# Patient Record
Sex: MALE | Race: WHITE | NOT HISPANIC OR LATINO | Employment: FULL TIME | URBAN - METROPOLITAN AREA
[De-identification: names, ages, dates, MRNs, and addresses within clinical notes are randomized per-mention and may not be internally consistent; named-entity substitution may affect disease eponyms.]

---

## 2017-11-30 ENCOUNTER — OFFICE VISIT (OUTPATIENT)
Dept: URGENT CARE | Facility: CLINIC | Age: 42
End: 2017-11-30
Payer: COMMERCIAL

## 2017-11-30 VITALS
DIASTOLIC BLOOD PRESSURE: 92 MMHG | HEART RATE: 102 BPM | WEIGHT: 230 LBS | SYSTOLIC BLOOD PRESSURE: 135 MMHG | BODY MASS INDEX: 36.1 KG/M2 | HEIGHT: 67 IN | TEMPERATURE: 99 F | RESPIRATION RATE: 20 BRPM | OXYGEN SATURATION: 98 %

## 2017-11-30 DIAGNOSIS — J40 SINOBRONCHITIS: Primary | ICD-10-CM

## 2017-11-30 DIAGNOSIS — J32.9 SINOBRONCHITIS: Primary | ICD-10-CM

## 2017-11-30 DIAGNOSIS — R05.9 COUGH: ICD-10-CM

## 2017-11-30 PROCEDURE — 96372 THER/PROPH/DIAG INJ SC/IM: CPT | Mod: S$GLB,,, | Performed by: EMERGENCY MEDICINE

## 2017-11-30 PROCEDURE — 99203 OFFICE O/P NEW LOW 30 MIN: CPT | Mod: S$GLB,,, | Performed by: PHYSICIAN ASSISTANT

## 2017-11-30 RX ORDER — BETAMETHASONE SODIUM PHOSPHATE AND BETAMETHASONE ACETATE 3; 3 MG/ML; MG/ML
6 INJECTION, SUSPENSION INTRA-ARTICULAR; INTRALESIONAL; INTRAMUSCULAR; SOFT TISSUE
Status: COMPLETED | OUTPATIENT
Start: 2017-11-30 | End: 2017-11-30

## 2017-11-30 RX ORDER — CODEINE PHOSPHATE AND GUAIFENESIN 10; 100 MG/5ML; MG/5ML
5 SOLUTION ORAL 3 TIMES DAILY PRN
Qty: 120 ML | Refills: 0 | Status: SHIPPED | OUTPATIENT
Start: 2017-11-30 | End: 2017-12-10

## 2017-11-30 RX ORDER — BENZONATATE 100 MG/1
200 CAPSULE ORAL 3 TIMES DAILY PRN
Qty: 40 CAPSULE | Refills: 0 | Status: SHIPPED | OUTPATIENT
Start: 2017-11-30 | End: 2020-01-13

## 2017-11-30 RX ORDER — AZITHROMYCIN 250 MG/1
TABLET, FILM COATED ORAL
Qty: 6 TABLET | Refills: 0 | Status: SHIPPED | OUTPATIENT
Start: 2017-11-30 | End: 2020-01-13 | Stop reason: HOSPADM

## 2017-11-30 RX ADMIN — BETAMETHASONE SODIUM PHOSPHATE AND BETAMETHASONE ACETATE 6 MG: 3; 3 INJECTION, SUSPENSION INTRA-ARTICULAR; INTRALESIONAL; INTRAMUSCULAR; SOFT TISSUE at 10:11

## 2017-11-30 NOTE — PROGRESS NOTES
"Subjective:       Patient ID: Wilfredo Thomas is a 42 y.o. male.    Vitals:  height is 5' 7" (1.702 m) and weight is 104.3 kg (230 lb). His oral temperature is 98.7 °F (37.1 °C). His blood pressure is 135/92 (abnormal) and his pulse is 102. His respiration is 20 and oxygen saturation is 98%.     Chief Complaint: Cough    Cough   This is a new problem. The current episode started in the past 7 days. The problem has been unchanged. The problem occurs every few minutes. The cough is non-productive. Associated symptoms include ear pain, postnasal drip and a sore throat. Pertinent negatives include no chest pain, chills, eye redness, fever, headaches, myalgias, shortness of breath or wheezing. Nothing aggravates the symptoms. He has tried nothing for the symptoms. His past medical history is significant for bronchitis.     Review of Systems   Constitution: Negative for chills, fever and malaise/fatigue.   HENT: Positive for ear pain, postnasal drip and sore throat. Negative for congestion and hoarse voice.    Eyes: Negative for discharge and redness.   Cardiovascular: Negative for chest pain, dyspnea on exertion and leg swelling.   Respiratory: Positive for cough. Negative for shortness of breath, sputum production and wheezing.    Musculoskeletal: Negative for myalgias.   Gastrointestinal: Negative for abdominal pain and nausea.   Neurological: Negative for headaches.       Objective:      Physical Exam   Constitutional: He is oriented to person, place, and time. He appears well-developed and well-nourished. He is cooperative.  Non-toxic appearance. He does not appear ill. No distress.   HENT:   Head: Normocephalic and atraumatic.   Right Ear: Hearing, tympanic membrane, external ear and ear canal normal.   Left Ear: Hearing, tympanic membrane, external ear and ear canal normal.   Nose: Mucosal edema and rhinorrhea present. No nasal deformity. No epistaxis. Right sinus exhibits maxillary sinus tenderness and frontal " sinus tenderness. Left sinus exhibits maxillary sinus tenderness. Left sinus exhibits no frontal sinus tenderness.   Mouth/Throat: Uvula is midline and mucous membranes are normal. No trismus in the jaw. Normal dentition. No uvula swelling. Posterior oropharyngeal erythema present.   Eyes: Conjunctivae and lids are normal. No scleral icterus.   Sclera clear bilat   Neck: Trachea normal, full passive range of motion without pain and phonation normal. Neck supple.   Cardiovascular: Normal rate, regular rhythm, normal heart sounds, intact distal pulses and normal pulses.    Pulmonary/Chest: Effort normal. No respiratory distress. He has no decreased breath sounds. He has no wheezes. He has no rhonchi. He has no rales.   Upper airway chest congestion noted with occasional cough   Abdominal: Soft. Normal appearance and bowel sounds are normal. He exhibits no distension. There is no tenderness.   Musculoskeletal: Normal range of motion. He exhibits no edema or deformity.   Neurological: He is alert and oriented to person, place, and time. He exhibits normal muscle tone. Coordination normal.   Skin: Skin is warm, dry and intact. He is not diaphoretic. No pallor.   Psychiatric: He has a normal mood and affect. His speech is normal and behavior is normal. Judgment and thought content normal. Cognition and memory are normal.   Nursing note and vitals reviewed.      Assessment:       1. Sinobronchitis    2. Cough        Plan:         Sinobronchitis  -     betamethasone acetate-betamethasone sodium phosphate injection 6 mg; Inject 1 mL (6 mg total) into the muscle one time.  -     azithromycin (ZITHROMAX Z-CARMEN) 250 MG tablet; Take 2 tablets (500 mg) on  Day 1,  followed by 1 tablet (250 mg) once daily on Days 2 through 5.  Dispense: 6 tablet; Refill: 0  -     benzonatate (TESSALON PERLES) 100 MG capsule; Take 2 capsules (200 mg total) by mouth 3 (three) times daily as needed for Cough.  Dispense: 40 capsule; Refill: 0  -      guaifenesin-codeine 100-10 mg/5 ml (TUSSI-ORGANIDIN NR)  mg/5 mL syrup; Take 5 mLs by mouth 3 (three) times daily as needed for Cough.  Dispense: 120 mL; Refill: 0    Cough  -     betamethasone acetate-betamethasone sodium phosphate injection 6 mg; Inject 1 mL (6 mg total) into the muscle one time.  -     azithromycin (ZITHROMAX Z-CARMEN) 250 MG tablet; Take 2 tablets (500 mg) on  Day 1,  followed by 1 tablet (250 mg) once daily on Days 2 through 5.  Dispense: 6 tablet; Refill: 0  -     benzonatate (TESSALON PERLES) 100 MG capsule; Take 2 capsules (200 mg total) by mouth 3 (three) times daily as needed for Cough.  Dispense: 40 capsule; Refill: 0  -     guaifenesin-codeine 100-10 mg/5 ml (TUSSI-ORGANIDIN NR)  mg/5 mL syrup; Take 5 mLs by mouth 3 (three) times daily as needed for Cough.  Dispense: 120 mL; Refill: 0        Bronchitis, Antibiotic Treatment (Adult)    Bronchitis is an infection of the air passages (bronchial tubes) in your lungs. It often occurs when you have a cold. This illness is contagious during the first few days and is spread through the air by coughing and sneezing, or by direct contact (touching the sick person and then touching your own eyes, nose, or mouth).  Symptoms of bronchitis include cough with mucus (phlegm) and low-grade fever. Bronchitis usually lasts 7 to 14 days. Mild cases can be treated with simple home remedies. More severe infection is treated with an antibiotic.  Home care  Follow these guidelines when caring for yourself at home:  · If your symptoms are severe, rest at home for the first 2 to 3 days. When you go back to your usual activities, don't let yourself get too tired.  · Do not smoke. Also avoid being exposed to secondhand smoke.  · You may use over-the-counter medicines to control fever or pain, unless another medicine was prescribed. (Note: If you have chronic liver or kidney disease or have ever had a stomach ulcer or gastrointestinal bleeding, talk with  your healthcare provider before using these medicines. Also talk to your provider if you are taking medicine to prevent blood clots.) Aspirin should never be given to anyone younger than 18 years of age who is ill with a viral infection or fever. It may cause severe liver or brain damage.  · Your appetite may be poor, so a light diet is fine. Avoid dehydration by drinking 6 to 8 glasses of fluids per day (such as water, soft drinks, sports drinks, juices, tea, or soup). Extra fluids will help loosen secretions in the nose and lungs.  · Over-the-counter cough, cold, and sore-throat medicines will not shorten the length of the illness, but they may be helpful to reduce symptoms. (Note: Do not use decongestants if you have high blood pressure.)  · Finish all antibiotic medicine. Do this even if you are feeling better after only a few days.  Follow-up care  Follow up with your healthcare provider, or as advised. If you had an X-ray or ECG (electrocardiogram), a specialist will review it. You will be notified of any new findings that may affect your care.  Note: If you are age 65 or older, or if you have a chronic lung disease or condition that affects your immune system, or you smoke, talk to your healthcare provider about having pneumococcal vaccinations and a yearly influenza vaccination (flu shot).  When to seek medical advice  Call your healthcare provider right away if any of these occur:  · Fever of 100.4°F (38°C) or higher  · Coughing up increased amounts of colored sputum  · Weakness, drowsiness, headache, facial pain, ear pain, or a stiff neck  Call 911, or get immediate medical care  Contact emergency services right away if any of these occur.  · Coughing up blood  · Worsening weakness, drowsiness, headache, or stiff neck  · Trouble breathing, wheezing, or pain with breathing  Date Last Reviewed: 9/13/2015  © 1496-4072 [employer]. [address] [place] [address], [place] [address]. All rights reserved.  This information is not intended as a substitute for professional medical care. Always follow your healthcare professional's instructions.        Sinusitis (Antibiotic Treatment)    The sinuses are air-filled spaces within the bones of the face. They connect to the inside of the nose. Sinusitis is an inflammation of the tissue lining the sinus cavity. Sinus inflammation can occur during a cold. It can also be due to allergies to pollens and other particles in the air. Sinusitis can cause symptoms of sinus congestion and fullness. A sinus infection causes fever, headache and facial pain. There is often green or yellow drainage from the nose or into the back of the throat (post-nasal drip). You have been given antibiotics to treat this condition.  Home care:  · Take the full course of antibiotics as instructed. Do not stop taking them, even if you feel better.  · Drink plenty of water, hot tea, and other liquids. This may help thin mucus. It also may promote sinus drainage.  · Heat may help soothe painful areas of the face. Use a towel soaked in hot water. Or,  the shower and direct the hot spray onto your face. Using a vaporizer along with a menthol rub at night may also help.   · An expectorant containing guaifenesin may help thin the mucus and promote drainage from the sinuses.  · Over-the-counter decongestants may be used unless a similar medicine was prescribed. Nasal sprays work the fastest. Use one that contains phenylephrine or oxymetazoline. First blow the nose gently. Then use the spray. Do not use these medicines more often than directed on the label or symptoms may get worse. You may also use tablets containing pseudoephedrine. Avoid products that combine ingredients, because side effects may be increased. Read labels. You can also ask the pharmacist for help. (NOTE: Persons with high blood pressure should not use decongestants. They can raise blood pressure.)  · Over-the-counter antihistamines may  help if allergies contributed to your sinusitis.    · Do not use nasal rinses or irrigation during an acute sinus infection, unless told to by your health care provider. Rinsing may spread the infection to other sinuses.  · Use acetaminophen or ibuprofen to control pain, unless another pain medicine was prescribed. (If you have chronic liver or kidney disease or ever had a stomach ulcer, talk with your doctor before using these medicines. Aspirin should never be used in anyone under 18 years of age who is ill with a fever. It may cause severe liver damage.)  · Don't smoke. This can worsen symptoms.  Follow-up care  Follow up with your healthcare provider or our staff if you are not improving within the next week.  When to seek medical advice  Call your healthcare provider if any of these occur:  · Facial pain or headache becoming more severe  · Stiff neck  · Unusual drowsiness or confusion  · Swelling of the forehead or eyelids  · Vision problems, including blurred or double vision  · Fever of 100.4ºF (38ºC) or higher, or as directed by your healthcare provider  · Seizure  · Breathing problems  · Symptoms not resolving within 10 days  Date Last Reviewed: 4/13/2015  © 0036-7672 [employer]. [address] [place] [address]. All rights reserved. This information is not intended as a substitute for professional medical care. Always follow your healthcare professional's instructions.      Please follow up with your Primary care provider within 2-5 days if your signs and symptoms have not resolved or worsen.     If your condition worsens or fails to improve we recommend that you receive another evaluation at the emergency room immediately or contact your primary medical clinic to discuss your concerns.   You must understand that you have received an Urgent Care treatment only and that you may be released before all of your medical problems are known or treated. You, the patient, will arrange for  follow up care as instructed.

## 2017-11-30 NOTE — PATIENT INSTRUCTIONS
Bronchitis, Antibiotic Treatment (Adult)    Bronchitis is an infection of the air passages (bronchial tubes) in your lungs. It often occurs when you have a cold. This illness is contagious during the first few days and is spread through the air by coughing and sneezing, or by direct contact (touching the sick person and then touching your own eyes, nose, or mouth).  Symptoms of bronchitis include cough with mucus (phlegm) and low-grade fever. Bronchitis usually lasts 7 to 14 days. Mild cases can be treated with simple home remedies. More severe infection is treated with an antibiotic.  Home care  Follow these guidelines when caring for yourself at home:  · If your symptoms are severe, rest at home for the first 2 to 3 days. When you go back to your usual activities, don't let yourself get too tired.  · Do not smoke. Also avoid being exposed to secondhand smoke.  · You may use over-the-counter medicines to control fever or pain, unless another medicine was prescribed. (Note: If you have chronic liver or kidney disease or have ever had a stomach ulcer or gastrointestinal bleeding, talk with your healthcare provider before using these medicines. Also talk to your provider if you are taking medicine to prevent blood clots.) Aspirin should never be given to anyone younger than 18 years of age who is ill with a viral infection or fever. It may cause severe liver or brain damage.  · Your appetite may be poor, so a light diet is fine. Avoid dehydration by drinking 6 to 8 glasses of fluids per day (such as water, soft drinks, sports drinks, juices, tea, or soup). Extra fluids will help loosen secretions in the nose and lungs.  · Over-the-counter cough, cold, and sore-throat medicines will not shorten the length of the illness, but they may be helpful to reduce symptoms. (Note: Do not use decongestants if you have high blood pressure.)  · Finish all antibiotic medicine. Do this even if you are feeling better after only a  few days.  Follow-up care  Follow up with your healthcare provider, or as advised. If you had an X-ray or ECG (electrocardiogram), a specialist will review it. You will be notified of any new findings that may affect your care.  Note: If you are age 65 or older, or if you have a chronic lung disease or condition that affects your immune system, or you smoke, talk to your healthcare provider about having pneumococcal vaccinations and a yearly influenza vaccination (flu shot).  When to seek medical advice  Call your healthcare provider right away if any of these occur:  · Fever of 100.4°F (38°C) or higher  · Coughing up increased amounts of colored sputum  · Weakness, drowsiness, headache, facial pain, ear pain, or a stiff neck  Call 911, or get immediate medical care  Contact emergency services right away if any of these occur.  · Coughing up blood  · Worsening weakness, drowsiness, headache, or stiff neck  · Trouble breathing, wheezing, or pain with breathing  Date Last Reviewed: 9/13/2015 © 2000-2017 [employer]. [address] [place] [address]. All rights reserved. This information is not intended as a substitute for professional medical care. Always follow your healthcare professional's instructions.        Sinusitis (Antibiotic Treatment)    The sinuses are air-filled spaces within the bones of the face. They connect to the inside of the nose. Sinusitis is an inflammation of the tissue lining the sinus cavity. Sinus inflammation can occur during a cold. It can also be due to allergies to pollens and other particles in the air. Sinusitis can cause symptoms of sinus congestion and fullness. A sinus infection causes fever, headache and facial pain. There is often green or yellow drainage from the nose or into the back of the throat (post-nasal drip). You have been given antibiotics to treat this condition.  Home care:  · Take the full course of antibiotics as instructed. Do not stop taking  them, even if you feel better.  · Drink plenty of water, hot tea, and other liquids. This may help thin mucus. It also may promote sinus drainage.  · Heat may help soothe painful areas of the face. Use a towel soaked in hot water. Or,  the shower and direct the hot spray onto your face. Using a vaporizer along with a menthol rub at night may also help.   · An expectorant containing guaifenesin may help thin the mucus and promote drainage from the sinuses.  · Over-the-counter decongestants may be used unless a similar medicine was prescribed. Nasal sprays work the fastest. Use one that contains phenylephrine or oxymetazoline. First blow the nose gently. Then use the spray. Do not use these medicines more often than directed on the label or symptoms may get worse. You may also use tablets containing pseudoephedrine. Avoid products that combine ingredients, because side effects may be increased. Read labels. You can also ask the pharmacist for help. (NOTE: Persons with high blood pressure should not use decongestants. They can raise blood pressure.)  · Over-the-counter antihistamines may help if allergies contributed to your sinusitis.    · Do not use nasal rinses or irrigation during an acute sinus infection, unless told to by your health care provider. Rinsing may spread the infection to other sinuses.  · Use acetaminophen or ibuprofen to control pain, unless another pain medicine was prescribed. (If you have chronic liver or kidney disease or ever had a stomach ulcer, talk with your doctor before using these medicines. Aspirin should never be used in anyone under 18 years of age who is ill with a fever. It may cause severe liver damage.)  · Don't smoke. This can worsen symptoms.  Follow-up care  Follow up with your healthcare provider or our staff if you are not improving within the next week.  When to seek medical advice  Call your healthcare provider if any of these occur:  · Facial pain or headache  becoming more severe  · Stiff neck  · Unusual drowsiness or confusion  · Swelling of the forehead or eyelids  · Vision problems, including blurred or double vision  · Fever of 100.4ºF (38ºC) or higher, or as directed by your healthcare provider  · Seizure  · Breathing problems  · Symptoms not resolving within 10 days  Date Last Reviewed: 4/13/2015  © 0320-9030 [employer]. [address] [place] [address]. All rights reserved. This information is not intended as a substitute for professional medical care. Always follow your healthcare professional's instructions.      Please follow up with your Primary care provider within 2-5 days if your signs and symptoms have not resolved or worsen.     If your condition worsens or fails to improve we recommend that you receive another evaluation at the emergency room immediately or contact your primary medical clinic to discuss your concerns.   You must understand that you have received an Urgent Care treatment only and that you may be released before all of your medical problems are known or treated. You, the patient, will arrange for follow up care as instructed.

## 2017-12-03 ENCOUNTER — TELEPHONE (OUTPATIENT)
Dept: URGENT CARE | Facility: CLINIC | Age: 42
End: 2017-12-03

## 2019-12-09 ENCOUNTER — OFFICE VISIT (OUTPATIENT)
Dept: URGENT CARE | Facility: CLINIC | Age: 44
End: 2019-12-09
Payer: COMMERCIAL

## 2019-12-09 VITALS
DIASTOLIC BLOOD PRESSURE: 106 MMHG | RESPIRATION RATE: 18 BRPM | WEIGHT: 240 LBS | BODY MASS INDEX: 37.67 KG/M2 | SYSTOLIC BLOOD PRESSURE: 160 MMHG | HEART RATE: 116 BPM | OXYGEN SATURATION: 97 % | TEMPERATURE: 96 F | HEIGHT: 67 IN

## 2019-12-09 DIAGNOSIS — L02.91 ABSCESS: Primary | ICD-10-CM

## 2019-12-09 PROCEDURE — 99214 OFFICE O/P EST MOD 30 MIN: CPT | Mod: 25,S$GLB,, | Performed by: NURSE PRACTITIONER

## 2019-12-09 PROCEDURE — 10060 INCISION & DRAINAGE: ICD-10-PCS | Mod: S$GLB,,, | Performed by: NURSE PRACTITIONER

## 2019-12-09 PROCEDURE — 10060 I&D ABSCESS SIMPLE/SINGLE: CPT | Mod: S$GLB,,, | Performed by: NURSE PRACTITIONER

## 2019-12-09 PROCEDURE — 99214 PR OFFICE/OUTPT VISIT, EST, LEVL IV, 30-39 MIN: ICD-10-PCS | Mod: 25,S$GLB,, | Performed by: NURSE PRACTITIONER

## 2019-12-09 RX ORDER — LISINOPRIL 20 MG/1
20 TABLET ORAL DAILY
COMMUNITY
End: 2020-01-13 | Stop reason: SDUPTHER

## 2019-12-09 RX ORDER — SULFAMETHOXAZOLE AND TRIMETHOPRIM 800; 160 MG/1; MG/1
1 TABLET ORAL 2 TIMES DAILY
Qty: 14 TABLET | Refills: 0 | Status: SHIPPED | OUTPATIENT
Start: 2019-12-09 | End: 2019-12-16

## 2019-12-09 RX ORDER — GLIPIZIDE 10 MG/1
5 TABLET, FILM COATED, EXTENDED RELEASE ORAL
COMMUNITY
End: 2020-01-13

## 2019-12-09 NOTE — LETTER
December 9, 2019      [facility]  [address] [address], [address]  [place] [address]  Phone: [phone]  Fax: [phone]       Patient: Wilfredo Thomas   YOB: 1975  Date of Visit: 12/09/2019    To Whom It May Concern:    Scar Thomas  was at [facility] on 12/09/2019. He may return to work/school on 12/11/2019 with no restrictions. If you have any questions or concerns, or if I can be of further assistance, please do not hesitate to contact me.    Sincerely,      Niesha Perry, RT

## 2019-12-09 NOTE — PROGRESS NOTES
"Subjective:       Patient ID: Wilfredo Thomas is a 44 y.o. male.    Vitals:  height is 5' 7" (1.702 m) and weight is 108.9 kg (240 lb). His tympanic temperature is 96.1 °F (35.6 °C). His blood pressure is 160/106 (abnormal) and his pulse is 116 (abnormal). His respiration is 18 and oxygen saturation is 97%.     Chief Complaint: Mass (lump under right arm)    Mass   This is a new problem. Episode onset: 1 week. The problem occurs constantly. The problem has been gradually worsening. Pertinent negatives include no arthralgias, chest pain, chills, congestion, coughing, fatigue, fever, headaches, joint swelling, myalgias, nausea, rash, sore throat, vertigo or vomiting. Exacerbated by: squeeze, palpation. Treatments tried: neosporin, squeezing. The treatment provided no relief.       Constitution: Negative for chills, fatigue and fever.   HENT: Negative for congestion and sore throat.    Cardiovascular: Negative for chest pain and leg swelling.   Eyes: Negative for double vision and blurred vision.   Respiratory: Negative for cough and shortness of breath.    Gastrointestinal: Negative for nausea, vomiting and diarrhea.   Genitourinary: Negative for dysuria, frequency and urgency.   Musculoskeletal: Negative for joint pain, joint swelling, muscle cramps and muscle ache.   Skin: Positive for abscess. Negative for color change, pale, rash and erythema.   Allergic/Immunologic: Negative for seasonal allergies.   Neurological: Negative for dizziness, history of vertigo, light-headedness, passing out and headaches.   Hematologic/Lymphatic: Negative for easy bruising/bleeding and history of blood clots. Does not bruise/bleed easily.   Psychiatric/Behavioral: Negative for nervous/anxious, sleep disturbance and depression. The patient is not nervous/anxious.        Objective:      Physical Exam   Constitutional: He is oriented to person, place, and time. Vital signs are normal. He appears well-developed and well-nourished. He is " cooperative.  Non-toxic appearance. He does not appear ill. No distress.   HENT:   Head: Normocephalic and atraumatic. Head is without abrasion, without contusion and without laceration.   Right Ear: Abnromal external ear normal.   Left Ear: Abnormal external ear normal.   Nose: Nose abnormal.   Mouth/Throat: Oropharynx is clear and moist and mucous membranes are normal.   Eyes: Pupils are equal, round, and reactive to light. Conjunctivae, EOM and lids are normal.   Neck: Trachea normal, full passive range of motion without pain and phonation normal. Neck supple.   Cardiovascular: Normal rate, regular rhythm and normal heart sounds.   Pulses:       Radial pulses are 2+ on the right side, and 2+ on the left side.   Pulmonary/Chest: Effort normal and breath sounds normal. No stridor. No respiratory distress.   Musculoskeletal: Normal range of motion.   Neurological: He is alert and oriented to person, place, and time.   Skin: Skin is warm, dry, intact, no rash and abscessed (to right axillary). Capillary refill takes less than 2 seconds. abrasion, burn, bruising, erythema and ecchymosis  Psychiatric: He has a normal mood and affect. His speech is normal and behavior is normal. Judgment and thought content normal. Cognition and memory are normal.   Nursing note and vitals reviewed.        Assessment:       1. Abscess        Plan:       This is an evaluation of a 44 y.o. male that presents to the Urgent Care for an abscess. Physical Exam shows a non-toxic, afebrile, and well appearing male. There is a 3 cm abscess to the right axillary with a central area of fluctuance with surrounding induration and erythema.  There is no active drainage.  Vital Signs Are Reassuring. Procedure: Abscess was drained per the procedure note. The wound was packed.      My overall impression is Abscess of the Right Axilla. I considered, but at this time, do not suspect an extensive cellulitis, sepsis, or bacteremia to warrant admission.      Additional D/C Information: warm compresses 10-15 minutes, 4-5 times a day to help increase wound drainage and decrease swelling. The diagnosis, treatment plan, instructions for follow-up and reevaluation with his PCP or the UC in 2 - 3 days for wound recheck as well as Urgent Care return precautions were discussed and understanding was verbalized. All questions or concerns have been addressed.       Abscess  -     sulfamethoxazole-trimethoprim 800-160mg (BACTRIM DS) 800-160 mg Tab; Take 1 tablet by mouth 2 (two) times daily. for 7 days  Dispense: 14 tablet; Refill: 0      Patient Instructions   Please keep your wound clean and dry.  Wash gently with soap and water and apply antibiotic ointment (bacitracin, neosporin, etc.) over the wound after washing. Please watch for signs of infection including: increased\spreading redness, swelling, or a fever greater than 100.4F. If you experience any of these, please contact your Primary Care Doctor or Return to the Urgent Care for a wound check.     If you were prescribed a narcotic or controlled medication, do not drive or operate heavy equipment or machinery while taking these medications.  You must understand that you've received an Urgent Care treatment only and that you may be released before all your medical problems are known or treated. You, the patient, will arrange for follow up care as instructed.  Follow up with your PCP or specialty clinic as directed within 2-5 days if not improved or as needed.  You can call [phone] to schedule an appointment with the appropriate provider.  If your condition worsens we recommend that you receive another evaluation at the emergency room immediately or contact your primary medical clinics after hours call service to discuss your concerns.  Please return here or go to the Emergency Department for any concerns or worsening of condition.              Abscess (Incision & Drainage)  An abscess is sometimes called a boil. It  happens when bacteria get trapped under the skin and start to grow. Pus forms inside the abscess as the body responds to the bacteria. An abscess can happen with an insect bite, ingrown hair, blocked oil gland, pimple, cyst, or puncture wound.  Your healthcare provider has drained the pus from your abscess. If the abscess pocket was large, your healthcare provider may have put in gauze packing. Your provider will need to remove it on your next visit. He or she may also replace it at that time. You may not need antibiotics to treat a simple abscess, unless the infection is spreading into the skin around the wound (cellulitis).  The wound will take about 1 to 2 weeks to heal, depending on the size of the abscess. Healthy tissue will grow from the bottom and sides of the opening until it seals over.  Home care  These tips can help your wound heal:  · The wound may drain for the first 2 days. Cover the wound with a clean dry dressing. Change the dressing if it becomes soaked with blood or pus.  · If a gauze packing was placed inside the abscess pocket, you may be told to remove it yourself. You may do this in the shower. Once the packing is removed, you should wash the area in the shower, or clean the area as directed by your provider. Continue to do this until the skin opening has closed. Make sure you wash your hands after changing the packing or cleaning the wound.  · If you were prescribed antibiotics, take them as directed until they are all gone.  · You may use acetaminophen or ibuprofen to control pain, unless another pain medicine was prescribed. If you have liver disease or ever had a stomach ulcer, talk with your doctor before using these medicines.  Follow-up care  Follow up with your healthcare provider, or as advised. If a gauze packing was put in your wound, it should be removed in 1 to 2 days. Check your wound every day for any signs that the infection is getting worse. The signs are listed below.  When  to seek medical advice  Call your healthcare provider right away if any of these occur:  · Increasing redness or swelling  · Red streaks in the skin leading away from the wound  · Increasing local pain or swelling  · Continued pus draining from the wound 2 days after treatment  · Fever of 100.4ºF (38ºC) or higher, or as directed by your healthcare provider  · Boil returns when you are at home  Date Last Reviewed: 9/1/2016  © 4304-1519 [employer]. [address] [place] [address]. All rights reserved. This information is not intended as a substitute for professional medical care. Always follow your healthcare professional's instructions.

## 2019-12-09 NOTE — PROCEDURES
"Incision & Drainage  Date/Time: 12/9/2019 3:20 PM  Performed by: Abby Dejesus NP  Authorized by: Abby Dejesus NP     Time out: Immediately prior to procedure a "time out" was called to verify the correct patient, procedure, equipment, support staff and site/side marked as required.    Consent Done?:  Yes (Verbal)    Type:  Abscess  Body area:  Upper extremity  Location details:  Right arm  Anesthesia:  Local infiltration  Local anesthetic: lidocaine 1% without epinephrine  Risk factor:  Underlying major vessel  Scalpel size:  11  Incision type:  Single straight  Complexity:  Simple  Drainage:  Pus and serosanguinous  Drainage amount:  Moderate  Wound treatment:  Incision and wound packed  Packing material:  1/2 in gauze  Patient tolerance:  Patient tolerated the procedure well with no immediate complications      "

## 2019-12-09 NOTE — PATIENT INSTRUCTIONS
Please keep your wound clean and dry.  Wash gently with soap and water and apply antibiotic ointment (bacitracin, neosporin, etc.) over the wound after washing. Please watch for signs of infection including: increased\spreading redness, swelling, or a fever greater than 100.4F. If you experience any of these, please contact your Primary Care Doctor or Return to the Urgent Care for a wound check.     If you were prescribed a narcotic or controlled medication, do not drive or operate heavy equipment or machinery while taking these medications.  You must understand that you've received an Urgent Care treatment only and that you may be released before all your medical problems are known or treated. You, the patient, will arrange for follow up care as instructed.  Follow up with your PCP or specialty clinic as directed within 2-5 days if not improved or as needed.  You can call [phone] to schedule an appointment with the appropriate provider.  If your condition worsens we recommend that you receive another evaluation at the emergency room immediately or contact your primary medical clinics after hours call service to discuss your concerns.  Please return here or go to the Emergency Department for any concerns or worsening of condition.              Abscess (Incision & Drainage)  An abscess is sometimes called a boil. It happens when bacteria get trapped under the skin and start to grow. Pus forms inside the abscess as the body responds to the bacteria. An abscess can happen with an insect bite, ingrown hair, blocked oil gland, pimple, cyst, or puncture wound.  Your healthcare provider has drained the pus from your abscess. If the abscess pocket was large, your healthcare provider may have put in gauze packing. Your provider will need to remove it on your next visit. He or she may also replace it at that time. You may not need antibiotics to treat a simple abscess, unless the infection is spreading into the skin  around the wound (cellulitis).  The wound will take about 1 to 2 weeks to heal, depending on the size of the abscess. Healthy tissue will grow from the bottom and sides of the opening until it seals over.  Home care  These tips can help your wound heal:  · The wound may drain for the first 2 days. Cover the wound with a clean dry dressing. Change the dressing if it becomes soaked with blood or pus.  · If a gauze packing was placed inside the abscess pocket, you may be told to remove it yourself. You may do this in the shower. Once the packing is removed, you should wash the area in the shower, or clean the area as directed by your provider. Continue to do this until the skin opening has closed. Make sure you wash your hands after changing the packing or cleaning the wound.  · If you were prescribed antibiotics, take them as directed until they are all gone.  · You may use acetaminophen or ibuprofen to control pain, unless another pain medicine was prescribed. If you have liver disease or ever had a stomach ulcer, talk with your doctor before using these medicines.  Follow-up care  Follow up with your healthcare provider, or as advised. If a gauze packing was put in your wound, it should be removed in 1 to 2 days. Check your wound every day for any signs that the infection is getting worse. The signs are listed below.  When to seek medical advice  Call your healthcare provider right away if any of these occur:  · Increasing redness or swelling  · Red streaks in the skin leading away from the wound  · Increasing local pain or swelling  · Continued pus draining from the wound 2 days after treatment  · Fever of 100.4ºF (38ºC) or higher, or as directed by your healthcare provider  · Boil returns when you are at home  Date Last Reviewed: 9/1/2016  © 7534-1291 [employer]. [address] [place] [address], [place] [address]. All rights reserved. This information is not intended as a substitute for professional medical  care. Always follow your healthcare professional's instructions.

## 2019-12-12 ENCOUNTER — TELEPHONE (OUTPATIENT)
Dept: URGENT CARE | Facility: CLINIC | Age: 44
End: 2019-12-12

## 2020-01-10 PROBLEM — E11.9 TYPE 2 DIABETES MELLITUS WITHOUT COMPLICATION, WITHOUT LONG-TERM CURRENT USE OF INSULIN: Status: ACTIVE | Noted: 2020-01-10

## 2020-01-10 PROBLEM — I10 ESSENTIAL HYPERTENSION: Status: ACTIVE | Noted: 2020-01-10

## 2020-01-10 PROBLEM — E66.01 CLASS 2 SEVERE OBESITY DUE TO EXCESS CALORIES WITH SERIOUS COMORBIDITY AND BODY MASS INDEX (BMI) OF 37.0 TO 37.9 IN ADULT: Status: ACTIVE | Noted: 2020-01-10

## 2020-01-10 PROBLEM — E66.812 CLASS 2 SEVERE OBESITY DUE TO EXCESS CALORIES WITH SERIOUS COMORBIDITY AND BODY MASS INDEX (BMI) OF 37.0 TO 37.9 IN ADULT: Status: ACTIVE | Noted: 2020-01-10

## 2020-01-10 NOTE — PROGRESS NOTES
FAMILY MEDICINE    Patient Active Problem List   Diagnosis    Type 2 diabetes mellitus with hyperglycemia, without long-term current use of insulin    Essential hypertension    Class 2 severe obesity due to excess calories with serious comorbidity and body mass index (BMI) of 35.0 to 35.9 in adult    History of gout    Tobacco dependence in remission       CC:   Chief Complaint   Patient presents with    [facility]    Diabetes    Hypertension       HPI: Wilfredo Thomas is a 44 y.o. male  - smoker (reently quit smoking 1/2020) with obesity, hypertension, type 2 diabetes and gout (since 2000) presents to [facility]. Last PCP Dr. Aylin Ruiz [facility]    1. Diabetes Type 2  Age: 41 yo    Current treatment regimen:   empagliflozin (JARDIANCE) 25 mg Tab, Take 25 mg by mouth., Disp: , Rfl:     glipiZIDE (GLUCOTROL) 10 MG TR24, Take 5 mg by mouth daily with breakfast., Disp: , Rfl:   - has been out of medication    Side effects from treatment: denies  Complications of diabetes: unsure and reports that has poor understanding of disease and management  - does not know what his last A1C was    Glucometer: none  Glucose monitoring: NA  A. Fasting: NA  7day: NA 14 day: NA 30 day: NA    1/13/2020 POC glucose: 222 mg/dL    Last A1C:   No results found for: LABA1C, HGBA1C    Low dose statin: none and declines at this time <5 years since diagnosis  Last eye exam: none John doc but never had dilated exam. Recommend camera exam today 1/13/2020  Last foot exam: due today 1/13/2020    Vaccines:   Influenza: denies and reports that did not tolerate  Pneumovax: 23 recommend and pt declined today  Prevnar 13: NA    2. Hypertension    Current medication treatment:   lisinopril (PRINIVIL,ZESTRIL) 20 MG tablet, Take 20 mg by mouth once daily., Disp: , Rfl:     Medication side effects: denies  Exercise regimen: none  Dietary treatment: poor compliance but has plans to improven    Home BP cuff: none  How often does  patient monitoring BP? NA  Last home BP reading: NA     Last 14 day average of home BP reading: NA    3. Gout:  Typical attack: one of his toes and once on the heel  Frequency: rare  Last gout attack: about 1 year    Current preventative: none and declined  Dietary compliance: attempts  PRN medication: Indomethacin     No results found for: URICACID        HEALTH MAINTENANCE:   Health Maintenance   Topic Date Due    Lipid Panel  1973    Hemoglobin A1c  1973    Foot Exam  07/23/1983    Eye Exam  07/23/1983    TETANUS VACCINE  07/23/1991    Pneumococcal Vaccine (Medium Risk) (1 of 1 - PPSV23) 07/23/1992    Low Dose Statin  07/23/1994       ROS: Review of Systems   Constitutional: Negative.    HENT: Negative.    Eyes: Negative.    Respiratory: Negative.    Cardiovascular: Negative.    Gastrointestinal: Negative.    Endocrine: Negative.    Genitourinary: Negative.    Musculoskeletal: Positive for arthralgias (right 5th toes back s/p hitting it against a wall).        Otherwise negative   Skin: Negative.    Allergic/Immunologic: Negative.    Neurological: Negative.    Hematological: Negative.    Psychiatric/Behavioral: Negative.        ALLERGIES:   Review of patient's allergies indicates:  No Known Allergies    MEDS:     Current Outpatient Medications:     empagliflozin (JARDIANCE) 25 mg Tab, Take 25 mg by mouth once daily., Disp: 90 tablet, Rfl: 0    indomethacin (INDOCIN SR) 75 mg CpSR CR capsule, Take 75 mg by mouth 2 (two) times daily as needed., Disp: , Rfl:     lisinopril (PRINIVIL,ZESTRIL) 20 MG tablet, Take 1 tablet (20 mg total) by mouth once daily., Disp: 90 tablet, Rfl: 1    blood sugar diagnostic Strp, 1 strip by Misc.(Non-Drug; Combo Route) route once daily., Disp: 100 each, Rfl: 3    blood-glucose meter kit, Use as instructed, Disp: 1 each, Rfl: 0    glipiZIDE (GLUCOTROL) 5 MG tablet, Take 1 tablet (5 mg total) by mouth 2 (two) times daily with meals., Disp: 180 tablet, Rfl: 0     "lancets Misc, 1 lancet by Misc.(Non-Drug; Combo Route) route once daily., Disp: 100 each, Rfl: 3    metFORMIN (GLUCOPHAGE-XR) 500 MG 24 hr tablet, Take 1 tablet (500 mg total) by mouth daily with breakfast. Generic, Disp: 90 tablet, Rfl: 0    Past Medical History:   Diagnosis Date    Gout     Hypertension        History reviewed. No pertinent surgical history.    Family History   Problem Relation Age of Onset    Diabetes Mother     No Known Problems Father        Social History     Tobacco Use    Smoking status: Former Smoker     Packs/day: 0.50     Types: Cigarettes     Last attempt to quit: 2020     Years since quittin.0    Smokeless tobacco: Never Used   Substance Use Topics    Alcohol use: Yes     Alcohol/week: 12.0 standard drinks     Types: 12 Cans of beer per week     Frequency: 4 or more times a week     Drinks per session: 5 or 6    Drug use: Never       Social History     Social History Narrative    Lives with wife and 2 kids in college and one older with 1 grand child. 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    Physical Exam   Constitutional: He is oriented to person, place, and time. No distress.   HENT:   Head: Normocephalic and atraumatic.   Right Ear: Tympanic membrane and ear canal normal.   Left Ear: Tympanic membrane and ear canal normal.   Nose: Nose normal.   Mouth/Throat: Uvula is midline, oropharynx is clear and moist and mucous membranes are normal.   Eyes: Pupils are equal, round, and reactive to light. Conjunctivae and EOM are normal.   Neck: Trachea normal. Neck supple. Normal carotid pulses and no JVD present. Carotid bruit is not present. No thyromegaly present. "   Cardiovascular: Normal rate, regular rhythm, normal heart sounds and intact distal pulses. Exam reveals no gallop and no friction rub.   No murmur heard.  Pulses:       Dorsalis pedis pulses are 2+ on the right side, and 2+ on the left side.        Posterior tibial pulses are 2+ on the right side, and 2+ on the left side.   Pulmonary/Chest: Effort normal and breath sounds normal. He has no decreased breath sounds. He has no wheezes. He has no rhonchi. He has no rales.   Abdominal: Soft. Normal appearance and bowel sounds are normal. There is no tenderness.   Feet:   Right Foot:   Protective Sensation: 5 sites tested. 5 sites sensed.   Skin Integrity: Negative for ulcer, blister, skin breakdown, erythema, warmth or callus.   Left Foot:   Protective Sensation: 5 sites tested. 5 sites sensed.   Skin Integrity: Negative for ulcer, blister, skin breakdown, erythema, warmth or callus.   Neurological: He is alert and oriented to person, place, and time.   Skin: Skin is warm. Capillary refill takes less than 2 seconds. No cyanosis. Nails show no clubbing.         Depression Patient Health Questionnaire 1/13/2020   Over the last two weeks how often have you been bothered by little interest or pleasure in doing things 0   Over the last two weeks how often have you been bothered by feeling down, depressed or hopeless 0   PHQ-2 Total Score 0       PERTINENT RESULTS:   No results found for: LABA1C, HGBA1C  BMP  Lab Results   Component Value Date    CREATININE 0.9 09/21/2016    ESTGFRAFRICA >60 09/21/2016    EGFRNONAA >60 09/21/2016     No results found for: CHOL  No results found for: HDL  No results found for: LDLCALC  No results found for: TRIG  No results found for: CHOLHDL  Lab Results   Component Value Date    ALT 43 09/21/2016    AST 34 09/21/2016    ALKPHOS 60 09/21/2016    BILITOT 0.5 09/21/2016       ASSESSMENT/PLAN:  Problem List Items Addressed This Visit        Cardiac/Vascular    Essential hypertension    Current  Assessment & Plan     - well controlled  - continue current medications         Relevant Medications    lisinopril (PRINIVIL,ZESTRIL) 20 MG tablet    Other Relevant Orders    Lipid panel    Microalbumin/creatinine urine ratio    Comprehensive metabolic panel       Endocrine    Type 2 diabetes mellitus with hyperglycemia, without long-term current use of insulin    Current Assessment & Plan     - glucose today fasting >200 mg/dL  - counseling on diabetes, management, prevention  - referral to DM education  - discussed recommendation for diet, exercise and weight loss  - continue Jardiance 25 mg daily  - restart Glipizide at 5 mg BID with meals  - start Metformin  mg daily  - counseling regarding new medication including expected results, potential side effects, and appropriate use. Questions elicited and answered  - encouraged to patient to notify me of any questions or concerns  - recommend monitor glucose daily and educated on goals and to contact me if no improvement  - recommend vaccines but pt declined         Relevant Medications    blood-glucose meter kit    blood sugar diagnostic Strp    lancets Misc    empagliflozin (JARDIANCE) 25 mg Tab    metFORMIN (GLUCOPHAGE-XR) 500 MG 24 hr tablet    glipiZIDE (GLUCOTROL) 5 MG tablet    Other Relevant Orders    [facility] DIABETES FOOT EXAM (Completed)    Lipid panel    Microalbumin/creatinine urine ratio    Comprehensive metabolic panel    Hemoglobin A1c    Diabetic Eye Screening Photo    POCT Glucose, Hand-Held Device    Ambulatory Referral to Diabetes Education       Orthopedic    History of gout    Current Assessment & Plan     - rare occurrence  - discussed recommendation for diet, exercise and weight loss  - check baseline uric acid         Relevant Orders    Uric acid       Other    Class 2 severe obesity due to excess calories with serious comorbidity and body mass index (BMI) of 35.0 to 35.9 in adult    Current Assessment & Plan     - discussed recommendation for  diet, exercise and weight loss         Tobacco dependence in remission    Overview     - quit smoking  - good job!               ORDERS:   Orders Placed This Encounter    Lipid panel    Microalbumin/creatinine urine ratio    Comprehensive metabolic panel    Hemoglobin A1c    Uric acid    Ambulatory Referral to Diabetes Education    POCT Glucose, Hand-Held Device    [facility] DIABETES FOOT EXAM    Diabetic Eye Screening Photo    blood-glucose meter kit    blood sugar diagnostic Strp    lancets Misc    empagliflozin (JARDIANCE) 25 mg Tab    metFORMIN (GLUCOPHAGE-XR) 500 MG 24 hr tablet    glipiZIDE (GLUCOTROL) 5 MG tablet    lisinopril (PRINIVIL,ZESTRIL) 20 MG tablet       ROCHELLE signed to get records from last provider    Vaccines recommended: Td, Flu and PPSV 23. Pt declined all vaccines    Follow-up in 2-4 weeks with labs.     Dr. Aylin Wayne D.O.   Family Medicine

## 2020-01-13 ENCOUNTER — OFFICE VISIT (OUTPATIENT)
Dept: FAMILY MEDICINE | Facility: CLINIC | Age: 45
End: 2020-01-13
Payer: COMMERCIAL

## 2020-01-13 VITALS
OXYGEN SATURATION: 98 % | TEMPERATURE: 98 F | SYSTOLIC BLOOD PRESSURE: 136 MMHG | HEIGHT: 67 IN | WEIGHT: 225.63 LBS | BODY MASS INDEX: 35.41 KG/M2 | DIASTOLIC BLOOD PRESSURE: 84 MMHG | HEART RATE: 97 BPM

## 2020-01-13 DIAGNOSIS — E11.65 TYPE 2 DIABETES MELLITUS WITH HYPERGLYCEMIA, WITHOUT LONG-TERM CURRENT USE OF INSULIN: ICD-10-CM

## 2020-01-13 DIAGNOSIS — I10 ESSENTIAL HYPERTENSION: ICD-10-CM

## 2020-01-13 DIAGNOSIS — F17.201 TOBACCO DEPENDENCE IN REMISSION: ICD-10-CM

## 2020-01-13 DIAGNOSIS — E66.01 CLASS 2 SEVERE OBESITY DUE TO EXCESS CALORIES WITH SERIOUS COMORBIDITY AND BODY MASS INDEX (BMI) OF 35.0 TO 35.9 IN ADULT: ICD-10-CM

## 2020-01-13 DIAGNOSIS — Z87.39 HISTORY OF GOUT: ICD-10-CM

## 2020-01-13 PROCEDURE — 99999 PR PBB SHADOW E&M-EST. PATIENT-LVL V: CPT | Mod: PBBFAC,,, | Performed by: FAMILY MEDICINE

## 2020-01-13 PROCEDURE — 3075F SYST BP GE 130 - 139MM HG: CPT | Mod: CPTII,S$GLB,, | Performed by: FAMILY MEDICINE

## 2020-01-13 PROCEDURE — 3079F DIAST BP 80-89 MM HG: CPT | Mod: CPTII,S$GLB,, | Performed by: FAMILY MEDICINE

## 2020-01-13 PROCEDURE — 99214 PR OFFICE/OUTPT VISIT, EST, LEVL IV, 30-39 MIN: ICD-10-PCS | Mod: S$GLB,,, | Performed by: FAMILY MEDICINE

## 2020-01-13 PROCEDURE — 99214 OFFICE O/P EST MOD 30 MIN: CPT | Mod: S$GLB,,, | Performed by: FAMILY MEDICINE

## 2020-01-13 PROCEDURE — 3075F PR MOST RECENT SYSTOLIC BLOOD PRESS GE 130-139MM HG: ICD-10-PCS | Mod: CPTII,S$GLB,, | Performed by: FAMILY MEDICINE

## 2020-01-13 PROCEDURE — 3008F BODY MASS INDEX DOCD: CPT | Mod: CPTII,S$GLB,, | Performed by: FAMILY MEDICINE

## 2020-01-13 PROCEDURE — 99999 PR PBB SHADOW E&M-EST. PATIENT-LVL V: ICD-10-PCS | Mod: PBBFAC,,, | Performed by: FAMILY MEDICINE

## 2020-01-13 PROCEDURE — 3008F PR BODY MASS INDEX (BMI) DOCUMENTED: ICD-10-PCS | Mod: CPTII,S$GLB,, | Performed by: FAMILY MEDICINE

## 2020-01-13 PROCEDURE — 3079F PR MOST RECENT DIASTOLIC BLOOD PRESSURE 80-89 MM HG: ICD-10-PCS | Mod: CPTII,S$GLB,, | Performed by: FAMILY MEDICINE

## 2020-01-13 RX ORDER — METFORMIN HYDROCHLORIDE 500 MG/1
500 TABLET, EXTENDED RELEASE ORAL
Qty: 90 TABLET | Refills: 0 | Status: SHIPPED | OUTPATIENT
Start: 2020-01-13 | End: 2020-01-15 | Stop reason: SDUPTHER

## 2020-01-13 RX ORDER — INSULIN PUMP SYRINGE, 3 ML
EACH MISCELLANEOUS
Qty: 1 EACH | Refills: 0 | Status: SHIPPED | OUTPATIENT
Start: 2020-01-13 | End: 2021-10-13 | Stop reason: SDUPTHER

## 2020-01-13 RX ORDER — GLIPIZIDE 5 MG/1
5 TABLET ORAL 2 TIMES DAILY WITH MEALS
Qty: 180 TABLET | Refills: 0 | Status: SHIPPED | OUTPATIENT
Start: 2020-01-13 | End: 2020-01-15 | Stop reason: SDUPTHER

## 2020-01-13 RX ORDER — LANCETS
1 EACH MISCELLANEOUS DAILY
Qty: 100 EACH | Refills: 3 | Status: SHIPPED | OUTPATIENT
Start: 2020-01-13 | End: 2021-03-29 | Stop reason: SDUPTHER

## 2020-01-13 RX ORDER — INDOMETHACIN 75 MG/1
75 CAPSULE, EXTENDED RELEASE ORAL 2 TIMES DAILY PRN
COMMUNITY
End: 2021-03-25

## 2020-01-13 RX ORDER — LISINOPRIL 20 MG/1
20 TABLET ORAL DAILY
Qty: 90 TABLET | Refills: 1 | Status: SHIPPED | OUTPATIENT
Start: 2020-01-13 | End: 2020-01-15 | Stop reason: SDUPTHER

## 2020-01-13 NOTE — ASSESSMENT & PLAN NOTE
- glucose today fasting >200 mg/dL  - counseling on diabetes, management, prevention  - referral to DM education  - discussed recommendation for diet, exercise and weight loss  - continue Jardiance 25 mg daily  - restart Glipizide at 5 mg BID with meals  - start Metformin  mg daily  - counseling regarding new medication including expected results, potential side effects, and appropriate use. Questions elicited and answered  - encouraged to patient to notify me of any questions or concerns  - recommend monitor glucose daily and educated on goals and to contact me if no improvement  - recommend vaccines but pt declined

## 2020-01-13 NOTE — ASSESSMENT & PLAN NOTE
- rare occurrence  - discussed recommendation for diet, exercise and weight loss  - check baseline uric acid

## 2020-01-13 NOTE — PATIENT INSTRUCTIONS
1. It is important that you bring your glucose monitor to every clinic visit.   2. Glucose goals  - Fasting AM glucose (no food 8 hours)  mg/dL  - 2 hours after you eat a meal, your glucose should be </=140 mg/dL  3. Monitor your glucose: daily  4. Diabetes medication:   - Metformin  mg daily   - Glipizide 5 mg twice a day with meals  - Jardiance 25 mg daily

## 2020-01-15 ENCOUNTER — PATIENT MESSAGE (OUTPATIENT)
Dept: FAMILY MEDICINE | Facility: CLINIC | Age: 45
End: 2020-01-15

## 2020-01-15 ENCOUNTER — TELEPHONE (OUTPATIENT)
Dept: FAMILY MEDICINE | Facility: CLINIC | Age: 45
End: 2020-01-15

## 2020-01-15 DIAGNOSIS — E11.65 TYPE 2 DIABETES MELLITUS WITH HYPERGLYCEMIA, WITHOUT LONG-TERM CURRENT USE OF INSULIN: ICD-10-CM

## 2020-01-15 DIAGNOSIS — I10 ESSENTIAL HYPERTENSION: Primary | ICD-10-CM

## 2020-01-15 DIAGNOSIS — I10 ESSENTIAL HYPERTENSION: ICD-10-CM

## 2020-01-15 RX ORDER — LISINOPRIL 20 MG/1
TABLET ORAL
Qty: 90 TABLET | OUTPATIENT
Start: 2020-01-15

## 2020-01-15 RX ORDER — GLIPIZIDE 5 MG/1
TABLET ORAL
Qty: 180 TABLET | OUTPATIENT
Start: 2020-01-15

## 2020-01-15 RX ORDER — LISINOPRIL 20 MG/1
20 TABLET ORAL DAILY
Qty: 30 TABLET | Refills: 1 | Status: SHIPPED | OUTPATIENT
Start: 2020-01-15 | End: 2020-02-10

## 2020-01-15 RX ORDER — METFORMIN HYDROCHLORIDE 500 MG/1
500 TABLET, EXTENDED RELEASE ORAL
Qty: 30 TABLET | Refills: 0 | Status: SHIPPED | OUTPATIENT
Start: 2020-01-15 | End: 2020-02-10

## 2020-01-15 RX ORDER — METFORMIN HYDROCHLORIDE 500 MG/1
500 TABLET, EXTENDED RELEASE ORAL
Qty: 90 TABLET | Refills: 0 | Status: SHIPPED | OUTPATIENT
Start: 2020-01-15 | End: 2020-02-10 | Stop reason: SDUPTHER

## 2020-01-15 RX ORDER — GLIPIZIDE 5 MG/1
5 TABLET ORAL
Qty: 180 TABLET | Refills: 0 | Status: SHIPPED | OUTPATIENT
Start: 2020-01-15 | End: 2020-04-20 | Stop reason: SDUPTHER

## 2020-01-15 RX ORDER — GLIPIZIDE 5 MG/1
5 TABLET ORAL 2 TIMES DAILY WITH MEALS
Qty: 60 TABLET | Refills: 0 | Status: SHIPPED | OUTPATIENT
Start: 2020-01-15 | End: 2020-02-10

## 2020-01-15 RX ORDER — METFORMIN HYDROCHLORIDE 500 MG/1
TABLET, EXTENDED RELEASE ORAL
Qty: 90 TABLET | OUTPATIENT
Start: 2020-01-15

## 2020-01-15 RX ORDER — LISINOPRIL 20 MG/1
20 TABLET ORAL DAILY
Qty: 90 TABLET | Refills: 1 | Status: SHIPPED | OUTPATIENT
Start: 2020-01-15 | End: 2020-05-27 | Stop reason: SDUPTHER

## 2020-01-15 NOTE — TELEPHONE ENCOUNTER
Yes, all medications sent 1 month supply to Narayan Wayne D.O.   [facility]

## 2020-01-15 NOTE — TELEPHONE ENCOUNTER
Joel Wayne!  It looks like [facility] has not begun filling my meds.  I am out of one of them and only two pills left of another.  Could you possibly call the meds into [facility].  Just one month supply? Thank you!     Pt wrote in , I called pt to clarify which med's there was no answer so I left a message.

## 2020-01-27 ENCOUNTER — PATIENT OUTREACH (OUTPATIENT)
Dept: ADMINISTRATIVE | Facility: HOSPITAL | Age: 45
End: 2020-01-27

## 2020-01-27 ENCOUNTER — CLINICAL SUPPORT (OUTPATIENT)
Dept: DIABETES | Facility: CLINIC | Age: 45
End: 2020-01-27
Payer: COMMERCIAL

## 2020-01-27 DIAGNOSIS — E11.65 TYPE 2 DIABETES MELLITUS WITH HYPERGLYCEMIA, WITHOUT LONG-TERM CURRENT USE OF INSULIN: ICD-10-CM

## 2020-01-27 PROCEDURE — G0108 PR DIAB MANAGE TRN  PER INDIV: ICD-10-PCS | Mod: S$GLB,,, | Performed by: DIETITIAN, REGISTERED

## 2020-01-27 PROCEDURE — 99999 PR PBB SHADOW E&M-EST. PATIENT-LVL I: CPT | Mod: PBBFAC,,, | Performed by: DIETITIAN, REGISTERED

## 2020-01-27 PROCEDURE — G0108 DIAB MANAGE TRN  PER INDIV: HCPCS | Mod: S$GLB,,, | Performed by: DIETITIAN, REGISTERED

## 2020-01-27 PROCEDURE — 99999 PR PBB SHADOW E&M-EST. PATIENT-LVL I: ICD-10-PCS | Mod: PBBFAC,,, | Performed by: DIETITIAN, REGISTERED

## 2020-01-29 NOTE — PROGRESS NOTES
Diabetes Education  Author: Mehreen Valentine RD  Date: 1/29/2020    Diabetes Care Management Summary  Diabetes Education Record Assessment/Progress: Initial  Current Diabetes Risk Level: Moderate     Last A1c: No results found for: HGBA1C  Last visit with Diabetes Educator: Last Education Visit: Not Found      Diabetes Type  Diabetes Type : Type II    Diabetes History  Diabetes Diagnosis: 1-3 years  Current Treatment: Oral Medication(glipizide, metformin, jadiance)  Reviewed Problem List with Patient: Yes    Health Maintenance was reviewed today with patient. Discussed with patient importance of routine eye exams, foot exams/foot care, blood work (i.e.: A1c, microalbumin, and lipid), dental visits, yearly flu vaccine, and pneumonia vaccine as indicated by PCP. Patient verbalized understanding.     Health Maintenance Topics with due status: Not Due       Topic Last Completion Date    Foot Exam 01/13/2020     Health Maintenance Due   Topic Date Due    Lipid Panel  1973    Hemoglobin A1c  1973    Eye Exam  07/23/1983    TETANUS VACCINE  07/23/1991    Pneumococcal Vaccine (Medium Risk) (1 of 1 - PPSV23) 07/23/1992    Low Dose Statin  07/23/1994       Nutrition  Meal Planning: 3 meals per day  What type of beverages do you drink?: water  Meal Plan 24 Hour Recall - Breakfast: toast  Meal Plan 24 Hour Recall - Lunch: sandwich  Meal Plan 24 Hour Recall - Dinner: baked chicken, chicken breast, jambalaya  Meal Plan 24 Hour Recall - Snack: apples, oranges    Monitoring   Self Monitoring : Just got meter  Blood Glucose Logs: No  Do you use a personal continuous glucose monitor?: No    Exercise   Exercise Type: walking  Intensity: Moderate  Frequency: 3-5 Times per week  Duration: 45 min    Current Diabetes Treatment   Current Treatment: Oral Medication(glipizide, metformin, jadiance)    Social History  Preferred Learning Method: Face to Face  Primary Support: Self  Occupation:   Smoking Status: Ex  Smoker(x3wks)            DDS-2 Score  ( > 3 = SIGNIFICANT DISTRESS): 1                   Barriers to Change  Barriers to Change: None  Learning Challenges : None    Readiness to Learn   Readiness to Learn : Acceptance    Cultural Influences  Cultural Influences: No    Diabetes Education Assessment/Progress  Diabetes Disease Process (diabetes disease process and treatment options): Individual Session, Written Materials Provided, Instructed, Demonstrates Understanding/Competency(verbalizes/demonstrates), Comprehends Key Points, Discussion  Nutrition (Incorporating nutritional management into one's lifestyle): Individual Session, Written Materials Provided, Instructed, Demonstrates Understanding/Competency (verbalizes/demonstrates), Comprehends Key Points, Discussion  Physical Activity (incorporating physical activity into one's lifestyle): Individual Session, Written Materials Provided, Instructed, Demonstrates Understanding/Competency (verbalizes/demonstrates), Comprehends Key Points, Discussion  Medications (states correct name, dose, onset, peak, duration, side effects & timing of meds): Individual Session, Written Materials Provided, Instructed, Demonstrates Understanding/Competency(verbalizes/demonstrates), Comprehends Key Points, Discussion  Monitoring (monitoring blood glucose/other parameters & using results): Individual Session, Written Materials Provided, Instructed, Demonstrates Understanding/Competency (verbalizes/demonstrates), Comprehends Key Points, Discussion  Acute Complications (preventing, detecting, and treating acute complications): Individual Session, Written Materials Provided, Instructed, Demonstrates Understanding/Competency (verbalizes/demonstrates), Comprehends Key Points, Discussion  Chronic Complications (preventing, detecting, and treating chronic complications): Individual Session, Written Materials Provided, Instructed, Demonstrates Understanding/Competency (verbalizes/demonstrates),  Comprehends Key Points, Discussion  Clinical (diabetes, other pertinent medical history, and relevant comorbidities reviewed during visit): Discussion, Comprehends Key Points  Cognitive (knowledge of self-management skills, functional health literacy): Discussion, Comprehends Key Points  Psychosocial (emotional response to diabetes): Discussion, Comprehends Key Points  Diabetes Distress and Support Systems: Discussion, Comprehends Key Points  Behavioral (readiness for change, lifestyle practices, self-care behaviors): Discussion, Comprehends Key Points    Goals  Patient has selected/evaluated goals during today's session: Yes, selected  Healthy Eating: Set(Patient to limit CHO intake at meals in order to have bg wnl )  Start Date: 01/27/20              Diabetes Meal Plan  Carbohydrate Per Meal: 45-60g  Carbohydrate Per Snack : 15-20g    Today's Self-Management Care Plan was developed with the patient's input and is based on barriers identified during today's assessment.    The long and short-term goals in the care plan were written with the patient/caregiver's input. The patient has agreed to work toward these goals to improve his overall diabetes control.      The patient received a copy of today's self-management plan and verbalized understanding of the care plan, goals, and all of today's instructions.      The patient was encouraged to communicate with his physician and care team regarding his condition(s) and treatment.  I provided the patient with my contact information today and encouraged him to contact me via phone or patient portal as needed.     Education Units of Time   Time Spent: 60 min

## 2020-01-31 ENCOUNTER — PATIENT MESSAGE (OUTPATIENT)
Dept: FAMILY MEDICINE | Facility: CLINIC | Age: 45
End: 2020-01-31

## 2020-02-07 PROBLEM — R79.89 ELEVATED LFTS: Status: ACTIVE | Noted: 2020-02-07

## 2020-02-07 PROBLEM — E78.2 MIXED HYPERLIPIDEMIA: Status: ACTIVE | Noted: 2020-02-07

## 2020-02-07 NOTE — PROGRESS NOTES
FAMILY MEDICINE    Patient Active Problem List   Diagnosis    Type 2 diabetes mellitus with hyperglycemia, without long-term current use of insulin    Essential hypertension    Class 1 obesity due to excess calories with serious comorbidity and body mass index (BMI) of 34.0 to 34.9 in adult    History of gout    Tobacco dependence in remission    Elevated LFTs    Mixed hyperlipidemia       CC:   Chief Complaint   Patient presents with    Follow-up     b/p     Diabetes       HPI: Wilfredo Thomas is a 44 y.o. male  - smoker (recently quit smoking 2020) with obesity, hypertension, type 2 diabetes and gout (since ) presents to follow-up diabetes and labs    Reports that night prior to labs was the super bowl and he had several beers that evening prior and concerns that affect his results    1. Diabetes Type 2  Age: 41 yo    Diabetes education: 2020  Now exercising walking 30-40 mins daily   Adjusted to low carbohydrate diet and very committed    Current treatment regimen:   empagliflozin (JARDIANCE) 25 mg Tab, Take 25 mg by mouth once daily., Disp: 90 tablet, Rfl: 0  glipiZIDE (GLUCOTROL) 5 MG tablet, Take 1 tablet (5 mg total) by mouth 2 (two) times daily with meals., Disp: 60 tablet, Rfl: 0  metFORMIN (GLUCOPHAGE-XR) 500 MG 24 hr tablet, Take 1 tablet (500 mg total) by mouth daily with breakfast., Disp: 90 tablet, Rfl: 0    Side effects from treatment: denies  Complications of diabetes: hyperglycemia    Glucometer: yes but did not bring  Glucose monitoring: NA  A. Fastin-138 mg/dL  7day: NAmg/dL 14 day: NA mg/dL  30 day: NA mg/dL     2/10/2020 POC glucose: 155 mg/dL  - Protein bar 20 min ago     2020 POC glucose: 222 mg/dL    Lab Results       Component                Value               Date                       HGBA1C                   9.7 (H)             2020                Low dose statin: none and declines at this time <5 years since diagnosis  Last eye exam: none [facility] doc  but never had dilated exam. Done 1/13/2020 and not submitted. Re-submitted today  Last foot exam: 1/13/2020    Vaccines:   Influenza: denies and reports that did not tolerate  Pneumovax: 23 recommend and pt declined today  Prevnar 13: NA    2. Hypertension    Current medication treatment:   lisinopril (PRINIVIL,ZESTRIL) 20 MG tablet, Take 20 mg by mouth once daily., Disp: , Rfl:     Medication side effects: denies  Exercise regimen: none  Dietary treatment: poor compliance but has plans to improven    Home BP cuff: none  How often does patient monitoring BP? NA  Last home BP reading: NA     Last 14 day average of home BP reading: NA    3. Gout:  Typical attack: one of his toes and once on the heel  Frequency: rare  Last gout attack: about 1 year    Current preventative: none and declined  Dietary compliance: attempts  PRN medication: Indomethacin     Lab Results       Component                Value               Date                       URICACID                 6.6                 02/03/2020                Hypertension   This is a recurrent problem. The current episode started more than 1 month ago. The problem has been gradually improving since onset. The problem is controlled. Pertinent negatives include no anxiety, peripheral edema or sweats. There are no associated agents to hypertension. Risk factors for coronary artery disease include diabetes mellitus and obesity. The current treatment provides mild improvement. Compliance problems include diet.      Wt Readings from Last 5 Encounters:   02/10/20 101 kg (222 lb 9.6 oz)   01/13/20 102.3 kg (225 lb 9.6 oz)   12/09/19 108.9 kg (240 lb)   11/30/17 104.3 kg (230 lb)     HEALTH MAINTENANCE:   Health Maintenance   Topic Date Due    Eye Exam  07/23/1983    TETANUS VACCINE  07/23/1991    Pneumococcal Vaccine (Medium Risk) (1 of 1 - PPSV23) 07/23/1992    Hemoglobin A1c  08/03/2020    Foot Exam  01/13/2021    Lipid Panel  02/03/2021    Low Dose Statin   02/10/2021       ROS: Review of Systems   Constitutional: Negative.    HENT: Negative.    Eyes: Negative.    Respiratory: Negative.    Cardiovascular: Negative.    Gastrointestinal: Negative.    Endocrine: Negative.    Genitourinary: Negative.    Musculoskeletal: Negative.    Skin: Negative.    Allergic/Immunologic: Negative.    Neurological: Negative.    Hematological: Negative.    Psychiatric/Behavioral: Negative.        ALLERGIES:   Review of patient's allergies indicates:  No Known Allergies    MEDS:     Current Outpatient Medications:     blood sugar diagnostic Strp, 1 strip by Misc.(Non-Drug; Combo Route) route once daily., Disp: 100 each, Rfl: 3    blood-glucose meter kit, Use as instructed, Disp: 1 each, Rfl: 0    empagliflozin (JARDIANCE) 25 mg Tab, Take 25 mg by mouth once daily., Disp: 90 tablet, Rfl: 0    glipiZIDE (GLUCOTROL) 5 MG tablet, Take 1 tablet (5 mg total) by mouth 2 (two) times daily before meals., Disp: 180 tablet, Rfl: 0    indomethacin (INDOCIN SR) 75 mg CpSR CR capsule, Take 75 mg by mouth 2 (two) times daily as needed., Disp: , Rfl:     lancets Misc, 1 lancet by Misc.(Non-Drug; Combo Route) route once daily., Disp: 100 each, Rfl: 3    lisinopril (PRINIVIL,ZESTRIL) 20 MG tablet, Take 1 tablet (20 mg total) by mouth once daily., Disp: 90 tablet, Rfl: 1    metFORMIN (GLUCOPHAGE-XR) 500 MG XR 24hr tablet, Take 1 tablet (500 mg total) by mouth 2 (two) times daily with meals., Disp: 180 tablet, Rfl: 0    atorvastatin (LIPITOR) 20 MG tablet, Take 1 tablet (20 mg total) by mouth once daily., Disp: 90 tablet, Rfl: 3    Past Medical History:   Diagnosis Date    Gout     Hypertension        History reviewed. No pertinent surgical history.    Family History   Problem Relation Age of Onset    Diabetes Mother     No Known Problems Father        Social History     Tobacco Use    Smoking status: Former Smoker     Packs/day: 0.50     Types: Cigarettes     Last attempt to quit: 1/2/2020      "Years since quittin.1    Smokeless tobacco: Never Used   Substance Use Topics    Alcohol use: Yes     Alcohol/week: 12.0 standard drinks     Types: 12 Cans of beer per week     Frequency: 4 or more times a week     Drinks per session: 5 or 6    Drug use: Never       Social History     Social History Narrative    Lives with wife and 2 kids in college and one older with 1 grand child. Supervisor at [employer]. Smoker but quit 2020       OBJECTIVE:   Vitals:    02/10/20 1009   BP: 120/78   BP Location: Left arm   Patient Position: Sitting   BP Method: X-Large (Manual)   Pulse: 80   Resp: 18   Temp: 98.3 °F (36.8 °C)   TempSrc: Oral   SpO2: 98%   Weight: 101 kg (222 lb 9.6 oz)   Height: 5' 7" (1.702 m)     Body mass index is 34.86 kg/m².    Physical Exam   Constitutional: No distress.   Neck: Neck supple.   Cardiovascular: Normal rate, regular rhythm, normal heart sounds and intact distal pulses. Exam reveals no friction rub.   No murmur heard.  Pulmonary/Chest: Effort normal and breath sounds normal.   Musculoskeletal: He exhibits no edema.   Neurological: He is alert.   Skin: Skin is warm.         Depression Patient Health Questionnaire 2/10/2020 2020   Over the last two weeks how often have you been bothered by little interest or pleasure in doing things 0 0   Over the last two weeks how often have you been bothered by feeling down, depressed or hopeless 0 0   PHQ-2 Total Score 0 0       PERTINENT RESULTS:   Lab Results   Component Value Date    HGBA1C 9.7 (H) 2020     BMP  Lab Results   Component Value Date     2020    K 4.7 2020     2020    CO2 31 (H) 2020    BUN 11 2020    CREATININE 0.76 2020    CALCIUM 9.6 2020    ANIONGAP 11 2020    ESTGFRAFRICA >60.0 2020    EGFRNONAA >60.0 2020     Lab Results   Component Value Date    CHOL 252 (H) 2020     Lab Results   Component Value Date    HDL 38 (L) 2020     Lab Results "   Component Value Date    LDLCALC 144.6 02/03/2020     Lab Results   Component Value Date    TRIG 347 (H) 02/03/2020     Lab Results   Component Value Date    CHOLHDL 15.1 (L) 02/03/2020     Lab Results   Component Value Date    ALT 90 (H) 02/03/2020    AST 62 (H) 02/03/2020    ALKPHOS 98 02/03/2020    BILITOT 0.7 02/03/2020       ASSESSMENT/PLAN:  Problem List Items Addressed This Visit        Cardiac/Vascular    Essential hypertension    Current Assessment & Plan     - well controlled  - continue current medications         Mixed hyperlipidemia    Current Assessment & Plan     Lab Results   Component Value Date    LDLCALC 144.6 02/03/2020     - recommend LDL <100  - recommend start statin   - counseling regarding new medication including expected results, potential side effects, and appropriate use. Questions elicited and answered  - encouraged to patient to notify me of any questions or concerns         Relevant Medications    atorvastatin (LIPITOR) 20 MG tablet    Other Relevant Orders    Comprehensive metabolic panel    Lipid panel       Endocrine    Type 2 diabetes mellitus with hyperglycemia, without long-term current use of insulin - Primary    Current Assessment & Plan     Lab Results   Component Value Date    HGBA1C 9.7 (H) 02/03/2020     - A1C above goal but glucose levels significantly improved and compliance improved  - POC glucose 155 mg/dL non-fasting and improved from last visit  - continue Jardiance and Glipizide  - increase Metformin  mg daily to BID  - encouraged to patient to notify me of any questions or concerns         Relevant Medications    metFORMIN (GLUCOPHAGE-XR) 500 MG XR 24hr tablet    atorvastatin (LIPITOR) 20 MG tablet    Other Relevant Orders    POCT Glucose, Hand-Held Device (Completed)    Hemoglobin A1c    Class 1 obesity due to excess calories with serious comorbidity and body mass index (BMI) of 34.0 to 34.9 in adult    Overview     - discussed recommendation for diet,  exercise and weight loss         Current Assessment & Plan     - + weight loss with diet and exercise            GI    Elevated LFTs    Current Assessment & Plan     - likely fatty liver and also pt had alcohol prior to labs  - monitor  - discussed recommendation for diet, exercise and weight loss         Relevant Orders    Comprehensive metabolic panel    Lipid panel       Orthopedic    History of gout    Current Assessment & Plan     Lab Results   Component Value Date    URICACID 6.6 02/03/2020     - uric acid at goal               ORDERS:   Orders Placed This Encounter    Hemoglobin A1c    Comprehensive metabolic panel    Lipid panel    POCT Glucose, Hand-Held Device    metFORMIN (GLUCOPHAGE-XR) 500 MG XR 24hr tablet    atorvastatin (LIPITOR) 20 MG tablet       ROCHELLE signed to get records from last provider but have not received records    Vaccines recommended: Td, Flu and PPSV 23. Pt declined all vaccines    Follow-up in 3 months with labs    Dr. Aylin Wayne D.O.   Family Medicine

## 2020-02-10 ENCOUNTER — CLINICAL SUPPORT (OUTPATIENT)
Dept: FAMILY MEDICINE | Facility: CLINIC | Age: 45
End: 2020-02-10
Attending: FAMILY MEDICINE
Payer: COMMERCIAL

## 2020-02-10 ENCOUNTER — OFFICE VISIT (OUTPATIENT)
Dept: FAMILY MEDICINE | Facility: CLINIC | Age: 45
End: 2020-02-10
Payer: COMMERCIAL

## 2020-02-10 ENCOUNTER — PATIENT MESSAGE (OUTPATIENT)
Dept: FAMILY MEDICINE | Facility: CLINIC | Age: 45
End: 2020-02-10

## 2020-02-10 VITALS
WEIGHT: 222.63 LBS | BODY MASS INDEX: 34.94 KG/M2 | HEIGHT: 67 IN | RESPIRATION RATE: 18 BRPM | SYSTOLIC BLOOD PRESSURE: 120 MMHG | OXYGEN SATURATION: 98 % | DIASTOLIC BLOOD PRESSURE: 78 MMHG | HEART RATE: 80 BPM | TEMPERATURE: 98 F

## 2020-02-10 DIAGNOSIS — E11.65 TYPE 2 DIABETES MELLITUS WITH HYPERGLYCEMIA, WITHOUT LONG-TERM CURRENT USE OF INSULIN: ICD-10-CM

## 2020-02-10 DIAGNOSIS — E11.65 TYPE 2 DIABETES MELLITUS WITH HYPERGLYCEMIA, WITHOUT LONG-TERM CURRENT USE OF INSULIN: Primary | ICD-10-CM

## 2020-02-10 DIAGNOSIS — E78.2 MIXED HYPERLIPIDEMIA: ICD-10-CM

## 2020-02-10 DIAGNOSIS — Z87.39 HISTORY OF GOUT: ICD-10-CM

## 2020-02-10 DIAGNOSIS — R79.89 ELEVATED LFTS: ICD-10-CM

## 2020-02-10 DIAGNOSIS — I10 ESSENTIAL HYPERTENSION: ICD-10-CM

## 2020-02-10 DIAGNOSIS — E66.09 CLASS 1 OBESITY DUE TO EXCESS CALORIES WITH SERIOUS COMORBIDITY AND BODY MASS INDEX (BMI) OF 34.0 TO 34.9 IN ADULT: ICD-10-CM

## 2020-02-10 PROBLEM — E66.811 CLASS 1 OBESITY DUE TO EXCESS CALORIES WITH SERIOUS COMORBIDITY AND BODY MASS INDEX (BMI) OF 34.0 TO 34.9 IN ADULT: Status: ACTIVE | Noted: 2020-01-10

## 2020-02-10 LAB — GLUCOSE SERPL-MCNC: 155 MG/DL (ref 70–110)

## 2020-02-10 PROCEDURE — 99214 OFFICE O/P EST MOD 30 MIN: CPT | Mod: S$GLB,,, | Performed by: FAMILY MEDICINE

## 2020-02-10 PROCEDURE — 3078F PR MOST RECENT DIASTOLIC BLOOD PRESSURE < 80 MM HG: ICD-10-PCS | Mod: CPTII,S$GLB,, | Performed by: FAMILY MEDICINE

## 2020-02-10 PROCEDURE — 3008F PR BODY MASS INDEX (BMI) DOCUMENTED: ICD-10-PCS | Mod: CPTII,S$GLB,, | Performed by: FAMILY MEDICINE

## 2020-02-10 PROCEDURE — 2022F DILAT RTA XM EVC RTNOPTHY: CPT | Mod: S$GLB,,, | Performed by: OPTOMETRIST

## 2020-02-10 PROCEDURE — 82962 POCT GLUCOSE, HAND-HELD DEVICE: ICD-10-PCS | Mod: S$GLB,,, | Performed by: FAMILY MEDICINE

## 2020-02-10 PROCEDURE — 3046F PR MOST RECENT HEMOGLOBIN A1C LEVEL > 9.0%: ICD-10-PCS | Mod: CPTII,S$GLB,, | Performed by: FAMILY MEDICINE

## 2020-02-10 PROCEDURE — 99214 PR OFFICE/OUTPT VISIT, EST, LEVL IV, 30-39 MIN: ICD-10-PCS | Mod: S$GLB,,, | Performed by: FAMILY MEDICINE

## 2020-02-10 PROCEDURE — 2022F DIABETIC EYE SCREENING PHOTO: ICD-10-PCS | Mod: S$GLB,,, | Performed by: OPTOMETRIST

## 2020-02-10 PROCEDURE — 92250 DIABETIC EYE SCREENING PHOTO: ICD-10-PCS | Mod: 26,S$GLB,, | Performed by: OPTOMETRIST

## 2020-02-10 PROCEDURE — 3046F HEMOGLOBIN A1C LEVEL >9.0%: CPT | Mod: CPTII,S$GLB,, | Performed by: FAMILY MEDICINE

## 2020-02-10 PROCEDURE — 82962 GLUCOSE BLOOD TEST: CPT | Mod: S$GLB,,, | Performed by: FAMILY MEDICINE

## 2020-02-10 PROCEDURE — 99999 PR PBB SHADOW E&M-EST. PATIENT-LVL IV: ICD-10-PCS | Mod: PBBFAC,,, | Performed by: FAMILY MEDICINE

## 2020-02-10 PROCEDURE — 3074F SYST BP LT 130 MM HG: CPT | Mod: CPTII,S$GLB,, | Performed by: FAMILY MEDICINE

## 2020-02-10 PROCEDURE — 92250 FUNDUS PHOTOGRAPHY W/I&R: CPT | Mod: 26,S$GLB,, | Performed by: OPTOMETRIST

## 2020-02-10 PROCEDURE — 3008F BODY MASS INDEX DOCD: CPT | Mod: CPTII,S$GLB,, | Performed by: FAMILY MEDICINE

## 2020-02-10 PROCEDURE — 99999 PR PBB SHADOW E&M-EST. PATIENT-LVL IV: CPT | Mod: PBBFAC,,, | Performed by: FAMILY MEDICINE

## 2020-02-10 PROCEDURE — 3074F PR MOST RECENT SYSTOLIC BLOOD PRESSURE < 130 MM HG: ICD-10-PCS | Mod: CPTII,S$GLB,, | Performed by: FAMILY MEDICINE

## 2020-02-10 PROCEDURE — 3078F DIAST BP <80 MM HG: CPT | Mod: CPTII,S$GLB,, | Performed by: FAMILY MEDICINE

## 2020-02-10 RX ORDER — METFORMIN HYDROCHLORIDE 500 MG/1
500 TABLET, EXTENDED RELEASE ORAL 2 TIMES DAILY WITH MEALS
Qty: 180 TABLET | Refills: 0 | Status: SHIPPED | OUTPATIENT
Start: 2020-02-10 | End: 2020-04-20

## 2020-02-10 RX ORDER — ATORVASTATIN CALCIUM 20 MG/1
20 TABLET, FILM COATED ORAL DAILY
Qty: 90 TABLET | Refills: 1 | Status: CANCELLED | OUTPATIENT
Start: 2020-02-10 | End: 2021-02-09

## 2020-02-10 RX ORDER — INSULIN GLARGINE 100 [IU]/ML
15 INJECTION, SOLUTION SUBCUTANEOUS NIGHTLY
Qty: 4.5 ML | Refills: 0 | Status: CANCELLED | OUTPATIENT
Start: 2020-02-10 | End: 2020-03-11

## 2020-02-10 RX ORDER — ATORVASTATIN CALCIUM 20 MG/1
20 TABLET, FILM COATED ORAL DAILY
Qty: 90 TABLET | Refills: 3 | Status: SHIPPED | OUTPATIENT
Start: 2020-02-10 | End: 2021-01-11

## 2020-02-10 NOTE — PROGRESS NOTES
Wilfredo Thomas is a 44 y.o. male here for a diabetic eye screening with non-dilated fundus photos per Dr. Wayne.    Patient cooperative?: Yes  Small pupils?: No  Last eye exam: unknown    For exam results, see Encounter Report.

## 2020-02-10 NOTE — ASSESSMENT & PLAN NOTE
Lab Results   Component Value Date    LDLCALC 144.6 02/03/2020     - recommend LDL <100  - recommend start statin   - counseling regarding new medication including expected results, potential side effects, and appropriate use. Questions elicited and answered  - encouraged to patient to notify me of any questions or concerns

## 2020-02-10 NOTE — ASSESSMENT & PLAN NOTE
Lab Results   Component Value Date    HGBA1C 9.7 (H) 02/03/2020     - A1C above goal but glucose levels significantly improved and compliance improved  - POC glucose 155 mg/dL non-fasting and improved from last visit  - continue Jardiance and Glipizide  - increase Metformin  mg daily to BID  - encouraged to patient to notify me of any questions or concerns

## 2020-02-10 NOTE — ASSESSMENT & PLAN NOTE
- likely fatty liver and also pt had alcohol prior to labs  - monitor  - discussed recommendation for diet, exercise and weight loss

## 2020-02-10 NOTE — PATIENT INSTRUCTIONS
1. Increase Metformin  mg to twice a day  2. Add cholesterol medication Atorvastatin 20 mg daily  3. It is important that you bring your glucose monitor to every clinic visit.   4. Glucose goals  - Fasting AM glucose (no food 8 hours) </= 130 mg/dL  - 2 hours after you eat a meal, your glucose should be </=160 mg/dL  5. Monitor your glucose: daily

## 2020-03-28 ENCOUNTER — PATIENT MESSAGE (OUTPATIENT)
Dept: FAMILY MEDICINE | Facility: CLINIC | Age: 45
End: 2020-03-28

## 2020-03-28 DIAGNOSIS — E11.65 TYPE 2 DIABETES MELLITUS WITH HYPERGLYCEMIA, WITHOUT LONG-TERM CURRENT USE OF INSULIN: ICD-10-CM

## 2020-04-19 ENCOUNTER — PATIENT MESSAGE (OUTPATIENT)
Dept: FAMILY MEDICINE | Facility: CLINIC | Age: 45
End: 2020-04-19

## 2020-04-19 DIAGNOSIS — E11.65 TYPE 2 DIABETES MELLITUS WITH HYPERGLYCEMIA, WITHOUT LONG-TERM CURRENT USE OF INSULIN: ICD-10-CM

## 2020-04-20 RX ORDER — METFORMIN HYDROCHLORIDE 500 MG/1
500 TABLET, EXTENDED RELEASE ORAL 2 TIMES DAILY WITH MEALS
Qty: 180 TABLET | Refills: 0 | Status: SHIPPED | OUTPATIENT
Start: 2020-04-20 | End: 2020-05-27 | Stop reason: SDUPTHER

## 2020-04-20 RX ORDER — GLIPIZIDE 5 MG/1
5 TABLET ORAL
Qty: 180 TABLET | Refills: 0 | Status: SHIPPED | OUTPATIENT
Start: 2020-04-20 | End: 2020-05-27 | Stop reason: SDUPTHER

## 2020-04-20 RX ORDER — METFORMIN HYDROCHLORIDE 500 MG/1
TABLET, EXTENDED RELEASE ORAL
Qty: 180 TABLET | Refills: 0 | Status: SHIPPED | OUTPATIENT
Start: 2020-04-20 | End: 2020-04-20 | Stop reason: SDUPTHER

## 2020-04-27 ENCOUNTER — TELEPHONE (OUTPATIENT)
Dept: FAMILY MEDICINE | Facility: CLINIC | Age: 45
End: 2020-04-27

## 2020-04-27 NOTE — TELEPHONE ENCOUNTER
----- Message from Aylin Wayne DO sent at 4/27/2020  2:04 PM CDT -----  Pt has appt 5/27/2020. Labs ordered previously. Please make sure scheduled prior to visit

## 2020-05-26 PROBLEM — K76.0 FATTY LIVER: Status: ACTIVE | Noted: 2020-05-26

## 2020-05-26 NOTE — ASSESSMENT & PLAN NOTE
Lab Results   Component Value Date    HGBA1C 7.9 (H) 05/25/2020     - improving though last 1 month worsened and did not bring readings  - reports that stress and decreased exercise with Covid-19 contributed   - recommend increase Glipizide to 10 mg BID  - continue Metformin and Jardiance   - discussed recommendation for diet, exercise and weight loss

## 2020-05-26 NOTE — PROGRESS NOTES
FAMILY MEDICINE    Patient Active Problem List   Diagnosis    Type 2 diabetes mellitus with hyperglycemia, without long-term current use of insulin    Essential hypertension    Class 1 obesity due to excess calories with serious comorbidity and body mass index (BMI) of 34.0 to 34.9 in adult    History of gout    Tobacco dependence in remission    Elevated LFTs    Mixed hyperlipidemia    Fatty liver       CC:   Chief Complaint   Patient presents with    Follow-up       HPI: Wilfredo Thomas is a 44 y.o. male  - smoker (quit smoking 1/2020) with obesity, hypertension, type 2 diabetes and gout (since 2000) presents to follow-up diabetes and labs    1. Diabetes Type 2  Age: 43 yo    Diabetes education: 1/27/2020  Now exercising walking 30-40 mins daily   Adjusted to low carbohydrate diet and very committed    Current treatment regimen:   empagliflozin (JARDIANCE) 25 mg Tab, Take 25 mg by mouth once daily., Disp: 90 tablet, Rfl: 1  glipiZIDE (GLUCOTROL) 5 MG tablet, Take 1 tablet (5 mg total) by mouth 2 (two) times daily before meals., Disp: 180 tablet, Rfl: 0  metFORMIN (GLUCOPHAGE-XR) 500 MG XR 24hr tablet, Take 1 tablet (500 mg total) by mouth 2 (two) times daily with meals., Disp: 180 tablet, Rfl: 0    Side effects from treatment: denies  Complications of diabetes: none    Glucometer: yes  Glucose monitoring: daily to BID see reading below  A. Fasting: see readings below mg/dL  7day: NAmg/dL 14 day: NA mg/dL  30 day: NA mg/dL    - though recently has not been monitoring since fearful of diet and exercise and glucose increasing     Lab Results       Component                Value               Date                       HGBA1C                   7.9 (H)             05/25/2020              Lab Results       Component                Value               Date                       HGBA1C                   9.7 (H)             02/03/2020                Low dose statin: none and declines at this time <5 years since  diagnosis  Last eye exam: 2/10/2020 ([facility])  Last foot exam: 2020    Vaccines:   Influenza: denies and reports that did not tolerate  Pneumovax: 23 recommend and pt declined today  Prevnar 13: NA    2. Hypertension    Current medication treatment:   lisinopril (PRINIVIL,ZESTRIL) 20 MG tablet, Take 20 mg by mouth once daily., Disp: , Rfl:     Medication side effects: denies  Exercise regimen: none  Dietary treatment: poor compliance but has plans to improven    Home BP cuff: yes  How often does patient monitoring BP? weekly  Last home BP readin/70 Last 14 day average of home BP reading: NA    Hypertension   This is a recurrent problem. The current episode started more than 1 month ago. The problem has been gradually improving since onset. The problem is controlled. Pertinent negatives include no anxiety, peripheral edema or sweats. There are no associated agents to hypertension. Risk factors for coronary artery disease include diabetes mellitus and obesity. The current treatment provides mild improvement. Compliance problems include diet.      Wt Readings from Last 5 Encounters:   20 105.2 kg (232 lb)   02/10/20 101 kg (222 lb 9.6 oz)   20 102.3 kg (225 lb 9.6 oz)   19 108.9 kg (240 lb)   17 104.3 kg (230 lb)                   HEALTH MAINTENANCE:   Health Maintenance   Topic Date Due    TETANUS VACCINE  1991    Pneumococcal Vaccine (Medium Risk) (1 of 1 - PPSV23) 1992    Hemoglobin A1c  2020    Foot Exam  2021    Eye Exam  02/10/2021    Lipid Panel  2021    Low Dose Statin  2021       ROS: Review of Systems   Constitutional: Negative.    HENT: Negative.    Eyes: Negative.    Respiratory: Negative.    Cardiovascular: Negative.    Gastrointestinal: Negative.    Endocrine: Negative.    Genitourinary: Negative.    Musculoskeletal: Negative.    Skin: Negative.    Allergic/Immunologic: Negative.    Neurological: Negative.    Hematological:  Negative.    Psychiatric/Behavioral: Negative.        ALLERGIES:   Review of patient's allergies indicates:  No Known Allergies    MEDS:     Current Outpatient Medications:     atorvastatin (LIPITOR) 20 MG tablet, Take 1 tablet (20 mg total) by mouth once daily., Disp: 90 tablet, Rfl: 3    blood sugar diagnostic Strp, 1 strip by Misc.(Non-Drug; Combo Route) route once daily., Disp: 100 each, Rfl: 3    blood-glucose meter kit, Use as instructed, Disp: 1 each, Rfl: 0    empagliflozin (JARDIANCE) 25 mg Tab, Take 25 mg by mouth once daily., Disp: 90 tablet, Rfl: 1    glipiZIDE (GLUCOTROL) 10 MG tablet, Take 1 tablet (10 mg total) by mouth 2 (two) times daily with meals., Disp: 180 tablet, Rfl: 1    indomethacin (INDOCIN SR) 75 mg CpSR CR capsule, Take 75 mg by mouth 2 (two) times daily as needed., Disp: , Rfl:     lancets Misc, 1 lancet by Misc.(Non-Drug; Combo Route) route once daily., Disp: 100 each, Rfl: 3    lisinopriL (PRINIVIL,ZESTRIL) 20 MG tablet, Take 1 tablet (20 mg total) by mouth once daily., Disp: 90 tablet, Rfl: 1    metFORMIN (GLUCOPHAGE-XR) 500 MG XR 24hr tablet, Take 1 tablet (500 mg total) by mouth 2 (two) times daily with meals., Disp: 180 tablet, Rfl: 1    Past Medical History:   Diagnosis Date    Gout     Hypertension        No past surgical history on file.    Family History   Problem Relation Age of Onset    Diabetes Mother     No Known Problems Father        Social History     Tobacco Use    Smoking status: Former Smoker     Packs/day: 0.50     Types: Cigarettes     Last attempt to quit: 2020     Years since quittin.4    Smokeless tobacco: Never Used   Substance Use Topics    Alcohol use: Yes     Alcohol/week: 12.0 standard drinks     Types: 12 Cans of beer per week     Frequency: 4 or more times a week     Drinks per session: 5 or 6    Drug use: Never       Social History     Social History Narrative    Lives with wife and 2 kids in college and one older with 1 grand  "child. Supervisor at [employer]. Smoker but quit 1/2020       OBJECTIVE:   Vitals:    05/27/20 1106   BP: 136/88   BP Location: Left arm   Patient Position: Sitting   BP Method: Large (Manual)   Pulse: 90   SpO2: 98%   Weight: 105.2 kg (232 lb)   Height: 5' 7" (1.702 m)     Body mass index is 36.34 kg/m².    Physical Exam   Constitutional: No distress.   Neck: Neck supple.   Cardiovascular: Normal rate, regular rhythm, normal heart sounds and intact distal pulses. Exam reveals no gallop and no friction rub.   No murmur heard.  Pulmonary/Chest: Effort normal and breath sounds normal. He has no decreased breath sounds. He has no wheezes. He has no rhonchi. He has no rales.   Musculoskeletal: He exhibits no edema.   Neurological: He is alert.   Skin: Skin is warm.         Depression Patient Health Questionnaire 2/10/2020 1/13/2020   Over the last two weeks how often have you been bothered by little interest or pleasure in doing things 0 0   Over the last two weeks how often have you been bothered by feeling down, depressed or hopeless 0 0   PHQ-2 Total Score 0 0       PERTINENT RESULTS:   Lab Results   Component Value Date    HGBA1C 7.9 (H) 05/25/2020     BMP  Lab Results   Component Value Date     05/25/2020    K 5.0 05/25/2020     05/25/2020    CO2 25 05/25/2020    BUN 16 05/25/2020    CREATININE 0.81 05/25/2020    CALCIUM 10.0 05/25/2020    ANIONGAP 12 05/25/2020    ESTGFRAFRICA >60.0 05/25/2020    EGFRNONAA >60.0 05/25/2020     Lab Results   Component Value Date    CHOL 179 05/25/2020    CHOL 252 (H) 02/03/2020     Lab Results   Component Value Date    HDL 39 (L) 05/25/2020    HDL 38 (L) 02/03/2020     Lab Results   Component Value Date    LDLCALC 87.4 05/25/2020    LDLCALC 144.6 02/03/2020     Lab Results   Component Value Date    TRIG 263 (H) 05/25/2020    TRIG 347 (H) 02/03/2020     Lab Results   Component Value Date    CHOLHDL 21.8 05/25/2020    CHOLHDL 15.1 (L) 02/03/2020     Lab Results   Component Value " Date    ALT 68 (H) 05/25/2020    AST 43 05/25/2020    ALKPHOS 101 05/25/2020    BILITOT 0.8 05/25/2020       ASSESSMENT/PLAN:  Problem List Items Addressed This Visit        Cardiac/Vascular    Essential hypertension    Current Assessment & Plan     - well controlled  - continue current medication         Relevant Medications    lisinopriL (PRINIVIL,ZESTRIL) 20 MG tablet    Mixed hyperlipidemia    Current Assessment & Plan     Lab Results   Component Value Date    LDLCALC 87.4 05/25/2020     - well controlled  - continue current medication  - triglycerides above goal  - discussed recommendation for diet, exercise and weight loss            Endocrine    Type 2 diabetes mellitus with hyperglycemia, without long-term current use of insulin - Primary    Current Assessment & Plan     Lab Results   Component Value Date    HGBA1C 7.9 (H) 05/25/2020     - improving though last 1 month worsened and did not bring readings  - reports that stress and decreased exercise with Covid-19 contributed   - recommend increase Glipizide to 10 mg BID  - continue Metformin and Jardiance   - discussed recommendation for diet, exercise and weight loss         Relevant Medications    empagliflozin (JARDIANCE) 25 mg Tab    metFORMIN (GLUCOPHAGE-XR) 500 MG XR 24hr tablet    glipiZIDE (GLUCOTROL) 10 MG tablet    Other Relevant Orders    Hemoglobin A1C    Class 1 obesity due to excess calories with serious comorbidity and body mass index (BMI) of 34.0 to 34.9 in adult    Overview     - discussed recommendation for diet, exercise and weight loss            GI    Elevated LFTs    Current Assessment & Plan     Lab Results   Component Value Date    ALT 68 (H) 05/25/2020    AST 43 05/25/2020    ALKPHOS 101 05/25/2020    BILITOT 0.8 05/25/2020     - improved with glucose control  - fatty liver  - discussed recommendation for diet, exercise and weight loss           Fatty liver    Overview     - 2016 CT abd/pelvis: noted fatty infiltrates in liver            Other Visit Diagnoses     Screening for HIV (human immunodeficiency virus)        Relevant Orders    HIV 1/2 Ag/Ab (4th Gen)          ORDERS:   Orders Placed This Encounter    Hemoglobin A1C    HIV 1/2 Ag/Ab (4th Gen)    empagliflozin (JARDIANCE) 25 mg Tab    metFORMIN (GLUCOPHAGE-XR) 500 MG XR 24hr tablet    lisinopriL (PRINIVIL,ZESTRIL) 20 MG tablet    glipiZIDE (GLUCOTROL) 10 MG tablet      HIV screening: discussed recommendations for HIV screening. Pt agreeable  Vaccines recommended: Td, Flu and PPSV 23. Pt declined all vaccines    Follow-up in 3 months with labs or sooner with any concerns    Dr. Aylin Wayne D.O.   Family Medicine

## 2020-05-26 NOTE — ASSESSMENT & PLAN NOTE
Lab Results   Component Value Date    ALT 68 (H) 05/25/2020    AST 43 05/25/2020    ALKPHOS 101 05/25/2020    BILITOT 0.8 05/25/2020     - improved with glucose control  - fatty liver  - discussed recommendation for diet, exercise and weight loss

## 2020-05-27 ENCOUNTER — OFFICE VISIT (OUTPATIENT)
Dept: FAMILY MEDICINE | Facility: CLINIC | Age: 45
End: 2020-05-27
Payer: COMMERCIAL

## 2020-05-27 VITALS
OXYGEN SATURATION: 98 % | WEIGHT: 232 LBS | DIASTOLIC BLOOD PRESSURE: 88 MMHG | BODY MASS INDEX: 36.41 KG/M2 | HEART RATE: 90 BPM | SYSTOLIC BLOOD PRESSURE: 136 MMHG | HEIGHT: 67 IN

## 2020-05-27 DIAGNOSIS — K76.0 FATTY LIVER: ICD-10-CM

## 2020-05-27 DIAGNOSIS — I10 ESSENTIAL HYPERTENSION: ICD-10-CM

## 2020-05-27 DIAGNOSIS — R79.89 ELEVATED LFTS: ICD-10-CM

## 2020-05-27 DIAGNOSIS — E66.09 CLASS 1 OBESITY DUE TO EXCESS CALORIES WITH SERIOUS COMORBIDITY AND BODY MASS INDEX (BMI) OF 34.0 TO 34.9 IN ADULT: ICD-10-CM

## 2020-05-27 DIAGNOSIS — E11.65 TYPE 2 DIABETES MELLITUS WITH HYPERGLYCEMIA, WITHOUT LONG-TERM CURRENT USE OF INSULIN: Primary | ICD-10-CM

## 2020-05-27 DIAGNOSIS — Z11.4 SCREENING FOR HIV (HUMAN IMMUNODEFICIENCY VIRUS): ICD-10-CM

## 2020-05-27 DIAGNOSIS — E78.2 MIXED HYPERLIPIDEMIA: ICD-10-CM

## 2020-05-27 PROCEDURE — 3051F HG A1C>EQUAL 7.0%<8.0%: CPT | Mod: CPTII,S$GLB,, | Performed by: FAMILY MEDICINE

## 2020-05-27 PROCEDURE — 3079F DIAST BP 80-89 MM HG: CPT | Mod: CPTII,S$GLB,, | Performed by: FAMILY MEDICINE

## 2020-05-27 PROCEDURE — 99999 PR PBB SHADOW E&M-EST. PATIENT-LVL IV: CPT | Mod: PBBFAC,,, | Performed by: FAMILY MEDICINE

## 2020-05-27 PROCEDURE — 99214 OFFICE O/P EST MOD 30 MIN: CPT | Mod: S$GLB,,, | Performed by: FAMILY MEDICINE

## 2020-05-27 PROCEDURE — 3051F PR MOST RECENT HEMOGLOBIN A1C LEVEL 7.0 - < 8.0%: ICD-10-PCS | Mod: CPTII,S$GLB,, | Performed by: FAMILY MEDICINE

## 2020-05-27 PROCEDURE — 99999 PR PBB SHADOW E&M-EST. PATIENT-LVL IV: ICD-10-PCS | Mod: PBBFAC,,, | Performed by: FAMILY MEDICINE

## 2020-05-27 PROCEDURE — 2024F 7 FLD RTA PHOTO EVC RTNOPTHY: CPT | Mod: S$GLB,,, | Performed by: FAMILY MEDICINE

## 2020-05-27 PROCEDURE — 3079F PR MOST RECENT DIASTOLIC BLOOD PRESSURE 80-89 MM HG: ICD-10-PCS | Mod: CPTII,S$GLB,, | Performed by: FAMILY MEDICINE

## 2020-05-27 PROCEDURE — 3008F PR BODY MASS INDEX (BMI) DOCUMENTED: ICD-10-PCS | Mod: CPTII,S$GLB,, | Performed by: FAMILY MEDICINE

## 2020-05-27 PROCEDURE — 2024F PR 7 FIELD PHOTOS WITH INTERP/ REVIEW: ICD-10-PCS | Mod: S$GLB,,, | Performed by: FAMILY MEDICINE

## 2020-05-27 PROCEDURE — 3075F SYST BP GE 130 - 139MM HG: CPT | Mod: CPTII,S$GLB,, | Performed by: FAMILY MEDICINE

## 2020-05-27 PROCEDURE — 99214 PR OFFICE/OUTPT VISIT, EST, LEVL IV, 30-39 MIN: ICD-10-PCS | Mod: S$GLB,,, | Performed by: FAMILY MEDICINE

## 2020-05-27 PROCEDURE — 3075F PR MOST RECENT SYSTOLIC BLOOD PRESS GE 130-139MM HG: ICD-10-PCS | Mod: CPTII,S$GLB,, | Performed by: FAMILY MEDICINE

## 2020-05-27 PROCEDURE — 3008F BODY MASS INDEX DOCD: CPT | Mod: CPTII,S$GLB,, | Performed by: FAMILY MEDICINE

## 2020-05-27 RX ORDER — LISINOPRIL 20 MG/1
20 TABLET ORAL DAILY
Qty: 90 TABLET | Refills: 1 | Status: SHIPPED | OUTPATIENT
Start: 2020-05-27 | End: 2021-01-11

## 2020-05-27 RX ORDER — GLIPIZIDE 10 MG/1
10 TABLET ORAL 2 TIMES DAILY WITH MEALS
Qty: 180 TABLET | Refills: 1 | Status: SHIPPED | OUTPATIENT
Start: 2020-05-27 | End: 2021-05-03 | Stop reason: SDUPTHER

## 2020-05-27 RX ORDER — METFORMIN HYDROCHLORIDE 500 MG/1
500 TABLET, EXTENDED RELEASE ORAL 2 TIMES DAILY WITH MEALS
Qty: 180 TABLET | Refills: 1 | Status: SHIPPED | OUTPATIENT
Start: 2020-05-27 | End: 2021-03-29 | Stop reason: SDUPTHER

## 2020-05-27 RX ORDER — GLIPIZIDE 5 MG/1
5 TABLET ORAL
Qty: 180 TABLET | Refills: 1 | Status: SHIPPED | OUTPATIENT
Start: 2020-05-27 | End: 2020-05-27

## 2020-05-27 NOTE — PATIENT INSTRUCTIONS
1. If glucose increases or eating a high carbohydrate meal then I recommend increasing your Glipizide 5 mg to 2 pills twice a day and 10 mg sent to pharmacy

## 2020-05-27 NOTE — ASSESSMENT & PLAN NOTE
Lab Results   Component Value Date    LDLCALC 87.4 05/25/2020     - well controlled  - continue current medication  - triglycerides above goal  - discussed recommendation for diet, exercise and weight loss

## 2020-09-09 ENCOUNTER — PATIENT OUTREACH (OUTPATIENT)
Dept: ADMINISTRATIVE | Facility: HOSPITAL | Age: 45
End: 2020-09-09

## 2020-09-17 RX ORDER — DULAGLUTIDE 1.5 MG/.5ML
1.5 INJECTION, SOLUTION SUBCUTANEOUS
Qty: 2 ML | Refills: 1 | Status: CANCELLED | OUTPATIENT
Start: 2020-09-17

## 2020-09-17 NOTE — PROGRESS NOTES
"VIRTUAL VISIT/TELEMEDICINE VISIT  [facility]    The patient location is: [place]   The chief complaint leading to consultation is: follow-up diabetes  Visit type: Virtual visit with synchronous audio and video with Doximity    Total time spent: 25 mins  Each patient to whom he or she provides medical services by telemedicine is:  (1) informed of the relationship between the physician and patient and the respective role of any other health care provider with respect to management of the patient; and (2) notified that he or she may decline to receive medical services by telemedicine and may withdraw from such care at any time.      FAMILY MEDICINE  [facility]    Patient Active Problem List   Diagnosis    Type 2 diabetes mellitus with hyperglycemia, without long-term current use of insulin    Essential hypertension    Class 1 obesity due to excess calories with serious comorbidity and body mass index (BMI) of 34.0 to 34.9 in adult    History of gout    Tobacco dependence in remission    Elevated LFTs    Mixed hyperlipidemia    Fatty liver       CC:   Chief Complaint   Patient presents with    Diabetes       SUBJECTIVE:  Wilfredo Thomas   is a 45 y.o. male  - smoker (quit smoking 1/2020) with obesity, hypertension, type 2 diabetes and gout (since 2000) presents to follow-up diabetes and labs     Pt reports increased stress in last 3 months and admits to poor dietary compliance and also poor medication compliance. Reports often misses his evening doses.     1. Diabetes Type 2    Age: 43 yo     Diabetes education: 1/27/2020  Prior visit: "exercising walking 30-40 mins daily and adjusted to low carbohydrate diet and very committed"  - however with increase work stressors has not found time to follow his diet plan and exercise     Current treatment regimen:   empagliflozin (JARDIANCE) 25 mg Tab, Take 25 mg by mouth once daily., Disp: 90 tablet, Rfl: 1  glipiZIDE (GLUCOTROL) 10 MG " tablet, Take 1 tablet (10 mg total) by mouth 2 (two) times daily with meals., Disp: 180 tablet, Rfl: 1  - missing doses  metFORMIN (GLUCOPHAGE-XR) 500 MG XR 24hr tablet, Take 1 tablet (500 mg total) by mouth 2 (two) times daily with meals., Disp: 180 tablet, Rfl: 1  - missing doses    Side effects from treatment: denies  Complications of diabetes: none     Glucometer: yes  Glucose monitoring: 3 days ago (weekly)  A. Fastin mg/dL  (pt thought this was well controlled and mentioned goal was 150-200 mg/dL)                   7day: NAmg/dL           14 day: NA mg/dL             30 day: NA mg/dL     Lab Results       Component                Value               Date                       HGBA1C                   9.2 (H)             09/10/2020              Lab Results       Component                Value               Date                       HGBA1C                   7.9 (H)             2020               Lab Results       Component                Value               Date                       HGBA1C                   9.7 (H)             2020                  Low dose statin: atorvastatin  Last eye exam: 2/10/2020 (John Doc)  Last foot exam: 2020     Vaccines:   Influenza: denies and reports that did not tolerate  Pneumovax: 23 recommend and pt declined today  Prevnar 13: NA         ROS: Review of Systems   Constitutional: Negative.    HENT: Negative.    Eyes: Negative.    Respiratory: Negative.    Cardiovascular: Negative.    Gastrointestinal: Negative.    Endocrine: Negative.    Genitourinary: Negative.    Musculoskeletal: Negative.    Skin: Negative.    Allergic/Immunologic: Negative.    Neurological: Negative.    Hematological: Negative.    Psychiatric/Behavioral: Negative.        Past Medical History:   Diagnosis Date    Gout     Hypertension        History reviewed. No pertinent surgical history.    Family History   Problem Relation Age of Onset    Diabetes Mother     No Known Problems  "Father        Social History     Tobacco Use    Smoking status: Former Smoker     Packs/day: 0.50     Types: Cigarettes     Quit date: 2020     Years since quittin.7    Smokeless tobacco: Never Used   Substance Use Topics    Alcohol use: Yes     Alcohol/week: 12.0 standard drinks     Types: 12 Cans of beer per week     Frequency: 4 or more times a week     Drinks per session: 5 or 6    Drug use: Never       Social History     Social History Narrative    Lives with wife and 2 kids in college and one older with 1 grand child. Supervisor at [employer]. Smoker but quit 2020       ALLERGIES: Review of patient's allergies indicates:  No Known Allergies    MEDS:   Current Outpatient Medications:     atorvastatin (LIPITOR) 20 MG tablet, Take 1 tablet (20 mg total) by mouth once daily., Disp: 90 tablet, Rfl: 3    empagliflozin (JARDIANCE) 25 mg Tab, Take 25 mg by mouth once daily., Disp: 90 tablet, Rfl: 1    glipiZIDE (GLUCOTROL) 10 MG tablet, Take 1 tablet (10 mg total) by mouth 2 (two) times daily with meals., Disp: 180 tablet, Rfl: 1    lisinopriL (PRINIVIL,ZESTRIL) 20 MG tablet, Take 1 tablet (20 mg total) by mouth once daily., Disp: 90 tablet, Rfl: 1    metFORMIN (GLUCOPHAGE-XR) 500 MG XR 24hr tablet, Take 1 tablet (500 mg total) by mouth 2 (two) times daily with meals., Disp: 180 tablet, Rfl: 1    blood sugar diagnostic Strp, 1 strip by Misc.(Non-Drug; Combo Route) route once daily., Disp: 100 each, Rfl: 3    blood-glucose meter kit, Use as instructed, Disp: 1 each, Rfl: 0    indomethacin (INDOCIN SR) 75 mg CpSR CR capsule, Take 75 mg by mouth 2 (two) times daily as needed., Disp: , Rfl:     lancets Misc, 1 lancet by Misc.(Non-Drug; Combo Route) route once daily., Disp: 100 each, Rfl: 3    OBJECTIVE:   Vitals:    20 0811   Height: 5' 7" (1.702 m)     Body mass index is 36.34 kg/m².    Physical Exam  Constitutional:       General: He is not in acute distress.  Pulmonary:      Effort: Pulmonary " "effort is normal.   Neurological:      Mental Status: He is alert.   Psychiatric:         Speech: Speech normal.      Comments: Mood: "stressed"  Affect: full range       PHQ-4  Score: 3      PERTINENT RESULTS:   Lab Visit on 09/10/2020   Component Date Value Ref Range Status    Hemoglobin A1C 09/10/2020 9.2* 4.0 - 5.6 % Final    Comment: ADA Screening Guidelines:  5.7-6.4%  Consistent with prediabetes  >or=6.5%  Consistent with diabetes  High levels of fetal hemoglobin interfere with the HbA1C  assay. Heterozygous hemoglobin variants (HbS, HgC, etc)do  not significantly interfere with this assay.   However, presence of multiple variants may affect accuracy.      Estimated Avg Glucose 09/10/2020 217* 68 - 131 mg/dL Final    HIV 1/2 Ag/Ab 09/10/2020 Negative  Negative Final         ASSESSMENT/PLAN:  Problem List Items Addressed This Visit        Endocrine    Type 2 diabetes mellitus with hyperglycemia, without long-term current use of insulin - Primary    Current Assessment & Plan     Lab Results   Component Value Date    HGBA1C 9.2 (H) 09/10/2020     - poorly controlled due to poor compliance with diet, exercise and medication  - counseling on importance of glucose control and effects of poorly controlled diabetes on long term health and prevention of other chronic disease  - discussed adding trulicity but patient would like one month to monitor more frequently and make lifestyle modifications  - last visit Glipizide was increased to 10 mg BID with meals  - continue Metformin and Jardiance   - monitor closely and discussed if glucose not at goal, will need to add medication  - pt agreeable with plan         Relevant Orders    Basic metabolic panel    Hemoglobin A1C    Diabetes Digital Medicine (DDMP) Enrollment Order (Completed)    Diabetes Digital Medicine (DDMP): Assign Onboarding Questionnaires (Completed)      Other Visit Diagnoses     Need for hepatitis C screening test        Relevant Orders    Hepatitis C " Antibody          ORDERS:   Orders Placed This Encounter    Basic metabolic panel    Hemoglobin A1C    Hepatitis C Antibody    Diabetes Digital Medicine (DDMP) Enrollment Order    Diabetes Digital Medicine (DDMP): Assign Onboarding Questionnaires     Orders Placed This Encounter   Procedures    Basic metabolic panel     Standing Status:   Future     Standing Expiration Date:   11/16/2021    Hemoglobin A1C     Standing Status:   Future     Standing Expiration Date:   11/16/2021    Hepatitis C Antibody     Standing Status:   Future     Standing Expiration Date:   9/17/2021    Diabetes Digital Medicine (DDMP) Enrollment Order     I. PURPOSE  To achieve optimal DM control by targeting individualized hemoglobin A1c (HgA1c) goals while preventing hypoglycemia and other adverse drug events in [facility] patients eligible for digital medicine management.     II. GOALS  To achieve individualized HgA1c goals and improve patient outcomes per the most recent guidelines by the American Association of Clinical Endocrinologists and American College of Endocrinology (AACE/ACE) on the comprehensive management of DM  To maintain a systematic, coordinated process to monitor and modify DM outcomes using aggregated patient data from connected devices  To provide consultative services to providers, patients and caregivers regarding optimal DM management  To provide education, guidance and reinforcement regarding DM management, including medication therapy, nutrition, and overall health and wellness   To add value to patient care by optimizing DM management that is innovative and cost effective  To promote health maintenance for patients with DM by facilitating routine testing including HgA1c, urine micro-albumin, and diabetic eye, and foot exams.   To provide patient care and education within an interdisciplinary framework and enhance partnering with other members of health care team  To collect and utilize  pharmacy related outcomes to improve quality of patient care    III. COLLABORATIVE PRACTICE AGREEMENT    A.  Under this collaborative practice agreement, an [facility] pharmacist according to and in compliance with [place] Board of Pharmacy Title 46, Part LIII, section 523 and [place] Board of Medical Examiners definition of the Collaborative Drug Therapy Management (CDTM) may initiate, implement, alter and monitor a therapeutic drug plan intended to manage DM therapy.  Services offered by the pharmacy specialist may include education on disease state and lifestyle modification, in addition to the drug therapy services listed above. Written and audio/visual educational materials and patient specific information may be provided to improve quality of care.    B. Primary Collaborating Physician:    Primary Physician: Neeraj Membreno MD  Title: Associate Medical Director, Primary [place], [facility]  [place] Number: [id number]  CDTM Number: [id number]  Telephone Number: [phone]  Email: [email or web address]  Emergency Contact Number: [phone]    Back Up Physician: Dania Meehan MD  Title: Senior Physician  License Number: [id number]  CDTM Number:   Telephone Number: [phone]  Email: [email or web address]  Emergency Contact Number: [phone]    Back Up Physician: Jamil Hickman MD ([place])  Title: Senior Physician   License Number: [id number]  CDTM Number:   Telephone Number: [phone]  Email: [email or web address]  Emergency Contact Number: [phone]    C.  Pharmacists:   Pharmacist: Niesha Aguilera, Anette  This pharmacist has completed a pharmacy practice residency and has practiced clinical pharmacy for 7 years. She has experience in the areas of cardiology, acute care, and managed care. She started a pharmacist run hypertension clinic at [facility] during her residency and published an article about the clinic in The American Journal of Health-System Pharmacists.     [place] Pharmacist License  Number: [id number]  CDTM number:  [id number]  Contact Number:  [phone]  Contact E-mail: [email or web address]  Emergency Contact Number:  [phone]    Pharmacist: Juan Francisco العراقيD  This pharmacist has completed a community ambulatory care pharmacy practice residency and has practiced clinical pharmacy for 5 years in the areas of retail, specialty, and ambulatory care.      [place] Pharmacist License Number: [id number]  CDTM Number: [id number]  Telephone number: [phone]  E-mail: [email or web address]  Emergency Contact Number: [phone]    Pharmacist: Mervat Dent PharmD  This pharmacist has completed a pharmacy practice residency with an emphasis in ambulatory care and has practiced clinical pharmacy for one year. She has experience in the areas of diabetes, hypertension and dyslipidemia.   [place] Pharmacist License Number: [id number]  CDTM Number: [id number]  Contact Number: [phone]  Contact Email: [email or web address]  Emergency Contact: [phone]    Pharmacist: Kenya White PharmD, BCPS  This pharmacist has completed a pharmacy practice residency with an emphasis in ambulatory care and is a Board Certified Pharmacotherapy Specialist. She has practiced clinical pharmacy for 5 year in areas of ambulatory care, internal medicine, cardiology and specialty pharmacy.    [place] Pharmacist License Number: [id number]  CDTM number:  [id number]  Contact Number:  [phone]  Contact E-mail:  [email or web address]   Emergency Contact Number:  [phone]    D.  Eligible Patients  Patients whose DM is managed under this agreement must have a diagnosis of Type 2 DM as documented in the patient record, and have established care with a provider within [facility]. All aspects of the patients medication management will be followed in collaboration with the physician treating the patients condition.  The patient will be seen by his/her physician  per his/her discretion or by the recommendation by the [facility] medicine clinical pharmacy specialist.  The patients case may be reviewed with medical personnel as needed. Outcomes including HgA1c will be reported at least every 120 days to the collaborating physicians.    E.  Medications in CDTM  This CDTM agreement involves the management of pharmacotherapy for diabetes, specifically, biguanides, sulfonylureas (SFUs), meglitinite derivatives, alpha-glucosidase inhibitors, thiazolidinediones (TZDs), glucagonlike peptide-1 (GLP-1) agonists, dipeptidyl peptidase 4 (DPPP-4) inhibitors, selective sodium-glucose cotransporter-2 (SGLT-2) inhibitors, insulins, amylionomimetics, bile acid sequestrants (BAS), dopamine receptor agonists, or those patients in which DM is controlled by lifestyle therapy alone.      This CDTM agreement also involves management of pharmacotherapy to minimize or prevent complications of diabetes, including statins and RAAS blocking agents.     F.  Clinical Procedure  All patients will be notified that a DM CDTM exists.  A signed physician order will be required for each patient to be enrolled into the DDMP.  Informed consent and an electronic signature will be obtained from each patient and documented in the patients record.  This patient signature will be valid for 1 year, requiring a new physician order and patient consent yearly.  The patient must also be enrolled in electronic communications via [facility]. The following management plan will be utilized:    Review initial therapy and establish entry HgA1c  Individualize HgA1c goal:  Less than or equal to 7% for patients without concurrent serious illness and at low hypoglycemic risk  Less than or equal to 8% for patients with concurrent serious illness and at risk for hypoglycemia    Obtain HgA1c measurements as outlined by enrolling physician, up to 4 times per year  Guide drug therapy optimization based on the AACE/ACE glycemic control  algorithm in order to achieve HgA1c goals  Ensure patient is on appropriate statin and RAAS blockade therapy  Order appropriate blood tests as needed to ensure medication safety  Document medication changes, interventions and outcomes in electronic medical record (EMR)  Notify responsible provider of specific problems/concerns as necessary. Refer to physician when appropriate.  Provide patient/caregiver education and continuous reinforcement regarding DM management, medication therapy and lifestyle modifications.   Consult endocrinology as needed for cases when therapeutic goals are not met despite DM therapy optimization    Pharmacists will be authorized to order and evaluate laboratory tests directly related to the disease specific drug therapy being managed. Pharmacists will also be authorized to order additional specific labs based on patient clinical presentation or description. Pharmacists may also review other lab data available in the patient record which may be necessary for the evaluation and assessment of the impact on antihypertensive therapy (i.e. drug interaction, disease interaction, etc.)   Documentation of patient encounters and lab results will be permanently placed in the [facility] EMR.  Laboratory results will automatically populate prompting review and assessment of each patient.  Laboratory results obtained from outside laboratories will be entered into EMR manually.  This documentation will include lab values, medication changes, identification and assessment of adverse events related to therapy, medication dose adjustments, therapeutic management plan and follow up as well as any other information given to the patient during the digital medicine encounter.     G.      will be carried out through quarterly reports tracking HgA1c changes, incidence of hypoglycemic events and compliance with health maintenance including urine micro-albumin screening and diabetic  eye exams.         Vaccines recommended: Tdap, PPSV 23 and flu. Pt declines all vaccines    Follow-up 4-6 weeks and 3 months with labs or sooner if any concerns.    Dr. Aylin Wayne D.O.   [facility]

## 2020-09-18 ENCOUNTER — OFFICE VISIT (OUTPATIENT)
Dept: FAMILY MEDICINE | Facility: CLINIC | Age: 45
End: 2020-09-18
Payer: COMMERCIAL

## 2020-09-18 ENCOUNTER — TELEPHONE (OUTPATIENT)
Dept: FAMILY MEDICINE | Facility: CLINIC | Age: 45
End: 2020-09-18

## 2020-09-18 VITALS — HEIGHT: 67 IN | BODY MASS INDEX: 36.34 KG/M2

## 2020-09-18 DIAGNOSIS — E11.65 TYPE 2 DIABETES MELLITUS WITH HYPERGLYCEMIA, WITHOUT LONG-TERM CURRENT USE OF INSULIN: Primary | ICD-10-CM

## 2020-09-18 DIAGNOSIS — Z11.59 NEED FOR HEPATITIS C SCREENING TEST: ICD-10-CM

## 2020-09-18 PROCEDURE — 3008F PR BODY MASS INDEX (BMI) DOCUMENTED: ICD-10-PCS | Mod: CPTII,,, | Performed by: FAMILY MEDICINE

## 2020-09-18 PROCEDURE — 2024F 7 FLD RTA PHOTO EVC RTNOPTHY: CPT | Mod: ,,, | Performed by: FAMILY MEDICINE

## 2020-09-18 PROCEDURE — 3046F HEMOGLOBIN A1C LEVEL >9.0%: CPT | Mod: CPTII,,, | Performed by: FAMILY MEDICINE

## 2020-09-18 PROCEDURE — 99214 PR OFFICE/OUTPT VISIT, EST, LEVL IV, 30-39 MIN: ICD-10-PCS | Mod: 95,,, | Performed by: FAMILY MEDICINE

## 2020-09-18 PROCEDURE — 2024F PR 7 FIELD PHOTOS WITH INTERP/ REVIEW: ICD-10-PCS | Mod: ,,, | Performed by: FAMILY MEDICINE

## 2020-09-18 PROCEDURE — 99214 OFFICE O/P EST MOD 30 MIN: CPT | Mod: 95,,, | Performed by: FAMILY MEDICINE

## 2020-09-18 PROCEDURE — 3046F PR MOST RECENT HEMOGLOBIN A1C LEVEL > 9.0%: ICD-10-PCS | Mod: CPTII,,, | Performed by: FAMILY MEDICINE

## 2020-09-18 PROCEDURE — 3008F BODY MASS INDEX DOCD: CPT | Mod: CPTII,,, | Performed by: FAMILY MEDICINE

## 2020-09-18 NOTE — TELEPHONE ENCOUNTER
----- Message from Harpreet Fuentes sent at 9/18/2020  2:33 PM CDT -----  Contact: patient// [phone]  MAYELA LANDAVERDE calling returning your call regarding scheduling   Please advise

## 2020-09-18 NOTE — ASSESSMENT & PLAN NOTE
Lab Results   Component Value Date    HGBA1C 9.2 (H) 09/10/2020     - poorly controlled due to poor compliance with diet, exercise and medication  - counseling on importance of glucose control and effects of poorly controlled diabetes on long term health and prevention of other chronic disease  - discussed adding trulicity but patient would like one month to monitor more frequently and make lifestyle modifications  - last visit Glipizide was increased to 10 mg BID with meals  - continue Metformin and Jardiance   - monitor closely and discussed if glucose not at goal, will need to add medication  - pt agreeable with plan

## 2020-09-18 NOTE — TELEPHONE ENCOUNTER
----- Message from Aylin Wayne DO sent at 9/18/2020  8:23 AM CDT -----  Please schedule patient for  1. 1 month video visit follow-up diabetes controled  2. 3 month diabetes follow-up with labs 1 week prior

## 2020-09-18 NOTE — PATIENT INSTRUCTIONS
1. It is important that you bring your glucose monitor to every clinic visit.   2. Glucose goals  - Fasting AM glucose (no food 8 hours) </= 130 mg/dL  - 2 hours after you eat a meal, your glucose should be </=160 mg/dL  3. Monitor your glucose: daily AM fasting or once 2 hours after a meal

## 2020-12-14 ENCOUNTER — TELEPHONE (OUTPATIENT)
Dept: FAMILY MEDICINE | Facility: CLINIC | Age: 45
End: 2020-12-14

## 2020-12-14 NOTE — TELEPHONE ENCOUNTER
Okay to keep appointment we can not do lab work on the day he seen.  Dr. Aylin Wayne D.O.   [facility]

## 2020-12-14 NOTE — TELEPHONE ENCOUNTER
----- Message from Libra Aden sent at 12/14/2020  1:34 PM CST -----  Regarding: Call back  Contact: [phone]  Patient Returning Your Phone Call

## 2020-12-14 NOTE — PROGRESS NOTES
FAMILY MEDICINE  [facility]    CC:   Chief Complaint   Patient presents with    Follow-up     diabetes        SUBJECTIVE:  Wilfredo Thomas   is a 45 y.o. male  - smoker (quit smoking 1/2020) with obesity, hypertension, type 2 diabetes and gout (since 2000) presents to follow-up diabetes and labs     However was unable to get labs yesterday because he was called into work.  He has been having some extra stress lately working increase shifts due to loss of staff due to COVID.  Also about 3 weeks ago his 19-year-old son moved out of the house after an argument.  It has been very difficult for his wife.  It has placed a lot of stress on him    1. Diabetes Type 2    Age: 43 yo     Diabetes education: 1/27/202     Current treatment regimen:   empagliflozin (JARDIANCE) 25 mg Tab, Take 25 mg by mouth once daily., Disp: 90 tablet, Rfl: 1  glipiZIDE (GLUCOTROL) 10 MG tablet, Take 1 tablet (10 mg total) by mouth 2 (two) times daily with meals., Disp: 180 tablet, Rfl: 1  metFORMIN (GLUCOPHAGE-XR) 500 MG XR 24hr tablet, Take 1 tablet (500 mg total) by mouth 2 (two) times daily with meals., Disp: 180 tablet, Rfl: 1t.     Side effects from treatment: denies  Complications of diabetes: none     Glucometer: yes  Glucose monitoring:  Not regularly recently  A. Fasting:  No recent values     Lab Results       Component                Value               Date                       HGBA1C                   9.2 (H)             09/10/2020              Lab Results       Component                Value               Date                       HGBA1C                   7.9 (H)             05/25/2020               Lab Results       Component                Value               Date                       HGBA1C                   9.7 (H)             02/03/2020                  Low dose statin: atorvastatin  Last eye exam: 2/10/2020 ([facility])  Last foot exam: 1/13/2020 due today 12/15/2020     Vaccines:   Influenza: denies and reports  that did not tolerate  Pneumovax: 23 recommend and pt declined today  Prevnar 13: NA         ROS: Review of Systems   Constitutional: Negative.    HENT: Negative.    Eyes: Negative.    Respiratory: Negative.    Cardiovascular: Negative.    Gastrointestinal: Negative.    Endocrine: Negative.    Genitourinary: Negative.    Musculoskeletal: Negative.    Skin: Negative.    Allergic/Immunologic: Negative.    Neurological: Negative.    Hematological: Negative.    Psychiatric/Behavioral: Negative.        Past Medical History:   Diagnosis Date    Gout     Hypertension        History reviewed. No pertinent surgical history.    Family History   Problem Relation Age of Onset    Diabetes Mother     No Known Problems Father        Social History     Tobacco Use    Smoking status: Former Smoker     Packs/day: 0.50     Types: Cigarettes     Quit date: 2020     Years since quittin.9    Smokeless tobacco: Never Used   Substance Use Topics    Alcohol use: Yes     Alcohol/week: 12.0 standard drinks     Types: 12 Cans of beer per week     Frequency: 4 or more times a week     Drinks per session: 5 or 6    Drug use: Never       Social History     Social History Narrative    Lives with wife and 2 kids in college and one older with 1 grand child. Supervisor at [employer]. Smoker but quit 2020       ALLERGIES: Review of patient's allergies indicates:  No Known Allergies    MEDS:   Current Outpatient Medications:     atorvastatin (LIPITOR) 20 MG tablet, Take 1 tablet (20 mg total) by mouth once daily., Disp: 90 tablet, Rfl: 3    blood sugar diagnostic Strp, 1 strip by Misc.(Non-Drug; Combo Route) route once daily., Disp: 100 each, Rfl: 3    blood-glucose meter kit, Use as instructed, Disp: 1 each, Rfl: 0    glipiZIDE (GLUCOTROL) 10 MG tablet, Take 1 tablet (10 mg total) by mouth 2 (two) times daily with meals., Disp: 180 tablet, Rfl: 1    indomethacin (INDOCIN SR) 75 mg CpSR CR capsule, Take 75 mg by mouth 2 (two) times  "daily as needed., Disp: , Rfl:     JARDIANCE 25 mg tablet, TAKE 1 TABLET EVERY DAY, Disp: 90 tablet, Rfl: 0    lancets Misc, 1 lancet by Misc.(Non-Drug; Combo Route) route once daily., Disp: 100 each, Rfl: 3    lisinopriL (PRINIVIL,ZESTRIL) 20 MG tablet, Take 1 tablet (20 mg total) by mouth once daily., Disp: 90 tablet, Rfl: 1    metFORMIN (GLUCOPHAGE-XR) 500 MG XR 24hr tablet, Take 1 tablet (500 mg total) by mouth 2 (two) times daily with meals., Disp: 180 tablet, Rfl: 1    OBJECTIVE:   Vitals:    12/15/20 1444 12/15/20 1502   BP: (!) 140/90 132/86   BP Location: Left arm    Patient Position: Sitting    BP Method: X-Large (Manual)    Pulse: 98    Resp: 18    Temp: 98.1 °F (36.7 °C)    TempSrc: Oral    SpO2: 98%    Weight: 107.1 kg (236 lb 3.2 oz)    Height: 5' 7" (1.702 m)      Body mass index is 36.99 kg/m².    Physical Exam  Constitutional:       General: He is not in acute distress.  Neck:      Musculoskeletal: Neck supple.   Cardiovascular:      Rate and Rhythm: Normal rate and regular rhythm.      Pulses:           Dorsalis pedis pulses are 2+ on the right side and 2+ on the left side.        Posterior tibial pulses are 2+ on the right side and 2+ on the left side.      Heart sounds: Normal heart sounds. No murmur.   Pulmonary:      Effort: Pulmonary effort is normal.      Breath sounds: Normal breath sounds.   Feet:      Right foot:      Protective Sensation: 5 sites tested. 5 sites sensed.      Skin integrity: Skin integrity normal.      Left foot:      Protective Sensation: 5 sites tested. 5 sites sensed.      Skin integrity: Skin integrity normal.   Skin:     General: Skin is warm.   Neurological:      Mental Status: He is alert.     PHQ-4  Score: 0      PERTINENT RESULTS:   No visits with results within 1 Month(s) from this visit.   Latest known visit with results is:   Lab Visit on 09/10/2020   Component Date Value Ref Range Status    Hemoglobin A1C 09/10/2020 9.2* 4.0 - 5.6 % Final    Comment: ADA " Screening Guidelines:  5.7-6.4%  Consistent with prediabetes  >or=6.5%  Consistent with diabetes  High levels of fetal hemoglobin interfere with the HbA1C  assay. Heterozygous hemoglobin variants (HbS, HgC, etc)do  not significantly interfere with this assay.   However, presence of multiple variants may affect accuracy.      Estimated Avg Glucose 09/10/2020 217* 68 - 131 mg/dL Final    HIV 1/2 Ag/Ab 09/10/2020 Negative  Negative Final         ASSESSMENT/PLAN:  Problem List Items Addressed This Visit        Cardiac/Vascular    Essential hypertension - Primary    Overview     - well controlled  - continue current medication         Relevant Orders    Comprehensive Metabolic Panel    Lipid Panel       Endocrine    Type 2 diabetes mellitus with hyperglycemia, without long-term current use of insulin    Overview     Lab Results   Component Value Date    HGBA1C 9.2 (H) 09/10/2020     - poorly controlled  - recently poor compliance with diet but has been very compliant with his medication  - he has some mild been monitoring his glucose regularly  - discussed importance of diabetes control  - he does have extra stressors in his life at this time  - discussed starting Trulicity if his A1c is above goal  - questions solicited answer and patient is agreeable with plan         Relevant Orders    Hemoglobin A1C    Comprehensive Metabolic Panel    Lipid Panel          ORDERS:   Orders Placed This Encounter    Hemoglobin A1C    Comprehensive Metabolic Panel    Lipid Panel     Vaccines recommended: Tdap, PPSV 23 and flu. Pt declines all vaccines    Follow-up 4-6 weeks and 3 months with labs or sooner if any concerns.    Dr. Aylin Wayne D.O.   Family Medicine

## 2020-12-14 NOTE — TELEPHONE ENCOUNTER
----- Message from Miguel Angel White sent at 12/14/2020 11:27 AM CST -----  Regarding: Labs missed  Type:  Needs Medical Advice    Who Called:  patient  Would the patient rather a call back or a response via [facility]?  Call back  Best Call Back Number:  [phone]  Additional Information:  needs to know if he has to reschedule his appointment since he missed his labs this past weekend due to work commitment

## 2020-12-15 ENCOUNTER — OFFICE VISIT (OUTPATIENT)
Dept: FAMILY MEDICINE | Facility: CLINIC | Age: 45
End: 2020-12-15
Payer: COMMERCIAL

## 2020-12-15 VITALS
WEIGHT: 236.19 LBS | BODY MASS INDEX: 37.07 KG/M2 | SYSTOLIC BLOOD PRESSURE: 132 MMHG | RESPIRATION RATE: 18 BRPM | OXYGEN SATURATION: 98 % | HEART RATE: 98 BPM | DIASTOLIC BLOOD PRESSURE: 86 MMHG | HEIGHT: 67 IN | TEMPERATURE: 98 F

## 2020-12-15 DIAGNOSIS — E11.65 TYPE 2 DIABETES MELLITUS WITH HYPERGLYCEMIA, WITHOUT LONG-TERM CURRENT USE OF INSULIN: ICD-10-CM

## 2020-12-15 DIAGNOSIS — I10 ESSENTIAL HYPERTENSION: Primary | ICD-10-CM

## 2020-12-15 PROCEDURE — 1126F PR PAIN SEVERITY QUANTIFIED, NO PAIN PRESENT: ICD-10-PCS | Mod: S$GLB,,, | Performed by: FAMILY MEDICINE

## 2020-12-15 PROCEDURE — 99214 PR OFFICE/OUTPT VISIT, EST, LEVL IV, 30-39 MIN: ICD-10-PCS | Mod: S$GLB,,, | Performed by: FAMILY MEDICINE

## 2020-12-15 PROCEDURE — 99999 PR PBB SHADOW E&M-EST. PATIENT-LVL V: ICD-10-PCS | Mod: PBBFAC,,, | Performed by: FAMILY MEDICINE

## 2020-12-15 PROCEDURE — 99214 OFFICE O/P EST MOD 30 MIN: CPT | Mod: S$GLB,,, | Performed by: FAMILY MEDICINE

## 2020-12-15 PROCEDURE — 3046F PR MOST RECENT HEMOGLOBIN A1C LEVEL > 9.0%: ICD-10-PCS | Mod: CPTII,S$GLB,, | Performed by: FAMILY MEDICINE

## 2020-12-15 PROCEDURE — 3008F PR BODY MASS INDEX (BMI) DOCUMENTED: ICD-10-PCS | Mod: CPTII,S$GLB,, | Performed by: FAMILY MEDICINE

## 2020-12-15 PROCEDURE — 3075F PR MOST RECENT SYSTOLIC BLOOD PRESS GE 130-139MM HG: ICD-10-PCS | Mod: CPTII,S$GLB,, | Performed by: FAMILY MEDICINE

## 2020-12-15 PROCEDURE — 3079F PR MOST RECENT DIASTOLIC BLOOD PRESSURE 80-89 MM HG: ICD-10-PCS | Mod: CPTII,S$GLB,, | Performed by: FAMILY MEDICINE

## 2020-12-15 PROCEDURE — 99999 PR PBB SHADOW E&M-EST. PATIENT-LVL V: CPT | Mod: PBBFAC,,, | Performed by: FAMILY MEDICINE

## 2020-12-15 PROCEDURE — 1126F AMNT PAIN NOTED NONE PRSNT: CPT | Mod: S$GLB,,, | Performed by: FAMILY MEDICINE

## 2020-12-15 PROCEDURE — 2024F 7 FLD RTA PHOTO EVC RTNOPTHY: CPT | Mod: S$GLB,,, | Performed by: FAMILY MEDICINE

## 2020-12-15 PROCEDURE — 3075F SYST BP GE 130 - 139MM HG: CPT | Mod: CPTII,S$GLB,, | Performed by: FAMILY MEDICINE

## 2020-12-15 PROCEDURE — 3008F BODY MASS INDEX DOCD: CPT | Mod: CPTII,S$GLB,, | Performed by: FAMILY MEDICINE

## 2020-12-15 PROCEDURE — 3079F DIAST BP 80-89 MM HG: CPT | Mod: CPTII,S$GLB,, | Performed by: FAMILY MEDICINE

## 2020-12-15 PROCEDURE — 2024F PR 7 FIELD PHOTOS WITH INTERP/ REVIEW: ICD-10-PCS | Mod: S$GLB,,, | Performed by: FAMILY MEDICINE

## 2020-12-15 PROCEDURE — 3046F HEMOGLOBIN A1C LEVEL >9.0%: CPT | Mod: CPTII,S$GLB,, | Performed by: FAMILY MEDICINE

## 2020-12-15 RX ORDER — LISINOPRIL AND HYDROCHLOROTHIAZIDE 20; 25 MG/1; MG/1
1 TABLET ORAL DAILY
Qty: 90 TABLET | Refills: 3 | Status: CANCELLED | OUTPATIENT
Start: 2020-12-15 | End: 2021-12-15

## 2020-12-16 ENCOUNTER — PATIENT MESSAGE (OUTPATIENT)
Dept: FAMILY MEDICINE | Facility: CLINIC | Age: 45
End: 2020-12-16

## 2020-12-16 DIAGNOSIS — E11.65 TYPE 2 DIABETES MELLITUS WITH HYPERGLYCEMIA, WITHOUT LONG-TERM CURRENT USE OF INSULIN: Primary | ICD-10-CM

## 2020-12-16 RX ORDER — DULAGLUTIDE 0.75 MG/.5ML
0.75 INJECTION, SOLUTION SUBCUTANEOUS WEEKLY
Qty: 2 ML | Refills: 0 | Status: SHIPPED | OUTPATIENT
Start: 2020-12-16 | End: 2021-01-11 | Stop reason: SDUPTHER

## 2020-12-16 NOTE — TELEPHONE ENCOUNTER
Patient already has three-month follow-up scheduled with labs.  However his A1c is poorly controlled and I need to see him back in 6 weeks as well.  Please call patient and schedule 6 week follow-up for diabetes.  Please remind him to bring his glucose monitor and all his medication to his visit.  Orders Placed This Encounter    dulaglutide (TRULICITY) 0.75 mg/0.5 mL pen injector       Dr. Aylin Wayne D.O.   [facility]

## 2021-01-07 ENCOUNTER — PATIENT MESSAGE (OUTPATIENT)
Dept: FAMILY MEDICINE | Facility: CLINIC | Age: 46
End: 2021-01-07

## 2021-01-10 ENCOUNTER — PATIENT MESSAGE (OUTPATIENT)
Dept: FAMILY MEDICINE | Facility: CLINIC | Age: 46
End: 2021-01-10

## 2021-01-10 DIAGNOSIS — E11.65 TYPE 2 DIABETES MELLITUS WITH HYPERGLYCEMIA, WITHOUT LONG-TERM CURRENT USE OF INSULIN: ICD-10-CM

## 2021-01-11 RX ORDER — DULAGLUTIDE 0.75 MG/.5ML
0.75 INJECTION, SOLUTION SUBCUTANEOUS WEEKLY
Qty: 2 ML | Refills: 2 | Status: SHIPPED | OUTPATIENT
Start: 2021-01-11 | End: 2021-02-10

## 2021-01-27 ENCOUNTER — OFFICE VISIT (OUTPATIENT)
Dept: FAMILY MEDICINE | Facility: CLINIC | Age: 46
End: 2021-01-27
Payer: COMMERCIAL

## 2021-01-27 VITALS
TEMPERATURE: 98 F | OXYGEN SATURATION: 98 % | HEART RATE: 93 BPM | HEIGHT: 67 IN | DIASTOLIC BLOOD PRESSURE: 80 MMHG | RESPIRATION RATE: 18 BRPM | WEIGHT: 221.19 LBS | BODY MASS INDEX: 34.72 KG/M2 | SYSTOLIC BLOOD PRESSURE: 132 MMHG

## 2021-01-27 DIAGNOSIS — E11.65 TYPE 2 DIABETES MELLITUS WITH HYPERGLYCEMIA, WITHOUT LONG-TERM CURRENT USE OF INSULIN: Primary | ICD-10-CM

## 2021-01-27 DIAGNOSIS — F17.201 TOBACCO DEPENDENCE IN REMISSION: ICD-10-CM

## 2021-01-27 DIAGNOSIS — I10 ESSENTIAL HYPERTENSION: ICD-10-CM

## 2021-01-27 PROCEDURE — 99213 OFFICE O/P EST LOW 20 MIN: CPT | Mod: S$GLB,,, | Performed by: FAMILY MEDICINE

## 2021-01-27 PROCEDURE — 3046F HEMOGLOBIN A1C LEVEL >9.0%: CPT | Mod: CPTII,S$GLB,, | Performed by: FAMILY MEDICINE

## 2021-01-27 PROCEDURE — 99999 PR PBB SHADOW E&M-EST. PATIENT-LVL IV: ICD-10-PCS | Mod: PBBFAC,,, | Performed by: FAMILY MEDICINE

## 2021-01-27 PROCEDURE — 99213 PR OFFICE/OUTPT VISIT, EST, LEVL III, 20-29 MIN: ICD-10-PCS | Mod: S$GLB,,, | Performed by: FAMILY MEDICINE

## 2021-01-27 PROCEDURE — 3046F PR MOST RECENT HEMOGLOBIN A1C LEVEL > 9.0%: ICD-10-PCS | Mod: CPTII,S$GLB,, | Performed by: FAMILY MEDICINE

## 2021-01-27 PROCEDURE — 3008F PR BODY MASS INDEX (BMI) DOCUMENTED: ICD-10-PCS | Mod: CPTII,S$GLB,, | Performed by: FAMILY MEDICINE

## 2021-01-27 PROCEDURE — 3079F PR MOST RECENT DIASTOLIC BLOOD PRESSURE 80-89 MM HG: ICD-10-PCS | Mod: CPTII,S$GLB,, | Performed by: FAMILY MEDICINE

## 2021-01-27 PROCEDURE — 1126F PR PAIN SEVERITY QUANTIFIED, NO PAIN PRESENT: ICD-10-PCS | Mod: S$GLB,,, | Performed by: FAMILY MEDICINE

## 2021-01-27 PROCEDURE — 3075F PR MOST RECENT SYSTOLIC BLOOD PRESS GE 130-139MM HG: ICD-10-PCS | Mod: CPTII,S$GLB,, | Performed by: FAMILY MEDICINE

## 2021-01-27 PROCEDURE — 3075F SYST BP GE 130 - 139MM HG: CPT | Mod: CPTII,S$GLB,, | Performed by: FAMILY MEDICINE

## 2021-01-27 PROCEDURE — 1126F AMNT PAIN NOTED NONE PRSNT: CPT | Mod: S$GLB,,, | Performed by: FAMILY MEDICINE

## 2021-01-27 PROCEDURE — 3008F BODY MASS INDEX DOCD: CPT | Mod: CPTII,S$GLB,, | Performed by: FAMILY MEDICINE

## 2021-01-27 PROCEDURE — 3079F DIAST BP 80-89 MM HG: CPT | Mod: CPTII,S$GLB,, | Performed by: FAMILY MEDICINE

## 2021-01-27 PROCEDURE — 99999 PR PBB SHADOW E&M-EST. PATIENT-LVL IV: CPT | Mod: PBBFAC,,, | Performed by: FAMILY MEDICINE

## 2021-03-01 LAB
LEFT EYE DM RETINOPATHY: NEGATIVE
RIGHT EYE DM RETINOPATHY: POSITIVE

## 2021-03-10 DIAGNOSIS — E11.9 TYPE 2 DIABETES MELLITUS WITHOUT COMPLICATION, UNSPECIFIED WHETHER LONG TERM INSULIN USE: ICD-10-CM

## 2021-03-16 ENCOUNTER — PATIENT OUTREACH (OUTPATIENT)
Dept: ADMINISTRATIVE | Facility: HOSPITAL | Age: 46
End: 2021-03-16

## 2021-03-25 RX ORDER — MELOXICAM 7.5 MG/1
7.5 TABLET ORAL 2 TIMES DAILY PRN
COMMUNITY
Start: 2021-01-04 | End: 2022-03-24

## 2021-03-25 RX ORDER — DULAGLUTIDE 0.75 MG/.5ML
0.75 INJECTION, SOLUTION SUBCUTANEOUS WEEKLY
COMMUNITY
Start: 2021-03-09 | End: 2021-03-29

## 2021-03-25 RX ORDER — TRAMADOL HYDROCHLORIDE 50 MG/1
50 TABLET ORAL EVERY 8 HOURS PRN
Status: ON HOLD | COMMUNITY
Start: 2021-01-04 | End: 2022-07-27 | Stop reason: ALTCHOICE

## 2021-03-29 ENCOUNTER — OFFICE VISIT (OUTPATIENT)
Dept: FAMILY MEDICINE | Facility: CLINIC | Age: 46
End: 2021-03-29
Payer: COMMERCIAL

## 2021-03-29 VITALS
DIASTOLIC BLOOD PRESSURE: 82 MMHG | RESPIRATION RATE: 18 BRPM | OXYGEN SATURATION: 98 % | HEIGHT: 67 IN | HEART RATE: 87 BPM | WEIGHT: 222.69 LBS | SYSTOLIC BLOOD PRESSURE: 130 MMHG | BODY MASS INDEX: 34.95 KG/M2

## 2021-03-29 DIAGNOSIS — F17.201 TOBACCO DEPENDENCE IN REMISSION: ICD-10-CM

## 2021-03-29 DIAGNOSIS — E11.9 TYPE 2 DIABETES MELLITUS WITHOUT COMPLICATION, WITHOUT LONG-TERM CURRENT USE OF INSULIN: Primary | ICD-10-CM

## 2021-03-29 DIAGNOSIS — K76.0 FATTY LIVER: ICD-10-CM

## 2021-03-29 DIAGNOSIS — I10 ESSENTIAL HYPERTENSION: ICD-10-CM

## 2021-03-29 DIAGNOSIS — E78.2 MIXED HYPERLIPIDEMIA: ICD-10-CM

## 2021-03-29 DIAGNOSIS — R79.89 ELEVATED LFTS: ICD-10-CM

## 2021-03-29 PROCEDURE — 3075F SYST BP GE 130 - 139MM HG: CPT | Mod: CPTII,S$GLB,, | Performed by: FAMILY MEDICINE

## 2021-03-29 PROCEDURE — 3008F PR BODY MASS INDEX (BMI) DOCUMENTED: ICD-10-PCS | Mod: CPTII,S$GLB,, | Performed by: FAMILY MEDICINE

## 2021-03-29 PROCEDURE — 3044F PR MOST RECENT HEMOGLOBIN A1C LEVEL <7.0%: ICD-10-PCS | Mod: CPTII,S$GLB,, | Performed by: FAMILY MEDICINE

## 2021-03-29 PROCEDURE — 3044F HG A1C LEVEL LT 7.0%: CPT | Mod: CPTII,S$GLB,, | Performed by: FAMILY MEDICINE

## 2021-03-29 PROCEDURE — 1126F PR PAIN SEVERITY QUANTIFIED, NO PAIN PRESENT: ICD-10-PCS | Mod: S$GLB,,, | Performed by: FAMILY MEDICINE

## 2021-03-29 PROCEDURE — 3079F DIAST BP 80-89 MM HG: CPT | Mod: CPTII,S$GLB,, | Performed by: FAMILY MEDICINE

## 2021-03-29 PROCEDURE — 99214 OFFICE O/P EST MOD 30 MIN: CPT | Mod: S$GLB,,, | Performed by: FAMILY MEDICINE

## 2021-03-29 PROCEDURE — 1126F AMNT PAIN NOTED NONE PRSNT: CPT | Mod: S$GLB,,, | Performed by: FAMILY MEDICINE

## 2021-03-29 PROCEDURE — 99999 PR PBB SHADOW E&M-EST. PATIENT-LVL IV: CPT | Mod: PBBFAC,,, | Performed by: FAMILY MEDICINE

## 2021-03-29 PROCEDURE — 99999 PR PBB SHADOW E&M-EST. PATIENT-LVL IV: ICD-10-PCS | Mod: PBBFAC,,, | Performed by: FAMILY MEDICINE

## 2021-03-29 PROCEDURE — 3008F BODY MASS INDEX DOCD: CPT | Mod: CPTII,S$GLB,, | Performed by: FAMILY MEDICINE

## 2021-03-29 PROCEDURE — 3079F PR MOST RECENT DIASTOLIC BLOOD PRESSURE 80-89 MM HG: ICD-10-PCS | Mod: CPTII,S$GLB,, | Performed by: FAMILY MEDICINE

## 2021-03-29 PROCEDURE — 3075F PR MOST RECENT SYSTOLIC BLOOD PRESS GE 130-139MM HG: ICD-10-PCS | Mod: CPTII,S$GLB,, | Performed by: FAMILY MEDICINE

## 2021-03-29 PROCEDURE — 99214 PR OFFICE/OUTPT VISIT, EST, LEVL IV, 30-39 MIN: ICD-10-PCS | Mod: S$GLB,,, | Performed by: FAMILY MEDICINE

## 2021-03-29 RX ORDER — DULAGLUTIDE 1.5 MG/.5ML
1.5 INJECTION, SOLUTION SUBCUTANEOUS
Qty: 2 ML | Refills: 5 | Status: SHIPPED | OUTPATIENT
Start: 2021-03-29 | End: 2021-10-13 | Stop reason: SDUPTHER

## 2021-03-29 RX ORDER — LANCETS
1 EACH MISCELLANEOUS DAILY
Qty: 100 EACH | Refills: 3 | Status: SHIPPED | OUTPATIENT
Start: 2021-03-29 | End: 2021-10-13 | Stop reason: SDUPTHER

## 2021-03-29 RX ORDER — EMPAGLIFLOZIN 25 MG/1
25 TABLET, FILM COATED ORAL DAILY
Qty: 90 TABLET | Refills: 3 | Status: SHIPPED | OUTPATIENT
Start: 2021-03-29 | End: 2021-10-13 | Stop reason: SDUPTHER

## 2021-03-29 RX ORDER — METFORMIN HYDROCHLORIDE 500 MG/1
500 TABLET, EXTENDED RELEASE ORAL 2 TIMES DAILY WITH MEALS
Qty: 180 TABLET | Refills: 3 | Status: SHIPPED | OUTPATIENT
Start: 2021-03-29 | End: 2021-10-13 | Stop reason: SDUPTHER

## 2021-05-02 DIAGNOSIS — E11.65 TYPE 2 DIABETES MELLITUS WITH HYPERGLYCEMIA, WITHOUT LONG-TERM CURRENT USE OF INSULIN: ICD-10-CM

## 2021-05-02 DIAGNOSIS — E11.9 TYPE 2 DIABETES MELLITUS WITHOUT COMPLICATION, WITHOUT LONG-TERM CURRENT USE OF INSULIN: Primary | ICD-10-CM

## 2021-05-03 RX ORDER — GLIPIZIDE 10 MG/1
10 TABLET ORAL 2 TIMES DAILY WITH MEALS
Qty: 180 TABLET | Refills: 1 | Status: SHIPPED | OUTPATIENT
Start: 2021-05-03 | End: 2021-10-13 | Stop reason: SDUPTHER

## 2021-05-03 RX ORDER — GLIPIZIDE 5 MG/1
TABLET ORAL
Qty: 180 TABLET | Refills: 1 | OUTPATIENT
Start: 2021-05-03

## 2021-05-06 ENCOUNTER — PATIENT MESSAGE (OUTPATIENT)
Dept: RESEARCH | Facility: HOSPITAL | Age: 46
End: 2021-05-06

## 2021-07-07 ENCOUNTER — PATIENT MESSAGE (OUTPATIENT)
Dept: ADMINISTRATIVE | Facility: HOSPITAL | Age: 46
End: 2021-07-07

## 2021-07-07 RX ORDER — DULAGLUTIDE 3 MG/.5ML
3 INJECTION, SOLUTION SUBCUTANEOUS
Qty: 4 PEN | Refills: 5 | Status: CANCELLED | OUTPATIENT
Start: 2021-07-07 | End: 2022-01-03

## 2021-07-08 ENCOUNTER — OFFICE VISIT (OUTPATIENT)
Dept: FAMILY MEDICINE | Facility: CLINIC | Age: 46
End: 2021-07-08
Payer: COMMERCIAL

## 2021-07-08 VITALS
BODY MASS INDEX: 35.68 KG/M2 | OXYGEN SATURATION: 98 % | SYSTOLIC BLOOD PRESSURE: 132 MMHG | DIASTOLIC BLOOD PRESSURE: 80 MMHG | HEART RATE: 98 BPM | WEIGHT: 227.31 LBS | RESPIRATION RATE: 18 BRPM | HEIGHT: 67 IN

## 2021-07-08 DIAGNOSIS — E78.2 MIXED HYPERLIPIDEMIA: ICD-10-CM

## 2021-07-08 DIAGNOSIS — R79.89 ELEVATED LFTS: ICD-10-CM

## 2021-07-08 DIAGNOSIS — F17.201 TOBACCO DEPENDENCE IN REMISSION: ICD-10-CM

## 2021-07-08 DIAGNOSIS — E11.9 TYPE 2 DIABETES MELLITUS WITHOUT COMPLICATION, WITHOUT LONG-TERM CURRENT USE OF INSULIN: Primary | ICD-10-CM

## 2021-07-08 DIAGNOSIS — I10 ESSENTIAL HYPERTENSION: ICD-10-CM

## 2021-07-08 DIAGNOSIS — K76.0 FATTY LIVER: ICD-10-CM

## 2021-07-08 DIAGNOSIS — E66.01 CLASS 2 SEVERE OBESITY DUE TO EXCESS CALORIES WITH SERIOUS COMORBIDITY AND BODY MASS INDEX (BMI) OF 35.0 TO 35.9 IN ADULT: ICD-10-CM

## 2021-07-08 DIAGNOSIS — E11.65 TYPE 2 DIABETES MELLITUS WITH HYPERGLYCEMIA, WITHOUT LONG-TERM CURRENT USE OF INSULIN: ICD-10-CM

## 2021-07-08 PROCEDURE — 3008F BODY MASS INDEX DOCD: CPT | Mod: CPTII,S$GLB,, | Performed by: FAMILY MEDICINE

## 2021-07-08 PROCEDURE — 99999 PR PBB SHADOW E&M-EST. PATIENT-LVL IV: CPT | Mod: PBBFAC,,, | Performed by: FAMILY MEDICINE

## 2021-07-08 PROCEDURE — 99214 OFFICE O/P EST MOD 30 MIN: CPT | Mod: S$GLB,,, | Performed by: FAMILY MEDICINE

## 2021-07-08 PROCEDURE — 3079F DIAST BP 80-89 MM HG: CPT | Mod: CPTII,S$GLB,, | Performed by: FAMILY MEDICINE

## 2021-07-08 PROCEDURE — 1126F AMNT PAIN NOTED NONE PRSNT: CPT | Mod: S$GLB,,, | Performed by: FAMILY MEDICINE

## 2021-07-08 PROCEDURE — 3075F PR MOST RECENT SYSTOLIC BLOOD PRESS GE 130-139MM HG: ICD-10-PCS | Mod: CPTII,S$GLB,, | Performed by: FAMILY MEDICINE

## 2021-07-08 PROCEDURE — 99214 PR OFFICE/OUTPT VISIT, EST, LEVL IV, 30-39 MIN: ICD-10-PCS | Mod: S$GLB,,, | Performed by: FAMILY MEDICINE

## 2021-07-08 PROCEDURE — 3052F HG A1C>EQUAL 8.0%<EQUAL 9.0%: CPT | Mod: CPTII,S$GLB,, | Performed by: FAMILY MEDICINE

## 2021-07-08 PROCEDURE — 1126F PR PAIN SEVERITY QUANTIFIED, NO PAIN PRESENT: ICD-10-PCS | Mod: S$GLB,,, | Performed by: FAMILY MEDICINE

## 2021-07-08 PROCEDURE — 3075F SYST BP GE 130 - 139MM HG: CPT | Mod: CPTII,S$GLB,, | Performed by: FAMILY MEDICINE

## 2021-07-08 PROCEDURE — 3079F PR MOST RECENT DIASTOLIC BLOOD PRESSURE 80-89 MM HG: ICD-10-PCS | Mod: CPTII,S$GLB,, | Performed by: FAMILY MEDICINE

## 2021-07-08 PROCEDURE — 3008F PR BODY MASS INDEX (BMI) DOCUMENTED: ICD-10-PCS | Mod: CPTII,S$GLB,, | Performed by: FAMILY MEDICINE

## 2021-07-08 PROCEDURE — 99999 PR PBB SHADOW E&M-EST. PATIENT-LVL IV: ICD-10-PCS | Mod: PBBFAC,,, | Performed by: FAMILY MEDICINE

## 2021-07-08 PROCEDURE — 3052F PR MOST RECENT HEMOGLOBIN A1C LEVEL 8.0 - < 9.0%: ICD-10-PCS | Mod: CPTII,S$GLB,, | Performed by: FAMILY MEDICINE

## 2021-07-08 RX ORDER — DULAGLUTIDE 3 MG/.5ML
3 INJECTION, SOLUTION SUBCUTANEOUS
Qty: 4 PEN | Refills: 5 | Status: CANCELLED | OUTPATIENT
Start: 2021-07-08 | End: 2022-01-04

## 2021-10-04 ENCOUNTER — PATIENT MESSAGE (OUTPATIENT)
Dept: ADMINISTRATIVE | Facility: HOSPITAL | Age: 46
End: 2021-10-04

## 2021-10-13 DIAGNOSIS — I10 ESSENTIAL HYPERTENSION: ICD-10-CM

## 2021-10-13 DIAGNOSIS — E11.65 TYPE 2 DIABETES MELLITUS WITH HYPERGLYCEMIA, WITHOUT LONG-TERM CURRENT USE OF INSULIN: ICD-10-CM

## 2021-10-13 DIAGNOSIS — E11.9 TYPE 2 DIABETES MELLITUS WITHOUT COMPLICATION, WITHOUT LONG-TERM CURRENT USE OF INSULIN: ICD-10-CM

## 2021-10-13 DIAGNOSIS — E78.2 MIXED HYPERLIPIDEMIA: ICD-10-CM

## 2021-10-13 RX ORDER — INSULIN PUMP SYRINGE, 3 ML
EACH MISCELLANEOUS
Qty: 1 EACH | Refills: 0 | Status: SHIPPED | OUTPATIENT
Start: 2021-10-13 | End: 2022-02-21 | Stop reason: SDUPTHER

## 2021-10-13 RX ORDER — EMPAGLIFLOZIN 25 MG/1
25 TABLET, FILM COATED ORAL DAILY
Qty: 90 TABLET | Refills: 3 | Status: SHIPPED | OUTPATIENT
Start: 2021-10-13 | End: 2022-02-21 | Stop reason: SDUPTHER

## 2021-10-13 RX ORDER — GLIPIZIDE 10 MG/1
10 TABLET ORAL 2 TIMES DAILY WITH MEALS
Qty: 180 TABLET | Refills: 1 | Status: SHIPPED | OUTPATIENT
Start: 2021-10-13 | End: 2022-02-21 | Stop reason: SDUPTHER

## 2021-10-13 RX ORDER — METFORMIN HYDROCHLORIDE 500 MG/1
500 TABLET, EXTENDED RELEASE ORAL 2 TIMES DAILY WITH MEALS
Qty: 180 TABLET | Refills: 3 | Status: SHIPPED | OUTPATIENT
Start: 2021-10-13 | End: 2022-02-21 | Stop reason: SDUPTHER

## 2021-10-13 RX ORDER — LANCETS
1 EACH MISCELLANEOUS DAILY
Qty: 100 EACH | Refills: 3 | Status: SHIPPED | OUTPATIENT
Start: 2021-10-13 | End: 2022-02-21 | Stop reason: SDUPTHER

## 2021-10-13 RX ORDER — LISINOPRIL 20 MG/1
20 TABLET ORAL DAILY
Qty: 90 TABLET | Refills: 3 | Status: SHIPPED | OUTPATIENT
Start: 2021-10-13 | End: 2022-02-21 | Stop reason: SDUPTHER

## 2021-10-13 RX ORDER — DULAGLUTIDE 1.5 MG/.5ML
1.5 INJECTION, SOLUTION SUBCUTANEOUS
Qty: 12 PEN | Refills: 1 | Status: SHIPPED | OUTPATIENT
Start: 2021-10-13 | End: 2022-02-21 | Stop reason: SDUPTHER

## 2021-10-13 RX ORDER — ATORVASTATIN CALCIUM 20 MG/1
20 TABLET, FILM COATED ORAL DAILY
Qty: 90 TABLET | Refills: 3 | Status: SHIPPED | OUTPATIENT
Start: 2021-10-13 | End: 2022-02-21 | Stop reason: SDUPTHER

## 2021-12-20 ENCOUNTER — TELEPHONE (OUTPATIENT)
Dept: FAMILY MEDICINE | Facility: CLINIC | Age: 46
End: 2021-12-20
Payer: COMMERCIAL

## 2021-12-21 ENCOUNTER — TELEPHONE (OUTPATIENT)
Dept: FAMILY MEDICINE | Facility: CLINIC | Age: 46
End: 2021-12-21
Payer: COMMERCIAL

## 2022-01-05 ENCOUNTER — PATIENT MESSAGE (OUTPATIENT)
Dept: ADMINISTRATIVE | Facility: HOSPITAL | Age: 47
End: 2022-01-05
Payer: COMMERCIAL

## 2022-01-10 ENCOUNTER — PATIENT MESSAGE (OUTPATIENT)
Dept: ADMINISTRATIVE | Facility: HOSPITAL | Age: 47
End: 2022-01-10
Payer: COMMERCIAL

## 2022-02-21 DIAGNOSIS — I10 ESSENTIAL HYPERTENSION: ICD-10-CM

## 2022-02-21 DIAGNOSIS — E11.9 TYPE 2 DIABETES MELLITUS WITHOUT COMPLICATION, WITHOUT LONG-TERM CURRENT USE OF INSULIN: ICD-10-CM

## 2022-02-21 DIAGNOSIS — E78.2 MIXED HYPERLIPIDEMIA: ICD-10-CM

## 2022-02-21 DIAGNOSIS — E11.65 TYPE 2 DIABETES MELLITUS WITH HYPERGLYCEMIA, WITHOUT LONG-TERM CURRENT USE OF INSULIN: ICD-10-CM

## 2022-02-21 RX ORDER — ATORVASTATIN CALCIUM 20 MG/1
20 TABLET, FILM COATED ORAL DAILY
Qty: 90 TABLET | Refills: 3 | Status: SHIPPED | OUTPATIENT
Start: 2022-02-21 | End: 2023-04-03 | Stop reason: SDUPTHER

## 2022-02-21 RX ORDER — DULAGLUTIDE 1.5 MG/.5ML
1.5 INJECTION, SOLUTION SUBCUTANEOUS
Qty: 12 PEN | Refills: 1 | Status: SHIPPED | OUTPATIENT
Start: 2022-02-21 | End: 2022-03-24

## 2022-02-21 RX ORDER — EMPAGLIFLOZIN 25 MG/1
25 TABLET, FILM COATED ORAL DAILY
Qty: 90 TABLET | Refills: 3 | Status: ON HOLD | OUTPATIENT
Start: 2022-02-21 | End: 2022-08-24 | Stop reason: HOSPADM

## 2022-02-21 RX ORDER — LANCETS
1 EACH MISCELLANEOUS DAILY
Qty: 100 EACH | Refills: 3 | Status: SHIPPED | OUTPATIENT
Start: 2022-02-21 | End: 2022-09-29 | Stop reason: SDUPTHER

## 2022-02-21 RX ORDER — GLIPIZIDE 10 MG/1
10 TABLET ORAL 2 TIMES DAILY WITH MEALS
Qty: 180 TABLET | Refills: 1 | Status: SHIPPED | OUTPATIENT
Start: 2022-02-21 | End: 2022-03-24

## 2022-02-21 RX ORDER — METFORMIN HYDROCHLORIDE 500 MG/1
500 TABLET, EXTENDED RELEASE ORAL 2 TIMES DAILY WITH MEALS
Qty: 180 TABLET | Refills: 3 | Status: ON HOLD | OUTPATIENT
Start: 2022-02-21 | End: 2022-08-24 | Stop reason: HOSPADM

## 2022-02-21 RX ORDER — INSULIN PUMP SYRINGE, 3 ML
EACH MISCELLANEOUS
Qty: 1 EACH | Refills: 0 | Status: SHIPPED | OUTPATIENT
Start: 2022-02-21

## 2022-02-21 RX ORDER — LISINOPRIL 20 MG/1
20 TABLET ORAL DAILY
Qty: 90 TABLET | Refills: 3 | Status: ON HOLD | OUTPATIENT
Start: 2022-02-21 | End: 2022-08-24 | Stop reason: HOSPADM

## 2022-02-21 NOTE — TELEPHONE ENCOUNTER
Care Due:                  Date            Visit Type   Department     Provider  --------------------------------------------------------------------------------                                EP -                              PRIMARY      [facility]  Last Visit: 07-      C[facility] ([facility])   [facility]                              PRIMARY      [facility]  Next Visit: 03-      [facility] ([facility])   [facility]  [facility]                                                            Last  Test          Frequency    Reason                     Performed    Due Date  --------------------------------------------------------------------------------    CMP.........  12 months..  atorvastatin,              03- 03-                             empagliflozin, glipiZIDE,                             lisinopriL, metFORMIN....    HBA1C.......  6 months...  dulaglutide,               07- 01-                             empagliflozin, glipiZIDE,                             metFORMIN................    Lipid Panel.  12 months..  atorvastatin.............  03- 03-    Powered by [employer]. Reference number: [id number].   2/21/2022 10:55:17 AM CST   No

## 2022-02-21 NOTE — TELEPHONE ENCOUNTER
----- Message from Adalberot Stevenson sent at 2/21/2022 10:35 AM CST -----  Type:  Sooner Apoointment Request    Caller is requesting a sooner appointment.  Name of Caller:Wilfredo Thomas  When is the first available appointment?no avail  Symptoms:annual  Would the patient rather a call back or a response via [facility]? Call back  Best Call Back Number:[phone]  Additional Information: Patient requesting call regarding sooner appointment on 03/29/22

## 2022-02-24 ENCOUNTER — TELEPHONE (OUTPATIENT)
Dept: FAMILY MEDICINE | Facility: CLINIC | Age: 47
End: 2022-02-24
Payer: COMMERCIAL

## 2022-02-24 DIAGNOSIS — Z00.01 ENCOUNTER FOR GENERAL ADULT MEDICAL EXAMINATION WITH ABNORMAL FINDINGS: ICD-10-CM

## 2022-02-24 DIAGNOSIS — E11.65 TYPE 2 DIABETES MELLITUS WITH HYPERGLYCEMIA, WITHOUT LONG-TERM CURRENT USE OF INSULIN: Primary | ICD-10-CM

## 2022-02-24 DIAGNOSIS — E78.2 MIXED HYPERLIPIDEMIA: ICD-10-CM

## 2022-02-24 DIAGNOSIS — R79.89 ELEVATED LFTS: ICD-10-CM

## 2022-02-24 DIAGNOSIS — I10 ESSENTIAL HYPERTENSION: ICD-10-CM

## 2022-02-24 NOTE — TELEPHONE ENCOUNTER
----- Message from Niesha Shaver MA sent at 2/21/2022 10:56 AM CST -----  Pt is scheduled to see you 3/24/22 does he need labs prior to appointment please advise.

## 2022-02-24 NOTE — TELEPHONE ENCOUNTER
Please schedule labs    Orders Placed This Encounter    Comprehensive Metabolic Panel    CBC Without Differential    Lipid Panel    Microalbumin/Creatinine Ratio, Urine    Hemoglobin A1C     Dr. Aylin Wayne D.O.   [facility]

## 2022-02-25 ENCOUNTER — TELEPHONE (OUTPATIENT)
Dept: FAMILY MEDICINE | Facility: CLINIC | Age: 47
End: 2022-02-25
Payer: COMMERCIAL

## 2022-02-25 ENCOUNTER — PATIENT MESSAGE (OUTPATIENT)
Dept: FAMILY MEDICINE | Facility: CLINIC | Age: 47
End: 2022-02-25
Payer: COMMERCIAL

## 2022-02-25 NOTE — TELEPHONE ENCOUNTER
----- Message from Libra Aden sent at 2/25/2022 12:47 PM CST -----  Regarding: Call back  Contact: [phone]  Type:  Patient Returning Call    Who Called: PT   Who Left Message for Patient: Nurse   Does the patient know what this is regarding?: lab orders   Would the patient rather a call back or a response via [facility]? Call back   Best Call Back Number: [phone]  Additional Information:

## 2022-03-21 ENCOUNTER — PATIENT MESSAGE (OUTPATIENT)
Dept: ADMINISTRATIVE | Facility: HOSPITAL | Age: 47
End: 2022-03-21
Payer: COMMERCIAL

## 2022-03-21 PROBLEM — I15.2 HYPERTENSION ASSOCIATED WITH TYPE 2 DIABETES MELLITUS: Status: ACTIVE | Noted: 2020-01-10

## 2022-03-21 PROBLEM — E11.59 HYPERTENSION ASSOCIATED WITH TYPE 2 DIABETES MELLITUS: Status: ACTIVE | Noted: 2020-01-10

## 2022-03-21 PROBLEM — E11.69 HYPERLIPIDEMIA ASSOCIATED WITH TYPE 2 DIABETES MELLITUS: Status: ACTIVE | Noted: 2020-02-07

## 2022-03-21 PROBLEM — E78.5 HYPERLIPIDEMIA ASSOCIATED WITH TYPE 2 DIABETES MELLITUS: Status: ACTIVE | Noted: 2020-02-07
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
## 2022-03-22 ENCOUNTER — TELEPHONE (OUTPATIENT)
Dept: FAMILY MEDICINE | Facility: CLINIC | Age: 47
End: 2022-03-22
Payer: COMMERCIAL

## 2022-03-22 NOTE — TELEPHONE ENCOUNTER
Looks like the A1C was not linked to the labs and needed. Please call lab and see if they can add on the A1C. They should be able to.   Dr. Aylin Wayne D.O.   [facility]

## 2022-03-24 ENCOUNTER — TELEPHONE (OUTPATIENT)
Dept: GASTROENTEROLOGY | Facility: CLINIC | Age: 47
End: 2022-03-24
Payer: COMMERCIAL

## 2022-03-24 ENCOUNTER — OFFICE VISIT (OUTPATIENT)
Dept: FAMILY MEDICINE | Facility: CLINIC | Age: 47
End: 2022-03-24
Payer: COMMERCIAL

## 2022-03-24 VITALS
RESPIRATION RATE: 18 BRPM | HEART RATE: 105 BPM | DIASTOLIC BLOOD PRESSURE: 80 MMHG | WEIGHT: 231.81 LBS | SYSTOLIC BLOOD PRESSURE: 136 MMHG | OXYGEN SATURATION: 98 % | HEIGHT: 67 IN | BODY MASS INDEX: 36.38 KG/M2

## 2022-03-24 DIAGNOSIS — R79.89 ELEVATED LFTS: ICD-10-CM

## 2022-03-24 DIAGNOSIS — Z12.11 SCREEN FOR COLON CANCER: ICD-10-CM

## 2022-03-24 DIAGNOSIS — K76.0 FATTY LIVER: ICD-10-CM

## 2022-03-24 DIAGNOSIS — Z79.4 TYPE 2 DIABETES MELLITUS WITH HYPERGLYCEMIA, WITH LONG-TERM CURRENT USE OF INSULIN: Primary | ICD-10-CM

## 2022-03-24 DIAGNOSIS — E11.69 HYPERLIPIDEMIA ASSOCIATED WITH TYPE 2 DIABETES MELLITUS: ICD-10-CM

## 2022-03-24 DIAGNOSIS — E66.01 CLASS 2 SEVERE OBESITY DUE TO EXCESS CALORIES WITH SERIOUS COMORBIDITY AND BODY MASS INDEX (BMI) OF 36.0 TO 36.9 IN ADULT: ICD-10-CM

## 2022-03-24 DIAGNOSIS — E78.5 HYPERLIPIDEMIA ASSOCIATED WITH TYPE 2 DIABETES MELLITUS: ICD-10-CM

## 2022-03-24 DIAGNOSIS — E11.65 TYPE 2 DIABETES MELLITUS WITH HYPERGLYCEMIA, WITH LONG-TERM CURRENT USE OF INSULIN: Primary | ICD-10-CM

## 2022-03-24 PROCEDURE — 99214 OFFICE O/P EST MOD 30 MIN: CPT | Mod: S$GLB,,, | Performed by: FAMILY MEDICINE

## 2022-03-24 PROCEDURE — 4010F PR ACE/ARB THEARPY RXD/TAKEN: ICD-10-PCS | Mod: CPTII,S$GLB,, | Performed by: FAMILY MEDICINE

## 2022-03-24 PROCEDURE — 3062F PR POS MACROALBUMINURIA RESULT DOCUMENTED/REVIEW: ICD-10-PCS | Mod: CPTII,S$GLB,, | Performed by: FAMILY MEDICINE

## 2022-03-24 PROCEDURE — 4010F ACE/ARB THERAPY RXD/TAKEN: CPT | Mod: CPTII,S$GLB,, | Performed by: FAMILY MEDICINE

## 2022-03-24 PROCEDURE — 3075F SYST BP GE 130 - 139MM HG: CPT | Mod: CPTII,S$GLB,, | Performed by: FAMILY MEDICINE

## 2022-03-24 PROCEDURE — 1159F PR MEDICATION LIST DOCUMENTED IN MEDICAL RECORD: ICD-10-PCS | Mod: CPTII,S$GLB,, | Performed by: FAMILY MEDICINE

## 2022-03-24 PROCEDURE — 3066F PR DOCUMENTATION OF TREATMENT FOR NEPHROPATHY: ICD-10-PCS | Mod: CPTII,S$GLB,, | Performed by: FAMILY MEDICINE

## 2022-03-24 PROCEDURE — 99999 PR PBB SHADOW E&M-EST. PATIENT-LVL V: CPT | Mod: PBBFAC,,, | Performed by: FAMILY MEDICINE

## 2022-03-24 PROCEDURE — 3075F PR MOST RECENT SYSTOLIC BLOOD PRESS GE 130-139MM HG: ICD-10-PCS | Mod: CPTII,S$GLB,, | Performed by: FAMILY MEDICINE

## 2022-03-24 PROCEDURE — 3066F NEPHROPATHY DOC TX: CPT | Mod: CPTII,S$GLB,, | Performed by: FAMILY MEDICINE

## 2022-03-24 PROCEDURE — 3079F DIAST BP 80-89 MM HG: CPT | Mod: CPTII,S$GLB,, | Performed by: FAMILY MEDICINE

## 2022-03-24 PROCEDURE — 3046F HEMOGLOBIN A1C LEVEL >9.0%: CPT | Mod: CPTII,S$GLB,, | Performed by: FAMILY MEDICINE

## 2022-03-24 PROCEDURE — 3008F PR BODY MASS INDEX (BMI) DOCUMENTED: ICD-10-PCS | Mod: CPTII,S$GLB,, | Performed by: FAMILY MEDICINE

## 2022-03-24 PROCEDURE — 1159F MED LIST DOCD IN RCRD: CPT | Mod: CPTII,S$GLB,, | Performed by: FAMILY MEDICINE

## 2022-03-24 PROCEDURE — 99999 PR PBB SHADOW E&M-EST. PATIENT-LVL V: ICD-10-PCS | Mod: PBBFAC,,, | Performed by: FAMILY MEDICINE

## 2022-03-24 PROCEDURE — 3046F PR MOST RECENT HEMOGLOBIN A1C LEVEL > 9.0%: ICD-10-PCS | Mod: CPTII,S$GLB,, | Performed by: FAMILY MEDICINE

## 2022-03-24 PROCEDURE — 99214 PR OFFICE/OUTPT VISIT, EST, LEVL IV, 30-39 MIN: ICD-10-PCS | Mod: S$GLB,,, | Performed by: FAMILY MEDICINE

## 2022-03-24 PROCEDURE — 3079F PR MOST RECENT DIASTOLIC BLOOD PRESSURE 80-89 MM HG: ICD-10-PCS | Mod: CPTII,S$GLB,, | Performed by: FAMILY MEDICINE

## 2022-03-24 PROCEDURE — 3062F POS MACROALBUMINURIA REV: CPT | Mod: CPTII,S$GLB,, | Performed by: FAMILY MEDICINE

## 2022-03-24 PROCEDURE — 3008F BODY MASS INDEX DOCD: CPT | Mod: CPTII,S$GLB,, | Performed by: FAMILY MEDICINE

## 2022-03-24 RX ORDER — DULAGLUTIDE 3 MG/.5ML
3 INJECTION, SOLUTION SUBCUTANEOUS
Qty: 12 PEN | Refills: 3 | Status: ON HOLD | OUTPATIENT
Start: 2022-03-24 | End: 2022-08-24 | Stop reason: HOSPADM

## 2022-03-24 RX ORDER — INSULIN GLARGINE 100 [IU]/ML
20 INJECTION, SOLUTION SUBCUTANEOUS DAILY
Qty: 18 ML | Refills: 0 | Status: SHIPPED | OUTPATIENT
Start: 2022-03-24 | End: 2022-09-08

## 2022-03-24 NOTE — PATIENT INSTRUCTIONS
Increase trulicity to 3 mg   - okay to take 2 of your 1.5 mg weekly  Continue Jardiance 25 mg and Metformin  Start Lantus 10 units daily   - I will increase every 2 weeks depending on your glucose levels    Diabetes:     1.It is important that you bring your glucose monitor to every clinic visit.   2. Glucose goals  - Fasting AM glucose (no food 8 hours) </= 120 mg/dL  - 2 hours after you eat a meal, your glucose should be </=140 mg/dL  3. Monitor your glucose: before meals    Diabetes increases risk for kidney disease, eye disease (retinopathy which can lead to blindness), neuropathy, pneumonia, infections, cardiovascular disorders and stroke. Therefore all diabetes are recommend to do the following to decrease those risks:  Goal A1C <7% must be checked every 3-6 months depending how well you are controlled  At least a low dose cholesterol medication no matter what your cholesterol is to decrease risk of cardiovascular disease.   Yearly dilated eye exam for retinopathy screening.   Yearly foot exam to assess blood flow and sensation  Pneumonia vaccination every 5-10 years    Healthy Diet and Lifestyle:   - I recommend adjusting your diet and avoiding processed carbohydrates (like sweets, pasta, crackers, cookies, chips, bread, flour etc), increasing vegetables and adding light cardiovascular exercise like walking 15-30 mins daily to prevent worsening to diabetes.   - goal diet should be colorful, rich in vegetables, good fats (omega-3 fish, olive oil, raw nuts/seeds, avocado) and lean protein   - American Heart Association goal for cardiovascular exercise is 150 minutes per week of light exercise (like walking, biking, swimming) or 75 minutes a week of moderate to strenuous exercise (like jogging, high intensity training)

## 2022-03-24 NOTE — TELEPHONE ENCOUNTER
Spoke to patient about scheduling a screening colonoscopy. Patient states he will have to call back to schedule.

## 2022-04-07 ENCOUNTER — PATIENT MESSAGE (OUTPATIENT)
Dept: FAMILY MEDICINE | Facility: CLINIC | Age: 47
End: 2022-04-07
Payer: COMMERCIAL

## 2022-04-28 ENCOUNTER — PATIENT MESSAGE (OUTPATIENT)
Dept: FAMILY MEDICINE | Facility: CLINIC | Age: 47
End: 2022-04-28
Payer: COMMERCIAL

## 2022-05-18 LAB
LEFT EYE DM RETINOPATHY: NEGATIVE
RIGHT EYE DM RETINOPATHY: NEGATIVE

## 2022-05-26 ENCOUNTER — PATIENT OUTREACH (OUTPATIENT)
Dept: ADMINISTRATIVE | Facility: HOSPITAL | Age: 47
End: 2022-05-26
Payer: COMMERCIAL

## 2022-07-26 ENCOUNTER — PATIENT OUTREACH (OUTPATIENT)
Dept: ADMINISTRATIVE | Facility: HOSPITAL | Age: 47
End: 2022-07-26
Payer: COMMERCIAL

## 2022-07-26 ENCOUNTER — PATIENT MESSAGE (OUTPATIENT)
Dept: ADMINISTRATIVE | Facility: HOSPITAL | Age: 47
End: 2022-07-26
Payer: COMMERCIAL

## 2022-07-26 ENCOUNTER — HOSPITAL ENCOUNTER (INPATIENT)
Facility: HOSPITAL | Age: 47
LOS: 29 days | Discharge: HOME | DRG: 981 | End: 2022-08-24
Attending: EMERGENCY MEDICINE | Admitting: INTERNAL MEDICINE
Payer: COMMERCIAL

## 2022-07-26 DIAGNOSIS — E11.65 TYPE 2 DIABETES MELLITUS WITH HYPERGLYCEMIA, WITH LONG-TERM CURRENT USE OF INSULIN: ICD-10-CM

## 2022-07-26 DIAGNOSIS — I80.8 SUPERFICIAL THROMBOPHLEBITIS OF RIGHT UPPER EXTREMITY: ICD-10-CM

## 2022-07-26 DIAGNOSIS — E87.1 HYPONATREMIA: ICD-10-CM

## 2022-07-26 DIAGNOSIS — A41.9 SEPSIS, DUE TO UNSPECIFIED ORGANISM, UNSPECIFIED WHETHER ACUTE ORGAN DYSFUNCTION PRESENT: ICD-10-CM

## 2022-07-26 DIAGNOSIS — N17.9 AKI (ACUTE KIDNEY INJURY): ICD-10-CM

## 2022-07-26 DIAGNOSIS — M79.605 LEFT LEG PAIN: ICD-10-CM

## 2022-07-26 DIAGNOSIS — R79.89 ELEVATED D-DIMER: ICD-10-CM

## 2022-07-26 DIAGNOSIS — R79.89 ELEVATED TSH: ICD-10-CM

## 2022-07-26 DIAGNOSIS — E87.5 HYPERKALEMIA: ICD-10-CM

## 2022-07-26 DIAGNOSIS — Z79.4 TYPE 2 DIABETES MELLITUS WITH HYPERGLYCEMIA, WITH LONG-TERM CURRENT USE OF INSULIN: ICD-10-CM

## 2022-07-26 DIAGNOSIS — L02.416 ABSCESS OF LEFT THIGH: ICD-10-CM

## 2022-07-26 DIAGNOSIS — R00.0 TACHYCARDIA: ICD-10-CM

## 2022-07-26 DIAGNOSIS — R50.9 FEVER, UNSPECIFIED FEVER CAUSE: ICD-10-CM

## 2022-07-26 DIAGNOSIS — M54.2 ACUTE NECK PAIN: ICD-10-CM

## 2022-07-26 DIAGNOSIS — L23.1 ALLERGIC CONTACT DERMATITIS DUE TO ADHESIVES: ICD-10-CM

## 2022-07-26 DIAGNOSIS — R06.03 RESPIRATORY DISTRESS, ACUTE: ICD-10-CM

## 2022-07-26 DIAGNOSIS — E11.10 DIABETIC KETOACIDOSIS WITHOUT COMA ASSOCIATED WITH TYPE 2 DIABETES MELLITUS: ICD-10-CM

## 2022-07-26 DIAGNOSIS — J18.9 PNEUMONIA OF BOTH UPPER LOBES DUE TO INFECTIOUS ORGANISM: ICD-10-CM

## 2022-07-26 DIAGNOSIS — E13.10 DIABETIC KETOACIDOSIS WITHOUT COMA ASSOCIATED WITH OTHER SPECIFIED DIABETES MELLITUS: Primary | ICD-10-CM

## 2022-07-26 DIAGNOSIS — I38 ENDOCARDITIS: ICD-10-CM

## 2022-07-26 PROBLEM — M79.89 REDNESS AND SWELLING OF THIGH: Status: ACTIVE | Noted: 2022-07-26

## 2022-07-26 PROBLEM — D72.829 LEUKOCYTOSIS: Status: ACTIVE | Noted: 2022-07-26

## 2022-07-26 PROBLEM — R23.8 REDNESS AND SWELLING OF THIGH: Status: ACTIVE | Noted: 2022-07-26

## 2022-07-26 LAB
ALBUMIN SERPL BCP-MCNC: 2.7 G/DL (ref 3.5–5.2)
ALLENS TEST: ABNORMAL
ALP SERPL-CCNC: 108 U/L (ref 55–135)
ALT SERPL W/O P-5'-P-CCNC: 13 U/L (ref 10–44)
ANION GAP SERPL CALC-SCNC: 26 MMOL/L (ref 8–16)
AST SERPL-CCNC: 9 U/L (ref 10–40)
B-OH-BUTYR BLD STRIP-SCNC: 5.9 MMOL/L (ref 0–0.5)
BASOPHILS NFR BLD: 0 % (ref 0–1.9)
BILIRUB SERPL-MCNC: 0.3 MG/DL (ref 0.1–1)
BNP SERPL-MCNC: 20 PG/ML (ref 0–99)
BUN SERPL-MCNC: 25 MG/DL (ref 6–20)
BUN SERPL-MCNC: 29 MG/DL (ref 6–30)
CALCIUM SERPL-MCNC: 10.1 MG/DL (ref 8.7–10.5)
CHLORIDE SERPL-SCNC: 101 MMOL/L (ref 95–110)
CHLORIDE SERPL-SCNC: 105 MMOL/L (ref 95–110)
CO2 SERPL-SCNC: 5 MMOL/L (ref 23–29)
CREAT SERPL-MCNC: 1.4 MG/DL (ref 0.5–1.4)
CREAT SERPL-MCNC: 1.9 MG/DL (ref 0.5–1.4)
D DIMER PPP IA.FEU-MCNC: 1.46 MG/L FEU
DELSYS: ABNORMAL
DIFFERENTIAL METHOD: ABNORMAL
EOSINOPHIL NFR BLD: 1 % (ref 0–8)
ERYTHROCYTE [DISTWIDTH] IN BLOOD BY AUTOMATED COUNT: 13 % (ref 11.5–14.5)
EST. GFR  (AFRICAN AMERICAN): 46.8 ML/MIN/1.73 M^2
EST. GFR  (NON AFRICAN AMERICAN): 40.5 ML/MIN/1.73 M^2
GLUCOSE SERPL-MCNC: 363 MG/DL (ref 70–110)
GLUCOSE SERPL-MCNC: 385 MG/DL (ref 70–110)
HCO3 UR-SCNC: 6.5 MMOL/L (ref 24–28)
HCT VFR BLD AUTO: 49.6 % (ref 40–54)
HCT VFR BLD CALC: 51 %PCV (ref 36–54)
HGB BLD-MCNC: 16.1 G/DL (ref 14–18)
IMM GRANULOCYTES # BLD AUTO: ABNORMAL K/UL (ref 0–0.04)
IMM GRANULOCYTES NFR BLD AUTO: ABNORMAL % (ref 0–0.5)
INR PPP: 1.1 (ref 0.8–1.2)
LYMPHOCYTES NFR BLD: 11 % (ref 18–48)
MAGNESIUM SERPL-MCNC: 2.5 MG/DL (ref 1.6–2.6)
MCH RBC QN AUTO: 31.8 PG (ref 27–31)
MCHC RBC AUTO-ENTMCNC: 32.5 G/DL (ref 32–36)
MCV RBC AUTO: 98 FL (ref 82–98)
METAMYELOCYTES NFR BLD MANUAL: 1 %
MODE: ABNORMAL
MONOCYTES NFR BLD: 7 % (ref 4–15)
MYELOCYTES NFR BLD MANUAL: 1 %
NEUTROPHILS NFR BLD: 67 % (ref 38–73)
NEUTS BAND NFR BLD MANUAL: 10 %
NRBC BLD-RTO: 0 /100 WBC
PCO2 BLDA: 27.9 MMHG (ref 35–45)
PH SMN: 6.98 [PH] (ref 7.35–7.45)
PHOSPHATE SERPL-MCNC: 5.9 MG/DL (ref 2.7–4.5)
PLATELET # BLD AUTO: 469 K/UL (ref 150–450)
PLATELET BLD QL SMEAR: ABNORMAL
PMV BLD AUTO: 9.2 FL (ref 9.2–12.9)
PO2 BLDA: 38 MMHG (ref 40–60)
POC BE: -25 MMOL/L
POC IONIZED CALCIUM: 1.33 MMOL/L (ref 1.06–1.42)
POC SATURATED O2: 46 % (ref 95–100)
POC TCO2 (MEASURED): 10 MMOL/L (ref 23–29)
POC TCO2: 7 MMOL/L (ref 24–29)
POCT GLUCOSE: 266 MG/DL (ref 70–110)
POCT GLUCOSE: 436 MG/DL (ref 70–110)
POTASSIUM BLD-SCNC: 5.4 MMOL/L (ref 3.5–5.1)
POTASSIUM SERPL-SCNC: 5.5 MMOL/L (ref 3.5–5.1)
PROCALCITONIN SERPL IA-MCNC: 0.29 NG/ML
PROMYELOCYTES NFR BLD MANUAL: 2 %
PROT SERPL-MCNC: 8.7 G/DL (ref 6–8.4)
PROTHROMBIN TIME: 10.9 SEC (ref 9–12.5)
RBC # BLD AUTO: 5.06 M/UL (ref 4.6–6.2)
SAMPLE: ABNORMAL
SAMPLE: ABNORMAL
SARS-COV-2 RDRP RESP QL NAA+PROBE: NEGATIVE
SITE: ABNORMAL
SODIUM BLD-SCNC: 131 MMOL/L (ref 136–145)
SODIUM SERPL-SCNC: 132 MMOL/L (ref 136–145)
T4 FREE SERPL-MCNC: 0.75 NG/DL (ref 0.71–1.51)
TROPONIN I SERPL DL<=0.01 NG/ML-MCNC: <0.006 NG/ML (ref 0–0.03)
TSH SERPL DL<=0.005 MIU/L-ACNC: 4.63 UIU/ML (ref 0.4–4)
WBC # BLD AUTO: 26.87 K/UL (ref 3.9–12.7)

## 2022-07-26 PROCEDURE — 93005 ELECTROCARDIOGRAM TRACING: CPT

## 2022-07-26 PROCEDURE — 85027 COMPLETE CBC AUTOMATED: CPT | Performed by: EMERGENCY MEDICINE

## 2022-07-26 PROCEDURE — 93010 ELECTROCARDIOGRAM REPORT: CPT | Mod: ,,, | Performed by: INTERNAL MEDICINE

## 2022-07-26 PROCEDURE — C9399 UNCLASSIFIED DRUGS OR BIOLOG: HCPCS | Performed by: STUDENT IN AN ORGANIZED HEALTH CARE EDUCATION/TRAINING PROGRAM

## 2022-07-26 PROCEDURE — 83735 ASSAY OF MAGNESIUM: CPT | Performed by: EMERGENCY MEDICINE

## 2022-07-26 PROCEDURE — 25500020 PHARM REV CODE 255: Performed by: EMERGENCY MEDICINE

## 2022-07-26 PROCEDURE — 84145 PROCALCITONIN (PCT): CPT | Performed by: EMERGENCY MEDICINE

## 2022-07-26 PROCEDURE — 25000003 PHARM REV CODE 250: Performed by: STUDENT IN AN ORGANIZED HEALTH CARE EDUCATION/TRAINING PROGRAM

## 2022-07-26 PROCEDURE — 85610 PROTHROMBIN TIME: CPT | Performed by: EMERGENCY MEDICINE

## 2022-07-26 PROCEDURE — 84443 ASSAY THYROID STIM HORMONE: CPT | Performed by: PHYSICIAN ASSISTANT

## 2022-07-26 PROCEDURE — 83605 ASSAY OF LACTIC ACID: CPT | Performed by: EMERGENCY MEDICINE

## 2022-07-26 PROCEDURE — 83036 HEMOGLOBIN GLYCOSYLATED A1C: CPT | Performed by: EMERGENCY MEDICINE

## 2022-07-26 PROCEDURE — 85379 FIBRIN DEGRADATION QUANT: CPT | Performed by: EMERGENCY MEDICINE

## 2022-07-26 PROCEDURE — 25000003 PHARM REV CODE 250: Performed by: PHYSICIAN ASSISTANT

## 2022-07-26 PROCEDURE — 84484 ASSAY OF TROPONIN QUANT: CPT | Performed by: EMERGENCY MEDICINE

## 2022-07-26 PROCEDURE — 82010 KETONE BODYS QUAN: CPT | Performed by: STUDENT IN AN ORGANIZED HEALTH CARE EDUCATION/TRAINING PROGRAM

## 2022-07-26 PROCEDURE — 82803 BLOOD GASES ANY COMBINATION: CPT

## 2022-07-26 PROCEDURE — 99291 PR CRITICAL CARE, E/M 30-74 MINUTES: ICD-10-PCS | Mod: CS,,, | Performed by: EMERGENCY MEDICINE

## 2022-07-26 PROCEDURE — 85007 BL SMEAR W/DIFF WBC COUNT: CPT | Performed by: EMERGENCY MEDICINE

## 2022-07-26 PROCEDURE — 87040 BLOOD CULTURE FOR BACTERIA: CPT | Mod: 59 | Performed by: PHYSICIAN ASSISTANT

## 2022-07-26 PROCEDURE — 93010 EKG 12-LEAD: ICD-10-PCS | Mod: ,,, | Performed by: INTERNAL MEDICINE

## 2022-07-26 PROCEDURE — 83880 ASSAY OF NATRIURETIC PEPTIDE: CPT | Performed by: EMERGENCY MEDICINE

## 2022-07-26 PROCEDURE — 12000002 HC ACUTE/MED SURGE SEMI-PRIVATE ROOM

## 2022-07-26 PROCEDURE — 63600175 PHARM REV CODE 636 W HCPCS: Performed by: PHYSICIAN ASSISTANT

## 2022-07-26 PROCEDURE — 84100 ASSAY OF PHOSPHORUS: CPT | Performed by: EMERGENCY MEDICINE

## 2022-07-26 PROCEDURE — 99291 CRITICAL CARE FIRST HOUR: CPT | Mod: CS,,, | Performed by: EMERGENCY MEDICINE

## 2022-07-26 PROCEDURE — 80048 BASIC METABOLIC PNL TOTAL CA: CPT

## 2022-07-26 PROCEDURE — 99291 CRITICAL CARE FIRST HOUR: CPT

## 2022-07-26 PROCEDURE — 99900035 HC TECH TIME PER 15 MIN (STAT)

## 2022-07-26 PROCEDURE — 84439 ASSAY OF FREE THYROXINE: CPT | Performed by: PHYSICIAN ASSISTANT

## 2022-07-26 PROCEDURE — 80053 COMPREHEN METABOLIC PANEL: CPT | Performed by: EMERGENCY MEDICINE

## 2022-07-26 PROCEDURE — U0002 COVID-19 LAB TEST NON-CDC: HCPCS | Performed by: EMERGENCY MEDICINE

## 2022-07-26 RX ORDER — ONDANSETRON 2 MG/ML
4 INJECTION INTRAMUSCULAR; INTRAVENOUS EVERY 6 HOURS PRN
Status: DISCONTINUED | OUTPATIENT
Start: 2022-07-26 | End: 2022-08-24 | Stop reason: HOSPADM

## 2022-07-26 RX ORDER — SODIUM CHLORIDE 0.9 % (FLUSH) 0.9 %
10 SYRINGE (ML) INJECTION
Status: DISCONTINUED | OUTPATIENT
Start: 2022-07-26 | End: 2022-08-04

## 2022-07-26 RX ORDER — SODIUM CHLORIDE 0.9 % (FLUSH) 0.9 %
10 SYRINGE (ML) INJECTION
Status: DISCONTINUED | OUTPATIENT
Start: 2022-07-26 | End: 2022-08-24 | Stop reason: HOSPADM

## 2022-07-26 RX ORDER — DEXTROSE MONOHYDRATE AND SODIUM CHLORIDE 5; .45 G/100ML; G/100ML
125 INJECTION, SOLUTION INTRAVENOUS CONTINUOUS
Status: DISCONTINUED | OUTPATIENT
Start: 2022-07-26 | End: 2022-07-27

## 2022-07-26 RX ORDER — SODIUM CHLORIDE 9 MG/ML
125 INJECTION, SOLUTION INTRAVENOUS CONTINUOUS
Status: DISCONTINUED | OUTPATIENT
Start: 2022-07-26 | End: 2022-07-27

## 2022-07-26 RX ORDER — FAMOTIDINE 20 MG/1
20 TABLET, FILM COATED ORAL 2 TIMES DAILY
Status: DISCONTINUED | OUTPATIENT
Start: 2022-07-26 | End: 2022-07-27

## 2022-07-26 RX ORDER — ATORVASTATIN CALCIUM 20 MG/1
20 TABLET, FILM COATED ORAL DAILY
Status: DISCONTINUED | OUTPATIENT
Start: 2022-07-27 | End: 2022-08-24 | Stop reason: HOSPADM

## 2022-07-26 RX ORDER — ACETAMINOPHEN 325 MG/1
650 TABLET ORAL EVERY 4 HOURS PRN
Status: DISCONTINUED | OUTPATIENT
Start: 2022-07-26 | End: 2022-07-27

## 2022-07-26 RX ADMIN — VANCOMYCIN HYDROCHLORIDE 2000 MG: 500 INJECTION, POWDER, LYOPHILIZED, FOR SOLUTION INTRAVENOUS at 10:07

## 2022-07-26 RX ADMIN — INSULIN DETEMIR 10 UNITS: 100 INJECTION, SOLUTION SUBCUTANEOUS at 10:07

## 2022-07-26 RX ADMIN — SODIUM CHLORIDE, SODIUM LACTATE, POTASSIUM CHLORIDE, AND CALCIUM CHLORIDE 2200 ML: .6; .31; .03; .02 INJECTION, SOLUTION INTRAVENOUS at 10:07

## 2022-07-26 RX ADMIN — IOHEXOL 100 ML: 350 INJECTION, SOLUTION INTRAVENOUS at 09:07

## 2022-07-27 LAB
ALBUMIN SERPL BCP-MCNC: 2.1 G/DL (ref 3.5–5.2)
ALP SERPL-CCNC: 97 U/L (ref 55–135)
ALT SERPL W/O P-5'-P-CCNC: 12 U/L (ref 10–44)
ANION GAP SERPL CALC-SCNC: 16 MMOL/L (ref 8–16)
ANION GAP SERPL CALC-SCNC: 16 MMOL/L (ref 8–16)
ANION GAP SERPL CALC-SCNC: 17 MMOL/L (ref 8–16)
ANION GAP SERPL CALC-SCNC: 20 MMOL/L (ref 8–16)
ANION GAP SERPL CALC-SCNC: 23 MMOL/L (ref 8–16)
ANION GAP SERPL CALC-SCNC: 23 MMOL/L (ref 8–16)
ANION GAP SERPL CALC-SCNC: ABNORMAL MMOL/L (ref 8–16)
AST SERPL-CCNC: 14 U/L (ref 10–40)
BACTERIA #/AREA URNS AUTO: ABNORMAL /HPF
BASOPHILS NFR BLD: 0 % (ref 0–1.9)
BILIRUB SERPL-MCNC: 0.3 MG/DL (ref 0.1–1)
BILIRUB UR QL STRIP: NEGATIVE
BUN SERPL-MCNC: 16 MG/DL (ref 6–20)
BUN SERPL-MCNC: 22 MG/DL (ref 6–20)
BUN SERPL-MCNC: 25 MG/DL (ref 6–20)
BUN SERPL-MCNC: 25 MG/DL (ref 6–20)
BUN SERPL-MCNC: 27 MG/DL (ref 6–20)
BUN SERPL-MCNC: 27 MG/DL (ref 6–20)
BUN SERPL-MCNC: 28 MG/DL (ref 6–20)
CALCIUM SERPL-MCNC: 8.8 MG/DL (ref 8.7–10.5)
CALCIUM SERPL-MCNC: 9.1 MG/DL (ref 8.7–10.5)
CALCIUM SERPL-MCNC: 9.4 MG/DL (ref 8.7–10.5)
CALCIUM SERPL-MCNC: 9.5 MG/DL (ref 8.7–10.5)
CHLORIDE SERPL-SCNC: 102 MMOL/L (ref 95–110)
CHLORIDE SERPL-SCNC: 102 MMOL/L (ref 95–110)
CHLORIDE SERPL-SCNC: 103 MMOL/L (ref 95–110)
CHLORIDE SERPL-SCNC: 105 MMOL/L (ref 95–110)
CHLORIDE SERPL-SCNC: 106 MMOL/L (ref 95–110)
CHLORIDE SERPL-SCNC: 107 MMOL/L (ref 95–110)
CHLORIDE SERPL-SCNC: 99 MMOL/L (ref 95–110)
CLARITY UR REFRACT.AUTO: ABNORMAL
CO2 SERPL-SCNC: 12 MMOL/L (ref 23–29)
CO2 SERPL-SCNC: 12 MMOL/L (ref 23–29)
CO2 SERPL-SCNC: 6 MMOL/L (ref 23–29)
CO2 SERPL-SCNC: 6 MMOL/L (ref 23–29)
CO2 SERPL-SCNC: 7 MMOL/L (ref 23–29)
CO2 SERPL-SCNC: 8 MMOL/L (ref 23–29)
CO2 SERPL-SCNC: <5 MMOL/L (ref 23–29)
COLOR UR AUTO: YELLOW
CREAT SERPL-MCNC: 1.2 MG/DL (ref 0.5–1.4)
CREAT SERPL-MCNC: 1.3 MG/DL (ref 0.5–1.4)
CREAT SERPL-MCNC: 1.3 MG/DL (ref 0.5–1.4)
CREAT SERPL-MCNC: 1.5 MG/DL (ref 0.5–1.4)
CREAT SERPL-MCNC: 1.7 MG/DL (ref 0.5–1.4)
CREAT SERPL-MCNC: 1.7 MG/DL (ref 0.5–1.4)
CREAT SERPL-MCNC: 1.9 MG/DL (ref 0.5–1.4)
DIFFERENTIAL METHOD: ABNORMAL
EOSINOPHIL NFR BLD: 0 % (ref 0–8)
ERYTHROCYTE [DISTWIDTH] IN BLOOD BY AUTOMATED COUNT: 12.9 % (ref 11.5–14.5)
EST. GFR  (AFRICAN AMERICAN): 46.8 ML/MIN/1.73 M^2
EST. GFR  (AFRICAN AMERICAN): 53.6 ML/MIN/1.73 M^2
EST. GFR  (AFRICAN AMERICAN): 53.6 ML/MIN/1.73 M^2
EST. GFR  (AFRICAN AMERICAN): >60 ML/MIN/1.73 M^2
EST. GFR  (NON AFRICAN AMERICAN): 40.5 ML/MIN/1.73 M^2
EST. GFR  (NON AFRICAN AMERICAN): 46.3 ML/MIN/1.73 M^2
EST. GFR  (NON AFRICAN AMERICAN): 46.3 ML/MIN/1.73 M^2
EST. GFR  (NON AFRICAN AMERICAN): 53.9 ML/MIN/1.73 M^2
EST. GFR  (NON AFRICAN AMERICAN): >60 ML/MIN/1.73 M^2
ESTIMATED AVG GLUCOSE: 243 MG/DL (ref 68–131)
GLUCOSE SERPL-MCNC: 217 MG/DL (ref 70–110)
GLUCOSE SERPL-MCNC: 235 MG/DL (ref 70–110)
GLUCOSE SERPL-MCNC: 244 MG/DL (ref 70–110)
GLUCOSE SERPL-MCNC: 293 MG/DL (ref 70–110)
GLUCOSE SERPL-MCNC: 337 MG/DL (ref 70–110)
GLUCOSE SERPL-MCNC: 337 MG/DL (ref 70–110)
GLUCOSE SERPL-MCNC: 395 MG/DL (ref 70–110)
GLUCOSE UR QL STRIP: ABNORMAL
GRAN CASTS UR QL COMP ASSIST: 3 /LPF
HBA1C MFR BLD: 10.1 % (ref 4–5.6)
HCT VFR BLD AUTO: 42.6 % (ref 40–54)
HGB BLD-MCNC: 14 G/DL (ref 14–18)
HGB UR QL STRIP: ABNORMAL
HYALINE CASTS UR QL AUTO: 9 /LPF
IMM GRANULOCYTES # BLD AUTO: ABNORMAL K/UL (ref 0–0.04)
IMM GRANULOCYTES NFR BLD AUTO: ABNORMAL % (ref 0–0.5)
KETONES UR QL STRIP: ABNORMAL
LACTATE SERPL-SCNC: 1.3 MMOL/L (ref 0.5–2.2)
LACTATE SERPL-SCNC: 1.9 MMOL/L (ref 0.5–2.2)
LEUKOCYTE ESTERASE UR QL STRIP: NEGATIVE
LYMPHOCYTES NFR BLD: 6 % (ref 18–48)
MAGNESIUM SERPL-MCNC: 2.2 MG/DL (ref 1.6–2.6)
MAGNESIUM SERPL-MCNC: 2.2 MG/DL (ref 1.6–2.6)
MAGNESIUM SERPL-MCNC: 2.3 MG/DL (ref 1.6–2.6)
MCH RBC QN AUTO: 31.3 PG (ref 27–31)
MCHC RBC AUTO-ENTMCNC: 32.9 G/DL (ref 32–36)
MCV RBC AUTO: 95 FL (ref 82–98)
METAMYELOCYTES NFR BLD MANUAL: 3 %
MICROSCOPIC COMMENT: ABNORMAL
MONOCYTES NFR BLD: 4 % (ref 4–15)
MYELOCYTES NFR BLD MANUAL: 2 %
NEUTROPHILS NFR BLD: 82 % (ref 38–73)
NEUTS BAND NFR BLD MANUAL: 3 %
NITRITE UR QL STRIP: NEGATIVE
NRBC BLD-RTO: 0 /100 WBC
PH UR STRIP: 5 [PH] (ref 5–8)
PHOSPHATE SERPL-MCNC: 2.2 MG/DL (ref 2.7–4.5)
PHOSPHATE SERPL-MCNC: 2.4 MG/DL (ref 2.7–4.5)
PHOSPHATE SERPL-MCNC: 2.5 MG/DL (ref 2.7–4.5)
PHOSPHATE SERPL-MCNC: 2.8 MG/DL (ref 2.7–4.5)
PHOSPHATE SERPL-MCNC: 3.1 MG/DL (ref 2.7–4.5)
PLATELET # BLD AUTO: 318 K/UL (ref 150–450)
PLATELET BLD QL SMEAR: ABNORMAL
PMV BLD AUTO: 9.4 FL (ref 9.2–12.9)
POCT GLUCOSE: 212 MG/DL (ref 70–110)
POCT GLUCOSE: 216 MG/DL (ref 70–110)
POCT GLUCOSE: 219 MG/DL (ref 70–110)
POCT GLUCOSE: 226 MG/DL (ref 70–110)
POCT GLUCOSE: 234 MG/DL (ref 70–110)
POCT GLUCOSE: 237 MG/DL (ref 70–110)
POCT GLUCOSE: 237 MG/DL (ref 70–110)
POCT GLUCOSE: 238 MG/DL (ref 70–110)
POCT GLUCOSE: 243 MG/DL (ref 70–110)
POCT GLUCOSE: 245 MG/DL (ref 70–110)
POCT GLUCOSE: 249 MG/DL (ref 70–110)
POCT GLUCOSE: 249 MG/DL (ref 70–110)
POCT GLUCOSE: 252 MG/DL (ref 70–110)
POCT GLUCOSE: 253 MG/DL (ref 70–110)
POCT GLUCOSE: 259 MG/DL (ref 70–110)
POCT GLUCOSE: 264 MG/DL (ref 70–110)
POCT GLUCOSE: 272 MG/DL (ref 70–110)
POCT GLUCOSE: 275 MG/DL (ref 70–110)
POCT GLUCOSE: 291 MG/DL (ref 70–110)
POCT GLUCOSE: 300 MG/DL (ref 70–110)
POCT GLUCOSE: 336 MG/DL (ref 70–110)
POCT GLUCOSE: 395 MG/DL (ref 70–110)
POCT GLUCOSE: 458 MG/DL (ref 70–110)
POTASSIUM SERPL-SCNC: 3.6 MMOL/L (ref 3.5–5.1)
POTASSIUM SERPL-SCNC: 4.1 MMOL/L (ref 3.5–5.1)
POTASSIUM SERPL-SCNC: 4.2 MMOL/L (ref 3.5–5.1)
POTASSIUM SERPL-SCNC: 5 MMOL/L (ref 3.5–5.1)
POTASSIUM SERPL-SCNC: 5.2 MMOL/L (ref 3.5–5.1)
POTASSIUM SERPL-SCNC: 5.2 MMOL/L (ref 3.5–5.1)
POTASSIUM SERPL-SCNC: 5.6 MMOL/L (ref 3.5–5.1)
PROT SERPL-MCNC: 7 G/DL (ref 6–8.4)
PROT UR QL STRIP: ABNORMAL
RBC # BLD AUTO: 4.47 M/UL (ref 4.6–6.2)
RBC #/AREA URNS AUTO: 8 /HPF (ref 0–4)
SODIUM SERPL-SCNC: 128 MMOL/L (ref 136–145)
SODIUM SERPL-SCNC: 129 MMOL/L (ref 136–145)
SODIUM SERPL-SCNC: 131 MMOL/L (ref 136–145)
SODIUM SERPL-SCNC: 131 MMOL/L (ref 136–145)
SODIUM SERPL-SCNC: 132 MMOL/L (ref 136–145)
SODIUM SERPL-SCNC: 133 MMOL/L (ref 136–145)
SODIUM SERPL-SCNC: 135 MMOL/L (ref 136–145)
SP GR UR STRIP: 1.03 (ref 1–1.03)
URN SPEC COLLECT METH UR: ABNORMAL
WBC # BLD AUTO: 20.27 K/UL (ref 3.9–12.7)
WBC #/AREA URNS AUTO: 2 /HPF (ref 0–5)
YEAST UR QL AUTO: ABNORMAL

## 2022-07-27 PROCEDURE — 63600175 PHARM REV CODE 636 W HCPCS: Performed by: STUDENT IN AN ORGANIZED HEALTH CARE EDUCATION/TRAINING PROGRAM

## 2022-07-27 PROCEDURE — 20000000 HC ICU ROOM

## 2022-07-27 PROCEDURE — 63600175 PHARM REV CODE 636 W HCPCS

## 2022-07-27 PROCEDURE — 99291 PR CRITICAL CARE, E/M 30-74 MINUTES: ICD-10-PCS | Mod: ,,, | Performed by: INTERNAL MEDICINE

## 2022-07-27 PROCEDURE — 85027 COMPLETE CBC AUTOMATED: CPT

## 2022-07-27 PROCEDURE — 25000003 PHARM REV CODE 250: Performed by: STUDENT IN AN ORGANIZED HEALTH CARE EDUCATION/TRAINING PROGRAM

## 2022-07-27 PROCEDURE — 83735 ASSAY OF MAGNESIUM: CPT | Mod: 91

## 2022-07-27 PROCEDURE — 25000003 PHARM REV CODE 250

## 2022-07-27 PROCEDURE — 80048 BASIC METABOLIC PNL TOTAL CA: CPT

## 2022-07-27 PROCEDURE — 84100 ASSAY OF PHOSPHORUS: CPT

## 2022-07-27 PROCEDURE — 81001 URINALYSIS AUTO W/SCOPE: CPT | Performed by: PHYSICIAN ASSISTANT

## 2022-07-27 PROCEDURE — 80048 BASIC METABOLIC PNL TOTAL CA: CPT | Mod: 91,XB

## 2022-07-27 PROCEDURE — 27000221 HC OXYGEN, UP TO 24 HOURS

## 2022-07-27 PROCEDURE — 99900035 HC TECH TIME PER 15 MIN (STAT)

## 2022-07-27 PROCEDURE — 84100 ASSAY OF PHOSPHORUS: CPT | Mod: 91

## 2022-07-27 PROCEDURE — 83735 ASSAY OF MAGNESIUM: CPT

## 2022-07-27 PROCEDURE — 83605 ASSAY OF LACTIC ACID: CPT | Performed by: PHYSICIAN ASSISTANT

## 2022-07-27 PROCEDURE — 99291 CRITICAL CARE FIRST HOUR: CPT | Mod: ,,, | Performed by: INTERNAL MEDICINE

## 2022-07-27 PROCEDURE — 80053 COMPREHEN METABOLIC PANEL: CPT

## 2022-07-27 PROCEDURE — 80048 BASIC METABOLIC PNL TOTAL CA: CPT | Mod: 91,XB | Performed by: INTERNAL MEDICINE

## 2022-07-27 PROCEDURE — S5010 5% DEXTROSE AND 0.45% SALINE: HCPCS

## 2022-07-27 PROCEDURE — 85007 BL SMEAR W/DIFF WBC COUNT: CPT

## 2022-07-27 RX ORDER — SODIUM,POTASSIUM PHOSPHATES 280-250MG
2 POWDER IN PACKET (EA) ORAL ONCE
Status: COMPLETED | OUTPATIENT
Start: 2022-07-27 | End: 2022-07-27

## 2022-07-27 RX ORDER — ACETAMINOPHEN 500 MG
1000 TABLET ORAL EVERY 8 HOURS PRN
Status: DISCONTINUED | OUTPATIENT
Start: 2022-07-27 | End: 2022-08-24 | Stop reason: HOSPADM

## 2022-07-27 RX ORDER — DEXTROSE MONOHYDRATE, SODIUM CHLORIDE, AND POTASSIUM CHLORIDE 50; 1.49; 4.5 G/1000ML; G/1000ML; G/1000ML
INJECTION, SOLUTION INTRAVENOUS CONTINUOUS
Status: DISCONTINUED | OUTPATIENT
Start: 2022-07-27 | End: 2022-07-29

## 2022-07-27 RX ORDER — OXYCODONE HYDROCHLORIDE 5 MG/1
5 TABLET ORAL EVERY 6 HOURS PRN
Status: DISCONTINUED | OUTPATIENT
Start: 2022-07-27 | End: 2022-07-28

## 2022-07-27 RX ORDER — MORPHINE SULFATE 2 MG/ML
2 INJECTION, SOLUTION INTRAMUSCULAR; INTRAVENOUS ONCE AS NEEDED
Status: COMPLETED | OUTPATIENT
Start: 2022-07-27 | End: 2022-07-27

## 2022-07-27 RX ADMIN — OXYCODONE 5 MG: 5 TABLET ORAL at 07:07

## 2022-07-27 RX ADMIN — POTASSIUM & SODIUM PHOSPHATES POWDER PACK 280-160-250 MG 2 PACKET: 280-160-250 PACK at 04:07

## 2022-07-27 RX ADMIN — ATORVASTATIN CALCIUM 20 MG: 20 TABLET, FILM COATED ORAL at 08:07

## 2022-07-27 RX ADMIN — SODIUM CHLORIDE 125 ML/HR: 0.9 INJECTION, SOLUTION INTRAVENOUS at 01:07

## 2022-07-27 RX ADMIN — OXYCODONE 5 MG: 5 TABLET ORAL at 12:07

## 2022-07-27 RX ADMIN — POTASSIUM & SODIUM PHOSPHATES POWDER PACK 280-160-250 MG 2 PACKET: 280-160-250 PACK at 07:07

## 2022-07-27 RX ADMIN — SODIUM CHLORIDE 1000 ML: 0.9 INJECTION, SOLUTION INTRAVENOUS at 01:07

## 2022-07-27 RX ADMIN — INSULIN HUMAN 3 UNITS/HR: 1 INJECTION, SOLUTION INTRAVENOUS at 12:07

## 2022-07-27 RX ADMIN — MORPHINE SULFATE 2 MG: 2 INJECTION, SOLUTION INTRAMUSCULAR; INTRAVENOUS at 05:07

## 2022-07-27 RX ADMIN — DEXTROSE AND SODIUM CHLORIDE 125 ML/HR: 5; .45 INJECTION, SOLUTION INTRAVENOUS at 04:07

## 2022-07-27 RX ADMIN — DEXTROSE AND SODIUM CHLORIDE 125 ML/HR: 5; .45 INJECTION, SOLUTION INTRAVENOUS at 12:07

## 2022-07-27 RX ADMIN — DEXTROSE, SODIUM CHLORIDE, AND POTASSIUM CHLORIDE: 5; .45; .15 INJECTION INTRAVENOUS at 07:07

## 2022-07-27 NOTE — PLAN OF CARE
[facility] Card[facility]  Initial Discharge Assessment       Primary Care Provider: Aylin Wayne DO    Admission Diagnosis: Hyperkalemia [E87.5]  Hyponatremia [E87.1]  Tachycardia [R00.0]  Left leg pain [M79.605]  Elevated TSH [R79.89]  Elevated d-dimer [R79.89]  ANGE (acute kidney injury) [N17.9]  Respiratory distress, acute [R06.03]  Diabetic ketoacidosis without coma associated with other specified diabetes mellitus [E13.10]  Sepsis, due to unspecified organism, unspecified whether acute organ dysfunction present [A41.9]    Admission Date: 7/26/2022  Expected Discharge Date: 8/1/2022    Discharge Barriers Identified: None    Payor: HUMANA / Plan: HUMANA HMO OPEN ACCESS / Product Type: HMO /     Extended Emergency Contact Information  Primary Emergency Contact: Lucy Thomas   [place]  Mobile Phone: [phone]  Relation: Spouse    Discharge Plan A: Home with family  Discharge Plan B: Home      [id number] [address] AT [facility] [address]  [address]  [facility] [address]  Phone: [phone] Fax: [phone]    [facility] (Now [facility]) - [facility] [address]  [address] [facility] [address]  Phone: [phone] Fax: [phone]      Transferred from:     Past Medical History:   Diagnosis Date    Diabetes     Gout     Hyperlipidemia     Hypertension          CM met with patient and Lucy Thomas (spouse) [phone] in room for Discharge Planning Assessment.  Patient was able to answer questions.  Per patient, he lives with Lucy Pereiraaj (spouse) [phone] in a 1-story home with 0 step(s) to enter.   Per patient, he was independent with ADLS and used no DME for ambulation. Per patient, he is not on dialysis and does not take Coumadin.   Patient will have help from Lucy Thomas (spouse) [phone] upon discharge.   Discharge Planning Booklet given to  patient/family and discussed.  All questions addressed.  CM will follow for needs.      Initial Assessment (most recent)     Adult Discharge Assessment - 07/27/22 1107        Discharge Assessment    Assessment Type Discharge Planning Assessment     Confirmed/corrected address, phone number and insurance Yes     Confirmed Demographics Correct on Facesheet     Source of Information patient;family     When was your last doctors appointment? 04/28/22     Communicated FILEMON with patient/caregiver Date not available/Unable to determine     Reason For Admission Diabetic ketoacidosis     Lives With spouse     Facility Arrived From: Home     Do you expect to return to your current living situation? Yes     Do you have help at home or someone to help you manage your care at home? Yes     Who are your caregiver(s) and their phone number(s)? Lucy Thomas     Prior to hospitilization cognitive status: Alert/Oriented     Current cognitive status: Alert/Oriented     Walking or Climbing Stairs Difficulty none     Dressing/Bathing Difficulty none     Equipment Currently Used at Home none     Readmission within 30 days? No     Patient currently being followed by outpatient case management? No     Do you currently have service(s) that help you manage your care at home? No     Do you take prescription medications? Yes     Do you have prescription coverage? Yes     Coverage HUMANA - HUMANA HMO OPEN ACCESS     Do you have any problems affording any of your prescribed medications? No     Is the patient taking medications as prescribed? yes     Who is going to help you get home at discharge? Lucy Thomas (spouse) [phone]     How do you get to doctors appointments? car, drives self     Are you on dialysis? No     Do you take coumadin? No     Discharge Plan A Home with family     Discharge Plan B Home     DME Needed Upon Discharge  other (see comments)   TBD    Discharge Plan discussed with: Spouse/sig other;Patient     Name(s) and  Number(s) Lucy Thomas (spouse) [phone]     Discharge Barriers Identified None        Relationship/Environment    Name(s) of Who Lives With Patient Lucy Thomas (spouse) [phone]                        PCP:  Aylin Wayne DO  [phone]        Pharmacy:    [id number] - [place], [place] - [address] AT [address]   [address]  [facility] [address]  Phone: [phone] Fax: [phone]    [facility] (Now [facility]) - [facility] [address] [facility]  [address] [facility] [address]  Phone: [phone] Fax: [phone]        Emergency Contacts:  Extended Emergency Contact Information  Primary Emergency Contact: Lucy Thomas   [place]  Mobile Phone: [phone]  Relation: Spouse      Insurance:    Payor: HUMANA / Plan: HUMANA HMO OPEN ACCESS / Product Type: HMO /     Sally Kenney RN     [phone]      07/27/2022  11:21 AM

## 2022-07-27 NOTE — FIRST PROVIDER EVALUATION
Medical screening exam completed.  I have conducted a focused provider triage encounter, findings are as follows:    Brief history of present illness:  47-year-old male with history of diabetes and recent steroid presents with marked shortness of breath.  He feels very winded.  No chest pain or fever.    Vitals:    07/26/22 1933   BP: (!) 146/94   Pulse: (!) 137   Resp: (!) 22   Temp: 97.4 °F (36.3 °C)   TempSrc: Oral   SpO2: 98%   Weight: 106.6 kg (235 lb)       Pertinent physical exam:  Patient is tachypneic and tachycardic.  Lungs are clear.  No focal tenderness to legs.    Brief workup plan:  I initially considered DKA.  His blood sugars only in the 200s and he seems quite short of breath.  Will order cardiac workup.  Will also do a D-dimer.  I ask my partners to understand this and indulgence  Preliminary workup initiated; this workup will be continued and followed by the physician or advanced practice provider that is assigned to the patient when roomed.

## 2022-07-27 NOTE — ASSESSMENT & PLAN NOTE
Baseline Cr 0.7-0.9.  Likely prerenal in setting of DKA.  Lab Results   Component Value Date    BUN 25 (H) 07/26/2022    CREATININE 1.9 (H) 07/26/2022     - IVF resuscitation  - CMP qd, trend Cr  - U/a, Umicro ordered  - renally dose all medications  - avoid nephrotoxic agents, NSAIDs, IV contrast, ACE/ARB  - Maintain MAP > 65

## 2022-07-27 NOTE — MED STUDENT ASSESSMENT & PLAN
Assessment/Plan:      Cardiac/Vascular  Hyperlipidemia associated with type 2 diabetes mellitus  On home atorvastatin.  - continue home statin     Hypertension associated with type 2 diabetes mellitus  On home lisinopril 20.  - hold ACEi in setting of ANGE    Renal/  Hyperkalemia  K 5.5 on admit.  Receiving insulin subq and gtt.  - BMP q4h, trend     ANGE (acute kidney injury)  Baseline Cr 0.7-0.9.  Likely prerenal in setting of DKA.        Lab Results   Component Value Date     BUN 25 (H) 07/26/2022     CREATININE 1.9 (H) 07/26/2022      - IVF resuscitation  - CMP qd, trend Cr  - U/a, Umicro ordered  - renally dose all medications  - avoid nephrotoxic agents, NSAIDs, IV contrast, ACE/ARB  - Maintain MAP > 65     Hematology  Elevated d-dimer  On RA with increased WOB.  D-dimer elevated to 1.46.  - CTA PE negative     Oncology  Leukocytosis  WBC 26.87 on admit, left thigh with large subcutaneous swelling.   - CTA LLE pending     Endocrine  * DKA (diabetic ketoacidosis)  47M with history of DM2, gout, HTN, tobacco use who initially presented for dyspnea, increased thirst, decreased PO intake, found to be in DKA.  Recent history of steroids for leg pain.  On home dulaglutide, empagliflozin, metformin, insulin glargine 20u qhs (has not been taking).  Recent a1c 11.2%.  , bicarb 5, AG 26, BHB 5.9 on admit.    - lactic pending  - 10u detemir now  - start insulin gtt  - q1h POCT glu checks while on drip  - s/p 30cc/kg IVF resuscitation, now on NS IVF continuous  - when serum glucose <250 mg/dL, switch to D5-1/2NS if needed to close gap  - BMP q4h  - diabetic diet    Type 2 diabetes mellitus with hyperglycemia, with long-term current use of insulin  See DKA     Class 2 severe obesity due to excess calories with serious comorbidity and body mass index (BMI) of 36.0 to 36.9 in adult  General weight loss/lifestyle modification strategies discussed (elicit support from others; identify saboteurs; non-food rewards,  etc).             Orthopedic  Redness and swelling of thigh  Received steroid injection, steroid taper for leg pain by orthopedics last week.  Left lateral/proximal thigh with painful localized swelling, hot to touch. Patient received vanc/zosyn in ED  - CTA LE, U/s LLE pending  - will continue Vanc/zosyn on admission          Critical Care Daily Checklist:     A: Awake: RASS Goal/Actual Goal:    Actual: Meza Agitation Sedation Scale (RASS): Drowsy   B: Spontaneous Breathing Trial Performed?    C: SAT & SBT Coordinated?  N/A                      D: Delirium: CAM-ICU    E: Early Mobility Performed? Yes   F: Feeding Goal:    Status:           Current Diet Order   Procedures   NPO   AS: Analgesia/Sedation none   T: Thromboembolic Prophylaxis lovenox    H: HOB > 300 Yes   U: Stress Ulcer Prophylaxis (if needed) famotidine   G: Glucose Control Insulin gtt   B: Bowel Function    I: Indwelling Catheter (Lines & Hull) Necessity necessary   D: De-escalation of Antimicrobials/Pharmacotherapies BSA     Plan for the day/ETD      Code Status:  Family/Goals of Care: Full Code

## 2022-07-27 NOTE — ASSESSMENT & PLAN NOTE
Received steroid injection, steroid taper for leg pain by orthopedics.  Left lateral/proximal thigh with painful localized swelling, hot to touch. Patient received vanc/zosyn in ED  - CTA LE, U/s LLE pending  - will continue Vanc/zosyn on admission

## 2022-07-27 NOTE — HPI
"46 y/o M hx of HTN, IDDM2, GERD obesity, HLD presented to the ED with complaint of 1 day of worsening SOB. Wife present at bedside. Patient states that he noticed yesterday he was feeling some shortness of breath and couldn't seem to catch his breath. His wife went to check on him this morning and noticed that he was breathing "fast and heavy" and he sounded like he was slurring his words. Wife was concerned about a stroke, so he brought him to the ED for evaluation.    Patient also reporting significant pain and swelling along his left lateral proximal thigh. He states he has noticed worsening pain over the past 2 weeks. He denies any acute trauma to the area. He was seen by ortho last week and was told to take ibu-profen for a muscle strain. This did not help his pain, so he presented again on 7/22 and was given a prednisone injection into his thigh. This also did not help his pain, and now the pain and swelling have advanced to the point that he has difficulty with ambulation. Patient denies fever, chills, nausea, vomiting.     Of note, patient has hx of poorly controlled diabetes. He was recently started on insulin by his PCP, but he has not taken any insulin since being prescribed it.     In the ED, patient hypertensive and tachycardic to the 130s. Tachypneic with RR in the 40s. On CBC, WBC 26.9, On CMP patient hyperkalemic to 5.5, CorNa 136, Bicarb 5. Cr elevated to 1.9 from BL 0.83. UA, BHB pending at time of admission. CTA without evidence of pulmonary embolism. Critical Care Medicine consulted given severe acidosis.   "

## 2022-07-27 NOTE — SUBJECTIVE & OBJECTIVE
No current facility-administered medications on file prior to encounter.     Current Outpatient Medications on File Prior to Encounter   Medication Sig    atorvastatin (LIPITOR) 20 MG tablet Take 1 tablet (20 mg total) by mouth once daily.    empagliflozin (JARDIANCE) 25 mg tablet Take 1 tablet (25 mg total) by mouth once daily.    lisinopriL (PRINIVIL,ZESTRIL) 20 MG tablet Take 1 tablet (20 mg total) by mouth once daily.    blood sugar diagnostic Strp 1 strip by Misc.(Non-Drug; Combo Route) route once daily.    blood-glucose meter kit Use as instructed    dulaglutide (TRULICITY) 3 mg/0.5 mL pen injector Inject 3 mg into the skin every 7 days.    insulin (LANTUS SOLOSTAR U-100 INSULIN) glargine 100 units/mL (3mL) SubQ pen Inject 20 Units into the skin once daily.    lancets Misc 1 lancet by Misc.(Non-Drug; Combo Route) route once daily.    metFORMIN (GLUCOPHAGE-XR) 500 MG ER 24hr tablet Take 1 tablet (500 mg total) by mouth 2 (two) times daily with meals.    [DISCONTINUED] traMADoL (ULTRAM) 50 mg tablet Take 50 mg by mouth every 8 (eight) hours as needed.       Review of patient's allergies indicates:  No Known Allergies    Past Medical History:   Diagnosis Date    Diabetes     Gout     Hyperlipidemia     Hypertension      Past Surgical History:   Procedure Laterality Date    WRIST SURGERY Left      Family History       Problem Relation (Age of Onset)    Diabetes Mother    No Known Problems Father          Tobacco Use    Smoking status: Former Smoker     Packs/day: 0.50     Types: Cigarettes     Quit date: 2020     Years since quittin.5    Smokeless tobacco: Never Used   Substance and Sexual Activity    Alcohol use: Yes     Alcohol/week: 12.0 standard drinks     Types: 12 Cans of beer per week    Drug use: Never    Sexual activity: Yes     Partners: Female     Review of Systems   Constitutional:  Positive for fatigue. Negative for activity change, chills and fever.   Respiratory:  Negative for chest  tightness, shortness of breath and wheezing.    Cardiovascular:  Negative for chest pain and palpitations.   Gastrointestinal:  Negative for abdominal distention, abdominal pain, constipation, diarrhea, nausea and vomiting.   Genitourinary:  Negative for difficulty urinating.   Musculoskeletal:         Left thigh pain, increased swelling in area of pain. No increased warmth. No drainage.   Skin:  Negative for color change and wound.   Neurological:  Negative for light-headedness.   Hematological:  Does not bruise/bleed easily.   Objective:     Vital Signs (Most Recent):  Temp: 98.3 °F (36.8 °C) (07/27/22 0701)  Pulse: 101 (07/27/22 1000)  Resp: (!) 25 (07/27/22 1000)  BP: 108/77 (07/27/22 0800)  SpO2: 99 % (07/27/22 1000)   Vital Signs (24h Range):  Temp:  [97.4 °F (36.3 °C)-98.7 °F (37.1 °C)] 98.3 °F (36.8 °C)  Pulse:  [101-137] 101  Resp:  [22-33] 25  SpO2:  [98 %-100 %] 99 %  BP: (107-179)/(73-94) 108/77     Weight: 106.6 kg (235 lb)  Body mass index is 36.81 kg/m².    Physical Exam  Vitals and nursing note reviewed.   Constitutional:       Appearance: He is well-developed.   Neck:      Vascular: No JVD.      Trachea: No tracheal deviation.   Cardiovascular:      Rate and Rhythm: Normal rate and regular rhythm.   Pulmonary:      Effort: Pulmonary effort is normal. No respiratory distress.   Abdominal:      General: There is no distension.      Palpations: Abdomen is soft.   Musculoskeletal:      Comments: Left thigh with area of induration in left lateral thigh, no superficial fluctuance or areas of drainage. No cellulitis. No increased warmth. Reticular vascular pattern in overlying skin. Very tender to palpation.   Skin:     General: Skin is warm and dry.   Neurological:      Mental Status: He is alert and oriented to person, place, and time.                 Significant Labs:  I have reviewed all pertinent lab results within the past 24 hours.  CBC:   Recent Labs   Lab 07/27/22  0412   WBC 20.27*   RBC 4.47*    HGB 14.0   HCT 42.6      MCV 95   MCH 31.3*   MCHC 32.9     CMP:   Recent Labs   Lab 07/27/22  0412 07/27/22  1000   * 293*   CALCIUM 8.8 8.8   ALBUMIN 2.1*  --    PROT 7.0  --    * 129*   K 5.0 4.2   CO2 7* 8*    105   BUN 25* 25*   CREATININE 1.5* 1.3   ALKPHOS 97  --    ALT 12  --    AST 14  --    BILITOT 0.3  --        Significant Diagnostics:  I have reviewed all pertinent imaging results/findings within the past 24 hours.    CT thigh reviewed - looks like a hematoma vs dense fluid collection    Bedside US performed - there was a pocket of fluid just superficial to the muscle fascia, but no significant area to target for needle aspiration.

## 2022-07-27 NOTE — ASSESSMENT & PLAN NOTE
General weight loss/lifestyle modification strategies discussed (elicit support from others; identify saboteurs; non-food rewards, etc).

## 2022-07-27 NOTE — CONSULTS
Consult received, patient to be admitted to Critical Care Medicine. Full H&P to follow.    Ilana Moreau MD  Pulmonary/Critical Care Fellow  07/26/2022 10:33 PM  Spectra: [phone]

## 2022-07-27 NOTE — CONSULTS
Nutrition-Related Diabetes Education      Time Spent: 10 minutes     Learners: Patient     Current HbA1c: 10.1    Is patient aware of their A1c and their goal A1c? Yes    Nutrition Education with handouts: MyPlate, CHO counting     Comments: Pt familiar with diabetic diet. Reviewed appropriate serving sizes, CHO containing foods, and nutrition facts label reading. All questions answered; pt verbalized understanding.     Barriers to Learning: Non-compliance     Pt currently NPO. PTA - pt reports decreased appetite x 2 days (good prior to those days) & UBW of 225#. Pt appears nourished w/ no indicators of malnutrition.     Follow up: Yes    Please consult as needed.    Thank you!  MS Cherise, RD, LDN

## 2022-07-27 NOTE — H&P
"[facility] - Emergency Dept  Critical Care Medicine  History & Physical    Patient Name: Wilfredo Thomas  MRN: [id number]  Admission Date: 7/26/2022  Hospital Length of Stay: 0 days  Code Status: Full Code  Attending Physician: Pantera Bundy MD   Primary Care Provider: Aylin Wayne DO   Principal Problem: DKA (diabetic ketoacidosis)    Subjective:     HPI:  46 y/o M hx of HTN, IDDM2, GERD obesity, HLD presented to the ED with complaint of 1 day of worsening SOB. Wife present at bedside. Patient states that he noticed yesterday he was feeling some shortness of breath and couldn't seem to catch his breath. His wife went to check on him this morning and noticed that he was breathing "fast and heavy" and he sounded like he was slurring his words. Wife was concerned about a stroke, so he brought him to the ED for evaluation.    Patient also reporting significant pain and swelling along his left lateral proximal thigh. He states he has noticed worsening pain over the past 2 weeks. He denies any acute trauma to the area. He was seen by ortho last week and was told to take ibu-profen for a muscle strain. This did not help his pain, so he presented again on 7/22 and was given a prednisone injection into his thigh. This also did not help his pain, and now the pain and swelling have advanced to the point that he has difficulty with ambulation. Patient denies fever, chills, nausea, vomiting.     Of note, patient has hx of poorly controlled diabetes. He was recently started on insulin by his PCP, but he has not taken any insulin since being prescribed it.     In the ED, patient hypertensive and tachycardic to the 130s. Tachypneic with RR in the 40s. On CBC, WBC 26.9, On CMP patient hyperkalemic to 5.5, CorNa 136, Bicarb 5. Cr elevated to 1.9 from BL 0.83. UA, BHB pending at time of admission. CTA without evidence of pulmonary embolism. Critical Care Medicine consulted given severe acidosis.       Hospital/ICU Course:  No notes " on file     Past Medical History:   Diagnosis Date    Diabetes     Gout     Hyperlipidemia     Hypertension        Past Surgical History:   Procedure Laterality Date    WRIST SURGERY Left        Review of patient's allergies indicates:  No Known Allergies    Family History       Problem Relation (Age of Onset)    Diabetes Mother    No Known Problems Father          Tobacco Use    Smoking status: Former Smoker     Packs/day: 0.50     Types: Cigarettes     Quit date: 2020     Years since quittin.5    Smokeless tobacco: Never Used   Substance and Sexual Activity    Alcohol use: Yes     Alcohol/week: 12.0 standard drinks     Types: 12 Cans of beer per week    Drug use: Never    Sexual activity: Yes     Partners: Female      Review of Systems   Constitutional:  Positive for fatigue. Negative for appetite change, chills and fever.   HENT:  Negative for sore throat and trouble swallowing.         Dry mouth   Respiratory:  Positive for shortness of breath. Negative for cough.    Cardiovascular:  Negative for chest pain.   Gastrointestinal:  Negative for abdominal pain, nausea and vomiting.   Genitourinary:  Negative for dysuria.   Musculoskeletal:  Positive for myalgias.   Skin:  Negative for color change.   Neurological:  Negative for dizziness, weakness and headaches.   Psychiatric/Behavioral:  Negative for confusion.    Objective:     Vital Signs (Most Recent):  Temp: 97.4 °F (36.3 °C) (22)  Pulse: (!) 132 (22)  Resp: (!) 33 (22)  BP: 137/89 (22)  SpO2: 99 % (22)   Vital Signs (24h Range):  Temp:  [97.4 °F (36.3 °C)] 97.4 °F (36.3 °C)  Pulse:  [132-137] 132  Resp:  [22-33] 33  SpO2:  [98 %-99 %] 99 %  BP: (137-146)/(89-94) 137/89   Weight: 106.6 kg (235 lb)  Body mass index is 36.81 kg/m².    No intake or output data in the 24 hours ending 22    Physical Exam  Vitals and nursing note reviewed.   Constitutional:       General: He is in  acute distress.      Appearance: He is ill-appearing and diaphoretic.      Comments: Ill-appearing adult male lying in bed in mild distress   HENT:      Head: Normocephalic and atraumatic.      Nose: Nose normal. No congestion.      Mouth/Throat:      Mouth: Mucous membranes are dry.      Pharynx: Oropharynx is clear.      Comments: Markedly dry mucus membranes  Eyes:      Extraocular Movements: Extraocular movements intact.      Conjunctiva/sclera: Conjunctivae normal.      Pupils: Pupils are equal, round, and reactive to light.   Cardiovascular:      Rate and Rhythm: Regular rhythm. Tachycardia present.      Pulses: Normal pulses.      Heart sounds: Normal heart sounds. No murmur heard.    No friction rub. No gallop.   Pulmonary:      Effort: Tachypnea present.      Breath sounds: Normal breath sounds.      Comments: Kussmaul breathing pattern  Musculoskeletal:         General: Swelling (large oval 10cm area of swelling and warmth on L lateral proximal thigh. no redness. No induration though area feels tight) present. Normal range of motion.      Cervical back: Normal range of motion and neck supple.   Skin:     General: Skin is warm.   Neurological:      General: No focal deficit present.      Mental Status: He is alert and oriented to person, place, and time.   Psychiatric:         Mood and Affect: Mood normal.         Behavior: Behavior normal.       Vents:     Lines/Drains/Airways       Peripheral Intravenous Line  Duration                  Peripheral IV - Single Lumen 07/26/22 20 G Right Antecubital <1 day                  Significant Labs:    CBC/Anemia Profile:  Recent Labs   Lab 07/26/22 2044 07/26/22 2108   WBC 26.87*  --    HGB 16.1  --    HCT 49.6 51   *  --    MCV 98  --    RDW 13.0  --         Chemistries:  Recent Labs   Lab 07/26/22 2044   *   K 5.5*      CO2 5*   BUN 25*   CREATININE 1.9*   CALCIUM 10.1   ALBUMIN 2.7*   PROT 8.7*   BILITOT 0.3   ALKPHOS 108   ALT 13   AST 9*        CMP:   Recent Labs   Lab 07/26/22 2044   *   K 5.5*      CO2 5*   *   BUN 25*   CREATININE 1.9*   CALCIUM 10.1   PROT 8.7*   ALBUMIN 2.7*   BILITOT 0.3   ALKPHOS 108   AST 9*   ALT 13   ANIONGAP 26*   EGFRNONAA 40.5*     Urine Studies: No results for input(s): COLORU, APPEARANCEUA, PHUR, SPECGRAV, PROTEINUA, GLUCUA, KETONESU, BILIRUBINUA, OCCULTUA, NITRITE, UROBILINOGEN, LEUKOCYTESUR, RBCUA, WBCUA, BACTERIA, SQUAMEPITHEL, HYALINECASTS in the last 48 hours.    Invalid input(s): WRIGHTSUR    Significant Imaging: I have reviewed all pertinent imaging results/findings within the past 24 hours.    CTA Chest Non-Coronary (PE Study)   Final Result      No large central or lobar pulmonary embolism.         Electronically signed by: Jori Hopkins   Date:    07/26/2022   Time:    21:36      X-Ray Chest 1 View   Final Result      No convincing radiographic evidence of acute intrathoracic process on this single view..         Electronically signed by: Olman Saucedo MD   Date:    07/26/2022   Time:    21:23      US Lower Extremity Veins Left    (Results Pending)   CTA Lower Extremity Left    (Results Pending)         Assessment/Plan:     Cardiac/Vascular  Hyperlipidemia associated with type 2 diabetes mellitus  On home atorvastatin.  - continue home statin    Renal/  Hyperkalemia  K 5.5 on admit.  Receiving insulin subq and gtt.  - BMP q4h, trend    ANGE (acute kidney injury)  Baseline Cr 0.7-0.9.  Likely prerenal in setting of DKA.  Lab Results   Component Value Date    BUN 25 (H) 07/26/2022    CREATININE 1.9 (H) 07/26/2022     - IVF resuscitation  - CMP qd, trend Cr  - U/a, Umicro ordered  - renally dose all medications  - avoid nephrotoxic agents, NSAIDs, IV contrast, ACE/ARB  - Maintain MAP > 65    Hematology  Elevated d-dimer  On RA with increased WOB.  D-dimer elevated to 1.46.  - CTA PE negative    Oncology  Leukocytosis  WBC 26.87 on admit, left thigh with large subcutaneous swelling.   - CTA  LLE pending    Endocrine  * DKA (diabetic ketoacidosis)  47M with history of DM2, gout, HTN, tobacco use who initially presented for dyspnea, increased thirst, decreased PO intake, found to be in DKA.  Recent history of steroids for leg pain.  On home dulaglutide, empagliflozin, metformin, insulin glargine 20u qhs (has not been taking).  Recent a1c 11.2%.  , bicarb 5, AG 26, BHB 5.9 on admit.    - lactic pending  - 10u detemir now  - start insulin gtt  - q1h POCT glu checks while on drip  - s/p 30cc/kg IVF resuscitation, now on NS IVF continuous  - when serum glucose <250 mg/dL, switch to D5-1/2NS if needed to close gap  - BMP q4h  - diabetic diet    Class 2 severe obesity due to excess calories with serious comorbidity and body mass index (BMI) of 36.0 to 36.9 in adult  General weight loss/lifestyle modification strategies discussed (elicit support from others; identify saboteurs; non-food rewards, etc).      Hypertension associated with type 2 diabetes mellitus  On home lisinopril 20.  - hold ACEi in setting of ANGE    Type 2 diabetes mellitus with hyperglycemia, with long-term current use of insulin  See DKA    Orthopedic  Redness and swelling of thigh  Received steroid injection, steroid taper for leg pain by orthopedics.  Left lateral/proximal thigh with painful localized swelling, hot to touch. Patient received vanc/zosyn in ED   - CTA LE, U/s LLE pending  - will continue Vanc/zosyn on admission        Critical Care Daily Checklist:    A: Awake: RASS Goal/Actual Goal:    Actual:     B: Spontaneous Breathing Trial Performed?     C: SAT & SBT Coordinated?  N/A                      D: Delirium: CAM-ICU     E: Early Mobility Performed? Yes   F: Feeding Goal:    Status:     Current Diet Order   Procedures    Diet diabetic [facility]; 2000 Calorie     Order Specific Question:   Indicate patient location for additional diet options:     Answer:   [facility]     Order Specific Question:   Total  calories:     Answer:   2000 Calorie      AS: Analgesia/Sedation none   T: Thromboembolic Prophylaxis Lovenox   H: HOB > 300 Yes   U: Stress Ulcer Prophylaxis (if needed) famotidine   G: Glucose Control Insulin gtt   B: Bowel Function     I: Indwelling Catheter (Lines & Hull) Necessity necessary   D: De-escalation of Antimicrobials/Pharmacotherapies vanc/zosyn    Plan for the day/ETD Admit to MICU    Code Status:  Family/Goals of Care: Full Code         Critical secondary to Patient has a condition that poses threat to life and bodily function: severe acidosis     Critical care was time spent personally by me on the following activities: development of treatment plan with patient or surrogate and bedside caregivers, discussions with consultants, evaluation of patient's response to treatment, examination of patient, ordering and performing treatments and interventions, ordering and review of laboratory studies, ordering and review of radiographic studies, pulse oximetry, re-evaluation of patient's condition. This critical care time did not overlap with that of any other provider or involve time for any procedures.     Cecil Rios MD  Critical Care Medicine  [facility] - Emergency Dept

## 2022-07-27 NOTE — SUBJECTIVE & OBJECTIVE
Past Medical History:   Diagnosis Date    Diabetes     Gout     Hyperlipidemia     Hypertension        Past Surgical History:   Procedure Laterality Date    WRIST SURGERY Left        Review of patient's allergies indicates:  No Known Allergies    Family History       Problem Relation (Age of Onset)    Diabetes Mother    No Known Problems Father          Tobacco Use    Smoking status: Former Smoker     Packs/day: 0.50     Types: Cigarettes     Quit date: 2020     Years since quittin.5    Smokeless tobacco: Never Used   Substance and Sexual Activity    Alcohol use: Yes     Alcohol/week: 12.0 standard drinks     Types: 12 Cans of beer per week    Drug use: Never    Sexual activity: Yes     Partners: Female      Review of Systems   Constitutional:  Positive for fatigue. Negative for appetite change, chills and fever.   HENT:  Negative for sore throat and trouble swallowing.         Dry mouth   Respiratory:  Positive for shortness of breath. Negative for cough.    Cardiovascular:  Negative for chest pain.   Gastrointestinal:  Negative for abdominal pain, nausea and vomiting.   Genitourinary:  Negative for dysuria.   Musculoskeletal:  Positive for myalgias.   Skin:  Negative for color change.   Neurological:  Negative for dizziness, weakness and headaches.   Psychiatric/Behavioral:  Negative for confusion.    Objective:     Vital Signs (Most Recent):  Temp: 97.4 °F (36.3 °C) (22)  Pulse: (!) 132 (22)  Resp: (!) 33 (22)  BP: 137/89 (22)  SpO2: 99 % (22)   Vital Signs (24h Range):  Temp:  [97.4 °F (36.3 °C)] 97.4 °F (36.3 °C)  Pulse:  [132-137] 132  Resp:  [22-33] 33  SpO2:  [98 %-99 %] 99 %  BP: (137-146)/(89-94) 137/89   Weight: 106.6 kg (235 lb)  Body mass index is 36.81 kg/m².    No intake or output data in the 24 hours ending 22    Physical Exam  Vitals and nursing note reviewed.   Constitutional:       General: He is in acute distress.       Appearance: He is ill-appearing and diaphoretic.      Comments: Ill-appearing adult male lying in bed in mild distress   HENT:      Head: Normocephalic and atraumatic.      Nose: Nose normal. No congestion.      Mouth/Throat:      Mouth: Mucous membranes are dry.      Pharynx: Oropharynx is clear.      Comments: Markedly dry mucus membranes  Eyes:      Extraocular Movements: Extraocular movements intact.      Conjunctiva/sclera: Conjunctivae normal.      Pupils: Pupils are equal, round, and reactive to light.   Cardiovascular:      Rate and Rhythm: Regular rhythm. Tachycardia present.      Pulses: Normal pulses.      Heart sounds: Normal heart sounds. No murmur heard.    No friction rub. No gallop.   Pulmonary:      Effort: Tachypnea present.      Breath sounds: Normal breath sounds.      Comments: Kussmaul breathing pattern  Musculoskeletal:         General: Swelling (large oval 10cm area of swelling and warmth on L lateral proximal thigh. no redness. No induration though area feels tight) present. Normal range of motion.      Cervical back: Normal range of motion and neck supple.   Skin:     General: Skin is warm.   Neurological:      General: No focal deficit present.      Mental Status: He is alert and oriented to person, place, and time.   Psychiatric:         Mood and Affect: Mood normal.         Behavior: Behavior normal.       Vents:     Lines/Drains/Airways       Peripheral Intravenous Line  Duration                  Peripheral IV - Single Lumen 07/26/22 20 G Right Antecubital <1 day                  Significant Labs:    CBC/Anemia Profile:  Recent Labs   Lab 07/26/22 2044 07/26/22 2108   WBC 26.87*  --    HGB 16.1  --    HCT 49.6 51   *  --    MCV 98  --    RDW 13.0  --         Chemistries:  Recent Labs   Lab 07/26/22 2044   *   K 5.5*      CO2 5*   BUN 25*   CREATININE 1.9*   CALCIUM 10.1   ALBUMIN 2.7*   PROT 8.7*   BILITOT 0.3   ALKPHOS 108   ALT 13   AST 9*       CMP:   Recent  Labs   Lab 07/26/22 2044   *   K 5.5*      CO2 5*   *   BUN 25*   CREATININE 1.9*   CALCIUM 10.1   PROT 8.7*   ALBUMIN 2.7*   BILITOT 0.3   ALKPHOS 108   AST 9*   ALT 13   ANIONGAP 26*   EGFRNONAA 40.5*     Urine Studies: No results for input(s): COLORU, APPEARANCEUA, PHUR, SPECGRAV, PROTEINUA, GLUCUA, KETONESU, BILIRUBINUA, OCCULTUA, NITRITE, UROBILINOGEN, LEUKOCYTESUR, RBCUA, WBCUA, BACTERIA, SQUAMEPITHEL, HYALINECASTS in the last 48 hours.    Invalid input(s): PRIYA    Significant Imaging: I have reviewed all pertinent imaging results/findings within the past 24 hours.    CTA Chest Non-Coronary (PE Study)   Final Result      No large central or lobar pulmonary embolism.         Electronically signed by: Jori Hopkins   Date:    07/26/2022   Time:    21:36      X-Ray Chest 1 View   Final Result      No convincing radiographic evidence of acute intrathoracic process on this single view..         Electronically signed by: Olman Saucedo MD   Date:    07/26/2022   Time:    21:23      US Lower Extremity Veins Left    (Results Pending)   CTA Lower Extremity Left    (Results Pending)

## 2022-07-27 NOTE — H&P
"[facility]  Critical Care Medicine  History & Physical    Patient Name: Wilfredo Thomas  MRN: [id number]  Admission Date: 7/26/2022  Hospital Length of Stay: 1 days  Code Status: Full Code  Attending Physician: Pantera Bundy*   Primary Care Provider: Aylin Wayne DO   Principal Problem: DKA (diabetic ketoacidosis)    Subjective:     HPI:  46 y/o M hx of HTN, IDDM2, GERD obesity, HLD presented to the ED with complaint of 1 day of worsening SOB. Wife present at bedside. Patient states that he noticed yesterday he was feeling some shortness of breath and couldn't seem to catch his breath. His wife went to check on him this morning and noticed that he was breathing "fast and heavy" and he sounded like he was slurring his words. Wife was concerned about a stroke, so he brought him to the ED for evaluation.    Patient also reporting significant pain and swelling along his left lateral proximal thigh. He states he has noticed worsening pain over the past 2 weeks. He denies any acute trauma to the area. He was seen by ortho last week and was told to take ibu-profen for a muscle strain. This did not help his pain, so he presented again on 7/22 and was given a prednisone injection into his thigh. This also did not help his pain, and now the pain and swelling have advanced to the point that he has difficulty with ambulation. Patient denies fever, chills, nausea, vomiting.     Of note, patient has hx of poorly controlled diabetes. He was recently started on insulin by his PCP, but he has not taken any insulin since being prescribed it.     In the ED, patient hypertensive and tachycardic to the 130s. Tachypneic with RR in the 40s. On CBC, WBC 26.9, On CMP patient hyperkalemic to 5.5, CorNa 136, Bicarb 5. Cr elevated to 1.9 from BL 0.83. UA, BHB pending at time of admission. CTA without evidence of pulmonary embolism. Critical Care Medicine consulted given severe acidosis.       Hospital/ICU " Course:  No notes on file     Past Medical History:   Diagnosis Date    Diabetes     Gout     Hyperlipidemia     Hypertension        Past Surgical History:   Procedure Laterality Date    WRIST SURGERY Left        Review of patient's allergies indicates:  No Known Allergies    Family History       Problem Relation (Age of Onset)    Diabetes Mother    No Known Problems Father          Tobacco Use    Smoking status: Former Smoker     Packs/day: 0.50     Types: Cigarettes     Quit date: 2020     Years since quittin.5    Smokeless tobacco: Never Used   Substance and Sexual Activity    Alcohol use: Yes     Alcohol/week: 12.0 standard drinks     Types: 12 Cans of beer per week    Drug use: Never    Sexual activity: Yes     Partners: Female      Review of Systems   Constitutional:  Positive for fatigue. Negative for appetite change, chills and fever.   HENT:  Negative for sore throat and trouble swallowing.         Dry mouth   Respiratory:  Positive for shortness of breath. Negative for cough.    Cardiovascular:  Negative for chest pain.   Gastrointestinal:  Negative for abdominal pain, nausea and vomiting.   Genitourinary:  Negative for dysuria.   Musculoskeletal:  Positive for myalgias.   Skin:  Negative for color change.   Neurological:  Negative for dizziness, weakness and headaches.   Psychiatric/Behavioral:  Negative for confusion.    Objective:     Vital Signs (Most Recent):  Temp: 97.4 °F (36.3 °C) (22)  Pulse: (!) 132 (22)  Resp: (!) 33 (22)  BP: 137/89 (22)  SpO2: 99 % (22)   Vital Signs (24h Range):  Temp:  [97.4 °F (36.3 °C)] 97.4 °F (36.3 °C)  Pulse:  [132-137] 132  Resp:  [22-33] 33  SpO2:  [98 %-99 %] 99 %  BP: (137-146)/(89-94) 137/89   Weight: 106.6 kg (235 lb)  Body mass index is 36.81 kg/m².    No intake or output data in the 24 hours ending 22    Physical Exam  Vitals and nursing note reviewed.   Constitutional:        General: He is in acute distress.      Appearance: He is ill-appearing and diaphoretic.      Comments: Ill-appearing adult male lying in bed in mild distress   HENT:      Head: Normocephalic and atraumatic.      Nose: Nose normal. No congestion.      Mouth/Throat:      Mouth: Mucous membranes are dry.      Pharynx: Oropharynx is clear.      Comments: Markedly dry mucus membranes  Eyes:      Extraocular Movements: Extraocular movements intact.      Conjunctiva/sclera: Conjunctivae normal.      Pupils: Pupils are equal, round, and reactive to light.   Cardiovascular:      Rate and Rhythm: Regular rhythm. Tachycardia present.      Pulses: Normal pulses.      Heart sounds: Normal heart sounds. No murmur heard.    No friction rub. No gallop.   Pulmonary:      Effort: Tachypnea present.      Breath sounds: Normal breath sounds.      Comments: Kussmaul breathing pattern  Musculoskeletal:         General: Swelling (large oval 10cm area of swelling and warmth on L lateral proximal thigh. no redness. No induration though area feels tight) present. Normal range of motion.      Cervical back: Normal range of motion and neck supple.   Skin:     General: Skin is warm.   Neurological:      General: No focal deficit present.      Mental Status: He is alert and oriented to person, place, and time.   Psychiatric:         Mood and Affect: Mood normal.         Behavior: Behavior normal.       Vents:     Lines/Drains/Airways       Peripheral Intravenous Line  Duration                  Peripheral IV - Single Lumen 07/26/22 20 G Right Antecubital <1 day                  Significant Labs:    CBC/Anemia Profile:  Recent Labs   Lab 07/26/22 2044 07/26/22 2108   WBC 26.87*  --    HGB 16.1  --    HCT 49.6 51   *  --    MCV 98  --    RDW 13.0  --         Chemistries:  Recent Labs   Lab 07/26/22 2044   *   K 5.5*      CO2 5*   BUN 25*   CREATININE 1.9*   CALCIUM 10.1   ALBUMIN 2.7*   PROT 8.7*   BILITOT 0.3   ALKPHOS 108    ALT 13   AST 9*       CMP:   Recent Labs   Lab 07/26/22 2044   *   K 5.5*      CO2 5*   *   BUN 25*   CREATININE 1.9*   CALCIUM 10.1   PROT 8.7*   ALBUMIN 2.7*   BILITOT 0.3   ALKPHOS 108   AST 9*   ALT 13   ANIONGAP 26*   EGFRNONAA 40.5*     Urine Studies: No results for input(s): COLORU, APPEARANCEUA, PHUR, SPECGRAV, PROTEINUA, GLUCUA, KETONESU, BILIRUBINUA, OCCULTUA, NITRITE, UROBILINOGEN, LEUKOCYTESUR, RBCUA, WBCUA, BACTERIA, SQUAMEPITHEL, HYALINECASTS in the last 48 hours.    Invalid input(s): WRIGHTSUR    Significant Imaging: I have reviewed all pertinent imaging results/findings within the past 24 hours.    CTA Chest Non-Coronary (PE Study)   Final Result      No large central or lobar pulmonary embolism.         Electronically signed by: Jori Hopkins   Date:    07/26/2022   Time:    21:36      X-Ray Chest 1 View   Final Result      No convincing radiographic evidence of acute intrathoracic process on this single view..         Electronically signed by: Olman Saucedo MD   Date:    07/26/2022   Time:    21:23      US Lower Extremity Veins Left    (Results Pending)   CTA Lower Extremity Left    (Results Pending)         Assessment/Plan:     Cardiac/Vascular  Hyperlipidemia associated with type 2 diabetes mellitus  On home atorvastatin.  - continue home statin    Renal/  Hyperkalemia  K 5.5 on admit.  Receiving insulin subq and gtt.  - BMP q4h, trend    ANGE (acute kidney injury)  Baseline Cr 0.7-0.9.  Likely prerenal in setting of DKA.  Lab Results   Component Value Date    BUN 25 (H) 07/26/2022    CREATININE 1.9 (H) 07/26/2022     - IVF resuscitation  - CMP qd, trend Cr  - U/a, Umicro ordered  - renally dose all medications  - avoid nephrotoxic agents, NSAIDs, IV contrast, ACE/ARB  - Maintain MAP > 65    Hematology  Elevated d-dimer  On RA with increased WOB.  D-dimer elevated to 1.46.  - CTA PE negative    Oncology  Leukocytosis  WBC 26.87 on admit, left thigh with large subcutaneous  swelling.   - CTA LLE pending    Endocrine  * DKA (diabetic ketoacidosis)  47M with history of DM2, gout, HTN, tobacco use who initially presented for dyspnea, increased thirst, decreased PO intake, found to be in DKA.  Recent history of steroids for leg pain.  On home dulaglutide, empagliflozin, metformin, insulin glargine 20u qhs (has not been taking).  Recent a1c 11.2%.  , bicarb 5, AG 26, BHB 5.9 on admit.    - lactic pending  - 10u detemir now  - start insulin gtt  - q1h POCT glu checks while on drip  - s/p 30cc/kg IVF resuscitation, now on NS IVF continuous  - when serum glucose <250 mg/dL, switch to D5-1/2NS if needed to close gap  - BMP q4h  - diabetic diet    Class 2 severe obesity due to excess calories with serious comorbidity and body mass index (BMI) of 36.0 to 36.9 in adult  General weight loss/lifestyle modification strategies discussed (elicit support from others; identify saboteurs; non-food rewards, etc).      Hypertension associated with type 2 diabetes mellitus  On home lisinopril 20.  - hold ACEi in setting of ANGE    Type 2 diabetes mellitus with hyperglycemia, with long-term current use of insulin  See DKA    Orthopedic  Redness and swelling of thigh  Received steroid injection, steroid taper for leg pain by orthopedics.  Left lateral/proximal thigh with painful localized swelling, hot to touch. Patient received vanc/zosyn in ED  - CTA LE, U/s LLE pending  - will continue Vanc/zosyn on admission        Critical Care Daily Checklist:    A: Awake: RASS Goal/Actual Goal:    Actual: Meza Agitation Sedation Scale (RASS): Drowsy   B: Spontaneous Breathing Trial Performed?     C: SAT & SBT Coordinated?  N/A                      D: Delirium: CAM-ICU     E: Early Mobility Performed? Yes   F: Feeding Goal:    Status:     Current Diet Order   Procedures    Diet diabetic [facility]; 2000 Calorie     Order Specific Question:   Indicate patient location for additional diet options:      Answer:   [facility]     Order Specific Question:   Total calories:     Answer:   2000 Calorie      AS: Analgesia/Sedation none   T: Thromboembolic Prophylaxis lovenox    H: HOB > 300 Yes   U: Stress Ulcer Prophylaxis (if needed) famotidine   G: Glucose Control Insulin gtt   B: Bowel Function     I: Indwelling Catheter (Lines & Hull) Necessity necessary   D: De-escalation of Antimicrobials/Pharmacotherapies BSA    Plan for the day/ETD Admit to MICU    Code Status:  Family/Goals of Care: Full Code         Critical secondary to Patient has a condition that poses threat to life and bodily function: severe acidosis     Critical care was time spent personally by me on the following activities: development of treatment plan with patient or surrogate and bedside caregivers, discussions with consultants, evaluation of patient's response to treatment, examination of patient, ordering and performing treatments and interventions, ordering and review of laboratory studies, ordering and review of radiographic studies, pulse oximetry, re-evaluation of patient's condition. This critical care time did not overlap with that of any other provider or involve time for any procedures.     Cecil Rios MD  Critical Care Medicine  [facility] - Cardiac Medical ICU

## 2022-07-27 NOTE — ASSESSMENT & PLAN NOTE
47M with history of DM2, gout, HTN, tobacco use who initially presented for dyspnea, increased thirst, decreased PO intake, found to be in DKA.  Recent history of steroids for leg pain.  On home dulaglutide, empagliflozin, metformin, insulin glargine 20u qhs (has not been taking).  Recent a1c 11.2%.  , bicarb 5, AG 26, BHB 5.9 on admit.    - lactic pending  - 10u detemir now  - start insulin gtt  - q1h POCT glu checks while on drip  - s/p 30cc/kg IVF resuscitation, now on NS IVF continuous  - when serum glucose <250 mg/dL, switch to D5-1/2NS if needed to close gap  - BMP q4h  - diabetic diet

## 2022-07-27 NOTE — ED PROVIDER NOTES
Encounter Date: 2022       History     Chief Complaint   Patient presents with    Shortness of Breath     Pt c/o increased SOB over the past few days. Pt is diabetic who hasn't taken his meds for the past couple days. Has been on steroids.      The patient presents to the ER for an emergent evaluation due to acute SOB. He states that he first began feeling SOB yesterday. He states that initially, the degree was mild and only during exertion. He states that the SOB progressively became worse. He states that today he is SOB even at rest. He states that his heart beat feels rapid. He denies any coughing, wheezing, or chest tightness. He states that he is not having any chest, abdominal, or back pain. He states that he has been having pain and swelling to his left lateral thigh for the past 3 weeks. He states that the left lateral thigh feels swollen and appears to be bruised. He has been limping due to the thigh pain. He went to the ER and to orthopedics for this and had negative x rays. He is scheduled for MRI next week. He has not had an US. He denies any calf pain. He has been on steroids for left upper leg pain for several days.         Review of patient's allergies indicates:  No Known Allergies  Past Medical History:   Diagnosis Date    Diabetes     Gout     Hyperlipidemia     Hypertension      Past Surgical History:   Procedure Laterality Date    WRIST SURGERY Left      Family History   Problem Relation Age of Onset    Diabetes Mother     No Known Problems Father      Social History     Tobacco Use    Smoking status: Former Smoker     Packs/day: 0.50     Types: Cigarettes     Quit date: 2020     Years since quittin.5    Smokeless tobacco: Never Used   Substance Use Topics    Alcohol use: Yes     Alcohol/week: 12.0 standard drinks     Types: 12 Cans of beer per week    Drug use: Never     Review of Systems   Constitutional: Positive for diaphoresis. Negative for chills and fever.   HENT:  Negative for congestion, rhinorrhea and sore throat.    Eyes: Negative for visual disturbance.   Respiratory: Positive for shortness of breath. Negative for cough and chest tightness.    Cardiovascular: Positive for leg swelling. Negative for chest pain and palpitations.   Gastrointestinal: Negative for abdominal pain, blood in stool, diarrhea, nausea and vomiting.   Genitourinary: Negative for decreased urine volume and difficulty urinating.   Musculoskeletal: Positive for gait problem. Negative for arthralgias, back pain and neck pain.   Skin: Positive for color change. Negative for rash.   Allergic/Immunologic: Negative for immunocompromised state.   Neurological: Negative for dizziness, seizures, syncope, facial asymmetry, weakness, light-headedness, numbness and headaches.   Psychiatric/Behavioral: Negative for confusion. The patient is nervous/anxious.        Physical Exam     Initial Vitals [07/26/22 1933]   BP Pulse Resp Temp SpO2   (!) 146/94 (!) 137 (!) 22 97.4 °F (36.3 °C) 98 %      MAP       --         Physical Exam    Nursing note and vitals reviewed.  Constitutional: He appears well-developed and well-nourished. He is diaphoretic. He appears distressed.   Alert and interactive. He is accompanied by his wife.    HENT:   Head: Normocephalic.   Mouth/Throat: Oropharynx is clear and moist.   Eyes: Conjunctivae are normal.   Neck: Neck supple.   Cardiovascular:   Tachycardia. Bilateral posterior tibial and dorsalis pedis pulses confirmed using doppler. Feet/toes pink w/o cyanosis.    Pulmonary/Chest: He is in respiratory distress. He has no wheezes.   Tachypnea. No coughing or wheezes. SaO2 98% on room air. Slight increased work of breathing. Conversational dyspnea present.    Abdominal: Abdomen is soft. He exhibits no distension. There is no abdominal tenderness.   No pulsatile mass.  There is no rebound and no guarding.   Musculoskeletal:      Cervical back: Neck supple.      Comments: No pretibial or  pedal edema. No calf tenderness. There is localized area of swelling, tenderness, erythema/ecchymosis, and increased warmth to lateral left thigh.      Neurological: He is alert and oriented to person, place, and time. He has normal strength. No cranial nerve deficit or sensory deficit.   No facial droop. No slurred speech. 5/5 strength. Oriented appropriately.    Skin:   Mottling present over bilateral anterior knees    Psychiatric:   Nervous/anxious appearing                  ED Course   Critical Care    Date/Time: 7/26/2022 9:00 PM  Performed by: Leighton Nowak PA-C  Authorized by: Lauro Flores MD   Direct patient critical care time: 10 minutes  Additional history critical care time: 5 minutes  Ordering / reviewing critical care time: 10 minutes  Documentation critical care time: 10 minutes  Consulting other physicians critical care time: 10 minutes  Total critical care time (exclusive of procedural time) : 45 minutes  Critical care time was exclusive of separately billable procedures and treating other patients and teaching time.  Critical care was necessary to treat or prevent imminent or life-threatening deterioration of the following conditions: metabolic crisis, respiratory failure, renal failure and sepsis.  Critical care was time spent personally by me on the following activities: development of treatment plan with patient or surrogate, discussions with consultants, interpretation of cardiac output measurements, evaluation of patient's response to treatment, examination of patient, obtaining history from patient or surrogate, ordering and performing treatments and interventions, ordering and review of laboratory studies, ordering and review of radiographic studies, pulse oximetry, re-evaluation of patient's condition and review of old charts.        Labs Reviewed   CBC W/ AUTO DIFFERENTIAL - Abnormal; Notable for the following components:       Result Value    WBC 26.87 (*)     MCH 31.8 (*)      Platelets 469 (*)     Lymph % 11.0 (*)     Platelet Estimate Increased (*)     All other components within normal limits   COMPREHENSIVE METABOLIC PANEL - Abnormal; Notable for the following components:    Sodium 132 (*)     Potassium 5.5 (*)     CO2 5 (*)     Glucose 363 (*)     BUN 25 (*)     Creatinine 1.9 (*)     Total Protein 8.7 (*)     Albumin 2.7 (*)     AST 9 (*)     Anion Gap 26 (*)     eGFR if  46.8 (*)     eGFR if non  40.5 (*)     All other components within normal limits   D DIMER, QUANTITATIVE - Abnormal; Notable for the following components:    D-Dimer 1.46 (*)     All other components within normal limits   TSH - Abnormal; Notable for the following components:    TSH 4.631 (*)     All other components within normal limits   URINALYSIS, REFLEX TO URINE CULTURE - Abnormal; Notable for the following components:    Appearance, UA Hazy (*)     Protein, UA 1+ (*)     Glucose, UA 3+ (*)     Ketones, UA 3+ (*)     Occult Blood UA 1+ (*)     All other components within normal limits    Narrative:     Specimen Source->Urine   PROCALCITONIN - Abnormal; Notable for the following components:    Procalcitonin 0.29 (*)     All other components within normal limits   PHOSPHORUS - Abnormal; Notable for the following components:    Phosphorus 5.9 (*)     All other components within normal limits    Narrative:     add on mag and phos per dr gabo jones/order#[id number]   @22:01 07/26/2022  ADD-ON PT PER GABO JONES MD  07/26/2022  22:11 [id number]   BETA - HYDROXYBUTYRATE, SERUM - Abnormal; Notable for the following components:    Beta-Hydroxybutyrate 5.9 (*)     All other components within normal limits   BASIC METABOLIC PANEL - Abnormal; Notable for the following components:    Sodium 128 (*)     Potassium 5.6 (*)     CO2 <5 (*)     Glucose 395 (*)     BUN 28 (*)     Creatinine 1.9 (*)     eGFR if  46.8 (*)     eGFR if non  40.5 (*)     All  other components within normal limits    Narrative:     _ acknowledged and accepted results on test(s) _ via secure chat.     rafael bonner rn by WPT 07/27/2022 00:18   HEMOGLOBIN A1C - Abnormal; Notable for the following components:    Hemoglobin A1C 10.1 (*)     Estimated Avg Glucose 243 (*)     All other components within normal limits    Narrative:     ADD ON A1C PER DR GABO JONES/ORDER# [id number] @ 00:21       add on mag and phos per dr gabo jones/order#[id number]   @22:01 07/26/2022  ADD-ON PT PER GABO JONES MD  07/26/2022  22:11 [id number]   URINALYSIS MICROSCOPIC - Abnormal; Notable for the following components:    RBC, UA 8 (*)     Bacteria Few (*)     Yeast, UA Few (*)     Hyaline Casts, UA 9 (*)     Granular Casts, UA 3 (*)     All other components within normal limits    Narrative:     Specimen Source->Urine   POCT GLUCOSE - Abnormal; Notable for the following components:    POCT Glucose 266 (*)     All other components within normal limits   ISTAT PROCEDURE - Abnormal; Notable for the following components:    POC Glucose 385 (*)     POC Sodium 131 (*)     POC Potassium 5.4 (*)     POC TCO2 (MEASURED) 10 (*)     All other components within normal limits   POCT GLUCOSE - Abnormal; Notable for the following components:    POCT Glucose 436 (*)     All other components within normal limits   ISTAT PROCEDURE - Abnormal; Notable for the following components:    POC PH 6.977 (*)     POC PCO2 27.9 (*)     POC PO2 38 (*)     POC HCO3 6.5 (*)     POC SATURATED O2 46 (*)     POC TCO2 7 (*)     All other components within normal limits   POCT GLUCOSE - Abnormal; Notable for the following components:    POCT Glucose 458 (*)     All other components within normal limits   B-TYPE NATRIURETIC PEPTIDE   TROPONIN I   SARS-COV-2 RNA AMPLIFICATION, QUAL   T4, FREE   PROCALCITONIN   PROTIME-INR   PHOSPHORUS   MAGNESIUM   MAGNESIUM    Narrative:     add on mag and phos per dr gabo rocha  reshma/order#[id number]   @22:01 07/26/2022  ADD-ON PT PER SHENG JONES MD  07/26/2022  22:11 [id number]   PROTIME-INR    Narrative:     add on mag and phos per dr sheng jones/order#[id number]   @22:01 07/26/2022  ADD-ON PT PER SHENG JONES MD  07/26/2022  22:11 [id number]   HEMOGLOBIN A1C   LACTIC ACID, PLASMA   SARS-COV-2 RDRP GENE   ISTAT CHEM8   POCT GLUCOSE MONITORING CONTINUOUS     Results for orders placed or performed during the hospital encounter of 07/26/22   Blood Culture #1 **CANNOT BE ORDERED STAT**    Specimen: Peripheral, Hand, Left; Blood   Result Value Ref Range    Blood Culture, Routine No Growth to date    Blood Culture #2 **CANNOT BE ORDERED STAT**    Specimen: Peripheral, Antecubital, Left; Blood   Result Value Ref Range    Blood Culture, Routine No Growth to date    CBC auto differential   Result Value Ref Range    WBC 26.87 (H) 3.90 - 12.70 K/uL    RBC 5.06 4.60 - 6.20 M/uL    Hemoglobin 16.1 14.0 - 18.0 g/dL    Hematocrit 49.6 40.0 - 54.0 %    MCV 98 82 - 98 fL    MCH 31.8 (H) 27.0 - 31.0 pg    MCHC 32.5 32.0 - 36.0 g/dL    RDW 13.0 11.5 - 14.5 %    Platelets 469 (H) 150 - 450 K/uL    MPV 9.2 9.2 - 12.9 fL    Immature Granulocytes CANCELED 0.0 - 0.5 %    Immature Grans (Abs) CANCELED 0.00 - 0.04 K/uL    nRBC 0 0 /100 WBC    Gran % 67.0 38.0 - 73.0 %    Lymph % 11.0 (L) 18.0 - 48.0 %    Mono % 7.0 4.0 - 15.0 %    Eosinophil % 1.0 0.0 - 8.0 %    Basophil % 0.0 0.0 - 1.9 %    Bands 10.0 %    Metamyelocytes 1.0 %    Myelocytes 1.0 %    Promyelocytes 2.0 %    Platelet Estimate Increased (A)     Differential Method Manual    Comprehensive metabolic panel   Result Value Ref Range    Sodium 132 (L) 136 - 145 mmol/L    Potassium 5.5 (H) 3.5 - 5.1 mmol/L    Chloride 101 95 - 110 mmol/L    CO2 5 (LL) 23 - 29 mmol/L    Glucose 363 (H) 70 - 110 mg/dL    BUN 25 (H) 6 - 20 mg/dL    Creatinine 1.9 (H) 0.5 - 1.4 mg/dL    Calcium 10.1 8.7 - 10.5 mg/dL    Total Protein 8.7 (H) 6.0 - 8.4  g/dL    Albumin 2.7 (L) 3.5 - 5.2 g/dL    Total Bilirubin 0.3 0.1 - 1.0 mg/dL    Alkaline Phosphatase 108 55 - 135 U/L    AST 9 (L) 10 - 40 U/L    ALT 13 10 - 44 U/L    Anion Gap 26 (H) 8 - 16 mmol/L    eGFR if African American 46.8 (A) >60 mL/min/1.73 m^2    eGFR if non African American 40.5 (A) >60 mL/min/1.73 m^2   Brain natriuretic peptide   Result Value Ref Range    BNP 20 0 - 99 pg/mL   Troponin I   Result Value Ref Range    Troponin I <0.006 0.000 - 0.026 ng/mL   D dimer, quantitative   Result Value Ref Range    D-Dimer 1.46 (H) <0.50 mg/L FEU   TSH   Result Value Ref Range    TSH 4.631 (H) 0.400 - 4.000 uIU/mL   COVID-19 Rapid Screening   Result Value Ref Range    SARS-CoV-2 RNA, Amplification, Qual Negative Negative   T4, Free   Result Value Ref Range    Free T4 0.75 0.71 - 1.51 ng/dL   Urinalysis, Reflex to Urine Culture Urine, Clean Catch    Specimen: Urine   Result Value Ref Range    Specimen UA Urine, Clean Catch     Color, UA Yellow Yellow, Straw, Ilana    Appearance, UA Hazy (A) Clear    pH, UA 5.0 5.0 - 8.0    Specific Gravity, UA 1.030 1.005 - 1.030    Protein, UA 1+ (A) Negative    Glucose, UA 3+ (A) Negative    Ketones, UA 3+ (A) Negative    Bilirubin (UA) Negative Negative    Occult Blood UA 1+ (A) Negative    Nitrite, UA Negative Negative    Leukocytes, UA Negative Negative   Procalcitonin   Result Value Ref Range    Procalcitonin 0.29 (H) <0.25 ng/mL   Magnesium   Result Value Ref Range    Magnesium 2.5 1.6 - 2.6 mg/dL   Phosphorus   Result Value Ref Range    Phosphorus 5.9 (H) 2.7 - 4.5 mg/dL   Beta - Hydroxybutyrate, Serum   Result Value Ref Range    Beta-Hydroxybutyrate 5.9 (H) 0.0 - 0.5 mmol/L   Protime-INR   Result Value Ref Range    Prothrombin Time 10.9 9.0 - 12.5 sec    INR 1.1 0.8 - 1.2   Basic metabolic panel   Result Value Ref Range    Sodium 128 (L) 136 - 145 mmol/L    Potassium 5.6 (H) 3.5 - 5.1 mmol/L    Chloride 99 95 - 110 mmol/L    CO2 <5 (LL) 23 - 29 mmol/L    Glucose 395 (H)  70 - 110 mg/dL    BUN 28 (H) 6 - 20 mg/dL    Creatinine 1.9 (H) 0.5 - 1.4 mg/dL    Calcium 9.5 8.7 - 10.5 mg/dL    Anion Gap Unable to calculate 8 - 16 mmol/L    eGFR if African American 46.8 (A) >60 mL/min/1.73 m^2    eGFR if non African American 40.5 (A) >60 mL/min/1.73 m^2   Lactic acid, plasma   Result Value Ref Range    Lactate (Lactic Acid) 1.3 0.5 - 2.2 mmol/L   Hemoglobin A1C   Result Value Ref Range    Hemoglobin A1C 10.1 (H) 4.0 - 5.6 %    Estimated Avg Glucose 243 (H) 68 - 131 mg/dL   Urinalysis Microscopic   Result Value Ref Range    RBC, UA 8 (H) 0 - 4 /hpf    WBC, UA 2 0 - 5 /hpf    Bacteria Few (A) None-Occ /hpf    Yeast, UA Few (A) None    Hyaline Casts, UA 9 (A) 0-1/lpf /lpf    Granular Casts, UA 3 (A) None /lpf    Microscopic Comment SEE COMMENT    Lactic acid, plasma #2   Result Value Ref Range    Lactate (Lactic Acid) 1.9 0.5 - 2.2 mmol/L   Basic metabolic panel   Result Value Ref Range    Sodium 131 (L) 136 - 145 mmol/L    Potassium 5.2 (H) 3.5 - 5.1 mmol/L    Chloride 102 95 - 110 mmol/L    CO2 6 (LL) 23 - 29 mmol/L    Glucose 337 (H) 70 - 110 mg/dL    BUN 27 (H) 6 - 20 mg/dL    Creatinine 1.7 (H) 0.5 - 1.4 mg/dL    Calcium 8.8 8.7 - 10.5 mg/dL    Anion Gap 23 (H) 8 - 16 mmol/L    eGFR if African American 53.6 (A) >60 mL/min/1.73 m^2    eGFR if non  46.3 (A) >60 mL/min/1.73 m^2   Basic metabolic panel   Result Value Ref Range    Sodium 131 (L) 136 - 145 mmol/L    Potassium 5.2 (H) 3.5 - 5.1 mmol/L    Chloride 102 95 - 110 mmol/L    CO2 6 (LL) 23 - 29 mmol/L    Glucose 337 (H) 70 - 110 mg/dL    BUN 27 (H) 6 - 20 mg/dL    Creatinine 1.7 (H) 0.5 - 1.4 mg/dL    Calcium 8.8 8.7 - 10.5 mg/dL    Anion Gap 23 (H) 8 - 16 mmol/L    eGFR if African American 53.6 (A) >60 mL/min/1.73 m^2    eGFR if non  46.3 (A) >60 mL/min/1.73 m^2   Phosphorus   Result Value Ref Range    Phosphorus 2.8 2.7 - 4.5 mg/dL   CBC auto differential   Result Value Ref Range    WBC 20.27 (H) 3.90 -  12.70 K/uL    RBC 4.47 (L) 4.60 - 6.20 M/uL    Hemoglobin 14.0 14.0 - 18.0 g/dL    Hematocrit 42.6 40.0 - 54.0 %    MCV 95 82 - 98 fL    MCH 31.3 (H) 27.0 - 31.0 pg    MCHC 32.9 32.0 - 36.0 g/dL    RDW 12.9 11.5 - 14.5 %    Platelets 318 150 - 450 K/uL    MPV 9.4 9.2 - 12.9 fL    Immature Granulocytes CANCELED 0.0 - 0.5 %    Immature Grans (Abs) CANCELED 0.00 - 0.04 K/uL    nRBC 0 0 /100 WBC    Gran % 82.0 (H) 38.0 - 73.0 %    Lymph % 6.0 (L) 18.0 - 48.0 %    Mono % 4.0 4.0 - 15.0 %    Eosinophil % 0.0 0.0 - 8.0 %    Basophil % 0.0 0.0 - 1.9 %    Bands 3.0 %    Metamyelocytes 3.0 %    Myelocytes 2.0 %    Platelet Estimate Appears normal     Differential Method Manual    Comprehensive metabolic panel   Result Value Ref Range    Sodium 133 (L) 136 - 145 mmol/L    Potassium 5.0 3.5 - 5.1 mmol/L    Chloride 106 95 - 110 mmol/L    CO2 7 (LL) 23 - 29 mmol/L    Glucose 235 (H) 70 - 110 mg/dL    BUN 25 (H) 6 - 20 mg/dL    Creatinine 1.5 (H) 0.5 - 1.4 mg/dL    Calcium 8.8 8.7 - 10.5 mg/dL    Total Protein 7.0 6.0 - 8.4 g/dL    Albumin 2.1 (L) 3.5 - 5.2 g/dL    Total Bilirubin 0.3 0.1 - 1.0 mg/dL    Alkaline Phosphatase 97 55 - 135 U/L    AST 14 10 - 40 U/L    ALT 12 10 - 44 U/L    Anion Gap 20 (H) 8 - 16 mmol/L    eGFR if African American >60.0 >60 mL/min/1.73 m^2    eGFR if non  53.9 (A) >60 mL/min/1.73 m^2   Magnesium   Result Value Ref Range    Magnesium 2.2 1.6 - 2.6 mg/dL   Basic metabolic panel   Result Value Ref Range    Sodium 129 (L) 136 - 145 mmol/L    Potassium 4.2 3.5 - 5.1 mmol/L    Chloride 105 95 - 110 mmol/L    CO2 8 (LL) 23 - 29 mmol/L    Glucose 293 (H) 70 - 110 mg/dL    BUN 25 (H) 6 - 20 mg/dL    Creatinine 1.3 0.5 - 1.4 mg/dL    Calcium 8.8 8.7 - 10.5 mg/dL    Anion Gap 16 8 - 16 mmol/L    eGFR if African American >60.0 >60 mL/min/1.73 m^2    eGFR if non African American >60.0 >60 mL/min/1.73 m^2   Magnesium   Result Value Ref Range    Magnesium 2.3 1.6 - 2.6 mg/dL   Phosphorus   Result  Value Ref Range    Phosphorus 3.1 2.7 - 4.5 mg/dL   Magnesium   Result Value Ref Range    Magnesium 2.3 1.6 - 2.6 mg/dL   Phosphorus   Result Value Ref Range    Phosphorus 2.4 (L) 2.7 - 4.5 mg/dL   POCT glucose   Result Value Ref Range    POCT Glucose 266 (H) 70 - 110 mg/dL   ISTAT PROCEDURE   Result Value Ref Range    POC Glucose 385 (H) 70 - 110 mg/dL    POC BUN 29 6 - 30 mg/dL    POC Creatinine 1.4 0.5 - 1.4 mg/dL    POC Sodium 131 (L) 136 - 145 mmol/L    POC Potassium 5.4 (H) 3.5 - 5.1 mmol/L    POC Chloride 105 95 - 110 mmol/L    POC TCO2 (MEASURED) 10 (L) 23 - 29 mmol/L    POC Ionized Calcium 1.33 1.06 - 1.42 mmol/L    POC Hematocrit 51 36 - 54 %PCV    Sample NEYMAR    POCT glucose   Result Value Ref Range    POCT Glucose 436 (H) 70 - 110 mg/dL   ISTAT PROCEDURE   Result Value Ref Range    POC PH 6.977 (L) 7.35 - 7.45    POC PCO2 27.9 (L) 35 - 45 mmHg    POC PO2 38 (L) 40 - 60 mmHg    POC HCO3 6.5 (L) 24 - 28 mmol/L    POC BE -25 -2 to 2 mmol/L    POC SATURATED O2 46 (L) 95 - 100 %    POC TCO2 7 (L) 24 - 29 mmol/L    Sample VENOUS     Site Other     Allens Test Pass     DelSys Room Air     Mode SPONT    POCT glucose   Result Value Ref Range    POCT Glucose 458 (HH) 70 - 110 mg/dL   POCT glucose   Result Value Ref Range    POCT Glucose 395 (H) 70 - 110 mg/dL   POCT glucose   Result Value Ref Range    POCT Glucose 336 (H) 70 - 110 mg/dL   POCT glucose   Result Value Ref Range    POCT Glucose 264 (H) 70 - 110 mg/dL   POCT glucose   Result Value Ref Range    POCT Glucose 238 (H) 70 - 110 mg/dL   POCT glucose   Result Value Ref Range    POCT Glucose 245 (H) 70 - 110 mg/dL   POCT glucose   Result Value Ref Range    POCT Glucose 249 (H) 70 - 110 mg/dL   POCT glucose   Result Value Ref Range    POCT Glucose 259 (H) 70 - 110 mg/dL   POCT glucose   Result Value Ref Range    POCT Glucose 253 (H) 70 - 110 mg/dL   POCT glucose   Result Value Ref Range    POCT Glucose 300 (H) 70 - 110 mg/dL   POCT glucose   Result Value Ref  Range    POCT Glucose 275 (H) 70 - 110 mg/dL   POCT glucose   Result Value Ref Range    POCT Glucose 249 (H) 70 - 110 mg/dL   POCT glucose   Result Value Ref Range    POCT Glucose 272 (H) 70 - 110 mg/dL   POCT glucose   Result Value Ref Range    POCT Glucose 226 (H) 70 - 110 mg/dL   POCT glucose   Result Value Ref Range    POCT Glucose 243 (H) 70 - 110 mg/dL       EKG Readings: (Independently Interpreted)   Initial Reading: No STEMI. Rhythm: Sinus Tachycardia. Heart Rate: 138. ST Segments: Normal ST Segments. T Waves: Normal.   ER attending physician reviewed and signed      ECG Results          EKG 12-lead (Final result)  Result time 07/27/22 07:53:05    Final result by Interface, Lab In [facility] (07/27/22 07:53:05)                 Narrative:    Test Reason : R00.0,    Vent. Rate : 138 BPM     Atrial Rate : 138 BPM     P-R Int : 126 ms          QRS Dur : 086 ms      QT Int : 286 ms       P-R-T Axes : 051 002 052 degrees     QTc Int : 433 ms    Sinus tachycardia  Otherwise normal ECG  No previous ECGs available  Confirmed by Crow HARRIS MD ([id number]) on 7/27/2022 7:53:00 AM    Referred By: [email or web address]   SELF           Confirmed By:Crow HARRIS MD                            Imaging Results          CT Thigh With Contrast Left (Final result)  Result time 07/27/22 01:13:09    Final result by Jori Hopkins DO (07/27/22 01:13:09)                 Impression:      Large heterogeneous fluid collection in the anterolateral left proximal thigh soft tissues, concerning for a soft tissue hematoma or Preston-Melissa lesion.  A neoplastic process is not entirely excluded.  Recommend close interval follow-up to assess for stability/resolution.      Electronically signed by: Jori Hopkins  Date:    07/27/2022  Time:    01:13             Narrative:    EXAMINATION:  CT THIGH WITH CONTRAST LEFT    CLINICAL HISTORY:  Soft tissue mass, thigh, deep;    TECHNIQUE:  Axial CT images of the left thigh with sagittal and coronal reformats after the  intravenous administration of 50 mL Omnipaque 350.    COMPARISON:  None.    FINDINGS:  Bone: Bone mineralization is normal.  There is no evidence of an acute fracture or dislocation.  Alignment is normal.    Soft tissues: There is a large heterogeneous fluid collection in the left anterolateral proximal thigh measuring approximately 16 x 7 x 6 cm.  The collection appears to be centered within the subcutaneous tissues or superficial fascia and abuts the gluteus musculature, the sartorius muscle, the rectus femoris, and the vastus lateralis.  There is soft tissue edema in the surrounding subcutaneous tissues.  Muscle bulk is normal.    Articulations: The left femoroacetabular joint is unremarkable.  The pubic symphysis is unremarkable.  The left knee is unremarkable.  No large joint effusion or significant cartilage loss.    Miscellaneous: Neurovascular structures are grossly intact.  There is moderate calcified atherosclerosis of the left femoral and popliteal arteries.                               CTA Chest Non-Coronary (PE Study) (Final result)  Result time 07/26/22 21:36:01    Final result by Jroi Hopkins DO (07/26/22 21:36:01)                 Impression:      No large central or lobar pulmonary embolism.      Electronically signed by: Jori Hopkins  Date:    07/26/2022  Time:    21:36             Narrative:    EXAMINATION:  CTA CHEST NON CORONARY    CLINICAL HISTORY:  Pulmonary embolism (PE) suspected, high prob;    TECHNIQUE:  Low dose axial images, sagittal and coronal reformations were obtained from the thoracic inlet to the lung bases following the IV administration of 100 mL of Omnipaque 350.  Contrast timing was optimized to evaluate the pulmonary arteries.  Maximum intensity projection images were provided for review.    COMPARISON:  Chest radiograph from earlier the same date.    FINDINGS:  Pulmonary vasculature: Satisfactory opacification of the pulmonary arterial system.  Motion artifact  significantly limits evaluation of the segmental and subsegmental pulmonary arteries.  There is no large central or lobar pulmonary embolism.    Aorta: Left-sided aortic arch.  No aneurysm and no significant atherosclerosis.    Base of Neck: No significant abnormality.    Thoracic soft tissues: Normal.    Heart: Normal size. No effusion.    Amena/Mediastinum: No pathologic russ enlargement.    Airways: The large airways are patent. No foci of endobronchial filling.    Lungs/Pleura: Clear lungs. No pleural effusion or thickening.    Esophagus: Normal.    Upper Abdomen: No abnormality of the partially imaged upper abdomen.    Bones: No acute fracture. No suspicious lytic or sclerotic lesions.                               X-Ray Chest 1 View (Final result)  Result time 07/26/22 21:23:42   Procedure changed from X-Ray Chest PA And Lateral     Final result by Olman Saucedo MD (07/26/22 21:23:42)                 Impression:      No convincing radiographic evidence of acute intrathoracic process on this single view..      Electronically signed by: Olman Saucedo MD  Date:    07/26/2022  Time:    21:23             Narrative:    EXAMINATION:  XR CHEST 1 VIEW    CLINICAL HISTORY:  shortness of breath;    TECHNIQUE:  Single frontal view of the chest was performed.    COMPARISON:  None    FINDINGS:  Cardiac monitoring leads overlie the chest.  Cardiac silhouette appears within normal limits.  No confluent airspace consolidation identified.  No significant volume of pleural fluid or pneumothorax appreciated.  The visualized osseous structures demonstrate mild degenerative changes.                                 Medications   piperacillin-tazobactam 4.5 g in sodium chloride 0.9% 100 mL IVPB (ready to mix system) (has no administration in time range)   sodium chloride 0.9% flush 10 mL (has no administration in time range)   dextrose 5 % and 0.45 % NaCl infusion (125 mL/hr Intravenous New Bag 7/27/22 1232)   dextrose 10%  "bolus 250 mL (has no administration in time range)   dextrose 10% bolus 125 mL (has no administration in time range)   ondansetron injection 4 mg (has no administration in time range)   insulin regular in 0.9 % NaCl 100 unit/100 mL (1 unit/mL) infusion (0.85 Units/hr Intravenous Rate/Dose Change 7/27/22 1343)   sodium chloride 0.9% flush 10 mL (has no administration in time range)   atorvastatin tablet 20 mg (20 mg Oral Given 7/27/22 0839)   iohexoL (OMNIPAQUE 350) injection 50 mL (has no administration in time range)   acetaminophen tablet 1,000 mg (has no administration in time range)   oxyCODONE immediate release tablet 5 mg (5 mg Oral Given 7/27/22 1244)   potassium, sodium phosphates 280-160-250 mg packet 2 packet (has no administration in time range)   iohexoL (OMNIPAQUE 350) injection 100 mL (100 mLs Intravenous Given 7/26/22 2120)   vancomycin 2 g in dextrose 5 % 500 mL IVPB (0 mg Intravenous Stopped 7/27/22 0059)   lactated ringers bolus 2,200 mL (0 mLs Intravenous Stopped 7/26/22 2339)   insulin detemir U-100 pen 10 Units (10 Units Subcutaneous Given 7/26/22 2248)   sodium chloride 0.9% bolus 1,000 mL (0 mLs Intravenous Stopped 7/27/22 0225)   morphine injection 2 mg (2 mg Intravenous Given 7/27/22 0521)     Medical Decision Making:   Initial Assessment:   48 yo male, hx of HTN & DM, reports atraumatic pain, swelling to left lateral thigh x 3 weeks, causing limp. Has been taking steroids for this for several days. Yesterday he gradually developed exertional dyspnea that progressively got worse. Presenting with tachycardia and tachypnea today in acute respiratory distress.   Differential Diagnosis:   Acute respiratory failure, DVT/PE, ACS, pneumonia, pneumothorax, aortic dissection, CHF, cardiomyopathy, dysrhythmia, DKA, sepsis, etc   Clinical Tests:   Lab Tests: Ordered and Reviewed  Radiological Study: Ordered and Reviewed  Medical Tests: Ordered and Reviewed  Sepsis Perfusion Assessment: "I attest a " "sepsis perfusion exam was performed within 6 hours of sepsis, severe sepsis, or septic shock presentation, following fluid resuscitation."  ED Management:  Vital signs reviewed   Records reviewed   Pt placed on cardiac monitor and supplemental oxygen   I discussed the case in detail with the ER attending physician early in ED process due to tachycardia and labored breathing   iStat completed to expedite CTA chest   I called and spoke with the CT tech to prioritize CT chest on work list   CT chest negative for PE   Sepsis labs/treatment initiated   IV fluids and Insulin ordered  VBG completed   Critical care consulted - will admit       Additional MDM:   EKG: I have independently interpreted EKG(s) - see notes.   X-Rays: I have independently interpreted X-Ray(s) - see notes.   PERC Rule:   Age is greater than or equal to 50 = 0.0  Heart Rate is greater than or equal to 100 = 1.0  SaO2 on room air < 95% = 0.0  Unilateral leg swelling = 1.0  Hemoptysis = 0.0  Recent surgery or trauma = 0.0  Prior PE or DVT =  0.0  Hormone use = 0.00  PERC Score = 2         Attending Attestation:     Physician Attestation Statement for NP/PA:   I have conducted a face to face encounter with this patient in addition to the NP/PA, due to Medical Complexity    Other NP/PA Attestation Additions:      Medical Decision Making: Left leg pain and swelling for few weeks, now with shortness of breath for a day and a half, appears significantly ill with tachycardia and tachypnea.  Given the leg swelling and shortness of breath, initial concern was PE, BP was occluded with CT.  Likely this is all metabolic due to DKA.  Good pulse in the leg, tenderness is focal over the swollen area in the upper lateral thigh and does not seem to be consistent with deep soft tissue infection.  ICU to admit.             ED Course as of 07/27/22 1553   Tue Jul 26, 2022 2148 POC Creatinine: 1.4 [DC]   2148 TSH(!): 4.631 [DC]   2148 CO2(!!): 5 [DC]   2148 WBC(!): " 26.87 [DC]   2148 Hemoglobin: 16.1 [DC]   2148 Platelets(!): 469 [DC]      ED Course User Index  [DC] Lauro Flores MD             Clinical Impression:   Final diagnoses:  [R00.0] Tachycardia  [M79.605] Left leg pain  [R06.03] Respiratory distress, acute  [N17.9] ANGE (acute kidney injury)  [E87.5] Hyperkalemia  [E87.1] Hyponatremia  [R79.89] Elevated d-dimer  [R79.89] Elevated TSH  [A41.9] Sepsis, due to unspecified organism, unspecified whether acute organ dysfunction present  [E13.10] Diabetic ketoacidosis without coma associated with other specified diabetes mellitus (Primary)          ED Disposition Condition    Admit               Leighton Nowak PA-C  07/27/22 [id number]       Lauro Flores MD  07/27/22 0624

## 2022-07-27 NOTE — ED NOTES
I-STAT Chem-8+ Results:   Value Reference Range   Sodium 131 136-145 mmol/L   Potassium  5.4 3.5-5.1 mmol/L   Chloride 105  mmol/L   Ionized Calcium 1.33 1.06-1.42 mmol/L   CO2 (measured) 10 23-29 mmol/L   Glucose 385  mg/dL   BUN 29 6-30 mg/dL   Creatinine 1.4 0.5-1.4 mg/dL   Hematocrit 51 36-54%

## 2022-07-27 NOTE — PLAN OF CARE
[facility] DAILY GOALS       A: Awake    RASS: Goal -    Actual - RASS (Meza Agitation-Sedation Scale): 0-->alert and calm   Restraint necessity:    B: Breathe   SBT: Not intubated   C: Coordinate A & B, analgesics/sedatives   Pain: managed    SAT: Not intubated  D: Delirium   CAM-ICU: Overall CAM-ICU: Negative  E: Early(intubated/ Progressive (non-intubated) Mobility   MOVE Screen: Pass   Activity: Activity Management: Rolling - L1  FAS: Feeding/Nutrition   Diet order: Diet/Nutrition Received: ice chips,    T: Thrombus   DVT prophylaxis:    H: HOB Elevation   Head of Bed (HOB) Positioning: HOB elevated  U: Ulcer Prophylaxis   GI: no  G: Glucose control   managed Glycemic Management: blood glucose monitored  S: Skin               Skin Protection: tubing/devices free from skin contact  B: Bowel Function   no issues   I: Indwelling Catheters   Hull necessity:     CVC necessity: No  D: De-escalation Antibiotics   Yes    Family/Goals of care/Code Status   Code Status: Full Code    24H Vital Sign Range  Temp:  [97 °F (36.1 °C)-98.9 °F (37.2 °C)]   Pulse:  []   Resp:  [21-37]   BP: (103-179)/(69-94)   SpO2:  [96 %-100 %]      Shift Events   Pt still on insulin drip, running at 0.9 units/hr, CBG has been unchanged for the past two hours. Pt scheduled for MRI of left thigh this evening. Pt's wife is at the bedside.    VS and assessment per flow sheet, patient progressing towards goals as tolerated, plan of care reviewed with pt Wilfredo Thomas and family, all concerns addressed, will continue to monitor.    Ranjeet Hernandez

## 2022-07-27 NOTE — ASSESSMENT & PLAN NOTE
Wilfredo Thomas is a 47 y.o. male with left thigh swelling and pain. Possible hematoma, but unusual as there has been no trauma in this area.     - after patient is no longer acutely ill from his DKA, would recommend inpatient MRI to rule out myositis  - no drainage or surgical intervention recommended at this time  - if the lesion expands over a short period of time, would worry about a bleed. In that instance, recommend CTA.

## 2022-07-27 NOTE — MED STUDENT SUBJECTIVE & OBJECTIVE
"[facility]  Critical Care Medicine  History & Physical     Patient Name: Wilfredo Thomas  MRN: [id number]  Admission Date: 7/26/2022  Hospital Length of Stay: 1 days  Code Status: Full Code  Attending Physician: Pantera Bundy*   Primary Care Provider: Aylin Wayne DO   Principal Problem: DKA (diabetic ketoacidosis)     Chief Complaint:   Chief Complaint   Patient presents with    Shortness of Breath     Pt c/o increased SOB over the past few days. Pt is diabetic who hasn't taken his meds for the past couple days. Has been on steroids.        Subjective:      HPI:   48 y/o M hx of HTN, IDDM2, GERD obesity, HLD presented to the ED with complaint of 2 days of worsening SOB found to be in DKA. Patient states that he noticed the previous day that he was feeling some shortness of breath and couldn't seem to catch his breath. His wife noticed that he was breathing "fast and heavy" and he sounded like he was slurring his words, and he brought him to the ED for evaluation. Patient has hx of poorly controlled diabetes. He was recently started on insulin by his PCP in April, but he has not taken any insulin since being prescribed it. Pt notes he does take his Jardiance, Trulicity, metformin. Pt denies nausea, vomiting, abdominal pain, dizziness, polydipsia.     Patient also reporting significant pain and swelling along his left lateral proximal thigh, worsening over the past 3-4 weeks. He denies any acute trauma to the area, though notes minor trauma to the lower L limb 4 weeks prior, but not to the thigh. He was seen by ortho last week and was told to take ibuprofen for a muscle strain. This did not help his pain, so he presented again on 7/22 and was given a prednisone injection into his thigh. This also did not help his pain, and now the pain and swelling have advanced to the point that he has difficulty with ambulation over the last few days. Patient denies fever, chills, nausea, vomiting. Pt " started on vanc in the ED.    In the ED, pH patient hypertensive and tachycardic to the 130s. Tachypneic with RR in the 40s. On CBC, WBC 26.9, On CMP patient hyperkalemic to 5.5, corrected Na 136, Bicarb 5. Cr elevated to 1.9 from baseline 0.83. UA positive for 3+ ketones, BHB 5.9. A1c 10.1. Blood gas: pH 6.977, blood glucose 385. CTA without evidence of pulmonary embolism. Pt was brought to MICU given severe acidosis.     Hospital Course:   7/26: Pt brought to MICU o/n for severe acidosis. DKA protocol began. General surgery consulted regarding L thigh mass, performed U/S at bedside, with concern for seroma and potential soft tissue sarcoma. IR consulted, determined not a candidate for I&D given potential hematoma and risk of infection. Surgical oncology consulted about biopsy.       Past Medical History:   Diagnosis Date    Diabetes     Gout     Hyperlipidemia     Hypertension        Past Surgical History:   Procedure Laterality Date    WRIST SURGERY Left        Review of patient's allergies indicates:  No Known Allergies    No current facility-administered medications on file prior to encounter.     Current Outpatient Medications on File Prior to Encounter   Medication Sig    atorvastatin (LIPITOR) 20 MG tablet Take 1 tablet (20 mg total) by mouth once daily.    blood sugar diagnostic Strp 1 strip by Misc.(Non-Drug; Combo Route) route once daily.    blood-glucose meter kit Use as instructed    dulaglutide (TRULICITY) 3 mg/0.5 mL pen injector Inject 3 mg into the skin every 7 days.    empagliflozin (JARDIANCE) 25 mg tablet Take 1 tablet (25 mg total) by mouth once daily.    insulin (LANTUS SOLOSTAR U-100 INSULIN) glargine 100 units/mL (3mL) SubQ pen Inject 20 Units into the skin once daily.    lancets Misc 1 lancet by Misc.(Non-Drug; Combo Route) route once daily.    lisinopriL (PRINIVIL,ZESTRIL) 20 MG tablet Take 1 tablet (20 mg total) by mouth once daily.    metFORMIN (GLUCOPHAGE-XR) 500 MG ER 24hr  tablet Take 1 tablet (500 mg total) by mouth 2 (two) times daily with meals.    traMADoL (ULTRAM) 50 mg tablet Take 50 mg by mouth every 8 (eight) hours as needed.     Family History     Problem Relation (Age of Onset)    Diabetes Mother    No Known Problems Father        Tobacco Use    Smoking status: Former Smoker     Packs/day: 0.50     Types: Cigarettes     Quit date: 2020     Years since quittin.5    Smokeless tobacco: Never Used   Substance and Sexual Activity    Alcohol use: Yes     Alcohol/week: 12.0 standard drinks     Types: 12 Cans of beer per week    Drug use: Never    Sexual activity: Yes     Partners: Female     Review of Systems   Constitutional: Positive for appetite change and fatigue.   HENT: Negative.    Eyes: Negative.    Respiratory: Positive for chest tightness and shortness of breath (Has felt like he's had to breathe very fast and deeply).    Cardiovascular: Negative.    Gastrointestinal: Negative.  Negative for abdominal pain, nausea and vomiting.   Endocrine: Negative.  Negative for polydipsia.   Genitourinary: Negative.    Musculoskeletal: Positive for myalgias (L lateral proximal thigh, progressive over last 2 weeks. Patient unable to walk the last few days).   Skin: Negative.    Allergic/Immunologic: Negative.    Neurological: Negative.  Negative for dizziness and light-headedness.   Hematological: Negative.    Psychiatric/Behavioral: Negative.      Objective:     Vital Signs (Most Recent):  Temp: 98.5 °F (36.9 °C) (22 0400)  Pulse: 106 (22 0445)  Resp: (!) 27 (22 0521)  BP: 107/73 (22 0445)  SpO2: 100 % (22 0445) Vital Signs (24h Range):  Temp:  [97.4 °F (36.3 °C)-98.7 °F (37.1 °C)] 98.5 °F (36.9 °C)  Pulse:  [105-137] 106  Resp:  [22-33] 27  SpO2:  [98 %-100 %] 100 %  BP: (107-179)/(73-94) 107/73     Weight: 106.6 kg (235 lb)  Body mass index is 36.81 kg/m².    Intake/Output Summary (Last 24 hours) at 2022 0809  Last data filed at  7/27/2022 0651  Gross per 24 hour   Intake 2894.36 ml   Output 1000 ml   Net 1894.36 ml      Physical Exam  Constitutional:       Appearance: He is ill-appearing.   HENT:      Head: Normocephalic.      Mouth/Throat:      Comments: Moderate amounts thick white sputum present  Eyes:      Conjunctiva/sclera: Conjunctivae normal.      Pupils: Pupils are equal, round, and reactive to light.   Cardiovascular:      Rate and Rhythm: Regular rhythm. Tachycardia present.      Pulses: Normal pulses.      Heart sounds: Normal heart sounds.   Pulmonary:      Breath sounds: Normal breath sounds.      Comments: Increased work of breathing  Abdominal:      General: Bowel sounds are normal. There is distension.      Tenderness: There is no abdominal tenderness. There is no guarding.   Musculoskeletal:         General: Tenderness present.      Cervical back: Normal range of motion.      Left lower leg: No edema.      Left foot: No swelling or tenderness. Normal pulse.        Legs:       Comments: Loculated mass on L lateral proximal thigh. Mass is firm, and patient in pain.    Skin:     Capillary Refill: Capillary refill takes less than 2 seconds.      Comments: Skin on L lower limb cold compared to R up to mid-lower limb. Patient notes no loss of sensation, no numbness, or tingling in L lower limb   Neurological:      General: No focal deficit present.      Mental Status: He is oriented to person, place, and time.      Sensory: No sensory deficit.      Deep Tendon Reflexes: Reflexes normal.   Psychiatric:         Mood and Affect: Mood normal.         Behavior: Behavior normal.         Thought Content: Thought content normal.         Significant Labs:   All pertinent labs within the past 24 hours have been reviewed.  A1C:   Recent Labs   Lab 03/22/22  1234 07/26/22  2044   HGBA1C 11.2* 10.1*     ABGs:   Recent Labs   Lab 07/26/22  2248   PH 6.977*   PCO2 27.9*   HCO3 6.5*   POCSATURATED 46*   BE -25   PO2 38*     BMP:   Recent Labs    Lab 07/27/22  1000   *   *   K 4.2      CO2 8*   BUN 25*   CREATININE 1.3   CALCIUM 8.8   MG 2.3     CBC:   Recent Labs   Lab 07/26/22 2044 07/26/22 2108 07/27/22 0412   WBC 26.87*  --  20.27*   HGB 16.1  --  14.0   HCT 49.6 51 42.6   *  --  318     CMP:   Recent Labs   Lab 07/26/22 2044 07/26/22 2306 07/27/22 0212 07/27/22 0412 07/27/22  1000   *   < > 131*  131* 133* 129*   K 5.5*   < > 5.2*  5.2* 5.0 4.2      < > 102  102 106 105   CO2 5*   < > 6*  6* 7* 8*   *   < > 337*  337* 235* 293*   BUN 25*   < > 27*  27* 25* 25*   CREATININE 1.9*   < > 1.7*  1.7* 1.5* 1.3   CALCIUM 10.1   < > 8.8  8.8 8.8 8.8   PROT 8.7*  --   --  7.0  --    ALBUMIN 2.7*  --   --  2.1*  --    BILITOT 0.3  --   --  0.3  --    ALKPHOS 108  --   --  97  --    AST 9*  --   --  14  --    ALT 13  --   --  12  --    ANIONGAP 26*   < > 23*  23* 20* 16   EGFRNONAA 40.5*   < > 46.3*  46.3* 53.9* >60.0    < > = values in this interval not displayed.     Cardiac Markers:   Recent Labs   Lab 07/26/22 2044   BNP 20     Lactic Acid:   Recent Labs   Lab 07/26/22  2335 07/27/22 0211   LACTATE 1.3 1.9       Significant Imaging: I have reviewed all pertinent imaging results/findings within the past 24 hours.  CT: I have reviewed all pertinent results/findings within the past 24 hours and my personal findings are:  ***  CXR: I have reviewed all pertinent results/findings within the past 24 hours and my personal findings are:  ***  U/S: I have reviewed all pertinent results/findings within the past 24 hours and my personal findings are:  ***    Medical Student Subjective & Objective

## 2022-07-27 NOTE — CONSULTS
48 y/o M presenting in DKA with a left thigh collection likely representing hematoma. Imaging reviewed and attenuation suggestive of hematoma.  There is no evidence of intracollection air or peripheral enhancement to suggest abscess at this time.  Hematomas typically are not amenable to aspiration or drainage and there is a chance of infection with this procedure.      Consider short term follow up imaging to assess for size increase if there is concern for ongoing bleeding.    Aron Posadas M.D.  PGY-V Radiology

## 2022-07-27 NOTE — HOSPITAL COURSE
Patient admitted to the MICU for management of severe DKA. Metabolic acidosis improving on insulin drip. He also has had left thigh pain for 1-2 months. There is a large mass on his left thigh that is tender to palpation, CT revealed hematoma vs soft tissue growth. 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 Ortho consulted and performed bedside aspiration of possible abscess which revealed >200K cells >80% segs. Patient taken to the OR for irrigation of his left thigh. A drain was placed and he was put on broad spectrum antibiotics. Patient transitioned off insulin drip. Patient stable for step down to hospital medicine.

## 2022-07-27 NOTE — HPI
Wilfredo Thomas is a 47 y.o. male presenting in DKA and with left thigh pain on 7/26/22. One month history of left thigh soreness that has inhibited his ability to ambulate 2/2 pain. Seen by ortho outpatient, given po prednisone for suspected muscle strain. Patient denies any sort of trauma to the area; no falls, no accidents, no vaccinations/injections in that area. His pain worsened and was seen in ED on Friday 7/22 and given IM dose of prednisone in right deltoid. Over the weekend had worsening pain and generalized malaise. Presented on 7/26 in DKA with bicarb of only 5. CT obtained of the left thigh, showing a fluid collection suspicious for hematoma/seroma vs reese lavalee lesion vs soft tissue neoplasm. General surgery consulted for eval.     Hx gltueal abscess requiring I&D in May 2022, reportedly +MRSA.

## 2022-07-27 NOTE — CONSULTS
[facility]  General Surgery  Consult Note    Patient Name: Wilfredo Thomas  MRN: [id number]  Code Status: Full Code  Admission Date: 7/26/2022  Hospital Length of Stay: 1 days  Attending Physician: Pantera Budny*  Primary Care Provider: Aylin Wayne DO    Patient information was obtained from patient, past medical records and ER records.     Inpatient consult to General Surgery  Consult performed by: Ira Navarro MD  Consult ordered by: Ilana Bermeo MD  Reason for consult: thigh swelling/pain        Subjective:     Principal Problem: DKA (diabetic ketoacidosis)    History of Present Illness: Wilfredo Thomas is a 47 y.o. male presenting in DKA and with left thigh pain on 7/26/22. One month history of left thigh soreness that has inhibited his ability to ambulate 2/2 pain. Seen by ortho outpatient, given po prednisone for suspected muscle strain. Patient denies any sort of trauma to the area; no falls, no accidents, no vaccinations/injections in that area. His pain worsened and was seen in ED on Friday 7/22 and given IM dose of prednisone in right deltoid. Over the weekend had worsening pain and generalized malaise. Presented on 7/26 in DKA with bicarb of only 5. CT obtained of the left thigh, showing a fluid collection suspicious for hematoma/seroma vs reese lavalee lesion vs soft tissue neoplasm. General surgery consulted for eval.     Hx gltueal abscess requiring I&D in May 2022, reportedly +MRSA.      No current facility-administered medications on file prior to encounter.     Current Outpatient Medications on File Prior to Encounter   Medication Sig    atorvastatin (LIPITOR) 20 MG tablet Take 1 tablet (20 mg total) by mouth once daily.    empagliflozin (JARDIANCE) 25 mg tablet Take 1 tablet (25 mg total) by mouth once daily.    lisinopriL (PRINIVIL,ZESTRIL) 20 MG tablet Take 1 tablet (20 mg total) by mouth once daily.    blood sugar diagnostic Strp 1 strip by  Misc.(Non-Drug; Combo Route) route once daily.    blood-glucose meter kit Use as instructed    dulaglutide (TRULICITY) 3 mg/0.5 mL pen injector Inject 3 mg into the skin every 7 days.    insulin (LANTUS SOLOSTAR U-100 INSULIN) glargine 100 units/mL (3mL) SubQ pen Inject 20 Units into the skin once daily.    lancets Misc 1 lancet by Misc.(Non-Drug; Combo Route) route once daily.    metFORMIN (GLUCOPHAGE-XR) 500 MG ER 24hr tablet Take 1 tablet (500 mg total) by mouth 2 (two) times daily with meals.    [DISCONTINUED] traMADoL (ULTRAM) 50 mg tablet Take 50 mg by mouth every 8 (eight) hours as needed.       Review of patient's allergies indicates:  No Known Allergies    Past Medical History:   Diagnosis Date    Diabetes     Gout     Hyperlipidemia     Hypertension      Past Surgical History:   Procedure Laterality Date    WRIST SURGERY Left      Family History       Problem Relation (Age of Onset)    Diabetes Mother    No Known Problems Father          Tobacco Use    Smoking status: Former Smoker     Packs/day: 0.50     Types: Cigarettes     Quit date: 2020     Years since quittin.5    Smokeless tobacco: Never Used   Substance and Sexual Activity    Alcohol use: Yes     Alcohol/week: 12.0 standard drinks     Types: 12 Cans of beer per week    Drug use: Never    Sexual activity: Yes     Partners: Female     Review of Systems   Constitutional:  Positive for fatigue. Negative for activity change, chills and fever.   Respiratory:  Negative for chest tightness, shortness of breath and wheezing.    Cardiovascular:  Negative for chest pain and palpitations.   Gastrointestinal:  Negative for abdominal distention, abdominal pain, constipation, diarrhea, nausea and vomiting.   Genitourinary:  Negative for difficulty urinating.   Musculoskeletal:         Left thigh pain, increased swelling in area of pain. No increased warmth. No drainage.   Skin:  Negative for color change and wound.   Neurological:   Negative for light-headedness.   Hematological:  Does not bruise/bleed easily.   Objective:     Vital Signs (Most Recent):  Temp: 98.3 °F (36.8 °C) (07/27/22 0701)  Pulse: 101 (07/27/22 1000)  Resp: (!) 25 (07/27/22 1000)  BP: 108/77 (07/27/22 0800)  SpO2: 99 % (07/27/22 1000)   Vital Signs (24h Range):  Temp:  [97.4 °F (36.3 °C)-98.7 °F (37.1 °C)] 98.3 °F (36.8 °C)  Pulse:  [101-137] 101  Resp:  [22-33] 25  SpO2:  [98 %-100 %] 99 %  BP: (107-179)/(73-94) 108/77     Weight: 106.6 kg (235 lb)  Body mass index is 36.81 kg/m².    Physical Exam  Vitals and nursing note reviewed.   Constitutional:       Appearance: He is well-developed.   Neck:      Vascular: No JVD.      Trachea: No tracheal deviation.   Cardiovascular:      Rate and Rhythm: Normal rate and regular rhythm.   Pulmonary:      Effort: Pulmonary effort is normal. No respiratory distress.   Abdominal:      General: There is no distension.      Palpations: Abdomen is soft.   Musculoskeletal:      Comments: Left thigh with area of induration in left lateral thigh, no superficial fluctuance or areas of drainage. No cellulitis. No increased warmth. Reticular vascular pattern in overlying skin. Very tender to palpation.   Skin:     General: Skin is warm and dry.   Neurological:      Mental Status: He is alert and oriented to person, place, and time.                 Significant Labs:  I have reviewed all pertinent lab results within the past 24 hours.  CBC:   Recent Labs   Lab 07/27/22  0412   WBC 20.27*   RBC 4.47*   HGB 14.0   HCT 42.6      MCV 95   MCH 31.3*   MCHC 32.9     CMP:   Recent Labs   Lab 07/27/22  0412 07/27/22  1000   * 293*   CALCIUM 8.8 8.8   ALBUMIN 2.1*  --    PROT 7.0  --    * 129*   K 5.0 4.2   CO2 7* 8*    105   BUN 25* 25*   CREATININE 1.5* 1.3   ALKPHOS 97  --    ALT 12  --    AST 14  --    BILITOT 0.3  --        Significant Diagnostics:  I have reviewed all pertinent imaging results/findings within the past 24  hours.    CT thigh reviewed - looks like a hematoma vs dense fluid collection    Bedside US performed - there was a pocket of fluid just superficial to the muscle fascia, but no significant area to target for needle aspiration.      Assessment/Plan:     Redness and swelling of thigh  Wilfredo Thomas is a 47 y.o. male with left thigh swelling and pain. Possible hematoma, but unusual as there has been no trauma in this area.     - after patient is no longer acutely ill from his DKA, would recommend inpatient MRI to rule out myositis  - no drainage or surgical intervention recommended at this time per IR, who agreed to see patient at our request  - if the lesion expands over a short period of time, would worry about a bleed. In that instance, recommend CTA.         VTE Risk Mitigation (From admission, onward)         Ordered     IP VTE HIGH RISK PATIENT  Once         07/26/22 2242     Place SAVANNAH hose  Until discontinued         07/26/22 2240     Place sequential compression device  Until discontinued         07/26/22 2240                Thank you for your consult. I will sign off. Please contact us if you have any additional questions.    Ira Navarro MD  General Surgery  [facility] - Cardiac Medical ICU

## 2022-07-28 PROBLEM — M79.605 LEFT LEG PAIN: Status: ACTIVE | Noted: 2022-07-28

## 2022-07-28 PROBLEM — S70.12XA HEMATOMA OF LEFT THIGH: Status: ACTIVE | Noted: 2022-07-28

## 2022-07-28 LAB
ALBUMIN SERPL BCP-MCNC: 1.9 G/DL (ref 3.5–5.2)
ALP SERPL-CCNC: 82 U/L (ref 55–135)
ALT SERPL W/O P-5'-P-CCNC: 10 U/L (ref 10–44)
ANION GAP SERPL CALC-SCNC: 15 MMOL/L (ref 8–16)
ANION GAP SERPL CALC-SCNC: 15 MMOL/L (ref 8–16)
ANION GAP SERPL CALC-SCNC: 16 MMOL/L (ref 8–16)
ANION GAP SERPL CALC-SCNC: 17 MMOL/L (ref 8–16)
ANION GAP SERPL CALC-SCNC: 17 MMOL/L (ref 8–16)
ANION GAP SERPL CALC-SCNC: 18 MMOL/L (ref 8–16)
APPEARANCE FLD: NORMAL
AST SERPL-CCNC: 10 U/L (ref 10–40)
BASOPHILS NFR BLD: 0 % (ref 0–1.9)
BILIRUB SERPL-MCNC: 0.4 MG/DL (ref 0.1–1)
BODY FLD TYPE: ABNORMAL
BODY FLD TYPE: NORMAL
BODY FLUID COMMENTS: NORMAL
BUN SERPL-MCNC: 10 MG/DL (ref 6–20)
BUN SERPL-MCNC: 12 MG/DL (ref 6–20)
BUN SERPL-MCNC: 13 MG/DL (ref 6–20)
BUN SERPL-MCNC: 13 MG/DL (ref 6–20)
BUN SERPL-MCNC: 14 MG/DL (ref 6–20)
BUN SERPL-MCNC: 15 MG/DL (ref 6–20)
CALCIUM SERPL-MCNC: 9 MG/DL (ref 8.7–10.5)
CALCIUM SERPL-MCNC: 9 MG/DL (ref 8.7–10.5)
CALCIUM SERPL-MCNC: 9.1 MG/DL (ref 8.7–10.5)
CALCIUM SERPL-MCNC: 9.2 MG/DL (ref 8.7–10.5)
CALCIUM SERPL-MCNC: 9.4 MG/DL (ref 8.7–10.5)
CALCIUM SERPL-MCNC: 9.4 MG/DL (ref 8.7–10.5)
CHLORIDE SERPL-SCNC: 103 MMOL/L (ref 95–110)
CHLORIDE SERPL-SCNC: 104 MMOL/L (ref 95–110)
CO2 SERPL-SCNC: 10 MMOL/L (ref 23–29)
CO2 SERPL-SCNC: 11 MMOL/L (ref 23–29)
CO2 SERPL-SCNC: 12 MMOL/L (ref 23–29)
CO2 SERPL-SCNC: 8 MMOL/L (ref 23–29)
COLOR FLD: NORMAL
CREAT SERPL-MCNC: 0.8 MG/DL (ref 0.5–1.4)
CREAT SERPL-MCNC: 0.9 MG/DL (ref 0.5–1.4)
CREAT SERPL-MCNC: 1 MG/DL (ref 0.5–1.4)
CREAT SERPL-MCNC: 1.2 MG/DL (ref 0.5–1.4)
CRYSTALS FLD MICRO: POSITIVE
DIFFERENTIAL METHOD: ABNORMAL
DOHLE BOD BLD QL SMEAR: ABNORMAL
EOSINOPHIL NFR BLD: 2 % (ref 0–8)
ERYTHROCYTE [DISTWIDTH] IN BLOOD BY AUTOMATED COUNT: 13.3 % (ref 11.5–14.5)
EST. GFR  (AFRICAN AMERICAN): >60 ML/MIN/1.73 M^2
EST. GFR  (NON AFRICAN AMERICAN): >60 ML/MIN/1.73 M^2
GLUCOSE SERPL-MCNC: 186 MG/DL (ref 70–110)
GLUCOSE SERPL-MCNC: 189 MG/DL (ref 70–110)
GLUCOSE SERPL-MCNC: 212 MG/DL (ref 70–110)
GLUCOSE SERPL-MCNC: 215 MG/DL (ref 70–110)
GLUCOSE SERPL-MCNC: 226 MG/DL (ref 70–110)
GLUCOSE SERPL-MCNC: 242 MG/DL (ref 70–110)
GRAM STN SPEC: NORMAL
GRAM STN SPEC: NORMAL
HCT VFR BLD AUTO: 40.1 % (ref 40–54)
HGB BLD-MCNC: 13.6 G/DL (ref 14–18)
IMM GRANULOCYTES # BLD AUTO: ABNORMAL K/UL (ref 0–0.04)
IMM GRANULOCYTES NFR BLD AUTO: ABNORMAL % (ref 0–0.5)
LYMPHOCYTES NFR BLD: 8 % (ref 18–48)
LYMPHOCYTES NFR FLD MANUAL: 2 %
MAGNESIUM SERPL-MCNC: 1.9 MG/DL (ref 1.6–2.6)
MAGNESIUM SERPL-MCNC: 2 MG/DL (ref 1.6–2.6)
MAGNESIUM SERPL-MCNC: 2 MG/DL (ref 1.6–2.6)
MAGNESIUM SERPL-MCNC: 2.1 MG/DL (ref 1.6–2.6)
MAGNESIUM SERPL-MCNC: 2.1 MG/DL (ref 1.6–2.6)
MCH RBC QN AUTO: 31.8 PG (ref 27–31)
MCHC RBC AUTO-ENTMCNC: 33.9 G/DL (ref 32–36)
MCV RBC AUTO: 94 FL (ref 82–98)
METAMYELOCYTES NFR BLD MANUAL: 1 %
MONOCYTES NFR BLD: 5 % (ref 4–15)
MONOS+MACROS NFR FLD MANUAL: 17 %
MYELOCYTES NFR BLD MANUAL: 3 %
NEUTROPHILS NFR BLD: 77 % (ref 38–73)
NEUTROPHILS NFR FLD MANUAL: 81 %
NEUTS BAND NFR BLD MANUAL: 4 %
NRBC BLD-RTO: 0 /100 WBC
PHOSPHATE SERPL-MCNC: 2.2 MG/DL (ref 2.7–4.5)
PHOSPHATE SERPL-MCNC: 2.2 MG/DL (ref 2.7–4.5)
PHOSPHATE SERPL-MCNC: 2.3 MG/DL (ref 2.7–4.5)
PHOSPHATE SERPL-MCNC: 2.5 MG/DL (ref 2.7–4.5)
PHOSPHATE SERPL-MCNC: 2.6 MG/DL (ref 2.7–4.5)
PLATELET # BLD AUTO: 305 K/UL (ref 150–450)
PLATELET BLD QL SMEAR: ABNORMAL
PMV BLD AUTO: 9.4 FL (ref 9.2–12.9)
POCT GLUCOSE: 172 MG/DL (ref 70–110)
POCT GLUCOSE: 172 MG/DL (ref 70–110)
POCT GLUCOSE: 183 MG/DL (ref 70–110)
POCT GLUCOSE: 187 MG/DL (ref 70–110)
POCT GLUCOSE: 187 MG/DL (ref 70–110)
POCT GLUCOSE: 194 MG/DL (ref 70–110)
POCT GLUCOSE: 195 MG/DL (ref 70–110)
POCT GLUCOSE: 196 MG/DL (ref 70–110)
POCT GLUCOSE: 197 MG/DL (ref 70–110)
POCT GLUCOSE: 199 MG/DL (ref 70–110)
POCT GLUCOSE: 199 MG/DL (ref 70–110)
POCT GLUCOSE: 202 MG/DL (ref 70–110)
POCT GLUCOSE: 212 MG/DL (ref 70–110)
POCT GLUCOSE: 213 MG/DL (ref 70–110)
POCT GLUCOSE: 217 MG/DL (ref 70–110)
POCT GLUCOSE: 218 MG/DL (ref 70–110)
POCT GLUCOSE: 229 MG/DL (ref 70–110)
POCT GLUCOSE: 255 MG/DL (ref 70–110)
POTASSIUM SERPL-SCNC: 3.9 MMOL/L (ref 3.5–5.1)
POTASSIUM SERPL-SCNC: 4 MMOL/L (ref 3.5–5.1)
POTASSIUM SERPL-SCNC: 4.1 MMOL/L (ref 3.5–5.1)
POTASSIUM SERPL-SCNC: 4.3 MMOL/L (ref 3.5–5.1)
PROT SERPL-MCNC: 6.7 G/DL (ref 6–8.4)
RBC # BLD AUTO: 4.28 M/UL (ref 4.6–6.2)
SODIUM SERPL-SCNC: 130 MMOL/L (ref 136–145)
SODIUM SERPL-SCNC: 131 MMOL/L (ref 136–145)
SODIUM SERPL-SCNC: 131 MMOL/L (ref 136–145)
SODIUM SERPL-SCNC: 132 MMOL/L (ref 136–145)
TOXIC GRANULES BLD QL SMEAR: PRESENT
URATE SERPL-MCNC: 6.9 MG/DL (ref 3.4–7)
WBC # BLD AUTO: 14.29 K/UL (ref 3.9–12.7)
WBC # FLD: NORMAL /CU MM

## 2022-07-28 PROCEDURE — 99291 PR CRITICAL CARE, E/M 30-74 MINUTES: ICD-10-PCS | Mod: ,,, | Performed by: INTERNAL MEDICINE

## 2022-07-28 PROCEDURE — 84550 ASSAY OF BLOOD/URIC ACID: CPT

## 2022-07-28 PROCEDURE — 94761 N-INVAS EAR/PLS OXIMETRY MLT: CPT

## 2022-07-28 PROCEDURE — 85007 BL SMEAR W/DIFF WBC COUNT: CPT

## 2022-07-28 PROCEDURE — 85027 COMPLETE CBC AUTOMATED: CPT

## 2022-07-28 PROCEDURE — 87070 CULTURE OTHR SPECIMN AEROBIC: CPT | Performed by: STUDENT IN AN ORGANIZED HEALTH CARE EDUCATION/TRAINING PROGRAM

## 2022-07-28 PROCEDURE — 80048 BASIC METABOLIC PNL TOTAL CA: CPT | Mod: 91

## 2022-07-28 PROCEDURE — 20000000 HC ICU ROOM

## 2022-07-28 PROCEDURE — 83735 ASSAY OF MAGNESIUM: CPT | Mod: 91

## 2022-07-28 PROCEDURE — 87075 CULTR BACTERIA EXCEPT BLOOD: CPT | Performed by: STUDENT IN AN ORGANIZED HEALTH CARE EDUCATION/TRAINING PROGRAM

## 2022-07-28 PROCEDURE — 99291 CRITICAL CARE FIRST HOUR: CPT | Mod: ,,, | Performed by: INTERNAL MEDICINE

## 2022-07-28 PROCEDURE — 25000003 PHARM REV CODE 250

## 2022-07-28 PROCEDURE — 87116 MYCOBACTERIA CULTURE: CPT | Performed by: STUDENT IN AN ORGANIZED HEALTH CARE EDUCATION/TRAINING PROGRAM

## 2022-07-28 PROCEDURE — 25000003 PHARM REV CODE 250: Performed by: INTERNAL MEDICINE

## 2022-07-28 PROCEDURE — 87015 SPECIMEN INFECT AGNT CONCNTJ: CPT | Performed by: STUDENT IN AN ORGANIZED HEALTH CARE EDUCATION/TRAINING PROGRAM

## 2022-07-28 PROCEDURE — 25500020 PHARM REV CODE 255: Performed by: INTERNAL MEDICINE

## 2022-07-28 PROCEDURE — 80053 COMPREHEN METABOLIC PANEL: CPT

## 2022-07-28 PROCEDURE — 87206 SMEAR FLUORESCENT/ACID STAI: CPT | Performed by: STUDENT IN AN ORGANIZED HEALTH CARE EDUCATION/TRAINING PROGRAM

## 2022-07-28 PROCEDURE — 99900035 HC TECH TIME PER 15 MIN (STAT)

## 2022-07-28 PROCEDURE — A9585 GADOBUTROL INJECTION: HCPCS | Performed by: INTERNAL MEDICINE

## 2022-07-28 PROCEDURE — 89060 EXAM SYNOVIAL FLUID CRYSTALS: CPT | Performed by: STUDENT IN AN ORGANIZED HEALTH CARE EDUCATION/TRAINING PROGRAM

## 2022-07-28 PROCEDURE — 63600175 PHARM REV CODE 636 W HCPCS: Performed by: STUDENT IN AN ORGANIZED HEALTH CARE EDUCATION/TRAINING PROGRAM

## 2022-07-28 PROCEDURE — 87077 CULTURE AEROBIC IDENTIFY: CPT | Performed by: STUDENT IN AN ORGANIZED HEALTH CARE EDUCATION/TRAINING PROGRAM

## 2022-07-28 PROCEDURE — 87186 SC STD MICRODIL/AGAR DIL: CPT | Performed by: STUDENT IN AN ORGANIZED HEALTH CARE EDUCATION/TRAINING PROGRAM

## 2022-07-28 PROCEDURE — 27000221 HC OXYGEN, UP TO 24 HOURS

## 2022-07-28 PROCEDURE — C9399 UNCLASSIFIED DRUGS OR BIOLOG: HCPCS

## 2022-07-28 PROCEDURE — 25000003 PHARM REV CODE 250: Performed by: STUDENT IN AN ORGANIZED HEALTH CARE EDUCATION/TRAINING PROGRAM

## 2022-07-28 PROCEDURE — 84100 ASSAY OF PHOSPHORUS: CPT | Mod: 91

## 2022-07-28 PROCEDURE — 89051 BODY FLUID CELL COUNT: CPT | Performed by: STUDENT IN AN ORGANIZED HEALTH CARE EDUCATION/TRAINING PROGRAM

## 2022-07-28 PROCEDURE — 87102 FUNGUS ISOLATION CULTURE: CPT | Performed by: STUDENT IN AN ORGANIZED HEALTH CARE EDUCATION/TRAINING PROGRAM

## 2022-07-28 PROCEDURE — 63600175 PHARM REV CODE 636 W HCPCS

## 2022-07-28 PROCEDURE — 87205 SMEAR GRAM STAIN: CPT | Performed by: STUDENT IN AN ORGANIZED HEALTH CARE EDUCATION/TRAINING PROGRAM

## 2022-07-28 RX ORDER — HEPARIN SODIUM 5000 [USP'U]/ML
5000 INJECTION, SOLUTION INTRAVENOUS; SUBCUTANEOUS EVERY 8 HOURS
Status: CANCELLED | OUTPATIENT
Start: 2022-07-28

## 2022-07-28 RX ORDER — AMOXICILLIN 250 MG
1 CAPSULE ORAL 2 TIMES DAILY
Status: DISCONTINUED | OUTPATIENT
Start: 2022-07-28 | End: 2022-07-29

## 2022-07-28 RX ORDER — POLYETHYLENE GLYCOL 3350 17 G/17G
17 POWDER, FOR SOLUTION ORAL DAILY
Status: DISCONTINUED | OUTPATIENT
Start: 2022-07-28 | End: 2022-07-29

## 2022-07-28 RX ORDER — GADOBUTROL 604.72 MG/ML
10 INJECTION INTRAVENOUS
Status: COMPLETED | OUTPATIENT
Start: 2022-07-28 | End: 2022-07-28

## 2022-07-28 RX ORDER — SODIUM,POTASSIUM PHOSPHATES 280-250MG
2 POWDER IN PACKET (EA) ORAL ONCE
Status: COMPLETED | OUTPATIENT
Start: 2022-07-28 | End: 2022-07-28

## 2022-07-28 RX ORDER — POTASSIUM CHLORIDE 20 MEQ/1
40 TABLET, EXTENDED RELEASE ORAL ONCE
Status: COMPLETED | OUTPATIENT
Start: 2022-07-28 | End: 2022-07-28

## 2022-07-28 RX ORDER — SODIUM,POTASSIUM PHOSPHATES 280-250MG
2 POWDER IN PACKET (EA) ORAL
Status: COMPLETED | OUTPATIENT
Start: 2022-07-28 | End: 2022-07-28

## 2022-07-28 RX ORDER — MORPHINE SULFATE 2 MG/ML
3 INJECTION, SOLUTION INTRAMUSCULAR; INTRAVENOUS EVERY 6 HOURS PRN
Status: DISCONTINUED | OUTPATIENT
Start: 2022-07-28 | End: 2022-08-24 | Stop reason: HOSPADM

## 2022-07-28 RX ORDER — MORPHINE SULFATE 2 MG/ML
3 INJECTION, SOLUTION INTRAMUSCULAR; INTRAVENOUS EVERY 6 HOURS PRN
Status: DISCONTINUED | OUTPATIENT
Start: 2022-07-28 | End: 2022-07-28

## 2022-07-28 RX ORDER — OXYCODONE HYDROCHLORIDE 5 MG/1
5 TABLET ORAL EVERY 4 HOURS PRN
Status: DISCONTINUED | OUTPATIENT
Start: 2022-07-28 | End: 2022-08-24 | Stop reason: HOSPADM

## 2022-07-28 RX ADMIN — ATORVASTATIN CALCIUM 20 MG: 20 TABLET, FILM COATED ORAL at 08:07

## 2022-07-28 RX ADMIN — MORPHINE SULFATE 3 MG: 2 INJECTION, SOLUTION INTRAMUSCULAR; INTRAVENOUS at 05:07

## 2022-07-28 RX ADMIN — DEXTROSE, SODIUM CHLORIDE, AND POTASSIUM CHLORIDE: 5; .45; .15 INJECTION INTRAVENOUS at 12:07

## 2022-07-28 RX ADMIN — POTASSIUM & SODIUM PHOSPHATES POWDER PACK 280-160-250 MG 2 PACKET: 280-160-250 PACK at 03:07

## 2022-07-28 RX ADMIN — OXYCODONE 5 MG: 5 TABLET ORAL at 01:07

## 2022-07-28 RX ADMIN — POTASSIUM CHLORIDE 40 MEQ: 1500 TABLET, EXTENDED RELEASE ORAL at 01:07

## 2022-07-28 RX ADMIN — INSULIN DETEMIR 10 UNITS: 100 INJECTION, SOLUTION SUBCUTANEOUS at 03:07

## 2022-07-28 RX ADMIN — OXYCODONE 5 MG: 5 TABLET ORAL at 07:07

## 2022-07-28 RX ADMIN — SENNOSIDES AND DOCUSATE SODIUM 1 TABLET: 50; 8.6 TABLET ORAL at 11:07

## 2022-07-28 RX ADMIN — POLYETHYLENE GLYCOL 3350 17 G: 17 POWDER, FOR SOLUTION ORAL at 11:07

## 2022-07-28 RX ADMIN — POTASSIUM & SODIUM PHOSPHATES POWDER PACK 280-160-250 MG 2 PACKET: 280-160-250 PACK at 08:07

## 2022-07-28 RX ADMIN — MORPHINE SULFATE 3 MG: 2 INJECTION, SOLUTION INTRAMUSCULAR; INTRAVENOUS at 06:07

## 2022-07-28 RX ADMIN — POTASSIUM & SODIUM PHOSPHATES POWDER PACK 280-160-250 MG 2 PACKET: 280-160-250 PACK at 11:07

## 2022-07-28 RX ADMIN — DEXTROSE, SODIUM CHLORIDE, AND POTASSIUM CHLORIDE: 5; .45; .15 INJECTION INTRAVENOUS at 09:07

## 2022-07-28 RX ADMIN — OXYCODONE 5 MG: 5 TABLET ORAL at 08:07

## 2022-07-28 RX ADMIN — GADOBUTROL 10 ML: 604.72 INJECTION INTRAVENOUS at 05:07

## 2022-07-28 RX ADMIN — DEXTROSE, SODIUM CHLORIDE, AND POTASSIUM CHLORIDE: 5; .45; .15 INJECTION INTRAVENOUS at 04:07

## 2022-07-28 RX ADMIN — OXYCODONE 5 MG: 5 TABLET ORAL at 11:07

## 2022-07-28 RX ADMIN — INSULIN DETEMIR 10 UNITS: 100 INJECTION, SOLUTION SUBCUTANEOUS at 08:07

## 2022-07-28 NOTE — ASSESSMENT & PLAN NOTE
- MRI with grade III tear of tension fascia jose luis with large hematoma   - Ortho consulted, appreciate recs

## 2022-07-28 NOTE — NURSING
Cecil Rios MD notified of pt's CO2 level. Per critical care team, increase insulin gtt back to 0.425 u/hr.

## 2022-07-28 NOTE — ASSESSMENT & PLAN NOTE
Baseline Cr 0.7-0.9.  Likely prerenal in setting of DKA.    - Back to baseline after IVF   - Monitor BMP's

## 2022-07-28 NOTE — PLAN OF CARE
MRI showed ruptured tensor fascia jose luis with hematoma. Ortho consulted and recommending compression and conservative management. No acute surgical intervention warranted. General surgery will sign off at this time, please call with questions.    Niesha Olvera MD  Pager: [phone]  General Surgery PGY-4  [facility]

## 2022-07-28 NOTE — ASSESSMENT & PLAN NOTE
47M with history of DM2, gout, HTN, tobacco use who initially presented for dyspnea, increased thirst, decreased PO intake, found to be in DKA.  Recent history of steroids for leg pain.  On home dulaglutide, empagliflozin, metformin, insulin glargine 20u qhs (has not been taking).  Recent a1c 11.2%.  , bicarb 5, AG 26, BHB 5.9 on admit.      - 10u detemir BID   - Insulin drip   - q1h POCT glu checks while on drip  - D5 1/2 NS with 20 meq K   - BMP q4h  - diabetic diet when off insulin drip

## 2022-07-28 NOTE — ASSESSMENT & PLAN NOTE
Wilfredo Thomas is a 47 y.o. male with past medical history of hypertension and diabetes, currently admitted to hospital for DKA. He reports 4-6 weeks of left lateral thigh pain and swelling, no known injury to the affected area. MRI scan showing tear in the TFL with adjacent hematoma.    - No acute orthopedic surgery intervention at this time  - Compressive Ace wrap applied to the affected area  - Abduction precautions with daily PT, abduction pillow while in bed   - Please call Orthopedic surgery with questions

## 2022-07-28 NOTE — PLAN OF CARE
[facility] DAILY GOALS       A: Awake    RASS: Goal -    Actual - RASS (Meza Agitation-Sedation Scale): 0-->alert and calm   Restraint necessity:    B: Breathe   SBT: Not intubated   C: Coordinate A & B, analgesics/sedatives   Pain: managed    SAT: Not intubated  D: Delirium   CAM-ICU: Overall CAM-ICU: Negative  E: Early(intubated/ Progressive (non-intubated) Mobility   MOVE Screen: Pass   Activity: Activity Management: Rolling - L1  FAS: Feeding/Nutrition   Diet order: Diet/Nutrition Received: NPO, ice chips,    T: Thrombus   DVT prophylaxis:    H: HOB Elevation   Head of Bed (HOB) Positioning: HOB elevated  U: Ulcer Prophylaxis   GI: no  G: Glucose control   managed Glycemic Management: blood glucose monitored  S: Skin               Skin Protection: tubing/devices free from skin contact  B: Bowel Function   no issues   I: Indwelling Catheters   Hull necessity:     CVC necessity: No  D: De-escalation Antibiotics   No    Family/Goals of care/Code Status   Code Status: Full Code    24H Vital Sign Range  Temp:  [97 °F (36.1 °C)-99 °F (37.2 °C)]   Pulse:  [101-111]   Resp:  [18-80]   BP: (107-142)/(72-91)   SpO2:  [97 %-100 %]      Shift Events   Insulin drip still infusing, currently at 0.4 units/hr. MD collected fluid sample from thigh abscess at the bedside, to be sent to lab to r/o infectious disease process. Irrigation and debridement of abscess to be performed tomorrow.      VS and assessment per flow sheet, patient progressing towards goals as tolerated, plan of care reviewed with pt Wilfredo Thomas and family, all concerns addressed, will continue to monitor.    Ranjeet Hernandez

## 2022-07-28 NOTE — CONSULTS
[facility]  Orthopedics  Consult Note    Patient Name: Wilfredo Thomas  MRN: [id number]  Admission Date: 7/26/2022  Hospital Length of Stay: 2 days  Attending Provider: Pantera Bundy*  Primary Care Provider: Aylin Wayne DO      Inpatient consult to Orthopedic Surgery  Consult performed by: Olman Au MD  Consult ordered by: Catalino Bennett MD        Subjective:     Principal Problem:DKA (diabetic ketoacidosis)    Chief Complaint:   Chief Complaint   Patient presents with    Shortness of Breath     Pt c/o increased SOB over the past few days. Pt is diabetic who hasn't taken his meds for the past couple days. Has been on steroids.         HPI: Wilfredo Thomas is a 47 y.o. male with a past medical history of diabetes, gout, and hypertension.  He is currently admitted to the hospital for management of DKA.  Orthopedic surgery is consulted for evaluation of left TFL tear seen on recent MRI scan collected for pain and swelling over the left lateral thigh. He recalls no recent trauma to the affected area, and can only recall that 6 weeks ago he kicked a cabinet with his left leg.  Past medical history is significant for left wrist surgery done by an outside orthopedic surgeon, and right gluteal at abscess debridement in May 2022. 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  He takes no home anticoagulation.  Denies history of MI, CVA, DVT, PE, or cancer.        Past Medical History:   Diagnosis Date    Diabetes     Gout     Hyperlipidemia     Hypertension        Past Surgical History:   Procedure Laterality Date    WRIST SURGERY Left        Review of patient's allergies indicates:  No Known Allergies    Current Facility-Administered Medications   Medication    acetaminophen tablet 1,000 mg    atorvastatin tablet 20 mg    dextrose 10% bolus 125 mL    dextrose 10% bolus 250 mL    dextrose 5 % and 0.45 % NaCl with KCl 20 mEq infusion    insulin detemir  "U-100 pen 10 Units    insulin regular in 0.9 % NaCl 100 unit/100 mL (1 unit/mL) infusion    iohexoL (OMNIPAQUE 350) injection 50 mL    morphine injection 3 mg    ondansetron injection 4 mg    oxyCODONE immediate release tablet 5 mg    polyethylene glycol packet 17 g    potassium, sodium phosphates 280-160-250 mg packet 2 packet    senna-docusate 8.6-50 mg per tablet 1 tablet    sodium chloride 0.9% flush 10 mL    sodium chloride 0.9% flush 10 mL     Family History       Problem Relation (Age of Onset)    Diabetes Mother    No Known Problems Father          Tobacco Use    Smoking status: Former Smoker     Packs/day: 0.50     Types: Cigarettes     Quit date: 2020     Years since quittin.5    Smokeless tobacco: Never Used   Substance and Sexual Activity    Alcohol use: Yes     Alcohol/week: 12.0 standard drinks     Types: 12 Cans of beer per week    Drug use: Never    Sexual activity: Yes     Partners: Female     ROS  Constitutional: negative for fevers/chills/night sweats  Eyes: no acute visual changes  ENT: negative acute  for hearing loss  Respiratory: negative for dyspnea  Cardiovascular: negative for chest pain  Gastrointestinal: negative for abdominal pain  Genitourinary: negative for dysuria  Neurological: negative for headaches  Behavioral/Psych: negative for hallucinations  Endocrine: negative for temperature intolerance  MSK: per HPI    Objective:     Vital Signs (Most Recent):  Temp: 98.1 °F (36.7 °C) (22 1100)  Pulse: 110 (22 1100)  Resp: (!) 25 (22 1140)  BP: (!) 130/91 (22 1100)  SpO2: 98 % (22 1100)   Vital Signs (24h Range):  Temp:  [97 °F (36.1 °C)-99 °F (37.2 °C)] 98.1 °F (36.7 °C)  Pulse:  [] 110  Resp:  [20-80] 25  SpO2:  [96 %-100 %] 98 %  BP: (103-142)/(69-91) 130/91     Weight: 94.5 kg (208 lb 5.4 oz)  Height: 5' 7" (170.2 cm)  Body mass index is 32.63 kg/m².      Intake/Output Summary (Last 24 hours) at 2022 1152  Last data filed at " 7/28/2022 1100  Gross per 24 hour   Intake 3831.2 ml   Output 4400 ml   Net -568.8 ml       Ortho/SPM Exam    General:  no acute distress, appears stated age   Neuro: alert and oriented x3  Psych: normal mood  Head: normocephalic, atraumatic.  Eyes: no scleral icterus  Mouth: moist mucous membranes  Cardiovascular: extremities warm and well perfused  Lungs: breathing comfortably, equal chest rise bilat  Skin: clean, dry, intact (any exceptions noted in below musculoskeletal exam)       Musculoskeletal  Right Upper Extremity     -Skin, Intact : no ecchymosis, lesions, lacerations or abrasions   -Non tender to palpation of entire arm   -Deltoid, biceps, triceps intact   -Compartments soft and compressible  -A/P ROM full in fingers, wrist, elbow, shoulder  -SILT M/R/U/Ax  -Motor intact Ain/PIN/U/Ax  -WWP     Left Upper Extremity     -Skin, Intact : no ecchymosis, lesions, lacerations or abrasions, dorsal wist surgical scar well healed   -Non tender to palpation of entire arm   -Deltoid, biceps, triceps intact   -Compartments soft and compressible  -A/P ROM full in fingers, wrist, elbow, shoulder  -SILT M/R/U/Ax  -Motor intact Ain/PIN/U/Ax  -WWP     Right Lower Extremity Exam     - Skin Intact : no ecchymosis, lesions, lacerations or abrasions   - Non-tender to palpation of the entire leg  - Quad/ Hip flexor intact   - Compartments soft and compressible  - A/P ROM full to the toes, ankle, knee, and hip  - TA/EHL/Gastroc/FHL intact  - SILT throughout  - WWP  - Site of gluteal abscess I&D well healed         Left Lower Extremity Exam    - Skin Intact : no ecchymosis, lesions, lacerations or abrasions   - Quad/ Hip flexor intact   - Compartments soft and compressible  - A/P ROM full to the toes, ankle, knee, and hip  - TA/EHL/Gastroc/FHL intact  - SILT throughout  - WWP  - TTP over the lateral thigh with swelling and redness over the proximal lateral thigh, moderate pain with AROM of the hip    All joints  (shoulder/elbow/wrist/hip/knee/ankle) were examined and had full ROM and were non-tender to palpation except as above         Significant Labs: All pertinent labs within the past 24 hours have been reviewed.    Significant Imaging: I have reviewed and interpreted all pertinent imaging results/findings. XR with no acute injury     Assessment/Plan:     Left leg pain  Wilfredo Thomas is a 47 y.o. male with past medical history of hypertension and diabetes, currently admitted to hospital for DKA. He reports 4-6 weeks of left lateral thigh pain and swelling, no known injury to the affected area. MRI scan showing tear in the TFL with adjacent hematoma.    - Compressive Ace wrap applied to the affected area  - After further discussion with staff, will plan bedside aspiration of fluid pocket with possible I&D tomorrow  - Aspirated at bedside +gram stain   - Plan for I&D   - NPOmn   - Hold Abx until after OR     Dispo: surgery tomorrow        Olman Au MD  Orthopedics  [facility]

## 2022-07-28 NOTE — NURSING
Attempted to draw 1600 labs but pt's PIVs were not drawing back blood, attempted venipuncture x1, pt refused further attempts.

## 2022-07-28 NOTE — HPI
Wilfredo Thomas is a 47 y.o. male with a past medical history of diabetes, gout, and hypertension.  He is currently admitted to the hospital for management of DKA.  Orthopedic surgery was consulted for evaluation of left sided lateral thigh abscess. Left thigh was I&D on 07/29. Patient had wound dressing and JOHANN drain placed. He recalls no recent trauma to the affected area, and can only recall that 6 weeks ago he kicked a cabinet with his left leg.  Past medical history is significant for left wrist surgery done by an outside orthopedic surgeon, and right gluteal at abscess debridement in May 2022. 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  He takes no home anticoagulation.  Denies history of MI, CVA, DVT, PE, or cancer.

## 2022-07-28 NOTE — SUBJECTIVE & OBJECTIVE
Past Medical History:   Diagnosis Date    Diabetes     Gout     Hyperlipidemia     Hypertension        Past Surgical History:   Procedure Laterality Date    WRIST SURGERY Left        Review of patient's allergies indicates:  No Known Allergies    Current Facility-Administered Medications   Medication    acetaminophen tablet 1,000 mg    atorvastatin tablet 20 mg    dextrose 10% bolus 125 mL    dextrose 10% bolus 250 mL    dextrose 5 % and 0.45 % NaCl with KCl 20 mEq infusion    insulin detemir U-100 pen 10 Units    insulin regular in 0.9 % NaCl 100 unit/100 mL (1 unit/mL) infusion    iohexoL (OMNIPAQUE 350) injection 50 mL    morphine injection 3 mg    ondansetron injection 4 mg    oxyCODONE immediate release tablet 5 mg    polyethylene glycol packet 17 g    potassium, sodium phosphates 280-160-250 mg packet 2 packet    senna-docusate 8.6-50 mg per tablet 1 tablet    sodium chloride 0.9% flush 10 mL    sodium chloride 0.9% flush 10 mL     Family History       Problem Relation (Age of Onset)    Diabetes Mother    No Known Problems Father          Tobacco Use    Smoking status: Former Smoker     Packs/day: 0.50     Types: Cigarettes     Quit date: 2020     Years since quittin.5    Smokeless tobacco: Never Used   Substance and Sexual Activity    Alcohol use: Yes     Alcohol/week: 12.0 standard drinks     Types: 12 Cans of beer per week    Drug use: Never    Sexual activity: Yes     Partners: Female     ROS  Constitutional: negative for fevers/chills/night sweats  Eyes: no acute visual changes  ENT: negative acute  for hearing loss  Respiratory: negative for dyspnea  Cardiovascular: negative for chest pain  Gastrointestinal: negative for abdominal pain  Genitourinary: negative for dysuria  Neurological: negative for headaches  Behavioral/Psych: negative for hallucinations  Endocrine: negative for temperature intolerance  MSK: per HPI    Objective:     Vital Signs (Most Recent):  Temp: 98.1 °F (36.7 °C) (22  "1100)  Pulse: 110 (07/28/22 1100)  Resp: (!) 25 (07/28/22 1140)  BP: (!) 130/91 (07/28/22 1100)  SpO2: 98 % (07/28/22 1100)   Vital Signs (24h Range):  Temp:  [97 °F (36.1 °C)-99 °F (37.2 °C)] 98.1 °F (36.7 °C)  Pulse:  [] 110  Resp:  [20-80] 25  SpO2:  [96 %-100 %] 98 %  BP: (103-142)/(69-91) 130/91     Weight: 94.5 kg (208 lb 5.4 oz)  Height: 5' 7" (170.2 cm)  Body mass index is 32.63 kg/m².      Intake/Output Summary (Last 24 hours) at 7/28/2022 1152  Last data filed at 7/28/2022 1100  Gross per 24 hour   Intake 3831.2 ml   Output 4400 ml   Net -568.8 ml       Ortho/SPM Exam    General:  no acute distress, appears stated age   Neuro: alert and oriented x3  Psych: normal mood  Head: normocephalic, atraumatic.  Eyes: no scleral icterus  Mouth: moist mucous membranes  Cardiovascular: extremities warm and well perfused  Lungs: breathing comfortably, equal chest rise bilat  Skin: clean, dry, intact (any exceptions noted in below musculoskeletal exam)       Musculoskeletal  Right Upper Extremity     -Skin, Intact : no ecchymosis, lesions, lacerations or abrasions   -Non tender to palpation of entire arm   -Deltoid, biceps, triceps intact   -Compartments soft and compressible  -A/P ROM full in fingers, wrist, elbow, shoulder  -SILT M/R/U/Ax  -Motor intact Ain/PIN/U/Ax  -WWP     Left Upper Extremity     -Skin, Intact : no ecchymosis, lesions, lacerations or abrasions, dorsal wist surgical scar well healed   -Non tender to palpation of entire arm   -Deltoid, biceps, triceps intact   -Compartments soft and compressible  -A/P ROM full in fingers, wrist, elbow, shoulder  -SILT M/R/U/Ax  -Motor intact Ain/PIN/U/Ax  -WWP     Right Lower Extremity Exam     - Skin Intact : no ecchymosis, lesions, lacerations or abrasions   - Non-tender to palpation of the entire leg  - Quad/ Hip flexor intact   - Compartments soft and compressible  - A/P ROM full to the toes, ankle, knee, and hip  - TA/EHL/Gastroc/FHL intact  - SILT " throughout  - WWP  - Site of gluteal abscess I&D well healed         Left Lower Extremity Exam    - Skin Intact : no ecchymosis, lesions, lacerations or abrasions   - Quad/ Hip flexor intact   - Compartments soft and compressible  - A/P ROM full to the toes, ankle, knee, and hip  - TA/EHL/Gastroc/FHL intact  - SILT throughout  - WWP  - TTP over the lateral thigh with swelling and redness over the proximal lateral thigh, moderate pain with AROM of the hip    All joints (shoulder/elbow/wrist/hip/knee/ankle) were examined and had full ROM and were non-tender to palpation except as above         Significant Labs: All pertinent labs within the past 24 hours have been reviewed.    Significant Imaging: I have reviewed and interpreted all pertinent imaging results/findings. XR with no acute injury

## 2022-07-28 NOTE — SUBJECTIVE & OBJECTIVE
Interval History/Significant Events: Metabolic acidosis slowly improving. Left thing pain present but not worsening. MRI revealed grade III tear of tensor fascia jose luis, ortho consulted.     Review of Systems   Constitutional:  Negative for appetite change, chills, fatigue and fever.   HENT:  Negative for sore throat and trouble swallowing.    Respiratory:  Negative for cough and shortness of breath.    Cardiovascular:  Negative for chest pain.   Gastrointestinal:  Negative for abdominal pain, nausea and vomiting.   Genitourinary:  Negative for dysuria.   Musculoskeletal:  Positive for myalgias (Left thigh pain).   Skin:  Negative for color change.   Neurological:  Negative for dizziness, weakness and headaches.   Psychiatric/Behavioral:  Negative for confusion.    Objective:     Vital Signs (Most Recent):  Temp: 98.1 °F (36.7 °C) (07/28/22 1100)  Pulse: 110 (07/28/22 1100)  Resp: (!) 25 (07/28/22 1140)  BP: (!) 130/91 (07/28/22 1100)  SpO2: 98 % (07/28/22 1100)   Vital Signs (24h Range):  Temp:  [97 °F (36.1 °C)-99 °F (37.2 °C)] 98.1 °F (36.7 °C)  Pulse:  [] 110  Resp:  [20-80] 25  SpO2:  [96 %-100 %] 98 %  BP: (103-142)/(69-91) 130/91   Weight: 94.5 kg (208 lb 5.4 oz)  Body mass index is 32.63 kg/m².      Intake/Output Summary (Last 24 hours) at 7/28/2022 1257  Last data filed at 7/28/2022 1100  Gross per 24 hour   Intake 3826.2 ml   Output 4400 ml   Net -573.8 ml       Physical Exam  Constitutional:       Appearance: Normal appearance.   HENT:      Head: Normocephalic and atraumatic.      Mouth/Throat:      Mouth: Mucous membranes are moist.      Pharynx: Oropharynx is clear.   Eyes:      Extraocular Movements: Extraocular movements intact.      Pupils: Pupils are equal, round, and reactive to light.   Cardiovascular:      Rate and Rhythm: Normal rate and regular rhythm.      Pulses: Normal pulses.      Heart sounds: Normal heart sounds.   Pulmonary:      Effort: Pulmonary effort is normal.      Breath  sounds: Normal breath sounds.   Abdominal:      General: Abdomen is flat.      Palpations: Abdomen is soft.      Tenderness: There is no abdominal tenderness.   Musculoskeletal:         General: Swelling (Large left thigh hematoma, tender to palpation) and tenderness present. Normal range of motion.      Cervical back: Normal range of motion and neck supple.      Right lower leg: No edema.      Left lower leg: No edema.   Skin:     General: Skin is warm and dry.   Neurological:      General: No focal deficit present.      Mental Status: He is alert and oriented to person, place, and time.   Psychiatric:         Mood and Affect: Mood normal.         Behavior: Behavior normal.       Vents:     Lines/Drains/Airways       Peripheral Intravenous Line  Duration                  Peripheral IV - Single Lumen 07/26/22 20 G Left Antecubital 2 days         Peripheral IV - Single Lumen 07/26/22 2319 22 G Left Hand 1 day         Peripheral IV - Single Lumen 07/26/22 2333 20 G Right Hand 1 day                  Significant Labs:    CBC/Anemia Profile:  Recent Labs   Lab 07/26/22 2044 07/26/22  2108 07/27/22  0412 07/28/22  0354   WBC 26.87*  --  20.27* 14.29*   HGB 16.1  --  14.0 13.6*   HCT 49.6 51 42.6 40.1   *  --  318 305   MCV 98  --  95 94   RDW 13.0  --  12.9 13.3        Chemistries:  Recent Labs   Lab 07/26/22 2044 07/26/22  2306 07/27/22  0412 07/27/22  1000 07/27/22  2350 07/28/22  0143 07/28/22  0354 07/28/22  0559 07/28/22  0850   *   < > 133*   < >  --    < > 130* 131* 132*   K 5.5*   < > 5.0   < >  --    < > 3.9 4.0 4.0      < > 106   < >  --    < > 104 104 104   CO2 5*   < > 7*   < >  --    < > 8* 10* 11*   BUN 25*   < > 25*   < >  --    < > 13 14 13   CREATININE 1.9*   < > 1.5*   < >  --    < > 1.0 0.9 0.8   CALCIUM 10.1   < > 8.8   < >  --    < > 9.0 9.4 9.4   ALBUMIN 2.7*  --  2.1*  --   --   --  1.9*  --   --    PROT 8.7*  --  7.0  --   --   --  6.7  --   --    BILITOT 0.3  --  0.3  --   --    --  0.4  --   --    ALKPHOS 108  --  97  --   --   --  82  --   --    ALT 13  --  12  --   --   --  10  --   --    AST 9*  --  14  --   --   --  10  --   --    MG 2.5  --  2.2   < > 2.0  --  2.1  --  2.1   PHOS 5.9*  --  2.8   < > 2.2*  --  2.2*  --  2.3*    < > = values in this interval not displayed.       All pertinent labs within the past 24 hours have been reviewed.    Significant Imaging:  I have reviewed all pertinent imaging results/findings within the past 24 hours.

## 2022-07-28 NOTE — PROGRESS NOTES
"[facility]  Critical Care Medicine  Progress Note    Patient Name: Wilfredo Thomas  MRN: [id number]  Admission Date: 7/26/2022  Hospital Length of Stay: 2 days  Code Status: Full Code  Attending Provider: Pantera Bundy*  Primary Care Provider: Aylin Wayne DO   Principal Problem: DKA (diabetic ketoacidosis)    Subjective:     HPI:  46 y/o M hx of HTN, IDDM2, GERD obesity, HLD presented to the ED with complaint of 1 day of worsening SOB. Wife present at bedside. Patient states that he noticed yesterday he was feeling some shortness of breath and couldn't seem to catch his breath. His wife went to check on him this morning and noticed that he was breathing "fast and heavy" and he sounded like he was slurring his words. Wife was concerned about a stroke, so he brought him to the ED for evaluation.    Patient also reporting significant pain and swelling along his left lateral proximal thigh. He states he has noticed worsening pain over the past 2 weeks. He denies any acute trauma to the area. He was seen by ortho last week and was told to take ibu-profen for a muscle strain. This did not help his pain, so he presented again on 7/22 and was given a prednisone injection into his thigh. This also did not help his pain, and now the pain and swelling have advanced to the point that he has difficulty with ambulation. Patient denies fever, chills, nausea, vomiting.     Of note, patient has hx of poorly controlled diabetes. He was recently started on insulin by his PCP, but he has not taken any insulin since being prescribed it.     In the ED, patient hypertensive and tachycardic to the 130s. Tachypneic with RR in the 40s. On CBC, WBC 26.9, On CMP patient hyperkalemic to 5.5, CorNa 136, Bicarb 5. Cr elevated to 1.9 from BL 0.83. UA, BHB pending at time of admission. CTA without evidence of pulmonary embolism. Critical Care Medicine consulted given severe acidosis.       Hospital/ICU " Course:  Patient admitted to the MICU for management of severe DKA. Metabolic acidosis improving on insulin drip. He also has had left thigh pain for 1-2 months. There is a large mass on his left thigh that is tender to palpation, CT revealed hematoma vs soft tissue growth. General surgery consulted and recommended IR consult. IR consulted, no indication for tap. 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 Ortho consulted. Patient kicked a cabinet drawer 1-month ago, and his hip pain started a couple days later. He may have torn his TFL when he kicked the door and with continued use and steroids the tear worsened.       Interval History/Significant Events: Metabolic acidosis slowly improving. Left thing pain present but not worsening. MRI revealed grade III tear of tensor fascia jose luis, ortho consulted.     Review of Systems   Constitutional:  Negative for appetite change, chills, fatigue and fever.   HENT:  Negative for sore throat and trouble swallowing.    Respiratory:  Negative for cough and shortness of breath.    Cardiovascular:  Negative for chest pain.   Gastrointestinal:  Negative for abdominal pain, nausea and vomiting.   Genitourinary:  Negative for dysuria.   Musculoskeletal:  Positive for myalgias (Left thigh pain).   Skin:  Negative for color change.   Neurological:  Negative for dizziness, weakness and headaches.   Psychiatric/Behavioral:  Negative for confusion.    Objective:     Vital Signs (Most Recent):  Temp: 98.1 °F (36.7 °C) (07/28/22 1100)  Pulse: 110 (07/28/22 1100)  Resp: (!) 25 (07/28/22 1140)  BP: (!) 130/91 (07/28/22 1100)  SpO2: 98 % (07/28/22 1100)   Vital Signs (24h Range):  Temp:  [97 °F (36.1 °C)-99 °F (37.2 °C)] 98.1 °F (36.7 °C)  Pulse:  [] 110  Resp:  [20-80] 25  SpO2:  [96 %-100 %] 98 %  BP: (103-142)/(69-91) 130/91   Weight: 94.5 kg (208 lb 5.4 oz)  Body mass index is 32.63 kg/m².      Intake/Output Summary  (Last 24 hours) at 7/28/2022 1257  Last data filed at 7/28/2022 1100  Gross per 24 hour   Intake 3826.2 ml   Output 4400 ml   Net -573.8 ml       Physical Exam  Constitutional:       Appearance: Normal appearance.   HENT:      Head: Normocephalic and atraumatic.      Mouth/Throat:      Mouth: Mucous membranes are moist.      Pharynx: Oropharynx is clear.   Eyes:      Extraocular Movements: Extraocular movements intact.      Pupils: Pupils are equal, round, and reactive to light.   Cardiovascular:      Rate and Rhythm: Normal rate and regular rhythm.      Pulses: Normal pulses.      Heart sounds: Normal heart sounds.   Pulmonary:      Effort: Pulmonary effort is normal.      Breath sounds: Normal breath sounds.   Abdominal:      General: Abdomen is flat.      Palpations: Abdomen is soft.      Tenderness: There is no abdominal tenderness.   Musculoskeletal:         General: Swelling (Large left thigh hematoma, tender to palpation) and tenderness present. Normal range of motion.      Cervical back: Normal range of motion and neck supple.      Right lower leg: No edema.      Left lower leg: No edema.   Skin:     General: Skin is warm and dry.   Neurological:      General: No focal deficit present.      Mental Status: He is alert and oriented to person, place, and time.   Psychiatric:         Mood and Affect: Mood normal.         Behavior: Behavior normal.       Vents:     Lines/Drains/Airways       Peripheral Intravenous Line  Duration                  Peripheral IV - Single Lumen 07/26/22 20 G Left Antecubital 2 days         Peripheral IV - Single Lumen 07/26/22 2319 22 G Left Hand 1 day         Peripheral IV - Single Lumen 07/26/22 2333 20 G Right Hand 1 day                  Significant Labs:    CBC/Anemia Profile:  Recent Labs   Lab 07/26/22  2044 07/26/22  2108 07/27/22  0412 07/28/22  0354   WBC 26.87*  --  20.27* 14.29*   HGB 16.1  --  14.0 13.6*   HCT 49.6 51 42.6 40.1   *  --  318 305   MCV 98  --  95 94    RDW 13.0  --  12.9 13.3        Chemistries:  Recent Labs   Lab 07/26/22  2044 07/26/22  2306 07/27/22  0412 07/27/22  1000 07/27/22  2350 07/28/22  0143 07/28/22  0354 07/28/22  0559 07/28/22  0850   *   < > 133*   < >  --    < > 130* 131* 132*   K 5.5*   < > 5.0   < >  --    < > 3.9 4.0 4.0      < > 106   < >  --    < > 104 104 104   CO2 5*   < > 7*   < >  --    < > 8* 10* 11*   BUN 25*   < > 25*   < >  --    < > 13 14 13   CREATININE 1.9*   < > 1.5*   < >  --    < > 1.0 0.9 0.8   CALCIUM 10.1   < > 8.8   < >  --    < > 9.0 9.4 9.4   ALBUMIN 2.7*  --  2.1*  --   --   --  1.9*  --   --    PROT 8.7*  --  7.0  --   --   --  6.7  --   --    BILITOT 0.3  --  0.3  --   --   --  0.4  --   --    ALKPHOS 108  --  97  --   --   --  82  --   --    ALT 13  --  12  --   --   --  10  --   --    AST 9*  --  14  --   --   --  10  --   --    MG 2.5  --  2.2   < > 2.0  --  2.1  --  2.1   PHOS 5.9*  --  2.8   < > 2.2*  --  2.2*  --  2.3*    < > = values in this interval not displayed.       All pertinent labs within the past 24 hours have been reviewed.    Significant Imaging:  I have reviewed all pertinent imaging results/findings within the past 24 hours.      ABG  Recent Labs   Lab 07/26/22 2248   PH 6.977*   PO2 38*   PCO2 27.9*   HCO3 6.5*   BE -25     Assessment/Plan:     Cardiac/Vascular  Hyperlipidemia associated with type 2 diabetes mellitus  On home atorvastatin.  - continue home statin    Renal/  Hyperkalemia  Resolved     ANGE (acute kidney injury)  Baseline Cr 0.7-0.9.  Likely prerenal in setting of DKA.    - Back to baseline after IVF   - Monitor BMP's     Hematology  Elevated d-dimer  On RA with increased WOB.  D-dimer elevated to 1.46.  - CTA PE negative    Oncology  Leukocytosis  WBC 26.87     - Likely due to recent steroid use, improving without antibiotics     Endocrine  * DKA (diabetic ketoacidosis)  47M with history of DM2, gout, HTN, tobacco use who initially presented for dyspnea, increased  thirst, decreased PO intake, found to be in DKA.  Recent history of steroids for leg pain.  On home dulaglutide, empagliflozin, metformin, insulin glargine 20u qhs (has not been taking).  Recent a1c 11.2%.  , bicarb 5, AG 26, BHB 5.9 on admit.      - 10u detemir BID   - Insulin drip   - q1h POCT glu checks while on drip  - D5 1/2 NS with 20 meq K   - BMP q4h  - diabetic diet when off insulin drip     Class 2 severe obesity due to excess calories with serious comorbidity and body mass index (BMI) of 36.0 to 36.9 in adult  General weight loss/lifestyle modification strategies discussed (elicit support from others; identify saboteurs; non-food rewards, etc).      Hypertension associated with type 2 diabetes mellitus  On home lisinopril 20.  - hold ACEi in setting of ANGE    Type 2 diabetes mellitus with hyperglycemia, with long-term current use of insulin  See DKA    Orthopedic  Hematoma of left thigh  - MRI with grade III tear of tension fascia jose luis with large hematoma   - Ortho consulted, appreciate recs       Redness and swelling of thigh  Received steroid injection, steroid taper for leg pain by orthopedics.  Left lateral/proximal thigh with painful localized swelling, hot to touch. Patient received vanc/zosyn in ED  - CTA LE, U/s LLE pending  - will continue Vanc/zosyn on admission       Critical Care Daily Checklist:    A: Awake: RASS Goal/Actual Goal:    Actual: Meza Agitation Sedation Scale (RASS): Alert and calm   B: Spontaneous Breathing Trial Performed?     C: SAT & SBT Coordinated?  NA                      D: Delirium: CAM-ICU Overall CAM-ICU: Negative   E: Early Mobility Performed? No   F: Feeding Goal:    Status:     Current Diet Order   Procedures    Diet NPO      AS: Analgesia/Sedation Y   T: Thromboembolic Prophylaxis No, large thigh hematoma    H: HOB > 300 Y   U: Stress Ulcer Prophylaxis (if needed) NA   G: Glucose Control Y   B: Bowel Function Stool Occurrence: 1   I: Indwelling Catheter  (Lines & Hull) Necessity Y   D: De-escalation of Antimicrobials/Pharmacotherapies No abx     Plan for the day/ETD Monitor resolution of DKA     Code Status:  Family/Goals of Care: Full Code       Critical secondary to Patient has a condition that poses threat to life and bodily function: Severe DKA       Critical care was time spent personally by me on the following activities: development of treatment plan with patient or surrogate and bedside caregivers, discussions with consultants, evaluation of patient's response to treatment, examination of patient, ordering and performing treatments and interventions, ordering and review of laboratory studies, ordering and review of radiographic studies, pulse oximetry, re-evaluation of patient's condition. This critical care time did not overlap with that of any other provider or involve time for any procedures.     Catalino Bennett MD  Critical Care Medicine  [facility] - Cardiac Medical ICU

## 2022-07-29 ENCOUNTER — ANESTHESIA (OUTPATIENT)
Dept: SURGERY | Facility: HOSPITAL | Age: 47
DRG: 981 | End: 2022-07-29
Payer: COMMERCIAL

## 2022-07-29 ENCOUNTER — ANESTHESIA EVENT (OUTPATIENT)
Dept: SURGERY | Facility: HOSPITAL | Age: 47
DRG: 981 | End: 2022-07-29
Payer: COMMERCIAL

## 2022-07-29 LAB
ABO + RH BLD: NORMAL
ALBUMIN SERPL BCP-MCNC: 1.7 G/DL (ref 3.5–5.2)
ALBUMIN SERPL BCP-MCNC: 1.7 G/DL (ref 3.5–5.2)
ALP SERPL-CCNC: 75 U/L (ref 55–135)
ALP SERPL-CCNC: 87 U/L (ref 55–135)
ALT SERPL W/O P-5'-P-CCNC: 11 U/L (ref 10–44)
ALT SERPL W/O P-5'-P-CCNC: 13 U/L (ref 10–44)
ANION GAP SERPL CALC-SCNC: 14 MMOL/L (ref 8–16)
ANION GAP SERPL CALC-SCNC: 14 MMOL/L (ref 8–16)
ANION GAP SERPL CALC-SCNC: 15 MMOL/L (ref 8–16)
ANION GAP SERPL CALC-SCNC: 15 MMOL/L (ref 8–16)
ANION GAP SERPL CALC-SCNC: 16 MMOL/L (ref 8–16)
ANION GAP SERPL CALC-SCNC: 18 MMOL/L (ref 8–16)
AST SERPL-CCNC: 11 U/L (ref 10–40)
AST SERPL-CCNC: 22 U/L (ref 10–40)
BASOPHILS # BLD AUTO: 0.02 K/UL (ref 0–0.2)
BASOPHILS NFR BLD: 0.2 % (ref 0–1.9)
BILIRUB SERPL-MCNC: 0.4 MG/DL (ref 0.1–1)
BILIRUB SERPL-MCNC: 0.5 MG/DL (ref 0.1–1)
BLD GP AB SCN CELLS X3 SERPL QL: NORMAL
BUN SERPL-MCNC: 10 MG/DL (ref 6–20)
BUN SERPL-MCNC: 8 MG/DL (ref 6–20)
BUN SERPL-MCNC: 8 MG/DL (ref 6–20)
BUN SERPL-MCNC: 9 MG/DL (ref 6–20)
CALCIUM SERPL-MCNC: 7.7 MG/DL (ref 8.7–10.5)
CALCIUM SERPL-MCNC: 8.4 MG/DL (ref 8.7–10.5)
CALCIUM SERPL-MCNC: 8.6 MG/DL (ref 8.7–10.5)
CALCIUM SERPL-MCNC: 9 MG/DL (ref 8.7–10.5)
CALCIUM SERPL-MCNC: 9.1 MG/DL (ref 8.7–10.5)
CALCIUM SERPL-MCNC: 9.1 MG/DL (ref 8.7–10.5)
CHLORIDE SERPL-SCNC: 101 MMOL/L (ref 95–110)
CHLORIDE SERPL-SCNC: 103 MMOL/L (ref 95–110)
CHLORIDE SERPL-SCNC: 103 MMOL/L (ref 95–110)
CHLORIDE SERPL-SCNC: 104 MMOL/L (ref 95–110)
CHLORIDE SERPL-SCNC: 104 MMOL/L (ref 95–110)
CHLORIDE SERPL-SCNC: 106 MMOL/L (ref 95–110)
CO2 SERPL-SCNC: 11 MMOL/L (ref 23–29)
CO2 SERPL-SCNC: 11 MMOL/L (ref 23–29)
CO2 SERPL-SCNC: 12 MMOL/L (ref 23–29)
CO2 SERPL-SCNC: 12 MMOL/L (ref 23–29)
CO2 SERPL-SCNC: 13 MMOL/L (ref 23–29)
CO2 SERPL-SCNC: 14 MMOL/L (ref 23–29)
CREAT SERPL-MCNC: 0.6 MG/DL (ref 0.5–1.4)
CREAT SERPL-MCNC: 0.7 MG/DL (ref 0.5–1.4)
CREAT SERPL-MCNC: 0.8 MG/DL (ref 0.5–1.4)
CRP SERPL-MCNC: 286.9 MG/L (ref 0–8.2)
DIFFERENTIAL METHOD: ABNORMAL
EOSINOPHIL # BLD AUTO: 0.4 K/UL (ref 0–0.5)
EOSINOPHIL NFR BLD: 3.1 % (ref 0–8)
ERYTHROCYTE [DISTWIDTH] IN BLOOD BY AUTOMATED COUNT: 12.9 % (ref 11.5–14.5)
ERYTHROCYTE [SEDIMENTATION RATE] IN BLOOD BY PHOTOMETRIC METHOD: 73 MM/HR (ref 0–23)
EST. GFR  (AFRICAN AMERICAN): >60 ML/MIN/1.73 M^2
EST. GFR  (NON AFRICAN AMERICAN): >60 ML/MIN/1.73 M^2
GLUCOSE SERPL-MCNC: 172 MG/DL (ref 70–110)
GLUCOSE SERPL-MCNC: 180 MG/DL (ref 70–110)
GLUCOSE SERPL-MCNC: 193 MG/DL (ref 70–110)
GLUCOSE SERPL-MCNC: 196 MG/DL (ref 70–110)
GLUCOSE SERPL-MCNC: 278 MG/DL (ref 70–110)
GLUCOSE SERPL-MCNC: 291 MG/DL (ref 70–110)
GRAM STN SPEC: NORMAL
HCT VFR BLD AUTO: 36.4 % (ref 40–54)
HGB BLD-MCNC: 12.6 G/DL (ref 14–18)
IMM GRANULOCYTES # BLD AUTO: 0.49 K/UL (ref 0–0.04)
IMM GRANULOCYTES NFR BLD AUTO: 4.3 % (ref 0–0.5)
INR PPP: 1.1 (ref 0.8–1.2)
LYMPHOCYTES # BLD AUTO: 1.1 K/UL (ref 1–4.8)
LYMPHOCYTES NFR BLD: 9.2 % (ref 18–48)
MAGNESIUM SERPL-MCNC: 1.7 MG/DL (ref 1.6–2.6)
MAGNESIUM SERPL-MCNC: 1.8 MG/DL (ref 1.6–2.6)
MAGNESIUM SERPL-MCNC: 1.9 MG/DL (ref 1.6–2.6)
MAGNESIUM SERPL-MCNC: 2 MG/DL (ref 1.6–2.6)
MCH RBC QN AUTO: 31.7 PG (ref 27–31)
MCHC RBC AUTO-ENTMCNC: 34.6 G/DL (ref 32–36)
MCV RBC AUTO: 92 FL (ref 82–98)
MONOCYTES # BLD AUTO: 0.9 K/UL (ref 0.3–1)
MONOCYTES NFR BLD: 7.8 % (ref 4–15)
NEUTROPHILS # BLD AUTO: 8.7 K/UL (ref 1.8–7.7)
NEUTROPHILS NFR BLD: 75.4 % (ref 38–73)
NRBC BLD-RTO: 0 /100 WBC
PATH INTERP FLD-IMP: NORMAL
PHOSPHATE SERPL-MCNC: 2.8 MG/DL (ref 2.7–4.5)
PHOSPHATE SERPL-MCNC: 2.9 MG/DL (ref 2.7–4.5)
PHOSPHATE SERPL-MCNC: 2.9 MG/DL (ref 2.7–4.5)
PHOSPHATE SERPL-MCNC: 4 MG/DL (ref 2.7–4.5)
PLATELET # BLD AUTO: 233 K/UL (ref 150–450)
PLATELET BLD QL SMEAR: ABNORMAL
PMV BLD AUTO: 8.9 FL (ref 9.2–12.9)
POCT GLUCOSE: 171 MG/DL (ref 70–110)
POCT GLUCOSE: 177 MG/DL (ref 70–110)
POCT GLUCOSE: 178 MG/DL (ref 70–110)
POCT GLUCOSE: 182 MG/DL (ref 70–110)
POCT GLUCOSE: 184 MG/DL (ref 70–110)
POCT GLUCOSE: 184 MG/DL (ref 70–110)
POCT GLUCOSE: 195 MG/DL (ref 70–110)
POCT GLUCOSE: 214 MG/DL (ref 70–110)
POCT GLUCOSE: 232 MG/DL (ref 70–110)
POCT GLUCOSE: 271 MG/DL (ref 70–110)
POCT GLUCOSE: 282 MG/DL (ref 70–110)
POCT GLUCOSE: 283 MG/DL (ref 70–110)
POCT GLUCOSE: 283 MG/DL (ref 70–110)
POCT GLUCOSE: 311 MG/DL (ref 70–110)
POCT GLUCOSE: >500 MG/DL (ref 70–110)
POTASSIUM SERPL-SCNC: 3.7 MMOL/L (ref 3.5–5.1)
POTASSIUM SERPL-SCNC: 3.9 MMOL/L (ref 3.5–5.1)
POTASSIUM SERPL-SCNC: 3.9 MMOL/L (ref 3.5–5.1)
POTASSIUM SERPL-SCNC: 4 MMOL/L (ref 3.5–5.1)
POTASSIUM SERPL-SCNC: 4.2 MMOL/L (ref 3.5–5.1)
POTASSIUM SERPL-SCNC: 5.5 MMOL/L (ref 3.5–5.1)
PREALB SERPL-MCNC: 7 MG/DL (ref 20–43)
PROCALCITONIN SERPL IA-MCNC: 0.57 NG/ML
PROT SERPL-MCNC: 5.8 G/DL (ref 6–8.4)
PROT SERPL-MCNC: 6.3 G/DL (ref 6–8.4)
PROTHROMBIN TIME: 11.3 SEC (ref 9–12.5)
RBC # BLD AUTO: 3.97 M/UL (ref 4.6–6.2)
SODIUM SERPL-SCNC: 129 MMOL/L (ref 136–145)
SODIUM SERPL-SCNC: 130 MMOL/L (ref 136–145)
SODIUM SERPL-SCNC: 131 MMOL/L (ref 136–145)
SODIUM SERPL-SCNC: 131 MMOL/L (ref 136–145)
SODIUM SERPL-SCNC: 132 MMOL/L (ref 136–145)
SODIUM SERPL-SCNC: 133 MMOL/L (ref 136–145)
TOXIC GRANULES BLD QL SMEAR: PRESENT
TRANSFERRIN SERPL-MCNC: 107 MG/DL (ref 200–375)
WBC # BLD AUTO: 11.47 K/UL (ref 3.9–12.7)

## 2022-07-29 PROCEDURE — 87116 MYCOBACTERIA CULTURE: CPT | Performed by: ORTHOPAEDIC SURGERY

## 2022-07-29 PROCEDURE — 25000003 PHARM REV CODE 250

## 2022-07-29 PROCEDURE — 36415 COLL VENOUS BLD VENIPUNCTURE: CPT

## 2022-07-29 PROCEDURE — 37000008 HC ANESTHESIA 1ST 15 MINUTES: Performed by: ORTHOPAEDIC SURGERY

## 2022-07-29 PROCEDURE — 63600175 PHARM REV CODE 636 W HCPCS: Performed by: INTERNAL MEDICINE

## 2022-07-29 PROCEDURE — 80048 BASIC METABOLIC PNL TOTAL CA: CPT | Mod: 91,XB | Performed by: INTERNAL MEDICINE

## 2022-07-29 PROCEDURE — 27301 DRAIN THIGH/KNEE LESION: CPT | Mod: LT,,, | Performed by: ORTHOPAEDIC SURGERY

## 2022-07-29 PROCEDURE — 99233 SBSQ HOSP IP/OBS HIGH 50: CPT | Mod: ,,, | Performed by: INTERNAL MEDICINE

## 2022-07-29 PROCEDURE — 87206 SMEAR FLUORESCENT/ACID STAI: CPT | Mod: 91 | Performed by: ORTHOPAEDIC SURGERY

## 2022-07-29 PROCEDURE — 85652 RBC SED RATE AUTOMATED: CPT | Performed by: STUDENT IN AN ORGANIZED HEALTH CARE EDUCATION/TRAINING PROGRAM

## 2022-07-29 PROCEDURE — 86850 RBC ANTIBODY SCREEN: CPT | Performed by: STUDENT IN AN ORGANIZED HEALTH CARE EDUCATION/TRAINING PROGRAM

## 2022-07-29 PROCEDURE — D9220A PRA ANESTHESIA: ICD-10-PCS | Mod: ,,, | Performed by: ANESTHESIOLOGY

## 2022-07-29 PROCEDURE — 99233 PR SUBSEQUENT HOSPITAL CARE,LEVL III: ICD-10-PCS | Mod: ,,, | Performed by: INTERNAL MEDICINE

## 2022-07-29 PROCEDURE — 99223 1ST HOSP IP/OBS HIGH 75: CPT | Mod: 57,,, | Performed by: ORTHOPAEDIC SURGERY

## 2022-07-29 PROCEDURE — 85610 PROTHROMBIN TIME: CPT | Performed by: STUDENT IN AN ORGANIZED HEALTH CARE EDUCATION/TRAINING PROGRAM

## 2022-07-29 PROCEDURE — 87075 CULTR BACTERIA EXCEPT BLOOD: CPT | Mod: 59 | Performed by: ORTHOPAEDIC SURGERY

## 2022-07-29 PROCEDURE — 84145 PROCALCITONIN (PCT): CPT | Performed by: STUDENT IN AN ORGANIZED HEALTH CARE EDUCATION/TRAINING PROGRAM

## 2022-07-29 PROCEDURE — 80053 COMPREHEN METABOLIC PANEL: CPT | Mod: 91 | Performed by: INTERNAL MEDICINE

## 2022-07-29 PROCEDURE — 20000000 HC ICU ROOM

## 2022-07-29 PROCEDURE — 25000003 PHARM REV CODE 250: Performed by: INTERNAL MEDICINE

## 2022-07-29 PROCEDURE — 37000009 HC ANESTHESIA EA ADD 15 MINS: Performed by: ORTHOPAEDIC SURGERY

## 2022-07-29 PROCEDURE — 84466 ASSAY OF TRANSFERRIN: CPT | Performed by: STUDENT IN AN ORGANIZED HEALTH CARE EDUCATION/TRAINING PROGRAM

## 2022-07-29 PROCEDURE — 99223 PR INITIAL HOSPITAL CARE,LEVL III: ICD-10-PCS | Mod: 57,,, | Performed by: ORTHOPAEDIC SURGERY

## 2022-07-29 PROCEDURE — 63600175 PHARM REV CODE 636 W HCPCS

## 2022-07-29 PROCEDURE — 86140 C-REACTIVE PROTEIN: CPT | Performed by: STUDENT IN AN ORGANIZED HEALTH CARE EDUCATION/TRAINING PROGRAM

## 2022-07-29 PROCEDURE — 87077 CULTURE AEROBIC IDENTIFY: CPT | Performed by: ORTHOPAEDIC SURGERY

## 2022-07-29 PROCEDURE — 84134 ASSAY OF PREALBUMIN: CPT | Performed by: STUDENT IN AN ORGANIZED HEALTH CARE EDUCATION/TRAINING PROGRAM

## 2022-07-29 PROCEDURE — 25000003 PHARM REV CODE 250: Performed by: STUDENT IN AN ORGANIZED HEALTH CARE EDUCATION/TRAINING PROGRAM

## 2022-07-29 PROCEDURE — 85025 COMPLETE CBC W/AUTO DIFF WBC: CPT

## 2022-07-29 PROCEDURE — 80053 COMPREHEN METABOLIC PANEL: CPT

## 2022-07-29 PROCEDURE — C1729 CATH, DRAINAGE: HCPCS | Performed by: ORTHOPAEDIC SURGERY

## 2022-07-29 PROCEDURE — 94761 N-INVAS EAR/PLS OXIMETRY MLT: CPT

## 2022-07-29 PROCEDURE — 63600175 PHARM REV CODE 636 W HCPCS: Performed by: STUDENT IN AN ORGANIZED HEALTH CARE EDUCATION/TRAINING PROGRAM

## 2022-07-29 PROCEDURE — 83735 ASSAY OF MAGNESIUM: CPT | Mod: 91

## 2022-07-29 PROCEDURE — 87205 SMEAR GRAM STAIN: CPT | Mod: 59 | Performed by: ORTHOPAEDIC SURGERY

## 2022-07-29 PROCEDURE — 84100 ASSAY OF PHOSPHORUS: CPT

## 2022-07-29 PROCEDURE — 84100 ASSAY OF PHOSPHORUS: CPT | Mod: 91

## 2022-07-29 PROCEDURE — 87186 SC STD MICRODIL/AGAR DIL: CPT | Performed by: ORTHOPAEDIC SURGERY

## 2022-07-29 PROCEDURE — 27301 PR INCIS/DRAIN THIGH/KNEE ABSCESS,DEEP: ICD-10-PCS | Mod: LT,,, | Performed by: ORTHOPAEDIC SURGERY

## 2022-07-29 PROCEDURE — 83735 ASSAY OF MAGNESIUM: CPT | Mod: 91 | Performed by: INTERNAL MEDICINE

## 2022-07-29 PROCEDURE — 87015 SPECIMEN INFECT AGNT CONCNTJ: CPT | Performed by: ORTHOPAEDIC SURGERY

## 2022-07-29 PROCEDURE — 63600175 PHARM REV CODE 636 W HCPCS: Performed by: ORTHOPAEDIC SURGERY

## 2022-07-29 PROCEDURE — 87070 CULTURE OTHR SPECIMN AEROBIC: CPT | Performed by: ORTHOPAEDIC SURGERY

## 2022-07-29 PROCEDURE — D9220A PRA ANESTHESIA: Mod: ,,, | Performed by: ANESTHESIOLOGY

## 2022-07-29 PROCEDURE — 87102 FUNGUS ISOLATION CULTURE: CPT | Performed by: ORTHOPAEDIC SURGERY

## 2022-07-29 PROCEDURE — 36000706: Performed by: ORTHOPAEDIC SURGERY

## 2022-07-29 PROCEDURE — 84100 ASSAY OF PHOSPHORUS: CPT | Mod: 91 | Performed by: INTERNAL MEDICINE

## 2022-07-29 PROCEDURE — 36000707: Performed by: ORTHOPAEDIC SURGERY

## 2022-07-29 PROCEDURE — 80048 BASIC METABOLIC PNL TOTAL CA: CPT | Mod: XB

## 2022-07-29 PROCEDURE — 86900 BLOOD TYPING SEROLOGIC ABO: CPT | Performed by: STUDENT IN AN ORGANIZED HEALTH CARE EDUCATION/TRAINING PROGRAM

## 2022-07-29 RX ORDER — NEOSTIGMINE METHYLSULFATE 0.5 MG/ML
INJECTION, SOLUTION INTRAVENOUS
Status: DISCONTINUED | OUTPATIENT
Start: 2022-07-29 | End: 2022-07-29

## 2022-07-29 RX ORDER — VANCOMYCIN HYDROCHLORIDE 1 G/20ML
INJECTION, POWDER, LYOPHILIZED, FOR SOLUTION INTRAVENOUS
Status: DISCONTINUED | OUTPATIENT
Start: 2022-07-29 | End: 2022-07-29 | Stop reason: HOSPADM

## 2022-07-29 RX ORDER — MIDAZOLAM HYDROCHLORIDE 1 MG/ML
INJECTION, SOLUTION INTRAMUSCULAR; INTRAVENOUS
Status: DISCONTINUED | OUTPATIENT
Start: 2022-07-29 | End: 2022-07-29

## 2022-07-29 RX ORDER — ONDANSETRON 2 MG/ML
4 INJECTION INTRAMUSCULAR; INTRAVENOUS ONCE AS NEEDED
Status: CANCELLED | OUTPATIENT
Start: 2022-07-29 | End: 2033-12-25

## 2022-07-29 RX ORDER — HYDROMORPHONE HYDROCHLORIDE 1 MG/ML
0.2 INJECTION, SOLUTION INTRAMUSCULAR; INTRAVENOUS; SUBCUTANEOUS EVERY 5 MIN PRN
Status: CANCELLED | OUTPATIENT
Start: 2022-07-29

## 2022-07-29 RX ORDER — SODIUM CHLORIDE 0.9 % (FLUSH) 0.9 %
10 SYRINGE (ML) INJECTION
Status: DISCONTINUED | OUTPATIENT
Start: 2022-07-29 | End: 2022-08-04

## 2022-07-29 RX ORDER — DEXAMETHASONE SODIUM PHOSPHATE 4 MG/ML
INJECTION, SOLUTION INTRA-ARTICULAR; INTRALESIONAL; INTRAMUSCULAR; INTRAVENOUS; SOFT TISSUE
Status: DISCONTINUED | OUTPATIENT
Start: 2022-07-29 | End: 2022-07-29

## 2022-07-29 RX ORDER — PROPOFOL 10 MG/ML
VIAL (ML) INTRAVENOUS
Status: DISCONTINUED | OUTPATIENT
Start: 2022-07-29 | End: 2022-07-29

## 2022-07-29 RX ORDER — VANCOMYCIN HYDROCHLORIDE 1 G/20ML
INJECTION, POWDER, LYOPHILIZED, FOR SOLUTION INTRAVENOUS
Status: DISCONTINUED | OUTPATIENT
Start: 2022-07-29 | End: 2022-07-29

## 2022-07-29 RX ORDER — ROCURONIUM BROMIDE 10 MG/ML
INJECTION, SOLUTION INTRAVENOUS
Status: DISCONTINUED | OUTPATIENT
Start: 2022-07-29 | End: 2022-07-29

## 2022-07-29 RX ORDER — ONDANSETRON 2 MG/ML
INJECTION INTRAMUSCULAR; INTRAVENOUS
Status: DISCONTINUED | OUTPATIENT
Start: 2022-07-29 | End: 2022-07-29

## 2022-07-29 RX ORDER — SODIUM CHLORIDE 0.9 % (FLUSH) 0.9 %
10 SYRINGE (ML) INJECTION
Status: CANCELLED | OUTPATIENT
Start: 2022-07-29

## 2022-07-29 RX ORDER — CLINDAMYCIN PHOSPHATE 900 MG/50ML
900 INJECTION, SOLUTION INTRAVENOUS
Status: DISCONTINUED | OUTPATIENT
Start: 2022-07-29 | End: 2022-08-01

## 2022-07-29 RX ORDER — FENTANYL CITRATE 50 UG/ML
INJECTION, SOLUTION INTRAMUSCULAR; INTRAVENOUS
Status: DISCONTINUED | OUTPATIENT
Start: 2022-07-29 | End: 2022-07-29

## 2022-07-29 RX ORDER — ACETAMINOPHEN 10 MG/ML
INJECTION, SOLUTION INTRAVENOUS
Status: DISCONTINUED | OUTPATIENT
Start: 2022-07-29 | End: 2022-07-29

## 2022-07-29 RX ORDER — SUCCINYLCHOLINE CHLORIDE 20 MG/ML
INJECTION INTRAMUSCULAR; INTRAVENOUS
Status: DISCONTINUED | OUTPATIENT
Start: 2022-07-29 | End: 2022-07-29

## 2022-07-29 RX ORDER — CEFAZOLIN SODIUM/WATER 2 G/20 ML
SYRINGE (ML) INTRAVENOUS
Status: DISCONTINUED | OUTPATIENT
Start: 2022-07-29 | End: 2022-07-29

## 2022-07-29 RX ORDER — LIDOCAINE HYDROCHLORIDE 10 MG/ML
INJECTION, SOLUTION EPIDURAL; INFILTRATION; INTRACAUDAL; PERINEURAL
Status: DISCONTINUED | OUTPATIENT
Start: 2022-07-29 | End: 2022-07-29

## 2022-07-29 RX ADMIN — OXYCODONE 5 MG: 5 TABLET ORAL at 11:07

## 2022-07-29 RX ADMIN — OXYCODONE 5 MG: 5 TABLET ORAL at 06:07

## 2022-07-29 RX ADMIN — PIPERACILLIN SODIUM AND TAZOBACTAM SODIUM 4.5 G: 4; .5 INJECTION, POWDER, LYOPHILIZED, FOR SOLUTION INTRAVENOUS at 10:07

## 2022-07-29 RX ADMIN — LIDOCAINE HYDROCHLORIDE 100 MG: 10 INJECTION, SOLUTION EPIDURAL; INFILTRATION; INTRACAUDAL at 12:07

## 2022-07-29 RX ADMIN — MIDAZOLAM 2 MG: 1 INJECTION INTRAMUSCULAR; INTRAVENOUS at 11:07

## 2022-07-29 RX ADMIN — ONDANSETRON 4 MG: 2 INJECTION INTRAMUSCULAR; INTRAVENOUS at 01:07

## 2022-07-29 RX ADMIN — OXYCODONE 5 MG: 5 TABLET ORAL at 10:07

## 2022-07-29 RX ADMIN — VANCOMYCIN HYDROCHLORIDE 2000 MG: 500 INJECTION, POWDER, LYOPHILIZED, FOR SOLUTION INTRAVENOUS at 03:07

## 2022-07-29 RX ADMIN — INSULIN HUMAN 2.4 UNITS/HR: 1 INJECTION, SOLUTION INTRAVENOUS at 05:07

## 2022-07-29 RX ADMIN — ROCURONIUM BROMIDE 20 MG: 10 INJECTION INTRAVENOUS at 01:07

## 2022-07-29 RX ADMIN — PROPOFOL 40 MG: 10 INJECTION, EMULSION INTRAVENOUS at 01:07

## 2022-07-29 RX ADMIN — PROPOFOL 20 MG: 10 INJECTION, EMULSION INTRAVENOUS at 12:07

## 2022-07-29 RX ADMIN — MORPHINE SULFATE 3 MG: 2 INJECTION, SOLUTION INTRAMUSCULAR; INTRAVENOUS at 02:07

## 2022-07-29 RX ADMIN — DEXTROSE, SODIUM CHLORIDE, AND POTASSIUM CHLORIDE: 5; .45; .15 INJECTION INTRAVENOUS at 10:07

## 2022-07-29 RX ADMIN — ACETAMINOPHEN 1000 MG: 10 INJECTION INTRAVENOUS at 12:07

## 2022-07-29 RX ADMIN — SUCCINYLCHOLINE CHLORIDE 20 MG: 20 INJECTION, SOLUTION INTRAMUSCULAR; INTRAVENOUS; PARENTERAL at 12:07

## 2022-07-29 RX ADMIN — ROCURONIUM BROMIDE 50 MG: 10 INJECTION INTRAVENOUS at 12:07

## 2022-07-29 RX ADMIN — PIPERACILLIN SODIUM AND TAZOBACTAM SODIUM 4.5 G: 4; .5 INJECTION, POWDER, LYOPHILIZED, FOR SOLUTION INTRAVENOUS at 02:07

## 2022-07-29 RX ADMIN — NEOSTIGMINE METHYLSULFATE 5 MG: 0.5 INJECTION, SOLUTION INTRAVENOUS at 01:07

## 2022-07-29 RX ADMIN — VANCOMYCIN HYDROCHLORIDE 1 G: 1 INJECTION, POWDER, LYOPHILIZED, FOR SOLUTION INTRAVENOUS at 01:07

## 2022-07-29 RX ADMIN — Medication 2 G: at 12:07

## 2022-07-29 RX ADMIN — TACROLIMUS 900 MG: 0.5 CAPSULE ORAL at 02:07

## 2022-07-29 RX ADMIN — TACROLIMUS 900 MG: 0.5 CAPSULE ORAL at 09:07

## 2022-07-29 RX ADMIN — SODIUM CHLORIDE, SODIUM GLUCONATE, SODIUM ACETATE, POTASSIUM CHLORIDE, MAGNESIUM CHLORIDE, SODIUM PHOSPHATE, DIBASIC, AND POTASSIUM PHOSPHATE: .53; .5; .37; .037; .03; .012; .00082 INJECTION, SOLUTION INTRAVENOUS at 11:07

## 2022-07-29 RX ADMIN — OXYCODONE 5 MG: 5 TABLET ORAL at 01:07

## 2022-07-29 RX ADMIN — DEXAMETHASONE SODIUM PHOSPHATE 4 MG: 4 INJECTION INTRA-ARTICULAR; INTRALESIONAL; INTRAMUSCULAR; INTRAVENOUS; SOFT TISSUE at 12:07

## 2022-07-29 RX ADMIN — INSULIN HUMAN 1.2 UNITS/HR: 1 INJECTION, SOLUTION INTRAVENOUS at 04:07

## 2022-07-29 RX ADMIN — GLYCOPYRROLATE 0.6 MG: 0.2 INJECTION, SOLUTION INTRAMUSCULAR; INTRAVITREAL at 01:07

## 2022-07-29 RX ADMIN — PROPOFOL 180 MG: 10 INJECTION, EMULSION INTRAVENOUS at 12:07

## 2022-07-29 RX ADMIN — DEXTROSE, SODIUM CHLORIDE, AND POTASSIUM CHLORIDE: 5; .45; .15 INJECTION INTRAVENOUS at 04:07

## 2022-07-29 RX ADMIN — FENTANYL CITRATE 100 MCG: 50 INJECTION INTRAMUSCULAR; INTRAVENOUS at 12:07

## 2022-07-29 RX ADMIN — ATORVASTATIN CALCIUM 20 MG: 20 TABLET, FILM COATED ORAL at 08:07

## 2022-07-29 NOTE — PLAN OF CARE
CMICU DAILY GOALS       A: Awake    RASS: Goal -    Actual - RASS (Meza Agitation-Sedation Scale): -1-->drowsy   Restraint necessity:    B: Breathe   SBT: Not intubated   C: Coordinate A & B, analgesics/sedatives   Pain: managed    SAT: Pass  D: Delirium   CAM-ICU: Overall CAM-ICU: Negative  E: Early(intubated/ Progressive (non-intubated) Mobility   MOVE Screen: Pass   Activity: Activity Management: Rolling - L1  FAS: Feeding/Nutrition   Diet order: Diet/Nutrition Received: NPO,    T: Thrombus   DVT prophylaxis:    H: HOB Elevation   Head of Bed (HOB) Positioning: HOB elevated  U: Ulcer Prophylaxis   GI: no  G: Glucose control   managed Glycemic Management: blood glucose monitored  S: Skin   Bathing/Skin Care: bath, complete, incontinence care, linen changed           Skin Protection: tubing/devices free from skin contact  B: Bowel Function   diarrhea   I: Indwelling Catheters   Hull necessity:     CVC necessity: No  D: De-escalation Antibiotics   Yes    Family/Goals of care/Code Status   Code Status: Full Code    24H Vital Sign Range  Temp:  [96.8 °F (36 °C)-99 °F (37.2 °C)]   Pulse:  []   Resp:  [16-31]   BP: (105-134)/(76-89)   SpO2:  [97 %-99 %]      Shift Events   Irrigation and debridement of left thigh abscess performed, pt tolerated procedure well, JOHANN drain in place. Insulin gtt infusing at 1.2 units/hr.    VS and assessment per flow sheet, patient progressing towards goals as tolerated, plan of care reviewed with  pt Wilfredo Thomas and family , all concerns addressed, will continue to monitor.    Ranjeet Hernandez.

## 2022-07-29 NOTE — ASSESSMENT & PLAN NOTE
Wilfredo Thomas is a 47 y.o. male with past medical history of hypertension and diabetes, currently admitted to hospital for DKA. He reports 4-6 weeks of left lateral thigh pain and swelling, no known injury to the affected area. MRI scan showing tear in the TFL with adjacent fluid collection.    - Aspirated at bedside yesterday   - >200K cells >80% segs consistent with abscess  - NPO since midnight   - Plan for I&D today

## 2022-07-29 NOTE — PROGRESS NOTES
Pharmacokinetic Initial Assessment: IV Vancomycin    Assessment/Plan:    Initiate intravenous vancomycin with loading dose of 2000 mg once followed by a maintenance dose of vancomycin 1750mg IV every 12 hours  Desired empiric serum trough concentration is 15 to 20 mcg/mL  Draw vancomycin trough level 60 min prior to third dose on 7/30 at approximately 1430  Pharmacy will continue to follow and monitor vancomycin.      Please contact pharmacy at extension [phone] with any questions regarding this assessment.     Thank you for the consult,   Deepak Scarlett       Patient brief summary:  Wilfredo Thomas is a 47 y.o. male initiated on antimicrobial therapy with IV Vancomycin for treatment of suspected skin & soft tissue infection    Drug Allergies:   Review of patient's allergies indicates:  No Known Allergies    Actual Body Weight:   94.5 kg    Renal Function:   Estimated Creatinine Clearance: 163.3 mL/min (based on SCr of 0.6 mg/dL).,     Dialysis Method (if applicable):  N/A    CBC (last 72 hours):  Recent Labs   Lab Result Units 07/26/22 2044 07/27/22  0412 07/28/22  0354 07/29/22  0336   WBC K/uL 26.87* 20.27* 14.29* 11.47   Hemoglobin g/dL 16.1 14.0 13.6* 12.6*   Hemoglobin A1C % 10.1*  --   --   --    Hematocrit % 49.6 42.6 40.1 36.4*   Platelets K/uL 469* 318 305 233   Gran % % 67.0 82.0* 77.0* 75.4*   Lymph % % 11.0* 6.0* 8.0* 9.2*   Mono % % 7.0 4.0 5.0 7.8   Eosinophil % % 1.0 0.0 2.0 3.1   Basophil % % 0.0 0.0 0.0 0.2   Differential Method  Manual Manual Manual Automated       Metabolic Panel (last 72 hours):  Recent Labs   Lab Result Units 07/26/22  2044 07/26/22  2306 07/27/22  0007 07/27/22  0212 07/27/22  0412 07/27/22  1000 07/27/22  1351 07/27/22  1612 07/27/22  1613 07/27/22  1945 07/27/22  2258 07/27/22  2350 07/28/22  0143 07/28/22  0354 07/28/22  0559 07/28/22  0850 07/28/22  1333 07/28/22  2017 07/29/22  0023 07/29/22  0148 07/29/22  0336 07/29/22  0603 07/29/22  1003   Sodium mmol/L 132* 128*  --   131*  131* 133* 129*  --   --  135*  --  132*  --  131* 130* 131* 132* 130* 130*  --  132* 131* 131* 133*   Potassium mmol/L 5.5* 5.6*  --  5.2*  5.2* 5.0 4.2  --   --  4.1  --  3.6  --  4.0 3.9 4.0 4.0 4.3 4.1  --  4.0 3.9 3.9 3.7   Chloride mmol/L 101 99  --  102  102 106 105  --   --  107  --  103  --  103 104 104 104 103 103  --  104 103 103 106   CO2 mmol/L 5* <5*  --  6*  6* 7* 8*  --   --  12*  --  12*  --  12* 8* 10* 11* 12* 12*  --  14* 13* 12* 12*   Glucose mg/dL 363* 395*  --  337*  337* 235* 293*  --   --  244*  --  217*  --  226* 212* 242* 215* 189* 186*  --  196* 193* 180* 172*   Glucose, UA   --   --  3+*  --   --   --   --   --   --   --   --   --   --   --   --   --   --   --   --   --   --   --   --    BUN mg/dL 25* 28*  --  27*  27* 25* 25*  --   --  22*  --  16  --  15 13 14 13 12 10  --  10 10 10 8   Creatinine mg/dL 1.9* 1.9*  --  1.7*  1.7* 1.5* 1.3  --   --  1.2  --  1.3  --  1.2 1.0 0.9 0.8 0.8 0.8  --  0.7 0.8 0.7 0.6   Albumin g/dL 2.7*  --   --   --  2.1*  --   --   --   --   --   --   --   --  1.9*  --   --   --   --   --   --  1.7*  --   --    Total Bilirubin mg/dL 0.3  --   --   --  0.3  --   --   --   --   --   --   --   --  0.4  --   --   --   --   --   --  0.5  --   --    Alkaline Phosphatase U/L 108  --   --   --  97  --   --   --   --   --   --   --   --  82  --   --   --   --   --   --  87  --   --    AST U/L 9*  --   --   --  14  --   --   --   --   --   --   --   --  10  --   --   --   --   --   --  11  --   --    ALT U/L 13  --   --   --  12  --   --   --   --   --   --   --   --  10  --   --   --   --   --   --  13  --   --    Magnesium mg/dL 2.5  --   --   --  2.2 2.3 2.3 2.3  --  2.2  --  2.0  --  2.1  --  2.1 2.0 1.9 2.0  --  1.9  --  1.7   Phosphorus mg/dL 5.9*  --   --   --  2.8 3.1 2.4* 2.5*  --  2.2*  --  2.2*  --  2.2*  --  2.3* 2.6* 2.5* 2.9  --  2.9  --  2.8       Drug levels (last 3 results):  No results for input(s): VANCOMYCINRA, VANCORANDOM, VANCOMYCINPE,  VANCOPEAK, VANCOMYCINTR, VANCOTROUGH in the last 72 hours.    Microbiologic Results:  Microbiology Results (last 7 days)     Procedure Component Value Units Date/Time    AFB Culture & Smear [[id number]] Collected: 07/28/22 1800    Order Status: Completed Specimen: Abscess from Leg, Left Updated: 07/29/22 1442     AFB CULTURE STAIN No acid fast bacilli seen.    Narrative:      LEFT THIGH ABSCESS    Gram stain [[id number]] Collected: 07/29/22 1300    Order Status: Completed Specimen: Abscess from Leg, Left Updated: 07/29/22 1421     Gram Stain Result Moderate WBC's      Moderate Gram positive cocci in clusters    Narrative:      Left thigh abscess #2    Gram stain [[id number]] Collected: 07/29/22 1251    Order Status: Completed Specimen: Abscess from Leg, Left Updated: 07/29/22 1420     Gram Stain Result Moderate WBC's      Moderate Gram positive cocci in clusters    Narrative:      Left thigh abscess #1    AFB Culture & Smear [[id number]] Collected: 07/29/22 1300    Order Status: Sent Specimen: Abscess from Leg, Left Updated: 07/29/22 1332    Culture, Anaerobe [[id number]] Collected: 07/29/22 1300    Order Status: Sent Specimen: Abscess from Leg, Left Updated: 07/29/22 1331    Fungus culture [[id number]] Collected: 07/29/22 1300    Order Status: Sent Specimen: Abscess from Leg, Left Updated: 07/29/22 1330    Aerobic culture [[id number]] Collected: 07/29/22 1300    Order Status: Sent Specimen: Abscess from Leg, Left Updated: 07/29/22 1330    AFB Culture & Smear [[id number]] Collected: 07/29/22 1252    Order Status: Sent Specimen: Wound from Leg, Left Updated: 07/29/22 1326    Fungus culture [[id number]] Collected: 07/29/22 1251    Order Status: Sent Specimen: Abscess from Leg, Left Updated: 07/29/22 1326    Culture, Anaerobe [[id number]] Collected: 07/29/22 1251    Order Status: Sent Specimen: Abscess from Leg, Left Updated: 07/29/22 1325    Aerobic culture [[id number]] Collected: 07/29/22 1251    Order Status: Sent Specimen:  Abscess from Leg, Left Updated: 07/29/22 1325    Aerobic culture [[id number]] Collected: 07/28/22 1800    Order Status: Completed Specimen: Abscess from Leg, Left Updated: 07/29/22 0735     Aerobic Bacterial Culture No growth    Narrative:      LEFT THIGH ABSCESS    Blood Culture #2 **CANNOT BE ORDERED STAT** [[id number]] Collected: 07/26/22 2246    Order Status: Completed Specimen: Blood from Peripheral, Antecubital, Left Updated: 07/29/22 0613     Blood Culture, Routine No Growth to date      No Growth to date      No Growth to date    Blood Culture #1 **CANNOT BE ORDERED STAT** [[id number]] Collected: 07/26/22 2302    Order Status: Completed Specimen: Blood from Peripheral, Hand, Left Updated: 07/29/22 0613     Blood Culture, Routine No Growth to date      No Growth to date      No Growth to date    Gram stain [[id number]] Collected: 07/28/22 1800    Order Status: Completed Specimen: Abscess from Leg, Left Updated: 07/28/22 2026     Gram Stain Result Rare WBC's      Few Gram positive cocci    Narrative:      LEFT THIGH ABSCESS    Fungus culture [[id number]] Collected: 07/28/22 1800    Order Status: Sent Specimen: Abscess from Leg, Left Updated: 07/28/22 1836    Culture, Anaerobic [[id number]] Collected: 07/28/22 1800    Order Status: Sent Specimen: Abscess from Leg, Left Updated: 07/28/22 1836

## 2022-07-29 NOTE — PROCEDURES
"Wilfredo Thomas is a 47 y.o. male patient.    Temp: 98.9 °F (37.2 °C) (22)  Pulse: 101 (22)  Resp: (!) 25 (22)  BP: 124/87 (22)  SpO2: 97 % (22)  Weight: 94.5 kg (208 lb 5.4 oz) (22)  Height: 5' 7" (170.2 cm) (22)       Orthopedic Injury Treatment    Date/Time: 2022 10:18 AM  Performed by: Olman Au MD  Authorized by: Olman Au MD     Location procedure was performed:  [facility] ([facility])  Injury:     Injury location:  Upper leg    Location details:  Left upper leg    Injury type:  Soft tissue      Pre-procedure assessment:     Neurovascular status: Neurovascularly intact        Selections made in this section will also lock the Injury type section above.:     Complications: No      Specimens: Yes (specify)      Implants: No    Post-procedure assessment:     Neurovascular status: Neurovascularly intact      Patient tolerance:  Patient tolerated the procedure well with no immediate complications       Procedure Note: left thigh abscess aspiration  Patient was explained risks, benefits, and alternatives to treatment and verbalized consent to proceed. Time out was performed and patient name, , site, and procedure were confirmed. Skin was sterilely prepared with alcohol solution. 18 gauge needle on a 60 cc syringe was used for aspiration through direct lateral approach. 5 cc of red/yellow colored fluid collected. Fluid and cultures were obtained and sent for analysis. Aspiration site was covered with a bandaid.          2022  "

## 2022-07-29 NOTE — PT/OT/SLP PROGRESS
Physical Therapy      Patient Name:  Wilfredo Thomas   MRN:  [id number]    Patient not seen today secondary to Off the floor for procedure/surgery (irrigation and debridement). PT orders received and acknowledged. Will follow-up as appropriate.

## 2022-07-29 NOTE — SUBJECTIVE & OBJECTIVE
Interval History/Significant Events: Metabolic acidosis slowly improving. Ortho taking patient to the OR for irrigation of the left thigh, will start antibiotics afterwards.     Review of Systems   Constitutional:  Negative for appetite change, chills, fatigue and fever.   HENT:  Negative for sore throat and trouble swallowing.    Respiratory:  Negative for cough and shortness of breath.    Cardiovascular:  Negative for chest pain.   Gastrointestinal:  Negative for abdominal pain, nausea and vomiting.   Genitourinary:  Negative for dysuria.   Musculoskeletal:  Positive for myalgias (Left thigh pain).   Skin:  Negative for color change.   Neurological:  Negative for dizziness, weakness and headaches.   Psychiatric/Behavioral:  Negative for confusion.    Objective:     Vital Signs (Most Recent):  Temp: 98.5 °F (36.9 °C) (07/29/22 1101)  Pulse: 101 (07/29/22 1101)  Resp: (!) 26 (07/29/22 1107)  BP: 129/83 (07/29/22 1101)  SpO2: 97 % (07/29/22 1101)   Vital Signs (24h Range):  Temp:  [97.9 °F (36.6 °C)-99 °F (37.2 °C)] 98.5 °F (36.9 °C)  Pulse:  [] 101  Resp:  [18-31] 26  SpO2:  [97 %-99 %] 97 %  BP: (107-135)/(72-89) 129/83   Weight: 94.5 kg (208 lb 5.4 oz)  Body mass index is 32.63 kg/m².      Intake/Output Summary (Last 24 hours) at 7/29/2022 1217  Last data filed at 7/29/2022 1100  Gross per 24 hour   Intake 4279.37 ml   Output 2000 ml   Net 2279.37 ml       Physical Exam  Constitutional:       Appearance: Normal appearance.   HENT:      Head: Normocephalic and atraumatic.      Mouth/Throat:      Mouth: Mucous membranes are moist.      Pharynx: Oropharynx is clear.   Eyes:      Extraocular Movements: Extraocular movements intact.      Pupils: Pupils are equal, round, and reactive to light.   Cardiovascular:      Rate and Rhythm: Normal rate and regular rhythm.      Pulses: Normal pulses.      Heart sounds: Normal heart sounds.   Pulmonary:      Effort: Pulmonary effort is normal.      Breath sounds: Normal  breath sounds.   Abdominal:      General: Abdomen is flat.      Palpations: Abdomen is soft.      Tenderness: There is no abdominal tenderness.   Musculoskeletal:         General: Swelling (Large left thigh hematoma, tender to palpation. Erythema increasing in size.) and tenderness present. Normal range of motion.      Cervical back: Normal range of motion and neck supple.      Right lower leg: No edema.      Left lower leg: No edema.   Skin:     General: Skin is warm and dry.   Neurological:      General: No focal deficit present.      Mental Status: He is alert and oriented to person, place, and time.   Psychiatric:         Mood and Affect: Mood normal.         Behavior: Behavior normal.       Vents:     Lines/Drains/Airways       Peripheral Intravenous Line  Duration                  Peripheral IV - Single Lumen 07/26/22 20 G Left Antecubital 3 days         Peripheral IV - Single Lumen 07/26/22 2319 22 G Left Hand 2 days         Peripheral IV - Single Lumen 07/26/22 2333 20 G Right Hand 2 days         Peripheral IV - Single Lumen 07/28/22 2000 20 G Distal;Posterior;Right Forearm <1 day                  Significant Labs:    CBC/Anemia Profile:  Recent Labs   Lab 07/28/22  0354 07/29/22  0336   WBC 14.29* 11.47   HGB 13.6* 12.6*   HCT 40.1 36.4*    233   MCV 94 92   RDW 13.3 12.9        Chemistries:  Recent Labs   Lab 07/28/22  0354 07/28/22  0559 07/29/22  0023 07/29/22  0148 07/29/22  0336 07/29/22  0603 07/29/22  1003   *   < >  --    < > 131* 131* 133*   K 3.9   < >  --    < > 3.9 3.9 3.7      < >  --    < > 103 103 106   CO2 8*   < >  --    < > 13* 12* 12*   BUN 13   < >  --    < > 10 10 8   CREATININE 1.0   < >  --    < > 0.8 0.7 0.6   CALCIUM 9.0   < >  --    < > 9.0 9.1 8.4*   ALBUMIN 1.9*  --   --   --  1.7*  --   --    PROT 6.7  --   --   --  6.3  --   --    BILITOT 0.4  --   --   --  0.5  --   --    ALKPHOS 82  --   --   --  87  --   --    ALT 10  --   --   --  13  --   --    AST 10   --   --   --  11  --   --    MG 2.1   < > 2.0  --  1.9  --  1.7   PHOS 2.2*   < > 2.9  --  2.9  --  2.8    < > = values in this interval not displayed.       All pertinent labs within the past 24 hours have been reviewed.    Significant Imaging:  I have reviewed all pertinent imaging results/findings within the past 24 hours.

## 2022-07-29 NOTE — ASSESSMENT & PLAN NOTE
- MRI with grade III tear of tension fascia jose luis with large hematoma   - Ortho consulted, aspiration concerning for infection, OR on 7/29 for irrigation   - Will start Vanc after irrigation

## 2022-07-29 NOTE — ANESTHESIA PROCEDURE NOTES
Intubation    Date/Time: 7/29/2022 12:12 PM  Performed by: Manuel Mathew DO  Authorized by: Fior Cooper MD     Intubation:     Induction:  Intravenous    Intubated:  Postinduction    Attempts:  1    Attempted By:  Resident anesthesiologist    Method of Intubation:  Direct    Blade:  Harvey 2    Laryngeal View Grade: Grade I - full view of cords      Difficult Airway Encountered?: No      Complications:  None    Airway Device:  Oral endotracheal tube    Airway Device Size:  7.5    Style/Cuff Inflation:  Cuffed (inflated to minimal occlusive pressure)    Tube secured:  22    Secured at:  The teeth    Placement Verified By:  Capnometry    Complicating Factors:  None    Findings Post-Intubation:  BS equal bilateral and atraumatic/condition of teeth unchanged

## 2022-07-29 NOTE — NURSING
Pt arrived back to the unit from the OR, on room air, tolerated procedure well, JOHANN drain in place and draining sanguineous fluid, insulin infusion still running at 0.4 units/hr.

## 2022-07-29 NOTE — ASSESSMENT & PLAN NOTE
WBC 26.87     - Trending down   - Initially thought to be due to recent steroid use but left thigh mass concerning for infection   - Will start Vanc after irrigation of left thigh by ortho

## 2022-07-29 NOTE — OP NOTE
OP NOTE    DOS:  07/29/2022    Preop Dx: Left thigh deep intramuscular abscess    Postop Dx: Left thigh deep intramuscular abscess    Procedure: Incision and drainage left thigh deep intramuscular abscess    Excisional and nonexcisional debridement fascia and muscle and subcutaneous fat left thigh    Surgeon: Rob Graff M.D.    Asst:  Joseph Patterson M.D    Anesthesia: General endotracheal    EBL:  25 cc    IVF:  500 cc crystalloid    Specimens: Cultures    Findings: Purulence.  Necrotic fascia at ITB band.    Dispo:  To PACU extubated/stable       Indications for Procedure:      47-year-old male presented with a fluid collection in the left thigh with some signs of infection as well as being in diabetic ketoacidosis which has been treated..  We aspirated this and it looked purulent.  We are now going to the operating room for formal incision drainage.  The risks, benefits and alternatives to surgery discussed the patient prior to going to the operating room.  Informed consent was obtained.  His blood sugars now between 100-200.     Procedure in Detail:    Patient was identified in preoperative holding area and the site was marked.  Patient was wheeled to the operating room and placed on the operating table in a supine position.  General endotracheal anesthesia was induced and the patient was turned into a lateral decubitus position on a beanbag with all bony prominences well padded.  The left hip and lower extremity were prepped draped sterile fashion.  A time-out was undertaken to confirm patient, side, site, surgery, surgeon.  All agreed we proceeded.    A 10 cm incision was made distal to the vastus ridge on the lateral aspect of the thigh.  This was carried the skin subcutaneous fat.  There was already damage to the tibial band in that area the thigh which was not in continuity.  Portions of the iliotibial bands were pedunculated were sharply excised.  Some of the fat and the area was also excised with a  10 blade.  A good deal of purulence was found within the vastus lateralis and quad.  This was suctioned from the wound.  The wound was then copiously irrigated normal saline solution.    At this point lap sponges were used within the wound combined with a Yankauer sucker to look for any loculations.  The lap sponges were used also to debride non excisionally any remaining pedunculated or loose fat and remaining fascial tissue.  The wound was again copiously irrigated normal saline solution.  At this point a 15 round Eleuterio/channel drain was placed deep and run distally on the thigh.  1 g vancomycin 2 g cefepime powder were then placed in the space.  The fascial layer over the un closable ITB band was then closed with antimicrobial 0 Vicryl sutures, the next layer with 2-0 Vicryl suture and the skin with 3-0 nylon suture in running fashion.  Drain was sewn into the skin with 3-0 nylon suture and sterile dressing was applied.    All instrument and sponge counts were reported correct at the end of the case.  There no complications.  Antibiotics were given after cultures taken.  The patient was returned to a supine position was hospital bed, extubated, awakened and taken to the recovery room stable condition.    Plan the patient:    Will leave the drain in until output is minimal.  Broad-spectrum antibiotics until cultures can tailor these.    Rob Graff MD

## 2022-07-29 NOTE — ASSESSMENT & PLAN NOTE
47M with history of DM2, gout, HTN, tobacco use who initially presented for dyspnea, increased thirst, decreased PO intake, found to be in DKA.  Recent history of steroids for leg pain.  On home dulaglutide, empagliflozin, metformin, insulin glargine 20u qhs (has not been taking).  Recent a1c 11.2%.  , bicarb 5, AG 26, BHB 5.9 on admit.      - Insulin drip   - q1h POCT glu checks while on drip  - D5 1/2 NS with 20 meq K   - BMP q4h  - diabetic diet when off insulin drip

## 2022-07-29 NOTE — PROGRESS NOTES
"[facility]  Orthopedics  Progress Note    Patient Name: Wilfredo Thomas  MRN: [id number]  Admission Date: 7/26/2022  Hospital Length of Stay: 3 days  Attending Provider: Pantera Bundy*  Primary Care Provider: Aylin Wayne DO  Follow-up For: Procedure(s) (LRB):  IRRIGATION AND DEBRIDEMENT, LOWER EXTREMITY: 9L NS, Cysto tubing (Left)    Post-Operative Day:    Subjective:     Principal Problem:DKA (diabetic ketoacidosis)    Principal Orthopedic Problem: Left thigh abscess    Interval History: NAEON, patient stable, pain controlled. No acute complaints this morning.   Aspiration yesterday appears infected planning I&D today in OR    Review of patient's allergies indicates:  No Known Allergies    Current Facility-Administered Medications   Medication    acetaminophen tablet 1,000 mg    atorvastatin tablet 20 mg    dextrose 10% bolus 125 mL    dextrose 10% bolus 250 mL    dextrose 5 % and 0.45 % NaCl with KCl 20 mEq infusion    insulin detemir U-100 pen 10 Units    insulin regular in 0.9 % NaCl 100 unit/100 mL (1 unit/mL) infusion    iohexoL (OMNIPAQUE 350) injection 50 mL    morphine injection 3 mg    ondansetron injection 4 mg    oxyCODONE immediate release tablet 5 mg    polyethylene glycol packet 17 g    senna-docusate 8.6-50 mg per tablet 1 tablet    sodium chloride 0.9% flush 10 mL    sodium chloride 0.9% flush 10 mL    sodium chloride 0.9% flush 10 mL     Objective:     Vital Signs (Most Recent):  Temp: 98.3 °F (36.8 °C) (07/29/22 0400)  Pulse: 96 (07/29/22 0500)  Resp: (!) 23 (07/29/22 0500)  BP: 127/81 (07/29/22 0500)  SpO2: 97 % (07/29/22 0500)   Vital Signs (24h Range):  Temp:  [97 °F (36.1 °C)-99 °F (37.2 °C)] 98.3 °F (36.8 °C)  Pulse:  [] 96  Resp:  [18-29] 23  SpO2:  [97 %-100 %] 97 %  BP: (107-135)/(72-91) 127/81     Weight: 94.5 kg (208 lb 5.4 oz)  Height: 5' 7" (170.2 cm)  Body mass index is 32.63 kg/m².      Intake/Output Summary (Last 24 hours) at " 7/29/2022 0555  Last data filed at 7/29/2022 0546  Gross per 24 hour   Intake 5137.11 ml   Output 2900 ml   Net 2237.11 ml       Ortho/SPM Exam     LLE:  Skin intact  Lateral thigh TTP  Compartments soft  SILT Sa/Musa/DP/SP/T  Motor intact TA/SP/DP  2+ DP/PT  Swelling and erythema over the lateral thigh    Significant Labs: All pertinent labs within the past 24 hours have been reviewed.    Significant Imaging: I have reviewed and interpreted all pertinent imaging results/findings.    Assessment/Plan:     Left leg pain  Wilfredo Thomas is a 47 y.o. male with past medical history of hypertension and diabetes, currently admitted to hospital for DKA. He reports 4-6 weeks of left lateral thigh pain and swelling, no known injury to the affected area. MRI scan showing tear in the TFL with adjacent fluid collection.    - Aspirated at bedside yesterday   - >200K cells >80% segs consistent with abscess, gram stain with GPCs  - NPO since midnight   - Plan for I&D today   - Hold Abx until after procedure             Olman Au MD  Orthopedics  [facility]

## 2022-07-29 NOTE — ANESTHESIA PREPROCEDURE EVALUATION
07/29/2022  Wilfredo Thomas is a 47 y.o., male   Pre-operative evaluation for Procedure(s) (LRB):  IRRIGATION AND DEBRIDEMENT, LOWER EXTREMITY: 9L NS, Cysto tubing (Left)    Wilfredo Thomas is a 47 y.o. male     Patient Active Problem List   Diagnosis    Type 2 diabetes mellitus with hyperglycemia, with long-term current use of insulin    Hypertension associated with type 2 diabetes mellitus    Class 2 severe obesity due to excess calories with serious comorbidity and body mass index (BMI) of 36.0 to 36.9 in adult    History of gout    Tobacco dependence in remission    Elevated LFTs    Hyperlipidemia associated with type 2 diabetes mellitus    Fatty liver    Redness and swelling of thigh    DKA (diabetic ketoacidosis)    Leukocytosis    ANGE (acute kidney injury)    Elevated d-dimer    Hyperkalemia    Left leg pain    Hematoma of left thigh       Review of patient's allergies indicates:  No Known Allergies    No current facility-administered medications on file prior to encounter.     Current Outpatient Medications on File Prior to Encounter   Medication Sig Dispense Refill    atorvastatin (LIPITOR) 20 MG tablet Take 1 tablet (20 mg total) by mouth once daily. 90 tablet 3    empagliflozin (JARDIANCE) 25 mg tablet Take 1 tablet (25 mg total) by mouth once daily. 90 tablet 3    lisinopriL (PRINIVIL,ZESTRIL) 20 MG tablet Take 1 tablet (20 mg total) by mouth once daily. 90 tablet 3    blood sugar diagnostic Strp 1 strip by Misc.(Non-Drug; Combo Route) route once daily. 100 each 3    blood-glucose meter kit Use as instructed 1 each 0    dulaglutide (TRULICITY) 3 mg/0.5 mL pen injector Inject 3 mg into the skin every 7 days. 12 pen 3    insulin (LANTUS SOLOSTAR U-100 INSULIN) glargine 100 units/mL (3mL) SubQ pen Inject 20 Units into the skin once daily. 18 mL 0    lancets Misc 1 lancet by  Misc.(Non-Drug; Combo Route) route once daily. 100 each 3    metFORMIN (GLUCOPHAGE-XR) 500 MG ER 24hr tablet Take 1 tablet (500 mg total) by mouth 2 (two) times daily with meals. 180 tablet 3       Past Surgical History:   Procedure Laterality Date    WRIST SURGERY Left      CBC:   Recent Labs     07/28/22  0354 07/29/22  0336   WBC 14.29* 11.47   RBC 4.28* 3.97*   HGB 13.6* 12.6*   HCT 40.1 36.4*    233   MCV 94 92   MCH 31.8* 31.7*   MCHC 33.9 34.6       CMP:   Recent Labs     07/28/22  0354 07/28/22  0559 07/29/22  0023 07/29/22  0148 07/29/22  0336 07/29/22  0603   *   < >  --    < > 131* 131*   K 3.9   < >  --    < > 3.9 3.9      < >  --    < > 103 103   CO2 8*   < >  --    < > 13* 12*   BUN 13   < >  --    < > 10 10   CREATININE 1.0   < >  --    < > 0.8 0.7   *   < >  --    < > 193* 180*   MG 2.1   < > 2.0  --  1.9  --    PHOS 2.2*   < > 2.9  --  2.9  --    CALCIUM 9.0   < >  --    < > 9.0 9.1   ALBUMIN 1.9*  --   --   --  1.7*  --    PROT 6.7  --   --   --  6.3  --    ALKPHOS 82  --   --   --  87  --    ALT 10  --   --   --  13  --    AST 10  --   --   --  11  --    BILITOT 0.4  --   --   --  0.5  --     < > = values in this interval not displayed.       INR  Recent Labs     07/26/22  2044 07/29/22  0023   INR 1.1 1.1       Pre-op Assessment    I have reviewed the Patient Summary Reports.     I have reviewed the Nursing Notes.    I have reviewed the Medications.     Review of Systems  Anesthesia Hx:  No problems with previous Anesthesia Denies Hx of Anesthetic complications  History of prior surgery of interest to airway management or planning: Denies Family Hx of Anesthesia complications.   Denies Personal Hx of Anesthesia complications.   Social:  Former Smoker, Alcohol Use    Hematology/Oncology:  Hematology Normal   Oncology Normal     EENT/Dental:EENT/Dental Normal   Cardiovascular:   Exercise tolerance: good Hypertension hyperlipidemia    Pulmonary:  Pulmonary Normal     Renal/:   Chronic Renal Disease, ARF    Hepatic/GI:   Liver Disease,    Neurological:  Neurology Normal    Endocrine:   Diabetes, poorly controlled, type 2 Pt presented in DKA   Psych:  Psychiatric Normal              Anesthesia Plan  Type of Anesthesia, risks & benefits discussed:    Anesthesia Type: Gen ETT  Intra-op Monitoring Plan: Standard ASA Monitors  Post Op Pain Control Plan: multimodal analgesia and IV/PO Opioids PRN  Induction:  IV  Airway Plan: Direct, Post-Induction  Informed Consent: Informed consent signed with the Patient and all parties understand the risks and agree with anesthesia plan.  All questions answered. Patient consented to blood products? No  ASA Score: 3  Day of Surgery Review of History & Physical: H&P Update referred to the surgeon/provider.    Ready For Surgery From Anesthesia Perspective.     .

## 2022-07-29 NOTE — NURSING
Pt left floor with anesthesia team to go to OR for irrigation and debridement of left thigh abscess, on room air, surgery and anesthesia consents obtained.

## 2022-07-29 NOTE — PROGRESS NOTES
"[facility] - [facility]  Critical Care Medicine  Progress Note    Patient Name: Wilfredo Thomas  MRN: [id number]  Admission Date: 7/26/2022  Hospital Length of Stay: 3 days  Code Status: Full Code  Attending Provider: Pantera Bundy*  Primary Care Provider: Aylin Wayne DO   Principal Problem: DKA (diabetic ketoacidosis)    Subjective:     HPI:  46 y/o M hx of HTN, IDDM2, GERD obesity, HLD presented to the ED with complaint of 1 day of worsening SOB. Wife present at bedside. Patient states that he noticed yesterday he was feeling some shortness of breath and couldn't seem to catch his breath. His wife went to check on him this morning and noticed that he was breathing "fast and heavy" and he sounded like he was slurring his words. Wife was concerned about a stroke, so he brought him to the ED for evaluation.    Patient also reporting significant pain and swelling along his left lateral proximal thigh. He states he has noticed worsening pain over the past 2 weeks. He denies any acute trauma to the area. He was seen by ortho last week and was told to take ibu-profen for a muscle strain. This did not help his pain, so he presented again on 7/22 and was given a prednisone injection into his thigh. This also did not help his pain, and now the pain and swelling have advanced to the point that he has difficulty with ambulation. Patient denies fever, chills, nausea, vomiting.     Of note, patient has hx of poorly controlled diabetes. He was recently started on insulin by his PCP, but he has not taken any insulin since being prescribed it.     In the ED, patient hypertensive and tachycardic to the 130s. Tachypneic with RR in the 40s. On CBC, WBC 26.9, On CMP patient hyperkalemic to 5.5, CorNa 136, Bicarb 5. Cr elevated to 1.9 from BL 0.83. UA, BHB pending at time of admission. CTA without evidence of pulmonary embolism. Critical Care Medicine consulted given severe acidosis.       Hospital/ICU " Course:  Patient admitted to the MICU for management of severe DKA. Metabolic acidosis improving on insulin drip. He also has had left thigh pain for 1-2 months. There is a large mass on his left thigh that is tender to palpation, CT revealed hematoma vs soft tissue growth. General surgery consulted and recommended IR consult. IR consulted, no indication for tap. 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 Ortho consulted and performed bedside aspiration of possible abscess which revealed >200K cells >80% segs. Will hold off on antibiotics for now per ortho recs, ortho is taking patient to the OR for irrigation of the thigh, will start antibiotics afterwards.       Interval History/Significant Events: Metabolic acidosis slowly improving. Ortho taking patient to the OR for irrigation of the left thigh, will start antibiotics afterwards.     Review of Systems   Constitutional:  Negative for appetite change, chills, fatigue and fever.   HENT:  Negative for sore throat and trouble swallowing.    Respiratory:  Negative for cough and shortness of breath.    Cardiovascular:  Negative for chest pain.   Gastrointestinal:  Negative for abdominal pain, nausea and vomiting.   Genitourinary:  Negative for dysuria.   Musculoskeletal:  Positive for myalgias (Left thigh pain).   Skin:  Negative for color change.   Neurological:  Negative for dizziness, weakness and headaches.   Psychiatric/Behavioral:  Negative for confusion.    Objective:     Vital Signs (Most Recent):  Temp: 98.5 °F (36.9 °C) (07/29/22 1101)  Pulse: 101 (07/29/22 1101)  Resp: (!) 26 (07/29/22 1107)  BP: 129/83 (07/29/22 1101)  SpO2: 97 % (07/29/22 1101)   Vital Signs (24h Range):  Temp:  [97.9 °F (36.6 °C)-99 °F (37.2 °C)] 98.5 °F (36.9 °C)  Pulse:  [] 101  Resp:  [18-31] 26  SpO2:  [97 %-99 %] 97 %  BP: (107-135)/(72-89) 129/83   Weight: 94.5 kg (208 lb 5.4 oz)  Body mass index is 32.63  kg/m².      Intake/Output Summary (Last 24 hours) at 7/29/2022 1217  Last data filed at 7/29/2022 1100  Gross per 24 hour   Intake 4279.37 ml   Output 2000 ml   Net 2279.37 ml       Physical Exam  Constitutional:       Appearance: Normal appearance.   HENT:      Head: Normocephalic and atraumatic.      Mouth/Throat:      Mouth: Mucous membranes are moist.      Pharynx: Oropharynx is clear.   Eyes:      Extraocular Movements: Extraocular movements intact.      Pupils: Pupils are equal, round, and reactive to light.   Cardiovascular:      Rate and Rhythm: Normal rate and regular rhythm.      Pulses: Normal pulses.      Heart sounds: Normal heart sounds.   Pulmonary:      Effort: Pulmonary effort is normal.      Breath sounds: Normal breath sounds.   Abdominal:      General: Abdomen is flat.      Palpations: Abdomen is soft.      Tenderness: There is no abdominal tenderness.   Musculoskeletal:         General: Swelling (Large left thigh hematoma, tender to palpation. Erythema increasing in size.) and tenderness present. Normal range of motion.      Cervical back: Normal range of motion and neck supple.      Right lower leg: No edema.      Left lower leg: No edema.   Skin:     General: Skin is warm and dry.   Neurological:      General: No focal deficit present.      Mental Status: He is alert and oriented to person, place, and time.   Psychiatric:         Mood and Affect: Mood normal.         Behavior: Behavior normal.       Vents:     Lines/Drains/Airways       Peripheral Intravenous Line  Duration                  Peripheral IV - Single Lumen 07/26/22 20 G Left Antecubital 3 days         Peripheral IV - Single Lumen 07/26/22 2319 22 G Left Hand 2 days         Peripheral IV - Single Lumen 07/26/22 2333 20 G Right Hand 2 days         Peripheral IV - Single Lumen 07/28/22 2000 20 G Distal;Posterior;Right Forearm <1 day                  Significant Labs:    CBC/Anemia Profile:  Recent Labs   Lab 07/28/22  7177  07/29/22  0336   WBC 14.29* 11.47   HGB 13.6* 12.6*   HCT 40.1 36.4*    233   MCV 94 92   RDW 13.3 12.9        Chemistries:  Recent Labs   Lab 07/28/22  0354 07/28/22  0559 07/29/22  0023 07/29/22  0148 07/29/22  0336 07/29/22  0603 07/29/22  1003   *   < >  --    < > 131* 131* 133*   K 3.9   < >  --    < > 3.9 3.9 3.7      < >  --    < > 103 103 106   CO2 8*   < >  --    < > 13* 12* 12*   BUN 13   < >  --    < > 10 10 8   CREATININE 1.0   < >  --    < > 0.8 0.7 0.6   CALCIUM 9.0   < >  --    < > 9.0 9.1 8.4*   ALBUMIN 1.9*  --   --   --  1.7*  --   --    PROT 6.7  --   --   --  6.3  --   --    BILITOT 0.4  --   --   --  0.5  --   --    ALKPHOS 82  --   --   --  87  --   --    ALT 10  --   --   --  13  --   --    AST 10  --   --   --  11  --   --    MG 2.1   < > 2.0  --  1.9  --  1.7   PHOS 2.2*   < > 2.9  --  2.9  --  2.8    < > = values in this interval not displayed.       All pertinent labs within the past 24 hours have been reviewed.    Significant Imaging:  I have reviewed all pertinent imaging results/findings within the past 24 hours.      ABG  Recent Labs   Lab 07/26/22  2248   PH 6.977*   PO2 38*   PCO2 27.9*   HCO3 6.5*   BE -25     Assessment/Plan:     Cardiac/Vascular  Hyperlipidemia associated with type 2 diabetes mellitus  On home atorvastatin.  - continue home statin    Renal/  Hyperkalemia  Resolved     ANGE (acute kidney injury)  Baseline Cr 0.7-0.9.  Likely prerenal in setting of DKA.    - Back to baseline after IVF   - Monitor BMP's     Hematology  Elevated d-dimer  On RA with increased WOB.  D-dimer elevated to 1.46.  - CTA PE negative    Oncology  Leukocytosis  WBC 26.87     - Trending down   - Initially thought to be due to recent steroid use but left thigh mass concerning for infection   - Will start Vanc after irrigation of left thigh by ortho     Endocrine  * DKA (diabetic ketoacidosis)  47M with history of DM2, gout, HTN, tobacco use who initially presented for dyspnea,  increased thirst, decreased PO intake, found to be in DKA.  Recent history of steroids for leg pain.  On home dulaglutide, empagliflozin, metformin, insulin glargine 20u qhs (has not been taking).  Recent a1c 11.2%.  , bicarb 5, AG 26, BHB 5.9 on admit.      - Insulin drip   - q1h POCT glu checks while on drip  - D5 1/2 NS with 20 meq K   - BMP q4h  - diabetic diet when off insulin drip     Class 2 severe obesity due to excess calories with serious comorbidity and body mass index (BMI) of 36.0 to 36.9 in adult  General weight loss/lifestyle modification strategies discussed (elicit support from others; identify saboteurs; non-food rewards, etc).      Hypertension associated with type 2 diabetes mellitus  On home lisinopril 20.  - hold ACEi in setting of ANGE    Type 2 diabetes mellitus with hyperglycemia, with long-term current use of insulin  See DKA    Orthopedic  Hematoma of left thigh  - MRI with grade III tear of tension fascia jose luis with large hematoma   - Ortho consulted, aspiration concerning for infection, OR on 7/29 for irrigation   - Will start Vanc after irrigation       Redness and swelling of thigh  Received steroid injection, steroid taper for leg pain by orthopedics.  Left lateral/proximal thigh with painful localized swelling, hot to touch. Patient received vanc/zosyn in ED  - CTA LE, U/s LLE pending  - will continue Vanc/zosyn on admission     Critical Care Daily Checklist:    A: Awake: RASS Goal/Actual Goal:    Actual: Meza Agitation Sedation Scale (RASS): Alert and calm   B: Spontaneous Breathing Trial Performed?     C: SAT & SBT Coordinated?  NA                      D: Delirium: CAM-ICU Overall CAM-ICU: Negative   E: Early Mobility Performed? No   F: Feeding Goal:    Status:     Current Diet Order   Procedures    Diet NPO     Specify start time      AS: Analgesia/Sedation Y   T: Thromboembolic Prophylaxis Y   H: HOB > 300 Yes   U: Stress Ulcer Prophylaxis (if needed) Y   G: Glucose  Control Y   B: Bowel Function Stool Occurrence: 1   I: Indwelling Catheter (Lines & Hull) Necessity Y   D: De-escalation of Antimicrobials/Pharmacotherapies Y    Plan for the day/ETD OR today for thigh irrigation     Code Status:  Family/Goals of Care: Full Code         Critical secondary to Patient has a condition that poses threat to life and bodily function: DKA with severe metabolic acidosis       Critical care was time spent personally by me on the following activities: development of treatment plan with patient or surrogate and bedside caregivers, discussions with consultants, evaluation of patient's response to treatment, examination of patient, ordering and performing treatments and interventions, ordering and review of laboratory studies, ordering and review of radiographic studies, pulse oximetry, re-evaluation of patient's condition. This critical care time did not overlap with that of any other provider or involve time for any procedures.     Catalino Bennett MD  Critical Care Medicine  [facility] - Cardiac Medical ICU

## 2022-07-29 NOTE — TRANSFER OF CARE
"Anesthesia Transfer of Care Note    Patient: Wilfredo Thomas    Procedure(s) Performed: Procedure(s) (LRB):  IRRIGATION AND DEBRIDEMENT, LOWER EXTREMITY (Left)    Patient location: ICU    Anesthesia Type: general    Transport from OR: Transported from OR on 6-10 L/min O2 by face mask with adequate spontaneous ventilation    Post pain: adequate analgesia    Post assessment: no apparent anesthetic complications    Post vital signs: stable    Level of consciousness: awake, alert and responds to stimulation    Nausea/Vomiting: no nausea/vomiting    Complications: none    Transfer of care protocol was followed      Last vitals:   Visit Vitals  /83 (BP Location: Right arm, Patient Position: Lying)   Pulse 101   Temp 36.9 °C (98.5 °F) (Axillary)   Resp (!) 26   Ht 5' 7" (1.702 m)   Wt 94.5 kg (208 lb 5.4 oz)   SpO2 97%   BMI 32.63 kg/m²     "

## 2022-07-30 PROBLEM — M79.89 REDNESS AND SWELLING OF THIGH: Status: RESOLVED | Noted: 2022-07-26 | Resolved: 2022-07-30

## 2022-07-30 PROBLEM — E87.1 HYPONATREMIA: Status: ACTIVE | Noted: 2022-07-30

## 2022-07-30 PROBLEM — R23.8 REDNESS AND SWELLING OF THIGH: Status: RESOLVED | Noted: 2022-07-26 | Resolved: 2022-07-30

## 2022-07-30 PROBLEM — D64.9 ANEMIA: Status: ACTIVE | Noted: 2022-07-30

## 2022-07-30 PROBLEM — E87.6 HYPOKALEMIA: Status: ACTIVE | Noted: 2022-07-30

## 2022-07-30 LAB
ALBUMIN SERPL BCP-MCNC: 1.6 G/DL (ref 3.5–5.2)
ALP SERPL-CCNC: 71 U/L (ref 55–135)
ALT SERPL W/O P-5'-P-CCNC: 9 U/L (ref 10–44)
ANION GAP SERPL CALC-SCNC: 13 MMOL/L (ref 8–16)
ANION GAP SERPL CALC-SCNC: 14 MMOL/L (ref 8–16)
ANION GAP SERPL CALC-SCNC: 14 MMOL/L (ref 8–16)
ANION GAP SERPL CALC-SCNC: 15 MMOL/L (ref 8–16)
ANION GAP SERPL CALC-SCNC: 15 MMOL/L (ref 8–16)
ANISOCYTOSIS BLD QL SMEAR: SLIGHT
AST SERPL-CCNC: 12 U/L (ref 10–40)
BASO STIPL BLD QL SMEAR: ABNORMAL
BASOPHILS # BLD AUTO: ABNORMAL K/UL (ref 0–0.2)
BASOPHILS NFR BLD: 0 % (ref 0–1.9)
BILIRUB SERPL-MCNC: 0.5 MG/DL (ref 0.1–1)
BUN SERPL-MCNC: 8 MG/DL (ref 6–20)
CALCIUM SERPL-MCNC: 8.3 MG/DL (ref 8.7–10.5)
CALCIUM SERPL-MCNC: 8.3 MG/DL (ref 8.7–10.5)
CALCIUM SERPL-MCNC: 8.5 MG/DL (ref 8.7–10.5)
CALCIUM SERPL-MCNC: 8.5 MG/DL (ref 8.7–10.5)
CALCIUM SERPL-MCNC: 9 MG/DL (ref 8.7–10.5)
CHLORIDE SERPL-SCNC: 101 MMOL/L (ref 95–110)
CHLORIDE SERPL-SCNC: 101 MMOL/L (ref 95–110)
CHLORIDE SERPL-SCNC: 102 MMOL/L (ref 95–110)
CHLORIDE SERPL-SCNC: 97 MMOL/L (ref 95–110)
CHLORIDE SERPL-SCNC: 99 MMOL/L (ref 95–110)
CO2 SERPL-SCNC: 15 MMOL/L (ref 23–29)
CO2 SERPL-SCNC: 16 MMOL/L (ref 23–29)
CO2 SERPL-SCNC: 17 MMOL/L (ref 23–29)
CREAT SERPL-MCNC: 0.6 MG/DL (ref 0.5–1.4)
CREAT SERPL-MCNC: 0.6 MG/DL (ref 0.5–1.4)
CREAT SERPL-MCNC: 0.7 MG/DL (ref 0.5–1.4)
DIFFERENTIAL METHOD: ABNORMAL
EOSINOPHIL # BLD AUTO: ABNORMAL K/UL (ref 0–0.5)
EOSINOPHIL NFR BLD: 0.7 % (ref 0–8)
ERYTHROCYTE [DISTWIDTH] IN BLOOD BY AUTOMATED COUNT: 12.8 % (ref 11.5–14.5)
EST. GFR  (AFRICAN AMERICAN): >60 ML/MIN/1.73 M^2
EST. GFR  (NON AFRICAN AMERICAN): >60 ML/MIN/1.73 M^2
GLUCOSE SERPL-MCNC: 160 MG/DL (ref 70–110)
GLUCOSE SERPL-MCNC: 171 MG/DL (ref 70–110)
GLUCOSE SERPL-MCNC: 221 MG/DL (ref 70–110)
GLUCOSE SERPL-MCNC: 230 MG/DL (ref 70–110)
GLUCOSE SERPL-MCNC: 340 MG/DL (ref 70–110)
HCT VFR BLD AUTO: 35 % (ref 40–54)
HGB BLD-MCNC: 11.9 G/DL (ref 14–18)
IMM GRANULOCYTES # BLD AUTO: ABNORMAL K/UL (ref 0–0.04)
IMM GRANULOCYTES NFR BLD AUTO: ABNORMAL % (ref 0–0.5)
LYMPHOCYTES # BLD AUTO: ABNORMAL K/UL (ref 1–4.8)
LYMPHOCYTES NFR BLD: 6.6 % (ref 18–48)
MAGNESIUM SERPL-MCNC: 1.8 MG/DL (ref 1.6–2.6)
MAGNESIUM SERPL-MCNC: 1.9 MG/DL (ref 1.6–2.6)
MCH RBC QN AUTO: 31.2 PG (ref 27–31)
MCHC RBC AUTO-ENTMCNC: 34 G/DL (ref 32–36)
MCV RBC AUTO: 92 FL (ref 82–98)
METAMYELOCYTES NFR BLD MANUAL: 0.7 %
MONOCYTES # BLD AUTO: ABNORMAL K/UL (ref 0.3–1)
MONOCYTES NFR BLD: 5.3 % (ref 4–15)
MYELOCYTES NFR BLD MANUAL: 0.7 %
NEUTROPHILS # BLD AUTO: ABNORMAL K/UL (ref 1.8–7.7)
NEUTROPHILS NFR BLD: 83.3 % (ref 38–73)
NEUTS BAND NFR BLD MANUAL: 2.7 %
NRBC BLD-RTO: 0 /100 WBC
PHOSPHATE SERPL-MCNC: 3.5 MG/DL (ref 2.7–4.5)
PHOSPHATE SERPL-MCNC: 3.6 MG/DL (ref 2.7–4.5)
PLATELET # BLD AUTO: 227 K/UL (ref 150–450)
PLATELET BLD QL SMEAR: ABNORMAL
PMV BLD AUTO: 8.8 FL (ref 9.2–12.9)
POCT GLUCOSE: 157 MG/DL (ref 70–110)
POCT GLUCOSE: 167 MG/DL (ref 70–110)
POCT GLUCOSE: 173 MG/DL (ref 70–110)
POCT GLUCOSE: 188 MG/DL (ref 70–110)
POCT GLUCOSE: 209 MG/DL (ref 70–110)
POCT GLUCOSE: 212 MG/DL (ref 70–110)
POCT GLUCOSE: 213 MG/DL (ref 70–110)
POCT GLUCOSE: 227 MG/DL (ref 70–110)
POCT GLUCOSE: 244 MG/DL (ref 70–110)
POCT GLUCOSE: 244 MG/DL (ref 70–110)
POCT GLUCOSE: 252 MG/DL (ref 70–110)
POLYCHROMASIA BLD QL SMEAR: ABNORMAL
POTASSIUM SERPL-SCNC: 3.2 MMOL/L (ref 3.5–5.1)
POTASSIUM SERPL-SCNC: 3.4 MMOL/L (ref 3.5–5.1)
POTASSIUM SERPL-SCNC: 3.8 MMOL/L (ref 3.5–5.1)
POTASSIUM SERPL-SCNC: 4 MMOL/L (ref 3.5–5.1)
POTASSIUM SERPL-SCNC: 4 MMOL/L (ref 3.5–5.1)
PROT SERPL-MCNC: 5.8 G/DL (ref 6–8.4)
RBC # BLD AUTO: 3.81 M/UL (ref 4.6–6.2)
SODIUM SERPL-SCNC: 128 MMOL/L (ref 136–145)
SODIUM SERPL-SCNC: 128 MMOL/L (ref 136–145)
SODIUM SERPL-SCNC: 130 MMOL/L (ref 136–145)
SODIUM SERPL-SCNC: 131 MMOL/L (ref 136–145)
SODIUM SERPL-SCNC: 132 MMOL/L (ref 136–145)
TOXIC GRANULES BLD QL SMEAR: PRESENT
VANCOMYCIN TROUGH SERPL-MCNC: 9.3 UG/ML (ref 10–22)
WBC # BLD AUTO: 11.2 K/UL (ref 3.9–12.7)
WBC TOXIC VACUOLES BLD QL SMEAR: PRESENT

## 2022-07-30 PROCEDURE — 83735 ASSAY OF MAGNESIUM: CPT | Mod: 91

## 2022-07-30 PROCEDURE — 63600175 PHARM REV CODE 636 W HCPCS: Performed by: INTERNAL MEDICINE

## 2022-07-30 PROCEDURE — 80202 ASSAY OF VANCOMYCIN: CPT | Performed by: INTERNAL MEDICINE

## 2022-07-30 PROCEDURE — 80048 BASIC METABOLIC PNL TOTAL CA: CPT | Mod: 91,XB | Performed by: STUDENT IN AN ORGANIZED HEALTH CARE EDUCATION/TRAINING PROGRAM

## 2022-07-30 PROCEDURE — 25000003 PHARM REV CODE 250: Performed by: STUDENT IN AN ORGANIZED HEALTH CARE EDUCATION/TRAINING PROGRAM

## 2022-07-30 PROCEDURE — 83735 ASSAY OF MAGNESIUM: CPT

## 2022-07-30 PROCEDURE — 99233 SBSQ HOSP IP/OBS HIGH 50: CPT | Mod: ,,, | Performed by: INTERNAL MEDICINE

## 2022-07-30 PROCEDURE — 25000003 PHARM REV CODE 250

## 2022-07-30 PROCEDURE — 84100 ASSAY OF PHOSPHORUS: CPT

## 2022-07-30 PROCEDURE — 84100 ASSAY OF PHOSPHORUS: CPT | Mod: 91

## 2022-07-30 PROCEDURE — 80048 BASIC METABOLIC PNL TOTAL CA: CPT | Mod: 91,XB

## 2022-07-30 PROCEDURE — C9399 UNCLASSIFIED DRUGS OR BIOLOG: HCPCS

## 2022-07-30 PROCEDURE — 80048 BASIC METABOLIC PNL TOTAL CA: CPT | Mod: 91,XB | Performed by: INTERNAL MEDICINE

## 2022-07-30 PROCEDURE — 94761 N-INVAS EAR/PLS OXIMETRY MLT: CPT

## 2022-07-30 PROCEDURE — 99233 PR SUBSEQUENT HOSPITAL CARE,LEVL III: ICD-10-PCS | Mod: ,,, | Performed by: INTERNAL MEDICINE

## 2022-07-30 PROCEDURE — 85007 BL SMEAR W/DIFF WBC COUNT: CPT

## 2022-07-30 PROCEDURE — 25000003 PHARM REV CODE 250: Performed by: INTERNAL MEDICINE

## 2022-07-30 PROCEDURE — 85027 COMPLETE CBC AUTOMATED: CPT

## 2022-07-30 PROCEDURE — 63600175 PHARM REV CODE 636 W HCPCS: Performed by: STUDENT IN AN ORGANIZED HEALTH CARE EDUCATION/TRAINING PROGRAM

## 2022-07-30 PROCEDURE — 80053 COMPREHEN METABOLIC PANEL: CPT

## 2022-07-30 PROCEDURE — 99900035 HC TECH TIME PER 15 MIN (STAT)

## 2022-07-30 PROCEDURE — 63600175 PHARM REV CODE 636 W HCPCS

## 2022-07-30 PROCEDURE — 20000000 HC ICU ROOM

## 2022-07-30 RX ORDER — INSULIN ASPART 100 [IU]/ML
8 INJECTION, SOLUTION INTRAVENOUS; SUBCUTANEOUS
Status: DISCONTINUED | OUTPATIENT
Start: 2022-07-31 | End: 2022-08-01

## 2022-07-30 RX ORDER — IBUPROFEN 200 MG
24 TABLET ORAL
Status: DISCONTINUED | OUTPATIENT
Start: 2022-07-30 | End: 2022-08-24 | Stop reason: HOSPADM

## 2022-07-30 RX ORDER — IBUPROFEN 200 MG
16 TABLET ORAL
Status: DISCONTINUED | OUTPATIENT
Start: 2022-07-30 | End: 2022-08-24 | Stop reason: HOSPADM

## 2022-07-30 RX ORDER — INSULIN ASPART 100 [IU]/ML
1-10 INJECTION, SOLUTION INTRAVENOUS; SUBCUTANEOUS
Status: DISCONTINUED | OUTPATIENT
Start: 2022-07-30 | End: 2022-08-24 | Stop reason: HOSPADM

## 2022-07-30 RX ORDER — POTASSIUM CHLORIDE 20 MEQ/1
40 TABLET, EXTENDED RELEASE ORAL ONCE
Status: DISCONTINUED | OUTPATIENT
Start: 2022-07-30 | End: 2022-07-30

## 2022-07-30 RX ORDER — GLUCAGON 1 MG
1 KIT INJECTION
Status: DISCONTINUED | OUTPATIENT
Start: 2022-07-30 | End: 2022-08-24 | Stop reason: HOSPADM

## 2022-07-30 RX ORDER — INSULIN ASPART 100 [IU]/ML
4 INJECTION, SOLUTION INTRAVENOUS; SUBCUTANEOUS
Status: DISCONTINUED | OUTPATIENT
Start: 2022-07-30 | End: 2022-07-30

## 2022-07-30 RX ORDER — POTASSIUM CHLORIDE 20 MEQ/1
40 TABLET, EXTENDED RELEASE ORAL ONCE
Status: COMPLETED | OUTPATIENT
Start: 2022-07-30 | End: 2022-07-30

## 2022-07-30 RX ADMIN — ATORVASTATIN CALCIUM 20 MG: 20 TABLET, FILM COATED ORAL at 08:07

## 2022-07-30 RX ADMIN — INSULIN ASPART 2 UNITS: 100 INJECTION, SOLUTION INTRAVENOUS; SUBCUTANEOUS at 10:07

## 2022-07-30 RX ADMIN — INSULIN ASPART 4 UNITS: 100 INJECTION, SOLUTION INTRAVENOUS; SUBCUTANEOUS at 05:07

## 2022-07-30 RX ADMIN — POTASSIUM CHLORIDE: 29.8 INJECTION, SOLUTION INTRAVENOUS at 12:07

## 2022-07-30 RX ADMIN — PIPERACILLIN SODIUM AND TAZOBACTAM SODIUM 4.5 G: 4; .5 INJECTION, POWDER, LYOPHILIZED, FOR SOLUTION INTRAVENOUS at 06:07

## 2022-07-30 RX ADMIN — PIPERACILLIN SODIUM AND TAZOBACTAM SODIUM 4.5 G: 4; .5 INJECTION, POWDER, LYOPHILIZED, FOR SOLUTION INTRAVENOUS at 10:07

## 2022-07-30 RX ADMIN — TACROLIMUS 900 MG: 0.5 CAPSULE ORAL at 09:07

## 2022-07-30 RX ADMIN — INSULIN DETEMIR 12 UNITS: 100 INJECTION, SOLUTION SUBCUTANEOUS at 10:07

## 2022-07-30 RX ADMIN — OXYCODONE 5 MG: 5 TABLET ORAL at 03:07

## 2022-07-30 RX ADMIN — PIPERACILLIN SODIUM AND TAZOBACTAM SODIUM 4.5 G: 4; .5 INJECTION, POWDER, LYOPHILIZED, FOR SOLUTION INTRAVENOUS at 02:07

## 2022-07-30 RX ADMIN — MORPHINE SULFATE 3 MG: 2 INJECTION, SOLUTION INTRAMUSCULAR; INTRAVENOUS at 11:07

## 2022-07-30 RX ADMIN — OXYCODONE 5 MG: 5 TABLET ORAL at 09:07

## 2022-07-30 RX ADMIN — POTASSIUM CHLORIDE 40 MEQ: 1500 TABLET, EXTENDED RELEASE ORAL at 11:07

## 2022-07-30 RX ADMIN — OXYCODONE 5 MG: 5 TABLET ORAL at 08:07

## 2022-07-30 RX ADMIN — MORPHINE SULFATE 3 MG: 2 INJECTION, SOLUTION INTRAMUSCULAR; INTRAVENOUS at 06:07

## 2022-07-30 RX ADMIN — INSULIN ASPART 4 UNITS: 100 INJECTION, SOLUTION INTRAVENOUS; SUBCUTANEOUS at 11:07

## 2022-07-30 RX ADMIN — VANCOMYCIN HYDROCHLORIDE 1750 MG: 500 INJECTION, POWDER, LYOPHILIZED, FOR SOLUTION INTRAVENOUS at 03:07

## 2022-07-30 RX ADMIN — INSULIN ASPART 4 UNITS: 100 INJECTION, SOLUTION INTRAVENOUS; SUBCUTANEOUS at 07:07

## 2022-07-30 RX ADMIN — TACROLIMUS 900 MG: 0.5 CAPSULE ORAL at 05:07

## 2022-07-30 RX ADMIN — INSULIN DETEMIR 10 UNITS: 100 INJECTION, SOLUTION SUBCUTANEOUS at 04:07

## 2022-07-30 RX ADMIN — VANCOMYCIN HYDROCHLORIDE 1750 MG: 500 INJECTION, POWDER, LYOPHILIZED, FOR SOLUTION INTRAVENOUS at 05:07

## 2022-07-30 RX ADMIN — TACROLIMUS 900 MG: 0.5 CAPSULE ORAL at 01:07

## 2022-07-30 RX ADMIN — POTASSIUM BICARBONATE 50 MEQ: 978 TABLET, EFFERVESCENT ORAL at 06:07

## 2022-07-30 NOTE — ASSESSMENT & PLAN NOTE
Patient's with Normocytic anemia.. Hemoglobin stable. Etiology likely due to chronic disease .  Current CBC reviewed-  Recent Labs   Lab 07/28/22  0354 07/29/22  0336 07/30/22  0311   HGB 13.6* 12.6* 11.9*     Monitor CBC and transfuse if H/H drops below 7/21.

## 2022-07-30 NOTE — ASSESSMENT & PLAN NOTE
47M with history of DM2, gout, HTN, tobacco use who initially presented for dyspnea, increased thirst, decreased PO intake, found to be in DKA.  Recent history of steroids for leg pain.  On home dulaglutide, empagliflozin, metformin, insulin glargine 20u qhs (has not been taking).  Recent a1c 11.2%.  , bicarb 5, AG 26, BHB 5.9 on admit.       - Insulin drip   - q1h POCT glu checks while on drip  - D5 1/2 NS with 20 meq K   - BMP q4h  - diabetic diet when off insulin drip     7/30  . Bicarb 15. K replaced . off insulin drip on SQ insulin. endocrine consulted  7/31 endocrine consulted.   Bicarbonate WNL . Glucose in 300s

## 2022-07-30 NOTE — ASSESSMENT & PLAN NOTE
47M with history of DM2, gout, HTN, tobacco use who initially presented for dyspnea, increased thirst, decreased PO intake, found to be in DKA.  Recent history of steroids for leg pain.  On home dulaglutide, empagliflozin, metformin, insulin glargine 20u qhs (has not been taking).  Recent a1c 11.2%.  , bicarb 5, AG 26, BHB 5.9 on admit.      - Transitioned off insulin drip   - On Detemir 10 BID and aspart 4 TIDWM  - SSI    - diabetic diet

## 2022-07-30 NOTE — ASSESSMENT & PLAN NOTE
WBC 26.87 on admission     - S/P irrigation of left thigh hematoma/infection, drain in place   - Continue broad spectrum antibiotics    done

## 2022-07-30 NOTE — ASSESSMENT & PLAN NOTE
MRI left thig-  Ruptured tensor fascia jose luis (TFL) muscle with associated large hematoma,   s/p orthopedic evaluation .aspiration showed  purulence. s/p I&D on 7/29 7/31  abscess with Calcium pyrophosphate crystals with unknown significance. . aerobic culture 7/28 STAPHYLOCOCCUS SPECIES .

## 2022-07-30 NOTE — ASSESSMENT & PLAN NOTE
Body mass index is 32.63 kg/m². Morbid obesity complicates all aspects of disease management from diagnostic modalities to treatment. Weight loss encouraged and health benefits explained to patient.

## 2022-07-30 NOTE — SUBJECTIVE & OBJECTIVE
Interval History/Significant Events: NAEON. Patients left thigh pain improving. SS fluid coming from left thigh drain.     Review of Systems   Constitutional:  Negative for appetite change, chills, fatigue and fever.   HENT:  Negative for sore throat and trouble swallowing.    Respiratory:  Negative for cough and shortness of breath.    Cardiovascular:  Negative for chest pain.   Gastrointestinal:  Negative for abdominal pain, nausea and vomiting.   Genitourinary:  Negative for dysuria.   Musculoskeletal:  Positive for myalgias (Left thigh pain).   Skin:  Negative for color change.   Neurological:  Negative for dizziness, weakness and headaches.   Psychiatric/Behavioral:  Negative for confusion.    Objective:     Vital Signs (Most Recent):  Temp: 98.8 °F (37.1 °C) (07/30/22 1500)  Pulse: 89 (07/30/22 1628)  Resp: (!) 22 (07/30/22 1628)  BP: (!) 154/90 (07/30/22 1600)  SpO2: 98 % (07/30/22 1628)   Vital Signs (24h Range):  Temp:  [98.2 °F (36.8 °C)-99 °F (37.2 °C)] 98.8 °F (37.1 °C)  Pulse:  [] 89  Resp:  [15-31] 22  SpO2:  [94 %-99 %] 98 %  BP: (100-154)/(75-96) 154/90   Weight: 94.5 kg (208 lb 5.4 oz)  Body mass index is 32.63 kg/m².      Intake/Output Summary (Last 24 hours) at 7/30/2022 1650  Last data filed at 7/30/2022 1600  Gross per 24 hour   Intake 4227.41 ml   Output 3815 ml   Net 412.41 ml       Physical Exam  Constitutional:       Appearance: Normal appearance.   HENT:      Head: Normocephalic and atraumatic.      Mouth/Throat:      Mouth: Mucous membranes are moist.      Pharynx: Oropharynx is clear.   Eyes:      Extraocular Movements: Extraocular movements intact.      Pupils: Pupils are equal, round, and reactive to light.   Cardiovascular:      Rate and Rhythm: Normal rate and regular rhythm.      Pulses: Normal pulses.      Heart sounds: Normal heart sounds.   Pulmonary:      Effort: Pulmonary effort is normal.      Breath sounds: Normal breath sounds.   Abdominal:      General: Abdomen is  flat.      Palpations: Abdomen is soft.      Tenderness: There is no abdominal tenderness.   Musculoskeletal:         General: Swelling and tenderness present. Normal range of motion.      Cervical back: Normal range of motion and neck supple.      Right lower leg: No edema.      Left lower leg: No edema.      Comments: Left thigh drain present with SS fluid    Skin:     General: Skin is warm and dry.   Neurological:      General: No focal deficit present.      Mental Status: He is alert and oriented to person, place, and time.   Psychiatric:         Mood and Affect: Mood normal.         Behavior: Behavior normal.       Vents:     Lines/Drains/Airways       Drain  Duration                  Closed/Suction Drain 07/29/22 1311 Left;Lateral Thigh Bulb 15 Fr. 1 day              Peripheral Intravenous Line  Duration                  Peripheral IV - Single Lumen 07/26/22 2319 22 G Left Hand 3 days         Peripheral IV - Single Lumen 07/30/22 18 G Anterior;Left;Proximal Upper Arm <1 day         Peripheral IV - Single Lumen 07/30/22 20 G Posterior;Right Forearm <1 day                  Significant Labs:    CBC/Anemia Profile:  Recent Labs   Lab 07/29/22  0336 07/30/22  0311   WBC 11.47 11.20   HGB 12.6* 11.9*   HCT 36.4* 35.0*    227   MCV 92 92   RDW 12.9 12.8        Chemistries:  Recent Labs   Lab 07/29/22  0336 07/29/22  0603 07/29/22  1519 07/29/22  2136 07/30/22  0211 07/30/22  0215 07/30/22  0311 07/30/22  0534 07/30/22  0902 07/30/22  1446   *   < > 130* 129*   < >  --  132* 128* 130* 128*   K 3.9   < > 4.2 5.5*   < >  --  4.0 3.8 3.2* 3.4*      < > 101 104   < >  --  102 99 101 97   CO2 13*   < > 11* 11*   < >  --  15* 16* 15* 17*   BUN 10   < > 8 9   < >  --  8 8 8 8   CREATININE 0.8   < > 0.7 0.7   < >  --  0.6 0.7 0.6 0.7   CALCIUM 9.0   < > 8.6* 7.7*   < >  --  8.5* 8.3* 8.5* 8.3*   ALBUMIN 1.7*  --   --  1.7*  --   --  1.6*  --   --   --    PROT 6.3  --   --  5.8*  --   --  5.8*  --   --   --     BILITOT 0.5  --   --  0.4  --   --  0.5  --   --   --    ALKPHOS 87  --   --  75  --   --  71  --   --   --    ALT 13  --   --  11  --   --  9*  --   --   --    AST 11  --   --  22  --   --  12  --   --   --    MG 1.9   < > 1.8  --   --  1.9 1.8  --   --   --    PHOS 2.9   < > 4.0  --   --  3.6 3.5  --   --   --     < > = values in this interval not displayed.       All pertinent labs within the past 24 hours have been reviewed.    Significant Imaging:  I have reviewed all pertinent imaging results/findings within the past 24 hours.

## 2022-07-30 NOTE — SUBJECTIVE & OBJECTIVE
"Principal Problem:DKA (diabetic ketoacidosis)    Principal Orthopedic Problem: Left thigh abscess    Interval History: NAEON, patient stable, pain controlled. No acute complaints this morning.   S/p I&D 7/29/22  Doing well   Micro w/ GPCs    Review of patient's allergies indicates:  No Known Allergies    Current Facility-Administered Medications   Medication    acetaminophen tablet 1,000 mg    atorvastatin tablet 20 mg    clindamycin in D5W 900 mg/50 mL IVPB 900 mg    dextrose 10% bolus 125 mL    dextrose 10% bolus 250 mL    glucagon (human recombinant) injection 1 mg    glucose chewable tablet 16 g    glucose chewable tablet 24 g    insulin aspart U-100 pen 1-10 Units    insulin detemir U-100 pen 10 Units    iohexoL (OMNIPAQUE 350) injection 50 mL    morphine injection 3 mg    ondansetron injection 4 mg    oxyCODONE immediate release tablet 5 mg    piperacillin-tazobactam 4.5 g in sodium chloride 0.9% 100 mL IVPB (ready to mix system)    sodium chloride 0.9% flush 10 mL    sodium chloride 0.9% flush 10 mL    sodium chloride 0.9% flush 10 mL    vancomycin - pharmacy to dose    vancomycin 1.75 g in 5 % dextrose 500 mL IVPB     Objective:     Vital Signs (Most Recent):  Temp: 98.5 °F (36.9 °C) (07/30/22 0315)  Pulse: 88 (07/30/22 0601)  Resp: (!) 22 (07/30/22 0601)  BP: (!) 137/91 (07/30/22 0601)  SpO2: 99 % (07/30/22 0601)   Vital Signs (24h Range):  Temp:  [96.8 °F (36 °C)-98.5 °F (36.9 °C)] 98.5 °F (36.9 °C)  Pulse:  [] 88  Resp:  [15-26] 22  SpO2:  [94 %-99 %] 99 %  BP: (100-137)/(75-91) 137/91     Weight: 94.5 kg (208 lb 5.4 oz)  Height: 5' 7" (170.2 cm)  Body mass index is 32.63 kg/m².      Intake/Output Summary (Last 24 hours) at 7/30/2022 0743  Last data filed at 7/30/2022 0601  Gross per 24 hour   Intake 4626.31 ml   Output 3845 ml   Net 781.31 ml         Ortho/SPM Exam     LLE:  Dressing CDI  Lateral thigh TTP  Compartments soft  SILT Sa/Musa/DP/SP/T  Motor intact TA/SP/DP  2+ DP/PT    Significant Labs: " All pertinent labs within the past 24 hours have been reviewed.    Significant Imaging: I have reviewed and interpreted all pertinent imaging results/findings.

## 2022-07-30 NOTE — ASSESSMENT & PLAN NOTE
Leukocytosis  WBC 26.87      - Trending down   - Initially thought to be due to recent steroid use but left thigh mass concerning for infection   - Will start Vanc after irrigation of left thigh by ortho   7/30     Patient with leucocytosis Recent Labs   Lab 07/28/22  0354 07/29/22  0336 07/30/22  0311   WBC 14.29* 11.47 11.20     . Afebrile. BCX 2, NGTD  . likely secondary to sepsis..  WBC counts normalized .

## 2022-07-30 NOTE — PLAN OF CARE
[facility] Medicine ICU Acceptance Note    Date of Admit: 7/26/2022  Date of Transfer: 7/30/2022  Boingris, C/J, L, Onc (IV chemo w/in 1 month), Gyn/Onc, or other special case?: no   ICU team stepping patient down:  MICU  Accepting  team: ELVIE GARLAND    Brief History of Present Illness:      Wilfredo Thomas is a 47 y.o. male with a PMH of PMH of HTN, IDDM2, GERD, obesity, HLD presented to the ED with complaint of 1 day of worsening SOB.   DKA with left thigh Abscess - MRI showed a ruptured tensor fascia jose luis (TFL) muscle with associated large hematoma.         To Do / Pending Studies / Follow ups:  s/p ortho I & D -Gram stain showed G+ cocci .ID eval     Patient has been accepted by [facility] Medicine Team ELVIE GARLAND, who will assume care of the patient upon arrival to the floor from the ICU. Please contact ICU team with any concerns prior to arrival. Please contact [facility] Medicine at [phone] or [phone] (please do NOT leave a voicemail) when patient arrives to the floor.    Bandar Arce MD  Attending Staff Physician  [facility] Medicine  pager- [phone] [facility] - [phone]

## 2022-07-30 NOTE — ASSESSMENT & PLAN NOTE
Patient's FSGs are controlled on current medication regimen.  Last A1c reviewed-   Lab Results   Component Value Date    HGBA1C 10.1 (H) 07/26/2022     Most recent fingerstick glucose reviewed-   Recent Labs   Lab 07/30/22  0643 07/30/22  0741 07/30/22  0901 07/30/22  1140   POCTGLUCOSE 188* 227* 244* 213*     Current correctional scale  Medium  Maintain anti-hyperglycemic dose as follows-   Antihyperglycemics (From admission, onward)            Start     Stop Route Frequency Ordered    07/30/22 1130  insulin aspart U-100 pen 4 Units         -- SubQ 3 times daily with meals 07/30/22 0748    07/30/22 0826  insulin aspart U-100 pen 1-10 Units         -- SubQ Before meals & nightly PRN 07/30/22 0727    07/30/22 0415  insulin detemir U-100 pen 10 Units         -- SubQ 2 times daily 07/30/22 0403        Hold Oral hypoglycemics while patient is in the hospital.  7/31 endocrine consulted.  Glucose in 300s

## 2022-07-30 NOTE — ASSESSMENT & PLAN NOTE
Received steroid injection, steroid taper for leg pain by orthopedics.  Left lateral/proximal thigh with painful localized swelling, hot to touch. Patient received vanc/zosyn in ED  - CTA LE, U/s LLE pending  - will continue Vanc/zosyn and cllindamycin

## 2022-07-30 NOTE — PROGRESS NOTES
[facility] - Cardiac Medical ICU  Orthopedics  Progress Note    Patient Name: Wilfredo Thomas  MRN: [id number]  Admission Date: 7/26/2022  Hospital Length of Stay: 4 days  Attending Provider: Pantera Bundy*  Primary Care Provider: Aylin Wayne DO  Follow-up For: Procedure(s) (LRB):  IRRIGATION AND DEBRIDEMENT, LOWER EXTREMITY (Left)    Post-Operative Day: 1 Day Post-Op  Subjective:     Principal Problem:DKA (diabetic ketoacidosis)    Principal Orthopedic Problem: Left thigh abscess    Interval History: NAEON, patient stable, pain controlled. No acute complaints this morning.   S/p I&D 7/29/22  Doing well   Micro w/ GPCs    Review of patient's allergies indicates:  No Known Allergies    Current Facility-Administered Medications   Medication    acetaminophen tablet 1,000 mg    atorvastatin tablet 20 mg    clindamycin in D5W 900 mg/50 mL IVPB 900 mg    dextrose 10% bolus 125 mL    dextrose 10% bolus 250 mL    glucagon (human recombinant) injection 1 mg    glucose chewable tablet 16 g    glucose chewable tablet 24 g    insulin aspart U-100 pen 1-10 Units    insulin detemir U-100 pen 10 Units    iohexoL (OMNIPAQUE 350) injection 50 mL    morphine injection 3 mg    ondansetron injection 4 mg    oxyCODONE immediate release tablet 5 mg    piperacillin-tazobactam 4.5 g in sodium chloride 0.9% 100 mL IVPB (ready to mix system)    sodium chloride 0.9% flush 10 mL    sodium chloride 0.9% flush 10 mL    sodium chloride 0.9% flush 10 mL    vancomycin - pharmacy to dose    vancomycin 1.75 g in 5 % dextrose 500 mL IVPB     Objective:     Vital Signs (Most Recent):  Temp: 98.5 °F (36.9 °C) (07/30/22 0315)  Pulse: 88 (07/30/22 0601)  Resp: (!) 22 (07/30/22 0601)  BP: (!) 137/91 (07/30/22 0601)  SpO2: 99 % (07/30/22 0601)   Vital Signs (24h Range):  Temp:  [96.8 °F (36 °C)-98.5 °F (36.9 °C)] 98.5 °F (36.9 °C)  Pulse:  [] 88  Resp:  [15-26] 22  SpO2:  [94 %-99 %] 99 %  BP: (100-137)/(75-91) 137/91  "    Weight: 94.5 kg (208 lb 5.4 oz)  Height: 5' 7" (170.2 cm)  Body mass index is 32.63 kg/m².      Intake/Output Summary (Last 24 hours) at 7/30/2022 0743  Last data filed at 7/30/2022 0601  Gross per 24 hour   Intake 4626.31 ml   Output 3845 ml   Net 781.31 ml         Ortho/SPM Exam     LLE:  Dressing CDI  Lateral thigh TTP  Compartments soft  SILT Sa/Musa/DP/SP/T  Motor intact TA/SP/DP  2+ DP/PT    Significant Labs: All pertinent labs within the past 24 hours have been reviewed.    Significant Imaging: I have reviewed and interpreted all pertinent imaging results/findings.    Assessment/Plan:     Left leg pain  Wilfredo Thomas is a 47 y.o. male with past medical history of hypertension and diabetes, currently admitted to hospital for DKA. He reports 4-6 weeks of left lateral thigh pain and swelling, no known injury to the affected area. MRI scan showing lateral thigh fluid collection. Now s/p I&D 7/29/22.  Cx with GPCs.     - S/p I&D 7/29/22  - PT daily   - Drain 125  - Abx Vanc Zosyn   - Okay to resume DVT PPX per primary    Dispo: Pending ID recs and Cx results             Olman Au MD  Orthopedics  [facility] - Cardiac Medical ICU  "

## 2022-07-30 NOTE — ASSESSMENT & PLAN NOTE
7/30  Patient with acute kidney injury likely due to IVVD/dehydration ANGE is currently resolved . Labs reviewed- Renal function/electrolytes with Estimated Creatinine Clearance: 163.3 mL/min (based on SCr of 0.6 mg/dL). according to latest data. Monitor urine output and serial BMP and adjust therapy as needed. Avoid nephrotoxins and renally dose meds for GFR listed above.

## 2022-07-30 NOTE — PROGRESS NOTES
"[facility] - Cardiac Medical ICU  Critical Care Medicine  Progress Note    Patient Name: Wilfredo Thomas  MRN: [id number]  Admission Date: 7/26/2022  Hospital Length of Stay: 4 days  Code Status: Full Code  Attending Provider: Pantera Bundy*  Primary Care Provider: Aylin Wayne DO   Principal Problem: DKA (diabetic ketoacidosis)    Subjective:     HPI:  46 y/o M hx of HTN, IDDM2, GERD obesity, HLD presented to the ED with complaint of 1 day of worsening SOB. Wife present at bedside. Patient states that he noticed yesterday he was feeling some shortness of breath and couldn't seem to catch his breath. His wife went to check on him this morning and noticed that he was breathing "fast and heavy" and he sounded like he was slurring his words. Wife was concerned about a stroke, so he brought him to the ED for evaluation.    Patient also reporting significant pain and swelling along his left lateral proximal thigh. He states he has noticed worsening pain over the past 2 weeks. He denies any acute trauma to the area. He was seen by ortho last week and was told to take ibu-profen for a muscle strain. This did not help his pain, so he presented again on 7/22 and was given a prednisone injection into his thigh. This also did not help his pain, and now the pain and swelling have advanced to the point that he has difficulty with ambulation. Patient denies fever, chills, nausea, vomiting.     Of note, patient has hx of poorly controlled diabetes. He was recently started on insulin by his PCP, but he has not taken any insulin since being prescribed it.     In the ED, patient hypertensive and tachycardic to the 130s. Tachypneic with RR in the 40s. On CBC, WBC 26.9, On CMP patient hyperkalemic to 5.5, CorNa 136, Bicarb 5. Cr elevated to 1.9 from BL 0.83. UA, BHB pending at time of admission. CTA without evidence of pulmonary embolism. Critical Care Medicine consulted given severe acidosis.       Hospital/ICU " Course:  Patient admitted to the MICU for management of severe DKA. Metabolic acidosis improving on insulin drip. He also has had left thigh pain for 1-2 months. There is a large mass on his left thigh that is tender to palpation, CT revealed hematoma vs soft tissue growth. 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 Ortho consulted and performed bedside aspiration of possible abscess which revealed >200K cells >80% segs. Patient taken to the OR for irrigation of his left thigh. A drain was placed and he was put on broad spectrum antibiotics. Patient transitioned off insulin drip. Patient stable for step down to hospital medicine.       Interval History/Significant Events: NAEON. Patients left thigh pain improving. SS fluid coming from left thigh drain.     Review of Systems   Constitutional:  Negative for appetite change, chills, fatigue and fever.   HENT:  Negative for sore throat and trouble swallowing.    Respiratory:  Negative for cough and shortness of breath.    Cardiovascular:  Negative for chest pain.   Gastrointestinal:  Negative for abdominal pain, nausea and vomiting.   Genitourinary:  Negative for dysuria.   Musculoskeletal:  Positive for myalgias (Left thigh pain).   Skin:  Negative for color change.   Neurological:  Negative for dizziness, weakness and headaches.   Psychiatric/Behavioral:  Negative for confusion.    Objective:     Vital Signs (Most Recent):  Temp: 98.8 °F (37.1 °C) (07/30/22 1500)  Pulse: 89 (07/30/22 1628)  Resp: (!) 22 (07/30/22 1628)  BP: (!) 154/90 (07/30/22 1600)  SpO2: 98 % (07/30/22 1628)   Vital Signs (24h Range):  Temp:  [98.2 °F (36.8 °C)-99 °F (37.2 °C)] 98.8 °F (37.1 °C)  Pulse:  [] 89  Resp:  [15-31] 22  SpO2:  [94 %-99 %] 98 %  BP: (100-154)/(75-96) 154/90   Weight: 94.5 kg (208 lb 5.4 oz)  Body mass index is 32.63 kg/m².      Intake/Output Summary (Last 24 hours) at 7/30/2022 1650  Last data filed at  7/30/2022 1600  Gross per 24 hour   Intake 4227.41 ml   Output 3815 ml   Net 412.41 ml       Physical Exam  Constitutional:       Appearance: Normal appearance.   HENT:      Head: Normocephalic and atraumatic.      Mouth/Throat:      Mouth: Mucous membranes are moist.      Pharynx: Oropharynx is clear.   Eyes:      Extraocular Movements: Extraocular movements intact.      Pupils: Pupils are equal, round, and reactive to light.   Cardiovascular:      Rate and Rhythm: Normal rate and regular rhythm.      Pulses: Normal pulses.      Heart sounds: Normal heart sounds.   Pulmonary:      Effort: Pulmonary effort is normal.      Breath sounds: Normal breath sounds.   Abdominal:      General: Abdomen is flat.      Palpations: Abdomen is soft.      Tenderness: There is no abdominal tenderness.   Musculoskeletal:         General: Swelling and tenderness present. Normal range of motion.      Cervical back: Normal range of motion and neck supple.      Right lower leg: No edema.      Left lower leg: No edema.      Comments: Left thigh drain present with SS fluid    Skin:     General: Skin is warm and dry.   Neurological:      General: No focal deficit present.      Mental Status: He is alert and oriented to person, place, and time.   Psychiatric:         Mood and Affect: Mood normal.         Behavior: Behavior normal.       Vents:     Lines/Drains/Airways       Drain  Duration                  Closed/Suction Drain 07/29/22 1311 Left;Lateral Thigh Bulb 15 Fr. 1 day              Peripheral Intravenous Line  Duration                  Peripheral IV - Single Lumen 07/26/22 2319 22 G Left Hand 3 days         Peripheral IV - Single Lumen 07/30/22 18 G Anterior;Left;Proximal Upper Arm <1 day         Peripheral IV - Single Lumen 07/30/22 20 G Posterior;Right Forearm <1 day                  Significant Labs:    CBC/Anemia Profile:  Recent Labs   Lab 07/29/22  0336 07/30/22  0311   WBC 11.47 11.20   HGB 12.6* 11.9*   HCT 36.4* 35.0*   PLT  233 227   MCV 92 92   RDW 12.9 12.8        Chemistries:  Recent Labs   Lab 07/29/22  0336 07/29/22  0603 07/29/22  1519 07/29/22  2136 07/30/22  0211 07/30/22  0215 07/30/22  0311 07/30/22  0534 07/30/22  0902 07/30/22  1446   *   < > 130* 129*   < >  --  132* 128* 130* 128*   K 3.9   < > 4.2 5.5*   < >  --  4.0 3.8 3.2* 3.4*      < > 101 104   < >  --  102 99 101 97   CO2 13*   < > 11* 11*   < >  --  15* 16* 15* 17*   BUN 10   < > 8 9   < >  --  8 8 8 8   CREATININE 0.8   < > 0.7 0.7   < >  --  0.6 0.7 0.6 0.7   CALCIUM 9.0   < > 8.6* 7.7*   < >  --  8.5* 8.3* 8.5* 8.3*   ALBUMIN 1.7*  --   --  1.7*  --   --  1.6*  --   --   --    PROT 6.3  --   --  5.8*  --   --  5.8*  --   --   --    BILITOT 0.5  --   --  0.4  --   --  0.5  --   --   --    ALKPHOS 87  --   --  75  --   --  71  --   --   --    ALT 13  --   --  11  --   --  9*  --   --   --    AST 11  --   --  22  --   --  12  --   --   --    MG 1.9   < > 1.8  --   --  1.9 1.8  --   --   --    PHOS 2.9   < > 4.0  --   --  3.6 3.5  --   --   --     < > = values in this interval not displayed.       All pertinent labs within the past 24 hours have been reviewed.    Significant Imaging:  I have reviewed all pertinent imaging results/findings within the past 24 hours.      AB  Recent Labs   Lab 07/26/22 2248   PH 6.977*   PO2 38*   PCO2 27.9*   HCO3 6.5*   BE -25     Assessment/Plan:     Cardiac/Vascular  Hyperlipidemia associated with type 2 diabetes mellitus  On home atorvastatin.  - continue home statin    Renal/  Hyperkalemia  Resolved     ANGE (acute kidney injury)  Baseline Cr 0.7-0.9.  Likely prerenal in setting of DKA.    - Back to baseline after IVF   - Monitor BMP's     Hematology  Elevated d-dimer  - CTA PE negative    Oncology  Leukocytosis  WBC 26.87 on admission     - S/P irrigation of left thigh hematoma/infection, drain in place   - Continue broad spectrum antibiotics     Endocrine  * DKA (diabetic ketoacidosis)  47M with history of DM2,  gout, HTN, tobacco use who initially presented for dyspnea, increased thirst, decreased PO intake, found to be in DKA.  Recent history of steroids for leg pain.  On home dulaglutide, empagliflozin, metformin, insulin glargine 20u qhs (has not been taking).  Recent a1c 11.2%.  , bicarb 5, AG 26, BHB 5.9 on admit.      - Transitioned off insulin drip   - On Detemir 10 BID and aspart 4 TIDWM  - SSI    - diabetic diet     Class 2 severe obesity due to excess calories with serious comorbidity and body mass index (BMI) of 36.0 to 36.9 in adult  General weight loss/lifestyle modification strategies discussed (elicit support from others; identify saboteurs; non-food rewards, etc).      Hypertension associated with type 2 diabetes mellitus  On home lisinopril 20.  - hold ACEi in setting of ANGE    Type 2 diabetes mellitus with hyperglycemia, with long-term current use of insulin  See DKA    Orthopedic  Hematoma of left thigh  - MRI with grade III tear of tension fascia jose luis with large hematoma   - S/P irrigation by ortho  - FU cultures   - Continue broad spectrum antibiotics  - ID consulted, appreciate recs        Critical Care Daily Checklist:    A: Awake: RASS Goal/Actual Goal: RASS Goal: 0-->alert and calm  Actual: Meza Agitation Sedation Scale (RASS): Alert and calm   B: Spontaneous Breathing Trial Performed?     C: SAT & SBT Coordinated?  NA                      D: Delirium: CAM-ICU Overall CAM-ICU: Negative   E: Early Mobility Performed? No   F: Feeding Goal:    Status:     Current Diet Order   Procedures    Diet diabetic [facility]; 2000 Calorie     Order Specific Question:   Indicate patient location for additional diet options:     Answer:   [facility]     Order Specific Question:   Total calories:     Answer:   2000 Calorie      AS: Analgesia/Sedation Y   T: Thromboembolic Prophylaxis N   H: HOB > 300 Yes   U: Stress Ulcer Prophylaxis (if needed) Y   G: Glucose Control Y   B: Bowel Function  Stool Occurrence: 1   I: Indwelling Catheter (Lines & Hull) Necessity Y   D: De-escalation of Antimicrobials/Pharmacotherapies Y    Plan for the day/ETD Step down to hospital medicine     Code Status:  Family/Goals of Care: Full Code         Critical secondary to Patient has a condition that poses threat to life and bodily function: DKA       Critical care was time spent personally by me on the following activities: development of treatment plan with patient or surrogate and bedside caregivers, discussions with consultants, evaluation of patient's response to treatment, examination of patient, ordering and performing treatments and interventions, ordering and review of laboratory studies, ordering and review of radiographic studies, pulse oximetry, re-evaluation of patient's condition. This critical care time did not overlap with that of any other provider or involve time for any procedures.     Catalino Bennett MD  Critical Care Medicine  [facility] - Cardiac Medical ICU

## 2022-07-30 NOTE — ASSESSMENT & PLAN NOTE
reports 4-6 weeks of left lateral thigh pain and swelling, no known injury to the affected area    7/30 Transfer to hospital medicine . S/p I&D  of left thigh Abscess on7/29/22 - MRI - uptured tensor fascia jose luis (TFL) muscle with associated large hematoma. gram stain -Moderate Gram positive cocci in clusters .cultures pending.  continue Vancomycin, clindamycin and Zosyn . abscess with Calcium pyrophosphate crystals with unknown significance .Surgical drain with purulent output. plan for OR tomorrow for washout  may need bone biopsy to r/o osteomyelitis.Discontinued clindamycin.

## 2022-07-30 NOTE — PROGRESS NOTES
Pharmacokinetic Assessment Follow Up: IV Vancomycin    Vancomycin serum concentration assessment(s):    The trough level was drawn incorrectly and cannot be used to guide therapy at this time. Trough obtained early - patient not at steady state yet.    Vancomycin Regimen Plan:    Continue current dose - repeat trough tomorrow 7/31 @ 1500      Drug levels (last 3 results):  Recent Labs   Lab Result Units 07/30/22  1446   Vancomycin-Trough ug/mL 9.3*       Pharmacy will continue to follow and monitor vancomycin.    Please contact pharmacy at extension [phone] for questions regarding this assessment.    Thank you for the consult,   Kellie Healy       Patient brief summary:  Wilfredo Thomas is a 47 y.o. male initiated on antimicrobial therapy with IV Vancomycin for treatment of skin & soft tissue infection    The patient's current regimen is 1750mg IV every 12 hours    Drug Allergies:   Review of patient's allergies indicates:  No Known Allergies    Actual Body Weight:   94.5 kg    Renal Function:   Estimated Creatinine Clearance: 139.9 mL/min (based on SCr of 0.7 mg/dL).,     Dialysis Method (if applicable):  N/A    CBC (last 72 hours):  Recent Labs   Lab Result Units 07/28/22  0354 07/29/22  0336 07/30/22  0311   WBC K/uL 14.29* 11.47 11.20   Hemoglobin g/dL 13.6* 12.6* 11.9*   Hematocrit % 40.1 36.4* 35.0*   Platelets K/uL 305 233 227   Gran % % 77.0* 75.4* 83.3*   Lymph % % 8.0* 9.2* 6.6*   Mono % % 5.0 7.8 5.3   Eosinophil % % 2.0 3.1 0.7   Basophil % % 0.0 0.2 0.0   Differential Method  Manual Automated Manual       Metabolic Panel (last 72 hours):  Recent Labs   Lab Result Units 07/27/22  1612 07/27/22  1613 07/27/22  1945 07/27/22  2258 07/27/22  2350 07/28/22  0143 07/28/22  0354 07/28/22  0559 07/28/22  0850 07/28/22  1333 07/28/22  2017 07/29/22  0023 07/29/22  0148 07/29/22  0336 07/29/22  0603 07/29/22  1003 07/29/22  1519 07/29/22  2136 07/30/22  0211 07/30/22  0215 07/30/22  0311 07/30/22  0534  07/30/22  0902 07/30/22  1446   Sodium mmol/L  --  135*  --  132*  --  131* 130* 131* 132* 130* 130*  --  132* 131* 131* 133* 130* 129* 131*  --  132* 128* 130* 128*   Potassium mmol/L  --  4.1  --  3.6  --  4.0 3.9 4.0 4.0 4.3 4.1  --  4.0 3.9 3.9 3.7 4.2 5.5* 4.0  --  4.0 3.8 3.2* 3.4*   Chloride mmol/L  --  107  --  103  --  103 104 104 104 103 103  --  104 103 103 106 101 104 101  --  102 99 101 97   CO2 mmol/L  --  12*  --  12*  --  12* 8* 10* 11* 12* 12*  --  14* 13* 12* 12* 11* 11* 15*  --  15* 16* 15* 17*   Glucose mg/dL  --  244*  --  217*  --  226* 212* 242* 215* 189* 186*  --  196* 193* 180* 172* 291* 278* 171*  --  160* 221* 230* 340*   BUN mg/dL  --  22*  --  16  --  15 13 14 13 12 10  --  10 10 10 8 8 9 8  --  8 8 8 8   Creatinine mg/dL  --  1.2  --  1.3  --  1.2 1.0 0.9 0.8 0.8 0.8  --  0.7 0.8 0.7 0.6 0.7 0.7 0.7  --  0.6 0.7 0.6 0.7   Albumin g/dL  --   --   --   --   --   --  1.9*  --   --   --   --   --   --  1.7*  --   --   --  1.7*  --   --  1.6*  --   --   --    Total Bilirubin mg/dL  --   --   --   --   --   --  0.4  --   --   --   --   --   --  0.5  --   --   --  0.4  --   --  0.5  --   --   --    Alkaline Phosphatase U/L  --   --   --   --   --   --  82  --   --   --   --   --   --  87  --   --   --  75  --   --  71  --   --   --    AST U/L  --   --   --   --   --   --  10  --   --   --   --   --   --  11  --   --   --  22  --   --  12  --   --   --    ALT U/L  --   --   --   --   --   --  10  --   --   --   --   --   --  13  --   --   --  11  --   --  9*  --   --   --    Magnesium mg/dL 2.3  --  2.2  --  2.0  --  2.1  --  2.1 2.0 1.9 2.0  --  1.9  --  1.7 1.8  --   --  1.9 1.8  --   --   --    Phosphorus mg/dL 2.5*  --  2.2*  --  2.2*  --  2.2*  --  2.3* 2.6* 2.5* 2.9  --  2.9  --  2.8 4.0  --   --  3.6 3.5  --   --   --        Vancomycin Administrations:  vancomycin given in the last 96 hours                   vancomycin 1.75 g in 5 % dextrose 500 mL IVPB (mg) 1,750 mg New Bag 07/30/22  0320    vancomycin 2 g in dextrose 5 % 500 mL IVPB (mg) 2,000 mg New Bag 07/29/22 1557    vancomycin (VANCOCIN) injection 1 g (g) 1 g Given 07/29/22 1310    vancomycin injection (g) 1 g Given 07/29/22 1300    vancomycin 2 g in dextrose 5 % 500 mL IVPB ()  Restarted 07/27/22 0053      Restarted 07/26/22 2307     2,000 mg New Bag  2259                Microbiologic Results:  Microbiology Results (last 7 days)     Procedure Component Value Units Date/Time    Culture, Anaerobic [[id number]] Collected: 07/28/22 1800    Order Status: Completed Specimen: Abscess from Leg, Left Updated: 07/30/22 1155     Anaerobic Culture Culture in progress    Narrative:      LEFT THIGH ABSCESS    Culture, Anaerobe [[id number]] Collected: 07/29/22 1251    Order Status: Completed Specimen: Abscess from Leg, Left Updated: 07/30/22 1155     Anaerobic Culture Culture in progress    Narrative:      Left thigh abscess #1    Culture, Anaerobe [[id number]] Collected: 07/29/22 1300    Order Status: Completed Specimen: Abscess from Leg, Left Updated: 07/30/22 1155     Anaerobic Culture Culture in progress    Narrative:      Left thigh abscess #2    Aerobic culture [[id number]] Collected: 07/29/22 1251    Order Status: Completed Specimen: Abscess from Leg, Left Updated: 07/30/22 0800     Aerobic Bacterial Culture Insufficient incubation, culture in progress    Narrative:      Left thigh abscess #1    Aerobic culture [[id number]] Collected: 07/29/22 1300    Order Status: Completed Specimen: Abscess from Leg, Left Updated: 07/30/22 0800     Aerobic Bacterial Culture Insufficient incubation, culture in progress    Narrative:      Left thigh abscess #2    Blood Culture #1 **CANNOT BE ORDERED STAT** [[id number]] Collected: 07/26/22 2302    Order Status: Completed Specimen: Blood from Peripheral, Hand, Left Updated: 07/30/22 0612     Blood Culture, Routine No Growth to date      No Growth to date      No Growth to date      No Growth to date    Blood Culture #2  **CANNOT BE ORDERED STAT** [[id number]] Collected: 07/26/22 2246    Order Status: Completed Specimen: Blood from Peripheral, Antecubital, Left Updated: 07/30/22 0612     Blood Culture, Routine No Growth to date      No Growth to date      No Growth to date      No Growth to date    AFB Culture & Smear [[id number]] Collected: 07/28/22 1800    Order Status: Completed Specimen: Abscess from Leg, Left Updated: 07/29/22 2127     AFB Culture & Smear Culture in progress     AFB CULTURE STAIN No acid fast bacilli seen.    Narrative:      LEFT THIGH ABSCESS    Gram stain [[id number]] Collected: 07/29/22 1300    Order Status: Completed Specimen: Abscess from Leg, Left Updated: 07/29/22 1421     Gram Stain Result Moderate WBC's      Moderate Gram positive cocci in clusters    Narrative:      Left thigh abscess #2    Gram stain [[id number]] Collected: 07/29/22 1251    Order Status: Completed Specimen: Abscess from Leg, Left Updated: 07/29/22 1420     Gram Stain Result Moderate WBC's      Moderate Gram positive cocci in clusters    Narrative:      Left thigh abscess #1    AFB Culture & Smear [[id number]] Collected: 07/29/22 1300    Order Status: Sent Specimen: Abscess from Leg, Left Updated: 07/29/22 1332    Fungus culture [[id number]] Collected: 07/29/22 1300    Order Status: Sent Specimen: Abscess from Leg, Left Updated: 07/29/22 1330    AFB Culture & Smear [[id number]] Collected: 07/29/22 1252    Order Status: Sent Specimen: Wound from Leg, Left Updated: 07/29/22 1326    Fungus culture [[id number]] Collected: 07/29/22 1251    Order Status: Sent Specimen: Abscess from Leg, Left Updated: 07/29/22 1326    Aerobic culture [[id number]] Collected: 07/28/22 1800    Order Status: Completed Specimen: Abscess from Leg, Left Updated: 07/29/22 0735     Aerobic Bacterial Culture No growth    Narrative:      LEFT THIGH ABSCESS    Gram stain [[id number]] Collected: 07/28/22 1800    Order Status: Completed Specimen: Abscess from Leg, Left Updated:  07/28/22 2026     Gram Stain Result Rare WBC's      Few Gram positive cocci    Narrative:      LEFT THIGH ABSCESS    Fungus culture [[id number]] Collected: 07/28/22 1800    Order Status: Sent Specimen: Abscess from Leg, Left Updated: 07/28/22 1833

## 2022-07-30 NOTE — HOSPITAL COURSE
Patient admitted to the MICU for management of severe DKA. Metabolic acidosis improving on insulin drip. He also has had left thigh pain for 1-2 months. There is a large mass on his left thigh that is tender to palpation, CT revealed hematoma vs soft tissue growth. General surgery consulted and recommended IR consult. IR consulted, no indication for tap.   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 Ortho consulted and performed bedside aspiration of possible abscess which revealed >200K cells >80% segs. Will hold off on antibiotics for now per ortho recs, ortho is taking patient to the OR for irrigation of the thigh, will start antibiotics afterwards.         7/29 Metabolic acidosis slowly improving. Ortho taking patient to the OR for irrigation of the left thigh, will start antibiotics afterwards.   7/30 Transfer to hospital medicine . S/p I&D  of left thigh Abscess on7/29/22 - MRI - uptured tensor fascia jose luis (TFL) muscle with associated large hematoma. gram stain -Moderate Gram positive cocci in clusters .cultures pending.  continue Vancomycin, clindamycin and Zosyn . Bicarb 15. K replaced . off insulin drip on SQ insulin.  7/31 ID and endocrine consulted.  abscess with Calcium pyrophosphate crystals with unknown significance potassium and P replaced. Bicarbonate WNL . aerobic culture 7/28 STAPHYLOCOCCUS SPECIES . Glucose in 300s .PRN imodium  for diarrhea. PT/OT consulted - WBAT. Surgical drain with purulent output  8/1 PICC team consulted for IV access. vanc trough at 11.4. monitor for vanomycin toxicity. possible OR tomorrow for washout  may need bone biopsy to r/o osteomyelitis.Discontinued clindamycin.   8/2 glucose in 300s.  Increased Levemir  to 14 units BID and Aspart  10 units TIDWM. K replaced   8/3 ANGE with cr 0.7--> 2.7. likely vanc induced ATN with levels 40. urine studies ordered renal sonogram WNL  nephrology consulted. started on  NS 100ml/h x 10h and follow up renal panel.Zosyn and  vancomycin discontinued. Strict IO monitor. condom catheter . switched to Ancef for MSSA on culture  Interval History:  8/4- MSSA- see below. /85 VSS. On room air, eating, BM on 8/2, good UO. Appreciate ortho recs. On Cephazolin, detim 14 bid and aspart 12 ac.  8/5-Cr still rising, cr =6.  Wrist still hurting. Ortho to tap it to eval for infection and gout today. /90   Pulse 85  AF, no other signs of infection.  Will discuss w Nephrology- received NS x one liter yesterday. Will repeat today. Suspect auto-diuresis.   8/6: washout with ortho today. Cr continues to rise (7.1), nephrology following  8/7: Cr 7.4, phos increasing; tolerated surgery well yesterday, intra-op cultures pending  8/8: Cr increased to 8.1, hyperphosphatemia again noted; continues to make urine, no acute indication for HD per nephrology. Intra-op cultures with S.aureus, continued cefazolin pending final C&S results.   Interval History:   8/18- 47M with DM admitted 7/26/22 for DKA, ortho following for left lateral thigh abscess. s/p I&D and drain placement left thigh 7/29/22. Cx +MSSA. Had high drain purulent output after first I&D, was taken back to OR 8/6/22 for repeat I&D. Drain in place, dressings CDI. Nephrology on board for ANGE.   pt transferred from telemedicine, had fever yesterday,  -> 90.  Wbc -ok. Covid negative. Drain output paula to 35 cc. Pt on Ancef.   No urine output, cr rising, Nephrology following, no indication for HD presently. Ortho may need to repeat debridement.      8/19- /71   Pulse 92 AF. Appreciate ortho and ID input, pt on daptomycin. T max 99.4. drain functioning. Blood cult 8/17 are neg so far, CRP and procal were higher. Will US the wound site for pockets of fluid.   - had fever and rigors  after receiving dapto today, MRI spine - neck ordered for acute neck/ back pain. Blood culture, lactic acid, robaxin ordered.   - still febrile. Add  cefepime, cooling blanket, cannot have NSAID    8/20 - pt developed blisters around IV site.   MRI- No evidence of discitis-osteomyelitis.  No epidural fluid collections.  Edema/enhancement in the posterior paraspinal musculature/superficial soft tissues at the level of C7 concerning for cellulitis/infection.  No rim enhancing collections to suggest abscess. Mild degenerative change of the spine as detailed above.  Consolidation in the left upper lobe, possible infection or noninfectious inflammation and new from CTA chest 07/26/2022.  On dapto and cefepime, lactade - 0.8, ck -nl, cr improving 5.2. Hb 6.8.  pt is AF since adding Cefipime. Will discuss w ID. For now keep antibiotcs the same. next dapto is tomorrow and we may change things before then.  He is on renal doses and renal function improving.   rash is contact dermatitis, in areas of tape. Do not think this monkey px  CT scan of the chest to eval pneumonia.  Might change antibiotics. We are suspecting drug related pneumonia and rash. isolation precautions until cleared by ID to make sure they concur.  8/21- cefepime stopped.pt still AF, good UO 2400cc, cr continues to improve.  It becomes febrile again will choose levoquin. CT chest done  - Multifocal pneumonia within the bilateral lungs. Nodular opacities in right upper lobe 0.7 x 1.4 cm.  Unclear if these are consolidations or solid nodules.    Appreciate ID input and f/u. Right thrombophlebitis and contact dermatitis improving. Daptomycin dose today.  Received one unit PRBCs yesterday - Hb 7.9. ID to change antibiotics to levoquin and Zyvox.    8/22- cr 3.2, drain output 20cc, on levoquin and zyvox, still AF. No symptoms of pneumonia -   8/23- eating 75%, drain output 15 cc, on zyvox and levoquin, seems to be doing well. Up to chair with improved mobility. Cr 2.7.Drain output- pending

## 2022-07-30 NOTE — PLAN OF CARE
CM[facility] DAILY GOALS       A: Awake    RASS: Goal - RASS Goal: 0-->alert and calm  Actual - RASS (Meza Agitation-Sedation Scale): 0-->alert and calm   Restraint necessity:    B: Breathe   SBT: Not intubated   C: Coordinate A & B, analgesics/sedatives   Pain: managed    SAT: Not intubated  D: Delirium   CAM-ICU: Overall CAM-ICU: Negative  E: Early(intubated/ Progressive (non-intubated) Mobility   MOVE Screen: Pass   Activity: Activity Management: Rolling - L1  FAS: Feeding/Nutrition   Diet order: Diet/Nutrition Received: consistent carb/diabetic diet,    T: Thrombus   DVT prophylaxis: VTE Required Core Measure: Patient refused interventions  H: HOB Elevation   Head of Bed (HOB) Positioning: HOB elevated  U: Ulcer Prophylaxis   GI: yes  G: Glucose control   managed Glycemic Management: blood glucose monitored, insulin infusion adjusted  S: Skin   Bathing/Skin Care: bath, complete, electrode patches/site rotation, dressed/undressed  Device Skin Pressure Protection: absorbent pad utilized/changed, adhesive use limited, pressure points protected  Pressure Reduction Devices: specialty bed utilized, positioning supports utilized, foam padding utilized  Pressure Reduction Techniques: weight shift assistance provided, pressure points protected  Skin Protection: adhesive use limited, incontinence pads utilized, protective footwear used, pulse oximeter probe site changed, tubing/devices free from skin contact, transparent dressing maintained  B: Bowel Function   no issues   I: Indwelling Catheters   Hull necessity:     CVC necessity: No  D: De-escalation Antibiotics   No    Family/Goals of care/Code Status   Code Status: Full Code    24H Vital Sign Range  Temp:  [96.8 °F (36 °C)-98.9 °F (37.2 °C)]   Pulse:  []   Resp:  [15-31]   BP: (100-136)/(75-89)   SpO2:  [94 %-99 %]      Shift Events   Pt AAOx3, afebrile, free of falls. Frequent lab draws complicated overnight due to hemolyzed samples/difficulty obtaining access,  despite ultrasound collected specimens; CCM aware throughout shift of this, and following. Pt titrated off insulin gtt and IVF this shift, diet ordered to start this morning. Pt c/o pain to LLE, managed with PRN PO oxy, tolerating well. Pt denies distress. Spouse at bedside throughout shift. Tolerating IV Abx.    VS and assessment per flow sheet, patient progressing towards goals as tolerated, plan of care reviewed with Wilfredo Thomas, all concerns addressed, will continue to monitor.    Michael Arciniega, MARLENAN, RN

## 2022-07-30 NOTE — ASSESSMENT & PLAN NOTE
Wilfredo Thomas is a 47 y.o. male with past medical history of hypertension and diabetes, currently admitted to hospital for DKA. He reports 4-6 weeks of left lateral thigh pain and swelling, no known injury to the affected area. MRI scan showing lateral thigh fluid collection. Now s/p I&D 7/29/22.  Cx with GPCs.     - S/p I&D 7/29/22  - PT daily   - Drain 125  - Abx Vanc Zosyn     Dispo: Pending ID recs and Cx results

## 2022-07-30 NOTE — ASSESSMENT & PLAN NOTE
- MRI with grade III tear of tension fascia jose luis with large hematoma   - S/P irrigation by ortho  - FU cultures   - Continue broad spectrum antibiotics  - ID consulted, appreciate recs

## 2022-07-31 PROBLEM — E87.5 HYPERKALEMIA: Status: RESOLVED | Noted: 2022-07-26 | Resolved: 2022-07-31

## 2022-07-31 LAB
ALBUMIN SERPL BCP-MCNC: 1.9 G/DL (ref 3.5–5.2)
ALP SERPL-CCNC: 108 U/L (ref 55–135)
ALT SERPL W/O P-5'-P-CCNC: 15 U/L (ref 10–44)
ANION GAP SERPL CALC-SCNC: 11 MMOL/L (ref 8–16)
ANION GAP SERPL CALC-SCNC: 14 MMOL/L (ref 8–16)
AST SERPL-CCNC: 15 U/L (ref 10–40)
BASOPHILS # BLD AUTO: 0.09 K/UL (ref 0–0.2)
BASOPHILS NFR BLD: 1.2 % (ref 0–1.9)
BILIRUB SERPL-MCNC: 0.5 MG/DL (ref 0.1–1)
BUN SERPL-MCNC: 7 MG/DL (ref 6–20)
BUN SERPL-MCNC: 7 MG/DL (ref 6–20)
CALCIUM SERPL-MCNC: 8.1 MG/DL (ref 8.7–10.5)
CALCIUM SERPL-MCNC: 8.8 MG/DL (ref 8.7–10.5)
CHLORIDE SERPL-SCNC: 102 MMOL/L (ref 95–110)
CHLORIDE SERPL-SCNC: 99 MMOL/L (ref 95–110)
CO2 SERPL-SCNC: 22 MMOL/L (ref 23–29)
CO2 SERPL-SCNC: 23 MMOL/L (ref 23–29)
CREAT SERPL-MCNC: 0.5 MG/DL (ref 0.5–1.4)
CREAT SERPL-MCNC: 0.6 MG/DL (ref 0.5–1.4)
CRP SERPL-MCNC: 81.2 MG/L (ref 0–8.2)
DIFFERENTIAL METHOD: ABNORMAL
EOSINOPHIL # BLD AUTO: 0.2 K/UL (ref 0–0.5)
EOSINOPHIL NFR BLD: 3 % (ref 0–8)
ERYTHROCYTE [DISTWIDTH] IN BLOOD BY AUTOMATED COUNT: 12.5 % (ref 11.5–14.5)
EST. GFR  (AFRICAN AMERICAN): >60 ML/MIN/1.73 M^2
EST. GFR  (AFRICAN AMERICAN): >60 ML/MIN/1.73 M^2
EST. GFR  (NON AFRICAN AMERICAN): >60 ML/MIN/1.73 M^2
EST. GFR  (NON AFRICAN AMERICAN): >60 ML/MIN/1.73 M^2
GLUCOSE SERPL-MCNC: 172 MG/DL (ref 70–110)
GLUCOSE SERPL-MCNC: 218 MG/DL (ref 70–110)
HCT VFR BLD AUTO: 34.8 % (ref 40–54)
HGB BLD-MCNC: 12 G/DL (ref 14–18)
IMM GRANULOCYTES # BLD AUTO: 0.26 K/UL (ref 0–0.04)
IMM GRANULOCYTES NFR BLD AUTO: 3.5 % (ref 0–0.5)
LYMPHOCYTES # BLD AUTO: 1.2 K/UL (ref 1–4.8)
LYMPHOCYTES NFR BLD: 16.2 % (ref 18–48)
MAGNESIUM SERPL-MCNC: 1.7 MG/DL (ref 1.6–2.6)
MCH RBC QN AUTO: 31.2 PG (ref 27–31)
MCHC RBC AUTO-ENTMCNC: 34.5 G/DL (ref 32–36)
MCV RBC AUTO: 90 FL (ref 82–98)
MONOCYTES # BLD AUTO: 0.7 K/UL (ref 0.3–1)
MONOCYTES NFR BLD: 8.8 % (ref 4–15)
NEUTROPHILS # BLD AUTO: 5 K/UL (ref 1.8–7.7)
NEUTROPHILS NFR BLD: 67.3 % (ref 38–73)
NRBC BLD-RTO: 0 /100 WBC
PHOSPHATE SERPL-MCNC: 2.5 MG/DL (ref 2.7–4.5)
PLATELET # BLD AUTO: 257 K/UL (ref 150–450)
PMV BLD AUTO: 9.2 FL (ref 9.2–12.9)
POCT GLUCOSE: 173 MG/DL (ref 70–110)
POCT GLUCOSE: 237 MG/DL (ref 70–110)
POTASSIUM SERPL-SCNC: 3.4 MMOL/L (ref 3.5–5.1)
POTASSIUM SERPL-SCNC: 3.5 MMOL/L (ref 3.5–5.1)
PROT SERPL-MCNC: 6.6 G/DL (ref 6–8.4)
RBC # BLD AUTO: 3.85 M/UL (ref 4.6–6.2)
SODIUM SERPL-SCNC: 135 MMOL/L (ref 136–145)
SODIUM SERPL-SCNC: 136 MMOL/L (ref 136–145)
VANCOMYCIN TROUGH SERPL-MCNC: 11.4 UG/ML (ref 10–22)
WBC # BLD AUTO: 7.4 K/UL (ref 3.9–12.7)

## 2022-07-31 PROCEDURE — 86140 C-REACTIVE PROTEIN: CPT | Performed by: STUDENT IN AN ORGANIZED HEALTH CARE EDUCATION/TRAINING PROGRAM

## 2022-07-31 PROCEDURE — 63600175 PHARM REV CODE 636 W HCPCS: Performed by: INTERNAL MEDICINE

## 2022-07-31 PROCEDURE — 36415 COLL VENOUS BLD VENIPUNCTURE: CPT | Performed by: HOSPITALIST

## 2022-07-31 PROCEDURE — 36415 COLL VENOUS BLD VENIPUNCTURE: CPT | Performed by: STUDENT IN AN ORGANIZED HEALTH CARE EDUCATION/TRAINING PROGRAM

## 2022-07-31 PROCEDURE — 99223 PR INITIAL HOSPITAL CARE,LEVL III: ICD-10-PCS | Mod: ,,, | Performed by: STUDENT IN AN ORGANIZED HEALTH CARE EDUCATION/TRAINING PROGRAM

## 2022-07-31 PROCEDURE — 97530 THERAPEUTIC ACTIVITIES: CPT

## 2022-07-31 PROCEDURE — 99499 NO LOS: ICD-10-PCS | Mod: ,,, | Performed by: REGISTERED NURSE

## 2022-07-31 PROCEDURE — 99233 PR SUBSEQUENT HOSPITAL CARE,LEVL III: ICD-10-PCS | Mod: ,,, | Performed by: HOSPITALIST

## 2022-07-31 PROCEDURE — 80053 COMPREHEN METABOLIC PANEL: CPT

## 2022-07-31 PROCEDURE — 36415 COLL VENOUS BLD VENIPUNCTURE: CPT | Performed by: INTERNAL MEDICINE

## 2022-07-31 PROCEDURE — 80202 ASSAY OF VANCOMYCIN: CPT | Performed by: INTERNAL MEDICINE

## 2022-07-31 PROCEDURE — 25000003 PHARM REV CODE 250: Performed by: INTERNAL MEDICINE

## 2022-07-31 PROCEDURE — 25000003 PHARM REV CODE 250: Performed by: STUDENT IN AN ORGANIZED HEALTH CARE EDUCATION/TRAINING PROGRAM

## 2022-07-31 PROCEDURE — 25000003 PHARM REV CODE 250: Performed by: HOSPITALIST

## 2022-07-31 PROCEDURE — 94761 N-INVAS EAR/PLS OXIMETRY MLT: CPT

## 2022-07-31 PROCEDURE — 99222 PR INITIAL HOSPITAL CARE,LEVL II: ICD-10-PCS | Mod: ,,, | Performed by: INTERNAL MEDICINE

## 2022-07-31 PROCEDURE — 20600001 HC STEP DOWN PRIVATE ROOM

## 2022-07-31 PROCEDURE — 99222 1ST HOSP IP/OBS MODERATE 55: CPT | Mod: ,,, | Performed by: INTERNAL MEDICINE

## 2022-07-31 PROCEDURE — 25000003 PHARM REV CODE 250

## 2022-07-31 PROCEDURE — C9399 UNCLASSIFIED DRUGS OR BIOLOG: HCPCS | Performed by: STUDENT IN AN ORGANIZED HEALTH CARE EDUCATION/TRAINING PROGRAM

## 2022-07-31 PROCEDURE — 85025 COMPLETE CBC W/AUTO DIFF WBC: CPT | Performed by: HOSPITALIST

## 2022-07-31 PROCEDURE — 97161 PT EVAL LOW COMPLEX 20 MIN: CPT

## 2022-07-31 PROCEDURE — 63600175 PHARM REV CODE 636 W HCPCS

## 2022-07-31 PROCEDURE — 83735 ASSAY OF MAGNESIUM: CPT | Performed by: STUDENT IN AN ORGANIZED HEALTH CARE EDUCATION/TRAINING PROGRAM

## 2022-07-31 PROCEDURE — 63600175 PHARM REV CODE 636 W HCPCS: Performed by: STUDENT IN AN ORGANIZED HEALTH CARE EDUCATION/TRAINING PROGRAM

## 2022-07-31 PROCEDURE — 84100 ASSAY OF PHOSPHORUS: CPT | Performed by: STUDENT IN AN ORGANIZED HEALTH CARE EDUCATION/TRAINING PROGRAM

## 2022-07-31 PROCEDURE — 97116 GAIT TRAINING THERAPY: CPT

## 2022-07-31 PROCEDURE — 99499 UNLISTED E&M SERVICE: CPT | Mod: ,,, | Performed by: REGISTERED NURSE

## 2022-07-31 PROCEDURE — 99233 SBSQ HOSP IP/OBS HIGH 50: CPT | Mod: ,,, | Performed by: HOSPITALIST

## 2022-07-31 PROCEDURE — 36415 COLL VENOUS BLD VENIPUNCTURE: CPT

## 2022-07-31 PROCEDURE — 99223 1ST HOSP IP/OBS HIGH 75: CPT | Mod: ,,, | Performed by: STUDENT IN AN ORGANIZED HEALTH CARE EDUCATION/TRAINING PROGRAM

## 2022-07-31 RX ORDER — SODIUM,POTASSIUM PHOSPHATES 280-250MG
2 POWDER IN PACKET (EA) ORAL ONCE
Status: COMPLETED | OUTPATIENT
Start: 2022-07-31 | End: 2022-07-31

## 2022-07-31 RX ORDER — POTASSIUM CHLORIDE 20 MEQ/1
40 TABLET, EXTENDED RELEASE ORAL ONCE
Status: DISCONTINUED | OUTPATIENT
Start: 2022-07-31 | End: 2022-07-31

## 2022-07-31 RX ADMIN — PIPERACILLIN SODIUM AND TAZOBACTAM SODIUM 4.5 G: 4; .5 INJECTION, POWDER, LYOPHILIZED, FOR SOLUTION INTRAVENOUS at 06:07

## 2022-07-31 RX ADMIN — INSULIN DETEMIR 12 UNITS: 100 INJECTION, SOLUTION SUBCUTANEOUS at 09:07

## 2022-07-31 RX ADMIN — PIPERACILLIN SODIUM AND TAZOBACTAM SODIUM 4.5 G: 4; .5 INJECTION, POWDER, LYOPHILIZED, FOR SOLUTION INTRAVENOUS at 07:07

## 2022-07-31 RX ADMIN — OXYCODONE 5 MG: 5 TABLET ORAL at 07:07

## 2022-07-31 RX ADMIN — VANCOMYCIN HYDROCHLORIDE 1750 MG: 500 INJECTION, POWDER, LYOPHILIZED, FOR SOLUTION INTRAVENOUS at 04:07

## 2022-07-31 RX ADMIN — TACROLIMUS 900 MG: 0.5 CAPSULE ORAL at 06:07

## 2022-07-31 RX ADMIN — POTASSIUM & SODIUM PHOSPHATES POWDER PACK 280-160-250 MG 2 PACKET: 280-160-250 PACK at 08:07

## 2022-07-31 RX ADMIN — INSULIN ASPART 8 UNITS: 100 INJECTION, SOLUTION INTRAVENOUS; SUBCUTANEOUS at 12:07

## 2022-07-31 RX ADMIN — INSULIN ASPART 6 UNITS: 100 INJECTION, SOLUTION INTRAVENOUS; SUBCUTANEOUS at 12:07

## 2022-07-31 RX ADMIN — MORPHINE SULFATE 3 MG: 2 INJECTION, SOLUTION INTRAMUSCULAR; INTRAVENOUS at 08:07

## 2022-07-31 RX ADMIN — INSULIN ASPART 8 UNITS: 100 INJECTION, SOLUTION INTRAVENOUS; SUBCUTANEOUS at 08:07

## 2022-07-31 RX ADMIN — ATORVASTATIN CALCIUM 20 MG: 20 TABLET, FILM COATED ORAL at 08:07

## 2022-07-31 RX ADMIN — TACROLIMUS 900 MG: 0.5 CAPSULE ORAL at 02:07

## 2022-07-31 RX ADMIN — TACROLIMUS 900 MG: 0.5 CAPSULE ORAL at 10:07

## 2022-07-31 RX ADMIN — MORPHINE SULFATE 3 MG: 2 INJECTION, SOLUTION INTRAMUSCULAR; INTRAVENOUS at 11:07

## 2022-07-31 RX ADMIN — POTASSIUM BICARBONATE 40 MEQ: 391 TABLET, EFFERVESCENT ORAL at 12:07

## 2022-07-31 RX ADMIN — OXYCODONE 5 MG: 5 TABLET ORAL at 06:07

## 2022-07-31 RX ADMIN — OXYCODONE 5 MG: 5 TABLET ORAL at 12:07

## 2022-07-31 RX ADMIN — INSULIN ASPART 4 UNITS: 100 INJECTION, SOLUTION INTRAVENOUS; SUBCUTANEOUS at 08:07

## 2022-07-31 RX ADMIN — MORPHINE SULFATE 3 MG: 2 INJECTION, SOLUTION INTRAMUSCULAR; INTRAVENOUS at 03:07

## 2022-07-31 RX ADMIN — PIPERACILLIN SODIUM AND TAZOBACTAM SODIUM 4.5 G: 4; .5 INJECTION, POWDER, LYOPHILIZED, FOR SOLUTION INTRAVENOUS at 11:07

## 2022-07-31 RX ADMIN — INSULIN ASPART 8 UNITS: 100 INJECTION, SOLUTION INTRAVENOUS; SUBCUTANEOUS at 05:07

## 2022-07-31 RX ADMIN — INSULIN DETEMIR 12 UNITS: 100 INJECTION, SOLUTION SUBCUTANEOUS at 08:07

## 2022-07-31 NOTE — ASSESSMENT & PLAN NOTE
Presented with DKA, now resolved and transitioned to MDI  Trigger for DKA: infection/thigh abscess

## 2022-07-31 NOTE — NURSING TRANSFER
Nursing Transfer Note      7/31/2022     Reason patient is being transferred: Stepdown    Transfer To: [id number]    Transfer via bed    Transfer with cardiac monitoring    Transported by Michael RN; REDD Gutierrez    Medicines sent: Vancomycin IVPB, Clindamycin IVPB, Insulin aspart pen, insulin detemir pen    Any special needs or follow-up needed: No    Chart send with patient: Yes    Notified: spouse at bedside    Patient reassessed at: 7/31/22 at 0413    Upon arrival to floor: cardiac monitor applied, patient oriented to room, call bell in reach and bed in lowest position

## 2022-07-31 NOTE — PLAN OF CARE
Problem: Diabetic Ketoacidosis  Goal: Fluid and Electrolyte Balance with Absence of Ketosis  Outcome: Ongoing, Progressing     Problem: Adult Inpatient Plan of Care  Goal: Plan of Care Review  Outcome: Ongoing, Progressing  Goal: Patient-Specific Goal (Individualized)  Outcome: Ongoing, Progressing  Goal: Absence of Hospital-Acquired Illness or Injury  Outcome: Ongoing, Progressing  Goal: Optimal Comfort and Wellbeing  Outcome: Ongoing, Progressing  Goal: Readiness for Transition of Care  Outcome: Ongoing, Progressing     Problem: Diabetes Comorbidity  Goal: Blood Glucose Level Within Targeted Range  Outcome: Ongoing, Progressing     Problem: Fluid and Electrolyte Imbalance (Acute Kidney Injury/Impairment)  Goal: Fluid and Electrolyte Balance  Outcome: Ongoing, Progressing     Problem: Oral Intake Inadequate (Acute Kidney Injury/Impairment)  Goal: Optimal Nutrition Intake  Outcome: Ongoing, Progressing     Problem: Renal Function Impairment (Acute Kidney Injury/Impairment)  Goal: Effective Renal Function  Outcome: Ongoing, Progressing     Problem: Skin Injury Risk Increased  Goal: Skin Health and Integrity  Outcome: Ongoing, Progressing  Patient resting in bed comfortably. Wife at bedside. No pain at this time. PRN pain management on board. Blood glucose monitored. Scheduled SSI and PRN SSI on board. PIV x3 in place. Plan of care and education reviewed with patient and wife. No questions at this time. Bed in low and locked position. Call light within reach, instructed to call for assistance if needed.

## 2022-07-31 NOTE — ASSESSMENT & PLAN NOTE
General weight loss/lifestyle modification strategies discussed (elicit support from others; identify saboteurs; non-food rewards, etc).   Weight loss will help to improve glycemic control

## 2022-07-31 NOTE — ASSESSMENT & PLAN NOTE
Key History and Diagnostic Findings  - Uncontrolled T2DM, pt not adherent to med regimen, not checking BG regularly at home  - p/w DKA with trigger infection/thigh abscess  - Home Regimen: metformin 500mg BID, Jardiance 25mg qd, Trulicity 3mg SC weekly, Lantus 10u qd   Pt never started insulin which was prescribed 3/2022   Not filling metformin regularly  - Glucose Goals: 140-180mg/dL  - 24 hour glucose trend:  170s-340s    Lab Results   Component Value Date    HGBA1C 10.1 (H) 07/26/2022       Plan  - Continue Levemir 12 units bid   - Continue Aspart 8 units TIDAC + moderate dose correction scale  - Will adjust insulin as needed  - Accuchecks TIDAC+qhs  - DM diet    - Patient will need to be discharged on MDI insulin (prandial and basal)   - Other home meds to consider at d/c:    - Would hold jardiace until complete resolution of infection/acute illness. Because his presentation of DKA has a clear trigger (infection) and was not euglycemic DKA, unlikely that Jardiance was the cause. For these reasons, it would be reasonable to resume it outpatient after recovery from acute illness.   - Can resume metformin and trulicity at d/c most likely, will re-visit dc recs when pt is ready for discharge    - Hypoglycemia protocol in place  - If blood glucose greater than 300, please ask patient not to eat food or drink anything other than water until correctional insulin has brought it back below 250    ** Please notify Endocrine for any change and/or advance in diet**  ** Please call Endocrine for any BG related issues **

## 2022-07-31 NOTE — CONSULTS
[facility] - Telemetry Stepdown  Infectious Disease  Consult Note    Patient Name: Wilfredo Thomas  MRN: [id number]  Admission Date: 7/26/2022  Hospital Length of Stay: 5 days  Attending Physician: Bandar Arce MD  Primary Care Provider: Aylin Wayne DO     Isolation Status: No active isolations    Patient information was obtained from patient, spouse/SO, past medical records and ER records.      Inpatient consult to Infectious Diseases  Consult performed by: Steven Potter NP  Consult ordered by: Cecil Rios MD        Assessment/Plan:     Abscess of left thigh   46 yo male with PMH of T2DM, HTN, admitted for DKA. Reporting 2 weeks of left lateral thigh pain, swelling. No known injury to the area. To note pt reports recent boil to buttocks as well as left wrist surgery following wrist fracture/ligament rupture. See HPI for full detail.      MRI of left femur was notable for ruptured tensor fascia jose luis mm with associated large hematoma, as well as small area of potential osteonecrosis. Bedside aspiration of the fluid collection was notable for a cell count of >200k >80%segs, cultures + staph spp. S/p I&D with Dr. Graff with ortho surgery on 7/29, cultures collected + staph spp. Per pt, states that Dr. Graff is planning to return to surgery tomorrow, 8/1/22 for repeat washout.      Afebrile. No leukocytosis. Blood cultures from 7/26 NGTD.      Pt is currently on zosyn, vancomycin, and clindamycin. ID was consulted for abx recs regarding left TFL infection.     Recommendations:   - Continue IV Vancomycin, inpatient pharmacy to dose.   - Continue Piperacillin-Tazobactam 4.5 mg IV q8hr.   - Discontinue clindamycin.   - If taken back to OR tomorrow for repeat washout, please obtain bone biopsy if any concern for osteomyelitis. If obtained, please send for path, gram stain, aerobic, anaerobic, AFB and fungal cultures.   - Pt seen and plan reviewed with ID staff, Dr. Ness. ID will follow.             Thank  you for your consult. I will follow-up with patient. Please contact us if you have any additional questions.    Steven Caicedo NP  Infectious Disease  [facility] - Tele[facility] Step[facility]    Subjective:     Principal Problem: DKA (diabetic ketoacidosis)    HPI: Mr. Thomas is a 47 year old male with a PMH of DM2 (poorly controlled), HTN, GERD, HLD, obesity who initially presented to [facility] ED with cc of SOBx1 day. Pt was also reporting significant pain and swelling along his left lateral proximal thigh. Pt reports this pain started over the last 2 weeks, which worsened. He initially was seen outpatient in which he was diagnoised with a mm strain and given 800 mg ibuprofen. From there he went to the ED on 7/22 d/t worsening pain. He was treated with prednisone/morphine shot, and well as a prednisone oral pack. Pt denied recent injury, with the exception of soreness to his left lower shin following bumping into a cabinet. He denies open wounds/abrasions from this injury but states the soreness started in his left thigh following the improvement of discomfort from his left shin. Pt reports having been treated for a boil on his central/right buttocks in the end of June. 2022. Pt states he was seen in Urgent Care in [place] and the boil was drained. He was given an oral abx in which he completed.     To note, pt reports having a recent left wrist fracture, ligament rupture following an accident with his riding lawnmower. States he underwent surgery and 2 screws were placed on 3/31/22 at [facility]. Denies associated infection/complications. Denies pain in the site currently.    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 Denies having pets. Denies recent fishing/hunting. Denies hx of immunocompromising conditions.     To note, pt was found to be in DKA with blood sugars >400, treated by critical team, which has since resolved. Pt was recently started on insulin by his PCP, which he  had not started. Pt latest A1C 10.     CT of left thigh notable for fluid collections suspicious for hematoma/seroma. MRI of left femur was notable for ruptured tensor fascia jose luis mm with associated large hematoma, as well as small area of potential osteonecrosis. Bedside aspiration of the fluid collection was notable for a cell count of >200k >80%segs, cultures + staph spp. Pt was taken for I&D with Dr. Graff with ortho surgery on 7/29, abscesses were noted and washed out, cultures collected + staph spp. Per pt, states that Dr. Graff is planning to return to surgery tomorrow, 8/1/22 for repeat washout.     Pt was intially with leukocytosis, but has since down trended to 11k. Blood cultures from 7/26 NGTD.     Pt is currently on zosyn, vancomycin, and clindamycin. ID was consulted for abx recs regarding left TFL infection.              Past Medical History:   Diagnosis Date    Diabetes     Gout     Hyperlipidemia     Hypertension        Past Surgical History:   Procedure Laterality Date    WRIST SURGERY Left        Review of patient's allergies indicates:  No Known Allergies    Medications:  Medications Prior to Admission   Medication Sig    atorvastatin (LIPITOR) 20 MG tablet Take 1 tablet (20 mg total) by mouth once daily.    empagliflozin (JARDIANCE) 25 mg tablet Take 1 tablet (25 mg total) by mouth once daily.    lisinopriL (PRINIVIL,ZESTRIL) 20 MG tablet Take 1 tablet (20 mg total) by mouth once daily.    blood sugar diagnostic Strp 1 strip by Misc.(Non-Drug; Combo Route) route once daily.    blood-glucose meter kit Use as instructed    dulaglutide (TRULICITY) 3 mg/0.5 mL pen injector Inject 3 mg into the skin every 7 days.    insulin (LANTUS SOLOSTAR U-100 INSULIN) glargine 100 units/mL (3mL) SubQ pen Inject 20 Units into the skin once daily.    lancets Misc 1 lancet by Misc.(Non-Drug; Combo Route) route once daily.    metFORMIN (GLUCOPHAGE-XR) 500 MG ER 24hr tablet Take 1 tablet (500 mg  "total) by mouth 2 (two) times daily with meals.     Antibiotics (From admission, onward)                Start     Stop Route Frequency Ordered    22 0330  vancomycin 1.75 g in 5 % dextrose 500 mL IVPB         -- IV Every 12 hours (non-standard times) 22 1534    22 1500  vancomycin - pharmacy to dose  (vancomycin IVPB)        "And" Linked Group Details    -- IV pharmacy to manage frequency 22 1401    22 1403  clindamycin in D5W 900 mg/50 mL IVPB 900 mg         -- IV Every 8 hours (non-standard times) 22 1404    22 1401  piperacillin-tazobactam 4.5 g in sodium chloride 0.9% 100 mL IVPB (ready to mix system)         -- IV Every 8 hours (non-standard times) 22 1401    22 2230  piperacillin-tazobactam 4.5 g in sodium chloride 0.9% 100 mL IVPB (ready to mix system)          1029 IV ED 1 Time 22 2154          Antifungals (From admission, onward)                None          Antivirals (From admission, onward)      None               There is no immunization history on file for this patient.    Family History       Problem Relation (Age of Onset)    Diabetes Mother    No Known Problems Father          Social History     Socioeconomic History    Marital status:    Tobacco Use    Smoking status: Former Smoker     Packs/day: 0.50     Types: Cigarettes     Quit date: 2020     Years since quittin.5    Smokeless tobacco: Never Used   Substance and Sexual Activity    Alcohol use: Yes     Alcohol/week: 12.0 standard drinks     Types: 12 Cans of beer per week    Drug use: Never    Sexual activity: Yes     Partners: Female   Social History Narrative    Lives with wife and 2 kids in college and one older with 1 grand child. Supervisor at [employer]. Smoker but quit 2020     Social Determinants of Health     Physical Activity: Unknown    Days of Exercise per Week: Patient refused   Social Connections: Unknown    Active Member of Clubs or Organizations: " Patient refused    Marital Status: Patient refused     Review of Systems   Constitutional:  Positive for activity change. Negative for chills, diaphoresis, fatigue and fever.   Respiratory:  Negative for cough and shortness of breath.    Cardiovascular:  Positive for leg swelling (upper left thigh). Negative for chest pain and palpitations.   Gastrointestinal:  Negative for abdominal distention and abdominal pain.   Genitourinary:  Negative for difficulty urinating and dysuria.   Musculoskeletal:  Positive for myalgias (upper left thigh). Negative for arthralgias and joint swelling.   Skin:  Positive for wound (upper left thigh). Negative for color change and rash.   Allergic/Immunologic: Negative for immunocompromised state.   Neurological:  Negative for dizziness, tremors, weakness, numbness and headaches.   Psychiatric/Behavioral:  Negative for agitation and decreased concentration. The patient is not nervous/anxious.    Objective:     Vital Signs (Most Recent):  Temp: 98 °F (36.7 °C) (07/31/22 1135)  Pulse: (!) 127 (07/31/22 1135)  Resp: 18 (07/31/22 1135)  BP: 123/85 (07/31/22 1135)  SpO2: 96 % (07/31/22 1135)   Vital Signs (24h Range):  Temp:  [96.7 °F (35.9 °C)-98.8 °F (37.1 °C)] 98 °F (36.7 °C)  Pulse:  [] 127  Resp:  [18-30] 18  SpO2:  [95 %-99 %] 96 %  BP: (111-154)/(71-96) 123/85     Weight: 94.5 kg (208 lb 5.4 oz)  Body mass index is 32.63 kg/m².    Estimated Creatinine Clearance: 163.3 mL/min (based on SCr of 0.6 mg/dL).    Physical Exam  Vitals and nursing note reviewed.   Constitutional:       General: He is not in acute distress.     Appearance: Normal appearance. He is well-developed. He is not ill-appearing, toxic-appearing or diaphoretic.   HENT:      Head: Normocephalic and atraumatic.      Nose: Nose normal.      Mouth/Throat:      Dentition: Normal dentition. Does not have dentures. No dental caries or dental abscesses.      Pharynx: No oropharyngeal exudate.   Eyes:      General: No  scleral icterus.     Conjunctiva/sclera: Conjunctivae normal.   Cardiovascular:      Rate and Rhythm: Normal rate and regular rhythm.      Heart sounds: Normal heart sounds. No murmur heard.  Pulmonary:      Effort: Pulmonary effort is normal. No respiratory distress.      Breath sounds: Normal breath sounds.   Abdominal:      General: Bowel sounds are normal. There is no distension.      Palpations: Abdomen is soft.   Musculoskeletal:      Cervical back: Normal range of motion.   Lymphadenopathy:      Cervical: No cervical adenopathy.   Skin:     General: Skin is warm and dry.      Findings: No erythema or rash.      Comments: Incision to left thigh. Dressed, CDI. Surgical drain with sanguinous/purulent output.    Neurological:      General: No focal deficit present.      Mental Status: He is alert and oriented to person, place, and time. Mental status is at baseline.   Psychiatric:         Mood and Affect: Mood normal.         Behavior: Behavior normal.         Thought Content: Thought content normal.         Judgment: Judgment normal.       Significant Labs: All pertinent labs within the past 24 hours have been reviewed.    Significant Imaging: I have reviewed all pertinent imaging results/findings within the past 24 hours.

## 2022-07-31 NOTE — ASSESSMENT & PLAN NOTE
46 yo male with PMH of T2DM, HTN, admitted for DKA. Reporting 2 weeks of left lateral thigh pain, swelling. No known injury to the area. To note pt reports recent boil to buttocks as well as left wrist surgery following wrist fracture/ligament rupture. See HPI for full detail.      MRI of left femur was notable for ruptured tensor fascia jose luis mm with associated large hematoma, as well as small area of potential osteonecrosis. Bedside aspiration of the fluid collection was notable for a cell count of >200k >80%segs, cultures + staph spp. S/p I&D with Dr. Graff with ortho surgery on 7/29, cultures collected + staph spp. Per pt, states that Dr. Graff is planning to return to surgery tomorrow, 8/1/22 for repeat washout.      Afebrile. No leukocytosis. Blood cultures from 7/26 NGTD.      Pt is currently on zosyn, vancomycin, and clindamycin. ID was consulted for abx recs regarding left TFL infection.     Recommendations:   - Continue IV Vancomycin, inpatient pharmacy to dose.   - Continue Piperacillin-Tazobactam 4.5 mg IV q8hr.   - Discontinue clindamycin.   - If taken back to OR tomorrow for repeat washout, please obtain bone biopsy if any concern for osteomyelitis. If obtained, please send for path, gram stain, aerobic, anaerobic, AFB and fungal cultures.   - Pt seen and plan reviewed with ID staff, Dr. Ness. ID will follow.

## 2022-07-31 NOTE — CONSULTS
[facility] - Telemetry Stepdown  Endocrinology  Diabetes Consult Note    Consult Requested by: Bandar Arce MD   Reason for admit: DKA (diabetic ketoacidosis)    HISTORY OF PRESENT ILLNESS:  Reason for Consult: Management of T2DM, Hyperglycemia     Surgical Procedure and Date: L thigh IM abscess I&D on 07/29/2022    Diabetes diagnosis year: at age 43yo    Home Diabetes Medications:    - Metformin 500 mg bid  - Jardiance 25 mg qd  - Trulicity 3 mg SC weekly  - Lantus 10 units qd -- prescribed at the end of 3/2022 but pt has not yet started taking it    Previously on glipizide, but stopped in 03/2022 by PCP who is managing his DM outpatient    How often checking glucose at home?  2-3x per week    BG readings on regimen: he reports <200  Hypoglycemia on the regimen?  No  Missed doses on regimen?  Yes    Diabetes Complications include:     Hyperglycemia    Complicating diabetes co morbidities:   HTN, obesity      HPI:   Patient is a 47 y.o. male with a diagnosis of T2DM, HTN, HLD, GERD, and obesity presented to the ED on 07/26/2022 with SOB and altered mental status x1d. Additionally reported pain and swelling L lateral high. Was seen at an OSH ED for thigh pain and was treated conservatively with NSAIDS initially. At follow up appt w/ ortho 7/22 he received IM steroid inj (deltoid) and po prednisone for suspected muscle strain. Due to worsening AMS and SOB he presented to the [facility] ED on 7/26. In the ED he was found to be in DKA and as admitted to the MICU due to severe acidosis.     For the thigh pain, MRI showed a ruptured tensor fascia jose luis (TFL) muscle with associated large hematoma. Ortho decided to perform aspiration night of 7/28. Gram stain showed G+ cocci in clusters- now growing Staph Species.They decided to take pt to OR 7/29 for formal I&D.      For DKA he was started on IVF and an intensive insulin gtt with resolution of DKA. Transitioned to MDI on 7/30 AM. Initial regimen was detemir 10u BID and Aspart  4u AC + LDC. Insulin up-titrated by MICU team prior to stepdown, to detemir 12u bid and aspart 8u AC + LDC. Endocrinology consulted for management of hyperglycemia.          Interval HPI:   Overnight events: Stepped down from the MICU to hospital medicine. Insulin regimen up titrated last night prior to stepdown.  Eatin%  Nausea: No  Hypoglycemia and intervention: No  Fever: No  TPN and/or TF: No  If yes, type of TF/TPN and rate: n/a    PMH, PSH, FH, SH updated and reviewed     ROS:  Review of Systems   Constitutional:  Positive for activity change. Negative for chills, diaphoresis, fatigue and fever.   HENT:  Negative for congestion and trouble swallowing.    Respiratory:  Negative for cough and shortness of breath.    Cardiovascular:  Positive for leg swelling (upper left thigh). Negative for chest pain and palpitations.   Gastrointestinal:  Negative for abdominal distention, abdominal pain, nausea and vomiting.   Endocrine: Positive for polydipsia and polyuria.   Genitourinary:  Negative for decreased urine volume, difficulty urinating and dysuria.   Musculoskeletal:  Positive for myalgias (upper left thigh). Negative for arthralgias and joint swelling.   Skin:  Positive for wound (upper left thigh). Negative for color change and rash.   Allergic/Immunologic: Negative for immunocompromised state.   Neurological:  Negative for dizziness, tremors, weakness, numbness and headaches.   Psychiatric/Behavioral:  Negative for agitation and decreased concentration. The patient is not nervous/anxious.      Current Medications and/or Treatments Impacting Glycemic Control  Immunotherapy:    Immunosuppressants       None          Steroids:   Hormones (From admission, onward)                None          Pressors:    Autonomic Drugs (From admission, onward)                None          Hyperglycemia/Diabetes Medications:   Antihyperglycemics (From admission, onward)                Start     Stop Route Frequency Ordered     07/31/22 0715  insulin aspart U-100 pen 8 Units         -- SubQ 3 times daily with meals 07/30/22 1725    07/30/22 2100  insulin detemir U-100 pen 12 Units         -- SubQ 2 times daily 07/30/22 1725    07/30/22 0826  insulin aspart U-100 pen 1-10 Units         -- SubQ Before meals & nightly PRN 07/30/22 0727             PHYSICAL EXAMINATION:  Vitals:    07/31/22 1206   BP:    Pulse:    Resp: 18   Temp:      Body mass index is 32.63 kg/m².    Physical Exam  Constitutional:       General: He is not in acute distress.     Appearance: He is obese. He is not ill-appearing.   HENT:      Head: Normocephalic and atraumatic.      Mouth/Throat:      Mouth: Mucous membranes are moist.   Eyes:      General:         Right eye: No discharge.         Left eye: No discharge.      Conjunctiva/sclera: Conjunctivae normal.   Cardiovascular:      Rate and Rhythm: Regular rhythm. Tachycardia present.   Pulmonary:      Effort: Pulmonary effort is normal. No respiratory distress.   Abdominal:      General: There is no distension.      Palpations: Abdomen is soft.      Tenderness: There is no abdominal tenderness.   Musculoskeletal:         General: No swelling.      Cervical back: Normal range of motion and neck supple.      Comments: L thigh drain in place with SS output   Skin:     General: Skin is warm and dry.   Neurological:      Mental Status: He is alert.   Psychiatric:         Mood and Affect: Mood normal.         Behavior: Behavior normal.         Labs Reviewed and Include   Recent Labs   Lab 07/31/22  0348   *   CALCIUM 8.8   ALBUMIN 1.9*   PROT 6.6      K 3.4*   CO2 23   CL 99   BUN 7   CREATININE 0.6   ALKPHOS 108   ALT 15   AST 15   BILITOT 0.5     Lab Results   Component Value Date    WBC 7.40 07/31/2022    HGB 12.0 (L) 07/31/2022    HCT 34.8 (L) 07/31/2022    MCV 90 07/31/2022     07/31/2022     Recent Labs   Lab 07/26/22 2044   TSH 4.631*   FREET4 0.75     Lab Results   Component Value Date     HGBA1C 10.1 (H) 07/26/2022       Nutritional status:   Body mass index is 32.63 kg/m².  Lab Results   Component Value Date    ALBUMIN 1.9 (L) 07/31/2022    ALBUMIN 1.6 (L) 07/30/2022    ALBUMIN 1.7 (L) 07/29/2022     Lab Results   Component Value Date    PREALBUMIN 7 (L) 07/29/2022       Estimated Creatinine Clearance: 163.3 mL/min (based on SCr of 0.6 mg/dL).    Accu-Checks  Recent Labs     07/30/22  0309 07/30/22  0416 07/30/22  0530 07/30/22  0643 07/30/22  0741 07/30/22  0901 07/30/22  1140 07/30/22  1726 07/30/22  2214 07/31/22  0726   POCTGLUCOSE 157* 212* 209* 188* 227* 244* 213* 252* 244* 237*        ASSESSMENT and PLAN    * DKA (diabetic ketoacidosis)  Presented with DKA, now resolved and transitioned to MDI  Trigger for DKA: infection/thigh abscess      Abscess of left thigh  L thigh abscess now s/p I&D with drain in place  ABX per ID/primary team        ANGE (acute kidney injury)  On admission Cr 1.9  ANGE likely prerenal with DKA  ANGE resolved with resolution of DKA    Hyperlipidemia associated with type 2 diabetes mellitus  Continue home statin   Goal LDL<70    Class 2 severe obesity due to excess calories with serious comorbidity and body mass index (BMI) of 36.0 to 36.9 in adult  General weight loss/lifestyle modification strategies discussed (elicit support from others; identify saboteurs; non-food rewards, etc).   Weight loss will help to improve glycemic control      Type 2 diabetes mellitus with hyperglycemia, with long-term current use of insulin  Key History and Diagnostic Findings  - Uncontrolled T2DM, pt not adherent to med regimen, not checking BG regularly at home  - p/w DKA with trigger infection/thigh abscess  - Home Regimen: metformin 500mg BID, Jardiance 25mg qd, Trulicity 3mg SC weekly, Lantus 10u qd   Pt never started insulin which was prescribed 3/2022   Not filling metformin regularly  - Glucose Goals: 140-180mg/dL  - 24 hour glucose trend:  170s-340s    Lab Results   Component Value  Date    HGBA1C 10.1 (H) 07/26/2022       Plan  - Continue Levemir 12 units bid   - Continue Aspart 8 units TIDAC + moderate dose correction scale  - Will adjust insulin as needed  - Accuchecks TIDAC+qhs  - DM diet    - Patient will need to be discharged on MDI insulin (prandial and basal)   - Other home meds to consider at d/c:    - Would hold jardiace until complete resolution of infection/acute illness. Because his presentation of DKA has a clear trigger (infection) and was not euglycemic DKA, unlikely that Jardiance was the cause. For these reasons, it would be reasonable to resume it outpatient after recovery from acute illness.   - Can resume metformin and trulicity at d/c most likely, will re-visit dc recs when pt is ready for discharge    - Hypoglycemia protocol in place  - If blood glucose greater than 300, please ask patient not to eat food or drink anything other than water until correctional insulin has brought it back below 250    ** Please notify Endocrine for any change and/or advance in diet**  ** Please call Endocrine for any BG related issues **            Plan discussed with patient, family, and RN at bedside.     Dominique Thurman MD  Endocrinology  [facility] - Telemetry Stepdown

## 2022-07-31 NOTE — PROGRESS NOTES
"Josafat Chun - Telemetry [facility] Medicine  Progress Note    Patient Name: Wilfredo Thomas  MRN: [id number]  Patient Class: IP- Inpatient   Admission Date: 7/26/2022  Length of Stay: 5 days  Attending Physician: Bandar Arce MD  Primary Care Provider: Aylin Wayne DO        Subjective:     Principal Problem:DKA (diabetic ketoacidosis)        HPI:  48 y/o M hx of HTN, IDDM2, GERD obesity, HLD presented to the ED with complaint of 1 day of worsening SOB. Wife present at bedside. Patient states that he noticed yesterday he was feeling some shortness of breath and couldn't seem to catch his breath. His wife went to check on him this morning and noticed that he was breathing "fast and heavy" and he sounded like he was slurring his words. Wife was concerned about a stroke, so he brought him to the ED for evaluation.     Patient also reporting significant pain and swelling along his left lateral proximal thigh. He states he has noticed worsening pain over the past 2 weeks. He denies any acute trauma to the area. He was seen by ortho last week and was told to take ibu-profen for a muscle strain. This did not help his pain, so he presented again on 7/22 and was given a prednisone injection into his thigh. This also did not help his pain, and now the pain and swelling have advanced to the point that he has difficulty with ambulation. Patient denies fever, chills, nausea, vomiting.      Of note, patient has hx of poorly controlled diabetes. He was recently started on insulin by his PCP, but he has not taken any insulin since being prescribed it.      In the ED, patient hypertensive and tachycardic to the 130s. Tachypneic with RR in the 40s. On CBC, WBC 26.9, On CMP patient hyperkalemic to 5.5, CorNa 136, Bicarb 5. Cr elevated to 1.9 from BL 0.83. UA, BHB pending at time of admission. CTA without evidence of pulmonary embolism. Critical Care Medicine consulted given severe acidosis.             Overview/Hospital " Course:    Patient admitted to the MICU for management of severe DKA. Metabolic acidosis improving on insulin drip. He also has had left thigh pain for 1-2 months. There is a large mass on his left thigh that is tender to palpation, CT revealed hematoma vs soft tissue growth. General surgery consulted and recommended IR consult. IR consulted, no indication for tap. 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 Ortho consulted and performed bedside aspiration of possible abscess which revealed >200K cells >80% segs. Will hold off on antibiotics for now per ortho recs, ortho is taking patient to the OR for irrigation of the thigh, will start antibiotics afterwards.         7/29 Metabolic acidosis slowly improving. Ortho taking patient to the OR for irrigation of the left thigh, will start antibiotics afterwards.   7/30 Transfer to hospital medicine . S/p I&D  of left thigh Abscess on7/29/22 - MRI - uptured tensor fascia jose luis (TFL) muscle with associated large hematoma. gram stain -Moderate Gram positive cocci in clusters .cultures pending.  continue Vancomycin, clindamycin and Zosyn . Bicarb 15. K replaced . off insulin drip on SQ insulin.  7/31 ID and endocrine consulted.  abscess with Calcium pyrophosphate crystals with unknown significance potassium and P replaced. Bicarbonate WNL . aerobic culture 7/28 STAPHYLOCOCCUS SPECIES . Glucose in 300s .PRN imodium  for diarrhea. PT/OT consulted - WBAT. Surgical drain with purulent output. plan for OR tomorrow for washout  may need bone biopsy to r/o osteomyelitis.Discontinued clindamycin.                     Review of Systems:   Pain scale:    Constitutional:  fever,  chills, headache, vision loss, hearing loss, weight loss, Generalized weakness, falls, loss of smell, loss of taste, poor appetite,  sore throat  Respiratory: cough, shortness of breath.   Cardiovascular: chest pain, dizziness, palpitations,  orthopnea, swelling of feet, syncope  Gastrointestinal: nausea, vomiting, abdominal pain, diarrhea, black stool,  blood in stool, change in bowel habits  Genitourinary: hematuria, dysuria, urgency, frequency  Integument/Breast: rash,  pruritis  Hematologic/Lymphatic: easy bruising, lymphadenopathy  Musculoskeletal: arthralgias , myalgias- Left thigh pain,   back pain, neck pain, knee pain  Neurological: confusion, seizures, tremors, slurred speech  Behavioral/Psych:  depression, anxiety, auditory or visual hallucinations     OBJECTIVE:     Physical Exam:  Body mass index is 32.63 kg/m².    Constitutional: Appears well-developed and well-nourished. obesity   Head: Normocephalic and atraumatic.   Neck: Normal range of motion. Neck supple.   Cardiovascular: Normal heart rate.  Regular heart rhythm.  Pulmonary/Chest: Effort normal.   Abdominal: No distension.  No tenderness  Musculoskeletal: Normal range of motion. No edema. left thigh swelling and tenderness, drain in place -Surgical drain with purulent output.   Neurological: Alert and oriented to person, place, and time.   Skin: Skin is warm and dry.   Psychiatric: Normal mood and affect. Behavior is normal.                  Vital Signs  Temp: 97.9 °F (36.6 °C) (07/31/22 1521)  Pulse: 95 (07/31/22 1521)  Resp: 18 (07/31/22 1521)  BP: 111/77 (07/31/22 1521)  SpO2: 98 % (07/31/22 1521)     24 Hour VS Range    Temp:  [96.7 °F (35.9 °C)-98.6 °F (37 °C)]   Pulse:  []   Resp:  [18-30]   BP: (111-139)/(71-93)   SpO2:  [96 %-99 %]     Intake/Output Summary (Last 24 hours) at 7/31/2022 1604  Last data filed at 7/31/2022 1200  Gross per 24 hour   Intake 1117.1 ml   Output 1200 ml   Net -82.9 ml         I/O This Shift:  I/O this shift:  In: 400 [P.O.:400]  Out: -     Wt Readings from Last 3 Encounters:   07/27/22 94.5 kg (208 lb 5.4 oz)   03/24/22 105.1 kg (231 lb 12.8 oz)   07/08/21 103.1 kg (227 lb 4.8 oz)       I have personally reviewed the vitals and recorded  Intake/Output     Laboratory/Diagnostic Data:    CBC/Anemia Labs: Coags:    Recent Labs   Lab 07/29/22  0336 07/30/22  0311 07/31/22  0923   WBC 11.47 11.20 7.40   HGB 12.6* 11.9* 12.0*   HCT 36.4* 35.0* 34.8*    227 257   MCV 92 92 90   RDW 12.9 12.8 12.5    Recent Labs   Lab 07/26/22  2044 07/29/22  0023   INR 1.1 1.1        Chemistries: ABG:   Recent Labs   Lab 07/29/22  2136 07/30/22  0211 07/30/22  0215 07/30/22  0311 07/30/22  0534 07/30/22  1446 07/30/22  2343 07/31/22  0348   *   < >  --  132*   < > 128* 135* 136   K 5.5*   < >  --  4.0   < > 3.4* 3.5 3.4*      < >  --  102   < > 97 102 99   CO2 11*   < >  --  15*   < > 17* 22* 23   BUN 9   < >  --  8   < > 8 7 7   CREATININE 0.7   < >  --  0.6   < > 0.7 0.5 0.6   CALCIUM 7.7*   < >  --  8.5*   < > 8.3* 8.1* 8.8   PROT 5.8*  --   --  5.8*  --   --   --  6.6   BILITOT 0.4  --   --  0.5  --   --   --  0.5   ALKPHOS 75  --   --  71  --   --   --  108   ALT 11  --   --  9*  --   --   --  15   AST 22  --   --  12  --   --   --  15   MG  --   --  1.9 1.8  --   --   --  1.7   PHOS  --   --  3.6 3.5  --   --   --  2.5*    < > = values in this interval not displayed.    Recent Labs   Lab 07/26/22 2248   PH 6.977*   PCO2 27.9*   PO2 38*   HCO3 6.5*   POCSATURATED 46*   BE -25        POCT Glucose: HbA1c:    Recent Labs   Lab 07/30/22  0901 07/30/22  1140 07/30/22  1726 07/30/22  2214 07/31/22  0726 07/31/22  1521   POCTGLUCOSE 244* 213* 252* 244* 237* 173*    Hemoglobin A1C   Date Value Ref Range Status   07/26/2022 10.1 (H) 4.0 - 5.6 % Final     Comment:     ADA Screening Guidelines:  5.7-6.4%  Consistent with prediabetes  >or=6.5%  Consistent with diabetes    High levels of fetal hemoglobin interfere with the HbA1C  assay. Heterozygous hemoglobin variants (HbS, HgC, etc)do  not significantly interfere with this assay.   However, presence of multiple variants may affect accuracy.     03/22/2022 11.2 (H) 4.0 - 5.6 % Final     Comment:     ADA  Screening Guidelines:  5.7-6.4%  Consistent with prediabetes  >or=6.5%  Consistent with diabetes    High levels of fetal hemoglobin interfere with the HbA1C  assay. Heterozygous hemoglobin variants (HbS, HgC, etc)do  not significantly interfere with this assay.   However, presence of multiple variants may affect accuracy.     07/07/2021 8.6 (H) 4.0 - 5.6 % Final     Comment:     ADA Screening Guidelines:  5.7-6.4%  Consistent with prediabetes  >or=6.5%  Consistent with diabetes    High levels of fetal hemoglobin interfere with the HbA1C  assay. Heterozygous hemoglobin variants (HbS, HgC, etc)do  not significantly interfere with this assay.   However, presence of multiple variants may affect accuracy.          Cardiac Enzymes: Ejection Fractions:    No results for input(s): CPK, CPKMB, MB, TROPONINI in the last 72 hours. No results found for: EF       No results for input(s): COLORU, APPEARANCEUA, PHUR, SPECGRAV, PROTEINUA, GLUCUA, KETONESU, BILIRUBINUA, OCCULTUA, NITRITE, UROBILINOGEN, LEUKOCYTESUR, RBCUA, WBCUA, BACTERIA, SQUAMEPITHEL, HYALINECASTS in the last 48 hours.    Invalid input(s): WRIGHTSUR    Procalcitonin (ng/mL)   Date Value   07/29/2022 0.57 (H)   07/26/2022 0.29 (H)     Lactate (Lactic Acid) (mmol/L)   Date Value   07/27/2022 1.9   07/26/2022 1.3     BNP (pg/mL)   Date Value   07/26/2022 20     CRP (mg/L)   Date Value   07/29/2022 286.9 (H)     Sed Rate (mm/Hr)   Date Value   07/29/2022 73 (H)     D-Dimer (mg/L FEU)   Date Value   07/26/2022 1.46 (H)     No results found for: FERRITIN  No results found for: LDH  Troponin I (ng/mL)   Date Value   07/26/2022 <0.006     No results found for this or any previous visit.  SARS-CoV-2 RNA, Amplification, Qual (no units)   Date Value   07/26/2022 Negative       Microbiology labs for the last week  Microbiology Results (last 7 days)     Procedure Component Value Units Date/Time    Aerobic culture [[id number]]  (Abnormal) Collected: 07/28/22 1800    Order  Status: Completed Specimen: Abscess from Leg, Left Updated: 07/31/22 1322     Aerobic Bacterial Culture STAPHYLOCOCCUS SPECIES  Many  Identification and susceptibility pending      Narrative:      LEFT THIGH ABSCESS    Aerobic culture [[id number]]  (Abnormal) Collected: 07/29/22 1300    Order Status: Completed Specimen: Abscess from Leg, Left Updated: 07/31/22 0740     Aerobic Bacterial Culture STAPHYLOCOCCUS SPECIES  Many  Identification and susceptibility pending      Narrative:      Left thigh abscess #2    Aerobic culture [[id number]]  (Abnormal) Collected: 07/29/22 1251    Order Status: Completed Specimen: Abscess from Leg, Left Updated: 07/31/22 0723     Aerobic Bacterial Culture STAPHYLOCOCCUS SPECIES  Many  Identification and susceptibility pending      Narrative:      Left thigh abscess #1    Blood Culture #2 **CANNOT BE ORDERED STAT** [[id number]] Collected: 07/26/22 2246    Order Status: Completed Specimen: Blood from Peripheral, Antecubital, Left Updated: 07/31/22 0612     Blood Culture, Routine No Growth to date      No Growth to date      No Growth to date      No Growth to date      No Growth to date    Blood Culture #1 **CANNOT BE ORDERED STAT** [[id number]] Collected: 07/26/22 2302    Order Status: Completed Specimen: Blood from Peripheral, Hand, Left Updated: 07/31/22 0612     Blood Culture, Routine No Growth to date      No Growth to date      No Growth to date      No Growth to date      No Growth to date    AFB Culture & Smear [[id number]] Collected: 07/29/22 1252    Order Status: Completed Specimen: Wound from Leg, Left Updated: 07/30/22 2127     AFB Culture & Smear Culture in progress    Narrative:      Left thigh abscess #1    AFB Culture & Smear [[id number]] Collected: 07/29/22 1300    Order Status: Completed Specimen: Abscess from Leg, Left Updated: 07/30/22 2127     AFB Culture & Smear Culture in progress    Narrative:      Left thigh abscess #2    Culture, Anaerobic [[id number]] Collected:  07/28/22 1800    Order Status: Completed Specimen: Abscess from Leg, Left Updated: 07/30/22 1155     Anaerobic Culture Culture in progress    Narrative:      LEFT THIGH ABSCESS    Culture, Anaerobe [[id number]] Collected: 07/29/22 1251    Order Status: Completed Specimen: Abscess from Leg, Left Updated: 07/30/22 1155     Anaerobic Culture Culture in progress    Narrative:      Left thigh abscess #1    Culture, Anaerobe [[id number]] Collected: 07/29/22 1300    Order Status: Completed Specimen: Abscess from Leg, Left Updated: 07/30/22 1155     Anaerobic Culture Culture in progress    Narrative:      Left thigh abscess #2    AFB Culture & Smear [[id number]] Collected: 07/28/22 1800    Order Status: Completed Specimen: Abscess from Leg, Left Updated: 07/29/22 2127     AFB Culture & Smear Culture in progress     AFB CULTURE STAIN No acid fast bacilli seen.    Narrative:      LEFT THIGH ABSCESS    Gram stain [[id number]] Collected: 07/29/22 1300    Order Status: Completed Specimen: Abscess from Leg, Left Updated: 07/29/22 1421     Gram Stain Result Moderate WBC's      Moderate Gram positive cocci in clusters    Narrative:      Left thigh abscess #2    Gram stain [[id number]] Collected: 07/29/22 1251    Order Status: Completed Specimen: Abscess from Leg, Left Updated: 07/29/22 1420     Gram Stain Result Moderate WBC's      Moderate Gram positive cocci in clusters    Narrative:      Left thigh abscess #1    Fungus culture [[id number]] Collected: 07/29/22 1300    Order Status: Sent Specimen: Abscess from Leg, Left Updated: 07/29/22 1330    Fungus culture [[id number]] Collected: 07/29/22 1251    Order Status: Sent Specimen: Abscess from Leg, Left Updated: 07/29/22 1326    Gram stain [[id number]] Collected: 07/28/22 1800    Order Status: Completed Specimen: Abscess from Leg, Left Updated: 07/28/22 2026     Gram Stain Result Rare WBC's      Few Gram positive cocci    Narrative:      LEFT THIGH ABSCESS    Fungus culture [[id number]]  Collected: 07/28/22 1800    Order Status: Sent Specimen: Abscess from Leg, Left Updated: 07/28/22 1836          Reviewed and noted in plan where applicable- Please see chart for full lab data.    Lines/Drains:       Peripheral IV - Single Lumen 07/26/22 2319 22 G Left Hand (Active)   Site Assessment Clean;Dry;Intact 07/31/22 0400   Extremity Assessment Distal to IV No abnormal discoloration;No redness;No swelling;No warmth 07/30/22 1901   Line Status Flushed;Saline locked 07/30/22 1901   Dressing Status Clean;Dry;Intact 07/31/22 0400   Dressing Intervention Integrity maintained 07/30/22 1901   Dressing Change Due 07/30/22 07/30/22 1901   Site Change Due 07/30/22 07/30/22 1901   Reason Not Rotated Poor venous access 07/30/22 1901   Number of days: 4            Peripheral IV - Single Lumen 07/30/22 20 G Posterior;Right Forearm (Active)   Site Assessment Clean;Dry;Intact 07/31/22 0400   Extremity Assessment Distal to IV No abnormal discoloration;No swelling;No warmth;No redness 07/30/22 1901   Line Status Flushed 07/31/22 0400   Dressing Status Clean;Dry;Intact 07/31/22 0400   Dressing Intervention Integrity maintained 07/31/22 0400   Dressing Change Due 08/03/22 07/30/22 1901   Site Change Due 08/03/22 07/30/22 1901   Reason Not Rotated Not due 07/30/22 1901   Number of days: 1            Peripheral IV - Single Lumen 07/30/22 18 G Anterior;Left;Proximal Upper Arm (Active)   Site Assessment Clean;Dry;Intact 07/31/22 0400   Extremity Assessment Distal to IV No abnormal discoloration;No redness;No swelling;No warmth 07/30/22 1901   Line Status Flushed 07/31/22 0400   Dressing Status Clean;Dry;Intact 07/31/22 0400   Dressing Intervention Integrity maintained 07/31/22 0400   Dressing Change Due 08/03/22 07/30/22 1901   Site Change Due 08/03/22 07/30/22 1901   Reason Not Rotated Not due 07/30/22 1901   Number of days: 1            Closed/Suction Drain 07/29/22 1311 Left;Lateral Thigh Bulb 15 Fr. (Active)   Site Description  Unable to view 07/30/22 1901   Dressing Type Gauze;Transparent (Tegaderm) 07/30/22 1901   Dressing Status Intact;Dry;Clean 07/30/22 1901   Dressing Intervention Integrity maintained 07/30/22 1901   Drainage Milky;Purulent;Sanguineous 07/30/22 1901   Status To bulb suction 07/30/22 1901   Output (mL) 30 mL 07/31/22 0700   Number of days: 1       Imaging  ECG Results          EKG 12-lead (Final result)  Result time 07/27/22 07:53:05    Final result by Interface, Lab In [facility] (07/27/22 07:53:05)             Narrative:    Test Reason : R00.0,    Vent. Rate : 138 BPM     Atrial Rate : 138 BPM     P-R Int : 126 ms          QRS Dur : 086 ms      QT Int : 286 ms       P-R-T Axes : 051 002 052 degrees     QTc Int : 433 ms    Sinus tachycardia  Otherwise normal ECG  No previous ECGs available  Confirmed by Crow HARRIS MD ([id number]) on 7/27/2022 7:53:00 AM    Referred By: [email or web address]   SELF           Confirmed By:Crow HARRIS MD                            No results found for this or any previous visit.      X-Ray Femur 2 View Left  Narrative: EXAMINATION:  XR FEMUR 2 VIEW LEFT    CLINICAL HISTORY:  pain;    TECHNIQUE:  AP and lateral views of the left femur were performed.    COMPARISON:  CT thigh from 07/27/2022.    FINDINGS:  No acute fracture or dislocation.  Stable serpiginous sclerotic lesion centered within the medullary component of the distal femoral metastasis, likely represents bone infarct or enchondroma.  No aggressive osseous lesion.  Scattered vascular calcifications.  Soft tissue swelling within the proximal thigh, better characterized on recent CT.  Impression: As above.    Electronically signed by: Thom Simpson  Date:    07/28/2022  Time:    16:22  X-Ray Pelvis Routine AP  Narrative: EXAMINATION:  XR PELVIS ROUTINE AP    CLINICAL HISTORY:  pain;    TECHNIQUE:  AP view of the pelvis was performed.    COMPARISON:  None.    FINDINGS:  No fracture or dislocation.  No bone destruction identified.  No significant  joint space narrowing  Impression: See above    Electronically signed by: Wilfredo Hylton MD  Date:    07/28/2022  Time:    12:08  MRI Femur W WO Contrast Left  Narrative: EXAMINATION:  MRI FEMUR W WO CONTRAST LEFT    CLINICAL HISTORY:  Soft tissue mass, thigh, deep;CT with left thigh mass. Hematoma vs soft tissue mass. MRI for further evaluation of mass;    TECHNIQUE:  Routine multisequence planer MRI soft tissue protocol of the left thigh performed with the administration 10 cc Gadavist IV contrast.    COMPARISON:  CT 07/27/2022    FINDINGS:  There is a grade III tear of the tensor fascia jose luis muscle with complete disruption of the fibers (series 8, image 12).  There is associated large hematoma measuring 8.4 x 5.2 cm in transverse dimensions (series 12, image 14) and a 9.6 cm in CC dimensions (series 11, image 26).  There is adjacent muscle strain the lateral aspect of the rectus femoris, vastus lateralis, and proximal sartorius.  The posterior iliotibial band is intact.  No fracture identified.  There is inflammation/ enhancement in the adjacent subcutaneous tissues. No underlying mass identified.    No lymphadenopathy.  Small area marrow changes in the distal femur (series 4, image 19), suggestive of a small area of osteonecrosis.  No surrounding marrow edema.  Impression: Ruptured tensor fascia jose luis (TFL) muscle with associated large hematoma, as above.    This report was flagged in Epic as abnormal.    Electronically signed by: Severino Baltazar MD  Date:    07/28/2022  Time:    08:30      Labs, Imaging, EKG and Diagnostic results from 7/31/2022 were reviewed.    Medications:  Medication list was reviewed and changes noted under Assessment/Plan.  No current facility-administered medications on file prior to encounter.     Current Outpatient Medications on File Prior to Encounter   Medication Sig Dispense Refill    atorvastatin (LIPITOR) 20 MG tablet Take 1 tablet (20 mg total) by mouth once daily. 90 tablet 3     empagliflozin (JARDIANCE) 25 mg tablet Take 1 tablet (25 mg total) by mouth once daily. 90 tablet 3    lisinopriL (PRINIVIL,ZESTRIL) 20 MG tablet Take 1 tablet (20 mg total) by mouth once daily. 90 tablet 3    blood sugar diagnostic Strp 1 strip by Misc.(Non-Drug; Combo Route) route once daily. 100 each 3    blood-glucose meter kit Use as instructed 1 each 0    dulaglutide (TRULICITY) 3 mg/0.5 mL pen injector Inject 3 mg into the skin every 7 days. 12 pen 3    insulin (LANTUS SOLOSTAR U-100 INSULIN) glargine 100 units/mL (3mL) SubQ pen Inject 20 Units into the skin once daily. 18 mL 0    lancets Misc 1 lancet by Misc.(Non-Drug; Combo Route) route once daily. 100 each 3    metFORMIN (GLUCOPHAGE-XR) 500 MG ER 24hr tablet Take 1 tablet (500 mg total) by mouth 2 (two) times daily with meals. 180 tablet 3     Scheduled Medications:  atorvastatin, 20 mg, Oral, Daily  clindamycin (CLEOCIN) IVPB, 900 mg, Intravenous, Q8H  insulin aspart U-100, 8 Units, Subcutaneous, TIDWM  insulin detemir U-100, 12 Units, Subcutaneous, BID  piperacillin-tazobactam (ZOSYN) IVPB, 4.5 g, Intravenous, Q8H  vancomycin (VANCOCIN) IVPB, 1,750 mg, Intravenous, Q12H      PRN: acetaminophen, dextrose 10%, dextrose 10%, glucagon (human recombinant), glucose, glucose, insulin aspart U-100, iohexoL, morphine, ondansetron, oxyCODONE, sodium chloride 0.9%, sodium chloride 0.9%, sodium chloride 0.9%, Pharmacy to dose Vancomycin consult **AND** vancomycin - pharmacy to dose  Infusions:   Estimated Creatinine Clearance: 163.3 mL/min (based on SCr of 0.6 mg/dL).    Assessment/Plan:      * DKA (diabetic ketoacidosis)    47M with history of DM2, gout, HTN, tobacco use who initially presented for dyspnea, increased thirst, decreased PO intake, found to be in DKA.  Recent history of steroids for leg pain.  On home dulaglutide, empagliflozin, metformin, insulin glargine 20u qhs (has not been taking).  Recent a1c 11.2%.  , bicarb 5, AG 26, BHB 5.9 on  admit.       - Insulin drip   - q1h POCT glu checks while on drip  - D5 1/2 NS with 20 meq K   - BMP q4h  - diabetic diet when off insulin drip     7/30  . Bicarb 15. K replaced . off insulin drip on SQ insulin. endocrine consulted  7/31 endocrine consulted.   Bicarbonate WNL . Glucose in 300s          Hyponatremia  sodium 130s. monitor  7/31 sodium 136 resolved       Anemia    Patient's with Normocytic anemia.. Hemoglobin stable. Etiology likely due to chronic disease .  Current CBC reviewed-  Recent Labs   Lab 07/28/22  0354 07/29/22  0336 07/30/22  0311   HGB 13.6* 12.6* 11.9*     Monitor CBC and transfuse if H/H drops below 7/21.       Hypokalemia  replaced      Abscess of left thigh  reports 4-6 weeks of left lateral thigh pain and swelling, no known injury to the affected area    7/30 Transfer to hospital medicine . S/p I&D  of left thigh Abscess on7/29/22 - MRI - uptured tensor fascia jose luis (TFL) muscle with associated large hematoma. gram stain -Moderate Gram positive cocci in clusters .cultures pending.  continue Vancomycin, clindamycin and Zosyn . abscess with Calcium pyrophosphate crystals with unknown significance .Surgical drain with purulent output. plan for OR tomorrow for washout  may need bone biopsy to r/o osteomyelitis.Discontinued clindamycin.     Hematoma of left thigh  MRI left thig-  Ruptured tensor fascia jose luis (TFL) muscle with associated large hematoma,   s/p orthopedic evaluation .aspiration showed  purulence. s/p I&D on 7/29 7/31  abscess with Calcium pyrophosphate crystals with unknown significance. . aerobic culture 7/28 STAPHYLOCOCCUS SPECIES .      Left leg pain    Received steroid injection, steroid taper for leg pain by orthopedics.  Left lateral/proximal thigh with painful localized swelling, hot to touch. Patient received vanc/zosyn in ED  - CTA LE, U/s LLE pending  - will continue Vanc/zosyn and cllindamycin         Elevated d-dimer    On RA with increased WOB.  D-dimer elevated  to 1.46.  - CTA PE negative    ANGE (acute kidney injury)    7/30  Patient with acute kidney injury likely due to IVVD/dehydration ANGE is currently resolved . Labs reviewed- Renal function/electrolytes with Estimated Creatinine Clearance: 163.3 mL/min (based on SCr of 0.6 mg/dL). according to latest data. Monitor urine output and serial BMP and adjust therapy as needed. Avoid nephrotoxins and renally dose meds for GFR listed above.       Leukocytosis    Leukocytosis  WBC 26.87      - Trending down   - Initially thought to be due to recent steroid use but left thigh mass concerning for infection   - Will start Vanc after irrigation of left thigh by ortho   7/30     Patient with leucocytosis Recent Labs   Lab 07/28/22  0354 07/29/22  0336 07/30/22  0311   WBC 14.29* 11.47 11.20     . Afebrile. BCX 2, NGTD  . likely secondary to sepsis..  WBC counts normalized .       Hyperlipidemia associated with type 2 diabetes mellitus    On home atorvastatin.  -    Class 2 severe obesity due to excess calories with serious comorbidity and body mass index (BMI) of 36.0 to 36.9 in adult  Body mass index is 32.63 kg/m². Morbid obesity complicates all aspects of disease management from diagnostic modalities to treatment. Weight loss encouraged and health benefits explained to patient.         Hypertension associated with type 2 diabetes mellitus    On home lisinopril 20.  - hold ACEi in setting of ANGE. ANGE resolved      7/30 SBP controlled     Type 2 diabetes mellitus with hyperglycemia, with long-term current use of insulin      Patient's FSGs are controlled on current medication regimen.  Last A1c reviewed-   Lab Results   Component Value Date    HGBA1C 10.1 (H) 07/26/2022     Most recent fingerstick glucose reviewed-   Recent Labs   Lab 07/30/22  0643 07/30/22  0741 07/30/22  0901 07/30/22  1140   POCTGLUCOSE 188* 227* 244* 213*     Current correctional scale  Medium  Maintain anti-hyperglycemic dose as follows-   Antihyperglycemics  (From admission, onward)            Start     Stop Route Frequency Ordered    07/30/22 1130  insulin aspart U-100 pen 4 Units         -- SubQ 3 times daily with meals 07/30/22 0748    07/30/22 0826  insulin aspart U-100 pen 1-10 Units         -- SubQ Before meals & nightly PRN 07/30/22 0727    07/30/22 0415  insulin detemir U-100 pen 10 Units         -- SubQ 2 times daily 07/30/22 0403        Hold Oral hypoglycemics while patient is in the hospital.  7/31 endocrine consulted.  Glucose in 300s     VTE Risk Mitigation (From admission, onward)         Ordered     Place sequential compression device  Until discontinued         07/29/22 0158     IP VTE HIGH RISK PATIENT  Once         07/29/22 0158     Place SAVANNAH hose  Until discontinued         07/26/22 2240     Place sequential compression device  Until discontinued         07/26/22 2240                Discharge Planning   FILEMON: 8/3/2022     Code Status: Full Code   Is the patient medically ready for discharge?: No    Reason for patient still in hospital (select all that apply): Treatment  Discharge Plan A: Home with family                  Bandar Arce MD  Department of Hospital Medicine   [facility] - Telemetry Stepdown

## 2022-07-31 NOTE — HPI
Mr. Thomas is a 47 year old male with a PMH of DM2 (poorly controlled), HTN, GERD, HLD, obesity who initially presented to [facility] [place] with cc of SOBx1 day. Pt was also reporting significant pain and swelling along his left lateral proximal thigh. Pt reports this pain started over the last 2 weeks, which worsened. He initially was seen outpatient in which he was diagnoised with a mm strain and given 800 mg ibuprofen. From there he went to the ED on 7/22 d/t worsening pain. He was treated with prednisone/morphine shot, and well as a prednisone oral pack. Pt denied recent injury, with the exception of soreness to his left lower shin following bumping into a cabinet. He denies open wounds/abrasions from this injury but states the soreness started in his left thigh following the improvement of discomfort from his left shin. Pt reports having been treated for a boil on his central/right buttocks in the end of June. 2022. Pt states he was seen in Urgent Care in [place] and the boil was drained. He was given an oral abx in which he completed.     To note, pt reports having a recent left wrist fracture, ligament rupture following an accident with his riding lawnmower. States he underwent surgery and 2 screws were placed on 3/31/22 at [facility]. Denies associated infection/complications. Denies pain in the site currently.    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 Denies having pets. Denies recent fishing/hunting. Denies hx of immunocompromising conditions.     To note, pt was found to be in DKA with blood sugars >400, treated by critical team, which has since resolved. Pt was recently started on insulin by his PCP, which he had not started. Pt latest A1C 10.     CT of left thigh notable for fluid collections suspicious for hematoma/seroma. MRI of left femur was notable for ruptured tensor fascia jose luis mm with associated large hematoma, as well as small area of potential  osteonecrosis. Bedside aspiration of the fluid collection was notable for a cell count of >200k >80%segs, cultures + staph spp. Pt was taken for I&D with Dr. Graff with ortho surgery on 7/29, abscesses were noted and washed out, cultures collected + staph spp. Per pt, states that Dr. Graff is planning to return to surgery tomorrow, 8/1/22 for repeat washout.     Pt was intially with leukocytosis, but has since down trended to 11k. Blood cultures from 7/26 NGTD.     Pt is currently on zosyn, vancomycin, and clindamycin. ID was consulted for abx recs regarding left TFL infection.

## 2022-07-31 NOTE — SUBJECTIVE & OBJECTIVE
"Principal Problem:DKA (diabetic ketoacidosis)    Principal Orthopedic Problem: POD 2 Left thigh I&D  Staph species culture on 07/28    Interval History: Overnight events: NAEON    Vitals: VSS    PT/OT update: PT/OT have not seen pt for recs at this time. Pt says he has been weight bearing to use bathroom, but has not attempted any greater distance.    Need to know: Pt drain put out 100+cc yesterday of milky colored blood tinged fluid potentially purulent.        Review of patient's allergies indicates:  No Known Allergies    Current Facility-Administered Medications   Medication    acetaminophen tablet 1,000 mg    atorvastatin tablet 20 mg    clindamycin in D5W 900 mg/50 mL IVPB 900 mg    dextrose 10% bolus 125 mL    dextrose 10% bolus 250 mL    glucagon (human recombinant) injection 1 mg    glucose chewable tablet 16 g    glucose chewable tablet 24 g    insulin aspart U-100 pen 1-10 Units    insulin aspart U-100 pen 8 Units    insulin detemir U-100 pen 12 Units    iohexoL (OMNIPAQUE 350) injection 50 mL    morphine injection 3 mg    ondansetron injection 4 mg    oxyCODONE immediate release tablet 5 mg    piperacillin-tazobactam 4.5 g in sodium chloride 0.9% 100 mL IVPB (ready to mix system)    sodium chloride 0.9% flush 10 mL    sodium chloride 0.9% flush 10 mL    sodium chloride 0.9% flush 10 mL    vancomycin - pharmacy to dose    vancomycin 1.75 g in 5 % dextrose 500 mL IVPB     Objective:     Vital Signs (Most Recent):  Temp: 96.7 °F (35.9 °C) (07/31/22 0640)  Pulse: 99 (07/31/22 0640)  Resp: 18 (07/31/22 0640)  BP: 127/82 (07/31/22 0640)  SpO2: 97 % (07/31/22 0640) Vital Signs (24h Range):  Temp:  [96.7 °F (35.9 °C)-98.8 °F (37.1 °C)] 96.7 °F (35.9 °C)  Pulse:  [] 99  Resp:  [18-31] 18  SpO2:  [95 %-99 %] 97 %  BP: (111-154)/(71-96) 127/82     Weight: 94.5 kg (208 lb 5.4 oz)  Height: 5' 7" (170.2 cm)  Body mass index is 32.63 kg/m².      Intake/Output Summary (Last 24 hours) at 7/31/2022 0709  Last data " filed at 7/31/2022 0301  Gross per 24 hour   Intake 1007.66 ml   Output 1200 ml   Net -192.34 ml       Ortho/SPM Exam    L Lower Extremity  Inspection  - Surgical site dressing clean, dry, and intact  - No swelling  - No ecchymosis, erythema, or signs of cellulitis  Palpation  - TTP to palpation over surgical site and anterior thigh  Range of motion  - AROM and PROM of the hip, knee, ankle, and foot intact without pain  Stability  - No evidence of joint dislocation or abnormal laxity  Neurovascular  - TA/EHL/Gastroc/FHL assessed in isolation without deficit  - SILT throughout  - Compartments soft  - DP palpated   - Capillary Refill <3s  - Negative Log roll  - Negative Stinchfield  - Muscle tone normal    Still waiting results from cultures taken during surgery 07/29.    CBC:   Recent Labs   Lab 07/30/22  0311   WBC 11.20   HGB 11.9*   HCT 35.0*        CMP:   Recent Labs   Lab 07/29/22  2136 07/30/22  0211 07/30/22  0311 07/30/22  0534 07/30/22  1446 07/30/22  2343 07/31/22  0348   *   < > 132*   < > 128* 135* 136   K 5.5*   < > 4.0   < > 3.4* 3.5 3.4*      < > 102   < > 97 102 99   CO2 11*   < > 15*   < > 17* 22* 23   *   < > 160*   < > 340* 218* 172*   BUN 9   < > 8   < > 8 7 7   CREATININE 0.7   < > 0.6   < > 0.7 0.5 0.6   CALCIUM 7.7*   < > 8.5*   < > 8.3* 8.1* 8.8   PROT 5.8*  --  5.8*  --   --   --  6.6   ALBUMIN 1.7*  --  1.6*  --   --   --  1.9*   BILITOT 0.4  --  0.5  --   --   --  0.5   ALKPHOS 75  --  71  --   --   --  108   AST 22  --  12  --   --   --  15   ALT 11  --  9*  --   --   --  15   ANIONGAP 14   < > 15   < > 14 11 14   EGFRNONAA >60.0   < > >60.0   < > >60.0 >60.0 >60.0    < > = values in this interval not displayed.       All pertinent labs within the past 24 hours have been reviewed.    Significant Imaging: I have reviewed and interpreted all pertinent imaging results/findings.

## 2022-07-31 NOTE — PT/OT/SLP EVAL
Physical Therapy Evaluation and Tx    Patient Name:  Wilfredo Thomas   MRN:  [id number]  Admit Date: 7/26/2022  Admitting Diagnosis:  DKA (diabetic ketoacidosis)  Length of Stay: 5 days  Recent Surgery: Procedure(s) (LRB):  IRRIGATION AND DEBRIDEMENT, LOWER EXTREMITY (Left) 2 Days Post-Op    Recommendations:   Discharge Recommendations:  outpatient PT (return to OPPT vs. HHPT)   Discharge Equipment Recommendations: walker, rolling   Barriers to discharge: None      Assessment:     Wilfredo Thomas is a 47 y.o. male admitted with a medical diagnosis of DKA (diabetic ketoacidosis).  Pt presents with significant functional mobility deficits and is functioning below baseline. Pt participated well throughout evaluation, but was limited by significant pain in LLE. Pt will continue to benefit from acute PT services in order to maximize safety and (I) with functional mobility.    Problem List: weakness, impaired endurance, impaired self care skills, impaired functional mobility, gait instability, impaired balance, decreased lower extremity function, decreased upper extremity function, pain, edema, decreased ROM, impaired joint extensibility, orthopedic precautions  Rehab Prognosis: Good; patient would benefit from acute skilled PT services to address these deficits and reach maximum level of function.        Plan:   During this hospitalization, patient to be seen daily to address the above listed problems via gait training, therapeutic activities, therapeutic exercises, neuromuscular re-education  · Plan of Care Expires:  08/26/22    Subjective     Chief Complaint: Shortness of Breath (Pt c/o increased SOB over the past few days. Pt is diabetic who hasn't taken his meds for the past couple days. Has been on steroids. )    Patient/Family Comments/goals: return to PLOF  Pain/Comfort:  · Pain Rating 1: 7/10  · Location - Side 1: Left  · Location - Orientation 1: generalized  · Location 1: leg  · Pain Addressed 1: Pre-medicate  for activity, Cessation of Activity  · Pain Rating Post-Intervention 1: 7/10    Social History:  Residence: Pt lives with spouse and adult child in [facility] with 0STE.  Support available: family  Equipment Used: none  Prior level of function: independent but Pt reports that he has been declining in function over the past ~6 weeks 2/2 main    Objective:     Communicated with RN prior to session.  Patient found HOB elevated upon PT entry to room.   Additional staff present: RN    General Precautions: Standard, fall   Orthopedic Precautions:LLE weight bearing as tolerated   Braces: N/A   Oxygen Device: Room Air    Exams:  · Cognition:   · AxOx4  · Command following: Follows multistep verbal commands    · Postural Exam:  Patient presented with the following abnormalities:    · -       Lateral weight shift of hips to R side (offloading LLE 2/2 pain)  · Sensation:    · -       Intact    · RLE ROM: WFL  · RLE Strength: WFL  · LLE ROM: WFL  · LLE Strength: WFL except knee extension at least 3+/5, not formally tested 2/2 pain    Functional Mobility:  Bed Mobility:     Scooting: supervision  Supine to Sit: supervision  Transfers:     Sit to Stand:  contact guard assistance with rolling walker  Bed to Chair: minimum assistance with  rolling walker  using  Step Transfer  Gait:   · Distance: ~6 ft  · Level of Assistance:  minimum assistance  · AD used: rolling walker  · Gait Deviations: decreased speed, step length, swing through, heel strike, forward flexed posture, and downward gaze  · Step to gait pattern leading with LLE  · PT demonstrated proper use of RW prior to ambulation  Balance:   · Static Sitting: SPV  · Dynamic Sitting: SBA   · Static Standing: CGA  · Dynamic Standing: Nicola    Therapeutic Activities & Exercises:      Role of PT in POC   Education on importance of upright positioning to promote cardiopulmonary health   Education on importance of pain management in order to progress functional mobility   Patient  educated on the importance of early mobility to prevent functional decline during hospital stay   Patient was instructed to utilize staff assistance for mobility/transfers.   Patient was educated on PT POC and all questions answered within PT scope of practice.   Patient able to verbalize understanding; will follow-up with pt during current admit for additional questions/concerns within scope of practice.     Pt safe for transfers to bedside chair/BSC with RW with nursing staff.    Outcome Measures:  AM-PAC 6 CLICK MOBILITY  Turning over in bed (including adjusting bedclothes, sheets and blankets)?: 3  Sitting down on and standing up from a chair with arms (e.g., wheelchair, bedside commode, etc.): 3  Moving from lying on back to sitting on the side of the bed?: 3  Moving to and from a bed to a chair (including a wheelchair)?: 3  Need to walk in hospital room?: 3  Climbing 3-5 steps with a railing?: 2  Basic Mobility Total Score: 17     Patient left up in chair with all lines intact, call button in reach and spouse present.    GOALS:   Multidisciplinary Problems     Physical Therapy Goals        Problem: Physical Therapy    Goal Priority Disciplines Outcome Goal Variances Interventions   Physical Therapy Goal     PT, PT/OT Ongoing, Progressing     Description: Goals to be met by: 22     Patient will increase functional independence with mobility by performin. Supine to sit with [place]  2. Sit to supine with [place]  3. Sit to stand transfer with [place]  4. Gait  x >150 feet with Contact Guard Assistance using Rolling Walker.   5. Lower extremity exercise program x 20 reps per handout, with independence                       History:     Past Medical History:   Diagnosis Date    Diabetes     Gout     Hyperlipidemia     Hypertension        Past Surgical History:   Procedure Laterality Date    WRIST SURGERY Left        Family History   Problem Relation Age of Onset    Diabetes  Mother     No Known Problems Father        Social History     Socioeconomic History    Marital status:    Tobacco Use    Smoking status: Former Smoker     Packs/day: 0.50     Types: Cigarettes     Quit date: 2020     Years since quittin.5    Smokeless tobacco: Never Used   Substance and Sexual Activity    Alcohol use: Yes     Alcohol/week: 12.0 standard drinks     Types: 12 Cans of beer per week    Drug use: Never    Sexual activity: Yes     Partners: Female   Social History Narrative    Lives with wife and 2 kids in college and one older with 1 grand child. Supervisor at [employer]. Smoker but quit 2020     Social Determinants of Health     Physical Activity: Unknown    Days of Exercise per Week: Patient refused   Social Connections: Unknown    Active Member of Clubs or Organizations: Patient refused    Marital Status: Patient refused     Time Tracking:     PT Received On: 22  PT Start Time: 1021     PT Stop Time: 1117  PT Total Time (min): 56 min     Billable Minutes: Evaluation 16, Gait Training 15 and Therapeutic Activity 25    2022

## 2022-07-31 NOTE — SUBJECTIVE & OBJECTIVE
Interval HPI:   Overnight events: Stepped down from the MICU to hospital medicine. Insulin regimen up titrated last night prior to stepdown.  Eatin%  Nausea: No  Hypoglycemia and intervention: No  Fever: No  TPN and/or TF: No  If yes, type of TF/TPN and rate: n/a    PMH, PSH, FH, SH updated and reviewed     ROS:  Review of Systems   Constitutional:  Positive for activity change. Negative for chills, diaphoresis, fatigue and fever.   HENT:  Negative for congestion and trouble swallowing.    Respiratory:  Negative for cough and shortness of breath.    Cardiovascular:  Positive for leg swelling (upper left thigh). Negative for chest pain and palpitations.   Gastrointestinal:  Negative for abdominal distention, abdominal pain, nausea and vomiting.   Endocrine: Positive for polydipsia and polyuria.   Genitourinary:  Negative for decreased urine volume, difficulty urinating and dysuria.   Musculoskeletal:  Positive for myalgias (upper left thigh). Negative for arthralgias and joint swelling.   Skin:  Positive for wound (upper left thigh). Negative for color change and rash.   Allergic/Immunologic: Negative for immunocompromised state.   Neurological:  Negative for dizziness, tremors, weakness, numbness and headaches.   Psychiatric/Behavioral:  Negative for agitation and decreased concentration. The patient is not nervous/anxious.      Current Medications and/or Treatments Impacting Glycemic Control  Immunotherapy:    Immunosuppressants       None          Steroids:   Hormones (From admission, onward)                None          Pressors:    Autonomic Drugs (From admission, onward)                None          Hyperglycemia/Diabetes Medications:   Antihyperglycemics (From admission, onward)                Start     Stop Route Frequency Ordered    22 0715  insulin aspart U-100 pen 8 Units         -- SubQ 3 times daily with meals 22 1725    22 2100  insulin detemir U-100 pen 12 Units         --  SubQ 2 times daily 07/30/22 1725    07/30/22 0826  insulin aspart U-100 pen 1-10 Units         -- SubQ Before meals & nightly PRN 07/30/22 0727             PHYSICAL EXAMINATION:  Vitals:    07/31/22 1206   BP:    Pulse:    Resp: 18   Temp:      Body mass index is 32.63 kg/m².    Physical Exam  Constitutional:       General: He is not in acute distress.     Appearance: He is obese. He is not ill-appearing.   HENT:      Head: Normocephalic and atraumatic.      Mouth/Throat:      Mouth: Mucous membranes are moist.   Eyes:      General:         Right eye: No discharge.         Left eye: No discharge.      Conjunctiva/sclera: Conjunctivae normal.   Cardiovascular:      Rate and Rhythm: Regular rhythm. Tachycardia present.   Pulmonary:      Effort: Pulmonary effort is normal. No respiratory distress.   Abdominal:      General: There is no distension.      Palpations: Abdomen is soft.      Tenderness: There is no abdominal tenderness.   Musculoskeletal:         General: No swelling.      Cervical back: Normal range of motion and neck supple.      Comments: L thigh drain in place with SS output   Skin:     General: Skin is warm and dry.   Neurological:      Mental Status: He is alert.   Psychiatric:         Mood and Affect: Mood normal.         Behavior: Behavior normal.

## 2022-07-31 NOTE — PLAN OF CARE
Problem: Physical Therapy  Goal: Physical Therapy Goal  Description: Goals to be met by: 22     Patient will increase functional independence with mobility by performin. Supine to sit with [place]  2. Sit to supine with [place]  3. Sit to stand transfer with [place]  4. Gait  x >150 feet with Contact Guard Assistance using Rolling Walker.   5. Lower extremity exercise program x 20 reps per handout, with independence      Outcome: Ongoing, Progressing   Pt progressing, appropriate goals established

## 2022-07-31 NOTE — SUBJECTIVE & OBJECTIVE
"Past Medical History:   Diagnosis Date    Diabetes     Gout     Hyperlipidemia     Hypertension        Past Surgical History:   Procedure Laterality Date    WRIST SURGERY Left        Review of patient's allergies indicates:  No Known Allergies    Medications:  Medications Prior to Admission   Medication Sig    atorvastatin (LIPITOR) 20 MG tablet Take 1 tablet (20 mg total) by mouth once daily.    empagliflozin (JARDIANCE) 25 mg tablet Take 1 tablet (25 mg total) by mouth once daily.    lisinopriL (PRINIVIL,ZESTRIL) 20 MG tablet Take 1 tablet (20 mg total) by mouth once daily.    blood sugar diagnostic Strp 1 strip by Misc.(Non-Drug; Combo Route) route once daily.    blood-glucose meter kit Use as instructed    dulaglutide (TRULICITY) 3 mg/0.5 mL pen injector Inject 3 mg into the skin every 7 days.    insulin (LANTUS SOLOSTAR U-100 INSULIN) glargine 100 units/mL (3mL) SubQ pen Inject 20 Units into the skin once daily.    lancets Misc 1 lancet by Misc.(Non-Drug; Combo Route) route once daily.    metFORMIN (GLUCOPHAGE-XR) 500 MG ER 24hr tablet Take 1 tablet (500 mg total) by mouth 2 (two) times daily with meals.     Antibiotics (From admission, onward)                Start     Stop Route Frequency Ordered    07/30/22 0330  vancomycin 1.75 g in 5 % dextrose 500 mL IVPB         -- IV Every 12 hours (non-standard times) 07/29/22 1534    07/29/22 1500  vancomycin - pharmacy to dose  (vancomycin IVPB)        "And" Linked Group Details    -- IV pharmacy to manage frequency 07/29/22 1401    07/29/22 1403  clindamycin in D5W 900 mg/50 mL IVPB 900 mg         -- IV Every 8 hours (non-standard times) 07/29/22 1404    07/29/22 1401  piperacillin-tazobactam 4.5 g in sodium chloride 0.9% 100 mL IVPB (ready to mix system)         -- IV Every 8 hours (non-standard times) 07/29/22 1401    07/26/22 2230  piperacillin-tazobactam 4.5 g in sodium chloride 0.9% 100 mL IVPB (ready to mix system)         07/27 1029 IV ED 1 Time 07/26/22 2154 "          Antifungals (From admission, onward)                None          Antivirals (From admission, onward)      None               There is no immunization history on file for this patient.    Family History       Problem Relation (Age of Onset)    Diabetes Mother    No Known Problems Father          Social History     Socioeconomic History    Marital status:    Tobacco Use    Smoking status: Former Smoker     Packs/day: 0.50     Types: Cigarettes     Quit date: 2020     Years since quittin.5    Smokeless tobacco: Never Used   Substance and Sexual Activity    Alcohol use: Yes     Alcohol/week: 12.0 standard drinks     Types: 12 Cans of beer per week    Drug use: Never    Sexual activity: Yes     Partners: Female   Social History Narrative    Lives with wife and 2 kids in college and one older with 1 grand child. 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 Negative for chills, diaphoresis, fatigue and fever.   Respiratory:  Negative for cough and shortness of breath.    Cardiovascular:  Positive for leg swelling (upper left thigh). Negative for chest pain and palpitations.   Gastrointestinal:  Negative for abdominal distention and abdominal pain.   Genitourinary:  Negative for difficulty urinating and dysuria.   Musculoskeletal:  Positive for myalgias (upper left thigh). Negative for arthralgias and joint swelling.   Skin:  Positive for wound (upper left thigh). Negative for color change and rash.   Allergic/Immunologic: Negative for immunocompromised state.   Neurological:  Negative for dizziness, tremors, weakness, numbness and headaches.   Psychiatric/Behavioral:  Negative for agitation and  decreased concentration. The patient is not nervous/anxious.    Objective:     Vital Signs (Most Recent):  Temp: 98 °F (36.7 °C) (07/31/22 1135)  Pulse: (!) 127 (07/31/22 1135)  Resp: 18 (07/31/22 1135)  BP: 123/85 (07/31/22 1135)  SpO2: 96 % (07/31/22 1135)   Vital Signs (24h Range):  Temp:  [96.7 °F (35.9 °C)-98.8 °F (37.1 °C)] 98 °F (36.7 °C)  Pulse:  [] 127  Resp:  [18-30] 18  SpO2:  [95 %-99 %] 96 %  BP: (111-154)/(71-96) 123/85     Weight: 94.5 kg (208 lb 5.4 oz)  Body mass index is 32.63 kg/m².    Estimated Creatinine Clearance: 163.3 mL/min (based on SCr of 0.6 mg/dL).    Physical Exam  Vitals and nursing note reviewed.   Constitutional:       General: He is not in acute distress.     Appearance: Normal appearance. He is well-developed. He is not ill-appearing, toxic-appearing or diaphoretic.   HENT:      Head: Normocephalic and atraumatic.      Nose: Nose normal.      Mouth/Throat:      Dentition: Normal dentition. Does not have dentures. No dental caries or dental abscesses.      Pharynx: No oropharyngeal exudate.   Eyes:      General: No scleral icterus.     Conjunctiva/sclera: Conjunctivae normal.   Cardiovascular:      Rate and Rhythm: Normal rate and regular rhythm.      Heart sounds: Normal heart sounds. No murmur heard.  Pulmonary:      Effort: Pulmonary effort is normal. No respiratory distress.      Breath sounds: Normal breath sounds.   Abdominal:      General: Bowel sounds are normal. There is no distension.      Palpations: Abdomen is soft.   Musculoskeletal:      Cervical back: Normal range of motion.   Lymphadenopathy:      Cervical: No cervical adenopathy.   Skin:     General: Skin is warm and dry.      Findings: No erythema or rash.      Comments: Incision to left thigh. Dressed, CDI. Surgical drain with sanguinous/purulent output.    Neurological:      General: No focal deficit present.      Mental Status: He is alert and oriented to person, place, and time. Mental status is at  baseline.   Psychiatric:         Mood and Affect: Mood normal.         Behavior: Behavior normal.         Thought Content: Thought content normal.         Judgment: Judgment normal.       Significant Labs: All pertinent labs within the past 24 hours have been reviewed.    Significant Imaging: I have reviewed all pertinent imaging results/findings within the past 24 hours.

## 2022-07-31 NOTE — HPI
Reason for Consult: Management of T2DM, Hyperglycemia     Surgical Procedure and Date: L thigh IM abscess I&D on 07/29/2022    Diabetes diagnosis year: at age 43yo    Home Diabetes Medications:    - Metformin 500 mg bid  - Jardiance 25 mg qd  - Trulicity 3 mg SC weekly  - Lantus 10 units qd -- prescribed at the end of 3/2022 but pt has not yet started taking it    Previously on glipizide, but stopped in 03/2022 by PCP who is managing his DM outpatient    How often checking glucose at home?  2-3x per week    BG readings on regimen: he reports <200  Hypoglycemia on the regimen?  No  Missed doses on regimen?  Yes    Diabetes Complications include:     Hyperglycemia    Complicating diabetes co morbidities:   HTN, obesity      HPI:   Patient is a 47 y.o. male with a diagnosis of T2DM, HTN, HLD, GERD, and obesity presented to the ED on 07/26/2022 with SOB and altered mental status x1d. Additionally reported pain and swelling L lateral high. Was seen at an OSH ED for thigh pain and was treated conservatively with NSAIDS initially. At follow up appt w/ ortho 7/22 he received IM steroid inj (deltoid) and po prednisone for suspected muscle strain. Due to worsening AMS and SOB he presented to the [facility] ED on 7/26. In the ED he was found to be in DKA and as admitted to the MICU due to severe acidosis.     For the thigh pain, MRI showed a ruptured tensor fascia jose luis (TFL) muscle with associated large hematoma. Ortho decided to perform aspiration night of 7/28. Gram stain showed G+ cocci in clusters- now growing Staph Species.They decided to take pt to OR 7/29 for formal I&D.      For DKA he was started on IVF and an intensive insulin gtt with resolution of DKA. Transitioned to MDI on 7/30 AM. Initial regimen was detemir 10u BID and Aspart 4u AC + LDC. Insulin up-titrated by MICU team prior to stepdown, to detemir 12u bid and aspart 8u AC + LDC. Endocrinology consulted for management of hyperglycemia.

## 2022-07-31 NOTE — PROGRESS NOTES
Pharmacokinetic Assessment Follow Up: IV Vancomycin    Vancomycin serum concentration assessment(s):    The trough level was drawn correctly and can be used to guide therapy at this time. The measurement is within the desired definitive target range of 10 to 20 mcg/mL.    Vancomycin Regimen Plan:    Continue regimen of Vancomycin 1750 mg IV every 12 hours with next serum trough concentration measured on 8/3 @ 1430        Drug levels (last 3 results):  Recent Labs   Lab Result Units 07/30/22  1446 07/31/22  1513   Vancomycin-Trough ug/mL 9.3* 11.4       Pharmacy will continue to follow and monitor vancomycin.    Please contact pharmacy at extension [phone] for questions regarding this assessment.    Thank you for the consult,   Kellie Monet       Patient brief summary:  Wilfredo Thomas is a 47 y.o. male initiated on antimicrobial therapy with IV Vancomycin for treatment of skin & soft tissue infection/abscess    The patient's current regimen is 1750mg IV every 12 hours    Drug Allergies:   Review of patient's allergies indicates:  No Known Allergies    Actual Body Weight:   94.5 kg    Renal Function:   Estimated Creatinine Clearance: 163.3 mL/min (based on SCr of 0.6 mg/dL).,     Dialysis Method (if applicable):  N/A    CBC (last 72 hours):  Recent Labs   Lab Result Units 07/29/22  0336 07/30/22  0311 07/31/22  0923   WBC K/uL 11.47 11.20 7.40   Hemoglobin g/dL 12.6* 11.9* 12.0*   Hematocrit % 36.4* 35.0* 34.8*   Platelets K/uL 233 227 257   Gran % % 75.4* 83.3* 67.3   Lymph % % 9.2* 6.6* 16.2*   Mono % % 7.8 5.3 8.8   Eosinophil % % 3.1 0.7 3.0   Basophil % % 0.2 0.0 1.2   Differential Method  Automated Manual Automated       Metabolic Panel (last 72 hours):  Recent Labs   Lab Result Units 07/28/22  2017 07/29/22  0023 07/29/22  0148 07/29/22  0336 07/29/22  0603 07/29/22  1003 07/29/22  1519 07/29/22  2136 07/30/22  0211 07/30/22  0215 07/30/22  0311 07/30/22  0534 07/30/22  0902 07/30/22  1446 07/30/22  2349  07/31/22  0348   Sodium mmol/L 130*  --  132* 131* 131* 133* 130* 129* 131*  --  132* 128* 130* 128* 135* 136   Potassium mmol/L 4.1  --  4.0 3.9 3.9 3.7 4.2 5.5* 4.0  --  4.0 3.8 3.2* 3.4* 3.5 3.4*   Chloride mmol/L 103  --  104 103 103 106 101 104 101  --  102 99 101 97 102 99   CO2 mmol/L 12*  --  14* 13* 12* 12* 11* 11* 15*  --  15* 16* 15* 17* 22* 23   Glucose mg/dL 186*  --  196* 193* 180* 172* 291* 278* 171*  --  160* 221* 230* 340* 218* 172*   BUN mg/dL 10  --  10 10 10 8 8 9 8  --  8 8 8 8 7 7   Creatinine mg/dL 0.8  --  0.7 0.8 0.7 0.6 0.7 0.7 0.7  --  0.6 0.7 0.6 0.7 0.5 0.6   Albumin g/dL  --   --   --  1.7*  --   --   --  1.7*  --   --  1.6*  --   --   --   --  1.9*   Total Bilirubin mg/dL  --   --   --  0.5  --   --   --  0.4  --   --  0.5  --   --   --   --  0.5   Alkaline Phosphatase U/L  --   --   --  87  --   --   --  75  --   --  71  --   --   --   --  108   AST U/L  --   --   --  11  --   --   --  22  --   --  12  --   --   --   --  15   ALT U/L  --   --   --  13  --   --   --  11  --   --  9*  --   --   --   --  15   Magnesium mg/dL 1.9 2.0  --  1.9  --  1.7 1.8  --   --  1.9 1.8  --   --   --   --  1.7   Phosphorus mg/dL 2.5* 2.9  --  2.9  --  2.8 4.0  --   --  3.6 3.5  --   --   --   --  2.5*       Vancomycin Administrations:  vancomycin given in the last 96 hours                   vancomycin 1.75 g in 5 % dextrose 500 mL IVPB (mg) 1,750 mg New Bag 07/31/22 1623     1,750 mg New Bag  0426     1,750 mg New Bag 07/30/22 1723     1,750 mg New Bag  0320    vancomycin 2 g in dextrose 5 % 500 mL IVPB (mg) 2,000 mg New Bag 07/29/22 1557    vancomycin (VANCOCIN) injection 1 g (g) 1 g Given 07/29/22 1310    vancomycin injection (g) 1 g Given 07/29/22 1300                Microbiologic Results:  Microbiology Results (last 7 days)     Procedure Component Value Units Date/Time    Aerobic culture [[id number]]  (Abnormal) Collected: 07/28/22 1800    Order Status: Completed Specimen: Abscess from Leg, Left  Updated: 07/31/22 1322     Aerobic Bacterial Culture STAPHYLOCOCCUS SPECIES  Many  Identification and susceptibility pending      Narrative:      LEFT THIGH ABSCESS    Aerobic culture [[id number]]  (Abnormal) Collected: 07/29/22 1300    Order Status: Completed Specimen: Abscess from Leg, Left Updated: 07/31/22 0740     Aerobic Bacterial Culture STAPHYLOCOCCUS SPECIES  Many  Identification and susceptibility pending      Narrative:      Left thigh abscess #2    Aerobic culture [[id number]]  (Abnormal) Collected: 07/29/22 1251    Order Status: Completed Specimen: Abscess from Leg, Left Updated: 07/31/22 0723     Aerobic Bacterial Culture STAPHYLOCOCCUS SPECIES  Many  Identification and susceptibility pending      Narrative:      Left thigh abscess #1    Blood Culture #2 **CANNOT BE ORDERED STAT** [[id number]] Collected: 07/26/22 2246    Order Status: Completed Specimen: Blood from Peripheral, Antecubital, Left Updated: 07/31/22 0612     Blood Culture, Routine No Growth to date      No Growth to date      No Growth to date      No Growth to date      No Growth to date    Blood Culture #1 **CANNOT BE ORDERED STAT** [[id number]] Collected: 07/26/22 2302    Order Status: Completed Specimen: Blood from Peripheral, Hand, Left Updated: 07/31/22 0612     Blood Culture, Routine No Growth to date      No Growth to date      No Growth to date      No Growth to date      No Growth to date    AFB Culture & Smear [[id number]] Collected: 07/29/22 1252    Order Status: Completed Specimen: Wound from Leg, Left Updated: 07/30/22 2127     AFB Culture & Smear Culture in progress    Narrative:      Left thigh abscess #1    AFB Culture & Smear [[id number]] Collected: 07/29/22 1300    Order Status: Completed Specimen: Abscess from Leg, Left Updated: 07/30/22 2127     AFB Culture & Smear Culture in progress    Narrative:      Left thigh abscess #2    Culture, Anaerobic [[id number]] Collected: 07/28/22 1800    Order Status: Completed Specimen:  Abscess from Leg, Left Updated: 07/30/22 1155     Anaerobic Culture Culture in progress    Narrative:      LEFT THIGH ABSCESS    Culture, Anaerobe [[id number]] Collected: 07/29/22 1251    Order Status: Completed Specimen: Abscess from Leg, Left Updated: 07/30/22 1155     Anaerobic Culture Culture in progress    Narrative:      Left thigh abscess #1    Culture, Anaerobe [[id number]] Collected: 07/29/22 1300    Order Status: Completed Specimen: Abscess from Leg, Left Updated: 07/30/22 1155     Anaerobic Culture Culture in progress    Narrative:      Left thigh abscess #2    AFB Culture & Smear [[id number]] Collected: 07/28/22 1800    Order Status: Completed Specimen: Abscess from Leg, Left Updated: 07/29/22 2127     AFB Culture & Smear Culture in progress     AFB CULTURE STAIN No acid fast bacilli seen.    Narrative:      LEFT THIGH ABSCESS    Gram stain [[id number]] Collected: 07/29/22 1300    Order Status: Completed Specimen: Abscess from Leg, Left Updated: 07/29/22 1421     Gram Stain Result Moderate WBC's      Moderate Gram positive cocci in clusters    Narrative:      Left thigh abscess #2    Gram stain [[id number]] Collected: 07/29/22 1251    Order Status: Completed Specimen: Abscess from Leg, Left Updated: 07/29/22 1420     Gram Stain Result Moderate WBC's      Moderate Gram positive cocci in clusters    Narrative:      Left thigh abscess #1    Fungus culture [[id number]] Collected: 07/29/22 1300    Order Status: Sent Specimen: Abscess from Leg, Left Updated: 07/29/22 1330    Fungus culture [[id number]] Collected: 07/29/22 1251    Order Status: Sent Specimen: Abscess from Leg, Left Updated: 07/29/22 1326    Gram stain [[id number]] Collected: 07/28/22 1800    Order Status: Completed Specimen: Abscess from Leg, Left Updated: 07/28/22 2026     Gram Stain Result Rare WBC's      Few Gram positive cocci    Narrative:      LEFT THIGH ABSCESS    Fungus culture [[id number]] Collected: 07/28/22 1800    Order Status: Sent  Specimen: Abscess from Leg, Left Updated: 07/28/22 1830

## 2022-07-31 NOTE — PROGRESS NOTES
[facility] - Telemetry Stepdown  Orthopedics  Progress Note    Patient Name: Wilfredo Thomas  MRN: [id number]  Admission Date: 7/26/2022  Hospital Length of Stay: 5 days  Attending Provider: Bandar Arce MD  Primary Care Provider: Aylin Wayne DO  Follow-up For: Procedure(s) (LRB):  IRRIGATION AND DEBRIDEMENT, LOWER EXTREMITY (Left)    Post-Operative Day: 2 Days Post-Op  Subjective:     Principal Problem:DKA (diabetic ketoacidosis)    Principal Orthopedic Problem: POD 2 Left thigh I&D  Staph species culture on 07/28    Interval History: Overnight events: NAEON    Vitals: VSS    PT/OT update: PT/OT have not seen pt for recs at this time. Pt says he has been weight bearing to use bathroom, but has not attempted any greater distance.    Need to know: Pt drain put out 100+cc yesterday of milky colored blood tinged fluid potentially purulent.        Review of patient's allergies indicates:  No Known Allergies    Current Facility-Administered Medications   Medication    acetaminophen tablet 1,000 mg    atorvastatin tablet 20 mg    clindamycin in D5W 900 mg/50 mL IVPB 900 mg    dextrose 10% bolus 125 mL    dextrose 10% bolus 250 mL    glucagon (human recombinant) injection 1 mg    glucose chewable tablet 16 g    glucose chewable tablet 24 g    insulin aspart U-100 pen 1-10 Units    insulin aspart U-100 pen 8 Units    insulin detemir U-100 pen 12 Units    iohexoL (OMNIPAQUE 350) injection 50 mL    morphine injection 3 mg    ondansetron injection 4 mg    oxyCODONE immediate release tablet 5 mg    piperacillin-tazobactam 4.5 g in sodium chloride 0.9% 100 mL IVPB (ready to mix system)    sodium chloride 0.9% flush 10 mL    sodium chloride 0.9% flush 10 mL    sodium chloride 0.9% flush 10 mL    vancomycin - pharmacy to dose    vancomycin 1.75 g in 5 % dextrose 500 mL IVPB     Objective:     Vital Signs (Most Recent):  Temp: 96.7 °F (35.9 °C) (07/31/22 0640)  Pulse: 99 (07/31/22 0640)  Resp: 18 (07/31/22  "0640)  BP: 127/82 (07/31/22 0640)  SpO2: 97 % (07/31/22 0640) Vital Signs (24h Range):  Temp:  [96.7 °F (35.9 °C)-98.8 °F (37.1 °C)] 96.7 °F (35.9 °C)  Pulse:  [] 99  Resp:  [18-31] 18  SpO2:  [95 %-99 %] 97 %  BP: (111-154)/(71-96) 127/82     Weight: 94.5 kg (208 lb 5.4 oz)  Height: 5' 7" (170.2 cm)  Body mass index is 32.63 kg/m².      Intake/Output Summary (Last 24 hours) at 7/31/2022 0709  Last data filed at 7/31/2022 0301  Gross per 24 hour   Intake 1007.66 ml   Output 1200 ml   Net -192.34 ml       Ortho/SPM Exam    L Lower Extremity  Inspection  - Surgical site dressing clean, dry, and intact  - No swelling  - No ecchymosis, erythema, or signs of cellulitis  Palpation  - TTP to palpation over surgical site and anterior thigh  Range of motion  - AROM and PROM of the hip, knee, ankle, and foot intact without pain  Stability  - No evidence of joint dislocation or abnormal laxity  Neurovascular  - TA/EHL/Gastroc/FHL assessed in isolation without deficit  - SILT throughout  - Compartments soft  - DP palpated   - Capillary Refill <3s  - Negative Log roll  - Negative Stinchfield  - Muscle tone normal    Still waiting results from cultures taken during surgery 07/29.    CBC:   Recent Labs   Lab 07/30/22  0311   WBC 11.20   HGB 11.9*   HCT 35.0*        CMP:   Recent Labs   Lab 07/29/22  2136 07/30/22  0211 07/30/22  0311 07/30/22  0534 07/30/22  1446 07/30/22  2343 07/31/22  0348   *   < > 132*   < > 128* 135* 136   K 5.5*   < > 4.0   < > 3.4* 3.5 3.4*      < > 102   < > 97 102 99   CO2 11*   < > 15*   < > 17* 22* 23   *   < > 160*   < > 340* 218* 172*   BUN 9   < > 8   < > 8 7 7   CREATININE 0.7   < > 0.6   < > 0.7 0.5 0.6   CALCIUM 7.7*   < > 8.5*   < > 8.3* 8.1* 8.8   PROT 5.8*  --  5.8*  --   --   --  6.6   ALBUMIN 1.7*  --  1.6*  --   --   --  1.9*   BILITOT 0.4  --  0.5  --   --   --  0.5   ALKPHOS 75  --  71  --   --   --  108   AST 22  --  12  --   --   --  15   ALT 11  --  9*  " --   --   --  15   ANIONGAP 14   < > 15   < > 14 11 14   EGFRNONAA >60.0   < > >60.0   < > >60.0 >60.0 >60.0    < > = values in this interval not displayed.       All pertinent labs within the past 24 hours have been reviewed.    Significant Imaging: I have reviewed and interpreted all pertinent imaging results/findings.    Assessment/Plan:     Left leg pain  Wilfredo Thomas is a 47 y.o. male with past medical history of hypertension and diabetes, currently admitted to hospital for DKA. He reports 4-6 weeks of left lateral thigh pain and swelling, no known injury to the affected area. MRI scan showing lateral thigh fluid collection. Now s/p I&D 7/29/22.  Cx with GPCs.     - S/p I&D 7/29/22  - PT daily   - Drain 125  - Abx Vanc Zosyn     Dispo: Pending ID recs and Cx results             EKTA WEI MD  Orthopedics  [facility] - Telemetry Stepdown

## 2022-08-01 LAB
ALBUMIN SERPL BCP-MCNC: 1.7 G/DL (ref 3.5–5.2)
ALP SERPL-CCNC: 80 U/L (ref 55–135)
ALT SERPL W/O P-5'-P-CCNC: 15 U/L (ref 10–44)
ANION GAP SERPL CALC-SCNC: 10 MMOL/L (ref 8–16)
AST SERPL-CCNC: 12 U/L (ref 10–40)
BACTERIA BLD CULT: NORMAL
BACTERIA BLD CULT: NORMAL
BASOPHILS # BLD AUTO: 0.08 K/UL (ref 0–0.2)
BASOPHILS NFR BLD: 1.5 % (ref 0–1.9)
BILIRUB SERPL-MCNC: 0.4 MG/DL (ref 0.1–1)
BUN SERPL-MCNC: 5 MG/DL (ref 6–20)
CALCIUM SERPL-MCNC: 8.6 MG/DL (ref 8.7–10.5)
CHLORIDE SERPL-SCNC: 96 MMOL/L (ref 95–110)
CO2 SERPL-SCNC: 29 MMOL/L (ref 23–29)
CREAT SERPL-MCNC: 0.6 MG/DL (ref 0.5–1.4)
DIFFERENTIAL METHOD: ABNORMAL
EOSINOPHIL # BLD AUTO: 0.2 K/UL (ref 0–0.5)
EOSINOPHIL NFR BLD: 3.7 % (ref 0–8)
ERYTHROCYTE [DISTWIDTH] IN BLOOD BY AUTOMATED COUNT: 12.4 % (ref 11.5–14.5)
EST. GFR  (NO RACE VARIABLE): >60 ML/MIN/1.73 M^2
GLUCOSE SERPL-MCNC: 168 MG/DL (ref 70–110)
HCT VFR BLD AUTO: 31.8 % (ref 40–54)
HGB BLD-MCNC: 11.2 G/DL (ref 14–18)
IMM GRANULOCYTES # BLD AUTO: 0.21 K/UL (ref 0–0.04)
IMM GRANULOCYTES NFR BLD AUTO: 3.9 % (ref 0–0.5)
LYMPHOCYTES # BLD AUTO: 1 K/UL (ref 1–4.8)
LYMPHOCYTES NFR BLD: 19.3 % (ref 18–48)
MAGNESIUM SERPL-MCNC: 1.6 MG/DL (ref 1.6–2.6)
MCH RBC QN AUTO: 31.5 PG (ref 27–31)
MCHC RBC AUTO-ENTMCNC: 35.2 G/DL (ref 32–36)
MCV RBC AUTO: 89 FL (ref 82–98)
MONOCYTES # BLD AUTO: 0.6 K/UL (ref 0.3–1)
MONOCYTES NFR BLD: 10.5 % (ref 4–15)
NEUTROPHILS # BLD AUTO: 3.3 K/UL (ref 1.8–7.7)
NEUTROPHILS NFR BLD: 61.1 % (ref 38–73)
NRBC BLD-RTO: 0 /100 WBC
PHOSPHATE SERPL-MCNC: 3.1 MG/DL (ref 2.7–4.5)
PLATELET # BLD AUTO: 242 K/UL (ref 150–450)
PMV BLD AUTO: 8.6 FL (ref 9.2–12.9)
POCT GLUCOSE: 200 MG/DL (ref 70–110)
POCT GLUCOSE: 322 MG/DL (ref 70–110)
POTASSIUM SERPL-SCNC: 3.5 MMOL/L (ref 3.5–5.1)
PROT SERPL-MCNC: 5.7 G/DL (ref 6–8.4)
RBC # BLD AUTO: 3.56 M/UL (ref 4.6–6.2)
SODIUM SERPL-SCNC: 135 MMOL/L (ref 136–145)
WBC # BLD AUTO: 5.34 K/UL (ref 3.9–12.7)

## 2022-08-01 PROCEDURE — 97110 THERAPEUTIC EXERCISES: CPT

## 2022-08-01 PROCEDURE — 94761 N-INVAS EAR/PLS OXIMETRY MLT: CPT

## 2022-08-01 PROCEDURE — 99232 SBSQ HOSP IP/OBS MODERATE 35: CPT | Mod: ,,, | Performed by: INTERNAL MEDICINE

## 2022-08-01 PROCEDURE — 25000003 PHARM REV CODE 250: Performed by: INTERNAL MEDICINE

## 2022-08-01 PROCEDURE — 63600175 PHARM REV CODE 636 W HCPCS: Performed by: INTERNAL MEDICINE

## 2022-08-01 PROCEDURE — 83735 ASSAY OF MAGNESIUM: CPT

## 2022-08-01 PROCEDURE — 20600001 HC STEP DOWN PRIVATE ROOM

## 2022-08-01 PROCEDURE — 80053 COMPREHEN METABOLIC PANEL: CPT

## 2022-08-01 PROCEDURE — 25000003 PHARM REV CODE 250

## 2022-08-01 PROCEDURE — 99233 PR SUBSEQUENT HOSPITAL CARE,LEVL III: ICD-10-PCS | Mod: ,,, | Performed by: REGISTERED NURSE

## 2022-08-01 PROCEDURE — 99232 PR SUBSEQUENT HOSPITAL CARE,LEVL II: ICD-10-PCS | Mod: ,,, | Performed by: INTERNAL MEDICINE

## 2022-08-01 PROCEDURE — 63600175 PHARM REV CODE 636 W HCPCS

## 2022-08-01 PROCEDURE — 99233 SBSQ HOSP IP/OBS HIGH 50: CPT | Mod: ,,, | Performed by: REGISTERED NURSE

## 2022-08-01 PROCEDURE — 36415 COLL VENOUS BLD VENIPUNCTURE: CPT

## 2022-08-01 PROCEDURE — 25000003 PHARM REV CODE 250: Performed by: STUDENT IN AN ORGANIZED HEALTH CARE EDUCATION/TRAINING PROGRAM

## 2022-08-01 PROCEDURE — 63600175 PHARM REV CODE 636 W HCPCS: Performed by: STUDENT IN AN ORGANIZED HEALTH CARE EDUCATION/TRAINING PROGRAM

## 2022-08-01 PROCEDURE — 99233 PR SUBSEQUENT HOSPITAL CARE,LEVL III: ICD-10-PCS | Mod: ,,, | Performed by: HOSPITALIST

## 2022-08-01 PROCEDURE — 97165 OT EVAL LOW COMPLEX 30 MIN: CPT

## 2022-08-01 PROCEDURE — 97530 THERAPEUTIC ACTIVITIES: CPT

## 2022-08-01 PROCEDURE — 85025 COMPLETE CBC W/AUTO DIFF WBC: CPT

## 2022-08-01 PROCEDURE — 84100 ASSAY OF PHOSPHORUS: CPT

## 2022-08-01 PROCEDURE — 97116 GAIT TRAINING THERAPY: CPT

## 2022-08-01 PROCEDURE — 99233 SBSQ HOSP IP/OBS HIGH 50: CPT | Mod: ,,, | Performed by: HOSPITALIST

## 2022-08-01 RX ORDER — INSULIN ASPART 100 [IU]/ML
8 INJECTION, SOLUTION INTRAVENOUS; SUBCUTANEOUS ONCE
Status: COMPLETED | OUTPATIENT
Start: 2022-08-01 | End: 2022-08-01

## 2022-08-01 RX ORDER — INSULIN ASPART 100 [IU]/ML
10 INJECTION, SOLUTION INTRAVENOUS; SUBCUTANEOUS
Status: DISCONTINUED | OUTPATIENT
Start: 2022-08-01 | End: 2022-08-03

## 2022-08-01 RX ADMIN — MORPHINE SULFATE 3 MG: 2 INJECTION, SOLUTION INTRAMUSCULAR; INTRAVENOUS at 10:08

## 2022-08-01 RX ADMIN — INSULIN ASPART 4 UNITS: 100 INJECTION, SOLUTION INTRAVENOUS; SUBCUTANEOUS at 04:08

## 2022-08-01 RX ADMIN — OXYCODONE 5 MG: 5 TABLET ORAL at 06:08

## 2022-08-01 RX ADMIN — TACROLIMUS 900 MG: 0.5 CAPSULE ORAL at 06:08

## 2022-08-01 RX ADMIN — INSULIN ASPART 4 UNITS: 100 INJECTION, SOLUTION INTRAVENOUS; SUBCUTANEOUS at 08:08

## 2022-08-01 RX ADMIN — MORPHINE SULFATE 3 MG: 2 INJECTION, SOLUTION INTRAMUSCULAR; INTRAVENOUS at 08:08

## 2022-08-01 RX ADMIN — INSULIN DETEMIR 12 UNITS: 100 INJECTION, SOLUTION SUBCUTANEOUS at 08:08

## 2022-08-01 RX ADMIN — INSULIN ASPART 8 UNITS: 100 INJECTION, SOLUTION INTRAVENOUS; SUBCUTANEOUS at 10:08

## 2022-08-01 RX ADMIN — PIPERACILLIN SODIUM AND TAZOBACTAM SODIUM 4.5 G: 4; .5 INJECTION, POWDER, LYOPHILIZED, FOR SOLUTION INTRAVENOUS at 08:08

## 2022-08-01 RX ADMIN — INSULIN ASPART 8 UNITS: 100 INJECTION, SOLUTION INTRAVENOUS; SUBCUTANEOUS at 09:08

## 2022-08-01 RX ADMIN — INSULIN ASPART 8 UNITS: 100 INJECTION, SOLUTION INTRAVENOUS; SUBCUTANEOUS at 12:08

## 2022-08-01 RX ADMIN — OXYCODONE 5 MG: 5 TABLET ORAL at 07:08

## 2022-08-01 RX ADMIN — OXYCODONE 5 MG: 5 TABLET ORAL at 12:08

## 2022-08-01 RX ADMIN — INSULIN ASPART 10 UNITS: 100 INJECTION, SOLUTION INTRAVENOUS; SUBCUTANEOUS at 04:08

## 2022-08-01 RX ADMIN — INSULIN ASPART 6 UNITS: 100 INJECTION, SOLUTION INTRAVENOUS; SUBCUTANEOUS at 12:08

## 2022-08-01 RX ADMIN — VANCOMYCIN HYDROCHLORIDE 1750 MG: 500 INJECTION, POWDER, LYOPHILIZED, FOR SOLUTION INTRAVENOUS at 04:08

## 2022-08-01 RX ADMIN — MORPHINE SULFATE 3 MG: 2 INJECTION, SOLUTION INTRAMUSCULAR; INTRAVENOUS at 04:08

## 2022-08-01 RX ADMIN — PIPERACILLIN SODIUM AND TAZOBACTAM SODIUM 4.5 G: 4; .5 INJECTION, POWDER, LYOPHILIZED, FOR SOLUTION INTRAVENOUS at 06:08

## 2022-08-01 NOTE — PT/OT/SLP EVAL
"Occupational Therapy   Evaluation and Treatment  Name: Wilfredo Thomas  MRN: [id number]  Admitting Diagnosis:  DKA (diabetic ketoacidosis) 3 Days Post-Op  Length of Stay: 6 days    Recommendations:     Discharge Recommendations: outpatient PT  Discharge Equipment Recommendations:  walker, rolling, shower chair  Barriers to discharge:  None    Plan:     Patient to be seen 2 x/week to address the above listed problems via self-care/home management, therapeutic activities, therapeutic exercises  · Plan of Care Expires: 08/31/22  · Plan of Care Reviewed with: patient, family    Assessment:     Wilfredo Thomas is a 47 y.o. male with a medical diagnosis of DKA (diabetic ketoacidosis).  He presents with the following performance deficits affecting function: weakness, impaired endurance, impaired self care skills, impaired functional mobility, gait instability, impaired balance, pain, decreased safety awareness, decreased lower extremity function, decreased upper extremity function, decreased coordination, decreased ROM, impaired skin, orthopedic precautions.      Pt presenting with increased pain, impaired endurance, increased weakness, impaired balance, decreased LLE function, and decreased safety awareness upon evaluation this date, requiring increased time and assistance to complete functional tasks. Patient is eager and motivated to return to independence. Pt would benefit from continued acute skilled OT services at this time to increase functional performance, and improve quality of life. OT to recommend Outpatient at discharge once medically appropriate for increased functional independence and to improve patient safety before returning home.    Rehab Prognosis: Good; patient would benefit from acute skilled OT services to address these deficits and reach maximum level of function.       Subjective   Patient states: " I just got comfortable after the last little bit I did. "    Communicated with: Nurse prior to " "session.  Patient found HOB elevated with JOHANN drain, telemetry, peripheral IV upon OT entry to room.    Chief Complaint: Shortness of Breath (Pt c/o increased SOB over the past few days. Pt is diabetic who hasn't taken his meds for the past couple days. Has been on steroids. )    Patient/Family Comments/goals: to return home at [facility]    Pain/Comfort:  · Pain Rating 1: 0/10  · Pain Rating Post-Intervention 1: 0/10    Patients cultural, spiritual, Shinto conflicts given the current situation: no    Occupational Profile:  Living Environment: lives with spouse and adult child in [facility] [address]; walk-in shower  Prior Level of Function: Patient reports being Independent with mobility & with ADLs.   Patient uses DME as follows: none.   DME owned (not currently used): none.  Roles/Repsonsibilities:   Hand Dominance: right   Work: no.   Drive: yes.   Managing Medicines/Managing Home: yes.   Equipment Used at Home:  none    Patient reports they will have assistance from spouse upon discharge.      Objective:     Patient found with: JOHANN drain, telemetry, peripheral IV   General Precautions: Standard, Cardiac fall   Orthopedic Precautions:LLE posterior precautions, LLE abduction precautions   Braces: N/A   Oxygen Device: Room Air  Vitals: /75 (BP Location: Right arm, Patient Position: Lying)   Pulse 90   Temp 99.1 °F (37.3 °C) (Oral)   Resp 18   Ht 5' 7" (1.702 m)   Wt 94.5 kg (208 lb 5.4 oz)   SpO2 (!) 94%   BMI 32.63 kg/m²     Cognitive and Psychosocial Function:   · AxOx4 -- Person, Place, Time and Situation   · Follows Commands/attention:follows two-step commands  · Communication:  clear/fluent  · Memory: No Deficits noted  · Safety awareness/insight to disability: impaired   · Mood/Affect/Coping skills/emotional control: Appropriate to situation    Hearing: Intact    Vision:  Intact visual fields    Physical Exam:  Postural examination/scapula alignment:    -       Forward head  Skin integrity: Visible skin " intact     Left UE Right UE   UE Edema absent absent   UE ROM AROM WFL AROM WFL   UE Strength 3+/5 3+/5    Strength fair composite grasp fair composite grasp   Sensation LUE INTACT:WFL RUE INTACT: WFL   Fine Motor Coordination:  LUE INTACT: WFL RUE INTACT: WFL   Gross Motor Coordination: LUE INTACT: WFL RUE INTACT:WFL     Occupational Performance:  Bed Mobility:    · Patient completed Rolling/Turning to Right with stand by assistance  · Patient completed Supine to Sit with stand by assistance on R side of bed  · Scooting anteriorly to EOB to have both feet planted on floor: stand by assistance  · Patient completed Sit to Supine with stand by assistance on R side of bed  · Scooting to HOB in supine: stand by assistance    Functional Mobility/Transfers:   Static Sitting EOB: SBA   Patient completed Sit <> Stand Transfer from EOB with contact guard assistance  with  rolling walker    Static Standing Balance: SBA   Patient completed Toilet Transfer Step Transfer technique with contact guard assistance with  rolling walker   Pt completed functional mobility of household distance ~40ft with CGA using RW. Required increased time       Activities of Daily Living:  · Lower Body Dressing: maximal assistance to don socks    AMPAC 6 Click ADL:  AMPAC Total Score: 22    Treatment & Education:  -Pt education on OT role and POC.  -Importance of E/OOB activity with staff assistance, emphasis on daily participation  -Safety during functional transfer and mobility ensured  -Patient provided with education on importance of Bilateral UB/LB integration during functional tasks for improvement in functional performance.   -Education provided/reviewed, questions answered within OT scope of practice.   -Patient demonstrates understanding and learning this date.         Patient left HOB elevated with all lines intact, call button in reach and family present    GOALS:   Multidisciplinary Problems     Occupational Therapy Goals         Problem: Occupational Therapy    Goal Priority Disciplines Outcome Interventions   Occupational Therapy Goal     OT, PT/OT Ongoing, Progressing    Description: Goals set on 8/1, with expiration date 8/15:  Patient will increase functional independence with ADLs by performing:    Bed mobility with [place]  Grooming while standing at sink with [place]  UB Dressing with [place].  LB Dressing with [place].  Toileting from toilet with [place] for hygiene and clothing management.   Functional mobility of household and community distance with [place] and AD as needed  Pt will demonstrate understanding of education provided regarding energy conservation and task modification through teach-back method.  Pt will demonstrate [place] in HEP for BUE strengthening GM/FM exercises to improve functional performance.                        History:     Past Medical History:   Diagnosis Date    Diabetes     Gout     Hyperlipidemia     Hypertension        Past Surgical History:   Procedure Laterality Date    IRRIGATION AND DEBRIDEMENT OF LOWER EXTREMITY Left 7/29/2022    Procedure: IRRIGATION AND DEBRIDEMENT, LOWER EXTREMITY;  Surgeon: Rob Graff MD;  Location: [facility] OR [address] [place] [address];  Service: Orthopedics;  Laterality: Left;    WRIST SURGERY Left        Time Tracking:       OT Date of Treatment: 08/01/22  OT Start Time: 1352  OT Stop Time: 1404  OT Total Time (min): 12 min    Billable Minutes:Evaluation 4  Therapeutic Activity 8      8/1/2022

## 2022-08-01 NOTE — PROGRESS NOTES
[facility] - Telemetry Stepdown  Endocrinology  Progress Note    Admit Date: 7/26/2022     Reason for Consult: Management of T2DM, Hyperglycemia     Surgical Procedure and Date: L thigh IM abscess I&D on 07/29/2022    Diabetes diagnosis year: at age 43yo    Home Diabetes Medications:    - Metformin 500 mg bid  - Jardiance 25 mg qd  - Trulicity 3 mg SC weekly  - Lantus 10 units qd -- prescribed at the end of 3/2022 but pt has not yet started taking it    Previously on glipizide, but stopped in 03/2022 by PCP who is managing his DM outpatient    How often checking glucose at home?  2-3x per week    BG readings on regimen: he reports <200  Hypoglycemia on the regimen?  No  Missed doses on regimen?  Yes    Diabetes Complications include:     Hyperglycemia    Complicating diabetes co morbidities:   HTN, obesity      HPI:   Patient is a 47 y.o. male with a diagnosis of T2DM, HTN, HLD, GERD, and obesity presented to the ED on 07/26/2022 with SOB and altered mental status x1d. Additionally reported pain and swelling L lateral high. Was seen at an OSH ED for thigh pain and was treated conservatively with NSAIDS initially. At follow up appt w/ ortho 7/22 he received IM steroid inj (deltoid) and po prednisone for suspected muscle strain. Due to worsening AMS and SOB he presented to the [facility] [place] [place] ED on 7/26. In the ED he was found to be in DKA and as admitted to the MICU due to severe acidosis.     For the thigh pain, MRI showed a ruptured tensor fascia jose luis (TFL) muscle with associated large hematoma. Ortho decided to perform aspiration night of 7/28. Gram stain showed G+ cocci in clusters- now growing Staph Species.They decided to take pt to OR 7/29 for formal I&D.      For DKA he was started on IVF and an intensive insulin gtt with resolution of DKA. Transitioned to MDI on 7/30 AM. Initial regimen was detemir 10u BID and Aspart 4u AC + LDC. Insulin up-titrated by MICU team prior to stepdown, to detemir 12u bid and aspart 8u AC +  "LD. Endocrinology consulted for management of hyperglycemia.          Interval HPI:   Overnight events:  Eatin%  Nausea: No  Hypoglycemia and intervention: No  Fever: No  TPN and/or TF: No  If yes, type of TF/TPN and rate: NA    BP (!) 140/74 (BP Location: Right arm, Patient Position: Lying)   Pulse 90   Temp 97.8 °F (36.6 °C) (Oral)   Resp 18   Ht 5' 7" (1.702 m)   Wt 94.5 kg (208 lb 5.4 oz)   SpO2 98%   BMI 32.63 kg/m²     Labs Reviewed and Include    Recent Labs   Lab 22  0448   *   CALCIUM 8.6*   ALBUMIN 1.7*   PROT 5.7*   *   K 3.5   CO2 29   CL 96   BUN 5*   CREATININE 0.6   ALKPHOS 80   ALT 15   AST 12   BILITOT 0.4     Lab Results   Component Value Date    WBC 5.34 2022    HGB 11.2 (L) 2022    HCT 31.8 (L) 2022    MCV 89 2022     2022     Recent Labs   Lab 22   TSH 4.631*   FREET4 0.75     Lab Results   Component Value Date    HGBA1C 10.1 (H) 2022       Nutritional status:   Body mass index is 32.63 kg/m².  Lab Results   Component Value Date    ALBUMIN 1.7 (L) 2022    ALBUMIN 1.9 (L) 2022    ALBUMIN 1.6 (L) 2022     Lab Results   Component Value Date    PREALBUMIN 7 (L) 2022       Estimated Creatinine Clearance: 163.3 mL/min (based on SCr of 0.6 mg/dL).    Accu-Checks  Recent Labs     22  0530 22  0643 22  0741 22  0901 22  1140 22  1726 22  2214 22  0726 22  1521 22  2046   POCTGLUCOSE 209* 188* 227* 244* 213* 252* 244* 237* 173* 200*       Current Medications and/or Treatments Impacting Glycemic Control  Immunotherapy:    Immunosuppressants       None          Steroids:   Hormones (From admission, onward)                None          Pressors:    Autonomic Drugs (From admission, onward)                None          Hyperglycemia/Diabetes Medications:   Antihyperglycemics (From admission, onward)                Start     Stop Route " Frequency Ordered    07/31/22 0715  insulin aspart U-100 pen 8 Units         -- SubQ 3 times daily with meals 07/30/22 1725    07/30/22 2100  insulin detemir U-100 pen 12 Units         -- SubQ 2 times daily 07/30/22 1725    07/30/22 0826  insulin aspart U-100 pen 1-10 Units         -- SubQ Before meals & nightly PRN 07/30/22 0727            ASSESSMENT and PLAN    Type 2 diabetes mellitus with hyperglycemia, with long-term current use of insulin  Key History and Diagnostic Findings  - Uncontrolled T2DM, pt not adherent to med regimen, not checking BG regularly at home  - P/w DKA with trigger infection/thigh abscess  - Home Regimen: metformin 500mg BID, Jardiance 25mg qd, Trulicity 3mg SC weekly, Lantus 10u qd   Pt never started insulin which was prescribed 3/2022   Not filling metformin regularly  - Glucose Goals: 140-180mg/dL  - 24 hour glucose trend: Low 200s    Plan  - Continue Levemir 12 units BID  - Continue Aspart 8 units TIDWM with moderate correction scale    - Patient will need to be discharged on MDI insulin (prandial and basal)   - Other home meds to consider at d/c:    - Would hold jardiace until complete resolution of infection/acute illness. Because his presentation of DKA has a clear trigger (infection) and was not euglycemic DKA, unlikely that Jardiance was the cause. For these reasons, it would be reasonable to resume it outpatient after recovery from acute illness.   - Can resume metformin and trulicity at d/c, will re-visit dc recs when pt is ready for discharge    - Hypoglycemia protocol in place  - If blood glucose greater than 300, please ask patient not to eat food or drink anything other than water until correctional insulin has brought it back below 250    ** Please notify Endocrine for any change and/or advance in diet**  ** Please call Endocrine for any BG related issues **    Class 2 severe obesity due to excess calories with serious comorbidity and body mass index (BMI) of 36.0 to 36.9 in  adult  General weight loss/lifestyle modification strategies discussed (elicit support from others; identify saboteurs; non-food rewards, etc).   Weight loss will help to improve glycemic control    Hyperlipidemia associated with type 2 diabetes mellitus  Continue home statin   Goal LDL<70      Lc Garcia DO  [facility], 6th Floor  [address] [place], [place], [address]    Office: [phone]  Fax: [phone]    Disclaimer: This note has been generated using voice-recognition software. There may be typographical errors that have been missed during proof-reading.    The above history labs imaging impression and plan were discussed with attending physician who is in agreement and also took part in this patient's care.  I personally reviewed all of the patients available medications, labs, imaging, vitals, allergies, medical history.

## 2022-08-01 NOTE — ANESTHESIA POSTPROCEDURE EVALUATION
Anesthesia Post Evaluation    Patient: Wilfredo Thomas    Procedure(s) Performed: Procedure(s) (LRB):  IRRIGATION AND DEBRIDEMENT, LOWER EXTREMITY (Left)    Final Anesthesia Type: general      Patient location during evaluation: PACU  Patient participation: Yes- Able to Participate  Level of consciousness: awake and alert and oriented  Post-procedure vital signs: reviewed and stable  Pain management: adequate  Airway patency: patent    PONV status at discharge: No PONV  Anesthetic complications: no      Cardiovascular status: blood pressure returned to baseline and hemodynamically stable  Respiratory status: unassisted, spontaneous ventilation and room air  Hydration status: euvolemic  Follow-up not needed.          Vitals Value Taken Time   /84 08/01/22 1127   Temp 36.8 °C (98.3 °F) 08/01/22 1127   Pulse 107 08/01/22 1127   Resp 18 08/01/22 1127   SpO2 92 % 08/01/22 1127         No case tracking events are documented in the log.      Pain/Brielle Score: Pain Rating Prior to Med Admin: 9 (8/1/2022  8:46 AM)  Pain Rating Post Med Admin: 0 (7/31/2022  9:00 PM)

## 2022-08-01 NOTE — SUBJECTIVE & OBJECTIVE
Past Medical History:   Diagnosis Date    Diabetes     Gout     Hyperlipidemia     Hypertension        Past Surgical History:   Procedure Laterality Date    IRRIGATION AND DEBRIDEMENT OF LOWER EXTREMITY Left 7/29/2022    Procedure: IRRIGATION AND DEBRIDEMENT, LOWER EXTREMITY;  Surgeon: Rob Graff MD;  Location: [facility] OR [address] [place] [address];  Service: Orthopedics;  Laterality: Left;    WRIST SURGERY Left        Review of patient's allergies indicates:  No Known Allergies    Medications:  Medications Prior to Admission   Medication Sig    atorvastatin (LIPITOR) 20 MG tablet Take 1 tablet (20 mg total) by mouth once daily.    empagliflozin (JARDIANCE) 25 mg tablet Take 1 tablet (25 mg total) by mouth once daily.    lisinopriL (PRINIVIL,ZESTRIL) 20 MG tablet Take 1 tablet (20 mg total) by mouth once daily.    blood sugar diagnostic Strp 1 strip by Misc.(Non-Drug; Combo Route) route once daily.    blood-glucose meter kit Use as instructed    dulaglutide (TRULICITY) 3 mg/0.5 mL pen injector Inject 3 mg into the skin every 7 days.    insulin (LANTUS SOLOSTAR U-100 INSULIN) glargine 100 units/mL (3mL) SubQ pen Inject 20 Units into the skin once daily.    lancets Misc 1 lancet by Misc.(Non-Drug; Combo Route) route once daily.    metFORMIN (GLUCOPHAGE-XR) 500 MG ER 24hr tablet Take 1 tablet (500 mg total) by mouth 2 (two) times daily with meals.     Antibiotics (From admission, onward)                Start     Stop Route Frequency Ordered    07/30/22 0330  vancomycin 1.75 g in 5 % dextrose 500 mL IVPB         -- IV Every 12 hours (non-standard times) 07/29/22 1534    07/29/22 1401  piperacillin-tazobactam 4.5 g in sodium chloride 0.9% 100 mL IVPB (ready to mix system)         -- IV Every 8 hours (non-standard times) 07/29/22 1401          Antifungals (From admission, onward)                None          Antivirals (From admission, onward)      None               There is no immunization history on file for this patient.    Family  History       Problem Relation (Age of Onset)    Diabetes Mother    No Known Problems Father          Social History     Socioeconomic History    Marital status:    Tobacco Use    Smoking status: Former Smoker     Packs/day: 0.50     Types: Cigarettes     Quit date: 2020     Years since quittin.5    Smokeless tobacco: Never Used   Substance and Sexual Activity    Alcohol use: Yes     Alcohol/week: 12.0 standard drinks     Types: 12 Cans of beer per week    Drug use: Never    Sexual activity: Yes     Partners: Female   Social History Narrative    Lives with wife and 2 kids in college and one older with 1 grand child. Supervisor at [employer]. Smoker but quit 2020     Social Determinants of Health     Physical Activity: Unknown    Days of Exercise per Week: Patient refused   Social Connections: Unknown    Active Member of Clubs or Organizations: Patient refused    Marital Status: Patient refused     Review of Systems   Constitutional:  Positive for activity change. Negative for chills, diaphoresis, fatigue and fever.   Respiratory:  Negative for cough and shortness of breath.    Cardiovascular:  Positive for leg swelling (upper left thigh). Negative for chest pain and palpitations.   Gastrointestinal:  Negative for abdominal distention and abdominal pain.   Genitourinary:  Negative for difficulty urinating and dysuria.   Musculoskeletal:  Positive for myalgias (upper left thigh). Negative for arthralgias and joint swelling.   Skin:  Positive for wound (upper left thigh). Negative for color change and rash.   Allergic/Immunologic: Negative for immunocompromised state.   Neurological:  Negative for dizziness, tremors, weakness, numbness and headaches.   Psychiatric/Behavioral:  Negative for agitation and decreased concentration. The patient is not nervous/anxious.    Objective:     Vital Signs (Most Recent):  Temp: 98.3 °F (36.8 °C) (22 1127)  Pulse: 107 (22 1127)  Resp: 18 (22 1208)  BP:  125/84 (08/01/22 1127)  SpO2: (!) 92 % (08/01/22 1127)   Vital Signs (24h Range):  Temp:  [97.3 °F (36.3 °C)-98.3 °F (36.8 °C)] 98.3 °F (36.8 °C)  Pulse:  [] 107  Resp:  [18] 18  SpO2:  [92 %-98 %] 92 %  BP: (111-140)/(74-89) 125/84     Weight: 94.5 kg (208 lb 5.4 oz)  Body mass index is 32.63 kg/m².    Estimated Creatinine Clearance: 163.3 mL/min (based on SCr of 0.6 mg/dL).    Physical Exam  Vitals and nursing note reviewed.   Constitutional:       General: He is not in acute distress.     Appearance: Normal appearance. He is well-developed. He is not ill-appearing, toxic-appearing or diaphoretic.   HENT:      Head: Normocephalic and atraumatic.      Nose: Nose normal.      Mouth/Throat:      Dentition: Normal dentition. Does not have dentures. No dental caries or dental abscesses.      Pharynx: No oropharyngeal exudate.   Eyes:      General: No scleral icterus.     Conjunctiva/sclera: Conjunctivae normal.   Cardiovascular:      Rate and Rhythm: Normal rate and regular rhythm.      Heart sounds: Normal heart sounds. No murmur heard.  Pulmonary:      Effort: Pulmonary effort is normal. No respiratory distress.      Breath sounds: Normal breath sounds.   Abdominal:      General: Bowel sounds are normal. There is no distension.      Palpations: Abdomen is soft.   Musculoskeletal:      Cervical back: Normal range of motion.   Lymphadenopathy:      Cervical: No cervical adenopathy.   Skin:     General: Skin is warm and dry.      Findings: No erythema or rash.      Comments: Incision to left thigh. Dressed, CDI. Surgical drain with sanguinous/purulent output.    Neurological:      General: No focal deficit present.      Mental Status: He is alert and oriented to person, place, and time. Mental status is at baseline.   Psychiatric:         Mood and Affect: Mood normal.         Behavior: Behavior normal.         Thought Content: Thought content normal.         Judgment: Judgment normal.       Significant Labs: All  pertinent labs within the past 24 hours have been reviewed.    Significant Imaging: I have reviewed all pertinent imaging results/findings within the past 24 hours.

## 2022-08-01 NOTE — SUBJECTIVE & OBJECTIVE
"Principal Problem:DKA (diabetic ketoacidosis)    Principal Orthopedic Problem: Left thigh I&D  Staph species culture on 07/28    Interval History: Pt seen and examined at bedside. NAEON. Pain controlled. Ambulated 6ft with RW, per PT.  VSS, AF. No other concerns stated.         Review of patient's allergies indicates:  No Known Allergies    Current Facility-Administered Medications   Medication    acetaminophen tablet 1,000 mg    atorvastatin tablet 20 mg    dextrose 10% bolus 125 mL    dextrose 10% bolus 250 mL    glucagon (human recombinant) injection 1 mg    glucose chewable tablet 16 g    glucose chewable tablet 24 g    insulin aspart U-100 pen 1-10 Units    insulin aspart U-100 pen 8 Units    insulin detemir U-100 pen 12 Units    iohexoL (OMNIPAQUE 350) injection 50 mL    morphine injection 3 mg    ondansetron injection 4 mg    oxyCODONE immediate release tablet 5 mg    piperacillin-tazobactam 4.5 g in sodium chloride 0.9% 100 mL IVPB (ready to mix system)    sodium chloride 0.9% flush 10 mL    sodium chloride 0.9% flush 10 mL    sodium chloride 0.9% flush 10 mL    vancomycin 1.75 g in 5 % dextrose 500 mL IVPB     Objective:     Vital Signs (Most Recent):  Temp: 97.8 °F (36.6 °C) (08/01/22 0736)  Pulse: 90 (08/01/22 0736)  Resp: 18 (08/01/22 0846)  BP: (!) 140/74 (08/01/22 0736)  SpO2: 98 % (08/01/22 0736) Vital Signs (24h Range):  Temp:  [97.3 °F (36.3 °C)-98.2 °F (36.8 °C)] 97.8 °F (36.6 °C)  Pulse:  [] 90  Resp:  [18] 18  SpO2:  [96 %-98 %] 98 %  BP: (111-140)/(74-89) 140/74     Weight: 94.5 kg (208 lb 5.4 oz)  Height: 5' 7" (170.2 cm)  Body mass index is 32.63 kg/m².      Intake/Output Summary (Last 24 hours) at 8/1/2022 0907  Last data filed at 8/1/2022 0636  Gross per 24 hour   Intake 200 ml   Output 85 ml   Net 115 ml         Ortho/SPM Exam    L Lower Extremity  Inspection  - Surgical site dressing clean, dry, and intact  - No swelling  - No ecchymosis, erythema, or signs of " cellulitis  Palpation  - TTP to palpation over surgical site and anterior thigh  Range of motion  - AROM and PROM of the hip, knee, ankle, and foot intact without pain  Stability  - No evidence of joint dislocation or abnormal laxity  Neurovascular  - TA/EHL/Gastroc/FHL assessed in isolation without deficit  - SILT throughout  - Compartments soft  - DP palpated   - Capillary Refill <3s  - Negative Log roll  - Negative Stinchfield  - Muscle tone normal    Still waiting results from cultures taken during surgery 07/29.    CBC:   Recent Labs   Lab 07/31/22  0923 08/01/22  0448   WBC 7.40 5.34   HGB 12.0* 11.2*   HCT 34.8* 31.8*    242       CMP:   Recent Labs   Lab 07/30/22  1446 07/30/22  2343 07/31/22  0348 08/01/22  0448   * 135* 136 135*   K 3.4* 3.5 3.4* 3.5   CL 97 102 99 96   CO2 17* 22* 23 29   * 218* 172* 168*   BUN 8 7 7 5*   CREATININE 0.7 0.5 0.6 0.6   CALCIUM 8.3* 8.1* 8.8 8.6*   PROT  --   --  6.6 5.7*   ALBUMIN  --   --  1.9* 1.7*   BILITOT  --   --  0.5 0.4   ALKPHOS  --   --  108 80   AST  --   --  15 12   ALT  --   --  15 15   ANIONGAP 14 11 14 10   EGFRNONAA >60.0 >60.0 >60.0  --          All pertinent labs within the past 24 hours have been reviewed.    Significant Imaging: I have reviewed and interpreted all pertinent imaging results/findings.

## 2022-08-01 NOTE — SUBJECTIVE & OBJECTIVE
"Interval HPI:   Overnight events:  Eatin%  Nausea: No  Hypoglycemia and intervention: No  Fever: No  TPN and/or TF: No  If yes, type of TF/TPN and rate: NA    BP (!) 140/74 (BP Location: Right arm, Patient Position: Lying)   Pulse 90   Temp 97.8 °F (36.6 °C) (Oral)   Resp 18   Ht 5' 7" (1.702 m)   Wt 94.5 kg (208 lb 5.4 oz)   SpO2 98%   BMI 32.63 kg/m²     Labs Reviewed and Include    Recent Labs   Lab 22  0448   *   CALCIUM 8.6*   ALBUMIN 1.7*   PROT 5.7*   *   K 3.5   CO2 29   CL 96   BUN 5*   CREATININE 0.6   ALKPHOS 80   ALT 15   AST 12   BILITOT 0.4     Lab Results   Component Value Date    WBC 5.34 2022    HGB 11.2 (L) 2022    HCT 31.8 (L) 2022    MCV 89 2022     2022     Recent Labs   Lab 22   TSH 4.631*   FREET4 0.75     Lab Results   Component Value Date    HGBA1C 10.1 (H) 2022       Nutritional status:   Body mass index is 32.63 kg/m².  Lab Results   Component Value Date    ALBUMIN 1.7 (L) 2022    ALBUMIN 1.9 (L) 2022    ALBUMIN 1.6 (L) 2022     Lab Results   Component Value Date    PREALBUMIN 7 (L) 2022       Estimated Creatinine Clearance: 163.3 mL/min (based on SCr of 0.6 mg/dL).    Accu-Checks  Recent Labs     22  0530 22  0643 22  0741 22  0901 22  1140 22  1726 22  2214 22  0726 22  1521 22   POCTGLUCOSE 209* 188* 227* 244* 213* 252* 244* 237* 173* 200*       Current Medications and/or Treatments Impacting Glycemic Control  Immunotherapy:    Immunosuppressants       None          Steroids:   Hormones (From admission, onward)                None          Pressors:    Autonomic Drugs (From admission, onward)                None          Hyperglycemia/Diabetes Medications:   Antihyperglycemics (From admission, onward)                Start     Stop Route Frequency Ordered    22 0715  insulin aspart U-100 pen 8 Units      "    -- SubQ 3 times daily with meals 07/30/22 1725 07/30/22 2100  insulin detemir U-100 pen 12 Units         -- SubQ 2 times daily 07/30/22 1725    07/30/22 0826  insulin aspart U-100 pen 1-10 Units         -- SubQ Before meals & nightly PRN 07/30/22 0758

## 2022-08-01 NOTE — ASSESSMENT & PLAN NOTE
Key History and Diagnostic Findings  - Uncontrolled T2DM, pt not adherent to med regimen, not checking BG regularly at home  - P/w DKA with trigger infection/thigh abscess  - Home Regimen: metformin 500mg BID, Jardiance 25mg qd, Trulicity 3mg SC weekly, Lantus 10u qd   Pt never started insulin which was prescribed 3/2022   Not filling metformin regularly  - Glucose Goals: 140-180mg/dL  - 24 hour glucose trend: Low 200s    Plan  - Continue Levemir 12 units BID  - Continue Aspart 8 units TIDWM with moderate correction scale    - Patient will need to be discharged on MDI insulin (prandial and basal)   - Other home meds to consider at d/c:    - Would hold jardiace until complete resolution of infection/acute illness. Because his presentation of DKA has a clear trigger (infection) and was not euglycemic DKA, unlikely that Jardiance was the cause. For these reasons, it would be reasonable to resume it outpatient after recovery from acute illness.   - Can resume metformin and trulicity at d/c, will re-visit dc recs when pt is ready for discharge    - Hypoglycemia protocol in place  - If blood glucose greater than 300, please ask patient not to eat food or drink anything other than water until correctional insulin has brought it back below 250    ** Please notify Endocrine for any change and/or advance in diet**  ** Please call Endocrine for any BG related issues **

## 2022-08-01 NOTE — CONSULTS
Diabetic diet education (MyPlate, CHO counting) provided to pt on 7/27 with handouts. Reiterated education today. Pt with no additional question regarding diabetic diet at this time. No other needs identified at this time.

## 2022-08-01 NOTE — PROGRESS NOTES
[facility] - Telemetry Stepdown  Infectious Disease  Progress Note    Patient Name: Wilfredo Thomas  MRN: [id number]  Admission Date: 7/26/2022  Length of Stay: 6 days  Attending Physician: Bandar Arce MD  Primary Care Provider: Aylin Wayne DO    Isolation Status: No active isolations  Assessment/Plan:      Abscess of left thigh   46 yo male with PMH of T2DM, HTN, admitted for DKA. Reporting 2 weeks of left lateral thigh pain, swelling. No known injury to the area. To note pt reports recent boil to buttocks as well as left wrist surgery following wrist fracture/ligament rupture. See HPI for full detail.      MRI of left femur was notable for ruptured tensor fascia jose luis mm with associated large hematoma, as well as small area of potential osteonecrosis. Bedside aspiration of the fluid collection was notable for a cell count of >200k >80%segs, cultures + staph spp, ID/sensitivity pending. S/p I&D with Dr. Graff with ortho surgery on 7/29, cultures collected + staph spp, ID/sensitivity pending.  Did not return to surgery today for washout. Pt states potentially tomorrow.       Afebrile. No leukocytosis. Blood cultures from 7/26 NGTD.      Pt is currently on zosyn and vancomycin. ID was consulted for abx recs regarding left TFL infection.     Recommendations:   - Continue IV Vancomycin, inpatient pharmacy to dose.   - Continue Piperacillin-Tazobactam 4.5 mg IV q8hr.   - If taken back to OR for repeat washout, please obtain bone biopsy if any concern for osteomyelitis. If obtained, please send for path, gram stain, aerobic, anaerobic, AFB and fungal cultures.   - Pt plan reviewed with ID staff, Dr. Samson. ID will follow.             Thank you for your consult. I will follow-up with patient. Please contact us if you have any additional questions.    Steven Caicedo, SANDRO  Infectious Disease  [facility] - Telemetry Stepdown    Subjective:     Principal Problem:DKA (diabetic ketoacidosis)    HPI: Mr. Thomas is a 47  year old male with a PMH of DM2 (poorly controlled), HTN, GERD, HLD, obesity who initially presented to [facility] ED with cc of SOBx1 day. Pt was also reporting significant pain and swelling along his left lateral proximal thigh. Pt reports this pain started over the last 2 weeks, which worsened. He initially was seen outpatient in which he was diagnoised with a mm strain and given 800 mg ibuprofen. From there he went to the ED on 7/22 d/t worsening pain. He was treated with prednisone/morphine shot, and well as a prednisone oral pack. Pt denied recent injury, with the exception of soreness to his left lower shin following bumping into a cabinet. He denies open wounds/abrasions from this injury but states the soreness started in his left thigh following the improvement of discomfort from his left shin. Pt reports having been treated for a boil on his central/right buttocks in the end of June. 2022. Pt states he was seen in Urgent Care in [place] and the boil was drained. He was given an oral abx in which he completed.     To note, pt reports having a recent left wrist fracture, ligament rupture following an accident with his riding lawnmower. States he underwent surgery and 2 screws were placed on 3/31/22 at [facility]. Denies associated infection/complications. Denies pain in the site currently.    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 Denies having pets. Denies recent fishing/hunting. Denies hx of immunocompromising conditions.     To note, pt was found to be in DKA with blood sugars >400, treated by critical team, which has since resolved. Pt was recently started on insulin by his PCP, which he had not started. Pt latest A1C 10.     CT of left thigh notable for fluid collections suspicious for hematoma/seroma. MRI of left femur was notable for ruptured tensor fascia jose luis mm with associated large hematoma, as well as small area of potential osteonecrosis. Bedside  aspiration of the fluid collection was notable for a cell count of >200k >80%segs, cultures + staph spp. Pt was taken for I&D with Dr. Graff with ortho surgery on 7/29, abscesses were noted and washed out, cultures collected + staph spp. Per pt, states that Dr. Graff is planning to return to surgery tomorrow, 8/1/22 for repeat washout.     Pt was intially with leukocytosis, but has since down trended to 11k. Blood cultures from 7/26 NGTD.     Pt is currently on zosyn, vancomycin, and clindamycin. ID was consulted for abx recs regarding left TFL infection.            Past Medical History:   Diagnosis Date    Diabetes     Gout     Hyperlipidemia     Hypertension        Past Surgical History:   Procedure Laterality Date    IRRIGATION AND DEBRIDEMENT OF LOWER EXTREMITY Left 7/29/2022    Procedure: IRRIGATION AND DEBRIDEMENT, LOWER EXTREMITY;  Surgeon: Rob Graff MD;  Location: [facility] OR [address] [place] [address];  Service: Orthopedics;  Laterality: Left;    WRIST SURGERY Left        Review of patient's allergies indicates:  No Known Allergies    Medications:  Medications Prior to Admission   Medication Sig    atorvastatin (LIPITOR) 20 MG tablet Take 1 tablet (20 mg total) by mouth once daily.    empagliflozin (JARDIANCE) 25 mg tablet Take 1 tablet (25 mg total) by mouth once daily.    lisinopriL (PRINIVIL,ZESTRIL) 20 MG tablet Take 1 tablet (20 mg total) by mouth once daily.    blood sugar diagnostic Strp 1 strip by Misc.(Non-Drug; Combo Route) route once daily.    blood-glucose meter kit Use as instructed    dulaglutide (TRULICITY) 3 mg/0.5 mL pen injector Inject 3 mg into the skin every 7 days.    insulin (LANTUS SOLOSTAR U-100 INSULIN) glargine 100 units/mL (3mL) SubQ pen Inject 20 Units into the skin once daily.    lancets Misc 1 lancet by Misc.(Non-Drug; Combo Route) route once daily.    metFORMIN (GLUCOPHAGE-XR) 500 MG ER 24hr tablet Take 1 tablet (500 mg total) by mouth 2 (two) times daily with meals.      Antibiotics (From admission, onward)                Start     Stop Route Frequency Ordered    22 0330  vancomycin 1.75 g in 5 % dextrose 500 mL IVPB         -- IV Every 12 hours (non-standard times) 22 1534    22 1401  piperacillin-tazobactam 4.5 g in sodium chloride 0.9% 100 mL IVPB (ready to mix system)         -- IV Every 8 hours (non-standard times) 22 1401          Antifungals (From admission, onward)                None          Antivirals (From admission, onward)      None               There is no immunization history on file for this patient.    Family History       Problem Relation (Age of Onset)    Diabetes Mother    No Known Problems Father          Social History     Socioeconomic History    Marital status:    Tobacco Use    Smoking status: Former Smoker     Packs/day: 0.50     Types: Cigarettes     Quit date: 2020     Years since quittin.5    Smokeless tobacco: Never Used   Substance and Sexual Activity    Alcohol use: Yes     Alcohol/week: 12.0 standard drinks     Types: 12 Cans of beer per week    Drug use: Never    Sexual activity: Yes     Partners: Female   Social History Narrative    Lives with wife and 2 kids in college and one older with 1 grand child. Supervisor at [employer]. Smoker but quit 2020     Social Determinants of Health     Physical Activity: Unknown    Days of Exercise per Week: Patient refused   Social Connections: Unknown    Active Member of Clubs or Organizations: Patient refused    Marital Status: Patient refused     Review of Systems   Constitutional:  Positive for activity change. Negative for chills, diaphoresis, fatigue and fever.   Respiratory:  Negative for cough and shortness of breath.    Cardiovascular:  Positive for leg swelling (upper left thigh). Negative for chest pain and palpitations.   Gastrointestinal:  Negative for abdominal distention and abdominal pain.   Genitourinary:  Negative for difficulty urinating and  dysuria.   Musculoskeletal:  Positive for myalgias (upper left thigh). Negative for arthralgias and joint swelling.   Skin:  Positive for wound (upper left thigh). Negative for color change and rash.   Allergic/Immunologic: Negative for immunocompromised state.   Neurological:  Negative for dizziness, tremors, weakness, numbness and headaches.   Psychiatric/Behavioral:  Negative for agitation and decreased concentration. The patient is not nervous/anxious.    Objective:     Vital Signs (Most Recent):  Temp: 98.3 °F (36.8 °C) (08/01/22 1127)  Pulse: 107 (08/01/22 1127)  Resp: 18 (08/01/22 1208)  BP: 125/84 (08/01/22 1127)  SpO2: (!) 92 % (08/01/22 1127)   Vital Signs (24h Range):  Temp:  [97.3 °F (36.3 °C)-98.3 °F (36.8 °C)] 98.3 °F (36.8 °C)  Pulse:  [] 107  Resp:  [18] 18  SpO2:  [92 %-98 %] 92 %  BP: (111-140)/(74-89) 125/84     Weight: 94.5 kg (208 lb 5.4 oz)  Body mass index is 32.63 kg/m².    Estimated Creatinine Clearance: 163.3 mL/min (based on SCr of 0.6 mg/dL).    Physical Exam  Vitals and nursing note reviewed.   Constitutional:       General: He is not in acute distress.     Appearance: Normal appearance. He is well-developed. He is not ill-appearing, toxic-appearing or diaphoretic.   HENT:      Head: Normocephalic and atraumatic.      Nose: Nose normal.      Mouth/Throat:      Dentition: Normal dentition. Does not have dentures. No dental caries or dental abscesses.      Pharynx: No oropharyngeal exudate.   Eyes:      General: No scleral icterus.     Conjunctiva/sclera: Conjunctivae normal.   Cardiovascular:      Rate and Rhythm: Normal rate and regular rhythm.      Heart sounds: Normal heart sounds. No murmur heard.  Pulmonary:      Effort: Pulmonary effort is normal. No respiratory distress.      Breath sounds: Normal breath sounds.   Abdominal:      General: Bowel sounds are normal. There is no distension.      Palpations: Abdomen is soft.   Musculoskeletal:      Cervical back: Normal range of  motion.   Lymphadenopathy:      Cervical: No cervical adenopathy.   Skin:     General: Skin is warm and dry.      Findings: No erythema or rash.      Comments: Incision to left thigh. Dressed, CDI. Surgical drain with sanguinous/purulent output.    Neurological:      General: No focal deficit present.      Mental Status: He is alert and oriented to person, place, and time. Mental status is at baseline.   Psychiatric:         Mood and Affect: Mood normal.         Behavior: Behavior normal.         Thought Content: Thought content normal.         Judgment: Judgment normal.       Significant Labs: All pertinent labs within the past 24 hours have been reviewed.    Significant Imaging: I have reviewed all pertinent imaging results/findings within the past 24 hours.

## 2022-08-01 NOTE — PLAN OF CARE
OT evaluation completed, POC established as appropriate. OT recommending Outpatient once medically stable for discharge.    Problem: Occupational Therapy  Goal: Occupational Therapy Goal  Description: Goals set on 8/1, with expiration date 8/15:  Patient will increase functional independence with ADLs by performing:    Bed mobility with Barnes  Grooming while standing at sink with Barnes  UB Dressing with Barnes.  LB Dressing with Barnes.  Toileting from toilet with Barnes for hygiene and clothing management.   Functional mobility of household and community distance with Barnes and AD as needed  Pt will demonstrate understanding of education provided regarding energy conservation and task modification through teach-back method.  Pt will demonstrate Barnes in HEP for BUE strengthening GM/FM exercises to improve functional performance.       Outcome: Ongoing, Progressing

## 2022-08-01 NOTE — PROGRESS NOTES
"Josafat Chun - Telemetry [facility] Medicine  Progress Note    Patient Name: Wilfredo Thomas  MRN: [id number]  Patient Class: IP- Inpatient   Admission Date: 7/26/2022  Length of Stay: 6 days  Attending Physician: Bandar Arce MD  Primary Care Provider: Aylin Wayne DO        Subjective:     Principal Problem:DKA (diabetic ketoacidosis)        HPI:  46 y/o M hx of HTN, IDDM2, GERD obesity, HLD presented to the ED with complaint of 1 day of worsening SOB. Wife present at bedside. Patient states that he noticed yesterday he was feeling some shortness of breath and couldn't seem to catch his breath. His wife went to check on him this morning and noticed that he was breathing "fast and heavy" and he sounded like he was slurring his words. Wife was concerned about a stroke, so he brought him to the ED for evaluation.     Patient also reporting significant pain and swelling along his left lateral proximal thigh. He states he has noticed worsening pain over the past 2 weeks. He denies any acute trauma to the area. He was seen by ortho last week and was told to take ibu-profen for a muscle strain. This did not help his pain, so he presented again on 7/22 and was given a prednisone injection into his thigh. This also did not help his pain, and now the pain and swelling have advanced to the point that he has difficulty with ambulation. Patient denies fever, chills, nausea, vomiting.      Of note, patient has hx of poorly controlled diabetes. He was recently started on insulin by his PCP, but he has not taken any insulin since being prescribed it.      In the ED, patient hypertensive and tachycardic to the 130s. Tachypneic with RR in the 40s. On CBC, WBC 26.9, On CMP patient hyperkalemic to 5.5, CorNa 136, Bicarb 5. Cr elevated to 1.9 from BL 0.83. UA, BHB pending at time of admission. CTA without evidence of pulmonary embolism. Critical Care Medicine consulted given severe acidosis.             Overview/Hospital " Course:    Patient admitted to the MICU for management of severe DKA. Metabolic acidosis improving on insulin drip. He also has had left thigh pain for 1-2 months. There is a large mass on his left thigh that is tender to palpation, CT revealed hematoma vs soft tissue growth. General surgery consulted and recommended IR consult. IR consulted, no indication for tap. 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 Ortho consulted and performed bedside aspiration of possible abscess which revealed >200K cells >80% segs. Will hold off on antibiotics for now per ortho recs, ortho is taking patient to the OR for irrigation of the thigh, will start antibiotics afterwards.         7/29 Metabolic acidosis slowly improving. Ortho taking patient to the OR for irrigation of the left thigh, will start antibiotics afterwards.   7/30 Transfer to hospital medicine . S/p I&D  of left thigh Abscess on7/29/22 - MRI - uptured tensor fascia jose luis (TFL) muscle with associated large hematoma. gram stain -Moderate Gram positive cocci in clusters .cultures pending.  continue Vancomycin, clindamycin and Zosyn . Bicarb 15. K replaced . off insulin drip on SQ insulin.  7/31 ID and endocrine consulted.  abscess with Calcium pyrophosphate crystals with unknown significance potassium and P replaced. Bicarbonate WNL . aerobic culture 7/28 STAPHYLOCOCCUS SPECIES . Glucose in 300s .PRN imodium  for diarrhea. PT/OT consulted - WBAT. Surgical drain with purulent output  8/1 PICC team consulted for IV access. vanc trough at 11.4. monitor for vanomycin toxicity. possible OR tomorrow for washout  may need bone biopsy to r/o osteomyelitis.Discontinued clindamycin.                     Review of Systems:   Pain scale:    Constitutional:  fever,  chills, headache, vision loss, hearing loss, weight loss, Generalized weakness, falls, loss of smell, loss of taste, poor appetite,  sore  throat  Respiratory: cough, shortness of breath.   Cardiovascular: chest pain, dizziness, palpitations, orthopnea, swelling of feet, syncope  Gastrointestinal: nausea, vomiting, abdominal pain, diarrhea, black stool,  blood in stool, change in bowel habits  Genitourinary: hematuria, dysuria, urgency, frequency  Integument/Breast: rash,  pruritis  Hematologic/Lymphatic: easy bruising, lymphadenopathy  Musculoskeletal: arthralgias , myalgias- Left thigh pain,   back pain, neck pain, knee pain  Neurological: confusion, seizures, tremors, slurred speech  Behavioral/Psych:  depression, anxiety, auditory or visual hallucinations     OBJECTIVE:     Physical Exam:  Body mass index is 32.63 kg/m².    Constitutional: Appears well-developed and well-nourished. obesity   Head: Normocephalic and atraumatic.   Neck: Normal range of motion. Neck supple.   Cardiovascular: Normal heart rate.  Regular heart rhythm.  Pulmonary/Chest: Effort normal.   Abdominal: No distension.  No tenderness  Musculoskeletal: Normal range of motion. No edema. left thigh swelling and tenderness, drain in place -Surgical drain with purulent output.   Neurological: Alert and oriented to person, place, and time.   Skin: Skin is warm and dry.   Psychiatric: Normal mood and affect. Behavior is normal.                  Vital Signs  Temp: 99.1 °F (37.3 °C) (08/01/22 1532)  Pulse: 90 (08/01/22 1532)  Resp: 18 (08/01/22 1813)  BP: 113/75 (08/01/22 1532)  SpO2: (Abnormal) 94 % (08/01/22 1532)     24 Hour VS Range    Temp:  [97.3 °F (36.3 °C)-99.1 °F (37.3 °C)]   Pulse:  []   Resp:  [18]   BP: (113-140)/(74-89)   SpO2:  [92 %-98 %]     Intake/Output Summary (Last 24 hours) at 8/1/2022 1901  Last data filed at 8/1/2022 1715  Gross per 24 hour   Intake 500 ml   Output 75 ml   Net 425 ml         I/O This Shift:  No intake/output data recorded.    Wt Readings from Last 3 Encounters:   07/27/22 94.5 kg (208 lb 5.4 oz)   03/24/22 105.1 kg (231 lb 12.8 oz)    07/08/21 103.1 kg (227 lb 4.8 oz)       I have personally reviewed the vitals and recorded Intake/Output     Laboratory/Diagnostic Data:    CBC/Anemia Labs: Coags:    Recent Labs   Lab 07/30/22  0311 07/31/22  0923 08/01/22  0448   WBC 11.20 7.40 5.34   HGB 11.9* 12.0* 11.2*   HCT 35.0* 34.8* 31.8*    257 242   MCV 92 90 89   RDW 12.8 12.5 12.4    Recent Labs   Lab 07/26/22  2044 07/29/22  0023   INR 1.1 1.1        Chemistries: ABG:   Recent Labs   Lab 07/30/22  0311 07/30/22  0534 07/30/22  2343 07/31/22  0348 08/01/22  0448   *   < > 135* 136 135*   K 4.0   < > 3.5 3.4* 3.5      < > 102 99 96   CO2 15*   < > 22* 23 29   BUN 8   < > 7 7 5*   CREATININE 0.6   < > 0.5 0.6 0.6   CALCIUM 8.5*   < > 8.1* 8.8 8.6*   PROT 5.8*  --   --  6.6 5.7*   BILITOT 0.5  --   --  0.5 0.4   ALKPHOS 71  --   --  108 80   ALT 9*  --   --  15 15   AST 12  --   --  15 12   MG 1.8  --   --  1.7 1.6   PHOS 3.5  --   --  2.5* 3.1    < > = values in this interval not displayed.    Recent Labs   Lab 07/26/22 2248   PH 6.977*   PCO2 27.9*   PO2 38*   HCO3 6.5*   POCSATURATED 46*   BE -25        POCT Glucose: HbA1c:    Recent Labs   Lab 07/30/22  1140 07/30/22  1726 07/30/22  2214 07/31/22  0726 07/31/22  1521 07/31/22 2046   POCTGLUCOSE 213* 252* 244* 237* 173* 200*    Hemoglobin A1C   Date Value Ref Range Status   07/26/2022 10.1 (H) 4.0 - 5.6 % Final     Comment:     ADA Screening Guidelines:  5.7-6.4%  Consistent with prediabetes  >or=6.5%  Consistent with diabetes    High levels of fetal hemoglobin interfere with the HbA1C  assay. Heterozygous hemoglobin variants (HbS, HgC, etc)do  not significantly interfere with this assay.   However, presence of multiple variants may affect accuracy.     03/22/2022 11.2 (H) 4.0 - 5.6 % Final     Comment:     ADA Screening Guidelines:  5.7-6.4%  Consistent with prediabetes  >or=6.5%  Consistent with diabetes    High levels of fetal hemoglobin interfere with the HbA1C  assay.  Heterozygous hemoglobin variants (HbS, HgC, etc)do  not significantly interfere with this assay.   However, presence of multiple variants may affect accuracy.     07/07/2021 8.6 (H) 4.0 - 5.6 % Final     Comment:     ADA Screening Guidelines:  5.7-6.4%  Consistent with prediabetes  >or=6.5%  Consistent with diabetes    High levels of fetal hemoglobin interfere with the HbA1C  assay. Heterozygous hemoglobin variants (HbS, HgC, etc)do  not significantly interfere with this assay.   However, presence of multiple variants may affect accuracy.          Cardiac Enzymes: Ejection Fractions:    No results for input(s): CPK, CPKMB, MB, TROPONINI in the last 72 hours. No results found for: EF       No results for input(s): COLORU, APPEARANCEUA, PHUR, SPECGRAV, PROTEINUA, GLUCUA, KETONESU, BILIRUBINUA, OCCULTUA, NITRITE, UROBILINOGEN, LEUKOCYTESUR, RBCUA, WBCUA, BACTERIA, SQUAMEPITHEL, HYALINECASTS in the last 48 hours.    Invalid input(s): WRIGHTSUR    Procalcitonin (ng/mL)   Date Value   07/29/2022 0.57 (H)   07/26/2022 0.29 (H)     Lactate (Lactic Acid) (mmol/L)   Date Value   07/27/2022 1.9   07/26/2022 1.3     BNP (pg/mL)   Date Value   07/26/2022 20     CRP (mg/L)   Date Value   07/31/2022 81.2 (H)   07/29/2022 286.9 (H)     Sed Rate (mm/Hr)   Date Value   07/29/2022 73 (H)     D-Dimer (mg/L FEU)   Date Value   07/26/2022 1.46 (H)     No results found for: FERRITIN  No results found for: LDH  Troponin I (ng/mL)   Date Value   07/26/2022 <0.006     No results found for this or any previous visit.  SARS-CoV-2 RNA, Amplification, Qual (no units)   Date Value   07/26/2022 Negative       Microbiology labs for the last week  Microbiology Results (last 7 days)     Procedure Component Value Units Date/Time    AFB Culture & Smear [[id number]] Collected: 07/29/22 1252    Order Status: Completed Specimen: Wound from Leg, Left Updated: 08/01/22 1512     AFB Culture & Smear Culture in progress     AFB CULTURE STAIN No acid fast  bacilli seen.    Narrative:      Left thigh abscess #1    AFB Culture & Smear [[id number]] Collected: 07/29/22 1300    Order Status: Completed Specimen: Abscess from Leg, Left Updated: 08/01/22 1512     AFB Culture & Smear Culture in progress     AFB CULTURE STAIN No acid fast bacilli seen.    Narrative:      Left thigh abscess #2    Aerobic culture [[id number]]  (Abnormal) Collected: 07/29/22 1300    Order Status: Completed Specimen: Abscess from Leg, Left Updated: 08/01/22 1406     Aerobic Bacterial Culture STAPHYLOCOCCUS SPECIES  Many  Identification and susceptibility pending      Narrative:      Left thigh abscess #2    Aerobic culture [[id number]]  (Abnormal) Collected: 07/29/22 1251    Order Status: Completed Specimen: Abscess from Leg, Left Updated: 08/01/22 1406     Aerobic Bacterial Culture STAPHYLOCOCCUS SPECIES  Many  Identification and susceptibility pending      Narrative:      Left thigh abscess #1    Aerobic culture [[id number]]  (Abnormal)  (Susceptibility) Collected: 07/28/22 1800    Order Status: Completed Specimen: Abscess from Leg, Left Updated: 08/01/22 1058     Aerobic Bacterial Culture STAPHYLOCOCCUS SPECIES  Many  Identification pending      Narrative:      LEFT THIGH ABSCESS    Blood Culture #1 **CANNOT BE ORDERED STAT** [[id number]] Collected: 07/26/22 2302    Order Status: Completed Specimen: Blood from Peripheral, Hand, Left Updated: 08/01/22 0612     Blood Culture, Routine No growth after 5 days.    Blood Culture #2 **CANNOT BE ORDERED STAT** [[id number]] Collected: 07/26/22 2246    Order Status: Completed Specimen: Blood from Peripheral, Antecubital, Left Updated: 08/01/22 0612     Blood Culture, Routine No growth after 5 days.    Culture, Anaerobic [[id number]] Collected: 07/28/22 1800    Order Status: Completed Specimen: Abscess from Leg, Left Updated: 07/30/22 1155     Anaerobic Culture Culture in progress    Narrative:      LEFT THIGH ABSCESS    Culture, Anaerobe [[id number]] Collected:  07/29/22 1251    Order Status: Completed Specimen: Abscess from Leg, Left Updated: 07/30/22 1155     Anaerobic Culture Culture in progress    Narrative:      Left thigh abscess #1    Culture, Anaerobe [[id number]] Collected: 07/29/22 1300    Order Status: Completed Specimen: Abscess from Leg, Left Updated: 07/30/22 1155     Anaerobic Culture Culture in progress    Narrative:      Left thigh abscess #2    AFB Culture & Smear [[id number]] Collected: 07/28/22 1800    Order Status: Completed Specimen: Abscess from Leg, Left Updated: 07/29/22 2127     AFB Culture & Smear Culture in progress     AFB CULTURE STAIN No acid fast bacilli seen.    Narrative:      LEFT THIGH ABSCESS    Gram stain [[id number]] Collected: 07/29/22 1300    Order Status: Completed Specimen: Abscess from Leg, Left Updated: 07/29/22 1421     Gram Stain Result Moderate WBC's      Moderate Gram positive cocci in clusters    Narrative:      Left thigh abscess #2    Gram stain [[id number]] Collected: 07/29/22 1251    Order Status: Completed Specimen: Abscess from Leg, Left Updated: 07/29/22 1420     Gram Stain Result Moderate WBC's      Moderate Gram positive cocci in clusters    Narrative:      Left thigh abscess #1    Fungus culture [[id number]] Collected: 07/29/22 1300    Order Status: Sent Specimen: Abscess from Leg, Left Updated: 07/29/22 1330    Fungus culture [[id number]] Collected: 07/29/22 1251    Order Status: Sent Specimen: Abscess from Leg, Left Updated: 07/29/22 1326    Gram stain [[id number]] Collected: 07/28/22 1800    Order Status: Completed Specimen: Abscess from Leg, Left Updated: 07/28/22 2026     Gram Stain Result Rare WBC's      Few Gram positive cocci    Narrative:      LEFT THIGH ABSCESS    Fungus culture [[id number]] Collected: 07/28/22 1800    Order Status: Sent Specimen: Abscess from Leg, Left Updated: 07/28/22 1836          Reviewed and noted in plan where applicable- Please see chart for full lab data.    Lines/Drains:        Peripheral IV - Single Lumen 07/26/22 2319 22 G Left Hand (Active)   Site Assessment Clean;Dry;Intact 07/31/22 0400   Extremity Assessment Distal to IV No abnormal discoloration;No redness;No swelling;No warmth 07/30/22 1901   Line Status Flushed;Saline locked 07/30/22 1901   Dressing Status Clean;Dry;Intact 07/31/22 0400   Dressing Intervention Integrity maintained 07/30/22 1901   Dressing Change Due 07/30/22 07/30/22 1901   Site Change Due 07/30/22 07/30/22 1901   Reason Not Rotated Poor venous access 07/30/22 1901   Number of days: 4            Peripheral IV - Single Lumen 07/30/22 20 G Posterior;Right Forearm (Active)   Site Assessment Clean;Dry;Intact 07/31/22 0400   Extremity Assessment Distal to IV No abnormal discoloration;No swelling;No warmth;No redness 07/30/22 1901   Line Status Flushed 07/31/22 0400   Dressing Status Clean;Dry;Intact 07/31/22 0400   Dressing Intervention Integrity maintained 07/31/22 0400   Dressing Change Due 08/03/22 07/30/22 1901   Site Change Due 08/03/22 07/30/22 1901   Reason Not Rotated Not due 07/30/22 1901   Number of days: 1            Peripheral IV - Single Lumen 07/30/22 18 G Anterior;Left;Proximal Upper Arm (Active)   Site Assessment Clean;Dry;Intact 07/31/22 0400   Extremity Assessment Distal to IV No abnormal discoloration;No redness;No swelling;No warmth 07/30/22 1901   Line Status Flushed 07/31/22 0400   Dressing Status Clean;Dry;Intact 07/31/22 0400   Dressing Intervention Integrity maintained 07/31/22 0400   Dressing Change Due 08/03/22 07/30/22 1901   Site Change Due 08/03/22 07/30/22 1901   Reason Not Rotated Not due 07/30/22 1901   Number of days: 1            Closed/Suction Drain 07/29/22 1311 Left;Lateral Thigh Bulb 15 Fr. (Active)   Site Description Unable to view 07/30/22 1901   Dressing Type Gauze;Transparent (Tegaderm) 07/30/22 1901   Dressing Status Intact;Dry;Clean 07/30/22 1901   Dressing Intervention Integrity maintained 07/30/22 1901   Drainage  Milky;Purulent;Sanguineous 07/30/22 1901   Status To bulb suction 07/30/22 1901   Output (mL) 30 mL 07/31/22 0700   Number of days: 1       Imaging  ECG Results          EKG 12-lead (Final result)  Result time 07/27/22 07:53:05    Final result by Interface, Lab In [facility] (07/27/22 07:53:05)             Narrative:    Test Reason : R00.0,    Vent. Rate : 138 BPM     Atrial Rate : 138 BPM     P-R Int : 126 ms          QRS Dur : 086 ms      QT Int : 286 ms       P-R-T Axes : 051 002 052 degrees     QTc Int : 433 ms    Sinus tachycardia  Otherwise normal ECG  No previous ECGs available  Confirmed by Crow HARRIS MD ([id number]) on 7/27/2022 7:53:00 AM    Referred By: CRISTIAN   SELF           Confirmed By:Crow HARRIS MD                            No results found for this or any previous visit.      X-Ray Femur 2 View Left  Narrative: EXAMINATION:  XR FEMUR 2 VIEW LEFT    CLINICAL HISTORY:  pain;    TECHNIQUE:  AP and lateral views of the left femur were performed.    COMPARISON:  CT thigh from 07/27/2022.    FINDINGS:  No acute fracture or dislocation.  Stable serpiginous sclerotic lesion centered within the medullary component of the distal femoral metastasis, likely represents bone infarct or enchondroma.  No aggressive osseous lesion.  Scattered vascular calcifications.  Soft tissue swelling within the proximal thigh, better characterized on recent CT.  Impression: As above.    Electronically signed by: Thom Simpson  Date:    07/28/2022  Time:    16:22  X-Ray Pelvis Routine AP  Narrative: EXAMINATION:  XR PELVIS ROUTINE AP    CLINICAL HISTORY:  pain;    TECHNIQUE:  AP view of the pelvis was performed.    COMPARISON:  None.    FINDINGS:  No fracture or dislocation.  No bone destruction identified.  No significant joint space narrowing  Impression: See above    Electronically signed by: Wilfredo Hylton MD  Date:    07/28/2022  Time:    12:08  MRI Femur W WO Contrast Left  Narrative: EXAMINATION:  MRI FEMUR W WO CONTRAST  LEFT    CLINICAL HISTORY:  Soft tissue mass, thigh, deep;CT with left thigh mass. Hematoma vs soft tissue mass. MRI for further evaluation of mass;    TECHNIQUE:  Routine multisequence planer MRI soft tissue protocol of the left thigh performed with the administration 10 cc Gadavist IV contrast.    COMPARISON:  CT 07/27/2022    FINDINGS:  There is a grade III tear of the tensor fascia jose luis muscle with complete disruption of the fibers (series 8, image 12).  There is associated large hematoma measuring 8.4 x 5.2 cm in transverse dimensions (series 12, image 14) and a 9.6 cm in CC dimensions (series 11, image 26).  There is adjacent muscle strain the lateral aspect of the rectus femoris, vastus lateralis, and proximal sartorius.  The posterior iliotibial band is intact.  No fracture identified.  There is inflammation/ enhancement in the adjacent subcutaneous tissues. No underlying mass identified.    No lymphadenopathy.  Small area marrow changes in the distal femur (series 4, image 19), suggestive of a small area of osteonecrosis.  No surrounding marrow edema.  Impression: Ruptured tensor fascia jose luis (TFL) muscle with associated large hematoma, as above.    This report was flagged in Epic as abnormal.    Electronically signed by: Severino Baltazar MD  Date:    07/28/2022  Time:    08:30      Labs, Imaging, EKG and Diagnostic results from 8/1/2022 were reviewed.    Medications:  Medication list was reviewed and changes noted under Assessment/Plan.  No current facility-administered medications on file prior to encounter.     Current Outpatient Medications on File Prior to Encounter   Medication Sig Dispense Refill    atorvastatin (LIPITOR) 20 MG tablet Take 1 tablet (20 mg total) by mouth once daily. 90 tablet 3    empagliflozin (JARDIANCE) 25 mg tablet Take 1 tablet (25 mg total) by mouth once daily. 90 tablet 3    lisinopriL (PRINIVIL,ZESTRIL) 20 MG tablet Take 1 tablet (20 mg total) by mouth once daily. 90 tablet 3     blood sugar diagnostic Strp 1 strip by Misc.(Non-Drug; Combo Route) route once daily. 100 each 3    blood-glucose meter kit Use as instructed 1 each 0    dulaglutide (TRULICITY) 3 mg/0.5 mL pen injector Inject 3 mg into the skin every 7 days. 12 pen 3    insulin (LANTUS SOLOSTAR U-100 INSULIN) glargine 100 units/mL (3mL) SubQ pen Inject 20 Units into the skin once daily. 18 mL 0    lancets Misc 1 lancet by Misc.(Non-Drug; Combo Route) route once daily. 100 each 3    metFORMIN (GLUCOPHAGE-XR) 500 MG ER 24hr tablet Take 1 tablet (500 mg total) by mouth 2 (two) times daily with meals. 180 tablet 3     Scheduled Medications:  atorvastatin, 20 mg, Oral, Daily  insulin aspart U-100, 10 Units, Subcutaneous, TIDWM  insulin detemir U-100, 14 Units, Subcutaneous, BID  piperacillin-tazobactam (ZOSYN) IVPB, 4.5 g, Intravenous, Q8H  vancomycin (VANCOCIN) IVPB, 1,750 mg, Intravenous, Q12H      PRN: acetaminophen, dextrose 10%, dextrose 10%, glucagon (human recombinant), glucose, glucose, insulin aspart U-100, iohexoL, morphine, ondansetron, oxyCODONE, sodium chloride 0.9%, sodium chloride 0.9%, sodium chloride 0.9%  Infusions:   Estimated Creatinine Clearance: 163.3 mL/min (based on SCr of 0.6 mg/dL).    Assessment/Plan:      * DKA (diabetic ketoacidosis)    47M with history of DM2, gout, HTN, tobacco use who initially presented for dyspnea, increased thirst, decreased PO intake, found to be in DKA.  Recent history of steroids for leg pain.  On home dulaglutide, empagliflozin, metformin, insulin glargine 20u qhs (has not been taking).  Recent a1c 11.2%.  , bicarb 5, AG 26, BHB 5.9 on admit.       - Insulin drip   - q1h POCT glu checks while on drip  - D5 1/2 NS with 20 meq K   - BMP q4h  - diabetic diet when off insulin drip     7/30  . Bicarb 15. K replaced . off insulin drip on SQ insulin. endocrine consulted  7/31 endocrine consulted.   Bicarbonate WNL . Glucose in 300s          Hyponatremia  sodium 130s.  monitor  7/31 sodium 136 resolved       Anemia    Patient's with Normocytic anemia.. Hemoglobin stable. Etiology likely due to chronic disease .  Current CBC reviewed-  Recent Labs   Lab 07/28/22  0354 07/29/22  0336 07/30/22  0311   HGB 13.6* 12.6* 11.9*     Monitor CBC and transfuse if H/H drops below 7/21.       Hypokalemia  replaced      Abscess of left thigh  reports 4-6 weeks of left lateral thigh pain and swelling, no known injury to the affected area    7/30 Transfer to [facility] medicine . S/p I&D  of left thigh Abscess on7/29/22 - MRI - uptured tensor fascia jose luis (TFL) muscle with associated large hematoma. gram stain -Moderate Gram positive cocci in clusters .cultures pending.  continue Vancomycin, clindamycin and Zosyn . abscess with Calcium pyrophosphate crystals with unknown significance .Surgical drain with purulent output.s.Discontinued clindamycin.   8/1 possible OR tomorrow for washout  may need bone biopsy to r/o osteomyelitis.Discontinued clindamycin.         Hematoma of left thigh  MRI left thig-  Ruptured tensor fascia jose luis (TFL) muscle with associated large hematoma,   s/p orthopedic evaluation .aspiration showed  purulence. s/p I&D on 7/29 7/31  abscess with Calcium pyrophosphate crystals with unknown significance. . aerobic culture 7/28 STAPHYLOCOCCUS SPECIES .      Left leg pain    Received steroid injection, steroid taper for leg pain by orthopedics.  Left lateral/proximal thigh with painful localized swelling, hot to touch. Patient received vanc/zosyn in ED  - CTA LE, U/s LLE pending  - will continue Vanc/zosyn and cllindamycin         Elevated d-dimer    On RA with increased WOB.  D-dimer elevated to 1.46.  - CTA PE negative    ANGE (acute kidney injury)    7/30  Patient with acute kidney injury likely due to IVVD/dehydration ANGE is currently resolved . Labs reviewed- Renal function/electrolytes with Estimated Creatinine Clearance: 163.3 mL/min (based on SCr of 0.6 mg/dL). according to  latest data. Monitor urine output and serial BMP and adjust therapy as needed. Avoid nephrotoxins and renally dose meds for GFR listed above.       Leukocytosis    Leukocytosis  WBC 26.87      - Trending down   - Initially thought to be due to recent steroid use but left thigh mass concerning for infection   - Will start Vanc after irrigation of left thigh by ortho   7/30     Patient with leucocytosis Recent Labs   Lab 07/28/22  0354 07/29/22  0336 07/30/22  0311   WBC 14.29* 11.47 11.20     . Afebrile. BCX 2, NGTD  . likely secondary to sepsis..  WBC counts normalized .       Hyperlipidemia associated with type 2 diabetes mellitus    On home atorvastatin.  -    Class 2 severe obesity due to excess calories with serious comorbidity and body mass index (BMI) of 36.0 to 36.9 in adult  Body mass index is 32.63 kg/m². Morbid obesity complicates all aspects of disease management from diagnostic modalities to treatment. Weight loss encouraged and health benefits explained to patient.         Hypertension associated with type 2 diabetes mellitus    On home lisinopril 20.  - hold ACEi in setting of ANGE. ANGE resolved      7/30 SBP controlled     Type 2 diabetes mellitus with hyperglycemia, with long-term current use of insulin      Patient's FSGs are controlled on current medication regimen.  Last A1c reviewed-   Lab Results   Component Value Date    HGBA1C 10.1 (H) 07/26/2022     Most recent fingerstick glucose reviewed-   Recent Labs   Lab 07/30/22  0643 07/30/22  0741 07/30/22  0901 07/30/22  1140   POCTGLUCOSE 188* 227* 244* 213*     Current correctional scale  Medium  Maintain anti-hyperglycemic dose as follows-   Antihyperglycemics (From admission, onward)            Start     Stop Route Frequency Ordered    07/30/22 1130  insulin aspart U-100 pen 4 Units         -- SubQ 3 times daily with meals 07/30/22 0748    07/30/22 0826  insulin aspart U-100 pen 1-10 Units         -- SubQ Before meals & nightly PRN 07/30/22 0727     07/30/22 0415  insulin detemir U-100 pen 10 Units         -- SubQ 2 times daily 07/30/22 0403        Hold Oral hypoglycemics while patient is in the hospital.  7/31 endocrine consulted.  Glucose in 300s     VTE Risk Mitigation (From admission, onward)         Ordered     Place sequential compression device  Until discontinued         07/29/22 0158     IP VTE HIGH RISK PATIENT  Once         07/29/22 0158     Place SAVANNAH hose  Until discontinued         07/26/22 2240     Place sequential compression device  Until discontinued         07/26/22 2240                Discharge Planning   FILEMON: 8/3/2022     Code Status: Full Code   Is the patient medically ready for discharge?: No    Reason for patient still in hospital (select all that apply): Treatment  Discharge Plan A: Home with family                  Bandar Arce MD  Department of Hospital Medicine   [facility] - Tele[facility]

## 2022-08-01 NOTE — PLAN OF CARE
[facility] - Tele[facility]  Discharge Reassessment    Primary Care Provider: Aylin Wayne DO    Expected Discharge Date: 8/3/2022    Reassessment (most recent)       Discharge Reassessment - 08/01/22 1346          Discharge Reassessment    Assessment Type Discharge Planning Reassessment (P)      Did the patient's condition or plan change since previous assessment? Yes (P)      Communicated FILEMON with patient/caregiver Date not available/Unable to determine (P)      Discharge Plan A Home with family (P)      Discharge Plan B Home (P)      DME Needed Upon Discharge  other (see comments) (P)    TBD    Discharge Barriers Identified None (P)      Why the patient remains in the hospital Requires continued medical care (P)                      Pt stepped down to the floor but is not stable for dc at this time.  SW/CM will f/u as needed to assist with dc planning.    Cindy Jackson, MATTHEW   PRN

## 2022-08-01 NOTE — ASSESSMENT & PLAN NOTE
48 yo male with PMH of T2DM, HTN, admitted for DKA. Reporting 2 weeks of left lateral thigh pain, swelling. No known injury to the area. To note pt reports recent boil to buttocks as well as left wrist surgery following wrist fracture/ligament rupture. See HPI for full detail.      MRI of left femur was notable for ruptured tensor fascia jose luis mm with associated large hematoma, as well as small area of potential osteonecrosis. Bedside aspiration of the fluid collection was notable for a cell count of >200k >80%segs, cultures + staph spp, ID/sensitivity pending. S/p I&D with Dr. Graff with ortho surgery on 7/29, cultures collected + staph spp, ID/sensitivity pending.  Did not return to surgery today for washout. Pt states potentially tomorrow.       Afebrile. No leukocytosis. Blood cultures from 7/26 NGTD.      Pt is currently on zosyn and vancomycin. ID was consulted for abx recs regarding left TFL infection.     Recommendations:   - Continue IV Vancomycin, inpatient pharmacy to dose.   - Continue Piperacillin-Tazobactam 4.5 mg IV q8hr.   - If taken back to OR for repeat washout, please obtain bone biopsy if any concern for osteomyelitis. If obtained, please send for path, gram stain, aerobic, anaerobic, AFB and fungal cultures.   - Pt plan reviewed with ID staff, Dr. Samson. ID will follow.

## 2022-08-01 NOTE — ASSESSMENT & PLAN NOTE
Wilfredo Thomas is a 47 y.o. male with past medical history of hypertension and diabetes, currently admitted to hospital for DKA. He reports 4-6 weeks of left lateral thigh pain and swelling, no known injury to the affected area. MRI scan showing lateral thigh fluid collection. Now s/p I&D 7/29/22.     - S/p I&D 7/29/22  - PT daily   - Cx: Stapfrancie, spec pending  - Drain 120  - Abx Vanc Zosyn   - No surgery today, will assess in AM again.     Dispo: Pending ID recs and Cx results

## 2022-08-01 NOTE — PROGRESS NOTES
[facility] - Tele[facility] Step[facility]  Orthopedics  Progress Note    Attg Note:  I agree with the resident's assessment and plan.  Patient began to mobilize followed by a slight increase in drain output.  Now tapering.  Leave drain.  Continue ambulation.  Staph on cultures.    Rob Graff MD      Patient Name: Wilfredo Thomas  MRN: [id number]  Admission Date: 7/26/2022  Hospital Length of Stay: 6 days  Attending Provider: Bandar Arce MD  Primary Care Provider: Aylin Wayne DO  Follow-up For: Procedure(s) (LRB):  IRRIGATION AND DEBRIDEMENT, LOWER EXTREMITY (Left)    Post-Operative Day: 3 Days Post-Op  Subjective:     Principal Problem:DKA (diabetic ketoacidosis)    Principal Orthopedic Problem: Left thigh I&D  Staph species culture on 07/28    Interval History: Pt seen and examined at bedside. NAEON. Pain controlled. Ambulated 6ft with RW, per PT.  VSS, AF. No other concerns stated.         Review of patient's allergies indicates:  No Known Allergies    Current Facility-Administered Medications   Medication    acetaminophen tablet 1,000 mg    atorvastatin tablet 20 mg    dextrose 10% bolus 125 mL    dextrose 10% bolus 250 mL    glucagon (human recombinant) injection 1 mg    glucose chewable tablet 16 g    glucose chewable tablet 24 g    insulin aspart U-100 pen 1-10 Units    insulin aspart U-100 pen 8 Units    insulin detemir U-100 pen 12 Units    iohexoL (OMNIPAQUE 350) injection 50 mL    morphine injection 3 mg    ondansetron injection 4 mg    oxyCODONE immediate release tablet 5 mg    piperacillin-tazobactam 4.5 g in sodium chloride 0.9% 100 mL IVPB (ready to mix system)    sodium chloride 0.9% flush 10 mL    sodium chloride 0.9% flush 10 mL    sodium chloride 0.9% flush 10 mL    vancomycin 1.75 g in 5 % dextrose 500 mL IVPB     Objective:     Vital Signs (Most Recent):  Temp: 97.8 °F (36.6 °C) (08/01/22 0736)  Pulse: 90 (08/01/22 0736)  Resp: 18 (08/01/22 0846)  BP: (!) 140/74 (08/01/22 0736)  SpO2: 98 %  "(08/01/22 0736) Vital Signs (24h Range):  Temp:  [97.3 °F (36.3 °C)-98.2 °F (36.8 °C)] 97.8 °F (36.6 °C)  Pulse:  [] 90  Resp:  [18] 18  SpO2:  [96 %-98 %] 98 %  BP: (111-140)/(74-89) 140/74     Weight: 94.5 kg (208 lb 5.4 oz)  Height: 5' 7" (170.2 cm)  Body mass index is 32.63 kg/m².      Intake/Output Summary (Last 24 hours) at 8/1/2022 0928  Last data filed at 8/1/2022 0636  Gross per 24 hour   Intake 200 ml   Output 85 ml   Net 115 ml         Ortho/SPM Exam    L Lower Extremity  Inspection  - Surgical site dressing clean, dry, and intact  - No swelling  - No ecchymosis, erythema, or signs of cellulitis  Palpation  - TTP to palpation over surgical site and anterior thigh  Range of motion  - AROM and PROM of the hip, knee, ankle, and foot intact without pain  Stability  - No evidence of joint dislocation or abnormal laxity  Neurovascular  - TA/EHL/Gastroc/FHL assessed in isolation without deficit  - SILT throughout  - Compartments soft  - DP palpated   - Capillary Refill <3s  - Negative Log roll  - Negative Stinchfield  - Muscle tone normal    Still waiting results from cultures taken during surgery 07/29.    CBC:   Recent Labs   Lab 07/31/22  0923 08/01/22  0448   WBC 7.40 5.34   HGB 12.0* 11.2*   HCT 34.8* 31.8*    242       CMP:   Recent Labs   Lab 07/30/22  1446 07/30/22  2343 07/31/22  0348 08/01/22  0448   * 135* 136 135*   K 3.4* 3.5 3.4* 3.5   CL 97 102 99 96   CO2 17* 22* 23 29   * 218* 172* 168*   BUN 8 7 7 5*   CREATININE 0.7 0.5 0.6 0.6   CALCIUM 8.3* 8.1* 8.8 8.6*   PROT  --   --  6.6 5.7*   ALBUMIN  --   --  1.9* 1.7*   BILITOT  --   --  0.5 0.4   ALKPHOS  --   --  108 80   AST  --   --  15 12   ALT  --   --  15 15   ANIONGAP 14 11 14 10   EGFRNONAA >60.0 >60.0 >60.0  --          All pertinent labs within the past 24 hours have been reviewed.    Significant Imaging: I have reviewed and interpreted all pertinent imaging results/findings.    Assessment/Plan:     Abscess of " left thigh  Wilfredo Thomas is a 47 y.o. male with past medical history of hypertension and diabetes, currently admitted to hospital for DKA. He reports 4-6 weeks of left lateral thigh pain and swelling, no known injury to the affected area. MRI scan showing lateral thigh fluid collection. Now s/p I&D 7/29/22.     - S/p I&D 7/29/22  - PT daily   - Cx: Staph, spec pending  - Drain 120  - Abx Vanc Zosyn   - No surgery today, will assess in AM again.     Dispo: Pending ID recs and Cx results     Left leg pain               Joseph Patterson MD  Orthopedics  [facility] - Tele[facility]

## 2022-08-01 NOTE — PT/OT/SLP PROGRESS
"Physical Therapy Treatment    Patient Name:  Wilfredo Thomas   MRN:  [id number]    Recommendations:     Discharge Recommendations:  outpatient PT   Discharge Equipment Recommendations: walker, rolling, shower chair   Barriers to discharge: None    Assessment:     Wilfredo Thomas is a 47 y.o. male admitted with a medical diagnosis of DKA (diabetic ketoacidosis).  He presents with the following impairments/functional limitations:  weakness, gait instability, impaired endurance, impaired functional mobility, decreased lower extremity function, decreased ROM, impaired skin, edema, orthopedic precautions. The patient continues to be limited by L lateral thigh pain, swelling, post-op L LE weakness. Performed L LE therex for strengthening and ROM, patient given HEP and verbalized understanding to perform therex 3x/day independently and ambulate with assist 3x/day; educated on orthopedic precautions L LE WBAT, no hip abduction past neutral. Performed sit to stand with contact guard assist, ambulated 40' with RW and stand by assistance with antalgic gait pattern. Gait tolerance pain limited. Patient PT frequency reduced to 3x/day as he is safe to ambulate with 1 person stand by assistance.  He would benefit from OPPT to address the above deficits.     Rehab Prognosis: Good; patient would benefit from acute skilled PT services to address these deficits and reach maximum level of function.    Recent Surgery: Procedure(s) (LRB):  IRRIGATION AND DEBRIDEMENT, LOWER EXTREMITY (Left) 3 Days Post-Op    Plan:     During this hospitalization, patient to be seen 3 x/week to address the identified rehab impairments via gait training, therapeutic activities, therapeutic exercises, neuromuscular re-education and progress toward the following goals:    · Plan of Care Expires:  08/26/22    Subjective     Chief Complaint: "My leg just feels sore, I just got pain medicine though"  Patient/Family Comments/goals: motivated to ambulate and " progress to PLOF  Pain/Comfort:  · Pain Rating 1: 5/10  · Location - Side 1: Left  · Location - Orientation 1: lateral  · Location 1: leg  · Pain Addressed 1: Pre-medicate for activity, Reposition, Distraction, Cessation of Activity  · Pain Rating Post-Intervention 1: 8/10 (with WBing)      Objective:     Communicated with RN prior to session.  Patient found HOB elevated with telemetry, peripheral IV, JOHANN drain upon PT entry to room. Wife at bedside.    General Precautions: Standard, fall   Orthopedic Precautions:LLE weight bearing as tolerated (no L hip abduction past neutral)   Braces: N/A  Respiratory Status: Room air     Functional Mobility:    Bed Mobility  Supine to Sit on the R side:  contact guard assist, HOB elevated     Transfers Sit to Stand:  contact guard assist, cued for hand placement   Gait  Gait Distance: 40 ft with RW  Assistance Level: contact guard assist progressing to stand by assistance   Description: narrow MIKEY, step to pattern with L LE leading, decreased step length, ambulating outside RW MIKEY, antalgic gait with decreased WBing L LE          AM-PAC 6 CLICK MOBILITY  Turning over in bed (including adjusting bedclothes, sheets and blankets)?: 4  Sitting down on and standing up from a chair with arms (e.g., wheelchair, bedside commode, etc.): 4  Moving from lying on back to sitting on the side of the bed?: 4  Moving to and from a bed to a chair (including a wheelchair)?: 3  Need to walk in hospital room?: 3  Climbing 3-5 steps with a railing?: 2  Basic Mobility Total Score: 20       Therapeutic Activities and Exercises:   Patient educated on role of therapy, goals of session, benefits of out of bed mobility. Patient agreeable to mobilize with therapy.      Seated and supine therex to improve LE strength and activity tolerance, cued for full ROM and eccentric lowering:  -LAQ, L LE 10 reps; emphasized full knee ROM flex/extension  -Marching, L LE 10 reps  -Isometric hip adduction pillow squeeze,  10 ea  -AP, EDITH 15 ea  -Quad set L LE 10 reps  Encouraged patient to perform 10 reps 3x/day, printed HEP given to patient, verbalized understanding and agreement.      Gait training: cued for upright posture, cued for 3 point gait with L LE leading, cued to ambulate within RW MIKEY    Patient educated on PT schedule.  Encouraged patient to ambulate, sit up in chair 3x/day to prevent deconditioning during hospitalization. Patient verbalized understanding and agreement not to mobilize without RN assist. Patient in agreement with PT POC, OP PT.    Patient given ice pack to L LE for pain and swelling. Breakfast heated up at end of session, patient cleared for diet by RN.     Patient safe to ambulate with 1 person assist with RW, RW delivered to bedside by PT.       Patient left up in chair with all lines intact, call button in reach, RN notified and wife present..    GOALS:   Multidisciplinary Problems     Physical Therapy Goals        Problem: Physical Therapy    Goal Priority Disciplines Outcome Goal Variances Interventions   Physical Therapy Goal     PT, PT/OT Ongoing, Progressing     Description: Goals to be met by: 22     Patient will increase functional independence with mobility by performin. Supine to sit with [place]  2. Sit to supine with [place]  3. Sit to stand transfer with [place]  4. Gait  x >150 feet with Contact Guard Assistance using Rolling Walker.   5. Lower extremity exercise program x 20 reps per handout, with independence                       Time Tracking:     PT Received On: 22  PT Start Time: 936     PT Stop Time: 1015  PT Total Time (min): 39 min     Billable Minutes: Gait Training 15 and Therapeutic Exercise 24    Treatment Type: Treatment  PT/PTA: PT     PTA Visit Number: 0     2022

## 2022-08-01 NOTE — PLAN OF CARE
Problem: Diabetic Ketoacidosis  Goal: Fluid and Electrolyte Balance with Absence of Ketosis  Outcome: Ongoing, Progressing     Problem: Adult Inpatient Plan of Care  Goal: Plan of Care Review  Outcome: Ongoing, Progressing  Goal: Patient-Specific Goal (Individualized)  Outcome: Ongoing, Progressing  Goal: Absence of Hospital-Acquired Illness or Injury  Outcome: Ongoing, Progressing  Goal: Optimal Comfort and Wellbeing  Outcome: Ongoing, Progressing  Goal: Readiness for Transition of Care  Outcome: Ongoing, Progressing     Problem: Diabetes Comorbidity  Goal: Blood Glucose Level Within Targeted Range  Outcome: Ongoing, Progressing     Problem: Fluid and Electrolyte Imbalance (Acute Kidney Injury/Impairment)  Goal: Fluid and Electrolyte Balance  Outcome: Ongoing, Progressing     Problem: Oral Intake Inadequate (Acute Kidney Injury/Impairment)  Goal: Optimal Nutrition Intake  Outcome: Ongoing, Progressing     Problem: Renal Function Impairment (Acute Kidney Injury/Impairment)  Goal: Effective Renal Function  Outcome: Ongoing, Progressing     Problem: Skin Injury Risk Increased  Goal: Skin Health and Integrity  Outcome: Ongoing, Progressing    Patient resting in bed with wife at bedside. Vitals stable. Blood glucose monitored, scheduled and prn insulin provided. JOHANN drain to left hip. Output documented. 2PIV in place. Worked with PT and OT today. C/O L hip pain, prn pain medication provided. Patient free from injury and falls. Bed in low and locked position, call light within reach. Instructed to call for assistance if needed.

## 2022-08-02 LAB
ALBUMIN SERPL BCP-MCNC: 1.9 G/DL (ref 3.5–5.2)
ALP SERPL-CCNC: 85 U/L (ref 55–135)
ALT SERPL W/O P-5'-P-CCNC: 14 U/L (ref 10–44)
ANION GAP SERPL CALC-SCNC: 8 MMOL/L (ref 8–16)
AST SERPL-CCNC: 15 U/L (ref 10–40)
BACTERIA SPEC ANAEROBE CULT: NORMAL
BASOPHILS # BLD AUTO: 0.05 K/UL (ref 0–0.2)
BASOPHILS NFR BLD: 1 % (ref 0–1.9)
BILIRUB SERPL-MCNC: 0.4 MG/DL (ref 0.1–1)
BUN SERPL-MCNC: 4 MG/DL (ref 6–20)
CALCIUM SERPL-MCNC: 9.2 MG/DL (ref 8.7–10.5)
CHLORIDE SERPL-SCNC: 99 MMOL/L (ref 95–110)
CO2 SERPL-SCNC: 32 MMOL/L (ref 23–29)
CREAT SERPL-MCNC: 0.7 MG/DL (ref 0.5–1.4)
DIFFERENTIAL METHOD: ABNORMAL
EOSINOPHIL # BLD AUTO: 0.1 K/UL (ref 0–0.5)
EOSINOPHIL NFR BLD: 2.8 % (ref 0–8)
ERYTHROCYTE [DISTWIDTH] IN BLOOD BY AUTOMATED COUNT: 12.5 % (ref 11.5–14.5)
EST. GFR  (NO RACE VARIABLE): >60 ML/MIN/1.73 M^2
GLUCOSE SERPL-MCNC: 146 MG/DL (ref 70–110)
HCT VFR BLD AUTO: 32.3 % (ref 40–54)
HGB BLD-MCNC: 11.3 G/DL (ref 14–18)
IMM GRANULOCYTES # BLD AUTO: 0.14 K/UL (ref 0–0.04)
IMM GRANULOCYTES NFR BLD AUTO: 2.8 % (ref 0–0.5)
LYMPHOCYTES # BLD AUTO: 1.2 K/UL (ref 1–4.8)
LYMPHOCYTES NFR BLD: 23.6 % (ref 18–48)
MAGNESIUM SERPL-MCNC: 1.8 MG/DL (ref 1.6–2.6)
MCH RBC QN AUTO: 31 PG (ref 27–31)
MCHC RBC AUTO-ENTMCNC: 35 G/DL (ref 32–36)
MCV RBC AUTO: 89 FL (ref 82–98)
MONOCYTES # BLD AUTO: 0.7 K/UL (ref 0.3–1)
MONOCYTES NFR BLD: 13.8 % (ref 4–15)
NEUTROPHILS # BLD AUTO: 2.8 K/UL (ref 1.8–7.7)
NEUTROPHILS NFR BLD: 56 % (ref 38–73)
NRBC BLD-RTO: 0 /100 WBC
PLATELET # BLD AUTO: 267 K/UL (ref 150–450)
PMV BLD AUTO: 9 FL (ref 9.2–12.9)
POCT GLUCOSE: 152 MG/DL (ref 70–110)
POCT GLUCOSE: 154 MG/DL (ref 70–110)
POCT GLUCOSE: 184 MG/DL (ref 70–110)
POCT GLUCOSE: 241 MG/DL (ref 70–110)
POCT GLUCOSE: 242 MG/DL (ref 70–110)
POCT GLUCOSE: 254 MG/DL (ref 70–110)
POCT GLUCOSE: 254 MG/DL (ref 70–110)
POCT GLUCOSE: 269 MG/DL (ref 70–110)
POTASSIUM SERPL-SCNC: 3.3 MMOL/L (ref 3.5–5.1)
PROT SERPL-MCNC: 6 G/DL (ref 6–8.4)
RBC # BLD AUTO: 3.64 M/UL (ref 4.6–6.2)
SODIUM SERPL-SCNC: 139 MMOL/L (ref 136–145)
WBC # BLD AUTO: 5.01 K/UL (ref 3.9–12.7)

## 2022-08-02 PROCEDURE — 99232 SBSQ HOSP IP/OBS MODERATE 35: CPT | Mod: ,,, | Performed by: INTERNAL MEDICINE

## 2022-08-02 PROCEDURE — 85025 COMPLETE CBC W/AUTO DIFF WBC: CPT

## 2022-08-02 PROCEDURE — 63600175 PHARM REV CODE 636 W HCPCS

## 2022-08-02 PROCEDURE — 20600001 HC STEP DOWN PRIVATE ROOM

## 2022-08-02 PROCEDURE — 25000003 PHARM REV CODE 250: Performed by: HOSPITALIST

## 2022-08-02 PROCEDURE — 83735 ASSAY OF MAGNESIUM: CPT

## 2022-08-02 PROCEDURE — 94761 N-INVAS EAR/PLS OXIMETRY MLT: CPT

## 2022-08-02 PROCEDURE — 99233 PR SUBSEQUENT HOSPITAL CARE,LEVL III: ICD-10-PCS | Mod: ,,, | Performed by: REGISTERED NURSE

## 2022-08-02 PROCEDURE — 99233 SBSQ HOSP IP/OBS HIGH 50: CPT | Mod: ,,, | Performed by: REGISTERED NURSE

## 2022-08-02 PROCEDURE — 99232 PR SUBSEQUENT HOSPITAL CARE,LEVL II: ICD-10-PCS | Mod: ,,, | Performed by: HOSPITALIST

## 2022-08-02 PROCEDURE — 99232 SBSQ HOSP IP/OBS MODERATE 35: CPT | Mod: ,,, | Performed by: HOSPITALIST

## 2022-08-02 PROCEDURE — 36415 COLL VENOUS BLD VENIPUNCTURE: CPT

## 2022-08-02 PROCEDURE — 25000003 PHARM REV CODE 250

## 2022-08-02 PROCEDURE — 25000003 PHARM REV CODE 250: Performed by: STUDENT IN AN ORGANIZED HEALTH CARE EDUCATION/TRAINING PROGRAM

## 2022-08-02 PROCEDURE — 25000003 PHARM REV CODE 250: Performed by: INTERNAL MEDICINE

## 2022-08-02 PROCEDURE — 63600175 PHARM REV CODE 636 W HCPCS: Performed by: INTERNAL MEDICINE

## 2022-08-02 PROCEDURE — 63600175 PHARM REV CODE 636 W HCPCS: Performed by: STUDENT IN AN ORGANIZED HEALTH CARE EDUCATION/TRAINING PROGRAM

## 2022-08-02 PROCEDURE — 99232 PR SUBSEQUENT HOSPITAL CARE,LEVL II: ICD-10-PCS | Mod: ,,, | Performed by: INTERNAL MEDICINE

## 2022-08-02 PROCEDURE — 80053 COMPREHEN METABOLIC PANEL: CPT

## 2022-08-02 RX ORDER — POTASSIUM CHLORIDE 20 MEQ/1
40 TABLET, EXTENDED RELEASE ORAL ONCE
Status: COMPLETED | OUTPATIENT
Start: 2022-08-02 | End: 2022-08-02

## 2022-08-02 RX ORDER — AMOXICILLIN 250 MG
2 CAPSULE ORAL DAILY
Status: DISCONTINUED | OUTPATIENT
Start: 2022-08-02 | End: 2022-08-24 | Stop reason: HOSPADM

## 2022-08-02 RX ADMIN — INSULIN ASPART 2 UNITS: 100 INJECTION, SOLUTION INTRAVENOUS; SUBCUTANEOUS at 11:08

## 2022-08-02 RX ADMIN — INSULIN ASPART 10 UNITS: 100 INJECTION, SOLUTION INTRAVENOUS; SUBCUTANEOUS at 04:08

## 2022-08-02 RX ADMIN — OXYCODONE 5 MG: 5 TABLET ORAL at 01:08

## 2022-08-02 RX ADMIN — INSULIN ASPART 2 UNITS: 100 INJECTION, SOLUTION INTRAVENOUS; SUBCUTANEOUS at 08:08

## 2022-08-02 RX ADMIN — POTASSIUM CHLORIDE 40 MEQ: 1500 TABLET, EXTENDED RELEASE ORAL at 10:08

## 2022-08-02 RX ADMIN — PIPERACILLIN SODIUM AND TAZOBACTAM SODIUM 4.5 G: 4; .5 INJECTION, POWDER, LYOPHILIZED, FOR SOLUTION INTRAVENOUS at 11:08

## 2022-08-02 RX ADMIN — PIPERACILLIN SODIUM AND TAZOBACTAM SODIUM 4.5 G: 4; .5 INJECTION, POWDER, LYOPHILIZED, FOR SOLUTION INTRAVENOUS at 09:08

## 2022-08-02 RX ADMIN — INSULIN ASPART 10 UNITS: 100 INJECTION, SOLUTION INTRAVENOUS; SUBCUTANEOUS at 08:08

## 2022-08-02 RX ADMIN — OXYCODONE 5 MG: 5 TABLET ORAL at 06:08

## 2022-08-02 RX ADMIN — VANCOMYCIN HYDROCHLORIDE 1750 MG: 500 INJECTION, POWDER, LYOPHILIZED, FOR SOLUTION INTRAVENOUS at 03:08

## 2022-08-02 RX ADMIN — INSULIN ASPART 3 UNITS: 100 INJECTION, SOLUTION INTRAVENOUS; SUBCUTANEOUS at 09:08

## 2022-08-02 RX ADMIN — MORPHINE SULFATE 3 MG: 2 INJECTION, SOLUTION INTRAMUSCULAR; INTRAVENOUS at 08:08

## 2022-08-02 RX ADMIN — PIPERACILLIN SODIUM AND TAZOBACTAM SODIUM 4.5 G: 4; .5 INJECTION, POWDER, LYOPHILIZED, FOR SOLUTION INTRAVENOUS at 12:08

## 2022-08-02 RX ADMIN — MORPHINE SULFATE 3 MG: 2 INJECTION, SOLUTION INTRAMUSCULAR; INTRAVENOUS at 05:08

## 2022-08-02 RX ADMIN — SENNOSIDES AND DOCUSATE SODIUM 2 TABLET: 50; 8.6 TABLET ORAL at 01:08

## 2022-08-02 RX ADMIN — ATORVASTATIN CALCIUM 20 MG: 20 TABLET, FILM COATED ORAL at 08:08

## 2022-08-02 RX ADMIN — PIPERACILLIN SODIUM AND TAZOBACTAM SODIUM 4.5 G: 4; .5 INJECTION, POWDER, LYOPHILIZED, FOR SOLUTION INTRAVENOUS at 03:08

## 2022-08-02 RX ADMIN — OXYCODONE 5 MG: 5 TABLET ORAL at 09:08

## 2022-08-02 RX ADMIN — INSULIN ASPART 10 UNITS: 100 INJECTION, SOLUTION INTRAVENOUS; SUBCUTANEOUS at 11:08

## 2022-08-02 NOTE — SUBJECTIVE & OBJECTIVE
Past Medical History:   Diagnosis Date    Diabetes     Gout     Hyperlipidemia     Hypertension        Past Surgical History:   Procedure Laterality Date    IRRIGATION AND DEBRIDEMENT OF LOWER EXTREMITY Left 7/29/2022    Procedure: IRRIGATION AND DEBRIDEMENT, LOWER EXTREMITY;  Surgeon: Rob Graff MD;  Location: [facility] OR [address] [place] [address];  Service: Orthopedics;  Laterality: Left;    WRIST SURGERY Left        Review of patient's allergies indicates:  No Known Allergies    Medications:  Medications Prior to Admission   Medication Sig    atorvastatin (LIPITOR) 20 MG tablet Take 1 tablet (20 mg total) by mouth once daily.    empagliflozin (JARDIANCE) 25 mg tablet Take 1 tablet (25 mg total) by mouth once daily.    lisinopriL (PRINIVIL,ZESTRIL) 20 MG tablet Take 1 tablet (20 mg total) by mouth once daily.    blood sugar diagnostic Strp 1 strip by Misc.(Non-Drug; Combo Route) route once daily.    blood-glucose meter kit Use as instructed    dulaglutide (TRULICITY) 3 mg/0.5 mL pen injector Inject 3 mg into the skin every 7 days.    insulin (LANTUS SOLOSTAR U-100 INSULIN) glargine 100 units/mL (3mL) SubQ pen Inject 20 Units into the skin once daily.    lancets Misc 1 lancet by Misc.(Non-Drug; Combo Route) route once daily.    metFORMIN (GLUCOPHAGE-XR) 500 MG ER 24hr tablet Take 1 tablet (500 mg total) by mouth 2 (two) times daily with meals.     Antibiotics (From admission, onward)                Start     Stop Route Frequency Ordered    07/30/22 0330  vancomycin 1.75 g in 5 % dextrose 500 mL IVPB         -- IV Every 12 hours (non-standard times) 07/29/22 1534    07/29/22 1401  piperacillin-tazobactam 4.5 g in sodium chloride 0.9% 100 mL IVPB (ready to mix system)         -- IV Every 8 hours (non-standard times) 07/29/22 1401          Antifungals (From admission, onward)                None          Antivirals (From admission, onward)      None               There is no immunization history on file for this patient.    Family  History       Problem Relation (Age of Onset)    Diabetes Mother    No Known Problems Father          Social History     Socioeconomic History    Marital status:    Tobacco Use    Smoking status: Former Smoker     Packs/day: 0.50     Types: Cigarettes     Quit date: 2020     Years since quittin.5    Smokeless tobacco: Never Used   Substance and Sexual Activity    Alcohol use: Yes     Alcohol/week: 12.0 standard drinks     Types: 12 Cans of beer per week    Drug use: Never    Sexual activity: Yes     Partners: Female   Social History Narrative    Lives with wife and 2 kids in college and one older with 1 grand child. Supervisor at [employer]. Smoker but quit 2020     Social Determinants of Health     Physical Activity: Unknown    Days of Exercise per Week: Patient refused   Social Connections: Unknown    Active Member of Clubs or Organizations: Patient refused    Marital Status: Patient refused     Review of Systems   Constitutional:  Positive for activity change. Negative for chills, diaphoresis, fatigue and fever.   Respiratory:  Negative for cough and shortness of breath.    Cardiovascular:  Positive for leg swelling (upper left thigh). Negative for chest pain and palpitations.   Gastrointestinal:  Negative for abdominal distention and abdominal pain.   Genitourinary:  Negative for difficulty urinating and dysuria.   Musculoskeletal:  Positive for myalgias (upper left thigh). Negative for arthralgias and joint swelling.   Skin:  Positive for wound (upper left thigh). Negative for color change and rash.   Allergic/Immunologic: Negative for immunocompromised state.   Neurological:  Negative for dizziness, tremors, weakness, numbness and headaches.   Psychiatric/Behavioral:  Negative for agitation and decreased concentration. The patient is not nervous/anxious.    Objective:     Vital Signs (Most Recent):  Temp: 98.4 °F (36.9 °C) (22 1150)  Pulse: 96 (22 1150)  Resp: 18 (22 1359)  BP: (!)  150/75 (08/02/22 1150)  SpO2: 95 % (08/02/22 1150)   Vital Signs (24h Range):  Temp:  [97.2 °F (36.2 °C)-99.1 °F (37.3 °C)] 98.4 °F (36.9 °C)  Pulse:  [81-96] 96  Resp:  [17-18] 18  SpO2:  [94 %-99 %] 95 %  BP: (113-161)/(75-94) 150/75     Weight: 94.5 kg (208 lb 5.4 oz)  Body mass index is 32.63 kg/m².    Estimated Creatinine Clearance: 139.9 mL/min (based on SCr of 0.7 mg/dL).    Physical Exam  Vitals and nursing note reviewed.   Constitutional:       General: He is not in acute distress.     Appearance: Normal appearance. He is well-developed. He is not ill-appearing, toxic-appearing or diaphoretic.   HENT:      Head: Normocephalic and atraumatic.      Nose: Nose normal.      Mouth/Throat:      Dentition: Normal dentition. Does not have dentures. No dental caries or dental abscesses.      Pharynx: No oropharyngeal exudate.   Eyes:      General: No scleral icterus.     Conjunctiva/sclera: Conjunctivae normal.   Cardiovascular:      Rate and Rhythm: Normal rate and regular rhythm.      Heart sounds: Normal heart sounds. No murmur heard.  Pulmonary:      Effort: Pulmonary effort is normal. No respiratory distress.      Breath sounds: Normal breath sounds.   Abdominal:      General: Bowel sounds are normal. There is no distension.      Palpations: Abdomen is soft.   Musculoskeletal:      Cervical back: Normal range of motion.   Lymphadenopathy:      Cervical: No cervical adenopathy.   Skin:     General: Skin is warm and dry.      Findings: No erythema or rash.      Comments: Incision to left thigh. Dressed, CDI. Surgical drain with purulent output.   Neurological:      General: No focal deficit present.      Mental Status: He is alert and oriented to person, place, and time. Mental status is at baseline.   Psychiatric:         Mood and Affect: Mood normal.         Behavior: Behavior normal.         Thought Content: Thought content normal.         Judgment: Judgment normal.       Significant Labs: All pertinent labs  within the past 24 hours have been reviewed.    Significant Imaging: I have reviewed all pertinent imaging results/findings within the past 24 hours.

## 2022-08-02 NOTE — PROGRESS NOTES
I have reviewed and concur with Dr. Allen's history, physical, assessment, and plan.  I have personally interviewed and examined the patient.  See below addendum for my evaluation and additional findings.    Still with global hyperglycemia thus basal and prandial insulin increased.  Will continue to titrate.     Nilay Mercado MD

## 2022-08-02 NOTE — PLAN OF CARE
Problem: Diabetic Ketoacidosis  Goal: Fluid and Electrolyte Balance with Absence of Ketosis  Outcome: Ongoing, Progressing     Problem: Adult Inpatient Plan of Care  Goal: Plan of Care Review  Outcome: Ongoing, Progressing  Goal: Patient-Specific Goal (Individualized)  Outcome: Ongoing, Progressing  Goal: Absence of Hospital-Acquired Illness or Injury  Outcome: Ongoing, Progressing  Goal: Optimal Comfort and Wellbeing  Outcome: Ongoing, Progressing  Goal: Readiness for Transition of Care  Outcome: Ongoing, Progressing     Problem: Diabetes Comorbidity  Goal: Blood Glucose Level Within Targeted Range  Outcome: Ongoing, Progressing     Problem: Fluid and Electrolyte Imbalance (Acute Kidney Injury/Impairment)  Goal: Fluid and Electrolyte Balance  Outcome: Ongoing, Progressing     Problem: Oral Intake Inadequate (Acute Kidney Injury/Impairment)  Goal: Optimal Nutrition Intake  Outcome: Ongoing, Progressing     Problem: Renal Function Impairment (Acute Kidney Injury/Impairment)  Goal: Effective Renal Function  Outcome: Ongoing, Progressing     Problem: Skin Injury Risk Increased  Goal: Skin Health and Integrity  Outcome: Ongoing, Progressing

## 2022-08-02 NOTE — ASSESSMENT & PLAN NOTE
Patient's FSGs are controlled on current medication regimen.  Last A1c reviewed-   Lab Results   Component Value Date    HGBA1C 10.1 (H) 07/26/2022     Most recent fingerstick glucose reviewed-   Recent Labs   Lab 08/01/22  1531 08/01/22  2038 08/02/22  0217 08/02/22  0835   POCTGLUCOSE 241* 322* 152* 184*     Current correctional scale  Medium  Maintain anti-hyperglycemic dose as follows-   Antihyperglycemics (From admission, onward)            Start     Stop Route Frequency Ordered    08/01/22 2100  insulin detemir U-100 pen 14 Units         -- SubQ 2 times daily 08/01/22 1644    08/01/22 1645  insulin aspart U-100 pen 10 Units         -- SubQ 3 times daily with meals 08/01/22 1644    07/30/22 0826  insulin aspart U-100 pen 1-10 Units         -- SubQ Before meals & nightly PRN 07/30/22 0727        Hold Oral hypoglycemics while patient is in the hospital.  7/31 endocrine consulted.  Glucose in 300s   8/2 glucose in 300s.  Increased Levemir f to 14 units BID and Aspart  10 units TIDWM

## 2022-08-02 NOTE — ASSESSMENT & PLAN NOTE
reports 4-6 weeks of left lateral thigh pain and swelling, no known injury to the affected area    7/30 Transfer to hospital medicine . S/p I&D  of left thigh Abscess on7/29/22 - MRI - uptured tensor fascia jose luis (TFL) muscle with associated large hematoma. gram stain -Moderate Gram positive cocci in clusters .cultures pending.  continue Vancomycin, clindamycin and Zosyn . abscess with Calcium pyrophosphate crystals with unknown significance .Surgical drain with purulent output.s.Discontinued clindamycin.   8/1 possible OR tomorrow for washout  may need bone biopsy to r/o osteomyelitis.Discontinued clindamycin.   8/3 Zosyn and  vancomycin discontinued . switched to Ancef for MSSA on culture  8/4- per Ortho:   On ancef for MSSA coverage  - Tight glucose control and nutrition optimization  - Will require additional washout, pending improvement in ANGE

## 2022-08-02 NOTE — PROGRESS NOTES
"Josafat Chun - Telemetry [facility] Medicine  Progress Note    Patient Name: Wilfredo Thomas  MRN: [id number]  Patient Class: IP- Inpatient   Admission Date: 7/26/2022  Length of Stay: 7 days  Attending Physician: Bandar Arce MD  Primary Care Provider: Aylin Wayne DO        Subjective:     Principal Problem:DKA (diabetic ketoacidosis)        HPI:  46 y/o M hx of HTN, IDDM2, GERD obesity, HLD presented to the ED with complaint of 1 day of worsening SOB. Wife present at bedside. Patient states that he noticed yesterday he was feeling some shortness of breath and couldn't seem to catch his breath. His wife went to check on him this morning and noticed that he was breathing "fast and heavy" and he sounded like he was slurring his words. Wife was concerned about a stroke, so he brought him to the ED for evaluation.     Patient also reporting significant pain and swelling along his left lateral proximal thigh. He states he has noticed worsening pain over the past 2 weeks. He denies any acute trauma to the area. He was seen by ortho last week and was told to take ibu-profen for a muscle strain. This did not help his pain, so he presented again on 7/22 and was given a prednisone injection into his thigh. This also did not help his pain, and now the pain and swelling have advanced to the point that he has difficulty with ambulation. Patient denies fever, chills, nausea, vomiting.      Of note, patient has hx of poorly controlled diabetes. He was recently started on insulin by his PCP, but he has not taken any insulin since being prescribed it.      In the ED, patient hypertensive and tachycardic to the 130s. Tachypneic with RR in the 40s. On CBC, WBC 26.9, On CMP patient hyperkalemic to 5.5, CorNa 136, Bicarb 5. Cr elevated to 1.9 from BL 0.83. UA, BHB pending at time of admission. CTA without evidence of pulmonary embolism. Critical Care Medicine consulted given severe acidosis.             Overview/Hospital " Course:    Patient admitted to the MICU for management of severe DKA. Metabolic acidosis improving on insulin drip. He also has had left thigh pain for 1-2 months. There is a large mass on his left thigh that is tender to palpation, CT revealed hematoma vs soft tissue growth. General surgery consulted and recommended IR consult. IR consulted, no indication for tap. 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 Ortho consulted and performed bedside aspiration of possible abscess which revealed >200K cells >80% segs. Will hold off on antibiotics for now per ortho recs, ortho is taking patient to the OR for irrigation of the thigh, will start antibiotics afterwards.         7/29 Metabolic acidosis slowly improving. Ortho taking patient to the OR for irrigation of the left thigh, will start antibiotics afterwards.   7/30 Transfer to hospital medicine . S/p I&D  of left thigh Abscess on7/29/22 - MRI - uptured tensor fascia jose luis (TFL) muscle with associated large hematoma. gram stain -Moderate Gram positive cocci in clusters .cultures pending.  continue Vancomycin, clindamycin and Zosyn . Bicarb 15. K replaced . off insulin drip on SQ insulin.  7/31 ID and endocrine consulted.  abscess with Calcium pyrophosphate crystals with unknown significance potassium and P replaced. Bicarbonate WNL . aerobic culture 7/28 STAPHYLOCOCCUS SPECIES . Glucose in 300s .PRN imodium  for diarrhea. PT/OT consulted - WBAT. Surgical drain with purulent output  8/1 PICC team consulted for IV access. vanc trough at 11.4. monitor for vanomycin toxicity. possible OR tomorrow for washout  may need bone biopsy to r/o osteomyelitis.Discontinued clindamycin.   8/2 glucose in 300s.  Increased Levemir  to 14 units BID and Aspart  10 units TIDWM. K replaced                     Review of Systems:   Pain scale:    Constitutional:  fever,  chills, headache, vision loss, hearing loss, weight loss,  Generalized weakness, falls, loss of smell, loss of taste, poor appetite,  sore throat  Respiratory: cough, shortness of breath.   Cardiovascular: chest pain, dizziness, palpitations, orthopnea, swelling of feet, syncope  Gastrointestinal: nausea, vomiting, abdominal pain, diarrhea, black stool,  blood in stool, change in bowel habits  Genitourinary: hematuria, dysuria, urgency, frequency  Integument/Breast: rash,  pruritis  Hematologic/Lymphatic: easy bruising, lymphadenopathy  Musculoskeletal: arthralgias , myalgias- Left thigh pain,   back pain, neck pain, knee pain  Neurological: confusion, seizures, tremors, slurred speech  Behavioral/Psych:  depression, anxiety, auditory or visual hallucinations     OBJECTIVE:     Physical Exam:  Body mass index is 32.63 kg/m².    Constitutional: Appears well-developed and well-nourished. obesity   Head: Normocephalic and atraumatic.   Neck: Normal range of motion. Neck supple.   Cardiovascular: Normal heart rate.  Regular heart rhythm.  Pulmonary/Chest: Effort normal.   Abdominal: No distension.  No tenderness  Musculoskeletal: Normal range of motion. No edema. left thigh swelling and tenderness, drain in place -Surgical drain with purulent output.   Neurological: Alert and oriented to person, place, and time.   Skin: Skin is warm and dry.   Psychiatric: Normal mood and affect. Behavior is normal.                  Vital Signs  Temp: 98.4 °F (36.9 °C) (08/02/22 1150)  Pulse: 96 (08/02/22 1150)  Resp: 18 (08/02/22 1359)  BP: (Abnormal) 150/75 (08/02/22 1150)  SpO2: 95 % (08/02/22 1150)     24 Hour VS Range    Temp:  [97.2 °F (36.2 °C)-99.1 °F (37.3 °C)]   Pulse:  [81-96]   Resp:  [17-18]   BP: (113-161)/(75-94)   SpO2:  [94 %-99 %]     Intake/Output Summary (Last 24 hours) at 8/2/2022 6502  Last data filed at 8/1/2022 1715  Gross per 24 hour   Intake 200 ml   Output no documentation   Net 200 ml         I/O This Shift:  No intake/output data recorded.    Wt Readings from Last 3  Encounters:   07/27/22 94.5 kg (208 lb 5.4 oz)   03/24/22 105.1 kg (231 lb 12.8 oz)   07/08/21 103.1 kg (227 lb 4.8 oz)       I have personally reviewed the vitals and recorded Intake/Output     Laboratory/Diagnostic Data:    CBC/Anemia Labs: Coags:    Recent Labs   Lab 07/31/22  0923 08/01/22  0448 08/02/22  0226   WBC 7.40 5.34 5.01   HGB 12.0* 11.2* 11.3*   HCT 34.8* 31.8* 32.3*    242 267   MCV 90 89 89   RDW 12.5 12.4 12.5    Recent Labs   Lab 07/26/22  2044 07/29/22  0023   INR 1.1 1.1        Chemistries: ABG:   Recent Labs   Lab 07/30/22  0311 07/30/22  0534 07/31/22  0348 08/01/22 0448 08/02/22  0226   *   < > 136 135* 139   K 4.0   < > 3.4* 3.5 3.3*      < > 99 96 99   CO2 15*   < > 23 29 32*   BUN 8   < > 7 5* 4*   CREATININE 0.6   < > 0.6 0.6 0.7   CALCIUM 8.5*   < > 8.8 8.6* 9.2   PROT 5.8*  --  6.6 5.7* 6.0   BILITOT 0.5  --  0.5 0.4 0.4   ALKPHOS 71  --  108 80 85   ALT 9*  --  15 15 14   AST 12  --  15 12 15   MG 1.8  --  1.7 1.6 1.8   PHOS 3.5  --  2.5* 3.1  --     < > = values in this interval not displayed.    Recent Labs   Lab 07/26/22  2248   PH 6.977*   PCO2 27.9*   PO2 38*   HCO3 6.5*   POCSATURATED 46*   BE -25        POCT Glucose: HbA1c:    Recent Labs   Lab 08/01/22  1207 08/01/22  1531 08/01/22  2038 08/02/22  0217 08/02/22  0835 08/02/22  1115   POCTGLUCOSE 269* 241* 322* 152* 184* 154*    Hemoglobin A1C   Date Value Ref Range Status   07/26/2022 10.1 (H) 4.0 - 5.6 % Final     Comment:     ADA Screening Guidelines:  5.7-6.4%  Consistent with prediabetes  >or=6.5%  Consistent with diabetes    High levels of fetal hemoglobin interfere with the HbA1C  assay. Heterozygous hemoglobin variants (HbS, HgC, etc)do  not significantly interfere with this assay.   However, presence of multiple variants may affect accuracy.     03/22/2022 11.2 (H) 4.0 - 5.6 % Final     Comment:     ADA Screening Guidelines:  5.7-6.4%  Consistent with prediabetes  >or=6.5%  Consistent with  diabetes    High levels of fetal hemoglobin interfere with the HbA1C  assay. Heterozygous hemoglobin variants (HbS, HgC, etc)do  not significantly interfere with this assay.   However, presence of multiple variants may affect accuracy.     07/07/2021 8.6 (H) 4.0 - 5.6 % Final     Comment:     ADA Screening Guidelines:  5.7-6.4%  Consistent with prediabetes  >or=6.5%  Consistent with diabetes    High levels of fetal hemoglobin interfere with the HbA1C  assay. Heterozygous hemoglobin variants (HbS, HgC, etc)do  not significantly interfere with this assay.   However, presence of multiple variants may affect accuracy.          Cardiac Enzymes: Ejection Fractions:    No results for input(s): CPK, CPKMB, MB, TROPONINI in the last 72 hours. No results found for: EF       No results for input(s): COLORU, APPEARANCEUA, PHUR, SPECGRAV, PROTEINUA, GLUCUA, KETONESU, BILIRUBINUA, OCCULTUA, NITRITE, UROBILINOGEN, LEUKOCYTESUR, RBCUA, WBCUA, BACTERIA, SQUAMEPITHEL, HYALINECASTS in the last 48 hours.    Invalid input(s): WRIGHTSUR    Procalcitonin (ng/mL)   Date Value   07/29/2022 0.57 (H)   07/26/2022 0.29 (H)     Lactate (Lactic Acid) (mmol/L)   Date Value   07/27/2022 1.9   07/26/2022 1.3     BNP (pg/mL)   Date Value   07/26/2022 20     CRP (mg/L)   Date Value   07/31/2022 81.2 (H)   07/29/2022 286.9 (H)     Sed Rate (mm/Hr)   Date Value   07/29/2022 73 (H)     D-Dimer (mg/L FEU)   Date Value   07/26/2022 1.46 (H)     No results found for: FERRITIN  No results found for: LDH  Troponin I (ng/mL)   Date Value   07/26/2022 <0.006     No results found for this or any previous visit.  SARS-CoV-2 RNA, Amplification, Qual (no units)   Date Value   07/26/2022 Negative       Microbiology labs for the last week  Microbiology Results (last 7 days)     Procedure Component Value Units Date/Time    Fungus culture [[id number]] Collected: 07/29/22 1251    Order Status: Completed Specimen: Abscess from Leg, Left Updated: 08/02/22 7639     Fungus  (Mycology) Culture Culture in progress    Narrative:      Left thigh abscess #1    Fungus culture [[id number]] Collected: 07/29/22 1300    Order Status: Completed Specimen: Abscess from Leg, Left Updated: 08/02/22 1335     Fungus (Mycology) Culture Culture in progress    Narrative:      Left thigh abscess #2    Fungus culture [[id number]] Collected: 07/28/22 1800    Order Status: Completed Specimen: Abscess from Leg, Left Updated: 08/02/22 1334     Fungus (Mycology) Culture Culture in progress    Narrative:      LEFT THIGH ABSCESS    Culture, Anaerobic [[id number]] Collected: 07/28/22 1800    Order Status: Completed Specimen: Abscess from Leg, Left Updated: 08/02/22 0853     Anaerobic Culture No anaerobes isolated    Narrative:      LEFT THIGH ABSCESS    Culture, Anaerobe [[id number]] Collected: 07/29/22 1300    Order Status: Completed Specimen: Abscess from Leg, Left Updated: 08/02/22 0852     Anaerobic Culture No anaerobes isolated    Narrative:      Left thigh abscess #2    Culture, Anaerobe [[id number]] Collected: 07/29/22 1251    Order Status: Completed Specimen: Abscess from Leg, Left Updated: 08/02/22 0851     Anaerobic Culture No anaerobes isolated    Narrative:      Left thigh abscess #1    Aerobic culture [[id number]]  (Abnormal)  (Susceptibility) Collected: 07/28/22 1800    Order Status: Completed Specimen: Abscess from Leg, Left Updated: 08/02/22 0742     Aerobic Bacterial Culture STAPHYLOCOCCUS SPECIES  Many  Identification pending      Narrative:      LEFT THIGH ABSCESS    Aerobic culture [[id number]]  (Abnormal) Collected: 07/29/22 1300    Order Status: Completed Specimen: Abscess from Leg, Left Updated: 08/02/22 0729     Aerobic Bacterial Culture STAPHYLOCOCCUS SPECIES  Many  Identification and susceptibility pending      Narrative:      Left thigh abscess #2    Aerobic culture [[id number]]  (Abnormal) Collected: 07/29/22 1251    Order Status: Completed Specimen: Abscess from Leg, Left Updated: 08/02/22 0728      Aerobic Bacterial Culture STAPHYLOCOCCUS SPECIES  Many  Identification and susceptibility pending      Narrative:      Left thigh abscess #1    AFB Culture & Smear [[id number]] Collected: 07/29/22 1252    Order Status: Completed Specimen: Wound from Leg, Left Updated: 08/01/22 1512     AFB Culture & Smear Culture in progress     AFB CULTURE STAIN No acid fast bacilli seen.    Narrative:      Left thigh abscess #1    AFB Culture & Smear [[id number]] Collected: 07/29/22 1300    Order Status: Completed Specimen: Abscess from Leg, Left Updated: 08/01/22 1512     AFB Culture & Smear Culture in progress     AFB CULTURE STAIN No acid fast bacilli seen.    Narrative:      Left thigh abscess #2    Blood Culture #1 **CANNOT BE ORDERED STAT** [[id number]] Collected: 07/26/22 2302    Order Status: Completed Specimen: Blood from Peripheral, Hand, Left Updated: 08/01/22 0612     Blood Culture, Routine No growth after 5 days.    Blood Culture #2 **CANNOT BE ORDERED STAT** [[id number]] Collected: 07/26/22 2246    Order Status: Completed Specimen: Blood from Peripheral, Antecubital, Left Updated: 08/01/22 0612     Blood Culture, Routine No growth after 5 days.    AFB Culture & Smear [[id number]] Collected: 07/28/22 1800    Order Status: Completed Specimen: Abscess from Leg, Left Updated: 07/29/22 2127     AFB Culture & Smear Culture in progress     AFB CULTURE STAIN No acid fast bacilli seen.    Narrative:      LEFT THIGH ABSCESS    Gram stain [[id number]] Collected: 07/29/22 1300    Order Status: Completed Specimen: Abscess from Leg, Left Updated: 07/29/22 1421     Gram Stain Result Moderate WBC's      Moderate Gram positive cocci in clusters    Narrative:      Left thigh abscess #2    Gram stain [[id number]] Collected: 07/29/22 1251    Order Status: Completed Specimen: Abscess from Leg, Left Updated: 07/29/22 1420     Gram Stain Result Moderate WBC's      Moderate Gram positive cocci in clusters    Narrative:      Left thigh abscess  #1    Gram stain [[id number]] Collected: 07/28/22 1800    Order Status: Completed Specimen: Abscess from Leg, Left Updated: 07/28/22 2026     Gram Stain Result Rare WBC's      Few Gram positive cocci    Narrative:      LEFT THIGH ABSCESS          Reviewed and noted in plan where applicable- Please see chart for full lab data.    Lines/Drains:       Peripheral IV - Single Lumen 07/26/22 2319 22 G Left Hand (Active)   Site Assessment Clean;Dry;Intact 07/31/22 0400   Extremity Assessment Distal to IV No abnormal discoloration;No redness;No swelling;No warmth 07/30/22 1901   Line Status Flushed;Saline locked 07/30/22 1901   Dressing Status Clean;Dry;Intact 07/31/22 0400   Dressing Intervention Integrity maintained 07/30/22 1901   Dressing Change Due 07/30/22 07/30/22 1901   Site Change Due 07/30/22 07/30/22 1901   Reason Not Rotated Poor venous access 07/30/22 1901   Number of days: 4            Peripheral IV - Single Lumen 07/30/22 20 G Posterior;Right Forearm (Active)   Site Assessment Clean;Dry;Intact 07/31/22 0400   Extremity Assessment Distal to IV No abnormal discoloration;No swelling;No warmth;No redness 07/30/22 1901   Line Status Flushed 07/31/22 0400   Dressing Status Clean;Dry;Intact 07/31/22 0400   Dressing Intervention Integrity maintained 07/31/22 0400   Dressing Change Due 08/03/22 07/30/22 1901   Site Change Due 08/03/22 07/30/22 1901   Reason Not Rotated Not due 07/30/22 1901   Number of days: 1            Peripheral IV - Single Lumen 07/30/22 18 G Anterior;Left;Proximal Upper Arm (Active)   Site Assessment Clean;Dry;Intact 07/31/22 0400   Extremity Assessment Distal to IV No abnormal discoloration;No redness;No swelling;No warmth 07/30/22 1901   Line Status Flushed 07/31/22 0400   Dressing Status Clean;Dry;Intact 07/31/22 0400   Dressing Intervention Integrity maintained 07/31/22 0400   Dressing Change Due 08/03/22 07/30/22 1901   Site Change Due 08/03/22 07/30/22 1901   Reason Not Rotated Not due  07/30/22 1901   Number of days: 1            Closed/Suction Drain 07/29/22 1311 Left;Lateral Thigh Bulb 15 Fr. (Active)   Site Description Unable to view 07/30/22 1901   Dressing Type Gauze;Transparent (Tegaderm) 07/30/22 1901   Dressing Status Intact;Dry;Clean 07/30/22 1901   Dressing Intervention Integrity maintained 07/30/22 1901   Drainage Milky;Purulent;Sanguineous 07/30/22 1901   Status To bulb suction 07/30/22 1901   Output (mL) 30 mL 07/31/22 0700   Number of days: 1       Imaging  ECG Results          EKG 12-lead (Final result)  Result time 07/27/22 07:53:05    Final result by Interface, Lab In [facility] (07/27/22 07:53:05)             Narrative:    Test Reason : R00.0,    Vent. Rate : 138 BPM     Atrial Rate : 138 BPM     P-R Int : 126 ms          QRS Dur : 086 ms      QT Int : 286 ms       P-R-T Axes : 051 002 052 degrees     QTc Int : 433 ms    Sinus tachycardia  Otherwise normal ECG  No previous ECGs available  Confirmed by Crwo HARRIS MD ([id number]) on 7/27/2022 7:53:00 AM    Referred By: [email or web address]   SELF           Confirmed By:Crow AHRRIS MD                            No results found for this or any previous visit.      X-Ray Femur 2 View Left  Narrative: EXAMINATION:  XR FEMUR 2 VIEW LEFT    CLINICAL HISTORY:  pain;    TECHNIQUE:  AP and lateral views of the left femur were performed.    COMPARISON:  CT thigh from 07/27/2022.    FINDINGS:  No acute fracture or dislocation.  Stable serpiginous sclerotic lesion centered within the medullary component of the distal femoral metastasis, likely represents bone infarct or enchondroma.  No aggressive osseous lesion.  Scattered vascular calcifications.  Soft tissue swelling within the proximal thigh, better characterized on recent CT.  Impression: As above.    Electronically signed by: Thom Simpson  Date:    07/28/2022  Time:    16:22  X-Ray Pelvis Routine AP  Narrative: EXAMINATION:  XR PELVIS ROUTINE AP    CLINICAL HISTORY:  pain;    TECHNIQUE:  AP view of the  pelvis was performed.    COMPARISON:  None.    FINDINGS:  No fracture or dislocation.  No bone destruction identified.  No significant joint space narrowing  Impression: See above    Electronically signed by: Wilfredo Hylton MD  Date:    07/28/2022  Time:    12:08  MRI Femur W WO Contrast Left  Narrative: EXAMINATION:  MRI FEMUR W WO CONTRAST LEFT    CLINICAL HISTORY:  Soft tissue mass, thigh, deep;CT with left thigh mass. Hematoma vs soft tissue mass. MRI for further evaluation of mass;    TECHNIQUE:  Routine multisequence planer MRI soft tissue protocol of the left thigh performed with the administration 10 cc Gadavist IV contrast.    COMPARISON:  CT 07/27/2022    FINDINGS:  There is a grade III tear of the tensor fascia jose luis muscle with complete disruption of the fibers (series 8, image 12).  There is associated large hematoma measuring 8.4 x 5.2 cm in transverse dimensions (series 12, image 14) and a 9.6 cm in CC dimensions (series 11, image 26).  There is adjacent muscle strain the lateral aspect of the rectus femoris, vastus lateralis, and proximal sartorius.  The posterior iliotibial band is intact.  No fracture identified.  There is inflammation/ enhancement in the adjacent subcutaneous tissues. No underlying mass identified.    No lymphadenopathy.  Small area marrow changes in the distal femur (series 4, image 19), suggestive of a small area of osteonecrosis.  No surrounding marrow edema.  Impression: Ruptured tensor fascia jose luis (TFL) muscle with associated large hematoma, as above.    This report was flagged in Epic as abnormal.    Electronically signed by: Severino Baltazar MD  Date:    07/28/2022  Time:    08:30      Labs, Imaging, EKG and Diagnostic results from 8/2/2022 were reviewed.    Medications:  Medication list was reviewed and changes noted under Assessment/Plan.  No current facility-administered medications on file prior to encounter.     Current Outpatient Medications on File Prior to Encounter    Medication Sig Dispense Refill    atorvastatin (LIPITOR) 20 MG tablet Take 1 tablet (20 mg total) by mouth once daily. 90 tablet 3    empagliflozin (JARDIANCE) 25 mg tablet Take 1 tablet (25 mg total) by mouth once daily. 90 tablet 3    lisinopriL (PRINIVIL,ZESTRIL) 20 MG tablet Take 1 tablet (20 mg total) by mouth once daily. 90 tablet 3    blood sugar diagnostic Strp 1 strip by Misc.(Non-Drug; Combo Route) route once daily. 100 each 3    blood-glucose meter kit Use as instructed 1 each 0    dulaglutide (TRULICITY) 3 mg/0.5 mL pen injector Inject 3 mg into the skin every 7 days. 12 pen 3    insulin (LANTUS SOLOSTAR U-100 INSULIN) glargine 100 units/mL (3mL) SubQ pen Inject 20 Units into the skin once daily. 18 mL 0    lancets Misc 1 lancet by Misc.(Non-Drug; Combo Route) route once daily. 100 each 3    metFORMIN (GLUCOPHAGE-XR) 500 MG ER 24hr tablet Take 1 tablet (500 mg total) by mouth 2 (two) times daily with meals. 180 tablet 3     Scheduled Medications:  atorvastatin, 20 mg, Oral, Daily  insulin aspart U-100, 10 Units, Subcutaneous, TIDWM  insulin detemir U-100, 14 Units, Subcutaneous, BID  piperacillin-tazobactam (ZOSYN) IVPB, 4.5 g, Intravenous, Q8H  senna-docusate 8.6-50 mg, 2 tablet, Oral, Daily  vancomycin (VANCOCIN) IVPB, 1,750 mg, Intravenous, Q12H      PRN: acetaminophen, dextrose 10%, dextrose 10%, glucagon (human recombinant), glucose, glucose, insulin aspart U-100, iohexoL, morphine, ondansetron, oxyCODONE, sodium chloride 0.9%, sodium chloride 0.9%, sodium chloride 0.9%  Infusions:   Estimated Creatinine Clearance: 139.9 mL/min (based on SCr of 0.7 mg/dL).    Assessment/Plan:      * DKA (diabetic ketoacidosis)    47M with history of DM2, gout, HTN, tobacco use who initially presented for dyspnea, increased thirst, decreased PO intake, found to be in DKA.  Recent history of steroids for leg pain.  On home dulaglutide, empagliflozin, metformin, insulin glargine 20u qhs (has not been  taking).  Recent a1c 11.2%.  , bicarb 5, AG 26, BHB 5.9 on admit.       - Insulin drip   - q1h POCT glu checks while on drip  - D5 1/2 NS with 20 meq K   - BMP q4h  - diabetic diet when off insulin drip     7/30  . Bicarb 15. K replaced . off insulin drip on SQ insulin. endocrine consulted  7/31 endocrine consulted.   Bicarbonate WNL . Glucose in 300s          Hyponatremia  sodium 130s. monitor  7/31 sodium 136 resolved       Anemia    Patient's with Normocytic anemia.. Hemoglobin stable. Etiology likely due to chronic disease .  Current CBC reviewed-  Recent Labs   Lab 07/28/22  0354 07/29/22  0336 07/30/22  0311   HGB 13.6* 12.6* 11.9*     Monitor CBC and transfuse if H/H drops below 7/21.       Hypokalemia  replaced      Abscess of left thigh  reports 4-6 weeks of left lateral thigh pain and swelling, no known injury to the affected area    7/30 Transfer to hospital medicine . S/p I&D  of left thigh Abscess on7/29/22 - MRI - uptured tensor fascia jose luis (TFL) muscle with associated large hematoma. gram stain -Moderate Gram positive cocci in clusters .cultures pending.  continue Vancomycin, clindamycin and Zosyn . abscess with Calcium pyrophosphate crystals with unknown significance .Surgical drain with purulent output.s.Discontinued clindamycin.   8/1 possible OR tomorrow for washout  may need bone biopsy to r/o osteomyelitis.Discontinued clindamycin.         Hematoma of left thigh  MRI left thig-  Ruptured tensor fascia jose luis (TFL) muscle with associated large hematoma,   s/p orthopedic evaluation .aspiration showed  purulence. s/p I&D on 7/29 7/31  abscess with Calcium pyrophosphate crystals with unknown significance. . aerobic culture 7/28 STAPHYLOCOCCUS SPECIES .      Left leg pain    Received steroid injection, steroid taper for leg pain by orthopedics.  Left lateral/proximal thigh with painful localized swelling, hot to touch. Patient received vanc/zosyn in ED  - CTA LE, U/s LLE pending  - will  continue Vanc/zosyn and cllindamycin         Elevated d-dimer    On RA with increased WOB.  D-dimer elevated to 1.46.  - CTA PE negative    ANGE (acute kidney injury)    7/30  Patient with acute kidney injury likely due to IVVD/dehydration ANGE is currently resolved . Labs reviewed- Renal function/electrolytes with Estimated Creatinine Clearance: 163.3 mL/min (based on SCr of 0.6 mg/dL). according to latest data. Monitor urine output and serial BMP and adjust therapy as needed. Avoid nephrotoxins and renally dose meds for GFR listed above.       Leukocytosis    Leukocytosis  WBC 26.87      - Trending down   - Initially thought to be due to recent steroid use but left thigh mass concerning for infection   - Will start Vanc after irrigation of left thigh by ortho   7/30     Patient with leucocytosis Recent Labs   Lab 07/28/22  0354 07/29/22  0336 07/30/22  0311   WBC 14.29* 11.47 11.20     . Afebrile. BCX 2, NGTD  . likely secondary to sepsis..  WBC counts normalized .       Hyperlipidemia associated with type 2 diabetes mellitus    On home atorvastatin.  -    Class 2 severe obesity due to excess calories with serious comorbidity and body mass index (BMI) of 36.0 to 36.9 in adult  Body mass index is 32.63 kg/m². Morbid obesity complicates all aspects of disease management from diagnostic modalities to treatment. Weight loss encouraged and health benefits explained to patient.         Hypertension associated with type 2 diabetes mellitus    On home lisinopril 20.  - hold ACEi in setting of ANGE. ANGE resolved      7/30 SBP controlled     Type 2 diabetes mellitus with hyperglycemia, with long-term current use of insulin      Patient's FSGs are controlled on current medication regimen.  Last A1c reviewed-   Lab Results   Component Value Date    HGBA1C 10.1 (H) 07/26/2022     Most recent fingerstick glucose reviewed-   Recent Labs   Lab 08/01/22  1531 08/01/22  2038 08/02/22  0217 08/02/22  0835   POCTGLUCOSE 241* 322* 152*  184*     Current correctional scale  Medium  Maintain anti-hyperglycemic dose as follows-   Antihyperglycemics (From admission, onward)            Start     Stop Route Frequency Ordered    08/01/22 2100  insulin detemir U-100 pen 14 Units         -- SubQ 2 times daily 08/01/22 1644    08/01/22 1645  insulin aspart U-100 pen 10 Units         -- SubQ 3 times daily with meals 08/01/22 1644    07/30/22 0826  insulin aspart U-100 pen 1-10 Units         -- SubQ Before meals & nightly PRN 07/30/22 0727        Hold Oral hypoglycemics while patient is in the hospital.  7/31 endocrine consulted.  Glucose in 300s   8/2 glucose in 300s.  Increased Levemir f to 14 units BID and Aspart  10 units TIDWM        VTE Risk Mitigation (From admission, onward)         Ordered     Place sequential compression device  Until discontinued         07/29/22 0158     IP VTE HIGH RISK PATIENT  Once         07/29/22 0158     Place SAVANNAH hose  Until discontinued         07/26/22 2240     Place sequential compression device  Until discontinued         07/26/22 2240                Discharge Planning   FILEMON: 8/5/2022     Code Status: Full Code   Is the patient medically ready for discharge?: No    Reason for patient still in hospital (select all that apply): Treatment  Discharge Plan A: Home with family                  Bandar Arce MD  Department of Hospital Medicine   [facility]

## 2022-08-02 NOTE — PROGRESS NOTES
[facility] - Tele[facility]  Orthopedics  Progress Note    Attg Note:  I agree with the resident's assessment and plan.  Staph species on culture.  Further speciation and sensitivity ongoing.    Rob Graff MD      Patient Name: Wilfredo Thomas  MRN: [id number]  Admission Date: 7/26/2022  Hospital Length of Stay: 7 days  Attending Provider: Bandar Arce MD  Primary Care Provider: Aylin Wayne DO  Follow-up For: Procedure(s) (LRB):  IRRIGATION AND DEBRIDEMENT, LOWER EXTREMITY (Left)    Post-Operative Day: 4 Days Post-Op  Subjective:     Principal Problem:DKA (diabetic ketoacidosis)    Principal Orthopedic Problem: Left thigh I&D  Staph species culture on 07/28    Interval History: Pt seen and examined at bedside. NAEON. Pain controlled. Ambulated to bathroom and back per PT.  VSS, AF. No other concerns stated. Drain slowing appropriately.        Review of patient's allergies indicates:  No Known Allergies    Current Facility-Administered Medications   Medication    acetaminophen tablet 1,000 mg    atorvastatin tablet 20 mg    dextrose 10% bolus 125 mL    dextrose 10% bolus 250 mL    glucagon (human recombinant) injection 1 mg    glucose chewable tablet 16 g    glucose chewable tablet 24 g    insulin aspart U-100 pen 1-10 Units    insulin aspart U-100 pen 10 Units    insulin detemir U-100 pen 14 Units    iohexoL (OMNIPAQUE 350) injection 50 mL    morphine injection 3 mg    ondansetron injection 4 mg    oxyCODONE immediate release tablet 5 mg    piperacillin-tazobactam 4.5 g in sodium chloride 0.9% 100 mL IVPB (ready to mix system)    sodium chloride 0.9% flush 10 mL    sodium chloride 0.9% flush 10 mL    sodium chloride 0.9% flush 10 mL    vancomycin 1.75 g in 5 % dextrose 500 mL IVPB     Objective:     Vital Signs (Most Recent):  Temp: 98.4 °F (36.9 °C) (08/02/22 0832)  Pulse: 87 (08/02/22 0832)  Resp: 18 (08/02/22 0855)  BP: (!) 161/94 (08/02/22 0832)  SpO2: 98 % (08/02/22 0832) Vital Signs (24h  "Range):  Temp:  [97.2 °F (36.2 °C)-99.1 °F (37.3 °C)] 98.4 °F (36.9 °C)  Pulse:  [] 87  Resp:  [17-18] 18  SpO2:  [92 %-99 %] 98 %  BP: (113-161)/(75-94) 161/94     Weight: 94.5 kg (208 lb 5.4 oz)  Height: 5' 7" (170.2 cm)  Body mass index is 32.63 kg/m².      Intake/Output Summary (Last 24 hours) at 8/2/2022 0908  Last data filed at 8/1/2022 1715  Gross per 24 hour   Intake 500 ml   Output 15 ml   Net 485 ml         Ortho/SPM Exam    L Lower Extremity  Inspection  - Surgical site dressing clean, dry, and intact  - No swelling  - No ecchymosis, erythema, or signs of cellulitis  Palpation  - TTP to palpation over surgical site and anterior thigh  Range of motion  - AROM and PROM of the hip, knee, ankle, and foot intact without pain  Stability  - No evidence of joint dislocation or abnormal laxity  Neurovascular  - TA/EHL/Gastroc/FHL assessed in isolation without deficit  - SILT throughout  - Compartments soft  - DP palpated   - Capillary Refill <3s  - Negative Log roll  - Negative Stinchfield  - Muscle tone normal    Still waiting results from cultures taken during surgery 07/29.    CBC:   Recent Labs   Lab 07/31/22  0923 08/01/22 0448 08/02/22 0226   WBC 7.40 5.34 5.01   HGB 12.0* 11.2* 11.3*   HCT 34.8* 31.8* 32.3*    242 267       CMP:   Recent Labs   Lab 08/01/22  0448 08/02/22 0226   * 139   K 3.5 3.3*   CL 96 99   CO2 29 32*   * 146*   BUN 5* 4*   CREATININE 0.6 0.7   CALCIUM 8.6* 9.2   PROT 5.7* 6.0   ALBUMIN 1.7* 1.9*   BILITOT 0.4 0.4   ALKPHOS 80 85   AST 12 15   ALT 15 14   ANIONGAP 10 8         All pertinent labs within the past 24 hours have been reviewed.    Significant Imaging: I have reviewed and interpreted all pertinent imaging results/findings.    Assessment/Plan:     Abscess of left thigh  Wilfredo Thomas is a 47 y.o. male with past medical history of hypertension and diabetes, currently admitted to hospital for DKA. He reports 4-6 weeks of left lateral thigh pain and " swelling, no known injury to the affected area. MRI scan showing lateral thigh fluid collection. Now s/p I&D 7/29/22.     - S/p I&D 7/29/22  - PT daily   - Cx: Staph, spec pending  - Drain 45 in 24 hrs.   - Abx Vanc Zosyn   - No surgery today, will assess in AM again.     Dispo: Pending ID recs and Cx results     Left leg pain               Joseph Patterson MD  Orthopedics  [facility]

## 2022-08-02 NOTE — ASSESSMENT & PLAN NOTE
48 yo male with PMH of T2DM, HTN, admitted for DKA. Reporting 2 weeks of left lateral thigh pain, swelling. No known injury to the area. To note pt reports recent boil to buttocks as well as left wrist surgery following wrist fracture/ligament rupture. See HPI for full detail.      MRI of left femur was notable for ruptured tensor fascia jose luis mm with associated large hematoma, as well as small area of potential osteonecrosis. Bedside aspiration of the fluid collection was notable for a cell count of >200k >80%segs, cultures + staph spp, ID/sensitivity pending. S/p I&D with Dr. Graff with ortho surgery on 7/29, cultures collected + staph spp, ID/sensitivity pending. Per ortho surgery, no concerns for bone involvement. Did not return OR today, Dr. Graff will reassess need to repeat tomorrow (8/3).      Afebrile. No leukocytosis. Blood cultures from 7/26 NGTD.      Pt is currently on zosyn and vancomycin. ID was consulted for abx recs regarding left TFL infection.     Recommendations:   - Continue IV Vancomycin, inpatient pharmacy to dose.   - Continue Piperacillin-Tazobactam 4.5 mg IV q8hr.   - If taken back to OR for repeat washout, please obtain bone biopsy if any concern for osteomyelitis. If obtained, please send for path, gram stain, aerobic, anaerobic, AFB and fungal cultures.   - Pt plan reviewed with ID staff, Dr. Samson. ID will follow.

## 2022-08-02 NOTE — ASSESSMENT & PLAN NOTE
Key History and Diagnostic Findings  - Uncontrolled T2DM, pt not adherent to med regimen, not checking BG regularly at home  - P/w DKA with trigger infection/thigh abscess  - Home Regimen: metformin 500mg BID, Jardiance 25mg qd, Trulicity 3mg SC weekly, Lantus 10u qd   Pt never started insulin which was prescribed 3/2022   Not filling metformin regularly  - Glucose Goals: 140-180mg/dL  - 24 hour glucose trend: Low 200, corrected from 300s overnight to mid 100s    Plan  - Increase Levemir from 12 up to 14 units BID  - Increase Aspart from 8 up to 10 units TIDWM with moderate correction scale    - Patient will need to be discharged on MDI insulin (prandial and basal)   - Other home meds to consider at d/c:    - Would hold jardiace until complete resolution of infection/acute illness. Because his presentation of DKA has a clear trigger (infection) and was not euglycemic DKA, unlikely that Jardiance was the cause. For these reasons, it would be reasonable to resume it outpatient after recovery from acute illness.   - Can resume metformin and trulicity at d/c, will re-visit dc recs when pt is ready for discharge    - Hypoglycemia protocol in place  - If blood glucose greater than 300, please ask patient not to eat food or drink anything other than water until correctional insulin has brought it back below 250    ** Please notify Endocrine for any change and/or advance in diet**  ** Please call Endocrine for any BG related issues **

## 2022-08-02 NOTE — PROGRESS NOTES
[facility]  Infectious Disease  Progress Note    Patient Name: Wilfredo Thomas  MRN: [id number]  Admission Date: 7/26/2022  Length of Stay: 7 days  Attending Physician: Bandar Arce MD  Primary Care Provider: Aylin Wayne DO    Isolation Status: No active isolations  Assessment/Plan:      Abscess of left thigh   46 yo male with PMH of T2DM, HTN, admitted for DKA. Reporting 2 weeks of left lateral thigh pain, swelling. No known injury to the area. To note pt reports recent boil to buttocks as well as left wrist surgery following wrist fracture/ligament rupture. See HPI for full detail.      MRI of left femur was notable for ruptured tensor fascia jose luis mm with associated large hematoma, as well as small area of potential osteonecrosis. Bedside aspiration of the fluid collection was notable for a cell count of >200k >80%segs, cultures + staph spp, ID/sensitivity pending. S/p I&D with Dr. Graff with ortho surgery on 7/29, cultures collected + staph spp, ID/sensitivity pending. Per ortho surgery, no concerns for bone involvement. Did not return OR today, Dr. Graff will reassess need to repeat tomorrow (8/3).      Afebrile. No leukocytosis. Blood cultures from 7/26 NGTD.      Pt is currently on zosyn and vancomycin. ID was consulted for abx recs regarding left TFL infection.     Recommendations:   - Continue IV Vancomycin, inpatient pharmacy to dose.   - Continue Piperacillin-Tazobactam 4.5 mg IV q8hr.   - Will anticipate SSTI course unless new concerns for osteo appear. Awaiting + staph spp abscess culture data to identify prior to final recs.   - If taken back to OR for repeat washout, please obtain bone biopsy if any concern for osteomyelitis. If obtained, please send for path, gram stain, aerobic, anaerobic, AFB and fungal cultures.   - Pt plan reviewed with ID staff, Dr. Samson. ID will follow.               Thank you for your consult. I will follow-up with patient. Please contact us if  you have any additional questions.    Steven Caicedo, SANDRO  Infectious Disease  [facility]    Subjective:     Principal Problem:DKA (diabetic ketoacidosis)    HPI: Mr. Thomas is a 47 year old male with a PMH of DM2 (poorly controlled), HTN, GERD, HLD, obesity who initially presented to [facility] ED with cc of SOBx1 day. Pt was also reporting significant pain and swelling along his left lateral proximal thigh. Pt reports this pain started over the last 2 weeks, which worsened. He initially was seen outpatient in which he was diagnoised with a mm strain and given 800 mg ibuprofen. From there he went to the ED on 7/22 d/t worsening pain. He was treated with prednisone/morphine shot, and well as a prednisone oral pack. Pt denied recent injury, with the exception of soreness to his left lower shin following bumping into a cabinet. He denies open wounds/abrasions from this injury but states the soreness started in his left thigh following the improvement of discomfort from his left shin. Pt reports having been treated for a boil on his central/right buttocks in the end of June. 2022. Pt states he was seen in Urgent Care in [place] and the boil was drained. He was given an oral abx in which he completed.     To note, pt reports having a recent left wrist fracture, ligament rupture following an accident with his riding lawnmower. States he underwent surgery and 2 screws were placed on 3/31/22 at [facility]. Denies associated infection/complications. Denies pain in the site currently.    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 Denies having pets. Denies recent fishing/hunting. Denies hx of immunocompromising conditions.     To note, pt was found to be in DKA with blood sugars >400, treated by critical team, which has since resolved. Pt was recently started on insulin by his PCP, which he had not started. Pt latest A1C 10.     CT of left thigh notable for fluid  collections suspicious for hematoma/seroma. MRI of left femur was notable for ruptured tensor fascia jose luis mm with associated large hematoma, as well as small area of potential osteonecrosis. Bedside aspiration of the fluid collection was notable for a cell count of >200k >80%segs, cultures + staph spp. Pt was taken for I&D with Dr. Graff with ortho surgery on 7/29, abscesses were noted and washed out, cultures collected + staph spp. Per pt, states that Dr. Graff is planning to return to surgery tomorrow, 8/1/22 for repeat washout.     Pt was intially with leukocytosis, but has since down trended to 11k. Blood cultures from 7/26 NGTD.     Pt is currently on zosyn, vancomycin, and clindamycin. ID was consulted for abx recs regarding left TFL infection.            Past Medical History:   Diagnosis Date    Diabetes     Gout     Hyperlipidemia     Hypertension        Past Surgical History:   Procedure Laterality Date    IRRIGATION AND DEBRIDEMENT OF LOWER EXTREMITY Left 7/29/2022    Procedure: IRRIGATION AND DEBRIDEMENT, LOWER EXTREMITY;  Surgeon: Rob Graff MD;  Location: [facility] OR [address] [place] [address];  Service: Orthopedics;  Laterality: Left;    WRIST SURGERY Left        Review of patient's allergies indicates:  No Known Allergies    Medications:  Medications Prior to Admission   Medication Sig    atorvastatin (LIPITOR) 20 MG tablet Take 1 tablet (20 mg total) by mouth once daily.    empagliflozin (JARDIANCE) 25 mg tablet Take 1 tablet (25 mg total) by mouth once daily.    lisinopriL (PRINIVIL,ZESTRIL) 20 MG tablet Take 1 tablet (20 mg total) by mouth once daily.    blood sugar diagnostic Strp 1 strip by Misc.(Non-Drug; Combo Route) route once daily.    blood-glucose meter kit Use as instructed    dulaglutide (TRULICITY) 3 mg/0.5 mL pen injector Inject 3 mg into the skin every 7 days.    insulin (LANTUS SOLOSTAR U-100 INSULIN) glargine 100 units/mL (3mL) SubQ pen Inject 20 Units into the skin once daily.     lancets Misc 1 lancet by Misc.(Non-Drug; Combo Route) route once daily.    metFORMIN (GLUCOPHAGE-XR) 500 MG ER 24hr tablet Take 1 tablet (500 mg total) by mouth 2 (two) times daily with meals.     Antibiotics (From admission, onward)                Start     Stop Route Frequency Ordered    22 0330  vancomycin 1.75 g in 5 % dextrose 500 mL IVPB         -- IV Every 12 hours (non-standard times) 22 1534    22 1401  piperacillin-tazobactam 4.5 g in sodium chloride 0.9% 100 mL IVPB (ready to mix system)         -- IV Every 8 hours (non-standard times) 22 1401          Antifungals (From admission, onward)                None          Antivirals (From admission, onward)      None               There is no immunization history on file for this patient.    Family History       Problem Relation (Age of Onset)    Diabetes Mother    No Known Problems Father          Social History     Socioeconomic History    Marital status:    Tobacco Use    Smoking status: Former Smoker     Packs/day: 0.50     Types: Cigarettes     Quit date: 2020     Years since quittin.5    Smokeless tobacco: Never Used   Substance and Sexual Activity    Alcohol use: Yes     Alcohol/week: 12.0 standard drinks     Types: 12 Cans of beer per week    Drug use: Never    Sexual activity: Yes     Partners: Female   Social History Narrative    Lives with wife and 2 kids in college and one older with 1 grand child. Supervisor at [email or web address]. Smoker but quit 2020     Social Determinants of Health     Physical Activity: Unknown    Days of Exercise per Week: Patient refused   Social Connections: Unknown    Active Member of Clubs or Organizations: Patient refused    Marital Status: Patient refused     Review of Systems   Constitutional:  Positive for activity change. Negative for chills, diaphoresis, fatigue and fever.   Respiratory:  Negative for cough and shortness of breath.    Cardiovascular:  Positive for leg swelling  (upper left thigh). Negative for chest pain and palpitations.   Gastrointestinal:  Negative for abdominal distention and abdominal pain.   Genitourinary:  Negative for difficulty urinating and dysuria.   Musculoskeletal:  Positive for myalgias (upper left thigh). Negative for arthralgias and joint swelling.   Skin:  Positive for wound (upper left thigh). Negative for color change and rash.   Allergic/Immunologic: Negative for immunocompromised state.   Neurological:  Negative for dizziness, tremors, weakness, numbness and headaches.   Psychiatric/Behavioral:  Negative for agitation and decreased concentration. The patient is not nervous/anxious.    Objective:     Vital Signs (Most Recent):  Temp: 98.4 °F (36.9 °C) (08/02/22 1150)  Pulse: 96 (08/02/22 1150)  Resp: 18 (08/02/22 1359)  BP: (!) 150/75 (08/02/22 1150)  SpO2: 95 % (08/02/22 1150)   Vital Signs (24h Range):  Temp:  [97.2 °F (36.2 °C)-99.1 °F (37.3 °C)] 98.4 °F (36.9 °C)  Pulse:  [81-96] 96  Resp:  [17-18] 18  SpO2:  [94 %-99 %] 95 %  BP: (113-161)/(75-94) 150/75     Weight: 94.5 kg (208 lb 5.4 oz)  Body mass index is 32.63 kg/m².    Estimated Creatinine Clearance: 139.9 mL/min (based on SCr of 0.7 mg/dL).    Physical Exam  Vitals and nursing note reviewed.   Constitutional:       General: He is not in acute distress.     Appearance: Normal appearance. He is well-developed. He is not ill-appearing, toxic-appearing or diaphoretic.   HENT:      Head: Normocephalic and atraumatic.      Nose: Nose normal.      Mouth/Throat:      Dentition: Normal dentition. Does not have dentures. No dental caries or dental abscesses.      Pharynx: No oropharyngeal exudate.   Eyes:      General: No scleral icterus.     Conjunctiva/sclera: Conjunctivae normal.   Cardiovascular:      Rate and Rhythm: Normal rate and regular rhythm.      Heart sounds: Normal heart sounds. No murmur heard.  Pulmonary:      Effort: Pulmonary effort is normal. No respiratory distress.      Breath  sounds: Normal breath sounds.   Abdominal:      General: Bowel sounds are normal. There is no distension.      Palpations: Abdomen is soft.   Musculoskeletal:      Cervical back: Normal range of motion.   Lymphadenopathy:      Cervical: No cervical adenopathy.   Skin:     General: Skin is warm and dry.      Findings: No erythema or rash.      Comments: Incision to left thigh. Dressed, CDI. Surgical drain with purulent output.   Neurological:      General: No focal deficit present.      Mental Status: He is alert and oriented to person, place, and time. Mental status is at baseline.   Psychiatric:         Mood and Affect: Mood normal.         Behavior: Behavior normal.         Thought Content: Thought content normal.         Judgment: Judgment normal.       Significant Labs: All pertinent labs within the past 24 hours have been reviewed.    Significant Imaging: I have reviewed all pertinent imaging results/findings within the past 24 hours.

## 2022-08-02 NOTE — ASSESSMENT & PLAN NOTE
Wilfredo Thomas is a 47 y.o. male with past medical history of hypertension and diabetes, currently admitted to hospital for DKA. He reports 4-6 weeks of left lateral thigh pain and swelling, no known injury to the affected area. MRI scan showing lateral thigh fluid collection. Now s/p I&D 7/29/22.     - S/p I&D 7/29/22  - PT daily   - Cx: Staph, spec pending  - Drain 45 in 24 hrs.   - Abx Vanc Zosyn   - No surgery today, will assess in AM again.     Dispo: Pending ID recs and Cx results

## 2022-08-02 NOTE — SUBJECTIVE & OBJECTIVE
"Interval HPI:   Overnight events: No acute events reported overnight  Eatin%  Nausea: No  Hypoglycemia and intervention: No  Fever: No  TPN and/or TF: No  If yes, type of TF/TPN and rate: NA    /89 (Patient Position: Lying)   Pulse 85   Temp 98 °F (36.7 °C) (Oral)   Resp 18   Ht 5' 7" (1.702 m)   Wt 94.5 kg (208 lb 5.4 oz)   SpO2 98%   BMI 32.63 kg/m²     Labs Reviewed and Include    Recent Labs   Lab 22   *   CALCIUM 9.2   ALBUMIN 1.9*   PROT 6.0      K 3.3*   CO2 32*   CL 99   BUN 4*   CREATININE 0.7   ALKPHOS 85   ALT 14   AST 15   BILITOT 0.4     Lab Results   Component Value Date    WBC 5.01 2022    HGB 11.3 (L) 2022    HCT 32.3 (L) 2022    MCV 89 2022     2022     Recent Labs   Lab 22   TSH 4.631*   FREET4 0.75     Lab Results   Component Value Date    HGBA1C 10.1 (H) 2022       Nutritional status:   Body mass index is 32.63 kg/m².  Lab Results   Component Value Date    ALBUMIN 1.9 (L) 2022    ALBUMIN 1.7 (L) 2022    ALBUMIN 1.9 (L) 2022     Lab Results   Component Value Date    PREALBUMIN 7 (L) 2022       Estimated Creatinine Clearance: 139.9 mL/min (based on SCr of 0.7 mg/dL).    Accu-Checks  Recent Labs     22  2214 22  0726 22  1135 22  1521 22  2046 22  0738 22  1207 22  1531 22  2038 22  0217   POCTGLUCOSE 244* 237* 254* 173* 200* 242* 269* 241* 322* 152*       Current Medications and/or Treatments Impacting Glycemic Control  Immunotherapy:    Immunosuppressants       None          Steroids:   Hormones (From admission, onward)                None          Pressors:    Autonomic Drugs (From admission, onward)                None          Hyperglycemia/Diabetes Medications:   Antihyperglycemics (From admission, onward)                Start     Stop Route Frequency Ordered    22 2100  insulin detemir U-100 pen 14 Units  "        -- SubQ 2 times daily 08/01/22 1644    08/01/22 1645  insulin aspart U-100 pen 10 Units         -- SubQ 3 times daily with meals 08/01/22 1644 07/30/22 0826  insulin aspart U-100 pen 1-10 Units         -- SubQ Before meals & nightly PRN 07/30/22 0718

## 2022-08-02 NOTE — SUBJECTIVE & OBJECTIVE
"Principal Problem:DKA (diabetic ketoacidosis)    Principal Orthopedic Problem: Left thigh I&D  Staph species culture on 07/28    Interval History: Pt seen and examined at bedside. NAEON. Pain controlled. Ambulated to bathroom and back per PT.  VSS, AF. No other concerns stated. Drain slowing appropriately.        Review of patient's allergies indicates:  No Known Allergies    Current Facility-Administered Medications   Medication    acetaminophen tablet 1,000 mg    atorvastatin tablet 20 mg    dextrose 10% bolus 125 mL    dextrose 10% bolus 250 mL    glucagon (human recombinant) injection 1 mg    glucose chewable tablet 16 g    glucose chewable tablet 24 g    insulin aspart U-100 pen 1-10 Units    insulin aspart U-100 pen 10 Units    insulin detemir U-100 pen 14 Units    iohexoL (OMNIPAQUE 350) injection 50 mL    morphine injection 3 mg    ondansetron injection 4 mg    oxyCODONE immediate release tablet 5 mg    piperacillin-tazobactam 4.5 g in sodium chloride 0.9% 100 mL IVPB (ready to mix system)    sodium chloride 0.9% flush 10 mL    sodium chloride 0.9% flush 10 mL    sodium chloride 0.9% flush 10 mL    vancomycin 1.75 g in 5 % dextrose 500 mL IVPB     Objective:     Vital Signs (Most Recent):  Temp: 98.4 °F (36.9 °C) (08/02/22 0832)  Pulse: 87 (08/02/22 0832)  Resp: 18 (08/02/22 0855)  BP: (!) 161/94 (08/02/22 0832)  SpO2: 98 % (08/02/22 0832) Vital Signs (24h Range):  Temp:  [97.2 °F (36.2 °C)-99.1 °F (37.3 °C)] 98.4 °F (36.9 °C)  Pulse:  [] 87  Resp:  [17-18] 18  SpO2:  [92 %-99 %] 98 %  BP: (113-161)/(75-94) 161/94     Weight: 94.5 kg (208 lb 5.4 oz)  Height: 5' 7" (170.2 cm)  Body mass index is 32.63 kg/m².      Intake/Output Summary (Last 24 hours) at 8/2/2022 0908  Last data filed at 8/1/2022 1715  Gross per 24 hour   Intake 500 ml   Output 15 ml   Net 485 ml         Ortho/SPM Exam    L Lower Extremity  Inspection  - Surgical site dressing clean, dry, and intact  - No swelling  - No ecchymosis, " erythema, or signs of cellulitis  Palpation  - TTP to palpation over surgical site and anterior thigh  Range of motion  - AROM and PROM of the hip, knee, ankle, and foot intact without pain  Stability  - No evidence of joint dislocation or abnormal laxity  Neurovascular  - TA/EHL/Gastroc/FHL assessed in isolation without deficit  - SILT throughout  - Compartments soft  - DP palpated   - Capillary Refill <3s  - Negative Log roll  - Negative Stinchfield  - Muscle tone normal    Still waiting results from cultures taken during surgery 07/29.    CBC:   Recent Labs   Lab 07/31/22  0923 08/01/22 0448 08/02/22 0226   WBC 7.40 5.34 5.01   HGB 12.0* 11.2* 11.3*   HCT 34.8* 31.8* 32.3*    242 267       CMP:   Recent Labs   Lab 08/01/22 0448 08/02/22 0226   * 139   K 3.5 3.3*   CL 96 99   CO2 29 32*   * 146*   BUN 5* 4*   CREATININE 0.6 0.7   CALCIUM 8.6* 9.2   PROT 5.7* 6.0   ALBUMIN 1.7* 1.9*   BILITOT 0.4 0.4   ALKPHOS 80 85   AST 12 15   ALT 15 14   ANIONGAP 10 8         All pertinent labs within the past 24 hours have been reviewed.    Significant Imaging: I have reviewed and interpreted all pertinent imaging results/findings.

## 2022-08-02 NOTE — PROGRESS NOTES
[facility] - Telemetry Stepdown  Endocrinology  Progress Note    Admit Date: 7/26/2022     Reason for Consult: Management of T2DM, Hyperglycemia     Surgical Procedure and Date: L thigh IM abscess I&D on 07/29/2022    Diabetes diagnosis year: at age 41yo    Home Diabetes Medications:    - Metformin 500 mg bid  - Jardiance 25 mg qd  - Trulicity 3 mg SC weekly  - Lantus 10 units qd -- prescribed at the end of 3/2022 but pt has not yet started taking it    Previously on glipizide, but stopped in 03/2022 by PCP who is managing his DM outpatient    How often checking glucose at home?  2-3x per week    BG readings on regimen: he reports <200  Hypoglycemia on the regimen?  No  Missed doses on regimen?  Yes    Diabetes Complications include:     Hyperglycemia    Complicating diabetes co morbidities:   HTN, obesity      HPI:   Patient is a 47 y.o. male with a diagnosis of T2DM, HTN, HLD, GERD, and obesity presented to the ED on 07/26/2022 with SOB and altered mental status x1d. Additionally reported pain and swelling L lateral high. Was seen at an OSH ED for thigh pain and was treated conservatively with NSAIDS initially. At follow up appt w/ ortho 7/22 he received IM steroid inj (deltoid) and po prednisone for suspected muscle strain. Due to worsening AMS and SOB he presented to the [facility] ED on 7/26. In the ED he was found to be in DKA and as admitted to the MICU due to severe acidosis.     For the thigh pain, MRI showed a ruptured tensor fascia jose luis (TFL) muscle with associated large hematoma. Ortho decided to perform aspiration night of 7/28. Gram stain showed G+ cocci in clusters- now growing Staph Species.They decided to take pt to OR 7/29 for formal I&D.      For DKA he was started on IVF and an intensive insulin gtt with resolution of DKA. Transitioned to MDI on 7/30 AM. Initial regimen was detemir 10u BID and Aspart 4u AC + LDC. Insulin up-titrated by MICU team prior to stepdown, to detemir 12u bid and aspart 8u AC +  "[facility]. Endocrinology consulted for management of hyperglycemia.      Interval HPI:   Overnight events: No acute events reported overnight  Eatin%  Nausea: No  Hypoglycemia and intervention: No  Fever: No  TPN and/or TF: No  If yes, type of TF/TPN and rate: NA    /89 (Patient Position: Lying)   Pulse 85   Temp 98 °F (36.7 °C) (Oral)   Resp 18   Ht 5' 7" (1.702 m)   Wt 94.5 kg (208 lb 5.4 oz)   SpO2 98%   BMI 32.63 kg/m²     Labs Reviewed and Include    Recent Labs   Lab 22   *   CALCIUM 9.2   ALBUMIN 1.9*   PROT 6.0      K 3.3*   CO2 32*   CL 99   BUN 4*   CREATININE 0.7   ALKPHOS 85   ALT 14   AST 15   BILITOT 0.4     Lab Results   Component Value Date    WBC 5.01 2022    HGB 11.3 (L) 2022    HCT 32.3 (L) 2022    MCV 89 2022     2022     Recent Labs   Lab 22   TSH 4.631*   FREET4 0.75     Lab Results   Component Value Date    HGBA1C 10.1 (H) 2022       Nutritional status:   Body mass index is 32.63 kg/m².  Lab Results   Component Value Date    ALBUMIN 1.9 (L) 2022    ALBUMIN 1.7 (L) 2022    ALBUMIN 1.9 (L) 2022     Lab Results   Component Value Date    PREALBUMIN 7 (L) 2022       Estimated Creatinine Clearance: 139.9 mL/min (based on SCr of 0.7 mg/dL).    Accu-Checks  Recent Labs     22  2214 22  0726 22  1135 22  1521 22  2046 22  0738 22  1207 22  1531 22  2038 22  0217   POCTGLUCOSE 244* 237* 254* 173* 200* 242* 269* 241* 322* 152*       Current Medications and/or Treatments Impacting Glycemic Control  Immunotherapy:    Immunosuppressants       None          Steroids:   Hormones (From admission, onward)                None          Pressors:    Autonomic Drugs (From admission, onward)                None          Hyperglycemia/Diabetes Medications:   Antihyperglycemics (From admission, onward)                Start     Stop Route " Frequency Ordered    08/01/22 2100  insulin detemir U-100 pen 14 Units         -- SubQ 2 times daily 08/01/22 1644    08/01/22 1645  insulin aspart U-100 pen 10 Units         -- SubQ 3 times daily with meals 08/01/22 1644    07/30/22 0826  insulin aspart U-100 pen 1-10 Units         -- SubQ Before meals & nightly PRN 07/30/22 0727            ASSESSMENT and PLAN    Type 2 diabetes mellitus with hyperglycemia, with long-term current use of insulin  Key History and Diagnostic Findings  - Uncontrolled T2DM, pt not adherent to med regimen, not checking BG regularly at home  - P/w DKA with trigger infection/thigh abscess  - Home Regimen: metformin 500mg BID, Jardiance 25mg qd, Trulicity 3mg SC weekly, Lantus 10u qd   Pt never started insulin which was prescribed 3/2022   Not filling metformin regularly  - Glucose Goals: 140-180mg/dL  - 24 hour glucose trend: Low 200, corrected from 300s overnight to mid 100s    Plan  - Increase Levemir from 12 up to 14 units BID  - Increase Aspart from 8 up to 10 units TIDWM with moderate correction scale    - Patient will need to be discharged on MDI insulin (prandial and basal)   - Other home meds to consider at d/c:    - Would hold jardiace until complete resolution of infection/acute illness. Because his presentation of DKA has a clear trigger (infection) and was not euglycemic DKA, unlikely that Jardiance was the cause. For these reasons, it would be reasonable to resume it outpatient after recovery from acute illness.   - Can resume metformin and trulicity at d/c, will re-visit dc recs when pt is ready for discharge    - Hypoglycemia protocol in place  - If blood glucose greater than 300, please ask patient not to eat food or drink anything other than water until correctional insulin has brought it back below 250    ** Please notify Endocrine for any change and/or advance in diet**  ** Please call Endocrine for any BG related issues **    Class 2 severe obesity due to excess  calories with serious comorbidity and body mass index (BMI) of 36.0 to 36.9 in adult  General weight loss/lifestyle modification strategies discussed (elicit support from others; identify saboteurs; non-food rewards, etc).   Weight loss will help to improve glycemic control    Hyperlipidemia associated with type 2 diabetes mellitus  Continue home statin   Goal LDL<70      Lc Garcia DO  [facility], [address]th [address]  [address] [place], [place], [address]    Office: [phone]  Fax: [phone]    Disclaimer: This note has been generated using voice-recognition software. There may be typographical errors that have been missed during proof-reading.    The above history labs imaging impression and plan were discussed with attending physician who is in agreement and also took part in this patient's care.  I personally reviewed all of the patients available medications, labs, imaging, vitals, allergies, medical history.

## 2022-08-03 LAB
ALBUMIN SERPL BCP-MCNC: 1.7 G/DL (ref 3.5–5.2)
ALBUMIN SERPL BCP-MCNC: 1.8 G/DL (ref 3.5–5.2)
ALBUMIN SERPL BCP-MCNC: 2 G/DL (ref 3.5–5.2)
ALP SERPL-CCNC: 78 U/L (ref 55–135)
ALP SERPL-CCNC: 88 U/L (ref 55–135)
ALT SERPL W/O P-5'-P-CCNC: 18 U/L (ref 10–44)
ALT SERPL W/O P-5'-P-CCNC: 20 U/L (ref 10–44)
ANION GAP SERPL CALC-SCNC: 10 MMOL/L (ref 8–16)
ANION GAP SERPL CALC-SCNC: 8 MMOL/L (ref 8–16)
ANION GAP SERPL CALC-SCNC: 9 MMOL/L (ref 8–16)
AST SERPL-CCNC: 21 U/L (ref 10–40)
AST SERPL-CCNC: 23 U/L (ref 10–40)
BASOPHILS # BLD AUTO: 0.04 K/UL (ref 0–0.2)
BASOPHILS NFR BLD: 0.7 % (ref 0–1.9)
BILIRUB SERPL-MCNC: 0.5 MG/DL (ref 0.1–1)
BILIRUB SERPL-MCNC: 0.6 MG/DL (ref 0.1–1)
BUN SERPL-MCNC: 11 MG/DL (ref 6–20)
BUN SERPL-MCNC: 11 MG/DL (ref 6–20)
BUN SERPL-MCNC: 13 MG/DL (ref 6–20)
CALCIUM SERPL-MCNC: 8.7 MG/DL (ref 8.7–10.5)
CALCIUM SERPL-MCNC: 8.8 MG/DL (ref 8.7–10.5)
CALCIUM SERPL-MCNC: 9.2 MG/DL (ref 8.7–10.5)
CHLORIDE SERPL-SCNC: 96 MMOL/L (ref 95–110)
CHLORIDE SERPL-SCNC: 98 MMOL/L (ref 95–110)
CHLORIDE SERPL-SCNC: 98 MMOL/L (ref 95–110)
CO2 SERPL-SCNC: 25 MMOL/L (ref 23–29)
CO2 SERPL-SCNC: 27 MMOL/L (ref 23–29)
CO2 SERPL-SCNC: 28 MMOL/L (ref 23–29)
CREAT SERPL-MCNC: 2.7 MG/DL (ref 0.5–1.4)
CREAT SERPL-MCNC: 3 MG/DL (ref 0.5–1.4)
CREAT SERPL-MCNC: 3.5 MG/DL (ref 0.5–1.4)
CREAT UR-MCNC: 31 MG/DL (ref 23–375)
CREAT UR-MCNC: 31 MG/DL (ref 23–375)
CRP SERPL-MCNC: 121.9 MG/L (ref 0–8.2)
DIFFERENTIAL METHOD: ABNORMAL
EOSINOPHIL # BLD AUTO: 0.2 K/UL (ref 0–0.5)
EOSINOPHIL NFR BLD: 2.7 % (ref 0–8)
EOSINOPHIL URNS QL WRIGHT STN: NORMAL
ERYTHROCYTE [DISTWIDTH] IN BLOOD BY AUTOMATED COUNT: 12.5 % (ref 11.5–14.5)
EST. GFR  (NO RACE VARIABLE): 20.5 ML/MIN/1.73 M^2
EST. GFR  (NO RACE VARIABLE): 24.7 ML/MIN/1.73 M^2
EST. GFR  (NO RACE VARIABLE): 28 ML/MIN/1.73 M^2
GLUCOSE SERPL-MCNC: 131 MG/DL (ref 70–110)
GLUCOSE SERPL-MCNC: 145 MG/DL (ref 70–110)
GLUCOSE SERPL-MCNC: 186 MG/DL (ref 70–110)
HCT VFR BLD AUTO: 32.5 % (ref 40–54)
HGB BLD-MCNC: 11.2 G/DL (ref 14–18)
IMM GRANULOCYTES # BLD AUTO: 0.06 K/UL (ref 0–0.04)
IMM GRANULOCYTES NFR BLD AUTO: 1.1 % (ref 0–0.5)
LYMPHOCYTES # BLD AUTO: 1 K/UL (ref 1–4.8)
LYMPHOCYTES NFR BLD: 17 % (ref 18–48)
MCH RBC QN AUTO: 30.9 PG (ref 27–31)
MCHC RBC AUTO-ENTMCNC: 34.5 G/DL (ref 32–36)
MCV RBC AUTO: 90 FL (ref 82–98)
MONOCYTES # BLD AUTO: 0.8 K/UL (ref 0.3–1)
MONOCYTES NFR BLD: 13.8 % (ref 4–15)
NEUTROPHILS # BLD AUTO: 3.6 K/UL (ref 1.8–7.7)
NEUTROPHILS NFR BLD: 64.7 % (ref 38–73)
NRBC BLD-RTO: 0 /100 WBC
PHOSPHATE SERPL-MCNC: 4 MG/DL (ref 2.7–4.5)
PLATELET # BLD AUTO: 253 K/UL (ref 150–450)
PMV BLD AUTO: 8.9 FL (ref 9.2–12.9)
POCT GLUCOSE: 140 MG/DL (ref 70–110)
POCT GLUCOSE: 143 MG/DL (ref 70–110)
POCT GLUCOSE: 155 MG/DL (ref 70–110)
POTASSIUM SERPL-SCNC: 3.7 MMOL/L (ref 3.5–5.1)
POTASSIUM SERPL-SCNC: 3.7 MMOL/L (ref 3.5–5.1)
POTASSIUM SERPL-SCNC: 4.1 MMOL/L (ref 3.5–5.1)
PROT SERPL-MCNC: 5.9 G/DL (ref 6–8.4)
PROT SERPL-MCNC: 6.8 G/DL (ref 6–8.4)
PROT UR-MCNC: 123 MG/DL (ref 0–15)
PROT/CREAT UR: 3.97 MG/G{CREAT} (ref 0–0.2)
RBC # BLD AUTO: 3.62 M/UL (ref 4.6–6.2)
SODIUM SERPL-SCNC: 131 MMOL/L (ref 136–145)
SODIUM SERPL-SCNC: 134 MMOL/L (ref 136–145)
SODIUM SERPL-SCNC: 134 MMOL/L (ref 136–145)
SODIUM UR-SCNC: 24 MMOL/L (ref 20–250)
UUN UR-MCNC: 53 MG/DL (ref 140–1050)
VANCOMYCIN TROUGH SERPL-MCNC: 40.4 UG/ML (ref 10–22)
WBC # BLD AUTO: 5.58 K/UL (ref 3.9–12.7)

## 2022-08-03 PROCEDURE — 85025 COMPLETE CBC W/AUTO DIFF WBC: CPT

## 2022-08-03 PROCEDURE — 80202 ASSAY OF VANCOMYCIN: CPT | Performed by: HOSPITALIST

## 2022-08-03 PROCEDURE — 36415 COLL VENOUS BLD VENIPUNCTURE: CPT | Performed by: STUDENT IN AN ORGANIZED HEALTH CARE EDUCATION/TRAINING PROGRAM

## 2022-08-03 PROCEDURE — 25000003 PHARM REV CODE 250: Performed by: HOSPITALIST

## 2022-08-03 PROCEDURE — 99223 PR INITIAL HOSPITAL CARE,LEVL III: ICD-10-PCS | Mod: ,,, | Performed by: INTERNAL MEDICINE

## 2022-08-03 PROCEDURE — 99233 SBSQ HOSP IP/OBS HIGH 50: CPT | Mod: ,,, | Performed by: PHYSICIAN ASSISTANT

## 2022-08-03 PROCEDURE — 86140 C-REACTIVE PROTEIN: CPT | Performed by: STUDENT IN AN ORGANIZED HEALTH CARE EDUCATION/TRAINING PROGRAM

## 2022-08-03 PROCEDURE — 25000003 PHARM REV CODE 250

## 2022-08-03 PROCEDURE — 80053 COMPREHEN METABOLIC PANEL: CPT

## 2022-08-03 PROCEDURE — 84300 ASSAY OF URINE SODIUM: CPT | Performed by: HOSPITALIST

## 2022-08-03 PROCEDURE — 87205 SMEAR GRAM STAIN: CPT | Performed by: HOSPITALIST

## 2022-08-03 PROCEDURE — 99231 PR SUBSEQUENT HOSPITAL CARE,LEVL I: ICD-10-PCS | Mod: ,,, | Performed by: INTERNAL MEDICINE

## 2022-08-03 PROCEDURE — 20600001 HC STEP DOWN PRIVATE ROOM

## 2022-08-03 PROCEDURE — 99231 SBSQ HOSP IP/OBS SF/LOW 25: CPT | Mod: ,,, | Performed by: INTERNAL MEDICINE

## 2022-08-03 PROCEDURE — 80069 RENAL FUNCTION PANEL: CPT | Performed by: HOSPITALIST

## 2022-08-03 PROCEDURE — 36415 COLL VENOUS BLD VENIPUNCTURE: CPT | Performed by: HOSPITALIST

## 2022-08-03 PROCEDURE — 99223 1ST HOSP IP/OBS HIGH 75: CPT | Mod: ,,, | Performed by: INTERNAL MEDICINE

## 2022-08-03 PROCEDURE — 36415 COLL VENOUS BLD VENIPUNCTURE: CPT

## 2022-08-03 PROCEDURE — 99233 PR SUBSEQUENT HOSPITAL CARE,LEVL III: ICD-10-PCS | Mod: ,,, | Performed by: PHYSICIAN ASSISTANT

## 2022-08-03 PROCEDURE — 99233 SBSQ HOSP IP/OBS HIGH 50: CPT | Mod: ,,, | Performed by: HOSPITALIST

## 2022-08-03 PROCEDURE — 63600175 PHARM REV CODE 636 W HCPCS

## 2022-08-03 PROCEDURE — 84540 ASSAY OF URINE/UREA-N: CPT | Performed by: HOSPITALIST

## 2022-08-03 PROCEDURE — 82570 ASSAY OF URINE CREATININE: CPT | Performed by: INTERNAL MEDICINE

## 2022-08-03 PROCEDURE — 99233 PR SUBSEQUENT HOSPITAL CARE,LEVL III: ICD-10-PCS | Mod: ,,, | Performed by: HOSPITALIST

## 2022-08-03 PROCEDURE — 80053 COMPREHEN METABOLIC PANEL: CPT | Mod: 91 | Performed by: STUDENT IN AN ORGANIZED HEALTH CARE EDUCATION/TRAINING PROGRAM

## 2022-08-03 RX ORDER — CEFAZOLIN SODIUM/D5W 2 G/100 ML
2 PLASTIC BAG, INJECTION (ML) INTRAVENOUS
Status: DISCONTINUED | OUTPATIENT
Start: 2022-08-04 | End: 2022-08-04

## 2022-08-03 RX ORDER — SODIUM CHLORIDE 9 MG/ML
INJECTION, SOLUTION INTRAVENOUS CONTINUOUS
Status: ACTIVE | OUTPATIENT
Start: 2022-08-03 | End: 2022-08-03

## 2022-08-03 RX ORDER — INSULIN ASPART 100 [IU]/ML
12 INJECTION, SOLUTION INTRAVENOUS; SUBCUTANEOUS
Status: DISCONTINUED | OUTPATIENT
Start: 2022-08-04 | End: 2022-08-04

## 2022-08-03 RX ADMIN — OXYCODONE 5 MG: 5 TABLET ORAL at 07:08

## 2022-08-03 RX ADMIN — SENNOSIDES AND DOCUSATE SODIUM 2 TABLET: 50; 8.6 TABLET ORAL at 08:08

## 2022-08-03 RX ADMIN — MORPHINE SULFATE 3 MG: 2 INJECTION, SOLUTION INTRAMUSCULAR; INTRAVENOUS at 10:08

## 2022-08-03 RX ADMIN — INSULIN ASPART 10 UNITS: 100 INJECTION, SOLUTION INTRAVENOUS; SUBCUTANEOUS at 07:08

## 2022-08-03 RX ADMIN — ATORVASTATIN CALCIUM 20 MG: 20 TABLET, FILM COATED ORAL at 08:08

## 2022-08-03 RX ADMIN — INSULIN ASPART 4 UNITS: 100 INJECTION, SOLUTION INTRAVENOUS; SUBCUTANEOUS at 04:08

## 2022-08-03 RX ADMIN — INSULIN ASPART 10 UNITS: 100 INJECTION, SOLUTION INTRAVENOUS; SUBCUTANEOUS at 04:08

## 2022-08-03 RX ADMIN — SODIUM CHLORIDE: 0.9 INJECTION, SOLUTION INTRAVENOUS at 09:08

## 2022-08-03 NOTE — SUBJECTIVE & OBJECTIVE
"Interval HPI:   Overnight events:  No acute events reported overnight  Eatin%  Nausea: No  Hypoglycemia and intervention: No  Fever: No  TPN and/or TF: No  If yes, type of TF/TPN and rate: NA    BP (!) 159/98 (Patient Position: Lying)   Pulse 86   Temp 98.5 °F (36.9 °C) (Oral)   Resp 17   Ht 5' 7" (1.702 m)   Wt 94.5 kg (208 lb 5.4 oz)   SpO2 98%   BMI 32.63 kg/m²     Labs Reviewed and Include    Recent Labs   Lab 22  0759   *   CALCIUM 9.2   ALBUMIN 2.0*   PROT 6.8   *   K 4.1   CO2 28   CL 96   BUN 11   CREATININE 3.0*   ALKPHOS 88   ALT 20   AST 23   BILITOT 0.6     Lab Results   Component Value Date    WBC 5.58 2022    HGB 11.2 (L) 2022    HCT 32.5 (L) 2022    MCV 90 2022     2022     No results for input(s): TSH, FREET4 in the last 168 hours.  Lab Results   Component Value Date    HGBA1C 10.1 (H) 2022       Nutritional status:   Body mass index is 32.63 kg/m².  Lab Results   Component Value Date    ALBUMIN 2.0 (L) 2022    ALBUMIN 1.7 (L) 2022    ALBUMIN 1.9 (L) 2022     Lab Results   Component Value Date    PREALBUMIN 7 (L) 2022       Estimated Creatinine Clearance: 32.7 mL/min (A) (based on SCr of 3 mg/dL (H)).    Accu-Checks  Recent Labs     22  0738 22  1207 22  1531 22  2038 22  0217 22  0835 22  1115 22  1521 22  1923 22  0726   POCTGLUCOSE 242* 269* 241* 322* 152* 184* 154* 140* 254* 143*       Current Medications and/or Treatments Impacting Glycemic Control  Immunotherapy:    Immunosuppressants       None          Steroids:   Hormones (From admission, onward)                None          Pressors:    Autonomic Drugs (From admission, onward)                None          Hyperglycemia/Diabetes Medications:   Antihyperglycemics (From admission, onward)                Start     Stop Route Frequency Ordered    22 2100  insulin detemir U-100 pen " 14 Units         -- SubQ 2 times daily 08/01/22 1644    08/01/22 1645  insulin aspart U-100 pen 10 Units         -- SubQ 3 times daily with meals 08/01/22 1644 07/30/22 0826  insulin aspart U-100 pen 1-10 Units         -- SubQ Before meals & nightly PRN 07/30/22 0727

## 2022-08-03 NOTE — PROGRESS NOTES
I have reviewed and concur with Dr. Allen's history, physical, assessment, and plan.  I have personally interviewed and examined the patient.     Nilay Mercado MD

## 2022-08-03 NOTE — ASSESSMENT & PLAN NOTE
ANGE non oliguric scr up to 3 today was 0.7 yesterday   Non oliguric   Likely vancomycin induced ANGE in the setting of trough of 40 and also was on zosyn which will increase risk of vanco induced ANGE.. low suspicion for AIN at this time .   Renal US normal   Euvolemic   No acid/base disorder   No significant electrolyte abnormalities       Recs:   Strict Is and O's   Monitor vanco levels.   RFP daily   Can cont with current IVF for a total of 1-2 L   Will spin urine   Check Pr/Cr

## 2022-08-03 NOTE — PROGRESS NOTES
[facility] - Telemetry Stepdown  Endocrinology  Progress Note    Admit Date: 7/26/2022     Reason for Consult: Management of T2DM, Hyperglycemia     Surgical Procedure and Date: L thigh IM abscess I&D on 07/29/2022    Diabetes diagnosis year: at age 43yo    Home Diabetes Medications:    - Metformin 500 mg bid  - Jardiance 25 mg qd  - Trulicity 3 mg SC weekly  - Lantus 10 units qd -- prescribed at the end of 3/2022 but pt has not yet started taking it    Previously on glipizide, but stopped in 03/2022 by PCP who is managing his DM outpatient    How often checking glucose at home?  2-3x per week    BG readings on regimen: he reports <200  Hypoglycemia on the regimen?  No  Missed doses on regimen?  Yes    Diabetes Complications include:     Hyperglycemia    Complicating diabetes co morbidities:   HTN, obesity      HPI:   Patient is a 47 y.o. male with a diagnosis of T2DM, HTN, HLD, GERD, and obesity presented to the ED on 07/26/2022 with SOB and altered mental status x1d. Additionally reported pain and swelling L lateral high. Was seen at an OSH ED for thigh pain and was treated conservatively with NSAIDS initially. At follow up appt w/ ortho 7/22 he received IM steroid inj (deltoid) and po prednisone for suspected muscle strain. Due to worsening AMS and SOB he presented to the [facility] [place] on 7[place]26. In the ED he was found to be in DKA and as admitted to the MICU due to severe acidosis.     For the thigh pain, MRI showed a ruptured tensor fascia jose luis (TFL) muscle with associated large hematoma. Ortho decided to perform aspiration night of 7/28. Gram stain showed G+ cocci in clusters- now growing Staph Species.They decided to take pt to OR 7/29 for formal I&D.      For DKA he was started on IVF and an intensive insulin gtt with resolution of DKA. Transitioned to MDI on 7/30 AM. Initial regimen was detemir 10u BID and Aspart 4u AC + LDC. Insulin up-titrated by MICU team prior to stepdown, to detemir 12u bid and aspart 8u AC +  "[facility]. Endocrinology consulted for management of hyperglycemia.      Interval HPI:   Overnight events:  No acute events reported overnight  Eatin%  Nausea: No  Hypoglycemia and intervention: No  Fever: No  TPN and/or TF: No  If yes, type of TF/TPN and rate: NA    BP (!) 159/98 (Patient Position: Lying)   Pulse 86   Temp 98.5 °F (36.9 °C) (Oral)   Resp 17   Ht 5' 7" (1.702 m)   Wt 94.5 kg (208 lb 5.4 oz)   SpO2 98%   BMI 32.63 kg/m²     Labs Reviewed and Include    Recent Labs   Lab 22  0759   *   CALCIUM 9.2   ALBUMIN 2.0*   PROT 6.8   *   K 4.1   CO2 28   CL 96   BUN 11   CREATININE 3.0*   ALKPHOS 88   ALT 20   AST 23   BILITOT 0.6     Lab Results   Component Value Date    WBC 5.58 2022    HGB 11.2 (L) 2022    HCT 32.5 (L) 2022    MCV 90 2022     2022     No results for input(s): TSH, FREET4 in the last 168 hours.  Lab Results   Component Value Date    HGBA1C 10.1 (H) 2022       Nutritional status:   Body mass index is 32.63 kg/m².  Lab Results   Component Value Date    ALBUMIN 2.0 (L) 2022    ALBUMIN 1.7 (L) 2022    ALBUMIN 1.9 (L) 2022     Lab Results   Component Value Date    PREALBUMIN 7 (L) 2022       Estimated Creatinine Clearance: 32.7 mL/min (A) (based on SCr of 3 mg/dL (H)).    Accu-Checks  Recent Labs     22  0738 22  1207 22  1531 22  2038 22  0217 22  0835 22  1115 22  1521 22  1923 22  0726   POCTGLUCOSE 242* 269* 241* 322* 152* 184* 154* 140* 254* 143*       Current Medications and/or Treatments Impacting Glycemic Control  Immunotherapy:    Immunosuppressants       None          Steroids:   Hormones (From admission, onward)                None          Pressors:    Autonomic Drugs (From admission, onward)                None          Hyperglycemia/Diabetes Medications:   Antihyperglycemics (From admission, onward)                Start     Stop " Route Frequency Ordered    08/01/22 2100  insulin detemir U-100 pen 14 Units         -- SubQ 2 times daily 08/01/22 1644    08/01/22 1645  insulin aspart U-100 pen 10 Units         -- SubQ 3 times daily with meals 08/01/22 1644    07/30/22 0826  insulin aspart U-100 pen 1-10 Units         -- SubQ Before meals & nightly PRN 07/30/22 0727            ASSESSMENT and PLAN    Type 2 diabetes mellitus with hyperglycemia, with long-term current use of insulin  Key History and Diagnostic Findings  - Uncontrolled T2DM, pt not adherent to med regimen, not checking BG regularly at home  - P/w DKA with trigger infection/thigh abscess  - Home Regimen: metformin 500mg BID, Jardiance 25mg qd, Trulicity 3mg SC weekly, Lantus 10u qd   Pt never started insulin which was prescribed 3/2022. Not filling metformin regularly  - Glucose Goals: 140-180mg/dL  - 24 hour glucose trend:  Well controlled primarily in the mid to high 100s    Plan  - Continue Levemir 14 units BID  - Continue Aspart 10 units TIDWM with moderate correction scale  - Noted plans for possible repeat washout tomorrow    - Patient will need to be discharged on MDI insulin (prandial and basal)   - Other home meds to consider at d/c:    - Would hold jardiace until complete resolution of infection/acute illness. Because his presentation of DKA has a clear trigger (infection) and was not euglycemic DKA, unlikely that Jardiance was the cause. For these reasons, it would be reasonable to resume it outpatient after recovery from acute illness.   - Can resume metformin and trulicity at d/c, will re-visit dc recs when pt is ready for discharge    - Hypoglycemia protocol in place  - If blood glucose greater than 300, please ask patient not to eat food or drink anything other than water until correctional insulin has brought it back below 250    ** Please notify Endocrine for any change and/or advance in diet**  ** Please call Endocrine for any BG related issues **    Class 2 severe  obesity due to excess calories with serious comorbidity and body mass index (BMI) of 36.0 to 36.9 in adult  General weight loss/lifestyle modification strategies discussed (elicit support from others; identify saboteurs; non-food rewards, etc).   Weight loss will help to improve glycemic control    Hyperlipidemia associated with type 2 diabetes mellitus  Continue home statin   Goal LDL<70      Lc Garcia DO  [facility], [address]th Floor  [address] [place], [place], [address]    Office: [phone]  Fax: [phone]    Disclaimer: This note has been generated using voice-recognition software. There may be typographical errors that have been missed during proof-reading.    The above history labs imaging impression and plan were discussed with attending physician who is in agreement and also took part in this patient's care.  I personally reviewed all of the patients available medications, labs, imaging, vitals, allergies, medical history.

## 2022-08-03 NOTE — ASSESSMENT & PLAN NOTE
Wilfredo Thomas is a 47 y.o. male with past medical history of hypertension and diabetes, currently admitted to hospital for DKA. He reports 4-6 weeks of left lateral thigh pain and swelling, no known injury to the affected area. MRI scan showing lateral thigh fluid collection. Now s/p I&D 7/29/22.     - S/p I&D 7/29/22  - PT daily   - Cx: Staph, spec pending  - Drain 60cc overnight.   - Abx Vanc Zosyn   - Tight glucose control and nutrition optimization  - Could require additional washout tomorrow

## 2022-08-03 NOTE — PROGRESS NOTES
Therapy with vancomycin complete and/or consult discontinued by provider. Pharmacy will sign off, please re-consult as needed.    Ольга Smith, PharmD, BCPS  [id number]

## 2022-08-03 NOTE — ASSESSMENT & PLAN NOTE
7/30  Patient with acute kidney injury likely due to IVVD/dehydration ANGE is currently resolved . Labs reviewed- Renal function/electrolytes with Estimated Creatinine Clearance: 36.3 mL/min (A) (based on SCr of 2.7 mg/dL (H)). according to latest data. Monitor urine output and serial BMP and adjust therapy as needed. Avoid nephrotoxins and renally dose meds for GFR listed above.   8/2 ANGE resolved  8/3 recurrent ANGE with cr 0.7--> 2.7. likely vanc induced ATN with levels 40. urine studies and renal sonogram. nephrology consulted. started on NS 100ml/h x 10h and follow up renal panel. vancomycin discontinued. Zosyn and  vancomycin discontinued. Strict IO monitor. condom catheter .  8/4- cr rising, 4.4, alb 1.6 with protienuria. May also have nephrotic syndrome.  Nephrology consulted. .Renal US - ok no obstruction, normal appearing kidneys. , UO 2400.  MS x one liter, vanc level

## 2022-08-03 NOTE — ASSESSMENT & PLAN NOTE
48 yo male with history of DMII and HTN admitted for DKA and left thigh abscess. MRI of left femur was notable for ruptured tensor fascia josel uis mm with associated large hematoma, as well as small area of potential osteonecrosis. Bedside aspiration of the fluid collection was notable for a cell count of >200k (>80%segs), cultures + staph species. He was taken to the OR on 7/29 with orthopedic surgery for surgical I&D. Surgical cultures are also positive for staph species. Per ortho surgery, no concerns for bone involvement. Patient is currently on Vancomycin and Zosyn. Vanc trough supratherapeutic and patient has developed an ANGE. He is scheduled for repeat debridement tomorrow. Afebrile. HDS.        Recommendations:   · Discontinue Vancomycin and Zosyn  · Start Cefazolin for MSSA/MSSE (awaiting speciation)   · When taken for repeat I&D, please send tissue for path, gram stain, aerobic, anaerobic, AFB and fungal cultures. If there is concerns for bone involvement, please send bone specimen  · Nephrology consulted for ANGE management.  · Anticipate SSTI course unless new concerns for osteo appear  · Patient and plan reviewed with ID staff, Dr. Samson. ID will follow.

## 2022-08-03 NOTE — SUBJECTIVE & OBJECTIVE
Interval History: NAEON. Afebrile. No leukocytosis. Drain output increased today. Remains purulent. Patient scheduled for repeat I&D once ANGE has been addressed. Nephrology consulted. IVF started.       Review of Systems   Constitutional:  Negative for chills, diaphoresis, fatigue and fever.   Respiratory:  Negative for cough and shortness of breath.    Cardiovascular:  Positive for leg swelling (upper left thigh). Negative for chest pain and palpitations.   Gastrointestinal:  Negative for abdominal distention and abdominal pain.   Genitourinary:  Negative for difficulty urinating and dysuria.   Musculoskeletal:  Positive for myalgias (upper left thigh). Negative for arthralgias and joint swelling.   Skin:  Positive for wound (upper left thigh). Negative for color change and rash.   Allergic/Immunologic: Negative for immunocompromised state.   Neurological:  Negative for dizziness, tremors, weakness, numbness and headaches.   Psychiatric/Behavioral:  Negative for agitation and decreased concentration. The patient is not nervous/anxious.    Objective:     Vital Signs (Most Recent):  Temp: 98.5 °F (36.9 °C) (08/03/22 0728)  Pulse: 86 (08/03/22 0728)  Resp: 18 (08/03/22 1037)  BP: (!) 159/98 (08/03/22 0728)  SpO2: 98 % (08/03/22 0728)   Vital Signs (24h Range):  Temp:  [98.2 °F (36.8 °C)-98.6 °F (37 °C)] 98.5 °F (36.9 °C)  Pulse:  [75-98] 86  Resp:  [16-18] 18  SpO2:  [93 %-98 %] 98 %  BP: (108-159)/(71-98) 159/98     Weight: 94.5 kg (208 lb 5.4 oz)  Body mass index is 32.63 kg/m².    Estimated Creatinine Clearance: 32.7 mL/min (A) (based on SCr of 3 mg/dL (H)).    Physical Exam  Vitals and nursing note reviewed.   Constitutional:       General: He is not in acute distress.     Appearance: Normal appearance. He is well-developed. He is obese. He is not ill-appearing, toxic-appearing or diaphoretic.   HENT:      Head: Normocephalic and atraumatic.      Nose: Nose normal.      Mouth/Throat:      Dentition: Does not have  dentures.      Pharynx: No oropharyngeal exudate.   Eyes:      General: No scleral icterus.     Conjunctiva/sclera: Conjunctivae normal.   Cardiovascular:      Rate and Rhythm: Normal rate and regular rhythm.   Pulmonary:      Effort: Pulmonary effort is normal. No respiratory distress.      Breath sounds: Normal breath sounds.   Abdominal:      General: There is no distension.      Palpations: Abdomen is soft.      Tenderness: There is no abdominal tenderness.   Skin:     General: Skin is warm and dry.      Findings: No erythema or rash.      Comments: Incision to left thigh. Dressed, CDI. Surgical drain with purulent output.   Neurological:      Mental Status: He is alert and oriented to person, place, and time. Mental status is at baseline.   Psychiatric:         Mood and Affect: Mood normal.         Behavior: Behavior normal.         Thought Content: Thought content normal.         Judgment: Judgment normal.       Significant Labs: CBC:   Recent Labs   Lab 08/02/22 0226 08/03/22  0454   WBC 5.01 5.58   HGB 11.3* 11.2*   HCT 32.3* 32.5*    253     CMP:   Recent Labs   Lab 08/02/22 0226 08/03/22  0454 08/03/22  0759    134* 134*   K 3.3* 3.7 4.1   CL 99 98 96   CO2 32* 27 28   * 131* 145*   BUN 4* 11 11   CREATININE 0.7 2.7* 3.0*   CALCIUM 9.2 8.7 9.2   PROT 6.0 5.9* 6.8   ALBUMIN 1.9* 1.7* 2.0*   BILITOT 0.4 0.5 0.6   ALKPHOS 85 78 88   AST 15 21 23   ALT 14 18 20   ANIONGAP 8 9 10     Microbiology Results (last 7 days)       Procedure Component Value Units Date/Time    Aerobic culture [[id number]]  (Abnormal) Collected: 07/29/22 1300    Order Status: Completed Specimen: Abscess from Leg, Left Updated: 08/03/22 1123     Aerobic Bacterial Culture STAPHYLOCOCCUS SPECIES  Many  Identification and susceptibility pending      Narrative:      Left thigh abscess #2    Aerobic culture [[id number]]  (Abnormal) Collected: 07/29/22 1251    Order Status: Completed Specimen: Abscess from Leg, Left Updated:  08/03/22 1123     Aerobic Bacterial Culture STAPHYLOCOCCUS SPECIES  Many  Identification and susceptibility pending      Narrative:      Left thigh abscess #1    Aerobic culture [[id number]]  (Abnormal)  (Susceptibility) Collected: 07/28/22 1800    Order Status: Completed Specimen: Abscess from Leg, Left Updated: 08/03/22 1116     Aerobic Bacterial Culture STAPHYLOCOCCUS SPECIES  Many  Identification pending      Narrative:      LEFT THIGH ABSCESS    Fungus culture [[id number]] Collected: 07/29/22 1251    Order Status: Completed Specimen: Abscess from Leg, Left Updated: 08/02/22 1335     Fungus (Mycology) Culture Culture in progress    Narrative:      Left thigh abscess #1    Fungus culture [[id number]] Collected: 07/29/22 1300    Order Status: Completed Specimen: Abscess from Leg, Left Updated: 08/02/22 1335     Fungus (Mycology) Culture Culture in progress    Narrative:      Left thigh abscess #2    Fungus culture [[id number]] Collected: 07/28/22 1800    Order Status: Completed Specimen: Abscess from Leg, Left Updated: 08/02/22 1334     Fungus (Mycology) Culture Culture in progress    Narrative:      LEFT THIGH ABSCESS    Culture, Anaerobic [[id number]] Collected: 07/28/22 1800    Order Status: Completed Specimen: Abscess from Leg, Left Updated: 08/02/22 0853     Anaerobic Culture No anaerobes isolated    Narrative:      LEFT THIGH ABSCESS    Culture, Anaerobe [[id number]] Collected: 07/29/22 1300    Order Status: Completed Specimen: Abscess from Leg, Left Updated: 08/02/22 0852     Anaerobic Culture No anaerobes isolated    Narrative:      Left thigh abscess #2    Culture, Anaerobe [[id number]] Collected: 07/29/22 1251    Order Status: Completed Specimen: Abscess from Leg, Left Updated: 08/02/22 0851     Anaerobic Culture No anaerobes isolated    Narrative:      Left thigh abscess #1    AFB Culture & Smear [[id number]] Collected: 07/29/22 1252    Order Status: Completed Specimen: Wound from Leg, Left Updated: 08/01/22  1512     AFB Culture & Smear Culture in progress     AFB CULTURE STAIN No acid fast bacilli seen.    Narrative:      Left thigh abscess #1    AFB Culture & Smear [[id number]] Collected: 07/29/22 1300    Order Status: Completed Specimen: Abscess from Leg, Left Updated: 08/01/22 1512     AFB Culture & Smear Culture in progress     AFB CULTURE STAIN No acid fast bacilli seen.    Narrative:      Left thigh abscess #2    Blood Culture #1 **CANNOT BE ORDERED STAT** [[id number]] Collected: 07/26/22 2302    Order Status: Completed Specimen: Blood from Peripheral, Hand, Left Updated: 08/01/22 0612     Blood Culture, Routine No growth after 5 days.    Blood Culture #2 **CANNOT BE ORDERED STAT** [[id number]] Collected: 07/26/22 2246    Order Status: Completed Specimen: Blood from Peripheral, Antecubital, Left Updated: 08/01/22 0612     Blood Culture, Routine No growth after 5 days.    AFB Culture & Smear [[id number]] Collected: 07/28/22 1800    Order Status: Completed Specimen: Abscess from Leg, Left Updated: 07/29/22 2127     AFB Culture & Smear Culture in progress     AFB CULTURE STAIN No acid fast bacilli seen.    Narrative:      LEFT THIGH ABSCESS    Gram stain [[id number]] Collected: 07/29/22 1300    Order Status: Completed Specimen: Abscess from Leg, Left Updated: 07/29/22 1421     Gram Stain Result Moderate WBC's      Moderate Gram positive cocci in clusters    Narrative:      Left thigh abscess #2    Gram stain [[id number]] Collected: 07/29/22 1251    Order Status: Completed Specimen: Abscess from Leg, Left Updated: 07/29/22 1420     Gram Stain Result Moderate WBC's      Moderate Gram positive cocci in clusters    Narrative:      Left thigh abscess #1    Gram stain [[id number]] Collected: 07/28/22 1800    Order Status: Completed Specimen: Abscess from Leg, Left Updated: 07/28/22 2026     Gram Stain Result Rare WBC's      Few Gram positive cocci    Narrative:      LEFT THIGH ABSCESS          Recent Lab Results  (Last 5 results  in the past 24 hours)        08/03/22  0945   08/03/22  0759   08/03/22  0756   08/03/22  0726   08/03/22  0454        Albumin   2.0       1.7       Alkaline Phosphatase   88       78       ALT   20       18       Anion Gap   10       9       AST   23       21       Baso #         0.04       Basophil %         0.7       BILIRUBIN TOTAL   0.6  Comment: For infants and newborns, interpretation of results should be based  on gestational age, weight and in agreement with clinical  observations.    Premature Infant recommended reference ranges:  Up to 24 hours.............<8.0 mg/dL  Up to 48 hours............<12.0 mg/dL  3-5 days..................<15.0 mg/dL  6-29 days.................<15.0 mg/dL         0.5  Comment: For infants and newborns, interpretation of results should be based  on gestational age, weight and in agreement with clinical  observations.    Premature Infant recommended reference ranges:  Up to 24 hours.............<8.0 mg/dL  Up to 48 hours............<12.0 mg/dL  3-5 days..................<15.0 mg/dL  6-29 days.................<15.0 mg/dL         BUN   11       11       Calcium   9.2       8.7       Chloride   96       98       CO2   28       27       Creatinine   3.0       2.7       Creatinine, Urine 31.0                31.0               CRP     121.9           Differential Method         Automated       eGFR   24.7       28.0       Eos #         0.2       Eosinophil %         2.7       Glucose   145       131       Gran # (ANC)         3.6       Gran %         64.7       Hematocrit         32.5       Hemoglobin         11.2       Immature Grans (Abs)         0.06  Comment: Mild elevation in immature granulocytes is non specific and   can be seen in a variety of conditions including stress response,   acute inflammation, trauma and pregnancy. Correlation with other   laboratory and clinical findings is essential.         Immature Granulocytes         1.1       Lymph #         1.0       Lymph %          17.0       MCH         30.9       MCHC         34.5       MCV         90       Mono #         0.8       Mono %         13.8       MPV         8.9       nRBC         0       Platelets         253       POCT Glucose       143         Potassium   4.1       3.7       Prot/Creat Ratio, Urine 3.97               PROTEIN TOTAL   6.8       5.9       Protein, Urine Random 123               RBC         3.62       RDW         12.5       Sodium   134       134       Sodium, Urine 24  Comment: The random urine reference ranges provided were established   for 24 hour urine collections.  No reference ranges exist for  random urine specimens.  Correlate clinically.                 Urine Urea Nitrogen 53  Comment: The random urine reference ranges provided were established   for 24 hour urine collections.  No reference ranges exist for  random urine specimens.  Correlate clinically.                 WBC         5.58       Emanuel's Stain, Ur No eosinophils seen                                      Significant Imaging:     Imaging Results              CT Thigh With Contrast Left (Final result)  Result time 07/27/22 01:13:09      Final result by Jori Hopkins DO (07/27/22 01:13:09)                   Impression:      Large heterogeneous fluid collection in the anterolateral left proximal thigh soft tissues, concerning for a soft tissue hematoma or Preston-Melissa lesion.  A neoplastic process is not entirely excluded.  Recommend close interval follow-up to assess for stability/resolution.      Electronically signed by: Jori Hopkins  Date:    07/27/2022  Time:    01:13               Narrative:    EXAMINATION:  CT THIGH WITH CONTRAST LEFT    CLINICAL HISTORY:  Soft tissue mass, thigh, deep;    TECHNIQUE:  Axial CT images of the left thigh with sagittal and coronal reformats after the intravenous administration of 50 mL Omnipaque 350.    COMPARISON:  None.    FINDINGS:  Bone: Bone mineralization is normal.  There is no evidence of an  acute fracture or dislocation.  Alignment is normal.    Soft tissues: There is a large heterogeneous fluid collection in the left anterolateral proximal thigh measuring approximately 16 x 7 x 6 cm.  The collection appears to be centered within the subcutaneous tissues or superficial fascia and abuts the gluteus musculature, the sartorius muscle, the rectus femoris, and the vastus lateralis.  There is soft tissue edema in the surrounding subcutaneous tissues.  Muscle bulk is normal.    Articulations: The left femoroacetabular joint is unremarkable.  The pubic symphysis is unremarkable.  The left knee is unremarkable.  No large joint effusion or significant cartilage loss.    Miscellaneous: Neurovascular structures are grossly intact.  There is moderate calcified atherosclerosis of the left femoral and popliteal arteries.                                       CTA Chest Non-Coronary (PE Study) (Final result)  Result time 07/26/22 21:36:01      Final result by Jori Hopkins DO (07/26/22 21:36:01)                   Impression:      No large central or lobar pulmonary embolism.      Electronically signed by: Jori Hopkins  Date:    07/26/2022  Time:    21:36               Narrative:    EXAMINATION:  CTA CHEST NON CORONARY    CLINICAL HISTORY:  Pulmonary embolism (PE) suspected, high prob;    TECHNIQUE:  Low dose axial images, sagittal and coronal reformations were obtained from the thoracic inlet to the lung bases following the IV administration of 100 mL of Omnipaque 350.  Contrast timing was optimized to evaluate the pulmonary arteries.  Maximum intensity projection images were provided for review.    COMPARISON:  Chest radiograph from earlier the same date.    FINDINGS:  Pulmonary vasculature: Satisfactory opacification of the pulmonary arterial system.  Motion artifact significantly limits evaluation of the segmental and subsegmental pulmonary arteries.  There is no large central or lobar pulmonary  embolism.    Aorta: Left-sided aortic arch.  No aneurysm and no significant atherosclerosis.    Base of Neck: No significant abnormality.    Thoracic soft tissues: Normal.    Heart: Normal size. No effusion.    Amena/Mediastinum: No pathologic russ enlargement.    Airways: The large airways are patent. No foci of endobronchial filling.    Lungs/Pleura: Clear lungs. No pleural effusion or thickening.    Esophagus: Normal.    Upper Abdomen: No abnormality of the partially imaged upper abdomen.    Bones: No acute fracture. No suspicious lytic or sclerotic lesions.                                       X-Ray Chest 1 View (Final result)  Result time 07/26/22 21:23:42   Procedure changed from X-Ray Chest PA And Lateral     Final result by Olman Saucedo MD (07/26/22 21:23:42)                   Impression:      No convincing radiographic evidence of acute intrathoracic process on this single view..      Electronically signed by: Olman Saucedo MD  Date:    07/26/2022  Time:    21:23               Narrative:    EXAMINATION:  XR CHEST 1 VIEW    CLINICAL HISTORY:  shortness of breath;    TECHNIQUE:  Single frontal view of the chest was performed.    COMPARISON:  None    FINDINGS:  Cardiac monitoring leads overlie the chest.  Cardiac silhouette appears within normal limits.  No confluent airspace consolidation identified.  No significant volume of pleural fluid or pneumothorax appreciated.  The visualized osseous structures demonstrate mild degenerative changes.

## 2022-08-03 NOTE — CONSULTS
"[facility] - Telemetry Stepdown  Nephrology  Consult Note    Patient Name: Wilfredo Thomas  MRN: [id number]  Admission Date: 7/26/2022  Hospital Length of Stay: 8 days  Attending Provider: Bandar Arce MD   Primary Care Physician: Aylin Wayne DO  Principal Problem:DKA (diabetic ketoacidosis)    Inpatient consult to Nephrology  Consult performed by: Angie Eugene MD  Consult ordered by: Bandar Arce MD        Subjective:     HPI: 46 y/o M hx of HTN, IDDM2, GERD obesity, HLD presented to the ED with complaint of 1 day of worsening SOB. Wife present at bedside. Patient states that he noticed yesterday he was feeling some shortness of breath and couldn't seem to catch his breath. His wife went to check on him this morning and noticed that he was breathing "fast and heavy" and he sounded like he was slurring his words. Wife was concerned about a stroke, so he brought him to the ED for evaluation.     Patient also reporting significant pain and swelling along his left lateral proximal thigh. He states he has noticed worsening pain over the past 2 weeks. He denies any acute trauma to the area. He was seen by ortho last week and was told to take ibu-profen for a muscle strain. This did not help his pain, so he presented again on 7/22 and was given a prednisone injection into his thigh. This also did not help his pain, and now the pain and swelling have advanced to the point that he has difficulty with ambulation. Patient denies fever, chills, nausea, vomiting.      Of note, patient has hx of poorly controlled diabetes. He was recently started on insulin by his PCP, but he has not taken any insulin since being prescribed it.      In the ED, patient hypertensive and tachycardic to the 130s. Tachypneic with RR in the 40s. On CBC, WBC 26.9, On CMP patient hyperkalemic to 5.5, CorNa 136, Bicarb 5. Cr elevated to 1.9 from BL 0.83. UA, BHB pending at time of admission. CTA without evidence of pulmonary embolism. Critical " Care Medicine consulted given severe acidosis.               Overview/Hospital Course:     Patient admitted to the MICU for management of severe DKA. Metabolic acidosis improving on insulin drip. He also has had left thigh pain for 1-2 months. There is a large mass on his left thigh that is tender to palpation, CT revealed hematoma vs soft tissue growth. General surgery consulted and recommended IR consult. IR consulted, no indication for tap. 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 Ortho consulted and performed bedside aspiration of possible abscess which revealed >200K cells >80% segs. Will hold off on antibiotics for now per ortho recs, ortho is taking patient to the OR for irrigation of the thigh, will start antibiotics afterwards.         7/29 Metabolic acidosis slowly improving. Ortho taking patient to the OR for irrigation of the left thigh, will start antibiotics afterwards.   7/30 Transfer to hospital medicine . S/p I&D  of left thigh Abscess on7/29/22 - MRI - uptured tensor fascia jose luis (TFL) muscle with associated large hematoma. gram stain -Moderate Gram positive cocci in clusters .cultures pending.  continue Vancomycin, clindamycin and Zosyn . Bicarb 15. K replaced . off insulin drip on SQ insulin.  7/31 ID and endocrine consulted.  abscess with Calcium pyrophosphate crystals with unknown significance potassium and P replaced. Bicarbonate WNL . aerobic culture 7/28 STAPHYLOCOCCUS SPECIES . Glucose in 300s .PRN imodium  for diarrhea. PT/OT consulted - WBAT. Surgical drain with purulent output  8/1 PICC team consulted for IV access. vanc trough at 11.4. monitor for vanomycin toxicity. possible OR tomorrow for washout  may need bone biopsy to r/o osteomyelitis.Discontinued clindamycin.   8/2 glucose in 300s.  Increased Levemir  to 14 units BID and Aspart  10 units TIDWM. K replaced         Nephrology consulyed for ANGE         Past Medical  History:   Diagnosis Date    Diabetes     Gout     Hyperlipidemia     Hypertension        Past Surgical History:   Procedure Laterality Date    IRRIGATION AND DEBRIDEMENT OF LOWER EXTREMITY Left 2022    Procedure: IRRIGATION AND DEBRIDEMENT, LOWER EXTREMITY;  Surgeon: Rob Graff MD;  Location: [facility] OR [address] [place] [address];  Service: Orthopedics;  Laterality: Left;    WRIST SURGERY Left        Review of patient's allergies indicates:  No Known Allergies  Current Facility-Administered Medications   Medication Frequency    0.9%  NaCl infusion Continuous    acetaminophen tablet 1,000 mg Q8H PRN    atorvastatin tablet 20 mg Daily    dextrose 10% bolus 125 mL PRN    dextrose 10% bolus 250 mL PRN    glucagon (human recombinant) injection 1 mg PRN    glucose chewable tablet 16 g PRN    glucose chewable tablet 24 g PRN    insulin aspart U-100 pen 1-10 Units QID (AC + HS) PRN    insulin aspart U-100 pen 10 Units TIDWM    insulin detemir U-100 pen 14 Units BID    iohexoL (OMNIPAQUE 350) injection 50 mL ONCE PRN    morphine injection 3 mg Q6H PRN    ondansetron injection 4 mg Q6H PRN    oxyCODONE immediate release tablet 5 mg Q4H PRN    senna-docusate 8.6-50 mg per tablet 2 tablet Daily    sodium chloride 0.9% flush 10 mL PRN    sodium chloride 0.9% flush 10 mL PRN    sodium chloride 0.9% flush 10 mL PRN     Family History       Problem Relation (Age of Onset)    Diabetes Mother    No Known Problems Father          Tobacco Use    Smoking status: Former Smoker     Packs/day: 0.50     Types: Cigarettes     Quit date: 2020     Years since quittin.5    Smokeless tobacco: Never Used   Substance and Sexual Activity    Alcohol use: Yes     Alcohol/week: 12.0 standard drinks     Types: 12 Cans of beer per week    Drug use: Never    Sexual activity: Yes     Partners: Female     Review of Systems   All other systems reviewed and are negative.  Objective:     Vital Signs (Most Recent):  Temp: 98.5 °F  (36.9 °C) (08/03/22 0728)  Pulse: 86 (08/03/22 0728)  Resp: 18 (08/03/22 1037)  BP: (!) 159/98 (08/03/22 0728)  SpO2: 98 % (08/03/22 0728)  O2 Device (Oxygen Therapy): room air (08/02/22 1922)   Vital Signs (24h Range):  Temp:  [98.2 °F (36.8 °C)-98.6 °F (37 °C)] 98.5 °F (36.9 °C)  Pulse:  [75-98] 86  Resp:  [16-18] 18  SpO2:  [93 %-98 %] 98 %  BP: (108-159)/(71-98) 159/98     Weight: 94.5 kg (208 lb 5.4 oz) (07/27/22 2100)  Body mass index is 32.63 kg/m².  Body surface area is 2.11 meters squared.    I/O last 3 completed shifts:  In: 700 [P.O.:700]  Out: 90 [Drains:90]    Physical Exam  HENT:      Head: Normocephalic and atraumatic.      Mouth/Throat:      Mouth: Mucous membranes are moist.   Eyes:      Conjunctiva/sclera: Conjunctivae normal.      Pupils: Pupils are equal, round, and reactive to light.   Cardiovascular:      Rate and Rhythm: Normal rate and regular rhythm.      Pulses: Normal pulses.      Heart sounds: Normal heart sounds.   Pulmonary:      Effort: Pulmonary effort is normal.      Breath sounds: Normal breath sounds.   Abdominal:      General: Bowel sounds are normal.      Palpations: Abdomen is soft.   Musculoskeletal:         General: Normal range of motion.   Skin:     General: Skin is warm.      Capillary Refill: Capillary refill takes less than 2 seconds.   Neurological:      General: No focal deficit present.      Mental Status: He is alert and oriented to person, place, and time.   Psychiatric:         Mood and Affect: Mood normal.       Significant Labs:  CBC:   Recent Labs   Lab 08/03/22  0454   WBC 5.58   RBC 3.62*   HGB 11.2*   HCT 32.5*      MCV 90   MCH 30.9   MCHC 34.5     CMP:   Recent Labs   Lab 08/03/22  0759   *   CALCIUM 9.2   ALBUMIN 2.0*   PROT 6.8   *   K 4.1   CO2 28   CL 96   BUN 11   CREATININE 3.0*   ALKPHOS 88   ALT 20   AST 23   BILITOT 0.6       Significant Imaging:  Reviewed     Assessment/Plan:     * DKA (diabetic ketoacidosis)  Per primary      Abscess of left thigh  Per primary     ANGE (acute kidney injury)  ANGE non oliguric scr up to 3 today was 0.7 yesterday   Non oliguric   Likely vancomycin induced ANGE in the setting of trough of 40 and also was on zosyn which will increase risk of vanco induced ANGE.. low suspicion for AIN at this time .   Renal US normal   Euvolemic   No acid/base disorder   No significant electrolyte abnormalities       Recs:   Strict Is and O's   Monitor vanco levels.   RFP daily   Can cont with current IVF for a total of 1-2 L   Will spin urine   Check Pr/Cr                     Angie Eugene MD  Nephrology  [facility] - Telemetry Stepdown

## 2022-08-03 NOTE — SUBJECTIVE & OBJECTIVE
"Principal Problem:DKA (diabetic ketoacidosis)    Principal Orthopedic Problem: Left thigh I&D  Staph species culture on 07/28    Interval History: Pt seen and examined at bedside. NAEON. Pain controlled. Ambulated to bathroom and back per PT.  VSS, AF. No other concerns stated. 60cc ON, purulent drainage.         Review of patient's allergies indicates:  No Known Allergies    Current Facility-Administered Medications   Medication    acetaminophen tablet 1,000 mg    atorvastatin tablet 20 mg    dextrose 10% bolus 125 mL    dextrose 10% bolus 250 mL    glucagon (human recombinant) injection 1 mg    glucose chewable tablet 16 g    glucose chewable tablet 24 g    insulin aspart U-100 pen 1-10 Units    insulin aspart U-100 pen 10 Units    insulin detemir U-100 pen 14 Units    iohexoL (OMNIPAQUE 350) injection 50 mL    morphine injection 3 mg    ondansetron injection 4 mg    oxyCODONE immediate release tablet 5 mg    piperacillin-tazobactam 4.5 g in sodium chloride 0.9% 100 mL IVPB (ready to mix system)    senna-docusate 8.6-50 mg per tablet 2 tablet    sodium chloride 0.9% flush 10 mL    sodium chloride 0.9% flush 10 mL    sodium chloride 0.9% flush 10 mL    vancomycin 1.75 g in 5 % dextrose 500 mL IVPB     Objective:     Vital Signs (Most Recent):  Temp: 98.2 °F (36.8 °C) (08/03/22 0304)  Pulse: 75 (08/03/22 0332)  Resp: 16 (08/02/22 2124)  BP: 139/86 (08/03/22 0304)  SpO2: 96 % (08/03/22 0304) Vital Signs (24h Range):  Temp:  [98.2 °F (36.8 °C)-98.6 °F (37 °C)] 98.2 °F (36.8 °C)  Pulse:  [75-98] 75  Resp:  [16-18] 16  SpO2:  [93 %-98 %] 96 %  BP: (108-161)/(71-94) 139/86     Weight: 94.5 kg (208 lb 5.4 oz)  Height: 5' 7" (170.2 cm)  Body mass index is 32.63 kg/m².      Intake/Output Summary (Last 24 hours) at 8/3/2022 0612  Last data filed at 8/3/2022 0508  Gross per 24 hour   Intake 700 ml   Output 90 ml   Net 610 ml         Ortho/SPM Exam    L Lower Extremity  Inspection  - Surgical site dressing clean, dry, and " intact  - No swelling  - No ecchymosis, erythema, or signs of cellulitis  Palpation  - TTP to palpation over surgical site and anterior thigh  Range of motion  - AROM and PROM of the hip, knee, ankle, and foot intact without pain  Stability  - No evidence of joint dislocation or abnormal laxity  Neurovascular  - TA/EHL/Gastroc/FHL assessed in isolation without deficit  - SILT throughout  - Compartments soft  - DP palpated   - Capillary Refill <3s  - Negative Log roll  - Negative Stinchfield  - Muscle tone normal    Still waiting results from cultures taken during surgery 07/29.    CBC:   Recent Labs   Lab 08/02/22 0226 08/03/22  0454   WBC 5.01 5.58   HGB 11.3* 11.2*   HCT 32.3* 32.5*    253       CMP:   Recent Labs   Lab 08/02/22 0226 08/03/22  0454    134*   K 3.3* 3.7   CL 99 98   CO2 32* 27   * 131*   BUN 4* 11   CREATININE 0.7 2.7*   CALCIUM 9.2 8.7   PROT 6.0 5.9*   ALBUMIN 1.9* 1.7*   BILITOT 0.4 0.5   ALKPHOS 85 78   AST 15 21   ALT 14 18   ANIONGAP 8 9         All pertinent labs within the past 24 hours have been reviewed.    Significant Imaging: I have reviewed and interpreted all pertinent imaging results/findings.

## 2022-08-03 NOTE — PROGRESS NOTES
[facility] - Telemetry Stepdown  Orthopedics  Progress Note    Patient Name: Wilfredo Thomas  MRN: [id number]  Admission Date: 7/26/2022  Hospital Length of Stay: 8 days  Attending Provider: Bandar Arce MD  Primary Care Provider: Aylin Wayne DO  Follow-up For: Procedure(s) (LRB):  IRRIGATION AND DEBRIDEMENT, LOWER EXTREMITY (Left)    Post-Operative Day: 5 Days Post-Op  Subjective:     Principal Problem:DKA (diabetic ketoacidosis)    Principal Orthopedic Problem: Left thigh I&D  Staph species culture on 07/28    Interval History: Pt seen and examined at bedside. NAEON. Pain controlled. Ambulated to bathroom and back per PT.  VSS, AF. No other concerns stated. 60cc ON, purulent drainage.         Review of patient's allergies indicates:  No Known Allergies    Current Facility-Administered Medications   Medication    acetaminophen tablet 1,000 mg    atorvastatin tablet 20 mg    dextrose 10% bolus 125 mL    dextrose 10% bolus 250 mL    glucagon (human recombinant) injection 1 mg    glucose chewable tablet 16 g    glucose chewable tablet 24 g    insulin aspart U-100 pen 1-10 Units    insulin aspart U-100 pen 10 Units    insulin detemir U-100 pen 14 Units    iohexoL (OMNIPAQUE 350) injection 50 mL    morphine injection 3 mg    ondansetron injection 4 mg    oxyCODONE immediate release tablet 5 mg    piperacillin-tazobactam 4.5 g in sodium chloride 0.9% 100 mL IVPB (ready to mix system)    senna-docusate 8.6-50 mg per tablet 2 tablet    sodium chloride 0.9% flush 10 mL    sodium chloride 0.9% flush 10 mL    sodium chloride 0.9% flush 10 mL    vancomycin 1.75 g in 5 % dextrose 500 mL IVPB     Objective:     Vital Signs (Most Recent):  Temp: 98.2 °F (36.8 °C) (08/03/22 0304)  Pulse: 75 (08/03/22 0332)  Resp: 16 (08/02/22 2124)  BP: 139/86 (08/03/22 0304)  SpO2: 96 % (08/03/22 0304) Vital Signs (24h Range):  Temp:  [98.2 °F (36.8 °C)-98.6 °F (37 °C)] 98.2 °F (36.8 °C)  Pulse:  [75-98] 75  Resp:  [16-18]  "16  SpO2:  [93 %-98 %] 96 %  BP: (108-161)/(71-94) 139/86     Weight: 94.5 kg (208 lb 5.4 oz)  Height: 5' 7" (170.2 cm)  Body mass index is 32.63 kg/m².      Intake/Output Summary (Last 24 hours) at 8/3/2022 0655  Last data filed at 8/3/2022 0508  Gross per 24 hour   Intake 700 ml   Output 90 ml   Net 610 ml         Ortho/SPM Exam    L Lower Extremity  Inspection  - Surgical site dressing clean, dry, and intact  - No swelling  - No ecchymosis, erythema, or signs of cellulitis  Palpation  - TTP to palpation over surgical site and anterior thigh  Range of motion  - AROM and PROM of the hip, knee, ankle, and foot intact without pain  Stability  - No evidence of joint dislocation or abnormal laxity  Neurovascular  - TA/EHL/Gastroc/FHL assessed in isolation without deficit  - SILT throughout  - Compartments soft  - DP palpated   - Capillary Refill <3s  - Negative Log roll  - Negative Stinchfield  - Muscle tone normal    Still waiting results from cultures taken during surgery 07/29.    CBC:   Recent Labs   Lab 08/02/22 0226 08/03/22  0454   WBC 5.01 5.58   HGB 11.3* 11.2*   HCT 32.3* 32.5*    253       CMP:   Recent Labs   Lab 08/02/22 0226 08/03/22  0454    134*   K 3.3* 3.7   CL 99 98   CO2 32* 27   * 131*   BUN 4* 11   CREATININE 0.7 2.7*   CALCIUM 9.2 8.7   PROT 6.0 5.9*   ALBUMIN 1.9* 1.7*   BILITOT 0.4 0.5   ALKPHOS 85 78   AST 15 21   ALT 14 18   ANIONGAP 8 9         All pertinent labs within the past 24 hours have been reviewed.    Significant Imaging: I have reviewed and interpreted all pertinent imaging results/findings.    Assessment/Plan:     Abscess of left thigh  Wilfredo Thomas is a 47 y.o. male with past medical history of hypertension and diabetes, currently admitted to hospital for DKA. He reports 4-6 weeks of left lateral thigh pain and swelling, no known injury to the affected area. MRI scan showing lateral thigh fluid collection. Now s/p I&D 7/29/22.     - S/p I&D 7/29/22  - PT " daily   - Cx: Staph, spec pending  - Drain 60cc overnight.   - Abx Vanc Zosyn   - Tight glucose control and nutrition optimization  - Could require additional washout tomorrow      Left leg pain               Joseph Patterson MD  Orthopedics  [facility] - Telemetry Stepdown

## 2022-08-03 NOTE — PROGRESS NOTES
[facility] - Telemetry Stepdown  Infectious Disease  Progress Note    Patient Name: Wilfredo Thomas  MRN: [id number]  Admission Date: 7/26/2022  Length of Stay: 8 days  Attending Physician: Bandar Arce MD  Primary Care Provider: Aylin Wayne DO    Isolation Status: No active isolations  Assessment/Plan:      Abscess of left thigh     46 yo male with history of DMII and HTN admitted for DKA and left thigh abscess. MRI of left femur was notable for ruptured tensor fascia jose luis mm with associated large hematoma, as well as small area of potential osteonecrosis. Bedside aspiration of the fluid collection was notable for a cell count of >200k (>80%segs), cultures + staph species. He was taken to the OR on 7/29 with orthopedic surgery for surgical I&D. Surgical cultures are also positive for staph species. Per ortho surgery, no concerns for bone involvement. Patient is currently on Vancomycin and Zosyn. Vanc trough supratherapeutic and patient has developed an ANGE. He is scheduled for repeat debridement tomorrow. Afebrile. HDS.        Recommendations:   · Discontinue Vancomycin and Zosyn  · Start Cefazolin for MSSA/MSSE (awaiting speciation)   · When taken for repeat I&D, please send tissue for path, gram stain, aerobic, anaerobic, AFB and fungal cultures. If there is concerns for bone involvement, please send bone specimen  · Nephrology consulted for ANGE management.  · Anticipate SSTI course unless new concerns for osteo appear  · Patient and plan reviewed with ID staff, Dr. Samson. ID will follow.             Thank you for the consult. Please call for any questions.  Giselle Nichols PA-C  ID [id number] Spectra: [id number]    Subjective:     Principal Problem:DKA (diabetic ketoacidosis)    HPI: Mr. Thomas is a 47 year old male with a PMH of DM2 (poorly controlled), HTN, GERD, HLD, obesity who initially presented to [facility] ED with cc of SOBx1 day. Pt was also reporting significant pain and swelling along his left lateral  proximal thigh. Pt reports this pain started over the last 2 weeks, which worsened. He initially was seen outpatient in which he was diagnoised with a mm strain and given 800 mg ibuprofen. From there he went to the ED on 7/22 d/t worsening pain. He was treated with prednisone/morphine shot, and well as a prednisone oral pack. Pt denied recent injury, with the exception of soreness to his left lower shin following bumping into a cabinet. He denies open wounds/abrasions from this injury but states the soreness started in his left thigh following the improvement of discomfort from his left shin. Pt reports having been treated for a boil on his central/right buttocks in the end of June. 2022. Pt states he was seen in Urgent Care in [place] and the boil was drained. He was given an oral abx in which he completed.     To note, pt reports having a recent left wrist fracture, ligament rupture following an accident with his riding lawnmower. States he underwent surgery and 2 screws were placed on 3/31/22 at [facility]. Denies associated infection/complications. Denies pain in the site currently.    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 Denies having pets. Denies recent fishing/hunting. Denies hx of immunocompromising conditions.     To note, pt was found to be in DKA with blood sugars >400, treated by critical team, which has since resolved. Pt was recently started on insulin by his PCP, which he had not started. Pt latest A1C 10.     CT of left thigh notable for fluid collections suspicious for hematoma/seroma. MRI of left femur was notable for ruptured tensor fascia jose luis mm with associated large hematoma, as well as small area of potential osteonecrosis. Bedside aspiration of the fluid collection was notable for a cell count of >200k >80%segs, cultures + staph spp. Pt was taken for I&D with Dr. Graff with ortho surgery on 7/29, abscesses were noted and washed out,  cultures collected + staph spp. Per pt, states that Dr. Graff is planning to return to surgery tomorrow, 8/1/22 for repeat washout.     Pt was intially with leukocytosis, but has since down trended to 11k. Blood cultures from 7/26 NGTD.     Pt is currently on zosyn, vancomycin, and clindamycin. ID was consulted for abx recs regarding left TFL infection.            Interval History: NAEON. Afebrile. No leukocytosis. Drain output increased today. Remains purulent. Patient scheduled for repeat I&D once ANGE has been addressed. Nephrology consulted. IVF started.       Review of Systems   Constitutional:  Negative for chills, diaphoresis, fatigue and fever.   Respiratory:  Negative for cough and shortness of breath.    Cardiovascular:  Positive for leg swelling (upper left thigh). Negative for chest pain and palpitations.   Gastrointestinal:  Negative for abdominal distention and abdominal pain.   Genitourinary:  Negative for difficulty urinating and dysuria.   Musculoskeletal:  Positive for myalgias (upper left thigh). Negative for arthralgias and joint swelling.   Skin:  Positive for wound (upper left thigh). Negative for color change and rash.   Allergic/Immunologic: Negative for immunocompromised state.   Neurological:  Negative for dizziness, tremors, weakness, numbness and headaches.   Psychiatric/Behavioral:  Negative for agitation and decreased concentration. The patient is not nervous/anxious.    Objective:     Vital Signs (Most Recent):  Temp: 98.5 °F (36.9 °C) (08/03/22 0728)  Pulse: 86 (08/03/22 0728)  Resp: 18 (08/03/22 1037)  BP: (!) 159/98 (08/03/22 0728)  SpO2: 98 % (08/03/22 0728)   Vital Signs (24h Range):  Temp:  [98.2 °F (36.8 °C)-98.6 °F (37 °C)] 98.5 °F (36.9 °C)  Pulse:  [75-98] 86  Resp:  [16-18] 18  SpO2:  [93 %-98 %] 98 %  BP: (108-159)/(71-98) 159/98     Weight: 94.5 kg (208 lb 5.4 oz)  Body mass index is 32.63 kg/m².    Estimated Creatinine Clearance: 32.7 mL/min (A) (based on SCr of 3 mg/dL  (H)).    Physical Exam  Vitals and nursing note reviewed.   Constitutional:       General: He is not in acute distress.     Appearance: Normal appearance. He is well-developed. He is obese. He is not ill-appearing, toxic-appearing or diaphoretic.   HENT:      Head: Normocephalic and atraumatic.      Nose: Nose normal.      Mouth/Throat:      Dentition: Does not have dentures.      Pharynx: No oropharyngeal exudate.   Eyes:      General: No scleral icterus.     Conjunctiva/sclera: Conjunctivae normal.   Cardiovascular:      Rate and Rhythm: Normal rate and regular rhythm.   Pulmonary:      Effort: Pulmonary effort is normal. No respiratory distress.      Breath sounds: Normal breath sounds.   Abdominal:      General: There is no distension.      Palpations: Abdomen is soft.      Tenderness: There is no abdominal tenderness.   Skin:     General: Skin is warm and dry.      Findings: No erythema or rash.      Comments: Incision to left thigh. Dressed, CDI. Surgical drain with purulent output.   Neurological:      Mental Status: He is alert and oriented to person, place, and time. Mental status is at baseline.   Psychiatric:         Mood and Affect: Mood normal.         Behavior: Behavior normal.         Thought Content: Thought content normal.         Judgment: Judgment normal.       Significant Labs: CBC:   Recent Labs   Lab 08/02/22 0226 08/03/22  0454   WBC 5.01 5.58   HGB 11.3* 11.2*   HCT 32.3* 32.5*    253     CMP:   Recent Labs   Lab 08/02/22 0226 08/03/22  0454 08/03/22  0759    134* 134*   K 3.3* 3.7 4.1   CL 99 98 96   CO2 32* 27 28   * 131* 145*   BUN 4* 11 11   CREATININE 0.7 2.7* 3.0*   CALCIUM 9.2 8.7 9.2   PROT 6.0 5.9* 6.8   ALBUMIN 1.9* 1.7* 2.0*   BILITOT 0.4 0.5 0.6   ALKPHOS 85 78 88   AST 15 21 23   ALT 14 18 20   ANIONGAP 8 9 10     Microbiology Results (last 7 days)       Procedure Component Value Units Date/Time    Aerobic culture [[id number]]  (Abnormal) Collected: 07/29/22  1300    Order Status: Completed Specimen: Abscess from Leg, Left Updated: 08/03/22 1123     Aerobic Bacterial Culture STAPHYLOCOCCUS SPECIES  Many  Identification and susceptibility pending      Narrative:      Left thigh abscess #2    Aerobic culture [[id number]]  (Abnormal) Collected: 07/29/22 1251    Order Status: Completed Specimen: Abscess from Leg, Left Updated: 08/03/22 1123     Aerobic Bacterial Culture STAPHYLOCOCCUS SPECIES  Many  Identification and susceptibility pending      Narrative:      Left thigh abscess #1    Aerobic culture [[id number]]  (Abnormal)  (Susceptibility) Collected: 07/28/22 1800    Order Status: Completed Specimen: Abscess from Leg, Left Updated: 08/03/22 1116     Aerobic Bacterial Culture STAPHYLOCOCCUS SPECIES  Many  Identification pending      Narrative:      LEFT THIGH ABSCESS    Fungus culture [[id number]] Collected: 07/29/22 1251    Order Status: Completed Specimen: Abscess from Leg, Left Updated: 08/02/22 1335     Fungus (Mycology) Culture Culture in progress    Narrative:      Left thigh abscess #1    Fungus culture [[id number]] Collected: 07/29/22 1300    Order Status: Completed Specimen: Abscess from Leg, Left Updated: 08/02/22 1335     Fungus (Mycology) Culture Culture in progress    Narrative:      Left thigh abscess #2    Fungus culture [[id number]] Collected: 07/28/22 1800    Order Status: Completed Specimen: Abscess from Leg, Left Updated: 08/02/22 1334     Fungus (Mycology) Culture Culture in progress    Narrative:      LEFT THIGH ABSCESS    Culture, Anaerobic [[id number]] Collected: 07/28/22 1800    Order Status: Completed Specimen: Abscess from Leg, Left Updated: 08/02/22 0853     Anaerobic Culture No anaerobes isolated    Narrative:      LEFT THIGH ABSCESS    Culture, Anaerobe [[id number]] Collected: 07/29/22 1300    Order Status: Completed Specimen: Abscess from Leg, Left Updated: 08/02/22 0852     Anaerobic Culture No anaerobes isolated    Narrative:      Left thigh  abscess #2    Culture, Anaerobe [[id number]] Collected: 07/29/22 1251    Order Status: Completed Specimen: Abscess from Leg, Left Updated: 08/02/22 0851     Anaerobic Culture No anaerobes isolated    Narrative:      Left thigh abscess #1    AFB Culture & Smear [[id number]] Collected: 07/29/22 1252    Order Status: Completed Specimen: Wound from Leg, Left Updated: 08/01/22 1512     AFB Culture & Smear Culture in progress     AFB CULTURE STAIN No acid fast bacilli seen.    Narrative:      Left thigh abscess #1    AFB Culture & Smear [[id number]] Collected: 07/29/22 1300    Order Status: Completed Specimen: Abscess from Leg, Left Updated: 08/01/22 1512     AFB Culture & Smear Culture in progress     AFB CULTURE STAIN No acid fast bacilli seen.    Narrative:      Left thigh abscess #2    Blood Culture #1 **CANNOT BE ORDERED STAT** [[id number]] Collected: 07/26/22 2302    Order Status: Completed Specimen: Blood from Peripheral, Hand, Left Updated: 08/01/22 0612     Blood Culture, Routine No growth after 5 days.    Blood Culture #2 **CANNOT BE ORDERED STAT** [[id number]] Collected: 07/26/22 2246    Order Status: Completed Specimen: Blood from Peripheral, Antecubital, Left Updated: 08/01/22 0612     Blood Culture, Routine No growth after 5 days.    AFB Culture & Smear [[id number]] Collected: 07/28/22 1800    Order Status: Completed Specimen: Abscess from Leg, Left Updated: 07/29/22 2127     AFB Culture & Smear Culture in progress     AFB CULTURE STAIN No acid fast bacilli seen.    Narrative:      LEFT THIGH ABSCESS    Gram stain [[id number]] Collected: 07/29/22 1300    Order Status: Completed Specimen: Abscess from Leg, Left Updated: 07/29/22 1421     Gram Stain Result Moderate WBC's      Moderate Gram positive cocci in clusters    Narrative:      Left thigh abscess #2    Gram stain [[id number]] Collected: 07/29/22 1251    Order Status: Completed Specimen: Abscess from Leg, Left Updated: 07/29/22 1420     Gram Stain Result  Moderate WBC's      Moderate Gram positive cocci in clusters    Narrative:      Left thigh abscess #1    Gram stain [[id number]] Collected: 07/28/22 1800    Order Status: Completed Specimen: Abscess from Leg, Left Updated: 07/28/22 2026     Gram Stain Result Rare WBC's      Few Gram positive cocci    Narrative:      LEFT THIGH ABSCESS          Recent Lab Results  (Last 5 results in the past 24 hours)        08/03/22  0945   08/03/22  0759   08/03/22  0756   08/03/22  0726   08/03/22  0454        Albumin   2.0       1.7       Alkaline Phosphatase   88       78       ALT   20       18       Anion Gap   10       9       AST   23       21       Baso #         0.04       Basophil %         0.7       BILIRUBIN TOTAL   0.6  Comment: For infants and newborns, interpretation of results should be based  on gestational age, weight and in agreement with clinical  observations.    Premature Infant recommended reference ranges:  Up to 24 hours.............<8.0 mg/dL  Up to 48 hours............<12.0 mg/dL  3-5 days..................<15.0 mg/dL  6-29 days.................<15.0 mg/dL         0.5  Comment: For infants and newborns, interpretation of results should be based  on gestational age, weight and in agreement with clinical  observations.    Premature Infant recommended reference ranges:  Up to 24 hours.............<8.0 mg/dL  Up to 48 hours............<12.0 mg/dL  3-5 days..................<15.0 mg/dL  6-29 days.................<15.0 mg/dL         BUN   11       11       Calcium   9.2       8.7       Chloride   96       98       CO2   28       27       Creatinine   3.0       2.7       Creatinine, Urine 31.0                31.0               CRP     121.9           Differential Method         Automated       eGFR   24.7       28.0       Eos #         0.2       Eosinophil %         2.7       Glucose   145       131       Gran # (ANC)         3.6       Gran %         64.7       Hematocrit         32.5       Hemoglobin          11.2       Immature Grans (Abs)         0.06  Comment: Mild elevation in immature granulocytes is non specific and   can be seen in a variety of conditions including stress response,   acute inflammation, trauma and pregnancy. Correlation with other   laboratory and clinical findings is essential.         Immature Granulocytes         1.1       Lymph #         1.0       Lymph %         17.0       MCH         30.9       MCHC         34.5       MCV         90       Mono #         0.8       Mono %         13.8       MPV         8.9       nRBC         0       Platelets         253       POCT Glucose       143         Potassium   4.1       3.7       Prot/Creat Ratio, Urine 3.97               PROTEIN TOTAL   6.8       5.9       Protein, Urine Random 123               RBC         3.62       RDW         12.5       Sodium   134       134       Sodium, Urine 24  Comment: The random urine reference ranges provided were established   for 24 hour urine collections.  No reference ranges exist for  random urine specimens.  Correlate clinically.                 Urine Urea Nitrogen 53  Comment: The random urine reference ranges provided were established   for 24 hour urine collections.  No reference ranges exist for  random urine specimens.  Correlate clinically.                 WBC         5.58       Emanuel's Stain, Ur No eosinophils seen                                      Significant Imaging:     Imaging Results              CT Thigh With Contrast Left (Final result)  Result time 07/27/22 01:13:09      Final result by Jori Hopkins DO (07/27/22 01:13:09)                   Impression:      Large heterogeneous fluid collection in the anterolateral left proximal thigh soft tissues, concerning for a soft tissue hematoma or Preston-Melissa lesion.  A neoplastic process is not entirely excluded.  Recommend close interval follow-up to assess for stability/resolution.      Electronically signed by: Jori  Kellie  Date:    07/27/2022  Time:    01:13               Narrative:    EXAMINATION:  CT THIGH WITH CONTRAST LEFT    CLINICAL HISTORY:  Soft tissue mass, thigh, deep;    TECHNIQUE:  Axial CT images of the left thigh with sagittal and coronal reformats after the intravenous administration of 50 mL Omnipaque 350.    COMPARISON:  None.    FINDINGS:  Bone: Bone mineralization is normal.  There is no evidence of an acute fracture or dislocation.  Alignment is normal.    Soft tissues: There is a large heterogeneous fluid collection in the left anterolateral proximal thigh measuring approximately 16 x 7 x 6 cm.  The collection appears to be centered within the subcutaneous tissues or superficial fascia and abuts the gluteus musculature, the sartorius muscle, the rectus femoris, and the vastus lateralis.  There is soft tissue edema in the surrounding subcutaneous tissues.  Muscle bulk is normal.    Articulations: The left femoroacetabular joint is unremarkable.  The pubic symphysis is unremarkable.  The left knee is unremarkable.  No large joint effusion or significant cartilage loss.    Miscellaneous: Neurovascular structures are grossly intact.  There is moderate calcified atherosclerosis of the left femoral and popliteal arteries.                                       CTA Chest Non-Coronary (PE Study) (Final result)  Result time 07/26/22 21:36:01      Final result by Jori Hopkins DO (07/26/22 21:36:01)                   Impression:      No large central or lobar pulmonary embolism.      Electronically signed by: Jori Hopkins  Date:    07/26/2022  Time:    21:36               Narrative:    EXAMINATION:  CTA CHEST NON CORONARY    CLINICAL HISTORY:  Pulmonary embolism (PE) suspected, high prob;    TECHNIQUE:  Low dose axial images, sagittal and coronal reformations were obtained from the thoracic inlet to the lung bases following the IV administration of 100 mL of Omnipaque 350.  Contrast timing was optimized to  evaluate the pulmonary arteries.  Maximum intensity projection images were provided for review.    COMPARISON:  Chest radiograph from earlier the same date.    FINDINGS:  Pulmonary vasculature: Satisfactory opacification of the pulmonary arterial system.  Motion artifact significantly limits evaluation of the segmental and subsegmental pulmonary arteries.  There is no large central or lobar pulmonary embolism.    Aorta: Left-sided aortic arch.  No aneurysm and no significant atherosclerosis.    Base of Neck: No significant abnormality.    Thoracic soft tissues: Normal.    Heart: Normal size. No effusion.    Amena/Mediastinum: No pathologic russ enlargement.    Airways: The large airways are patent. No foci of endobronchial filling.    Lungs/Pleura: Clear lungs. No pleural effusion or thickening.    Esophagus: Normal.    Upper Abdomen: No abnormality of the partially imaged upper abdomen.    Bones: No acute fracture. No suspicious lytic or sclerotic lesions.                                       X-Ray Chest 1 View (Final result)  Result time 07/26/22 21:23:42   Procedure changed from X-Ray Chest PA And Lateral     Final result by Olman Saucedo MD (07/26/22 21:23:42)                   Impression:      No convincing radiographic evidence of acute intrathoracic process on this single view..      Electronically signed by: Olman Saucedo MD  Date:    07/26/2022  Time:    21:23               Narrative:    EXAMINATION:  XR CHEST 1 VIEW    CLINICAL HISTORY:  shortness of breath;    TECHNIQUE:  Single frontal view of the chest was performed.    COMPARISON:  None    FINDINGS:  Cardiac monitoring leads overlie the chest.  Cardiac silhouette appears within normal limits.  No confluent airspace consolidation identified.  No significant volume of pleural fluid or pneumothorax appreciated.  The visualized osseous structures demonstrate mild degenerative changes.

## 2022-08-03 NOTE — PT/OT/SLP PROGRESS
Physical Therapy      Patient Name:  Wilfredo Thomas   MRN:  [id number]    Patient not seen today secondary to Off the floor for procedure/surgery, at US. Unable to return for second therapy attempt. Patient's wife reports patient has been ambulating short distances with RW and following HEP without issue.  Will follow-up as appropriate.

## 2022-08-03 NOTE — SUBJECTIVE & OBJECTIVE
Past Medical History:   Diagnosis Date    Diabetes     Gout     Hyperlipidemia     Hypertension        Past Surgical History:   Procedure Laterality Date    IRRIGATION AND DEBRIDEMENT OF LOWER EXTREMITY Left 2022    Procedure: IRRIGATION AND DEBRIDEMENT, LOWER EXTREMITY;  Surgeon: Rob Graff MD;  Location: [facility] OR [address] [place] [address];  Service: Orthopedics;  Laterality: Left;    WRIST SURGERY Left        Review of patient's allergies indicates:  No Known Allergies  Current Facility-Administered Medications   Medication Frequency    0.9%  NaCl infusion Continuous    acetaminophen tablet 1,000 mg Q8H PRN    atorvastatin tablet 20 mg Daily    dextrose 10% bolus 125 mL PRN    dextrose 10% bolus 250 mL PRN    glucagon (human recombinant) injection 1 mg PRN    glucose chewable tablet 16 g PRN    glucose chewable tablet 24 g PRN    insulin aspart U-100 pen 1-10 Units QID (AC + HS) PRN    insulin aspart U-100 pen 10 Units TIDWM    insulin detemir U-100 pen 14 Units BID    iohexoL (OMNIPAQUE 350) injection 50 mL ONCE PRN    morphine injection 3 mg Q6H PRN    ondansetron injection 4 mg Q6H PRN    oxyCODONE immediate release tablet 5 mg Q4H PRN    senna-docusate 8.6-50 mg per tablet 2 tablet Daily    sodium chloride 0.9% flush 10 mL PRN    sodium chloride 0.9% flush 10 mL PRN    sodium chloride 0.9% flush 10 mL PRN     Family History       Problem Relation (Age of Onset)    Diabetes Mother    No Known Problems Father          Tobacco Use    Smoking status: Former Smoker     Packs/day: 0.50     Types: Cigarettes     Quit date: 2020     Years since quittin.5    Smokeless tobacco: Never Used   Substance and Sexual Activity    Alcohol use: Yes     Alcohol/week: 12.0 standard drinks     Types: 12 Cans of beer per week    Drug use: Never    Sexual activity: Yes     Partners: Female     Review of Systems   All other systems reviewed and are negative.  Objective:     Vital Signs (Most Recent):  Temp: 98.5 °F (36.9 °C)  (08/03/22 0728)  Pulse: 86 (08/03/22 0728)  Resp: 18 (08/03/22 1037)  BP: (!) 159/98 (08/03/22 0728)  SpO2: 98 % (08/03/22 0728)  O2 Device (Oxygen Therapy): room air (08/02/22 1922)   Vital Signs (24h Range):  Temp:  [98.2 °F (36.8 °C)-98.6 °F (37 °C)] 98.5 °F (36.9 °C)  Pulse:  [75-98] 86  Resp:  [16-18] 18  SpO2:  [93 %-98 %] 98 %  BP: (108-159)/(71-98) 159/98     Weight: 94.5 kg (208 lb 5.4 oz) (07/27/22 2100)  Body mass index is 32.63 kg/m².  Body surface area is 2.11 meters squared.    I/O last 3 completed shifts:  In: 700 [P.O.:700]  Out: 90 [Drains:90]    Physical Exam  HENT:      Head: Normocephalic and atraumatic.      Mouth/Throat:      Mouth: Mucous membranes are moist.   Eyes:      Conjunctiva/sclera: Conjunctivae normal.      Pupils: Pupils are equal, round, and reactive to light.   Cardiovascular:      Rate and Rhythm: Normal rate and regular rhythm.      Pulses: Normal pulses.      Heart sounds: Normal heart sounds.   Pulmonary:      Effort: Pulmonary effort is normal.      Breath sounds: Normal breath sounds.   Abdominal:      General: Bowel sounds are normal.      Palpations: Abdomen is soft.   Musculoskeletal:         General: Normal range of motion.   Skin:     General: Skin is warm.      Capillary Refill: Capillary refill takes less than 2 seconds.   Neurological:      General: No focal deficit present.      Mental Status: He is alert and oriented to person, place, and time.   Psychiatric:         Mood and Affect: Mood normal.       Significant Labs:  CBC:   Recent Labs   Lab 08/03/22  0454   WBC 5.58   RBC 3.62*   HGB 11.2*   HCT 32.5*      MCV 90   MCH 30.9   MCHC 34.5     CMP:   Recent Labs   Lab 08/03/22  0759   *   CALCIUM 9.2   ALBUMIN 2.0*   PROT 6.8   *   K 4.1   CO2 28   CL 96   BUN 11   CREATININE 3.0*   ALKPHOS 88   ALT 20   AST 23   BILITOT 0.6       Significant Imaging:  Reviewed

## 2022-08-03 NOTE — HPI
"46 y/o M hx of HTN, IDDM2, GERD obesity, HLD presented to the ED with complaint of 1 day of worsening SOB. Wife present at bedside. Patient states that he noticed yesterday he was feeling some shortness of breath and couldn't seem to catch his breath. His wife went to check on him this morning and noticed that he was breathing "fast and heavy" and he sounded like he was slurring his words. Wife was concerned about a stroke, so he brought him to the ED for evaluation.     Patient also reporting significant pain and swelling along his left lateral proximal thigh. He states he has noticed worsening pain over the past 2 weeks. He denies any acute trauma to the area. He was seen by ortho last week and was told to take ibu-profen for a muscle strain. This did not help his pain, so he presented again on 7/22 and was given a prednisone injection into his thigh. This also did not help his pain, and now the pain and swelling have advanced to the point that he has difficulty with ambulation. Patient denies fever, chills, nausea, vomiting.      Of note, patient has hx of poorly controlled diabetes. He was recently started on insulin by his PCP, but he has not taken any insulin since being prescribed it.      In the ED, patient hypertensive and tachycardic to the 130s. Tachypneic with RR in the 40s. On CBC, WBC 26.9, On CMP patient hyperkalemic to 5.5, CorNa 136, Bicarb 5. Cr elevated to 1.9 from BL 0.83. UA, BHB pending at time of admission. CTA without evidence of pulmonary embolism. Critical Care Medicine consulted given severe acidosis.               Overview/Hospital Course:     Patient admitted to the MICU for management of severe DKA. Metabolic acidosis improving on insulin drip. He also has had left thigh pain for 1-2 months. There is a large mass on his left thigh that is tender to palpation, CT revealed hematoma vs soft tissue growth. General surgery consulted and recommended IR consult. IR consulted, no indication for " tap. 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 Ortho consulted and performed bedside aspiration of possible abscess which revealed >200K cells >80% segs. Will hold off on antibiotics for now per ortho recs, ortho is taking patient to the OR for irrigation of the thigh, will start antibiotics afterwards.         7/29 Metabolic acidosis slowly improving. Ortho taking patient to the OR for irrigation of the left thigh, will start antibiotics afterwards.   7/30 Transfer to hospital medicine . S/p I&D  of left thigh Abscess on7/29/22 - MRI - uptured tensor fascia jose luis (TFL) muscle with associated large hematoma. gram stain -Moderate Gram positive cocci in clusters .cultures pending.  continue Vancomycin, clindamycin and Zosyn . Bicarb 15. K replaced . off insulin drip on SQ insulin.  7/31 ID and endocrine consulted.  abscess with Calcium pyrophosphate crystals with unknown significance potassium and P replaced. Bicarbonate WNL . aerobic culture 7/28 STAPHYLOCOCCUS SPECIES . Glucose in 300s .PRN imodium  for diarrhea. PT/OT consulted - WBAT. Surgical drain with purulent output  8/1 PICC team consulted for IV access. vanc trough at 11.4. monitor for vanomycin toxicity. possible OR tomorrow for washout  may need bone biopsy to r/o osteomyelitis.Discontinued clindamycin.   8/2 glucose in 300s.  Increased Levemir  to 14 units BID and Aspart  10 units TIDWM. K replaced         Nephrology consulyed for ANGE

## 2022-08-03 NOTE — PROGRESS NOTES
"Josafat Chun - Telemetry [facility] Medicine  Progress Note    Patient Name: Wilfredo Thomas  MRN: [id number]  Patient Class: IP- Inpatient   Admission Date: 7/26/2022  Length of Stay: 8 days  Attending Physician: Bandar Arce MD  Primary Care Provider: Aylin Wayne DO        Subjective:     Principal Problem:DKA (diabetic ketoacidosis)        HPI:  48 y/o M hx of HTN, IDDM2, GERD obesity, HLD presented to the ED with complaint of 1 day of worsening SOB. Wife present at bedside. Patient states that he noticed yesterday he was feeling some shortness of breath and couldn't seem to catch his breath. His wife went to check on him this morning and noticed that he was breathing "fast and heavy" and he sounded like he was slurring his words. Wife was concerned about a stroke, so he brought him to the ED for evaluation.     Patient also reporting significant pain and swelling along his left lateral proximal thigh. He states he has noticed worsening pain over the past 2 weeks. He denies any acute trauma to the area. He was seen by ortho last week and was told to take ibu-profen for a muscle strain. This did not help his pain, so he presented again on 7/22 and was given a prednisone injection into his thigh. This also did not help his pain, and now the pain and swelling have advanced to the point that he has difficulty with ambulation. Patient denies fever, chills, nausea, vomiting.      Of note, patient has hx of poorly controlled diabetes. He was recently started on insulin by his PCP, but he has not taken any insulin since being prescribed it.      In the ED, patient hypertensive and tachycardic to the 130s. Tachypneic with RR in the 40s. On CBC, WBC 26.9, On CMP patient hyperkalemic to 5.5, CorNa 136, Bicarb 5. Cr elevated to 1.9 from BL 0.83. UA, BHB pending at time of admission. CTA without evidence of pulmonary embolism. Critical Care Medicine consulted given severe acidosis.             Overview/Hospital " Course:    Patient admitted to the MICU for management of severe DKA. Metabolic acidosis improving on insulin drip. He also has had left thigh pain for 1-2 months. There is a large mass on his left thigh that is tender to palpation, CT revealed hematoma vs soft tissue growth. General surgery consulted and recommended IR consult. IR consulted, no indication for tap. 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 Ortho consulted and performed bedside aspiration of possible abscess which revealed >200K cells >80% segs. Will hold off on antibiotics for now per ortho recs, ortho is taking patient to the OR for irrigation of the thigh, will start antibiotics afterwards.         7/29 Metabolic acidosis slowly improving. Ortho taking patient to the OR for irrigation of the left thigh, will start antibiotics afterwards.   7/30 Transfer to hospital medicine . S/p I&D  of left thigh Abscess on7/29/22 - MRI - uptured tensor fascia jose luis (TFL) muscle with associated large hematoma. gram stain -Moderate Gram positive cocci in clusters .cultures pending.  continue Vancomycin, clindamycin and Zosyn . Bicarb 15. K replaced . off insulin drip on SQ insulin.  7/31 ID and endocrine consulted.  abscess with Calcium pyrophosphate crystals with unknown significance potassium and P replaced. Bicarbonate WNL . aerobic culture 7/28 STAPHYLOCOCCUS SPECIES . Glucose in 300s .PRN imodium  for diarrhea. PT/OT consulted - WBAT. Surgical drain with purulent output  8/1 PICC team consulted for IV access. vanc trough at 11.4. monitor for vanomycin toxicity. possible OR tomorrow for washout  may need bone biopsy to r/o osteomyelitis.Discontinued clindamycin.   8/2 glucose in 300s.  Increased Levemir  to 14 units BID and Aspart  10 units TIDWM. K replaced   8/3 ANGE with cr 0.7--> 2.7. likely vanc induced ATN with levels 40. urine studies ordered renal sonogram WNL  nephrology consulted.  started on NS 100ml/h x 10h and follow up renal panel.Zosyn and  vancomycin discontinued. Strict IO monitor. condom catheter . switched to Ancef for MSSA on culture                Review of Systems:   Pain scale:    Constitutional:  fever,  chills, headache, vision loss, hearing loss, weight loss, Generalized weakness, falls, loss of smell, loss of taste, poor appetite,  sore throat  Respiratory: cough, shortness of breath.   Cardiovascular: chest pain, dizziness, palpitations, orthopnea, swelling of feet, syncope  Gastrointestinal: nausea, vomiting, abdominal pain, diarrhea, black stool,  blood in stool, change in bowel habits  Genitourinary: hematuria, dysuria, urgency, frequency  Integument/Breast: rash,  pruritis  Hematologic/Lymphatic: easy bruising, lymphadenopathy  Musculoskeletal: arthralgias , myalgias- Left thigh pain,   back pain, neck pain, knee pain  Neurological: confusion, seizures, tremors, slurred speech  Behavioral/Psych:  depression, anxiety, auditory or visual hallucinations     OBJECTIVE:     Physical Exam:  Body mass index is 32.63 kg/m².    Constitutional: Appears well-developed and well-nourished. obesity   Head: Normocephalic and atraumatic.   Neck: Normal range of motion. Neck supple.   Cardiovascular: Normal heart rate.  Regular heart rhythm.  Pulmonary/Chest: Effort normal.   Abdominal: No distension.  No tenderness  Musculoskeletal: Normal range of motion. No edema. left thigh swelling and tenderness, drain in place -Surgical drain with purulent output.   Neurological: Alert and oriented to person, place, and time.   Skin: Skin is warm and dry.   Psychiatric: Normal mood and affect. Behavior is normal.                  Vital Signs  Temp: 98.2 °F (36.8 °C) (08/03/22 1513)  Pulse: 101 (08/03/22 1514)  Resp: 18 (08/03/22 1513)  BP: 100/61 (08/03/22 1514)  SpO2: (Abnormal) 94 % (08/03/22 1513)     24 Hour VS Range    Temp:  [98.2 °F (36.8 °C)-98.6 °F (37 °C)]   Pulse:  []   Resp:   [16-18]   BP: ()/(59-98)   SpO2:  [93 %-98 %]     Intake/Output Summary (Last 24 hours) at 8/3/2022 1903  Last data filed at 8/3/2022 1600  Gross per 24 hour   Intake 939.95 ml   Output 2040 ml   Net -1100.05 ml         I/O This Shift:  No intake/output data recorded.    Wt Readings from Last 3 Encounters:   07/27/22 94.5 kg (208 lb 5.4 oz)   03/24/22 105.1 kg (231 lb 12.8 oz)   07/08/21 103.1 kg (227 lb 4.8 oz)       I have personally reviewed the vitals and recorded Intake/Output     Laboratory/Diagnostic Data:    CBC/Anemia Labs: Coags:    Recent Labs   Lab 08/01/22 0448 08/02/22 0226 08/03/22  0454   WBC 5.34 5.01 5.58   HGB 11.2* 11.3* 11.2*   HCT 31.8* 32.3* 32.5*    267 253   MCV 89 89 90   RDW 12.4 12.5 12.5    Recent Labs   Lab 07/29/22  0023   INR 1.1        Chemistries: ABG:   Recent Labs   Lab 07/31/22  0348 08/01/22 0448 08/02/22  0226 08/03/22  0454 08/03/22  0759 08/03/22  1509    135* 139 134* 134* 131*   K 3.4* 3.5 3.3* 3.7 4.1 3.7   CL 99 96 99 98 96 98   CO2 23 29 32* 27 28 25   BUN 7 5* 4* 11 11 13   CREATININE 0.6 0.6 0.7 2.7* 3.0* 3.5*   CALCIUM 8.8 8.6* 9.2 8.7 9.2 8.8   PROT 6.6 5.7* 6.0 5.9* 6.8  --    BILITOT 0.5 0.4 0.4 0.5 0.6  --    ALKPHOS 108 80 85 78 88  --    ALT 15 15 14 18 20  --    AST 15 12 15 21 23  --    MG 1.7 1.6 1.8  --   --   --    PHOS 2.5* 3.1  --   --   --  4.0    No results for input(s): PH, PCO2, PO2, HCO3, POCSATURATED, BE in the last 168 hours.     POCT Glucose: HbA1c:    Recent Labs   Lab 08/02/22  0217 08/02/22  0835 08/02/22  1115 08/02/22  1521 08/02/22  1923 08/03/22  0726   POCTGLUCOSE 152* 184* 154* 140* 254* 143*    Hemoglobin A1C   Date Value Ref Range Status   07/26/2022 10.1 (H) 4.0 - 5.6 % Final     Comment:     ADA Screening Guidelines:  5.7-6.4%  Consistent with prediabetes  >or=6.5%  Consistent with diabetes    High levels of fetal hemoglobin interfere with the HbA1C  assay. Heterozygous hemoglobin variants (HbS, HgC, etc)do  not  significantly interfere with this assay.   However, presence of multiple variants may affect accuracy.     03/22/2022 11.2 (H) 4.0 - 5.6 % Final     Comment:     ADA Screening Guidelines:  5.7-6.4%  Consistent with prediabetes  >or=6.5%  Consistent with diabetes    High levels of fetal hemoglobin interfere with the HbA1C  assay. Heterozygous hemoglobin variants (HbS, HgC, etc)do  not significantly interfere with this assay.   However, presence of multiple variants may affect accuracy.     07/07/2021 8.6 (H) 4.0 - 5.6 % Final     Comment:     ADA Screening Guidelines:  5.7-6.4%  Consistent with prediabetes  >or=6.5%  Consistent with diabetes    High levels of fetal hemoglobin interfere with the HbA1C  assay. Heterozygous hemoglobin variants (HbS, HgC, etc)do  not significantly interfere with this assay.   However, presence of multiple variants may affect accuracy.          Cardiac Enzymes: Ejection Fractions:    No results for input(s): CPK, CPKMB, MB, TROPONINI in the last 72 hours. No results found for: EF       No results for input(s): COLORU, APPEARANCEUA, PHUR, SPECGRAV, PROTEINUA, GLUCUA, KETONESU, BILIRUBINUA, OCCULTUA, NITRITE, UROBILINOGEN, LEUKOCYTESUR, RBCUA, WBCUA, BACTERIA, SQUAMEPITHEL, HYALINECASTS in the last 48 hours.    Invalid input(s): WRIGHTSUR    Procalcitonin (ng/mL)   Date Value   07/29/2022 0.57 (H)   07/26/2022 0.29 (H)     Lactate (Lactic Acid) (mmol/L)   Date Value   07/27/2022 1.9   07/26/2022 1.3     BNP (pg/mL)   Date Value   07/26/2022 20     CRP (mg/L)   Date Value   08/03/2022 121.9 (H)   07/31/2022 81.2 (H)   07/29/2022 286.9 (H)     Sed Rate (mm/Hr)   Date Value   07/29/2022 73 (H)     D-Dimer (mg/L FEU)   Date Value   07/26/2022 1.46 (H)     No results found for: FERRITIN  No results found for: LDH  Troponin I (ng/mL)   Date Value   07/26/2022 <0.006     No results found for this or any previous visit.  SARS-CoV-2 RNA, Amplification, Qual (no units)   Date Value   07/26/2022  Negative       Microbiology labs for the last week  Microbiology Results (last 7 days)     Procedure Component Value Units Date/Time    Aerobic culture [[id number]]  (Abnormal) Collected: 07/29/22 1300    Order Status: Completed Specimen: Abscess from Leg, Left Updated: 08/03/22 1123     Aerobic Bacterial Culture STAPHYLOCOCCUS SPECIES  Many  Identification and susceptibility pending      Narrative:      Left thigh abscess #2    Aerobic culture [[id number]]  (Abnormal) Collected: 07/29/22 1251    Order Status: Completed Specimen: Abscess from Leg, Left Updated: 08/03/22 1123     Aerobic Bacterial Culture STAPHYLOCOCCUS SPECIES  Many  Identification and susceptibility pending      Narrative:      Left thigh abscess #1    Aerobic culture [[id number]]  (Abnormal)  (Susceptibility) Collected: 07/28/22 1800    Order Status: Completed Specimen: Abscess from Leg, Left Updated: 08/03/22 1116     Aerobic Bacterial Culture STAPHYLOCOCCUS SPECIES  Many  Identification pending      Narrative:      LEFT THIGH ABSCESS    Fungus culture [[id number]] Collected: 07/29/22 1251    Order Status: Completed Specimen: Abscess from Leg, Left Updated: 08/02/22 1335     Fungus (Mycology) Culture Culture in progress    Narrative:      Left thigh abscess #1    Fungus culture [[id number]] Collected: 07/29/22 1300    Order Status: Completed Specimen: Abscess from Leg, Left Updated: 08/02/22 1335     Fungus (Mycology) Culture Culture in progress    Narrative:      Left thigh abscess #2    Fungus culture [[id number]] Collected: 07/28/22 1800    Order Status: Completed Specimen: Abscess from Leg, Left Updated: 08/02/22 1334     Fungus (Mycology) Culture Culture in progress    Narrative:      LEFT THIGH ABSCESS    Culture, Anaerobic [[id number]] Collected: 07/28/22 1800    Order Status: Completed Specimen: Abscess from Leg, Left Updated: 08/02/22 0853     Anaerobic Culture No anaerobes isolated    Narrative:      LEFT THIGH ABSCESS    Culture, Anaerobe  [[id number]] Collected: 07/29/22 1300    Order Status: Completed Specimen: Abscess from Leg, Left Updated: 08/02/22 0852     Anaerobic Culture No anaerobes isolated    Narrative:      Left thigh abscess #2    Culture, Anaerobe [[id number]] Collected: 07/29/22 1251    Order Status: Completed Specimen: Abscess from Leg, Left Updated: 08/02/22 0851     Anaerobic Culture No anaerobes isolated    Narrative:      Left thigh abscess #1    AFB Culture & Smear [[id number]] Collected: 07/29/22 1252    Order Status: Completed Specimen: Wound from Leg, Left Updated: 08/01/22 1512     AFB Culture & Smear Culture in progress     AFB CULTURE STAIN No acid fast bacilli seen.    Narrative:      Left thigh abscess #1    AFB Culture & Smear [[id number]] Collected: 07/29/22 1300    Order Status: Completed Specimen: Abscess from Leg, Left Updated: 08/01/22 1512     AFB Culture & Smear Culture in progress     AFB CULTURE STAIN No acid fast bacilli seen.    Narrative:      Left thigh abscess #2    Blood Culture #1 **CANNOT BE ORDERED STAT** [[id number]] Collected: 07/26/22 2302    Order Status: Completed Specimen: Blood from Peripheral, Hand, Left Updated: 08/01/22 0612     Blood Culture, Routine No growth after 5 days.    Blood Culture #2 **CANNOT BE ORDERED STAT** [[id number]] Collected: 07/26/22 2246    Order Status: Completed Specimen: Blood from Peripheral, Antecubital, Left Updated: 08/01/22 0612     Blood Culture, Routine No growth after 5 days.    AFB Culture & Smear [[id number]] Collected: 07/28/22 1800    Order Status: Completed Specimen: Abscess from Leg, Left Updated: 07/29/22 2127     AFB Culture & Smear Culture in progress     AFB CULTURE STAIN No acid fast bacilli seen.    Narrative:      LEFT THIGH ABSCESS    Gram stain [[id number]] Collected: 07/29/22 1300    Order Status: Completed Specimen: Abscess from Leg, Left Updated: 07/29/22 1421     Gram Stain Result Moderate WBC's      Moderate Gram positive cocci in clusters     Narrative:      Left thigh abscess #2    Gram stain [[id number]] Collected: 07/29/22 1251    Order Status: Completed Specimen: Abscess from Leg, Left Updated: 07/29/22 1420     Gram Stain Result Moderate WBC's      Moderate Gram positive cocci in clusters    Narrative:      Left thigh abscess #1    Gram stain [[id number]] Collected: 07/28/22 1800    Order Status: Completed Specimen: Abscess from Leg, Left Updated: 07/28/22 2026     Gram Stain Result Rare WBC's      Few Gram positive cocci    Narrative:      LEFT THIGH ABSCESS          Reviewed and noted in plan where applicable- Please see chart for full lab data.    Lines/Drains:       Peripheral IV - Single Lumen 07/26/22 2319 22 G Left Hand (Active)   Site Assessment Clean;Dry;Intact 07/31/22 0400   Extremity Assessment Distal to IV No abnormal discoloration;No redness;No swelling;No warmth 07/30/22 1901   Line Status Flushed;Saline locked 07/30/22 1901   Dressing Status Clean;Dry;Intact 07/31/22 0400   Dressing Intervention Integrity maintained 07/30/22 1901   Dressing Change Due 07/30/22 07/30/22 1901   Site Change Due 07/30/22 07/30/22 1901   Reason Not Rotated Poor venous access 07/30/22 1901   Number of days: 4            Peripheral IV - Single Lumen 07/30/22 20 G Posterior;Right Forearm (Active)   Site Assessment Clean;Dry;Intact 07/31/22 0400   Extremity Assessment Distal to IV No abnormal discoloration;No swelling;No warmth;No redness 07/30/22 1901   Line Status Flushed 07/31/22 0400   Dressing Status Clean;Dry;Intact 07/31/22 0400   Dressing Intervention Integrity maintained 07/31/22 0400   Dressing Change Due 08/03/22 07/30/22 1901   Site Change Due 08/03/22 07/30/22 1901   Reason Not Rotated Not due 07/30/22 1901   Number of days: 1            Peripheral IV - Single Lumen 07/30/22 18 G Anterior;Left;Proximal Upper Arm (Active)   Site Assessment Clean;Dry;Intact 07/31/22 0400   Extremity Assessment Distal to IV No abnormal discoloration;No redness;No  swelling;No warmth 07/30/22 1901   Line Status Flushed 07/31/22 0400   Dressing Status Clean;Dry;Intact 07/31/22 0400   Dressing Intervention Integrity maintained 07/31/22 0400   Dressing Change Due 08/03/22 07/30/22 1901   Site Change Due 08/03/22 07/30/22 1901   Reason Not Rotated Not due 07/30/22 1901   Number of days: 1            Closed/Suction Drain 07/29/22 1311 Left;Lateral Thigh Bulb 15 Fr. (Active)   Site Description Unable to view 07/30/22 1901   Dressing Type Gauze;Transparent (Tegaderm) 07/30/22 1901   Dressing Status Intact;Dry;Clean 07/30/22 1901   Dressing Intervention Integrity maintained 07/30/22 1901   Drainage Milky;Purulent;Sanguineous 07/30/22 1901   Status To bulb suction 07/30/22 1901   Output (mL) 30 mL 07/31/22 0700   Number of days: 1       Imaging  ECG Results          EKG 12-lead (Final result)  Result time 07/27/22 07:53:05    Final result by Interface, Lab In [facility] (07/27/22 07:53:05)             Narrative:    Test Reason : R00.0,    Vent. Rate : 138 BPM     Atrial Rate : 138 BPM     P-R Int : 126 ms          QRS Dur : 086 ms      QT Int : 286 ms       P-R-T Axes : 051 002 052 degrees     QTc Int : 433 ms    Sinus tachycardia  Otherwise normal ECG  No previous ECGs available  Confirmed by Crow HARRIS MD ([id number]) on 7/27/2022 7:53:00 AM    Referred By: [email or web address]   SELF           Confirmed By:Crow HARRIS MD                            No results found for this or any previous visit.      US Retroperitoneal Complete  Narrative: EXAMINATION:  US RETROPERITONEAL COMPLETE    CLINICAL HISTORY:  ANGE;    TECHNIQUE:  Ultrasound of the kidneys and urinary bladder was performed including color flow and Doppler evaluation of the kidneys.    COMPARISON:  CT abdomen with contrast 09/21/2016    FINDINGS:  Right kidney: The right kidney measures 12.7 cm. No cortical thinning. No loss of corticomedullary distinction. Resistive index measures 0.71.  No mass. No renal stone. No hydronephrosis.    Left  kidney: The left kidney measures 12.6 cm. No cortical thinning. No loss of corticomedullary distinction. Resistive index measures 0.68.  No mass. No renal stone. No hydronephrosis.    The bladder appears unremarkable.  Impression: No significant sonographic abnormality.  No hydronephrosis.    Electronically signed by resident: Geraldine Rao  Date:    08/03/2022  Time:    11:45    Electronically signed by: Thom Simpson  Date:    08/03/2022  Time:    12:25      Labs, Imaging, EKG and Diagnostic results from 8/3/2022 were reviewed.    Medications:  Medication list was reviewed and changes noted under Assessment/Plan.  No current facility-administered medications on file prior to encounter.     Current Outpatient Medications on File Prior to Encounter   Medication Sig Dispense Refill    atorvastatin (LIPITOR) 20 MG tablet Take 1 tablet (20 mg total) by mouth once daily. 90 tablet 3    empagliflozin (JARDIANCE) 25 mg tablet Take 1 tablet (25 mg total) by mouth once daily. 90 tablet 3    lisinopriL (PRINIVIL,ZESTRIL) 20 MG tablet Take 1 tablet (20 mg total) by mouth once daily. 90 tablet 3    blood sugar diagnostic Strp 1 strip by Misc.(Non-Drug; Combo Route) route once daily. 100 each 3    blood-glucose meter kit Use as instructed 1 each 0    dulaglutide (TRULICITY) 3 mg/0.5 mL pen injector Inject 3 mg into the skin every 7 days. 12 pen 3    insulin (LANTUS SOLOSTAR U-100 INSULIN) glargine 100 units/mL (3mL) SubQ pen Inject 20 Units into the skin once daily. 18 mL 0    lancets Misc 1 lancet by Misc.(Non-Drug; Combo Route) route once daily. 100 each 3    metFORMIN (GLUCOPHAGE-XR) 500 MG ER 24hr tablet Take 1 tablet (500 mg total) by mouth 2 (two) times daily with meals. 180 tablet 3     Scheduled Medications:  atorvastatin, 20 mg, Oral, Daily  [START ON 8/4/2022] ceFAZolin (ANCEF) IVPB, 2 g, Intravenous, Q8H  [START ON 8/4/2022] insulin aspart U-100, 12 Units, Subcutaneous, TIDWM  insulin detemir U-100, 14  Units, Subcutaneous, BID  senna-docusate 8.6-50 mg, 2 tablet, Oral, Daily      PRN: acetaminophen, dextrose 10%, dextrose 10%, glucagon (human recombinant), glucose, glucose, insulin aspart U-100, iohexoL, morphine, ondansetron, oxyCODONE, sodium chloride 0.9%, sodium chloride 0.9%, sodium chloride 0.9%  Infusions:    sodium chloride 0.9% 100 mL/hr at 08/03/22 0907     Estimated Creatinine Clearance: 28 mL/min (A) (based on SCr of 3.5 mg/dL (H)).    Assessment/Plan:      * DKA (diabetic ketoacidosis)    47M with history of DM2, gout, HTN, tobacco use who initially presented for dyspnea, increased thirst, decreased PO intake, found to be in DKA.  Recent history of steroids for leg pain.  On home dulaglutide, empagliflozin, metformin, insulin glargine 20u qhs (has not been taking).  Recent a1c 11.2%.  , bicarb 5, AG 26, BHB 5.9 on admit.       - Insulin drip   - q1h POCT glu checks while on drip  - D5 1/2 NS with 20 meq K   - BMP q4h  - diabetic diet when off insulin drip     7/30  . Bicarb 15. K replaced . off insulin drip on SQ insulin. endocrine consulted  7/31 endocrine consulted.   Bicarbonate WNL . Glucose in 300s          Hyponatremia  sodium 130s. monitor  7/31 sodium 136 resolved       Anemia    Patient's with Normocytic anemia.. Hemoglobin stable. Etiology likely due to chronic disease .  Current CBC reviewed-  Recent Labs   Lab 07/28/22  0354 07/29/22  0336 07/30/22  0311   HGB 13.6* 12.6* 11.9*     Monitor CBC and transfuse if H/H drops below 7/21.       Hypokalemia  replaced      Abscess of left thigh  reports 4-6 weeks of left lateral thigh pain and swelling, no known injury to the affected area    7/30 Transfer to hospital medicine . S/p I&D  of left thigh Abscess on7/29/22 - MRI - uptured tensor fascia jose luis (TFL) muscle with associated large hematoma. gram stain -Moderate Gram positive cocci in clusters .cultures pending.  continue Vancomycin, clindamycin and Zosyn . abscess with Calcium  pyrophosphate crystals with unknown significance .Surgical drain with purulent output.s.Discontinued clindamycin.   8/1 possible OR tomorrow for washout  may need bone biopsy to r/o osteomyelitis.Discontinued clindamycin.   8/3 Zosyn and  vancomycin discontinued . switched to Ancef for MSSA on culture        Hematoma of left thigh  MRI left thig-  Ruptured tensor fascia jose luis (TFL) muscle with associated large hematoma,   s/p orthopedic evaluation .aspiration showed  purulence. s/p I&D on 7/29 7/31  abscess with Calcium pyrophosphate crystals with unknown significance. . aerobic culture 7/28 STAPHYLOCOCCUS SPECIES .      Left leg pain    Received steroid injection, steroid taper for leg pain by orthopedics.  Left lateral/proximal thigh with painful localized swelling, hot to touch. Patient received vanc/zosyn in ED  - CTA LE, U/s LLE pending  - will continue Vanc/zosyn and cllindamycin         Elevated d-dimer    On RA with increased WOB.  D-dimer elevated to 1.46.  - CTA PE negative    ANGE (acute kidney injury)    7/30  Patient with acute kidney injury likely due to IVVD/dehydration ANGE is currently resolved . Labs reviewed- Renal function/electrolytes with Estimated Creatinine Clearance: 36.3 mL/min (A) (based on SCr of 2.7 mg/dL (H)). according to latest data. Monitor urine output and serial BMP and adjust therapy as needed. Avoid nephrotoxins and renally dose meds for GFR listed above.   8/2 ANGE resolved  8/3 recurrent ANGE with cr 0.7--> 2.7. likely vanc induced ATN with levels 40. urine studies and renal sonogram. nephrology consulted. started on NS 100ml/h x 10h and follow up renal panel. vancomycin discontinued. Zosyn and  vancomycin discontinued. Strict IO monitor. condom catheter .      Leukocytosis    Leukocytosis  WBC 26.87      - Trending down   - Initially thought to be due to recent steroid use but left thigh mass concerning for infection   - Will start Vanc after irrigation of left thigh by ortho    7/30     Patient with leucocytosis Recent Labs   Lab 07/28/22  0354 07/29/22  0336 07/30/22  0311   WBC 14.29* 11.47 11.20     . Afebrile. BCX 2, NGTD  . likely secondary to sepsis..  WBC counts normalized .       Hyperlipidemia associated with type 2 diabetes mellitus    On home atorvastatin.  -    Class 2 severe obesity due to excess calories with serious comorbidity and body mass index (BMI) of 36.0 to 36.9 in adult  Body mass index is 32.63 kg/m². Morbid obesity complicates all aspects of disease management from diagnostic modalities to treatment. Weight loss encouraged and health benefits explained to patient.         Hypertension associated with type 2 diabetes mellitus    On home lisinopril 20.  - hold ACEi in setting of ANGE. ANGE resolved      7/30 SBP controlled     Type 2 diabetes mellitus with hyperglycemia, with long-term current use of insulin      Patient's FSGs are controlled on current medication regimen.  Last A1c reviewed-   Lab Results   Component Value Date    HGBA1C 10.1 (H) 07/26/2022     Most recent fingerstick glucose reviewed-   Recent Labs   Lab 08/01/22  1531 08/01/22  2038 08/02/22  0217 08/02/22  0835   POCTGLUCOSE 241* 322* 152* 184*     Current correctional scale  Medium  Maintain anti-hyperglycemic dose as follows-   Antihyperglycemics (From admission, onward)            Start     Stop Route Frequency Ordered    08/01/22 2100  insulin detemir U-100 pen 14 Units         -- SubQ 2 times daily 08/01/22 1644    08/01/22 1645  insulin aspart U-100 pen 10 Units         -- SubQ 3 times daily with meals 08/01/22 1644    07/30/22 0826  insulin aspart U-100 pen 1-10 Units         -- SubQ Before meals & nightly PRN 07/30/22 0727        Hold Oral hypoglycemics while patient is in the hospital.  7/31 endocrine consulted.  Glucose in 300s   8/2 glucose in 300s.  Increased Levemir f to 14 units BID and Aspart  10 units TIDWM        VTE Risk Mitigation (From admission, onward)         Ordered      Place sequential compression device  Until discontinued         07/29/22 0158     IP VTE HIGH RISK PATIENT  Once         07/29/22 0158     Place SAVANNAH hose  Until discontinued         07/26/22 2240     Place sequential compression device  Until discontinued         07/26/22 2240                Discharge Planning   FILEMON: 8/8/2022     Code Status: Full Code   Is the patient medically ready for discharge?: No    Reason for patient still in hospital (select all that apply): Treatment  Discharge Plan A: Home with family                  Bandar Arce MD  Department of Hospital Medicine   [facility] - Telemetry Stepdown

## 2022-08-04 LAB
ALBUMIN SERPL BCP-MCNC: 1.6 G/DL (ref 3.5–5.2)
ALP SERPL-CCNC: 73 U/L (ref 55–135)
ALT SERPL W/O P-5'-P-CCNC: 14 U/L (ref 10–44)
ANION GAP SERPL CALC-SCNC: 11 MMOL/L (ref 8–16)
AST SERPL-CCNC: 18 U/L (ref 10–40)
BACTERIA SPEC AEROBE CULT: ABNORMAL
BASOPHILS # BLD AUTO: 0.07 K/UL (ref 0–0.2)
BASOPHILS NFR BLD: 0.9 % (ref 0–1.9)
BILIRUB SERPL-MCNC: 0.4 MG/DL (ref 0.1–1)
BUN SERPL-MCNC: 16 MG/DL (ref 6–20)
CALCIUM SERPL-MCNC: 8.3 MG/DL (ref 8.7–10.5)
CHLORIDE SERPL-SCNC: 104 MMOL/L (ref 95–110)
CO2 SERPL-SCNC: 22 MMOL/L (ref 23–29)
CREAT SERPL-MCNC: 4.4 MG/DL (ref 0.5–1.4)
DIFFERENTIAL METHOD: ABNORMAL
EOSINOPHIL # BLD AUTO: 0.2 K/UL (ref 0–0.5)
EOSINOPHIL NFR BLD: 2 % (ref 0–8)
ERYTHROCYTE [DISTWIDTH] IN BLOOD BY AUTOMATED COUNT: 12.5 % (ref 11.5–14.5)
EST. GFR  (NO RACE VARIABLE): 15.6 ML/MIN/1.73 M^2
GLUCOSE SERPL-MCNC: 82 MG/DL (ref 70–110)
HCT VFR BLD AUTO: 32.7 % (ref 40–54)
HGB BLD-MCNC: 11.3 G/DL (ref 14–18)
IMM GRANULOCYTES # BLD AUTO: 0.04 K/UL (ref 0–0.04)
IMM GRANULOCYTES NFR BLD AUTO: 0.5 % (ref 0–0.5)
LYMPHOCYTES # BLD AUTO: 1.1 K/UL (ref 1–4.8)
LYMPHOCYTES NFR BLD: 14.2 % (ref 18–48)
MCH RBC QN AUTO: 31.4 PG (ref 27–31)
MCHC RBC AUTO-ENTMCNC: 34.6 G/DL (ref 32–36)
MCV RBC AUTO: 91 FL (ref 82–98)
MONOCYTES # BLD AUTO: 1.1 K/UL (ref 0.3–1)
MONOCYTES NFR BLD: 15.2 % (ref 4–15)
NEUTROPHILS # BLD AUTO: 5 K/UL (ref 1.8–7.7)
NEUTROPHILS NFR BLD: 67.2 % (ref 38–73)
NRBC BLD-RTO: 0 /100 WBC
PLATELET # BLD AUTO: 244 K/UL (ref 150–450)
PMV BLD AUTO: 10.2 FL (ref 9.2–12.9)
POCT GLUCOSE: 150 MG/DL (ref 70–110)
POCT GLUCOSE: 162 MG/DL (ref 70–110)
POCT GLUCOSE: 210 MG/DL (ref 70–110)
POCT GLUCOSE: 77 MG/DL (ref 70–110)
POCT GLUCOSE: 98 MG/DL (ref 70–110)
POTASSIUM SERPL-SCNC: 4.2 MMOL/L (ref 3.5–5.1)
PROT SERPL-MCNC: 5.6 G/DL (ref 6–8.4)
RBC # BLD AUTO: 3.6 M/UL (ref 4.6–6.2)
SODIUM SERPL-SCNC: 137 MMOL/L (ref 136–145)
VANCOMYCIN TROUGH SERPL-MCNC: 31.4 UG/ML (ref 10–22)
WBC # BLD AUTO: 7.48 K/UL (ref 3.9–12.7)

## 2022-08-04 PROCEDURE — 36415 COLL VENOUS BLD VENIPUNCTURE: CPT | Performed by: HOSPITALIST

## 2022-08-04 PROCEDURE — 36415 COLL VENOUS BLD VENIPUNCTURE: CPT

## 2022-08-04 PROCEDURE — 63600175 PHARM REV CODE 636 W HCPCS: Performed by: HOSPITALIST

## 2022-08-04 PROCEDURE — 25000003 PHARM REV CODE 250

## 2022-08-04 PROCEDURE — 20600001 HC STEP DOWN PRIVATE ROOM

## 2022-08-04 PROCEDURE — 99233 SBSQ HOSP IP/OBS HIGH 50: CPT | Mod: ,,, | Performed by: HOSPITALIST

## 2022-08-04 PROCEDURE — 99233 PR SUBSEQUENT HOSPITAL CARE,LEVL III: ICD-10-PCS | Mod: ,,, | Performed by: REGISTERED NURSE

## 2022-08-04 PROCEDURE — 97116 GAIT TRAINING THERAPY: CPT | Mod: CQ

## 2022-08-04 PROCEDURE — 25000003 PHARM REV CODE 250: Performed by: HOSPITALIST

## 2022-08-04 PROCEDURE — 80053 COMPREHEN METABOLIC PANEL: CPT

## 2022-08-04 PROCEDURE — 97110 THERAPEUTIC EXERCISES: CPT

## 2022-08-04 PROCEDURE — 85025 COMPLETE CBC W/AUTO DIFF WBC: CPT | Performed by: HOSPITALIST

## 2022-08-04 PROCEDURE — 99233 SBSQ HOSP IP/OBS HIGH 50: CPT | Mod: ,,, | Performed by: REGISTERED NURSE

## 2022-08-04 PROCEDURE — 99233 PR SUBSEQUENT HOSPITAL CARE,LEVL III: ICD-10-PCS | Mod: ,,, | Performed by: HOSPITALIST

## 2022-08-04 PROCEDURE — 99232 SBSQ HOSP IP/OBS MODERATE 35: CPT | Mod: ,,, | Performed by: INTERNAL MEDICINE

## 2022-08-04 PROCEDURE — 99232 PR SUBSEQUENT HOSPITAL CARE,LEVL II: ICD-10-PCS | Mod: ,,, | Performed by: INTERNAL MEDICINE

## 2022-08-04 PROCEDURE — 63600175 PHARM REV CODE 636 W HCPCS

## 2022-08-04 PROCEDURE — 80202 ASSAY OF VANCOMYCIN: CPT | Performed by: HOSPITALIST

## 2022-08-04 RX ORDER — CEFAZOLIN SODIUM/D5W 2 G/100 ML
2 PLASTIC BAG, INJECTION (ML) INTRAVENOUS
Status: DISCONTINUED | OUTPATIENT
Start: 2022-08-04 | End: 2022-08-05

## 2022-08-04 RX ORDER — SODIUM CHLORIDE 9 MG/ML
INJECTION, SOLUTION INTRAVENOUS CONTINUOUS
Status: ACTIVE | OUTPATIENT
Start: 2022-08-04 | End: 2022-08-04

## 2022-08-04 RX ORDER — INSULIN ASPART 100 [IU]/ML
10 INJECTION, SOLUTION INTRAVENOUS; SUBCUTANEOUS
Status: DISCONTINUED | OUTPATIENT
Start: 2022-08-05 | End: 2022-08-08

## 2022-08-04 RX ADMIN — INSULIN ASPART 12 UNITS: 100 INJECTION, SOLUTION INTRAVENOUS; SUBCUTANEOUS at 11:08

## 2022-08-04 RX ADMIN — OXYCODONE 5 MG: 5 TABLET ORAL at 05:08

## 2022-08-04 RX ADMIN — ATORVASTATIN CALCIUM 20 MG: 20 TABLET, FILM COATED ORAL at 09:08

## 2022-08-04 RX ADMIN — SODIUM CHLORIDE: 0.9 INJECTION, SOLUTION INTRAVENOUS at 04:08

## 2022-08-04 RX ADMIN — MORPHINE SULFATE 3 MG: 2 INJECTION, SOLUTION INTRAMUSCULAR; INTRAVENOUS at 09:08

## 2022-08-04 RX ADMIN — SODIUM CHLORIDE: 0.9 INJECTION, SOLUTION INTRAVENOUS at 10:08

## 2022-08-04 RX ADMIN — CEFAZOLIN 2 G: 10 INJECTION, POWDER, FOR SOLUTION INTRAVENOUS at 08:08

## 2022-08-04 RX ADMIN — MORPHINE SULFATE 3 MG: 2 INJECTION, SOLUTION INTRAMUSCULAR; INTRAVENOUS at 10:08

## 2022-08-04 RX ADMIN — CEFAZOLIN 2 G: 10 INJECTION, POWDER, FOR SOLUTION INTRAVENOUS at 09:08

## 2022-08-04 RX ADMIN — SENNOSIDES AND DOCUSATE SODIUM 2 TABLET: 50; 8.6 TABLET ORAL at 09:08

## 2022-08-04 RX ADMIN — INSULIN ASPART 12 UNITS: 100 INJECTION, SOLUTION INTRAVENOUS; SUBCUTANEOUS at 05:08

## 2022-08-04 NOTE — ASSESSMENT & PLAN NOTE
Wilfredo Thomas is a 47 y.o. male with past medical history of hypertension and diabetes, currently admitted to hospital for DKA. He reports 4-6 weeks of left lateral thigh pain and swelling, no known injury to the affected area. MRI scan showing lateral thigh fluid collection. Now s/p I&D 7/29/22.     - PT daily   - Cx: Staph, spec pending. MSSA on first culture  - Drain 180cc yesterday  - Abx: Vanc/Zosyn discontinued. On ancef for MSSA coverage  - Tight glucose control and nutrition optimization  - ANGE - likely vanc toxicity. Management per primary  - Will require additional washout, pending improvement in ANGE    Dispo: return to OR pending improved Cr

## 2022-08-04 NOTE — PROGRESS NOTES
"[facility] - Tele[facility] [place] Medicine  Progress Note    Patient Name: Wilfredo Thomas  MRN: [id number]  Patient Class: IP- Inpatient   Admission Date: 7/26/2022  Length of Stay: 9 days  Attending Physician: Dorota Reynoso MD  Primary Care Provider: Aylin Wayne DO        Subjective:     Principal Problem:DKA (diabetic ketoacidosis)        HPI:  48 y/o M hx of HTN, IDDM2, GERD obesity, HLD presented to the ED with complaint of 1 day of worsening SOB. Wife present at bedside. Patient states that he noticed yesterday he was feeling some shortness of breath and couldn't seem to catch his breath. His wife went to check on him this morning and noticed that he was breathing "fast and heavy" and he sounded like he was slurring his words. Wife was concerned about a stroke, so he brought him to the ED for evaluation.     Patient also reporting significant pain and swelling along his left lateral proximal thigh. He states he has noticed worsening pain over the past 2 weeks. He denies any acute trauma to the area. He was seen by ortho last week and was told to take ibu-profen for a muscle strain. This did not help his pain, so he presented again on 7/22 and was given a prednisone injection into his thigh. This also did not help his pain, and now the pain and swelling have advanced to the point that he has difficulty with ambulation. Patient denies fever, chills, nausea, vomiting.      Of note, patient has hx of poorly controlled diabetes. He was recently started on insulin by his PCP, but he has not taken any insulin since being prescribed it.      In the ED, patient hypertensive and tachycardic to the 130s. Tachypneic with RR in the 40s. On CBC, WBC 26.9, On CMP patient hyperkalemic to 5.5, CorNa 136, Bicarb 5. Cr elevated to 1.9 from BL 0.83. UA, BHB pending at time of admission. CTA without evidence of pulmonary embolism. Critical Care Medicine consulted given severe acidosis.             Overview/Hospital " Course:    Patient admitted to the MICU for management of severe DKA. Metabolic acidosis improving on insulin drip. He also has had left thigh pain for 1-2 months. There is a large mass on his left thigh that is tender to palpation, CT revealed hematoma vs soft tissue growth. General surgery consulted and recommended IR consult. IR consulted, no indication for tap.   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 Ortho consulted and performed bedside aspiration of possible abscess which revealed >200K cells >80% segs. Will hold off on antibiotics for now per ortho recs, ortho is taking patient to the OR for irrigation of the thigh, will start antibiotics afterwards.         7/29 Metabolic acidosis slowly improving. Ortho taking patient to the OR for irrigation of the left thigh, will start antibiotics afterwards.   7/30 Transfer to hospital medicine . S/p I&D  of left thigh Abscess on7/29/22 - MRI - uptured tensor fascia jose luis (TFL) muscle with associated large hematoma. gram stain -Moderate Gram positive cocci in clusters .cultures pending.  continue Vancomycin, clindamycin and Zosyn . Bicarb 15. K replaced . off insulin drip on SQ insulin.  7/31 ID and endocrine consulted.  abscess with Calcium pyrophosphate crystals with unknown significance potassium and P replaced. Bicarbonate WNL . aerobic culture 7/28 STAPHYLOCOCCUS SPECIES . Glucose in 300s .PRN imodium  for diarrhea. PT/OT consulted - WBAT. Surgical drain with purulent output  8/1 PICC team consulted for IV access. vanc trough at 11.4. monitor for vanomycin toxicity. possible OR tomorrow for washout  may need bone biopsy to r/o osteomyelitis.Discontinued clindamycin.   8/2 glucose in 300s.  Increased Levemir  to 14 units BID and Aspart  10 units TIDWM. K replaced   8/3 ANGE with cr 0.7--> 2.7. likely vanc induced ATN with levels 40. urine studies ordered renal sonogram WNL  nephrology consulted.  started on NS 100ml/h x 10h and follow up renal panel.Zosyn and  vancomycin discontinued. Strict IO monitor. condom catheter . switched to Ancef for MSSA on culture  Interval History:  8/4- MSSA- see below. /85 VSS. On room air, eating, BM on 8/2, good UO. Appreciate ortho recs. On Cephazolin, detim 14 bid and aspart 12 ac.            Interval History: see above    Review of Systems   Constitutional:  Negative for activity change, appetite change, chills, fatigue and fever.   HENT:  Negative for congestion, facial swelling, nosebleeds, sore throat and trouble swallowing.    Respiratory:  Negative for apnea, cough, choking, chest tightness, shortness of breath, wheezing and stridor.    Cardiovascular:  Negative for chest pain and leg swelling.   Gastrointestinal:  Negative for abdominal distention, abdominal pain, blood in stool, constipation, diarrhea, nausea and vomiting.   Genitourinary:  Negative for difficulty urinating, dysuria, flank pain, frequency, hematuria and urgency.   Musculoskeletal:  Negative for arthralgias, back pain, gait problem, neck pain and neck stiffness.   Skin:  Negative for color change, rash and wound.   Neurological:  Negative for dizziness, tremors, seizures, syncope, facial asymmetry, speech difficulty, weakness, light-headedness, numbness and headaches.   Psychiatric/Behavioral:  Negative for agitation, behavioral problems, confusion, decreased concentration, dysphoric mood, hallucinations, self-injury and sleep disturbance. The patient is not nervous/anxious and is not hyperactive.    Objective:     Vital Signs (Most Recent):  Temp: 98.1 °F (36.7 °C) (08/04/22 0735)  Pulse: 79 (08/04/22 0735)  Resp: 18 (08/04/22 0735)  BP: (!) 142/85 (08/04/22 0735)  SpO2: 96 % (08/04/22 0735)   Vital Signs (24h Range):  Temp:  [97.9 °F (36.6 °C)-98.5 °F (36.9 °C)] 98.1 °F (36.7 °C)  Pulse:  [] 79  Resp:  [16-18] 18  SpO2:  [94 %-97 %] 96 %  BP: ()/(59-85) 142/85     Weight: 94.5 kg  (208 lb 5.4 oz)  Body mass index is 32.63 kg/m².    Intake/Output Summary (Last 24 hours) at 8/4/2022 0837  Last data filed at 8/4/2022 0500  Gross per 24 hour   Intake 939.95 ml   Output 2510 ml   Net -1570.05 ml      Physical Exam  Constitutional:       General: He is not in acute distress.     Appearance: Normal appearance. He is not ill-appearing, toxic-appearing or diaphoretic.   HENT:      Head: Normocephalic and atraumatic.      Nose: Nose normal.      Mouth/Throat:      Mouth: Mucous membranes are moist.      Pharynx: Oropharynx is clear.   Eyes:      General: No scleral icterus.     Extraocular Movements: Extraocular movements intact.      Conjunctiva/sclera: Conjunctivae normal.      Pupils: Pupils are equal, round, and reactive to light.   Cardiovascular:      Rate and Rhythm: Normal rate and regular rhythm.      Pulses: Normal pulses.      Heart sounds: Normal heart sounds.   Pulmonary:      Effort: Pulmonary effort is normal. No respiratory distress.      Breath sounds: Normal breath sounds. No stridor. No wheezing, rhonchi or rales.   Chest:      Chest wall: No tenderness.   Abdominal:      General: Abdomen is flat. Bowel sounds are normal. There is no distension.      Palpations: Abdomen is soft.      Tenderness: There is no abdominal tenderness. There is no right CVA tenderness, guarding or rebound.   Musculoskeletal:         General: Swelling and tenderness present. No deformity or signs of injury. Normal range of motion.      Cervical back: Normal range of motion and neck supple. No rigidity or tenderness.      Right lower leg: No edema.      Left lower leg: No edema.      Comments: Left lat thigh is bandaged   Skin:     General: Skin is warm and dry.      Coloration: Skin is not jaundiced.      Findings: No erythema or rash.   Neurological:      General: No focal deficit present.      Mental Status: He is alert and oriented to person, place, and time. Mental status is at baseline.      Motor: No  weakness.   Psychiatric:         Mood and Affect: Mood normal.         Behavior: Behavior normal.         Thought Content: Thought content normal.         Judgment: Judgment normal.       Significant Labs: All pertinent labs within the past 24 hours have been reviewed.  CBC:   Recent Labs   Lab 08/03/22  0454   WBC 5.58   HGB 11.2*   HCT 32.5*        CMP:   Recent Labs   Lab 08/03/22  0454 08/03/22  0759 08/03/22  1509 08/04/22  0425   * 134* 131* 137   K 3.7 4.1 3.7 4.2   CL 98 96 98 104   CO2 27 28 25 22*   * 145* 186* 82   BUN 11 11 13 16   CREATININE 2.7* 3.0* 3.5* 4.4*   CALCIUM 8.7 9.2 8.8 8.3*   PROT 5.9* 6.8  --  5.6*   ALBUMIN 1.7* 2.0* 1.8* 1.6*   BILITOT 0.5 0.6  --  0.4   ALKPHOS 78 88  --  73   AST 21 23  --  18   ALT 18 20  --  14   ANIONGAP 9 10 8 11       Significant Imaging: I have reviewed all pertinent imaging results/findings within the past 24 hours.      Assessment/Plan:      * DKA (diabetic ketoacidosis)  47M with history of DM2, gout, HTN, tobacco use who initially presented for dyspnea, increased thirst, decreased PO intake, found to be in DKA.  Recent history of steroids for leg pain.  On home dulaglutide, empagliflozin, metformin, insulin glargine 20u qhs (has not been taking).  Recent a1c 11.2%.  , bicarb 5, AG 26, BHB 5.9 on admit.       - Insulin drip   - q1h POCT glu checks while on drip  - D5 1/2 NS with 20 meq K   - BMP q4h  - diabetic diet when off insulin drip     7/30  . Bicarb 15. K replaced . off insulin drip on SQ insulin. endocrine consulted  7/31 endocrine consulted.   Bicarbonate WNL . Glucose in 300s   Recent Labs     08/02/22  1115 08/02/22  1521 08/02/22  1923 08/03/22  0726 08/03/22  1922 08/04/22  0721   POCTGLUCOSE 154* 140* 254* 143* 155* 77     8/4- on detemir 14 bid, aspart 12 ac.   -see above           ANGE (acute kidney injury)  7/30  Patient with acute kidney injury likely due to IVVD/dehydration ANGE is currently resolved . Labs reviewed-  Renal function/electrolytes with Estimated Creatinine Clearance: 36.3 mL/min (A) (based on SCr of 2.7 mg/dL (H)). according to latest data. Monitor urine output and serial BMP and adjust therapy as needed. Avoid nephrotoxins and renally dose meds for GFR listed above.   8/2 ANGE resolved  8/3 recurrent ANGE with cr 0.7--> 2.7. likely vanc induced ATN with levels 40. urine studies and renal sonogram. nephrology consulted. started on NS 100ml/h x 10h and follow up renal panel. vancomycin discontinued. Zosyn and  vancomycin discontinued. Strict IO monitor. condom catheter .  8/4- cr rising, 4.4, alb 1.6 with protienuria. May also have nephrotic syndrome.  Nephrology consulted. .Renal US - ok no obstruction, normal appearing kidneys. , UO 2400.  MS x one liter, vanc level      Abscess of left thigh  reports 4-6 weeks of left lateral thigh pain and swelling, no known injury to the affected area    7/30 Transfer to hospital medicine . S/p I&D  of left thigh Abscess on7/29/22 - MRI - uptured tensor fascia jose luis (TFL) muscle with associated large hematoma. gram stain -Moderate Gram positive cocci in clusters .cultures pending.  continue Vancomycin, clindamycin and Zosyn . abscess with Calcium pyrophosphate crystals with unknown significance .Surgical drain with purulent output.s.Discontinued clindamycin.   8/1 possible OR tomorrow for washout  may need bone biopsy to r/o osteomyelitis.Discontinued clindamycin.   8/3 Zosyn and  vancomycin discontinued . switched to Ancef for MSSA on culture  8/4- per Ortho:   On ancef for MSSA coverage  - Tight glucose control and nutrition optimization  - Will require additional washout, pending improvement in ANGE          Type 2 diabetes mellitus with hyperglycemia, with long-term current use of insulin      Patient's FSGs are controlled on current medication regimen.  Last A1c reviewed-   Lab Results   Component Value Date    HGBA1C 10.1 (H) 07/26/2022     Most recent fingerstick glucose  reviewed-   Recent Labs   Lab 08/01/22  1531 08/01/22  2038 08/02/22  0217 08/02/22  0835   POCTGLUCOSE 241* 322* 152* 184*     Current correctional scale  Medium  Maintain anti-hyperglycemic dose as follows-   Antihyperglycemics (From admission, onward)            Start     Stop Route Frequency Ordered    08/01/22 2100  insulin detemir U-100 pen 14 Units         -- SubQ 2 times daily 08/01/22 1644    08/01/22 1645  insulin aspart U-100 pen 10 Units         -- SubQ 3 times daily with meals 08/01/22 1644    07/30/22 0826  insulin aspart U-100 pen 1-10 Units         -- SubQ Before meals & nightly PRN 07/30/22 0727        Hold Oral hypoglycemics while patient is in the hospital.  7/31 endocrine consulted.  Glucose in 300s   8/2 glucose in 300s.  Increased Levemir f to 14 units BID and Aspart  10 units TIDWM        Hypertension associated with type 2 diabetes mellitus  On home lisinopril 20.  - hold ACEi in setting of ANGE. ANGE resolved   7/30 SBP controlled   8/4- /85     Hematoma of left thigh  MRI left thig-  Ruptured tensor fascia jose luis (TFL) muscle with associated large hematoma,   s/p orthopedic evaluation .aspiration showed  purulence. s/p I&D on 7/29 7/31  abscess with Calcium pyrophosphate crystals with unknown significance. . aerobic culture 7/28 STAPHYLOCOCCUS SPECIES .    -see above    Anemia    Patient's with Normocytic anemia.. Hemoglobin stable. Etiology likely due to chronic disease .  Current CBC reviewed-    Recent Labs   Lab 08/01/22  0448 08/02/22  0226 08/03/22  0454   HGB 11.2* 11.3* 11.2*     Monitor CBC and transfuse if H/H drops below 7/21.       Hyponatremia  sodium 130s. monitor  7/31 sodium 136 resolved       Hypokalemia  replaced      Class 2 severe obesity due to excess calories with serious comorbidity and body mass index (BMI) of 36.0 to 36.9 in adult  Body mass index is 32.63 kg/m². Morbid obesity complicates all aspects of disease management from diagnostic modalities to  treatment. Weight loss encouraged and health benefits explained to patient.         Left leg pain  Received steroid injection, steroid taper for leg pain by orthopedics.    Left lateral/proximal thigh with painful localized swelling, hot to touch. Patient received vanc/zosyn in ED  - CTA LE, U/s LLE pending  -see above         Elevated d-dimer  On RA with increased WOB.  D-dimer elevated to 1.46.  - CTA PE negative    Hyperlipidemia associated with type 2 diabetes mellitus  On home atorvastatin.  -    Leukocytosis  WBC 26.87      - Trending down   - Initially thought to be due to recent steroid use but left thigh mass concerning for infection   - Will start Vanc after irrigation of left thigh by ortho   7/30     Patient with leucocytosis   Recent Labs   Lab 08/01/22  0448 08/02/22  0226 08/03/22  0454   WBC 5.34 5.01 5.58     . Afebrile. BCX 2, NGTD  . likely secondary to sepsis..  WBC counts normalized .       VTE Risk Mitigation (From admission, onward)         Ordered     Place sequential compression device  Until discontinued         07/29/22 0158     IP VTE HIGH RISK PATIENT  Once         07/29/22 0158     Place SAVANNAH hose  Until discontinued         07/26/22 2240     Place sequential compression device  Until discontinued         07/26/22 2240                Discharge Planning   FILEMON: 8/8/2022     Code Status: Full Code   Is the patient medically ready for discharge?: No    Reason for patient still in hospital (select all that apply): Patient trending condition  Discharge Plan A: Home with family          Dorota Reynoso MD  Department of Hospital Medicine   [facility] - Telemetry Stepdown

## 2022-08-04 NOTE — ASSESSMENT & PLAN NOTE
Key History and Diagnostic Findings  - Uncontrolled T2DM, pt not adherent to med regimen, not checking BG regularly at home  - P/w DKA with trigger infection/thigh abscess  - Home Regimen: metformin 500mg BID, Jardiance 25mg qd, Trulicity 3mg SC weekly, Lantus 10u qd   Pt never started insulin which was prescribed 3/2022. Not filling metformin regularly  - Glucose Goals: 140-180mg/dL  - 24 hour glucose trend:  Well controlled primarily in the mid to high 100s    Plan  - Decrease Levemir from 14 down to 12 units BID due to downtrending overnight glucose into the high 70s  - Continue Aspart 10 units TIDWM with moderate correction scale    - Patient will need to be discharged on MDI insulin (prandial and basal)   - Other home meds to consider at d/c:    - Recommend holding jardiace until complete resolution of infection/acute illness. Because his presentation of DKA has a clear trigger (infection) and was not euglycemic DKA, unlikely that Jardiance was the cause. For these reasons, it would be reasonable to resume it outpatient after recovery from acute illness.   - Can resume metformin and trulicity at d/c, will re-visit dc recs when pt is ready for discharge    - Hypoglycemia protocol in place  - If blood glucose greater than 300, please ask patient not to eat food or drink anything other than water until correctional insulin has brought it back below 250    ** Please notify Endocrine for any change and/or advance in diet**  ** Please call Endocrine for any BG related issues **

## 2022-08-04 NOTE — PROGRESS NOTES
[facility] - Telemetry Stepdown  Endocrinology  Progress Note    Admit Date: 7/26/2022     Reason for Consult: Management of T2DM, Hyperglycemia     Surgical Procedure and Date: L thigh IM abscess I&D on 07/29/2022    Diabetes diagnosis year: at age 43yo    Home Diabetes Medications:    - Metformin 500 mg bid  - Jardiance 25 mg qd  - Trulicity 3 mg SC weekly  - Lantus 10 units qd -- prescribed at the end of 3/2022 but pt has not yet started taking it    Previously on glipizide, but stopped in 03/2022 by PCP who is managing his DM outpatient    How often checking glucose at home?  2-3x per week    BG readings on regimen: he reports <200  Hypoglycemia on the regimen?  No  Missed doses on regimen?  Yes    Diabetes Complications include:     Hyperglycemia    Complicating diabetes co morbidities:   HTN, obesity      HPI:   Patient is a 47 y.o. male with a diagnosis of T2DM, HTN, HLD, GERD, and obesity presented to the ED on 07/26/2022 with SOB and altered mental status x1d. Additionally reported pain and swelling L lateral high. Was seen at an OSH ED for thigh pain and was treated conservatively with NSAIDS initially. At follow up appt w/ ortho 7/22 he received IM steroid inj (deltoid) and po prednisone for suspected muscle strain. Due to worsening AMS and SOB he presented to the [facility] [place] [place] ED on 7/26. In the ED he was found to be in DKA and as admitted to the MICU due to severe acidosis.     For the thigh pain, MRI showed a ruptured tensor fascia jose luis (TFL) muscle with associated large hematoma. Ortho decided to perform aspiration night of 7/28. Gram stain showed G+ cocci in clusters- now growing Staph Species.They decided to take pt to OR 7/29 for formal I&D.      For DKA he was started on IVF and an intensive insulin gtt with resolution of DKA. Transitioned to MDI on 7/30 AM. Initial regimen was detemir 10u BID and Aspart 4u AC + LDC. Insulin up-titrated by MICU team prior to stepdown, to detemir 12u bid and aspart 8u AC +  "LD. Endocrinology consulted for management of hyperglycemia.      Interval HPI:   Overnight events:  No acute events reported overnight  Eatin%  Nausea: No  Hypoglycemia and intervention: No  Fever: No  TPN and/or TF: No  If yes, type of TF/TPN and rate: NA    BP (!) 142/85 (BP Location: Right arm, Patient Position: Lying)   Pulse 79   Temp 98.1 °F (36.7 °C) (Oral)   Resp 18   Ht 5' 7" (1.702 m)   Wt 94.5 kg (208 lb 5.4 oz)   SpO2 96%   BMI 32.63 kg/m²     Labs Reviewed and Include    Recent Labs   Lab 22  0425   GLU 82   CALCIUM 8.3*   ALBUMIN 1.6*   PROT 5.6*      K 4.2   CO2 22*      BUN 16   CREATININE 4.4*   ALKPHOS 73   ALT 14   AST 18   BILITOT 0.4     Lab Results   Component Value Date    WBC 7.48 2022    HGB 11.3 (L) 2022    HCT 32.7 (L) 2022    MCV 91 2022     2022     No results for input(s): TSH, FREET4 in the last 168 hours.  Lab Results   Component Value Date    HGBA1C 10.1 (H) 2022       Nutritional status:   Body mass index is 32.63 kg/m².  Lab Results   Component Value Date    ALBUMIN 1.6 (L) 2022    ALBUMIN 1.8 (L) 2022    ALBUMIN 2.0 (L) 2022     Lab Results   Component Value Date    PREALBUMIN 7 (L) 2022       Estimated Creatinine Clearance: 22.3 mL/min (A) (based on SCr of 4.4 mg/dL (H)).    Accu-Checks  Recent Labs     22  1531 22  2038 22  0217 22  0835 22  1115 22  1521 22  1923 22  0726 22  1922 22  0721   POCTGLUCOSE 241* 322* 152* 184* 154* 140* 254* 143* 155* 77       Current Medications and/or Treatments Impacting Glycemic Control  Immunotherapy:    Immunosuppressants       None          Steroids:   Hormones (From admission, onward)                None          Pressors:    Autonomic Drugs (From admission, onward)                None          Hyperglycemia/Diabetes Medications:   Antihyperglycemics (From admission, onward)       "          Start     Stop Route Frequency Ordered    08/04/22 0915  insulin detemir U-100 pen 12 Units         -- SubQ 2 times daily 08/04/22 0901    08/04/22 0715  insulin aspart U-100 pen 12 Units         -- SubQ 3 times daily with meals 08/03/22 1737    07/30/22 0826  insulin aspart U-100 pen 1-10 Units         -- SubQ Before meals & nightly PRN 07/30/22 0727            ASSESSMENT and PLAN    Type 2 diabetes mellitus with hyperglycemia, with long-term current use of insulin  Key History and Diagnostic Findings  - Uncontrolled T2DM, pt not adherent to med regimen, not checking BG regularly at home  - P/w DKA with trigger infection/thigh abscess  - Home Regimen: metformin 500mg BID, Jardiance 25mg qd, Trulicity 3mg SC weekly, Lantus 10u qd   Pt never started insulin which was prescribed 3/2022. Not filling metformin regularly  - Glucose Goals: 140-180mg/dL  - 24 hour glucose trend:  Well controlled primarily in the mid to high 100s    Plan  - Decrease Levemir from 14 down to 12 units BID due to downtrending overnight glucose into the high 70s  - Continue Aspart 10 units TIDWM with moderate correction scale    - Patient will need to be discharged on MDI insulin (prandial and basal)   - Other home meds to consider at d/c:    - Recommend holding jardiace until complete resolution of infection/acute illness. Because his presentation of DKA has a clear trigger (infection) and was not euglycemic DKA, unlikely that Jardiance was the cause. For these reasons, it would be reasonable to resume it outpatient after recovery from acute illness.   - Can resume metformin and trulicity at d/c, will re-visit dc recs when pt is ready for discharge    - Hypoglycemia protocol in place  - If blood glucose greater than 300, please ask patient not to eat food or drink anything other than water until correctional insulin has brought it back below 250    ** Please notify Endocrine for any change and/or advance in diet**  ** Please call  Endocrine for any BG related issues **    Class 2 severe obesity due to excess calories with serious comorbidity and body mass index (BMI) of 36.0 to 36.9 in adult  General weight loss/lifestyle modification strategies discussed (elicit support from others; identify saboteurs; non-food rewards, etc).   Weight loss will help to improve glycemic control    Hyperlipidemia associated with type 2 diabetes mellitus  Continue home statin   Goal LDL<70      Lc Garcia DO  [facility], [address]th [address]  [address], [place], [address]    Office: [phone]  Fax: [phone]    Disclaimer: This note has been generated using voice-recognition software. There may be typographical errors that have been missed during proof-reading.    The above history labs imaging impression and plan were discussed with attending physician who is in agreement and also took part in this patient's care.  I personally reviewed all of the patients available medications, labs, imaging, vitals, allergies, medical history.

## 2022-08-04 NOTE — SUBJECTIVE & OBJECTIVE
Interval History: see above    Review of Systems   Constitutional:  Negative for activity change, appetite change, chills, fatigue and fever.   HENT:  Negative for congestion, facial swelling, nosebleeds, sore throat and trouble swallowing.    Respiratory:  Negative for apnea, cough, choking, chest tightness, shortness of breath, wheezing and stridor.    Cardiovascular:  Negative for chest pain and leg swelling.   Gastrointestinal:  Negative for abdominal distention, abdominal pain, blood in stool, constipation, diarrhea, nausea and vomiting.   Genitourinary:  Negative for difficulty urinating, dysuria, flank pain, frequency, hematuria and urgency.   Musculoskeletal:  Negative for arthralgias, back pain, gait problem, neck pain and neck stiffness.   Skin:  Negative for color change, rash and wound.   Neurological:  Negative for dizziness, tremors, seizures, syncope, facial asymmetry, speech difficulty, weakness, light-headedness, numbness and headaches.   Psychiatric/Behavioral:  Negative for agitation, behavioral problems, confusion, decreased concentration, dysphoric mood, hallucinations, self-injury and sleep disturbance. The patient is not nervous/anxious and is not hyperactive.    Objective:     Vital Signs (Most Recent):  Temp: 98.1 °F (36.7 °C) (08/04/22 0735)  Pulse: 79 (08/04/22 0735)  Resp: 18 (08/04/22 0735)  BP: (!) 142/85 (08/04/22 0735)  SpO2: 96 % (08/04/22 0735)   Vital Signs (24h Range):  Temp:  [97.9 °F (36.6 °C)-98.5 °F (36.9 °C)] 98.1 °F (36.7 °C)  Pulse:  [] 79  Resp:  [16-18] 18  SpO2:  [94 %-97 %] 96 %  BP: ()/(59-85) 142/85     Weight: 94.5 kg (208 lb 5.4 oz)  Body mass index is 32.63 kg/m².    Intake/Output Summary (Last 24 hours) at 8/4/2022 0837  Last data filed at 8/4/2022 0500  Gross per 24 hour   Intake 939.95 ml   Output 2510 ml   Net -1570.05 ml      Physical Exam  Constitutional:       General: He is not in acute distress.     Appearance: Normal appearance. He is not  ill-appearing, toxic-appearing or diaphoretic.   HENT:      Head: Normocephalic and atraumatic.      Nose: Nose normal.      Mouth/Throat:      Mouth: Mucous membranes are moist.      Pharynx: Oropharynx is clear.   Eyes:      General: No scleral icterus.     Extraocular Movements: Extraocular movements intact.      Conjunctiva/sclera: Conjunctivae normal.      Pupils: Pupils are equal, round, and reactive to light.   Cardiovascular:      Rate and Rhythm: Normal rate and regular rhythm.      Pulses: Normal pulses.      Heart sounds: Normal heart sounds.   Pulmonary:      Effort: Pulmonary effort is normal. No respiratory distress.      Breath sounds: Normal breath sounds. No stridor. No wheezing, rhonchi or rales.   Chest:      Chest wall: No tenderness.   Abdominal:      General: Abdomen is flat. Bowel sounds are normal. There is no distension.      Palpations: Abdomen is soft.      Tenderness: There is no abdominal tenderness. There is no right CVA tenderness, guarding or rebound.   Musculoskeletal:         General: Swelling and tenderness present. No deformity or signs of injury. Normal range of motion.      Cervical back: Normal range of motion and neck supple. No rigidity or tenderness.      Right lower leg: No edema.      Left lower leg: No edema.      Comments: Left lat thigh is bandaged   Skin:     General: Skin is warm and dry.      Coloration: Skin is not jaundiced.      Findings: No erythema or rash.   Neurological:      General: No focal deficit present.      Mental Status: He is alert and oriented to person, place, and time. Mental status is at baseline.      Motor: No weakness.   Psychiatric:         Mood and Affect: Mood normal.         Behavior: Behavior normal.         Thought Content: Thought content normal.         Judgment: Judgment normal.       Significant Labs: All pertinent labs within the past 24 hours have been reviewed.  CBC:   Recent Labs   Lab 08/03/22  0454   WBC 5.58   HGB 11.2*   HCT  32.5*        CMP:   Recent Labs   Lab 08/03/22  0454 08/03/22  0759 08/03/22  1509 08/04/22  0425   * 134* 131* 137   K 3.7 4.1 3.7 4.2   CL 98 96 98 104   CO2 27 28 25 22*   * 145* 186* 82   BUN 11 11 13 16   CREATININE 2.7* 3.0* 3.5* 4.4*   CALCIUM 8.7 9.2 8.8 8.3*   PROT 5.9* 6.8  --  5.6*   ALBUMIN 1.7* 2.0* 1.8* 1.6*   BILITOT 0.5 0.6  --  0.4   ALKPHOS 78 88  --  73   AST 21 23  --  18   ALT 18 20  --  14   ANIONGAP 9 10 8 11       Significant Imaging: I have reviewed all pertinent imaging results/findings within the past 24 hours.

## 2022-08-04 NOTE — SUBJECTIVE & OBJECTIVE
"Interval HPI:   Overnight events:  No acute events reported overnight  Eatin%  Nausea: No  Hypoglycemia and intervention: No  Fever: No  TPN and/or TF: No  If yes, type of TF/TPN and rate: NA    BP (!) 142/85 (BP Location: Right arm, Patient Position: Lying)   Pulse 79   Temp 98.1 °F (36.7 °C) (Oral)   Resp 18   Ht 5' 7" (1.702 m)   Wt 94.5 kg (208 lb 5.4 oz)   SpO2 96%   BMI 32.63 kg/m²     Labs Reviewed and Include    Recent Labs   Lab 22  0425   GLU 82   CALCIUM 8.3*   ALBUMIN 1.6*   PROT 5.6*      K 4.2   CO2 22*      BUN 16   CREATININE 4.4*   ALKPHOS 73   ALT 14   AST 18   BILITOT 0.4     Lab Results   Component Value Date    WBC 7.48 2022    HGB 11.3 (L) 2022    HCT 32.7 (L) 2022    MCV 91 2022     2022     No results for input(s): TSH, FREET4 in the last 168 hours.  Lab Results   Component Value Date    HGBA1C 10.1 (H) 2022       Nutritional status:   Body mass index is 32.63 kg/m².  Lab Results   Component Value Date    ALBUMIN 1.6 (L) 2022    ALBUMIN 1.8 (L) 2022    ALBUMIN 2.0 (L) 2022     Lab Results   Component Value Date    PREALBUMIN 7 (L) 2022       Estimated Creatinine Clearance: 22.3 mL/min (A) (based on SCr of 4.4 mg/dL (H)).    Accu-Checks  Recent Labs     22  1531 22  2038 22  0217 22  0835 22  1115 22  1521 22  1923 22  0726 22  1922 22  0721   POCTGLUCOSE 241* 322* 152* 184* 154* 140* 254* 143* 155* 77       Current Medications and/or Treatments Impacting Glycemic Control  Immunotherapy:    Immunosuppressants       None          Steroids:   Hormones (From admission, onward)                None          Pressors:    Autonomic Drugs (From admission, onward)                None          Hyperglycemia/Diabetes Medications:   Antihyperglycemics (From admission, onward)                Start     Stop Route Frequency Ordered    22 0915  " insulin detemir U-100 pen 12 Units         -- SubQ 2 times daily 08/04/22 0901    08/04/22 0715  insulin aspart U-100 pen 12 Units         -- SubQ 3 times daily with meals 08/03/22 1737    07/30/22 0826  insulin aspart U-100 pen 1-10 Units         -- SubQ Before meals & nightly PRN 07/30/22 0727

## 2022-08-04 NOTE — ASSESSMENT & PLAN NOTE
46 yo male with history of DMII and HTN admitted for DKA and left thigh abscess. MRI of left femur was notable for ruptured tensor fascia jose luis mm with associated large hematoma, as well as small area of potential osteonecrosis. Bedside aspiration of the fluid collection was notable for a cell count of >200k (>80%segs), cultures + staph species. He was taken to the OR on 7/29 with orthopedic surgery for surgical I&D. Surgical cultures are also positive for MSSA. Per ortho surgery, no concerns for bone involvement. Patient was initially on Vancomycin and Zosyn. Vanc trough supratherapeutic and patient has developed an ANGE. He is currently on Cefazolin. He is scheduled for repeat debridement, TBD. Today with new onset left wrist pain. He is s/p wrist arthroscopy with hardware placement in 3/2022. Wife noted a boil on his left wrist that she popped yesterday. Slight swelling and TTP today. Otherwise, afebrile. HDS.        Recommendations:   · Continue Cefazolin 2g q24 (renally dosed) for MSSA SSTI.   · When taken for repeat I&D, please send tissue for path, gram stain, aerobic, anaerobic, AFB and fungal cultures. If there is concerns for bone involvement, please send bone specimen  · With new onset left wrist pain following boil rupture, recommend imaging per ortho recommendations.   · Nephrology ANGE management.  · Anticipate SSTI course unless new concerns for osteo appear  · Patient and plan reviewed with ID staff, Dr. Samson. ID will follow.

## 2022-08-04 NOTE — MEDICAL/APP STUDENT
"[facility] Medicine Student   Progress Note  [facility] – [place], [place] HOSP MED N    Admit Date: 7/26/2022  Hospital Day: 9  08/04/2022  11:01 AM    SUBJECTIVE:   Mr. Wilfredo Thomas is a 47 y.o. male with a relevant medical history of HTN, IDDM2, GERD obesity, HLD who is being followed up for DKA (diabetic ketoacidosis).     HPI:  Wife present at bedside. Patient states that he noticed yesterday he was feeling some shortness of breath and couldn't seem to catch his breath. His wife went to check on him this morning and noticed that he was breathing "fast and heavy" and he sounded like he was slurring his words. Wife was concerned about a stroke, so he brought him to the ED for evaluation.     Patient also reporting significant pain and swelling along his left lateral proximal thigh. He states he has noticed worsening pain over the past 2 weeks. He denies any acute trauma to the area. He was seen by ortho last week and was told to take ibu-profen for a muscle strain. This did not help his pain, so he presented again on 7/22 and was given a prednisone injection into his thigh. This also did not help his pain, and now the pain and swelling have advanced to the point that he has difficulty with ambulation. Patient denies fever, chills, nausea, vomiting.      Of note, patient has hx of poorly controlled diabetes. He was recently started on insulin by his PCP, but he has not taken any insulin since being prescribed it.      In the ED, patient hypertensive and tachycardic to the 130s. Tachypneic with RR in the 40s. On CBC, WBC 26.9, On CMP patient hyperkalemic to 5.5, CorNa 136, Bicarb 5. Cr elevated to 1.9 from BL 0.83. UA, BHB pending at time of admission. CTA without evidence of pulmonary embolism. Critical Care Medicine consulted given severe acidosis.            Overview/Hospital Course:     Patient admitted to the MICU for management of severe DKA. Metabolic acidosis improving on insulin drip. He also has had left thigh pain for 1-2 " months. There is a large mass on his left thigh that is tender to palpation, CT revealed hematoma vs soft tissue growth. General surgery consulted and recommended IR consult. IR consulted, no indication for tap. 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 Ortho consulted and performed bedside aspiration of possible abscess which revealed >200K cells >80% segs. Will hold off on antibiotics for now per ortho recs, ortho is taking patient to the OR for irrigation of the thigh, will start antibiotics afterwards.         7/29 Metabolic acidosis slowly improving. Ortho taking patient to the OR for irrigation of the left thigh, will start antibiotics afterwards.   7/30 Transfer to hospital medicine . S/p I&D  of left thigh Abscess on7/29/22 - MRI - uptured tensor fascia jose luis (TFL) muscle with associated large hematoma. gram stain -Moderate Gram positive cocci in clusters .cultures pending.  continue Vancomycin, clindamycin and Zosyn . Bicarb 15. K replaced . off insulin drip on SQ insulin.  7/31 ID and endocrine consulted.  abscess with Calcium pyrophosphate crystals with unknown significance potassium and P replaced. Bicarbonate WNL . aerobic culture 7/28 STAPHYLOCOCCUS SPECIES . Glucose in 300s .PRN imodium  for diarrhea. PT/OT consulted - WBAT. Surgical drain with purulent output  8/1 PICC team consulted for IV access. vanc trough at 11.4. monitor for vanomycin toxicity. possible OR tomorrow for washout  may need bone biopsy to r/o osteomyelitis.Discontinued clindamycin.   8/2 glucose in 300s.  Increased Levemir  to 14 units BID and Aspart  10 units TIDWM. K replaced   8/3 ANGE with cr 0.7--> 2.7. likely vanc induced ATN with levels 40. urine studies ordered renal sonogram WNL  nephrology consulted. started on NS 100ml/h x 10h and follow up renal panel.Zosyn and  vancomycin discontinued. Strict IO monitor. condom catheter . switched to Ancef for MSSA on  culture  8/4: Cr 4.4 - still increasing following d/c of vanc/zosyn. Pt still receiving IVF and continuing to monitor Cr. Ortho plans to do 2nd washout following resolution of ANGE. Drain still in place for thigh wound with increased purulent drainage. Insulin  Aspart increased to 12 units and Levemir decreased to 12 units due to downtrending glucose overnight into the 70s. Nephro consulted for rapidly progressing ANGE.       Interval history: VSS, NAEON. Pt endorses pain in the left wrist today that is made worse with movement. No swelling of the hand and normal radial pulse. A&Ox4. No other complaints.         Review of Systems   Constitutional: Negative for chills and fever.   HENT: Negative for congestion and sore throat.    Eyes: Negative for blurred vision and double vision.   Respiratory: Negative for cough, hemoptysis and shortness of breath.    Cardiovascular: Negative for chest pain, palpitations and leg swelling.   Gastrointestinal: Negative for abdominal pain, nausea and vomiting.   Genitourinary: Negative for dysuria, frequency and urgency.   Musculoskeletal: Negative for back pain and neck pain.        Pain in left wrist on movement   Skin: Negative for itching and rash.   Neurological: Negative for dizziness, weakness and headaches.   Endo/Heme/Allergies: Does not bruise/bleed easily.       Please refer to the H&P for past medical, family, and social history.    OBJECTIVE:     Vital Signs Recent:  Temp: 98.1 °F (36.7 °C) (08/04/22 0735)  Pulse: 88 (08/04/22 1037)  Resp: 18 (08/04/22 1038)  BP: (!) 142/85 (08/04/22 0735)  SpO2: 96 % (08/04/22 0735)  Oxygen Documentation:    Flow (L/min): 1.5           O2 Device (Oxygen Therapy): room air  $ Is the patient on Low Flow Oxygen?: Yes      Vital Signs Range (Last 24H):  Temp:  [97.9 °F (36.6 °C)-98.5 °F (36.9 °C)]   Pulse:  []   Resp:  [16-18]   BP: ()/(59-85)   SpO2:  [94 %-97 %]        I & O (Last 24H):    Intake/Output Summary (Last 24 hours)  at 8/4/2022 1101  Last data filed at 8/4/2022 1039  Gross per 24 hour   Intake 939.95 ml   Output 2330 ml   Net -1390.05 ml        Physical Exam:  Physical Exam  Constitutional:       General: He is not in acute distress.     Appearance: Normal appearance.   HENT:      Head: Normocephalic and atraumatic.      Nose: Nose normal.      Mouth/Throat:      Mouth: Mucous membranes are moist.      Pharynx: Oropharynx is clear.   Eyes:      Extraocular Movements: Extraocular movements intact.      Pupils: Pupils are equal, round, and reactive to light.   Cardiovascular:      Rate and Rhythm: Normal rate and regular rhythm.      Pulses: Normal pulses.      Heart sounds: Normal heart sounds. No murmur heard.  Pulmonary:      Effort: Pulmonary effort is normal.      Breath sounds: Normal breath sounds. No wheezing.   Abdominal:      General: Abdomen is flat. Bowel sounds are normal. There is no distension.      Palpations: Abdomen is soft.      Tenderness: There is no abdominal tenderness.   Musculoskeletal:         General: Tenderness (left wrist tender to palpation) present. No swelling or deformity.      Cervical back: Normal range of motion and neck supple.      Right lower leg: No edema.      Left lower leg: No edema.   Skin:     General: Skin is warm and dry.      Capillary Refill: Capillary refill takes less than 2 seconds.      Findings: No rash.   Neurological:      General: No focal deficit present.      Mental Status: He is alert and oriented to person, place, and time.         Labs:   Recent Labs   Lab 08/02/22  0226 08/03/22  0454 08/03/22  0759 08/03/22  1509 08/04/22  0425      < > 134* 131* 137   K 3.3*   < > 4.1 3.7 4.2   CL 99   < > 96 98 104   CO2 32*   < > 28 25 22*   BUN 4*   < > 11 13 16   CREATININE 0.7   < > 3.0* 3.5* 4.4*   *   < > 145* 186* 82   CALCIUM 9.2   < > 9.2 8.8 8.3*   MG 1.8  --   --   --   --    PHOS  --   --   --  4.0  --     < > = values in this interval not displayed.      Recent Labs   Lab 07/29/22  0023 07/29/22  0336 08/03/22  0454 08/03/22  0759 08/03/22  1509 08/04/22  0425   ALKPHOS  --    < > 78 88  --  73   ALT  --    < > 18 20  --  14   AST  --    < > 21 23  --  18   ALBUMIN  --    < > 1.7* 2.0* 1.8* 1.6*   PROT  --    < > 5.9* 6.8  --  5.6*   BILITOT  --    < > 0.5 0.6  --  0.4   INR 1.1  --   --   --   --   --     < > = values in this interval not displayed.     Recent Labs   Lab 08/02/22  0226 08/03/22  0454 08/04/22  0708   WBC 5.01 5.58 7.48   HGB 11.3* 11.2* 11.3*   HCT 32.3* 32.5* 32.7*    253 244           Scheduled Meds:   atorvastatin  20 mg Oral Daily    ceFAZolin (ANCEF) IVPB  2 g Intravenous Q12H    insulin aspart U-100  12 Units Subcutaneous TIDWM    insulin detemir U-100  12 Units Subcutaneous BID    senna-docusate 8.6-50 mg  2 tablet Oral Daily     Continuous Infusions:   sodium chloride 0.9% 100 mL/hr at 08/04/22 1039     PRN Meds:acetaminophen, dextrose 10%, dextrose 10%, glucagon (human recombinant), glucose, glucose, insulin aspart U-100, morphine, ondansetron, oxyCODONE, sodium chloride 0.9%    ASSESSMENT/PLAN:   Mr. Wilfredo Thomas is a 47 y.o. male with a relevant medical history of HTN, IDDM, GERD obesity, HLD who is being followed up for DKA (diabetic ketoacidosis).    Active Hospital Problems    Diagnosis  POA    *DKA (diabetic ketoacidosis) [E11.10]  Yes    Hypokalemia [E87.6]  Yes    Anemia [D64.9]  Yes    Hyponatremia [E87.1]  Yes    Abscess of left thigh [L02.416]  Yes    Left leg pain [M79.605]  Yes    Hematoma of left thigh [S70.12XA]  Yes    Leukocytosis [D72.829]  Yes    ANGE (acute kidney injury) [N17.9]  No    Elevated d-dimer [R79.89]  Yes    Hyperlipidemia associated with type 2 diabetes mellitus [E11.69, E78.5]  Yes     Lab Results   Component Value Date    LDLCALC 46.6 (L) 03/22/2022     - well controlled  - continue current medication      Type 2 diabetes mellitus with hyperglycemia, with long-term current  use of insulin [E11.65, Z79.4]  Not Applicable     Lab Results   Component Value Date    HGBA1C 11.2 (H) 03/22/2022     - A1C worsened from 8.6% to 11.2 %  - recommend stop Glipizide  - recommend continue Jardiance and Trulicity  - recommend increase Trulicity to 3 mg weekly  - recommend start Lantus 10 units daily and will uptitrate to goal   - recommend f/u glucose every 2 weeks and monitor before meals      Hypertension associated with type 2 diabetes mellitus [E11.59, I15.2]  Yes     - well controlled  - continue current medication      Class 2 severe obesity due to excess calories with serious comorbidity and body mass index (BMI) of 36.0 to 36.9 in adult [E66.01, Z68.36]  Not Applicable     - discussed recommendation for diet, exercise and weight loss        Resolved Hospital Problems    Diagnosis Date Resolved POA    Hyperkalemia [E87.5] 07/31/2022 Unknown       DKA (diabetic ketoacidosis)     47M with history of DM2, gout, HTN, tobacco use who initially presented for dyspnea, increased thirst, decreased PO intake, found to be in DKA.  Recent history of steroids for leg pain.  On home dulaglutide, empagliflozin, metformin, insulin glargine 20u qhs (has not been taking).  Recent a1c 11.2%.  , bicarb 5, AG 26, BHB 5.9 on admit.       - Insulin drip   - q1h POCT glu checks while on drip  - D5 1/2 NS with 20 meq K   - BMP q4h  - diabetic diet when off insulin drip      7/30  . Bicarb 15. K replaced . off insulin drip on SQ insulin. endocrine consulted  7/31 endocrine consulted.   Bicarbonate WNL . Glucose in 300s   8/4 glucose controlled   - continue subq insulin regimen with aspart and detemir       ANGE (acute kidney injury)     7/30  Patient with acute kidney injury likely due to IVVD/dehydration ANGE is currently resolved . Labs reviewed- Renal function/electrolytes with Estimated Creatinine Clearance: 36.3 mL/min (A) (based on SCr of 2.7 mg/dL (H)). according to latest data. Monitor urine output and  serial BMP and adjust therapy as needed. Avoid nephrotoxins and renally dose meds for GFR listed above.   8/2 ANGE resolved  8/3 recurrent ANGE with cr 0.7--> 2.7. likely vanc induced ATN with levels 40. urine studies and renal sonogram. nephrology consulted. started on NS 100ml/h x 10h and follow up renal panel. Zosyn and  vancomycin discontinued. Strict IO monitor. condom catheter .  8/4 Cr trending upward at 4.4. Retro US unremarkable. No eosinophils present on Emanuel stain.   - cont. IVF   - daily CMP   - cont to hold lisinopril   - strict I/Os       Hyponatremia  sodium 130s. monitor  7/31 sodium 136 resolved         Anemia     Patient's with Normocytic anemia.. Hemoglobin stable. Etiology likely due to chronic disease .        Current CBC reviewed-  Recent Labs   Lab 07/28/22  0354 07/29/22  0336 07/30/22  0311   HGB 13.6* 12.6* 11.9*      Monitor CBC and transfuse if H/H drops below 7.          Abscess of left thigh  reports 4-6 weeks of left lateral thigh pain and swelling, no known injury to the affected area     7/30 Transfer to [facility] medicine . S/p I&D  of left thigh Abscess on7/29/22 - MRI - uptured tensor fascia jose luis (TFL) muscle with associated large hematoma. gram stain -Moderate Gram positive cocci in clusters .cultures pending.  continue Vancomycin, clindamycin and Zosyn . abscess with Calcium pyrophosphate crystals with unknown significance .Surgical drain with purulent output.s.Discontinued clindamycin.   8/1 possible OR tomorrow for washout  may need bone biopsy to r/o osteomyelitis.Discontinued clindamycin.   8/3 Zosyn and  vancomycin discontinued . switched to Ancef for MSSA on culture    - cont. Ancef   - Ortho consult. Cont. to follow recs    - Washout by ortho following resolution of ANGE            Hypokalemia  Resolved       Elevated d-dimer     On RA with increased WOB.  D-dimer elevated to 1.46.  - CTA PE negative           Leukocytosis     Leukocytosis  WBC 26.87      - Trending down    - Initially thought to be due to recent steroid use but left thigh mass concerning for infection   - Will start Vanc after irrigation of left thigh by ortho   7/30   - Afebrile. BCX 2, NGTD  . likely secondary to sepsis..  WBC counts normalized .        Hyperlipidemia associated with type 2 diabetes mellitus     On home atorvastatin.  -     Class 2 severe obesity due to excess calories with serious comorbidity and body mass index (BMI) of 36.0 to 36.9 in adult  Body mass index is 32.63 kg/m². Morbid obesity complicates all aspects of disease management from diagnostic modalities to treatment. Weight loss encouraged and health benefits explained to patient.                   Hypertension associated with type 2 diabetes mellitus     On home lisinopril 20.  - hold ACEi in setting of ANGE.     7/30 SBP controlled      Type 2 diabetes mellitus with hyperglycemia, with long-term current use of insulin        Patient's FSGs are controlled on current medication regimen.  Last A1c reviewed-         Lab Results   Component Value Date     HGBA1C 10.1 (H) 07/26/2022      Most recent fingerstick glucose reviewed-          Recent Labs   Lab 08/01/22  1531 08/01/22  2038 08/02/22  0217 08/02/22  0835   POCTGLUCOSE 241* 322* 152* 184*      Current correctional scale  Medium  Maintain anti-hyperglycemic dose as follows-              Antihyperglycemics (From admission, onward)                 Start     Stop Route Frequency Ordered     08/01/22 2100   insulin detemir U-100 pen 14 Units         -- SubQ 2 times daily 08/01/22 1644     08/01/22 1645   insulin aspart U-100 pen 10 Units         -- SubQ 3 times daily with meals 08/01/22 1644     07/30/22 0826   insulin aspart U-100 pen 1-10 Units         -- SubQ Before meals & nightly PRN 07/30/22 0727          Hold Oral hypoglycemics while patient is in the hospital.  7/31 endocrine consulted.  Glucose in 300s   8/2 glucose in 300s.  Increased Levemir f to 14 units BID and Aspart   10 units TIDWM  8/4 Glucose downtrend overnight into high 70s. Levemir decreased to 12 units and Aspart increased to 12 units                VTE Risk Mitigation (From admission, onward)                  Ordered        Place sequential compression device  Until discontinued         07/29/22 0158        IP VTE HIGH RISK PATIENT  Once         07/29/22 0158        Place SAVANNAH hose  Until discontinued         07/26/22 2240        Place sequential compression device  Until discontinued         07/26/22 2240                     Discharge Planning   FILEMON: 8/8/2022     Code Status: Full Code   Is the patient medically ready for discharge?: No    Reason for patient still in hospital (select all that apply): Treatment  Discharge Plan A: Home with family               Camilo Aguilera, MS3  [employer]

## 2022-08-04 NOTE — ASSESSMENT & PLAN NOTE
MRI left thig-  Ruptured tensor fascia jose luis (TFL) muscle with associated large hematoma,   s/p orthopedic evaluation .aspiration showed  purulence. s/p I&D on 7/29 7/31  abscess with Calcium pyrophosphate crystals with unknown significance. . aerobic culture 7/28 STAPHYLOCOCCUS SPECIES .    -see above

## 2022-08-04 NOTE — ASSESSMENT & PLAN NOTE
WBC 26.87      - Trending down   - Initially thought to be due to recent steroid use but left thigh mass concerning for infection   - Will start Vanc after irrigation of left thigh by ortho   7/30     Patient with leucocytosis   Recent Labs   Lab 08/01/22  0448 08/02/22  0226 08/03/22  0454   WBC 5.34 5.01 5.58     . Afebrile. BCX 2, NGTD  . likely secondary to sepsis..  WBC counts normalized .

## 2022-08-04 NOTE — ASSESSMENT & PLAN NOTE
Received steroid injection, steroid taper for leg pain by orthopedics.    Left lateral/proximal thigh with painful localized swelling, hot to touch. Patient received vanc/zosyn in ED  - CTA LE, U/s LLE pending  -see above

## 2022-08-04 NOTE — PROGRESS NOTES
Pharmacist Renal Dose Adjustment Note    Wilfredo Thomas is a 47 y.o. male being treated with cefazolin    Patient Data:    Vital Signs (Most Recent):  Temp: 98.1 °F (36.7 °C) (08/04/22 0735)  Pulse: 79 (08/04/22 0735)  Resp: 18 (08/04/22 0735)  BP: (!) 142/85 (08/04/22 0735)  SpO2: 96 % (08/04/22 0735)   Vital Signs (72h Range):  Temp:  [97.2 °F (36.2 °C)-99.1 °F (37.3 °C)]   Pulse:  []   Resp:  [16-18]   BP: ()/(59-98)   SpO2:  [92 %-99 %]      Recent Labs   Lab 08/03/22  0759 08/03/22  1509 08/04/22  0425   CREATININE 3.0* 3.5* 4.4*     Serum creatinine: 4.4 mg/dL (H) 08/04/22 0425  Estimated creatinine clearance: 22.3 mL/min (A)    Cefazolin changed to 2 g IV q12h. Patient with severe/rapidly progressing ANGE    Garth AugustD., BCPS  [id number]

## 2022-08-04 NOTE — ASSESSMENT & PLAN NOTE
Patient's with Normocytic anemia.. Hemoglobin stable. Etiology likely due to chronic disease .  Current CBC reviewed-    Recent Labs   Lab 08/01/22  0448 08/02/22  0226 08/03/22  0454   HGB 11.2* 11.3* 11.2*     Monitor CBC and transfuse if H/H drops below 7/21.

## 2022-08-04 NOTE — PROGRESS NOTES
[facility] - Telemetry Stepdown  Infectious Disease  Progress Note    Patient Name: Wilfredo Thomas  MRN: [id number]  Admission Date: 7/26/2022  Length of Stay: 9 days  Attending Physician: Dorota Reynoso MD  Primary Care Provider: Aylin Wayne DO    Isolation Status: No active isolations  Assessment/Plan:      Abscess of left thigh     48 yo male with history of DMII and HTN admitted for DKA and left thigh abscess. MRI of left femur was notable for ruptured tensor fascia jose luis mm with associated large hematoma, as well as small area of potential osteonecrosis. Bedside aspiration of the fluid collection was notable for a cell count of >200k (>80%segs), cultures + staph species. He was taken to the OR on 7/29 with orthopedic surgery for surgical I&D. Surgical cultures are also positive for MSSA. Per ortho surgery, no concerns for bone involvement. Patient was initially on Vancomycin and Zosyn. Vanc trough supratherapeutic and patient has developed an ANGE. He is currently on Cefazolin. He is scheduled for repeat debridement, TBD. Today with new onset left wrist pain. He is s/p wrist arthroscopy with hardware placement in 3/2022. Wife noted a boil on his left wrist that she popped yesterday. Slight swelling and TTP today. Otherwise, afebrile. HDS.        Recommendations:   · Continue Cefazolin 2g q24 (renally dosed) for MSSA SSTI.   · When taken for repeat I&D, please send tissue for path, gram stain, aerobic, anaerobic, AFB and fungal cultures. If there is concerns for bone involvement, please send bone specimen  · With new onset left wrist pain following boil rupture, recommend MRI w/ w/o of left wrist per ortho recommendations.   · Nephrology ANGE management.  · Anticipate SSTI course unless new concerns for osteo appear  · Patient and plan reviewed with ID staff, Dr. Samson. ID will follow.               Thank you for your consult. I will follow-up with patient. Please contact us if you have any additional  questions.    Steven Caicedo NP  Infectious Disease  [facility] - Telemetry Stepdown    Subjective:     Principal Problem:DKA (diabetic ketoacidosis)    HPI: Mr. Thomas is a 47 year old male with a PMH of DM2 (poorly controlled), HTN, GERD, HLD, obesity who initially presented to [facility] ED with cc of SOBx1 day. Pt was also reporting significant pain and swelling along his left lateral proximal thigh. Pt reports this pain started over the last 2 weeks, which worsened. He initially was seen outpatient in which he was diagnoised with a mm strain and given 800 mg ibuprofen. From there he went to the ED on 7/22 d/t worsening pain. He was treated with prednisone/morphine shot, and well as a prednisone oral pack. Pt denied recent injury, with the exception of soreness to his left lower shin following bumping into a cabinet. He denies open wounds/abrasions from this injury but states the soreness started in his left thigh following the improvement of discomfort from his left shin. Pt reports having been treated for a boil on his central/right buttocks in the end of June. 2022. Pt states he was seen in Urgent Care in [place] and the boil was drained. He was given an oral abx in which he completed.     To note, pt reports having a recent left wrist fracture, ligament rupture following an accident with his riding lawnmower. States he underwent surgery and 2 screws were placed on 3/31/22 at [facility]. Denies associated infection/complications. Denies pain in the site currently.    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 Denies having pets. Denies recent fishing/hunting. Denies hx of immunocompromising conditions.     To note, pt was found to be in DKA with blood sugars >400, treated by critical team, which has since resolved. Pt was recently started on insulin by his PCP, which he had not started. Pt latest A1C 10.     CT of left thigh notable for fluid collections suspicious  for hematoma/seroma. MRI of left femur was notable for ruptured tensor fascia jose luis mm with associated large hematoma, as well as small area of potential osteonecrosis. Bedside aspiration of the fluid collection was notable for a cell count of >200k >80%segs, cultures + staph spp. Pt was taken for I&D with Dr. Graff with ortho surgery on 7/29, abscesses were noted and washed out, cultures collected + staph spp. Per pt, states that Dr. Graff is planning to return to surgery tomorrow, 8/1/22 for repeat washout.     Pt was intially with leukocytosis, but has since down trended to 11k. Blood cultures from 7/26 NGTD.     Pt is currently on zosyn, vancomycin, and clindamycin. ID was consulted for abx recs regarding left TFL infection.            Interval History: NAEON. Remains with ANGE, on IVF, reporting urination. Cr to 4.6 today. Ancef renally dosed. Plans to return to OR with ortho for repeat washout once ANGE resolves. Today with new onset left wrist pain.     Review of Systems   Constitutional:  Negative for chills, diaphoresis, fatigue and fever.   Respiratory:  Negative for cough and shortness of breath.    Cardiovascular:  Positive for leg swelling (upper left thigh). Negative for chest pain and palpitations.   Gastrointestinal:  Negative for abdominal distention and abdominal pain.   Genitourinary:  Negative for difficulty urinating and dysuria.   Musculoskeletal:  Positive for arthralgias (left wrist), joint swelling (left wrist) and myalgias (upper left thigh).   Skin:  Positive for wound (upper left thigh). Negative for color change and rash.   Allergic/Immunologic: Negative for immunocompromised state.   Neurological:  Negative for dizziness, tremors, weakness, numbness and headaches.   Psychiatric/Behavioral:  Negative for agitation and decreased concentration. The patient is not nervous/anxious.    Objective:     Vital Signs (Most Recent):  Temp: 99 °F (37.2 °C) (08/04/22 1200)  Pulse: 89 (08/04/22  1200)  Resp: 18 (08/04/22 1200)  BP: (!) 147/82 (08/04/22 1200)  SpO2: 95 % (08/04/22 1200)   Vital Signs (24h Range):  Temp:  [97.9 °F (36.6 °C)-99 °F (37.2 °C)] 99 °F (37.2 °C)  Pulse:  [] 89  Resp:  [16-18] 18  SpO2:  [94 %-97 %] 95 %  BP: ()/(59-85) 147/82     Weight: 94.5 kg (208 lb 5.4 oz)  Body mass index is 32.63 kg/m².    Estimated Creatinine Clearance: 22.3 mL/min (A) (based on SCr of 4.4 mg/dL (H)).    Physical Exam  Vitals and nursing note reviewed.   Constitutional:       General: He is not in acute distress.     Appearance: Normal appearance. He is well-developed. He is obese. He is not ill-appearing, toxic-appearing or diaphoretic.   HENT:      Head: Normocephalic and atraumatic.      Nose: Nose normal.      Mouth/Throat:      Dentition: Does not have dentures.      Pharynx: No oropharyngeal exudate.   Eyes:      General: No scleral icterus.     Conjunctiva/sclera: Conjunctivae normal.   Cardiovascular:      Rate and Rhythm: Normal rate and regular rhythm.   Pulmonary:      Effort: Pulmonary effort is normal. No respiratory distress.      Breath sounds: Normal breath sounds.   Abdominal:      General: There is no distension.      Palpations: Abdomen is soft.      Tenderness: There is no abdominal tenderness.   Musculoskeletal:         General: Swelling (left wrist) present.   Skin:     General: Skin is warm and dry.      Findings: No erythema or rash.      Comments: Incision to left thigh. Dressed, CDI. Surgical drain with purulent output.   Neurological:      General: No focal deficit present.      Mental Status: He is alert and oriented to person, place, and time. Mental status is at baseline.      Motor: No weakness.      Gait: Gait normal.   Psychiatric:         Mood and Affect: Mood normal.         Behavior: Behavior normal.         Thought Content: Thought content normal.         Judgment: Judgment normal.       Significant Labs: CBC:   Recent Labs   Lab 08/03/22  0454 08/04/22  0708    WBC 5.58 7.48   HGB 11.2* 11.3*   HCT 32.5* 32.7*    244       CMP:   Recent Labs   Lab 08/03/22  0454 08/03/22  0759 08/03/22  1509 08/04/22  0425   * 134* 131* 137   K 3.7 4.1 3.7 4.2   CL 98 96 98 104   CO2 27 28 25 22*   * 145* 186* 82   BUN 11 11 13 16   CREATININE 2.7* 3.0* 3.5* 4.4*   CALCIUM 8.7 9.2 8.8 8.3*   PROT 5.9* 6.8  --  5.6*   ALBUMIN 1.7* 2.0* 1.8* 1.6*   BILITOT 0.5 0.6  --  0.4   ALKPHOS 78 88  --  73   AST 21 23  --  18   ALT 18 20  --  14   ANIONGAP 9 10 8 11       Microbiology Results (last 7 days)       Procedure Component Value Units Date/Time    Aerobic culture [[id number]]  (Abnormal) Collected: 07/29/22 1251    Order Status: Completed Specimen: Abscess from Leg, Left Updated: 08/04/22 1250     Aerobic Bacterial Culture STAPHYLOCOCCUS AUREUS  Many  For susceptibility see order #[id number]      Narrative:      Left thigh abscess #1    Aerobic culture [[id number]]  (Abnormal)  (Susceptibility) Collected: 07/28/22 1800    Order Status: Completed Specimen: Abscess from Leg, Left Updated: 08/04/22 1249     Aerobic Bacterial Culture STAPHYLOCOCCUS AUREUS  Many      Narrative:      LEFT THIGH ABSCESS    Aerobic culture [[id number]]  (Abnormal)  (Susceptibility) Collected: 07/29/22 1300    Order Status: Completed Specimen: Abscess from Leg, Left Updated: 08/04/22 1248     Aerobic Bacterial Culture STAPHYLOCOCCUS AUREUS  Many      Narrative:      Left thigh abscess #2    Fungus culture [[id number]] Collected: 07/29/22 1251    Order Status: Completed Specimen: Abscess from Leg, Left Updated: 08/02/22 1335     Fungus (Mycology) Culture Culture in progress    Narrative:      Left thigh abscess #1    Fungus culture [[id number]] Collected: 07/29/22 1300    Order Status: Completed Specimen: Abscess from Leg, Left Updated: 08/02/22 1335     Fungus (Mycology) Culture Culture in progress    Narrative:      Left thigh abscess #2    Fungus culture [[id number]] Collected: 07/28/22 1800    Order  Status: Completed Specimen: Abscess from Leg, Left Updated: 08/02/22 1334     Fungus (Mycology) Culture Culture in progress    Narrative:      LEFT THIGH ABSCESS    Culture, Anaerobic [[id number]] Collected: 07/28/22 1800    Order Status: Completed Specimen: Abscess from Leg, Left Updated: 08/02/22 0853     Anaerobic Culture No anaerobes isolated    Narrative:      LEFT THIGH ABSCESS    Culture, Anaerobe [[id number]] Collected: 07/29/22 1300    Order Status: Completed Specimen: Abscess from Leg, Left Updated: 08/02/22 0852     Anaerobic Culture No anaerobes isolated    Narrative:      Left thigh abscess #2    Culture, Anaerobe [[id number]] Collected: 07/29/22 1251    Order Status: Completed Specimen: Abscess from Leg, Left Updated: 08/02/22 0851     Anaerobic Culture No anaerobes isolated    Narrative:      Left thigh abscess #1    AFB Culture & Smear [[id number]] Collected: 07/29/22 1252    Order Status: Completed Specimen: Wound from Leg, Left Updated: 08/01/22 1512     AFB Culture & Smear Culture in progress     AFB CULTURE STAIN No acid fast bacilli seen.    Narrative:      Left thigh abscess #1    AFB Culture & Smear [[id number]] Collected: 07/29/22 1300    Order Status: Completed Specimen: Abscess from Leg, Left Updated: 08/01/22 1512     AFB Culture & Smear Culture in progress     AFB CULTURE STAIN No acid fast bacilli seen.    Narrative:      Left thigh abscess #2    Blood Culture #1 **CANNOT BE ORDERED STAT** [[id number]] Collected: 07/26/22 2302    Order Status: Completed Specimen: Blood from Peripheral, Hand, Left Updated: 08/01/22 0612     Blood Culture, Routine No growth after 5 days.    Blood Culture #2 **CANNOT BE ORDERED STAT** [[id number]] Collected: 07/26/22 2246    Order Status: Completed Specimen: Blood from Peripheral, Antecubital, Left Updated: 08/01/22 0612     Blood Culture, Routine No growth after 5 days.    AFB Culture & Smear [[id number]] Collected: 07/28/22 1800    Order Status: Completed  Specimen: Abscess from Leg, Left Updated: 07/29/22 2127     AFB Culture & Smear Culture in progress     AFB CULTURE STAIN No acid fast bacilli seen.    Narrative:      LEFT THIGH ABSCESS    Gram stain [[id number]] Collected: 07/29/22 1300    Order Status: Completed Specimen: Abscess from Leg, Left Updated: 07/29/22 1421     Gram Stain Result Moderate WBC's      Moderate Gram positive cocci in clusters    Narrative:      Left thigh abscess #2    Gram stain [[id number]] Collected: 07/29/22 1251    Order Status: Completed Specimen: Abscess from Leg, Left Updated: 07/29/22 1420     Gram Stain Result Moderate WBC's      Moderate Gram positive cocci in clusters    Narrative:      Left thigh abscess #1    Gram stain [[id number]] Collected: 07/28/22 1800    Order Status: Completed Specimen: Abscess from Leg, Left Updated: 07/28/22 2026     Gram Stain Result Rare WBC's      Few Gram positive cocci    Narrative:      LEFT THIGH ABSCESS          Recent Lab Results  (Last 5 results in the past 24 hours)        08/04/22  1120   08/04/22  0911   08/04/22  0721   08/04/22  0708   08/04/22  0425        Albumin         1.6       Alkaline Phosphatase         73       ALT         14       Anion Gap         11       AST         18       Baso #       0.07         Basophil %       0.9         BILIRUBIN TOTAL         0.4  Comment: For infants and newborns, interpretation of results should be based  on gestational age, weight and in agreement with clinical  observations.    Premature Infant recommended reference ranges:  Up to 24 hours.............<8.0 mg/dL  Up to 48 hours............<12.0 mg/dL  3-5 days..................<15.0 mg/dL  6-29 days.................<15.0 mg/dL         BUN         16       Calcium         8.3       Chloride         104       CO2         22       Creatinine         4.4       Differential Method       Automated         eGFR         15.6       Eos #       0.2         Eosinophil %       2.0         Glucose          82       Gran # (ANC)       5.0         Gran %       67.2         Hematocrit       32.7         Hemoglobin       11.3         Immature Grans (Abs)       0.04  Comment: Mild elevation in immature granulocytes is non specific and   can be seen in a variety of conditions including stress response,   acute inflammation, trauma and pregnancy. Correlation with other   laboratory and clinical findings is essential.           Immature Granulocytes       0.5         Lymph #       1.1         Lymph %       14.2         MCH       31.4         MCHC       34.6         MCV       91         Mono #       1.1         Mono %       15.2         MPV       10.2         nRBC       0         Platelets       244         POCT Glucose 162     77           Potassium         4.2       PROTEIN TOTAL         5.6       RBC       3.60         RDW       12.5         Sodium         137       Vancomycin-Trough   31.4  Comment: *Critical value notification by INDIGO with confirmation of receipt to   Aydin Cortez RN at Date 08/04/22 Time 10:14               WBC       7.48                                Significant Imaging:     Imaging Results              CT Thigh With Contrast Left (Final result)  Result time 07/27/22 01:13:09      Final result by Jori Hopkins DO (07/27/22 01:13:09)                   Impression:      Large heterogeneous fluid collection in the anterolateral left proximal thigh soft tissues, concerning for a soft tissue hematoma or Preston-Melissa lesion.  A neoplastic process is not entirely excluded.  Recommend close interval follow-up to assess for stability/resolution.      Electronically signed by: Jori Hopkins  Date:    07/27/2022  Time:    01:13               Narrative:    EXAMINATION:  CT THIGH WITH CONTRAST LEFT    CLINICAL HISTORY:  Soft tissue mass, thigh, deep;    TECHNIQUE:  Axial CT images of the left thigh with sagittal and coronal reformats after the intravenous administration of 50 mL Omnipaque  350.    COMPARISON:  None.    FINDINGS:  Bone: Bone mineralization is normal.  There is no evidence of an acute fracture or dislocation.  Alignment is normal.    Soft tissues: There is a large heterogeneous fluid collection in the left anterolateral proximal thigh measuring approximately 16 x 7 x 6 cm.  The collection appears to be centered within the subcutaneous tissues or superficial fascia and abuts the gluteus musculature, the sartorius muscle, the rectus femoris, and the vastus lateralis.  There is soft tissue edema in the surrounding subcutaneous tissues.  Muscle bulk is normal.    Articulations: The left femoroacetabular joint is unremarkable.  The pubic symphysis is unremarkable.  The left knee is unremarkable.  No large joint effusion or significant cartilage loss.    Miscellaneous: Neurovascular structures are grossly intact.  There is moderate calcified atherosclerosis of the left femoral and popliteal arteries.                                       CTA Chest Non-Coronary (PE Study) (Final result)  Result time 07/26/22 21:36:01      Final result by Jori Hopkins DO (07/26/22 21:36:01)                   Impression:      No large central or lobar pulmonary embolism.      Electronically signed by: Jori Hopkins  Date:    07/26/2022  Time:    21:36               Narrative:    EXAMINATION:  CTA CHEST NON CORONARY    CLINICAL HISTORY:  Pulmonary embolism (PE) suspected, high prob;    TECHNIQUE:  Low dose axial images, sagittal and coronal reformations were obtained from the thoracic inlet to the lung bases following the IV administration of 100 mL of Omnipaque 350.  Contrast timing was optimized to evaluate the pulmonary arteries.  Maximum intensity projection images were provided for review.    COMPARISON:  Chest radiograph from earlier the same date.    FINDINGS:  Pulmonary vasculature: Satisfactory opacification of the pulmonary arterial system.  Motion artifact significantly limits evaluation of the  segmental and subsegmental pulmonary arteries.  There is no large central or lobar pulmonary embolism.    Aorta: Left-sided aortic arch.  No aneurysm and no significant atherosclerosis.    Base of Neck: No significant abnormality.    Thoracic soft tissues: Normal.    Heart: Normal size. No effusion.    Amena/Mediastinum: No pathologic russ enlargement.    Airways: The large airways are patent. No foci of endobronchial filling.    Lungs/Pleura: Clear lungs. No pleural effusion or thickening.    Esophagus: Normal.    Upper Abdomen: No abnormality of the partially imaged upper abdomen.    Bones: No acute fracture. No suspicious lytic or sclerotic lesions.                                       X-Ray Chest 1 View (Final result)  Result time 07/26/22 21:23:42   Procedure changed from X-Ray Chest PA And Lateral     Final result by Olman Saucedo MD (07/26/22 21:23:42)                   Impression:      No convincing radiographic evidence of acute intrathoracic process on this single view..      Electronically signed by: Olman Saucedo MD  Date:    07/26/2022  Time:    21:23               Narrative:    EXAMINATION:  XR CHEST 1 VIEW    CLINICAL HISTORY:  shortness of breath;    TECHNIQUE:  Single frontal view of the chest was performed.    COMPARISON:  None    FINDINGS:  Cardiac monitoring leads overlie the chest.  Cardiac silhouette appears within normal limits.  No confluent airspace consolidation identified.  No significant volume of pleural fluid or pneumothorax appreciated.  The visualized osseous structures demonstrate mild degenerative changes.

## 2022-08-04 NOTE — PT/OT/SLP PROGRESS
Physical Therapy Treatment    Patient Name:  Wilfredo Thomas   MRN:  [id number]    Recommendations:     Discharge Recommendations:  outpatient PT   Discharge Equipment Recommendations: walker, rolling, shower chair   Barriers to discharge: None    Assessment:     Wilfredo Thomas is a 47 y.o. male admitted with a medical diagnosis of DKA (diabetic ketoacidosis).  He presents with the following impairments/functional limitations:  weakness, impaired balance, gait instability, impaired functional mobility, decreased lower extremity function, decreased ROM, impaired skin, edema, orthopedic precautions. Pt participated, and tolerated treatment well. Pt will continue to benefit from skilled PT services to improve all deficits noted above. Resume PT POC a indicated.     Rehab Prognosis: Good; patient would benefit from acute skilled PT services to address these deficits and reach maximum level of function.    Recent Surgery: Procedure(s) (LRB):  IRRIGATION AND DEBRIDEMENT, LOWER EXTREMITY (Left) 6 Days Post-Op    Plan:     During this hospitalization, patient to be seen 3 x/week to address the identified rehab impairments via gait training, therapeutic activities, therapeutic exercises, neuromuscular re-education and progress toward the following goals:    · Plan of Care Expires:  08/26/22    Subjective     Chief Complaint: (L) wrist pain  Patient/Family Comments/goals: none stated  Pain/Comfort:  · Pain Rating 1:  (not rated)  · Location - Side 1: Left  · Location 1:  (wrist)  · Pain Addressed 1: Reposition, Pre-medicate for activity  · Pain Rating Post-Intervention 1:  (not rated)      Objective:     Communicated with nursing prior to session.  Patient found HOB elevated with  (all lines intact) upon PT entry to room.     General Precautions: Standard, fall   Orthopedic Precautions:LLE weight bearing as tolerated (no L hip abduction past neutral)   Braces: N/A  Respiratory Status: Room air     Functional Mobility:  · Bed  Mobility:  Supine to Sit: stand by assistance  · Transfers:  Sit to Stand:  stand by assistance with no AD  · Gait: ~100ft with RW and SBA for safety. No LOB noted.       AM-PAC 6 CLICK MOBILITY  Turning over in bed (including adjusting bedclothes, sheets and blankets)?: 4  Sitting down on and standing up from a chair with arms (e.g., wheelchair, bedside commode, etc.): 4  Moving from lying on back to sitting on the side of the bed?: 4  Moving to and from a bed to a chair (including a wheelchair)?: 3  Need to walk in hospital room?: 3  Climbing 3-5 steps with a railing?: 2  Basic Mobility Total Score: 20       Therapeutic Activities and Exercises:   -All questions/concerns answered within PTA scope of practice.     Patient left up in chair with all lines intact, call button in reach, nursing notified and spouse present..    GOALS:   Multidisciplinary Problems     Physical Therapy Goals        Problem: Physical Therapy    Goal Priority Disciplines Outcome Goal Variances Interventions   Physical Therapy Goal     PT, PT/OT Ongoing, Progressing     Description: Goals to be met by: 22     Patient will increase functional independence with mobility by performin. Supine to sit with [place]  2. Sit to supine with [place]  3. Sit to stand transfer with [place]  4. Gait  x >150 feet with Contact Guard Assistance using Rolling Walker.   5. Lower extremity exercise program x 20 reps per handout, with independence                       Time Tracking:     PT Received On: 22  PT Start Time: 844     PT Stop Time: 0857  PT Total Time (min): 13 min     Billable Minutes: Gait Training 13    Treatment Type: Treatment  PT/PTA: PTA     PTA Visit Number: 1     2022

## 2022-08-04 NOTE — PROGRESS NOTES
"[facility] - Tele[facility] Stepdown  Nephrology  Progress Note    Patient Name: Wilfredo Thomas  MRN: [id number]  Admission Date: 7/26/2022  Hospital Length of Stay: 9 days  Attending Provider: Dorota Reynoso MD   Primary Care Physician: Aylin Wayne DO  Principal Problem:DKA (diabetic ketoacidosis)    Subjective:     HPI: 46 y/o M hx of HTN, IDDM2, GERD obesity, HLD presented to the ED with complaint of 1 day of worsening SOB. Wife present at bedside. Patient states that he noticed yesterday he was feeling some shortness of breath and couldn't seem to catch his breath. His wife went to check on him this morning and noticed that he was breathing "fast and heavy" and he sounded like he was slurring his words. Wife was concerned about a stroke, so he brought him to the ED for evaluation.     Patient also reporting significant pain and swelling along his left lateral proximal thigh. He states he has noticed worsening pain over the past 2 weeks. He denies any acute trauma to the area. He was seen by ortho last week and was told to take ibu-profen for a muscle strain. This did not help his pain, so he presented again on 7/22 and was given a prednisone injection into his thigh. This also did not help his pain, and now the pain and swelling have advanced to the point that he has difficulty with ambulation. Patient denies fever, chills, nausea, vomiting.      Of note, patient has hx of poorly controlled diabetes. He was recently started on insulin by his PCP, but he has not taken any insulin since being prescribed it.      In the ED, patient hypertensive and tachycardic to the 130s. Tachypneic with RR in the 40s. On CBC, WBC 26.9, On CMP patient hyperkalemic to 5.5, CorNa 136, Bicarb 5. Cr elevated to 1.9 from BL 0.83. UA, BHB pending at time of admission. CTA without evidence of pulmonary embolism. Critical Care Medicine consulted given severe acidosis.               Overview/Hospital Course:     Patient admitted to the MICU " for management of severe DKA. Metabolic acidosis improving on insulin drip. He also has had left thigh pain for 1-2 months. There is a large mass on his left thigh that is tender to palpation, CT revealed hematoma vs soft tissue growth. General surgery consulted and recommended IR consult. IR consulted, no indication for tap. 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 Ortho consulted and performed bedside aspiration of possible abscess which revealed >200K cells >80% segs. Will hold off on antibiotics for now per ortho recs, ortho is taking patient to the OR for irrigation of the thigh, will start antibiotics afterwards.         7/29 Metabolic acidosis slowly improving. Ortho taking patient to the OR for irrigation of the left thigh, will start antibiotics afterwards.   7/30 Transfer to hospital medicine . S/p I&D  of left thigh Abscess on7/29/22 - MRI - uptured tensor fascia jose luis (TFL) muscle with associated large hematoma. gram stain -Moderate Gram positive cocci in clusters .cultures pending.  continue Vancomycin, clindamycin and Zosyn . Bicarb 15. K replaced . off insulin drip on SQ insulin.  7/31 ID and endocrine consulted.  abscess with Calcium pyrophosphate crystals with unknown significance potassium and P replaced. Bicarbonate WNL . aerobic culture 7/28 STAPHYLOCOCCUS SPECIES . Glucose in 300s .PRN imodium  for diarrhea. PT/OT consulted - WBAT. Surgical drain with purulent output  8/1 PICC team consulted for IV access. vanc trough at 11.4. monitor for vanomycin toxicity. possible OR tomorrow for washout  may need bone biopsy to r/o osteomyelitis.Discontinued clindamycin.   8/2 glucose in 300s.  Increased Levemir  to 14 units BID and Aspart  10 units TIDWM. K replaced         Nephrology consulyed for ANGE         Interval History: REGINOEON. Pt states that he is doing well. Cr bumped up to 4.4 and Vanc level decreased to 31.4    Review of patient's  allergies indicates:  No Known Allergies  Current Facility-Administered Medications   Medication Frequency    0.9%  NaCl infusion Continuous    acetaminophen tablet 1,000 mg Q8H PRN    atorvastatin tablet 20 mg Daily    ceFAZolin 2 gram in dextrose 5% 100 mL IVPB (premix) Q12H    dextrose 10% bolus 125 mL PRN    dextrose 10% bolus 250 mL PRN    glucagon (human recombinant) injection 1 mg PRN    glucose chewable tablet 16 g PRN    glucose chewable tablet 24 g PRN    insulin aspart U-100 pen 1-10 Units QID (AC + HS) PRN    insulin aspart U-100 pen 12 Units TIDWM    insulin detemir U-100 pen 12 Units BID    morphine injection 3 mg Q6H PRN    ondansetron injection 4 mg Q6H PRN    oxyCODONE immediate release tablet 5 mg Q4H PRN    senna-docusate 8.6-50 mg per tablet 2 tablet Daily    sodium chloride 0.9% flush 10 mL PRN       Objective:     Vital Signs (Most Recent):  Temp: 98.1 °F (36.7 °C) (08/04/22 0735)  Pulse: 88 (08/04/22 1037)  Resp: 18 (08/04/22 1038)  BP: (!) 142/85 (08/04/22 0735)  SpO2: 96 % (08/04/22 0735)  O2 Device (Oxygen Therapy): room air (08/03/22 2318)   Vital Signs (24h Range):  Temp:  [97.9 °F (36.6 °C)-98.5 °F (36.9 °C)] 98.1 °F (36.7 °C)  Pulse:  [] 88  Resp:  [16-18] 18  SpO2:  [94 %-97 %] 96 %  BP: ()/(59-85) 142/85     Weight: 94.5 kg (208 lb 5.4 oz) (07/27/22 2100)  Body mass index is 32.63 kg/m².  Body surface area is 2.11 meters squared.    I/O last 3 completed shifts:  In: 940 [P.O.:400; I.V.:540]  Out: 2640 [Urine:2400; Drains:240]    Physical Exam  Vitals and nursing note reviewed.   Constitutional:       General: He is not in acute distress.     Appearance: Normal appearance.   HENT:      Head: Normocephalic and atraumatic.      Right Ear: External ear normal.      Left Ear: External ear normal.      Nose: Nose normal.   Eyes:      Extraocular Movements: Extraocular movements intact.      Pupils: Pupils are equal, round, and reactive to light.    Cardiovascular:      Rate and Rhythm: Normal rate and regular rhythm.      Pulses: Normal pulses.      Heart sounds: Normal heart sounds.   Pulmonary:      Effort: Pulmonary effort is normal.      Breath sounds: Normal breath sounds.   Abdominal:      General: Abdomen is flat.      Palpations: Abdomen is soft.   Musculoskeletal:         General: Normal range of motion.      Cervical back: Normal range of motion.   Skin:     General: Skin is warm and dry.   Neurological:      General: No focal deficit present.      Mental Status: He is alert and oriented to person, place, and time.   Psychiatric:         Mood and Affect: Mood normal.         Behavior: Behavior normal.       Significant Labs:  CBC:   Recent Labs   Lab 08/04/22  0708   WBC 7.48   RBC 3.60*   HGB 11.3*   HCT 32.7*      MCV 91   MCH 31.4*   MCHC 34.6     CMP:   Recent Labs   Lab 08/04/22  0425   GLU 82   CALCIUM 8.3*   ALBUMIN 1.6*   PROT 5.6*      K 4.2   CO2 22*      BUN 16   CREATININE 4.4*   ALKPHOS 73   ALT 14   AST 18   BILITOT 0.4     All labs within the past 24 hours have been reviewed.     Significant Imaging:  Reviewed    Assessment/Plan:     * DKA (diabetic ketoacidosis)  Per primary     Abscess of left thigh  Per primary     ANGE (acute kidney injury)  ANGE non oliguric scr up to 4.4 today, was 3.5 yesterday   Non oliguric   Likely vancomycin induced ANGE in the setting of trough of 40 and also was on zosyn which will increase risk of vanco induced ANGE. Vanc trough decreased to 31 today. Low suspicion for AIN at this time .   Pr/Cr: 3.97  Renal US normal   Euvolemic   No acid/base disorder   No significant electrolyte abnormalities       Recs:   Strict Is and O's   Monitor vanco levels.   RFP daily   Can cont with current IVF for a total of 1-2 L   Will spin urine                   Thank you for your consult. I will follow-up with patient. Please contact us if you have any additional questions.    Hayder Avila,  MD  Nephrology  [facility] - Tele[facility] Step[facility]

## 2022-08-04 NOTE — ASSESSMENT & PLAN NOTE
ANGE non oliguric scr up to 4.4 today, was 3.5 yesterday   Non oliguric   Likely vancomycin induced ANGE in the setting of trough of 40 and also was on zosyn which will increase risk of vanco induced ANGE. Vanc trough decreased to 31 today. Low suspicion for AIN at this time .   Pr/Cr: 3.97  Renal US normal   Euvolemic   No acid/base disorder   No significant electrolyte abnormalities       Recs:   Strict Is and O's   Monitor vanco levels.   RFP daily   Can cont with current IVF for a total of 1-2 L   Will spin urine

## 2022-08-04 NOTE — PROGRESS NOTES
[facility] - Telemetry Stepdown  Orthopedics  Progress Note    Attg Note:  I agree with the resident's assessment and plan.  Drain still with some significant output.  Patient with ANGE.  Will follow drain output and see when his ANGE improves if it is still significant.  At that point possible repeat washout.    Rob Graff MD      Patient Name: Wilfredo Thomas  MRN: [id number]  Admission Date: 7/26/2022  Hospital Length of Stay: 9 days  Attending Provider: Dorota Reynoso MD  Primary Care Provider: Aylin Wayne DO  Follow-up For: Procedure(s) (LRB):  IRRIGATION AND DEBRIDEMENT, LOWER EXTREMITY (Left)    Post-Operative Day: 6 Days Post-Op  Subjective:     Principal Problem:DKA (diabetic ketoacidosis)    Principal Orthopedic Problem: Left thigh I&D  Staph species culture on 07/28    Interval History: Pt seen and examined at bedside. VSS, AF. Pt with ANGE, likely vanc toxicity - creatinine uptrending to 4.4 this am. Vanc and zosyn discontinued, switched to ancef for MSSA coverage. Pain controlled. Ambulating with walker. 180cc yesterday, purulent drainage.         Review of patient's allergies indicates:  No Known Allergies    Current Facility-Administered Medications   Medication    acetaminophen tablet 1,000 mg    atorvastatin tablet 20 mg    ceFAZolin 2 gram in dextrose 5% 100 mL IVPB (premix)    dextrose 10% bolus 125 mL    dextrose 10% bolus 250 mL    glucagon (human recombinant) injection 1 mg    glucose chewable tablet 16 g    glucose chewable tablet 24 g    insulin aspart U-100 pen 1-10 Units    insulin aspart U-100 pen 12 Units    insulin detemir U-100 pen 14 Units    iohexoL (OMNIPAQUE 350) injection 50 mL    morphine injection 3 mg    ondansetron injection 4 mg    oxyCODONE immediate release tablet 5 mg    senna-docusate 8.6-50 mg per tablet 2 tablet    sodium chloride 0.9% flush 10 mL    sodium chloride 0.9% flush 10 mL    sodium chloride 0.9% flush 10 mL     Objective:     Vital Signs (Most Recent):  Temp:  "97.9 °F (36.6 °C) (08/04/22 0347)  Pulse: 83 (08/04/22 0347)  Resp: 16 (08/04/22 0538)  BP: 139/83 (08/04/22 0347)  SpO2: 96 % (08/04/22 0347) Vital Signs (24h Range):  Temp:  [97.9 °F (36.6 °C)-98.5 °F (36.9 °C)] 97.9 °F (36.6 °C)  Pulse:  [] 83  Resp:  [16-18] 16  SpO2:  [94 %-98 %] 96 %  BP: ()/(59-98) 139/83     Weight: 94.5 kg (208 lb 5.4 oz)  Height: 5' 7" (170.2 cm)  Body mass index is 32.63 kg/m².      Intake/Output Summary (Last 24 hours) at 8/4/2022 0705  Last data filed at 8/4/2022 0500  Gross per 24 hour   Intake 939.95 ml   Output 2580 ml   Net -1640.05 ml         Ortho/SPM Exam    L Lower Extremity  Inspection  - Surgical site dressing clean, dry, and intact  - Drain in place, purulent drainage  - No swelling  - No ecchymosis, erythema, or signs of cellulitis  Palpation  - TTP to palpation over surgical site and anterior thigh  Range of motion  - AROM and PROM of the hip, knee, ankle, and foot intact without pain  Stability  - No evidence of joint dislocation or abnormal laxity  Neurovascular  - TA/EHL/Gastroc/FHL assessed in isolation without deficit  - SILT throughout  - Compartments soft  - DP palpated   - Capillary Refill <3s  - Negative Log roll  - Negative Stinchfield  - Muscle tone normal    Still waiting results from cultures taken during surgery 07/29.    CBC:   Recent Labs   Lab 08/03/22  0454   WBC 5.58   HGB 11.2*   HCT 32.5*          CMP:   Recent Labs   Lab 08/03/22  0454 08/03/22  0759 08/03/22  1509 08/04/22  0425   * 134* 131* 137   K 3.7 4.1 3.7 4.2   CL 98 96 98 104   CO2 27 28 25 22*   * 145* 186* 82   BUN 11 11 13 16   CREATININE 2.7* 3.0* 3.5* 4.4*   CALCIUM 8.7 9.2 8.8 8.3*   PROT 5.9* 6.8  --  5.6*   ALBUMIN 1.7* 2.0* 1.8* 1.6*   BILITOT 0.5 0.6  --  0.4   ALKPHOS 78 88  --  73   AST 21 23  --  18   ALT 18 20  --  14   ANIONGAP 9 10 8 11         All pertinent labs within the past 24 hours have been reviewed.    Significant Imaging: I have reviewed " and interpreted all pertinent imaging results/findings.    Assessment/Plan:     Abscess of left thigh  Wilfredo Thomas is a 47 y.o. male with past medical history of hypertension and diabetes, currently admitted to hospital for DKA. He reports 4-6 weeks of left lateral thigh pain and swelling, no known injury to the affected area. MRI scan showing lateral thigh fluid collection. Now s/p I&D 7/29/22.     - PT daily   - Cx: Staph, spec pending. MSSA on first culture  - Drain 180cc yesterday  - Abx: Vanc/Zosyn discontinued. On ancef for MSSA coverage  - Tight glucose control and nutrition optimization  - ANGE - likely vanc toxicity. Management per primary  - Will require additional washout, pending improvement in ANGE    Dispo: return to OR pending improved Cr    Left leg pain               Aron Vieyra MD  Orthopedics  [facility] - Telemetry Stepdown

## 2022-08-04 NOTE — ASSESSMENT & PLAN NOTE
On home lisinopril 20.  - hold ACEi in setting of ANGE. ANGE resolved   7/30 SBP controlled   8/4- /85

## 2022-08-04 NOTE — SUBJECTIVE & OBJECTIVE
Interval History: NAEON. Remains with ANGE, on IVF, reporting urination. Cr to 4.6 today. Ancef renally dosed. Plans to return to OR with ortho for repeat washout once ANGE resolves. Today with new onset left wrist pain.     Review of Systems   Constitutional:  Negative for chills, diaphoresis, fatigue and fever.   Respiratory:  Negative for cough and shortness of breath.    Cardiovascular:  Positive for leg swelling (upper left thigh). Negative for chest pain and palpitations.   Gastrointestinal:  Negative for abdominal distention and abdominal pain.   Genitourinary:  Negative for difficulty urinating and dysuria.   Musculoskeletal:  Positive for arthralgias (left wrist), joint swelling (left wrist) and myalgias (upper left thigh).   Skin:  Positive for wound (upper left thigh). Negative for color change and rash.   Allergic/Immunologic: Negative for immunocompromised state.   Neurological:  Negative for dizziness, tremors, weakness, numbness and headaches.   Psychiatric/Behavioral:  Negative for agitation and decreased concentration. The patient is not nervous/anxious.    Objective:     Vital Signs (Most Recent):  Temp: 99 °F (37.2 °C) (08/04/22 1200)  Pulse: 89 (08/04/22 1200)  Resp: 18 (08/04/22 1200)  BP: (!) 147/82 (08/04/22 1200)  SpO2: 95 % (08/04/22 1200)   Vital Signs (24h Range):  Temp:  [97.9 °F (36.6 °C)-99 °F (37.2 °C)] 99 °F (37.2 °C)  Pulse:  [] 89  Resp:  [16-18] 18  SpO2:  [94 %-97 %] 95 %  BP: ()/(59-85) 147/82     Weight: 94.5 kg (208 lb 5.4 oz)  Body mass index is 32.63 kg/m².    Estimated Creatinine Clearance: 22.3 mL/min (A) (based on SCr of 4.4 mg/dL (H)).    Physical Exam  Vitals and nursing note reviewed.   Constitutional:       General: He is not in acute distress.     Appearance: Normal appearance. He is well-developed. He is obese. He is not ill-appearing, toxic-appearing or diaphoretic.   HENT:      Head: Normocephalic and atraumatic.      Nose: Nose normal.      Mouth/Throat:       Dentition: Does not have dentures.      Pharynx: No oropharyngeal exudate.   Eyes:      General: No scleral icterus.     Conjunctiva/sclera: Conjunctivae normal.   Cardiovascular:      Rate and Rhythm: Normal rate and regular rhythm.   Pulmonary:      Effort: Pulmonary effort is normal. No respiratory distress.      Breath sounds: Normal breath sounds.   Abdominal:      General: There is no distension.      Palpations: Abdomen is soft.      Tenderness: There is no abdominal tenderness.   Musculoskeletal:         General: Swelling (left wrist) present.   Skin:     General: Skin is warm and dry.      Findings: No erythema or rash.      Comments: Incision to left thigh. Dressed, CDI. Surgical drain with purulent output.   Neurological:      General: No focal deficit present.      Mental Status: He is alert and oriented to person, place, and time. Mental status is at baseline.      Motor: No weakness.      Gait: Gait normal.   Psychiatric:         Mood and Affect: Mood normal.         Behavior: Behavior normal.         Thought Content: Thought content normal.         Judgment: Judgment normal.       Significant Labs: CBC:   Recent Labs   Lab 08/03/22  0454 08/04/22  0708   WBC 5.58 7.48   HGB 11.2* 11.3*   HCT 32.5* 32.7*    244       CMP:   Recent Labs   Lab 08/03/22  0454 08/03/22  0759 08/03/22  1509 08/04/22  0425   * 134* 131* 137   K 3.7 4.1 3.7 4.2   CL 98 96 98 104   CO2 27 28 25 22*   * 145* 186* 82   BUN 11 11 13 16   CREATININE 2.7* 3.0* 3.5* 4.4*   CALCIUM 8.7 9.2 8.8 8.3*   PROT 5.9* 6.8  --  5.6*   ALBUMIN 1.7* 2.0* 1.8* 1.6*   BILITOT 0.5 0.6  --  0.4   ALKPHOS 78 88  --  73   AST 21 23  --  18   ALT 18 20  --  14   ANIONGAP 9 10 8 11       Microbiology Results (last 7 days)       Procedure Component Value Units Date/Time    Aerobic culture [[id number]]  (Abnormal) Collected: 07/29/22 1251    Order Status: Completed Specimen: Abscess from Leg, Left Updated: 08/04/22 1254      Aerobic Bacterial Culture STAPHYLOCOCCUS AUREUS  Many  For susceptibility see order #[id number]      Narrative:      Left thigh abscess #1    Aerobic culture [[id number]]  (Abnormal)  (Susceptibility) Collected: 07/28/22 1800    Order Status: Completed Specimen: Abscess from Leg, Left Updated: 08/04/22 1249     Aerobic Bacterial Culture STAPHYLOCOCCUS AUREUS  Many      Narrative:      LEFT THIGH ABSCESS    Aerobic culture [[id number]]  (Abnormal)  (Susceptibility) Collected: 07/29/22 1300    Order Status: Completed Specimen: Abscess from Leg, Left Updated: 08/04/22 1248     Aerobic Bacterial Culture STAPHYLOCOCCUS AUREUS  Many      Narrative:      Left thigh abscess #2    Fungus culture [[id number]] Collected: 07/29/22 1251    Order Status: Completed Specimen: Abscess from Leg, Left Updated: 08/02/22 1335     Fungus (Mycology) Culture Culture in progress    Narrative:      Left thigh abscess #1    Fungus culture [[id number]] Collected: 07/29/22 1300    Order Status: Completed Specimen: Abscess from Leg, Left Updated: 08/02/22 1335     Fungus (Mycology) Culture Culture in progress    Narrative:      Left thigh abscess #2    Fungus culture [[id number]] Collected: 07/28/22 1800    Order Status: Completed Specimen: Abscess from Leg, Left Updated: 08/02/22 1334     Fungus (Mycology) Culture Culture in progress    Narrative:      LEFT THIGH ABSCESS    Culture, Anaerobic [[id number]] Collected: 07/28/22 1800    Order Status: Completed Specimen: Abscess from Leg, Left Updated: 08/02/22 0853     Anaerobic Culture No anaerobes isolated    Narrative:      LEFT THIGH ABSCESS    Culture, Anaerobe [[id number]] Collected: 07/29/22 1300    Order Status: Completed Specimen: Abscess from Leg, Left Updated: 08/02/22 0852     Anaerobic Culture No anaerobes isolated    Narrative:      Left thigh abscess #2    Culture, Anaerobe [[id number]] Collected: 07/29/22 1251    Order Status: Completed Specimen: Abscess from Leg, Left Updated: 08/02/22  0851     Anaerobic Culture No anaerobes isolated    Narrative:      Left thigh abscess #1    AFB Culture & Smear [[id number]] Collected: 07/29/22 1252    Order Status: Completed Specimen: Wound from Leg, Left Updated: 08/01/22 1512     AFB Culture & Smear Culture in progress     AFB CULTURE STAIN No acid fast bacilli seen.    Narrative:      Left thigh abscess #1    AFB Culture & Smear [[id number]] Collected: 07/29/22 1300    Order Status: Completed Specimen: Abscess from Leg, Left Updated: 08/01/22 1512     AFB Culture & Smear Culture in progress     AFB CULTURE STAIN No acid fast bacilli seen.    Narrative:      Left thigh abscess #2    Blood Culture #1 **CANNOT BE ORDERED STAT** [[id number]] Collected: 07/26/22 2302    Order Status: Completed Specimen: Blood from Peripheral, Hand, Left Updated: 08/01/22 0612     Blood Culture, Routine No growth after 5 days.    Blood Culture #2 **CANNOT BE ORDERED STAT** [[id number]] Collected: 07/26/22 2246    Order Status: Completed Specimen: Blood from Peripheral, Antecubital, Left Updated: 08/01/22 0612     Blood Culture, Routine No growth after 5 days.    AFB Culture & Smear [[id number]] Collected: 07/28/22 1800    Order Status: Completed Specimen: Abscess from Leg, Left Updated: 07/29/22 2127     AFB Culture & Smear Culture in progress     AFB CULTURE STAIN No acid fast bacilli seen.    Narrative:      LEFT THIGH ABSCESS    Gram stain [[id number]] Collected: 07/29/22 1300    Order Status: Completed Specimen: Abscess from Leg, Left Updated: 07/29/22 1421     Gram Stain Result Moderate WBC's      Moderate Gram positive cocci in clusters    Narrative:      Left thigh abscess #2    Gram stain [[id number]] Collected: 07/29/22 1251    Order Status: Completed Specimen: Abscess from Leg, Left Updated: 07/29/22 1420     Gram Stain Result Moderate WBC's      Moderate Gram positive cocci in clusters    Narrative:      Left thigh abscess #1    Gram stain [[id number]] Collected: 07/28/22 1800     Order Status: Completed Specimen: Abscess from Leg, Left Updated: 07/28/22 2026     Gram Stain Result Rare WBC's      Few Gram positive cocci    Narrative:      LEFT THIGH ABSCESS          Recent Lab Results  (Last 5 results in the past 24 hours)        08/04/22  1120   08/04/22  0911   08/04/22  0721   08/04/22  0708   08/04/22  0425        Albumin         1.6       Alkaline Phosphatase         73       ALT         14       Anion Gap         11       AST         18       Baso #       0.07         Basophil %       0.9         BILIRUBIN TOTAL         0.4  Comment: For infants and newborns, interpretation of results should be based  on gestational age, weight and in agreement with clinical  observations.    Premature Infant recommended reference ranges:  Up to 24 hours.............<8.0 mg/dL  Up to 48 hours............<12.0 mg/dL  3-5 days..................<15.0 mg/dL  6-29 days.................<15.0 mg/dL         BUN         16       Calcium         8.3       Chloride         104       CO2         22       Creatinine         4.4       Differential Method       Automated         eGFR         15.6       Eos #       0.2         Eosinophil %       2.0         Glucose         82       Gran # (ANC)       5.0         Gran %       67.2         Hematocrit       32.7         Hemoglobin       11.3         Immature Grans (Abs)       0.04  Comment: Mild elevation in immature granulocytes is non specific and   can be seen in a variety of conditions including stress response,   acute inflammation, trauma and pregnancy. Correlation with other   laboratory and clinical findings is essential.           Immature Granulocytes       0.5         Lymph #       1.1         Lymph %       14.2         MCH       31.4         MCHC       34.6         MCV       91         Mono #       1.1         Mono %       15.2         MPV       10.2         nRBC       0         Platelets       244         POCT Glucose 162     77           Potassium          4.2       PROTEIN TOTAL         5.6       RBC       3.60         RDW       12.5         Sodium         137       Vancomycin-Trough   31.4  Comment: *Critical value notification by INDIGO with confirmation of receipt to   Aydin Cortez RN at Date 08/04/22 Time 10:14               WBC       7.48                                Significant Imaging:     Imaging Results              CT Thigh With Contrast Left (Final result)  Result time 07/27/22 01:13:09      Final result by Jori Hopkins DO (07/27/22 01:13:09)                   Impression:      Large heterogeneous fluid collection in the anterolateral left proximal thigh soft tissues, concerning for a soft tissue hematoma or Preston-Melissa lesion.  A neoplastic process is not entirely excluded.  Recommend close interval follow-up to assess for stability/resolution.      Electronically signed by: Jori Hopkins  Date:    07/27/2022  Time:    01:13               Narrative:    EXAMINATION:  CT THIGH WITH CONTRAST LEFT    CLINICAL HISTORY:  Soft tissue mass, thigh, deep;    TECHNIQUE:  Axial CT images of the left thigh with sagittal and coronal reformats after the intravenous administration of 50 mL Omnipaque 350.    COMPARISON:  None.    FINDINGS:  Bone: Bone mineralization is normal.  There is no evidence of an acute fracture or dislocation.  Alignment is normal.    Soft tissues: There is a large heterogeneous fluid collection in the left anterolateral proximal thigh measuring approximately 16 x 7 x 6 cm.  The collection appears to be centered within the subcutaneous tissues or superficial fascia and abuts the gluteus musculature, the sartorius muscle, the rectus femoris, and the vastus lateralis.  There is soft tissue edema in the surrounding subcutaneous tissues.  Muscle bulk is normal.    Articulations: The left femoroacetabular joint is unremarkable.  The pubic symphysis is unremarkable.  The left knee is unremarkable.  No large joint effusion or significant  cartilage loss.    Miscellaneous: Neurovascular structures are grossly intact.  There is moderate calcified atherosclerosis of the left femoral and popliteal arteries.                                       CTA Chest Non-Coronary (PE Study) (Final result)  Result time 07/26/22 21:36:01      Final result by Jori Hopkins DO (07/26/22 21:36:01)                   Impression:      No large central or lobar pulmonary embolism.      Electronically signed by: Jori Hopkins  Date:    07/26/2022  Time:    21:36               Narrative:    EXAMINATION:  CTA CHEST NON CORONARY    CLINICAL HISTORY:  Pulmonary embolism (PE) suspected, high prob;    TECHNIQUE:  Low dose axial images, sagittal and coronal reformations were obtained from the thoracic inlet to the lung bases following the IV administration of 100 mL of Omnipaque 350.  Contrast timing was optimized to evaluate the pulmonary arteries.  Maximum intensity projection images were provided for review.    COMPARISON:  Chest radiograph from earlier the same date.    FINDINGS:  Pulmonary vasculature: Satisfactory opacification of the pulmonary arterial system.  Motion artifact significantly limits evaluation of the segmental and subsegmental pulmonary arteries.  There is no large central or lobar pulmonary embolism.    Aorta: Left-sided aortic arch.  No aneurysm and no significant atherosclerosis.    Base of Neck: No significant abnormality.    Thoracic soft tissues: Normal.    Heart: Normal size. No effusion.    Amena/Mediastinum: No pathologic russ enlargement.    Airways: The large airways are patent. No foci of endobronchial filling.    Lungs/Pleura: Clear lungs. No pleural effusion or thickening.    Esophagus: Normal.    Upper Abdomen: No abnormality of the partially imaged upper abdomen.    Bones: No acute fracture. No suspicious lytic or sclerotic lesions.                                       X-Ray Chest 1 View (Final result)  Result time 07/26/22 21:23:42    Procedure changed from X-Ray Chest PA And Lateral     Final result by Olman Saucedo MD (07/26/22 21:23:42)                   Impression:      No convincing radiographic evidence of acute intrathoracic process on this single view..      Electronically signed by: Olman Saucedo MD  Date:    07/26/2022  Time:    21:23               Narrative:    EXAMINATION:  XR CHEST 1 VIEW    CLINICAL HISTORY:  shortness of breath;    TECHNIQUE:  Single frontal view of the chest was performed.    COMPARISON:  None    FINDINGS:  Cardiac monitoring leads overlie the chest.  Cardiac silhouette appears within normal limits.  No confluent airspace consolidation identified.  No significant volume of pleural fluid or pneumothorax appreciated.  The visualized osseous structures demonstrate mild degenerative changes.

## 2022-08-04 NOTE — PT/OT/SLP PROGRESS
"Occupational Therapy   Treatment    Name: Wilfredo Thomas  MRN: [id number]  Admitting Diagnosis:  DKA (diabetic ketoacidosis)  6 Days Post-Op    Recommendations:     Discharge Recommendations: outpatient PT  Discharge Equipment Recommendations:  walker, rolling, shower chair  Barriers to discharge:  None    Assessment:     Wilfredo Thomas is a 47 y.o. male with a medical diagnosis of DKA (diabetic ketoacidosis).  Patient seen for OT. He reported he has been walking with rolling walker with increased pain in left wrist. He stated he thinks he slept weird and his having left wrist pain with limited hand motion and swelling noted. He is 3 months post wrist surgery. Patient educated on hand/wrist exercises and completed in session. Educated on positioning of left hand/wrist.  Performance deficits affecting function are weakness, impaired functional mobility, gait instability, impaired balance, impaired self care skills, pain, decreased lower extremity function, decreased upper extremity function, decreased ROM, orthopedic precautions.     Rehab Prognosis:  Good; patient would benefit from acute skilled OT services to address these deficits and reach maximum level of function.       Plan:     Patient to be seen 2 x/week to address the above listed problems via self-care/home management, therapeutic activities, therapeutic exercises  · Plan of Care Expires: 08/31/22  · Plan of Care Reviewed with: patient, spouse    Subjective     "I had left wrist/hand surgery 3 months ago. I woke up this morning with a lot of pain and can barely move it. It really hurts with I push through the rolling walker."     Pain/Comfort:  · Pain Rating 1: 9/10  · Location - Side 1: Left  · Location 1: wrist  · Pain Addressed 1: Reposition, Distraction    Objective:     Communicated with: RN prior to session.  Patient found supine with telemetry upon OT entry to room.    General Precautions: Standard, fall   Orthopedic Precautions:LLE weight " bearing as tolerated (no left hip abduction past neutral)   Braces: N/A  Respiratory Status: Room air     Occupational Performance:     Functional Mobility/Transfers:  · Deferred as patient as been getting up in room and ambulated with PT. Deferred secondary to patients pain in left wrist increased with ambulation     Activities of Daily Living:  · Patient reported all ADLs had been completed       [facility] 6 Click ADL: 19    Treatment & Education:  -Thera-ex: left wrist/hand    -wrist flexion/extension within tolerance    -tendon glides    -lumbrical exercises    -full fist  -OT completed retrograde massage to left fingers/hand   -Patient educated on position of left hand/wrist to decreased swelling   Patient educated on role of OT and plan of care     Patient left supine with all lines intactEducation:      GOALS:   Multidisciplinary Problems     Occupational Therapy Goals        Problem: Occupational Therapy    Goal Priority Disciplines Outcome Interventions   Occupational Therapy Goal     OT, PT/OT Ongoing, Progressing    Description: Goals set on 8/1, with expiration date 8/15:  Patient will increase functional independence with ADLs by performing:    Bed mobility with [place]  Grooming while standing at sink with [place]  UB Dressing with [place].  LB Dressing with [place].  Toileting from toilet with [place] for hygiene and clothing management.   Functional mobility of household and community distance with [place] and AD as needed  Pt will demonstrate understanding of education provided regarding energy conservation and task modification through teach-back method.  Pt will demonstrate [place] in HEP for BUE strengthening GM/FM exercises to improve functional performance.                        Time Tracking:     OT Date of Treatment: 08/04/22  OT Start Time: 1328  OT Stop Time: 1345  OT Total Time (min): 17 min    Billable Minutes:Therapeutic Exercise 17                8/4/2022

## 2022-08-04 NOTE — SUBJECTIVE & OBJECTIVE
"Principal Problem:DKA (diabetic ketoacidosis)    Principal Orthopedic Problem: Left thigh I&D  Staph species culture on 07/28    Interval History: Pt seen and examined at bedside. VSS, AF. Pt with ANGE, likely vanc toxicity - creatinine uptrending to 4.4 this am. Vanc and zosyn discontinued, switched to ancef for MSSA coverage. Pain controlled. Ambulating with walker. 180cc yesterday, purulent drainage.         Review of patient's allergies indicates:  No Known Allergies    Current Facility-Administered Medications   Medication    acetaminophen tablet 1,000 mg    atorvastatin tablet 20 mg    ceFAZolin 2 gram in dextrose 5% 100 mL IVPB (premix)    dextrose 10% bolus 125 mL    dextrose 10% bolus 250 mL    glucagon (human recombinant) injection 1 mg    glucose chewable tablet 16 g    glucose chewable tablet 24 g    insulin aspart U-100 pen 1-10 Units    insulin aspart U-100 pen 12 Units    insulin detemir U-100 pen 14 Units    iohexoL (OMNIPAQUE 350) injection 50 mL    morphine injection 3 mg    ondansetron injection 4 mg    oxyCODONE immediate release tablet 5 mg    senna-docusate 8.6-50 mg per tablet 2 tablet    sodium chloride 0.9% flush 10 mL    sodium chloride 0.9% flush 10 mL    sodium chloride 0.9% flush 10 mL     Objective:     Vital Signs (Most Recent):  Temp: 97.9 °F (36.6 °C) (08/04/22 0347)  Pulse: 83 (08/04/22 0347)  Resp: 16 (08/04/22 0538)  BP: 139/83 (08/04/22 0347)  SpO2: 96 % (08/04/22 0347) Vital Signs (24h Range):  Temp:  [97.9 °F (36.6 °C)-98.5 °F (36.9 °C)] 97.9 °F (36.6 °C)  Pulse:  [] 83  Resp:  [16-18] 16  SpO2:  [94 %-98 %] 96 %  BP: ()/(59-98) 139/83     Weight: 94.5 kg (208 lb 5.4 oz)  Height: 5' 7" (170.2 cm)  Body mass index is 32.63 kg/m².      Intake/Output Summary (Last 24 hours) at 8/4/2022 0705  Last data filed at 8/4/2022 0500  Gross per 24 hour   Intake 939.95 ml   Output 2580 ml   Net -1640.05 ml         Ortho/SPM Exam    L Lower Extremity  Inspection  - Surgical site " dressing clean, dry, and intact  - Drain in place, purulent drainage  - No swelling  - No ecchymosis, erythema, or signs of cellulitis  Palpation  - TTP to palpation over surgical site and anterior thigh  Range of motion  - AROM and PROM of the hip, knee, ankle, and foot intact without pain  Stability  - No evidence of joint dislocation or abnormal laxity  Neurovascular  - TA/EHL/Gastroc/FHL assessed in isolation without deficit  - SILT throughout  - Compartments soft  - DP palpated   - Capillary Refill <3s  - Negative Log roll  - Negative Stinchfield  - Muscle tone normal    Still waiting results from cultures taken during surgery 07/29.    CBC:   Recent Labs   Lab 08/03/22  0454   WBC 5.58   HGB 11.2*   HCT 32.5*          CMP:   Recent Labs   Lab 08/03/22  0454 08/03/22  0759 08/03/22  1509 08/04/22  0425   * 134* 131* 137   K 3.7 4.1 3.7 4.2   CL 98 96 98 104   CO2 27 28 25 22*   * 145* 186* 82   BUN 11 11 13 16   CREATININE 2.7* 3.0* 3.5* 4.4*   CALCIUM 8.7 9.2 8.8 8.3*   PROT 5.9* 6.8  --  5.6*   ALBUMIN 1.7* 2.0* 1.8* 1.6*   BILITOT 0.5 0.6  --  0.4   ALKPHOS 78 88  --  73   AST 21 23  --  18   ALT 18 20  --  14   ANIONGAP 9 10 8 11         All pertinent labs within the past 24 hours have been reviewed.    Significant Imaging: I have reviewed and interpreted all pertinent imaging results/findings.

## 2022-08-04 NOTE — PROGRESS NOTES
I have reviewed and concur with Dr. Allen's history, physical, assessment, and plan.  I have personally interviewed and examined the patient.      Nilay Mercado MD         Spoke with the patient's wife advised the order for home health has been updated.  Since it is [facility] they will give them a call to start the set up for the home health PT

## 2022-08-04 NOTE — ASSESSMENT & PLAN NOTE
47M with history of DM2, gout, HTN, tobacco use who initially presented for dyspnea, increased thirst, decreased PO intake, found to be in DKA.  Recent history of steroids for leg pain.  On home dulaglutide, empagliflozin, metformin, insulin glargine 20u qhs (has not been taking).  Recent a1c 11.2%.  , bicarb 5, AG 26, BHB 5.9 on admit.       - Insulin drip   - q1h POCT glu checks while on drip  - D5 1/2 NS with 20 meq K   - BMP q4h  - diabetic diet when off insulin drip     7/30  . Bicarb 15. K replaced . off insulin drip on SQ insulin. endocrine consulted  7/31 endocrine consulted.   Bicarbonate WNL . Glucose in 300s   Recent Labs     08/02/22  1115 08/02/22  1521 08/02/22  1923 08/03/22  0726 08/03/22  1922 08/04/22  0721   POCTGLUCOSE 154* 140* 254* 143* 155* 77     8/4- on detemir 14 bid, aspart 12 ac.   -see above

## 2022-08-04 NOTE — SUBJECTIVE & OBJECTIVE
Interval History: CASSIEON. Pt states that he is doing well. Cr bumped up to 4.4 and Vanc level decreased to 31.4    Review of patient's allergies indicates:  No Known Allergies  Current Facility-Administered Medications   Medication Frequency    0.9%  NaCl infusion Continuous    acetaminophen tablet 1,000 mg Q8H PRN    atorvastatin tablet 20 mg Daily    ceFAZolin 2 gram in dextrose 5% 100 mL IVPB (premix) Q12H    dextrose 10% bolus 125 mL PRN    dextrose 10% bolus 250 mL PRN    glucagon (human recombinant) injection 1 mg PRN    glucose chewable tablet 16 g PRN    glucose chewable tablet 24 g PRN    insulin aspart U-100 pen 1-10 Units QID (AC + HS) PRN    insulin aspart U-100 pen 12 Units TIDWM    insulin detemir U-100 pen 12 Units BID    morphine injection 3 mg Q6H PRN    ondansetron injection 4 mg Q6H PRN    oxyCODONE immediate release tablet 5 mg Q4H PRN    senna-docusate 8.6-50 mg per tablet 2 tablet Daily    sodium chloride 0.9% flush 10 mL PRN       Objective:     Vital Signs (Most Recent):  Temp: 98.1 °F (36.7 °C) (08/04/22 0735)  Pulse: 88 (08/04/22 1037)  Resp: 18 (08/04/22 1038)  BP: (!) 142/85 (08/04/22 0735)  SpO2: 96 % (08/04/22 0735)  O2 Device (Oxygen Therapy): room air (08/03/22 2318)   Vital Signs (24h Range):  Temp:  [97.9 °F (36.6 °C)-98.5 °F (36.9 °C)] 98.1 °F (36.7 °C)  Pulse:  [] 88  Resp:  [16-18] 18  SpO2:  [94 %-97 %] 96 %  BP: ()/(59-85) 142/85     Weight: 94.5 kg (208 lb 5.4 oz) (07/27/22 2100)  Body mass index is 32.63 kg/m².  Body surface area is 2.11 meters squared.    I/O last 3 completed shifts:  In: 940 [P.O.:400; I.V.:540]  Out: 2640 [Urine:2400; Drains:240]    Physical Exam  Vitals and nursing note reviewed.   Constitutional:       General: He is not in acute distress.     Appearance: Normal appearance.   HENT:      Head: Normocephalic and atraumatic.      Right Ear: External ear normal.      Left Ear: External ear normal.      Nose: Nose normal.   Eyes:      Extraocular  Movements: Extraocular movements intact.      Pupils: Pupils are equal, round, and reactive to light.   Cardiovascular:      Rate and Rhythm: Normal rate and regular rhythm.      Pulses: Normal pulses.      Heart sounds: Normal heart sounds.   Pulmonary:      Effort: Pulmonary effort is normal.      Breath sounds: Normal breath sounds.   Abdominal:      General: Abdomen is flat.      Palpations: Abdomen is soft.   Musculoskeletal:         General: Normal range of motion.      Cervical back: Normal range of motion.   Skin:     General: Skin is warm and dry.   Neurological:      General: No focal deficit present.      Mental Status: He is alert and oriented to person, place, and time.   Psychiatric:         Mood and Affect: Mood normal.         Behavior: Behavior normal.       Significant Labs:  CBC:   Recent Labs   Lab 08/04/22  0708   WBC 7.48   RBC 3.60*   HGB 11.3*   HCT 32.7*      MCV 91   MCH 31.4*   MCHC 34.6     CMP:   Recent Labs   Lab 08/04/22  0425   GLU 82   CALCIUM 8.3*   ALBUMIN 1.6*   PROT 5.6*      K 4.2   CO2 22*      BUN 16   CREATININE 4.4*   ALKPHOS 73   ALT 14   AST 18   BILITOT 0.4     All labs within the past 24 hours have been reviewed.     Significant Imaging:  Reviewed

## 2022-08-05 ENCOUNTER — ANESTHESIA EVENT (OUTPATIENT)
Dept: SURGERY | Facility: HOSPITAL | Age: 47
DRG: 981 | End: 2022-08-05
Payer: COMMERCIAL

## 2022-08-05 PROBLEM — M10.9 ACUTE GOUT OF LEFT WRIST: Status: ACTIVE | Noted: 2022-08-05

## 2022-08-05 PROBLEM — M25.432 PAIN AND SWELLING OF LEFT WRIST: Status: ACTIVE | Noted: 2022-08-05

## 2022-08-05 PROBLEM — M25.532 PAIN AND SWELLING OF LEFT WRIST: Status: ACTIVE | Noted: 2022-08-05

## 2022-08-05 LAB
ALBUMIN SERPL BCP-MCNC: 1.6 G/DL (ref 3.5–5.2)
ALP SERPL-CCNC: 80 U/L (ref 55–135)
ALT SERPL W/O P-5'-P-CCNC: 6 U/L (ref 10–44)
ANION GAP SERPL CALC-SCNC: 10 MMOL/L (ref 8–16)
APPEARANCE FLD: NORMAL
AST SERPL-CCNC: 15 U/L (ref 10–40)
BASOPHILS # BLD AUTO: 0.04 K/UL (ref 0–0.2)
BASOPHILS NFR BLD: 0.7 % (ref 0–1.9)
BILIRUB SERPL-MCNC: 0.3 MG/DL (ref 0.1–1)
BODY FLD TYPE: NORMAL
BODY FLD TYPE: NORMAL
BODY FLUID COMMENTS: NORMAL
BUN SERPL-MCNC: 21 MG/DL (ref 6–20)
CALCIUM SERPL-MCNC: 8.4 MG/DL (ref 8.7–10.5)
CHLORIDE SERPL-SCNC: 102 MMOL/L (ref 95–110)
CO2 SERPL-SCNC: 23 MMOL/L (ref 23–29)
COLOR FLD: NORMAL
CREAT SERPL-MCNC: 6 MG/DL (ref 0.5–1.4)
CRP SERPL-MCNC: 125 MG/L (ref 0–8.2)
CRYSTALS FLD MICRO: NEGATIVE
DIFFERENTIAL METHOD: ABNORMAL
EOSINOPHIL # BLD AUTO: 0.1 K/UL (ref 0–0.5)
EOSINOPHIL NFR BLD: 2.2 % (ref 0–8)
EOSINOPHIL NFR FLD MANUAL: 1 %
ERYTHROCYTE [DISTWIDTH] IN BLOOD BY AUTOMATED COUNT: 12.7 % (ref 11.5–14.5)
EST. GFR  (NO RACE VARIABLE): 10.7 ML/MIN/1.73 M^2
GLUCOSE SERPL-MCNC: 97 MG/DL (ref 70–110)
GRAM STN SPEC: NORMAL
GRAM STN SPEC: NORMAL
HCT VFR BLD AUTO: 31.6 % (ref 40–54)
HGB BLD-MCNC: 10.4 G/DL (ref 14–18)
IMM GRANULOCYTES # BLD AUTO: 0.03 K/UL (ref 0–0.04)
IMM GRANULOCYTES NFR BLD AUTO: 0.5 % (ref 0–0.5)
LYMPHOCYTES # BLD AUTO: 1 K/UL (ref 1–4.8)
LYMPHOCYTES NFR BLD: 17.7 % (ref 18–48)
LYMPHOCYTES NFR FLD MANUAL: 7 %
MCH RBC QN AUTO: 31 PG (ref 27–31)
MCHC RBC AUTO-ENTMCNC: 32.9 G/DL (ref 32–36)
MCV RBC AUTO: 94 FL (ref 82–98)
MONOCYTES # BLD AUTO: 0.6 K/UL (ref 0.3–1)
MONOCYTES NFR BLD: 10.4 % (ref 4–15)
MONOS+MACROS NFR FLD MANUAL: 4 %
NEUTROPHILS # BLD AUTO: 3.8 K/UL (ref 1.8–7.7)
NEUTROPHILS NFR BLD: 68.5 % (ref 38–73)
NEUTROPHILS NFR FLD MANUAL: 88 %
NRBC BLD-RTO: 0 /100 WBC
PATH INTERP FLD-IMP: NORMAL
PLATELET # BLD AUTO: 253 K/UL (ref 150–450)
PMV BLD AUTO: 9.2 FL (ref 9.2–12.9)
POCT GLUCOSE: 111 MG/DL (ref 70–110)
POCT GLUCOSE: 128 MG/DL (ref 70–110)
POCT GLUCOSE: 161 MG/DL (ref 70–110)
POCT GLUCOSE: 182 MG/DL (ref 70–110)
POCT GLUCOSE: 190 MG/DL (ref 70–110)
POTASSIUM SERPL-SCNC: 3.9 MMOL/L (ref 3.5–5.1)
PROT SERPL-MCNC: 6.1 G/DL (ref 6–8.4)
RBC # BLD AUTO: 3.35 M/UL (ref 4.6–6.2)
SODIUM SERPL-SCNC: 135 MMOL/L (ref 136–145)
URATE SERPL-MCNC: 4.5 MG/DL (ref 3.4–7)
WBC # BLD AUTO: 5.48 K/UL (ref 3.9–12.7)
WBC # FLD: 2050 /CU MM

## 2022-08-05 PROCEDURE — 99233 SBSQ HOSP IP/OBS HIGH 50: CPT | Mod: ,,, | Performed by: HOSPITALIST

## 2022-08-05 PROCEDURE — 87205 SMEAR GRAM STAIN: CPT | Performed by: STUDENT IN AN ORGANIZED HEALTH CARE EDUCATION/TRAINING PROGRAM

## 2022-08-05 PROCEDURE — 25000003 PHARM REV CODE 250: Performed by: HOSPITALIST

## 2022-08-05 PROCEDURE — 63600175 PHARM REV CODE 636 W HCPCS

## 2022-08-05 PROCEDURE — 87102 FUNGUS ISOLATION CULTURE: CPT | Performed by: STUDENT IN AN ORGANIZED HEALTH CARE EDUCATION/TRAINING PROGRAM

## 2022-08-05 PROCEDURE — 99232 PR SUBSEQUENT HOSPITAL CARE,LEVL II: ICD-10-PCS | Mod: ,,, | Performed by: INTERNAL MEDICINE

## 2022-08-05 PROCEDURE — 97530 THERAPEUTIC ACTIVITIES: CPT

## 2022-08-05 PROCEDURE — 36415 COLL VENOUS BLD VENIPUNCTURE: CPT | Performed by: STUDENT IN AN ORGANIZED HEALTH CARE EDUCATION/TRAINING PROGRAM

## 2022-08-05 PROCEDURE — 94761 N-INVAS EAR/PLS OXIMETRY MLT: CPT

## 2022-08-05 PROCEDURE — 87116 MYCOBACTERIA CULTURE: CPT | Performed by: STUDENT IN AN ORGANIZED HEALTH CARE EDUCATION/TRAINING PROGRAM

## 2022-08-05 PROCEDURE — 25000003 PHARM REV CODE 250

## 2022-08-05 PROCEDURE — 87206 SMEAR FLUORESCENT/ACID STAI: CPT | Performed by: STUDENT IN AN ORGANIZED HEALTH CARE EDUCATION/TRAINING PROGRAM

## 2022-08-05 PROCEDURE — 99231 PR SUBSEQUENT HOSPITAL CARE,LEVL I: ICD-10-PCS | Mod: ,,, | Performed by: INTERNAL MEDICINE

## 2022-08-05 PROCEDURE — 20600001 HC STEP DOWN PRIVATE ROOM

## 2022-08-05 PROCEDURE — 89051 BODY FLUID CELL COUNT: CPT | Performed by: STUDENT IN AN ORGANIZED HEALTH CARE EDUCATION/TRAINING PROGRAM

## 2022-08-05 PROCEDURE — 84550 ASSAY OF BLOOD/URIC ACID: CPT | Performed by: STUDENT IN AN ORGANIZED HEALTH CARE EDUCATION/TRAINING PROGRAM

## 2022-08-05 PROCEDURE — 36415 COLL VENOUS BLD VENIPUNCTURE: CPT

## 2022-08-05 PROCEDURE — 89060 EXAM SYNOVIAL FLUID CRYSTALS: CPT | Performed by: STUDENT IN AN ORGANIZED HEALTH CARE EDUCATION/TRAINING PROGRAM

## 2022-08-05 PROCEDURE — 63600175 PHARM REV CODE 636 W HCPCS: Performed by: HOSPITALIST

## 2022-08-05 PROCEDURE — 99233 PR SUBSEQUENT HOSPITAL CARE,LEVL III: ICD-10-PCS | Mod: ,,, | Performed by: HOSPITALIST

## 2022-08-05 PROCEDURE — 87070 CULTURE OTHR SPECIMN AEROBIC: CPT | Performed by: STUDENT IN AN ORGANIZED HEALTH CARE EDUCATION/TRAINING PROGRAM

## 2022-08-05 PROCEDURE — 87040 BLOOD CULTURE FOR BACTERIA: CPT | Mod: 59 | Performed by: STUDENT IN AN ORGANIZED HEALTH CARE EDUCATION/TRAINING PROGRAM

## 2022-08-05 PROCEDURE — 99232 SBSQ HOSP IP/OBS MODERATE 35: CPT | Mod: ,,, | Performed by: INTERNAL MEDICINE

## 2022-08-05 PROCEDURE — 80053 COMPREHEN METABOLIC PANEL: CPT

## 2022-08-05 PROCEDURE — 99231 SBSQ HOSP IP/OBS SF/LOW 25: CPT | Mod: ,,, | Performed by: INTERNAL MEDICINE

## 2022-08-05 PROCEDURE — 85025 COMPLETE CBC W/AUTO DIFF WBC: CPT

## 2022-08-05 PROCEDURE — 86140 C-REACTIVE PROTEIN: CPT | Performed by: STUDENT IN AN ORGANIZED HEALTH CARE EDUCATION/TRAINING PROGRAM

## 2022-08-05 RX ORDER — CEFAZOLIN SODIUM/D5W 2 G/100 ML
2 PLASTIC BAG, INJECTION (ML) INTRAVENOUS EVERY 24 HOURS
Status: DISCONTINUED | OUTPATIENT
Start: 2022-08-06 | End: 2022-08-07

## 2022-08-05 RX ORDER — SODIUM CHLORIDE 9 MG/ML
INJECTION, SOLUTION INTRAVENOUS CONTINUOUS
Status: ACTIVE | OUTPATIENT
Start: 2022-08-05 | End: 2022-08-05

## 2022-08-05 RX ORDER — AMLODIPINE BESYLATE 5 MG/1
5 TABLET ORAL DAILY
Status: DISCONTINUED | OUTPATIENT
Start: 2022-08-05 | End: 2022-08-13

## 2022-08-05 RX ADMIN — SODIUM CHLORIDE: 0.9 INJECTION, SOLUTION INTRAVENOUS at 08:08

## 2022-08-05 RX ADMIN — ATORVASTATIN CALCIUM 20 MG: 20 TABLET, FILM COATED ORAL at 08:08

## 2022-08-05 RX ADMIN — OXYCODONE 5 MG: 5 TABLET ORAL at 02:08

## 2022-08-05 RX ADMIN — SENNOSIDES AND DOCUSATE SODIUM 2 TABLET: 50; 8.6 TABLET ORAL at 08:08

## 2022-08-05 RX ADMIN — INSULIN ASPART 10 UNITS: 100 INJECTION, SOLUTION INTRAVENOUS; SUBCUTANEOUS at 04:08

## 2022-08-05 RX ADMIN — INSULIN ASPART 2 UNITS: 100 INJECTION, SOLUTION INTRAVENOUS; SUBCUTANEOUS at 04:08

## 2022-08-05 RX ADMIN — MORPHINE SULFATE 3 MG: 2 INJECTION, SOLUTION INTRAMUSCULAR; INTRAVENOUS at 12:08

## 2022-08-05 RX ADMIN — AMLODIPINE BESYLATE 5 MG: 5 TABLET ORAL at 12:08

## 2022-08-05 RX ADMIN — OXYCODONE 5 MG: 5 TABLET ORAL at 08:08

## 2022-08-05 RX ADMIN — MORPHINE SULFATE 3 MG: 2 INJECTION, SOLUTION INTRAMUSCULAR; INTRAVENOUS at 06:08

## 2022-08-05 RX ADMIN — INSULIN ASPART 1 UNITS: 100 INJECTION, SOLUTION INTRAVENOUS; SUBCUTANEOUS at 09:08

## 2022-08-05 RX ADMIN — OXYCODONE 5 MG: 5 TABLET ORAL at 03:08

## 2022-08-05 RX ADMIN — CEFAZOLIN 2 G: 10 INJECTION, POWDER, FOR SOLUTION INTRAVENOUS at 08:08

## 2022-08-05 RX ADMIN — SODIUM CHLORIDE: 0.9 INJECTION, SOLUTION INTRAVENOUS at 02:08

## 2022-08-05 RX ADMIN — OXYCODONE 5 MG: 5 TABLET ORAL at 09:08

## 2022-08-05 RX ADMIN — METHYLPREDNISOLONE SODIUM SUCCINATE 40 MG: 40 INJECTION, POWDER, FOR SOLUTION INTRAMUSCULAR; INTRAVENOUS at 04:08

## 2022-08-05 NOTE — CARE UPDATE
Pathologist reviewed left wrist joint fluid aspiration and actually doesn't see any gout crystals in specimen so changing lab result to say negative for gout crystals.     Wbc was 2050 with 88% segs, gram stain negative. Will monitor cultures

## 2022-08-05 NOTE — SUBJECTIVE & OBJECTIVE
Principal Problem:DKA (diabetic ketoacidosis)    Principal Orthopedic Problem: Left thigh I&D  Staph species culture on 07/28    Interval History: Pt seen and examined at bedside. NAEON. VS with HTN this morning, otherwise wnl. Left thigh drain 230cc output total/24h - 60cc was overnight. Noé purulence in drain this AM.     His chief complaint this AM is left wrist pain x 2 day duration. Says multiple IV sticks in that wrist 2 days ago, since then progressive onset of left wrist pain. Now can barely touch it or move it w/o excruciating pain. Some rednesss but notes he had adhesive tape there which caused skin rash for him. Has never had pain like this before in left wrist. Notes hx left wrist surgery 3/31/22 by Dr. Nguyen at [facility]. Review of care everywhere shows he had left wrist scope and S-L ligament repair with suture anchor. Dr. Nguyen notes in his op note evidence of gout, he sent pathology and this confirmed urate crystals in left wrist joint.     Cr continues to uptrend, 6.0 this morning from 4.4 yseterday. Suspect 2/2 vanc toxicity given high vanc troph. Nephrology following for this.       Review of patient's allergies indicates:  No Known Allergies    Current Facility-Administered Medications   Medication    0.9%  NaCl infusion    acetaminophen tablet 1,000 mg    amLODIPine tablet 5 mg    atorvastatin tablet 20 mg    ceFAZolin 2 gram in dextrose 5% 100 mL IVPB (premix)    dextrose 10% bolus 125 mL    dextrose 10% bolus 250 mL    glucagon (human recombinant) injection 1 mg    glucose chewable tablet 16 g    glucose chewable tablet 24 g    insulin aspart U-100 pen 1-10 Units    insulin aspart U-100 pen 10 Units    insulin detemir U-100 pen 12 Units    morphine injection 3 mg    ondansetron injection 4 mg    oxyCODONE immediate release tablet 5 mg    senna-docusate 8.6-50 mg per tablet 2 tablet    sodium chloride 0.9% flush 10 mL     Objective:     Vital Signs (Most Recent):  Temp: 98.2 °F  "(36.8 °C) (08/05/22 0807)  Pulse: 85 (08/05/22 0807)  Resp: 19 (08/05/22 0807)  BP: (!) 179/90 (08/05/22 0807)  SpO2: 96 % (08/05/22 0807) Vital Signs (24h Range):  Temp:  [98.2 °F (36.8 °C)-99.3 °F (37.4 °C)] 98.2 °F (36.8 °C)  Pulse:  [75-95] 85  Resp:  [16-19] 19  SpO2:  [95 %-96 %] 96 %  BP: (124-179)/(69-91) 179/90     Weight: 94.5 kg (208 lb 5.4 oz)  Height: 5' 7" (170.2 cm)  Body mass index is 32.63 kg/m².      Intake/Output Summary (Last 24 hours) at 8/5/2022 0925  Last data filed at 8/5/2022 0600  Gross per 24 hour   Intake 633.11 ml   Output 2190 ml   Net -1556.89 ml         Ortho/SPM Exam  Left lower extremity  Left thigh dressing cdi  Drain in place, grossly purulent output  No erythema of thigh, no signficant swelling  Compartments soft and compressible  Painless ROM of hip and knee  NVI    Left wrist  Surgical scar dorsal wrist over 4th compartment  Splotchy erythema and rash dorsal wrist, some appears to follow distribution of where tape was previously  Mild swelling dorsal wrist  Severe TTP entire wrist joint  Severe pain with any ROM of wrist joint  Some pain with ROM of all fingers  No hand/finger swelling, erythema, or TTP  NVI  Radial pulse 2+, brisk cap refill    All major joints examined this AM and other than described above no erythema, swelling, TTP, or pain with ROM        CBC:   Recent Labs   Lab 08/04/22  0708 08/05/22  0615   WBC 7.48 5.48   HGB 11.3* 10.4*   HCT 32.7* 31.6*    253       CMP:   Recent Labs   Lab 08/03/22  1509 08/04/22  0425 08/05/22  0615   * 137 135*   K 3.7 4.2 3.9   CL 98 104 102   CO2 25 22* 23   * 82 97   BUN 13 16 21*   CREATININE 3.5* 4.4* 6.0*   CALCIUM 8.8 8.3* 8.4*   PROT  --  5.6* 6.1   ALBUMIN 1.8* 1.6* 1.6*   BILITOT  --  0.4 0.3   ALKPHOS  --  73 80   AST  --  18 15   ALT  --  14 6*   ANIONGAP 8 11 10         All pertinent labs within the past 24 hours have been reviewed.    Significant Imaging: I have reviewed and interpreted all " pertinent imaging results/findings.    XR left wrist- post op changes from S-L ligament repair, no effusion or soft tissue swelling or gas on XR,  fx/dislocation

## 2022-08-05 NOTE — ASSESSMENT & PLAN NOTE
Patient's with Normocytic anemia.. Hemoglobin stable. Etiology likely due to chronic disease .  Current CBC reviewed-    Recent Labs   Lab 08/03/22  0454 08/04/22  0708 08/05/22  0615   HGB 11.2* 11.3* 10.4*     Monitor CBC and transfuse if H/H drops below 7/21.

## 2022-08-05 NOTE — PLAN OF CARE
Recommendations     1. When medically feasible, resume Diabetic diet + Boost Glucose Control ONS.               - Add Renal diet restrictions & change ONS to Novasource if necessary.   2. RD to monitor & follow-up.     Goals: Meet % EEN, EPN by RD f/u date  Nutrition Goal Status: new  Communication of RD Recs: reviewed with RN

## 2022-08-05 NOTE — PROGRESS NOTES
"[facility] - Telemetry Stepdown  Adult Nutrition  Consult Note    SUMMARY     Recommendations    1. When medically feasible, resume Diabetic diet + Boost Glucose Control ONS.    - Add Renal diet restrictions & change ONS to Novasource if necessary.   2. RD to monitor & follow-up.    Goals: Meet % EEN, EPN by RD f/u date  Nutrition Goal Status: new  Communication of RD Recs: reviewed with RN    Assessment and Plan    Nutrition Problem  Excessive CHO intake    Related to (etiology):   Food and nutrition related knowledge deficit     Signs and Symptoms (as evidenced by):   A1C 10.1    Interventions(treatment strategy):  Collaboration of nutrition care w/ other providers  Nutrition education     Nutrition Diagnosis Status:   New     Reason for Assessment    Reason For Assessment: RD follow-up  Diagnosis: other (see comments) (DKA)  Relevant Medical History: DM, HTN  Interdisciplinary Rounds: did not attend    General Information Comments: Diabetic diet education complete 7/27. 6 days s/p I & D; planning for washout - pt NPO. Prior to NPO status, per RN documentation, pt tolerating diet + ONS w/ 75% PO intake. At initial RD assessment, pt reported good appetite PTA & stable weight. Appears nourished w/ no indicators of malnutrition.   Nutrition Discharge Planning: Dietary compliance    Nutrition/Diet History    Spiritual, Cultural Beliefs, Mosque Practices, Values that Affect Care: no  Factors Affecting Nutritional Intake: NPO    Anthropometrics    Temp: 98.5 °F (36.9 °C)  Height Method: Stated  Height: 5' 7" (170.2 cm)  Height (inches): 67 in  Weight Method: Bed Scale  Weight: 94.5 kg (208 lb 5.4 oz)  Weight (lb): 208.34 lb  Ideal Body Weight (IBW), Male: 148 lb  % Ideal Body Weight, Male (lb): 140.77 %  BMI (Calculated): 32.6  BMI Grade: 30 - 34.9- obesity - grade I    Lab/Procedures/Meds    Pertinent Labs Reviewed: reviewed  Pertinent Labs Comments: Na 135, BUN 21, Creat 6, GFR 10.7, A1C 10.1  Pertinent " Medications Reviewed: reviewed  Pertinent Medications Comments: Statin    Estimated/Assessed Needs    Weight Used For Calorie Calculations: 94.5 kg (208 lb 5.4 oz)     Energy Calorie Requirements (kcal): 1946 kcal/d  Energy Need Method: [place]-St Jeor (1.1 PAL)     Protein Requirements:  g/d (1-1.2 g/kg)  Weight Used For Protein Calculations: 94.5 kg (208 lb 5.4 oz)     Estimated Fluid Requirement Method: other (see comments) (Per MD or 1 mL/kcal)  RDA Method (mL): 1946    Nutrition Prescription Ordered    Current Diet Order: NPO  Nutrition Order Comments: Was on Diabetic diet + Boost Glucose ONS    Evaluation of Received Nutrient/Fluid Intake    I/O: +2.4L since admit    Comments: LBM: 8/1    Tolerance: was tolerating    Nutrition Risk    Level of Risk/Frequency of Follow-up:  (1x/week)     Monitor and Evaluation    Food and Nutrient Intake: food and beverage intake, energy intake  Physical Activity and Function: nutrition-related ADLs and IADLs  Anthropometric Measurements: weight, weight change  Biochemical Data, Medical Tests and Procedures: glucose/endocrine profile, lipid profile, inflammatory profile, gastrointestinal profile, electrolyte and renal panel  Nutrition-Focused Physical Findings: overall appearance     Nutrition Follow-Up    RD Follow-up?: Yes

## 2022-08-05 NOTE — ASSESSMENT & PLAN NOTE
Solumedrol 40 IV x once  Monitor clinical response  Continue IVF today, then convert to po.  Renal function may worsen before it improves  Drain in abscess working- Needs to go back to OR when renal function improves

## 2022-08-05 NOTE — ASSESSMENT & PLAN NOTE
WBC 26.87      - Trending down   - Initially thought to be due to recent steroid use but left thigh mass concerning for infection   - Will start Vanc after irrigation of left thigh by ortho   7/30     Patient with leucocytosis   Recent Labs   Lab 08/03/22  0454 08/04/22  0708 08/05/22  0615   WBC 5.58 7.48 5.48     . Afebrile. BCX 2, NGTD  . likely secondary to sepsis..  WBC counts normalized .

## 2022-08-05 NOTE — CARE UPDATE
Left Wrist Joint Fluid Analysis:  WBC: 2050  Segs: 88%  Crystals: +urate crystals (gout)  Gram Stain: negative  Cultures pending    Impression:  Gout flare left wrist  -unfortunately cannot get colchicine/nsaids at this time 2/2 graciela  -would avoid systemic steroids given active infection in left thigh  -will check with attending about local steroid injection for left wrist however this also may not be an option given the suture anchor in his left wrist  -any additional options for medical management of gout flare and pain per primary taem

## 2022-08-05 NOTE — SUBJECTIVE & OBJECTIVE
Interval History: see above    Review of Systems   Constitutional:  Positive for activity change. Negative for appetite change, chills, fatigue and fever.   HENT:  Negative for congestion, facial swelling, nosebleeds, sore throat and trouble swallowing.    Respiratory:  Negative for apnea, cough, choking, chest tightness, shortness of breath, wheezing and stridor.    Cardiovascular:  Negative for chest pain and leg swelling.   Gastrointestinal:  Negative for abdominal distention, abdominal pain, blood in stool, constipation, diarrhea, nausea and vomiting.   Genitourinary:  Negative for difficulty urinating, dysuria, flank pain, frequency, hematuria and urgency.   Musculoskeletal:  Negative for arthralgias, back pain, gait problem, neck pain and neck stiffness.   Skin:  Negative for color change, rash and wound.   Neurological:  Negative for dizziness, tremors, seizures, syncope, facial asymmetry, speech difficulty, weakness, light-headedness, numbness and headaches.   Psychiatric/Behavioral:  Negative for agitation, behavioral problems, confusion, decreased concentration, dysphoric mood, hallucinations, self-injury and sleep disturbance. The patient is not nervous/anxious and is not hyperactive.    Objective:     Vital Signs (Most Recent):  Temp: 98.2 °F (36.8 °C) (08/05/22 0807)  Pulse: 85 (08/05/22 0807)  Resp: 19 (08/05/22 0807)  BP: (!) 179/90 (08/05/22 0807)  SpO2: 96 % (08/05/22 0807)   Vital Signs (24h Range):  Temp:  [98.2 °F (36.8 °C)-99.3 °F (37.4 °C)] 98.2 °F (36.8 °C)  Pulse:  [75-95] 85  Resp:  [16-19] 19  SpO2:  [95 %-96 %] 96 %  BP: (124-179)/(69-91) 179/90     Weight: 94.5 kg (208 lb 5.4 oz)  Body mass index is 32.63 kg/m².    Intake/Output Summary (Last 24 hours) at 8/5/2022 0819  Last data filed at 8/5/2022 0600  Gross per 24 hour   Intake 633.11 ml   Output 2230 ml   Net -1596.89 ml        Physical Exam  Constitutional:       General: He is not in acute distress.     Appearance: Normal appearance.  He is normal weight. He is not ill-appearing, toxic-appearing or diaphoretic.      Comments: Good bed mobility   HENT:      Head: Normocephalic and atraumatic.      Nose: Nose normal.      Mouth/Throat:      Mouth: Mucous membranes are moist.      Pharynx: Oropharynx is clear.   Eyes:      General: No scleral icterus.     Extraocular Movements: Extraocular movements intact.      Conjunctiva/sclera: Conjunctivae normal.      Pupils: Pupils are equal, round, and reactive to light.   Cardiovascular:      Rate and Rhythm: Normal rate and regular rhythm.      Pulses: Normal pulses.      Heart sounds: Normal heart sounds.   Pulmonary:      Effort: Pulmonary effort is normal. No respiratory distress.      Breath sounds: Normal breath sounds. No stridor. No wheezing, rhonchi or rales.   Chest:      Chest wall: No tenderness.   Abdominal:      General: Abdomen is flat. Bowel sounds are normal. There is no distension.      Palpations: Abdomen is soft.      Tenderness: There is no abdominal tenderness. There is no right CVA tenderness, guarding or rebound.   Musculoskeletal:         General: Swelling and tenderness present. No deformity or signs of injury. Normal range of motion.      Cervical back: Normal range of motion and neck supple. No rigidity or tenderness.      Right lower leg: No edema.      Left lower leg: No edema.      Comments: Left lat thigh is bandaged  Small pin point lesion on left hand above wrist, where he had previous surgery, scar is intact, mild swelling on the joint w decreased ROM.    Skin:     General: Skin is warm and dry.      Coloration: Skin is not jaundiced.      Findings: No erythema or rash.   Neurological:      General: No focal deficit present.      Mental Status: He is alert and oriented to person, place, and time. Mental status is at baseline.      Motor: No weakness.   Psychiatric:         Mood and Affect: Mood normal.         Behavior: Behavior normal.         Thought Content: Thought  content normal.         Judgment: Judgment normal.       Significant Labs: All pertinent labs within the past 24 hours have been reviewed.  CBC:   Recent Labs   Lab 08/04/22  0708 08/05/22  0615   WBC 7.48 5.48   HGB 11.3* 10.4*   HCT 32.7* 31.6*    253       CMP:   Recent Labs   Lab 08/03/22  1509 08/04/22  0425 08/05/22  0615   * 137 135*   K 3.7 4.2 3.9   CL 98 104 102   CO2 25 22* 23   * 82 97   BUN 13 16 21*   CREATININE 3.5* 4.4* 6.0*   CALCIUM 8.8 8.3* 8.4*   PROT  --  5.6* 6.1   ALBUMIN 1.8* 1.6* 1.6*   BILITOT  --  0.4 0.3   ALKPHOS  --  73 80   AST  --  18 15   ALT  --  14 6*   ANIONGAP 8 11 10         Significant Imaging: I have reviewed all pertinent imaging results/findings within the past 24 hours.

## 2022-08-05 NOTE — PROGRESS NOTES
"Josafat Chun - Telemetry [facility] Medicine  Progress Note    Patient Name: Wilfredo Thomas  MRN: [id number]  Patient Class: IP- Inpatient   Admission Date: 7/26/2022  Length of Stay: 10 days  Attending Physician: Dorota Reynoso MD  Primary Care Provider: Aylin Wayne DO        Subjective:     Principal Problem:DKA (diabetic ketoacidosis)        HPI:  48 y/o M hx of HTN, IDDM2, GERD obesity, HLD presented to the ED with complaint of 1 day of worsening SOB. Wife present at bedside. Patient states that he noticed yesterday he was feeling some shortness of breath and couldn't seem to catch his breath. His wife went to check on him this morning and noticed that he was breathing "fast and heavy" and he sounded like he was slurring his words. Wife was concerned about a stroke, so he brought him to the ED for evaluation.     Patient also reporting significant pain and swelling along his left lateral proximal thigh. He states he has noticed worsening pain over the past 2 weeks. He denies any acute trauma to the area. He was seen by ortho last week and was told to take ibu-profen for a muscle strain. This did not help his pain, so he presented again on 7/22 and was given a prednisone injection into his thigh. This also did not help his pain, and now the pain and swelling have advanced to the point that he has difficulty with ambulation. Patient denies fever, chills, nausea, vomiting.      Of note, patient has hx of poorly controlled diabetes. He was recently started on insulin by his PCP, but he has not taken any insulin since being prescribed it.      In the ED, patient hypertensive and tachycardic to the 130s. Tachypneic with RR in the 40s. On CBC, WBC 26.9, On CMP patient hyperkalemic to 5.5, CorNa 136, Bicarb 5. Cr elevated to 1.9 from BL 0.83. UA, BHB pending at time of admission. CTA without evidence of pulmonary embolism. Critical Care Medicine consulted given severe acidosis.             Overview/Hospital " Course:    Patient admitted to the MICU for management of severe DKA. Metabolic acidosis improving on insulin drip. He also has had left thigh pain for 1-2 months. There is a large mass on his left thigh that is tender to palpation, CT revealed hematoma vs soft tissue growth. General surgery consulted and recommended IR consult. IR consulted, no indication for tap.   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 Ortho consulted and performed bedside aspiration of possible abscess which revealed >200K cells >80% segs. Will hold off on antibiotics for now per ortho recs, ortho is taking patient to the OR for irrigation of the thigh, will start antibiotics afterwards.         7/29 Metabolic acidosis slowly improving. Ortho taking patient to the OR for irrigation of the left thigh, will start antibiotics afterwards.   7/30 Transfer to hospital medicine . S/p I&D  of left thigh Abscess on7/29/22 - MRI - uptured tensor fascia jose luis (TFL) muscle with associated large hematoma. gram stain -Moderate Gram positive cocci in clusters .cultures pending.  continue Vancomycin, clindamycin and Zosyn . Bicarb 15. K replaced . off insulin drip on SQ insulin.  7/31 ID and endocrine consulted.  abscess with Calcium pyrophosphate crystals with unknown significance potassium and P replaced. Bicarbonate WNL . aerobic culture 7/28 STAPHYLOCOCCUS SPECIES . Glucose in 300s .PRN imodium  for diarrhea. PT/OT consulted - WBAT. Surgical drain with purulent output  8/1 PICC team consulted for IV access. vanc trough at 11.4. monitor for vanomycin toxicity. possible OR tomorrow for washout  may need bone biopsy to r/o osteomyelitis.Discontinued clindamycin.   8/2 glucose in 300s.  Increased Levemir  to 14 units BID and Aspart  10 units TIDWM. K replaced   8/3 ANGE with cr 0.7--> 2.7. likely vanc induced ATN with levels 40. urine studies ordered renal sonogram WNL  nephrology consulted.  started on NS 100ml/h x 10h and follow up renal panel.Zosyn and  vancomycin discontinued. Strict IO monitor. condom catheter . switched to Ancef for MSSA on culture  Interval History:  8/4- MSSA- see below. /85 VSS. On room air, eating, BM on 8/2, good UO. Appreciate ortho recs. On Cephazolin, detim 14 bid and aspart 12 ac.      8/5-Cr still rising, cr =6.  Wrist still hurting. Ortho to tap it to eval for infection and gout today. /90   Pulse 85  AF, no other signs of infection.  Will discuss w Nephrology- received NS x one liter yesterday. Will repeat today. Suspect auto-diuresis.     Discussed treatment options and shared med decision making. We decided to give one dose of solumedrol 40 mg IV. For acute gout. Pt has hx of gout in both feet. This will prevent polyarticular gout from developing and treat current symptoms while avoiding harmful effects on kidneys.       Interval History: see above    Review of Systems   Constitutional:  Positive for activity change. Negative for appetite change, chills, fatigue and fever.   HENT:  Negative for congestion, facial swelling, nosebleeds, sore throat and trouble swallowing.    Respiratory:  Negative for apnea, cough, choking, chest tightness, shortness of breath, wheezing and stridor.    Cardiovascular:  Negative for chest pain and leg swelling.   Gastrointestinal:  Negative for abdominal distention, abdominal pain, blood in stool, constipation, diarrhea, nausea and vomiting.   Genitourinary:  Negative for difficulty urinating, dysuria, flank pain, frequency, hematuria and urgency.   Musculoskeletal:  Negative for arthralgias, back pain, gait problem, neck pain and neck stiffness.   Skin:  Negative for color change, rash and wound.   Neurological:  Negative for dizziness, tremors, seizures, syncope, facial asymmetry, speech difficulty, weakness, light-headedness, numbness and headaches.   Psychiatric/Behavioral:  Negative for agitation, behavioral problems,  confusion, decreased concentration, dysphoric mood, hallucinations, self-injury and sleep disturbance. The patient is not nervous/anxious and is not hyperactive.    Objective:     Vital Signs (Most Recent):  Temp: 98.2 °F (36.8 °C) (08/05/22 0807)  Pulse: 85 (08/05/22 0807)  Resp: 19 (08/05/22 0807)  BP: (!) 179/90 (08/05/22 0807)  SpO2: 96 % (08/05/22 0807)   Vital Signs (24h Range):  Temp:  [98.2 °F (36.8 °C)-99.3 °F (37.4 °C)] 98.2 °F (36.8 °C)  Pulse:  [75-95] 85  Resp:  [16-19] 19  SpO2:  [95 %-96 %] 96 %  BP: (124-179)/(69-91) 179/90     Weight: 94.5 kg (208 lb 5.4 oz)  Body mass index is 32.63 kg/m².    Intake/Output Summary (Last 24 hours) at 8/5/2022 0819  Last data filed at 8/5/2022 0600  Gross per 24 hour   Intake 633.11 ml   Output 2230 ml   Net -1596.89 ml        Physical Exam  Constitutional:       General: He is not in acute distress.     Appearance: Normal appearance. He is normal weight. He is not ill-appearing, toxic-appearing or diaphoretic.      Comments: Good bed mobility   HENT:      Head: Normocephalic and atraumatic.      Nose: Nose normal.      Mouth/Throat:      Mouth: Mucous membranes are moist.      Pharynx: Oropharynx is clear.   Eyes:      General: No scleral icterus.     Extraocular Movements: Extraocular movements intact.      Conjunctiva/sclera: Conjunctivae normal.      Pupils: Pupils are equal, round, and reactive to light.   Cardiovascular:      Rate and Rhythm: Normal rate and regular rhythm.      Pulses: Normal pulses.      Heart sounds: Normal heart sounds.   Pulmonary:      Effort: Pulmonary effort is normal. No respiratory distress.      Breath sounds: Normal breath sounds. No stridor. No wheezing, rhonchi or rales.   Chest:      Chest wall: No tenderness.   Abdominal:      General: Abdomen is flat. Bowel sounds are normal. There is no distension.      Palpations: Abdomen is soft.      Tenderness: There is no abdominal tenderness. There is no right CVA tenderness, guarding or  rebound.   Musculoskeletal:         General: Swelling and tenderness present. No deformity or signs of injury. Normal range of motion.      Cervical back: Normal range of motion and neck supple. No rigidity or tenderness.      Right lower leg: No edema.      Left lower leg: No edema.      Comments: Left lat thigh is bandaged  Small pin point lesion on left hand above wrist, where he had previous surgery, scar is intact, mild swelling on the joint w decreased ROM.    Skin:     General: Skin is warm and dry.      Coloration: Skin is not jaundiced.      Findings: No erythema or rash.   Neurological:      General: No focal deficit present.      Mental Status: He is alert and oriented to person, place, and time. Mental status is at baseline.      Motor: No weakness.   Psychiatric:         Mood and Affect: Mood normal.         Behavior: Behavior normal.         Thought Content: Thought content normal.         Judgment: Judgment normal.       Significant Labs: All pertinent labs within the past 24 hours have been reviewed.  CBC:   Recent Labs   Lab 08/04/22  0708 08/05/22  0615   WBC 7.48 5.48   HGB 11.3* 10.4*   HCT 32.7* 31.6*    253       CMP:   Recent Labs   Lab 08/03/22  1509 08/04/22  0425 08/05/22  0615   * 137 135*   K 3.7 4.2 3.9   CL 98 104 102   CO2 25 22* 23   * 82 97   BUN 13 16 21*   CREATININE 3.5* 4.4* 6.0*   CALCIUM 8.8 8.3* 8.4*   PROT  --  5.6* 6.1   ALBUMIN 1.8* 1.6* 1.6*   BILITOT  --  0.4 0.3   ALKPHOS  --  73 80   AST  --  18 15   ALT  --  14 6*   ANIONGAP 8 11 10         Significant Imaging: I have reviewed all pertinent imaging results/findings within the past 24 hours.      Assessment/Plan:      * DKA (diabetic ketoacidosis)  47M with history of DM2, gout, HTN, tobacco use who initially presented for dyspnea, increased thirst, decreased PO intake, found to be in DKA.  Recent history of steroids for leg pain.  On home dulaglutide, empagliflozin, metformin, insulin glargine 20u  qhs (has not been taking).  Recent a1c 11.2%.  , bicarb 5, AG 26, BHB 5.9 on admit.       - Insulin drip   - q1h POCT glu checks while on drip  - D5 1/2 NS with 20 meq K   - BMP q4h  - diabetic diet when off insulin drip     7/30  . Bicarb 15. K replaced . off insulin drip on SQ insulin. endocrine consulted  7/31 endocrine consulted.   Bicarbonate WNL . Glucose in 300s   Recent Labs     08/03/22  1556 08/03/22  1922 08/04/22  0721 08/04/22  1120 08/04/22  1611 08/04/22  1929   POCTGLUCOSE 210* 155* 77 162* 150* 98     -see above           Pain and swelling of left wrist  Suspect Gout  Ortho to tap for evaluation, possible infection  Previous surgery       ANGE (acute kidney injury)  7/30  Patient with acute kidney injury likely due to IVVD/dehydration ANGE is currently resolved . Labs reviewed- Renal function/electrolytes with Estimated Creatinine Clearance: 16.3 mL/min (A) (based on SCr of 6 mg/dL (H)). according to latest data. Monitor urine output and serial BMP and adjust therapy as needed. Avoid nephrotoxins and renally dose meds for GFR listed above.   8/2 ANGE resolved  8/3 recurrent ANGE with cr 0.7--> 2.7. likely vanc induced ATN with levels 40. urine studies and renal sonogram. nephrology consulted. started on NS 100ml/h x 10h and follow up renal panel. vancomycin discontinued. Zosyn and  vancomycin discontinued. Strict IO monitor. condom catheter .  8/4- cr rising, 4.4, alb 1.6 with protienuria. May also have nephrotic syndrome.  Nephrology consulted. .Renal US - ok no obstruction, normal appearing kidneys. , UO 2400.  NS x one liter, vanc level 31    8/5- cr 6, discuss w nephrology, wrist to be tapped for gout.  IVFs        Abscess of left thigh  reports 4-6 weeks of left lateral thigh pain and swelling, no known injury to the affected area    7/30 Transfer to hospital medicine . S/p I&D  of left thigh Abscess on7/29/22 - MRI - uptured tensor fascia jose luis (TFL) muscle with associated large hematoma.  gram stain -Moderate Gram positive cocci in clusters .cultures pending.  continue Vancomycin, clindamycin and Zosyn . abscess with Calcium pyrophosphate crystals with unknown significance .Surgical drain with purulent output.s.Discontinued clindamycin.   8/1 possible OR tomorrow for washout  may need bone biopsy to r/o osteomyelitis.Discontinued clindamycin.   8/3 Zosyn and  vancomycin discontinued . switched to Ancef for MSSA on culture  8/4- per Ortho:   On ancef for MSSA coverage  - Tight glucose control and nutrition optimization  - Will require additional washout, pending improvement in ANGE          Type 2 diabetes mellitus with hyperglycemia, with long-term current use of insulin      Patient's FSGs are controlled on current medication regimen.  Last A1c reviewed-   Lab Results   Component Value Date    HGBA1C 10.1 (H) 07/26/2022     Most recent fingerstick glucose reviewed-   Recent Labs   Lab 08/04/22  1120 08/04/22  1611 08/04/22  1929   POCTGLUCOSE 162* 150* 98     Current correctional scale  Medium  Maintain anti-hyperglycemic dose as follows-   Antihyperglycemics (From admission, onward)            Start     Stop Route Frequency Ordered    08/05/22 0715  insulin aspart U-100 pen 10 Units         -- SubQ 3 times daily with meals 08/04/22 2017 08/04/22 0915  insulin detemir U-100 pen 12 Units         -- SubQ 2 times daily 08/04/22 0901    07/30/22 0826  insulin aspart U-100 pen 1-10 Units         -- SubQ Before meals & nightly PRN 07/30/22 0727        Hold Oral hypoglycemics while patient is in the hospital.  7/31 endocrine consulted.  Glucose in 300s   8/5- BS controlled with above regimen        Hypertension associated with type 2 diabetes mellitus  On home lisinopril 20.  - hold ACEi in setting of ANGE.   8/5- /85 ->  179/90 . Start norvasc 5 mg daily, Lisinopril on hold due to ARF.     Hematoma of left thigh  MRI left thig-  Ruptured tensor fascia jose luis (TFL) muscle with associated large  hematoma,   s/p orthopedic evaluation .aspiration showed  purulence. s/p I&D on 7/29 7/31  abscess with Calcium pyrophosphate crystals with unknown significance. . aerobic culture 7/28 STAPHYLOCOCCUS SPECIES .    8/5- needs a second wash out surgery per ortho  -see above    Anemia    Patient's with Normocytic anemia.. Hemoglobin stable. Etiology likely due to chronic disease .  Current CBC reviewed-    Recent Labs   Lab 08/03/22  0454 08/04/22  0708 08/05/22  0615   HGB 11.2* 11.3* 10.4*     Monitor CBC and transfuse if H/H drops below 7/21.       Hyponatremia  sodium 130s. monitor  8/5- Na 135      Hypokalemia  replaced      Class 2 severe obesity due to excess calories with serious comorbidity and body mass index (BMI) of 36.0 to 36.9 in adult  Body mass index is 32.63 kg/m². Morbid obesity complicates all aspects of disease management from diagnostic modalities to treatment. Weight loss encouraged and health benefits explained to patient.         Left leg pain  Received steroid injection, steroid taper for leg pain by orthopedics.    Left lateral/proximal thigh with painful localized swelling, hot to touch. Patient received vanc/zosyn in ED  - CTA LE, U/s LLE pending  -see above         Elevated d-dimer  On RA with increased WOB.  D-dimer elevated to 1.46.  - CTA PE negative    Hyperlipidemia associated with type 2 diabetes mellitus  On home atorvastatin.  -    Leukocytosis  WBC 26.87      - Trending down   - Initially thought to be due to recent steroid use but left thigh mass concerning for infection   - Will start Vanc after irrigation of left thigh by ortho   7/30     Patient with leucocytosis   Recent Labs   Lab 08/03/22  0454 08/04/22  0708 08/05/22  0615   WBC 5.58 7.48 5.48     . Afebrile. BCX 2, NGTD  . likely secondary to sepsis..  WBC counts normalized .       VTE Risk Mitigation (From admission, onward)         Ordered     Place sequential compression device  Until discontinued         07/29/22 0152      IP VTE HIGH RISK PATIENT  Once         07/29/22 0158     Place SAVANNAH hose  Until discontinued         07/26/22 2240     Place sequential compression device  Until discontinued         07/26/22 2240                Discharge Planning   FILEMON: 8/8/2022     Code Status: Full Code   Is the patient medically ready for discharge?: No    Reason for patient still in hospital (select all that apply): Patient trending condition  Discharge Plan A: Home with family            Dorota Reynoso MD  Department of Hospital Medicine   [facility] - Telemetry Stepdown

## 2022-08-05 NOTE — PROGRESS NOTES
"[facility] Tele[facility]  Nephrology  Progress Note    Patient Name: Wilfredo Thomas  MRN: [id number]  Admission Date: 7/26/2022  Hospital Length of Stay: 10 days  Attending Provider: Dorota Reynoso MD   Primary Care Physician: Aylin Wayne DO  Principal Problem:DKA (diabetic ketoacidosis)    Subjective:     HPI: 46 y/o M hx of HTN, IDDM2, GERD obesity, HLD presented to the ED with complaint of 1 day of worsening SOB. Wife present at bedside. Patient states that he noticed yesterday he was feeling some shortness of breath and couldn't seem to catch his breath. His wife went to check on him this morning and noticed that he was breathing "fast and heavy" and he sounded like he was slurring his words. Wife was concerned about a stroke, so he brought him to the ED for evaluation.     Patient also reporting significant pain and swelling along his left lateral proximal thigh. He states he has noticed worsening pain over the past 2 weeks. He denies any acute trauma to the area. He was seen by ortho last week and was told to take ibu-profen for a muscle strain. This did not help his pain, so he presented again on 7/22 and was given a prednisone injection into his thigh. This also did not help his pain, and now the pain and swelling have advanced to the point that he has difficulty with ambulation. Patient denies fever, chills, nausea, vomiting.      Of note, patient has hx of poorly controlled diabetes. He was recently started on insulin by his PCP, but he has not taken any insulin since being prescribed it.      In the ED, patient hypertensive and tachycardic to the 130s. Tachypneic with RR in the 40s. On CBC, WBC 26.9, On CMP patient hyperkalemic to 5.5, CorNa 136, Bicarb 5. Cr elevated to 1.9 from BL 0.83. UA, BHB pending at time of admission. CTA without evidence of pulmonary embolism. Critical Care Medicine consulted given severe acidosis.               Overview/Hospital Course:     Patient admitted to the MICU " for management of severe DKA. Metabolic acidosis improving on insulin drip. He also has had left thigh pain for 1-2 months. There is a large mass on his left thigh that is tender to palpation, CT revealed hematoma vs soft tissue growth. General surgery consulted and recommended IR consult. IR consulted, no indication for tap. 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 Ortho consulted and performed bedside aspiration of possible abscess which revealed >200K cells >80% segs. Will hold off on antibiotics for now per ortho recs, ortho is taking patient to the OR for irrigation of the thigh, will start antibiotics afterwards.         7/29 Metabolic acidosis slowly improving. Ortho taking patient to the OR for irrigation of the left thigh, will start antibiotics afterwards.   7/30 Transfer to hospital medicine . S/p I&D  of left thigh Abscess on7/29/22 - MRI - uptured tensor fascia jose luis (TFL) muscle with associated large hematoma. gram stain -Moderate Gram positive cocci in clusters .cultures pending.  continue Vancomycin, clindamycin and Zosyn . Bicarb 15. K replaced . off insulin drip on SQ insulin.  7/31 ID and endocrine consulted.  abscess with Calcium pyrophosphate crystals with unknown significance potassium and P replaced. Bicarbonate WNL . aerobic culture 7/28 STAPHYLOCOCCUS SPECIES . Glucose in 300s .PRN imodium  for diarrhea. PT/OT consulted - WBAT. Surgical drain with purulent output  8/1 PICC team consulted for IV access. vanc trough at 11.4. monitor for vanomycin toxicity. possible OR tomorrow for washout  may need bone biopsy to r/o osteomyelitis.Discontinued clindamycin.   8/2 glucose in 300s.  Increased Levemir  to 14 units BID and Aspart  10 units TIDWM. K replaced         Nephrology consulyed for ANGE         Interval History: NAEON. Pt complained of wrist pain this AM and ortho tapped wrist. Cr up to 6 today.    Review of patient's  allergies indicates:  No Known Allergies  Current Facility-Administered Medications   Medication Frequency    0.9%  NaCl infusion Continuous    acetaminophen tablet 1,000 mg Q8H PRN    amLODIPine tablet 5 mg Daily    atorvastatin tablet 20 mg Daily    [START ON 8/6/2022] ceFAZolin 2 gram in dextrose 5% 100 mL IVPB (premix) Daily    dextrose 10% bolus 125 mL PRN    dextrose 10% bolus 250 mL PRN    glucagon (human recombinant) injection 1 mg PRN    glucose chewable tablet 16 g PRN    glucose chewable tablet 24 g PRN    insulin aspart U-100 pen 1-10 Units QID (AC + HS) PRN    insulin aspart U-100 pen 10 Units TIDWM    insulin detemir U-100 pen 12 Units BID    morphine injection 3 mg Q6H PRN    ondansetron injection 4 mg Q6H PRN    oxyCODONE immediate release tablet 5 mg Q4H PRN    senna-docusate 8.6-50 mg per tablet 2 tablet Daily    sodium chloride 0.9% flush 10 mL PRN       Objective:     Vital Signs (Most Recent):  Temp: 98.5 °F (36.9 °C) (08/05/22 1145)  Pulse: 86 (08/05/22 1145)  Resp: 18 (08/05/22 1223)  BP: (!) 147/89 (08/05/22 1145)  SpO2: 99 % (08/05/22 1145)  O2 Device (Oxygen Therapy): room air (08/05/22 0807)   Vital Signs (24h Range):  Temp:  [98.2 °F (36.8 °C)-99.3 °F (37.4 °C)] 98.5 °F (36.9 °C)  Pulse:  [75-95] 86  Resp:  [16-20] 18  SpO2:  [95 %-99 %] 99 %  BP: (124-179)/(69-91) 147/89     Weight: 94.5 kg (208 lb 5.4 oz) (08/05/22 1200)  Body mass index is 32.63 kg/m².  Body surface area is 2.11 meters squared.    I/O last 3 completed shifts:  In: 633.1 [P.O.:100; I.V.:533.1]  Out: 2830 [Urine:2600; Drains:230]    Physical Exam  Vitals reviewed.   Constitutional:       Appearance: Normal appearance.   HENT:      Head: Normocephalic and atraumatic.      Right Ear: External ear normal.      Left Ear: External ear normal.      Nose: Nose normal.      Mouth/Throat:      Pharynx: Oropharynx is clear.   Eyes:      Extraocular Movements: Extraocular movements intact.      Conjunctiva/sclera:  Conjunctivae normal.   Cardiovascular:      Rate and Rhythm: Normal rate and regular rhythm.      Pulses: Normal pulses.   Pulmonary:      Effort: Pulmonary effort is normal.      Breath sounds: Normal breath sounds.   Abdominal:      General: Abdomen is flat. There is no distension.   Musculoskeletal:         General: Normal range of motion.      Cervical back: Normal range of motion.   Skin:     General: Skin is warm and dry.   Neurological:      General: No focal deficit present.      Mental Status: He is alert and oriented to person, place, and time.   Psychiatric:         Mood and Affect: Mood normal.         Behavior: Behavior normal.       Significant Labs:  CBC:   Recent Labs   Lab 08/05/22 0615   WBC 5.48   RBC 3.35*   HGB 10.4*   HCT 31.6*      MCV 94   MCH 31.0   MCHC 32.9     CMP:   Recent Labs   Lab 08/05/22 0615   GLU 97   CALCIUM 8.4*   ALBUMIN 1.6*   PROT 6.1   *   K 3.9   CO2 23      BUN 21*   CREATININE 6.0*   ALKPHOS 80   ALT 6*   AST 15   BILITOT 0.3     All labs within the past 24 hours have been reviewed.     Significant Imaging:  reviewed    Assessment/Plan:     * DKA (diabetic ketoacidosis)  Per primary     Abscess of left thigh  Per primary    ANGE (acute kidney injury)  ANGE non oliguric scr up to 6.0 today, was 4.4 yesterday   Non oliguric   Likely vancomycin induced ANGE (ATI/ATN) in the setting of trough of 40 and also was on zosyn which will increase risk of vanco induced ANGE. Vanc trough decreased to 31. Low suspicion for AIN at this time.   Pr/Cr: 3.97  Renal US normal   Euvolemic   No acid/base disorder   No significant electrolyte abnormalities       Recs:   Strict Is and O's   Monitor vanco levels.   RFP daily   More likely tubular injury than volume issue-> Can dc fluids at this time if tolerating PO intake with good UOP  Will spin urine                   Thank you for your consult. I will follow-up with patient. Please contact us if you have any additional  questions.    Hayder Avila MD  Nephrology  [facility]

## 2022-08-05 NOTE — PROGRESS NOTES
[facility] - Tele[facility]  Endocrinology  Progress Note    Admit Date: 7/26/2022     Reason for Consult: Management of T2DM, Hyperglycemia     Surgical Procedure and Date: L thigh IM abscess I&D on 07/29/2022    Diabetes diagnosis year: at age 43yo    Home Diabetes Medications:    - Metformin 500 mg bid  - Jardiance 25 mg qd  - Trulicity 3 mg SC weekly  - Lantus 10 units qd -- prescribed at the end of 3/2022 but pt has not yet started taking it    Previously on glipizide, but stopped in 03/2022 by PCP who is managing his DM outpatient    How often checking glucose at home?  2-3x per week    BG readings on regimen: he reports <200  Hypoglycemia on the regimen?  No  Missed doses on regimen?  Yes    Diabetes Complications include:     Hyperglycemia    Complicating diabetes co morbidities:   HTN, obesity      HPI:   Patient is a 47 y.o. male with a diagnosis of T2DM, HTN, HLD, GERD, and obesity presented to the ED on 07/26/2022 with SOB and altered mental status x1d. Additionally reported pain and swelling L lateral high. Was seen at an OSH ED for thigh pain and was treated conservatively with NSAIDS initially. At follow up appt w/ ortho 7/22 he received IM steroid inj (deltoid) and po prednisone for suspected muscle strain. Due to worsening AMS and SOB he presented to the [facility] ED on 7/26. In the ED he was found to be in DKA and as admitted to the MICU due to severe acidosis.     For the thigh pain, MRI showed a ruptured tensor fascia jose luis (TFL) muscle with associated large hematoma. Ortho decided to perform aspiration night of 7/28. Gram stain showed G+ cocci in clusters- now growing Staph Species.They decided to take pt to OR 7/29 for formal I&D.      For DKA he was started on IVF and an intensive insulin gtt with resolution of DKA. Transitioned to MDI on 7/30 AM. Initial regimen was detemir 10u BID and Aspart 4u AC + LDC. Insulin up-titrated by MICU team prior to stepdown, to detemir 12u bid and aspart 8u AC +  "[facility]. Endocrinology consulted for management of hyperglycemia.      Interval HPI:   Overnight events:  No acute events reported overnight  Eatin%  Nausea: No  Hypoglycemia and intervention: No  Fever: No  TPN and/or TF: No  If yes, type of TF/TPN and rate: NA    BP (!) 179/90   Pulse 85   Temp 98.2 °F (36.8 °C) (Oral)   Resp 19   Ht 5' 7" (1.702 m)   Wt 94.5 kg (208 lb 5.4 oz)   SpO2 96%   BMI 32.63 kg/m²     Labs Reviewed and Include    Recent Labs   Lab 22  0615   GLU 97   CALCIUM 8.4*   ALBUMIN 1.6*   PROT 6.1   *   K 3.9   CO2 23      BUN 21*   CREATININE 6.0*   ALKPHOS 80   ALT 6*   AST 15   BILITOT 0.3     Lab Results   Component Value Date    WBC 5.48 2022    HGB 10.4 (L) 2022    HCT 31.6 (L) 2022    MCV 94 2022     2022     No results for input(s): TSH, FREET4 in the last 168 hours.  Lab Results   Component Value Date    HGBA1C 10.1 (H) 2022       Nutritional status:   Body mass index is 32.63 kg/m².  Lab Results   Component Value Date    ALBUMIN 1.6 (L) 2022    ALBUMIN 1.6 (L) 2022    ALBUMIN 1.8 (L) 2022     Lab Results   Component Value Date    PREALBUMIN 7 (L) 2022       Estimated Creatinine Clearance: 16.3 mL/min (A) (based on SCr of 6 mg/dL (H)).    Accu-Checks  Recent Labs     22  1521 22  1923 22  0726 22  1556 22  1922 22  0721 22  1120 22  1611 22  1929 22  0823   POCTGLUCOSE 140* 254* 143* 210* 155* 77 162* 150* 98 111*       Current Medications and/or Treatments Impacting Glycemic Control  Immunotherapy:    Immunosuppressants       None          Steroids:   Hormones (From admission, onward)                None          Pressors:    Autonomic Drugs (From admission, onward)                None          Hyperglycemia/Diabetes Medications:   Antihyperglycemics (From admission, onward)                Start     Stop Route Frequency Ordered    " 08/05/22 0715  insulin aspart U-100 pen 10 Units         -- SubQ 3 times daily with meals 08/04/22 2017 08/04/22 0915  insulin detemir U-100 pen 12 Units         -- SubQ 2 times daily 08/04/22 0901 07/30/22 0826  insulin aspart U-100 pen 1-10 Units         -- SubQ Before meals & nightly PRN 07/30/22 0727            ASSESSMENT and PLAN    Type 2 diabetes mellitus with hyperglycemia, with long-term current use of insulin  Key History and Diagnostic Findings  - Uncontrolled T2DM, pt not adherent to med regimen, not checking BG regularly at home  - P/w DKA with trigger infection/thigh abscess  - Home Regimen: metformin 500mg BID, Jardiance 25mg qd, Trulicity 3mg SC weekly, Lantus 10u qd   Pt never started insulin which was prescribed 3/2022. Not filling metformin regularly  - Glucose Goals: 140-180mg/dL  - 24 hour glucose trend:  Well controlled primarily in the mid to high 100s    Plan  - Continue Levemir 12 units BID  - Continue Aspart 10 units TIDWM with moderate correction scale    - Planning to return to OR for repeat I&D of thigh depending on renal function, underwent aspiration of left wrist per ortho    - Patient will need to be discharged on MDI insulin (prandial and basal)   - Other home meds to consider at d/c:    - Recommend holding jardiace until complete resolution of infection/acute illness. Because his presentation of DKA has a clear trigger (infection) and was not euglycemic DKA, unlikely that Jardiance was the cause. For these reasons, it would be reasonable to resume it outpatient after recovery from acute illness.   - Can resume metformin and trulicity at d/c, will re-visit dc recs when pt is ready for discharge    - Hypoglycemia protocol in place  - If blood glucose greater than 300, please ask patient not to eat food or drink anything other than water until correctional insulin has brought it back below 250    ** Please notify Endocrine for any change and/or advance in diet**  ** Please call  Endocrine for any BG related issues **    Class 2 severe obesity due to excess calories with serious comorbidity and body mass index (BMI) of 36.0 to 36.9 in adult  General weight loss/lifestyle modification strategies discussed (elicit support from others; identify saboteurs; non-food rewards, etc).   Weight loss will help to improve glycemic control    Hyperlipidemia associated with type 2 diabetes mellitus  Continue home statin   Goal LDL<70      Lc Garcia DO  [facility], [address]th [address]  [address] [place], [address]    Office: [phone]  Fax: [phone]    Disclaimer: This note has been generated using voice-recognition software. There may be typographical errors that have been missed during proof-reading.    The above history labs imaging impression and plan were discussed with attending physician who is in agreement and also took part in this patient's care.  I personally reviewed all of the patients available medications, labs, imaging, vitals, allergies, medical history.

## 2022-08-05 NOTE — ASSESSMENT & PLAN NOTE
7/30  Patient with acute kidney injury likely due to IVVD/dehydration ANGE is currently resolved . Labs reviewed- Renal function/electrolytes with Estimated Creatinine Clearance: 16.3 mL/min (A) (based on SCr of 6 mg/dL (H)). according to latest data. Monitor urine output and serial BMP and adjust therapy as needed. Avoid nephrotoxins and renally dose meds for GFR listed above.   8/2 ANGE resolved  8/3 recurrent ANGE with cr 0.7--> 2.7. likely vanc induced ATN with levels 40. urine studies and renal sonogram. nephrology consulted. started on NS 100ml/h x 10h and follow up renal panel. vancomycin discontinued. Zosyn and  vancomycin discontinued. Strict IO monitor. condom catheter .  8/4- cr rising, 4.4, alb 1.6 with protienuria. May also have nephrotic syndrome.  Nephrology consulted. .Renal US - ok no obstruction, normal appearing kidneys. , UO 2400.  NS x one liter, vanc level 31    8/5- cr 6, discuss w nephrology, wrist to be tapped for gout.  IVFs

## 2022-08-05 NOTE — PROGRESS NOTES
[facility] - Telemetry Stepdown  Infectious Disease  Progress Note    Patient Name: Wilfredo Thomas  MRN: [id number]  Admission Date: 7/26/2022  Length of Stay: 10 days  Attending Physician: Dorota Reynoso MD  Primary Care Provider: Aylin Wayne DO    Isolation Status: No active isolations  Assessment/Plan:      Abscess of left thigh     46 yo male with history of DMII and HTN admitted for DKA and left thigh abscess. MRI of left femur was notable for ruptured tensor fascia jose luis mm with associated large hematoma, as well as small area of potential osteonecrosis. Bedside aspiration of the fluid collection was notable for a cell count of >200k (>80%segs), cultures + staph species. He was taken to the OR on 7/29 with orthopedic surgery for surgical I&D. Surgical cultures are positive for MSSA. Per ortho surgery, no concerns for bone involvement. Patient was initially on Vancomycin and Zosyn. Vanc trough supratherapeutic and patient has developed an ANGE. He is currently on Cefazolin. He is scheduled for repeat debridement, TBD.       Yesterday was with new onset left wrist pain, states it has increased today. He is s/p wrist arthroscopy with hardware placement in 3/2022. Wife noticed a boil on his wrist. Swelling and TTP today. S/p joint aspiration, white count 2050k, 88% segs. No crystals noted. Culture pending. Xray of wrist with no acute concerns.      Otherwise, afebrile. HDS.        Recommendations:   · Continue Cefazolin 2g q24 (renally dosed per pharmacy) for MSSA SSTI.   · When taken for repeat I&D, please send tissue for path, gram stain, aerobic, anaerobic, AFB and fungal cultures. If there is concerns for bone involvement, please send bone specimen  · With new onset left wrist pain following boil rupture, will follow joint aspiration cultures.   · Nephrology ANGE management.  · Anticipate SSTI course unless new concerns for osteo appear  · Patient and plan reviewed with ID staff, Dr. Samson. ID will follow.              Thank you for your consult. I will follow-up with patient. Please contact us if you have any additional questions.    Steven Caicedo NP  Infectious Disease  [facility] - Telemetry Stepdown    Subjective:     Principal Problem:DKA (diabetic ketoacidosis)    HPI: Mr. Thomas is a 47 year old male with a PMH of DM2 (poorly controlled), HTN, GERD, HLD, obesity who initially presented to [facility] with cc of SOBx1 day. Pt was also reporting significant pain and swelling along his left lateral proximal thigh. Pt reports this pain started over the last 2 weeks, which worsened. He initially was seen outpatient in which he was diagnoised with a mm strain and given 800 mg ibuprofen. From there he went to the ED on 7/22 d/t worsening pain. He was treated with prednisone/morphine shot, and well as a prednisone oral pack. Pt denied recent injury, with the exception of soreness to his left lower shin following bumping into a cabinet. He denies open wounds/abrasions from this injury but states the soreness started in his left thigh following the improvement of discomfort from his left shin. Pt reports having been treated for a boil on his central/right buttocks in the end of June. 2022. Pt states he was seen in Urgent Care in [place] and the boil was drained. He was given an oral abx in which he completed.     To note, pt reports having a recent left wrist fracture, ligament rupture following an accident with his riding lawnmower. States he underwent surgery and 2 screws were placed on 3/31/22 at [facility]. Denies associated infection/complications. Denies pain in the site currently.    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 Denies having pets. Denies recent fishing/hunting. Denies hx of immunocompromising conditions.     To note, pt was found to be in DKA with blood sugars >400, treated by critical team, which has since resolved. Pt was recently started on insulin by  his PCP, which he had not started. Pt latest A1C 10.     CT of left thigh notable for fluid collections suspicious for hematoma/seroma. MRI of left femur was notable for ruptured tensor fascia jose luis mm with associated large hematoma, as well as small area of potential osteonecrosis. Bedside aspiration of the fluid collection was notable for a cell count of >200k >80%segs, cultures + staph spp. Pt was taken for I&D with Dr. Grfaf with ortho surgery on 7/29, abscesses were noted and washed out, cultures collected + staph spp. Per pt, states that Dr. Graff is planning to return to surgery tomorrow, 8/1/22 for repeat washout.     Pt was intially with leukocytosis, but has since down trended to 11k. Blood cultures from 7/26 NGTD.     Pt is currently on zosyn, vancomycin, and clindamycin. ID was consulted for abx recs regarding left TFL infection.            Interval History: NAEON. Remains with ANGE, on IVF, reporting urination. Cr to 6 today. Ancef renally dosed. Plans to return to OR with ortho for repeat washout once ANGE resolves. New onset left wrist pain, s/p joint aspiration, cultures pending.     Review of Systems   Constitutional:  Negative for chills, diaphoresis, fatigue and fever.   Respiratory:  Negative for cough and shortness of breath.    Cardiovascular:  Positive for leg swelling (upper left thigh). Negative for chest pain and palpitations.   Gastrointestinal:  Negative for abdominal distention and abdominal pain.   Genitourinary:  Negative for difficulty urinating and dysuria.   Musculoskeletal:  Positive for arthralgias (left wrist), joint swelling (left wrist) and myalgias (upper left thigh).   Skin:  Positive for wound (upper left thigh). Negative for color change and rash.   Allergic/Immunologic: Negative for immunocompromised state.   Neurological:  Negative for dizziness, tremors, weakness, numbness and headaches.   Psychiatric/Behavioral:  Negative for agitation and decreased concentration. The  patient is not nervous/anxious.    Objective:     Vital Signs (Most Recent):  Temp: 98.4 °F (36.9 °C) (08/05/22 1540)  Pulse: 91 (08/05/22 1540)  Resp: 20 (08/05/22 1540)  BP: (!) 145/83 (08/05/22 1540)  SpO2: 96 % (08/05/22 1540)   Vital Signs (24h Range):  Temp:  [98.2 °F (36.8 °C)-99.3 °F (37.4 °C)] 98.4 °F (36.9 °C)  Pulse:  [75-95] 91  Resp:  [16-20] 20  SpO2:  [95 %-99 %] 96 %  BP: (124-179)/(69-91) 145/83     Weight: 94.5 kg (208 lb 5.4 oz)  Body mass index is 32.63 kg/m².    Estimated Creatinine Clearance: 16.3 mL/min (A) (based on SCr of 6 mg/dL (H)).    Physical Exam  Vitals and nursing note reviewed.   Constitutional:       General: He is not in acute distress.     Appearance: Normal appearance. He is well-developed. He is obese. He is not ill-appearing, toxic-appearing or diaphoretic.   HENT:      Head: Normocephalic and atraumatic.      Nose: Nose normal.      Mouth/Throat:      Dentition: Does not have dentures.      Pharynx: No oropharyngeal exudate.   Eyes:      General: No scleral icterus.     Conjunctiva/sclera: Conjunctivae normal.   Cardiovascular:      Rate and Rhythm: Normal rate and regular rhythm.   Pulmonary:      Effort: Pulmonary effort is normal. No respiratory distress.      Breath sounds: Normal breath sounds.   Abdominal:      General: There is no distension.      Palpations: Abdomen is soft.      Tenderness: There is no abdominal tenderness.   Musculoskeletal:         General: Swelling (left wrist) present.   Skin:     General: Skin is warm and dry.      Findings: No erythema or rash.      Comments: Incision to left thigh. Dressed, CDI. Surgical drain with purulent output.   Neurological:      General: No focal deficit present.      Mental Status: He is alert and oriented to person, place, and time. Mental status is at baseline.      Motor: No weakness.      Gait: Gait normal.   Psychiatric:         Mood and Affect: Mood normal.         Behavior: Behavior normal.         Thought  Content: Thought content normal.         Judgment: Judgment normal.       Significant Labs: CBC:   Recent Labs   Lab 08/04/22  0708 08/05/22  0615   WBC 7.48 5.48   HGB 11.3* 10.4*   HCT 32.7* 31.6*    253       CMP:   Recent Labs   Lab 08/04/22  0425 08/05/22  0615    135*   K 4.2 3.9    102   CO2 22* 23   GLU 82 97   BUN 16 21*   CREATININE 4.4* 6.0*   CALCIUM 8.3* 8.4*   PROT 5.6* 6.1   ALBUMIN 1.6* 1.6*   BILITOT 0.4 0.3   ALKPHOS 73 80   AST 18 15   ALT 14 6*   ANIONGAP 11 10       Microbiology Results (last 7 days)       Procedure Component Value Units Date/Time    Blood culture [[id number]] Collected: 08/05/22 0852    Order Status: Completed Specimen: Blood from Peripheral, Right Hand Updated: 08/05/22 1545     Blood Culture, Routine No Growth to date    Blood culture [[id number]] Collected: 08/05/22 0850    Order Status: Completed Specimen: Blood from Antecubital, Right Arm Updated: 08/05/22 1545     Blood Culture, Routine No Growth to date    Gram stain [[id number]] Collected: 08/05/22 0817    Order Status: Completed Specimen: Joint Fluid from Wrist, Left Updated: 08/05/22 1038     Gram Stain Result No WBC's      No organisms seen    Aerobic culture [[id number]] Collected: 08/05/22 0817    Order Status: Sent Specimen: Bone from Wrist, Left Updated: 08/05/22 0902    Fungus culture [[id number]] Collected: 08/05/22 0817    Order Status: Sent Specimen: Joint Fluid from Wrist, Left Updated: 08/05/22 0901    AFB Culture & Smear [[id number]] Collected: 08/05/22 0817    Order Status: Sent Specimen: Joint Fluid from Wrist, Left Updated: 08/05/22 0900    Culture, Anaerobe [[id number]] Collected: 08/05/22 0817    Order Status: Sent Specimen: Joint Fluid from Wrist, Left Updated: 08/05/22 0818    Aerobic culture [[id number]]  (Abnormal) Collected: 07/29/22 1251    Order Status: Completed Specimen: Abscess from Leg, Left Updated: 08/04/22 1250     Aerobic Bacterial Culture STAPHYLOCOCCUS AUREUS  Many  For  susceptibility see order #[id number]      Narrative:      Left thigh abscess #1    Aerobic culture [[id number]]  (Abnormal)  (Susceptibility) Collected: 07/28/22 1800    Order Status: Completed Specimen: Abscess from Leg, Left Updated: 08/04/22 1249     Aerobic Bacterial Culture STAPHYLOCOCCUS AUREUS  Many      Narrative:      LEFT THIGH ABSCESS    Aerobic culture [[id number]]  (Abnormal)  (Susceptibility) Collected: 07/29/22 1300    Order Status: Completed Specimen: Abscess from Leg, Left Updated: 08/04/22 1248     Aerobic Bacterial Culture STAPHYLOCOCCUS AUREUS  Many      Narrative:      Left thigh abscess #2    Fungus culture [[id number]] Collected: 07/29/22 1251    Order Status: Completed Specimen: Abscess from Leg, Left Updated: 08/02/22 1335     Fungus (Mycology) Culture Culture in progress    Narrative:      Left thigh abscess #1    Fungus culture [[id number]] Collected: 07/29/22 1300    Order Status: Completed Specimen: Abscess from Leg, Left Updated: 08/02/22 1335     Fungus (Mycology) Culture Culture in progress    Narrative:      Left thigh abscess #2    Fungus culture [[id number]] Collected: 07/28/22 1800    Order Status: Completed Specimen: Abscess from Leg, Left Updated: 08/02/22 1334     Fungus (Mycology) Culture Culture in progress    Narrative:      LEFT THIGH ABSCESS    Culture, Anaerobic [[id number]] Collected: 07/28/22 1800    Order Status: Completed Specimen: Abscess from Leg, Left Updated: 08/02/22 0853     Anaerobic Culture No anaerobes isolated    Narrative:      LEFT THIGH ABSCESS    Culture, Anaerobe [[id number]] Collected: 07/29/22 1300    Order Status: Completed Specimen: Abscess from Leg, Left Updated: 08/02/22 0852     Anaerobic Culture No anaerobes isolated    Narrative:      Left thigh abscess #2    Culture, Anaerobe [[id number]] Collected: 07/29/22 1251    Order Status: Completed Specimen: Abscess from Leg, Left Updated: 08/02/22 0851     Anaerobic Culture No anaerobes isolated    Narrative:       Left thigh abscess #1    AFB Culture & Smear [[id number]] Collected: 07/29/22 1252    Order Status: Completed Specimen: Wound from Leg, Left Updated: 08/01/22 1512     AFB Culture & Smear Culture in progress     AFB CULTURE STAIN No acid fast bacilli seen.    Narrative:      Left thigh abscess #1    AFB Culture & Smear [[id number]] Collected: 07/29/22 1300    Order Status: Completed Specimen: Abscess from Leg, Left Updated: 08/01/22 1512     AFB Culture & Smear Culture in progress     AFB CULTURE STAIN No acid fast bacilli seen.    Narrative:      Left thigh abscess #2    Blood Culture #1 **CANNOT BE ORDERED STAT** [[id number]] Collected: 07/26/22 2302    Order Status: Completed Specimen: Blood from Peripheral, Hand, Left Updated: 08/01/22 0612     Blood Culture, Routine No growth after 5 days.    Blood Culture #2 **CANNOT BE ORDERED STAT** [[id number]] Collected: 07/26/22 2246    Order Status: Completed Specimen: Blood from Peripheral, Antecubital, Left Updated: 08/01/22 0612     Blood Culture, Routine No growth after 5 days.    AFB Culture & Smear [[id number]] Collected: 07/28/22 1800    Order Status: Completed Specimen: Abscess from Leg, Left Updated: 07/29/22 2127     AFB Culture & Smear Culture in progress     AFB CULTURE STAIN No acid fast bacilli seen.    Narrative:      LEFT THIGH ABSCESS          Recent Lab Results  (Last 5 results in the past 24 hours)        08/05/22  1541   08/05/22  1146   08/05/22  0852   08/05/22  0851   08/05/22  0850        Blood Culture, Routine     No Growth to date  [P]     No Growth to date  [P]       CRP       125.0         POCT Glucose 182   128             Uric Acid       4.5                                 [P] - Preliminary Result               Significant Imaging:     Imaging Results              CT Thigh With Contrast Left (Final result)  Result time 07/27/22 01:13:09      Final result by Jori Hopkins DO (07/27/22 01:13:09)                   Impression:      Large  heterogeneous fluid collection in the anterolateral left proximal thigh soft tissues, concerning for a soft tissue hematoma or Preston-Melissa lesion.  A neoplastic process is not entirely excluded.  Recommend close interval follow-up to assess for stability/resolution.      Electronically signed by: Jori Hopkins  Date:    07/27/2022  Time:    01:13               Narrative:    EXAMINATION:  CT THIGH WITH CONTRAST LEFT    CLINICAL HISTORY:  Soft tissue mass, thigh, deep;    TECHNIQUE:  Axial CT images of the left thigh with sagittal and coronal reformats after the intravenous administration of 50 mL Omnipaque 350.    COMPARISON:  None.    FINDINGS:  Bone: Bone mineralization is normal.  There is no evidence of an acute fracture or dislocation.  Alignment is normal.    Soft tissues: There is a large heterogeneous fluid collection in the left anterolateral proximal thigh measuring approximately 16 x 7 x 6 cm.  The collection appears to be centered within the subcutaneous tissues or superficial fascia and abuts the gluteus musculature, the sartorius muscle, the rectus femoris, and the vastus lateralis.  There is soft tissue edema in the surrounding subcutaneous tissues.  Muscle bulk is normal.    Articulations: The left femoroacetabular joint is unremarkable.  The pubic symphysis is unremarkable.  The left knee is unremarkable.  No large joint effusion or significant cartilage loss.    Miscellaneous: Neurovascular structures are grossly intact.  There is moderate calcified atherosclerosis of the left femoral and popliteal arteries.                                       CTA Chest Non-Coronary (PE Study) (Final result)  Result time 07/26/22 21:36:01      Final result by Jori Hopkins DO (07/26/22 21:36:01)                   Impression:      No large central or lobar pulmonary embolism.      Electronically signed by: Jori Hopkins  Date:    07/26/2022  Time:    21:36               Narrative:    EXAMINATION:  CTA CHEST  NON CORONARY    CLINICAL HISTORY:  Pulmonary embolism (PE) suspected, high prob;    TECHNIQUE:  Low dose axial images, sagittal and coronal reformations were obtained from the thoracic inlet to the lung bases following the IV administration of 100 mL of Omnipaque 350.  Contrast timing was optimized to evaluate the pulmonary arteries.  Maximum intensity projection images were provided for review.    COMPARISON:  Chest radiograph from earlier the same date.    FINDINGS:  Pulmonary vasculature: Satisfactory opacification of the pulmonary arterial system.  Motion artifact significantly limits evaluation of the segmental and subsegmental pulmonary arteries.  There is no large central or lobar pulmonary embolism.    Aorta: Left-sided aortic arch.  No aneurysm and no significant atherosclerosis.    Base of Neck: No significant abnormality.    Thoracic soft tissues: Normal.    Heart: Normal size. No effusion.    Amena/Mediastinum: No pathologic russ enlargement.    Airways: The large airways are patent. No foci of endobronchial filling.    Lungs/Pleura: Clear lungs. No pleural effusion or thickening.    Esophagus: Normal.    Upper Abdomen: No abnormality of the partially imaged upper abdomen.    Bones: No acute fracture. No suspicious lytic or sclerotic lesions.                                       X-Ray Chest 1 View (Final result)  Result time 07/26/22 21:23:42   Procedure changed from X-Ray Chest PA And Lateral     Final result by Olman Saucedo MD (07/26/22 21:23:42)                   Impression:      No convincing radiographic evidence of acute intrathoracic process on this single view..      Electronically signed by: Olman Saucedo MD  Date:    07/26/2022  Time:    21:23               Narrative:    EXAMINATION:  XR CHEST 1 VIEW    CLINICAL HISTORY:  shortness of breath;    TECHNIQUE:  Single frontal view of the chest was performed.    COMPARISON:  None    FINDINGS:  Cardiac monitoring leads overlie the chest.   Cardiac silhouette appears within normal limits.  No confluent airspace consolidation identified.  No significant volume of pleural fluid or pneumothorax appreciated.  The visualized osseous structures demonstrate mild degenerative changes.

## 2022-08-05 NOTE — SUBJECTIVE & OBJECTIVE
Interval History: NAEON. Remains with ANGE, on IVF, reporting urination. Cr to 6 today. Ancef renally dosed. Plans to return to OR with ortho for repeat washout once ANGE resolves. New onset left wrist pain, s/p joint aspiration, cultures pending.     Review of Systems   Constitutional:  Negative for chills, diaphoresis, fatigue and fever.   Respiratory:  Negative for cough and shortness of breath.    Cardiovascular:  Positive for leg swelling (upper left thigh). Negative for chest pain and palpitations.   Gastrointestinal:  Negative for abdominal distention and abdominal pain.   Genitourinary:  Negative for difficulty urinating and dysuria.   Musculoskeletal:  Positive for arthralgias (left wrist), joint swelling (left wrist) and myalgias (upper left thigh).   Skin:  Positive for wound (upper left thigh). Negative for color change and rash.   Allergic/Immunologic: Negative for immunocompromised state.   Neurological:  Negative for dizziness, tremors, weakness, numbness and headaches.   Psychiatric/Behavioral:  Negative for agitation and decreased concentration. The patient is not nervous/anxious.    Objective:     Vital Signs (Most Recent):  Temp: 98.4 °F (36.9 °C) (08/05/22 1540)  Pulse: 91 (08/05/22 1540)  Resp: 20 (08/05/22 1540)  BP: (!) 145/83 (08/05/22 1540)  SpO2: 96 % (08/05/22 1540)   Vital Signs (24h Range):  Temp:  [98.2 °F (36.8 °C)-99.3 °F (37.4 °C)] 98.4 °F (36.9 °C)  Pulse:  [75-95] 91  Resp:  [16-20] 20  SpO2:  [95 %-99 %] 96 %  BP: (124-179)/(69-91) 145/83     Weight: 94.5 kg (208 lb 5.4 oz)  Body mass index is 32.63 kg/m².    Estimated Creatinine Clearance: 16.3 mL/min (A) (based on SCr of 6 mg/dL (H)).    Physical Exam  Vitals and nursing note reviewed.   Constitutional:       General: He is not in acute distress.     Appearance: Normal appearance. He is well-developed. He is obese. He is not ill-appearing, toxic-appearing or diaphoretic.   HENT:      Head: Normocephalic and atraumatic.      Nose:  Nose normal.      Mouth/Throat:      Dentition: Does not have dentures.      Pharynx: No oropharyngeal exudate.   Eyes:      General: No scleral icterus.     Conjunctiva/sclera: Conjunctivae normal.   Cardiovascular:      Rate and Rhythm: Normal rate and regular rhythm.   Pulmonary:      Effort: Pulmonary effort is normal. No respiratory distress.      Breath sounds: Normal breath sounds.   Abdominal:      General: There is no distension.      Palpations: Abdomen is soft.      Tenderness: There is no abdominal tenderness.   Musculoskeletal:         General: Swelling (left wrist) present.   Skin:     General: Skin is warm and dry.      Findings: No erythema or rash.      Comments: Incision to left thigh. Dressed, CDI. Surgical drain with purulent output.   Neurological:      General: No focal deficit present.      Mental Status: He is alert and oriented to person, place, and time. Mental status is at baseline.      Motor: No weakness.      Gait: Gait normal.   Psychiatric:         Mood and Affect: Mood normal.         Behavior: Behavior normal.         Thought Content: Thought content normal.         Judgment: Judgment normal.       Significant Labs: CBC:   Recent Labs   Lab 08/04/22  0708 08/05/22  0615   WBC 7.48 5.48   HGB 11.3* 10.4*   HCT 32.7* 31.6*    253       CMP:   Recent Labs   Lab 08/04/22  0425 08/05/22  0615    135*   K 4.2 3.9    102   CO2 22* 23   GLU 82 97   BUN 16 21*   CREATININE 4.4* 6.0*   CALCIUM 8.3* 8.4*   PROT 5.6* 6.1   ALBUMIN 1.6* 1.6*   BILITOT 0.4 0.3   ALKPHOS 73 80   AST 18 15   ALT 14 6*   ANIONGAP 11 10       Microbiology Results (last 7 days)       Procedure Component Value Units Date/Time    Blood culture [[id number]] Collected: 08/05/22 0852    Order Status: Completed Specimen: Blood from Peripheral, Right Hand Updated: 08/05/22 1545     Blood Culture, Routine No Growth to date    Blood culture [[id number]] Collected: 08/05/22 0850    Order Status: Completed  Specimen: Blood from Antecubital, Right Arm Updated: 08/05/22 1545     Blood Culture, Routine No Growth to date    Gram stain [[id number]] Collected: 08/05/22 0817    Order Status: Completed Specimen: Joint Fluid from Wrist, Left Updated: 08/05/22 1038     Gram Stain Result No WBC's      No organisms seen    Aerobic culture [[id number]] Collected: 08/05/22 0817    Order Status: Sent Specimen: Bone from Wrist, Left Updated: 08/05/22 0902    Fungus culture [[id number]] Collected: 08/05/22 0817    Order Status: Sent Specimen: Joint Fluid from Wrist, Left Updated: 08/05/22 0901    AFB Culture & Smear [[id number]] Collected: 08/05/22 0817    Order Status: Sent Specimen: Joint Fluid from Wrist, Left Updated: 08/05/22 0900    Culture, Anaerobe [[id number]] Collected: 08/05/22 0817    Order Status: Sent Specimen: Joint Fluid from Wrist, Left Updated: 08/05/22 0818    Aerobic culture [[id number]]  (Abnormal) Collected: 07/29/22 1251    Order Status: Completed Specimen: Abscess from Leg, Left Updated: 08/04/22 1250     Aerobic Bacterial Culture STAPHYLOCOCCUS AUREUS  Many  For susceptibility see order #[id number]      Narrative:      Left thigh abscess #1    Aerobic culture [[id number]]  (Abnormal)  (Susceptibility) Collected: 07/28/22 1800    Order Status: Completed Specimen: Abscess from Leg, Left Updated: 08/04/22 1249     Aerobic Bacterial Culture STAPHYLOCOCCUS AUREUS  Many      Narrative:      LEFT THIGH ABSCESS    Aerobic culture [[id number]]  (Abnormal)  (Susceptibility) Collected: 07/29/22 1300    Order Status: Completed Specimen: Abscess from Leg, Left Updated: 08/04/22 1248     Aerobic Bacterial Culture STAPHYLOCOCCUS AUREUS  Many      Narrative:      Left thigh abscess #2    Fungus culture [[id number]] Collected: 07/29/22 1251    Order Status: Completed Specimen: Abscess from Leg, Left Updated: 08/02/22 1335     Fungus (Mycology) Culture Culture in progress    Narrative:      Left thigh abscess #1    Fungus culture  [[id number]] Collected: 07/29/22 1300    Order Status: Completed Specimen: Abscess from Leg, Left Updated: 08/02/22 1335     Fungus (Mycology) Culture Culture in progress    Narrative:      Left thigh abscess #2    Fungus culture [[id number]] Collected: 07/28/22 1800    Order Status: Completed Specimen: Abscess from Leg, Left Updated: 08/02/22 1334     Fungus (Mycology) Culture Culture in progress    Narrative:      LEFT THIGH ABSCESS    Culture, Anaerobic [[id number]] Collected: 07/28/22 1800    Order Status: Completed Specimen: Abscess from Leg, Left Updated: 08/02/22 0853     Anaerobic Culture No anaerobes isolated    Narrative:      LEFT THIGH ABSCESS    Culture, Anaerobe [[id number]] Collected: 07/29/22 1300    Order Status: Completed Specimen: Abscess from Leg, Left Updated: 08/02/22 0852     Anaerobic Culture No anaerobes isolated    Narrative:      Left thigh abscess #2    Culture, Anaerobe [[id number]] Collected: 07/29/22 1251    Order Status: Completed Specimen: Abscess from Leg, Left Updated: 08/02/22 0851     Anaerobic Culture No anaerobes isolated    Narrative:      Left thigh abscess #1    AFB Culture & Smear [[id number]] Collected: 07/29/22 1252    Order Status: Completed Specimen: Wound from Leg, Left Updated: 08/01/22 1512     AFB Culture & Smear Culture in progress     AFB CULTURE STAIN No acid fast bacilli seen.    Narrative:      Left thigh abscess #1    AFB Culture & Smear [[id number]] Collected: 07/29/22 1300    Order Status: Completed Specimen: Abscess from Leg, Left Updated: 08/01/22 1512     AFB Culture & Smear Culture in progress     AFB CULTURE STAIN No acid fast bacilli seen.    Narrative:      Left thigh abscess #2    Blood Culture #1 **CANNOT BE ORDERED STAT** [[id number]] Collected: 07/26/22 2302    Order Status: Completed Specimen: Blood from Peripheral, Hand, Left Updated: 08/01/22 0612     Blood Culture, Routine No growth after 5 days.    Blood Culture #2 **CANNOT BE ORDERED STAT**  [[id number]] Collected: 07/26/22 2246    Order Status: Completed Specimen: Blood from Peripheral, Antecubital, Left Updated: 08/01/22 0612     Blood Culture, Routine No growth after 5 days.    AFB Culture & Smear [[id number]] Collected: 07/28/22 1800    Order Status: Completed Specimen: Abscess from Leg, Left Updated: 07/29/22 2127     AFB Culture & Smear Culture in progress     AFB CULTURE STAIN No acid fast bacilli seen.    Narrative:      LEFT THIGH ABSCESS          Recent Lab Results  (Last 5 results in the past 24 hours)        08/05/22  1541   08/05/22  1146   08/05/22  0852   08/05/22  0851   08/05/22  0850        Blood Culture, Routine     No Growth to date  [P]     No Growth to date  [P]       CRP       125.0         POCT Glucose 182   128             Uric Acid       4.5                                 [P] - Preliminary Result               Significant Imaging:     Imaging Results              CT Thigh With Contrast Left (Final result)  Result time 07/27/22 01:13:09      Final result by Jori Hopkins DO (07/27/22 01:13:09)                   Impression:      Large heterogeneous fluid collection in the anterolateral left proximal thigh soft tissues, concerning for a soft tissue hematoma or Preston-Melissa lesion.  A neoplastic process is not entirely excluded.  Recommend close interval follow-up to assess for stability/resolution.      Electronically signed by: Jori Hopkins  Date:    07/27/2022  Time:    01:13               Narrative:    EXAMINATION:  CT THIGH WITH CONTRAST LEFT    CLINICAL HISTORY:  Soft tissue mass, thigh, deep;    TECHNIQUE:  Axial CT images of the left thigh with sagittal and coronal reformats after the intravenous administration of 50 mL Omnipaque 350.    COMPARISON:  None.    FINDINGS:  Bone: Bone mineralization is normal.  There is no evidence of an acute fracture or dislocation.  Alignment is normal.    Soft tissues: There is a large heterogeneous fluid collection in the left  anterolateral proximal thigh measuring approximately 16 x 7 x 6 cm.  The collection appears to be centered within the subcutaneous tissues or superficial fascia and abuts the gluteus musculature, the sartorius muscle, the rectus femoris, and the vastus lateralis.  There is soft tissue edema in the surrounding subcutaneous tissues.  Muscle bulk is normal.    Articulations: The left femoroacetabular joint is unremarkable.  The pubic symphysis is unremarkable.  The left knee is unremarkable.  No large joint effusion or significant cartilage loss.    Miscellaneous: Neurovascular structures are grossly intact.  There is moderate calcified atherosclerosis of the left femoral and popliteal arteries.                                       CTA Chest Non-Coronary (PE Study) (Final result)  Result time 07/26/22 21:36:01      Final result by Jori Hopkins DO (07/26/22 21:36:01)                   Impression:      No large central or lobar pulmonary embolism.      Electronically signed by: Jori Hopkins  Date:    07/26/2022  Time:    21:36               Narrative:    EXAMINATION:  CTA CHEST NON CORONARY    CLINICAL HISTORY:  Pulmonary embolism (PE) suspected, high prob;    TECHNIQUE:  Low dose axial images, sagittal and coronal reformations were obtained from the thoracic inlet to the lung bases following the IV administration of 100 mL of Omnipaque 350.  Contrast timing was optimized to evaluate the pulmonary arteries.  Maximum intensity projection images were provided for review.    COMPARISON:  Chest radiograph from earlier the same date.    FINDINGS:  Pulmonary vasculature: Satisfactory opacification of the pulmonary arterial system.  Motion artifact significantly limits evaluation of the segmental and subsegmental pulmonary arteries.  There is no large central or lobar pulmonary embolism.    Aorta: Left-sided aortic arch.  No aneurysm and no significant atherosclerosis.    Base of Neck: No significant  abnormality.    Thoracic soft tissues: Normal.    Heart: Normal size. No effusion.    Amena/Mediastinum: No pathologic russ enlargement.    Airways: The large airways are patent. No foci of endobronchial filling.    Lungs/Pleura: Clear lungs. No pleural effusion or thickening.    Esophagus: Normal.    Upper Abdomen: No abnormality of the partially imaged upper abdomen.    Bones: No acute fracture. No suspicious lytic or sclerotic lesions.                                       X-Ray Chest 1 View (Final result)  Result time 07/26/22 21:23:42   Procedure changed from X-Ray Chest PA And Lateral     Final result by Olman Saucedo MD (07/26/22 21:23:42)                   Impression:      No convincing radiographic evidence of acute intrathoracic process on this single view..      Electronically signed by: Olman Saucedo MD  Date:    07/26/2022  Time:    21:23               Narrative:    EXAMINATION:  XR CHEST 1 VIEW    CLINICAL HISTORY:  shortness of breath;    TECHNIQUE:  Single frontal view of the chest was performed.    COMPARISON:  None    FINDINGS:  Cardiac monitoring leads overlie the chest.  Cardiac silhouette appears within normal limits.  No confluent airspace consolidation identified.  No significant volume of pleural fluid or pneumothorax appreciated.  The visualized osseous structures demonstrate mild degenerative changes.

## 2022-08-05 NOTE — PROGRESS NOTES
Pharmacist Renal Dose Adjustment Note    Wilfredo Thomas is a 47 y.o. male being treated with cefazolin    Patient Data:    Vital Signs (Most Recent):  Temp: 98.5 °F (36.9 °C) (08/05/22 1145)  Pulse: 86 (08/05/22 1145)  Resp: 20 (08/05/22 1145)  BP: (!) 147/89 (08/05/22 1145)  SpO2: 99 % (08/05/22 1145)   Vital Signs (72h Range):  Temp:  [97.9 °F (36.6 °C)-99.3 °F (37.4 °C)]   Pulse:  []   Resp:  [16-20]   BP: ()/(59-98)   SpO2:  [93 %-99 %]      Recent Labs   Lab 08/03/22  1509 08/04/22  0425 08/05/22  0615   CREATININE 3.5* 4.4* 6.0*     Serum creatinine: 6 mg/dL (H) 08/05/22 0615  Estimated creatinine clearance: 16.3 mL/min (A)    Cefazolin changed to 2 g IV q24h. Severe ANGE    Juan Cardona Pharm.D., BCPS  [id number]

## 2022-08-05 NOTE — PROGRESS NOTES
[facility] - Telemetry Stepdown  Orthopedics  Progress Note    Attg Note:  I agree with the resident's assessment and plan.  increased output from the drain.  Wrist aspiration does not look like infection.  Likely return to the operating room tomorrow for repeat washout of the thigh.    Rob Graff MD      Patient Name: Wilfredo Thomas  MRN: [id number]  Admission Date: 7/26/2022  Hospital Length of Stay: 10 days  Attending Provider: Dorota Reynoso MD  Primary Care Provider: Aylin Wayne DO  Follow-up For: Procedure(s) (LRB):  IRRIGATION AND DEBRIDEMENT, LOWER EXTREMITY (Left)    Post-Operative Day: 7 Days Post-Op  Subjective:     Principal Problem:DKA (diabetic ketoacidosis)    Principal Orthopedic Problem: Left thigh I&D  Staph species culture on 07/28    Interval History: Pt seen and examined at bedside. NAEON. VS with HTN this morning, otherwise wnl. Left thigh drain 230cc output total/24h - 60cc was overnight. Noé purulence in drain this AM.     His chief complaint this AM is left wrist pain x 2 day duration. Says multiple IV sticks in that wrist 2 days ago, since then progressive onset of left wrist pain. Now can barely touch it or move it w/o excruciating pain. Some rednesss but notes he had adhesive tape there which caused skin rash for him. Has never had pain like this before in left wrist. Notes hx left wrist surgery 3/31/22 by Dr. Nguyen at [facility]. Review of care everywhere shows he had left wrist scope and S-L ligament repair with suture anchor. Dr. Nguyen notes in his op note evidence of gout, he sent pathology and this confirmed urate crystals in left wrist joint.     Cr continues to uptrend, 6.0 this morning from 4.4 yseterday. Suspect 2/2 vanc toxicity given high vanc troph. Nephrology following for this.       Review of patient's allergies indicates:  No Known Allergies    Current Facility-Administered Medications   Medication    0.9%  NaCl infusion    acetaminophen tablet 1,000 mg     "amLODIPine tablet 5 mg    atorvastatin tablet 20 mg    ceFAZolin 2 gram in dextrose 5% 100 mL IVPB (premix)    dextrose 10% bolus 125 mL    dextrose 10% bolus 250 mL    glucagon (human recombinant) injection 1 mg    glucose chewable tablet 16 g    glucose chewable tablet 24 g    insulin aspart U-100 pen 1-10 Units    insulin aspart U-100 pen 10 Units    insulin detemir U-100 pen 12 Units    morphine injection 3 mg    ondansetron injection 4 mg    oxyCODONE immediate release tablet 5 mg    senna-docusate 8.6-50 mg per tablet 2 tablet    sodium chloride 0.9% flush 10 mL     Objective:     Vital Signs (Most Recent):  Temp: 98.2 °F (36.8 °C) (08/05/22 0807)  Pulse: 85 (08/05/22 0807)  Resp: 19 (08/05/22 0807)  BP: (!) 179/90 (08/05/22 0807)  SpO2: 96 % (08/05/22 0807) Vital Signs (24h Range):  Temp:  [98.2 °F (36.8 °C)-99.3 °F (37.4 °C)] 98.2 °F (36.8 °C)  Pulse:  [75-95] 85  Resp:  [16-19] 19  SpO2:  [95 %-96 %] 96 %  BP: (124-179)/(69-91) 179/90     Weight: 94.5 kg (208 lb 5.4 oz)  Height: 5' 7" (170.2 cm)  Body mass index is 32.63 kg/m².      Intake/Output Summary (Last 24 hours) at 8/5/2022 0925  Last data filed at 8/5/2022 0600  Gross per 24 hour   Intake 633.11 ml   Output 2190 ml   Net -1556.89 ml         Ortho/SPM Exam  Left lower extremity  Left thigh dressing cdi  Drain in place, grossly purulent output  No erythema of thigh, no signficant swelling  Compartments soft and compressible  Painless ROM of hip and knee  NVI    Left wrist  Surgical scar dorsal wrist over 4th compartment  Splotchy erythema and rash dorsal wrist, some appears to follow distribution of where tape was previously  Mild swelling dorsal wrist  Severe TTP entire wrist joint  Severe pain with any ROM of wrist joint  Some pain with ROM of all fingers  No hand/finger swelling, erythema, or TTP  NVI  Radial pulse 2+, brisk cap refill    All major joints examined this AM and other than described above no erythema, swelling, TTP, or pain with " ROM        CBC:   Recent Labs   Lab 08/04/22  0708 08/05/22  0615   WBC 7.48 5.48   HGB 11.3* 10.4*   HCT 32.7* 31.6*    253       CMP:   Recent Labs   Lab 08/03/22  1509 08/04/22  0425 08/05/22  0615   * 137 135*   K 3.7 4.2 3.9   CL 98 104 102   CO2 25 22* 23   * 82 97   BUN 13 16 21*   CREATININE 3.5* 4.4* 6.0*   CALCIUM 8.8 8.3* 8.4*   PROT  --  5.6* 6.1   ALBUMIN 1.8* 1.6* 1.6*   BILITOT  --  0.4 0.3   ALKPHOS  --  73 80   AST  --  18 15   ALT  --  14 6*   ANIONGAP 8 11 10         All pertinent labs within the past 24 hours have been reviewed.    Significant Imaging: I have reviewed and interpreted all pertinent imaging results/findings.    XR left wrist- post op changes from S-L ligament repair, no effusion or soft tissue swelling or gas on XR,  fx/dislocation        Assessment/Plan:     Abscess of left thigh  47M with DM admitted 7/26/22 for DKA, ortho following for left lateral thigh abscess. s/p I&D and drain placement left thigh 7/29/22. Cx +MSSA. Continues to have grossly purulent and high drain output so we have been trying to take him back to OR for another washout however this has been delayed 2/2 ANGE from vanc toxicity. Cr continues to uptrend, nephrology co-managing. BG under good control now, endocrine co-managing.     New onset severe left wrist pain, ddx gout flare, cppd, septic arthritis. Gout flare- reasonable to suspect given hx left wrist gout (pathology confirmed 4/2022) and ANGE can precipitate gout flare. CPPD flare- possible, had CCPD crystals in left lateral thigh fluid collection. Septic arthritis- must r/o given current MSSA infection in left thigh which could have spread, hx left wrist S-L ligament repair, has anchor in place which could be nidus for infection.     Left wrist aspirated this AM. <1cc bloody joint fluid obtained. Sent to lab for WBC (priority), crystals, gram stain, cultures.     Procedure Note: Left wrist joint aspiration  Patient was explained risks,  benefits, and alternatives to treatment and verbalized consent to proceed. Following confirmation of the  patient name, site, and procedure, we began. Skin was sterilely prepared with betadine and then alcohol solution. A 18 gauge needle on a 50 cc syringe was used for aspiration through dorsal approach. <1 cc of bloody colored fluid collected. Fluid and cultures were obtained and sent for analysis. Aspiration site was covered with a bandaid. The patient tolerated the procedure quite well.     Recs  -Will FU labs for left wrist aspiration. If indicative of septic arthritis will need urgent left wrist I&D  -Still needs repeat I&D left thigh. I have spoken with primary team Dr. Reynoso regarding optimization for OR in light of his worsening ANGE, and she is discussing with nephrology.  -Rec empirically starting colchicine for possible gout flare of left wrist  - ambulate/PT daily   - ID following, on ancef for left thigh abscess cx +MSSA  - Endocrine following for BG management, sugars now controlled      Dispo: return to OR pending improved Cr    Left leg pain               Mansi Christianson MD  Orthopedics  [facility] - Telemetry Stepdown

## 2022-08-05 NOTE — ASSESSMENT & PLAN NOTE
Patient's FSGs are controlled on current medication regimen.  Last A1c reviewed-   Lab Results   Component Value Date    HGBA1C 10.1 (H) 07/26/2022     Most recent fingerstick glucose reviewed-   Recent Labs   Lab 08/04/22  1120 08/04/22  1611 08/04/22  1929   POCTGLUCOSE 162* 150* 98     Current correctional scale  Medium  Maintain anti-hyperglycemic dose as follows-   Antihyperglycemics (From admission, onward)            Start     Stop Route Frequency Ordered    08/05/22 0715  insulin aspart U-100 pen 10 Units         -- SubQ 3 times daily with meals 08/04/22 2017 08/04/22 0915  insulin detemir U-100 pen 12 Units         -- SubQ 2 times daily 08/04/22 0901    07/30/22 0826  insulin aspart U-100 pen 1-10 Units         -- SubQ Before meals & nightly PRN 07/30/22 0727        Hold Oral hypoglycemics while patient is in the hospital.  7/31 endocrine consulted.  Glucose in 300s   8/5- BS controlled with above regimen

## 2022-08-05 NOTE — ASSESSMENT & PLAN NOTE
Key History and Diagnostic Findings  - Uncontrolled T2DM, pt not adherent to med regimen, not checking BG regularly at home  - P/w DKA with trigger infection/thigh abscess  - Home Regimen: metformin 500mg BID, Jardiance 25mg qd, Trulicity 3mg SC weekly, Lantus 10u qd   Pt never started insulin which was prescribed 3/2022. Not filling metformin regularly  - Glucose Goals: 140-180mg/dL  - 24 hour glucose trend:  Well controlled primarily in the mid to high 100s    Plan  - Continue Levemir 12 units BID  - Continue Aspart 10 units TIDWM with moderate correction scale    - Planning to return to OR for repeat I&D of thigh depending on renal function, underwent aspiration of left wrist per ortho    - Patient will need to be discharged on MDI insulin (prandial and basal)   - Other home meds to consider at d/c:    - Recommend holding jardiace until complete resolution of infection/acute illness. Because his presentation of DKA has a clear trigger (infection) and was not euglycemic DKA, unlikely that Jardiance was the cause. For these reasons, it would be reasonable to resume it outpatient after recovery from acute illness.   - Can resume metformin and trulicity at d/c, will re-visit dc recs when pt is ready for discharge    - Hypoglycemia protocol in place  - If blood glucose greater than 300, please ask patient not to eat food or drink anything other than water until correctional insulin has brought it back below 250    ** Please notify Endocrine for any change and/or advance in diet**  ** Please call Endocrine for any BG related issues **

## 2022-08-05 NOTE — ASSESSMENT & PLAN NOTE
ANGE non oliguric scr up to 6.0 today, was 4.4 yesterday   Non oliguric   Likely vancomycin induced ANGE (ATI/ATN) in the setting of trough of 40 and also was on zosyn which will increase risk of vanco induced ANGE. Vanc trough decreased to 31. Low suspicion for AIN at this time.   Pr/Cr: 3.97  Renal US normal   Euvolemic   No acid/base disorder   No significant electrolyte abnormalities       Recs:   Strict Is and O's   Monitor vanco levels.   RFP daily   More likely tubular injury than volume issue-> Can dc fluids at this time if tolerating PO intake with good UOP  Will spin urine

## 2022-08-05 NOTE — ASSESSMENT & PLAN NOTE
47M with DM admitted 7/26/22 for DKA, ortho following for left lateral thigh abscess. s/p I&D and drain placement left thigh 7/29/22. Cx +MSSA. Continues to have grossly purulent and high drain output so we have been trying to take him back to OR for another washout however this has been delayed 2/2 AGNE from vanc toxicity. Cr continues to uptrend, nephrology co-managing. BG under good control now, endocrine co-managing.     New onset severe left wrist pain, ddx gout flare, cppd, septic arthritis. Gout flare- reasonable to suspect given hx left wrist gout (pathology confirmed 4/2022) and ANGE can precipitate gout flare. CPPD flare- possible, had CCPD crystals in left lateral thigh fluid collection. Septic arthritis- must r/o given current MSSA infection in left thigh which could have spread, hx left wrist S-L ligament repair, has anchor in place which could be nidus for infection.     Left wrist aspirated this AM. <1cc bloody joint fluid obtained. Sent to lab for WBC (priority), crystals, gram stain, cultures.     Procedure Note: Left wrist joint aspiration  Patient was explained risks, benefits, and alternatives to treatment and verbalized consent to proceed. Following confirmation of the  patient name, site, and procedure, we began. Skin was sterilely prepared with betadine and then alcohol solution. A 18 gauge needle on a 50 cc syringe was used for aspiration through dorsal approach. <1 cc of bloody colored fluid collected. Fluid and cultures were obtained and sent for analysis. Aspiration site was covered with a bandaid. The patient tolerated the procedure quite well.     Recs  -Will FU labs for left wrist aspiration. If indicative of septic arthritis will need urgent left wrist I&D  -Still needs repeat I&D left thigh. I have spoken with primary team Dr. Reynoso regarding optimization for OR in light of his worsening ANGE, and she is discussing with nephrology.  -Rec empirically starting colchicine for possible gout  flare of left wrist  - ambulate/PT daily   - ID following, on ancef for left thigh abscess cx +MSSA  - Endocrine following for BG management, sugars now controlled      Dispo: return to OR pending improved Cr

## 2022-08-05 NOTE — ASSESSMENT & PLAN NOTE
On home lisinopril 20.  - hold ACEi in setting of ANGE.   8/5- /85 ->  179/90 . Start norvasc 5 mg daily, Lisinopril on hold due to ARF.

## 2022-08-05 NOTE — SUBJECTIVE & OBJECTIVE
Interval History: NAEON. Pt complained of wrist pain this AM and ortho tapped wrist. Cr up to 6 today.    Review of patient's allergies indicates:  No Known Allergies  Current Facility-Administered Medications   Medication Frequency    0.9%  NaCl infusion Continuous    acetaminophen tablet 1,000 mg Q8H PRN    amLODIPine tablet 5 mg Daily    atorvastatin tablet 20 mg Daily    [START ON 8/6/2022] ceFAZolin 2 gram in dextrose 5% 100 mL IVPB (premix) Daily    dextrose 10% bolus 125 mL PRN    dextrose 10% bolus 250 mL PRN    glucagon (human recombinant) injection 1 mg PRN    glucose chewable tablet 16 g PRN    glucose chewable tablet 24 g PRN    insulin aspart U-100 pen 1-10 Units QID (AC + HS) PRN    insulin aspart U-100 pen 10 Units TIDWM    insulin detemir U-100 pen 12 Units BID    morphine injection 3 mg Q6H PRN    ondansetron injection 4 mg Q6H PRN    oxyCODONE immediate release tablet 5 mg Q4H PRN    senna-docusate 8.6-50 mg per tablet 2 tablet Daily    sodium chloride 0.9% flush 10 mL PRN       Objective:     Vital Signs (Most Recent):  Temp: 98.5 °F (36.9 °C) (08/05/22 1145)  Pulse: 86 (08/05/22 1145)  Resp: 18 (08/05/22 1223)  BP: (!) 147/89 (08/05/22 1145)  SpO2: 99 % (08/05/22 1145)  O2 Device (Oxygen Therapy): room air (08/05/22 0807)   Vital Signs (24h Range):  Temp:  [98.2 °F (36.8 °C)-99.3 °F (37.4 °C)] 98.5 °F (36.9 °C)  Pulse:  [75-95] 86  Resp:  [16-20] 18  SpO2:  [95 %-99 %] 99 %  BP: (124-179)/(69-91) 147/89     Weight: 94.5 kg (208 lb 5.4 oz) (08/05/22 1200)  Body mass index is 32.63 kg/m².  Body surface area is 2.11 meters squared.    I/O last 3 completed shifts:  In: 633.1 [P.O.:100; I.V.:533.1]  Out: 2830 [Urine:2600; Drains:230]    Physical Exam  Vitals reviewed.   Constitutional:       Appearance: Normal appearance.   HENT:      Head: Normocephalic and atraumatic.      Right Ear: External ear normal.      Left Ear: External ear normal.      Nose: Nose normal.      Mouth/Throat:      Pharynx:  Oropharynx is clear.   Eyes:      Extraocular Movements: Extraocular movements intact.      Conjunctiva/sclera: Conjunctivae normal.   Cardiovascular:      Rate and Rhythm: Normal rate and regular rhythm.      Pulses: Normal pulses.   Pulmonary:      Effort: Pulmonary effort is normal.      Breath sounds: Normal breath sounds.   Abdominal:      General: Abdomen is flat. There is no distension.   Musculoskeletal:         General: Normal range of motion.      Cervical back: Normal range of motion.   Skin:     General: Skin is warm and dry.   Neurological:      General: No focal deficit present.      Mental Status: He is alert and oriented to person, place, and time.   Psychiatric:         Mood and Affect: Mood normal.         Behavior: Behavior normal.       Significant Labs:  CBC:   Recent Labs   Lab 08/05/22  0615   WBC 5.48   RBC 3.35*   HGB 10.4*   HCT 31.6*      MCV 94   MCH 31.0   MCHC 32.9     CMP:   Recent Labs   Lab 08/05/22  0615   GLU 97   CALCIUM 8.4*   ALBUMIN 1.6*   PROT 6.1   *   K 3.9   CO2 23      BUN 21*   CREATININE 6.0*   ALKPHOS 80   ALT 6*   AST 15   BILITOT 0.3     All labs within the past 24 hours have been reviewed.     Significant Imaging:  reviewed

## 2022-08-05 NOTE — PT/OT/SLP PROGRESS
"Physical Therapy      Patient Name:  Wilfredo Thomas   MRN:  [id number]    Patient requesting to defer out of bed mobility today secondary to Pain in L wrist "20/10", significant edema, unable to tolerate wrist ROM. Patient educated on importance of keeping L wrist elevated above elbow, elevated above heart, encouraged finger/hand AAROM within pain tolerance to prevent stiffness and mitigate swelling. Reviewed L LE HEP, patient reports he has been compliant with HEP. Educated on possibility of using cane vs RW to avoid WBing through L wrist, patient in too much pain to attempt gait training- recommend HHA on R side for gait, he reports he has been ambulating short distances in the room with HHA as tolerated. Will follow up as appropriate.     Start time: 14:50  End time: 15:00  Total time: 10 min, therapeutic activity for education        "

## 2022-08-05 NOTE — ASSESSMENT & PLAN NOTE
MRI left thig-  Ruptured tensor fascia jose luis (TFL) muscle with associated large hematoma,   s/p orthopedic evaluation .aspiration showed  purulence. s/p I&D on 7/29 7/31  abscess with Calcium pyrophosphate crystals with unknown significance. . aerobic culture 7/28 STAPHYLOCOCCUS SPECIES .    8/5- needs a second wash out surgery per ortho  -see above

## 2022-08-05 NOTE — ASSESSMENT & PLAN NOTE
47M with history of DM2, gout, HTN, tobacco use who initially presented for dyspnea, increased thirst, decreased PO intake, found to be in DKA.  Recent history of steroids for leg pain.  On home dulaglutide, empagliflozin, metformin, insulin glargine 20u qhs (has not been taking).  Recent a1c 11.2%.  , bicarb 5, AG 26, BHB 5.9 on admit.       - Insulin drip   - q1h POCT glu checks while on drip  - D5 1/2 NS with 20 meq K   - BMP q4h  - diabetic diet when off insulin drip     7/30  . Bicarb 15. K replaced . off insulin drip on SQ insulin. endocrine consulted  7/31 endocrine consulted.   Bicarbonate WNL . Glucose in 300s   Recent Labs     08/03/22  1556 08/03/22  1922 08/04/22  0721 08/04/22  1120 08/04/22  1611 08/04/22  1929   POCTGLUCOSE 210* 155* 77 162* 150* 98     -see above

## 2022-08-06 ENCOUNTER — ANESTHESIA (OUTPATIENT)
Dept: SURGERY | Facility: HOSPITAL | Age: 47
DRG: 981 | End: 2022-08-06
Payer: COMMERCIAL

## 2022-08-06 PROBLEM — D72.829 LEUKOCYTOSIS: Status: RESOLVED | Noted: 2022-07-26 | Resolved: 2022-08-06

## 2022-08-06 PROBLEM — E11.10 DKA (DIABETIC KETOACIDOSIS): Status: RESOLVED | Noted: 2022-07-26 | Resolved: 2022-08-06

## 2022-08-06 PROBLEM — D63.8 ANEMIA OF CHRONIC DISEASE: Status: ACTIVE | Noted: 2022-07-30

## 2022-08-06 PROBLEM — E87.6 HYPOKALEMIA: Status: RESOLVED | Noted: 2022-07-30 | Resolved: 2022-08-06

## 2022-08-06 PROBLEM — R79.89 ELEVATED D-DIMER: Status: RESOLVED | Noted: 2022-07-26 | Resolved: 2022-08-06

## 2022-08-06 PROBLEM — M10.9 ACUTE GOUT OF LEFT WRIST: Status: RESOLVED | Noted: 2022-08-05 | Resolved: 2022-08-06

## 2022-08-06 PROBLEM — E87.1 HYPONATREMIA: Status: RESOLVED | Noted: 2022-07-30 | Resolved: 2022-08-06

## 2022-08-06 LAB
ALBUMIN SERPL BCP-MCNC: 1.5 G/DL (ref 3.5–5.2)
ALP SERPL-CCNC: 78 U/L (ref 55–135)
ALT SERPL W/O P-5'-P-CCNC: <5 U/L (ref 10–44)
ANION GAP SERPL CALC-SCNC: 10 MMOL/L (ref 8–16)
AST SERPL-CCNC: 12 U/L (ref 10–40)
BASOPHILS # BLD AUTO: 0.03 K/UL (ref 0–0.2)
BASOPHILS NFR BLD: 0.5 % (ref 0–1.9)
BILIRUB SERPL-MCNC: 0.2 MG/DL (ref 0.1–1)
BUN SERPL-MCNC: 30 MG/DL (ref 6–20)
CALCIUM SERPL-MCNC: 8.7 MG/DL (ref 8.7–10.5)
CHLORIDE SERPL-SCNC: 104 MMOL/L (ref 95–110)
CO2 SERPL-SCNC: 19 MMOL/L (ref 23–29)
CREAT SERPL-MCNC: 7.1 MG/DL (ref 0.5–1.4)
DIFFERENTIAL METHOD: ABNORMAL
EOSINOPHIL # BLD AUTO: 0 K/UL (ref 0–0.5)
EOSINOPHIL NFR BLD: 0 % (ref 0–8)
ERYTHROCYTE [DISTWIDTH] IN BLOOD BY AUTOMATED COUNT: 12.4 % (ref 11.5–14.5)
EST. GFR  (NO RACE VARIABLE): 8.8 ML/MIN/1.73 M^2
GLUCOSE SERPL-MCNC: 189 MG/DL (ref 70–110)
HCT VFR BLD AUTO: 28.3 % (ref 40–54)
HGB BLD-MCNC: 9.7 G/DL (ref 14–18)
IMM GRANULOCYTES # BLD AUTO: 0.04 K/UL (ref 0–0.04)
IMM GRANULOCYTES NFR BLD AUTO: 0.7 % (ref 0–0.5)
LYMPHOCYTES # BLD AUTO: 0.6 K/UL (ref 1–4.8)
LYMPHOCYTES NFR BLD: 10.8 % (ref 18–48)
MCH RBC QN AUTO: 31.7 PG (ref 27–31)
MCHC RBC AUTO-ENTMCNC: 34.3 G/DL (ref 32–36)
MCV RBC AUTO: 93 FL (ref 82–98)
MONOCYTES # BLD AUTO: 0.1 K/UL (ref 0.3–1)
MONOCYTES NFR BLD: 2.4 % (ref 4–15)
NEUTROPHILS # BLD AUTO: 4.9 K/UL (ref 1.8–7.7)
NEUTROPHILS NFR BLD: 85.6 % (ref 38–73)
NRBC BLD-RTO: 0 /100 WBC
PLATELET # BLD AUTO: 237 K/UL (ref 150–450)
PMV BLD AUTO: 9.2 FL (ref 9.2–12.9)
POCT GLUCOSE: 188 MG/DL (ref 70–110)
POCT GLUCOSE: 196 MG/DL (ref 70–110)
POCT GLUCOSE: 217 MG/DL (ref 70–110)
POCT GLUCOSE: 238 MG/DL (ref 70–110)
POCT GLUCOSE: 288 MG/DL (ref 70–110)
POTASSIUM SERPL-SCNC: 5 MMOL/L (ref 3.5–5.1)
PROT SERPL-MCNC: 5.9 G/DL (ref 6–8.4)
RBC # BLD AUTO: 3.06 M/UL (ref 4.6–6.2)
SARS-COV-2 RDRP RESP QL NAA+PROBE: NEGATIVE
SODIUM SERPL-SCNC: 133 MMOL/L (ref 136–145)
VANCOMYCIN SERPL-MCNC: 24.4 UG/ML
WBC # BLD AUTO: 5.76 K/UL (ref 3.9–12.7)

## 2022-08-06 PROCEDURE — 87075 CULTR BACTERIA EXCEPT BLOOD: CPT | Performed by: ORTHOPAEDIC SURGERY

## 2022-08-06 PROCEDURE — 63600175 PHARM REV CODE 636 W HCPCS: Performed by: ANESTHESIOLOGY

## 2022-08-06 PROCEDURE — 88304 PR  SURG PATH,LEVEL III: ICD-10-PCS | Mod: 26,,, | Performed by: PATHOLOGY

## 2022-08-06 PROCEDURE — 20600001 HC STEP DOWN PRIVATE ROOM

## 2022-08-06 PROCEDURE — 25000003 PHARM REV CODE 250

## 2022-08-06 PROCEDURE — 36000705 HC OR TIME LEV I EA ADD 15 MIN: Performed by: ORTHOPAEDIC SURGERY

## 2022-08-06 PROCEDURE — 88304 TISSUE EXAM BY PATHOLOGIST: CPT | Performed by: PATHOLOGY

## 2022-08-06 PROCEDURE — 87070 CULTURE OTHR SPECIMN AEROBIC: CPT | Mod: 59 | Performed by: ORTHOPAEDIC SURGERY

## 2022-08-06 PROCEDURE — 87077 CULTURE AEROBIC IDENTIFY: CPT | Performed by: ORTHOPAEDIC SURGERY

## 2022-08-06 PROCEDURE — 25000003 PHARM REV CODE 250: Performed by: HOSPITALIST

## 2022-08-06 PROCEDURE — 63600175 PHARM REV CODE 636 W HCPCS

## 2022-08-06 PROCEDURE — 71000033 HC RECOVERY, INTIAL HOUR: Performed by: ORTHOPAEDIC SURGERY

## 2022-08-06 PROCEDURE — 63600175 PHARM REV CODE 636 W HCPCS: Performed by: STUDENT IN AN ORGANIZED HEALTH CARE EDUCATION/TRAINING PROGRAM

## 2022-08-06 PROCEDURE — 63600175 PHARM REV CODE 636 W HCPCS: Performed by: NURSE ANESTHETIST, CERTIFIED REGISTERED

## 2022-08-06 PROCEDURE — D9220A PRA ANESTHESIA: ICD-10-PCS | Mod: ANES,,, | Performed by: ANESTHESIOLOGY

## 2022-08-06 PROCEDURE — 82962 GLUCOSE BLOOD TEST: CPT | Performed by: ORTHOPAEDIC SURGERY

## 2022-08-06 PROCEDURE — 27301 PR INCIS/DRAIN THIGH/KNEE ABSCESS,DEEP: ICD-10-PCS | Mod: 58,LT,, | Performed by: ORTHOPAEDIC SURGERY

## 2022-08-06 PROCEDURE — 88304 TISSUE EXAM BY PATHOLOGIST: CPT | Mod: 26,,, | Performed by: PATHOLOGY

## 2022-08-06 PROCEDURE — 99233 SBSQ HOSP IP/OBS HIGH 50: CPT | Mod: ,,, | Performed by: STUDENT IN AN ORGANIZED HEALTH CARE EDUCATION/TRAINING PROGRAM

## 2022-08-06 PROCEDURE — U0002 COVID-19 LAB TEST NON-CDC: HCPCS | Performed by: STUDENT IN AN ORGANIZED HEALTH CARE EDUCATION/TRAINING PROGRAM

## 2022-08-06 PROCEDURE — 71000015 HC POSTOP RECOV 1ST HR: Performed by: ORTHOPAEDIC SURGERY

## 2022-08-06 PROCEDURE — 37000008 HC ANESTHESIA 1ST 15 MINUTES: Performed by: ORTHOPAEDIC SURGERY

## 2022-08-06 PROCEDURE — 80053 COMPREHEN METABOLIC PANEL: CPT

## 2022-08-06 PROCEDURE — 27301 DRAIN THIGH/KNEE LESION: CPT | Mod: 58,LT,, | Performed by: ORTHOPAEDIC SURGERY

## 2022-08-06 PROCEDURE — 88312 PR  SPECIAL STAINS,GROUP I: ICD-10-PCS | Mod: 26,,, | Performed by: PATHOLOGY

## 2022-08-06 PROCEDURE — 99233 PR SUBSEQUENT HOSPITAL CARE,LEVL III: ICD-10-PCS | Mod: ,,, | Performed by: STUDENT IN AN ORGANIZED HEALTH CARE EDUCATION/TRAINING PROGRAM

## 2022-08-06 PROCEDURE — 80202 ASSAY OF VANCOMYCIN: CPT

## 2022-08-06 PROCEDURE — 94668 MNPJ CHEST WALL SBSQ: CPT

## 2022-08-06 PROCEDURE — 36000704 HC OR TIME LEV I 1ST 15 MIN: Performed by: ORTHOPAEDIC SURGERY

## 2022-08-06 PROCEDURE — D9220A PRA ANESTHESIA: Mod: ANES,,, | Performed by: ANESTHESIOLOGY

## 2022-08-06 PROCEDURE — 82962 GLUCOSE BLOOD TEST: CPT

## 2022-08-06 PROCEDURE — 25000003 PHARM REV CODE 250: Performed by: NURSE ANESTHETIST, CERTIFIED REGISTERED

## 2022-08-06 PROCEDURE — D9220A PRA ANESTHESIA: ICD-10-PCS | Mod: CRNA,,, | Performed by: NURSE ANESTHETIST, CERTIFIED REGISTERED

## 2022-08-06 PROCEDURE — 36415 COLL VENOUS BLD VENIPUNCTURE: CPT

## 2022-08-06 PROCEDURE — C1729 CATH, DRAINAGE: HCPCS | Performed by: ORTHOPAEDIC SURGERY

## 2022-08-06 PROCEDURE — 63600175 PHARM REV CODE 636 W HCPCS: Performed by: HOSPITALIST

## 2022-08-06 PROCEDURE — 85025 COMPLETE CBC W/AUTO DIFF WBC: CPT

## 2022-08-06 PROCEDURE — 71000016 HC POSTOP RECOV ADDL HR: Performed by: ORTHOPAEDIC SURGERY

## 2022-08-06 PROCEDURE — 37000009 HC ANESTHESIA EA ADD 15 MINS: Performed by: ORTHOPAEDIC SURGERY

## 2022-08-06 PROCEDURE — D9220A PRA ANESTHESIA: Mod: CRNA,,, | Performed by: NURSE ANESTHETIST, CERTIFIED REGISTERED

## 2022-08-06 PROCEDURE — 87186 SC STD MICRODIL/AGAR DIL: CPT | Performed by: ORTHOPAEDIC SURGERY

## 2022-08-06 PROCEDURE — 25000003 PHARM REV CODE 250: Performed by: ANESTHESIOLOGY

## 2022-08-06 PROCEDURE — 88312 SPECIAL STAINS GROUP 1: CPT | Mod: 26,,, | Performed by: PATHOLOGY

## 2022-08-06 RX ORDER — ENOXAPARIN SODIUM 100 MG/ML
30 INJECTION SUBCUTANEOUS EVERY 24 HOURS
Status: DISCONTINUED | OUTPATIENT
Start: 2022-08-06 | End: 2022-08-07

## 2022-08-06 RX ORDER — SODIUM CHLORIDE 0.9 % (FLUSH) 0.9 %
10 SYRINGE (ML) INJECTION
Status: DISCONTINUED | OUTPATIENT
Start: 2022-08-06 | End: 2022-08-06 | Stop reason: HOSPADM

## 2022-08-06 RX ORDER — CEFAZOLIN SODIUM/WATER 2 G/20 ML
2 SYRINGE (ML) INTRAVENOUS
Status: DISCONTINUED | OUTPATIENT
Start: 2022-08-06 | End: 2022-08-06 | Stop reason: HOSPADM

## 2022-08-06 RX ORDER — MUPIROCIN 20 MG/G
OINTMENT TOPICAL
Status: DISCONTINUED | OUTPATIENT
Start: 2022-08-06 | End: 2022-08-06 | Stop reason: HOSPADM

## 2022-08-06 RX ORDER — ROCURONIUM BROMIDE 10 MG/ML
INJECTION, SOLUTION INTRAVENOUS
Status: DISCONTINUED | OUTPATIENT
Start: 2022-08-06 | End: 2022-08-06

## 2022-08-06 RX ORDER — FENTANYL CITRATE 50 UG/ML
25 INJECTION, SOLUTION INTRAMUSCULAR; INTRAVENOUS EVERY 5 MIN PRN
Status: COMPLETED | OUTPATIENT
Start: 2022-08-06 | End: 2022-08-06

## 2022-08-06 RX ORDER — OXYCODONE HYDROCHLORIDE 5 MG/1
5 TABLET ORAL
Status: DISCONTINUED | OUTPATIENT
Start: 2022-08-06 | End: 2022-08-06 | Stop reason: HOSPADM

## 2022-08-06 RX ORDER — NEOSTIGMINE METHYLSULFATE 0.5 MG/ML
INJECTION, SOLUTION INTRAVENOUS
Status: DISCONTINUED | OUTPATIENT
Start: 2022-08-06 | End: 2022-08-06

## 2022-08-06 RX ORDER — ONDANSETRON 2 MG/ML
4 INJECTION INTRAMUSCULAR; INTRAVENOUS DAILY PRN
Status: DISCONTINUED | OUTPATIENT
Start: 2022-08-06 | End: 2022-08-06 | Stop reason: HOSPADM

## 2022-08-06 RX ORDER — LIDOCAINE HYDROCHLORIDE 10 MG/ML
INJECTION, SOLUTION INTRAVENOUS
Status: DISCONTINUED | OUTPATIENT
Start: 2022-08-06 | End: 2022-08-06

## 2022-08-06 RX ORDER — PROPOFOL 10 MG/ML
VIAL (ML) INTRAVENOUS
Status: DISCONTINUED | OUTPATIENT
Start: 2022-08-06 | End: 2022-08-06

## 2022-08-06 RX ORDER — MIDAZOLAM HYDROCHLORIDE 1 MG/ML
INJECTION, SOLUTION INTRAMUSCULAR; INTRAVENOUS
Status: DISCONTINUED | OUTPATIENT
Start: 2022-08-06 | End: 2022-08-06

## 2022-08-06 RX ORDER — DEXAMETHASONE SODIUM PHOSPHATE 4 MG/ML
INJECTION, SOLUTION INTRA-ARTICULAR; INTRALESIONAL; INTRAMUSCULAR; INTRAVENOUS; SOFT TISSUE
Status: DISCONTINUED | OUTPATIENT
Start: 2022-08-06 | End: 2022-08-06

## 2022-08-06 RX ORDER — ONDANSETRON 2 MG/ML
INJECTION INTRAMUSCULAR; INTRAVENOUS
Status: DISCONTINUED | OUTPATIENT
Start: 2022-08-06 | End: 2022-08-06

## 2022-08-06 RX ORDER — FENTANYL CITRATE 50 UG/ML
INJECTION, SOLUTION INTRAMUSCULAR; INTRAVENOUS
Status: DISCONTINUED | OUTPATIENT
Start: 2022-08-06 | End: 2022-08-06

## 2022-08-06 RX ORDER — HALOPERIDOL 5 MG/ML
0.5 INJECTION INTRAMUSCULAR EVERY 10 MIN PRN
Status: DISCONTINUED | OUTPATIENT
Start: 2022-08-06 | End: 2022-08-06 | Stop reason: HOSPADM

## 2022-08-06 RX ADMIN — OXYCODONE 5 MG: 5 TABLET ORAL at 08:08

## 2022-08-06 RX ADMIN — FENTANYL CITRATE 25 MCG: 50 INJECTION INTRAMUSCULAR; INTRAVENOUS at 12:08

## 2022-08-06 RX ADMIN — ATORVASTATIN CALCIUM 20 MG: 20 TABLET, FILM COATED ORAL at 08:08

## 2022-08-06 RX ADMIN — ONDANSETRON 4 MG: 2 INJECTION INTRAMUSCULAR; INTRAVENOUS at 11:08

## 2022-08-06 RX ADMIN — MORPHINE SULFATE 3 MG: 2 INJECTION, SOLUTION INTRAMUSCULAR; INTRAVENOUS at 02:08

## 2022-08-06 RX ADMIN — SODIUM CHLORIDE: 0.9 INJECTION, SOLUTION INTRAVENOUS at 10:08

## 2022-08-06 RX ADMIN — OXYCODONE 5 MG: 5 TABLET ORAL at 03:08

## 2022-08-06 RX ADMIN — INSULIN ASPART 10 UNITS: 100 INJECTION, SOLUTION INTRAVENOUS; SUBCUTANEOUS at 05:08

## 2022-08-06 RX ADMIN — OXYCODONE 5 MG: 5 TABLET ORAL at 07:08

## 2022-08-06 RX ADMIN — LIDOCAINE HYDROCHLORIDE 50 MG: 10 INJECTION, SOLUTION INTRAVENOUS at 11:08

## 2022-08-06 RX ADMIN — AMLODIPINE BESYLATE 5 MG: 5 TABLET ORAL at 08:08

## 2022-08-06 RX ADMIN — MIDAZOLAM HYDROCHLORIDE 2 MG: 1 INJECTION, SOLUTION INTRAMUSCULAR; INTRAVENOUS at 10:08

## 2022-08-06 RX ADMIN — GLYCOPYRROLATE 0.4 MG: 0.2 INJECTION, SOLUTION INTRAMUSCULAR; INTRAVENOUS at 12:08

## 2022-08-06 RX ADMIN — NEOSTIGMINE METHYLSULFATE 4 MG: 0.5 INJECTION INTRAVENOUS at 12:08

## 2022-08-06 RX ADMIN — DEXTROSE 2 G: 50 INJECTION, SOLUTION INTRAVENOUS at 11:08

## 2022-08-06 RX ADMIN — INSULIN ASPART 6 UNITS: 100 INJECTION, SOLUTION INTRAVENOUS; SUBCUTANEOUS at 05:08

## 2022-08-06 RX ADMIN — OXYCODONE 5 MG: 5 TABLET ORAL at 12:08

## 2022-08-06 RX ADMIN — SENNOSIDES AND DOCUSATE SODIUM 2 TABLET: 50; 8.6 TABLET ORAL at 08:08

## 2022-08-06 RX ADMIN — FENTANYL CITRATE 50 MCG: 50 INJECTION, SOLUTION INTRAMUSCULAR; INTRAVENOUS at 11:08

## 2022-08-06 RX ADMIN — ROCURONIUM BROMIDE 50 MG: 10 INJECTION, SOLUTION INTRAVENOUS at 11:08

## 2022-08-06 RX ADMIN — ENOXAPARIN SODIUM 30 MG: 30 INJECTION, SOLUTION INTRAVENOUS; SUBCUTANEOUS at 05:08

## 2022-08-06 RX ADMIN — DEXAMETHASONE SODIUM PHOSPHATE 4 MG: 4 INJECTION, SOLUTION INTRAMUSCULAR; INTRAVENOUS at 11:08

## 2022-08-06 RX ADMIN — PROPOFOL 200 MG: 10 INJECTION, EMULSION INTRAVENOUS at 11:08

## 2022-08-06 NOTE — PLAN OF CARE
Reason for Consult: Management of T2DM, Hyperglycemia      Surgical Procedure and Date: L thigh IM abscess I&D on 07/29/2022     Diabetes diagnosis year: at age 41yo     Home Diabetes Medications:    - Metformin 500 mg bid  - Jardiance 25 mg qd  - Trulicity 3 mg SC weekly  - Lantus 10 units qd -- prescribed at the end of 3/2022 but pt has not yet started taking it     Previously on glipizide, but stopped in 03/2022 by PCP who is managing his DM outpatient     How often checking glucose at home?  2-3x per week    BG readings on regimen: he reports <200  Hypoglycemia on the regimen?  No  Missed doses on regimen?  Yes     Diabetes Complications include:     Hyperglycemia     Complicating diabetes co morbidities:   HTN, obesity        HPI:   Patient is a 47 y.o. male with a diagnosis of T2DM, HTN, HLD, GERD, and obesity presented to the ED on 07/26/2022 with SOB and altered mental status x1d. Additionally reported pain and swelling L lateral high. Was seen at an OSH ED for thigh pain and was treated conservatively with NSAIDS initially. At follow up appt w/ ortho 7/22 he received IM steroid inj (deltoid) and po prednisone for suspected muscle strain. Due to worsening AMS and SOB he presented to the [facility] ED on 7/26. In the ED he was found to be in DKA and as admitted to the MICU due to severe acidosis.      For the thigh pain, MRI showed a ruptured tensor fascia jose luis (TFL) muscle with associated large hematoma. Ortho decided to perform aspiration night of 7/28. Gram stain showed G+ cocci in clusters- now growing Staph Species.They decided to take pt to OR 7/29 for formal I&D.       For DKA he was started on IVF and an intensive insulin gtt with resolution of DKA. Transitioned to MDI on 7/30 AM. Initial regimen was detemir 10u BID and Aspart 4u AC + LDC. Insulin up-titrated by MICU team prior to stepdown, to detemir 12u bid and aspart 8u AC + LDC. Endocrinology consulted for management of hyperglycemia.     ASSESSMENT and  PLAN     Type 2 diabetes mellitus with hyperglycemia, with long-term current use of insulin  Key History and Diagnostic Findings  - Uncontrolled T2DM, pt not adherent to med regimen, not checking BG regularly at home  - P/w DKA with trigger infection/thigh abscess  - Home Regimen: metformin 500mg BID, Jardiance 25mg qd, Trulicity 3mg SC weekly, Lantus 10u qd          Pt never started insulin which was prescribed 3/2022. Not filling metformin regularly  - Glucose Goals: 140-180mg/dL  - 24 hour glucose trend:  Had some overnight hyperglycemia even after being made NPO at midnight, will not adjust basal dose at this time     Plan  - Continue Levemir 12 units BID  - Continue Aspart 10 units TIDWM with moderate correction scale  - Patient with worsening renal function, caution making stronger insulin adjustments given decreased renal clearance     - Planning to return to OR for repeat I&D of thigh depending on renal function, underwent aspiration of left wrist per ortho     - Patient will need to be discharged on MDI insulin (prandial and basal)   - Other home meds to consider at d/c:           - Recommend holding jardiace until complete resolution of infection/acute illness. Because his presentation of DKA has a clear trigger (infection) and was not euglycemic DKA, unlikely that Jardiance was the cause. For these reasons, it would be reasonable to resume it outpatient after recovery from acute illness.          - Can resume metformin and trulicity at d/c if kidney function allows, will re-visit dc recs when pt is ready for discharge     - Hypoglycemia protocol in place  - If blood glucose greater than 300, please ask patient not to eat food or drink anything other than water until correctional insulin has brought it back below 250     ** Please notify Endocrine for any change and/or advance in diet**  ** Please call Endocrine for any BG related issues **     Class 2 severe obesity due to excess calories with serious  comorbidity and body mass index (BMI) of 36.0 to 36.9 in adult  General weight loss/lifestyle modification strategies discussed (elicit support from others; identify saboteurs; non-food rewards, etc).   Weight loss will help to improve glycemic control     Hyperlipidemia associated with type 2 diabetes mellitus  Continue home statin   Goal LDL<70        Lc Garcia DO  [facility], [address]  [address] [place], [place], [address]     Office: [phone]  Fax: [phone]     Disclaimer: This note has been generated using voice-recognition software. There may be typographical errors that have been missed during proof-reading.     The above history labs imaging impression and plan were discussed with attending physician who is in agreement and also took part in this patient's care.  I personally reviewed all of the patients available medications, labs, imaging, vitals, allergies, medical history.

## 2022-08-06 NOTE — ASSESSMENT & PLAN NOTE
- Interval history and hospital course as described above  - Cr 7.1 on AM labs  - Nephrology following  - Renally dosing all medications  - Avoid nephrotoxins  - Will continue to monitor on daily labs

## 2022-08-06 NOTE — PLAN OF CARE
Problem: Adult Inpatient Plan of Care  Goal: Plan of Care Review  Outcome: Ongoing, Progressing     Problem: Adult Inpatient Plan of Care  Goal: Absence of Hospital-Acquired Illness or Injury  Outcome: Ongoing, Progressing     Problem: Diabetes Comorbidity  Goal: Blood Glucose Level Within Targeted Range  Outcome: Ongoing, Progressing     Problem: Skin Injury Risk Increased  Goal: Skin Health and Integrity  Outcome: Ongoing, Progressing     Problem: Oral Intake Inadequate (Acute Kidney Injury/Impairment)  Goal: Optimal Nutrition Intake  Outcome: Ongoing, Progressing     Problem: Renal Function Impairment (Acute Kidney Injury/Impairment)  Goal: Effective Renal Function  Outcome: Ongoing, Progressing     Patient is AAOX4, VSS, NAD. Patient got to OR for I/D left leg. C/o pain. PRN pain medications given. Plan of care reviewed and explained. Patient and spouse verbalized understanding. Fall precautions maintained. Bed alarm refused. Bed in low and locked position, side rails up x 2, call light within reach.

## 2022-08-06 NOTE — SUBJECTIVE & OBJECTIVE
Interval History: Pt seen and examined this morning on sanket GRAY. Pain is well-controlled this AM. Awaiting to go to the OR with ortho for repeat washout. Care plan reviewed with pt and wife at bedside. Otherwise, doing well and with no further complaints at this time.      Objective:     Vital Signs (Most Recent):  Temp: 97.9 °F (36.6 °C) (08/06/22 1023)  Pulse: 89 (08/06/22 1023)  Resp: 18 (08/06/22 1023)  BP: (!) 168/93 (08/06/22 1023)  SpO2: 100 % (08/06/22 1023)   Vital Signs (24h Range):  Temp:  [97.6 °F (36.4 °C)-98.5 °F (36.9 °C)] 97.9 °F (36.6 °C)  Pulse:  [62-91] 89  Resp:  [18-20] 18  SpO2:  [94 %-100 %] 100 %  BP: (117-168)/(60-93) 168/93     Weight: 104.3 kg (230 lb)  Body mass index is 36.02 kg/m².    Intake/Output Summary (Last 24 hours) at 8/6/2022 1124  Last data filed at 8/6/2022 0318  Gross per 24 hour   Intake 540 ml   Output 1170 ml   Net -630 ml        Physical Exam  Gen: in NAD, appears stated age; pt is obese  Neuro: AAOx4, CN2-12 grossly intact BL; motor, sensory, and strength grossly intact BL  HEENT: NTNC, EOMI, PERRLA, MMM; no thyromegaly or lymphadenopathy; no JVD appreciated  CVS: RRR, no m/r/g; S1/S2 auscultated with no S3 or S4; capillary refill < 2 sec  Resp: lungs CTAB, no w/r/r; no belabored breathing or accessory muscle use appreciated   Abd: BS+ in all 4 quadrants; NTND, soft to palpation; no organomegaly appreciated   Extrem: pulses full, equal, and regular over all 4 extremities; no UE or LE edema BL; left thigh with clean bandaging in place      Significant Labs: All pertinent labs within the past 24 hours have been reviewed.    Significant Imaging: I have reviewed all pertinent imaging results/findings within the past 24 hours.

## 2022-08-06 NOTE — PROGRESS NOTES
"[facility] - Telemetry Stepdown  Orthopedics  Progress Note    Patient Name: Wilfredo Thomas  MRN: [id number]  Admission Date: 7/26/2022  Hospital Length of Stay: 11 days  Attending Provider: Dwayne Montes MD  Primary Care Provider: Aylin Wayne DO  Follow-up For: Procedure(s) (LRB):  IRRIGATION AND DEBRIDEMENT, LOWER EXTREMITY (Left)    Post-Operative Day: 8 Days Post-Op  Subjective:     Principal Problem:DKA (diabetic ketoacidosis)    Principal Orthopedic Problem: Left thigh I&D  Staph species culture on 07/28    Interval History  Cleared for I&D yesterday, labs look worse this AM, will discuss with medicine today       Review of patient's allergies indicates:  No Known Allergies    Current Facility-Administered Medications   Medication    acetaminophen tablet 1,000 mg    amLODIPine tablet 5 mg    atorvastatin tablet 20 mg    ceFAZolin 2 gram in dextrose 5% 100 mL IVPB (premix)    dextrose 10% bolus 125 mL    dextrose 10% bolus 250 mL    glucagon (human recombinant) injection 1 mg    glucose chewable tablet 16 g    glucose chewable tablet 24 g    insulin aspart U-100 pen 1-10 Units    insulin aspart U-100 pen 10 Units    insulin detemir U-100 pen 12 Units    morphine injection 3 mg    ondansetron injection 4 mg    oxyCODONE immediate release tablet 5 mg    senna-docusate 8.6-50 mg per tablet 2 tablet    sodium chloride 0.9% flush 10 mL     Objective:     Vital Signs (Most Recent):  Temp: 97.7 °F (36.5 °C) (08/06/22 0731)  Pulse: 71 (08/06/22 0731)  Resp: 18 (08/06/22 0733)  BP: 127/60 (08/06/22 0731)  SpO2: 97 % (08/06/22 0731) Vital Signs (24h Range):  Temp:  [97.6 °F (36.4 °C)-98.5 °F (36.9 °C)] 97.7 °F (36.5 °C)  Pulse:  [62-91] 71  Resp:  [18-20] 18  SpO2:  [94 %-99 %] 97 %  BP: (117-179)/(60-90) 127/60     Weight: 94.5 kg (208 lb 5.4 oz)  Height: 5' 7" (170.2 cm)  Body mass index is 32.63 kg/m².      Intake/Output Summary (Last 24 hours) at 8/6/2022 0739  Last data filed at 8/6/2022 " 0318  Gross per 24 hour   Intake 540 ml   Output 1170 ml   Net -630 ml         Ortho/SPM Exam  Left lower extremity  Left thigh dressing cdi  Drain in place, grossly purulent output  No erythema of thigh, no signficant swelling  Compartments soft and compressible  Painless ROM of hip and knee  NVI          CBC:   Recent Labs   Lab 08/05/22  0615 08/06/22  0257   WBC 5.48 5.76   HGB 10.4* 9.7*   HCT 31.6* 28.3*    237       CMP:   Recent Labs   Lab 08/05/22  0615 08/06/22  0257   * 133*   K 3.9 5.0    104   CO2 23 19*   GLU 97 189*   BUN 21* 30*   CREATININE 6.0* 7.1*   CALCIUM 8.4* 8.7   PROT 6.1 5.9*   ALBUMIN 1.6* 1.5*   BILITOT 0.3 0.2   ALKPHOS 80 78   AST 15 12   ALT 6* <5*   ANIONGAP 10 10         All pertinent labs within the past 24 hours have been reviewed.    Significant Imaging: I have reviewed and interpreted all pertinent imaging results/findings.      Assessment/Plan:     Abscess of left thigh  47M with DM admitted 7/26/22 for DKA, ortho following for left lateral thigh abscess. s/p I&D and drain placement left thigh 7/29/22. Cx +MSSA. Continues to have grossly purulent and high drain output so we have been trying to take him back to OR for another washout however this has been delayed 2/2 ANGE from vanc toxicity. Cr continues to uptrend, nephrology co-managing. BG under good control now, endocrine co-managing.     -- Plan for OR repeat I&D today   -- Will discuss labs with medicine before surgery     Left leg pain               Olman Au MD  Orthopedics  [facility] - Telemetry Stepdown

## 2022-08-06 NOTE — ASSESSMENT & PLAN NOTE
- Working to optimize BG control: DKA has since resolved  - Levemir 12u BID, prandial aspart 10u TID WM  - SSI provided for corrective dosing  - DXTs as ordered  - Hypoglycemic protocol in effect  - Diabetic diet provided

## 2022-08-06 NOTE — ASSESSMENT & PLAN NOTE
- Body mass index is 36.02 kg/m².  - Needs dietary and lifestyle modifications in relation to health  - Consider dietary/nutrition consult outpatient

## 2022-08-06 NOTE — NURSING TRANSFER
Nursing Transfer Note      8/6/2022     Reason patient is being transferred: *post op    Transfer To: [id number]    Transfer via bed    Transfer with cardiac monitoring    Transported by pct    Medicines sent: none    Any special needs or follow-up needed: routine    Chart send with patient: Yes    Notified: spouse    Patient reassessed at: 1300

## 2022-08-06 NOTE — ASSESSMENT & PLAN NOTE
- Working to optimize BP control   - Continue home regimen of amlodipine; lisinopril held 2/2 renal function  - Will continue to monitor and further titrate antihypertensives as clinically indicated

## 2022-08-06 NOTE — ANESTHESIA PREPROCEDURE EVALUATION
[facility]  Anesthesia Pre-Operative Evaluation         Patient Name: Wilfredo Thomas  YOB: 1975  MRN: [id number]    SUBJECTIVE:     Pre-operative evaluation for Procedure(s) (LRB):  Incision and Drainage - LEFT THIGH. lateral on bean bag. 9L cysto tubing. culture swabs. ancef powder. 15 round Eleuterio/channel drain. (Left)     08/05/2022    Wilfredo Thomas is a 47 y.o. male w/ a significant PMHx of HTN, IDDM2, GERD obesity, HLD who was admitted on 7/26/22 for DKA. He has a left thigh wound that has required repeat washouts.     Patient now presents for the above procedure(s).      LDA:        Peripheral IV - Single Lumen 07/30/22 20 G Posterior;Right Forearm (Active)   Site Assessment Clean;Dry;Intact;No redness;No swelling 08/05/22 1600   Extremity Assessment Distal to IV No abnormal discoloration 08/05/22 0807   Line Status Flushed;Infusing 08/05/22 1600   Dressing Status Dry;Intact;Clean 08/05/22 1600   Dressing Intervention Integrity maintained 08/05/22 1600   Dressing Change Due 08/03/22 08/02/22 0800   Site Change Due 08/03/22 07/30/22 1901   Reason Not Rotated Not due 08/04/22 0800   Number of days: 6            Closed/Suction Drain 07/29/22 1311 Left;Lateral Thigh Bulb 15 Fr. (Active)   Site Description Unable to view 08/05/22 0807   Dressing Type Gauze 08/05/22 0807   Dressing Status Dry;Clean;Intact 08/05/22 0807   Dressing Intervention Integrity maintained 08/05/22 0807   Drainage Aguirre;Milky 08/05/22 0807   Status To bulb suction 08/05/22 0807   Output (mL) 20 mL 08/05/22 0600   Number of days: 7       Prev airway: Intubation     Date/Time: 7/29/2022 12:12 PM  Performed by: Manuel Mathew DO  Authorized by: Fior Cooper MD      Intubation:     Induction:  Intravenous    Intubated:  Postinduction    Attempts:  1    Attempted By:  Resident anesthesiologist    Method of Intubation:  Direct    Blade:  Harvey 2    Laryngeal View Grade: Grade I -  full view of cords      Difficult Airway Encountered?: No      Complications:  None    Airway Device:  Oral endotracheal tube    Airway Device Size:  7.5    Style/Cuff Inflation:  Cuffed (inflated to minimal occlusive pressure)    Tube secured:  22    Secured at:  The teeth    Placement Verified By:  Capnometry    Complicating Factors:  None    Findings Post-Intubation:  BS equal bilateral and atraumatic/condition of teeth unchanged    Drips: None documented.      Patient Active Problem List   Diagnosis    Type 2 diabetes mellitus with hyperglycemia, with long-term current use of insulin    Hypertension associated with type 2 diabetes mellitus    Class 2 severe obesity due to excess calories with serious comorbidity and body mass index (BMI) of 36.0 to 36.9 in adult    History of gout    Tobacco dependence in remission    Elevated LFTs    Hyperlipidemia associated with type 2 diabetes mellitus    Fatty liver    DKA (diabetic ketoacidosis)    Leukocytosis    ANGE (acute kidney injury)    Elevated d-dimer    Left leg pain    Hematoma of left thigh    Abscess of left thigh    Hypokalemia    Anemia    Hyponatremia    Pain and swelling of left wrist    Acute gout of left wrist       Review of patient's allergies indicates:  No Known Allergies    Current Inpatient Medications:   amLODIPine  5 mg Oral Daily    atorvastatin  20 mg Oral Daily    [START ON 8/6/2022] ceFAZolin (ANCEF) IVPB  2 g Intravenous Daily    insulin aspart U-100  10 Units Subcutaneous TIDWM    insulin detemir U-100  12 Units Subcutaneous BID    senna-docusate 8.6-50 mg  2 tablet Oral Daily       No current facility-administered medications on file prior to encounter.     Current Outpatient Medications on File Prior to Encounter   Medication Sig Dispense Refill    atorvastatin (LIPITOR) 20 MG tablet Take 1 tablet (20 mg total) by mouth once daily. 90 tablet 3    empagliflozin (JARDIANCE) 25 mg tablet Take 1 tablet (25 mg total)  by mouth once daily. 90 tablet 3    lisinopriL (PRINIVIL,ZESTRIL) 20 MG tablet Take 1 tablet (20 mg total) by mouth once daily. 90 tablet 3    blood sugar diagnostic Strp 1 strip by Misc.(Non-Drug; Combo Route) route once daily. 100 each 3    blood-glucose meter kit Use as instructed 1 each 0    dulaglutide (TRULICITY) 3 mg/0.5 mL pen injector Inject 3 mg into the skin every 7 days. 12 pen 3    insulin (LANTUS SOLOSTAR U-100 INSULIN) glargine 100 units/mL (3mL) SubQ pen Inject 20 Units into the skin once daily. 18 mL 0    lancets Misc 1 lancet by Misc.(Non-Drug; Combo Route) route once daily. 100 each 3    metFORMIN (GLUCOPHAGE-XR) 500 MG ER 24hr tablet Take 1 tablet (500 mg total) by mouth 2 (two) times daily with meals. 180 tablet 3       Past Surgical History:   Procedure Laterality Date    IRRIGATION AND DEBRIDEMENT OF LOWER EXTREMITY Left 2022    Procedure: IRRIGATION AND DEBRIDEMENT, LOWER EXTREMITY;  Surgeon: Rob Graff MD;  Location: [facility] OR [address] [place] [address];  Service: Orthopedics;  Laterality: Left;    WRIST SURGERY Left        Social History     Socioeconomic History    Marital status:    Tobacco Use    Smoking status: Former Smoker     Packs/day: 0.50     Types: Cigarettes     Quit date: 2020     Years since quittin.5    Smokeless tobacco: Never Used   Substance and Sexual Activity    Alcohol use: Yes     Alcohol/week: 12.0 standard drinks     Types: 12 Cans of beer per week    Drug use: Never    Sexual activity: Yes     Partners: Female   Social History Narrative    Lives with wife and 2 kids in college and one older with 1 grand child. Supervisor at [employer]. Smoker but quit 2020     Social Determinants of Health     Physical Activity: Unknown    Days of Exercise per Week: Patient refused   Social Connections: Unknown    Active Member of Clubs or Organizations: Patient refused    Marital Status: Patient refused       OBJECTIVE:     Vital Signs Range (Last 24H):  Temp:   [36.7 °C (98.1 °F)-37.1 °C (98.8 °F)]   Pulse:  [75-91]   Resp:  [16-20]   BP: (124-179)/(69-90)   SpO2:  [94 %-99 %]       Significant Labs:  Lab Results   Component Value Date    WBC 5.48 08/05/2022    HGB 10.4 (L) 08/05/2022    HCT 31.6 (L) 08/05/2022     08/05/2022    CHOL 130 03/22/2022    TRIG 277 (H) 03/22/2022    HDL 28 (L) 03/22/2022    ALT 6 (L) 08/05/2022    AST 15 08/05/2022     (L) 08/05/2022    K 3.9 08/05/2022     08/05/2022    CREATININE 6.0 (H) 08/05/2022    BUN 21 (H) 08/05/2022    CO2 23 08/05/2022    TSH 4.631 (H) 07/26/2022    INR 1.1 07/29/2022    HGBA1C 10.1 (H) 07/26/2022       Diagnostic Studies: No relevant studies.    EKG:   Results for orders placed or performed during the hospital encounter of 07/26/22   EKG 12-lead    Collection Time: 07/26/22  7:35 PM    Narrative    Test Reason : R00.0,    Vent. Rate : 138 BPM     Atrial Rate : 138 BPM     P-R Int : 126 ms          QRS Dur : 086 ms      QT Int : 286 ms       P-R-T Axes : 051 002 052 degrees     QTc Int : 433 ms    Sinus tachycardia  Otherwise normal ECG  No previous ECGs available  Confirmed by Crow HARRIS MD ([id number]) on 7/27/2022 7:53:00 AM    Referred By: [email or web address]   SELF           Confirmed By:Crow HARRIS MD       2D ECHO:  TTE:  No results found for this or any previous visit.    CAROLE:  No results found for this or any previous visit.    ASSESSMENT/PLAN:         Pre-op Assessment    I have reviewed the Patient Summary Reports.     I have reviewed the Nursing Notes.    I have reviewed the Medications.     Review of Systems  Anesthesia Hx:  No problems with previous Anesthesia Denies Hx of Anesthetic complications  History of prior surgery of interest to airway management or planning: Denies Family Hx of Anesthesia complications.   Denies Personal Hx of Anesthesia complications.   Social:  Former Smoker, Alcohol Use    Hematology/Oncology:  Hematology Normal   Oncology Normal     EENT/Dental:EENT/Dental Normal    Cardiovascular:   Exercise tolerance: good Hypertension hyperlipidemia    Pulmonary:  Pulmonary Normal    Renal/:   Chronic Renal Disease, ARF    Hepatic/GI:   Liver Disease,    Neurological:  Neurology Normal    Endocrine:   Diabetes, poorly controlled, type 2 Pt presented in DKA   Psych:  Psychiatric Normal           Physical Exam  General: Well nourished and Cooperative    Airway:  Mallampati: III / II  Mouth Opening: Normal  Neck ROM: Normal ROM    Dental:  Periodontal disease    Chest/Lungs:  Clear to auscultation    Heart:  Rate: Normal        Anesthesia Plan  Type of Anesthesia, risks & benefits discussed:    Anesthesia Type: Gen ETT, CSE  Intra-op Monitoring Plan: Standard ASA Monitors  Post Op Pain Control Plan: multimodal analgesia and IV/PO Opioids PRN  Induction:  IV  Airway Plan: Direct, Post-Induction  Informed Consent: Informed consent signed with the Patient and all parties understand the risks and agree with anesthesia plan.  All questions answered.   ASA Score: 3  Day of Surgery Review of History & Physical: H&P Update referred to the surgeon/provider.    Ready For Surgery From Anesthesia Perspective.     .

## 2022-08-06 NOTE — PROGRESS NOTES
"[facility] - Surgery ([facility])  [facility] Medicine  Progress Note    Patient Name: Wilfredo Thomas  MRN: [id number]  Patient Class: IP- Inpatient   Admission Date: 7/26/2022  Length of Stay: 11 days  Attending Physician: Dwayne Montes MD  Primary Care Provider: Aylin Wayne DO        Subjective:     Principal Problem:Abscess of left thigh        HPI:  48 y/o M hx of HTN, IDDM2, GERD obesity, HLD presented to the ED with complaint of 1 day of worsening SOB. Wife present at bedside. Patient states that he noticed yesterday he was feeling some shortness of breath and couldn't seem to catch his breath. His wife went to check on him this morning and noticed that he was breathing "fast and heavy" and he sounded like he was slurring his words. Wife was concerned about a stroke, so he brought him to the ED for evaluation.     Patient also reporting significant pain and swelling along his left lateral proximal thigh. He states he has noticed worsening pain over the past 2 weeks. He denies any acute trauma to the area. He was seen by ortho last week and was told to take ibu-profen for a muscle strain. This did not help his pain, so he presented again on 7/22 and was given a prednisone injection into his thigh. This also did not help his pain, and now the pain and swelling have advanced to the point that he has difficulty with ambulation. Patient denies fever, chills, nausea, vomiting.      Of note, patient has hx of poorly controlled diabetes. He was recently started on insulin by his PCP, but he has not taken any insulin since being prescribed it.      In the ED, patient hypertensive and tachycardic to the 130s. Tachypneic with RR in the 40s. On CBC, WBC 26.9, On CMP patient hyperkalemic to 5.5, CorNa 136, Bicarb 5. Cr elevated to 1.9 from BL 0.83. UA, BHB pending at time of admission. CTA without evidence of pulmonary embolism. Critical Care Medicine consulted given severe acidosis.             Overview/Hospital " Course:    Patient admitted to the MICU for management of severe DKA. Metabolic acidosis improving on insulin drip. He also has had left thigh pain for 1-2 months. There is a large mass on his left thigh that is tender to palpation, CT revealed hematoma vs soft tissue growth. General surgery consulted and recommended IR consult. IR consulted, no indication for tap.   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 Ortho consulted and performed bedside aspiration of possible abscess which revealed >200K cells >80% segs. Will hold off on antibiotics for now per ortho recs, ortho is taking patient to the OR for irrigation of the thigh, will start antibiotics afterwards.         7/29 Metabolic acidosis slowly improving. Ortho taking patient to the OR for irrigation of the left thigh, will start antibiotics afterwards.   7/30 Transfer to hospital medicine . S/p I&D  of left thigh Abscess on7/29/22 - MRI - uptured tensor fascia jose luis (TFL) muscle with associated large hematoma. gram stain -Moderate Gram positive cocci in clusters .cultures pending.  continue Vancomycin, clindamycin and Zosyn . Bicarb 15. K replaced . off insulin drip on SQ insulin.  7/31 ID and endocrine consulted.  abscess with Calcium pyrophosphate crystals with unknown significance potassium and P replaced. Bicarbonate WNL . aerobic culture 7/28 STAPHYLOCOCCUS SPECIES . Glucose in 300s .PRN imodium  for diarrhea. PT/OT consulted - WBAT. Surgical drain with purulent output  8/1 PICC team consulted for IV access. vanc trough at 11.4. monitor for vanomycin toxicity. possible OR tomorrow for washout  may need bone biopsy to r/o osteomyelitis.Discontinued clindamycin.   8/2 glucose in 300s.  Increased Levemir  to 14 units BID and Aspart  10 units TIDWM. K replaced   8/3 ANGE with cr 0.7--> 2.7. likely vanc induced ATN with levels 40. urine studies ordered renal sonogram WNL  nephrology consulted.  started on NS 100ml/h x 10h and follow up renal panel.Zosyn and  vancomycin discontinued. Strict IO monitor. condom catheter . switched to Ancef for MSSA on culture  Interval History:  8/4- MSSA- see below. /85 VSS. On room air, eating, BM on 8/2, good UO. Appreciate ortho recs. On Cephazolin, detim 14 bid and aspart 12 ac.  8/5-Cr still rising, cr =6.  Wrist still hurting. Ortho to tap it to eval for infection and gout today. /90   Pulse 85  AF, no other signs of infection.  Will discuss w Nephrology- received NS x one liter yesterday. Will repeat today. Suspect auto-diuresis.   8/6: washout with ortho today. Cr continues to rise (7.1), nephrology following      Interval History: Pt seen and examined this morning on tracey. MARINA. Pain is well-controlled this AM. Awaiting to go to the OR with ortho for repeat washout. Care plan reviewed with pt and wife at bedside. Otherwise, doing well and with no further complaints at this time.      Objective:     Vital Signs (Most Recent):  Temp: 97.9 °F (36.6 °C) (08/06/22 1023)  Pulse: 89 (08/06/22 1023)  Resp: 18 (08/06/22 1023)  BP: (!) 168/93 (08/06/22 1023)  SpO2: 100 % (08/06/22 1023)   Vital Signs (24h Range):  Temp:  [97.6 °F (36.4 °C)-98.5 °F (36.9 °C)] 97.9 °F (36.6 °C)  Pulse:  [62-91] 89  Resp:  [18-20] 18  SpO2:  [94 %-100 %] 100 %  BP: (117-168)/(60-93) 168/93     Weight: 104.3 kg (230 lb)  Body mass index is 36.02 kg/m².    Intake/Output Summary (Last 24 hours) at 8/6/2022 1124  Last data filed at 8/6/2022 0318  Gross per 24 hour   Intake 540 ml   Output 1170 ml   Net -630 ml        Physical Exam  Gen: in NAD, appears stated age; pt is obese  Neuro: AAOx4, CN2-12 grossly intact BL; motor, sensory, and strength grossly intact BL  HEENT: NTNC, EOMI, PERRLA, MMM; no thyromegaly or lymphadenopathy; no JVD appreciated  CVS: RRR, no m/r/g; S1/S2 auscultated with no S3 or S4; capillary refill < 2 sec  Resp: lungs CTAB, no w/r/r; no belabored breathing or  accessory muscle use appreciated   Abd: BS+ in all 4 quadrants; NTND, soft to palpation; no organomegaly appreciated   Extrem: pulses full, equal, and regular over all 4 extremities; no UE or LE edema BL; left thigh with clean bandaging in place      Significant Labs: All pertinent labs within the past 24 hours have been reviewed.    Significant Imaging: I have reviewed all pertinent imaging results/findings within the past 24 hours.      Assessment/Plan:      * Abscess of left thigh  - Interval history and physical exam findings as described above  - Hospital course reviewed  - Afebrile, no leukocytosis  - Continue ancef  - To OR with ortho 8/6 for repeat washout  - PRN analgesics provided  - Continuing to monitor    Hematoma of left thigh  - As above    Left leg pain  - As above    ANGE (acute kidney injury)  - Interval history and hospital course as described above  - Cr 7.1 on AM labs  - Nephrology following  - Renally dosing all medications  - Avoid nephrotoxins  - Will continue to monitor on daily labs    Pain and swelling of left wrist  - Tapped by ortho, fluid reviewed: does not appear to be gout  - PRN analgesics provided    Type 2 diabetes mellitus with hyperglycemia, with long-term current use of insulin  - Working to optimize BG control: DKA has since resolved  - Levemir 12u BID, prandial aspart 10u TID WM  - SSI provided for corrective dosing  - DXTs as ordered  - Hypoglycemic protocol in effect  - Diabetic diet provided    Hypertension associated with type 2 diabetes mellitus  - Working to optimize BP control   - Continue home regimen of amlodipine; lisinopril held 2/2 renal function  - Will continue to monitor and further titrate antihypertensives as clinically indicated     Hyperlipidemia associated with type 2 diabetes mellitus  - Continue home statin regimen    Anemia of chronic disease  - H/H stable near baseline  - Will continue to monitor on daily labs    Class 2 severe obesity due to excess  calories with serious comorbidity and body mass index (BMI) of 36.0 to 36.9 in adult  - Body mass index is 36.02 kg/m².  - Needs dietary and lifestyle modifications in relation to health  - Consider dietary/nutrition consult outpatient      VTE Risk Mitigation (From admission, onward)         Ordered     Place sequential compression device  Until discontinued         08/06/22 0855     Place sequential compression device  Until discontinued         07/29/22 0158     IP VTE HIGH RISK PATIENT  Once         07/29/22 0158     Place SAVANNAH hose  Until discontinued         07/26/22 2240     Place sequential compression device  Until discontinued         07/26/22 2240                Discharge Planning   FILEMON: 8/8/2022     Code Status: Full Code   Is the patient medically ready for discharge?: No    Reason for patient still in hospital (select all that apply): Patient trending condition  Discharge Plan A: Home with family            Dwayne Montes MD  Attending Physician  Department of Hospital Medicine  Epic secure chat preferred, or ext. [phone]  8/6/2022

## 2022-08-06 NOTE — ASSESSMENT & PLAN NOTE
47M with DM admitted 7/26/22 for DKA, ortho following for left lateral thigh abscess. s/p I&D and drain placement left thigh 7/29/22. Cx +MSSA. Continues to have grossly purulent and high drain output so we have been trying to take him back to OR for another washout however this has been delayed 2/2 ANGE from vanc toxicity. Cr continues to uptrend, nephrology co-managing. BG under good control now, endocrine co-managing.     -- Plan for OR repeat I&D today   -- Will discuss labs with medicine before surgery

## 2022-08-06 NOTE — OP NOTE
OP NOTE     DOS:                 8.6.22     Preop Dx:         Left thigh deep intramuscular abscess     Postop Dx:        Left thigh deep intramuscular abscess     Procedure:       Incision and drainage left thigh deep intramuscular abscess (17987)  Excisional and nonexcisional debridement fascia and fat necrosis      Surgeon:           LISS Neff M.D.     Asst:                  KASSIE Coyle M.D     Anesthesia:       General endotracheal     EBL:                 20 cc     IVF:                    350 cc crystalloid     Specimens:       Cultures x2        Findings:           Purulent fluid, necrotic fat and muscle.     Dispo:                To PACU extubated/stable             Indications for Procedure:       Mr Thomas is a 47-year-old male poorly controlled diabetic who is SP I and D left thigh now with re-aculumalation of purulent fluid collection. the patient has had elevated drain output and it continued to be purulent. We elected to proceed with repeat I and D. The patient currently is being treated for Vancomycin induced ANGE. We discussed with the medicine team as well as nephrology team who did not recommend any further optimization prior to the OR.  presented with a fluid collection in the left thigh with some signs of infection as well as being in diabetic ketoacidosis which has been treated.     Procedure in Detail:     Patient was identified in preoperative holding area and the site was marked.  Patient was wheeled to the operating room and placed on the operating table in a supine position.  General endotracheal anesthesia was induced and the patient was turned into a lateral decubitus position on a beanbag with all bony prominences well padded.  The left hip and lower extremity were prepped draped sterile fashion.  A time-out was undertaken to confirm patient, side, site, surgery, surgeon.  we held pre op antibiotics prior to cultures being obtained. All agreed we proceeded.     We opened the  previous incision.  This was carried through the skin and subcutaneous fat.  we placed two gelpis and explored the wound. There was a significant degree of fat necrosis and myonecrosis. I explored the wound distally which tracked over the anterior aspect o the thigh. I broke up a few more loculations with my finger. I then swept around the proximal aspect of the wound and expressed more purulent fluid proximally. We then used a lap sponge to roughly debride the muscle and subcutaneous fat. I then used a rongeure to remove some of the IT band  that appeared necrotic and did not respond to bovi stimulation.  There was already damage to the tibial band in that area the thigh which was not in continuity.  Portions of the iliotibial bands were pedunculated were sharply excised.        The wound was then copiously irrigated with 3 liters of normal saline solution. Then betadine solution. Then 3 more liters of saline. Then peroxide solution, then 3 more liters of normal saline for a total of 9 liters of saline.       At this point a 15 round Eleuterio/channel drain was placed deep to suction and sutured into place with a 2-0 silk suture. We sutured the fat layer then subcutaneous layer with 2-0 PDS. Then the skin with 3-0 nylon suture and wound dressed with 4x4 and tegaderm.      All instrument and sponge counts were reported correct at the end of the case.  There no complications.  Antibiotics were given after cultures taken.  The patient was returned to a supine position was hospital bed, extubated, awakened and taken to the recovery room stable condition.     Plan the patient:     Aggressive IV antibiotics. Immediate WBAT with therapy  Resume anticoagulation. Keep drain until output diminishes.

## 2022-08-06 NOTE — TRANSFER OF CARE
"Anesthesia Transfer of Care Note    Patient: Wilfredo Thomas    Procedure(s) Performed: Procedure(s) (LRB):  Incision and Drainage - LEFT THIGH. (Left)    Patient location: PACU    Anesthesia Type: general    Transport from OR: Transported from OR on room air with adequate spontaneous ventilation    Post pain: adequate analgesia    Post assessment: no apparent anesthetic complications    Post vital signs: stable    Level of consciousness: awake    Nausea/Vomiting: no nausea/vomiting    Complications: none    Transfer of care protocol was followed      Last vitals:   Visit Vitals  BP (!) 168/93 (BP Location: Left arm, Patient Position: Lying)   Pulse 89   Temp 36.6 °C (97.9 °F) (Oral)   Resp 18   Ht 5' 7" (1.702 m)   Wt 104.3 kg (230 lb)   SpO2 100%   BMI 36.02 kg/m²     "

## 2022-08-06 NOTE — ANESTHESIA PROCEDURE NOTES
Intubation    Date/Time: 8/6/2022 11:02 AM  Performed by: Rob German CRNA  Authorized by: Karin Watson MD     Intubation:     Induction:  Intravenous    Intubated:  Postinduction    Attempts:  1    Attempted By:  ELIJAH    Blade:  Berg 3    Laryngeal View Grade comment:  Good view with Berg    Difficult Airway Encountered?: No      Complications:  None    Airway Device:  Oral endotracheal tube    Airway Device Size:  7.5    Style/Cuff Inflation:  Cuffed    Inflation Amount (mL):  7    Tube secured:  22    Secured at:  The lips    Placement Verified By:  Capnometry    Complicating Factors:  Obesity and short neck    Findings Post-Intubation:  BS equal bilateral (Bleeding noted at gums prior to intubation)

## 2022-08-06 NOTE — ASSESSMENT & PLAN NOTE
- Interval history and physical exam findings as described above  - Hospital course reviewed  - Afebrile, no leukocytosis  - Continue ancef  - To OR with ortho 8/6 for repeat washout  - PRN analgesics provided  - Continuing to monitor

## 2022-08-06 NOTE — SUBJECTIVE & OBJECTIVE
"Principal Problem:DKA (diabetic ketoacidosis)    Principal Orthopedic Problem: Left thigh I&D  Staph species culture on 07/28    Interval History  Cleared for I&D yesterday, labs look worse this AM, will discuss with medicine today       Review of patient's allergies indicates:  No Known Allergies    Current Facility-Administered Medications   Medication    acetaminophen tablet 1,000 mg    amLODIPine tablet 5 mg    atorvastatin tablet 20 mg    ceFAZolin 2 gram in dextrose 5% 100 mL IVPB (premix)    dextrose 10% bolus 125 mL    dextrose 10% bolus 250 mL    glucagon (human recombinant) injection 1 mg    glucose chewable tablet 16 g    glucose chewable tablet 24 g    insulin aspart U-100 pen 1-10 Units    insulin aspart U-100 pen 10 Units    insulin detemir U-100 pen 12 Units    morphine injection 3 mg    ondansetron injection 4 mg    oxyCODONE immediate release tablet 5 mg    senna-docusate 8.6-50 mg per tablet 2 tablet    sodium chloride 0.9% flush 10 mL     Objective:     Vital Signs (Most Recent):  Temp: 97.7 °F (36.5 °C) (08/06/22 0731)  Pulse: 71 (08/06/22 0731)  Resp: 18 (08/06/22 0733)  BP: 127/60 (08/06/22 0731)  SpO2: 97 % (08/06/22 0731) Vital Signs (24h Range):  Temp:  [97.6 °F (36.4 °C)-98.5 °F (36.9 °C)] 97.7 °F (36.5 °C)  Pulse:  [62-91] 71  Resp:  [18-20] 18  SpO2:  [94 %-99 %] 97 %  BP: (117-179)/(60-90) 127/60     Weight: 94.5 kg (208 lb 5.4 oz)  Height: 5' 7" (170.2 cm)  Body mass index is 32.63 kg/m².      Intake/Output Summary (Last 24 hours) at 8/6/2022 0739  Last data filed at 8/6/2022 0318  Gross per 24 hour   Intake 540 ml   Output 1170 ml   Net -630 ml         Ortho/SPM Exam  Left lower extremity  Left thigh dressing cdi  Drain in place, grossly purulent output  No erythema of thigh, no signficant swelling  Compartments soft and compressible  Painless ROM of hip and knee  NVI          CBC:   Recent Labs   Lab 08/05/22  0615 08/06/22  0257   WBC 5.48 5.76   HGB 10.4* 9.7*   HCT 31.6* 28.3* "    237       CMP:   Recent Labs   Lab 08/05/22  0615 08/06/22  0257   * 133*   K 3.9 5.0    104   CO2 23 19*   GLU 97 189*   BUN 21* 30*   CREATININE 6.0* 7.1*   CALCIUM 8.4* 8.7   PROT 6.1 5.9*   ALBUMIN 1.6* 1.5*   BILITOT 0.3 0.2   ALKPHOS 80 78   AST 15 12   ALT 6* <5*   ANIONGAP 10 10         All pertinent labs within the past 24 hours have been reviewed.    Significant Imaging: I have reviewed and interpreted all pertinent imaging results/findings.

## 2022-08-07 LAB
ALBUMIN SERPL BCP-MCNC: 1.7 G/DL (ref 3.5–5.2)
ALP SERPL-CCNC: 72 U/L (ref 55–135)
ALT SERPL W/O P-5'-P-CCNC: <5 U/L (ref 10–44)
ANION GAP SERPL CALC-SCNC: 9 MMOL/L (ref 8–16)
AST SERPL-CCNC: 10 U/L (ref 10–40)
BASOPHILS # BLD AUTO: 0.06 K/UL (ref 0–0.2)
BASOPHILS NFR BLD: 0.6 % (ref 0–1.9)
BILIRUB SERPL-MCNC: 0.2 MG/DL (ref 0.1–1)
BUN SERPL-MCNC: 40 MG/DL (ref 6–20)
CALCIUM SERPL-MCNC: 9 MG/DL (ref 8.7–10.5)
CHLORIDE SERPL-SCNC: 100 MMOL/L (ref 95–110)
CO2 SERPL-SCNC: 22 MMOL/L (ref 23–29)
CREAT SERPL-MCNC: 7.4 MG/DL (ref 0.5–1.4)
DIFFERENTIAL METHOD: ABNORMAL
EOSINOPHIL # BLD AUTO: 0 K/UL (ref 0–0.5)
EOSINOPHIL NFR BLD: 0.3 % (ref 0–8)
ERYTHROCYTE [DISTWIDTH] IN BLOOD BY AUTOMATED COUNT: 12.6 % (ref 11.5–14.5)
EST. GFR  (NO RACE VARIABLE): 8.4 ML/MIN/1.73 M^2
GLUCOSE SERPL-MCNC: 171 MG/DL (ref 70–110)
HCT VFR BLD AUTO: 29.2 % (ref 40–54)
HGB BLD-MCNC: 9.9 G/DL (ref 14–18)
IMM GRANULOCYTES # BLD AUTO: 0.06 K/UL (ref 0–0.04)
IMM GRANULOCYTES NFR BLD AUTO: 0.6 % (ref 0–0.5)
LYMPHOCYTES # BLD AUTO: 1.2 K/UL (ref 1–4.8)
LYMPHOCYTES NFR BLD: 12.5 % (ref 18–48)
MAGNESIUM SERPL-MCNC: 1.9 MG/DL (ref 1.6–2.6)
MCH RBC QN AUTO: 31.6 PG (ref 27–31)
MCHC RBC AUTO-ENTMCNC: 33.9 G/DL (ref 32–36)
MCV RBC AUTO: 93 FL (ref 82–98)
MONOCYTES # BLD AUTO: 0.5 K/UL (ref 0.3–1)
MONOCYTES NFR BLD: 5.2 % (ref 4–15)
NEUTROPHILS # BLD AUTO: 8 K/UL (ref 1.8–7.7)
NEUTROPHILS NFR BLD: 80.8 % (ref 38–73)
NRBC BLD-RTO: 0 /100 WBC
PHOSPHATE SERPL-MCNC: 6.1 MG/DL (ref 2.7–4.5)
PLATELET # BLD AUTO: 291 K/UL (ref 150–450)
PMV BLD AUTO: 9.2 FL (ref 9.2–12.9)
POCT GLUCOSE: 117 MG/DL (ref 70–110)
POCT GLUCOSE: 150 MG/DL (ref 70–110)
POCT GLUCOSE: 176 MG/DL (ref 70–110)
POCT GLUCOSE: 181 MG/DL (ref 70–110)
POCT GLUCOSE: 248 MG/DL (ref 70–110)
POTASSIUM SERPL-SCNC: 4.9 MMOL/L (ref 3.5–5.1)
PROT SERPL-MCNC: 6.1 G/DL (ref 6–8.4)
RBC # BLD AUTO: 3.13 M/UL (ref 4.6–6.2)
SODIUM SERPL-SCNC: 131 MMOL/L (ref 136–145)
WBC # BLD AUTO: 9.92 K/UL (ref 3.9–12.7)

## 2022-08-07 PROCEDURE — 63600175 PHARM REV CODE 636 W HCPCS: Performed by: HOSPITALIST

## 2022-08-07 PROCEDURE — 25000003 PHARM REV CODE 250

## 2022-08-07 PROCEDURE — 84100 ASSAY OF PHOSPHORUS: CPT | Performed by: STUDENT IN AN ORGANIZED HEALTH CARE EDUCATION/TRAINING PROGRAM

## 2022-08-07 PROCEDURE — 85025 COMPLETE CBC W/AUTO DIFF WBC: CPT | Performed by: STUDENT IN AN ORGANIZED HEALTH CARE EDUCATION/TRAINING PROGRAM

## 2022-08-07 PROCEDURE — 83735 ASSAY OF MAGNESIUM: CPT | Performed by: STUDENT IN AN ORGANIZED HEALTH CARE EDUCATION/TRAINING PROGRAM

## 2022-08-07 PROCEDURE — 99233 PR SUBSEQUENT HOSPITAL CARE,LEVL III: ICD-10-PCS | Mod: ,,, | Performed by: STUDENT IN AN ORGANIZED HEALTH CARE EDUCATION/TRAINING PROGRAM

## 2022-08-07 PROCEDURE — 25000003 PHARM REV CODE 250: Performed by: HOSPITALIST

## 2022-08-07 PROCEDURE — 99231 PR SUBSEQUENT HOSPITAL CARE,LEVL I: ICD-10-PCS | Mod: ,,, | Performed by: INTERNAL MEDICINE

## 2022-08-07 PROCEDURE — 36415 COLL VENOUS BLD VENIPUNCTURE: CPT | Performed by: STUDENT IN AN ORGANIZED HEALTH CARE EDUCATION/TRAINING PROGRAM

## 2022-08-07 PROCEDURE — 63600175 PHARM REV CODE 636 W HCPCS: Performed by: STUDENT IN AN ORGANIZED HEALTH CARE EDUCATION/TRAINING PROGRAM

## 2022-08-07 PROCEDURE — 20600001 HC STEP DOWN PRIVATE ROOM

## 2022-08-07 PROCEDURE — 99231 SBSQ HOSP IP/OBS SF/LOW 25: CPT | Mod: ,,, | Performed by: INTERNAL MEDICINE

## 2022-08-07 PROCEDURE — 80053 COMPREHEN METABOLIC PANEL: CPT | Performed by: STUDENT IN AN ORGANIZED HEALTH CARE EDUCATION/TRAINING PROGRAM

## 2022-08-07 PROCEDURE — 99233 SBSQ HOSP IP/OBS HIGH 50: CPT | Mod: ,,, | Performed by: STUDENT IN AN ORGANIZED HEALTH CARE EDUCATION/TRAINING PROGRAM

## 2022-08-07 RX ORDER — HEPARIN SODIUM 5000 [USP'U]/ML
5000 INJECTION, SOLUTION INTRAVENOUS; SUBCUTANEOUS EVERY 8 HOURS
Status: DISCONTINUED | OUTPATIENT
Start: 2022-08-07 | End: 2022-08-24 | Stop reason: HOSPADM

## 2022-08-07 RX ORDER — CEFAZOLIN SODIUM/D5W 2 G/100 ML
2 PLASTIC BAG, INJECTION (ML) INTRAVENOUS EVERY 24 HOURS
Status: DISCONTINUED | OUTPATIENT
Start: 2022-08-08 | End: 2022-08-13

## 2022-08-07 RX ORDER — CEFAZOLIN SODIUM 1 G/50ML
1 SOLUTION INTRAVENOUS EVERY 24 HOURS
Status: DISCONTINUED | OUTPATIENT
Start: 2022-08-08 | End: 2022-08-07

## 2022-08-07 RX ADMIN — ATORVASTATIN CALCIUM 20 MG: 20 TABLET, FILM COATED ORAL at 08:08

## 2022-08-07 RX ADMIN — INSULIN ASPART 10 UNITS: 100 INJECTION, SOLUTION INTRAVENOUS; SUBCUTANEOUS at 08:08

## 2022-08-07 RX ADMIN — OXYCODONE 5 MG: 5 TABLET ORAL at 10:08

## 2022-08-07 RX ADMIN — AMLODIPINE BESYLATE 5 MG: 5 TABLET ORAL at 08:08

## 2022-08-07 RX ADMIN — SENNOSIDES AND DOCUSATE SODIUM 2 TABLET: 50; 8.6 TABLET ORAL at 08:08

## 2022-08-07 RX ADMIN — HEPARIN SODIUM 5000 UNITS: 5000 INJECTION INTRAVENOUS; SUBCUTANEOUS at 09:08

## 2022-08-07 RX ADMIN — OXYCODONE 5 MG: 5 TABLET ORAL at 06:08

## 2022-08-07 RX ADMIN — OXYCODONE 5 MG: 5 TABLET ORAL at 03:08

## 2022-08-07 RX ADMIN — INSULIN ASPART 10 UNITS: 100 INJECTION, SOLUTION INTRAVENOUS; SUBCUTANEOUS at 05:08

## 2022-08-07 RX ADMIN — DEXTROSE 2 G: 50 INJECTION, SOLUTION INTRAVENOUS at 08:08

## 2022-08-07 RX ADMIN — INSULIN ASPART 2 UNITS: 100 INJECTION, SOLUTION INTRAVENOUS; SUBCUTANEOUS at 08:08

## 2022-08-07 RX ADMIN — INSULIN ASPART 10 UNITS: 100 INJECTION, SOLUTION INTRAVENOUS; SUBCUTANEOUS at 12:08

## 2022-08-07 RX ADMIN — INSULIN ASPART 2 UNITS: 100 INJECTION, SOLUTION INTRAVENOUS; SUBCUTANEOUS at 12:08

## 2022-08-07 RX ADMIN — OXYCODONE 5 MG: 5 TABLET ORAL at 08:08

## 2022-08-07 NOTE — PROGRESS NOTES
"[facility] - Telemetry [facility] Medicine  Progress Note    Patient Name: Wilfredo Thomas  MRN: [id number]  Patient Class: IP- Inpatient   Admission Date: 7/26/2022  Length of Stay: 12 days  Attending Physician: Dwayne Montes MD  Primary Care Provider: Aylin Wayne DO        Subjective:     Principal Problem:Abscess of left thigh        HPI:  46 y/o M hx of HTN, IDDM2, GERD obesity, HLD presented to the ED with complaint of 1 day of worsening SOB. Wife present at bedside. Patient states that he noticed yesterday he was feeling some shortness of breath and couldn't seem to catch his breath. His wife went to check on him this morning and noticed that he was breathing "fast and heavy" and he sounded like he was slurring his words. Wife was concerned about a stroke, so he brought him to the ED for evaluation.     Patient also reporting significant pain and swelling along his left lateral proximal thigh. He states he has noticed worsening pain over the past 2 weeks. He denies any acute trauma to the area. He was seen by ortho last week and was told to take ibu-profen for a muscle strain. This did not help his pain, so he presented again on 7/22 and was given a prednisone injection into his thigh. This also did not help his pain, and now the pain and swelling have advanced to the point that he has difficulty with ambulation. Patient denies fever, chills, nausea, vomiting.      Of note, patient has hx of poorly controlled diabetes. He was recently started on insulin by his PCP, but he has not taken any insulin since being prescribed it.      In the ED, patient hypertensive and tachycardic to the 130s. Tachypneic with RR in the 40s. On CBC, WBC 26.9, On CMP patient hyperkalemic to 5.5, CorNa 136, Bicarb 5. Cr elevated to 1.9 from BL 0.83. UA, BHB pending at time of admission. CTA without evidence of pulmonary embolism. Critical Care Medicine consulted given severe acidosis.             Overview/Hospital " Course:    Patient admitted to the MICU for management of severe DKA. Metabolic acidosis improving on insulin drip. He also has had left thigh pain for 1-2 months. There is a large mass on his left thigh that is tender to palpation, CT revealed hematoma vs soft tissue growth. General surgery consulted and recommended IR consult. IR consulted, no indication for tap.   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 Ortho consulted and performed bedside aspiration of possible abscess which revealed >200K cells >80% segs. Will hold off on antibiotics for now per ortho recs, ortho is taking patient to the OR for irrigation of the thigh, will start antibiotics afterwards.         7/29 Metabolic acidosis slowly improving. Ortho taking patient to the OR for irrigation of the left thigh, will start antibiotics afterwards.   7/30 Transfer to hospital medicine . S/p I&D  of left thigh Abscess on7/29/22 - MRI - uptured tensor fascia jose luis (TFL) muscle with associated large hematoma. gram stain -Moderate Gram positive cocci in clusters .cultures pending.  continue Vancomycin, clindamycin and Zosyn . Bicarb 15. K replaced . off insulin drip on SQ insulin.  7/31 ID and endocrine consulted.  abscess with Calcium pyrophosphate crystals with unknown significance potassium and P replaced. Bicarbonate WNL . aerobic culture 7/28 STAPHYLOCOCCUS SPECIES . Glucose in 300s .PRN imodium  for diarrhea. PT/OT consulted - WBAT. Surgical drain with purulent output  8/1 PICC team consulted for IV access. vanc trough at 11.4. monitor for vanomycin toxicity. possible OR tomorrow for washout  may need bone biopsy to r/o osteomyelitis.Discontinued clindamycin.   8/2 glucose in 300s.  Increased Levemir  to 14 units BID and Aspart  10 units TIDWM. K replaced   8/3 ANGE with cr 0.7--> 2.7. likely vanc induced ATN with levels 40. urine studies ordered renal sonogram WNL  nephrology consulted.  started on NS 100ml/h x 10h and follow up renal panel.Zosyn and  vancomycin discontinued. Strict IO monitor. condom catheter . switched to Ancef for MSSA on culture  Interval History:  8/4- MSSA- see below. /85 VSS. On room air, eating, BM on 8/2, good UO. Appreciate ortho recs. On Cephazolin, detim 14 bid and aspart 12 ac.  8/5-Cr still rising, cr =6.  Wrist still hurting. Ortho to tap it to eval for infection and gout today. /90   Pulse 85  AF, no other signs of infection.  Will discuss w Nephrology- received NS x one liter yesterday. Will repeat today. Suspect auto-diuresis.   8/6: washout with ortho today. Cr continues to rise (7.1), nephrology following  8/7: Cr 7.4, phos increasing; tolerated surgery well yesterday, intra-op cultures pending      Interval History: Pt seen and examined this morning on sanket GRAY. States he is doing well today, pain well-controlled, and tolerated surgery well yesterday. Updated on renal function, awaiting intra-op culture results. Care plan reviewed. Otherwise, doing well and with no further complaints at this time.      Objective:     Vital Signs (Most Recent):  Temp: 97.4 °F (36.3 °C) (08/07/22 0803)  Pulse: 68 (08/07/22 0803)  Resp: 16 (08/07/22 1023)  BP: 138/80 (08/07/22 0803)  SpO2: 99 % (08/07/22 0803)   Vital Signs (24h Range):  Temp:  [97.4 °F (36.3 °C)-98.5 °F (36.9 °C)] 97.4 °F (36.3 °C)  Pulse:  [67-99] 68  Resp:  [12-22] 16  SpO2:  [94 %-99 %] 99 %  BP: (123-167)/(65-99) 138/80     Weight: 104.3 kg (230 lb)  Body mass index is 36.02 kg/m².    Intake/Output Summary (Last 24 hours) at 8/7/2022 1028  Last data filed at 8/7/2022 0500  Gross per 24 hour   Intake 1800 ml   Output 760 ml   Net 1040 ml        Physical Exam  Gen: in NAD, appears stated age; pt is obese  Neuro: AAOx4, CN2-12 grossly intact BL; motor, sensory, and strength grossly intact BL  HEENT: NTNC, EOMI, PERRLA, MMM; no thyromegaly or lymphadenopathy; no JVD appreciated  CVS: RRR, no  m/r/g; S1/S2 auscultated with no S3 or S4; capillary refill < 2 sec  Resp: lungs CTAB, no w/r/r; no belabored breathing or accessory muscle use appreciated   Abd: BS+ in all 4 quadrants; NTND, soft to palpation; no organomegaly appreciated   Extrem: pulses full, equal, and regular over all 4 extremities; no UE or LE edema BL; left thigh with clean bandaging in place, JOHANN drain in placed with purulent serosanguinous fluid       Significant Labs: All pertinent labs within the past 24 hours have been reviewed.    Significant Imaging: I have reviewed all pertinent imaging results/findings within the past 24 hours.      Assessment/Plan:      * Abscess of left thigh  - Interval history and physical exam findings as described above  - Hospital course reviewed  - Afebrile, no leukocytosis  - Continue ancef  - S/p OR with ortho 8/6 for repeat washout: intra-op cultures pending  - PRN analgesics provided  - Continuing to monitor    Hematoma of left thigh  - As above    Left leg pain  - As above    ANGE (acute kidney injury)  - Interval history and hospital course as described above  - Cr 7.4 on AM labs  - Nephrology following  - Renally dosing all medications  - Avoid nephrotoxins  - Will continue to monitor on daily labs    Pain and swelling of left wrist  - Tapped by ortho, fluid reviewed: does not appear to be gout  - PRN analgesics provided    Type 2 diabetes mellitus with hyperglycemia, with long-term current use of insulin  - Working to optimize BG control: DKA has since resolved  - Levemir 12u BID, prandial aspart 10u TID WM  - SSI provided for corrective dosing  - DXTs as ordered  - Hypoglycemic protocol in effect  - Diabetic diet provided    Hypertension associated with type 2 diabetes mellitus  - Working to optimize BP control   - Continue home regimen of amlodipine; lisinopril held 2/2 renal function  - Will continue to monitor and further titrate antihypertensives as clinically indicated     Hyperlipidemia  associated with type 2 diabetes mellitus  - Continue home statin regimen    Anemia of chronic disease  - H/H stable near baseline  - Will continue to monitor on daily labs    Class 2 severe obesity due to excess calories with serious comorbidity and body mass index (BMI) of 36.0 to 36.9 in adult  - Body mass index is 36.02 kg/m².  - Needs dietary and lifestyle modifications in relation to health  - Consider dietary/nutrition consult outpatient      VTE Risk Mitigation (From admission, onward)         Ordered     heparin (porcine) injection 5,000 Units  Every 8 hours         08/07/22 1010     Place sequential compression device  Until discontinued         08/06/22 0855     Place sequential compression device  Until discontinued         07/29/22 0158     IP VTE HIGH RISK PATIENT  Once         07/29/22 0158     Place SAVANNAH hose  Until discontinued         07/26/22 2240     Place sequential compression device  Until discontinued         07/26/22 2240                Discharge Planning   FILEMON: 8/8/2022     Code Status: Full Code   Is the patient medically ready for discharge?: No    Reason for patient still in hospital (select all that apply): Patient trending condition  Discharge Plan A: Home with family          Dwayne Montes MD  Attending Physician  Department of Hospital Medicine  Epic secure chat preferred, or [phone]. [phone]  8/7/2022

## 2022-08-07 NOTE — ASSESSMENT & PLAN NOTE
ANGE non oliguric scr up today  Non oliguric   Likely vancomycin induced ANGE (ATI/ATN) in the setting of trough of 40 and also was on zosyn which will increase risk of vanco induced ANGE.   Renal US normal   Euvolemic   No acid/base disorder   No significant electrolyte abnormalities       Recs:   Strict Is and O's   Monitor vanco levels.   RFP daily

## 2022-08-07 NOTE — SUBJECTIVE & OBJECTIVE
Interval History: feels better    Review of patient's allergies indicates:  No Known Allergies  Current Facility-Administered Medications   Medication Frequency    acetaminophen tablet 1,000 mg Q8H PRN    amLODIPine tablet 5 mg Daily    atorvastatin tablet 20 mg Daily    [START ON 8/8/2022] ceFAZolin 2 gram in dextrose 5% 100 mL IVPB (premix) Daily    dextrose 10% bolus 125 mL PRN    dextrose 10% bolus 250 mL PRN    glucagon (human recombinant) injection 1 mg PRN    glucose chewable tablet 16 g PRN    glucose chewable tablet 24 g PRN    heparin (porcine) injection 5,000 Units Q8H    insulin aspart U-100 pen 1-10 Units QID (AC + HS) PRN    insulin aspart U-100 pen 10 Units TIDWM    insulin detemir U-100 pen 12 Units BID    morphine injection 3 mg Q6H PRN    ondansetron injection 4 mg Q6H PRN    oxyCODONE immediate release tablet 5 mg Q4H PRN    senna-docusate 8.6-50 mg per tablet 2 tablet Daily    sodium chloride 0.9% flush 10 mL PRN       Objective:     Vital Signs (Most Recent):  Temp: 97.2 °F (36.2 °C) (08/07/22 1140)  Pulse: 90 (08/07/22 1140)  Resp: 20 (08/07/22 1140)  BP: 123/84 (08/07/22 1140)  SpO2: 99 % (08/07/22 1140)  O2 Device (Oxygen Therapy): room air (08/06/22 1405) Vital Signs (24h Range):  Temp:  [97.2 °F (36.2 °C)-98.5 °F (36.9 °C)] 97.2 °F (36.2 °C)  Pulse:  [67-99] 90  Resp:  [12-22] 20  SpO2:  [94 %-99 %] 99 %  BP: (123-167)/(65-99) 123/84     Weight: 104.3 kg (230 lb) (08/06/22 1023)  Body mass index is 36.02 kg/m².  Body surface area is 2.22 meters squared.    I/O last 3 completed shifts:  In: 1800 [P.O.:800; IV Piggyback:1000]  Out: 1660 [Urine:1500; Drains:160]    Physical Exam  Vitals reviewed.   Constitutional:       Appearance: Normal appearance.   HENT:      Head: Normocephalic and atraumatic.      Right Ear: External ear normal.      Left Ear: External ear normal.      Nose: Nose normal.      Mouth/Throat:      Pharynx: Oropharynx is clear.   Eyes:      Extraocular Movements: Extraocular  movements intact.      Conjunctiva/sclera: Conjunctivae normal.   Cardiovascular:      Rate and Rhythm: Normal rate and regular rhythm.      Pulses: Normal pulses.   Pulmonary:      Effort: Pulmonary effort is normal.      Breath sounds: Normal breath sounds.   Abdominal:      General: Abdomen is flat. There is no distension.   Musculoskeletal:         General: Normal range of motion.      Cervical back: Normal range of motion.   Skin:     General: Skin is warm and dry.   Neurological:      General: No focal deficit present.      Mental Status: He is alert and oriented to person, place, and time.   Psychiatric:         Mood and Affect: Mood normal.         Behavior: Behavior normal.       Significant Labs:  All labs within the past 24 hours have been reviewed.     Significant Imaging:  .

## 2022-08-07 NOTE — ASSESSMENT & PLAN NOTE
Key History and Diagnostic Findings  - Uncontrolled T2DM, pt not adherent to med regimen, not checking BG regularly at home  - P/w DKA with trigger infection/thigh abscess  - Home Regimen: metformin 500mg BID, Jardiance 25mg qd, Trulicity 3mg SC weekly, Lantus 10u qd   Pt never started insulin which was prescribed 3/2022. Not filling metformin regularly  - Status post repeat I&D of leg on 08/06/2022  - Glucose Goals: 140-180mg/dL  - 24 hour glucose trend:  Had hyperglycemia after I&D repeat procedure yesterday and not receiving correction insulin, better controlled this morning in the high 100s    Plan  - Continue Levemir 12 units BID  - Continue Aspart 10 units TIDWM with moderate correction scale    - Patient will need to be discharged on MDI insulin (prandial and basal)   - Other home meds to consider at d/c:    - Recommend holding jardiace until complete resolution of infection/acute illness. Because his presentation of DKA has a clear trigger (infection) and was not euglycemic DKA, unlikely that Jardiance was the cause. For these reasons, it would be reasonable to resume it outpatient after recovery from acute illness.   - Can resume metformin and trulicity at d/c, will re-visit dc recs when pt is ready for discharge    - Hypoglycemia protocol in place  - If blood glucose greater than 300, please ask patient not to eat food or drink anything other than water until correctional insulin has brought it back below 250    ** Please notify Endocrine for any change and/or advance in diet**  ** Please call Endocrine for any BG related issues **

## 2022-08-07 NOTE — PLAN OF CARE
Reason for Consult: Management of T2DM, Hyperglycemia      Surgical Procedure and Date: L thigh IM abscess I&D on 07/29/2022     Diabetes diagnosis year: at age 43yo     Home Diabetes Medications:    - Metformin 500 mg bid  - Jardiance 25 mg qd  - Trulicity 3 mg SC weekly  - Lantus 10 units qd -- prescribed at the end of 3/2022 but pt has not yet started taking it     Previously on glipizide, but stopped in 03/2022 by PCP who is managing his DM outpatient     How often checking glucose at home?  2-3x per week    BG readings on regimen: he reports <200  Hypoglycemia on the regimen?  No  Missed doses on regimen?  Yes     Diabetes Complications include:     Hyperglycemia     Complicating diabetes co morbidities:   HTN, obesity        HPI:   Patient is a 47 y.o. male with a diagnosis of T2DM, HTN, HLD, GERD, and obesity presented to the ED on 07/26/2022 with SOB and altered mental status x1d. Additionally reported pain and swelling L lateral high. Was seen at an OSH ED for thigh pain and was treated conservatively with NSAIDS initially. At follow up appt w/ ortho 7/22 he received IM steroid inj (deltoid) and po prednisone for suspected muscle strain. Due to worsening AMS and SOB he presented to the [facility] ED on 7/26. In the ED he was found to be in DKA and as admitted to the MICU due to severe acidosis.      For the thigh pain, MRI showed a ruptured tensor fascia jose luis (TFL) muscle with associated large hematoma. Ortho decided to perform aspiration night of 7/28. Gram stain showed G+ cocci in clusters- now growing Staph Species.They decided to take pt to OR 7/29 for formal I&D.       For DKA he was started on IVF and an intensive insulin gtt with resolution of DKA. Transitioned to MDI on 7/30 AM. Initial regimen was detemir 10u BID and Aspart 4u AC + LDC. Insulin up-titrated by MICU team prior to stepdown, to detemir 12u bid and aspart 8u AC + LDC. Endocrinology consulted for management of hyperglycemia.     ASSESSMENT and  PLAN     Type 2 diabetes mellitus with hyperglycemia, with long-term current use of insulin  Key History and Diagnostic Findings  - Uncontrolled T2DM, pt not adherent to med regimen, not checking BG regularly at home  - P/w DKA with trigger infection/thigh abscess  - Home Regimen: metformin 500mg BID, Jardiance 25mg qd, Trulicity 3mg SC weekly, Lantus 10u qd          Pt never started insulin which was prescribed 3/2022. Not filling metformin regularly  - Underwent repeat I&D of leg on 08/06/2022  - Glucose Goals: 140-180mg/dL  - 24 hour glucose trend:  Had hyperglycemia after procedure yesterday and did not receive correction insulin, better controlled this morning in the high 100s     Plan  - Continue Levemir 12 units BID  - Continue Aspart 10 units TIDWM with moderate correction scale  - Patient with worsening renal function, caution making stronger insulin adjustments given decreased renal clearance.  Nephrology following.     - Patient will need to be discharged on MDI insulin (prandial and basal)   - Other home meds to consider at d/c:           - Recommend holding jardiace until complete resolution of infection/acute illness. Because his presentation of DKA has a clear trigger (infection) and was not euglycemic DKA, unlikely that Jardiance was the cause. For these reasons, it would be reasonable to resume it outpatient after recovery from acute illness.          - Can resume metformin and trulicity at d/c if kidney function allows, will re-visit dc recs when pt is ready for discharge     - Hypoglycemia protocol in place  - If blood glucose greater than 300, please ask patient not to eat food or drink anything other than water until correctional insulin has brought it back below 250     ** Please notify Endocrine for any change and/or advance in diet**  ** Please call Endocrine for any BG related issues **     Class 2 severe obesity due to excess calories with serious comorbidity and body mass index (BMI) of  36.0 to 36.9 in adult  General weight loss/lifestyle modification strategies discussed (elicit support from others; identify saboteurs; non-food rewards, etc).   Weight loss will help to improve glycemic control     Hyperlipidemia associated with type 2 diabetes mellitus  Continue home statin   Goal LDL<70        Lc Garcia DO  [facility], [address]  [address] [place], [address]     Office: [phone]  Fax: [phone]     Disclaimer: This note has been generated using voice-recognition software. There may be typographical errors that have been missed during proof-reading.     The above history labs imaging impression and plan were discussed with attending physician who is in agreement and also took part in this patient's care.  I personally reviewed all of the patients available medications, labs, imaging, vitals, allergies, medical history.

## 2022-08-07 NOTE — ANESTHESIA POSTPROCEDURE EVALUATION
Anesthesia Post Evaluation    Patient: Wilfredo Thomas    Procedure(s) Performed: Procedure(s) (LRB):  Incision and Drainage - LEFT THIGH. (Left)    Final Anesthesia Type: general      Patient location during evaluation: PACU  Patient participation: Yes- Able to Participate  Level of consciousness: awake  Post-procedure vital signs: reviewed and stable  Pain management: adequate  Airway patency: patent    PONV status at discharge: No PONV  Anesthetic complications: no      Cardiovascular status: blood pressure returned to baseline  Respiratory status: unassisted  Hydration status: euvolemic  Follow-up not needed.          Vitals Value Taken Time   /84 08/07/22 1140   Temp 36.2 °C (97.2 °F) 08/07/22 1140   Pulse 90 08/07/22 1140   Resp 20 08/07/22 1140   SpO2 99 % 08/07/22 1320         Event Time   Out of Recovery 08/06/2022 12:45:00         Pain/Brielle Score: Pain Rating Prior to Med Admin: 7 (8/7/2022 10:23 AM)  Pain Rating Post Med Admin: 4 (8/7/2022 11:23 AM)  Brielle Score: 10 (8/7/2022  8:00 AM)

## 2022-08-07 NOTE — ASSESSMENT & PLAN NOTE
47M with DM admitted 7/26/22 for DKA, ortho following for left lateral thigh abscess. s/p I&D and drain placement left thigh 7/29/22. Cx +MSSA. Had high drain purulent output after first I&D, was taken back to OR 8/6/22 for repeat I&D. Drain in place, dressings CDI. Nephrology on board for ANGE.     -- ANGE nephrology managing   -- DVT LVX   -- Ancef, MSSA from first I&D   -- Drain 50cc    Dispo: pending

## 2022-08-07 NOTE — PROGRESS NOTES
"[facility]  Nephrology  Progress Note    Patient Name: Wilfredo Thomas  MRN: [id number]  Admission Date: 7/26/2022  Hospital Length of Stay: 12 days  Attending Provider: Dwayne Montes MD   Primary Care Physician: Aylin Wayne DO  Principal Problem:Abscess of left thigh    Subjective:     HPI: 48 y/o M hx of HTN, IDDM2, GERD obesity, HLD presented to the ED with complaint of 1 day of worsening SOB. Wife present at bedside. Patient states that he noticed yesterday he was feeling some shortness of breath and couldn't seem to catch his breath. His wife went to check on him this morning and noticed that he was breathing "fast and heavy" and he sounded like he was slurring his words. Wife was concerned about a stroke, so he brought him to the ED for evaluation.     Patient also reporting significant pain and swelling along his left lateral proximal thigh. He states he has noticed worsening pain over the past 2 weeks. He denies any acute trauma to the area. He was seen by ortho last week and was told to take ibu-profen for a muscle strain. This did not help his pain, so he presented again on 7/22 and was given a prednisone injection into his thigh. This also did not help his pain, and now the pain and swelling have advanced to the point that he has difficulty with ambulation. Patient denies fever, chills, nausea, vomiting.      Of note, patient has hx of poorly controlled diabetes. He was recently started on insulin by his PCP, but he has not taken any insulin since being prescribed it.      In the ED, patient hypertensive and tachycardic to the 130s. Tachypneic with RR in the 40s. On CBC, WBC 26.9, On CMP patient hyperkalemic to 5.5, CorNa 136, Bicarb 5. Cr elevated to 1.9 from BL 0.83. UA, BHB pending at time of admission. CTA without evidence of pulmonary embolism. Critical Care Medicine consulted given severe acidosis.               Overview/Hospital Course:     Patient admitted to the MICU for " management of severe DKA. Metabolic acidosis improving on insulin drip. He also has had left thigh pain for 1-2 months. There is a large mass on his left thigh that is tender to palpation, CT revealed hematoma vs soft tissue growth. General surgery consulted and recommended IR consult. IR consulted, no indication for tap. 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 Ortho consulted and performed bedside aspiration of possible abscess which revealed >200K cells >80% segs. Will hold off on antibiotics for now per ortho recs, ortho is taking patient to the OR for irrigation of the thigh, will start antibiotics afterwards.         7/29 Metabolic acidosis slowly improving. Ortho taking patient to the OR for irrigation of the left thigh, will start antibiotics afterwards.   7/30 Transfer to hospital medicine . S/p I&D  of left thigh Abscess on7/29/22 - MRI - uptured tensor fascia jose luis (TFL) muscle with associated large hematoma. gram stain -Moderate Gram positive cocci in clusters .cultures pending.  continue Vancomycin, clindamycin and Zosyn . Bicarb 15. K replaced . off insulin drip on SQ insulin.  7/31 ID and endocrine consulted.  abscess with Calcium pyrophosphate crystals with unknown significance potassium and P replaced. Bicarbonate WNL . aerobic culture 7/28 STAPHYLOCOCCUS SPECIES . Glucose in 300s .PRN imodium  for diarrhea. PT/OT consulted - WBAT. Surgical drain with purulent output  8/1 PICC team consulted for IV access. vanc trough at 11.4. monitor for vanomycin toxicity. possible OR tomorrow for washout  may need bone biopsy to r/o osteomyelitis.Discontinued clindamycin.   8/2 glucose in 300s.  Increased Levemir  to 14 units BID and Aspart  10 units TIDWM. K replaced         Nephrology consulyed for ANGE         Interval History: feels better    Review of patient's allergies indicates:  No Known Allergies  Current Facility-Administered Medications    Medication Frequency    acetaminophen tablet 1,000 mg Q8H PRN    amLODIPine tablet 5 mg Daily    atorvastatin tablet 20 mg Daily    [START ON 8/8/2022] ceFAZolin 2 gram in dextrose 5% 100 mL IVPB (premix) Daily    dextrose 10% bolus 125 mL PRN    dextrose 10% bolus 250 mL PRN    glucagon (human recombinant) injection 1 mg PRN    glucose chewable tablet 16 g PRN    glucose chewable tablet 24 g PRN    heparin (porcine) injection 5,000 Units Q8H    insulin aspart U-100 pen 1-10 Units QID (AC + HS) PRN    insulin aspart U-100 pen 10 Units TIDWM    insulin detemir U-100 pen 12 Units BID    morphine injection 3 mg Q6H PRN    ondansetron injection 4 mg Q6H PRN    oxyCODONE immediate release tablet 5 mg Q4H PRN    senna-docusate 8.6-50 mg per tablet 2 tablet Daily    sodium chloride 0.9% flush 10 mL PRN       Objective:     Vital Signs (Most Recent):  Temp: 97.2 °F (36.2 °C) (08/07/22 1140)  Pulse: 90 (08/07/22 1140)  Resp: 20 (08/07/22 1140)  BP: 123/84 (08/07/22 1140)  SpO2: 99 % (08/07/22 1140)  O2 Device (Oxygen Therapy): room air (08/06/22 1405) Vital Signs (24h Range):  Temp:  [97.2 °F (36.2 °C)-98.5 °F (36.9 °C)] 97.2 °F (36.2 °C)  Pulse:  [67-99] 90  Resp:  [12-22] 20  SpO2:  [94 %-99 %] 99 %  BP: (123-167)/(65-99) 123/84     Weight: 104.3 kg (230 lb) (08/06/22 1023)  Body mass index is 36.02 kg/m².  Body surface area is 2.22 meters squared.    I/O last 3 completed shifts:  In: 1800 [P.O.:800; IV Piggyback:1000]  Out: 1660 [Urine:1500; Drains:160]    Physical Exam  Vitals reviewed.   Constitutional:       Appearance: Normal appearance.   HENT:      Head: Normocephalic and atraumatic.      Right Ear: External ear normal.      Left Ear: External ear normal.      Nose: Nose normal.      Mouth/Throat:      Pharynx: Oropharynx is clear.   Eyes:      Extraocular Movements: Extraocular movements intact.      Conjunctiva/sclera: Conjunctivae normal.   Cardiovascular:      Rate and Rhythm: Normal rate and  regular rhythm.      Pulses: Normal pulses.   Pulmonary:      Effort: Pulmonary effort is normal.      Breath sounds: Normal breath sounds.   Abdominal:      General: Abdomen is flat. There is no distension.   Musculoskeletal:         General: Normal range of motion.      Cervical back: Normal range of motion.   Skin:     General: Skin is warm and dry.   Neurological:      General: No focal deficit present.      Mental Status: He is alert and oriented to person, place, and time.   Psychiatric:         Mood and Affect: Mood normal.         Behavior: Behavior normal.       Significant Labs:  All labs within the past 24 hours have been reviewed.     Significant Imaging:  .    Assessment/Plan:     ANGE (acute kidney injury)  ANGE non oliguric scr up today  Non oliguric   Likely vancomycin induced ANGE (ATI/ATN) in the setting of trough of 40 and also was on zosyn which will increase risk of vanco induced ANGE.   Renal US normal   Euvolemic   No acid/base disorder   No significant electrolyte abnormalities       Recs:   Strict Is and O's   Monitor vanco levels.   RFP daily                     Thank you for your consult. I will follow-up with patient. Please contact us if you have any additional questions.    Zuri Cain MD  Nephrology  [facility]

## 2022-08-07 NOTE — SUBJECTIVE & OBJECTIVE
"Principal Problem:Abscess of left thigh    Principal Orthopedic Problem: Left thigh I&D  Staph species culture on 07/28    Interval History  S/p L thigh I&D 8/6/22. NAEON, patient stable, pain controlled. No acute complaints this morning. Drain with 50cc since placement. Cx NGTD from I&D 8/6      Review of patient's allergies indicates:  No Known Allergies    Current Facility-Administered Medications   Medication    acetaminophen tablet 1,000 mg    amLODIPine tablet 5 mg    atorvastatin tablet 20 mg    ceFAZolin 2 gram in dextrose 5% 100 mL IVPB (premix)    dextrose 10% bolus 125 mL    dextrose 10% bolus 250 mL    enoxaparin injection 30 mg    glucagon (human recombinant) injection 1 mg    glucose chewable tablet 16 g    glucose chewable tablet 24 g    insulin aspart U-100 pen 1-10 Units    insulin aspart U-100 pen 10 Units    insulin detemir U-100 pen 12 Units    morphine injection 3 mg    ondansetron injection 4 mg    oxyCODONE immediate release tablet 5 mg    senna-docusate 8.6-50 mg per tablet 2 tablet    sodium chloride 0.9% flush 10 mL     Objective:     Vital Signs (Most Recent):  Temp: 98.5 °F (36.9 °C) (08/07/22 0443)  Pulse: 68 (08/07/22 0443)  Resp: 18 (08/07/22 0610)  BP: (!) 148/86 (08/07/22 0443)  SpO2: 99 % (08/07/22 0546) Vital Signs (24h Range):  Temp:  [97.4 °F (36.3 °C)-98.5 °F (36.9 °C)] 98.5 °F (36.9 °C)  Pulse:  [67-99] 68  Resp:  [12-22] 18  SpO2:  [94 %-100 %] 99 %  BP: (123-168)/(60-99) 148/86     Weight: 104.3 kg (230 lb)  Height: 5' 7" (170.2 cm)  Body mass index is 36.02 kg/m².      Intake/Output Summary (Last 24 hours) at 8/7/2022 0638  Last data filed at 8/7/2022 0500  Gross per 24 hour   Intake 1800 ml   Output 790 ml   Net 1010 ml         Ortho/SPM Exam  Left lower extremity  Left thigh dressing cdi  Drain in place  No erythema of thigh, no signficant swelling  Compartments soft and compressible  Painless ROM of hip and knee  NVI          CBC:   Recent Labs   Lab 08/06/22  0257   WBC " 5.76   HGB 9.7*   HCT 28.3*          CMP:   Recent Labs   Lab 08/06/22  0257   *   K 5.0      CO2 19*   *   BUN 30*   CREATININE 7.1*   CALCIUM 8.7   PROT 5.9*   ALBUMIN 1.5*   BILITOT 0.2   ALKPHOS 78   AST 12   ALT <5*   ANIONGAP 10         All pertinent labs within the past 24 hours have been reviewed.    Significant Imaging: I have reviewed and interpreted all pertinent imaging results/findings.

## 2022-08-07 NOTE — PROGRESS NOTES
"[facility]  Orthopedics  Progress Note    Patient Name: Wilfredo Thomas  MRN: [id number]  Admission Date: 7/26/2022  Hospital Length of Stay: 12 days  Attending Provider: Dwayne Montes MD  Primary Care Provider: Aylin Wayne DO  Follow-up For: Procedure(s) (LRB):  Incision and Drainage - LEFT THIGH. (Left)    Post-Operative Day: 1 Day Post-Op  Subjective:     Principal Problem:Abscess of left thigh    Principal Orthopedic Problem: Left thigh I&D  Staph species culture on 07/28    Interval History  S/p L thigh I&D 8/6/22. NAEON, patient stable, pain controlled. No acute complaints this morning. Drain with 50cc since placement. Cx NGTD from I&D 8/6      Review of patient's allergies indicates:  No Known Allergies    Current Facility-Administered Medications   Medication    acetaminophen tablet 1,000 mg    amLODIPine tablet 5 mg    atorvastatin tablet 20 mg    ceFAZolin 2 gram in dextrose 5% 100 mL IVPB (premix)    dextrose 10% bolus 125 mL    dextrose 10% bolus 250 mL    enoxaparin injection 30 mg    glucagon (human recombinant) injection 1 mg    glucose chewable tablet 16 g    glucose chewable tablet 24 g    insulin aspart U-100 pen 1-10 Units    insulin aspart U-100 pen 10 Units    insulin detemir U-100 pen 12 Units    morphine injection 3 mg    ondansetron injection 4 mg    oxyCODONE immediate release tablet 5 mg    senna-docusate 8.6-50 mg per tablet 2 tablet    sodium chloride 0.9% flush 10 mL     Objective:     Vital Signs (Most Recent):  Temp: 98.5 °F (36.9 °C) (08/07/22 0443)  Pulse: 68 (08/07/22 0443)  Resp: 18 (08/07/22 0610)  BP: (!) 148/86 (08/07/22 0443)  SpO2: 99 % (08/07/22 0546) Vital Signs (24h Range):  Temp:  [97.4 °F (36.3 °C)-98.5 °F (36.9 °C)] 98.5 °F (36.9 °C)  Pulse:  [67-99] 68  Resp:  [12-22] 18  SpO2:  [94 %-100 %] 99 %  BP: (123-168)/(60-99) 148/86     Weight: 104.3 kg (230 lb)  Height: 5' 7" (170.2 cm)  Body mass index is 36.02 " kg/m².      Intake/Output Summary (Last 24 hours) at 8/7/2022 0638  Last data filed at 8/7/2022 0500  Gross per 24 hour   Intake 1800 ml   Output 790 ml   Net 1010 ml         Ortho/SPM Exam  Left lower extremity  Left thigh dressing cdi  Drain in place  No erythema of thigh, no signficant swelling  Compartments soft and compressible  Painless ROM of hip and knee  NVI          CBC:   Recent Labs   Lab 08/06/22  0257   WBC 5.76   HGB 9.7*   HCT 28.3*          CMP:   Recent Labs   Lab 08/06/22  0257   *   K 5.0      CO2 19*   *   BUN 30*   CREATININE 7.1*   CALCIUM 8.7   PROT 5.9*   ALBUMIN 1.5*   BILITOT 0.2   ALKPHOS 78   AST 12   ALT <5*   ANIONGAP 10         All pertinent labs within the past 24 hours have been reviewed.    Significant Imaging: I have reviewed and interpreted all pertinent imaging results/findings.      Assessment/Plan:     * Abscess of left thigh  47M with DM admitted 7/26/22 for DKA, ortho following for left lateral thigh abscess. s/p I&D and drain placement left thigh 7/29/22. Cx +MSSA. Had high drain purulent output after first I&D, was taken back to OR 8/6/22 for repeat I&D. Drain in place, dressings CDI. Nephrology on board for ANGE.     -- ANGE nephrology managing   -- DVT LVX   -- Ancef, MSSA from first I&D   -- Drain 50cc    Dispo: pending     Left leg pain               Olman Au MD  Orthopedics  [facility]

## 2022-08-07 NOTE — PLAN OF CARE
Monitoring and treating blood glucose levels this shift per scheduled and sliding scale insulin orders.  Patient educated on how to monitor and manage his blood glucose levels and his short acting and long acting insulin.  Patient has been rating his pain 7/10 this shift in his left leg and hip receiving oxycodone for pain management.  Patient up walking with walker with his wife for standby assist in the hallway and in his room.  10 ml of serosanguineous drainage from left JOHANN drain documented this shift. Will continue to monitor and treat per physician orders.

## 2022-08-07 NOTE — ASSESSMENT & PLAN NOTE
- Interval history and hospital course as described above  - Cr 7.4 on AM labs  - Nephrology following  - Renally dosing all medications  - Avoid nephrotoxins  - Will continue to monitor on daily labs

## 2022-08-07 NOTE — SUBJECTIVE & OBJECTIVE
"Interval HPI:   Overnight events:  No acute events reported overnight.  \  Eatin%  Nausea: No  Hypoglycemia and intervention: No  Fever: No  TPN and/or TF: No  If yes, type of TF/TPN and rate: Na    /80 (BP Location: Right arm)   Pulse 68   Temp 97.4 °F (36.3 °C) (Oral)   Resp 16   Ht 5' 7" (1.702 m)   Wt 104.3 kg (230 lb)   SpO2 99%   BMI 36.02 kg/m²     Labs Reviewed and Include    Recent Labs   Lab 22  0721   *   CALCIUM 9.0   ALBUMIN 1.7*   PROT 6.1   *   K 4.9   CO2 22*      BUN 40*   CREATININE 7.4*   ALKPHOS 72   ALT <5*   AST 10   BILITOT 0.2     Lab Results   Component Value Date    WBC 9.92 2022    HGB 9.9 (L) 2022    HCT 29.2 (L) 2022    MCV 93 2022     2022     No results for input(s): TSH, FREET4 in the last 168 hours.  Lab Results   Component Value Date    HGBA1C 10.1 (H) 2022       Nutritional status:   Body mass index is 36.02 kg/m².  Lab Results   Component Value Date    ALBUMIN 1.7 (L) 2022    ALBUMIN 1.5 (L) 2022    ALBUMIN 1.6 (L) 2022     Lab Results   Component Value Date    PREALBUMIN 7 (L) 2022       Estimated Creatinine Clearance: 13.9 mL/min (A) (based on SCr of 7.4 mg/dL (H)).    Accu-Checks  Recent Labs     22  1541 22  1655 22  2123 22  0715 22  1029 22  1228 22  1408 22  1608 22  2028 22  0743   POCTGLUCOSE 182* 190* 161* 238* 217* 188* 196* 288* 248* 181*       Current Medications and/or Treatments Impacting Glycemic Control  Immunotherapy:    Immunosuppressants       None          Steroids:   Hormones (From admission, onward)                None          Pressors:    Autonomic Drugs (From admission, onward)                None          Hyperglycemia/Diabetes Medications:   Antihyperglycemics (From admission, onward)                Start     Stop Route Frequency Ordered    22 0715  insulin aspart U-100 pen 10 " Units         -- SubQ 3 times daily with meals 08/04/22 2017 08/04/22 0915  insulin detemir U-100 pen 12 Units         -- SubQ 2 times daily 08/04/22 0901 07/30/22 0826  insulin aspart U-100 pen 1-10 Units         -- SubQ Before meals & nightly PRN 07/30/22 0727

## 2022-08-07 NOTE — SUBJECTIVE & OBJECTIVE
Interval History: Pt seen and examined this morning on sanket GRAY. States he is doing well today, pain well-controlled, and tolerated surgery well yesterday. Updated on renal function, awaiting intra-op culture results. Care plan reviewed. Otherwise, doing well and with no further complaints at this time.      Objective:     Vital Signs (Most Recent):  Temp: 97.4 °F (36.3 °C) (08/07/22 0803)  Pulse: 68 (08/07/22 0803)  Resp: 16 (08/07/22 1023)  BP: 138/80 (08/07/22 0803)  SpO2: 99 % (08/07/22 0803)   Vital Signs (24h Range):  Temp:  [97.4 °F (36.3 °C)-98.5 °F (36.9 °C)] 97.4 °F (36.3 °C)  Pulse:  [67-99] 68  Resp:  [12-22] 16  SpO2:  [94 %-99 %] 99 %  BP: (123-167)/(65-99) 138/80     Weight: 104.3 kg (230 lb)  Body mass index is 36.02 kg/m².    Intake/Output Summary (Last 24 hours) at 8/7/2022 1028  Last data filed at 8/7/2022 0500  Gross per 24 hour   Intake 1800 ml   Output 760 ml   Net 1040 ml        Physical Exam  Gen: in NAD, appears stated age; pt is obese  Neuro: AAOx4, CN2-12 grossly intact BL; motor, sensory, and strength grossly intact BL  HEENT: NTNC, EOMI, PERRLA, MMM; no thyromegaly or lymphadenopathy; no JVD appreciated  CVS: RRR, no m/r/g; S1/S2 auscultated with no S3 or S4; capillary refill < 2 sec  Resp: lungs CTAB, no w/r/r; no belabored breathing or accessory muscle use appreciated   Abd: BS+ in all 4 quadrants; NTND, soft to palpation; no organomegaly appreciated   Extrem: pulses full, equal, and regular over all 4 extremities; no UE or LE edema BL; left thigh with clean bandaging in place, JOHANN drain in placed with purulent serosanguinous fluid       Significant Labs: All pertinent labs within the past 24 hours have been reviewed.    Significant Imaging: I have reviewed all pertinent imaging results/findings within the past 24 hours.

## 2022-08-07 NOTE — ASSESSMENT & PLAN NOTE
- Interval history and physical exam findings as described above  - Hospital course reviewed  - Afebrile, no leukocytosis  - Continue ancef  - S/p OR with ortho 8/6 for repeat washout: intra-op cultures pending  - PRN analgesics provided  - Continuing to monitor

## 2022-08-08 LAB
ALBUMIN SERPL BCP-MCNC: 1.9 G/DL (ref 3.5–5.2)
ALP SERPL-CCNC: 78 U/L (ref 55–135)
ALT SERPL W/O P-5'-P-CCNC: <5 U/L (ref 10–44)
ANION GAP SERPL CALC-SCNC: 11 MMOL/L (ref 8–16)
AST SERPL-CCNC: 12 U/L (ref 10–40)
BACTERIA SPEC AEROBE CULT: NO GROWTH
BASOPHILS # BLD AUTO: 0.09 K/UL (ref 0–0.2)
BASOPHILS NFR BLD: 1.4 % (ref 0–1.9)
BILIRUB SERPL-MCNC: 0.2 MG/DL (ref 0.1–1)
BUN SERPL-MCNC: 48 MG/DL (ref 6–20)
CALCIUM SERPL-MCNC: 8.8 MG/DL (ref 8.7–10.5)
CHLORIDE SERPL-SCNC: 99 MMOL/L (ref 95–110)
CO2 SERPL-SCNC: 21 MMOL/L (ref 23–29)
CREAT SERPL-MCNC: 8.1 MG/DL (ref 0.5–1.4)
CRP SERPL-MCNC: 51.4 MG/L (ref 0–8.2)
DIFFERENTIAL METHOD: ABNORMAL
EOSINOPHIL # BLD AUTO: 0.3 K/UL (ref 0–0.5)
EOSINOPHIL NFR BLD: 4.1 % (ref 0–8)
ERYTHROCYTE [DISTWIDTH] IN BLOOD BY AUTOMATED COUNT: 12.8 % (ref 11.5–14.5)
EST. GFR  (NO RACE VARIABLE): 7.5 ML/MIN/1.73 M^2
GLUCOSE SERPL-MCNC: 85 MG/DL (ref 70–110)
HCT VFR BLD AUTO: 30.2 % (ref 40–54)
HGB BLD-MCNC: 9.8 G/DL (ref 14–18)
IMM GRANULOCYTES # BLD AUTO: 0.06 K/UL (ref 0–0.04)
IMM GRANULOCYTES NFR BLD AUTO: 0.9 % (ref 0–0.5)
LYMPHOCYTES # BLD AUTO: 1.1 K/UL (ref 1–4.8)
LYMPHOCYTES NFR BLD: 15.8 % (ref 18–48)
MAGNESIUM SERPL-MCNC: 1.9 MG/DL (ref 1.6–2.6)
MCH RBC QN AUTO: 30.2 PG (ref 27–31)
MCHC RBC AUTO-ENTMCNC: 32.5 G/DL (ref 32–36)
MCV RBC AUTO: 93 FL (ref 82–98)
MONOCYTES # BLD AUTO: 0.4 K/UL (ref 0.3–1)
MONOCYTES NFR BLD: 5.9 % (ref 4–15)
NEUTROPHILS # BLD AUTO: 4.8 K/UL (ref 1.8–7.7)
NEUTROPHILS NFR BLD: 71.9 % (ref 38–73)
NRBC BLD-RTO: 0 /100 WBC
PHOSPHATE SERPL-MCNC: 6.2 MG/DL (ref 2.7–4.5)
PLATELET # BLD AUTO: 262 K/UL (ref 150–450)
PMV BLD AUTO: 9 FL (ref 9.2–12.9)
POCT GLUCOSE: 142 MG/DL (ref 70–110)
POCT GLUCOSE: 82 MG/DL (ref 70–110)
POCT GLUCOSE: 86 MG/DL (ref 70–110)
POCT GLUCOSE: 93 MG/DL (ref 70–110)
POTASSIUM SERPL-SCNC: 3.9 MMOL/L (ref 3.5–5.1)
PROT SERPL-MCNC: 6.2 G/DL (ref 6–8.4)
RBC # BLD AUTO: 3.25 M/UL (ref 4.6–6.2)
SODIUM SERPL-SCNC: 131 MMOL/L (ref 136–145)
VANCOMYCIN SERPL-MCNC: 21.4 UG/ML
WBC # BLD AUTO: 6.65 K/UL (ref 3.9–12.7)

## 2022-08-08 PROCEDURE — 25000003 PHARM REV CODE 250: Performed by: HOSPITALIST

## 2022-08-08 PROCEDURE — 97110 THERAPEUTIC EXERCISES: CPT

## 2022-08-08 PROCEDURE — 99233 PR SUBSEQUENT HOSPITAL CARE,LEVL III: ICD-10-PCS | Mod: ,,, | Performed by: STUDENT IN AN ORGANIZED HEALTH CARE EDUCATION/TRAINING PROGRAM

## 2022-08-08 PROCEDURE — 63600175 PHARM REV CODE 636 W HCPCS: Performed by: STUDENT IN AN ORGANIZED HEALTH CARE EDUCATION/TRAINING PROGRAM

## 2022-08-08 PROCEDURE — 99233 SBSQ HOSP IP/OBS HIGH 50: CPT | Mod: ,,, | Performed by: STUDENT IN AN ORGANIZED HEALTH CARE EDUCATION/TRAINING PROGRAM

## 2022-08-08 PROCEDURE — 63600175 PHARM REV CODE 636 W HCPCS: Performed by: PHYSICIAN ASSISTANT

## 2022-08-08 PROCEDURE — 99233 PR SUBSEQUENT HOSPITAL CARE,LEVL III: ICD-10-PCS | Mod: ,,, | Performed by: PHYSICIAN ASSISTANT

## 2022-08-08 PROCEDURE — 99231 SBSQ HOSP IP/OBS SF/LOW 25: CPT | Mod: ,,, | Performed by: INTERNAL MEDICINE

## 2022-08-08 PROCEDURE — 80053 COMPREHEN METABOLIC PANEL: CPT | Performed by: STUDENT IN AN ORGANIZED HEALTH CARE EDUCATION/TRAINING PROGRAM

## 2022-08-08 PROCEDURE — 99233 SBSQ HOSP IP/OBS HIGH 50: CPT | Mod: ,,, | Performed by: PHYSICIAN ASSISTANT

## 2022-08-08 PROCEDURE — 83735 ASSAY OF MAGNESIUM: CPT | Performed by: STUDENT IN AN ORGANIZED HEALTH CARE EDUCATION/TRAINING PROGRAM

## 2022-08-08 PROCEDURE — 80202 ASSAY OF VANCOMYCIN: CPT | Performed by: STUDENT IN AN ORGANIZED HEALTH CARE EDUCATION/TRAINING PROGRAM

## 2022-08-08 PROCEDURE — 36415 COLL VENOUS BLD VENIPUNCTURE: CPT | Performed by: STUDENT IN AN ORGANIZED HEALTH CARE EDUCATION/TRAINING PROGRAM

## 2022-08-08 PROCEDURE — 99232 SBSQ HOSP IP/OBS MODERATE 35: CPT | Mod: ,,, | Performed by: INTERNAL MEDICINE

## 2022-08-08 PROCEDURE — 25000003 PHARM REV CODE 250

## 2022-08-08 PROCEDURE — 86140 C-REACTIVE PROTEIN: CPT | Performed by: STUDENT IN AN ORGANIZED HEALTH CARE EDUCATION/TRAINING PROGRAM

## 2022-08-08 PROCEDURE — 85025 COMPLETE CBC W/AUTO DIFF WBC: CPT | Performed by: STUDENT IN AN ORGANIZED HEALTH CARE EDUCATION/TRAINING PROGRAM

## 2022-08-08 PROCEDURE — 20600001 HC STEP DOWN PRIVATE ROOM

## 2022-08-08 PROCEDURE — 97530 THERAPEUTIC ACTIVITIES: CPT

## 2022-08-08 PROCEDURE — 25000003 PHARM REV CODE 250: Performed by: PHYSICIAN ASSISTANT

## 2022-08-08 PROCEDURE — 99231 PR SUBSEQUENT HOSPITAL CARE,LEVL I: ICD-10-PCS | Mod: ,,, | Performed by: INTERNAL MEDICINE

## 2022-08-08 PROCEDURE — 99232 PR SUBSEQUENT HOSPITAL CARE,LEVL II: ICD-10-PCS | Mod: ,,, | Performed by: INTERNAL MEDICINE

## 2022-08-08 PROCEDURE — 84100 ASSAY OF PHOSPHORUS: CPT | Performed by: STUDENT IN AN ORGANIZED HEALTH CARE EDUCATION/TRAINING PROGRAM

## 2022-08-08 RX ORDER — INSULIN ASPART 100 [IU]/ML
8 INJECTION, SOLUTION INTRAVENOUS; SUBCUTANEOUS
Status: DISCONTINUED | OUTPATIENT
Start: 2022-08-08 | End: 2022-08-24 | Stop reason: HOSPADM

## 2022-08-08 RX ADMIN — HEPARIN SODIUM 5000 UNITS: 5000 INJECTION INTRAVENOUS; SUBCUTANEOUS at 09:08

## 2022-08-08 RX ADMIN — HEPARIN SODIUM 5000 UNITS: 5000 INJECTION INTRAVENOUS; SUBCUTANEOUS at 02:08

## 2022-08-08 RX ADMIN — AMLODIPINE BESYLATE 5 MG: 5 TABLET ORAL at 08:08

## 2022-08-08 RX ADMIN — INSULIN DETEMIR 10 UNITS: 100 INJECTION, SOLUTION SUBCUTANEOUS at 09:08

## 2022-08-08 RX ADMIN — INSULIN ASPART 8 UNITS: 100 INJECTION, SOLUTION INTRAVENOUS; SUBCUTANEOUS at 04:08

## 2022-08-08 RX ADMIN — OXYCODONE 5 MG: 5 TABLET ORAL at 04:08

## 2022-08-08 RX ADMIN — SENNOSIDES AND DOCUSATE SODIUM 2 TABLET: 50; 8.6 TABLET ORAL at 08:08

## 2022-08-08 RX ADMIN — OXYCODONE 5 MG: 5 TABLET ORAL at 09:08

## 2022-08-08 RX ADMIN — ATORVASTATIN CALCIUM 20 MG: 20 TABLET, FILM COATED ORAL at 08:08

## 2022-08-08 RX ADMIN — HEPARIN SODIUM 5000 UNITS: 5000 INJECTION INTRAVENOUS; SUBCUTANEOUS at 05:08

## 2022-08-08 RX ADMIN — CEFAZOLIN 2 G: 10 INJECTION, POWDER, FOR SOLUTION INTRAVENOUS at 08:08

## 2022-08-08 RX ADMIN — OXYCODONE 5 MG: 5 TABLET ORAL at 12:08

## 2022-08-08 RX ADMIN — OXYCODONE 5 MG: 5 TABLET ORAL at 08:08

## 2022-08-08 RX ADMIN — INSULIN ASPART 10 UNITS: 100 INJECTION, SOLUTION INTRAVENOUS; SUBCUTANEOUS at 12:08

## 2022-08-08 RX ADMIN — INSULIN ASPART 10 UNITS: 100 INJECTION, SOLUTION INTRAVENOUS; SUBCUTANEOUS at 08:08

## 2022-08-08 NOTE — PLAN OF CARE
Patient stated he did not sleep well last night and was rating the pain in his hip 7/10 at shift change.  Patient given PO oxycodone for pain.  Seen by ID and orthopedic this shift and more labs will be drawn to monitor infection, at this time continuing with current IV abx treatment plan.  Patient up with PT this shift ambulating in the hallways and doing some mobility exercises.  BUN and Crt levels still elevated this shift.  Patient's JOHANN drain has a very small amount of dark red output, will document at end of shift with patient I+O's.  Patient urinating throughout the shift clear, pale yellow urine.  Patient passing gas, no bm's at this time.  Will continue to monitor and treat per physician orders.

## 2022-08-08 NOTE — ASSESSMENT & PLAN NOTE
ANGE non oliguric scr up today to 8.4  Non oliguric 700 cc of urine in last 24 hours   Likely vancomycin induced ANGE (ATI/ATN) in the setting of trough of 40 and also was on zosyn which will increase risk of vanco induced ANGE.   Continue to monitor RFP daily. No indication for Dialysis at this time   Renal US normal   Euvolemic   No acid/base disorder   No significant electrolyte abnormalities       Recs:   Strict Is and O's   Monitor vanco levels.   RFP daily

## 2022-08-08 NOTE — PROGRESS NOTES
"[facility] Tele[facility]  Nephrology  Progress Note    Patient Name: Wilfredo Thomas  MRN: [id number]  Admission Date: 7/26/2022  Hospital Length of Stay: 13 days  Attending Provider: Dwayne Montes MD   Primary Care Physician: Aylin Wayne DO  Principal Problem:Abscess of left thigh    Subjective:     HPI: 46 y/o M hx of HTN, IDDM2, GERD obesity, HLD presented to the ED with complaint of 1 day of worsening SOB. Wife present at bedside. Patient states that he noticed yesterday he was feeling some shortness of breath and couldn't seem to catch his breath. His wife went to check on him this morning and noticed that he was breathing "fast and heavy" and he sounded like he was slurring his words. Wife was concerned about a stroke, so he brought him to the ED for evaluation.     Patient also reporting significant pain and swelling along his left lateral proximal thigh. He states he has noticed worsening pain over the past 2 weeks. He denies any acute trauma to the area. He was seen by ortho last week and was told to take ibu-profen for a muscle strain. This did not help his pain, so he presented again on 7/22 and was given a prednisone injection into his thigh. This also did not help his pain, and now the pain and swelling have advanced to the point that he has difficulty with ambulation. Patient denies fever, chills, nausea, vomiting.      Of note, patient has hx of poorly controlled diabetes. He was recently started on insulin by his PCP, but he has not taken any insulin since being prescribed it.      In the ED, patient hypertensive and tachycardic to the 130s. Tachypneic with RR in the 40s. On CBC, WBC 26.9, On CMP patient hyperkalemic to 5.5, CorNa 136, Bicarb 5. Cr elevated to 1.9 from BL 0.83. UA, BHB pending at time of admission. CTA without evidence of pulmonary embolism. Critical Care Medicine consulted given severe acidosis.               Overview/Hospital Course:     Patient admitted to the MICU for " management of severe DKA. Metabolic acidosis improving on insulin drip. He also has had left thigh pain for 1-2 months. There is a large mass on his left thigh that is tender to palpation, CT revealed hematoma vs soft tissue growth. General surgery consulted and recommended IR consult. IR consulted, no indication for tap. 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 Ortho consulted and performed bedside aspiration of possible abscess which revealed >200K cells >80% segs. Will hold off on antibiotics for now per ortho recs, ortho is taking patient to the OR for irrigation of the thigh, will start antibiotics afterwards.         7/29 Metabolic acidosis slowly improving. Ortho taking patient to the OR for irrigation of the left thigh, will start antibiotics afterwards.   7/30 Transfer to hospital medicine . S/p I&D  of left thigh Abscess on7/29/22 - MRI - uptured tensor fascia jose luis (TFL) muscle with associated large hematoma. gram stain -Moderate Gram positive cocci in clusters .cultures pending.  continue Vancomycin, clindamycin and Zosyn . Bicarb 15. K replaced . off insulin drip on SQ insulin.  7/31 ID and endocrine consulted.  abscess with Calcium pyrophosphate crystals with unknown significance potassium and P replaced. Bicarbonate WNL . aerobic culture 7/28 STAPHYLOCOCCUS SPECIES . Glucose in 300s .PRN imodium  for diarrhea. PT/OT consulted - WBAT. Surgical drain with purulent output  8/1 PICC team consulted for IV access. vanc trough at 11.4. monitor for vanomycin toxicity. possible OR tomorrow for washout  may need bone biopsy to r/o osteomyelitis.Discontinued clindamycin.   8/2 glucose in 300s.  Increased Levemir  to 14 units BID and Aspart  10 units TIDWM. K replaced         Nephrology consulyed for ANGE         Interval History: no acute events overnight. Scr worse today 8.1 from 7.4     Review of patient's allergies indicates:  No Known  Allergies  Current Facility-Administered Medications   Medication Frequency    acetaminophen tablet 1,000 mg Q8H PRN    amLODIPine tablet 5 mg Daily    atorvastatin tablet 20 mg Daily    ceFAZolin 2 gram in dextrose 5% 100 mL IVPB (premix) Daily    dextrose 10% bolus 125 mL PRN    dextrose 10% bolus 250 mL PRN    glucagon (human recombinant) injection 1 mg PRN    glucose chewable tablet 16 g PRN    glucose chewable tablet 24 g PRN    heparin (porcine) injection 5,000 Units Q8H    insulin aspart U-100 pen 1-10 Units QID (AC + HS) PRN    insulin aspart U-100 pen 10 Units TIDWM    insulin detemir U-100 pen 12 Units BID    morphine injection 3 mg Q6H PRN    ondansetron injection 4 mg Q6H PRN    oxyCODONE immediate release tablet 5 mg Q4H PRN    senna-docusate 8.6-50 mg per tablet 2 tablet Daily    sodium chloride 0.9% flush 10 mL PRN       Objective:     Vital Signs (Most Recent):  Temp: 98 °F (36.7 °C) (08/08/22 0741)  Pulse: 82 (08/08/22 0743)  Resp: 16 (08/08/22 0811)  BP: 131/63 (08/08/22 0741)  SpO2: 98 % (08/08/22 0741)  O2 Device (Oxygen Therapy): room air (08/06/22 1405) Vital Signs (24h Range):  Temp:  [96.6 °F (35.9 °C)-98 °F (36.7 °C)] 98 °F (36.7 °C)  Pulse:  [76-90] 82  Resp:  [16-20] 16  SpO2:  [92 %-100 %] 98 %  BP: (123-151)/(63-96) 131/63     Weight: 104.3 kg (230 lb) (08/06/22 1023)  Body mass index is 36.02 kg/m².  Body surface area is 2.22 meters squared.    I/O last 3 completed shifts:  In: 1520 [P.O.:1520]  Out: 1470 [Urine:1400; Drains:70]    Physical Exam  Vitals reviewed.   Constitutional:       Appearance: Normal appearance.   HENT:      Head: Normocephalic and atraumatic.      Right Ear: External ear normal.      Left Ear: External ear normal.      Nose: Nose normal.      Mouth/Throat:      Pharynx: Oropharynx is clear.   Eyes:      Extraocular Movements: Extraocular movements intact.      Conjunctiva/sclera: Conjunctivae normal.   Cardiovascular:      Rate and Rhythm: Normal  rate and regular rhythm.      Pulses: Normal pulses.   Pulmonary:      Effort: Pulmonary effort is normal.      Breath sounds: Normal breath sounds.   Abdominal:      General: Abdomen is flat. There is no distension.   Musculoskeletal:         General: Normal range of motion.      Cervical back: Normal range of motion.   Skin:     General: Skin is warm and dry.   Neurological:      General: No focal deficit present.      Mental Status: He is alert and oriented to person, place, and time.   Psychiatric:         Mood and Affect: Mood normal.         Behavior: Behavior normal.       Significant Labs:  All labs within the past 24 hours have been reviewed.     Significant Imaging:  .    Assessment/Plan:     * Abscess of left thigh  Per primary    ANGE (acute kidney injury)  ANGE non oliguric scr up today to 8.4  Non oliguric 700 cc of urine in last 24 hours   Likely vancomycin induced ANGE (ATI/ATN) in the setting of trough of 40 and also was on zosyn which will increase risk of vanco induced ANGE.   Continue to monitor RFP daily. No indication for Dialysis at this time   Renal US normal   Euvolemic   No acid/base disorder   No significant electrolyte abnormalities       Recs:   Strict Is and O's   Monitor vanco levels.   RFP daily                       Angie Eugene MD  Nephrology  [facility] - [facility]

## 2022-08-08 NOTE — PHYSICIAN QUERY
PT Name: Wilfredo Thomas  MR #: [id number]    DOCUMENTATION CLARIFICATION      CDS/: Shaista Preston RN, BSN, CCDS               Contact information:  [email or web address]       This form is a permanent document in the medical record.     Query Date: August 8, 2022    By submitting this query, we are merely seeking further clarification of documentation. Please utilize your independent clinical judgment when addressing the question(s) below.    The Medical Record contains the following:   Indicators   Supporting Clinical Findings Location in Medical Record   X Hypertension associated with diabetes documented Hypertension associated with type 2 diabetes mellitus   07/27 H&P, 07/28 - 08/07 HM PN   X Chronic condition(s) DM II  Severe obesity  Hyperlipidemia      X Vital signs /94, 137/89, 163/93   07/27 Flowsheet   X Treatment/Medication Amlodipine 5 mg po daily   Insulin U-100 4/12 units SQ tid with meals  Insulin detemir 10-14 units SQ bid   08/05 - 08/08 MAR  07/30 - 08/04 MAR  07/28 - 08/04 MAR    Other       Provider, please clarify the relationship between the Hypertension and Diabetes Mellitus  [   ] Hypertension is a manifestation of Diabetes Mellitus (Secondary Hypertension)   [   ]  Hypertension is not a manifestation of Diabetes Mellitus (Essential Hypertension)   [   ] Other Clarification (please specify): ____________   [ x ] Clinically Undetermined       Please document in your progress notes daily for the duration of treatment, until resolved, and include in your discharge summary.

## 2022-08-08 NOTE — PLAN OF CARE
Patient alert and oriented; JOHANN drain remained in place;scanty serosanguineous drainage noted. Medicated x 1 with oxycodone for pain level 8\10 with good result. Patient voids x 2  moderate yellow urine during shift. Heparin therapy continue; well tolerated. Blood within range and VS stable.

## 2022-08-08 NOTE — PROGRESS NOTES
[facility] Tele[facility]  Endocrinology  Progress Note    Admit Date: 7/26/2022     Reason for Consult: Management of T2DM, Hyperglycemia     Surgical Procedure and Date: L thigh IM abscess I&D on 07/29/2022    Diabetes diagnosis year: at age 43yo    Home Diabetes Medications:    - Metformin 500 mg bid  - Jardiance 25 mg qd  - Trulicity 3 mg SC weekly  - Lantus 10 units qd -- prescribed at the end of 3/2022 but pt has not yet started taking it    Previously on glipizide, but stopped in 03/2022 by PCP who is managing his DM outpatient    How often checking glucose at home?  2-3x per week    BG readings on regimen: he reports <200  Hypoglycemia on the regimen?  No  Missed doses on regimen?  Yes    Diabetes Complications include:     Hyperglycemia    Complicating diabetes co morbidities:   HTN, obesity      HPI:   Patient is a 47 y.o. male with a diagnosis of T2DM, HTN, HLD, GERD, and obesity presented to the ED on 07/26/2022 with SOB and altered mental status x1d. Additionally reported pain and swelling L lateral high. Was seen at an OSH ED for thigh pain and was treated conservatively with NSAIDS initially. At follow up appt w/ ortho 7/22 he received IM steroid inj (deltoid) and po prednisone for suspected muscle strain. Due to worsening AMS and SOB he presented to the [facility] ED on 7/26. In the ED he was found to be in DKA and as admitted to the MICU due to severe acidosis.     For the thigh pain, MRI showed a ruptured tensor fascia jose luis (TFL) muscle with associated large hematoma. Ortho decided to perform aspiration night of 7/28. Gram stain showed G+ cocci in clusters- now growing Staph Species.They decided to take pt to OR 7/29 for formal I&D.      For DKA he was started on IVF and an intensive insulin gtt with resolution of DKA. Transitioned to MDI on 7/30 AM. Initial regimen was detemir 10u BID and Aspart 4u AC + LDC. Insulin up-titrated by MICU team prior to stepdown, to detemir 12u bid and aspart 8u AC +  "[facility]. Endocrinology consulted for management of hyperglycemia.      Interval HPI:   Overnight events:  No acute events reported overnight.  \  Eatin%  Nausea: No  Hypoglycemia and intervention: No  Fever: No  TPN and/or TF: No  If yes, type of TF/TPN and rate: Na    /80 (BP Location: Right arm)   Pulse 68   Temp 97.4 °F (36.3 °C) (Oral)   Resp 16   Ht 5' 7" (1.702 m)   Wt 104.3 kg (230 lb)   SpO2 99%   BMI 36.02 kg/m²     Labs Reviewed and Include    Recent Labs   Lab 22   *   CALCIUM 9.0   ALBUMIN 1.7*   PROT 6.1   *   K 4.9   CO2 22*      BUN 40*   CREATININE 7.4*   ALKPHOS 72   ALT <5*   AST 10   BILITOT 0.2     Lab Results   Component Value Date    WBC 9.92 2022    HGB 9.9 (L) 2022    HCT 29.2 (L) 2022    MCV 93 2022     2022     No results for input(s): TSH, FREET4 in the last 168 hours.  Lab Results   Component Value Date    HGBA1C 10.1 (H) 2022       Nutritional status:   Body mass index is 36.02 kg/m².  Lab Results   Component Value Date    ALBUMIN 1.7 (L) 2022    ALBUMIN 1.5 (L) 2022    ALBUMIN 1.6 (L) 2022     Lab Results   Component Value Date    PREALBUMIN 7 (L) 2022       Estimated Creatinine Clearance: 13.9 mL/min (A) (based on SCr of 7.4 mg/dL (H)).    Accu-Checks  Recent Labs     22  1541 22  1655 22  2123 22  0715 22  1029 22  1228 22  1408 22  1608 22  2028 22  0743   POCTGLUCOSE 182* 190* 161* 238* 217* 188* 196* 288* 248* 181*       Current Medications and/or Treatments Impacting Glycemic Control  Immunotherapy:    Immunosuppressants       None          Steroids:   Hormones (From admission, onward)                None          Pressors:    Autonomic Drugs (From admission, onward)                None          Hyperglycemia/Diabetes Medications:   Antihyperglycemics (From admission, onward)                Start     Stop " Route Frequency Ordered    08/05/22 0715  insulin aspart U-100 pen 10 Units         -- SubQ 3 times daily with meals 08/04/22 2017 08/04/22 0915  insulin detemir U-100 pen 12 Units         -- SubQ 2 times daily 08/04/22 0901 07/30/22 0826  insulin aspart U-100 pen 1-10 Units         -- SubQ Before meals & nightly PRN 07/30/22 0727            ASSESSMENT and PLAN    Hyperlipidemia associated with type 2 diabetes mellitus  Continue home statin   Goal LDL<70    Class 2 severe obesity due to excess calories with serious comorbidity and body mass index (BMI) of 36.0 to 36.9 in adult  General weight loss/lifestyle modification strategies discussed (elicit support from others; identify saboteurs; non-food rewards, etc).   Weight loss will help to improve glycemic control    Type 2 diabetes mellitus with hyperglycemia, with long-term current use of insulin  Key History and Diagnostic Findings  - Uncontrolled T2DM, pt not adherent to med regimen, not checking BG regularly at home  - P/w DKA with trigger infection/thigh abscess  - Home Regimen: metformin 500mg BID, Jardiance 25mg qd, Trulicity 3mg SC weekly, Lantus 10u qd   Pt never started insulin which was prescribed 3/2022. Not filling metformin regularly  - Status post repeat I&D of leg on 08/06/2022  - Glucose Goals: 140-180mg/dL  - 24 hour glucose trend:  Had hyperglycemia after I&D repeat procedure yesterday and not receiving correction insulin, better controlled this morning in the high 100s    Plan  - Continue Levemir 12 units BID  - Continue Aspart 10 units TIDWM with moderate correction scale    - Patient will need to be discharged on MDI insulin (prandial and basal)   - Other home meds to consider at d/c:    - Recommend holding jardiace until complete resolution of infection/acute illness. Because his presentation of DKA has a clear trigger (infection) and was not euglycemic DKA, unlikely that Jardiance was the cause. For these reasons, it would be reasonable  to resume it outpatient after recovery from acute illness.   - Can resume metformin and trulicity at d/c, will re-visit dc recs when pt is ready for discharge    - Hypoglycemia protocol in place  - If blood glucose greater than 300, please ask patient not to eat food or drink anything other than water until correctional insulin has brought it back below 250    ** Please notify Endocrine for any change and/or advance in diet**  ** Please call Endocrine for any BG related issues **        Dania Charles, DO  Endocrinology  [facility] - Tele[facility]

## 2022-08-08 NOTE — PROGRESS NOTES
"[facility] - Telemetry [facility] Medicine  Progress Note    Patient Name: Wilfredo Thomas  MRN: [id number]  Patient Class: IP- Inpatient   Admission Date: 7/26/2022  Length of Stay: 13 days  Attending Physician: Dwayne Montes MD  Primary Care Provider: Aylin Wayne DO        Subjective:     Principal Problem:Abscess of left thigh        HPI:  48 y/o M hx of HTN, IDDM2, GERD obesity, HLD presented to the ED with complaint of 1 day of worsening SOB. Wife present at bedside. Patient states that he noticed yesterday he was feeling some shortness of breath and couldn't seem to catch his breath. His wife went to check on him this morning and noticed that he was breathing "fast and heavy" and he sounded like he was slurring his words. Wife was concerned about a stroke, so he brought him to the ED for evaluation.     Patient also reporting significant pain and swelling along his left lateral proximal thigh. He states he has noticed worsening pain over the past 2 weeks. He denies any acute trauma to the area. He was seen by ortho last week and was told to take ibu-profen for a muscle strain. This did not help his pain, so he presented again on 7/22 and was given a prednisone injection into his thigh. This also did not help his pain, and now the pain and swelling have advanced to the point that he has difficulty with ambulation. Patient denies fever, chills, nausea, vomiting.      Of note, patient has hx of poorly controlled diabetes. He was recently started on insulin by his PCP, but he has not taken any insulin since being prescribed it.      In the ED, patient hypertensive and tachycardic to the 130s. Tachypneic with RR in the 40s. On CBC, WBC 26.9, On CMP patient hyperkalemic to 5.5, CorNa 136, Bicarb 5. Cr elevated to 1.9 from BL 0.83. UA, BHB pending at time of admission. CTA without evidence of pulmonary embolism. Critical Care Medicine consulted given severe acidosis.             Overview/Hospital " Course:    Patient admitted to the MICU for management of severe DKA. Metabolic acidosis improving on insulin drip. He also has had left thigh pain for 1-2 months. There is a large mass on his left thigh that is tender to palpation, CT revealed hematoma vs soft tissue growth. General surgery consulted and recommended IR consult. IR consulted, no indication for tap.   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 Ortho consulted and performed bedside aspiration of possible abscess which revealed >200K cells >80% segs. Will hold off on antibiotics for now per ortho recs, ortho is taking patient to the OR for irrigation of the thigh, will start antibiotics afterwards.         7/29 Metabolic acidosis slowly improving. Ortho taking patient to the OR for irrigation of the left thigh, will start antibiotics afterwards.   7/30 Transfer to hospital medicine . S/p I&D  of left thigh Abscess on7/29/22 - MRI - uptured tensor fascia jose luis (TFL) muscle with associated large hematoma. gram stain -Moderate Gram positive cocci in clusters .cultures pending.  continue Vancomycin, clindamycin and Zosyn . Bicarb 15. K replaced . off insulin drip on SQ insulin.  7/31 ID and endocrine consulted.  abscess with Calcium pyrophosphate crystals with unknown significance potassium and P replaced. Bicarbonate WNL . aerobic culture 7/28 STAPHYLOCOCCUS SPECIES . Glucose in 300s .PRN imodium  for diarrhea. PT/OT consulted - WBAT. Surgical drain with purulent output  8/1 PICC team consulted for IV access. vanc trough at 11.4. monitor for vanomycin toxicity. possible OR tomorrow for washout  may need bone biopsy to r/o osteomyelitis.Discontinued clindamycin.   8/2 glucose in 300s.  Increased Levemir  to 14 units BID and Aspart  10 units TIDWM. K replaced   8/3 ANGE with cr 0.7--> 2.7. likely vanc induced ATN with levels 40. urine studies ordered renal sonogram WNL  nephrology consulted.  started on NS 100ml/h x 10h and follow up renal panel.Zosyn and  vancomycin discontinued. Strict IO monitor. condom catheter . switched to Ancef for MSSA on culture  Interval History:  8/4- MSSA- see below. /85 VSS. On room air, eating, BM on 8/2, good UO. Appreciate ortho recs. On Cephazolin, detim 14 bid and aspart 12 ac.  8/5-Cr still rising, cr =6.  Wrist still hurting. Ortho to tap it to eval for infection and gout today. /90   Pulse 85  AF, no other signs of infection.  Will discuss w Nephrology- received NS x one liter yesterday. Will repeat today. Suspect auto-diuresis.   8/6: washout with ortho today. Cr continues to rise (7.1), nephrology following  8/7: Cr 7.4, phos increasing; tolerated surgery well yesterday, intra-op cultures pending  8/8: Cr increased to 8.1, hyperphosphatemia again noted; continues to make urine, no acute indication for HD per nephrology. Intra-op cultures with S.aureus, continued cefazolin pending final C&S results.      Interval History: Pt seen and examined this morning on sanket GRAY. Pt disheartened about increasing renal function, though optimistic Cr will begin to downtrend again. Discussed possibilities, though no HD acutely indicated per nephrology. Ambulating well, LLE pain well-controlled. Care plan reviewed. Otherwise, doing well and with no further complaints at this time.      Objective:     Vital Signs (Most Recent):  Temp: 98.1 °F (36.7 °C) (08/08/22 1141)  Pulse: 81 (08/08/22 1141)  Resp: 18 (08/08/22 1141)  BP: 138/86 (08/08/22 1141)  SpO2: 97 % (08/08/22 1141)   Vital Signs (24h Range):  Temp:  [96.6 °F (35.9 °C)-98.1 °F (36.7 °C)] 98.1 °F (36.7 °C)  Pulse:  [76-88] 81  Resp:  [16-20] 18  SpO2:  [92 %-100 %] 97 %  BP: (130-151)/(63-96) 138/86     Weight: 104.3 kg (230 lb)  Body mass index is 36.02 kg/m².    Intake/Output Summary (Last 24 hours) at 8/8/2022 1150  Last data filed at 8/8/2022 0745  Gross per 24 hour   Intake 600 ml   Output 310 ml   Net  290 ml        Physical Exam  Gen: in NAD, appears stated age; pt is obese  Neuro: AAOx4, CN2-12 grossly intact BL; motor, sensory, and strength grossly intact BL  HEENT: NTNC, EOMI, PERRLA, MMM; no thyromegaly or lymphadenopathy; no JVD appreciated  CVS: RRR, no m/r/g; S1/S2 auscultated with no S3 or S4; capillary refill < 2 sec  Resp: lungs CTAB, no w/r/r; no belabored breathing or accessory muscle use appreciated   Abd: BS+ in all 4 quadrants; NTND, soft to palpation; no organomegaly appreciated   Extrem: pulses full, equal, and regular over all 4 extremities; no UE or LE edema BL; left thigh with clean bandaging in place, JOHANN drain in placed with purulent serosanguinous fluid       Significant Labs: All pertinent labs within the past 24 hours have been reviewed.    Significant Imaging: I have reviewed all pertinent imaging results/findings within the past 24 hours.      Assessment/Plan:      * Abscess of left thigh  - Interval history and physical exam findings as described above  - Hospital course reviewed  - Afebrile, no leukocytosis  - Continue ancef  - S/p OR with ortho 8/6 for repeat washout  - Intra-op cultures with S.aureus: continuing ancef pending final C&S results  - PRN analgesics provided  - Continuing to monitor    Hematoma of left thigh  - As above    Left leg pain  - As above    ANGE (acute kidney injury)  - Interval history and hospital course as described above  - Cr 8.1 on AM labs  - Hyperphosphatemia also noted  - Continues to make urine  - Nephrology following: stated no acute indication for HD at this time  - Renally dosing all medications  - Avoid nephrotoxins  - Will continue to monitor on daily labs    Pain and swelling of left wrist  - Tapped by ortho, fluid reviewed: does not appear to be gout  - PRN analgesics provided    Type 2 diabetes mellitus with hyperglycemia, with long-term current use of insulin  - Working to optimize BG control: DKA has since resolved  - Levemir 12u BID, prandial  aspart 10u TID WM  - SSI provided for corrective dosing  - DXTs as ordered  - Hypoglycemic protocol in effect  - Diabetic diet provided    Hypertension associated with type 2 diabetes mellitus  - Working to optimize BP control   - Continue home regimen of amlodipine; lisinopril held 2/2 renal function  - Will continue to monitor and further titrate antihypertensives as clinically indicated     Hyperlipidemia associated with type 2 diabetes mellitus  - Continue home statin regimen    Anemia of chronic disease  - H/H stable near baseline  - Will continue to monitor on daily labs    Class 2 severe obesity due to excess calories with serious comorbidity and body mass index (BMI) of 36.0 to 36.9 in adult  - Body mass index is 36.02 kg/m².  - Needs dietary and lifestyle modifications in relation to health  - Consider dietary/nutrition consult outpatient      VTE Risk Mitigation (From admission, onward)         Ordered     heparin (porcine) injection 5,000 Units  Every 8 hours         08/07/22 1010     Place sequential compression device  Until discontinued         08/06/22 0855     Place sequential compression device  Until discontinued         07/29/22 0158     IP VTE HIGH RISK PATIENT  Once         07/29/22 0158     Place SAVANNAH hose  Until discontinued         07/26/22 2240     Place sequential compression device  Until discontinued         07/26/22 2240                Discharge Planning   FILEMON: 8/10/2022     Code Status: Full Code   Is the patient medically ready for discharge?: No    Reason for patient still in hospital (select all that apply): Patient trending condition  Discharge Plan A: Home with family            Dwayne Montes MD  Attending Physician  Department of Hospital Medicine  Epic secure chat preferred, or ext. [phone]  8/8/2022

## 2022-08-08 NOTE — PT/OT/SLP PROGRESS
"Physical Therapy Treatment    Patient Name:  Wilfredo Thomas   MRN:  [id number]    Recommendations:     Discharge Recommendations:  outpatient PT   Discharge Equipment Recommendations: cane, straight, shower chair   Barriers to discharge: None    Assessment:     Wilfredo Thomas is a 47 y.o. male admitted with a medical diagnosis of Abscess of left thigh.  He presents with the following impairments/functional limitations:  weakness, impaired self care skills, impaired functional mobility, gait instability, impaired endurance, pain, decreased lower extremity function, decreased upper extremity function, edema, orthopedic precautions, decreased ROM. The patient reports decreased L wrist pain and swelling, improvement in tolerance of wrist and finger AROM. He also shows improvement in gait tolerance and independence, he ambulated 250' with no AD and stand by assistance, wide MIKEY, antalgic gait and decreased weight shift. He would benefit from OP PT to address the above deficits.     Rehab Prognosis: Good; patient would benefit from acute skilled PT services to address these deficits and reach maximum level of function.    Recent Surgery: Procedure(s) (LRB):  Incision and Drainage - LEFT THIGH. (Left) 2 Days Post-Op    Plan:     During this hospitalization, patient to be seen 3 x/week to address the identified rehab impairments via gait training, therapeutic activities, therapeutic exercises, neuromuscular re-education and progress toward the following goals:    · Plan of Care Expires:  08/26/22    Subjective     Chief Complaint: "My wrist feels a lot better"  Patient/Family Comments/goals: "when am I going to be back to normal?"  Pain/Comfort:  · Pain Rating 1:  (mild soreness, did not rate)  · Location - Side 1: Left  · Location - Orientation 1: lateral  · Location 1: thigh  · Pain Addressed 1: Reposition, Distraction, Nurse notified  · Pain Rating Post-Intervention 1:  (increased soreness with " activity)      Objective:     Communicated with RN prior to session.  Patient found HOB elevated with telemetry, JOHANN drain upon PT entry to room.     General Precautions: Standard, fall   Orthopedic Precautions:LLE weight bearing as tolerated (no L hip abduction past neutral)   Braces: N/A  Respiratory Status: Room air     Functional Mobility:    Bed Mobility  Supine to Sit on the R side:  supervision assistance    Transfers Sit to Stand:  supervision assistance    Gait  Gait Distance: 250 ft with no AD  Assistance Level: stand by assistance   Description: wide MIKEY, decreased step length on L, decreased L hip knee ROM, antalgic gait, decreased gait speed, R hip drop in L stance            AM-PAC 6 CLICK MOBILITY  Turning over in bed (including adjusting bedclothes, sheets and blankets)?: 4  Sitting down on and standing up from a chair with arms (e.g., wheelchair, bedside commode, etc.): 4  Moving from lying on back to sitting on the side of the bed?: 4  Moving to and from a bed to a chair (including a wheelchair)?: 4  Need to walk in hospital room?: 4  Climbing 3-5 steps with a railing?: 3  Basic Mobility Total Score: 23       Therapeutic Activities and Exercises:   Patient educated on role of therapy, goals of session, benefits of out of bed mobility. Patient agreeable to mobilize with therapy.      Standing exercises for LE strength and balance:   -Static marching R UE support for balance, 10 reps ea, L hip flex ROM limited due to pain  -Mini squat, 10 reps with cues for hip hinge  -Heel raises, 10 reps, cued for controlled ROM    Patient states he has been compliant with HEP, ambulating with assist 3 reps daily. Demo'd good understanding of hip abduction precautions.   Advised to avoid RW use to avoid WBing through L wrist.     Patient educated on PT schedule.  Encouraged patient to ambulate, sit up in chair 3x/day to prevent deconditioning during hospitalization. Patient verbalized understanding and agreement not  to mobilize without RN assist. Patient in agreement with PT POC, OP PT.      Patient left up in chair with all lines intact and call button in reach..    GOALS:   Multidisciplinary Problems     Physical Therapy Goals        Problem: Physical Therapy    Goal Priority Disciplines Outcome Goal Variances Interventions   Physical Therapy Goal     PT, PT/OT Ongoing, Progressing     Description: Goals to be met by: 22     Patient will increase functional independence with mobility by performin. Supine to sit with [place]  2. Sit to supine with [place]  3. Sit to stand transfer with [place]  4. Gait  x >150 feet with Contact Guard Assistance using Rolling Walker. - Met   Ambulate 400' with supervision assistance with no AD.   5. Lower extremity exercise program x 20 reps per handout, with independence                       Time Tracking:     PT Received On: 22  PT Start Time: 1150     PT Stop Time: 1213  PT Total Time (min): 23 min     Billable Minutes: Therapeutic Activity 13 and Therapeutic Exercise 10    Treatment Type: Treatment  PT/PTA: PT     PTA Visit Number: 0     2022

## 2022-08-08 NOTE — PLAN OF CARE
Problem: Physical Therapy  Goal: Physical Therapy Goal  Description: Goals to be met by: 22     Patient will increase functional independence with mobility by performin. Supine to sit with [place]  2. Sit to supine with [place]  3. Sit to stand transfer with [place]  4. Gait  x >150 feet with Contact Guard Assistance using Rolling Walker. - Met   Ambulate 400' with supervision assistance with no AD.   5. Lower extremity exercise program x 20 reps per handout, with independence      Outcome: Ongoing, Progressing  Updated goals to reflect patient's progress.   Sandra Reed, PT  2022

## 2022-08-08 NOTE — SUBJECTIVE & OBJECTIVE
Interval History: Pt seen and examined this morning on sanket GRAY. Pt disheartened about increasing renal function, though optimistic Cr will begin to downtrend again. Discussed possibilities, though no HD acutely indicated per nephrology. Ambulating well, LLE pain well-controlled. Care plan reviewed. Otherwise, doing well and with no further complaints at this time.      Objective:     Vital Signs (Most Recent):  Temp: 98.1 °F (36.7 °C) (08/08/22 1141)  Pulse: 81 (08/08/22 1141)  Resp: 18 (08/08/22 1141)  BP: 138/86 (08/08/22 1141)  SpO2: 97 % (08/08/22 1141)   Vital Signs (24h Range):  Temp:  [96.6 °F (35.9 °C)-98.1 °F (36.7 °C)] 98.1 °F (36.7 °C)  Pulse:  [76-88] 81  Resp:  [16-20] 18  SpO2:  [92 %-100 %] 97 %  BP: (130-151)/(63-96) 138/86     Weight: 104.3 kg (230 lb)  Body mass index is 36.02 kg/m².    Intake/Output Summary (Last 24 hours) at 8/8/2022 1150  Last data filed at 8/8/2022 0745  Gross per 24 hour   Intake 600 ml   Output 310 ml   Net 290 ml        Physical Exam  Gen: in NAD, appears stated age; pt is obese  Neuro: AAOx4, CN2-12 grossly intact BL; motor, sensory, and strength grossly intact BL  HEENT: NTNC, EOMI, PERRLA, MMM; no thyromegaly or lymphadenopathy; no JVD appreciated  CVS: RRR, no m/r/g; S1/S2 auscultated with no S3 or S4; capillary refill < 2 sec  Resp: lungs CTAB, no w/r/r; no belabored breathing or accessory muscle use appreciated   Abd: BS+ in all 4 quadrants; NTND, soft to palpation; no organomegaly appreciated   Extrem: pulses full, equal, and regular over all 4 extremities; no UE or LE edema BL; left thigh with clean bandaging in place, JOHANN drain in placed with purulent serosanguinous fluid       Significant Labs: All pertinent labs within the past 24 hours have been reviewed.    Significant Imaging: I have reviewed all pertinent imaging results/findings within the past 24 hours.

## 2022-08-08 NOTE — PROGRESS NOTES
"[facility] - Telemetry Stepdown  Orthopedics  Progress Note    Patient Name: Wilfredo Thomas  MRN: [id number]  Admission Date: 7/26/2022  Hospital Length of Stay: 13 days  Attending Provider: Dwayne Montes MD  Primary Care Provider: Aylin Wayne DO  Follow-up For: Procedure(s) (LRB):  Incision and Drainage - LEFT THIGH. (Left)    Post-Operative Day: 2 Days Post-Op  Subjective:     Principal Problem:Abscess of left thigh    Principal Orthopedic Problem: Left thigh I&D  Staph species culture on 07/28    Interval History  S/p repeat L thigh I&D 8/6/22. NAEON, patient stable, pain controlled. No acute complaints this morning. Drain with 10cc yesterday, 70cc since placement. Cx NGTD from I&D 8/6.      Review of patient's allergies indicates:  No Known Allergies    Current Facility-Administered Medications   Medication    acetaminophen tablet 1,000 mg    amLODIPine tablet 5 mg    atorvastatin tablet 20 mg    ceFAZolin 2 gram in dextrose 5% 100 mL IVPB (premix)    dextrose 10% bolus 125 mL    dextrose 10% bolus 250 mL    glucagon (human recombinant) injection 1 mg    glucose chewable tablet 16 g    glucose chewable tablet 24 g    heparin (porcine) injection 5,000 Units    insulin aspart U-100 pen 1-10 Units    insulin aspart U-100 pen 10 Units    insulin detemir U-100 pen 12 Units    morphine injection 3 mg    ondansetron injection 4 mg    oxyCODONE immediate release tablet 5 mg    senna-docusate 8.6-50 mg per tablet 2 tablet    sodium chloride 0.9% flush 10 mL     Objective:     Vital Signs (Most Recent):  Temp: 98 °F (36.7 °C) (08/08/22 0427)  Pulse: 84 (08/08/22 0427)  Resp: 18 (08/08/22 0427)  BP: (!) 141/80 (08/08/22 0427)  SpO2: 97 % (08/08/22 0546) Vital Signs (24h Range):  Temp:  [96.6 °F (35.9 °C)-98 °F (36.7 °C)] 98 °F (36.7 °C)  Pulse:  [68-90] 84  Resp:  [16-20] 18  SpO2:  [92 %-100 %] 97 %  BP: (123-151)/(80-96) 141/80     Weight: 104.3 kg (230 lb)  Height: 5' 7" (170.2 cm)  Body mass index " is 36.02 kg/m².      Intake/Output Summary (Last 24 hours) at 8/8/2022 0723  Last data filed at 8/7/2022 2002  Gross per 24 hour   Intake 720 ml   Output 710 ml   Net 10 ml         Ortho/SPM Exam  Left lower extremity  Left thigh dressing cdi  Drain in place  No erythema of thigh, no signficant swelling  Compartments soft and compressible  Painless ROM of hip and knee  NVI        CBC:   Recent Labs   Lab 08/07/22  0721   WBC 9.92   HGB 9.9*   HCT 29.2*          CMP:   Recent Labs   Lab 08/07/22  0721   *   K 4.9      CO2 22*   *   BUN 40*   CREATININE 7.4*   CALCIUM 9.0   PROT 6.1   ALBUMIN 1.7*   BILITOT 0.2   ALKPHOS 72   AST 10   ALT <5*   ANIONGAP 9         All pertinent labs within the past 24 hours have been reviewed.    Significant Imaging: I have reviewed and interpreted all pertinent imaging results/findings.      Assessment/Plan:     * Abscess of left thigh  47M with DM admitted 7/26/22 for DKA, ortho following for left lateral thigh abscess. s/p I&D and drain placement left thigh 7/29/22. Cx +MSSA. Had high drain purulent output after first I&D, was taken back to OR 8/6/22 for repeat I&D. Drain in place, dressings CDI. Nephrology on board for ANGE.     -- ANGE nephrology managing   -- DVT LVX   -- Ancef, MSSA from first I&D    -- Drain 10cc yesterday, serosainguinous    Dispo: pending     Left leg pain               Aron Vieyra MD  Orthopedics  [facility] - Telemetry Stepdown

## 2022-08-08 NOTE — SUBJECTIVE & OBJECTIVE
Interval History: no acute events overnight. Scr worse today 8.1 from 7.4     Review of patient's allergies indicates:  No Known Allergies  Current Facility-Administered Medications   Medication Frequency    acetaminophen tablet 1,000 mg Q8H PRN    amLODIPine tablet 5 mg Daily    atorvastatin tablet 20 mg Daily    ceFAZolin 2 gram in dextrose 5% 100 mL IVPB (premix) Daily    dextrose 10% bolus 125 mL PRN    dextrose 10% bolus 250 mL PRN    glucagon (human recombinant) injection 1 mg PRN    glucose chewable tablet 16 g PRN    glucose chewable tablet 24 g PRN    heparin (porcine) injection 5,000 Units Q8H    insulin aspart U-100 pen 1-10 Units QID (AC + HS) PRN    insulin aspart U-100 pen 10 Units TIDWM    insulin detemir U-100 pen 12 Units BID    morphine injection 3 mg Q6H PRN    ondansetron injection 4 mg Q6H PRN    oxyCODONE immediate release tablet 5 mg Q4H PRN    senna-docusate 8.6-50 mg per tablet 2 tablet Daily    sodium chloride 0.9% flush 10 mL PRN       Objective:     Vital Signs (Most Recent):  Temp: 98 °F (36.7 °C) (08/08/22 0741)  Pulse: 82 (08/08/22 0743)  Resp: 16 (08/08/22 0811)  BP: 131/63 (08/08/22 0741)  SpO2: 98 % (08/08/22 0741)  O2 Device (Oxygen Therapy): room air (08/06/22 1405) Vital Signs (24h Range):  Temp:  [96.6 °F (35.9 °C)-98 °F (36.7 °C)] 98 °F (36.7 °C)  Pulse:  [76-90] 82  Resp:  [16-20] 16  SpO2:  [92 %-100 %] 98 %  BP: (123-151)/(63-96) 131/63     Weight: 104.3 kg (230 lb) (08/06/22 1023)  Body mass index is 36.02 kg/m².  Body surface area is 2.22 meters squared.    I/O last 3 completed shifts:  In: 1520 [P.O.:1520]  Out: 1470 [Urine:1400; Drains:70]    Physical Exam  Vitals reviewed.   Constitutional:       Appearance: Normal appearance.   HENT:      Head: Normocephalic and atraumatic.      Right Ear: External ear normal.      Left Ear: External ear normal.      Nose: Nose normal.      Mouth/Throat:      Pharynx: Oropharynx is clear.   Eyes:      Extraocular Movements: Extraocular  movements intact.      Conjunctiva/sclera: Conjunctivae normal.   Cardiovascular:      Rate and Rhythm: Normal rate and regular rhythm.      Pulses: Normal pulses.   Pulmonary:      Effort: Pulmonary effort is normal.      Breath sounds: Normal breath sounds.   Abdominal:      General: Abdomen is flat. There is no distension.   Musculoskeletal:         General: Normal range of motion.      Cervical back: Normal range of motion.   Skin:     General: Skin is warm and dry.   Neurological:      General: No focal deficit present.      Mental Status: He is alert and oriented to person, place, and time.   Psychiatric:         Mood and Affect: Mood normal.         Behavior: Behavior normal.       Significant Labs:  All labs within the past 24 hours have been reviewed.     Significant Imaging:  .

## 2022-08-08 NOTE — ASSESSMENT & PLAN NOTE
- Interval history and hospital course as described above  - Cr 8.1 on AM labs  - Hyperphosphatemia also noted  - Continues to make urine  - Nephrology following: stated no acute indication for HD at this time  - Renally dosing all medications  - Avoid nephrotoxins  - Will continue to monitor on daily labs

## 2022-08-08 NOTE — ASSESSMENT & PLAN NOTE
47M with DM admitted 7/26/22 for DKA, ortho following for left lateral thigh abscess. s/p I&D and drain placement left thigh 7/29/22. Cx +MSSA. Had high drain purulent output after first I&D, was taken back to OR 8/6/22 for repeat I&D. Drain in place, dressings CDI. Nephrology on board for ANGE.     -- ANGE nephrology managing   -- DVT LVX   -- Ancef, MSSA from first I&D    -- Drain 10cc yesterday, serosainguinous    Dispo: pending

## 2022-08-08 NOTE — SUBJECTIVE & OBJECTIVE
"Principal Problem:Abscess of left thigh    Principal Orthopedic Problem: Left thigh I&D  Staph species culture on 07/28    Interval History  S/p repeat L thigh I&D 8/6/22. NAEON, patient stable, pain controlled. No acute complaints this morning. Drain with 10cc yesterday, 70cc since placement. Cx NGTD from I&D 8/6.      Review of patient's allergies indicates:  No Known Allergies    Current Facility-Administered Medications   Medication    acetaminophen tablet 1,000 mg    amLODIPine tablet 5 mg    atorvastatin tablet 20 mg    ceFAZolin 2 gram in dextrose 5% 100 mL IVPB (premix)    dextrose 10% bolus 125 mL    dextrose 10% bolus 250 mL    glucagon (human recombinant) injection 1 mg    glucose chewable tablet 16 g    glucose chewable tablet 24 g    heparin (porcine) injection 5,000 Units    insulin aspart U-100 pen 1-10 Units    insulin aspart U-100 pen 10 Units    insulin detemir U-100 pen 12 Units    morphine injection 3 mg    ondansetron injection 4 mg    oxyCODONE immediate release tablet 5 mg    senna-docusate 8.6-50 mg per tablet 2 tablet    sodium chloride 0.9% flush 10 mL     Objective:     Vital Signs (Most Recent):  Temp: 98 °F (36.7 °C) (08/08/22 0427)  Pulse: 84 (08/08/22 0427)  Resp: 18 (08/08/22 0427)  BP: (!) 141/80 (08/08/22 0427)  SpO2: 97 % (08/08/22 0546) Vital Signs (24h Range):  Temp:  [96.6 °F (35.9 °C)-98 °F (36.7 °C)] 98 °F (36.7 °C)  Pulse:  [68-90] 84  Resp:  [16-20] 18  SpO2:  [92 %-100 %] 97 %  BP: (123-151)/(80-96) 141/80     Weight: 104.3 kg (230 lb)  Height: 5' 7" (170.2 cm)  Body mass index is 36.02 kg/m².      Intake/Output Summary (Last 24 hours) at 8/8/2022 0723  Last data filed at 8/7/2022 2002  Gross per 24 hour   Intake 720 ml   Output 710 ml   Net 10 ml         Ortho/SPM Exam  Left lower extremity  Left thigh dressing cdi  Drain in place  No erythema of thigh, no signficant swelling  Compartments soft and compressible  Painless ROM of hip and knee  NVI        CBC:   Recent Labs "   Lab 08/07/22  0721   WBC 9.92   HGB 9.9*   HCT 29.2*          CMP:   Recent Labs   Lab 08/07/22  0721   *   K 4.9      CO2 22*   *   BUN 40*   CREATININE 7.4*   CALCIUM 9.0   PROT 6.1   ALBUMIN 1.7*   BILITOT 0.2   ALKPHOS 72   AST 10   ALT <5*   ANIONGAP 9         All pertinent labs within the past 24 hours have been reviewed.    Significant Imaging: I have reviewed and interpreted all pertinent imaging results/findings.

## 2022-08-08 NOTE — PHYSICIAN QUERY
PT Name: Wilfredo Thomas  MR #: [id number]     Documentation Clarification      CDS/: Shaista Preston RN, BSN, CCDS               Contact information:  [email or web address]       This form is a permanent document in the medical record.     Query Date: August 8, 2022    By submitting this query, we are merely seeking further clarification of documentation. Please utilize your independent clinical judgment when addressing the question(s) below.    The Medical Record reflects the following:    Supporting Clinical Findings Location in Medical Record   He reports 4-6 weeks of left lateral thigh pain and swelling,  no known injury to the affected area. MRI scan showing tear in the TFL with adjacent hematoma.  Aspirated at bedside +gram stain    Ortho consulted and performed bedside aspiration of possible abscess which revealed >200K cells >80% segs. 07/28 Orthopedic Surgery Consult        07/29 ICU PN                                                                            Provider, please provide depth of the bedside aspiration of the left lateral thigh:    [   ] Subcutaneous tissue and fascia   [ x  ] Muscle   [   ] Other (please specify): ____________   [  ] Clinically undetermined

## 2022-08-08 NOTE — ASSESSMENT & PLAN NOTE
- Interval history and physical exam findings as described above  - Hospital course reviewed  - Afebrile, no leukocytosis  - Continue ancef  - S/p OR with ortho 8/6 for repeat washout  - Intra-op cultures with S.aureus: continuing ancef pending final C&S results  - PRN analgesics provided  - Continuing to monitor

## 2022-08-09 LAB
ALBUMIN SERPL BCP-MCNC: 1.8 G/DL (ref 3.5–5.2)
ALP SERPL-CCNC: 73 U/L (ref 55–135)
ALT SERPL W/O P-5'-P-CCNC: <5 U/L (ref 10–44)
ANION GAP SERPL CALC-SCNC: 11 MMOL/L (ref 8–16)
ASCENDING AORTA: 3.11 CM
AST SERPL-CCNC: 15 U/L (ref 10–40)
AV INDEX (PROSTH): 0.77
AV MEAN GRADIENT: 5 MMHG
AV PEAK GRADIENT: 9 MMHG
AV VALVE AREA: 3.56 CM2
AV VELOCITY RATIO: 0.73
BASOPHILS # BLD AUTO: 0.06 K/UL (ref 0–0.2)
BASOPHILS NFR BLD: 1.1 % (ref 0–1.9)
BILIRUB SERPL-MCNC: 0.2 MG/DL (ref 0.1–1)
BSA FOR ECHO PROCEDURE: 2.22 M2
BUN SERPL-MCNC: 52 MG/DL (ref 6–20)
CALCIUM SERPL-MCNC: 8.9 MG/DL (ref 8.7–10.5)
CHLORIDE SERPL-SCNC: 103 MMOL/L (ref 95–110)
CO2 SERPL-SCNC: 19 MMOL/L (ref 23–29)
CREAT SERPL-MCNC: 8.8 MG/DL (ref 0.5–1.4)
CV ECHO LV RWT: 0.4 CM
DIFFERENTIAL METHOD: ABNORMAL
DOP CALC AO PEAK VEL: 1.48 M/S
DOP CALC AO VTI: 32.5 CM
DOP CALC LVOT AREA: 4.6 CM2
DOP CALC LVOT DIAMETER: 2.42 CM
DOP CALC LVOT PEAK VEL: 1.08 M/S
DOP CALC LVOT STROKE VOLUME: 115.76 CM3
DOP CALCLVOT PEAK VEL VTI: 25.18 CM
E WAVE DECELERATION TIME: 242.49 MSEC
E/A RATIO: 1.24
E/E' RATIO: 9.26 M/S
ECHO LV POSTERIOR WALL: 0.93 CM (ref 0.6–1.1)
EJECTION FRACTION: 65 %
EOSINOPHIL # BLD AUTO: 0.4 K/UL (ref 0–0.5)
EOSINOPHIL NFR BLD: 6.4 % (ref 0–8)
ERYTHROCYTE [DISTWIDTH] IN BLOOD BY AUTOMATED COUNT: 12.9 % (ref 11.5–14.5)
EST. GFR  (NO RACE VARIABLE): 6.8 ML/MIN/1.73 M^2
FRACTIONAL SHORTENING: 38 % (ref 28–44)
GLUCOSE SERPL-MCNC: 196 MG/DL (ref 70–110)
HCT VFR BLD AUTO: 27 % (ref 40–54)
HGB BLD-MCNC: 9.1 G/DL (ref 14–18)
IMM GRANULOCYTES # BLD AUTO: 0.18 K/UL (ref 0–0.04)
IMM GRANULOCYTES NFR BLD AUTO: 3.3 % (ref 0–0.5)
INTERVENTRICULAR SEPTUM: 0.83 CM (ref 0.6–1.1)
LA MAJOR: 5.19 CM
LA MINOR: 5.71 CM
LA WIDTH: 3 CM
LEFT ATRIUM SIZE: 4.73 CM
LEFT ATRIUM VOLUME INDEX MOD: 16.6 ML/M2
LEFT ATRIUM VOLUME INDEX: 30.5 ML/M2
LEFT ATRIUM VOLUME MOD: 35.67 CM3
LEFT ATRIUM VOLUME: 65.59 CM3
LEFT INTERNAL DIMENSION IN SYSTOLE: 2.93 CM (ref 2.1–4)
LEFT VENTRICLE DIASTOLIC VOLUME INDEX: 47.56 ML/M2
LEFT VENTRICLE DIASTOLIC VOLUME: 102.25 ML
LEFT VENTRICLE MASS INDEX: 64 G/M2
LEFT VENTRICLE SYSTOLIC VOLUME INDEX: 15.4 ML/M2
LEFT VENTRICLE SYSTOLIC VOLUME: 33.12 ML
LEFT VENTRICULAR INTERNAL DIMENSION IN DIASTOLE: 4.7 CM (ref 3.5–6)
LEFT VENTRICULAR MASS: 138.51 G
LV LATERAL E/E' RATIO: 8 M/S
LV SEPTAL E/E' RATIO: 11 M/S
LYMPHOCYTES # BLD AUTO: 0.9 K/UL (ref 1–4.8)
LYMPHOCYTES NFR BLD: 15.7 % (ref 18–48)
MAGNESIUM SERPL-MCNC: 1.9 MG/DL (ref 1.6–2.6)
MCH RBC QN AUTO: 30.8 PG (ref 27–31)
MCHC RBC AUTO-ENTMCNC: 33.7 G/DL (ref 32–36)
MCV RBC AUTO: 92 FL (ref 82–98)
MONOCYTES # BLD AUTO: 0.4 K/UL (ref 0.3–1)
MONOCYTES NFR BLD: 7.3 % (ref 4–15)
MV A" WAVE DURATION": 14.27 MSEC
MV PEAK A VEL: 0.71 M/S
MV PEAK E VEL: 0.88 M/S
MV STENOSIS PRESSURE HALF TIME: 70.32 MS
MV VALVE AREA P 1/2 METHOD: 3.13 CM2
NEUTROPHILS # BLD AUTO: 3.6 K/UL (ref 1.8–7.7)
NEUTROPHILS NFR BLD: 66.2 % (ref 38–73)
NRBC BLD-RTO: 0 /100 WBC
PHOSPHATE SERPL-MCNC: 5.9 MG/DL (ref 2.7–4.5)
PISA TR MAX VEL: 1.83 M/S
PLATELET # BLD AUTO: 222 K/UL (ref 150–450)
PLATELET BLD QL SMEAR: ABNORMAL
PMV BLD AUTO: 9.9 FL (ref 9.2–12.9)
POCT GLUCOSE: 110 MG/DL (ref 70–110)
POCT GLUCOSE: 125 MG/DL (ref 70–110)
POCT GLUCOSE: 127 MG/DL (ref 70–110)
POCT GLUCOSE: 193 MG/DL (ref 70–110)
POCT GLUCOSE: 220 MG/DL (ref 70–110)
POLYCHROMASIA BLD QL SMEAR: ABNORMAL
POTASSIUM SERPL-SCNC: 5.1 MMOL/L (ref 3.5–5.1)
PROT SERPL-MCNC: 5.8 G/DL (ref 6–8.4)
PULM VEIN S/D RATIO: 1.63
PV PEAK D VEL: 0.41 M/S
PV PEAK S VEL: 0.67 M/S
RA MAJOR: 4.68 CM
RA PRESSURE: 3 MMHG
RA WIDTH: 3.23 CM
RBC # BLD AUTO: 2.95 M/UL (ref 4.6–6.2)
RIGHT VENTRICULAR END-DIASTOLIC DIMENSION: 3.83 CM
RV TISSUE DOPPLER FREE WALL SYSTOLIC VELOCITY 1 (APICAL 4 CHAMBER VIEW): 13.93 CM/S
SINUS: 3.64 CM
SODIUM SERPL-SCNC: 133 MMOL/L (ref 136–145)
STJ: 3.34 CM
TDI LATERAL: 0.11 M/S
TDI SEPTAL: 0.08 M/S
TDI: 0.1 M/S
TR MAX PG: 13 MMHG
TRICUSPID ANNULAR PLANE SYSTOLIC EXCURSION: 1.98 CM
TV REST PULMONARY ARTERY PRESSURE: 16 MMHG
WBC # BLD AUTO: 5.49 K/UL (ref 3.9–12.7)

## 2022-08-09 PROCEDURE — 36415 COLL VENOUS BLD VENIPUNCTURE: CPT | Performed by: STUDENT IN AN ORGANIZED HEALTH CARE EDUCATION/TRAINING PROGRAM

## 2022-08-09 PROCEDURE — 63600175 PHARM REV CODE 636 W HCPCS: Performed by: STUDENT IN AN ORGANIZED HEALTH CARE EDUCATION/TRAINING PROGRAM

## 2022-08-09 PROCEDURE — 99233 SBSQ HOSP IP/OBS HIGH 50: CPT | Mod: 95,,, | Performed by: INTERNAL MEDICINE

## 2022-08-09 PROCEDURE — 25000003 PHARM REV CODE 250: Performed by: PHYSICIAN ASSISTANT

## 2022-08-09 PROCEDURE — 25000003 PHARM REV CODE 250

## 2022-08-09 PROCEDURE — 25000003 PHARM REV CODE 250: Performed by: HOSPITALIST

## 2022-08-09 PROCEDURE — 84100 ASSAY OF PHOSPHORUS: CPT | Performed by: STUDENT IN AN ORGANIZED HEALTH CARE EDUCATION/TRAINING PROGRAM

## 2022-08-09 PROCEDURE — 80053 COMPREHEN METABOLIC PANEL: CPT | Performed by: STUDENT IN AN ORGANIZED HEALTH CARE EDUCATION/TRAINING PROGRAM

## 2022-08-09 PROCEDURE — 83735 ASSAY OF MAGNESIUM: CPT | Performed by: STUDENT IN AN ORGANIZED HEALTH CARE EDUCATION/TRAINING PROGRAM

## 2022-08-09 PROCEDURE — 99233 SBSQ HOSP IP/OBS HIGH 50: CPT | Mod: ,,, | Performed by: PHYSICIAN ASSISTANT

## 2022-08-09 PROCEDURE — 99233 PR SUBSEQUENT HOSPITAL CARE,LEVL III: ICD-10-PCS | Mod: 95,,, | Performed by: INTERNAL MEDICINE

## 2022-08-09 PROCEDURE — 99232 SBSQ HOSP IP/OBS MODERATE 35: CPT | Mod: ,,, | Performed by: INTERNAL MEDICINE

## 2022-08-09 PROCEDURE — C9399 UNCLASSIFIED DRUGS OR BIOLOG: HCPCS | Performed by: STUDENT IN AN ORGANIZED HEALTH CARE EDUCATION/TRAINING PROGRAM

## 2022-08-09 PROCEDURE — 99232 PR SUBSEQUENT HOSPITAL CARE,LEVL II: ICD-10-PCS | Mod: ,,, | Performed by: INTERNAL MEDICINE

## 2022-08-09 PROCEDURE — 25000003 PHARM REV CODE 250: Performed by: STUDENT IN AN ORGANIZED HEALTH CARE EDUCATION/TRAINING PROGRAM

## 2022-08-09 PROCEDURE — 85025 COMPLETE CBC W/AUTO DIFF WBC: CPT | Performed by: STUDENT IN AN ORGANIZED HEALTH CARE EDUCATION/TRAINING PROGRAM

## 2022-08-09 PROCEDURE — 99233 PR SUBSEQUENT HOSPITAL CARE,LEVL III: ICD-10-PCS | Mod: ,,, | Performed by: PHYSICIAN ASSISTANT

## 2022-08-09 PROCEDURE — 63600175 PHARM REV CODE 636 W HCPCS: Performed by: PHYSICIAN ASSISTANT

## 2022-08-09 PROCEDURE — 20600001 HC STEP DOWN PRIVATE ROOM

## 2022-08-09 RX ADMIN — INSULIN ASPART 8 UNITS: 100 INJECTION, SOLUTION INTRAVENOUS; SUBCUTANEOUS at 05:08

## 2022-08-09 RX ADMIN — OXYCODONE 5 MG: 5 TABLET ORAL at 09:08

## 2022-08-09 RX ADMIN — OXYCODONE 5 MG: 5 TABLET ORAL at 07:08

## 2022-08-09 RX ADMIN — INSULIN DETEMIR 10 UNITS: 100 INJECTION, SOLUTION SUBCUTANEOUS at 09:08

## 2022-08-09 RX ADMIN — INSULIN ASPART 8 UNITS: 100 INJECTION, SOLUTION INTRAVENOUS; SUBCUTANEOUS at 12:08

## 2022-08-09 RX ADMIN — INSULIN ASPART 2 UNITS: 100 INJECTION, SOLUTION INTRAVENOUS; SUBCUTANEOUS at 09:08

## 2022-08-09 RX ADMIN — INSULIN ASPART 8 UNITS: 100 INJECTION, SOLUTION INTRAVENOUS; SUBCUTANEOUS at 09:08

## 2022-08-09 RX ADMIN — INSULIN ASPART 4 UNITS: 100 INJECTION, SOLUTION INTRAVENOUS; SUBCUTANEOUS at 12:08

## 2022-08-09 RX ADMIN — OXYCODONE 5 MG: 5 TABLET ORAL at 03:08

## 2022-08-09 RX ADMIN — AMLODIPINE BESYLATE 5 MG: 5 TABLET ORAL at 09:08

## 2022-08-09 RX ADMIN — SENNOSIDES AND DOCUSATE SODIUM 2 TABLET: 50; 8.6 TABLET ORAL at 09:08

## 2022-08-09 RX ADMIN — HEPARIN SODIUM 5000 UNITS: 5000 INJECTION INTRAVENOUS; SUBCUTANEOUS at 09:08

## 2022-08-09 RX ADMIN — ATORVASTATIN CALCIUM 20 MG: 20 TABLET, FILM COATED ORAL at 09:08

## 2022-08-09 RX ADMIN — HEPARIN SODIUM 5000 UNITS: 5000 INJECTION INTRAVENOUS; SUBCUTANEOUS at 03:08

## 2022-08-09 RX ADMIN — HEPARIN SODIUM 5000 UNITS: 5000 INJECTION INTRAVENOUS; SUBCUTANEOUS at 06:08

## 2022-08-09 RX ADMIN — CEFAZOLIN 2 G: 10 INJECTION, POWDER, FOR SOLUTION INTRAVENOUS at 10:08

## 2022-08-09 NOTE — PROGRESS NOTES
"[facility] - Telemetry Stepdown  Nephrology  Progress Note    Patient Name: Wilfredo Thomas  MRN: [id number]  Admission Date: 7/26/2022  Hospital Length of Stay: 14 days  Attending Provider: Lyric Pichardo MD   Primary Care Physician: Aylin Wayne DO  Principal Problem:Abscess of left thigh    Subjective:     HPI: 46 y/o M hx of HTN, IDDM2, GERD obesity, HLD presented to the ED with complaint of 1 day of worsening SOB. Wife present at bedside. Patient states that he noticed yesterday he was feeling some shortness of breath and couldn't seem to catch his breath. His wife went to check on him this morning and noticed that he was breathing "fast and heavy" and he sounded like he was slurring his words. Wife was concerned about a stroke, so he brought him to the ED for evaluation.     Patient also reporting significant pain and swelling along his left lateral proximal thigh. He states he has noticed worsening pain over the past 2 weeks. He denies any acute trauma to the area. He was seen by ortho last week and was told to take ibu-profen for a muscle strain. This did not help his pain, so he presented again on 7/22 and was given a prednisone injection into his thigh. This also did not help his pain, and now the pain and swelling have advanced to the point that he has difficulty with ambulation. Patient denies fever, chills, nausea, vomiting.      Of note, patient has hx of poorly controlled diabetes. He was recently started on insulin by his PCP, but he has not taken any insulin since being prescribed it.      In the ED, patient hypertensive and tachycardic to the 130s. Tachypneic with RR in the 40s. On CBC, WBC 26.9, On CMP patient hyperkalemic to 5.5, CorNa 136, Bicarb 5. Cr elevated to 1.9 from BL 0.83. UA, BHB pending at time of admission. CTA without evidence of pulmonary embolism. Critical Care Medicine consulted given severe acidosis.               Overview/Hospital Course:     Patient admitted to the MICU for " management of severe DKA. Metabolic acidosis improving on insulin drip. He also has had left thigh pain for 1-2 months. There is a large mass on his left thigh that is tender to palpation, CT revealed hematoma vs soft tissue growth. General surgery consulted and recommended IR consult. IR consulted, no indication for tap. 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 Ortho consulted and performed bedside aspiration of possible abscess which revealed >200K cells >80% segs. Will hold off on antibiotics for now per ortho recs, ortho is taking patient to the OR for irrigation of the thigh, will start antibiotics afterwards.         7/29 Metabolic acidosis slowly improving. Ortho taking patient to the OR for irrigation of the left thigh, will start antibiotics afterwards.   7/30 Transfer to hospital medicine . S/p I&D  of left thigh Abscess on7/29/22 - MRI - uptured tensor fascia jose luis (TFL) muscle with associated large hematoma. gram stain -Moderate Gram positive cocci in clusters .cultures pending.  continue Vancomycin, clindamycin and Zosyn . Bicarb 15. K replaced . off insulin drip on SQ insulin.  7/31 ID and endocrine consulted.  abscess with Calcium pyrophosphate crystals with unknown significance potassium and P replaced. Bicarbonate WNL . aerobic culture 7/28 STAPHYLOCOCCUS SPECIES . Glucose in 300s .PRN imodium  for diarrhea. PT/OT consulted - WBAT. Surgical drain with purulent output  8/1 PICC team consulted for IV access. vanc trough at 11.4. monitor for vanomycin toxicity. possible OR tomorrow for washout  may need bone biopsy to r/o osteomyelitis.Discontinued clindamycin.   8/2 glucose in 300s.  Increased Levemir  to 14 units BID and Aspart  10 units TIDWM. K replaced         Nephrology consulyed for ANGE         Interval History: no acute events overnight. Scr worse today 8.8 from 8.1. had 900 cc of urineOPT    Review of patient's allergies  indicates:  No Known Allergies  Current Facility-Administered Medications   Medication Frequency    acetaminophen tablet 1,000 mg Q8H PRN    amLODIPine tablet 5 mg Daily    atorvastatin tablet 20 mg Daily    ceFAZolin 2 gram in dextrose 5% 100 mL IVPB (premix) Daily    COVID-19 vac, forrest(Pfizer)(PF) (Pfizer COVID-19) 30 mcg/0.3 mL injection 0.3 mL vaccine x 1 dose    dextrose 10% bolus 125 mL PRN    dextrose 10% bolus 250 mL PRN    glucagon (human recombinant) injection 1 mg PRN    glucose chewable tablet 16 g PRN    glucose chewable tablet 24 g PRN    heparin (porcine) injection 5,000 Units Q8H    insulin aspart U-100 pen 1-10 Units QID (AC + HS) PRN    insulin aspart U-100 pen 8 Units TIDWM    insulin detemir U-100 pen 10 Units BID    morphine injection 3 mg Q6H PRN    ondansetron injection 4 mg Q6H PRN    oxyCODONE immediate release tablet 5 mg Q4H PRN    senna-docusate 8.6-50 mg per tablet 2 tablet Daily    sodium chloride 0.9% flush 10 mL PRN       Objective:     Vital Signs (Most Recent):  Temp: 98 °F (36.7 °C) (08/09/22 1119)  Pulse: 77 (08/09/22 1119)  Resp: 18 (08/09/22 1119)  BP: 132/80 (08/09/22 1119)  SpO2: 99 % (08/09/22 1119)  O2 Device (Oxygen Therapy): room air (08/06/22 1405) Vital Signs (24h Range):  Temp:  [97.2 °F (36.2 °C)-98.5 °F (36.9 °C)] 98 °F (36.7 °C)  Pulse:  [76-98] 77  Resp:  [16-20] 18  SpO2:  [94 %-99 %] 99 %  BP: (132-174)/(70-95) 132/80     Weight: 104.3 kg (230 lb) (08/06/22 1023)  Body mass index is 36.02 kg/m².  Body surface area is 2.22 meters squared.    I/O last 3 completed shifts:  In: 1105 [P.O.:1100; I.V.:5]  Out: 1205 [Urine:1200; Drains:5]    Physical Exam  Vitals reviewed.   Constitutional:       Appearance: Normal appearance.   HENT:      Head: Normocephalic and atraumatic.      Right Ear: External ear normal.      Left Ear: External ear normal.      Nose: Nose normal.      Mouth/Throat:      Pharynx: Oropharynx is clear.   Eyes:      Extraocular  Movements: Extraocular movements intact.      Conjunctiva/sclera: Conjunctivae normal.   Cardiovascular:      Rate and Rhythm: Normal rate and regular rhythm.      Pulses: Normal pulses.   Pulmonary:      Effort: Pulmonary effort is normal.      Breath sounds: Normal breath sounds.   Abdominal:      General: Abdomen is flat. There is no distension.   Musculoskeletal:         General: Normal range of motion.      Cervical back: Normal range of motion.   Skin:     General: Skin is warm and dry.   Neurological:      General: No focal deficit present.      Mental Status: He is alert and oriented to person, place, and time.   Psychiatric:         Mood and Affect: Mood normal.         Behavior: Behavior normal.       Significant Labs:  All labs within the past 24 hours have been reviewed.       Assessment/Plan:     * Abscess of left thigh  Per primary    ANGE (acute kidney injury)  ANGE non oliguric Scr worse today 8.8 from 8.1. had 900 cc of urineOPT  Likely vancomycin induced ANGE (ATI/ATN) in the setting of trough of 40 and also was on zosyn which will increase risk of vanco induced ANGE.   Continue to monitor RFP BID. No indication for Dialysis at this time   Bicarb 19 start sodium bicarb 1300 TID. K 5.1 (recommend renal diet if not on renal diet)   Strict Is and Os   Renal US normal   Euvolemic                           Anige Eugene MD  Nephrology  [facility] - Telemetry Stepdown

## 2022-08-09 NOTE — SUBJECTIVE & OBJECTIVE
Telemedicine  This service was provided by Virtual Visit.    Patient was transferred to [facility] Medicine on:  8/9/2022    Chief Complaint   Patient presents with    Shortness of Breath     Pt c/o increased SOB over the past few days. Pt is diabetic who hasn't taken his meds for the past couple days. Has been on steroids.      The patient location is: [facility]/[address]   Admitted 7/26/2022  8:24 PM  Present with the patient at the time of the telemed/virtual assessment: Telepresenter    Interval History / Events Overnight:   The patient is able to provide adequate history. Additional history was obtained from past medical records. No significant events reported by Nursing.  Less drainage from L hip. Improved ROM in L wrist.  Patient complains of L hip pain. Symptoms have improved since yesterday. Associated symptoms include: fatigue. Symptoms are decreasing in severity.     Lab test(s) reviewed: H&H stable    Review of Systems   Constitutional:  Negative for fever.   Respiratory:  Negative for shortness of breath.      Objective:     Vital Signs (Most Recent):  Temp: 98 °F (36.7 °C) (08/09/22 1119)  Pulse: 77 (08/09/22 1119)  Resp: 18 (08/09/22 1119)  BP: 132/80 (08/09/22 1119)  SpO2: 99 % (08/09/22 1119) Vital Signs (24h Range):  Temp:  [97.2 °F (36.2 °C)-98.5 °F (36.9 °C)] 98 °F (36.7 °C)  Pulse:  [76-98] 77  Resp:  [16-20] 18  SpO2:  [94 %-99 %] 99 %  BP: (132-174)/(70-95) 132/80     Weight: 104.3 kg (230 lb)  Body mass index is 36.02 kg/m².    Intake/Output Summary (Last 24 hours) at 8/9/2022 1142  Last data filed at 8/8/2022 2200  Gross per 24 hour   Intake 985 ml   Output 905 ml   Net 80 ml      Physical Exam  Constitutional:       General: He is not in acute distress.     Appearance: Normal appearance.   Eyes:      General: Lids are normal. No scleral icterus.        Right eye: No discharge.         Left eye: No discharge.      Conjunctiva/sclera: Conjunctivae normal.   Neck:      Trachea: Phonation normal.    Cardiovascular:      Rate and Rhythm: Normal rate.      Comments: Monitor / Vital signs reviewed at time of visit  Pulmonary:      Effort: Pulmonary effort is normal. No tachypnea, accessory muscle usage or respiratory distress.   Abdominal:      General: There is no distension.   Skin:     Coloration: Skin is not cyanotic.   Neurological:      Mental Status: He is alert. He is not disoriented.   Psychiatric:         Attention and Perception: Attention normal.         Mood and Affect: Affect normal.         Behavior: Behavior is cooperative.       Significant Labs:   Recent Labs   Lab 03/22/22  1234 07/26/22  2044   HGBA1C 11.2* 10.1*     Recent Labs   Lab 08/09/22  1119 08/09/22  1537 08/09/22  2103   POCTGLUCOSE 220* 125* 127*     Recent Labs   Lab 08/07/22  0721 08/08/22  0725 08/09/22  0942   WBC 9.92 6.65 5.49   HGB 9.9* 9.8* 9.1*   HCT 29.2* 30.2* 27.0*    262 222     Recent Labs   Lab 08/05/22  0818 08/06/22  0257 08/07/22  0721 08/08/22  0725 08/09/22  0942   GRAN  --    < > 80.8*  8.0* 71.9  4.8 66.2  3.6   SEGS 88  --   --   --   --    LYMPH  --    < > 12.5*  1.2 15.8*  1.1 15.7*  0.9*   LYMPHS 7  --   --   --   --    MONO  --    < > 5.2  0.5 5.9  0.4 7.3  0.4   EOS 1   < > 0.0 0.3 0.4    < > = values in this interval not displayed.     Recent Labs   Lab 08/07/22  0721 08/08/22  0725 08/09/22  0942   * 131* 133*   K 4.9 3.9 5.1    99 103   CO2 22* 21* 19*   BUN 40* 48* 52*   CREATININE 7.4* 8.1* 8.8*   * 85 196*   CALCIUM 9.0 8.8 8.9   ALBUMIN 1.7* 1.9* 1.8*   MG 1.9 1.9 1.9   PHOS 6.1* 6.2* 5.9*     Recent Labs   Lab 08/03/22  0756 08/03/22  0759 08/05/22  0851 08/06/22  0257 08/07/22  0721 08/08/22  0725 08/09/22  0942   ALKPHOS  --    < >  --    < > 72 78 73   ALT  --    < >  --    < > <5* <5* <5*   AST  --    < >  --    < > 10 12 15   PROT  --    < >  --    < > 6.1 6.2 5.8*   BILITOT  --    < >  --    < > 0.2 0.2 0.2   .9*  --  125.0*  --   --  51.4*  --      < > = values in this interval not displayed.     Recent Labs   Lab 07/26/22 2044 07/26/22 2238 07/26/22  2335 07/27/22  0211 07/29/22  0023   PROCAL  --  0.29*  --   --  0.57*   LACTATE  --   --  1.3 1.9  --    DDIMER 1.46*  --   --   --   --    SEDRATE  --   --   --   --  73*     SARS-CoV-2 RNA, Amplification, Qual (no units)   Date Value   08/06/2022 Negative   07/26/2022 Negative       ECG Results              EKG 12-lead (Final result)  Result time 07/27/22 07:53:05      Final result by Interface, Lab In seven (07/27/22 07:53:05)                   Narrative:    Test Reason : R00.0,    Vent. Rate : 138 BPM     Atrial Rate : 138 BPM     P-R Int : 126 ms          QRS Dur : 086 ms      QT Int : 286 ms       P-R-T Axes : 051 002 052 degrees     QTc Int : 433 ms    Sinus tachycardia  Otherwise normal ECG  No previous ECGs available  Confirmed by Crow HARRIS MD ([id number]) on 7/27/2022 7:53:00 AM    Referred By: [email or web address]   SELF           Confirmed By:Crow HARRIS MD                                    Results for orders placed during the hospital encounter of 07/26/22    Echo    Interpretation Summary  · The left ventricle is normal in size with normal systolic function.  · The estimated ejection fraction is 65%.  · Normal left ventricular diastolic function.  · Normal right ventricular size with normal right ventricular systolic function.  · The estimated PA systolic pressure is 16 mmHg.  · Normal central venous pressure (3 mmHg).      Echo  · The left ventricle is normal in size with normal systolic function.  · The estimated ejection fraction is 65%.  · Normal left ventricular diastolic function.  · Normal right ventricular size with normal right ventricular systolic   function.  · The estimated PA systolic pressure is 16 mmHg.  · Normal central venous pressure (3 mmHg).         Labs and Imaging within the last 24 hours listed above were reviewed.       Diet: Diet diabetic [facility]; 2000 Calorie;  Renal  Significant LDAs:   IV Access Type: Peripheral  Urinary Catheter Indication if present: Patient Does Not Have Urinary Catheter  Other Lines/Tubes/Drains:    HIGH RISK CONDITION(S):   Patient has a condition that poses threat to life and bodily function: Acute Renal Failure     Goals of Care:    Previous admission:     Likely prognosis:  Fair  Code Status: Full Code  Comfort Only: No  Hospice: No  Goals at discharge: remain at home, with physician follow-up    Discharge Planning   FILEMON: 8/12/2022     Code Status: Full Code   Is the patient medically ready for discharge?: No    Reason for patient still in hospital (select all that apply): Patient trending condition, Treatment, and Consult recommendations  Discharge Plan A: Home with family

## 2022-08-09 NOTE — ASSESSMENT & PLAN NOTE
47M with DM admitted 7/26/22 for DKA, ortho following for left lateral thigh abscess. s/p I&D and drain placement left thigh 7/29/22. Cx +MSSA. Had high drain purulent output after first I&D, was taken back to OR 8/6/22 for repeat I&D. Drain in place, dressings CDI. Nephrology on board for ANGE.     -- NAGE nephrology managing   -- DVT SQH  -- Ancef, MSSA from first I&D    -- Pain MM  -- Drain 5cc yesterday, will remove Friday 8/12 if no increase in output  -- CRP downtrending  -- L wrist aspiration non-concerning for septic arthritis    Dispo: pending

## 2022-08-09 NOTE — PLAN OF CARE
[facility] - Telemetry Stepdown  Discharge Reassessment    Primary Care Provider: Aylin Wayne DO    Expected Discharge Date: 8/12/2022    Reassessment (most recent)     Discharge Reassessment - 08/09/22 1439        Discharge Reassessment    Assessment Type Discharge Planning Reassessment     Did the patient's condition or plan change since previous assessment? No     Discharge Plan A Home with family     Discharge Plan B Home Health     DME Needed Upon Discharge  other (see comments)   TBD    Discharge Barriers Identified None     Why the patient remains in the hospital Requires continued medical care               Marnie De La Torre RN  Ext [phone]

## 2022-08-09 NOTE — ASSESSMENT & PLAN NOTE
48 yo male with history of DMII and HTN admitted for DKA and left thigh abscess. MRI of left femur was notable for ruptured tensor fascia jose luis mm with associated large hematoma, as well as small area of potential osteonecrosis. Bedside aspiration of the fluid collection was notable for a cell count of >200k (>80%segs), cultures + staph species. He was taken to the OR on 7/29 with orthopedic surgery for surgical I&D. Surgical cultures are positive for MSSA. Per ortho surgery, no concerns for bone involvement. Patient was initially on Vancomycin and Zosyn. Vanc trough supratherapeutic and patient has developed an ANGE. He is currently on Cefazolin.       Recent new onset left wrist pain, states it has increased today. He is s/p wrist arthroscopy with hardware placement in 3/2022. Wife noticed a boil on his wrist. Swelling and TTP today. S/p joint aspiration, white count 2050k, 88% segs. No crystals noted. Culture NG. Xray of wrist with no acute concerns.      Otherwise, afebrile. HDS. OR cultures 8/6 L thigh now show Staph A.  Leg pain improved        Recommendations:   · Continue Cefazolin 2g q24 (renally dosed per pharmacy) for MSSA SSTI.   · FU Ortho plans  · Nephrology ANGE management.  · Anticipate SSTI course unless new concerns for osteo appear - will rediscuss with ortho re:cf bone involvement.  · Patient and plan reviewed with ID staff, Dr. Samson. ID will follow.

## 2022-08-09 NOTE — PROGRESS NOTES
[facility] - Telemetry Stepdown  Infectious Disease  Progress Note    Patient Name: Wilfredo Thomas  MRN: [id number]  Admission Date: 7/26/2022  Length of Stay: 13 days  Attending Physician: Dwayne Montes MD  Primary Care Provider: Aylin Wayne DO    Isolation Status: No active isolations  Assessment/Plan:      * Abscess of left thigh     48 yo male with history of DMII and HTN admitted for DKA and left thigh abscess. MRI of left femur was notable for ruptured tensor fascia jose luis mm with associated large hematoma, as well as small area of potential osteonecrosis. Bedside aspiration of the fluid collection was notable for a cell count of >200k (>80%segs), cultures + staph species. He was taken to the OR on 7/29 with orthopedic surgery for surgical I&D. Surgical cultures are positive for MSSA. Per ortho surgery, no concerns for bone involvement. Patient was initially on Vancomycin and Zosyn. Vanc trough supratherapeutic and patient has developed an ANGE. He is currently on Cefazolin.       Recent new onset left wrist pain, states it has increased today. He is s/p wrist arthroscopy with hardware placement in 3/2022. Wife noticed a boil on his wrist. Swelling and TTP today. S/p joint aspiration, white count 2050k, 88% segs. No crystals noted. Culture NG. Xray of wrist with no acute concerns.      Otherwise, afebrile. HDS. OR cultures 8/6 L thigh now show Staph A.  Leg pain improved        Recommendations:   · Continue Cefazolin 2g q24 (renally dosed per pharmacy) for MSSA SSTI.   · FU Ortho plans  · Nephrology ANGE management.  · Anticipate SSTI course unless new concerns for osteo appear - will rediscuss with ortho re:cf bone involvement.  · Given MSSA infection and source unclear - check 2D echo  · Patient and plan reviewed with ID staff, Dr. Samson. ID will follow.             Anticipated Disposition: tbd    Thank you for your consult. I will follow-up with patient. Please contact us if you have any additional  questions.    DARLYN Thomas  Infectious Disease  [facility] - Telemetry Stepdown    Subjective:     Principal Problem:Abscess of left thigh    HPI: Mr. Thomas is a 47 year old male with a PMH of DM2 (poorly controlled), HTN, GERD, HLD, obesity who initially presented to [facility] ED with cc of SOBx1 day. Pt was also reporting significant pain and swelling along his left lateral proximal thigh. Pt reports this pain started over the last 2 weeks, which worsened. He initially was seen outpatient in which he was diagnoised with a mm strain and given 800 mg ibuprofen. From there he went to the ED on 7/22 d/t worsening pain. He was treated with prednisone/morphine shot, and well as a prednisone oral pack. Pt denied recent injury, with the exception of soreness to his left lower shin following bumping into a cabinet. He denies open wounds/abrasions from this injury but states the soreness started in his left thigh following the improvement of discomfort from his left shin. Pt reports having been treated for a boil on his central/right buttocks in the end of June. 2022. Pt states he was seen in Urgent Care in [place] and the boil was drained. He was given an oral abx in which he completed.     To note, pt reports having a recent left wrist fracture, ligament rupture following an accident with his riding lawnmower. States he underwent surgery and 2 screws were placed on 3/31/22 at [facility]. Denies associated infection/complications. Denies pain in the site currently.    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 Denies having pets. Denies recent fishing/hunting. Denies hx of immunocompromising conditions.     To note, pt was found to be in DKA with blood sugars >400, treated by critical team, which has since resolved. Pt was recently started on insulin by his PCP, which he had not started. Pt latest A1C 10.     CT of left thigh notable for fluid collections suspicious for  hematoma/seroma. MRI of left femur was notable for ruptured tensor fascia jose luis mm with associated large hematoma, as well as small area of potential osteonecrosis. Bedside aspiration of the fluid collection was notable for a cell count of >200k >80%segs, cultures + staph spp. Pt was taken for I&D with Dr. Graff with ortho surgery on 7/29, abscesses were noted and washed out, cultures collected + staph spp. Per pt, states that Dr. Graff is planning to return to surgery tomorrow, 8/1/22 for repeat washout.     Pt was intially with leukocytosis, but has since down trended to 11k. Blood cultures from 7/26 NGTD.     Pt is currently on zosyn, vancomycin, and clindamycin. ID was consulted for abx recs regarding left TFL infection.            Interval History:   No AEON.   Worsening ANGE, on IVF, reporting urination. Cr to 8s today.   L thigh 80% better  All thigh cultures with SA/MSSA  The patient denies any recent fever, chills, or sweats.        Review of Systems   Constitutional:  Negative for chills, diaphoresis, fatigue and fever.   Respiratory:  Negative for cough and shortness of breath.    Cardiovascular:  Positive for leg swelling (upper left thigh). Negative for chest pain and palpitations.   Gastrointestinal:  Negative for abdominal distention and abdominal pain.   Genitourinary:  Negative for difficulty urinating and dysuria.   Musculoskeletal:  Positive for arthralgias (left wrist), joint swelling (left wrist) and myalgias (upper left thigh).   Skin:  Positive for wound (upper left thigh). Negative for color change and rash.   Allergic/Immunologic: Negative for immunocompromised state.   Neurological:  Negative for dizziness, tremors, weakness, numbness and headaches.   Psychiatric/Behavioral:  Negative for agitation and decreased concentration. The patient is not nervous/anxious.    Objective:     Vital Signs (Most Recent):  Temp: 98.5 °F (36.9 °C) (08/08/22 1937)  Pulse: 76 (08/08/22 1937)  Resp: 18  (08/08/22 2122)  BP: (!) 144/85 (08/08/22 1937)  SpO2: 99 % (08/08/22 1937)   Vital Signs (24h Range):  Temp:  [97.8 °F (36.6 °C)-98.5 °F (36.9 °C)] 98.5 °F (36.9 °C)  Pulse:  [76-85] 76  Resp:  [16-19] 18  SpO2:  [92 %-99 %] 99 %  BP: (131-144)/(63-86) 144/85     Weight: 104.3 kg (230 lb)  Body mass index is 36.02 kg/m².    Estimated Creatinine Clearance: 12.7 mL/min (A) (based on SCr of 8.1 mg/dL (H)).    Physical Exam  Vitals and nursing note reviewed.   Constitutional:       General: He is not in acute distress.     Appearance: Normal appearance. He is well-developed. He is obese. He is not ill-appearing, toxic-appearing or diaphoretic.   HENT:      Head: Normocephalic and atraumatic.      Nose: Nose normal.      Mouth/Throat:      Dentition: Does not have dentures.      Pharynx: No oropharyngeal exudate.   Eyes:      General: No scleral icterus.     Conjunctiva/sclera: Conjunctivae normal.   Cardiovascular:      Rate and Rhythm: Normal rate and regular rhythm.   Pulmonary:      Effort: Pulmonary effort is normal. No respiratory distress.      Breath sounds: Normal breath sounds.   Abdominal:      General: There is no distension.      Palpations: Abdomen is soft.      Tenderness: There is no abdominal tenderness.   Musculoskeletal:         General: Swelling (left wrist) present.   Skin:     General: Skin is warm and dry.      Findings: No erythema or rash.      Comments: Incision to left thigh. Dressed, CDI. Surgical drain with justo bloody output.   Neurological:      General: No focal deficit present.      Mental Status: He is alert and oriented to person, place, and time. Mental status is at baseline.      Motor: No weakness.      Gait: Gait normal.   Psychiatric:         Mood and Affect: Mood normal.         Behavior: Behavior normal.         Thought Content: Thought content normal.         Judgment: Judgment normal.       Significant Labs: CBC:   Recent Labs   Lab 08/07/22  0721 08/08/22  0725   WBC 9.92 6.65    HGB 9.9* 9.8*   HCT 29.2* 30.2*    262       CMP:   Recent Labs   Lab 08/07/22  0721 08/08/22  0725   * 131*   K 4.9 3.9    99   CO2 22* 21*   * 85   BUN 40* 48*   CREATININE 7.4* 8.1*   CALCIUM 9.0 8.8   PROT 6.1 6.2   ALBUMIN 1.7* 1.9*   BILITOT 0.2 0.2   ALKPHOS 72 78   AST 10 12   ALT <5* <5*   ANIONGAP 9 11       Microbiology Results (last 7 days)       Procedure Component Value Units Date/Time    AFB Culture & Smear [[id number]] Collected: 08/05/22 0817    Order Status: Completed Specimen: Joint Fluid from Wrist, Left Updated: 08/08/22 1529     AFB Culture & Smear Culture in progress     AFB CULTURE STAIN No acid fast bacilli seen.    Aerobic culture [[id number]]  (Abnormal) Collected: 08/06/22 1153    Order Status: Completed Specimen: Wound from Hip, Left Updated: 08/08/22 1040     Aerobic Bacterial Culture STAPHYLOCOCCUS AUREUS  Moderate  For susceptibility see order #[id number]      Narrative:      #1 Left hip fluid    Aerobic culture [[id number]]  (Abnormal) Collected: 08/06/22 1153    Order Status: Completed Specimen: Wound from Hip, Left Updated: 08/08/22 1038     Aerobic Bacterial Culture STAPHYLOCOCCUS AUREUS  Few  Susceptibility pending      Narrative:      #2 Left hip fluid    Blood culture [[id number]] Collected: 08/05/22 0850    Order Status: Completed Specimen: Blood from Antecubital, Right Arm Updated: 08/08/22 1012     Blood Culture, Routine No Growth to date      No Growth to date      No Growth to date      No Growth to date    Blood culture [[id number]] Collected: 08/05/22 0852    Order Status: Completed Specimen: Blood from Peripheral, Right Hand Updated: 08/08/22 1012     Blood Culture, Routine No Growth to date      No Growth to date      No Growth to date      No Growth to date    Aerobic culture [[id number]] Collected: 08/05/22 0817    Order Status: Completed Specimen: Bone from Wrist, Left Updated: 08/08/22 0540     Aerobic Bacterial Culture No growth    Culture,  Anaerobe [[id number]] Collected: 08/06/22 1153    Order Status: Completed Specimen: Body Fluid from Hip, Left Updated: 08/07/22 1159     Anaerobic Culture Culture in progress    Narrative:      #2 Left hip fluid    Culture, Anaerobe [[id number]] Collected: 08/06/22 1153    Order Status: Completed Specimen: Body Fluid from Hip, Left Updated: 08/07/22 1159     Anaerobic Culture Culture in progress    Narrative:      #1 Left hip fluid    Gram stain [[id number]] Collected: 08/05/22 0817    Order Status: Completed Specimen: Joint Fluid from Wrist, Left Updated: 08/05/22 1038     Gram Stain Result No WBC's      No organisms seen    Fungus culture [[id number]] Collected: 08/05/22 0817    Order Status: Sent Specimen: Joint Fluid from Wrist, Left Updated: 08/05/22 0901    Culture, Anaerobe [[id number]] Collected: 08/05/22 0817    Order Status: Sent Specimen: Joint Fluid from Wrist, Left Updated: 08/05/22 0818    Aerobic culture [[id number]]  (Abnormal) Collected: 07/29/22 1251    Order Status: Completed Specimen: Abscess from Leg, Left Updated: 08/04/22 1250     Aerobic Bacterial Culture STAPHYLOCOCCUS AUREUS  Many  For susceptibility see order #[id number]      Narrative:      Left thigh abscess #1    Aerobic culture [[id number]]  (Abnormal)  (Susceptibility) Collected: 07/28/22 1800    Order Status: Completed Specimen: Abscess from Leg, Left Updated: 08/04/22 1249     Aerobic Bacterial Culture STAPHYLOCOCCUS AUREUS  Many      Narrative:      LEFT THIGH ABSCESS    Aerobic culture [[id number]]  (Abnormal)  (Susceptibility) Collected: 07/29/22 1300    Order Status: Completed Specimen: Abscess from Leg, Left Updated: 08/04/22 1248     Aerobic Bacterial Culture STAPHYLOCOCCUS AUREUS  Many      Narrative:      Left thigh abscess #2    Fungus culture [[id number]] Collected: 07/29/22 1251    Order Status: Completed Specimen: Abscess from Leg, Left Updated: 08/02/22 1335     Fungus (Mycology) Culture Culture in progress    Narrative:       Left thigh abscess #1    Fungus culture [[id number]] Collected: 07/29/22 1300    Order Status: Completed Specimen: Abscess from Leg, Left Updated: 08/02/22 1335     Fungus (Mycology) Culture Culture in progress    Narrative:      Left thigh abscess #2    Fungus culture [[id number]] Collected: 07/28/22 1800    Order Status: Completed Specimen: Abscess from Leg, Left Updated: 08/02/22 1334     Fungus (Mycology) Culture Culture in progress    Narrative:      LEFT THIGH ABSCESS    Culture, Anaerobic [[id number]] Collected: 07/28/22 1800    Order Status: Completed Specimen: Abscess from Leg, Left Updated: 08/02/22 0853     Anaerobic Culture No anaerobes isolated    Narrative:      LEFT THIGH ABSCESS    Culture, Anaerobe [[id number]] Collected: 07/29/22 1300    Order Status: Completed Specimen: Abscess from Leg, Left Updated: 08/02/22 0852     Anaerobic Culture No anaerobes isolated    Narrative:      Left thigh abscess #2    Culture, Anaerobe [[id number]] Collected: 07/29/22 1251    Order Status: Completed Specimen: Abscess from Leg, Left Updated: 08/02/22 0851     Anaerobic Culture No anaerobes isolated    Narrative:      Left thigh abscess #1          Recent Lab Results  (Last 5 results in the past 24 hours)        08/08/22  2113   08/08/22  1526   08/08/22  1141   08/08/22  0725   08/08/22  0718        Albumin       1.9         Alkaline Phosphatase       78         ALT       <5         Anion Gap       11         AST       12         Baso #       0.09         Basophil %       1.4         BILIRUBIN TOTAL       0.2  Comment: For infants and newborns, interpretation of results should be based  on gestational age, weight and in agreement with clinical  observations.    Premature Infant recommended reference ranges:  Up to 24 hours.............<8.0 mg/dL  Up to 48 hours............<12.0 mg/dL  3-5 days..................<15.0 mg/dL  6-29 days.................<15.0 mg/dL           BUN       48         Calcium       8.8          Chloride       99         CO2       21         Creatinine       8.1         CRP       51.4         Differential Method       Automated         eGFR       7.5         Eos #       0.3         Eosinophil %       4.1         Glucose       85         Gran # (ANC)       4.8         Gran %       71.9         Hematocrit       30.2         Hemoglobin       9.8         Immature Grans (Abs)       0.06  Comment: Mild elevation in immature granulocytes is non specific and   can be seen in a variety of conditions including stress response,   acute inflammation, trauma and pregnancy. Correlation with other   laboratory and clinical findings is essential.           Immature Granulocytes       0.9         Lymph #       1.1         Lymph %       15.8         Magnesium       1.9         MCH       30.2         MCHC       32.5         MCV       93         Mono #       0.4         Mono %       5.9         MPV       9.0         nRBC       0         Phosphorus       6.2         Platelets       262         POCT Glucose 86   142   82     93       Potassium       3.9         PROTEIN TOTAL       6.2         RBC       3.25         RDW       12.8         Sodium       131         Vancomycin, Random       21.4         WBC       6.65                                Significant Imaging:     Imaging Results              CT Thigh With Contrast Left (Final result)  Result time 07/27/22 01:13:09      Final result by Jori Hopkins DO (07/27/22 01:13:09)                   Impression:      Large heterogeneous fluid collection in the anterolateral left proximal thigh soft tissues, concerning for a soft tissue hematoma or Preston-Melissa lesion.  A neoplastic process is not entirely excluded.  Recommend close interval follow-up to assess for stability/resolution.      Electronically signed by: Jori Hopkins  Date:    07/27/2022  Time:    01:13               Narrative:    EXAMINATION:  CT THIGH WITH CONTRAST LEFT    CLINICAL HISTORY:  Soft tissue mass,  thigh, deep;    TECHNIQUE:  Axial CT images of the left thigh with sagittal and coronal reformats after the intravenous administration of 50 mL Omnipaque 350.    COMPARISON:  None.    FINDINGS:  Bone: Bone mineralization is normal.  There is no evidence of an acute fracture or dislocation.  Alignment is normal.    Soft tissues: There is a large heterogeneous fluid collection in the left anterolateral proximal thigh measuring approximately 16 x 7 x 6 cm.  The collection appears to be centered within the subcutaneous tissues or superficial fascia and abuts the gluteus musculature, the sartorius muscle, the rectus femoris, and the vastus lateralis.  There is soft tissue edema in the surrounding subcutaneous tissues.  Muscle bulk is normal.    Articulations: The left femoroacetabular joint is unremarkable.  The pubic symphysis is unremarkable.  The left knee is unremarkable.  No large joint effusion or significant cartilage loss.    Miscellaneous: Neurovascular structures are grossly intact.  There is moderate calcified atherosclerosis of the left femoral and popliteal arteries.                                       CTA Chest Non-Coronary (PE Study) (Final result)  Result time 07/26/22 21:36:01      Final result by Jori Hopkins DO (07/26/22 21:36:01)                   Impression:      No large central or lobar pulmonary embolism.      Electronically signed by: Jori Hopkins  Date:    07/26/2022  Time:    21:36               Narrative:    EXAMINATION:  CTA CHEST NON CORONARY    CLINICAL HISTORY:  Pulmonary embolism (PE) suspected, high prob;    TECHNIQUE:  Low dose axial images, sagittal and coronal reformations were obtained from the thoracic inlet to the lung bases following the IV administration of 100 mL of Omnipaque 350.  Contrast timing was optimized to evaluate the pulmonary arteries.  Maximum intensity projection images were provided for review.    COMPARISON:  Chest radiograph from earlier the same  date.    FINDINGS:  Pulmonary vasculature: Satisfactory opacification of the pulmonary arterial system.  Motion artifact significantly limits evaluation of the segmental and subsegmental pulmonary arteries.  There is no large central or lobar pulmonary embolism.    Aorta: Left-sided aortic arch.  No aneurysm and no significant atherosclerosis.    Base of Neck: No significant abnormality.    Thoracic soft tissues: Normal.    Heart: Normal size. No effusion.    Amena/Mediastinum: No pathologic russ enlargement.    Airways: The large airways are patent. No foci of endobronchial filling.    Lungs/Pleura: Clear lungs. No pleural effusion or thickening.    Esophagus: Normal.    Upper Abdomen: No abnormality of the partially imaged upper abdomen.    Bones: No acute fracture. No suspicious lytic or sclerotic lesions.                                       X-Ray Chest 1 View (Final result)  Result time 07/26/22 21:23:42   Procedure changed from X-Ray Chest PA And Lateral     Final result by Olman Saucedo MD (07/26/22 21:23:42)                   Impression:      No convincing radiographic evidence of acute intrathoracic process on this single view..      Electronically signed by: Olman Saucedo MD  Date:    07/26/2022  Time:    21:23               Narrative:    EXAMINATION:  XR CHEST 1 VIEW    CLINICAL HISTORY:  shortness of breath;    TECHNIQUE:  Single frontal view of the chest was performed.    COMPARISON:  None    FINDINGS:  Cardiac monitoring leads overlie the chest.  Cardiac silhouette appears within normal limits.  No confluent airspace consolidation identified.  No significant volume of pleural fluid or pneumothorax appreciated.  The visualized osseous structures demonstrate mild degenerative changes.

## 2022-08-09 NOTE — ASSESSMENT & PLAN NOTE
ANGE non oliguric Scr worse today 8.8 from 8.1. had 900 cc of urineOPT  Likely vancomycin induced ANGE (ATI/ATN) in the setting of trough of 40 and also was on zosyn which will increase risk of vanco induced ANGE.   Continue to monitor RFP BID. No indication for Dialysis at this time   Bicarb 19 start sodium bicarb 1300 TID. K 5.1 (recommend renal diet if not on renal diet)   Strict Is and Os   Renal US normal   Euvolemic

## 2022-08-09 NOTE — SUBJECTIVE & OBJECTIVE
"Principal Problem:Abscess of left thigh    Principal Orthopedic Problem: Left thigh I&D 7/29 and 8/6  Staph species on thigh abscess cultures    Interval History  S/p repeat L thigh I&D 8/6/22. NAEON, patient stable, pain controlled. No acute complaints this morning. Drain with 5cc yesterday, 75cc since placement. Cx Staph aureus from I&D.      Review of patient's allergies indicates:  No Known Allergies    Current Facility-Administered Medications   Medication    acetaminophen tablet 1,000 mg    amLODIPine tablet 5 mg    atorvastatin tablet 20 mg    ceFAZolin 2 gram in dextrose 5% 100 mL IVPB (premix)    COVID-19 vac, forrest(Pfizer)(PF) (Pfizer COVID-19) 30 mcg/0.3 mL injection 0.3 mL    dextrose 10% bolus 125 mL    dextrose 10% bolus 250 mL    glucagon (human recombinant) injection 1 mg    glucose chewable tablet 16 g    glucose chewable tablet 24 g    heparin (porcine) injection 5,000 Units    insulin aspart U-100 pen 1-10 Units    insulin aspart U-100 pen 8 Units    insulin detemir U-100 pen 10 Units    morphine injection 3 mg    ondansetron injection 4 mg    oxyCODONE immediate release tablet 5 mg    senna-docusate 8.6-50 mg per tablet 2 tablet    sodium chloride 0.9% flush 10 mL     Objective:     Vital Signs (Most Recent):  Temp: 98 °F (36.7 °C) (08/09/22 0409)  Pulse: 79 (08/09/22 0409)  Resp: 20 (08/09/22 0409)  BP: (!) 141/79 (08/09/22 0409)  SpO2: (!) 94 % (08/09/22 0409) Vital Signs (24h Range):  Temp:  [98 °F (36.7 °C)-98.5 °F (36.9 °C)] 98 °F (36.7 °C)  Pulse:  [76-82] 79  Resp:  [16-20] 20  SpO2:  [94 %-99 %] 94 %  BP: (131-144)/(63-86) 141/79     Weight: 104.3 kg (230 lb)  Height: 5' 7" (170.2 cm)  Body mass index is 36.02 kg/m².      Intake/Output Summary (Last 24 hours) at 8/9/2022 0719  Last data filed at 8/8/2022 2200  Gross per 24 hour   Intake 1105 ml   Output 905 ml   Net 200 ml         Ortho/SPM Exam  Left lower extremity  Left thigh dressing cdi  Drain in place  No erythema of thigh, no " signficant swelling  Compartments soft and compressible  Painless ROM of hip and knee  NVI        CBC:   Recent Labs   Lab 08/07/22  0721 08/08/22  0725   WBC 9.92 6.65   HGB 9.9* 9.8*   HCT 29.2* 30.2*    262       CMP:   Recent Labs   Lab 08/07/22  0721 08/08/22  0725   * 131*   K 4.9 3.9    99   CO2 22* 21*   * 85   BUN 40* 48*   CREATININE 7.4* 8.1*   CALCIUM 9.0 8.8   PROT 6.1 6.2   ALBUMIN 1.7* 1.9*   BILITOT 0.2 0.2   ALKPHOS 72 78   AST 10 12   ALT <5* <5*   ANIONGAP 9 11         All pertinent labs within the past 24 hours have been reviewed.    Significant Imaging: I have reviewed and interpreted all pertinent imaging results/findings.

## 2022-08-09 NOTE — PROGRESS NOTES
[facility] - Telemetry Stepdown  Endocrinology  Progress Note    Admit Date: 7/26/2022     Reason for Consult: Management of T2DM, Hyperglycemia     Surgical Procedure and Date: L thigh IM abscess I&D on 07/29/2022    Diabetes diagnosis year: at age 43yo    Home Diabetes Medications:    - Metformin 500 mg bid  - Jardiance 25 mg qd  - Trulicity 3 mg SC weekly  - Lantus 10 units qd -- prescribed at the end of 3/2022 but pt has not yet started taking it    Previously on glipizide, but stopped in 03/2022 by PCP who is managing his DM outpatient    How often checking glucose at home?  2-3x per week    BG readings on regimen: he reports <200  Hypoglycemia on the regimen?  No  Missed doses on regimen?  Yes    Diabetes Complications include:     Hyperglycemia    Complicating diabetes co morbidities:   HTN, obesity      HPI:   Patient is a 47 y.o. male with a diagnosis of T2DM, HTN, HLD, GERD, and obesity presented to the ED on 07/26/2022 with SOB and altered mental status x1d. Additionally reported pain and swelling L lateral high. Was seen at an OSH ED for thigh pain and was treated conservatively with NSAIDS initially. At follow up appt w/ ortho 7/22 he received IM steroid inj (deltoid) and po prednisone for suspected muscle strain. Due to worsening AMS and SOB he presented to the [facility] ED on 7/26. In the ED he was found to be in DKA and as admitted to the MICU due to severe acidosis.     For the thigh pain, MRI showed a ruptured tensor fascia jose luis (TFL) muscle with associated large hematoma. Ortho decided to perform aspiration night of 7/28. Gram stain showed G+ cocci in clusters- now growing Staph Species.They decided to take pt to OR 7/29 for formal I&D.      For DKA he was started on IVF and an intensive insulin gtt with resolution of DKA. Transitioned to MDI on 7/30 AM. Initial regimen was detemir 10u BID and Aspart 4u AC + LDC. Insulin up-titrated by MICU team prior to stepdown, to detemir 12u bid and aspart 8u AC +  "LDC. Endocrinology consulted for management of hyperglycemia.      Interval HPI:   Overnight events: Changed Insulin regimen to Levemir 10 units BID and Novolog 8 units TID AC with low dose correction scaleJonh GRAY, however pt concerned about worsening renal function.    Eatin%  Nausea: No  Hypoglycemia and intervention: No  Fever: No  TPN and/or TF: No    BP (!) 174/95 (Patient Position: Lying)   Pulse 98   Temp 97.2 °F (36.2 °C) (Oral)   Resp 18   Ht 5' 7" (1.702 m)   Wt 104.3 kg (230 lb)   SpO2 98%   BMI 36.02 kg/m²     Labs Reviewed and Include    No results for input(s): GLU, CALCIUM, ALBUMIN, PROT, NA, K, CO2, CL, BUN, CREATININE, ALKPHOS, ALT, AST, BILITOT in the last 24 hours.  Lab Results   Component Value Date    WBC 6.65 2022    HGB 9.8 (L) 2022    HCT 30.2 (L) 2022    MCV 93 2022     2022     No results for input(s): TSH, FREET4 in the last 168 hours.  Lab Results   Component Value Date    HGBA1C 10.1 (H) 2022       Nutritional status:   Body mass index is 36.02 kg/m².  Lab Results   Component Value Date    ALBUMIN 1.9 (L) 2022    ALBUMIN 1.7 (L) 2022    ALBUMIN 1.5 (L) 2022     Lab Results   Component Value Date    PREALBUMIN 7 (L) 2022       Estimated Creatinine Clearance: 12.7 mL/min (A) (based on SCr of 8.1 mg/dL (H)).    Accu-Checks  Recent Labs     22  0743 22  1120 22  1526 22  2122 22  0718 22  1141 22  1526 22  2113 22  0724 22  0953   POCTGLUCOSE 181* 176* 150* 117* 93 82 142* 86 110 193*       Current Medications and/or Treatments Impacting Glycemic Control  Immunotherapy:    Immunosuppressants       None          Steroids:   Hormones (From admission, onward)                None          Pressors:    Autonomic Drugs (From admission, onward)                None          Hyperglycemia/Diabetes Medications:   Antihyperglycemics (From admission, onward)       "          Start     Stop Route Frequency Ordered    08/08/22 2100  insulin detemir U-100 pen 10 Units         -- SubQ 2 times daily 08/08/22 1357    08/08/22 1645  insulin aspart U-100 pen 8 Units         -- SubQ 3 times daily with meals 08/08/22 1357    07/30/22 0826  insulin aspart U-100 pen 1-10 Units         -- SubQ Before meals & nightly PRN 07/30/22 0727            ASSESSMENT and PLAN    Type 2 diabetes mellitus with hyperglycemia, with long-term current use of insulin  Key History and Diagnostic Findings  - Uncontrolled T2DM, pt not adherent to med regimen, not checking BG regularly at home  - P/w DKA with trigger infection/thigh abscess  - Home Regimen: metformin 500mg BID, Jardiance 25mg qd, Trulicity 3mg SC weekly, Lantus 10u qd   Pt never started insulin which was prescribed 3/2022. Not filling metformin regularly  - Status post repeat I&D of leg on 08/06/2022  - Glucose Goals: 140-180mg/dL  - 24 hour glucose trend:  Had hyperglycemia after I&D repeat procedure yesterday and not receiving correction insulin, better controlled this morning in the high 100s    Plan  - Continue Levemir 10 units BID  - Continue Aspart 8 units TIDWM with moderate correction scale    - Patient will need to be discharged on MDI insulin (prandial and basal)   - Other home meds to consider at d/c:    - Recommend holding jardiace until complete resolution of infection/acute illness. Because his presentation of DKA has a clear trigger (infection) and was not euglycemic DKA, unlikely that Jardiance was the cause. For these reasons, it would be reasonable to resume it outpatient after recovery from acute illness.   - Can resume metformin and trulicity at d/c, will re-visit dc recs when pt is ready for discharge    - Hypoglycemia protocol in place  - If blood glucose greater than 300, please ask patient not to eat food or drink anything other than water until correctional insulin has brought it back below 250    ** Please notify  Endocrine for any change and/or advance in diet**  ** Please call Endocrine for any BG related issues **        Dania Charles,   Endocrinology  [facility] - Telemetry Stepdown

## 2022-08-09 NOTE — CONSULTS
[facility] - [facility] Medicine  Telemedicine Consult Note    Patient Name: Wilfredo Thomas  MRN: [id number]  Admission Date: 7/26/2022  Hospital Length of Stay: 13 days  Attending Physician: Dwayne Montes MD   Primary Care Provider: Aylin Wayne DO         Thank you for your consult to [facility]. We have reviewed the patient chart. This patient does meet criteria for [facility] service at this time. Will assume care on 08/09/22 at 7AM.      Lyric Pichardo MD  Department of Hospital Medicine   [facility] - [facility]

## 2022-08-09 NOTE — SUBJECTIVE & OBJECTIVE
Interval History: no acute events overnight. Scr worse today 8.8 from 8.1. had 900 cc of urineOPT    Review of patient's allergies indicates:  No Known Allergies  Current Facility-Administered Medications   Medication Frequency    acetaminophen tablet 1,000 mg Q8H PRN    amLODIPine tablet 5 mg Daily    atorvastatin tablet 20 mg Daily    ceFAZolin 2 gram in dextrose 5% 100 mL IVPB (premix) Daily    COVID-19 vac, forrest(Pfizer)(PF) (Pfizer COVID-19) 30 mcg/0.3 mL injection 0.3 mL vaccine x 1 dose    dextrose 10% bolus 125 mL PRN    dextrose 10% bolus 250 mL PRN    glucagon (human recombinant) injection 1 mg PRN    glucose chewable tablet 16 g PRN    glucose chewable tablet 24 g PRN    heparin (porcine) injection 5,000 Units Q8H    insulin aspart U-100 pen 1-10 Units QID (AC + HS) PRN    insulin aspart U-100 pen 8 Units TIDWM    insulin detemir U-100 pen 10 Units BID    morphine injection 3 mg Q6H PRN    ondansetron injection 4 mg Q6H PRN    oxyCODONE immediate release tablet 5 mg Q4H PRN    senna-docusate 8.6-50 mg per tablet 2 tablet Daily    sodium chloride 0.9% flush 10 mL PRN       Objective:     Vital Signs (Most Recent):  Temp: 98 °F (36.7 °C) (08/09/22 1119)  Pulse: 77 (08/09/22 1119)  Resp: 18 (08/09/22 1119)  BP: 132/80 (08/09/22 1119)  SpO2: 99 % (08/09/22 1119)  O2 Device (Oxygen Therapy): room air (08/06/22 1405) Vital Signs (24h Range):  Temp:  [97.2 °F (36.2 °C)-98.5 °F (36.9 °C)] 98 °F (36.7 °C)  Pulse:  [76-98] 77  Resp:  [16-20] 18  SpO2:  [94 %-99 %] 99 %  BP: (132-174)/(70-95) 132/80     Weight: 104.3 kg (230 lb) (08/06/22 1023)  Body mass index is 36.02 kg/m².  Body surface area is 2.22 meters squared.    I/O last 3 completed shifts:  In: 1105 [P.O.:1100; I.V.:5]  Out: 1205 [Urine:1200; Drains:5]    Physical Exam  Vitals reviewed.   Constitutional:       Appearance: Normal appearance.   HENT:      Head: Normocephalic and atraumatic.      Right Ear: External ear normal.      Left Ear: External ear  normal.      Nose: Nose normal.      Mouth/Throat:      Pharynx: Oropharynx is clear.   Eyes:      Extraocular Movements: Extraocular movements intact.      Conjunctiva/sclera: Conjunctivae normal.   Cardiovascular:      Rate and Rhythm: Normal rate and regular rhythm.      Pulses: Normal pulses.   Pulmonary:      Effort: Pulmonary effort is normal.      Breath sounds: Normal breath sounds.   Abdominal:      General: Abdomen is flat. There is no distension.   Musculoskeletal:         General: Normal range of motion.      Cervical back: Normal range of motion.   Skin:     General: Skin is warm and dry.   Neurological:      General: No focal deficit present.      Mental Status: He is alert and oriented to person, place, and time.   Psychiatric:         Mood and Affect: Mood normal.         Behavior: Behavior normal.       Significant Labs:  All labs within the past 24 hours have been reviewed.

## 2022-08-09 NOTE — ASSESSMENT & PLAN NOTE
Key History and Diagnostic Findings  - Uncontrolled T2DM, pt not adherent to med regimen, not checking BG regularly at home  - P/w DKA with trigger infection/thigh abscess  - Home Regimen: metformin 500mg BID, Jardiance 25mg qd, Trulicity 3mg SC weekly, Lantus 10u qd   Pt never started insulin which was prescribed 3/2022. Not filling metformin regularly  - Status post repeat I&D of leg on 08/06/2022  - Glucose Goals: 140-180mg/dL  - 24 hour glucose trend:  Had hyperglycemia after I&D repeat procedure yesterday and not receiving correction insulin, better controlled this morning in the high 100s    Plan  - Continue Levemir 10 units BID  - Continue Aspart 8 units TIDWM with moderate correction scale    - Patient will need to be discharged on MDI insulin (prandial and basal)   - Other home meds to consider at d/c:    - Recommend holding jardiace until complete resolution of infection/acute illness. Because his presentation of DKA has a clear trigger (infection) and was not euglycemic DKA, unlikely that Jardiance was the cause. For these reasons, it would be reasonable to resume it outpatient after recovery from acute illness.   - Can resume metformin and trulicity at d/c, will re-visit dc recs when pt is ready for discharge    - Hypoglycemia protocol in place  - If blood glucose greater than 300, please ask patient not to eat food or drink anything other than water until correctional insulin has brought it back below 250    ** Please notify Endocrine for any change and/or advance in diet**  ** Please call Endocrine for any BG related issues **

## 2022-08-09 NOTE — SUBJECTIVE & OBJECTIVE
"Interval HPI:   Overnight events: Changed Insulin regimen to Levemir 10 units BID and Novolog 8 units TID AC with low dose correction scale. NAEON, however pt concerned about worsening renal function.    Eatin%  Nausea: No  Hypoglycemia and intervention: No  Fever: No  TPN and/or TF: No    BP (!) 174/95 (Patient Position: Lying)   Pulse 98   Temp 97.2 °F (36.2 °C) (Oral)   Resp 18   Ht 5' 7" (1.702 m)   Wt 104.3 kg (230 lb)   SpO2 98%   BMI 36.02 kg/m²     Labs Reviewed and Include    No results for input(s): GLU, CALCIUM, ALBUMIN, PROT, NA, K, CO2, CL, BUN, CREATININE, ALKPHOS, ALT, AST, BILITOT in the last 24 hours.  Lab Results   Component Value Date    WBC 6.65 2022    HGB 9.8 (L) 2022    HCT 30.2 (L) 2022    MCV 93 2022     2022     No results for input(s): TSH, FREET4 in the last 168 hours.  Lab Results   Component Value Date    HGBA1C 10.1 (H) 2022       Nutritional status:   Body mass index is 36.02 kg/m².  Lab Results   Component Value Date    ALBUMIN 1.9 (L) 2022    ALBUMIN 1.7 (L) 2022    ALBUMIN 1.5 (L) 2022     Lab Results   Component Value Date    PREALBUMIN 7 (L) 2022       Estimated Creatinine Clearance: 12.7 mL/min (A) (based on SCr of 8.1 mg/dL (H)).    Accu-Checks  Recent Labs     22  0743 22  1120 22  1526 22  2122 22  0718 22  1141 22  1526 22  2113 22  0724 22  0953   POCTGLUCOSE 181* 176* 150* 117* 93 82 142* 86 110 193*       Current Medications and/or Treatments Impacting Glycemic Control  Immunotherapy:    Immunosuppressants       None          Steroids:   Hormones (From admission, onward)                None          Pressors:    Autonomic Drugs (From admission, onward)                None          Hyperglycemia/Diabetes Medications:   Antihyperglycemics (From admission, onward)                Start     Stop Route Frequency Ordered    22 2100  " insulin detemir U-100 pen 10 Units         -- SubQ 2 times daily 08/08/22 1357    08/08/22 1645  insulin aspart U-100 pen 8 Units         -- SubQ 3 times daily with meals 08/08/22 1357    07/30/22 0826  insulin aspart U-100 pen 1-10 Units         -- SubQ Before meals & nightly PRN 07/30/22 0727

## 2022-08-09 NOTE — PROGRESS NOTES
[facility] - Telemetry Stepdown  Orthopedics  Progress Note    Patient Name: Wilfredo Thomas  MRN: [id number]  Admission Date: 7/26/2022  Hospital Length of Stay: 14 days  Attending Provider: Lyric Pichardo MD  Primary Care Provider: Aylin Wayne DO  Follow-up For: Procedure(s) (LRB):  Incision and Drainage - LEFT THIGH. (Left)    Post-Operative Day: 3 Days Post-Op  Subjective:     Principal Problem:Abscess of left thigh    Principal Orthopedic Problem: Left thigh I&D 7/29 and 8/6  Staph species on thigh abscess cultures    Interval History  S/p repeat L thigh I&D 8/6/22. NAEON, patient stable, pain controlled. No acute complaints this morning. Drain with 5cc yesterday, 75cc since placement. Cx Staph aureus from I&D.      Review of patient's allergies indicates:  No Known Allergies    Current Facility-Administered Medications   Medication    acetaminophen tablet 1,000 mg    amLODIPine tablet 5 mg    atorvastatin tablet 20 mg    ceFAZolin 2 gram in dextrose 5% 100 mL IVPB (premix)    COVID-19 vac, forrest(Pfizer)(PF) (Pfizer COVID-19) 30 mcg/0.3 mL injection 0.3 mL    dextrose 10% bolus 125 mL    dextrose 10% bolus 250 mL    glucagon (human recombinant) injection 1 mg    glucose chewable tablet 16 g    glucose chewable tablet 24 g    heparin (porcine) injection 5,000 Units    insulin aspart U-100 pen 1-10 Units    insulin aspart U-100 pen 8 Units    insulin detemir U-100 pen 10 Units    morphine injection 3 mg    ondansetron injection 4 mg    oxyCODONE immediate release tablet 5 mg    senna-docusate 8.6-50 mg per tablet 2 tablet    sodium chloride 0.9% flush 10 mL     Objective:     Vital Signs (Most Recent):  Temp: 98 °F (36.7 °C) (08/09/22 0409)  Pulse: 79 (08/09/22 0409)  Resp: 20 (08/09/22 0409)  BP: (!) 141/79 (08/09/22 0409)  SpO2: (!) 94 % (08/09/22 0409) Vital Signs (24h Range):  Temp:  [98 °F (36.7 °C)-98.5 °F (36.9 °C)] 98 °F (36.7 °C)  Pulse:  [76-82] 79  Resp:  [16-20] 20  SpO2:  [94 %-99  "%] 94 %  BP: (131-144)/(63-86) 141/79     Weight: 104.3 kg (230 lb)  Height: 5' 7" (170.2 cm)  Body mass index is 36.02 kg/m².      Intake/Output Summary (Last 24 hours) at 8/9/2022 0719  Last data filed at 8/8/2022 2200  Gross per 24 hour   Intake 1105 ml   Output 905 ml   Net 200 ml         Ortho/SPM Exam  Left lower extremity  Left thigh dressing cdi  Drain in place  No erythema of thigh, no signficant swelling  Compartments soft and compressible  Painless ROM of hip and knee  NVI        CBC:   Recent Labs   Lab 08/07/22  0721 08/08/22  0725   WBC 9.92 6.65   HGB 9.9* 9.8*   HCT 29.2* 30.2*    262       CMP:   Recent Labs   Lab 08/07/22 0721 08/08/22  0725   * 131*   K 4.9 3.9    99   CO2 22* 21*   * 85   BUN 40* 48*   CREATININE 7.4* 8.1*   CALCIUM 9.0 8.8   PROT 6.1 6.2   ALBUMIN 1.7* 1.9*   BILITOT 0.2 0.2   ALKPHOS 72 78   AST 10 12   ALT <5* <5*   ANIONGAP 9 11         All pertinent labs within the past 24 hours have been reviewed.    Significant Imaging: I have reviewed and interpreted all pertinent imaging results/findings.      Assessment/Plan:     * Abscess of left thigh  47M with DM admitted 7/26/22 for DKA, ortho following for left lateral thigh abscess. s/p I&D and drain placement left thigh 7/29/22. Cx +MSSA. Had high drain purulent output after first I&D, was taken back to OR 8/6/22 for repeat I&D. Drain in place, dressings CDI. Nephrology on board for ANGE.     -- ANGE nephrology managing   -- DVT SQH  -- Ancef, MSSA from first I&D    -- Pain MM  -- Drain 5cc yesterday, will remove Friday 8/12 if no increase in output  -- CRP downtrending  -- L wrist aspiration non-concerning for septic arthritis    Dispo: pending    Left leg pain               rAon Vieyra MD  Orthopedics  [facility] - Telemetry Stepdown  "

## 2022-08-09 NOTE — SUBJECTIVE & OBJECTIVE
Interval History:   No AEON.   Worsening ANGE, on IVF, reporting urination. Cr to 8s today.   L thigh 80% better  All thigh cultures with SA/MSSA  The patient denies any recent fever, chills, or sweats.        Review of Systems   Constitutional:  Negative for chills, diaphoresis, fatigue and fever.   Respiratory:  Negative for cough and shortness of breath.    Cardiovascular:  Positive for leg swelling (upper left thigh). Negative for chest pain and palpitations.   Gastrointestinal:  Negative for abdominal distention and abdominal pain.   Genitourinary:  Negative for difficulty urinating and dysuria.   Musculoskeletal:  Positive for arthralgias (left wrist), joint swelling (left wrist) and myalgias (upper left thigh).   Skin:  Positive for wound (upper left thigh). Negative for color change and rash.   Allergic/Immunologic: Negative for immunocompromised state.   Neurological:  Negative for dizziness, tremors, weakness, numbness and headaches.   Psychiatric/Behavioral:  Negative for agitation and decreased concentration. The patient is not nervous/anxious.    Objective:     Vital Signs (Most Recent):  Temp: 98.5 °F (36.9 °C) (08/08/22 1937)  Pulse: 76 (08/08/22 1937)  Resp: 18 (08/08/22 2122)  BP: (!) 144/85 (08/08/22 1937)  SpO2: 99 % (08/08/22 1937)   Vital Signs (24h Range):  Temp:  [97.8 °F (36.6 °C)-98.5 °F (36.9 °C)] 98.5 °F (36.9 °C)  Pulse:  [76-85] 76  Resp:  [16-19] 18  SpO2:  [92 %-99 %] 99 %  BP: (131-144)/(63-86) 144/85     Weight: 104.3 kg (230 lb)  Body mass index is 36.02 kg/m².    Estimated Creatinine Clearance: 12.7 mL/min (A) (based on SCr of 8.1 mg/dL (H)).    Physical Exam  Vitals and nursing note reviewed.   Constitutional:       General: He is not in acute distress.     Appearance: Normal appearance. He is well-developed. He is obese. He is not ill-appearing, toxic-appearing or diaphoretic.   HENT:      Head: Normocephalic and atraumatic.      Nose: Nose normal.      Mouth/Throat:       Dentition: Does not have dentures.      Pharynx: No oropharyngeal exudate.   Eyes:      General: No scleral icterus.     Conjunctiva/sclera: Conjunctivae normal.   Cardiovascular:      Rate and Rhythm: Normal rate and regular rhythm.   Pulmonary:      Effort: Pulmonary effort is normal. No respiratory distress.      Breath sounds: Normal breath sounds.   Abdominal:      General: There is no distension.      Palpations: Abdomen is soft.      Tenderness: There is no abdominal tenderness.   Musculoskeletal:         General: Swelling (left wrist) present.   Skin:     General: Skin is warm and dry.      Findings: No erythema or rash.      Comments: Incision to left thigh. Dressed, CDI. Surgical drain with justo bloody output.   Neurological:      General: No focal deficit present.      Mental Status: He is alert and oriented to person, place, and time. Mental status is at baseline.      Motor: No weakness.      Gait: Gait normal.   Psychiatric:         Mood and Affect: Mood normal.         Behavior: Behavior normal.         Thought Content: Thought content normal.         Judgment: Judgment normal.       Significant Labs: CBC:   Recent Labs   Lab 08/07/22  0721 08/08/22  0725   WBC 9.92 6.65   HGB 9.9* 9.8*   HCT 29.2* 30.2*    262       CMP:   Recent Labs   Lab 08/07/22  0721 08/08/22  0725   * 131*   K 4.9 3.9    99   CO2 22* 21*   * 85   BUN 40* 48*   CREATININE 7.4* 8.1*   CALCIUM 9.0 8.8   PROT 6.1 6.2   ALBUMIN 1.7* 1.9*   BILITOT 0.2 0.2   ALKPHOS 72 78   AST 10 12   ALT <5* <5*   ANIONGAP 9 11       Microbiology Results (last 7 days)       Procedure Component Value Units Date/Time    AFB Culture & Smear [[id number]] Collected: 08/05/22 0817    Order Status: Completed Specimen: Joint Fluid from Wrist, Left Updated: 08/08/22 1529     AFB Culture & Smear Culture in progress     AFB CULTURE STAIN No acid fast bacilli seen.    Aerobic culture [[id number]]  (Abnormal) Collected: 08/06/22 2597     Order Status: Completed Specimen: Wound from Hip, Left Updated: 08/08/22 1040     Aerobic Bacterial Culture STAPHYLOCOCCUS AUREUS  Moderate  For susceptibility see order #[id number]      Narrative:      #1 Left hip fluid    Aerobic culture [[id number]]  (Abnormal) Collected: 08/06/22 1153    Order Status: Completed Specimen: Wound from Hip, Left Updated: 08/08/22 1038     Aerobic Bacterial Culture STAPHYLOCOCCUS AUREUS  Few  Susceptibility pending      Narrative:      #2 Left hip fluid    Blood culture [[id number]] Collected: 08/05/22 0850    Order Status: Completed Specimen: Blood from Antecubital, Right Arm Updated: 08/08/22 1012     Blood Culture, Routine No Growth to date      No Growth to date      No Growth to date      No Growth to date    Blood culture [[id number]] Collected: 08/05/22 0852    Order Status: Completed Specimen: Blood from Peripheral, Right Hand Updated: 08/08/22 1012     Blood Culture, Routine No Growth to date      No Growth to date      No Growth to date      No Growth to date    Aerobic culture [[id number]] Collected: 08/05/22 0817    Order Status: Completed Specimen: Bone from Wrist, Left Updated: 08/08/22 0540     Aerobic Bacterial Culture No growth    Culture, Anaerobe [[id number]] Collected: 08/06/22 1153    Order Status: Completed Specimen: Body Fluid from Hip, Left Updated: 08/07/22 1159     Anaerobic Culture Culture in progress    Narrative:      #2 Left hip fluid    Culture, Anaerobe [[id number]] Collected: 08/06/22 1153    Order Status: Completed Specimen: Body Fluid from Hip, Left Updated: 08/07/22 1159     Anaerobic Culture Culture in progress    Narrative:      #1 Left hip fluid    Gram stain [[id number]] Collected: 08/05/22 0817    Order Status: Completed Specimen: Joint Fluid from Wrist, Left Updated: 08/05/22 1038     Gram Stain Result No WBC's      No organisms seen    Fungus culture [[id number]] Collected: 08/05/22 0817    Order Status: Sent Specimen: Joint Fluid from Wrist, Left  Updated: 08/05/22 0901    Culture, Anaerobe [[id number]] Collected: 08/05/22 0817    Order Status: Sent Specimen: Joint Fluid from Wrist, Left Updated: 08/05/22 0818    Aerobic culture [[id number]]  (Abnormal) Collected: 07/29/22 1251    Order Status: Completed Specimen: Abscess from Leg, Left Updated: 08/04/22 1250     Aerobic Bacterial Culture STAPHYLOCOCCUS AUREUS  Many  For susceptibility see order #[id number]      Narrative:      Left thigh abscess #1    Aerobic culture [[id number]]  (Abnormal)  (Susceptibility) Collected: 07/28/22 1800    Order Status: Completed Specimen: Abscess from Leg, Left Updated: 08/04/22 1249     Aerobic Bacterial Culture STAPHYLOCOCCUS AUREUS  Many      Narrative:      LEFT THIGH ABSCESS    Aerobic culture [[id number]]  (Abnormal)  (Susceptibility) Collected: 07/29/22 1300    Order Status: Completed Specimen: Abscess from Leg, Left Updated: 08/04/22 1248     Aerobic Bacterial Culture STAPHYLOCOCCUS AUREUS  Many      Narrative:      Left thigh abscess #2    Fungus culture [[id number]] Collected: 07/29/22 1251    Order Status: Completed Specimen: Abscess from Leg, Left Updated: 08/02/22 1335     Fungus (Mycology) Culture Culture in progress    Narrative:      Left thigh abscess #1    Fungus culture [[id number]] Collected: 07/29/22 1300    Order Status: Completed Specimen: Abscess from Leg, Left Updated: 08/02/22 1335     Fungus (Mycology) Culture Culture in progress    Narrative:      Left thigh abscess #2    Fungus culture [[id number]] Collected: 07/28/22 1800    Order Status: Completed Specimen: Abscess from Leg, Left Updated: 08/02/22 1334     Fungus (Mycology) Culture Culture in progress    Narrative:      LEFT THIGH ABSCESS    Culture, Anaerobic [[id number]] Collected: 07/28/22 1800    Order Status: Completed Specimen: Abscess from Leg, Left Updated: 08/02/22 0853     Anaerobic Culture No anaerobes isolated    Narrative:      LEFT THIGH ABSCESS    Culture, Anaerobe [[id number]] Collected:  07/29/22 1300    Order Status: Completed Specimen: Abscess from Leg, Left Updated: 08/02/22 0852     Anaerobic Culture No anaerobes isolated    Narrative:      Left thigh abscess #2    Culture, Anaerobe [[id number]] Collected: 07/29/22 1251    Order Status: Completed Specimen: Abscess from Leg, Left Updated: 08/02/22 0851     Anaerobic Culture No anaerobes isolated    Narrative:      Left thigh abscess #1          Recent Lab Results  (Last 5 results in the past 24 hours)        08/08/22  2113   08/08/22  1526   08/08/22  1141   08/08/22  0725   08/08/22  0718        Albumin       1.9         Alkaline Phosphatase       78         ALT       <5         Anion Gap       11         AST       12         Baso #       0.09         Basophil %       1.4         BILIRUBIN TOTAL       0.2  Comment: For infants and newborns, interpretation of results should be based  on gestational age, weight and in agreement with clinical  observations.    Premature Infant recommended reference ranges:  Up to 24 hours.............<8.0 mg/dL  Up to 48 hours............<12.0 mg/dL  3-5 days..................<15.0 mg/dL  6-29 days.................<15.0 mg/dL           BUN       48         Calcium       8.8         Chloride       99         CO2       21         Creatinine       8.1         CRP       51.4         Differential Method       Automated         eGFR       7.5         Eos #       0.3         Eosinophil %       4.1         Glucose       85         Gran # (ANC)       4.8         Gran %       71.9         Hematocrit       30.2         Hemoglobin       9.8         Immature Grans (Abs)       0.06  Comment: Mild elevation in immature granulocytes is non specific and   can be seen in a variety of conditions including stress response,   acute inflammation, trauma and pregnancy. Correlation with other   laboratory and clinical findings is essential.           Immature Granulocytes       0.9         Lymph #       1.1         Lymph %       15.8          Magnesium       1.9         MCH       30.2         MCHC       32.5         MCV       93         Mono #       0.4         Mono %       5.9         MPV       9.0         nRBC       0         Phosphorus       6.2         Platelets       262         POCT Glucose 86   142   82     93       Potassium       3.9         PROTEIN TOTAL       6.2         RBC       3.25         RDW       12.8         Sodium       131         Vancomycin, Random       21.4         WBC       6.65                                Significant Imaging:     Imaging Results              CT Thigh With Contrast Left (Final result)  Result time 07/27/22 01:13:09      Final result by Jori Hopkins DO (07/27/22 01:13:09)                   Impression:      Large heterogeneous fluid collection in the anterolateral left proximal thigh soft tissues, concerning for a soft tissue hematoma or Preston-Melissa lesion.  A neoplastic process is not entirely excluded.  Recommend close interval follow-up to assess for stability/resolution.      Electronically signed by: Jori Hopkins  Date:    07/27/2022  Time:    01:13               Narrative:    EXAMINATION:  CT THIGH WITH CONTRAST LEFT    CLINICAL HISTORY:  Soft tissue mass, thigh, deep;    TECHNIQUE:  Axial CT images of the left thigh with sagittal and coronal reformats after the intravenous administration of 50 mL Omnipaque 350.    COMPARISON:  None.    FINDINGS:  Bone: Bone mineralization is normal.  There is no evidence of an acute fracture or dislocation.  Alignment is normal.    Soft tissues: There is a large heterogeneous fluid collection in the left anterolateral proximal thigh measuring approximately 16 x 7 x 6 cm.  The collection appears to be centered within the subcutaneous tissues or superficial fascia and abuts the gluteus musculature, the sartorius muscle, the rectus femoris, and the vastus lateralis.  There is soft tissue edema in the surrounding subcutaneous tissues.  Muscle bulk is  normal.    Articulations: The left femoroacetabular joint is unremarkable.  The pubic symphysis is unremarkable.  The left knee is unremarkable.  No large joint effusion or significant cartilage loss.    Miscellaneous: Neurovascular structures are grossly intact.  There is moderate calcified atherosclerosis of the left femoral and popliteal arteries.                                       CTA Chest Non-Coronary (PE Study) (Final result)  Result time 07/26/22 21:36:01      Final result by Jori Hopkins DO (07/26/22 21:36:01)                   Impression:      No large central or lobar pulmonary embolism.      Electronically signed by: Jori Hopkins  Date:    07/26/2022  Time:    21:36               Narrative:    EXAMINATION:  CTA CHEST NON CORONARY    CLINICAL HISTORY:  Pulmonary embolism (PE) suspected, high prob;    TECHNIQUE:  Low dose axial images, sagittal and coronal reformations were obtained from the thoracic inlet to the lung bases following the IV administration of 100 mL of Omnipaque 350.  Contrast timing was optimized to evaluate the pulmonary arteries.  Maximum intensity projection images were provided for review.    COMPARISON:  Chest radiograph from earlier the same date.    FINDINGS:  Pulmonary vasculature: Satisfactory opacification of the pulmonary arterial system.  Motion artifact significantly limits evaluation of the segmental and subsegmental pulmonary arteries.  There is no large central or lobar pulmonary embolism.    Aorta: Left-sided aortic arch.  No aneurysm and no significant atherosclerosis.    Base of Neck: No significant abnormality.    Thoracic soft tissues: Normal.    Heart: Normal size. No effusion.    Amena/Mediastinum: No pathologic russ enlargement.    Airways: The large airways are patent. No foci of endobronchial filling.    Lungs/Pleura: Clear lungs. No pleural effusion or thickening.    Esophagus: Normal.    Upper Abdomen: No abnormality of the partially imaged upper  abdomen.    Bones: No acute fracture. No suspicious lytic or sclerotic lesions.                                       X-Ray Chest 1 View (Final result)  Result time 07/26/22 21:23:42   Procedure changed from X-Ray Chest PA And Lateral     Final result by Olman Saucedo MD (07/26/22 21:23:42)                   Impression:      No convincing radiographic evidence of acute intrathoracic process on this single view..      Electronically signed by: Olman Saucedo MD  Date:    07/26/2022  Time:    21:23               Narrative:    EXAMINATION:  XR CHEST 1 VIEW    CLINICAL HISTORY:  shortness of breath;    TECHNIQUE:  Single frontal view of the chest was performed.    COMPARISON:  None    FINDINGS:  Cardiac monitoring leads overlie the chest.  Cardiac silhouette appears within normal limits.  No confluent airspace consolidation identified.  No significant volume of pleural fluid or pneumothorax appreciated.  The visualized osseous structures demonstrate mild degenerative changes.

## 2022-08-09 NOTE — PLAN OF CARE
Patient alert and oriented x 4. Medicated with oxycodone 5 mg po x1 for pain 8/10 with good result. No acute distress noted. Scanty serosanguinous fluid noted. Ambulate to bathroom; void large amount de yellow urine. VS stable; Blood sugar within normal range. All safety maintained.

## 2022-08-10 PROBLEM — M79.605 LEFT LEG PAIN: Status: RESOLVED | Noted: 2022-07-28 | Resolved: 2022-08-10

## 2022-08-10 LAB
ALBUMIN SERPL BCP-MCNC: 1.7 G/DL (ref 3.5–5.2)
ALP SERPL-CCNC: 69 U/L (ref 55–135)
ALT SERPL W/O P-5'-P-CCNC: <5 U/L (ref 10–44)
ANION GAP SERPL CALC-SCNC: 10 MMOL/L (ref 8–16)
AST SERPL-CCNC: 12 U/L (ref 10–40)
BACTERIA BLD CULT: NORMAL
BACTERIA BLD CULT: NORMAL
BACTERIA SPEC AEROBE CULT: ABNORMAL
BACTERIA SPEC ANAEROBE CULT: NORMAL
BACTERIA SPEC ANAEROBE CULT: NORMAL
BASOPHILS # BLD AUTO: 0.04 K/UL (ref 0–0.2)
BASOPHILS NFR BLD: 0.9 % (ref 0–1.9)
BILIRUB SERPL-MCNC: 0.2 MG/DL (ref 0.1–1)
BUN SERPL-MCNC: 56 MG/DL (ref 6–20)
CALCIUM SERPL-MCNC: 8.9 MG/DL (ref 8.7–10.5)
CHLORIDE SERPL-SCNC: 102 MMOL/L (ref 95–110)
CO2 SERPL-SCNC: 21 MMOL/L (ref 23–29)
CREAT SERPL-MCNC: 8.9 MG/DL (ref 0.5–1.4)
DIFFERENTIAL METHOD: ABNORMAL
EOSINOPHIL # BLD AUTO: 0.3 K/UL (ref 0–0.5)
EOSINOPHIL NFR BLD: 6.6 % (ref 0–8)
ERYTHROCYTE [DISTWIDTH] IN BLOOD BY AUTOMATED COUNT: 12.7 % (ref 11.5–14.5)
EST. GFR  (NO RACE VARIABLE): 6.7 ML/MIN/1.73 M^2
GLUCOSE SERPL-MCNC: 173 MG/DL (ref 70–110)
HCT VFR BLD AUTO: 24.9 % (ref 40–54)
HGB BLD-MCNC: 8.2 G/DL (ref 14–18)
IMM GRANULOCYTES # BLD AUTO: 0.1 K/UL (ref 0–0.04)
IMM GRANULOCYTES NFR BLD AUTO: 2.1 % (ref 0–0.5)
LYMPHOCYTES # BLD AUTO: 0.9 K/UL (ref 1–4.8)
LYMPHOCYTES NFR BLD: 18.3 % (ref 18–48)
MAGNESIUM SERPL-MCNC: 2 MG/DL (ref 1.6–2.6)
MCH RBC QN AUTO: 30.4 PG (ref 27–31)
MCHC RBC AUTO-ENTMCNC: 32.9 G/DL (ref 32–36)
MCV RBC AUTO: 92 FL (ref 82–98)
MONOCYTES # BLD AUTO: 0.3 K/UL (ref 0.3–1)
MONOCYTES NFR BLD: 6.4 % (ref 4–15)
NEUTROPHILS # BLD AUTO: 3.1 K/UL (ref 1.8–7.7)
NEUTROPHILS NFR BLD: 65.7 % (ref 38–73)
NRBC BLD-RTO: 0 /100 WBC
PHOSPHATE SERPL-MCNC: 6.4 MG/DL (ref 2.7–4.5)
PLATELET # BLD AUTO: 224 K/UL (ref 150–450)
PMV BLD AUTO: 9.6 FL (ref 9.2–12.9)
POCT GLUCOSE: 131 MG/DL (ref 70–110)
POCT GLUCOSE: 136 MG/DL (ref 70–110)
POCT GLUCOSE: 149 MG/DL (ref 70–110)
POCT GLUCOSE: 212 MG/DL (ref 70–110)
POTASSIUM SERPL-SCNC: 4.3 MMOL/L (ref 3.5–5.1)
PROT SERPL-MCNC: 5.5 G/DL (ref 6–8.4)
RBC # BLD AUTO: 2.7 M/UL (ref 4.6–6.2)
SODIUM SERPL-SCNC: 133 MMOL/L (ref 136–145)
WBC # BLD AUTO: 4.7 K/UL (ref 3.9–12.7)

## 2022-08-10 PROCEDURE — 99231 SBSQ HOSP IP/OBS SF/LOW 25: CPT | Mod: ,,, | Performed by: INTERNAL MEDICINE

## 2022-08-10 PROCEDURE — 99233 PR SUBSEQUENT HOSPITAL CARE,LEVL III: ICD-10-PCS | Mod: 95,,, | Performed by: INTERNAL MEDICINE

## 2022-08-10 PROCEDURE — 99231 PR SUBSEQUENT HOSPITAL CARE,LEVL I: ICD-10-PCS | Mod: ,,, | Performed by: INTERNAL MEDICINE

## 2022-08-10 PROCEDURE — 25000003 PHARM REV CODE 250

## 2022-08-10 PROCEDURE — 99232 SBSQ HOSP IP/OBS MODERATE 35: CPT | Mod: ,,, | Performed by: INTERNAL MEDICINE

## 2022-08-10 PROCEDURE — 83735 ASSAY OF MAGNESIUM: CPT | Performed by: STUDENT IN AN ORGANIZED HEALTH CARE EDUCATION/TRAINING PROGRAM

## 2022-08-10 PROCEDURE — 84100 ASSAY OF PHOSPHORUS: CPT | Performed by: STUDENT IN AN ORGANIZED HEALTH CARE EDUCATION/TRAINING PROGRAM

## 2022-08-10 PROCEDURE — 63600175 PHARM REV CODE 636 W HCPCS: Performed by: STUDENT IN AN ORGANIZED HEALTH CARE EDUCATION/TRAINING PROGRAM

## 2022-08-10 PROCEDURE — 63600175 PHARM REV CODE 636 W HCPCS: Performed by: PHYSICIAN ASSISTANT

## 2022-08-10 PROCEDURE — 99233 SBSQ HOSP IP/OBS HIGH 50: CPT | Mod: 95,,, | Performed by: INTERNAL MEDICINE

## 2022-08-10 PROCEDURE — 99232 PR SUBSEQUENT HOSPITAL CARE,LEVL II: ICD-10-PCS | Mod: ,,, | Performed by: INTERNAL MEDICINE

## 2022-08-10 PROCEDURE — 85025 COMPLETE CBC W/AUTO DIFF WBC: CPT | Performed by: STUDENT IN AN ORGANIZED HEALTH CARE EDUCATION/TRAINING PROGRAM

## 2022-08-10 PROCEDURE — 80053 COMPREHEN METABOLIC PANEL: CPT | Performed by: STUDENT IN AN ORGANIZED HEALTH CARE EDUCATION/TRAINING PROGRAM

## 2022-08-10 PROCEDURE — 25000003 PHARM REV CODE 250: Performed by: HOSPITALIST

## 2022-08-10 PROCEDURE — 25000003 PHARM REV CODE 250: Performed by: PHYSICIAN ASSISTANT

## 2022-08-10 PROCEDURE — 20600001 HC STEP DOWN PRIVATE ROOM

## 2022-08-10 PROCEDURE — 97110 THERAPEUTIC EXERCISES: CPT

## 2022-08-10 PROCEDURE — 36415 COLL VENOUS BLD VENIPUNCTURE: CPT | Performed by: STUDENT IN AN ORGANIZED HEALTH CARE EDUCATION/TRAINING PROGRAM

## 2022-08-10 RX ADMIN — INSULIN ASPART 8 UNITS: 100 INJECTION, SOLUTION INTRAVENOUS; SUBCUTANEOUS at 08:08

## 2022-08-10 RX ADMIN — INSULIN DETEMIR 10 UNITS: 100 INJECTION, SOLUTION SUBCUTANEOUS at 08:08

## 2022-08-10 RX ADMIN — ATORVASTATIN CALCIUM 20 MG: 20 TABLET, FILM COATED ORAL at 08:08

## 2022-08-10 RX ADMIN — OXYCODONE 5 MG: 5 TABLET ORAL at 06:08

## 2022-08-10 RX ADMIN — CEFAZOLIN 2 G: 10 INJECTION, POWDER, FOR SOLUTION INTRAVENOUS at 08:08

## 2022-08-10 RX ADMIN — OXYCODONE 5 MG: 5 TABLET ORAL at 03:08

## 2022-08-10 RX ADMIN — HEPARIN SODIUM 5000 UNITS: 5000 INJECTION INTRAVENOUS; SUBCUTANEOUS at 03:08

## 2022-08-10 RX ADMIN — INSULIN ASPART 8 UNITS: 100 INJECTION, SOLUTION INTRAVENOUS; SUBCUTANEOUS at 12:08

## 2022-08-10 RX ADMIN — HEPARIN SODIUM 5000 UNITS: 5000 INJECTION INTRAVENOUS; SUBCUTANEOUS at 10:08

## 2022-08-10 RX ADMIN — OXYCODONE 5 MG: 5 TABLET ORAL at 10:08

## 2022-08-10 RX ADMIN — OXYCODONE 5 MG: 5 TABLET ORAL at 08:08

## 2022-08-10 RX ADMIN — INSULIN ASPART 4 UNITS: 100 INJECTION, SOLUTION INTRAVENOUS; SUBCUTANEOUS at 12:08

## 2022-08-10 RX ADMIN — HEPARIN SODIUM 5000 UNITS: 5000 INJECTION INTRAVENOUS; SUBCUTANEOUS at 06:08

## 2022-08-10 RX ADMIN — AMLODIPINE BESYLATE 5 MG: 5 TABLET ORAL at 08:08

## 2022-08-10 RX ADMIN — INSULIN ASPART 8 UNITS: 100 INJECTION, SOLUTION INTRAVENOUS; SUBCUTANEOUS at 05:08

## 2022-08-10 NOTE — PROGRESS NOTES
"[facility] - Telemetry Stepdown  Nephrology  Progress Note    Patient Name: Wilfredo Thomas  MRN: [id number]  Admission Date: 7/26/2022  Hospital Length of Stay: 15 days  Attending Provider: Lyric Pichardo MD   Primary Care Physician: Aylin Wayne DO  Principal Problem:Abscess of left thigh    Subjective:     HPI: 48 y/o M hx of HTN, IDDM2, GERD obesity, HLD presented to the ED with complaint of 1 day of worsening SOB. Wife present at bedside. Patient states that he noticed yesterday he was feeling some shortness of breath and couldn't seem to catch his breath. His wife went to check on him this morning and noticed that he was breathing "fast and heavy" and he sounded like he was slurring his words. Wife was concerned about a stroke, so he brought him to the ED for evaluation.     Patient also reporting significant pain and swelling along his left lateral proximal thigh. He states he has noticed worsening pain over the past 2 weeks. He denies any acute trauma to the area. He was seen by ortho last week and was told to take ibu-profen for a muscle strain. This did not help his pain, so he presented again on 7/22 and was given a prednisone injection into his thigh. This also did not help his pain, and now the pain and swelling have advanced to the point that he has difficulty with ambulation. Patient denies fever, chills, nausea, vomiting.      Of note, patient has hx of poorly controlled diabetes. He was recently started on insulin by his PCP, but he has not taken any insulin since being prescribed it.      In the ED, patient hypertensive and tachycardic to the 130s. Tachypneic with RR in the 40s. On CBC, WBC 26.9, On CMP patient hyperkalemic to 5.5, CorNa 136, Bicarb 5. Cr elevated to 1.9 from BL 0.83. UA, BHB pending at time of admission. CTA without evidence of pulmonary embolism. Critical Care Medicine consulted given severe acidosis.               Overview/Hospital Course:     Patient admitted to the MICU for " management of severe DKA. Metabolic acidosis improving on insulin drip. He also has had left thigh pain for 1-2 months. There is a large mass on his left thigh that is tender to palpation, CT revealed hematoma vs soft tissue growth. General surgery consulted and recommended IR consult. IR consulted, no indication for tap. 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 Ortho consulted and performed bedside aspiration of possible abscess which revealed >200K cells >80% segs. Will hold off on antibiotics for now per ortho recs, ortho is taking patient to the OR for irrigation of the thigh, will start antibiotics afterwards.         7/29 Metabolic acidosis slowly improving. Ortho taking patient to the OR for irrigation of the left thigh, will start antibiotics afterwards.   7/30 Transfer to hospital medicine . S/p I&D  of left thigh Abscess on7/29/22 - MRI - uptured tensor fascia jose luis (TFL) muscle with associated large hematoma. gram stain -Moderate Gram positive cocci in clusters .cultures pending.  continue Vancomycin, clindamycin and Zosyn . Bicarb 15. K replaced . off insulin drip on SQ insulin.  7/31 ID and endocrine consulted.  abscess with Calcium pyrophosphate crystals with unknown significance potassium and P replaced. Bicarbonate WNL . aerobic culture 7/28 STAPHYLOCOCCUS SPECIES . Glucose in 300s .PRN imodium  for diarrhea. PT/OT consulted - WBAT. Surgical drain with purulent output  8/1 PICC team consulted for IV access. vanc trough at 11.4. monitor for vanomycin toxicity. possible OR tomorrow for washout  may need bone biopsy to r/o osteomyelitis.Discontinued clindamycin.   8/2 glucose in 300s.  Increased Levemir  to 14 units BID and Aspart  10 units TIDWM. K replaced         Nephrology consulyed for ANGE         Interval History: no acute events overnight. Scr stable 8.9 today 8.8 from 8.8 yesterday . had 1400cc of urineOPT    Review of patient's  allergies indicates:  No Known Allergies  Current Facility-Administered Medications   Medication Frequency    acetaminophen tablet 1,000 mg Q8H PRN    amLODIPine tablet 5 mg Daily    atorvastatin tablet 20 mg Daily    ceFAZolin 2 gram in dextrose 5% 100 mL IVPB (premix) Daily    COVID-19 vac, forrest(Pfizer)(PF) (Pfizer COVID-19) 30 mcg/0.3 mL injection 0.3 mL vaccine x 1 dose    dextrose 10% bolus 125 mL PRN    dextrose 10% bolus 250 mL PRN    glucagon (human recombinant) injection 1 mg PRN    glucose chewable tablet 16 g PRN    glucose chewable tablet 24 g PRN    heparin (porcine) injection 5,000 Units Q8H    insulin aspart U-100 pen 1-10 Units QID (AC + HS) PRN    insulin aspart U-100 pen 8 Units TIDWM    insulin detemir U-100 pen 10 Units BID    morphine injection 3 mg Q6H PRN    ondansetron injection 4 mg Q6H PRN    oxyCODONE immediate release tablet 5 mg Q4H PRN    senna-docusate 8.6-50 mg per tablet 2 tablet Daily    sodium chloride 0.9% flush 10 mL PRN       Objective:     Vital Signs (Most Recent):  Temp: 97.9 °F (36.6 °C) (08/10/22 0422)  Pulse: 76 (08/10/22 0730)  Resp: 18 (08/10/22 0624)  BP: (!) 150/83 (08/10/22 0422)  SpO2: 95 % (08/10/22 0546)  O2 Device (Oxygen Therapy): room air (08/06/22 1405) Vital Signs (24h Range):  Temp:  [97.9 °F (36.6 °C)-98.2 °F (36.8 °C)] 97.9 °F (36.6 °C)  Pulse:  [71-98] 76  Resp:  [18] 18  SpO2:  [94 %-99 %] 95 %  BP: (132-170)/(78-98) 150/83     Weight: 104.3 kg (230 lb) (08/09/22 1307)  Body mass index is 36.02 kg/m².  Body surface area is 2.22 meters squared.    I/O last 3 completed shifts:  In: 600 [P.O.:590; I.V.:10]  Out: 1850 [Urine:1850]    Physical Exam  Vitals reviewed.   Constitutional:       Appearance: Normal appearance.   HENT:      Head: Normocephalic and atraumatic.      Right Ear: External ear normal.      Left Ear: External ear normal.      Nose: Nose normal.      Mouth/Throat:      Pharynx: Oropharynx is clear.   Eyes:      Extraocular  Movements: Extraocular movements intact.      Conjunctiva/sclera: Conjunctivae normal.   Cardiovascular:      Rate and Rhythm: Normal rate and regular rhythm.      Pulses: Normal pulses.   Pulmonary:      Effort: Pulmonary effort is normal.      Breath sounds: Normal breath sounds.   Abdominal:      General: Abdomen is flat. There is no distension.   Musculoskeletal:         General: Normal range of motion.      Cervical back: Normal range of motion.   Skin:     General: Skin is warm and dry.   Neurological:      General: No focal deficit present.      Mental Status: He is alert and oriented to person, place, and time.   Psychiatric:         Mood and Affect: Mood normal.         Behavior: Behavior normal.       Significant Labs:  All labs within the past 24 hours have been reviewed.       Assessment/Plan:     * Abscess of left thigh  Per primary    ANGE (acute kidney injury)  ANGE non oliguric Scr stable today 8.9  from 8.8 yesterday seems like started to stabilize. had 1400 cc of urineOPT  Likely vancomycin induced ANGE (ATI/ATN) in the setting of trough of 40 and also was on zosyn which will increase risk of vanco induced ANGE.   Strict Is and Os   Renal US normal   Euvolemic     Plan: continue to monitor RFP hopefully scr will start to improve today is the first time it stabilized 8.8 to 8.9 today. No indication for RRT                            Angie Eugene MD  Nephrology  [facility] - Telemetry Stepdown

## 2022-08-10 NOTE — SUBJECTIVE & OBJECTIVE
"Principal Problem:Abscess of left thigh    Principal Orthopedic Problem: Left thigh I&D 7/29 and 8/6  Staph species on thigh abscess cultures    Interval History  S/p repeat L thigh I&D 8/6/22. NAEON, patient stable, pain controlled. No acute complaints this morning. Drain output has significantly slowed, no appreciable output yesterday. 75cc since placement. Cx Staph aureus from I&D. Cr stable at 8.9, nephro following.      Review of patient's allergies indicates:  No Known Allergies    Current Facility-Administered Medications   Medication    acetaminophen tablet 1,000 mg    amLODIPine tablet 5 mg    atorvastatin tablet 20 mg    ceFAZolin 2 gram in dextrose 5% 100 mL IVPB (premix)    COVID-19 vac, forrest(Pfizer)(PF) (Pfizer COVID-19) 30 mcg/0.3 mL injection 0.3 mL    dextrose 10% bolus 125 mL    dextrose 10% bolus 250 mL    glucagon (human recombinant) injection 1 mg    glucose chewable tablet 16 g    glucose chewable tablet 24 g    heparin (porcine) injection 5,000 Units    insulin aspart U-100 pen 1-10 Units    insulin aspart U-100 pen 8 Units    insulin detemir U-100 pen 10 Units    morphine injection 3 mg    ondansetron injection 4 mg    oxyCODONE immediate release tablet 5 mg    senna-docusate 8.6-50 mg per tablet 2 tablet    sodium chloride 0.9% flush 10 mL     Objective:     Vital Signs (Most Recent):  Temp: 98.5 °F (36.9 °C) (08/10/22 0833)  Pulse: 88 (08/10/22 0833)  Resp: 18 (08/10/22 0833)  BP: (!) 175/95 (08/10/22 0833)  SpO2: 99 % (08/10/22 0833) Vital Signs (24h Range):  Temp:  [97.9 °F (36.6 °C)-98.5 °F (36.9 °C)] 98.5 °F (36.9 °C)  Pulse:  [71-98] 88  Resp:  [18] 18  SpO2:  [94 %-99 %] 99 %  BP: (132-175)/(78-98) 175/95     Weight: 104.3 kg (230 lb)  Height: 5' 7" (170.2 cm)  Body mass index is 36.02 kg/m².      Intake/Output Summary (Last 24 hours) at 8/10/2022 0842  Last data filed at 8/10/2022 0600  Gross per 24 hour   Intake 395 ml   Output 1450 ml   Net -1055 ml         Ortho/SPM Exam  Left " lower extremity  Left thigh dressing cdi  Drain in place  No erythema of thigh, no signficant swelling  Compartments soft and compressible  Painless ROM of hip and knee  NVI        CBC:   Recent Labs   Lab 08/09/22  0942 08/10/22  0246   WBC 5.49 4.70   HGB 9.1* 8.2*   HCT 27.0* 24.9*    224       CMP:   Recent Labs   Lab 08/09/22  0942 08/10/22  0246   * 133*   K 5.1 4.3    102   CO2 19* 21*   * 173*   BUN 52* 56*   CREATININE 8.8* 8.9*   CALCIUM 8.9 8.9   PROT 5.8* 5.5*   ALBUMIN 1.8* 1.7*   BILITOT 0.2 0.2   ALKPHOS 73 69   AST 15 12   ALT <5* <5*   ANIONGAP 11 10         All pertinent labs within the past 24 hours have been reviewed.    Significant Imaging: I have reviewed and interpreted all pertinent imaging results/findings.

## 2022-08-10 NOTE — ASSESSMENT & PLAN NOTE
ANGE non oliguric Scr stable today 8.9  from 8.8 yesterday seems like started to stabilize. had 1400 cc of urineOPT  Likely vancomycin induced ANGE (ATI/ATN) in the setting of trough of 40 and also was on zosyn which will increase risk of vanco induced ANGE.   Strict Is and Os   Renal US normal   Euvolemic     Plan: continue to monitor RFP hopefully scr will start to improve today is the first time it stabilized 8.8 to 8.9 today. No indication for RRT

## 2022-08-10 NOTE — ASSESSMENT & PLAN NOTE
- Afebrile, no leukocytosis  - Continue Ancef  - S/p OR with Ortho 8/6 for repeat washout  - Intra-op cultures with S.aureus: continuing Ancef pending final C&S results  - PRN analgesics provided  - Continuing to monitor

## 2022-08-10 NOTE — PLAN OF CARE
Problem: Diabetic Ketoacidosis  Goal: Fluid and Electrolyte Balance with Absence of Ketosis  Outcome: Ongoing, Progressing     Problem: Adult Inpatient Plan of Care  Goal: Plan of Care Review  Outcome: Ongoing, Progressing  Goal: Patient-Specific Goal (Individualized)  Outcome: Ongoing, Progressing  Goal: Absence of Hospital-Acquired Illness or Injury  Outcome: Ongoing, Progressing  Goal: Optimal Comfort and Wellbeing  Outcome: Ongoing, Progressing  Goal: Readiness for Transition of Care  Outcome: Ongoing, Progressing     Problem: Diabetes Comorbidity  Goal: Blood Glucose Level Within Targeted Range  Outcome: Ongoing, Progressing     Problem: Fluid and Electrolyte Imbalance (Acute Kidney Injury/Impairment)  Goal: Fluid and Electrolyte Balance  Outcome: Ongoing, Progressing     Problem: Oral Intake Inadequate (Acute Kidney Injury/Impairment)  Goal: Optimal Nutrition Intake  Outcome: Ongoing, Progressing     Problem: Renal Function Impairment (Acute Kidney Injury/Impairment)  Goal: Effective Renal Function  Outcome: Ongoing, Progressing     Problem: Skin Injury Risk Increased  Goal: Skin Health and Integrity  Outcome: Ongoing, Progressing     Problem: Fall Injury Risk  Goal: Absence of Fall and Fall-Related Injury  Outcome: Ongoing, Progressing

## 2022-08-10 NOTE — ASSESSMENT & PLAN NOTE
46 yo male with history of DMII and HTN admitted for DKA and left thigh abscess. MRI of left femur was notable for ruptured tensor fascia jose luis mm with associated large hematoma, as well as small area of potential osteonecrosis. Bedside aspiration of the fluid collection was notable for a cell count of >200k (>80%segs), cultures + staph species. He was taken to the OR on 7/29 with orthopedic surgery for surgical I&D. Surgical cultures are positive for MSSA. Per ortho surgery, no concerns for bone involvement. Patient was initially on Vancomycin and Zosyn. Vanc trough supratherapeutic and patient has developed an ANGE. He is currently on Cefazolin.       Recent new onset left wrist pain, states it has increased today. He is s/p wrist arthroscopy with hardware placement in 3/2022. Wife noticed a boil on his wrist. Swelling and TTP today. S/p joint aspiration, white count 2050k, 88% segs. No crystals noted. Culture NG. Xray of wrist with no acute concerns.      Otherwise, afebrile. HDS. OR cultures 8/6 L thigh now show MSSA.  Leg pain improved.  ANGE worsened. 2D echo without mention of vegetations.  Per dw ortho, no further OR plans and no hip joint/bone involvement.  Stable non septic        Recommendations:   · Continue Cefazolin 2g q24 (renally dosed per pharmacy) for MSSA SSTI.   · FU Ortho plans  · Nephrology ANGE management.  · Anticipate SSTI course from time of last washout 8/6  · Patient and plan reviewed with ID staff, Dr. Samson.   · ID will follow given complicated case and monitoring of RF and need for dose adjustments.

## 2022-08-10 NOTE — SUBJECTIVE & OBJECTIVE
Interval History:   No AEON.   Worsening ANGE, on IVF, reporting urination. Cr to 8.8 today.   L thigh improving  All thigh cultures with MSSA  The patient denies any recent fever, chills, or sweats.        Review of Systems   Constitutional:  Negative for chills, diaphoresis, fatigue and fever.   Respiratory:  Negative for cough and shortness of breath.    Cardiovascular:  Positive for leg swelling (upper left thigh). Negative for chest pain and palpitations.   Gastrointestinal:  Negative for abdominal distention and abdominal pain.   Genitourinary:  Negative for difficulty urinating and dysuria.   Musculoskeletal:  Positive for arthralgias (left wrist), joint swelling (left wrist) and myalgias (upper left thigh).   Skin:  Positive for wound (upper left thigh). Negative for color change and rash.   Allergic/Immunologic: Negative for immunocompromised state.   Neurological:  Negative for dizziness, tremors, weakness, numbness and headaches.   Psychiatric/Behavioral:  Negative for agitation and decreased concentration. The patient is not nervous/anxious.    Objective:     Vital Signs (Most Recent):  Temp: 98.1 °F (36.7 °C) (08/09/22 1923)  Pulse: 89 (08/09/22 1927)  Resp: 18 (08/09/22 1956)  BP: (!) 170/98 (08/09/22 1923)  SpO2: 96 % (08/09/22 1923)   Vital Signs (24h Range):  Temp:  [97.2 °F (36.2 °C)-98.2 °F (36.8 °C)] 98.1 °F (36.7 °C)  Pulse:  [77-98] 89  Resp:  [18-20] 18  SpO2:  [94 %-99 %] 96 %  BP: (132-174)/(70-98) 170/98     Weight: 104.3 kg (230 lb)  Body mass index is 36.02 kg/m².    Estimated Creatinine Clearance: 11.7 mL/min (A) (based on SCr of 8.8 mg/dL (H)).    Physical Exam  Vitals and nursing note reviewed.   Constitutional:       General: He is not in acute distress.     Appearance: Normal appearance. He is well-developed. He is obese. He is not ill-appearing, toxic-appearing or diaphoretic.   HENT:      Head: Normocephalic and atraumatic.      Nose: Nose normal.      Mouth/Throat:      Dentition:  Does not have dentures.      Pharynx: No oropharyngeal exudate.   Eyes:      General: No scleral icterus.     Conjunctiva/sclera: Conjunctivae normal.   Cardiovascular:      Rate and Rhythm: Normal rate and regular rhythm.   Pulmonary:      Effort: Pulmonary effort is normal. No respiratory distress.      Breath sounds: Normal breath sounds.   Abdominal:      General: There is no distension.      Palpations: Abdomen is soft.      Tenderness: There is no abdominal tenderness.   Musculoskeletal:         General: Swelling (left wrist) present.   Skin:     General: Skin is warm and dry.      Findings: No erythema or rash.      Comments: Incision to left thigh. Dressed, CDI. Surgical drain with dark bloody output.   Neurological:      General: No focal deficit present.      Mental Status: He is alert and oriented to person, place, and time. Mental status is at baseline.      Motor: No weakness.      Gait: Gait normal.   Psychiatric:         Mood and Affect: Mood normal.         Behavior: Behavior normal.         Thought Content: Thought content normal.         Judgment: Judgment normal.       Significant Labs: CBC:   Recent Labs   Lab 08/08/22  0725 08/09/22  0942   WBC 6.65 5.49   HGB 9.8* 9.1*   HCT 30.2* 27.0*    222       CMP:   Recent Labs   Lab 08/08/22  0725 08/09/22  0942   * 133*   K 3.9 5.1   CL 99 103   CO2 21* 19*   GLU 85 196*   BUN 48* 52*   CREATININE 8.1* 8.8*   CALCIUM 8.8 8.9   PROT 6.2 5.8*   ALBUMIN 1.9* 1.8*   BILITOT 0.2 0.2   ALKPHOS 78 73   AST 12 15   ALT <5* <5*   ANIONGAP 11 11       Microbiology Results (last 7 days)       Procedure Component Value Units Date/Time    Fungus culture [[id number]] Collected: 08/05/22 0817    Order Status: Completed Specimen: Joint Fluid from Wrist, Left Updated: 08/09/22 1307     Fungus (Mycology) Culture Culture in progress    Aerobic culture [[id number]]  (Abnormal) Collected: 08/06/22 1153    Order Status: Completed Specimen: Wound from Hip, Left  Updated: 08/09/22 1030     Aerobic Bacterial Culture STAPHYLOCOCCUS AUREUS  Moderate  For susceptibility see order #[id number]      Narrative:      #1 Left hip fluid    Aerobic culture [[id number]]  (Abnormal) Collected: 08/06/22 1153    Order Status: Completed Specimen: Wound from Hip, Left Updated: 08/09/22 1030     Aerobic Bacterial Culture STAPHYLOCOCCUS AUREUS  Few  Susceptibility pending      Narrative:      #2 Left hip fluid    Culture, Anaerobe [[id number]] Collected: 08/06/22 1153    Order Status: Completed Specimen: Body Fluid from Hip, Left Updated: 08/09/22 1013     Anaerobic Culture Culture in progress    Narrative:      #2 Left hip fluid    Culture, Anaerobe [[id number]] Collected: 08/06/22 1153    Order Status: Completed Specimen: Body Fluid from Hip, Left Updated: 08/09/22 1012     Anaerobic Culture Culture in progress    Narrative:      #1 Left hip fluid    Blood culture [[id number]] Collected: 08/05/22 0850    Order Status: Completed Specimen: Blood from Antecubital, Right Arm Updated: 08/09/22 1012     Blood Culture, Routine No Growth to date      No Growth to date      No Growth to date      No Growth to date      No Growth to date    Blood culture [[id number]] Collected: 08/05/22 0852    Order Status: Completed Specimen: Blood from Peripheral, Right Hand Updated: 08/09/22 1012     Blood Culture, Routine No Growth to date      No Growth to date      No Growth to date      No Growth to date      No Growth to date    AFB Culture & Smear [[id number]] Collected: 08/05/22 0817    Order Status: Completed Specimen: Joint Fluid from Wrist, Left Updated: 08/08/22 1529     AFB Culture & Smear Culture in progress     AFB CULTURE STAIN No acid fast bacilli seen.    Aerobic culture [[id number]] Collected: 08/05/22 0817    Order Status: Completed Specimen: Bone from Wrist, Left Updated: 08/08/22 0540     Aerobic Bacterial Culture No growth    Gram stain [[id number]] Collected: 08/05/22 0817    Order Status: Completed  Specimen: Joint Fluid from Wrist, Left Updated: 08/05/22 1038     Gram Stain Result No WBC's      No organisms seen    Culture, Anaerobe [[id number]] Collected: 08/05/22 0817    Order Status: Sent Specimen: Joint Fluid from Wrist, Left Updated: 08/05/22 0818    Aerobic culture [[id number]]  (Abnormal) Collected: 07/29/22 1251    Order Status: Completed Specimen: Abscess from Leg, Left Updated: 08/04/22 1250     Aerobic Bacterial Culture STAPHYLOCOCCUS AUREUS  Many  For susceptibility see order #[id number]      Narrative:      Left thigh abscess #1    Aerobic culture [[id number]]  (Abnormal)  (Susceptibility) Collected: 07/28/22 1800    Order Status: Completed Specimen: Abscess from Leg, Left Updated: 08/04/22 1249     Aerobic Bacterial Culture STAPHYLOCOCCUS AUREUS  Many      Narrative:      LEFT THIGH ABSCESS    Aerobic culture [[id number]]  (Abnormal)  (Susceptibility) Collected: 07/29/22 1300    Order Status: Completed Specimen: Abscess from Leg, Left Updated: 08/04/22 1248     Aerobic Bacterial Culture STAPHYLOCOCCUS AUREUS  Many      Narrative:      Left thigh abscess #2          Recent Lab Results  (Last 5 results in the past 24 hours)        08/09/22  1537   08/09/22  1400   08/09/22  1119   08/09/22  0953   08/09/22  0942        Albumin         1.8       Alkaline Phosphatase         73       ALT         <5       Anion Gap         11       Ascending aorta   3.11             Ao peak blaire   1.48             Ao VTI   32.50             AST         15       AV valve area   3.56             AV mean gradient   5             AV index (prosthetic)   0.77             AV peak gradient   9             AV Velocity Ratio   0.73             Baso #         0.06       Basophil %         1.1       BILIRUBIN TOTAL         0.2  Comment: For infants and newborns, interpretation of results should be based  on gestational age, weight and in agreement with clinical  observations.    Premature Infant recommended reference ranges:  Up to  24 hours.............<8.0 mg/dL  Up to 48 hours............<12.0 mg/dL  3-5 days..................<15.0 mg/dL  6-29 days.................<15.0 mg/dL         BSA   2.22             BUN         52       Calcium         8.9       Chloride         103       CO2         19       Creatinine         8.8       Left Ventricle Relative Wall Thickness   0.40             Differential Method         Automated       E/A ratio   1.24             E/E' ratio   9.26             eGFR         6.8       EF   65             Eos #         0.4       Eosinophil %         6.4       E wave deceleration time   242.49             FS   38             Glucose         196       Gran # (ANC)         3.6       Gran %         66.2       Hematocrit         27.0       Hemoglobin         9.1       Immature Grans (Abs)         0.18  Comment: Mild elevation in immature granulocytes is non specific and   can be seen in a variety of conditions including stress response,   acute inflammation, trauma and pregnancy. Correlation with other   laboratory and clinical findings is essential.         Immature Granulocytes         3.3       IVSd   0.83             LA WIDTH   3.00             Left Atrium Major Axis   5.19             Left Atrium Minor Axis   5.71             LA size   4.73             LA volume   65.59                35.67             LA vol index   30.5             LA Volume Index (Mod)   16.6             LVOT area   4.6             LV LATERAL E/E' RATIO   8.00             LV SEPTAL E/E' RATIO   11.00             LV EDV BP   102.25             LV Diastolic Volume Index   47.56             LVIDd   4.70             LVIDs   2.93             LV mass   138.51             LV Mass Index   64             LVOT diameter   2.42             LVOT peak blaire   1.08             LVOT stroke volume   115.76             LVOT peak VTI   25.18             LV ESV BP   33.12             LV Systolic Volume Index   15.4             Lymph #         0.9       Lymph %          "15.7       Magnesium         1.9       MCH         30.8       MCHC         33.7       MCV         92       Mean e'   0.10             Mono #         0.4       Mono %         7.3       MPV         9.9       MV valve area p 1/2 method   3.13             MV "A" wave duration   14.27             MV Peak A Vickey   0.71             MV Peak E Vickey   0.88             MV stenosis pressure 1/2 time   70.32             nRBC         0       Phosphorus         5.9       Platelet Estimate         Appears normal       Platelets         222       POCT Glucose 125     220   193         Poly         Occasional       Potassium         5.1       PROTEIN TOTAL         5.8       PV Peak D Vickey   0.41             PV Peak S Vickey   0.67             Pulm vein S/D ratio   1.63             Posterior Wall   0.93             Right Atrial Pressure (from IVC)   3             RA Major Axis   4.68             RA Width   3.23             RBC         2.95       RDW         12.9       RV S'   13.93             RVDD   3.83             Sinus   3.64             Sodium         133       STJ   3.34             TAPSE   1.98             TDI SEPTAL   0.08             TDI LATERAL   0.11             Triscuspid Valve Regurgitation Peak Gradient   13             TR Max Vickey   1.83             TV rest pulmonary artery pressure   16             WBC         5.49                              Significant Imaging:     Imaging Results              CT Thigh With Contrast Left (Final result)  Result time 07/27/22 01:13:09      Final result by Jori Hopkins DO (07/27/22 01:13:09)                   Impression:      Large heterogeneous fluid collection in the anterolateral left proximal thigh soft tissues, concerning for a soft tissue hematoma or Preston-Melissa lesion.  A neoplastic process is not entirely excluded.  Recommend close interval follow-up to assess for stability/resolution.      Electronically signed by: Jori Hopkins  Date:    07/27/2022  Time:    01:13          "      Narrative:    EXAMINATION:  CT THIGH WITH CONTRAST LEFT    CLINICAL HISTORY:  Soft tissue mass, thigh, deep;    TECHNIQUE:  Axial CT images of the left thigh with sagittal and coronal reformats after the intravenous administration of 50 mL Omnipaque 350.    COMPARISON:  None.    FINDINGS:  Bone: Bone mineralization is normal.  There is no evidence of an acute fracture or dislocation.  Alignment is normal.    Soft tissues: There is a large heterogeneous fluid collection in the left anterolateral proximal thigh measuring approximately 16 x 7 x 6 cm.  The collection appears to be centered within the subcutaneous tissues or superficial fascia and abuts the gluteus musculature, the sartorius muscle, the rectus femoris, and the vastus lateralis.  There is soft tissue edema in the surrounding subcutaneous tissues.  Muscle bulk is normal.    Articulations: The left femoroacetabular joint is unremarkable.  The pubic symphysis is unremarkable.  The left knee is unremarkable.  No large joint effusion or significant cartilage loss.    Miscellaneous: Neurovascular structures are grossly intact.  There is moderate calcified atherosclerosis of the left femoral and popliteal arteries.                                       CTA Chest Non-Coronary (PE Study) (Final result)  Result time 07/26/22 21:36:01      Final result by Jori Hopkins DO (07/26/22 21:36:01)                   Impression:      No large central or lobar pulmonary embolism.      Electronically signed by: Jori Hopkins  Date:    07/26/2022  Time:    21:36               Narrative:    EXAMINATION:  CTA CHEST NON CORONARY    CLINICAL HISTORY:  Pulmonary embolism (PE) suspected, high prob;    TECHNIQUE:  Low dose axial images, sagittal and coronal reformations were obtained from the thoracic inlet to the lung bases following the IV administration of 100 mL of Omnipaque 350.  Contrast timing was optimized to evaluate the pulmonary arteries.  Maximum intensity  projection images were provided for review.    COMPARISON:  Chest radiograph from earlier the same date.    FINDINGS:  Pulmonary vasculature: Satisfactory opacification of the pulmonary arterial system.  Motion artifact significantly limits evaluation of the segmental and subsegmental pulmonary arteries.  There is no large central or lobar pulmonary embolism.    Aorta: Left-sided aortic arch.  No aneurysm and no significant atherosclerosis.    Base of Neck: No significant abnormality.    Thoracic soft tissues: Normal.    Heart: Normal size. No effusion.    Amena/Mediastinum: No pathologic russ enlargement.    Airways: The large airways are patent. No foci of endobronchial filling.    Lungs/Pleura: Clear lungs. No pleural effusion or thickening.    Esophagus: Normal.    Upper Abdomen: No abnormality of the partially imaged upper abdomen.    Bones: No acute fracture. No suspicious lytic or sclerotic lesions.                                       X-Ray Chest 1 View (Final result)  Result time 07/26/22 21:23:42   Procedure changed from X-Ray Chest PA And Lateral     Final result by Olman Saucedo MD (07/26/22 21:23:42)                   Impression:      No convincing radiographic evidence of acute intrathoracic process on this single view..      Electronically signed by: Olman Saucedo MD  Date:    07/26/2022  Time:    21:23               Narrative:    EXAMINATION:  XR CHEST 1 VIEW    CLINICAL HISTORY:  shortness of breath;    TECHNIQUE:  Single frontal view of the chest was performed.    COMPARISON:  None    FINDINGS:  Cardiac monitoring leads overlie the chest.  Cardiac silhouette appears within normal limits.  No confluent airspace consolidation identified.  No significant volume of pleural fluid or pneumothorax appreciated.  The visualized osseous structures demonstrate mild degenerative changes.

## 2022-08-10 NOTE — PROGRESS NOTES
"  [facility] - Tele[facility]  Telemedicine Progress Note    Patient Name: Wilfredo Thomas  MRN: [id number]  Patient Class: IP- Inpatient   Admission Date: 7/26/2022  Length of Stay: 14 days  Attending Physician: Lyric iPchardo MD  Primary Care Provider: Aylin Wayne DO    Subjective:     Principal Problem:Abscess of left thigh    HPI:  48 y/o M hx of HTN, IDDM2, GERD obesity, HLD presented to the ED with complaint of 1 day of worsening SOB. Wife present at bedside. Patient states that he noticed yesterday he was feeling some shortness of breath and couldn't seem to catch his breath. His wife went to check on him this morning and noticed that he was breathing "fast and heavy" and he sounded like he was slurring his words. Wife was concerned about a stroke, so he brought him to the ED for evaluation.     Patient also reporting significant pain and swelling along his left lateral proximal thigh. He states he has noticed worsening pain over the past 2 weeks. He denies any acute trauma to the area. He was seen by ortho last week and was told to take ibu-profen for a muscle strain. This did not help his pain, so he presented again on 7/22 and was given a prednisone injection into his thigh. This also did not help his pain, and now the pain and swelling have advanced to the point that he has difficulty with ambulation. Patient denies fever, chills, nausea, vomiting.      Of note, patient has hx of poorly controlled diabetes. He was recently started on insulin by his PCP, but he has not taken any insulin since being prescribed it.      In the ED, patient hypertensive and tachycardic to the 130s. Tachypneic with RR in the 40s. On CBC, WBC 26.9, On CMP patient hyperkalemic to 5.5, CorNa 136, Bicarb 5. Cr elevated to 1.9 from BL 0.83. UA, BHB pending at time of admission. CTA without evidence of pulmonary embolism. Critical Care Medicine consulted given severe acidosis.           Overview/Hospital " Course:    Patient admitted to the MICU for management of severe DKA. Metabolic acidosis improving on insulin drip. He also has had left thigh pain for 1-2 months. There is a large mass on his left thigh that is tender to palpation, CT revealed hematoma vs soft tissue growth. General surgery consulted and recommended IR consult. IR consulted, no indication for tap.   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 Ortho consulted and performed bedside aspiration of possible abscess which revealed >200K cells >80% segs. Will hold off on antibiotics for now per ortho recs, ortho is taking patient to the OR for irrigation of the thigh, will start antibiotics afterwards.         7/29 Metabolic acidosis slowly improving. Ortho taking patient to the OR for irrigation of the left thigh, will start antibiotics afterwards.   7/30 Transfer to hospital medicine . S/p I&D  of left thigh Abscess on7/29/22 - MRI - uptured tensor fascia jose luis (TFL) muscle with associated large hematoma. gram stain -Moderate Gram positive cocci in clusters .cultures pending.  continue Vancomycin, clindamycin and Zosyn . Bicarb 15. K replaced . off insulin drip on SQ insulin.  7/31 ID and endocrine consulted.  abscess with Calcium pyrophosphate crystals with unknown significance potassium and P replaced. Bicarbonate WNL . aerobic culture 7/28 STAPHYLOCOCCUS SPECIES . Glucose in 300s .PRN imodium  for diarrhea. PT/OT consulted - WBAT. Surgical drain with purulent output  8/1 PICC team consulted for IV access. vanc trough at 11.4. monitor for vanomycin toxicity. possible OR tomorrow for washout  may need bone biopsy to r/o osteomyelitis.Discontinued clindamycin.   8/2 glucose in 300s.  Increased Levemir  to 14 units BID and Aspart  10 units TIDWM. K replaced   8/3 ANGE with cr 0.7--> 2.7. likely vanc induced ATN with levels 40. urine studies ordered renal sonogram WNL  nephrology consulted.  started on NS 100ml/h x 10h and follow up renal panel.Zosyn and  vancomycin discontinued. Strict IO monitor. condom catheter . switched to Ancef for MSSA on culture  Interval History:  8/4- MSSA- see below. /85 VSS. On room air, eating, BM on 8/2, good UO. Appreciate ortho recs. On Cephazolin, detim 14 bid and aspart 12 ac.  8/5-Cr still rising, cr =6.  Wrist still hurting. Ortho to tap it to eval for infection and gout today. /90   Pulse 85  AF, no other signs of infection.  Will discuss w Nephrology- received NS x one liter yesterday. Will repeat today. Suspect auto-diuresis.   8/6: washout with ortho today. Cr continues to rise (7.1), nephrology following  8/7: Cr 7.4, phos increasing; tolerated surgery well yesterday, intra-op cultures pending  8/8: Cr increased to 8.1, hyperphosphatemia again noted; continues to make urine, no acute indication for HD per nephrology. Intra-op cultures with S.aureus, continued cefazolin pending final C&S results.      Telemedicine  This service was provided by Virtual Visit.    Patient was transferred to [facility] on:  8/9/2022    Chief Complaint   Patient presents with    Shortness of Breath     Pt c/o increased SOB over the past few days. Pt is diabetic who hasn't taken his meds for the past couple days. Has been on steroids.      The patient location is: [address] [place] [address] A   Admitted 7/26/2022  8:24 PM  Present with the patient at the time of the telemed/virtual assessment: Telepresenter    Interval History / Events Overnight:   The patient is able to provide adequate history. Additional history was obtained from past medical records. No significant events reported by Nursing.  Less drainage from L hip. Improved ROM in L wrist.  Patient complains of L hip pain. Symptoms have improved since yesterday. Associated symptoms include: fatigue. Symptoms are decreasing in severity.     Lab test(s) reviewed: H&H stable    Review of Systems   Constitutional:   Negative for fever.   Respiratory:  Negative for shortness of breath.      Objective:     Vital Signs (Most Recent):  Temp: 98 °F (36.7 °C) (08/09/22 1119)  Pulse: 77 (08/09/22 1119)  Resp: 18 (08/09/22 1119)  BP: 132/80 (08/09/22 1119)  SpO2: 99 % (08/09/22 1119) Vital Signs (24h Range):  Temp:  [97.2 °F (36.2 °C)-98.5 °F (36.9 °C)] 98 °F (36.7 °C)  Pulse:  [76-98] 77  Resp:  [16-20] 18  SpO2:  [94 %-99 %] 99 %  BP: (132-174)/(70-95) 132/80     Weight: 104.3 kg (230 lb)  Body mass index is 36.02 kg/m².    Intake/Output Summary (Last 24 hours) at 8/9/2022 1142  Last data filed at 8/8/2022 2200  Gross per 24 hour   Intake 985 ml   Output 905 ml   Net 80 ml      Physical Exam  Constitutional:       General: He is not in acute distress.     Appearance: Normal appearance.   Eyes:      General: Lids are normal. No scleral icterus.        Right eye: No discharge.         Left eye: No discharge.      Conjunctiva/sclera: Conjunctivae normal.   Neck:      Trachea: Phonation normal.   Cardiovascular:      Rate and Rhythm: Normal rate.      Comments: Monitor / Vital signs reviewed at time of visit  Pulmonary:      Effort: Pulmonary effort is normal. No tachypnea, accessory muscle usage or respiratory distress.   Abdominal:      General: There is no distension.   Skin:     Coloration: Skin is not cyanotic.   Neurological:      Mental Status: He is alert. He is not disoriented.   Psychiatric:         Attention and Perception: Attention normal.         Mood and Affect: Affect normal.         Behavior: Behavior is cooperative.       Significant Labs:   Recent Labs   Lab 03/22/22  1234 07/26/22  2044   HGBA1C 11.2* 10.1*     Recent Labs   Lab 08/09/22  1119 08/09/22  1537 08/09/22  2103   POCTGLUCOSE 220* 125* 127*     Recent Labs   Lab 08/07/22  0721 08/08/22  0725 08/09/22  0942   WBC 9.92 6.65 5.49   HGB 9.9* 9.8* 9.1*   HCT 29.2* 30.2* 27.0*    262 222     Recent Labs   Lab 08/05/22  0818 08/06/22  0257 08/07/22  0721  08/08/22 0725 08/09/22 0942   GRAN  --    < > 80.8*  8.0* 71.9  4.8 66.2  3.6   SEGS 88  --   --   --   --    LYMPH  --    < > 12.5*  1.2 15.8*  1.1 15.7*  0.9*   LYMPHS 7  --   --   --   --    MONO  --    < > 5.2  0.5 5.9  0.4 7.3  0.4   EOS 1   < > 0.0 0.3 0.4    < > = values in this interval not displayed.     Recent Labs   Lab 08/07/22 0721 08/08/22 0725 08/09/22 0942   * 131* 133*   K 4.9 3.9 5.1    99 103   CO2 22* 21* 19*   BUN 40* 48* 52*   CREATININE 7.4* 8.1* 8.8*   * 85 196*   CALCIUM 9.0 8.8 8.9   ALBUMIN 1.7* 1.9* 1.8*   MG 1.9 1.9 1.9   PHOS 6.1* 6.2* 5.9*     Recent Labs   Lab 08/03/22  0756 08/03/22  0759 08/05/22  0851 08/06/22  0257 08/07/22 0721 08/08/22 0725 08/09/22 0942   ALKPHOS  --    < >  --    < > 72 78 73   ALT  --    < >  --    < > <5* <5* <5*   AST  --    < >  --    < > 10 12 15   PROT  --    < >  --    < > 6.1 6.2 5.8*   BILITOT  --    < >  --    < > 0.2 0.2 0.2   .9*  --  125.0*  --   --  51.4*  --     < > = values in this interval not displayed.     Recent Labs   Lab 07/26/22 2044 07/26/22 2238 07/26/22  2335 07/27/22  0211 07/29/22  0023   PROCAL  --  0.29*  --   --  0.57*   LACTATE  --   --  1.3 1.9  --    DDIMER 1.46*  --   --   --   --    SEDRATE  --   --   --   --  73*     SARS-CoV-2 RNA, Amplification, Qual (no units)   Date Value   08/06/2022 Negative   07/26/2022 Negative       ECG Results              EKG 12-lead (Final result)  Result time 07/27/22 07:53:05      Final result by Interface, Lab In [facility] (07/27/22 07:53:05)                   Narrative:    Test Reason : R00.0,    Vent. Rate : 138 BPM     Atrial Rate : 138 BPM     P-R Int : 126 ms          QRS Dur : 086 ms      QT Int : 286 ms       P-R-T Axes : 051 002 052 degrees     QTc Int : 433 ms    Sinus tachycardia  Otherwise normal ECG  No previous ECGs available  Confirmed by KIMBERLY ARCEO, Crow ([id number]) on 7/27/2022 7:53:00 AM    Referred By: [email or web address]   SELF           Confirmed  By:Crow HARRIS MD                                    Results for orders placed during the hospital encounter of 07/26/22    Echo    Interpretation Summary  · The left ventricle is normal in size with normal systolic function.  · The estimated ejection fraction is 65%.  · Normal left ventricular diastolic function.  · Normal right ventricular size with normal right ventricular systolic function.  · The estimated PA systolic pressure is 16 mmHg.  · Normal central venous pressure (3 mmHg).      Echo  · The left ventricle is normal in size with normal systolic function.  · The estimated ejection fraction is 65%.  · Normal left ventricular diastolic function.  · Normal right ventricular size with normal right ventricular systolic   function.  · The estimated PA systolic pressure is 16 mmHg.  · Normal central venous pressure (3 mmHg).         Labs and Imaging within the last 24 hours listed above were reviewed.       Diet: Diet diabetic [facility]; 2000 Calorie; Renal  Significant LDAs:   IV Access Type: Peripheral  Urinary Catheter Indication if present: Patient Does Not Have Urinary Catheter  Other Lines/Tubes/Drains:    HIGH RISK CONDITION(S):   Patient has a condition that poses threat to life and bodily function: Acute Renal Failure     Goals of Care:    Previous admission:     Likely prognosis:  Fair  Code Status: Full Code  Comfort Only: No  Hospice: No  Goals at discharge: remain at home, with physician follow-up    Discharge Planning   FILEMON: 8/12/2022     Code Status: Full Code   Is the patient medically ready for discharge?: No    Reason for patient still in hospital (select all that apply): Patient trending condition, Treatment, and Consult recommendations  Discharge Plan A: Home with family        Assessment/Plan:      * Abscess of left thigh  - Afebrile, no leukocytosis  - Continue Ancef  - S/p OR with Ortho 8/6 for repeat washout  - Intra-op cultures with S.aureus: continuing Ancef pending final C&S  results  - PRN analgesics provided  - Continuing to monitor    Pain and swelling of left wrist  - Tapped by Ortho, fluid reviewed: does not appear to be gout or septic arthritis  - PRN analgesics provided    Anemia of chronic disease  - H/H stable near baseline  - continue to monitor     Hematoma of left thigh  - See Abscess of L thigh    Left leg pain  - As above    ANGE (acute kidney injury)  - Hyperphosphatemia also noted  - Continues to make urine  - Nephrology following: no acute indication for HD at this time  - Renally dosing all medications  - Avoid nephrotoxins    Hyperlipidemia associated with type 2 diabetes mellitus  - Continue home statin     Class 2 severe obesity due to excess calories with serious comorbidity and body mass index (BMI) of 36.0 to 36.9 in adult  - Body mass index is 36.02 kg/m².  - Needs dietary and lifestyle modifications in relation to health    Hypertension associated with type 2 diabetes mellitus  - Continue home regimen of amlodipine; lisinopril held due to poor renal function  - continue to monitor and further titrate antihypertensives as clinically indicated     Type 2 diabetes mellitus with hyperglycemia, with long-term current use of insulin  -  DKA has resolved  - Endocrinology consulted and managing.        Active Hospital Problems    Diagnosis  POA    *Abscess of left thigh [L02.416]  Yes    Pain and swelling of left wrist [M25.532, M25.432]  No    Anemia of chronic disease [D63.8]  Yes    Left leg pain [M79.605]  Yes    Hematoma of left thigh [S70.12XA]  Yes    ANGE (acute kidney injury) [N17.9]  No    Hyperlipidemia associated with type 2 diabetes mellitus [E11.69, E78.5]  Yes     Lab Results   Component Value Date    LDLCALC 46.6 (L) 03/22/2022     - well controlled  - continue current medication      Type 2 diabetes mellitus with hyperglycemia, with long-term current use of insulin [E11.65, Z79.4]  Not Applicable     Lab Results   Component Value Date    HGBA1C  11.2 (H) 03/22/2022     - A1C worsened from 8.6% to 11.2 %  - recommend stop Glipizide  - recommend continue Jardiance and Trulicity  - recommend increase Trulicity to 3 mg weekly  - recommend start Lantus 10 units daily and will uptitrate to goal   - recommend f/u glucose every 2 weeks and monitor before meals      Hypertension associated with type 2 diabetes mellitus [E11.59, I15.2]  Yes     - well controlled  - continue current medication      Class 2 severe obesity due to excess calories with serious comorbidity and body mass index (BMI) of 36.0 to 36.9 in adult [E66.01, Z68.36]  Not Applicable     - discussed recommendation for diet, exercise and weight loss        Resolved Hospital Problems    Diagnosis Date Resolved POA    Acute gout of left wrist [M10.9] 08/06/2022 No    Hypokalemia [E87.6] 08/06/2022 Yes    Hyponatremia [E87.1] 08/06/2022 Yes    DKA (diabetic ketoacidosis) [E11.10] 08/06/2022 Yes    Leukocytosis [D72.829] 08/06/2022 Yes    Elevated d-dimer [R79.89] 08/06/2022 Yes    Hyperkalemia [E87.5] 07/31/2022 Unknown       Inpatient Medications Prescribed for Management of Current Problems:     Scheduled Meds:    amLODIPine  5 mg Oral Daily    atorvastatin  20 mg Oral Daily    ceFAZolin (ANCEF) IVPB  2 g Intravenous Daily    heparin (porcine)  5,000 Units Subcutaneous Q8H    insulin aspart U-100  8 Units Subcutaneous TIDWM    insulin detemir U-100  10 Units Subcutaneous BID    senna-docusate 8.6-50 mg  2 tablet Oral Daily     Continuous Infusions:   As Needed: acetaminophen, sars-cov-2 (covid-19), dextrose 10%, dextrose 10%, glucagon (human recombinant), glucose, glucose, insulin aspart U-100, morphine, ondansetron, oxyCODONE, sodium chloride 0.9%    VTE Risk Mitigation (From admission, onward)         Ordered     heparin (porcine) injection 5,000 Units  Every 8 hours         08/07/22 1010     Place sequential compression device  Until discontinued         08/06/22 0855     Place  sequential compression device  Until discontinued         07/29/22 0158     IP VTE HIGH RISK PATIENT  Once         07/29/22 0158     Place SAVANNAH hose  Until discontinued         07/26/22 2240     Place sequential compression device  Until discontinued         07/26/22 2240              I have assessed these finding virtually using telemed platform and with assistance of bedside nurse     The attending portion of this evaluation, treatment, and documentation was performed per Lyric Pichardo MD via Telemedicine AudioVisual using the secure [employer] software platform with 2 way audio/video. The provider was located off-site and the patient is located in the hospital. The aforementioned video software was utilized to document the relevant history and physical exam    Lyric Pichardo MD  Department of Hospital Medicine   [facility] - Telemetry Stepdown

## 2022-08-10 NOTE — SUBJECTIVE & OBJECTIVE
Interval History: no acute events overnight. Scr stable 8.9 today 8.8 from 8.8 yesterday . had 1400cc of urineOPT    Review of patient's allergies indicates:  No Known Allergies  Current Facility-Administered Medications   Medication Frequency    acetaminophen tablet 1,000 mg Q8H PRN    amLODIPine tablet 5 mg Daily    atorvastatin tablet 20 mg Daily    ceFAZolin 2 gram in dextrose 5% 100 mL IVPB (premix) Daily    COVID-19 vac, forrest(Pfizer)(PF) (Pfizer COVID-19) 30 mcg/0.3 mL injection 0.3 mL vaccine x 1 dose    dextrose 10% bolus 125 mL PRN    dextrose 10% bolus 250 mL PRN    glucagon (human recombinant) injection 1 mg PRN    glucose chewable tablet 16 g PRN    glucose chewable tablet 24 g PRN    heparin (porcine) injection 5,000 Units Q8H    insulin aspart U-100 pen 1-10 Units QID (AC + HS) PRN    insulin aspart U-100 pen 8 Units TIDWM    insulin detemir U-100 pen 10 Units BID    morphine injection 3 mg Q6H PRN    ondansetron injection 4 mg Q6H PRN    oxyCODONE immediate release tablet 5 mg Q4H PRN    senna-docusate 8.6-50 mg per tablet 2 tablet Daily    sodium chloride 0.9% flush 10 mL PRN       Objective:     Vital Signs (Most Recent):  Temp: 97.9 °F (36.6 °C) (08/10/22 0422)  Pulse: 76 (08/10/22 0730)  Resp: 18 (08/10/22 0624)  BP: (!) 150/83 (08/10/22 0422)  SpO2: 95 % (08/10/22 0546)  O2 Device (Oxygen Therapy): room air (08/06/22 1405) Vital Signs (24h Range):  Temp:  [97.9 °F (36.6 °C)-98.2 °F (36.8 °C)] 97.9 °F (36.6 °C)  Pulse:  [71-98] 76  Resp:  [18] 18  SpO2:  [94 %-99 %] 95 %  BP: (132-170)/(78-98) 150/83     Weight: 104.3 kg (230 lb) (08/09/22 1307)  Body mass index is 36.02 kg/m².  Body surface area is 2.22 meters squared.    I/O last 3 completed shifts:  In: 600 [P.O.:590; I.V.:10]  Out: 1850 [Urine:1850]    Physical Exam  Vitals reviewed.   Constitutional:       Appearance: Normal appearance.   HENT:      Head: Normocephalic and atraumatic.      Right Ear: External ear normal.      Left Ear:  External ear normal.      Nose: Nose normal.      Mouth/Throat:      Pharynx: Oropharynx is clear.   Eyes:      Extraocular Movements: Extraocular movements intact.      Conjunctiva/sclera: Conjunctivae normal.   Cardiovascular:      Rate and Rhythm: Normal rate and regular rhythm.      Pulses: Normal pulses.   Pulmonary:      Effort: Pulmonary effort is normal.      Breath sounds: Normal breath sounds.   Abdominal:      General: Abdomen is flat. There is no distension.   Musculoskeletal:         General: Normal range of motion.      Cervical back: Normal range of motion.   Skin:     General: Skin is warm and dry.   Neurological:      General: No focal deficit present.      Mental Status: He is alert and oriented to person, place, and time.   Psychiatric:         Mood and Affect: Mood normal.         Behavior: Behavior normal.       Significant Labs:  All labs within the past 24 hours have been reviewed.

## 2022-08-10 NOTE — PLAN OF CARE
Patient alert and oriented x 4; Blood sugar within normal range. Medicated with oxycodone 5 mg po for pain level 8/10 with good result. Stand by assistance  with walker provided. No acute distress noted. VS stable. Used urinal. All safety measures maintained.

## 2022-08-10 NOTE — PROGRESS NOTES
[facility] - Telemetry Stepdown  Infectious Disease  Progress Note    Patient Name: Wilfredo Thomas  MRN: [id number]  Admission Date: 7/26/2022  Length of Stay: 14 days  Attending Physician: Lyric Pichardo MD  Primary Care Provider: Aylin Wayne DO    Isolation Status: No active isolations  Assessment/Plan:      * Abscess of left thigh     46 yo male with history of DMII and HTN admitted for DKA and left thigh abscess. MRI of left femur was notable for ruptured tensor fascia jose luis mm with associated large hematoma, as well as small area of potential osteonecrosis. Bedside aspiration of the fluid collection was notable for a cell count of >200k (>80%segs), cultures + staph species. He was taken to the OR on 7/29 with orthopedic surgery for surgical I&D. Surgical cultures are positive for MSSA. Per ortho surgery, no concerns for bone involvement. Patient was initially on Vancomycin and Zosyn. Vanc trough supratherapeutic and patient has developed an ANGE. He is currently on Cefazolin.       Recent new onset left wrist pain, states it has increased today. He is s/p wrist arthroscopy with hardware placement in 3/2022. Wife noticed a boil on his wrist. Swelling and TTP today. S/p joint aspiration, white count 2050k, 88% segs. No crystals noted. Culture NG. Xray of wrist with no acute concerns.      Otherwise, afebrile. HDS. OR cultures 8/6 L thigh now show MSSA.  Leg pain improved.  ANGE worsened. 2D echo without mention of vegetations.  Per dw ortho, no further OR plans and no hip joint/bone involvement.  Stable non septic        Recommendations:   · Continue Cefazolin 2g q24 (renally dosed per pharmacy) for MSSA SSTI.   · FU Ortho plans  · Nephrology ANGE management.  · Anticipate SSTI course from time of last washout 8/6  · Patient and plan reviewed with ID staff, Dr. Samson.   · ID will follow given complicated case and monitoring of RF and need for dose adjustments.             Anticipated Disposition: tbd    Thank  you for your consult. I will follow-up with patient. Please contact us if you have any additional questions.    DARLYN Thomas  Infectious Disease  [facility] - Telemetry Stepdown    Subjective:     Principal Problem:Abscess of left thigh    HPI: Mr. Thomas is a 47 year old male with a PMH of DM2 (poorly controlled), HTN, GERD, HLD, obesity who initially presented to [facility] ED with cc of SOBx1 day. Pt was also reporting significant pain and swelling along his left lateral proximal thigh. Pt reports this pain started over the last 2 weeks, which worsened. He initially was seen outpatient in which he was diagnoised with a mm strain and given 800 mg ibuprofen. From there he went to the ED on 7/22 d/t worsening pain. He was treated with prednisone/morphine shot, and well as a prednisone oral pack. Pt denied recent injury, with the exception of soreness to his left lower shin following bumping into a cabinet. He denies open wounds/abrasions from this injury but states the soreness started in his left thigh following the improvement of discomfort from his left shin. Pt reports having been treated for a boil on his central/right buttocks in the end of June. 2022. Pt states he was seen in Urgent Care in [place] and the boil was drained. He was given an oral abx in which he completed.     To note, pt reports having a recent left wrist fracture, ligament rupture following an accident with his riding lawnmower. States he underwent surgery and 2 screws were placed on 3/31/22 at [facility]. Denies associated infection/complications. Denies pain in the site currently.    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 Denies having pets. Denies recent fishing/hunting. Denies hx of immunocompromising conditions.     To note, pt was found to be in DKA with blood sugars >400, treated by critical team, which has since resolved. Pt was recently started on insulin by his PCP, which he had  not started. Pt latest A1C 10.     CT of left thigh notable for fluid collections suspicious for hematoma/seroma. MRI of left femur was notable for ruptured tensor fascia jose luis mm with associated large hematoma, as well as small area of potential osteonecrosis. Bedside aspiration of the fluid collection was notable for a cell count of >200k >80%segs, cultures + staph spp. Pt was taken for I&D with Dr. Graff with ortho surgery on 7/29, abscesses were noted and washed out, cultures collected + staph spp. Per pt, states that Dr. Graff is planning to return to surgery tomorrow, 8/1/22 for repeat washout.     Pt was intially with leukocytosis, but has since down trended to 11k. Blood cultures from 7/26 NGTD.     Pt is currently on zosyn, vancomycin, and clindamycin. ID was consulted for abx recs regarding left TFL infection.            Interval History:   No AEON.   Worsening ANGE, on IVF, reporting urination. Cr to 8.8 today.   L thigh improving  All thigh cultures with MSSA  The patient denies any recent fever, chills, or sweats.        Review of Systems   Constitutional:  Negative for chills, diaphoresis, fatigue and fever.   Respiratory:  Negative for cough and shortness of breath.    Cardiovascular:  Positive for leg swelling (upper left thigh). Negative for chest pain and palpitations.   Gastrointestinal:  Negative for abdominal distention and abdominal pain.   Genitourinary:  Negative for difficulty urinating and dysuria.   Musculoskeletal:  Positive for arthralgias (left wrist), joint swelling (left wrist) and myalgias (upper left thigh).   Skin:  Positive for wound (upper left thigh). Negative for color change and rash.   Allergic/Immunologic: Negative for immunocompromised state.   Neurological:  Negative for dizziness, tremors, weakness, numbness and headaches.   Psychiatric/Behavioral:  Negative for agitation and decreased concentration. The patient is not nervous/anxious.    Objective:     Vital Signs  (Most Recent):  Temp: 98.1 °F (36.7 °C) (08/09/22 1923)  Pulse: 89 (08/09/22 1927)  Resp: 18 (08/09/22 1956)  BP: (!) 170/98 (08/09/22 1923)  SpO2: 96 % (08/09/22 1923)   Vital Signs (24h Range):  Temp:  [97.2 °F (36.2 °C)-98.2 °F (36.8 °C)] 98.1 °F (36.7 °C)  Pulse:  [77-98] 89  Resp:  [18-20] 18  SpO2:  [94 %-99 %] 96 %  BP: (132-174)/(70-98) 170/98     Weight: 104.3 kg (230 lb)  Body mass index is 36.02 kg/m².    Estimated Creatinine Clearance: 11.7 mL/min (A) (based on SCr of 8.8 mg/dL (H)).    Physical Exam  Vitals and nursing note reviewed.   Constitutional:       General: He is not in acute distress.     Appearance: Normal appearance. He is well-developed. He is obese. He is not ill-appearing, toxic-appearing or diaphoretic.   HENT:      Head: Normocephalic and atraumatic.      Nose: Nose normal.      Mouth/Throat:      Dentition: Does not have dentures.      Pharynx: No oropharyngeal exudate.   Eyes:      General: No scleral icterus.     Conjunctiva/sclera: Conjunctivae normal.   Cardiovascular:      Rate and Rhythm: Normal rate and regular rhythm.   Pulmonary:      Effort: Pulmonary effort is normal. No respiratory distress.      Breath sounds: Normal breath sounds.   Abdominal:      General: There is no distension.      Palpations: Abdomen is soft.      Tenderness: There is no abdominal tenderness.   Musculoskeletal:         General: Swelling (left wrist) present.   Skin:     General: Skin is warm and dry.      Findings: No erythema or rash.      Comments: Incision to left thigh. Dressed, CDI. Surgical drain with dark bloody output.   Neurological:      General: No focal deficit present.      Mental Status: He is alert and oriented to person, place, and time. Mental status is at baseline.      Motor: No weakness.      Gait: Gait normal.   Psychiatric:         Mood and Affect: Mood normal.         Behavior: Behavior normal.         Thought Content: Thought content normal.         Judgment: Judgment normal.        Significant Labs: CBC:   Recent Labs   Lab 08/08/22  0725 08/09/22  0942   WBC 6.65 5.49   HGB 9.8* 9.1*   HCT 30.2* 27.0*    222       CMP:   Recent Labs   Lab 08/08/22  0725 08/09/22  0942   * 133*   K 3.9 5.1   CL 99 103   CO2 21* 19*   GLU 85 196*   BUN 48* 52*   CREATININE 8.1* 8.8*   CALCIUM 8.8 8.9   PROT 6.2 5.8*   ALBUMIN 1.9* 1.8*   BILITOT 0.2 0.2   ALKPHOS 78 73   AST 12 15   ALT <5* <5*   ANIONGAP 11 11       Microbiology Results (last 7 days)       Procedure Component Value Units Date/Time    Fungus culture [[id number]] Collected: 08/05/22 0817    Order Status: Completed Specimen: Joint Fluid from Wrist, Left Updated: 08/09/22 1307     Fungus (Mycology) Culture Culture in progress    Aerobic culture [[id number]]  (Abnormal) Collected: 08/06/22 1153    Order Status: Completed Specimen: Wound from Hip, Left Updated: 08/09/22 1030     Aerobic Bacterial Culture STAPHYLOCOCCUS AUREUS  Moderate  For susceptibility see order #[id number]      Narrative:      #1 Left hip fluid    Aerobic culture [[id number]]  (Abnormal) Collected: 08/06/22 1153    Order Status: Completed Specimen: Wound from Hip, Left Updated: 08/09/22 1030     Aerobic Bacterial Culture STAPHYLOCOCCUS AUREUS  Few  Susceptibility pending      Narrative:      #2 Left hip fluid    Culture, Anaerobe [[id number]] Collected: 08/06/22 1153    Order Status: Completed Specimen: Body Fluid from Hip, Left Updated: 08/09/22 1013     Anaerobic Culture Culture in progress    Narrative:      #2 Left hip fluid    Culture, Anaerobe [[id number]] Collected: 08/06/22 1153    Order Status: Completed Specimen: Body Fluid from Hip, Left Updated: 08/09/22 1012     Anaerobic Culture Culture in progress    Narrative:      #1 Left hip fluid    Blood culture [[id number]] Collected: 08/05/22 0850    Order Status: Completed Specimen: Blood from Antecubital, Right Arm Updated: 08/09/22 1012     Blood Culture, Routine No Growth to date      No Growth to date       No Growth to date      No Growth to date      No Growth to date    Blood culture [[id number]] Collected: 08/05/22 0852    Order Status: Completed Specimen: Blood from Peripheral, Right Hand Updated: 08/09/22 1012     Blood Culture, Routine No Growth to date      No Growth to date      No Growth to date      No Growth to date      No Growth to date    AFB Culture & Smear [[id number]] Collected: 08/05/22 0817    Order Status: Completed Specimen: Joint Fluid from Wrist, Left Updated: 08/08/22 1529     AFB Culture & Smear Culture in progress     AFB CULTURE STAIN No acid fast bacilli seen.    Aerobic culture [[id number]] Collected: 08/05/22 0817    Order Status: Completed Specimen: Bone from Wrist, Left Updated: 08/08/22 0540     Aerobic Bacterial Culture No growth    Gram stain [[id number]] Collected: 08/05/22 0817    Order Status: Completed Specimen: Joint Fluid from Wrist, Left Updated: 08/05/22 1038     Gram Stain Result No WBC's      No organisms seen    Culture, Anaerobe [[id number]] Collected: 08/05/22 0817    Order Status: Sent Specimen: Joint Fluid from Wrist, Left Updated: 08/05/22 0818    Aerobic culture [[id number]]  (Abnormal) Collected: 07/29/22 1251    Order Status: Completed Specimen: Abscess from Leg, Left Updated: 08/04/22 1250     Aerobic Bacterial Culture STAPHYLOCOCCUS AUREUS  Many  For susceptibility see order #[id number]      Narrative:      Left thigh abscess #1    Aerobic culture [[id number]]  (Abnormal)  (Susceptibility) Collected: 07/28/22 1800    Order Status: Completed Specimen: Abscess from Leg, Left Updated: 08/04/22 1249     Aerobic Bacterial Culture STAPHYLOCOCCUS AUREUS  Many      Narrative:      LEFT THIGH ABSCESS    Aerobic culture [[id number]]  (Abnormal)  (Susceptibility) Collected: 07/29/22 1300    Order Status: Completed Specimen: Abscess from Leg, Left Updated: 08/04/22 1248     Aerobic Bacterial Culture STAPHYLOCOCCUS AUREUS  Many      Narrative:      Left thigh abscess #2           Recent Lab Results  (Last 5 results in the past 24 hours)        08/09/22  1537   08/09/22  1400   08/09/22  1119   08/09/22  0953   08/09/22  0942        Albumin         1.8       Alkaline Phosphatase         73       ALT         <5       Anion Gap         11       Ascending aorta   3.11             Ao peak blaire   1.48             Ao VTI   32.50             AST         15       AV valve area   3.56             AV mean gradient   5             AV index (prosthetic)   0.77             AV peak gradient   9             AV Velocity Ratio   0.73             Baso #         0.06       Basophil %         1.1       BILIRUBIN TOTAL         0.2  Comment: For infants and newborns, interpretation of results should be based  on gestational age, weight and in agreement with clinical  observations.    Premature Infant recommended reference ranges:  Up to 24 hours.............<8.0 mg/dL  Up to 48 hours............<12.0 mg/dL  3-5 days..................<15.0 mg/dL  6-29 days.................<15.0 mg/dL         BSA   2.22             BUN         52       Calcium         8.9       Chloride         103       CO2         19       Creatinine         8.8       Left Ventricle Relative Wall Thickness   0.40             Differential Method         Automated       E/A ratio   1.24             E/E' ratio   9.26             eGFR         6.8       EF   65             Eos #         0.4       Eosinophil %         6.4       E wave deceleration time   242.49             FS   38             Glucose         196       Gran # (ANC)         3.6       Gran %         66.2       Hematocrit         27.0       Hemoglobin         9.1       Immature Grans (Abs)         0.18  Comment: Mild elevation in immature granulocytes is non specific and   can be seen in a variety of conditions including stress response,   acute inflammation, trauma and pregnancy. Correlation with other   laboratory and clinical findings is essential.         Immature Granulocytes         " 3.3       IVSd   0.83             LA WIDTH   3.00             Left Atrium Major Axis   5.19             Left Atrium Minor Axis   5.71             LA size   4.73             LA volume   65.59                35.67             LA vol index   30.5             LA Volume Index (Mod)   16.6             LVOT area   4.6             LV LATERAL E/E' RATIO   8.00             LV SEPTAL E/E' RATIO   11.00             LV EDV BP   102.25             LV Diastolic Volume Index   47.56             LVIDd   4.70             LVIDs   2.93             LV mass   138.51             LV Mass Index   64             LVOT diameter   2.42             LVOT peak vickey   1.08             LVOT stroke volume   115.76             LVOT peak VTI   25.18             LV ESV BP   33.12             LV Systolic Volume Index   15.4             Lymph #         0.9       Lymph %         15.7       Magnesium         1.9       MCH         30.8       MCHC         33.7       MCV         92       Mean e'   0.10             Mono #         0.4       Mono %         7.3       MPV         9.9       MV valve area p 1/2 method   3.13             MV "A" wave duration   14.27             MV Peak A Vickey   0.71             MV Peak E Vickey   0.88             MV stenosis pressure 1/2 time   70.32             nRBC         0       Phosphorus         5.9       Platelet Estimate         Appears normal       Platelets         222       POCT Glucose 125     220   193         Poly         Occasional       Potassium         5.1       PROTEIN TOTAL         5.8       PV Peak D Vickey   0.41             PV Peak S Vickey   0.67             Pulm vein S/D ratio   1.63             Posterior Wall   0.93             Right Atrial Pressure (from IVC)   3             RA Major Axis   4.68             RA Width   3.23             RBC         2.95       RDW         12.9       RV S'   13.93             RVDD   3.83             Sinus   3.64             Sodium         133       STJ   3.34             TAPSE   1.98      "        TDI SEPTAL   0.08             TDI LATERAL   0.11             Triscuspid Valve Regurgitation Peak Gradient   13             TR Max Vickey   1.83             TV rest pulmonary artery pressure   16             WBC         5.49                              Significant Imaging:     Imaging Results              CT Thigh With Contrast Left (Final result)  Result time 07/27/22 01:13:09      Final result by Jori Hopkins DO (07/27/22 01:13:09)                   Impression:      Large heterogeneous fluid collection in the anterolateral left proximal thigh soft tissues, concerning for a soft tissue hematoma or Preston-Melissa lesion.  A neoplastic process is not entirely excluded.  Recommend close interval follow-up to assess for stability/resolution.      Electronically signed by: Jori Hopkins  Date:    07/27/2022  Time:    01:13               Narrative:    EXAMINATION:  CT THIGH WITH CONTRAST LEFT    CLINICAL HISTORY:  Soft tissue mass, thigh, deep;    TECHNIQUE:  Axial CT images of the left thigh with sagittal and coronal reformats after the intravenous administration of 50 mL Omnipaque 350.    COMPARISON:  None.    FINDINGS:  Bone: Bone mineralization is normal.  There is no evidence of an acute fracture or dislocation.  Alignment is normal.    Soft tissues: There is a large heterogeneous fluid collection in the left anterolateral proximal thigh measuring approximately 16 x 7 x 6 cm.  The collection appears to be centered within the subcutaneous tissues or superficial fascia and abuts the gluteus musculature, the sartorius muscle, the rectus femoris, and the vastus lateralis.  There is soft tissue edema in the surrounding subcutaneous tissues.  Muscle bulk is normal.    Articulations: The left femoroacetabular joint is unremarkable.  The pubic symphysis is unremarkable.  The left knee is unremarkable.  No large joint effusion or significant cartilage loss.    Miscellaneous: Neurovascular structures are grossly  intact.  There is moderate calcified atherosclerosis of the left femoral and popliteal arteries.                                       CTA Chest Non-Coronary (PE Study) (Final result)  Result time 07/26/22 21:36:01      Final result by Jori Hopkins DO (07/26/22 21:36:01)                   Impression:      No large central or lobar pulmonary embolism.      Electronically signed by: Jori Hopkins  Date:    07/26/2022  Time:    21:36               Narrative:    EXAMINATION:  CTA CHEST NON CORONARY    CLINICAL HISTORY:  Pulmonary embolism (PE) suspected, high prob;    TECHNIQUE:  Low dose axial images, sagittal and coronal reformations were obtained from the thoracic inlet to the lung bases following the IV administration of 100 mL of Omnipaque 350.  Contrast timing was optimized to evaluate the pulmonary arteries.  Maximum intensity projection images were provided for review.    COMPARISON:  Chest radiograph from earlier the same date.    FINDINGS:  Pulmonary vasculature: Satisfactory opacification of the pulmonary arterial system.  Motion artifact significantly limits evaluation of the segmental and subsegmental pulmonary arteries.  There is no large central or lobar pulmonary embolism.    Aorta: Left-sided aortic arch.  No aneurysm and no significant atherosclerosis.    Base of Neck: No significant abnormality.    Thoracic soft tissues: Normal.    Heart: Normal size. No effusion.    Amena/Mediastinum: No pathologic russ enlargement.    Airways: The large airways are patent. No foci of endobronchial filling.    Lungs/Pleura: Clear lungs. No pleural effusion or thickening.    Esophagus: Normal.    Upper Abdomen: No abnormality of the partially imaged upper abdomen.    Bones: No acute fracture. No suspicious lytic or sclerotic lesions.                                       X-Ray Chest 1 View (Final result)  Result time 07/26/22 21:23:42   Procedure changed from X-Ray Chest PA And Lateral     Final result by Olman  GAEL Saucedo MD (07/26/22 21:23:42)                   Impression:      No convincing radiographic evidence of acute intrathoracic process on this single view..      Electronically signed by: Olman Saucedo MD  Date:    07/26/2022  Time:    21:23               Narrative:    EXAMINATION:  XR CHEST 1 VIEW    CLINICAL HISTORY:  shortness of breath;    TECHNIQUE:  Single frontal view of the chest was performed.    COMPARISON:  None    FINDINGS:  Cardiac monitoring leads overlie the chest.  Cardiac silhouette appears within normal limits.  No confluent airspace consolidation identified.  No significant volume of pleural fluid or pneumothorax appreciated.  The visualized osseous structures demonstrate mild degenerative changes.

## 2022-08-10 NOTE — ASSESSMENT & PLAN NOTE
- Tapped by Ortho, fluid reviewed: does not appear to be gout or septic arthritis  - PRN analgesics provided

## 2022-08-10 NOTE — PROGRESS NOTES
[facility] - Telemetry Stepdown  Endocrinology  Progress Note    Admit Date: 7/26/2022     Reason for Consult: Management of T2DM, Hyperglycemia     Surgical Procedure and Date: L thigh IM abscess I&D on 07/29/2022    Diabetes diagnosis year: at age 43yo    Home Diabetes Medications:    - Metformin 500 mg bid  - Jardiance 25 mg qd  - Trulicity 3 mg SC weekly  - Lantus 10 units qd -- prescribed at the end of 3/2022 but pt has not yet started taking it    Previously on glipizide, but stopped in 03/2022 by PCP who is managing his DM outpatient    How often checking glucose at home?  2-3x per week    BG readings on regimen: he reports <200  Hypoglycemia on the regimen?  No  Missed doses on regimen?  Yes    Diabetes Complications include:     Hyperglycemia    Complicating diabetes co morbidities:   HTN, obesity      HPI:   Patient is a 47 y.o. male with a diagnosis of T2DM, HTN, HLD, GERD, and obesity presented to the ED on 07/26/2022 with SOB and altered mental status x1d. Additionally reported pain and swelling L lateral high. Was seen at an OSH ED for thigh pain and was treated conservatively with NSAIDS initially. At follow up appt w/ ortho 7/22 he received IM steroid inj (deltoid) and po prednisone for suspected muscle strain. Due to worsening AMS and SOB he presented to the [facility] ED on 7/26. In the ED he was found to be in DKA and as admitted to the MICU due to severe acidosis.     For the thigh pain, MRI showed a ruptured tensor fascia jose luis (TFL) muscle with associated large hematoma. Ortho decided to perform aspiration night of 7/28. Gram stain showed G+ cocci in clusters- now growing Staph Species.They decided to take pt to OR 7/29 for formal I&D.      For DKA he was started on IVF and an intensive insulin gtt with resolution of DKA. Transitioned to MDI on 7/30 AM. Initial regimen was detemir 10u BID and Aspart 4u AC + LDC. Insulin up-titrated by MICU team prior to stepdown, to detemir 12u bid and aspart 8u AC +  "LDC. Endocrinology consulted for management of hyperglycemia.      Interval HPI:   Overnight events: NAEON, blood sugars maintained within range on Levemir 10u qd,  novolog 8u TIDWM.  Eatin%  Nausea: No  Hypoglycemia and intervention: No  Fever: No  TPN and/or TF: No    BP (!) 175/95 (BP Location: Right arm, Patient Position: Sitting)   Pulse 88   Temp 98.5 °F (36.9 °C) (Oral)   Resp 18   Ht 5' 7" (1.702 m)   Wt 104.3 kg (230 lb)   SpO2 99%   BMI 36.02 kg/m²     Labs Reviewed and Include    Recent Labs   Lab 08/10/22  0246   *   CALCIUM 8.9   ALBUMIN 1.7*   PROT 5.5*   *   K 4.3   CO2 21*      BUN 56*   CREATININE 8.9*   ALKPHOS 69   ALT <5*   AST 12   BILITOT 0.2     Lab Results   Component Value Date    WBC 4.70 08/10/2022    HGB 8.2 (L) 08/10/2022    HCT 24.9 (L) 08/10/2022    MCV 92 08/10/2022     08/10/2022     No results for input(s): TSH, FREET4 in the last 168 hours.  Lab Results   Component Value Date    HGBA1C 10.1 (H) 2022       Nutritional status:   Body mass index is 36.02 kg/m².  Lab Results   Component Value Date    ALBUMIN 1.7 (L) 08/10/2022    ALBUMIN 1.8 (L) 2022    ALBUMIN 1.9 (L) 2022     Lab Results   Component Value Date    PREALBUMIN 7 (L) 2022       Estimated Creatinine Clearance: 11.6 mL/min (A) (based on SCr of 8.9 mg/dL (H)).    Accu-Checks  Recent Labs     22  0718 22  1141 22  1526 22  2113 22  0724 22  0953 22  1119 22  1537 22  2103 08/10/22  0735   POCTGLUCOSE 93 82 142* 86 110 193* 220* 125* 127* 149*       Current Medications and/or Treatments Impacting Glycemic Control  Immunotherapy:    Immunosuppressants       None          Steroids:   Hormones (From admission, onward)                None          Pressors:    Autonomic Drugs (From admission, onward)                None          Hyperglycemia/Diabetes Medications:   Antihyperglycemics (From admission, onward) "                Start     Stop Route Frequency Ordered    08/08/22 2100  insulin detemir U-100 pen 10 Units         -- SubQ 2 times daily 08/08/22 1357    08/08/22 1645  insulin aspart U-100 pen 8 Units         -- SubQ 3 times daily with meals 08/08/22 1357    07/30/22 0826  insulin aspart U-100 pen 1-10 Units         -- SubQ Before meals & nightly PRN 07/30/22 0727            ASSESSMENT and PLAN    Type 2 diabetes mellitus with hyperglycemia, with long-term current use of insulin  Key History and Diagnostic Findings  - Uncontrolled T2DM, pt not adherent to med regimen, not checking BG regularly at home  - P/w DKA with trigger infection/thigh abscess  - Home Regimen: metformin 500mg BID, Jardiance 25mg qd, Trulicity 3mg SC weekly, Lantus 10u qd   Pt never started insulin which was prescribed 3/2022. Not filling metformin regularly  - Status post repeat I&D of leg on 08/06/2022  - Glucose Goals: 140-180mg/dL  - 24 hour glucose trend:  Had hyperglycemia after I&D repeat procedure yesterday and not receiving correction insulin, better controlled this morning in the high 100s    Plan  - Continue Levemir 10 units BID  - Continue Aspart 8 units TIDWM with moderate correction scale    - Patient will need to be discharged on MDI insulin (prandial and basal)   - Other home meds to consider at d/c:    - Recommend holding jardiace until complete resolution of infection/acute illness. Because his presentation of DKA has a clear trigger (infection) and was not euglycemic DKA, unlikely that Jardiance was the cause. For these reasons, it would be reasonable to resume it outpatient after recovery from acute illness.   - Can resume metformin and trulicity at d/c, will re-visit dc recs when pt is ready for discharge    - Hypoglycemia protocol in place  - If blood glucose greater than 300, please ask patient not to eat food or drink anything other than water until correctional insulin has brought it back below 250    ** Please notify  Endocrine for any change and/or advance in diet**  ** Please call Endocrine for any BG related issues **        Dania Charles,   Endocrinology  [facility] - Telemetry Stepdown

## 2022-08-10 NOTE — SUBJECTIVE & OBJECTIVE
"Interval HPI:   Overnight events: NAEON, blood sugars maintained within range on Levemir 10u qd,  novolog 8u TIDWM.  Eatin%  Nausea: No  Hypoglycemia and intervention: No  Fever: No  TPN and/or TF: No    BP (!) 175/95 (BP Location: Right arm, Patient Position: Sitting)   Pulse 88   Temp 98.5 °F (36.9 °C) (Oral)   Resp 18   Ht 5' 7" (1.702 m)   Wt 104.3 kg (230 lb)   SpO2 99%   BMI 36.02 kg/m²     Labs Reviewed and Include    Recent Labs   Lab 08/10/22  0246   *   CALCIUM 8.9   ALBUMIN 1.7*   PROT 5.5*   *   K 4.3   CO2 21*      BUN 56*   CREATININE 8.9*   ALKPHOS 69   ALT <5*   AST 12   BILITOT 0.2     Lab Results   Component Value Date    WBC 4.70 08/10/2022    HGB 8.2 (L) 08/10/2022    HCT 24.9 (L) 08/10/2022    MCV 92 08/10/2022     08/10/2022     No results for input(s): TSH, FREET4 in the last 168 hours.  Lab Results   Component Value Date    HGBA1C 10.1 (H) 2022       Nutritional status:   Body mass index is 36.02 kg/m².  Lab Results   Component Value Date    ALBUMIN 1.7 (L) 08/10/2022    ALBUMIN 1.8 (L) 2022    ALBUMIN 1.9 (L) 2022     Lab Results   Component Value Date    PREALBUMIN 7 (L) 2022       Estimated Creatinine Clearance: 11.6 mL/min (A) (based on SCr of 8.9 mg/dL (H)).    Accu-Checks  Recent Labs     22  0718 22  1141 22  1526 22  2113 22  0724 22  0953 22  1119 22  1537 22  2103 08/10/22  0735   POCTGLUCOSE 93 82 142* 86 110 193* 220* 125* 127* 149*       Current Medications and/or Treatments Impacting Glycemic Control  Immunotherapy:    Immunosuppressants       None          Steroids:   Hormones (From admission, onward)                None          Pressors:    Autonomic Drugs (From admission, onward)                None          Hyperglycemia/Diabetes Medications:   Antihyperglycemics (From admission, onward)                Start     Stop Route Frequency Ordered    22 2100 "  insulin detemir U-100 pen 10 Units         -- SubQ 2 times daily 08/08/22 1357    08/08/22 1645  insulin aspart U-100 pen 8 Units         -- SubQ 3 times daily with meals 08/08/22 1357    07/30/22 0826  insulin aspart U-100 pen 1-10 Units         -- SubQ Before meals & nightly PRN 07/30/22 0727

## 2022-08-10 NOTE — ASSESSMENT & PLAN NOTE
- Body mass index is 36.02 kg/m².  - Needs dietary and lifestyle modifications in relation to health

## 2022-08-10 NOTE — ASSESSMENT & PLAN NOTE
47M with DM admitted 7/26/22 for DKA, ortho following for left lateral thigh abscess. s/p I&D and drain placement left thigh 7/29/22. Cx +MSSA. Had high drain purulent output after first I&D, was taken back to OR 8/6/22 for repeat I&D. Drain in place, dressings CDI. Nephrology on board for ANGE.     -- ANGE nephrology managing   -- DVT SQH  -- Ancef for MSSA per ID    -- Pain MM  -- Drain no output yesterday, will remove Friday 8/12 if no increase in output  -- CRP downtrending 8/9  -- L wrist aspiration non-concerning for septic arthritis  -- Encourage ambulation    Dispo: pending

## 2022-08-10 NOTE — ASSESSMENT & PLAN NOTE
- Hyperphosphatemia also noted  - Continues to make urine  - Nephrology following: no acute indication for HD at this time  - Renally dosing all medications  - Avoid nephrotoxins

## 2022-08-10 NOTE — ASSESSMENT & PLAN NOTE
- Continue home regimen of amlodipine; lisinopril held due to poor renal function  - continue to monitor and further titrate antihypertensives as clinically indicated

## 2022-08-10 NOTE — PT/OT/SLP PROGRESS
Occupational Therapy   Treatment    Name: Wilfredo Thomas  MRN: [id number]  Admitting Diagnosis:  Abscess of left thigh  4 Days Post-Op    Recommendations:     Discharge Recommendations: outpatient OT  Discharge Equipment Recommendations:  shower chair, cane, straight  Barriers to discharge:  None    Assessment:     Wilfredo Thomas is a 47 y.o. male with a medical diagnosis of Abscess of left thigh.  He presents with deficits in mobility as well as pain noted in left wrist. Pt. Provided with education on this date on AAROM there ex, positioning and tendon glides with good understanding noted. . Performance deficits affecting function are impaired endurance, pain, decreased upper extremity function, impaired fine motor.     Rehab Prognosis:  Good; patient would benefit from acute skilled OT services to address these deficits and reach maximum level of function.       Plan:     Patient to be seen 2 x/week to address the above listed problems via self-care/home management, therapeutic activities, therapeutic exercises  · Plan of Care Expires: 08/31/22  · Plan of Care Reviewed with: patient    Subjective     Pain/Comfort:  · Pain Rating 1: 10/10 (with certain motions per pt.)  · Location - Side 1: Left  · Location 1: wrist  · Pain Addressed 1: Reposition, Distraction, Pre-medicate for activity  · Pain Rating Post-Intervention 1:  (appeared to remain constant)    Objective:     Communicated with: nurse prior to session.  Patient found ambulatory in room/bach with   upon OT entry to room.    General Precautions: Standard, fall   Orthopedic Precautions:LLE weight bearing as tolerated   Braces: N/A  Respiratory Status: Room air     Occupational Performance:     Bed Mobility:    · Not tested     Functional Mobility/Transfers:  · Patient completed Sit <> Stand Transfer with independence  with  no assistive device   · Functional Mobility: s to ambulate in room without an AD    Activities of Daily Living:  · LBD: reviewed  donning socks and maintaining left hip in neutral no abduction      [facility] 6 Click ADL: 23    Treatment & Education:  Pt. Performed 1 set  3 to 5 reps of AAROM there ex with use of RUE to assist for wrist flex/ext as well as ulna and radial deviation; Pt. Performed tendon glides x 5 reps   Pt. Educated on elevating LUE to decrease edema as well as massage to reduce edema    Patient left up in chair with all lines intact and call button in reachEducation:      GOALS:   Multidisciplinary Problems     Occupational Therapy Goals        Problem: Occupational Therapy    Goal Priority Disciplines Outcome Interventions   Occupational Therapy Goal     OT, PT/OT Ongoing, Progressing    Description: Goals set on 8/1, with expiration date 8/15:  Patient will increase functional independence with ADLs by performing:    Bed mobility with [place]  Grooming while standing at sink with [place]  UB Dressing with [place].  LB Dressing with [place].  Toileting from toilet with [place] for hygiene and clothing management.   Functional mobility of household and community distance with [place] and AD as needed  Pt will demonstrate understanding of education provided regarding energy conservation and task modification through teach-back method.  Pt will demonstrate [place] in HEP for BUE strengthening GM/FM exercises to improve functional performance.                        Time Tracking:     OT Date of Treatment: 08/10/22  OT Start Time: 1034  OT Stop Time: 1045  OT Total Time (min): 11 min    Billable Minutes:Therapeutic Exercise 11    OT/GORDO: OT          8/10/2022

## 2022-08-10 NOTE — PLAN OF CARE
Chart reviewed:    Afebrile and WBC WNL  Cr 8.9 today and 8.8 day prior - no HD per renal at this time -Cr may have peaked.  On ancef for MSSA  Drain output slowing per Ortho Sx    Plan:  1. Continue renal adjusted ancef  2. Trend creatinine  3. Will follow

## 2022-08-10 NOTE — SUBJECTIVE & OBJECTIVE
Interval History:   No AEON.   Worsening ANGE, reporting urination, states he will go back on IVF today. Cr to 9.3 today.   L thigh improving  All thigh cultures with MSSA  The patient denies any recent fever, chills, or sweats.  Reports left wrist pain improving. Swelling decreased. ROM increased.         Review of Systems   Constitutional:  Negative for chills, diaphoresis, fatigue and fever.   Respiratory:  Negative for cough and shortness of breath.    Cardiovascular:  Positive for leg swelling (upper left thigh). Negative for chest pain and palpitations.   Gastrointestinal:  Negative for abdominal distention and abdominal pain.   Genitourinary:  Negative for difficulty urinating and dysuria.   Musculoskeletal:  Positive for arthralgias (left wrist) and myalgias (upper left thigh). Negative for joint swelling.   Skin:  Positive for wound (upper left thigh). Negative for color change and rash.   Allergic/Immunologic: Negative for immunocompromised state.   Neurological:  Negative for dizziness, tremors, weakness, numbness and headaches.   Psychiatric/Behavioral:  Negative for agitation and decreased concentration. The patient is not nervous/anxious.    Objective:     Vital Signs (Most Recent):  Temp: 98.5 °F (36.9 °C) (08/10/22 0833)  Pulse: 88 (08/10/22 0833)  Resp: 18 (08/10/22 0833)  BP: (!) 175/95 (08/10/22 0833)  SpO2: 99 % (08/10/22 0833)   Vital Signs (24h Range):  Temp:  [97.9 °F (36.6 °C)-98.5 °F (36.9 °C)] 98.5 °F (36.9 °C)  Pulse:  [71-94] 88  Resp:  [18] 18  SpO2:  [94 %-99 %] 99 %  BP: (132-175)/(78-98) 175/95     Weight: 104.3 kg (230 lb)  Body mass index is 36.02 kg/m².    Estimated Creatinine Clearance: 11.6 mL/min (A) (based on SCr of 8.9 mg/dL (H)).    Physical Exam  Vitals and nursing note reviewed.   Constitutional:       General: He is not in acute distress.     Appearance: Normal appearance. He is well-developed. He is obese. He is not ill-appearing, toxic-appearing or diaphoretic.   HENT:       Head: Normocephalic and atraumatic.      Nose: Nose normal.      Mouth/Throat:      Dentition: Does not have dentures.      Pharynx: No oropharyngeal exudate.   Eyes:      General: No scleral icterus.     Conjunctiva/sclera: Conjunctivae normal.   Cardiovascular:      Rate and Rhythm: Normal rate and regular rhythm.   Pulmonary:      Effort: Pulmonary effort is normal. No respiratory distress.      Breath sounds: Normal breath sounds.   Abdominal:      General: There is no distension.      Palpations: Abdomen is soft.      Tenderness: There is no abdominal tenderness.   Musculoskeletal:         General: No swelling.   Skin:     General: Skin is warm and dry.      Findings: No erythema or rash.      Comments: Incision to left thigh. Dressed, CDI. Surgical drain with dark bloody output.   Neurological:      General: No focal deficit present.      Mental Status: He is alert and oriented to person, place, and time. Mental status is at baseline.      Motor: No weakness.      Gait: Gait normal.   Psychiatric:         Mood and Affect: Mood normal.         Behavior: Behavior normal.         Thought Content: Thought content normal.         Judgment: Judgment normal.       Significant Labs: CBC:   Recent Labs   Lab 08/09/22  0942 08/10/22  0246   WBC 5.49 4.70   HGB 9.1* 8.2*   HCT 27.0* 24.9*    224       CMP:   Recent Labs   Lab 08/09/22  0942 08/10/22  0246   * 133*   K 5.1 4.3    102   CO2 19* 21*   * 173*   BUN 52* 56*   CREATININE 8.8* 8.9*   CALCIUM 8.9 8.9   PROT 5.8* 5.5*   ALBUMIN 1.8* 1.7*   BILITOT 0.2 0.2   ALKPHOS 73 69   AST 15 12   ALT <5* <5*   ANIONGAP 11 10       Microbiology Results (last 7 days)       Procedure Component Value Units Date/Time    Fungus culture [[id number]] Collected: 08/05/22 0817    Order Status: Completed Specimen: Joint Fluid from Wrist, Left Updated: 08/09/22 1307     Fungus (Mycology) Culture Culture in progress    Aerobic culture [[id number]]  (Abnormal)  Collected: 08/06/22 1153    Order Status: Completed Specimen: Wound from Hip, Left Updated: 08/09/22 1030     Aerobic Bacterial Culture STAPHYLOCOCCUS AUREUS  Moderate  For susceptibility see order #[id number]      Narrative:      #1 Left hip fluid    Aerobic culture [[id number]]  (Abnormal) Collected: 08/06/22 1153    Order Status: Completed Specimen: Wound from Hip, Left Updated: 08/09/22 1030     Aerobic Bacterial Culture STAPHYLOCOCCUS AUREUS  Few  Susceptibility pending      Narrative:      #2 Left hip fluid    Culture, Anaerobe [[id number]] Collected: 08/06/22 1153    Order Status: Completed Specimen: Body Fluid from Hip, Left Updated: 08/09/22 1013     Anaerobic Culture Culture in progress    Narrative:      #2 Left hip fluid    Culture, Anaerobe [[id number]] Collected: 08/06/22 1153    Order Status: Completed Specimen: Body Fluid from Hip, Left Updated: 08/09/22 1012     Anaerobic Culture Culture in progress    Narrative:      #1 Left hip fluid    Blood culture [[id number]] Collected: 08/05/22 0850    Order Status: Completed Specimen: Blood from Antecubital, Right Arm Updated: 08/09/22 1012     Blood Culture, Routine No Growth to date      No Growth to date      No Growth to date      No Growth to date      No Growth to date    Blood culture [[id number]] Collected: 08/05/22 0852    Order Status: Completed Specimen: Blood from Peripheral, Right Hand Updated: 08/09/22 1012     Blood Culture, Routine No Growth to date      No Growth to date      No Growth to date      No Growth to date      No Growth to date    AFB Culture & Smear [[id number]] Collected: 08/05/22 0817    Order Status: Completed Specimen: Joint Fluid from Wrist, Left Updated: 08/08/22 1529     AFB Culture & Smear Culture in progress     AFB CULTURE STAIN No acid fast bacilli seen.    Aerobic culture [[id number]] Collected: 08/05/22 0817    Order Status: Completed Specimen: Bone from Wrist, Left Updated: 08/08/22 0540     Aerobic Bacterial Culture No  growth    Gram stain [[id number]] Collected: 08/05/22 0817    Order Status: Completed Specimen: Joint Fluid from Wrist, Left Updated: 08/05/22 1038     Gram Stain Result No WBC's      No organisms seen    Culture, Anaerobe [[id number]] Collected: 08/05/22 0817    Order Status: Sent Specimen: Joint Fluid from Wrist, Left Updated: 08/05/22 0818    Aerobic culture [[id number]]  (Abnormal) Collected: 07/29/22 1251    Order Status: Completed Specimen: Abscess from Leg, Left Updated: 08/04/22 1250     Aerobic Bacterial Culture STAPHYLOCOCCUS AUREUS  Many  For susceptibility see order #[id number]      Narrative:      Left thigh abscess #1    Aerobic culture [[id number]]  (Abnormal)  (Susceptibility) Collected: 07/28/22 1800    Order Status: Completed Specimen: Abscess from Leg, Left Updated: 08/04/22 1249     Aerobic Bacterial Culture STAPHYLOCOCCUS AUREUS  Many      Narrative:      LEFT THIGH ABSCESS    Aerobic culture [[id number]]  (Abnormal)  (Susceptibility) Collected: 07/29/22 1300    Order Status: Completed Specimen: Abscess from Leg, Left Updated: 08/04/22 1248     Aerobic Bacterial Culture STAPHYLOCOCCUS AUREUS  Many      Narrative:      Left thigh abscess #2          Recent Lab Results  (Last 5 results in the past 24 hours)        08/10/22  0735   08/10/22  0246   08/09/22  2103   08/09/22  1537   08/09/22  1400        Albumin   1.7             Alkaline Phosphatase   69             ALT   <5             Anion Gap   10             Ascending aorta         3.11       Ao peak blaire         1.48       Ao VTI         32.50       AST   12             AV valve area         3.56       AV mean gradient         5       AV index (prosthetic)         0.77       AV peak gradient         9       AV Velocity Ratio         0.73       Baso #   0.04             Basophil %   0.9             BILIRUBIN TOTAL   0.2  Comment: For infants and newborns, interpretation of results should be based  on gestational age, weight and in agreement with  clinical  observations.    Premature Infant recommended reference ranges:  Up to 24 hours.............<8.0 mg/dL  Up to 48 hours............<12.0 mg/dL  3-5 days..................<15.0 mg/dL  6-29 days.................<15.0 mg/dL               BSA         2.22       BUN   56             Calcium   8.9             Chloride   102             CO2   21             Creatinine   8.9             Left Ventricle Relative Wall Thickness         0.40       Differential Method   Automated             E/A ratio         1.24       E/E' ratio         9.26       eGFR   6.7             EF         65       Eos #   0.3             Eosinophil %   6.6             E wave deceleration time         242.49       FS         38       Glucose   173             Gran # (ANC)   3.1             Gran %   65.7             Hematocrit   24.9             Hemoglobin   8.2             Immature Grans (Abs)   0.10  Comment: Mild elevation in immature granulocytes is non specific and   can be seen in a variety of conditions including stress response,   acute inflammation, trauma and pregnancy. Correlation with other   laboratory and clinical findings is essential.               Immature Granulocytes   2.1             IVSd         0.83       LA WIDTH         3.00       Left Atrium Major Axis         5.19       Left Atrium Minor Axis         5.71       LA size         4.73       LA volume         65.59                35.67       LA vol index         30.5       LA Volume Index (Mod)         16.6       LVOT area         4.6       LV LATERAL E/E' RATIO         8.00       LV SEPTAL E/E' RATIO         11.00       LV EDV BP         102.25       LV Diastolic Volume Index         47.56       LVIDd         4.70       LVIDs         2.93       LV mass         138.51       LV Mass Index         64       LVOT diameter         2.42       LVOT peak blaire         1.08       LVOT stroke volume         115.76       LVOT peak VTI         25.18       LV ESV BP         33.12        "LV Systolic Volume Index         15.4       Lymph #   0.9             Lymph %   18.3             Magnesium   2.0             MCH   30.4             MCHC   32.9             MCV   92             Mean e'         0.10       Mono #   0.3             Mono %   6.4             MPV   9.6             MV valve area p 1/2 method         3.13       MV "A" wave duration         14.27       MV Peak A Vickey         0.71       MV Peak E Vickey         0.88       MV stenosis pressure 1/2 time         70.32       nRBC   0             Phosphorus   6.4             Platelets   224             POCT Glucose 149     127   125         Potassium   4.3             PROTEIN TOTAL   5.5             PV Peak D Vickey         0.41       PV Peak S Vickey         0.67       Pulm vein S/D ratio         1.63       Posterior Wall         0.93       Right Atrial Pressure (from IVC)         3       RA Major Axis         4.68       RA Width         3.23       RBC   2.70             RDW   12.7             RV S'         13.93       RVDD         3.83       Sinus         3.64       Sodium   133             STJ         3.34       TAPSE         1.98       TDI SEPTAL         0.08       TDI LATERAL         0.11       Triscuspid Valve Regurgitation Peak Gradient         13       TR Max Vickey         1.83       TV rest pulmonary artery pressure         16       WBC   4.70                                    Significant Imaging:     Imaging Results              CT Thigh With Contrast Left (Final result)  Result time 07/27/22 01:13:09      Final result by Jori Hopkins DO (07/27/22 01:13:09)                   Impression:      Large heterogeneous fluid collection in the anterolateral left proximal thigh soft tissues, concerning for a soft tissue hematoma or Preston-Melissa lesion.  A neoplastic process is not entirely excluded.  Recommend close interval follow-up to assess for stability/resolution.      Electronically signed by: Jori Hopkins  Date:    07/27/2022  Time:    01:13 "               Narrative:    EXAMINATION:  CT THIGH WITH CONTRAST LEFT    CLINICAL HISTORY:  Soft tissue mass, thigh, deep;    TECHNIQUE:  Axial CT images of the left thigh with sagittal and coronal reformats after the intravenous administration of 50 mL Omnipaque 350.    COMPARISON:  None.    FINDINGS:  Bone: Bone mineralization is normal.  There is no evidence of an acute fracture or dislocation.  Alignment is normal.    Soft tissues: There is a large heterogeneous fluid collection in the left anterolateral proximal thigh measuring approximately 16 x 7 x 6 cm.  The collection appears to be centered within the subcutaneous tissues or superficial fascia and abuts the gluteus musculature, the sartorius muscle, the rectus femoris, and the vastus lateralis.  There is soft tissue edema in the surrounding subcutaneous tissues.  Muscle bulk is normal.    Articulations: The left femoroacetabular joint is unremarkable.  The pubic symphysis is unremarkable.  The left knee is unremarkable.  No large joint effusion or significant cartilage loss.    Miscellaneous: Neurovascular structures are grossly intact.  There is moderate calcified atherosclerosis of the left femoral and popliteal arteries.                                       CTA Chest Non-Coronary (PE Study) (Final result)  Result time 07/26/22 21:36:01      Final result by Jori Hopkins DO (07/26/22 21:36:01)                   Impression:      No large central or lobar pulmonary embolism.      Electronically signed by: Jori Hopkins  Date:    07/26/2022  Time:    21:36               Narrative:    EXAMINATION:  CTA CHEST NON CORONARY    CLINICAL HISTORY:  Pulmonary embolism (PE) suspected, high prob;    TECHNIQUE:  Low dose axial images, sagittal and coronal reformations were obtained from the thoracic inlet to the lung bases following the IV administration of 100 mL of Omnipaque 350.  Contrast timing was optimized to evaluate the pulmonary arteries.  Maximum  intensity projection images were provided for review.    COMPARISON:  Chest radiograph from earlier the same date.    FINDINGS:  Pulmonary vasculature: Satisfactory opacification of the pulmonary arterial system.  Motion artifact significantly limits evaluation of the segmental and subsegmental pulmonary arteries.  There is no large central or lobar pulmonary embolism.    Aorta: Left-sided aortic arch.  No aneurysm and no significant atherosclerosis.    Base of Neck: No significant abnormality.    Thoracic soft tissues: Normal.    Heart: Normal size. No effusion.    Amena/Mediastinum: No pathologic russ enlargement.    Airways: The large airways are patent. No foci of endobronchial filling.    Lungs/Pleura: Clear lungs. No pleural effusion or thickening.    Esophagus: Normal.    Upper Abdomen: No abnormality of the partially imaged upper abdomen.    Bones: No acute fracture. No suspicious lytic or sclerotic lesions.                                       X-Ray Chest 1 View (Final result)  Result time 07/26/22 21:23:42   Procedure changed from X-Ray Chest PA And Lateral     Final result by Olman Saucedo MD (07/26/22 21:23:42)                   Impression:      No convincing radiographic evidence of acute intrathoracic process on this single view..      Electronically signed by: Olman Saucedo MD  Date:    07/26/2022  Time:    21:23               Narrative:    EXAMINATION:  XR CHEST 1 VIEW    CLINICAL HISTORY:  shortness of breath;    TECHNIQUE:  Single frontal view of the chest was performed.    COMPARISON:  None    FINDINGS:  Cardiac monitoring leads overlie the chest.  Cardiac silhouette appears within normal limits.  No confluent airspace consolidation identified.  No significant volume of pleural fluid or pneumothorax appreciated.  The visualized osseous structures demonstrate mild degenerative changes.

## 2022-08-10 NOTE — SUBJECTIVE & OBJECTIVE
Telemedicine  This service was provided by Virtual Visit.    Patient was transferred to [facility] Medicine on:  8/9/2022    Chief Complaint   Patient presents with    Shortness of Breath     Pt c/o increased SOB over the past few days. Pt is diabetic who hasn't taken his meds for the past couple days. Has been on steroids.      The patient location is: [facility]/72 [address]   Admitted 7/26/2022  8:24 PM  Present with the patient at the time of the telemed/virtual assessment: Telepresenter    Interval History / Events Overnight:   The patient is able to provide adequate history. Additional history was obtained from past medical records. No significant events reported by Nursing.    Patient complains of L hip and wrist pain. Symptoms have worsened since yesterday. Associated symptoms include: fatigue. Symptoms are increasing in severity.     Lab test(s) reviewed: sCr elevated but stable    Review of Systems   Constitutional:  Negative for fever.   Respiratory:  Negative for shortness of breath.      Objective:     Vital Signs (Most Recent):  Temp: 98.5 °F (36.9 °C) (08/10/22 0833)  Pulse: 88 (08/10/22 0833)  Resp: 18 (08/10/22 0833)  BP: (!) 175/95 (08/10/22 0833)  SpO2: 99 % (08/10/22 0833) Vital Signs (24h Range):  Temp:  [97.9 °F (36.6 °C)-98.5 °F (36.9 °C)] 98.5 °F (36.9 °C)  Pulse:  [71-94] 88  Resp:  [18] 18  SpO2:  [94 %-99 %] 99 %  BP: (132-175)/(78-98) 175/95     Weight: 104.3 kg (230 lb)  Body mass index is 36.02 kg/m².    Intake/Output Summary (Last 24 hours) at 8/10/2022 1020  Last data filed at 8/10/2022 0600  Gross per 24 hour   Intake 395 ml   Output 1450 ml   Net -1055 ml        Physical Exam  Constitutional:       General: He is not in acute distress.     Appearance: Normal appearance.   Eyes:      General: Lids are normal. No scleral icterus.        Right eye: No discharge.         Left eye: No discharge.      Conjunctiva/sclera: Conjunctivae normal.   Neck:      Trachea: Phonation normal.    Cardiovascular:      Rate and Rhythm: Normal rate.      Comments: Monitor / Vital signs reviewed at time of visit  Pulmonary:      Effort: Pulmonary effort is normal. No tachypnea, accessory muscle usage or respiratory distress.   Abdominal:      General: There is no distension.   Skin:     Coloration: Skin is not cyanotic.   Neurological:      Mental Status: He is alert. He is not disoriented.   Psychiatric:         Attention and Perception: Attention normal.         Mood and Affect: Affect normal.         Behavior: Behavior is cooperative.       Significant Labs:   Recent Labs   Lab 03/22/22  1234 07/26/22  2044   HGBA1C 11.2* 10.1*       Recent Labs   Lab 08/09/22  1537 08/09/22  2103 08/10/22  0735   POCTGLUCOSE 125* 127* 149*       Recent Labs   Lab 08/08/22  0725 08/09/22  0942 08/10/22  0246   WBC 6.65 5.49 4.70   HGB 9.8* 9.1* 8.2*   HCT 30.2* 27.0* 24.9*    222 224       Recent Labs   Lab 08/05/22  0818 08/06/22  0257 08/08/22  0725 08/09/22  0942 08/10/22  0246   GRAN  --    < > 71.9  4.8 66.2  3.6 65.7  3.1   SEGS 88  --   --   --   --    LYMPH  --    < > 15.8*  1.1 15.7*  0.9* 18.3  0.9*   LYMPHS 7  --   --   --   --    MONO  --    < > 5.9  0.4 7.3  0.4 6.4  0.3   EOS 1   < > 0.3 0.4 0.3    < > = values in this interval not displayed.       Recent Labs   Lab 08/08/22 0725 08/09/22  0942 08/10/22  0246   * 133* 133*   K 3.9 5.1 4.3   CL 99 103 102   CO2 21* 19* 21*   BUN 48* 52* 56*   CREATININE 8.1* 8.8* 8.9*   GLU 85 196* 173*   CALCIUM 8.8 8.9 8.9   ALBUMIN 1.9* 1.8* 1.7*   MG 1.9 1.9 2.0   PHOS 6.2* 5.9* 6.4*       Recent Labs   Lab 08/05/22  0851 08/06/22  0257 08/08/22  0725 08/09/22  0942 08/10/22  0246   ALKPHOS  --    < > 78 73 69   ALT  --    < > <5* <5* <5*   AST  --    < > 12 15 12   PROT  --    < > 6.2 5.8* 5.5*   BILITOT  --    < > 0.2 0.2 0.2   .0*  --  51.4*  --   --     < > = values in this interval not displayed.       Recent Labs   Lab 07/26/22  2047  07/26/22 2238 07/26/22  2335 07/27/22  0211 07/29/22  0023   PROCAL  --  0.29*  --   --  0.57*   LACTATE  --   --  1.3 1.9  --    DDIMER 1.46*  --   --   --   --    SEDRATE  --   --   --   --  73*       SARS-CoV-2 RNA, Amplification, Qual (no units)   Date Value   08/06/2022 Negative   07/26/2022 Negative       ECG Results              EKG 12-lead (Final result)  Result time 07/27/22 07:53:05      Final result by Interface, Lab In [facility] (07/27/22 07:53:05)                   Narrative:    Test Reason : R00.0,    Vent. Rate : 138 BPM     Atrial Rate : 138 BPM     P-R Int : 126 ms          QRS Dur : 086 ms      QT Int : 286 ms       P-R-T Axes : 051 002 052 degrees     QTc Int : 433 ms    Sinus tachycardia  Otherwise normal ECG  No previous ECGs available  Confirmed by Crow HARRIS MD ([id number]) on 7/27/2022 7:53:00 AM    Referred By: [email or web address]   SELF           Confirmed By:Crow HARRIS MD                                    Results for orders placed during the hospital encounter of 07/26/22    Echo    Interpretation Summary  · The left ventricle is normal in size with normal systolic function.  · The estimated ejection fraction is 65%.  · Normal left ventricular diastolic function.  · Normal right ventricular size with normal right ventricular systolic function.  · The estimated PA systolic pressure is 16 mmHg.  · Normal central venous pressure (3 mmHg).      Echo  · The left ventricle is normal in size with normal systolic function.  · The estimated ejection fraction is 65%.  · Normal left ventricular diastolic function.  · Normal right ventricular size with normal right ventricular systolic   function.  · The estimated PA systolic pressure is 16 mmHg.  · Normal central venous pressure (3 mmHg).         Labs and Imaging within the last 24 hours listed above were reviewed.       Diet: Diet diabetic [facility]; 2000 Calorie; Renal  Significant LDAs:   IV Access Type: Peripheral  Urinary Catheter Indication if present:  Patient Does Not Have Urinary Catheter  Other Lines/Tubes/Drains:    HIGH RISK CONDITION(S):   Patient has a condition that poses threat to life and bodily function: Acute Renal Failure     Goals of Care:    Previous admission:     Likely prognosis:  Fair  Code Status: Full Code  Comfort Only: No  Hospice: No  Goals at discharge: remain at home, with physician follow-up    Discharge Planning   FILEMON: 8/15/2022     Code Status: Full Code   Is the patient medically ready for discharge?: No    Reason for patient still in hospital (select all that apply): Patient trending condition, Treatment, and Consult recommendations  Discharge Plan A: Home with family

## 2022-08-10 NOTE — PROGRESS NOTES
[facility] - Telemetry Stepdown  Orthopedics  Progress Note    Attg Note:  I agree with the resident's assessment and plan.    Rob Graff MD      Patient Name: Wilfredo Thomas  MRN: [id number]  Admission Date: 7/26/2022  Hospital Length of Stay: 15 days  Attending Provider: Lyric Pichardo MD  Primary Care Provider: Aylin Wayne DO  Follow-up For: Procedure(s) (LRB):  Incision and Drainage - LEFT THIGH. (Left)    Post-Operative Day: 4 Days Post-Op  Subjective:     Principal Problem:Abscess of left thigh    Principal Orthopedic Problem: Left thigh I&D 7/29 and 8/6  Staph species on thigh abscess cultures    Interval History  S/p repeat L thigh I&D 8/6/22. NAEON, patient stable, pain controlled. No acute complaints this morning. Drain output has significantly slowed, no appreciable output yesterday. 75cc since placement. Cx Staph aureus from I&D. Cr stable at 8.9, nephro following.      Review of patient's allergies indicates:  No Known Allergies    Current Facility-Administered Medications   Medication    acetaminophen tablet 1,000 mg    amLODIPine tablet 5 mg    atorvastatin tablet 20 mg    ceFAZolin 2 gram in dextrose 5% 100 mL IVPB (premix)    COVID-19 vac, forrest(Pfizer)(PF) (Pfizer COVID-19) 30 mcg/0.3 mL injection 0.3 mL    dextrose 10% bolus 125 mL    dextrose 10% bolus 250 mL    glucagon (human recombinant) injection 1 mg    glucose chewable tablet 16 g    glucose chewable tablet 24 g    heparin (porcine) injection 5,000 Units    insulin aspart U-100 pen 1-10 Units    insulin aspart U-100 pen 8 Units    insulin detemir U-100 pen 10 Units    morphine injection 3 mg    ondansetron injection 4 mg    oxyCODONE immediate release tablet 5 mg    senna-docusate 8.6-50 mg per tablet 2 tablet    sodium chloride 0.9% flush 10 mL     Objective:     Vital Signs (Most Recent):  Temp: 98.5 °F (36.9 °C) (08/10/22 0833)  Pulse: 88 (08/10/22 0833)  Resp: 18 (08/10/22 0833)  BP: (!) 175/95 (08/10/22 0833)  SpO2: 99 % (08/10/22  "0833) Vital Signs (24h Range):  Temp:  [97.9 °F (36.6 °C)-98.5 °F (36.9 °C)] 98.5 °F (36.9 °C)  Pulse:  [71-98] 88  Resp:  [18] 18  SpO2:  [94 %-99 %] 99 %  BP: (132-175)/(78-98) 175/95     Weight: 104.3 kg (230 lb)  Height: 5' 7" (170.2 cm)  Body mass index is 36.02 kg/m².      Intake/Output Summary (Last 24 hours) at 8/10/2022 0842  Last data filed at 8/10/2022 0600  Gross per 24 hour   Intake 395 ml   Output 1450 ml   Net -1055 ml         Ortho/SPM Exam  Left lower extremity  Left thigh dressing cdi  Drain in place  No erythema of thigh, no signficant swelling  Compartments soft and compressible  Painless ROM of hip and knee  NVI        CBC:   Recent Labs   Lab 08/09/22  0942 08/10/22  0246   WBC 5.49 4.70   HGB 9.1* 8.2*   HCT 27.0* 24.9*    224       CMP:   Recent Labs   Lab 08/09/22  0942 08/10/22  0246   * 133*   K 5.1 4.3    102   CO2 19* 21*   * 173*   BUN 52* 56*   CREATININE 8.8* 8.9*   CALCIUM 8.9 8.9   PROT 5.8* 5.5*   ALBUMIN 1.8* 1.7*   BILITOT 0.2 0.2   ALKPHOS 73 69   AST 15 12   ALT <5* <5*   ANIONGAP 11 10         All pertinent labs within the past 24 hours have been reviewed.    Significant Imaging: I have reviewed and interpreted all pertinent imaging results/findings.      Assessment/Plan:     * Abscess of left thigh  47M with DM admitted 7/26/22 for DKA, ortho following for left lateral thigh abscess. s/p I&D and drain placement left thigh 7/29/22. Cx +MSSA. Had high drain purulent output after first I&D, was taken back to OR 8/6/22 for repeat I&D. Drain in place, dressings CDI. Nephrology on board for ANGE.     -- ANGE nephrology managing   -- DVT SQ  -- Ancef for MSSA per ID    -- Pain MM  -- Drain no output yesterday, will remove Friday 8/12 if no increase in output  -- CRP downtrending 8/9  -- L wrist aspiration non-concerning for septic arthritis  -- Encourage ambulation    Dispo: pending          Aron Vieyra MD  Orthopedics  [facility] - Telemetry Stepdown  "

## 2022-08-11 LAB
ALBUMIN SERPL BCP-MCNC: 1.8 G/DL (ref 3.5–5.2)
ALP SERPL-CCNC: 70 U/L (ref 55–135)
ALT SERPL W/O P-5'-P-CCNC: <5 U/L (ref 10–44)
ANION GAP SERPL CALC-SCNC: 11 MMOL/L (ref 8–16)
AST SERPL-CCNC: 14 U/L (ref 10–40)
BASOPHILS # BLD AUTO: 0.03 K/UL (ref 0–0.2)
BASOPHILS NFR BLD: 0.7 % (ref 0–1.9)
BILIRUB SERPL-MCNC: 0.2 MG/DL (ref 0.1–1)
BUN SERPL-MCNC: 58 MG/DL (ref 6–20)
C3 SERPL-MCNC: 125 MG/DL (ref 50–180)
C4 SERPL-MCNC: 27 MG/DL (ref 11–44)
CALCIUM SERPL-MCNC: 8.8 MG/DL (ref 8.7–10.5)
CHLORIDE SERPL-SCNC: 104 MMOL/L (ref 95–110)
CK SERPL-CCNC: 9 U/L (ref 20–200)
CO2 SERPL-SCNC: 21 MMOL/L (ref 23–29)
CREAT SERPL-MCNC: 9.3 MG/DL (ref 0.5–1.4)
CRP SERPL-MCNC: 43.7 MG/L (ref 0–8.2)
DIFFERENTIAL METHOD: ABNORMAL
EOSINOPHIL # BLD AUTO: 0.4 K/UL (ref 0–0.5)
EOSINOPHIL NFR BLD: 9.8 % (ref 0–8)
ERYTHROCYTE [DISTWIDTH] IN BLOOD BY AUTOMATED COUNT: 12.9 % (ref 11.5–14.5)
ERYTHROCYTE [SEDIMENTATION RATE] IN BLOOD BY PHOTOMETRIC METHOD: 89 MM/HR (ref 0–23)
EST. GFR  (NO RACE VARIABLE): 6.4 ML/MIN/1.73 M^2
GLUCOSE SERPL-MCNC: 84 MG/DL (ref 70–110)
HCT VFR BLD AUTO: 25.1 % (ref 40–54)
HGB BLD-MCNC: 8.2 G/DL (ref 14–18)
IMM GRANULOCYTES # BLD AUTO: 0.12 K/UL (ref 0–0.04)
IMM GRANULOCYTES NFR BLD AUTO: 2.7 % (ref 0–0.5)
LYMPHOCYTES # BLD AUTO: 0.9 K/UL (ref 1–4.8)
LYMPHOCYTES NFR BLD: 20.5 % (ref 18–48)
MAGNESIUM SERPL-MCNC: 2.2 MG/DL (ref 1.6–2.6)
MCH RBC QN AUTO: 30.7 PG (ref 27–31)
MCHC RBC AUTO-ENTMCNC: 32.7 G/DL (ref 32–36)
MCV RBC AUTO: 94 FL (ref 82–98)
MONOCYTES # BLD AUTO: 0.3 K/UL (ref 0.3–1)
MONOCYTES NFR BLD: 7.7 % (ref 4–15)
NEUTROPHILS # BLD AUTO: 2.6 K/UL (ref 1.8–7.7)
NEUTROPHILS NFR BLD: 58.6 % (ref 38–73)
NRBC BLD-RTO: 0 /100 WBC
PHOSPHATE SERPL-MCNC: 6.7 MG/DL (ref 2.7–4.5)
PLATELET # BLD AUTO: 220 K/UL (ref 150–450)
PMV BLD AUTO: 9.6 FL (ref 9.2–12.9)
POCT GLUCOSE: 160 MG/DL (ref 70–110)
POCT GLUCOSE: 167 MG/DL (ref 70–110)
POCT GLUCOSE: 197 MG/DL (ref 70–110)
POCT GLUCOSE: 198 MG/DL (ref 70–110)
POCT GLUCOSE: 213 MG/DL (ref 70–110)
POTASSIUM SERPL-SCNC: 4.4 MMOL/L (ref 3.5–5.1)
PROT SERPL-MCNC: 5.6 G/DL (ref 6–8.4)
RBC # BLD AUTO: 2.67 M/UL (ref 4.6–6.2)
SODIUM SERPL-SCNC: 136 MMOL/L (ref 136–145)
WBC # BLD AUTO: 4.4 K/UL (ref 3.9–12.7)

## 2022-08-11 PROCEDURE — 36415 COLL VENOUS BLD VENIPUNCTURE: CPT | Performed by: ORTHOPAEDIC SURGERY

## 2022-08-11 PROCEDURE — 97116 GAIT TRAINING THERAPY: CPT | Mod: CQ

## 2022-08-11 PROCEDURE — 99233 SBSQ HOSP IP/OBS HIGH 50: CPT | Mod: 95,,, | Performed by: INTERNAL MEDICINE

## 2022-08-11 PROCEDURE — 99232 SBSQ HOSP IP/OBS MODERATE 35: CPT | Mod: ,,, | Performed by: INTERNAL MEDICINE

## 2022-08-11 PROCEDURE — 99232 PR SUBSEQUENT HOSPITAL CARE,LEVL II: ICD-10-PCS | Mod: ,,, | Performed by: INTERNAL MEDICINE

## 2022-08-11 PROCEDURE — 94761 N-INVAS EAR/PLS OXIMETRY MLT: CPT

## 2022-08-11 PROCEDURE — 25000003 PHARM REV CODE 250

## 2022-08-11 PROCEDURE — 25000003 PHARM REV CODE 250: Performed by: HOSPITALIST

## 2022-08-11 PROCEDURE — 36415 COLL VENOUS BLD VENIPUNCTURE: CPT | Performed by: STUDENT IN AN ORGANIZED HEALTH CARE EDUCATION/TRAINING PROGRAM

## 2022-08-11 PROCEDURE — 86160 COMPLEMENT ANTIGEN: CPT | Performed by: INTERNAL MEDICINE

## 2022-08-11 PROCEDURE — 36415 COLL VENOUS BLD VENIPUNCTURE: CPT | Performed by: INTERNAL MEDICINE

## 2022-08-11 PROCEDURE — 85025 COMPLETE CBC W/AUTO DIFF WBC: CPT | Performed by: STUDENT IN AN ORGANIZED HEALTH CARE EDUCATION/TRAINING PROGRAM

## 2022-08-11 PROCEDURE — 25000003 PHARM REV CODE 250: Performed by: PHYSICIAN ASSISTANT

## 2022-08-11 PROCEDURE — 83516 IMMUNOASSAY NONANTIBODY: CPT | Mod: 59 | Performed by: INTERNAL MEDICINE

## 2022-08-11 PROCEDURE — 63600175 PHARM REV CODE 636 W HCPCS: Performed by: STUDENT IN AN ORGANIZED HEALTH CARE EDUCATION/TRAINING PROGRAM

## 2022-08-11 PROCEDURE — 83516 IMMUNOASSAY NONANTIBODY: CPT | Performed by: INTERNAL MEDICINE

## 2022-08-11 PROCEDURE — 80053 COMPREHEN METABOLIC PANEL: CPT | Performed by: STUDENT IN AN ORGANIZED HEALTH CARE EDUCATION/TRAINING PROGRAM

## 2022-08-11 PROCEDURE — 20600001 HC STEP DOWN PRIVATE ROOM

## 2022-08-11 PROCEDURE — 36415 COLL VENOUS BLD VENIPUNCTURE: CPT

## 2022-08-11 PROCEDURE — 99233 PR SUBSEQUENT HOSPITAL CARE,LEVL III: ICD-10-PCS | Mod: 95,,, | Performed by: INTERNAL MEDICINE

## 2022-08-11 PROCEDURE — 51798 US URINE CAPACITY MEASURE: CPT

## 2022-08-11 PROCEDURE — 86255 FLUORESCENT ANTIBODY SCREEN: CPT | Performed by: INTERNAL MEDICINE

## 2022-08-11 PROCEDURE — 63600175 PHARM REV CODE 636 W HCPCS: Performed by: INTERNAL MEDICINE

## 2022-08-11 PROCEDURE — 86038 ANTINUCLEAR ANTIBODIES: CPT | Performed by: INTERNAL MEDICINE

## 2022-08-11 PROCEDURE — 86140 C-REACTIVE PROTEIN: CPT

## 2022-08-11 PROCEDURE — 86160 COMPLEMENT ANTIGEN: CPT | Mod: 59 | Performed by: INTERNAL MEDICINE

## 2022-08-11 PROCEDURE — 83735 ASSAY OF MAGNESIUM: CPT | Performed by: STUDENT IN AN ORGANIZED HEALTH CARE EDUCATION/TRAINING PROGRAM

## 2022-08-11 PROCEDURE — 82550 ASSAY OF CK (CPK): CPT | Performed by: ORTHOPAEDIC SURGERY

## 2022-08-11 PROCEDURE — 85652 RBC SED RATE AUTOMATED: CPT

## 2022-08-11 PROCEDURE — 63600175 PHARM REV CODE 636 W HCPCS: Performed by: PHYSICIAN ASSISTANT

## 2022-08-11 PROCEDURE — 84100 ASSAY OF PHOSPHORUS: CPT | Performed by: STUDENT IN AN ORGANIZED HEALTH CARE EDUCATION/TRAINING PROGRAM

## 2022-08-11 PROCEDURE — 25000003 PHARM REV CODE 250: Performed by: INTERNAL MEDICINE

## 2022-08-11 RX ORDER — SEVELAMER CARBONATE 800 MG/1
800 TABLET, FILM COATED ORAL
Status: DISCONTINUED | OUTPATIENT
Start: 2022-08-12 | End: 2022-08-19

## 2022-08-11 RX ADMIN — INSULIN ASPART 1 UNITS: 100 INJECTION, SOLUTION INTRAVENOUS; SUBCUTANEOUS at 09:08

## 2022-08-11 RX ADMIN — INSULIN ASPART 2 UNITS: 100 INJECTION, SOLUTION INTRAVENOUS; SUBCUTANEOUS at 05:08

## 2022-08-11 RX ADMIN — AMLODIPINE BESYLATE 5 MG: 5 TABLET ORAL at 08:08

## 2022-08-11 RX ADMIN — INSULIN ASPART 2 UNITS: 100 INJECTION, SOLUTION INTRAVENOUS; SUBCUTANEOUS at 12:08

## 2022-08-11 RX ADMIN — INSULIN ASPART 8 UNITS: 100 INJECTION, SOLUTION INTRAVENOUS; SUBCUTANEOUS at 07:08

## 2022-08-11 RX ADMIN — OXYCODONE 5 MG: 5 TABLET ORAL at 05:08

## 2022-08-11 RX ADMIN — ATORVASTATIN CALCIUM 20 MG: 20 TABLET, FILM COATED ORAL at 08:08

## 2022-08-11 RX ADMIN — INSULIN ASPART 8 UNITS: 100 INJECTION, SOLUTION INTRAVENOUS; SUBCUTANEOUS at 12:08

## 2022-08-11 RX ADMIN — INSULIN ASPART 2 UNITS: 100 INJECTION, SOLUTION INTRAVENOUS; SUBCUTANEOUS at 07:08

## 2022-08-11 RX ADMIN — HEPARIN SODIUM 5000 UNITS: 5000 INJECTION INTRAVENOUS; SUBCUTANEOUS at 06:08

## 2022-08-11 RX ADMIN — INSULIN DETEMIR 10 UNITS: 100 INJECTION, SOLUTION SUBCUTANEOUS at 08:08

## 2022-08-11 RX ADMIN — HEPARIN SODIUM 5000 UNITS: 5000 INJECTION INTRAVENOUS; SUBCUTANEOUS at 09:08

## 2022-08-11 RX ADMIN — OXYCODONE 5 MG: 5 TABLET ORAL at 06:08

## 2022-08-11 RX ADMIN — OXYCODONE 5 MG: 5 TABLET ORAL at 11:08

## 2022-08-11 RX ADMIN — FUROSEMIDE 200 MG: 10 INJECTION, SOLUTION INTRAMUSCULAR; INTRAVENOUS at 12:08

## 2022-08-11 RX ADMIN — CEFAZOLIN 2 G: 10 INJECTION, POWDER, FOR SOLUTION INTRAVENOUS at 08:08

## 2022-08-11 RX ADMIN — OXYCODONE 5 MG: 5 TABLET ORAL at 09:08

## 2022-08-11 RX ADMIN — INSULIN ASPART 8 UNITS: 100 INJECTION, SOLUTION INTRAVENOUS; SUBCUTANEOUS at 05:08

## 2022-08-11 RX ADMIN — HEPARIN SODIUM 5000 UNITS: 5000 INJECTION INTRAVENOUS; SUBCUTANEOUS at 02:08

## 2022-08-11 NOTE — PLAN OF CARE
-Patient with Type 2 diabetes mellitus with initial hyperglycemia now well controlled.  -Would recommend continuing on Levemir 10 units twice daily and Aspart 8 units TIDWM with moderate correction scale    Endocrinology will sign off, recall us if need arises.

## 2022-08-11 NOTE — PT/OT/SLP PROGRESS
Physical Therapy Treatment    Patient Name:  Wilfredo Thomas   MRN:  [id number]    Recommendations:     Discharge Recommendations:  outpatient PT   Discharge Equipment Recommendations: shower chair, cane, straight   Barriers to discharge: None    Assessment:     Wilfredo Thomas is a 47 y.o. male admitted with a medical diagnosis of Abscess of left thigh.  He presents with the following impairments/functional limitations:   (impaired endurance; pain; decreased upper extremity function; impaired fine motor) .Pt Progressing with PT Intervention. Pt Progressing with improving gait distance. Pt would continue to benefit from skilled PT to address overall functional mobility, goals , and to return to functional baseline.  Goals remain appropriate.    Rehab Prognosis: Good; patient would benefit from acute skilled PT services to address these deficits and reach maximum level of function.    Recent Surgery: Procedure(s) (LRB):  Incision and Drainage - LEFT THIGH. (Left) 5 Days Post-Op    Plan:     During this hospitalization, patient to be seen 3 x/week to address the identified rehab impairments via gait training, therapeutic activities, therapeutic exercises, neuromuscular re-education and progress toward the following goals:    · Plan of Care Expires:  08/26/22    Subjective     Patient/Family Comments/goals: I will go for a walk  Pain/Comfort:  · Pain Rating 1:  (not rated)  · Location - Side 1: Left  · Location - Orientation 1: lateral  · Location 1: wrist  · Pain Addressed 1: Reposition, Distraction, Pre-medicate for activity      Objective:     Communicated with RN prior to session.  Patient found HOB elevated with telemetry, JOHANN drain upon PT entry to room.     General Precautions: Standard, fall   Orthopedic Precautions:LLE weight bearing as tolerated   Braces: N/A  Respiratory Status: Room air     Functional Mobility:  · Bed Mobility:     · Supine to Sit: supervision  · Sit to Supine: supervision  · Transfers:      · Sit to Stand:  supervision with no AD  Gait: 300 ft with no AD stand by assistance with 1 episode of LOB , pt able to self recover  · Pt demo wide MIKEY, decreased step length on L,  antalgic gait, decreased gait speed, R hip drop in L stance      AM-PAC 6 CLICK MOBILITY  Turning over in bed (including adjusting bedclothes, sheets and blankets)?: 4  Sitting down on and standing up from a chair with arms (e.g., wheelchair, bedside commode, etc.): 4  Moving from lying on back to sitting on the side of the bed?: 4  Moving to and from a bed to a chair (including a wheelchair)?: 4  Need to walk in hospital room?: 4  Climbing 3-5 steps with a railing?: 3  Basic Mobility Total Score: 23       Therapeutic Activities and Exercises:   Therapist provided instruction and educated of  patient on progress, safety,d/c,PT POC,   proper body mechanics, energy conservation, and fall prevention strategies during tasks listed above, on the effects of prolonged immobility and the importance of performing OOB activity and exercises to promote healing and reduce recovery time    Updated white board with appropriate PT mobility information for medical team notification     Donned an extra gown  Call nursing/pct to transfer to chair/use bathroom. Pt stated understanding    Bedside table in front of patient and area set up for function, convenience, and safety. RN aware of patient's mobility needs and status. Questions/concerns addressed within PTA scope of practice; patient  with no further questions. Time was provided for active listening, discussion of health disposition, and discussion of safe discharge. Pt?verbalized?agreement .    Patient left HOB elevated with all lines intact, call button in reach and MD present..    GOALS:   Multidisciplinary Problems     Physical Therapy Goals        Problem: Physical Therapy    Goal Priority Disciplines Outcome Goal Variances Interventions   Physical Therapy Goal     PT, PT/OT Ongoing,  Progressing     Description: Goals to be met by: 22     Patient will increase functional independence with mobility by performin. Supine to sit with Oliver  2. Sit to supine with Oliver  3. Sit to stand transfer with Oliver  4. Gait  x >150 feet with Contact Guard Assistance using Rolling Walker. - Met   Ambulate 400' with supervision assistance with no AD.   5. Lower extremity exercise program x 20 reps per handout, with independence                       Time Tracking:     PT Received On: 22  PT Start Time: 1152     PT Stop Time: 1201  PT Total Time (min): 9 min     Billable Minutes: Gait Training 9    Treatment Type: Treatment  PT/PTA: PTA     PTA Visit Number: 1     2022

## 2022-08-11 NOTE — PROGRESS NOTES
"  [facility] - Telemetry [facility] Medicine  Telemedicine Progress Note    Patient Name: Wilfredo Thomas  MRN: [id number]  Patient Class: IP- Inpatient   Admission Date: 7/26/2022  Length of Stay: 15 days  Attending Physician: Lyric Pichardo MD  Primary Care Provider: Aylin Wayne DO    Subjective:     Principal Problem:Abscess of left thigh    HPI:  48 y/o M hx of HTN, IDDM2, GERD obesity, HLD presented to the ED with complaint of 1 day of worsening SOB. Wife present at bedside. Patient states that he noticed yesterday he was feeling some shortness of breath and couldn't seem to catch his breath. His wife went to check on him this morning and noticed that he was breathing "fast and heavy" and he sounded like he was slurring his words. Wife was concerned about a stroke, so he brought him to the ED for evaluation.     Patient also reporting significant pain and swelling along his left lateral proximal thigh. He states he has noticed worsening pain over the past 2 weeks. He denies any acute trauma to the area. He was seen by ortho last week and was told to take ibu-profen for a muscle strain. This did not help his pain, so he presented again on 7/22 and was given a prednisone injection into his thigh. This also did not help his pain, and now the pain and swelling have advanced to the point that he has difficulty with ambulation. Patient denies fever, chills, nausea, vomiting.      Of note, patient has hx of poorly controlled diabetes. He was recently started on insulin by his PCP, but he has not taken any insulin since being prescribed it.      In the ED, patient hypertensive and tachycardic to the 130s. Tachypneic with RR in the 40s. On CBC, WBC 26.9, On CMP patient hyperkalemic to 5.5, CorNa 136, Bicarb 5. Cr elevated to 1.9 from BL 0.83. UA, BHB pending at time of admission. CTA without evidence of pulmonary embolism. Critical Care Medicine consulted given severe acidosis.           Overview/Hospital " Course:    Patient admitted to the MICU for management of severe DKA. Metabolic acidosis improving on insulin drip. He also has had left thigh pain for 1-2 months. There is a large mass on his left thigh that is tender to palpation, CT revealed hematoma vs soft tissue growth. General surgery consulted and recommended IR consult. IR consulted, no indication for tap.   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 Ortho consulted and performed bedside aspiration of possible abscess which revealed >200K cells >80% segs. Will hold off on antibiotics for now per ortho recs, ortho is taking patient to the OR for irrigation of the thigh, will start antibiotics afterwards.         7/29 Metabolic acidosis slowly improving. Ortho taking patient to the OR for irrigation of the left thigh, will start antibiotics afterwards.   7/30 Transfer to hospital medicine . S/p I&D  of left thigh Abscess on7/29/22 - MRI - uptured tensor fascia jose luis (TFL) muscle with associated large hematoma. gram stain -Moderate Gram positive cocci in clusters .cultures pending.  continue Vancomycin, clindamycin and Zosyn . Bicarb 15. K replaced . off insulin drip on SQ insulin.  7/31 ID and endocrine consulted.  abscess with Calcium pyrophosphate crystals with unknown significance potassium and P replaced. Bicarbonate WNL . aerobic culture 7/28 STAPHYLOCOCCUS SPECIES . Glucose in 300s .PRN imodium  for diarrhea. PT/OT consulted - WBAT. Surgical drain with purulent output  8/1 PICC team consulted for IV access. vanc trough at 11.4. monitor for vanomycin toxicity. possible OR tomorrow for washout  may need bone biopsy to r/o osteomyelitis.Discontinued clindamycin.   8/2 glucose in 300s.  Increased Levemir  to 14 units BID and Aspart  10 units TIDWM. K replaced   8/3 ANGE with cr 0.7--> 2.7. likely vanc induced ATN with levels 40. urine studies ordered renal sonogram WNL  nephrology consulted.  started on NS 100ml/h x 10h and follow up renal panel.Zosyn and  vancomycin discontinued. Strict IO monitor. condom catheter . switched to Ancef for MSSA on culture  Interval History:  8/4- MSSA- see below. /85 VSS. On room air, eating, BM on 8/2, good UO. Appreciate ortho recs. On Cephazolin, detim 14 bid and aspart 12 ac.  8/5-Cr still rising, cr =6.  Wrist still hurting. Ortho to tap it to eval for infection and gout today. /90   Pulse 85  AF, no other signs of infection.  Will discuss w Nephrology- received NS x one liter yesterday. Will repeat today. Suspect auto-diuresis.   8/6: washout with ortho today. Cr continues to rise (7.1), nephrology following  8/7: Cr 7.4, phos increasing; tolerated surgery well yesterday, intra-op cultures pending  8/8: Cr increased to 8.1, hyperphosphatemia again noted; continues to make urine, no acute indication for HD per nephrology. Intra-op cultures with S.aureus, continued cefazolin pending final C&S results.      Telemedicine  This service was provided by Virtual Visit.    Patient was transferred to [facility] Medicine on:  8/9/2022    Chief Complaint   Patient presents with    Shortness of Breath     Pt c/o increased SOB over the past few days. Pt is diabetic who hasn't taken his meds for the past couple days. Has been on steroids.      The patient location is: [address] [place] [address] A   Admitted 7/26/2022  8:24 PM  Present with the patient at the time of the telemed/virtual assessment: Telepresenter    Interval History / Events Overnight:   The patient is able to provide adequate history. Additional history was obtained from past medical records. No significant events reported by Nursing.    Patient complains of L hip and wrist pain. Symptoms have worsened since yesterday. Associated symptoms include: fatigue. Symptoms are increasing in severity.     Lab test(s) reviewed: sCr elevated but stable    Review of Systems   Constitutional:  Negative for fever.    Respiratory:  Negative for shortness of breath.      Objective:     Vital Signs (Most Recent):  Temp: 98.5 °F (36.9 °C) (08/10/22 0833)  Pulse: 88 (08/10/22 0833)  Resp: 18 (08/10/22 0833)  BP: (!) 175/95 (08/10/22 0833)  SpO2: 99 % (08/10/22 0833) Vital Signs (24h Range):  Temp:  [97.9 °F (36.6 °C)-98.5 °F (36.9 °C)] 98.5 °F (36.9 °C)  Pulse:  [71-94] 88  Resp:  [18] 18  SpO2:  [94 %-99 %] 99 %  BP: (132-175)/(78-98) 175/95     Weight: 104.3 kg (230 lb)  Body mass index is 36.02 kg/m².    Intake/Output Summary (Last 24 hours) at 8/10/2022 1020  Last data filed at 8/10/2022 0600  Gross per 24 hour   Intake 395 ml   Output 1450 ml   Net -1055 ml        Physical Exam  Constitutional:       General: He is not in acute distress.     Appearance: Normal appearance.   Eyes:      General: Lids are normal. No scleral icterus.        Right eye: No discharge.         Left eye: No discharge.      Conjunctiva/sclera: Conjunctivae normal.   Neck:      Trachea: Phonation normal.   Cardiovascular:      Rate and Rhythm: Normal rate.      Comments: Monitor / Vital signs reviewed at time of visit  Pulmonary:      Effort: Pulmonary effort is normal. No tachypnea, accessory muscle usage or respiratory distress.   Abdominal:      General: There is no distension.   Skin:     Coloration: Skin is not cyanotic.   Neurological:      Mental Status: He is alert. He is not disoriented.   Psychiatric:         Attention and Perception: Attention normal.         Mood and Affect: Affect normal.         Behavior: Behavior is cooperative.       Significant Labs:   Recent Labs   Lab 03/22/22  1234 07/26/22  2044   HGBA1C 11.2* 10.1*       Recent Labs   Lab 08/09/22  1537 08/09/22  2103 08/10/22  0735   POCTGLUCOSE 125* 127* 149*       Recent Labs   Lab 08/08/22  0725 08/09/22  0942 08/10/22  0246   WBC 6.65 5.49 4.70   HGB 9.8* 9.1* 8.2*   HCT 30.2* 27.0* 24.9*    222 224       Recent Labs   Lab 08/05/22  0818 08/06/22  0257 08/08/22  0725  08/09/22 0942 08/10/22  0246   GRAN  --    < > 71.9  4.8 66.2  3.6 65.7  3.1   SEGS 88  --   --   --   --    LYMPH  --    < > 15.8*  1.1 15.7*  0.9* 18.3  0.9*   LYMPHS 7  --   --   --   --    MONO  --    < > 5.9  0.4 7.3  0.4 6.4  0.3   EOS 1   < > 0.3 0.4 0.3    < > = values in this interval not displayed.       Recent Labs   Lab 08/08/22 0725 08/09/22 0942 08/10/22  0246   * 133* 133*   K 3.9 5.1 4.3   CL 99 103 102   CO2 21* 19* 21*   BUN 48* 52* 56*   CREATININE 8.1* 8.8* 8.9*   GLU 85 196* 173*   CALCIUM 8.8 8.9 8.9   ALBUMIN 1.9* 1.8* 1.7*   MG 1.9 1.9 2.0   PHOS 6.2* 5.9* 6.4*       Recent Labs   Lab 08/05/22  0851 08/06/22  0257 08/08/22 0725 08/09/22 0942 08/10/22  0246   ALKPHOS  --    < > 78 73 69   ALT  --    < > <5* <5* <5*   AST  --    < > 12 15 12   PROT  --    < > 6.2 5.8* 5.5*   BILITOT  --    < > 0.2 0.2 0.2   .0*  --  51.4*  --   --     < > = values in this interval not displayed.       Recent Labs   Lab 07/26/22 2044 07/26/22 2238 07/26/22  2335 07/27/22  0211 07/29/22  0023   PROCAL  --  0.29*  --   --  0.57*   LACTATE  --   --  1.3 1.9  --    DDIMER 1.46*  --   --   --   --    SEDRATE  --   --   --   --  73*       SARS-CoV-2 RNA, Amplification, Qual (no units)   Date Value   08/06/2022 Negative   07/26/2022 Negative       ECG Results              EKG 12-lead (Final result)  Result time 07/27/22 07:53:05      Final result by Interface, Lab In [facility] (07/27/22 07:53:05)                   Narrative:    Test Reason : R00.0,    Vent. Rate : 138 BPM     Atrial Rate : 138 BPM     P-R Int : 126 ms          QRS Dur : 086 ms      QT Int : 286 ms       P-R-T Axes : 051 002 052 degrees     QTc Int : 433 ms    Sinus tachycardia  Otherwise normal ECG  No previous ECGs available  Confirmed by Crow HARRIS MD ([id number]) on 7/27/2022 7:53:00 AM    Referred By: [email or web address]   SELF           Confirmed By:Crow HARRIS MD                                    Results for orders placed during the  hospital encounter of 07/26/22    Echo    Interpretation Summary  · The left ventricle is normal in size with normal systolic function.  · The estimated ejection fraction is 65%.  · Normal left ventricular diastolic function.  · Normal right ventricular size with normal right ventricular systolic function.  · The estimated PA systolic pressure is 16 mmHg.  · Normal central venous pressure (3 mmHg).      Echo  · The left ventricle is normal in size with normal systolic function.  · The estimated ejection fraction is 65%.  · Normal left ventricular diastolic function.  · Normal right ventricular size with normal right ventricular systolic   function.  · The estimated PA systolic pressure is 16 mmHg.  · Normal central venous pressure (3 mmHg).         Labs and Imaging within the last 24 hours listed above were reviewed.       Diet: Diet diabetic [facility]; 2000 Calorie; Renal  Significant LDAs:   IV Access Type: Peripheral  Urinary Catheter Indication if present: Patient Does Not Have Urinary Catheter  Other Lines/Tubes/Drains:    HIGH RISK CONDITION(S):   Patient has a condition that poses threat to life and bodily function: Acute Renal Failure     Goals of Care:    Previous admission:     Likely prognosis:  Fair  Code Status: Full Code  Comfort Only: No  Hospice: No  Goals at discharge: remain at home, with physician follow-up    Discharge Planning   FILEMON: 8/15/2022     Code Status: Full Code   Is the patient medically ready for discharge?: No    Reason for patient still in hospital (select all that apply): Patient trending condition, Treatment, and Consult recommendations  Discharge Plan A: Home with family        Assessment/Plan:      * Abscess of left thigh  - Afebrile, no leukocytosis  - Continue Ancef  - S/p OR with Ortho 8/6 for repeat washout  - Intra-op cultures with S.aureus: continuing Ancef pending final C&S results  - PRN analgesics provided  - Continuing to monitor; maintain drain for now    Pain  and swelling of left wrist  - Tapped by Ortho, fluid reviewed: does not appear to be gout or septic arthritis, likely pseudogout  - PRN analgesics provided    Anemia of chronic disease  - H/H stable near baseline  - continue to monitor     Hematoma of left thigh  - See Abscess of L thigh    ANGE (acute kidney injury)  - Hyperphosphatemia also noted  - Continues to make urine  - Nephrology following: no acute indication for HD at this time  - Renally dosing all medications  - Avoid nephrotoxins    Hyperlipidemia associated with type 2 diabetes mellitus  - Continue home statin     Class 2 severe obesity due to excess calories with serious comorbidity and body mass index (BMI) of 36.0 to 36.9 in adult  - Body mass index is 36.02 kg/m².  - Needs dietary and lifestyle modifications in relation to health    Hypertension associated with type 2 diabetes mellitus  - Continue home regimen of amlodipine; lisinopril held due to poor renal function  - continue to monitor and further titrate antihypertensives as clinically indicated     Type 2 diabetes mellitus with hyperglycemia, with long-term current use of insulin  -  DKA has resolved  - Endocrinology consulted and managing.        Active Hospital Problems    Diagnosis  POA    *Abscess of left thigh [L02.416]  Yes    Pain and swelling of left wrist [M25.532, M25.432]  No    Anemia of chronic disease [D63.8]  Yes    Hematoma of left thigh [S70.12XA]  Yes    ANGE (acute kidney injury) [N17.9]  No    Hyperlipidemia associated with type 2 diabetes mellitus [E11.69, E78.5]  Yes     Lab Results   Component Value Date    LDLCALC 46.6 (L) 03/22/2022     - well controlled  - continue current medication      Type 2 diabetes mellitus with hyperglycemia, with long-term current use of insulin [E11.65, Z79.4]  Not Applicable     Lab Results   Component Value Date    HGBA1C 11.2 (H) 03/22/2022     - A1C worsened from 8.6% to 11.2 %  - recommend stop Glipizide  - recommend continue  Jardiance and Trulicity  - recommend increase Trulicity to 3 mg weekly  - recommend start Lantus 10 units daily and will uptitrate to goal   - recommend f/u glucose every 2 weeks and monitor before meals      Hypertension associated with type 2 diabetes mellitus [E11.59, I15.2]  Yes     - well controlled  - continue current medication      Class 2 severe obesity due to excess calories with serious comorbidity and body mass index (BMI) of 36.0 to 36.9 in adult [E66.01, Z68.36]  Not Applicable     - discussed recommendation for diet, exercise and weight loss        Resolved Hospital Problems    Diagnosis Date Resolved POA    Acute gout of left wrist [M10.9] 08/06/2022 No    Hypokalemia [E87.6] 08/06/2022 Yes    Hyponatremia [E87.1] 08/06/2022 Yes    Left leg pain [M79.605] 08/10/2022 Yes    DKA (diabetic ketoacidosis) [E11.10] 08/06/2022 Yes    Leukocytosis [D72.829] 08/06/2022 Yes    Elevated d-dimer [R79.89] 08/06/2022 Yes    Hyperkalemia [E87.5] 07/31/2022 Unknown       Inpatient Medications Prescribed for Management of Current Problems:     Scheduled Meds:    amLODIPine  5 mg Oral Daily    atorvastatin  20 mg Oral Daily    ceFAZolin (ANCEF) IVPB  2 g Intravenous Daily    heparin (porcine)  5,000 Units Subcutaneous Q8H    insulin aspart U-100  8 Units Subcutaneous TIDWM    insulin detemir U-100  10 Units Subcutaneous BID    senna-docusate 8.6-50 mg  2 tablet Oral Daily     Continuous Infusions:   As Needed: acetaminophen, sars-cov-2 (covid-19), dextrose 10%, dextrose 10%, glucagon (human recombinant), glucose, glucose, insulin aspart U-100, morphine, ondansetron, oxyCODONE, sodium chloride 0.9%    VTE Risk Mitigation (From admission, onward)         Ordered     heparin (porcine) injection 5,000 Units  Every 8 hours         08/07/22 1010     Place sequential compression device  Until discontinued         08/06/22 0855     Place sequential compression device  Until discontinued         07/29/22 6001      IP VTE HIGH RISK PATIENT  Once         07/29/22 0158     Place SAVANNAH hose  Until discontinued         07/26/22 2240     Place sequential compression device  Until discontinued         07/26/22 2240              I have assessed these finding virtually using telemed platform and with assistance of bedside nurse     The attending portion of this evaluation, treatment, and documentation was performed per Lyric Pichardo MD via Telemedicine AudioVisual using the secure [employer] software platform with 2 way audio/video. The provider was located off-site and the patient is located in the hospital. The aforementioned video software was utilized to document the relevant history and physical exam    Lyric Pichardo MD  Department of Hospital Medicine   [facility] - Telemetry Stepdown

## 2022-08-11 NOTE — PLAN OF CARE
Problem: Adult Inpatient Plan of Care  Goal: Plan of Care Review  Outcome: Ongoing, Progressing     Problem: Adult Inpatient Plan of Care  Goal: Patient-Specific Goal (Individualized)  Outcome: Ongoing, Progressing     Problem: Adult Inpatient Plan of Care  Goal: Absence of Hospital-Acquired Illness or Injury  Outcome: Ongoing, Progressing     POC reviewed with pt. Answered all questions. A&Ox4. Complains of pain to left thigh - PRN medication given with moderate relief noted/reported. Denies other needs. Tele in place. Bed in lowest position, call light within reach, non skid socks on, bed alarm refused, instructed to call staff for needs with call light - pt verbalized understanding.

## 2022-08-11 NOTE — ASSESSMENT & PLAN NOTE
47M with DM admitted 7/26/22 for DKA, ortho following for left lateral thigh abscess. s/p I&D and drain placement left thigh 7/29/22. Cx +MSSA. Had high drain purulent output after first I&D, was taken back to OR 8/6/22 for repeat I&D. Drain in place, dressings CDI. Nephrology on board for ANGE.     -- ANGE nephrology managing   -- DVT SQH  -- Ancef for MSSA per ID    -- Pain MM  -- Drain output 290cc yesterday, will continue to watch drain output  -- CRP downtrending 8/9, repeat ESR/CRP trending  -- L wrist aspiration non-concerning for septic arthritis  -- Encourage ambulation    Dispo: pending. May potentially require return to OR. Trend inflammatory markers, drainage, exam

## 2022-08-11 NOTE — PROGRESS NOTES
"[facility] - Tele[facility]  Nephrology  Progress Note    Patient Name: Wilfredo Thomas  MRN: [id number]  Admission Date: 7/26/2022  Hospital Length of Stay: 16 days  Attending Provider: Mervat Johnson MD   Primary Care Physician: Aylin Wayne DO  Principal Problem:Abscess of left thigh    Subjective:     HPI: 48 y/o M hx of HTN, IDDM2, GERD obesity, HLD presented to the ED with complaint of 1 day of worsening SOB. Wife present at bedside. Patient states that he noticed yesterday he was feeling some shortness of breath and couldn't seem to catch his breath. His wife went to check on him this morning and noticed that he was breathing "fast and heavy" and he sounded like he was slurring his words. Wife was concerned about a stroke, so he brought him to the ED for evaluation.     Patient also reporting significant pain and swelling along his left lateral proximal thigh. He states he has noticed worsening pain over the past 2 weeks. He denies any acute trauma to the area. He was seen by ortho last week and was told to take ibu-profen for a muscle strain. This did not help his pain, so he presented again on 7/22 and was given a prednisone injection into his thigh. This also did not help his pain, and now the pain and swelling have advanced to the point that he has difficulty with ambulation. Patient denies fever, chills, nausea, vomiting.      Of note, patient has hx of poorly controlled diabetes. He was recently started on insulin by his PCP, but he has not taken any insulin since being prescribed it.      In the ED, patient hypertensive and tachycardic to the 130s. Tachypneic with RR in the 40s. On CBC, WBC 26.9, On CMP patient hyperkalemic to 5.5, CorNa 136, Bicarb 5. Cr elevated to 1.9 from BL 0.83. UA, BHB pending at time of admission. CTA without evidence of pulmonary embolism. Critical Care Medicine consulted given severe acidosis.               Overview/Hospital Course:     Patient admitted to the MICU for " management of severe DKA. Metabolic acidosis improving on insulin drip. He also has had left thigh pain for 1-2 months. There is a large mass on his left thigh that is tender to palpation, CT revealed hematoma vs soft tissue growth. General surgery consulted and recommended IR consult. IR consulted, no indication for tap. 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 Ortho consulted and performed bedside aspiration of possible abscess which revealed >200K cells >80% segs. Will hold off on antibiotics for now per ortho recs, ortho is taking patient to the OR for irrigation of the thigh, will start antibiotics afterwards.         7/29 Metabolic acidosis slowly improving. Ortho taking patient to the OR for irrigation of the left thigh, will start antibiotics afterwards.   7/30 Transfer to hospital medicine . S/p I&D  of left thigh Abscess on7/29/22 - MRI - uptured tensor fascia jose luis (TFL) muscle with associated large hematoma. gram stain -Moderate Gram positive cocci in clusters .cultures pending.  continue Vancomycin, clindamycin and Zosyn . Bicarb 15. K replaced . off insulin drip on SQ insulin.  7/31 ID and endocrine consulted.  abscess with Calcium pyrophosphate crystals with unknown significance potassium and P replaced. Bicarbonate WNL . aerobic culture 7/28 STAPHYLOCOCCUS SPECIES . Glucose in 300s .PRN imodium  for diarrhea. PT/OT consulted - WBAT. Surgical drain with purulent output  8/1 PICC team consulted for IV access. vanc trough at 11.4. monitor for vanomycin toxicity. possible OR tomorrow for washout  may need bone biopsy to r/o osteomyelitis.Discontinued clindamycin.   8/2 glucose in 300s.  Increased Levemir  to 14 units BID and Aspart  10 units TIDWM. K replaced         Nephrology consulyed for ANGE         Interval History: no acute events overnight. Scr stable 8.9 today 9.3 from 8.9 yesterday . had 700cc of urineOPT    Review of patient's  allergies indicates:  No Known Allergies  Current Facility-Administered Medications   Medication Frequency    acetaminophen tablet 1,000 mg Q8H PRN    amLODIPine tablet 5 mg Daily    atorvastatin tablet 20 mg Daily    ceFAZolin 2 gram in dextrose 5% 100 mL IVPB (premix) Daily    COVID-19 vac, forrest(Pfizer)(PF) (Pfizer COVID-19) 30 mcg/0.3 mL injection 0.3 mL vaccine x 1 dose    dextrose 10% bolus 125 mL PRN    dextrose 10% bolus 250 mL PRN    furosemide (LASIX) 200 mg in dextrose 5 % 50 mL IVPB Once    glucagon (human recombinant) injection 1 mg PRN    glucose chewable tablet 16 g PRN    glucose chewable tablet 24 g PRN    heparin (porcine) injection 5,000 Units Q8H    insulin aspart U-100 pen 1-10 Units QID (AC + HS) PRN    insulin aspart U-100 pen 8 Units TIDWM    insulin detemir U-100 pen 10 Units BID    morphine injection 3 mg Q6H PRN    ondansetron injection 4 mg Q6H PRN    oxyCODONE immediate release tablet 5 mg Q4H PRN    senna-docusate 8.6-50 mg per tablet 2 tablet Daily    sodium chloride 0.9% flush 10 mL PRN       Objective:     Vital Signs (Most Recent):  Temp: 98.4 °F (36.9 °C) (08/11/22 0741)  Pulse: 81 (08/11/22 0741)  Resp: 18 (08/11/22 0741)  BP: (!) 148/87 (08/11/22 0741)  SpO2: 97 % (08/11/22 0741)  O2 Device (Oxygen Therapy): room air (08/11/22 0427) Vital Signs (24h Range):  Temp:  [97.7 °F (36.5 °C)-98.9 °F (37.2 °C)] 98.4 °F (36.9 °C)  Pulse:  [67-87] 81  Resp:  [16-19] 18  SpO2:  [94 %-98 %] 97 %  BP: (138-170)/(72-95) 148/87     Weight: 104.3 kg (230 lb) (08/09/22 1307)  Body mass index is 36.02 kg/m².  Body surface area is 2.22 meters squared.    I/O last 3 completed shifts:  In: 1215 [P.O.:1210; I.V.:5]  Out: 2015 [Urine:1725; Drains:290]    Physical Exam  Vitals reviewed.   Constitutional:       Appearance: Normal appearance.   HENT:      Head: Normocephalic and atraumatic.      Right Ear: External ear normal.      Left Ear: External ear normal.      Nose: Nose normal.       Mouth/Throat:      Pharynx: Oropharynx is clear.   Eyes:      Extraocular Movements: Extraocular movements intact.      Conjunctiva/sclera: Conjunctivae normal.   Cardiovascular:      Rate and Rhythm: Normal rate and regular rhythm.      Pulses: Normal pulses.   Pulmonary:      Effort: Pulmonary effort is normal.      Breath sounds: Normal breath sounds.   Abdominal:      General: Abdomen is flat. There is no distension.   Musculoskeletal:         General: Normal range of motion.      Cervical back: Normal range of motion.      Right lower leg: Edema present.      Left lower leg: Edema present.   Skin:     General: Skin is warm and dry.   Neurological:      General: No focal deficit present.      Mental Status: He is alert and oriented to person, place, and time.   Psychiatric:         Mood and Affect: Mood normal.         Behavior: Behavior normal.       Significant Labs:  All labs within the past 24 hours have been reviewed.       Assessment/Plan:     * Abscess of left thigh  Per primary    ANGE (acute kidney injury)  ANGE non oliguric Scr worse  today 9.3   from 8.8 yesterday . had 700 cc of urineOPT  Likely vancomycin induced ANGE (ATI/ATN) in the setting of trough of 40 and also was on zosyn which will increase risk of vanco induced ANGE.   Strict Is and Os   Renal US normal   Euvolemic     Plan:   Given that cr continues to worsen 9.3 we still think its vanco induced but will do further work up will repeat UPCR, check ANCA, JEFFERSON, c3,c4, JEFFERSON. Will spin urine again today and perform bedside US to rule out obstruction. Will give lasix 200 mg IV times one for overload                             Angie Eugene MD  Nephrology  [facility]

## 2022-08-11 NOTE — PLAN OF CARE
Recommendations     1. Continue current Diabetic, Renal diet + ONS.   2. RD to monitor & follow-up.     Goals: Meet % EEN, EPN by RD f/u date  Nutrition Goal Status: goal met  Communication of RD Recs: reviewed with RN

## 2022-08-11 NOTE — SUBJECTIVE & OBJECTIVE
"Principal Problem:Abscess of left thigh    Principal Orthopedic Problem: Left thigh I&D 7/29 and 8/6  Staph species on thigh abscess cultures    Interval History  S/p repeat L thigh I&D 8/6/22. NAEON, patient stable, pain controlled. No acute complaints this morning. Drain with 290cc serosainguinous output yesterday after having basically no output the 2 prior days. Likely fluid was freed up by the patient mobilizing.  Cx Staph aureus from I&D. Cr 9.3, nephro following.      Review of patient's allergies indicates:  No Known Allergies    Current Facility-Administered Medications   Medication    acetaminophen tablet 1,000 mg    amLODIPine tablet 5 mg    atorvastatin tablet 20 mg    ceFAZolin 2 gram in dextrose 5% 100 mL IVPB (premix)    COVID-19 vac, forrest(Pfizer)(PF) (Pfizer COVID-19) 30 mcg/0.3 mL injection 0.3 mL    dextrose 10% bolus 125 mL    dextrose 10% bolus 250 mL    glucagon (human recombinant) injection 1 mg    glucose chewable tablet 16 g    glucose chewable tablet 24 g    heparin (porcine) injection 5,000 Units    insulin aspart U-100 pen 1-10 Units    insulin aspart U-100 pen 8 Units    insulin detemir U-100 pen 10 Units    morphine injection 3 mg    ondansetron injection 4 mg    oxyCODONE immediate release tablet 5 mg    senna-docusate 8.6-50 mg per tablet 2 tablet    sodium chloride 0.9% flush 10 mL     Objective:     Vital Signs (Most Recent):  Temp: 97.7 °F (36.5 °C) (08/11/22 0427)  Pulse: 68 (08/11/22 0452)  Resp: 18 (08/11/22 0608)  BP: (!) 151/72 (08/11/22 0427)  SpO2: 96 % (08/11/22 0546) Vital Signs (24h Range):  Temp:  [97.7 °F (36.5 °C)-98.9 °F (37.2 °C)] 97.7 °F (36.5 °C)  Pulse:  [67-88] 68  Resp:  [16-20] 18  SpO2:  [94 %-99 %] 96 %  BP: (138-175)/(72-95) 151/72     Weight: 104.3 kg (230 lb)  Height: 5' 7" (170.2 cm)  Body mass index is 36.02 kg/m².      Intake/Output Summary (Last 24 hours) at 8/11/2022 0644  Last data filed at 8/11/2022 0610  Gross per 24 hour   Intake 820 ml   Output " 1015 ml   Net -195 ml         Ortho/SPM Exam  Left lower extremity  Left thigh dressing cdi  Drain in place, ss output  No erythema of thigh, no signficant swelling  Compartments soft and compressible  Painless ROM of hip and knee  NVI        CBC:   Recent Labs   Lab 08/09/22  0942 08/10/22  0246 08/11/22  0415   WBC 5.49 4.70 4.40   HGB 9.1* 8.2* 8.2*   HCT 27.0* 24.9* 25.1*    224 220       CMP:   Recent Labs   Lab 08/09/22  0942 08/10/22  0246 08/11/22  0415   * 133* 136   K 5.1 4.3 4.4    102 104   CO2 19* 21* 21*   * 173* 84   BUN 52* 56* 58*   CREATININE 8.8* 8.9* 9.3*   CALCIUM 8.9 8.9 8.8   PROT 5.8* 5.5* 5.6*   ALBUMIN 1.8* 1.7* 1.8*   BILITOT 0.2 0.2 0.2   ALKPHOS 73 69 70   AST 15 12 14   ALT <5* <5* <5*   ANIONGAP 11 10 11         All pertinent labs within the past 24 hours have been reviewed.    Significant Imaging: I have reviewed and interpreted all pertinent imaging results/findings.

## 2022-08-11 NOTE — PLAN OF CARE
[facility]    Referral received for home infusion. Verifying benefits and following progress until ready for discharge.

## 2022-08-11 NOTE — SUBJECTIVE & OBJECTIVE
Interval History: no acute events overnight. Scr stable 8.9 today 9.3 from 8.9 yesterday . had 700cc of urineOPT    Review of patient's allergies indicates:  No Known Allergies  Current Facility-Administered Medications   Medication Frequency    acetaminophen tablet 1,000 mg Q8H PRN    amLODIPine tablet 5 mg Daily    atorvastatin tablet 20 mg Daily    ceFAZolin 2 gram in dextrose 5% 100 mL IVPB (premix) Daily    COVID-19 vac, forrest(Pfizer)(PF) (Pfizer COVID-19) 30 mcg/0.3 mL injection 0.3 mL vaccine x 1 dose    dextrose 10% bolus 125 mL PRN    dextrose 10% bolus 250 mL PRN    furosemide (LASIX) 200 mg in dextrose 5 % 50 mL IVPB Once    glucagon (human recombinant) injection 1 mg PRN    glucose chewable tablet 16 g PRN    glucose chewable tablet 24 g PRN    heparin (porcine) injection 5,000 Units Q8H    insulin aspart U-100 pen 1-10 Units QID (AC + HS) PRN    insulin aspart U-100 pen 8 Units TIDWM    insulin detemir U-100 pen 10 Units BID    morphine injection 3 mg Q6H PRN    ondansetron injection 4 mg Q6H PRN    oxyCODONE immediate release tablet 5 mg Q4H PRN    senna-docusate 8.6-50 mg per tablet 2 tablet Daily    sodium chloride 0.9% flush 10 mL PRN       Objective:     Vital Signs (Most Recent):  Temp: 98.4 °F (36.9 °C) (08/11/22 0741)  Pulse: 81 (08/11/22 0741)  Resp: 18 (08/11/22 0741)  BP: (!) 148/87 (08/11/22 0741)  SpO2: 97 % (08/11/22 0741)  O2 Device (Oxygen Therapy): room air (08/11/22 0427) Vital Signs (24h Range):  Temp:  [97.7 °F (36.5 °C)-98.9 °F (37.2 °C)] 98.4 °F (36.9 °C)  Pulse:  [67-87] 81  Resp:  [16-19] 18  SpO2:  [94 %-98 %] 97 %  BP: (138-170)/(72-95) 148/87     Weight: 104.3 kg (230 lb) (08/09/22 1307)  Body mass index is 36.02 kg/m².  Body surface area is 2.22 meters squared.    I/O last 3 completed shifts:  In: 1215 [P.O.:1210; I.V.:5]  Out: 2015 [Urine:1725; Drains:290]    Physical Exam  Vitals reviewed.   Constitutional:       Appearance: Normal appearance.   HENT:      Head:  Normocephalic and atraumatic.      Right Ear: External ear normal.      Left Ear: External ear normal.      Nose: Nose normal.      Mouth/Throat:      Pharynx: Oropharynx is clear.   Eyes:      Extraocular Movements: Extraocular movements intact.      Conjunctiva/sclera: Conjunctivae normal.   Cardiovascular:      Rate and Rhythm: Normal rate and regular rhythm.      Pulses: Normal pulses.   Pulmonary:      Effort: Pulmonary effort is normal.      Breath sounds: Normal breath sounds.   Abdominal:      General: Abdomen is flat. There is no distension.   Musculoskeletal:         General: Normal range of motion.      Cervical back: Normal range of motion.      Right lower leg: Edema present.      Left lower leg: Edema present.   Skin:     General: Skin is warm and dry.   Neurological:      General: No focal deficit present.      Mental Status: He is alert and oriented to person, place, and time.   Psychiatric:         Mood and Affect: Mood normal.         Behavior: Behavior normal.       Significant Labs:  All labs within the past 24 hours have been reviewed.

## 2022-08-11 NOTE — ASSESSMENT & PLAN NOTE
Estimated Creatinine Clearance: 11.1 mL/min (A) (based on SCr of 9.3 mg/dL (H)).  - Hyperphosphatemia also noted - start binder  - Continues to make urine  - Nephrology following: no acute indication for HD at this time  - Renally dosing all medications  - Avoid nephrotoxins

## 2022-08-11 NOTE — ASSESSMENT & PLAN NOTE
ANGE non oliguric Scr worse  today 9.3   from 8.8 yesterday . had 700 cc of urineOPT  Likely vancomycin induced ANGE (ATI/ATN) in the setting of trough of 40 and also was on zosyn which will increase risk of vanco induced ANGE.   Strict Is and Os   Renal US normal   Euvolemic     Plan:   Given that cr continues to worsen 9.3 we still think its vanco induced but will do further work up will repeat UPCR, check ANCA, JEFFERSON, c3,c4, JEFFERSON. Will spin urine again today and perform bedside US to rule out obstruction. Will give lasix 200 mg IV times one for overload

## 2022-08-11 NOTE — PROGRESS NOTES
"      Josafat Chun - Telemetry [facility] Medicine  Telemedicine Progress Note    Patient Name: Wilfredo Thomas  MRN: [id number]  Patient Class: IP- Inpatient   Admission Date: 7/26/2022  Length of Stay: 16 days  Attending Physician: Mervat Johnson MD  Primary Care Provider: Aylin Wayne DO          Subjective:     Principal Problem:Abscess of left thigh        HPI:  48 y/o M hx of HTN, IDDM2, GERD obesity, HLD presented to the ED with complaint of 1 day of worsening SOB. Wife present at bedside. Patient states that he noticed yesterday he was feeling some shortness of breath and couldn't seem to catch his breath. His wife went to check on him this morning and noticed that he was breathing "fast and heavy" and he sounded like he was slurring his words. Wife was concerned about a stroke, so he brought him to the ED for evaluation.     Patient also reporting significant pain and swelling along his left lateral proximal thigh. He states he has noticed worsening pain over the past 2 weeks. He denies any acute trauma to the area. He was seen by ortho last week and was told to take ibu-profen for a muscle strain. This did not help his pain, so he presented again on 7/22 and was given a prednisone injection into his thigh. This also did not help his pain, and now the pain and swelling have advanced to the point that he has difficulty with ambulation. Patient denies fever, chills, nausea, vomiting.      Of note, patient has hx of poorly controlled diabetes. He was recently started on insulin by his PCP, but he has not taken any insulin since being prescribed it.      In the ED, patient hypertensive and tachycardic to the 130s. Tachypneic with RR in the 40s. On CBC, WBC 26.9, On CMP patient hyperkalemic to 5.5, CorNa 136, Bicarb 5. Cr elevated to 1.9 from BL 0.83. UA, BHB pending at time of admission. CTA without evidence of pulmonary embolism. Critical Care Medicine consulted given severe acidosis.       "       Overview/Hospital Course:    Patient admitted to the MICU for management of severe DKA. Metabolic acidosis improving on insulin drip. He also has had left thigh pain for 1-2 months. There is a large mass on his left thigh that is tender to palpation, CT revealed hematoma vs soft tissue growth. General surgery consulted and recommended IR consult. IR consulted, no indication for tap.   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 Ortho consulted and performed bedside aspiration of possible abscess which revealed >200K cells >80% segs. Will hold off on antibiotics for now per ortho recs, ortho is taking patient to the OR for irrigation of the thigh, will start antibiotics afterwards.         7/29 Metabolic acidosis slowly improving. Ortho taking patient to the OR for irrigation of the left thigh, will start antibiotics afterwards.   7/30 Transfer to hospital medicine . S/p I&D  of left thigh Abscess on7/29/22 - MRI - uptured tensor fascia jose luis (TFL) muscle with associated large hematoma. gram stain -Moderate Gram positive cocci in clusters .cultures pending.  continue Vancomycin, clindamycin and Zosyn . Bicarb 15. K replaced . off insulin drip on SQ insulin.  7/31 ID and endocrine consulted.  abscess with Calcium pyrophosphate crystals with unknown significance potassium and P replaced. Bicarbonate WNL . aerobic culture 7/28 STAPHYLOCOCCUS SPECIES . Glucose in 300s .PRN imodium  for diarrhea. PT/OT consulted - WBAT. Surgical drain with purulent output  8/1 PICC team consulted for IV access. vanc trough at 11.4. monitor for vanomycin toxicity. possible OR tomorrow for washout  may need bone biopsy to r/o osteomyelitis.Discontinued clindamycin.   8/2 glucose in 300s.  Increased Levemir  to 14 units BID and Aspart  10 units TIDWM. K replaced   8/3 ANGE with cr 0.7--> 2.7. likely vanc induced ATN with levels 40. urine studies ordered renal sonogram WNL   nephrology consulted. started on NS 100ml/h x 10h and follow up renal panel.Zosyn and  vancomycin discontinued. Strict IO monitor. condom catheter . switched to Ancef for MSSA on culture  Interval History:  8/4- MSSA- see below. /85 VSS. On room air, eating, BM on 8/2, good UO. Appreciate ortho recs. On Cephazolin, detim 14 bid and aspart 12 ac.  8/5-Cr still rising, cr =6.  Wrist still hurting. Ortho to tap it to eval for infection and gout today. /90   Pulse 85  AF, no other signs of infection.  Will discuss w Nephrology- received NS x one liter yesterday. Will repeat today. Suspect auto-diuresis.   8/6: washout with ortho today. Cr continues to rise (7.1), nephrology following  8/7: Cr 7.4, phos increasing; tolerated surgery well yesterday, intra-op cultures pending  8/8: Cr increased to 8.1, hyperphosphatemia again noted; continues to make urine, no acute indication for HD per nephrology. Intra-op cultures with S.aureus, continued cefazolin pending final C&S results.        This encounter was provided through telemedicine.  Patient was transferred to the telemedicine service on:  08/9/2022   The [facility] location [facility]: [facility] admitted 7/26/2022  8:24 PM.  Present with the patient at the time of the telemed/virtual assessment: Telepresenter    Interval History/Overnight Events:   Clinical record since admit reviewed.  Patient able to provide history.    Left thigh pain improving and drain continues with significant output; he is ambulatory; he also has LE edema and nephrology has ordered lasix IV; nurse to do post-void residual with bladder scan    Review of Systems   Constitutional:  Positive for fatigue. Negative for activity change and fever.   Respiratory:  Negative for cough and shortness of breath.    Cardiovascular:  Positive for leg swelling. Negative for chest pain.   Gastrointestinal:  Negative for diarrhea and vomiting.   Musculoskeletal:  Positive for myalgias.      Inpatient  Medications:  Scheduled Meds:   amLODIPine  5 mg Oral Daily    atorvastatin  20 mg Oral Daily    ceFAZolin (ANCEF) IVPB  2 g Intravenous Daily    heparin (porcine)  5,000 Units Subcutaneous Q8H    insulin aspart U-100  8 Units Subcutaneous TIDWM    insulin detemir U-100  10 Units Subcutaneous BID    senna-docusate 8.6-50 mg  2 tablet Oral Daily     Continuous Infusions:  PRN Meds:.acetaminophen, sars-cov-2 (covid-19), dextrose 10%, dextrose 10%, glucagon (human recombinant), glucose, glucose, insulin aspart U-100, morphine, ondansetron, oxyCODONE, sodium chloride 0.9%      Objective:     Temp:  [97.7 °F (36.5 °C)-98.9 °F (37.2 °C)] 98.5 °F (36.9 °C)  Pulse:  [] 110  Resp:  [18-20] 19  SpO2:  [95 %-98 %] 98 %  BP: (140-170)/(72-95) 140/83      Intake/Output Summary (Last 24 hours) at 8/11/2022 1648  Last data filed at 8/11/2022 1400  Gross per 24 hour   Intake 1360 ml   Output 1970 ml   Net -610 ml        Body mass index is 36.01 kg/m².    Physical Exam  Vitals and nursing note reviewed.   Constitutional:       General: He is not in acute distress.     Appearance: Normal appearance. He is normal weight. He is not ill-appearing.   HENT:      Head: Normocephalic and atraumatic.      Right Ear: Hearing normal.      Left Ear: Hearing normal.      Nose: Nose normal.   Eyes:      General: No scleral icterus.        Right eye: No discharge.         Left eye: No discharge.      Extraocular Movements: Extraocular movements intact.   Cardiovascular:      Rate and Rhythm: Normal rate.   Pulmonary:      Effort: Pulmonary effort is normal. No accessory muscle usage or respiratory distress.   Musculoskeletal:      Comments: Dressing to left thigh with drain (serosanguineous drainage)   Neurological:      General: No focal deficit present.      Mental Status: He is alert and oriented to person, place, and time.      Cranial Nerves: No cranial nerve deficit.      Motor: No weakness.   Psychiatric:         Attention and  Perception: Attention normal.         Mood and Affect: Mood normal.         Speech: Speech normal.         Behavior: Behavior is cooperative.        Labs:  Recent Results (from the past 24 hour(s))   POCT glucose    Collection Time: 08/10/22  8:09 PM   Result Value Ref Range    POCT Glucose 136 (H) 70 - 110 mg/dL   Phosphorus    Collection Time: 08/11/22  4:15 AM   Result Value Ref Range    Phosphorus 6.7 (H) 2.7 - 4.5 mg/dL   Magnesium    Collection Time: 08/11/22  4:15 AM   Result Value Ref Range    Magnesium 2.2 1.6 - 2.6 mg/dL   Comprehensive Metabolic Panel    Collection Time: 08/11/22  4:15 AM   Result Value Ref Range    Sodium 136 136 - 145 mmol/L    Potassium 4.4 3.5 - 5.1 mmol/L    Chloride 104 95 - 110 mmol/L    CO2 21 (L) 23 - 29 mmol/L    Glucose 84 70 - 110 mg/dL    BUN 58 (H) 6 - 20 mg/dL    Creatinine 9.3 (H) 0.5 - 1.4 mg/dL    Calcium 8.8 8.7 - 10.5 mg/dL    Total Protein 5.6 (L) 6.0 - 8.4 g/dL    Albumin 1.8 (L) 3.5 - 5.2 g/dL    Total Bilirubin 0.2 0.1 - 1.0 mg/dL    Alkaline Phosphatase 70 55 - 135 U/L    AST 14 10 - 40 U/L    ALT <5 (L) 10 - 44 U/L    Anion Gap 11 8 - 16 mmol/L    eGFR 6.4 (A) >60 mL/min/1.73 m^2   CBC Auto Differential    Collection Time: 08/11/22  4:15 AM   Result Value Ref Range    WBC 4.40 3.90 - 12.70 K/uL    RBC 2.67 (L) 4.60 - 6.20 M/uL    Hemoglobin 8.2 (L) 14.0 - 18.0 g/dL    Hematocrit 25.1 (L) 40.0 - 54.0 %    MCV 94 82 - 98 fL    MCH 30.7 27.0 - 31.0 pg    MCHC 32.7 32.0 - 36.0 g/dL    RDW 12.9 11.5 - 14.5 %    Platelets 220 150 - 450 K/uL    MPV 9.6 9.2 - 12.9 fL    Immature Granulocytes 2.7 (H) 0.0 - 0.5 %    Gran # (ANC) 2.6 1.8 - 7.7 K/uL    Immature Grans (Abs) 0.12 (H) 0.00 - 0.04 K/uL    Lymph # 0.9 (L) 1.0 - 4.8 K/uL    Mono # 0.3 0.3 - 1.0 K/uL    Eos # 0.4 0.0 - 0.5 K/uL    Baso # 0.03 0.00 - 0.20 K/uL    nRBC 0 0 /100 WBC    Gran % 58.6 38.0 - 73.0 %    Lymph % 20.5 18.0 - 48.0 %    Mono % 7.7 4.0 - 15.0 %    Eosinophil % 9.8 (H) 0.0 - 8.0 %    Basophil %  0.7 0.0 - 1.9 %    Differential Method Automated    POCT glucose    Collection Time: 08/11/22  7:49 AM   Result Value Ref Range    POCT Glucose 160 (H) 70 - 110 mg/dL   Sedimentation rate    Collection Time: 08/11/22  8:07 AM   Result Value Ref Range    Sed Rate 89 (H) 0 - 23 mm/Hr   C-Reactive Protein    Collection Time: 08/11/22  8:07 AM   Result Value Ref Range    CRP 43.7 (H) 0.0 - 8.2 mg/L   POCT glucose    Collection Time: 08/11/22 11:41 AM   Result Value Ref Range    POCT Glucose 198 (H) 70 - 110 mg/dL   C3 complement    Collection Time: 08/11/22 12:43 PM   Result Value Ref Range    Complement (C-3) 125 50 - 180 mg/dL   C4 complement    Collection Time: 08/11/22 12:43 PM   Result Value Ref Range    Complement (C-4) 27 11 - 44 mg/dL   POCT glucose    Collection Time: 08/11/22  4:09 PM   Result Value Ref Range    POCT Glucose 167 (H) 70 - 110 mg/dL        Lab Results   Component Value Date    [id number] Negative 08/06/2022       Recent Labs   Lab 08/09/22 0942 08/10/22  0246 08/11/22  0415   WBC 5.49 4.70 4.40   LYMPH 15.7*  0.9* 18.3  0.9* 20.5  0.9*   HGB 9.1* 8.2* 8.2*   HCT 27.0* 24.9* 25.1*    224 220     Recent Labs   Lab 08/09/22  0942 08/10/22  0246 08/11/22  0415   * 133* 136   K 5.1 4.3 4.4    102 104   CO2 19* 21* 21*   BUN 52* 56* 58*   CREATININE 8.8* 8.9* 9.3*   * 173* 84   CALCIUM 8.9 8.9 8.8   MG 1.9 2.0 2.2   PHOS 5.9* 6.4* 6.7*     Recent Labs   Lab 08/09/22 0942 08/10/22  0246 08/11/22  0415   ALKPHOS 73 69 70   ALT <5* <5* <5*   AST 15 12 14   ALBUMIN 1.8* 1.7* 1.8*   PROT 5.8* 5.5* 5.6*   BILITOT 0.2 0.2 0.2        Recent Labs     08/11/22  0807   CRP 43.7*       All labs within the last 24 hours were reviewed.     Microbiology:  Microbiology Results (last 7 days)       Procedure Component Value Units Date/Time    Aerobic culture [[id number]]  (Abnormal) Collected: 08/06/22 1153    Order Status: Completed Specimen: Wound from Hip, Left Updated: 08/10/22 1214      Aerobic Bacterial Culture STAPHYLOCOCCUS AUREUS  Moderate  For susceptibility see order #[id number]      Narrative:      #1 Left hip fluid    Aerobic culture [[id number]]  (Abnormal)  (Susceptibility) Collected: 08/06/22 1153    Order Status: Completed Specimen: Wound from Hip, Left Updated: 08/10/22 1214     Aerobic Bacterial Culture STAPHYLOCOCCUS AUREUS  Few      Narrative:      #2 Left hip fluid    Culture, Anaerobe [[id number]] Collected: 08/06/22 1153    Order Status: Completed Specimen: Body Fluid from Hip, Left Updated: 08/10/22 1035     Anaerobic Culture No anaerobes isolated    Narrative:      #2 Left hip fluid    Culture, Anaerobe [[id number]] Collected: 08/06/22 1153    Order Status: Completed Specimen: Body Fluid from Hip, Left Updated: 08/10/22 1034     Anaerobic Culture No anaerobes isolated    Narrative:      #1 Left hip fluid    Blood culture [[id number]] Collected: 08/05/22 0850    Order Status: Completed Specimen: Blood from Antecubital, Right Arm Updated: 08/10/22 1012     Blood Culture, Routine No growth after 5 days.    Blood culture [[id number]] Collected: 08/05/22 0852    Order Status: Completed Specimen: Blood from Peripheral, Right Hand Updated: 08/10/22 1012     Blood Culture, Routine No growth after 5 days.    Fungus culture [[id number]] Collected: 08/05/22 0817    Order Status: Completed Specimen: Joint Fluid from Wrist, Left Updated: 08/09/22 1307     Fungus (Mycology) Culture Culture in progress    AFB Culture & Smear [[id number]] Collected: 08/05/22 0817    Order Status: Completed Specimen: Joint Fluid from Wrist, Left Updated: 08/08/22 1529     AFB Culture & Smear Culture in progress     AFB CULTURE STAIN No acid fast bacilli seen.    Aerobic culture [[id number]] Collected: 08/05/22 0817    Order Status: Completed Specimen: Bone from Wrist, Left Updated: 08/08/22 0540     Aerobic Bacterial Culture No growth    Gram stain [[id number]] Collected: 08/05/22 0817    Order Status: Completed  Specimen: Joint Fluid from Wrist, Left Updated: 08/05/22 1038     Gram Stain Result No WBC's      No organisms seen    Culture, Anaerobe [[id number]] Collected: 08/05/22 0817    Order Status: Sent Specimen: Joint Fluid from Wrist, Left Updated: 08/05/22 0818              Imaging  ECG Results              EKG 12-lead (Final result)  Result time 07/27/22 07:53:05      Final result by Interface, Lab In [facility] (07/27/22 07:53:05)                   Narrative:    Test Reason : R00.0,    Vent. Rate : 138 BPM     Atrial Rate : 138 BPM     P-R Int : 126 ms          QRS Dur : 086 ms      QT Int : 286 ms       P-R-T Axes : 051 002 052 degrees     QTc Int : 433 ms    Sinus tachycardia  Otherwise normal ECG  No previous ECGs available  Confirmed by Crow HARRIS MD ([id number]) on 7/27/2022 7:53:00 AM    Referred By: [email or web address]   SELF           Confirmed By:Crow HARRIS MD                                    Results for orders placed during the hospital encounter of 07/26/22    Echo    Interpretation Summary  · The left ventricle is normal in size with normal systolic function.  · The estimated ejection fraction is 65%.  · Normal left ventricular diastolic function.  · Normal right ventricular size with normal right ventricular systolic function.  · The estimated PA systolic pressure is 16 mmHg.  · Normal central venous pressure (3 mmHg).      Echo  · The left ventricle is normal in size with normal systolic function.  · The estimated ejection fraction is 65%.  · Normal left ventricular diastolic function.  · Normal right ventricular size with normal right ventricular systolic   function.  · The estimated PA systolic pressure is 16 mmHg.  · Normal central venous pressure (3 mmHg).         All imaging within the last 24 hours was reviewed.       Discharge Planning   FILEMON: 8/15/2022     Code Status: Full Code   Is the patient medically ready for discharge?: No    Reason for patient still in hospital (select all that apply): Patient trending  condition, Laboratory test, Treatment, Consult recommendations, and Pending disposition  Discharge Plan A: Home with family            Assessment/Plan:      * Abscess of left thigh  - Afebrile, no leukocytosis  - Continue Ancef  - S/p OR with Ortho 8/6 for repeat washout  - Intra-op cultures with S.aureus: continuing Ancef for MSSA  - PRN analgesics provided  - Continuing to monitor; maintain drain for now    Pain and swelling of left wrist  - Tapped by Ortho, fluid reviewed: does not appear to be gout or septic arthritis, likely pseudogout (calcium pyrophosphate crystals on previous LE aspiration)  - PRN analgesics provided  -improved after steroid injection    Anemia of chronic disease  - H/H slowly declining  - transfuse for Hgb < 7  - continue to monitor     Hematoma of left thigh  - See Abscess of L thigh    ANGE (acute kidney injury)  Estimated Creatinine Clearance: 11.1 mL/min (A) (based on SCr of 9.3 mg/dL (H)).  - Hyperphosphatemia also noted - start binder  - Continues to make urine  - Nephrology following: no acute indication for HD at this time  - Renally dosing all medications  - Avoid nephrotoxins    Hyperlipidemia associated with type 2 diabetes mellitus  - Continue home statin     Class 2 severe obesity due to excess calories with serious comorbidity and body mass index (BMI) of 36.0 to 36.9 in adult  - Body mass index is 36.01 kg/m².  - Needs dietary and lifestyle modifications in relation to health    Hypertension associated with type 2 diabetes mellitus  - Continue home regimen of amlodipine; lisinopril held due to poor renal function  - continue to monitor and further titrate antihypertensives as clinically indicated     Type 2 diabetes mellitus with hyperglycemia, with long-term current use of insulin  - DKA has resolved  - Endocrinology consulted and managing.      VTE Risk Mitigation (From admission, onward)         Ordered     heparin (porcine) injection 5,000 Units  Every 8 hours          08/07/22 1010     Place sequential compression device  Until discontinued         08/06/22 0855     Place sequential compression device  Until discontinued         07/29/22 0158     IP VTE HIGH RISK PATIENT  Once         07/29/22 0158     Place SAVANNAH hose  Until discontinued         07/26/22 2240     Place sequential compression device  Until discontinued         07/26/22 2240              High Risk Conditions:  Patient has a condition that poses threat to life and bodily function: Acute Renal Failure    I have assessed these findings virtually using a telemed platform and with assistance of the bedside nurse.      The attending portion of this evaluation, treatment, and documentation was performed per Mervat Johnson MD via Telemedicine AudioVisual using the secure [employer] software platform with 2 way audio/video. The provider was located off-site and the patient is located in the hospital. The aforementioned video software was utilized to document the relevant history and physical exam    Mervat Johnson MD  Department of Hospital Medicine   [facility] - Telemetry Stepdown

## 2022-08-11 NOTE — SUBJECTIVE & OBJECTIVE
This encounter was provided through telemedicine.  Patient was transferred to the telemedicine service on:  08/9/2022   The patient location is: [id number] A admitted 7/26/2022  8:24 PM.  Present with the patient at the time of the telemed/virtual assessment: Telepresenter    Interval History/Overnight Events:   Clinical record since admit reviewed.  Patient able to provide history.    Left thigh pain improving and drain continues with significant output; he is ambulatory; he also has LE edema and nephrology has ordered lasix IV; nurse to do post-void residual with bladder scan    Review of Systems   Constitutional:  Positive for fatigue. Negative for activity change and fever.   Respiratory:  Negative for cough and shortness of breath.    Cardiovascular:  Positive for leg swelling. Negative for chest pain.   Gastrointestinal:  Negative for diarrhea and vomiting.   Musculoskeletal:  Positive for myalgias.      Inpatient Medications:  Scheduled Meds:   amLODIPine  5 mg Oral Daily    atorvastatin  20 mg Oral Daily    ceFAZolin (ANCEF) IVPB  2 g Intravenous Daily    heparin (porcine)  5,000 Units Subcutaneous Q8H    insulin aspart U-100  8 Units Subcutaneous TIDWM    insulin detemir U-100  10 Units Subcutaneous BID    senna-docusate 8.6-50 mg  2 tablet Oral Daily     Continuous Infusions:  PRN Meds:.acetaminophen, sars-cov-2 (covid-19), dextrose 10%, dextrose 10%, glucagon (human recombinant), glucose, glucose, insulin aspart U-100, morphine, ondansetron, oxyCODONE, sodium chloride 0.9%      Objective:     Temp:  [97.7 °F (36.5 °C)-98.9 °F (37.2 °C)] 98.5 °F (36.9 °C)  Pulse:  [] 110  Resp:  [18-20] 19  SpO2:  [95 %-98 %] 98 %  BP: (140-170)/(72-95) 140/83      Intake/Output Summary (Last 24 hours) at 8/11/2022 1648  Last data filed at 8/11/2022 1400  Gross per 24 hour   Intake 1360 ml   Output 1970 ml   Net -610 ml        Body mass index is 36.01 kg/m².    Physical Exam  Vitals and nursing note reviewed.    Constitutional:       General: He is not in acute distress.     Appearance: Normal appearance. He is normal weight. He is not ill-appearing.   HENT:      Head: Normocephalic and atraumatic.      Right Ear: Hearing normal.      Left Ear: Hearing normal.      Nose: Nose normal.   Eyes:      General: No scleral icterus.        Right eye: No discharge.         Left eye: No discharge.      Extraocular Movements: Extraocular movements intact.   Cardiovascular:      Rate and Rhythm: Normal rate.   Pulmonary:      Effort: Pulmonary effort is normal. No accessory muscle usage or respiratory distress.   Musculoskeletal:      Comments: Dressing to left thigh with drain (serosanguineous drainage)   Neurological:      General: No focal deficit present.      Mental Status: He is alert and oriented to person, place, and time.      Cranial Nerves: No cranial nerve deficit.      Motor: No weakness.   Psychiatric:         Attention and Perception: Attention normal.         Mood and Affect: Mood normal.         Speech: Speech normal.         Behavior: Behavior is cooperative.        Labs:  Recent Results (from the past 24 hour(s))   POCT glucose    Collection Time: 08/10/22  8:09 PM   Result Value Ref Range    POCT Glucose 136 (H) 70 - 110 mg/dL   Phosphorus    Collection Time: 08/11/22  4:15 AM   Result Value Ref Range    Phosphorus 6.7 (H) 2.7 - 4.5 mg/dL   Magnesium    Collection Time: 08/11/22  4:15 AM   Result Value Ref Range    Magnesium 2.2 1.6 - 2.6 mg/dL   Comprehensive Metabolic Panel    Collection Time: 08/11/22  4:15 AM   Result Value Ref Range    Sodium 136 136 - 145 mmol/L    Potassium 4.4 3.5 - 5.1 mmol/L    Chloride 104 95 - 110 mmol/L    CO2 21 (L) 23 - 29 mmol/L    Glucose 84 70 - 110 mg/dL    BUN 58 (H) 6 - 20 mg/dL    Creatinine 9.3 (H) 0.5 - 1.4 mg/dL    Calcium 8.8 8.7 - 10.5 mg/dL    Total Protein 5.6 (L) 6.0 - 8.4 g/dL    Albumin 1.8 (L) 3.5 - 5.2 g/dL    Total Bilirubin 0.2 0.1 - 1.0 mg/dL    Alkaline  Phosphatase 70 55 - 135 U/L    AST 14 10 - 40 U/L    ALT <5 (L) 10 - 44 U/L    Anion Gap 11 8 - 16 mmol/L    eGFR 6.4 (A) >60 mL/min/1.73 m^2   CBC Auto Differential    Collection Time: 08/11/22  4:15 AM   Result Value Ref Range    WBC 4.40 3.90 - 12.70 K/uL    RBC 2.67 (L) 4.60 - 6.20 M/uL    Hemoglobin 8.2 (L) 14.0 - 18.0 g/dL    Hematocrit 25.1 (L) 40.0 - 54.0 %    MCV 94 82 - 98 fL    MCH 30.7 27.0 - 31.0 pg    MCHC 32.7 32.0 - 36.0 g/dL    RDW 12.9 11.5 - 14.5 %    Platelets 220 150 - 450 K/uL    MPV 9.6 9.2 - 12.9 fL    Immature Granulocytes 2.7 (H) 0.0 - 0.5 %    Gran # (ANC) 2.6 1.8 - 7.7 K/uL    Immature Grans (Abs) 0.12 (H) 0.00 - 0.04 K/uL    Lymph # 0.9 (L) 1.0 - 4.8 K/uL    Mono # 0.3 0.3 - 1.0 K/uL    Eos # 0.4 0.0 - 0.5 K/uL    Baso # 0.03 0.00 - 0.20 K/uL    nRBC 0 0 /100 WBC    Gran % 58.6 38.0 - 73.0 %    Lymph % 20.5 18.0 - 48.0 %    Mono % 7.7 4.0 - 15.0 %    Eosinophil % 9.8 (H) 0.0 - 8.0 %    Basophil % 0.7 0.0 - 1.9 %    Differential Method Automated    POCT glucose    Collection Time: 08/11/22  7:49 AM   Result Value Ref Range    POCT Glucose 160 (H) 70 - 110 mg/dL   Sedimentation rate    Collection Time: 08/11/22  8:07 AM   Result Value Ref Range    Sed Rate 89 (H) 0 - 23 mm/Hr   C-Reactive Protein    Collection Time: 08/11/22  8:07 AM   Result Value Ref Range    CRP 43.7 (H) 0.0 - 8.2 mg/L   POCT glucose    Collection Time: 08/11/22 11:41 AM   Result Value Ref Range    POCT Glucose 198 (H) 70 - 110 mg/dL   C3 complement    Collection Time: 08/11/22 12:43 PM   Result Value Ref Range    Complement (C-3) 125 50 - 180 mg/dL   C4 complement    Collection Time: 08/11/22 12:43 PM   Result Value Ref Range    Complement (C-4) 27 11 - 44 mg/dL   POCT glucose    Collection Time: 08/11/22  4:09 PM   Result Value Ref Range    POCT Glucose 167 (H) 70 - 110 mg/dL        Lab Results   Component Value Date    [id number] Negative 08/06/2022       Recent Labs   Lab 08/09/22  0942 08/10/22  0246  08/11/22  0415   WBC 5.49 4.70 4.40   LYMPH 15.7*  0.9* 18.3  0.9* 20.5  0.9*   HGB 9.1* 8.2* 8.2*   HCT 27.0* 24.9* 25.1*    224 220     Recent Labs   Lab 08/09/22  0942 08/10/22  0246 08/11/22  0415   * 133* 136   K 5.1 4.3 4.4    102 104   CO2 19* 21* 21*   BUN 52* 56* 58*   CREATININE 8.8* 8.9* 9.3*   * 173* 84   CALCIUM 8.9 8.9 8.8   MG 1.9 2.0 2.2   PHOS 5.9* 6.4* 6.7*     Recent Labs   Lab 08/09/22 0942 08/10/22  0246 08/11/22  0415   ALKPHOS 73 69 70   ALT <5* <5* <5*   AST 15 12 14   ALBUMIN 1.8* 1.7* 1.8*   PROT 5.8* 5.5* 5.6*   BILITOT 0.2 0.2 0.2        Recent Labs     08/11/22  0807   CRP 43.7*       All labs within the last 24 hours were reviewed.     Microbiology:  Microbiology Results (last 7 days)       Procedure Component Value Units Date/Time    Aerobic culture [[id number]]  (Abnormal) Collected: 08/06/22 1153    Order Status: Completed Specimen: Wound from Hip, Left Updated: 08/10/22 1214     Aerobic Bacterial Culture STAPHYLOCOCCUS AUREUS  Moderate  For susceptibility see order #[id number]      Narrative:      #1 Left hip fluid    Aerobic culture [[id number]]  (Abnormal)  (Susceptibility) Collected: 08/06/22 1153    Order Status: Completed Specimen: Wound from Hip, Left Updated: 08/10/22 1214     Aerobic Bacterial Culture STAPHYLOCOCCUS AUREUS  Few      Narrative:      #2 Left hip fluid    Culture, Anaerobe [[id number]] Collected: 08/06/22 1153    Order Status: Completed Specimen: Body Fluid from Hip, Left Updated: 08/10/22 1035     Anaerobic Culture No anaerobes isolated    Narrative:      #2 Left hip fluid    Culture, Anaerobe [[id number]] Collected: 08/06/22 1153    Order Status: Completed Specimen: Body Fluid from Hip, Left Updated: 08/10/22 1034     Anaerobic Culture No anaerobes isolated    Narrative:      #1 Left hip fluid    Blood culture [[id number]] Collected: 08/05/22 0850    Order Status: Completed Specimen: Blood from Antecubital, Right Arm Updated: 08/10/22  1012     Blood Culture, Routine No growth after 5 days.    Blood culture [[id number]] Collected: 08/05/22 0852    Order Status: Completed Specimen: Blood from Peripheral, Right Hand Updated: 08/10/22 1012     Blood Culture, Routine No growth after 5 days.    Fungus culture [[id number]] Collected: 08/05/22 0817    Order Status: Completed Specimen: Joint Fluid from Wrist, Left Updated: 08/09/22 1307     Fungus (Mycology) Culture Culture in progress    AFB Culture & Smear [[id number]] Collected: 08/05/22 0817    Order Status: Completed Specimen: Joint Fluid from Wrist, Left Updated: 08/08/22 1529     AFB Culture & Smear Culture in progress     AFB CULTURE STAIN No acid fast bacilli seen.    Aerobic culture [[id number]] Collected: 08/05/22 0817    Order Status: Completed Specimen: Bone from Wrist, Left Updated: 08/08/22 0540     Aerobic Bacterial Culture No growth    Gram stain [[id number]] Collected: 08/05/22 0817    Order Status: Completed Specimen: Joint Fluid from Wrist, Left Updated: 08/05/22 1038     Gram Stain Result No WBC's      No organisms seen    Culture, Anaerobe [[id number]] Collected: 08/05/22 0817    Order Status: Sent Specimen: Joint Fluid from Wrist, Left Updated: 08/05/22 0818              Imaging  ECG Results              EKG 12-lead (Final result)  Result time 07/27/22 07:53:05      Final result by Interface, Lab In [facility] (07/27/22 07:53:05)                   Narrative:    Test Reason : R00.0,    Vent. Rate : 138 BPM     Atrial Rate : 138 BPM     P-R Int : 126 ms          QRS Dur : 086 ms      QT Int : 286 ms       P-R-T Axes : 051 002 052 degrees     QTc Int : 433 ms    Sinus tachycardia  Otherwise normal ECG  No previous ECGs available  Confirmed by Crow HARRIS MD ([id number]) on 7/27/2022 7:53:00 AM    Referred By: [email or web address]   SELF           Confirmed By:Crow HARRIS MD                                    Results for orders placed during the hospital encounter of 07/26/22    Echo    Interpretation Summary  ·  The left ventricle is normal in size with normal systolic function.  · The estimated ejection fraction is 65%.  · Normal left ventricular diastolic function.  · Normal right ventricular size with normal right ventricular systolic function.  · The estimated PA systolic pressure is 16 mmHg.  · Normal central venous pressure (3 mmHg).      Echo  · The left ventricle is normal in size with normal systolic function.  · The estimated ejection fraction is 65%.  · Normal left ventricular diastolic function.  · Normal right ventricular size with normal right ventricular systolic   function.  · The estimated PA systolic pressure is 16 mmHg.  · Normal central venous pressure (3 mmHg).         All imaging within the last 24 hours was reviewed.       Discharge Planning   FILEMON: 8/15/2022     Code Status: Full Code   Is the patient medically ready for discharge?: No    Reason for patient still in hospital (select all that apply): Patient trending condition, Laboratory test, Treatment, Consult recommendations, and Pending disposition  Discharge Plan A: Home with family

## 2022-08-11 NOTE — ASSESSMENT & PLAN NOTE
- Tapped by Ortho, fluid reviewed: does not appear to be gout or septic arthritis, likely pseudogout (calcium pyrophosphate crystals on previous LE aspiration)  - PRN analgesics provided  -improved after steroid injection

## 2022-08-11 NOTE — PROGRESS NOTES
[facility] - Telemetry Stepdown  Orthopedics  Progress Note    Attg Note:  I agree with the resident's assessment and plan.  Increased drain output yesterday with mobilization.  Will leave a drain in for time being.    Rob Graff MD      Patient Name: Wilfredo Thomas  MRN: [id number]  Admission Date: 7/26/2022  Hospital Length of Stay: 16 days  Attending Provider: Mervat Johnson MD  Primary Care Provider: Aylin Wayne, DO  Follow-up For: Procedure(s) (LRB):  Incision and Drainage - LEFT THIGH. (Left)    Post-Operative Day: 5 Days Post-Op  Subjective:     Principal Problem:Abscess of left thigh    Principal Orthopedic Problem: Left thigh I&D 7/29 and 8/6  Staph species on thigh abscess cultures    Interval History  S/p repeat L thigh I&D 8/6/22. NAEON, patient stable, pain controlled. No acute complaints this morning. Drain with 290cc serosainguinous output yesterday after having basically no output the 2 prior days. Likely fluid was freed up by the patient mobilizing.  Cx Staph aureus from I&D. Cr 9.3, nephro following.      Review of patient's allergies indicates:  No Known Allergies    Current Facility-Administered Medications   Medication    acetaminophen tablet 1,000 mg    amLODIPine tablet 5 mg    atorvastatin tablet 20 mg    ceFAZolin 2 gram in dextrose 5% 100 mL IVPB (premix)    COVID-19 vac, forrest(Pfizer)(PF) (Pfizer COVID-19) 30 mcg/0.3 mL injection 0.3 mL    dextrose 10% bolus 125 mL    dextrose 10% bolus 250 mL    glucagon (human recombinant) injection 1 mg    glucose chewable tablet 16 g    glucose chewable tablet 24 g    heparin (porcine) injection 5,000 Units    insulin aspart U-100 pen 1-10 Units    insulin aspart U-100 pen 8 Units    insulin detemir U-100 pen 10 Units    morphine injection 3 mg    ondansetron injection 4 mg    oxyCODONE immediate release tablet 5 mg    senna-docusate 8.6-50 mg per tablet 2 tablet    sodium chloride 0.9% flush 10 mL     Objective:     Vital Signs (Most Recent):  Temp:  "97.7 °F (36.5 °C) (08/11/22 0427)  Pulse: 68 (08/11/22 0452)  Resp: 18 (08/11/22 0608)  BP: (!) 151/72 (08/11/22 0427)  SpO2: 96 % (08/11/22 0546) Vital Signs (24h Range):  Temp:  [97.7 °F (36.5 °C)-98.9 °F (37.2 °C)] 97.7 °F (36.5 °C)  Pulse:  [67-88] 68  Resp:  [16-20] 18  SpO2:  [94 %-99 %] 96 %  BP: (138-175)/(72-95) 151/72     Weight: 104.3 kg (230 lb)  Height: 5' 7" (170.2 cm)  Body mass index is 36.02 kg/m².      Intake/Output Summary (Last 24 hours) at 8/11/2022 0644  Last data filed at 8/11/2022 0610  Gross per 24 hour   Intake 820 ml   Output 1015 ml   Net -195 ml         Ortho/SPM Exam  Left lower extremity  Left thigh dressing cdi  Drain in place, ss output  No erythema of thigh, no signficant swelling  Compartments soft and compressible  Painless ROM of hip and knee  NVI        CBC:   Recent Labs   Lab 08/09/22  0942 08/10/22  0246 08/11/22  0415   WBC 5.49 4.70 4.40   HGB 9.1* 8.2* 8.2*   HCT 27.0* 24.9* 25.1*    224 220       CMP:   Recent Labs   Lab 08/09/22  0942 08/10/22  0246 08/11/22  0415   * 133* 136   K 5.1 4.3 4.4    102 104   CO2 19* 21* 21*   * 173* 84   BUN 52* 56* 58*   CREATININE 8.8* 8.9* 9.3*   CALCIUM 8.9 8.9 8.8   PROT 5.8* 5.5* 5.6*   ALBUMIN 1.8* 1.7* 1.8*   BILITOT 0.2 0.2 0.2   ALKPHOS 73 69 70   AST 15 12 14   ALT <5* <5* <5*   ANIONGAP 11 10 11         All pertinent labs within the past 24 hours have been reviewed.    Significant Imaging: I have reviewed and interpreted all pertinent imaging results/findings.      Assessment/Plan:     * Abscess of left thigh  47M with DM admitted 7/26/22 for DKA, ortho following for left lateral thigh abscess. s/p I&D and drain placement left thigh 7/29/22. Cx +MSSA. Had high drain purulent output after first I&D, was taken back to OR 8/6/22 for repeat I&D. Drain in place, dressings CDI. Nephrology on board for ANGE.     -- ANGE nephrology managing   -- DVT SQH  -- Ancef for MSSA per ID    -- Pain MM  -- Drain output " 290cc yesterday, will continue to watch drain output  -- CRP downtrending 8/9, repeat ESR/CRP trending  -- L wrist aspiration non-concerning for septic arthritis  -- Encourage ambulation    Dispo: pending. May potentially require return to OR. Trend inflammatory markers, drainage, exam          Aron Vieyra MD  Orthopedics  [facility] - Telemetry Stepdown

## 2022-08-11 NOTE — PROGRESS NOTES
[facility] - Telemetry Stepdown  Infectious Disease  Progress Note    Patient Name: Wilfredo Thomas  MRN: [id number]  Admission Date: 7/26/2022  Length of Stay: 16 days  Attending Physician: Mervat Johnson MD  Primary Care Provider: Aylin Wayne DO    Isolation Status: No active isolations  Assessment/Plan:      * Abscess of left thigh     48 yo male with history of DMII and HTN admitted for DKA and left thigh abscess. MRI of left femur was notable for ruptured tensor fascia jose luis mm with associated large hematoma, as well as small area of potential osteonecrosis. Bedside aspiration of the fluid collection was notable for a cell count of >200k (>80%segs), cultures + staph species. He was taken to the OR on 7/29 with orthopedic surgery for surgical I&D. Surgical cultures are positive for MSSA. Per ortho surgery, no concerns for bone involvement. Patient was initially on Vancomycin and Zosyn. Vanc trough supratherapeutic and patient has developed an ANGE. He is currently on Cefazolin.       Recent new onset left wrist pain, today stating improvement. Minimal swelling noted today, ROM improving. Pt states pain decreasing. He is s/p wrist arthroscopy with hardware placement in 3/2022. Wife noticed a boil on his wrist. S/p joint aspiration, white count 2050k, 88% segs. No crystals noted. Left wrist aspiration from 8/5/22-  NG. Xray of wrist with no acute concerns.      Otherwise, afebrile. HDS. OR cultures 8/6 L thigh now show MSSA.  Leg pain improved.  ANGE worsened. 2D echo without mention of vegetations.  Per dw ortho, no further OR plans and no hip joint/bone involvement.  Stable non septic        Recommendations:   · Continue Cefazolin 2g q24 (renally dosed per pharmacy) for MSSA SSTI.   · FU Ortho plans  · Nephrology ANGE management. Will follow plan regarding further w/u.   · Anticipate SSTI course from time of last washout 8/6  · Patient and plan reviewed with ID staff, Dr. Samson.   · ID will follow given  complicated case and monitoring of RF and need for dose adjustments.             Thank you for your consult. I will follow-up with patient. Please contact us if you have any additional questions.    Steven Caicedo NP  Infectious Disease  [facility] - Telemetry Stepdown    Subjective:     Principal Problem:Abscess of left thigh    HPI: Mr. Thomas is a 47 year old male with a PMH of DM2 (poorly controlled), HTN, GERD, HLD, obesity who [place] [facility] [place] SOBx1 [place]. Pt was also reporting significant pain and swelling along his left lateral proximal thigh. Pt reports this pain started over the last 2 weeks, which worsened. He initially was seen outpatient in which he was diagnoised with a mm strain and given 800 mg ibuprofen. From there he went to the ED on 7/22 d/t worsening pain. He was treated with prednisone/morphine shot, and well as a prednisone oral pack. Pt denied recent injury, with the exception of soreness to his left lower shin following bumping into a cabinet. He denies open wounds/abrasions from this injury but states the soreness started in his left thigh following the improvement of discomfort from his left shin. Pt reports having been treated for a boil on his central/right buttocks in the end of June. 2022. Pt states he was seen in Urgent Care in [place] and the boil was drained. He was given an oral abx in which he completed.     To note, pt reports having a recent left wrist fracture, ligament rupture following an accident with his riding lawnmower. States he underwent surgery and 2 screws were placed on 3/31/22 at [facility]. Denies associated infection/complications. Denies pain in the site currently.    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 Denies having pets. Denies recent fishing/hunting. Denies hx of immunocompromising conditions.     To note, pt was found to be in DKA with blood sugars >400, treated by critical team,  which has since resolved. Pt was recently started on insulin by his PCP, which he had not started. Pt latest A1C 10.     CT of left thigh notable for fluid collections suspicious for hematoma/seroma. MRI of left femur was notable for ruptured tensor fascia jose luis mm with associated large hematoma, as well as small area of potential osteonecrosis. Bedside aspiration of the fluid collection was notable for a cell count of >200k >80%segs, cultures + staph spp. Pt was taken for I&D with Dr. Graff with ortho surgery on 7/29, abscesses were noted and washed out, cultures collected + staph spp. Per pt, states that Dr. Graff is planning to return to surgery tomorrow, 8/1/22 for repeat washout.     Pt was intially with leukocytosis, but has since down trended to 11k. Blood cultures from 7/26 NGTD.     Pt is currently on zosyn, vancomycin, and clindamycin. ID was consulted for abx recs regarding left TFL infection.            Interval History:   No AEON.   Worsening ANGE, reporting urination, states he will go back on IVF today. Cr to 9.3 today.   L thigh improving  All thigh cultures with MSSA  The patient denies any recent fever, chills, or sweats.  Reports left wrist pain improving. Swelling decreased. ROM increased.         Review of Systems   Constitutional:  Negative for chills, diaphoresis, fatigue and fever.   Respiratory:  Negative for cough and shortness of breath.    Cardiovascular:  Positive for leg swelling (upper left thigh). Negative for chest pain and palpitations.   Gastrointestinal:  Negative for abdominal distention and abdominal pain.   Genitourinary:  Negative for difficulty urinating and dysuria.   Musculoskeletal:  Positive for arthralgias (left wrist) and myalgias (upper left thigh). Negative for joint swelling.   Skin:  Positive for wound (upper left thigh). Negative for color change and rash.   Allergic/Immunologic: Negative for immunocompromised state.   Neurological:  Negative for dizziness,  tremors, weakness, numbness and headaches.   Psychiatric/Behavioral:  Negative for agitation and decreased concentration. The patient is not nervous/anxious.    Objective:     Vital Signs (Most Recent):  Temp: 98.5 °F (36.9 °C) (08/10/22 0833)  Pulse: 88 (08/10/22 0833)  Resp: 18 (08/10/22 0833)  BP: (!) 175/95 (08/10/22 0833)  SpO2: 99 % (08/10/22 0833)   Vital Signs (24h Range):  Temp:  [97.9 °F (36.6 °C)-98.5 °F (36.9 °C)] 98.5 °F (36.9 °C)  Pulse:  [71-94] 88  Resp:  [18] 18  SpO2:  [94 %-99 %] 99 %  BP: (132-175)/(78-98) 175/95     Weight: 104.3 kg (230 lb)  Body mass index is 36.02 kg/m².    Estimated Creatinine Clearance: 11.6 mL/min (A) (based on SCr of 8.9 mg/dL (H)).    Physical Exam  Vitals and nursing note reviewed.   Constitutional:       General: He is not in acute distress.     Appearance: Normal appearance. He is well-developed. He is obese. He is not ill-appearing, toxic-appearing or diaphoretic.   HENT:      Head: Normocephalic and atraumatic.      Nose: Nose normal.      Mouth/Throat:      Dentition: Does not have dentures.      Pharynx: No oropharyngeal exudate.   Eyes:      General: No scleral icterus.     Conjunctiva/sclera: Conjunctivae normal.   Cardiovascular:      Rate and Rhythm: Normal rate and regular rhythm.   Pulmonary:      Effort: Pulmonary effort is normal. No respiratory distress.      Breath sounds: Normal breath sounds.   Abdominal:      General: There is no distension.      Palpations: Abdomen is soft.      Tenderness: There is no abdominal tenderness.   Musculoskeletal:         General: No swelling.   Skin:     General: Skin is warm and dry.      Findings: No erythema or rash.      Comments: Incision to left thigh. Dressed, CDI. Surgical drain with dark bloody output.   Neurological:      General: No focal deficit present.      Mental Status: He is alert and oriented to person, place, and time. Mental status is at baseline.      Motor: No weakness.      Gait: Gait normal.    Psychiatric:         Mood and Affect: Mood normal.         Behavior: Behavior normal.         Thought Content: Thought content normal.         Judgment: Judgment normal.       Significant Labs: CBC:   Recent Labs   Lab 08/09/22  0942 08/10/22  0246   WBC 5.49 4.70   HGB 9.1* 8.2*   HCT 27.0* 24.9*    224       CMP:   Recent Labs   Lab 08/09/22  0942 08/10/22  0246   * 133*   K 5.1 4.3    102   CO2 19* 21*   * 173*   BUN 52* 56*   CREATININE 8.8* 8.9*   CALCIUM 8.9 8.9   PROT 5.8* 5.5*   ALBUMIN 1.8* 1.7*   BILITOT 0.2 0.2   ALKPHOS 73 69   AST 15 12   ALT <5* <5*   ANIONGAP 11 10       Microbiology Results (last 7 days)       Procedure Component Value Units Date/Time    Fungus culture [[id number]] Collected: 08/05/22 0817    Order Status: Completed Specimen: Joint Fluid from Wrist, Left Updated: 08/09/22 1307     Fungus (Mycology) Culture Culture in progress    Aerobic culture [[id number]]  (Abnormal) Collected: 08/06/22 1153    Order Status: Completed Specimen: Wound from Hip, Left Updated: 08/09/22 1030     Aerobic Bacterial Culture STAPHYLOCOCCUS AUREUS  Moderate  For susceptibility see order #[id number]      Narrative:      #1 Left hip fluid    Aerobic culture [[id number]]  (Abnormal) Collected: 08/06/22 1153    Order Status: Completed Specimen: Wound from Hip, Left Updated: 08/09/22 1030     Aerobic Bacterial Culture STAPHYLOCOCCUS AUREUS  Few  Susceptibility pending      Narrative:      #2 Left hip fluid    Culture, Anaerobe [[id number]] Collected: 08/06/22 1153    Order Status: Completed Specimen: Body Fluid from Hip, Left Updated: 08/09/22 1013     Anaerobic Culture Culture in progress    Narrative:      #2 Left hip fluid    Culture, Anaerobe [[id number]] Collected: 08/06/22 1153    Order Status: Completed Specimen: Body Fluid from Hip, Left Updated: 08/09/22 1012     Anaerobic Culture Culture in progress    Narrative:      #1 Left hip fluid    Blood culture [[id number]] Collected:  08/05/22 0850    Order Status: Completed Specimen: Blood from Antecubital, Right Arm Updated: 08/09/22 1012     Blood Culture, Routine No Growth to date      No Growth to date      No Growth to date      No Growth to date      No Growth to date    Blood culture [[id number]] Collected: 08/05/22 0852    Order Status: Completed Specimen: Blood from Peripheral, Right Hand Updated: 08/09/22 1012     Blood Culture, Routine No Growth to date      No Growth to date      No Growth to date      No Growth to date      No Growth to date    AFB Culture & Smear [[id number]] Collected: 08/05/22 0817    Order Status: Completed Specimen: Joint Fluid from Wrist, Left Updated: 08/08/22 1529     AFB Culture & Smear Culture in progress     AFB CULTURE STAIN No acid fast bacilli seen.    Aerobic culture [[id number]] Collected: 08/05/22 0817    Order Status: Completed Specimen: Bone from Wrist, Left Updated: 08/08/22 0540     Aerobic Bacterial Culture No growth    Gram stain [[id number]] Collected: 08/05/22 0817    Order Status: Completed Specimen: Joint Fluid from Wrist, Left Updated: 08/05/22 1038     Gram Stain Result No WBC's      No organisms seen    Culture, Anaerobe [[id number]] Collected: 08/05/22 0817    Order Status: Sent Specimen: Joint Fluid from Wrist, Left Updated: 08/05/22 0818    Aerobic culture [[id number]]  (Abnormal) Collected: 07/29/22 1251    Order Status: Completed Specimen: Abscess from Leg, Left Updated: 08/04/22 1250     Aerobic Bacterial Culture STAPHYLOCOCCUS AUREUS  Many  For susceptibility see order #[id number]      Narrative:      Left thigh abscess #1    Aerobic culture [[id number]]  (Abnormal)  (Susceptibility) Collected: 07/28/22 1800    Order Status: Completed Specimen: Abscess from Leg, Left Updated: 08/04/22 1249     Aerobic Bacterial Culture STAPHYLOCOCCUS AUREUS  Many      Narrative:      LEFT THIGH ABSCESS    Aerobic culture [[id number]]  (Abnormal)  (Susceptibility) Collected: 07/29/22 1300    Order Status:  Completed Specimen: Abscess from Leg, Left Updated: 08/04/22 1248     Aerobic Bacterial Culture STAPHYLOCOCCUS AUREUS  Many      Narrative:      Left thigh abscess #2          Recent Lab Results  (Last 5 results in the past 24 hours)        08/10/22  0735   08/10/22  0246   08/09/22  2103   08/09/22  1537   08/09/22  1400        Albumin   1.7             Alkaline Phosphatase   69             ALT   <5             Anion Gap   10             Ascending aorta         3.11       Ao peak blaire         1.48       Ao VTI         32.50       AST   12             AV valve area         3.56       AV mean gradient         5       AV index (prosthetic)         0.77       AV peak gradient         9       AV Velocity Ratio         0.73       Baso #   0.04             Basophil %   0.9             BILIRUBIN TOTAL   0.2  Comment: For infants and newborns, interpretation of results should be based  on gestational age, weight and in agreement with clinical  observations.    Premature Infant recommended reference ranges:  Up to 24 hours.............<8.0 mg/dL  Up to 48 hours............<12.0 mg/dL  3-5 days..................<15.0 mg/dL  6-29 days.................<15.0 mg/dL               BSA         2.22       BUN   56             Calcium   8.9             Chloride   102             CO2   21             Creatinine   8.9             Left Ventricle Relative Wall Thickness         0.40       Differential Method   Automated             E/A ratio         1.24       E/E' ratio         9.26       eGFR   6.7             EF         65       Eos #   0.3             Eosinophil %   6.6             E wave deceleration time         242.49       FS         38       Glucose   173             Gran # (ANC)   3.1             Gran %   65.7             Hematocrit   24.9             Hemoglobin   8.2             Immature Grans (Abs)   0.10  Comment: Mild elevation in immature granulocytes is non specific and   can be seen in a variety of conditions including  "stress response,   acute inflammation, trauma and pregnancy. Correlation with other   laboratory and clinical findings is essential.               Immature Granulocytes   2.1             IVSd         0.83       LA WIDTH         3.00       Left Atrium Major Axis         5.19       Left Atrium Minor Axis         5.71       LA size         4.73       LA volume         65.59                35.67       LA vol index         30.5       LA Volume Index (Mod)         16.6       LVOT area         4.6       LV LATERAL E/E' RATIO         8.00       LV SEPTAL E/E' RATIO         11.00       LV EDV BP         102.25       LV Diastolic Volume Index         47.56       LVIDd         4.70       LVIDs         2.93       LV mass         138.51       LV Mass Index         64       LVOT diameter         2.42       LVOT peak vickey         1.08       LVOT stroke volume         115.76       LVOT peak VTI         25.18       LV ESV BP         33.12       LV Systolic Volume Index         15.4       Lymph #   0.9             Lymph %   18.3             Magnesium   2.0             MCH   30.4             MCHC   32.9             MCV   92             Mean e'         0.10       Mono #   0.3             Mono %   6.4             MPV   9.6             MV valve area p 1/2 method         3.13       MV "A" wave duration         14.27       MV Peak A Vickey         0.71       MV Peak E Vickey         0.88       MV stenosis pressure 1/2 time         70.32       nRBC   0             Phosphorus   6.4             Platelets   224             POCT Glucose 149     127   125         Potassium   4.3             PROTEIN TOTAL   5.5             PV Peak D Vickey         0.41       PV Peak S Vickey         0.67       Pulm vein S/D ratio         1.63       Posterior Wall         0.93       Right Atrial Pressure (from IVC)         3       RA Major Axis         4.68       RA Width         3.23       RBC   2.70             RDW   12.7             RV S'         13.93       RVDD         " 3.83       Sinus         3.64       Sodium   133             STJ         3.34       TAPSE         1.98       TDI SEPTAL         0.08       TDI LATERAL         0.11       Triscuspid Valve Regurgitation Peak Gradient         13       TR Max Vickey         1.83       TV rest pulmonary artery pressure         16       WBC   4.70                                    Significant Imaging:     Imaging Results              CT Thigh With Contrast Left (Final result)  Result time 07/27/22 01:13:09      Final result by Jori Hopkins DO (07/27/22 01:13:09)                   Impression:      Large heterogeneous fluid collection in the anterolateral left proximal thigh soft tissues, concerning for a soft tissue hematoma or Preston-Melissa lesion.  A neoplastic process is not entirely excluded.  Recommend close interval follow-up to assess for stability/resolution.      Electronically signed by: Jori Hopkins  Date:    07/27/2022  Time:    01:13               Narrative:    EXAMINATION:  CT THIGH WITH CONTRAST LEFT    CLINICAL HISTORY:  Soft tissue mass, thigh, deep;    TECHNIQUE:  Axial CT images of the left thigh with sagittal and coronal reformats after the intravenous administration of 50 mL Omnipaque 350.    COMPARISON:  None.    FINDINGS:  Bone: Bone mineralization is normal.  There is no evidence of an acute fracture or dislocation.  Alignment is normal.    Soft tissues: There is a large heterogeneous fluid collection in the left anterolateral proximal thigh measuring approximately 16 x 7 x 6 cm.  The collection appears to be centered within the subcutaneous tissues or superficial fascia and abuts the gluteus musculature, the sartorius muscle, the rectus femoris, and the vastus lateralis.  There is soft tissue edema in the surrounding subcutaneous tissues.  Muscle bulk is normal.    Articulations: The left femoroacetabular joint is unremarkable.  The pubic symphysis is unremarkable.  The left knee is unremarkable.  No large  joint effusion or significant cartilage loss.    Miscellaneous: Neurovascular structures are grossly intact.  There is moderate calcified atherosclerosis of the left femoral and popliteal arteries.                                       CTA Chest Non-Coronary (PE Study) (Final result)  Result time 07/26/22 21:36:01      Final result by Jori Hopkins DO (07/26/22 21:36:01)                   Impression:      No large central or lobar pulmonary embolism.      Electronically signed by: Jori Hopkins  Date:    07/26/2022  Time:    21:36               Narrative:    EXAMINATION:  CTA CHEST NON CORONARY    CLINICAL HISTORY:  Pulmonary embolism (PE) suspected, high prob;    TECHNIQUE:  Low dose axial images, sagittal and coronal reformations were obtained from the thoracic inlet to the lung bases following the IV administration of 100 mL of Omnipaque 350.  Contrast timing was optimized to evaluate the pulmonary arteries.  Maximum intensity projection images were provided for review.    COMPARISON:  Chest radiograph from earlier the same date.    FINDINGS:  Pulmonary vasculature: Satisfactory opacification of the pulmonary arterial system.  Motion artifact significantly limits evaluation of the segmental and subsegmental pulmonary arteries.  There is no large central or lobar pulmonary embolism.    Aorta: Left-sided aortic arch.  No aneurysm and no significant atherosclerosis.    Base of Neck: No significant abnormality.    Thoracic soft tissues: Normal.    Heart: Normal size. No effusion.    Amena/Mediastinum: No pathologic russ enlargement.    Airways: The large airways are patent. No foci of endobronchial filling.    Lungs/Pleura: Clear lungs. No pleural effusion or thickening.    Esophagus: Normal.    Upper Abdomen: No abnormality of the partially imaged upper abdomen.    Bones: No acute fracture. No suspicious lytic or sclerotic lesions.                                       X-Ray Chest 1 View (Final result)  Result  time 07/26/22 21:23:42   Procedure changed from X-Ray Chest PA And Lateral     Final result by Olman Saucedo MD (07/26/22 21:23:42)                   Impression:      No convincing radiographic evidence of acute intrathoracic process on this single view..      Electronically signed by: Olman Saucedo MD  Date:    07/26/2022  Time:    21:23               Narrative:    EXAMINATION:  XR CHEST 1 VIEW    CLINICAL HISTORY:  shortness of breath;    TECHNIQUE:  Single frontal view of the chest was performed.    COMPARISON:  None    FINDINGS:  Cardiac monitoring leads overlie the chest.  Cardiac silhouette appears within normal limits.  No confluent airspace consolidation identified.  No significant volume of pleural fluid or pneumothorax appreciated.  The visualized osseous structures demonstrate mild degenerative changes.

## 2022-08-11 NOTE — ASSESSMENT & PLAN NOTE
- Tapped by Ortho, fluid reviewed: does not appear to be gout or septic arthritis, likely pseudogout  - PRN analgesics provided

## 2022-08-11 NOTE — ASSESSMENT & PLAN NOTE
- Afebrile, no leukocytosis  - Continue Ancef  - S/p OR with Ortho 8/6 for repeat washout  - Intra-op cultures with S.aureus: continuing Ancef for MSSA  - PRN analgesics provided  - Continuing to monitor; maintain drain for now

## 2022-08-11 NOTE — ASSESSMENT & PLAN NOTE
- Afebrile, no leukocytosis  - Continue Ancef  - S/p OR with Ortho 8/6 for repeat washout  - Intra-op cultures with S.aureus: continuing Ancef pending final C&S results  - PRN analgesics provided  - Continuing to monitor; maintain drain for now

## 2022-08-11 NOTE — PROGRESS NOTES
"  [facility] - Telemetry Stepdown  Adult Nutrition  Consult Note    SUMMARY     Recommendations    1. Continue current Diabetic, Renal diet + ONS.   2. RD to monitor & follow-up.    Goals: Meet % EEN, EPN by RD f/u date  Nutrition Goal Status: goal met  Communication of RD Recs: reviewed with RN    Assessment and Plan    Nutrition Problem:  Excessive CHO intake     Related to (etiology):   Food and nutrition related knowledge deficit      Signs and Symptoms (as evidenced by):   A1C 10.1     Interventions(treatment strategy):  Collaboration of nutrition care w/ other providers  Nutrition education      Nutrition Diagnosis Status:   Continues     Reason for Assessment    Reason For Assessment: RD follow-up  Diagnosis: other (see comments) (DKA)  Relevant Medical History: DM, HTN  Interdisciplinary Rounds: did not attend    General Information Comments: Pt continues to tolerate diet w/ 50-75% PO intake. S/p I & D of L. thigh. Diabetic diet education complete 7/27. At initial RD assessment, pt reported good appetite PTA & stable weight. Appears nourished w/ no indicators of malnutrition.   Nutrition Discharge Planning: Dietary compliance    Nutrition/Diet History    Spiritual, Cultural Beliefs, Shinto Practices, Values that Affect Care: no  Factors Affecting Nutritional Intake: None identified at this time    Anthropometrics    Temp: 98.4 °F (36.9 °C)  Height Method: Stated  Height: 5' 7" (170.2 cm)  Height (inches): 67 in  Weight Method: Standard Scale  Weight: 104.3 kg (229 lb 15 oz)  Weight (lb): 229.94 lb  Ideal Body Weight (IBW), Male: 148 lb  % Ideal Body Weight, Male (lb): 155.36 %  BMI (Calculated): 36  BMI Grade: 35 - 39.9 - obesity - grade II    Lab/Procedures/Meds    Pertinent Labs Reviewed: reviewed  Pertinent Labs Comments: BUN 58, Creat 9.3, GFR 6.4, P 6.7  Pertinent Medications Reviewed: reviewed  Pertinent Medications Comments: Statin    Estimated/Assessed Needs    Weight Used For Calorie " Calculations: 104.3 kg (229 lb 15 oz)     Energy Calorie Requirements (kcal): 2054 kcal/d  Energy Need Method: [place]-St Jeor (1.1 PAL)     Protein Requirements: 104-115 g/d (1-1.1 g/kg)  Weight Used For Protein Calculations: 104.3 kg (229 lb 15 oz)     Estimated Fluid Requirement Method: other (see comments) (Per MD or 1 mL/kcal)  RDA Method (mL): 2054    Nutrition Prescription Ordered    Current Diet Order: 2000 kcal ADA, Renal  Oral Nutrition Supplement: Boost Glucose Control    Evaluation of Received Nutrient/Fluid Intake    I/O: -7.3L since 7/28    Comments: LBM: 8/10    Tolerance: tolerating    Nutrition Risk    Level of Risk/Frequency of Follow-up:  (1x/week)     Monitor and Evaluation    Food and Nutrient Intake: food and beverage intake, energy intake  Physical Activity and Function: nutrition-related ADLs and IADLs  Anthropometric Measurements: weight, weight change  Biochemical Data, Medical Tests and Procedures: glucose/endocrine profile, lipid profile, inflammatory profile, gastrointestinal profile, electrolyte and renal panel  Nutrition-Focused Physical Findings: overall appearance     Nutrition Follow-Up    RD Follow-up?: Yes

## 2022-08-11 NOTE — ASSESSMENT & PLAN NOTE
- Body mass index is 36.01 kg/m².  - Needs dietary and lifestyle modifications in relation to health

## 2022-08-11 NOTE — ASSESSMENT & PLAN NOTE
48 yo male with history of DMII and HTN admitted for DKA and left thigh abscess. MRI of left femur was notable for ruptured tensor fascia jose luis mm with associated large hematoma, as well as small area of potential osteonecrosis. Bedside aspiration of the fluid collection was notable for a cell count of >200k (>80%segs), cultures + staph species. He was taken to the OR on 7/29 with orthopedic surgery for surgical I&D. Surgical cultures are positive for MSSA. Per ortho surgery, no concerns for bone involvement. Patient was initially on Vancomycin and Zosyn. Vanc trough supratherapeutic and patient has developed an ANGE. He is currently on Cefazolin.       Recent new onset left wrist pain, today stating improvement. Minimal swelling noted today, ROM improving. Pt states pain decreasing. He is s/p wrist arthroscopy with hardware placement in 3/2022. Wife noticed a boil on his wrist. S/p joint aspiration, white count 2050k, 88% segs. No crystals noted. Left wrist aspiration from 8/5/22-  NG. Xray of wrist with no acute concerns.      Otherwise, afebrile. HDS. OR cultures 8/6 L thigh now show MSSA.  Leg pain improved.  ANGE worsened. 2D echo without mention of vegetations.  Per dw ortho, no further OR plans and no hip joint/bone involvement.  Stable non septic        Recommendations:   · Continue Cefazolin 2g q24 (renally dosed per pharmacy) for MSSA SSTI.   · FU Ortho plans  · Nephrology ANGE management. Will follow plan regarding further w/u.   · Anticipate SSTI course from time of last washout 8/6  · Patient and plan reviewed with ID staff, Dr. Samson.   · ID will follow given complicated case and monitoring of RF and need for dose adjustments.

## 2022-08-12 LAB
ALBUMIN SERPL BCP-MCNC: 1.9 G/DL (ref 3.5–5.2)
ALP SERPL-CCNC: 75 U/L (ref 55–135)
ALT SERPL W/O P-5'-P-CCNC: <5 U/L (ref 10–44)
ANA SER QL IF: NORMAL
ANION GAP SERPL CALC-SCNC: 11 MMOL/L (ref 8–16)
AST SERPL-CCNC: 13 U/L (ref 10–40)
BASOPHILS # BLD AUTO: 0.04 K/UL (ref 0–0.2)
BASOPHILS NFR BLD: 1 % (ref 0–1.9)
BILIRUB SERPL-MCNC: 0.2 MG/DL (ref 0.1–1)
BUN SERPL-MCNC: 61 MG/DL (ref 6–20)
CALCIUM SERPL-MCNC: 9 MG/DL (ref 8.7–10.5)
CHLORIDE SERPL-SCNC: 103 MMOL/L (ref 95–110)
CO2 SERPL-SCNC: 22 MMOL/L (ref 23–29)
CREAT SERPL-MCNC: 9.5 MG/DL (ref 0.5–1.4)
DIFFERENTIAL METHOD: ABNORMAL
EOSINOPHIL # BLD AUTO: 0.4 K/UL (ref 0–0.5)
EOSINOPHIL NFR BLD: 10.7 % (ref 0–8)
EOSINOPHIL URNS QL WRIGHT STN: ABNORMAL
ERYTHROCYTE [DISTWIDTH] IN BLOOD BY AUTOMATED COUNT: 13 % (ref 11.5–14.5)
EST. GFR  (NO RACE VARIABLE): 6.2 ML/MIN/1.73 M^2
GLUCOSE SERPL-MCNC: 119 MG/DL (ref 70–110)
HCT VFR BLD AUTO: 24.7 % (ref 40–54)
HGB BLD-MCNC: 8.2 G/DL (ref 14–18)
IMM GRANULOCYTES # BLD AUTO: 0.09 K/UL (ref 0–0.04)
IMM GRANULOCYTES NFR BLD AUTO: 2.2 % (ref 0–0.5)
LYMPHOCYTES # BLD AUTO: 0.9 K/UL (ref 1–4.8)
LYMPHOCYTES NFR BLD: 22.7 % (ref 18–48)
MAGNESIUM SERPL-MCNC: 2.1 MG/DL (ref 1.6–2.6)
MCH RBC QN AUTO: 29.9 PG (ref 27–31)
MCHC RBC AUTO-ENTMCNC: 33.2 G/DL (ref 32–36)
MCV RBC AUTO: 90 FL (ref 82–98)
MONOCYTES # BLD AUTO: 0.4 K/UL (ref 0.3–1)
MONOCYTES NFR BLD: 8.7 % (ref 4–15)
NEUTROPHILS # BLD AUTO: 2.2 K/UL (ref 1.8–7.7)
NEUTROPHILS NFR BLD: 54.7 % (ref 38–73)
NRBC BLD-RTO: 0 /100 WBC
PHOSPHATE SERPL-MCNC: 7.2 MG/DL (ref 2.7–4.5)
PLATELET # BLD AUTO: 209 K/UL (ref 150–450)
PMV BLD AUTO: 9.6 FL (ref 9.2–12.9)
POCT GLUCOSE: 143 MG/DL (ref 70–110)
POCT GLUCOSE: 178 MG/DL (ref 70–110)
POCT GLUCOSE: 181 MG/DL (ref 70–110)
POCT GLUCOSE: 183 MG/DL (ref 70–110)
POTASSIUM SERPL-SCNC: 4.4 MMOL/L (ref 3.5–5.1)
PROT SERPL-MCNC: 5.8 G/DL (ref 6–8.4)
PROTEINASE3 IGG SER-ACNC: <0.2 U
RBC # BLD AUTO: 2.74 M/UL (ref 4.6–6.2)
SODIUM SERPL-SCNC: 136 MMOL/L (ref 136–145)
WBC # BLD AUTO: 4.01 K/UL (ref 3.9–12.7)

## 2022-08-12 PROCEDURE — 99232 PR SUBSEQUENT HOSPITAL CARE,LEVL II: ICD-10-PCS | Mod: ,,, | Performed by: INTERNAL MEDICINE

## 2022-08-12 PROCEDURE — 99232 SBSQ HOSP IP/OBS MODERATE 35: CPT | Mod: ,,, | Performed by: INTERNAL MEDICINE

## 2022-08-12 PROCEDURE — 25000003 PHARM REV CODE 250: Performed by: INTERNAL MEDICINE

## 2022-08-12 PROCEDURE — 80053 COMPREHEN METABOLIC PANEL: CPT | Performed by: STUDENT IN AN ORGANIZED HEALTH CARE EDUCATION/TRAINING PROGRAM

## 2022-08-12 PROCEDURE — 94761 N-INVAS EAR/PLS OXIMETRY MLT: CPT

## 2022-08-12 PROCEDURE — 85025 COMPLETE CBC W/AUTO DIFF WBC: CPT | Performed by: STUDENT IN AN ORGANIZED HEALTH CARE EDUCATION/TRAINING PROGRAM

## 2022-08-12 PROCEDURE — 99233 PR SUBSEQUENT HOSPITAL CARE,LEVL III: ICD-10-PCS | Mod: 95,,, | Performed by: INTERNAL MEDICINE

## 2022-08-12 PROCEDURE — 36415 COLL VENOUS BLD VENIPUNCTURE: CPT | Performed by: STUDENT IN AN ORGANIZED HEALTH CARE EDUCATION/TRAINING PROGRAM

## 2022-08-12 PROCEDURE — 99233 SBSQ HOSP IP/OBS HIGH 50: CPT | Mod: ,,, | Performed by: REGISTERED NURSE

## 2022-08-12 PROCEDURE — 63600175 PHARM REV CODE 636 W HCPCS: Performed by: STUDENT IN AN ORGANIZED HEALTH CARE EDUCATION/TRAINING PROGRAM

## 2022-08-12 PROCEDURE — 25000003 PHARM REV CODE 250

## 2022-08-12 PROCEDURE — 25000003 PHARM REV CODE 250: Performed by: HOSPITALIST

## 2022-08-12 PROCEDURE — 87205 SMEAR GRAM STAIN: CPT | Performed by: REGISTERED NURSE

## 2022-08-12 PROCEDURE — 25000003 PHARM REV CODE 250: Performed by: PHYSICIAN ASSISTANT

## 2022-08-12 PROCEDURE — 84100 ASSAY OF PHOSPHORUS: CPT | Performed by: STUDENT IN AN ORGANIZED HEALTH CARE EDUCATION/TRAINING PROGRAM

## 2022-08-12 PROCEDURE — 63600175 PHARM REV CODE 636 W HCPCS: Performed by: PHYSICIAN ASSISTANT

## 2022-08-12 PROCEDURE — 99233 SBSQ HOSP IP/OBS HIGH 50: CPT | Mod: 95,,, | Performed by: INTERNAL MEDICINE

## 2022-08-12 PROCEDURE — 99233 PR SUBSEQUENT HOSPITAL CARE,LEVL III: ICD-10-PCS | Mod: ,,, | Performed by: REGISTERED NURSE

## 2022-08-12 PROCEDURE — 20600001 HC STEP DOWN PRIVATE ROOM

## 2022-08-12 PROCEDURE — 83735 ASSAY OF MAGNESIUM: CPT | Performed by: STUDENT IN AN ORGANIZED HEALTH CARE EDUCATION/TRAINING PROGRAM

## 2022-08-12 RX ADMIN — OXYCODONE 5 MG: 5 TABLET ORAL at 10:08

## 2022-08-12 RX ADMIN — AMLODIPINE BESYLATE 5 MG: 5 TABLET ORAL at 09:08

## 2022-08-12 RX ADMIN — OXYCODONE 5 MG: 5 TABLET ORAL at 03:08

## 2022-08-12 RX ADMIN — INSULIN ASPART 1 UNITS: 100 INJECTION, SOLUTION INTRAVENOUS; SUBCUTANEOUS at 08:08

## 2022-08-12 RX ADMIN — OXYCODONE 5 MG: 5 TABLET ORAL at 07:08

## 2022-08-12 RX ADMIN — CEFAZOLIN 2 G: 10 INJECTION, POWDER, FOR SOLUTION INTRAVENOUS at 09:08

## 2022-08-12 RX ADMIN — INSULIN ASPART 8 UNITS: 100 INJECTION, SOLUTION INTRAVENOUS; SUBCUTANEOUS at 11:08

## 2022-08-12 RX ADMIN — OXYCODONE 5 MG: 5 TABLET ORAL at 06:08

## 2022-08-12 RX ADMIN — SEVELAMER CARBONATE 800 MG: 800 TABLET, FILM COATED ORAL at 05:08

## 2022-08-12 RX ADMIN — SEVELAMER CARBONATE 800 MG: 800 TABLET, FILM COATED ORAL at 11:08

## 2022-08-12 RX ADMIN — INSULIN DETEMIR 10 UNITS: 100 INJECTION, SOLUTION SUBCUTANEOUS at 09:08

## 2022-08-12 RX ADMIN — HEPARIN SODIUM 5000 UNITS: 5000 INJECTION INTRAVENOUS; SUBCUTANEOUS at 03:08

## 2022-08-12 RX ADMIN — INSULIN ASPART 8 UNITS: 100 INJECTION, SOLUTION INTRAVENOUS; SUBCUTANEOUS at 05:08

## 2022-08-12 RX ADMIN — INSULIN DETEMIR 10 UNITS: 100 INJECTION, SOLUTION SUBCUTANEOUS at 08:08

## 2022-08-12 RX ADMIN — ATORVASTATIN CALCIUM 20 MG: 20 TABLET, FILM COATED ORAL at 09:08

## 2022-08-12 RX ADMIN — HEPARIN SODIUM 5000 UNITS: 5000 INJECTION INTRAVENOUS; SUBCUTANEOUS at 08:08

## 2022-08-12 RX ADMIN — HEPARIN SODIUM 5000 UNITS: 5000 INJECTION INTRAVENOUS; SUBCUTANEOUS at 06:08

## 2022-08-12 NOTE — ASSESSMENT & PLAN NOTE
Estimated Creatinine Clearance: 10.8 mL/min (A) (based on SCr of 9.5 mg/dL (H)).  - Hyperphosphatemia also noted - start binder  - Continues to make urine  - Nephrology following: no acute indication for HD at this time  - Renally dosing all medications  - Avoid nephrotoxins

## 2022-08-12 NOTE — ASSESSMENT & PLAN NOTE
ANGE non oliguric Scr slightly worse  today 9.5  from 9.3 yesterday . had 2400 cc of urineOPT s/p lasix 200 IV times one   Likely vancomycin induced ANGE (ATI/ATN) in the setting of trough of 40 and also was on zosyn which will increase risk of vanco induced ANGE.   Strict Is and Os   Renal US normal   Euvolemic     Plan:   Will continue to monitor RFP daily   No indication for HD at this time.   Bedside US done on 8/11/22 no hydro   Will follow on autoimmune work up but the we still think the most likely cause of his ANGE is vancomycin

## 2022-08-12 NOTE — SUBJECTIVE & OBJECTIVE
This encounter was provided through telemedicine.  Patient was transferred to the telemedicine service on:  08/9/2022   The patient location is: [id number] A admitted 7/26/2022  8:24 PM.  Present with the patient at the time of the telemed/virtual assessment: Telepresenter    Interval History/Overnight Events:   Clinical record since admit reviewed.  Patient able to provide history.    Drain output is decreased - LE edema decreased after lasix;  Left thigh pain continues to be controlled with current pain regimen and he is ambulatory; Ortho considering repeat I&D    Review of Systems   Constitutional:  Positive for fatigue. Negative for activity change and fever.   Respiratory:  Negative for cough and shortness of breath.    Cardiovascular:  Positive for leg swelling. Negative for chest pain.   Gastrointestinal:  Negative for diarrhea and vomiting.   Musculoskeletal:  Positive for myalgias.      Inpatient Medications:  Scheduled Meds:   amLODIPine  5 mg Oral Daily    atorvastatin  20 mg Oral Daily    ceFAZolin (ANCEF) IVPB  2 g Intravenous Daily    heparin (porcine)  5,000 Units Subcutaneous Q8H    insulin aspart U-100  8 Units Subcutaneous TIDWM    insulin detemir U-100  10 Units Subcutaneous BID    senna-docusate 8.6-50 mg  2 tablet Oral Daily    sevelamer carbonate  800 mg Oral TID WM     Continuous Infusions:  PRN Meds:.acetaminophen, sars-cov-2 (covid-19), dextrose 10%, dextrose 10%, glucagon (human recombinant), glucose, glucose, insulin aspart U-100, morphine, ondansetron, oxyCODONE, sodium chloride 0.9%      Objective:     Temp:  [97 °F (36.1 °C)-98.8 °F (37.1 °C)] 97 °F (36.1 °C)  Pulse:  [] 75  Resp:  [18-20] 18  SpO2:  [94 %-99 %] 97 %  BP: (135-162)/(67-85) 156/83      Intake/Output Summary (Last 24 hours) at 8/12/2022 1025  Last data filed at 8/12/2022 0600  Gross per 24 hour   Intake 1080 ml   Output 2455 ml   Net -1375 ml          Body mass index is 36.01 kg/m².    Physical Exam  Vitals and  nursing note reviewed.   Constitutional:       General: He is not in acute distress.     Appearance: Normal appearance. He is normal weight. He is not ill-appearing.   HENT:      Head: Normocephalic and atraumatic.      Right Ear: Hearing normal.      Left Ear: Hearing normal.      Nose: Nose normal.   Eyes:      General: No scleral icterus.        Right eye: No discharge.         Left eye: No discharge.      Extraocular Movements: Extraocular movements intact.   Cardiovascular:      Rate and Rhythm: Normal rate.   Pulmonary:      Effort: Pulmonary effort is normal. No accessory muscle usage or respiratory distress.   Musculoskeletal:      Right lower leg: Edema present.      Left lower leg: Edema present.      Comments: Dressing to left thigh with drain (serosanguineous drainage)   Neurological:      General: No focal deficit present.      Mental Status: He is alert and oriented to person, place, and time.      Cranial Nerves: No cranial nerve deficit.      Motor: No weakness.   Psychiatric:         Attention and Perception: Attention normal.         Mood and Affect: Mood normal.         Speech: Speech normal.         Behavior: Behavior is cooperative.        Labs:  Recent Results (from the past 24 hour(s))   POCT glucose    Collection Time: 08/11/22 11:41 AM   Result Value Ref Range    POCT Glucose 198 (H) 70 - 110 mg/dL   C3 complement    Collection Time: 08/11/22 12:43 PM   Result Value Ref Range    Complement (C-3) 125 50 - 180 mg/dL   C4 complement    Collection Time: 08/11/22 12:43 PM   Result Value Ref Range    Complement (C-4) 27 11 - 44 mg/dL   POCT glucose    Collection Time: 08/11/22  4:09 PM   Result Value Ref Range    POCT Glucose 167 (H) 70 - 110 mg/dL   CK    Collection Time: 08/11/22  5:43 PM   Result Value Ref Range    CPK 9 (L) 20 - 200 U/L   POCT glucose    Collection Time: 08/11/22  8:08 PM   Result Value Ref Range    POCT Glucose 213 (H) 70 - 110 mg/dL   POCT glucose    Collection Time:  08/11/22  9:10 PM   Result Value Ref Range    POCT Glucose 197 (H) 70 - 110 mg/dL   Phosphorus    Collection Time: 08/12/22  3:07 AM   Result Value Ref Range    Phosphorus 7.2 (H) 2.7 - 4.5 mg/dL   Magnesium    Collection Time: 08/12/22  3:07 AM   Result Value Ref Range    Magnesium 2.1 1.6 - 2.6 mg/dL   Comprehensive Metabolic Panel    Collection Time: 08/12/22  3:07 AM   Result Value Ref Range    Sodium 136 136 - 145 mmol/L    Potassium 4.4 3.5 - 5.1 mmol/L    Chloride 103 95 - 110 mmol/L    CO2 22 (L) 23 - 29 mmol/L    Glucose 119 (H) 70 - 110 mg/dL    BUN 61 (H) 6 - 20 mg/dL    Creatinine 9.5 (H) 0.5 - 1.4 mg/dL    Calcium 9.0 8.7 - 10.5 mg/dL    Total Protein 5.8 (L) 6.0 - 8.4 g/dL    Albumin 1.9 (L) 3.5 - 5.2 g/dL    Total Bilirubin 0.2 0.1 - 1.0 mg/dL    Alkaline Phosphatase 75 55 - 135 U/L    AST 13 10 - 40 U/L    ALT <5 (L) 10 - 44 U/L    Anion Gap 11 8 - 16 mmol/L    eGFR 6.2 (A) >60 mL/min/1.73 m^2   CBC Auto Differential    Collection Time: 08/12/22  3:07 AM   Result Value Ref Range    WBC 4.01 3.90 - 12.70 K/uL    RBC 2.74 (L) 4.60 - 6.20 M/uL    Hemoglobin 8.2 (L) 14.0 - 18.0 g/dL    Hematocrit 24.7 (L) 40.0 - 54.0 %    MCV 90 82 - 98 fL    MCH 29.9 27.0 - 31.0 pg    MCHC 33.2 32.0 - 36.0 g/dL    RDW 13.0 11.5 - 14.5 %    Platelets 209 150 - 450 K/uL    MPV 9.6 9.2 - 12.9 fL    Immature Granulocytes 2.2 (H) 0.0 - 0.5 %    Gran # (ANC) 2.2 1.8 - 7.7 K/uL    Immature Grans (Abs) 0.09 (H) 0.00 - 0.04 K/uL    Lymph # 0.9 (L) 1.0 - 4.8 K/uL    Mono # 0.4 0.3 - 1.0 K/uL    Eos # 0.4 0.0 - 0.5 K/uL    Baso # 0.04 0.00 - 0.20 K/uL    nRBC 0 0 /100 WBC    Gran % 54.7 38.0 - 73.0 %    Lymph % 22.7 18.0 - 48.0 %    Mono % 8.7 4.0 - 15.0 %    Eosinophil % 10.7 (H) 0.0 - 8.0 %    Basophil % 1.0 0.0 - 1.9 %    Differential Method Automated    POCT glucose    Collection Time: 08/12/22  7:14 AM   Result Value Ref Range    POCT Glucose 143 (H) 70 - 110 mg/dL        Lab Results   Component Value Date    [id number]  Negative 08/06/2022       Recent Labs   Lab 08/10/22  0246 08/11/22  0415 08/12/22  0307   WBC 4.70 4.40 4.01   LYMPH 18.3  0.9* 20.5  0.9* 22.7  0.9*   HGB 8.2* 8.2* 8.2*   HCT 24.9* 25.1* 24.7*    220 209       Recent Labs   Lab 08/10/22  0246 08/11/22  0415 08/12/22  0307   * 136 136   K 4.3 4.4 4.4    104 103   CO2 21* 21* 22*   BUN 56* 58* 61*   CREATININE 8.9* 9.3* 9.5*   * 84 119*   CALCIUM 8.9 8.8 9.0   MG 2.0 2.2 2.1   PHOS 6.4* 6.7* 7.2*       Recent Labs   Lab 08/10/22  0246 08/11/22 0415 08/12/22  0307   ALKPHOS 69 70 75   ALT <5* <5* <5*   AST 12 14 13   ALBUMIN 1.7* 1.8* 1.9*   PROT 5.5* 5.6* 5.8*   BILITOT 0.2 0.2 0.2          Recent Labs     08/11/22  0807 08/11/22  1743   CRP 43.7*  --    CPK  --  9*         All labs within the last 24 hours were reviewed.     Microbiology:  Microbiology Results (last 7 days)       Procedure Component Value Units Date/Time    Aerobic culture [[id number]]  (Abnormal) Collected: 08/06/22 1153    Order Status: Completed Specimen: Wound from Hip, Left Updated: 08/10/22 1214     Aerobic Bacterial Culture STAPHYLOCOCCUS AUREUS  Moderate  For susceptibility see order #[id number]      Narrative:      #1 Left hip fluid    Aerobic culture [[id number]]  (Abnormal)  (Susceptibility) Collected: 08/06/22 1153    Order Status: Completed Specimen: Wound from Hip, Left Updated: 08/10/22 1214     Aerobic Bacterial Culture STAPHYLOCOCCUS AUREUS  Few      Narrative:      #2 Left hip fluid    Culture, Anaerobe [[id number]] Collected: 08/06/22 1153    Order Status: Completed Specimen: Body Fluid from Hip, Left Updated: 08/10/22 1035     Anaerobic Culture No anaerobes isolated    Narrative:      #2 Left hip fluid    Culture, Anaerobe [[id number]] Collected: 08/06/22 1153    Order Status: Completed Specimen: Body Fluid from Hip, Left Updated: 08/10/22 1034     Anaerobic Culture No anaerobes isolated    Narrative:      #1 Left hip fluid    Blood culture [[id number]]  Collected: 08/05/22 0850    Order Status: Completed Specimen: Blood from Antecubital, Right Arm Updated: 08/10/22 1012     Blood Culture, Routine No growth after 5 days.    Blood culture [[id number]] Collected: 08/05/22 0852    Order Status: Completed Specimen: Blood from Peripheral, Right Hand Updated: 08/10/22 1012     Blood Culture, Routine No growth after 5 days.    Fungus culture [[id number]] Collected: 08/05/22 0817    Order Status: Completed Specimen: Joint Fluid from Wrist, Left Updated: 08/09/22 1307     Fungus (Mycology) Culture Culture in progress    AFB Culture & Smear [[id number]] Collected: 08/05/22 0817    Order Status: Completed Specimen: Joint Fluid from Wrist, Left Updated: 08/08/22 1529     AFB Culture & Smear Culture in progress     AFB CULTURE STAIN No acid fast bacilli seen.    Aerobic culture [[id number]] Collected: 08/05/22 0817    Order Status: Completed Specimen: Bone from Wrist, Left Updated: 08/08/22 0540     Aerobic Bacterial Culture No growth    Gram stain [[id number]] Collected: 08/05/22 0817    Order Status: Completed Specimen: Joint Fluid from Wrist, Left Updated: 08/05/22 1038     Gram Stain Result No WBC's      No organisms seen              Imaging  ECG Results              EKG 12-lead (Final result)  Result time 07/27/22 07:53:05      Final result by Interface, Lab In [facility] (07/27/22 07:53:05)                   Narrative:    Test Reason : R00.0,    Vent. Rate : 138 BPM     Atrial Rate : 138 BPM     P-R Int : 126 ms          QRS Dur : 086 ms      QT Int : 286 ms       P-R-T Axes : 051 002 052 degrees     QTc Int : 433 ms    Sinus tachycardia  Otherwise normal ECG  No previous ECGs available  Confirmed by Crow HARRIS MD ([id number]) on 7/27/2022 7:53:00 AM    Referred By: [email or web address]   SELF           Confirmed By:Crow HARRIS MD                                    Results for orders placed during the hospital encounter of 07/26/22    Echo    Interpretation Summary  · The left ventricle is normal  in size with normal systolic function.  · The estimated ejection fraction is 65%.  · Normal left ventricular diastolic function.  · Normal right ventricular size with normal right ventricular systolic function.  · The estimated PA systolic pressure is 16 mmHg.  · Normal central venous pressure (3 mmHg).      Echo  · The left ventricle is normal in size with normal systolic function.  · The estimated ejection fraction is 65%.  · Normal left ventricular diastolic function.  · Normal right ventricular size with normal right ventricular systolic   function.  · The estimated PA systolic pressure is 16 mmHg.  · Normal central venous pressure (3 mmHg).         All imaging within the last 24 hours was reviewed.       Discharge Planning   FILEMON: 8/15/2022     Code Status: Full Code   Is the patient medically ready for discharge?: No    Reason for patient still in hospital (select all that apply): Patient trending condition, Laboratory test, Treatment, Consult recommendations, and Pending disposition  Discharge Plan A: Home with family

## 2022-08-12 NOTE — SUBJECTIVE & OBJECTIVE
Interval History:   No AEON.   Worsening ANGE, reporting urination, no plans for HD at this time per nephrology. Cr to 9.5 today.   L thigh improving  All thigh cultures with MSSA  The patient denies any recent fever, chills, or sweats.  Reports left wrist pain improving. Swelling decreased. ROM increased. TTP.        Review of Systems   Constitutional:  Negative for chills, diaphoresis, fatigue and fever.   Respiratory:  Negative for cough and shortness of breath.    Cardiovascular:  Positive for leg swelling (upper left thigh). Negative for chest pain and palpitations.   Gastrointestinal:  Negative for abdominal distention and abdominal pain.   Genitourinary:  Negative for difficulty urinating and dysuria.   Musculoskeletal:  Positive for arthralgias (left wrist) and myalgias (upper left thigh). Negative for joint swelling.   Skin:  Positive for wound (upper left thigh). Negative for color change and rash.   Allergic/Immunologic: Negative for immunocompromised state.   Neurological:  Negative for dizziness, tremors, weakness, numbness and headaches.   Psychiatric/Behavioral:  Negative for agitation and decreased concentration. The patient is not nervous/anxious.    Objective:     Vital Signs (Most Recent):  Temp: 97 °F (36.1 °C) (08/12/22 0738)  Pulse: 75 (08/12/22 0805)  Resp: 18 (08/12/22 0738)  BP: (!) 156/83 (08/12/22 0738)  SpO2: 97 % (08/12/22 0738)   Vital Signs (24h Range):  Temp:  [97 °F (36.1 °C)-98.8 °F (37.1 °C)] 97 °F (36.1 °C)  Pulse:  [] 75  Resp:  [18-20] 18  SpO2:  [94 %-99 %] 97 %  BP: (135-162)/(67-85) 156/83     Weight: 104.3 kg (229 lb 15 oz)  Body mass index is 36.01 kg/m².    Estimated Creatinine Clearance: 10.8 mL/min (A) (based on SCr of 9.5 mg/dL (H)).    Physical Exam  Vitals and nursing note reviewed.   Constitutional:       General: He is not in acute distress.     Appearance: Normal appearance. He is well-developed. He is obese. He is not ill-appearing, toxic-appearing or  diaphoretic.   HENT:      Head: Normocephalic and atraumatic.      Nose: Nose normal.      Mouth/Throat:      Dentition: Does not have dentures.      Pharynx: No oropharyngeal exudate.   Eyes:      General: No scleral icterus.     Conjunctiva/sclera: Conjunctivae normal.   Cardiovascular:      Rate and Rhythm: Normal rate and regular rhythm.   Pulmonary:      Effort: Pulmonary effort is normal. No respiratory distress.      Breath sounds: Normal breath sounds.   Abdominal:      General: There is no distension.      Palpations: Abdomen is soft.      Tenderness: There is no abdominal tenderness.   Musculoskeletal:         General: No swelling.   Skin:     General: Skin is warm and dry.      Findings: No erythema or rash.      Comments: Incision to left thigh. Dressed, CDI. Surgical drain with dark bloody output.   Neurological:      General: No focal deficit present.      Mental Status: He is alert and oriented to person, place, and time. Mental status is at baseline.      Motor: No weakness.      Gait: Gait normal.   Psychiatric:         Mood and Affect: Mood normal.         Behavior: Behavior normal.         Thought Content: Thought content normal.         Judgment: Judgment normal.       Significant Labs: CBC:   Recent Labs   Lab 08/11/22  0415 08/12/22  0307   WBC 4.40 4.01   HGB 8.2* 8.2*   HCT 25.1* 24.7*    209       CMP:   Recent Labs   Lab 08/11/22  0415 08/12/22  0307    136   K 4.4 4.4    103   CO2 21* 22*   GLU 84 119*   BUN 58* 61*   CREATININE 9.3* 9.5*   CALCIUM 8.8 9.0   PROT 5.6* 5.8*   ALBUMIN 1.8* 1.9*   BILITOT 0.2 0.2   ALKPHOS 70 75   AST 14 13   ALT <5* <5*   ANIONGAP 11 11       Microbiology Results (last 7 days)       Procedure Component Value Units Date/Time    Aerobic culture [[id number]]  (Abnormal) Collected: 08/06/22 1153    Order Status: Completed Specimen: Wound from Hip, Left Updated: 08/10/22 1214     Aerobic Bacterial Culture STAPHYLOCOCCUS AUREUS  Moderate  For  susceptibility see order #[id number]      Narrative:      #1 Left hip fluid    Aerobic culture [[id number]]  (Abnormal)  (Susceptibility) Collected: 08/06/22 1153    Order Status: Completed Specimen: Wound from Hip, Left Updated: 08/10/22 1214     Aerobic Bacterial Culture STAPHYLOCOCCUS AUREUS  Few      Narrative:      #2 Left hip fluid    Culture, Anaerobe [[id number]] Collected: 08/06/22 1153    Order Status: Completed Specimen: Body Fluid from Hip, Left Updated: 08/10/22 1035     Anaerobic Culture No anaerobes isolated    Narrative:      #2 Left hip fluid    Culture, Anaerobe [[id number]] Collected: 08/06/22 1153    Order Status: Completed Specimen: Body Fluid from Hip, Left Updated: 08/10/22 1034     Anaerobic Culture No anaerobes isolated    Narrative:      #1 Left hip fluid    Blood culture [[id number]] Collected: 08/05/22 0850    Order Status: Completed Specimen: Blood from Antecubital, Right Arm Updated: 08/10/22 1012     Blood Culture, Routine No growth after 5 days.    Blood culture [[id number]] Collected: 08/05/22 0852    Order Status: Completed Specimen: Blood from Peripheral, Right Hand Updated: 08/10/22 1012     Blood Culture, Routine No growth after 5 days.    Fungus culture [[id number]] Collected: 08/05/22 0817    Order Status: Completed Specimen: Joint Fluid from Wrist, Left Updated: 08/09/22 1307     Fungus (Mycology) Culture Culture in progress    AFB Culture & Smear [[id number]] Collected: 08/05/22 0817    Order Status: Completed Specimen: Joint Fluid from Wrist, Left Updated: 08/08/22 1529     AFB Culture & Smear Culture in progress     AFB CULTURE STAIN No acid fast bacilli seen.    Aerobic culture [[id number]] Collected: 08/05/22 0817    Order Status: Completed Specimen: Bone from Wrist, Left Updated: 08/08/22 0540     Aerobic Bacterial Culture No growth    Gram stain [[id number]] Collected: 08/05/22 0817    Order Status: Completed Specimen: Joint Fluid from Wrist, Left Updated: 08/05/22 1038     Gram  Stain Result No WBC's      No organisms seen          Recent Lab Results  (Last 5 results in the past 24 hours)        08/12/22  0714   08/12/22  0307   08/11/22  2110   08/11/22 2008 08/11/22  1743        Albumin   1.9             Alkaline Phosphatase   75             ALT   <5             Anion Gap   11             AST   13             Baso #   0.04             Basophil %   1.0             BILIRUBIN TOTAL   0.2  Comment: For infants and newborns, interpretation of results should be based  on gestational age, weight and in agreement with clinical  observations.    Premature Infant recommended reference ranges:  Up to 24 hours.............<8.0 mg/dL  Up to 48 hours............<12.0 mg/dL  3-5 days..................<15.0 mg/dL  6-29 days.................<15.0 mg/dL               BUN   61             Calcium   9.0             Chloride   103             CO2   22             CPK         9       Creatinine   9.5             Differential Method   Automated             eGFR   6.2             Eos #   0.4             Eosinophil %   10.7             Glucose   119             Gran # (ANC)   2.2             Gran %   54.7             Hematocrit   24.7             Hemoglobin   8.2             Immature Grans (Abs)   0.09  Comment: Mild elevation in immature granulocytes is non specific and   can be seen in a variety of conditions including stress response,   acute inflammation, trauma and pregnancy. Correlation with other   laboratory and clinical findings is essential.               Immature Granulocytes   2.2             Lymph #   0.9             Lymph %   22.7             Magnesium   2.1             MCH   29.9             MCHC   33.2             MCV   90             Mono #   0.4             Mono %   8.7             MPV   9.6             nRBC   0             Phosphorus   7.2             Platelets   209             POCT Glucose 143     197   213         Potassium   4.4             PROTEIN TOTAL   5.8             RBC   2.74              RDW   13.0             Sodium   136             WBC   4.01                                    Significant Imaging:     Imaging Results              CT Thigh With Contrast Left (Final result)  Result time 07/27/22 01:13:09      Final result by Jori Hopkins DO (07/27/22 01:13:09)                   Impression:      Large heterogeneous fluid collection in the anterolateral left proximal thigh soft tissues, concerning for a soft tissue hematoma or Preston-Melissa lesion.  A neoplastic process is not entirely excluded.  Recommend close interval follow-up to assess for stability/resolution.      Electronically signed by: Jori Hopkins  Date:    07/27/2022  Time:    01:13               Narrative:    EXAMINATION:  CT THIGH WITH CONTRAST LEFT    CLINICAL HISTORY:  Soft tissue mass, thigh, deep;    TECHNIQUE:  Axial CT images of the left thigh with sagittal and coronal reformats after the intravenous administration of 50 mL Omnipaque 350.    COMPARISON:  None.    FINDINGS:  Bone: Bone mineralization is normal.  There is no evidence of an acute fracture or dislocation.  Alignment is normal.    Soft tissues: There is a large heterogeneous fluid collection in the left anterolateral proximal thigh measuring approximately 16 x 7 x 6 cm.  The collection appears to be centered within the subcutaneous tissues or superficial fascia and abuts the gluteus musculature, the sartorius muscle, the rectus femoris, and the vastus lateralis.  There is soft tissue edema in the surrounding subcutaneous tissues.  Muscle bulk is normal.    Articulations: The left femoroacetabular joint is unremarkable.  The pubic symphysis is unremarkable.  The left knee is unremarkable.  No large joint effusion or significant cartilage loss.    Miscellaneous: Neurovascular structures are grossly intact.  There is moderate calcified atherosclerosis of the left femoral and popliteal arteries.                                       CTA Chest Non-Coronary  (PE Study) (Final result)  Result time 07/26/22 21:36:01      Final result by Jori Hopkins DO (07/26/22 21:36:01)                   Impression:      No large central or lobar pulmonary embolism.      Electronically signed by: Jori Hopkins  Date:    07/26/2022  Time:    21:36               Narrative:    EXAMINATION:  CTA CHEST NON CORONARY    CLINICAL HISTORY:  Pulmonary embolism (PE) suspected, high prob;    TECHNIQUE:  Low dose axial images, sagittal and coronal reformations were obtained from the thoracic inlet to the lung bases following the IV administration of 100 mL of Omnipaque 350.  Contrast timing was optimized to evaluate the pulmonary arteries.  Maximum intensity projection images were provided for review.    COMPARISON:  Chest radiograph from earlier the same date.    FINDINGS:  Pulmonary vasculature: Satisfactory opacification of the pulmonary arterial system.  Motion artifact significantly limits evaluation of the segmental and subsegmental pulmonary arteries.  There is no large central or lobar pulmonary embolism.    Aorta: Left-sided aortic arch.  No aneurysm and no significant atherosclerosis.    Base of Neck: No significant abnormality.    Thoracic soft tissues: Normal.    Heart: Normal size. No effusion.    Amena/Mediastinum: No pathologic russ enlargement.    Airways: The large airways are patent. No foci of endobronchial filling.    Lungs/Pleura: Clear lungs. No pleural effusion or thickening.    Esophagus: Normal.    Upper Abdomen: No abnormality of the partially imaged upper abdomen.    Bones: No acute fracture. No suspicious lytic or sclerotic lesions.                                       X-Ray Chest 1 View (Final result)  Result time 07/26/22 21:23:42   Procedure changed from X-Ray Chest PA And Lateral     Final result by Olman Saucedo MD (07/26/22 21:23:42)                   Impression:      No convincing radiographic evidence of acute intrathoracic process on this single  view..      Electronically signed by: Olman Saucedo MD  Date:    07/26/2022  Time:    21:23               Narrative:    EXAMINATION:  XR CHEST 1 VIEW    CLINICAL HISTORY:  shortness of breath;    TECHNIQUE:  Single frontal view of the chest was performed.    COMPARISON:  None    FINDINGS:  Cardiac monitoring leads overlie the chest.  Cardiac silhouette appears within normal limits.  No confluent airspace consolidation identified.  No significant volume of pleural fluid or pneumothorax appreciated.  The visualized osseous structures demonstrate mild degenerative changes.

## 2022-08-12 NOTE — PROGRESS NOTES
[facility] - Telemetry Stepdown  Orthopedics  Progress Note    Patient Name: Wilfredo Thomas  MRN: [id number]  Admission Date: 7/26/2022  Hospital Length of Stay: 17 days  Attending Provider: Mervat Johnson MD  Primary Care Provider: Aylin Wayne DO  Follow-up For: Procedure(s) (LRB):  Incision and Drainage - LEFT THIGH. (Left)    Post-Operative Day: 6 Days Post-Op  Subjective:     Principal Problem:Abscess of left thigh    Principal Orthopedic Problem: Left thigh I&D 7/29 and 8/6  Staph species on thigh abscess cultures    Interval History  S/p repeat L thigh I&D 8/6/22. NAEON, patient stable, pain controlled. No acute complaints this morning. Drain with 80cc serosainguinous output yesterday after 290cc day before. Patient feels like he is improving. Cx Staph aureus from I&D. Cr 9.5, nephro following. CK 9 - does not appear to be ongoing myonecrosis.      Review of patient's allergies indicates:  No Known Allergies    Current Facility-Administered Medications   Medication    acetaminophen tablet 1,000 mg    amLODIPine tablet 5 mg    atorvastatin tablet 20 mg    ceFAZolin 2 gram in dextrose 5% 100 mL IVPB (premix)    COVID-19 vac, forrest(Pfizer)(PF) (Pfizer COVID-19) 30 mcg/0.3 mL injection 0.3 mL    dextrose 10% bolus 125 mL    dextrose 10% bolus 250 mL    glucagon (human recombinant) injection 1 mg    glucose chewable tablet 16 g    glucose chewable tablet 24 g    heparin (porcine) injection 5,000 Units    insulin aspart U-100 pen 1-10 Units    insulin aspart U-100 pen 8 Units    insulin detemir U-100 pen 10 Units    morphine injection 3 mg    ondansetron injection 4 mg    oxyCODONE immediate release tablet 5 mg    senna-docusate 8.6-50 mg per tablet 2 tablet    sevelamer carbonate tablet 800 mg    sodium chloride 0.9% flush 10 mL     Objective:     Vital Signs (Most Recent):  Temp: 97 °F (36.1 °C) (08/12/22 0738)  Pulse: 75 (08/12/22 0805)  Resp: 18 (08/12/22 0738)  BP: (!) 156/83 (08/12/22  "0738)  SpO2: 97 % (08/12/22 0738) Vital Signs (24h Range):  Temp:  [97 °F (36.1 °C)-98.8 °F (37.1 °C)] 97 °F (36.1 °C)  Pulse:  [] 75  Resp:  [18-20] 18  SpO2:  [94 %-99 %] 97 %  BP: (135-162)/(67-85) 156/83     Weight: 104.3 kg (229 lb 15 oz)  Height: 5' 7" (170.2 cm)  Body mass index is 36.01 kg/m².      Intake/Output Summary (Last 24 hours) at 8/12/2022 1003  Last data filed at 8/12/2022 0600  Gross per 24 hour   Intake 1080 ml   Output 2455 ml   Net -1375 ml         Ortho/SPM Exam  Left lower extremity  Left thigh dressing cdi  Drain in place, ss output  No erythema of thigh, no signficant swelling  Compartments soft and compressible  Painless ROM of hip and knee  NVI        CBC:   Recent Labs   Lab 08/11/22  0415 08/12/22  0307   WBC 4.40 4.01   HGB 8.2* 8.2*   HCT 25.1* 24.7*    209       CMP:   Recent Labs   Lab 08/11/22  0415 08/12/22  0307    136   K 4.4 4.4    103   CO2 21* 22*   GLU 84 119*   BUN 58* 61*   CREATININE 9.3* 9.5*   CALCIUM 8.8 9.0   PROT 5.6* 5.8*   ALBUMIN 1.8* 1.9*   BILITOT 0.2 0.2   ALKPHOS 70 75   AST 14 13   ALT <5* <5*   ANIONGAP 11 11         All pertinent labs within the past 24 hours have been reviewed.    Significant Imaging: I have reviewed and interpreted all pertinent imaging results/findings.      Assessment/Plan:     * Abscess of left thigh  47M with DM admitted 7/26/22 for DKA, ortho following for left lateral thigh abscess. s/p I&D and drain placement left thigh 7/29/22. Cx +MSSA. Had high drain purulent output after first I&D, was taken back to OR 8/6/22 for repeat I&D. Drain in place, dressings CDI. Nephrology on board for ANGE.     -- ANGE nephrology managing   -- DVT SQH  -- Ancef for MSSA per ID    -- Pain MM  -- Drain output 80cc yesterday, 290cc day before. Will continue to watch drain output  -- Allow drain to continue to decompress. At this point additional surgical intervention would not benefit  -- CRP downtrending 8/9, continue trending " ESR/CRP  -- L wrist aspiration non-concerning for septic arthritis  -- Encourage ambulation    Dispo: pending. May potentially require return to OR. Trend inflammatory markers, drainage, exam          Aron Vieyra MD  Orthopedics  [facility] - Telemetry Stepdown

## 2022-08-12 NOTE — ASSESSMENT & PLAN NOTE
47M with DM admitted 7/26/22 for DKA, ortho following for left lateral thigh abscess. s/p I&D and drain placement left thigh 7/29/22. Cx +MSSA. Had high drain purulent output after first I&D, was taken back to OR 8/6/22 for repeat I&D. Drain in place, dressings CDI. Nephrology on board for ANGE.     -- ANGE nephrology managing   -- DVT SQH  -- Ancef for MSSA per ID    -- Pain MM  -- Drain output 80cc yesterday, 290cc day before. Will continue to watch drain output  -- Allow drain to continue to decompress. At this point additional surgical intervention would not benefit  -- CRP downtrending 8/9, continue trending ESR/CRP  -- L wrist aspiration non-concerning for septic arthritis  -- Encourage ambulation    Dispo: pending. May potentially require return to OR. Trend inflammatory markers, drainage, exam

## 2022-08-12 NOTE — ASSESSMENT & PLAN NOTE
46 yo male with history of DMII and HTN admitted for DKA and left thigh abscess. MRI of left femur was notable for ruptured tensor fascia jose luis mm with associated large hematoma, as well as small area of potential osteonecrosis. Bedside aspiration of the fluid collection was notable for a cell count of >200k (>80%segs), cultures + staph species. He was taken to the OR on 7/29 with orthopedic surgery for surgical I&D. Surgical cultures are positive for MSSA. Per ortho surgery, no concerns for bone involvement. Patient was initially on Vancomycin and Zosyn. Vanc trough supratherapeutic and patient has developed an ANGE. He is currently on Cefazolin.       Recent new onset left wrist pain, today stating improvement. Minimal swelling noted today, ROM improving. Pt states pain decreasing since he had steroid shot. He is s/p wrist arthroscopy with hardware placement in 3/2022. Wife noticed a boil on his wrist. S/p joint aspiration, white count 2050k, 88% segs. No crystals noted. Left wrist aspiration from 8/5/22-  NG. Xray of wrist with no acute concerns.      Pt with ANGE, Cr 9.5 today. Nephrology managing. Autoimmune workup ordered, pending. Bedside ultrasound completed and no hydronephrosis noted per nephrology note on 8/11/22. Emanuel stain negative on 8/3/22. Reordered on 8/11/22, pending.     Otherwise, afebrile. HDS. No leukocytosis. OR cultures 8/6 L thigh now show MSSA.  Leg pain improved.  ANGE continues to worsened. 2D echo without mention of vegetations.  Per ortho recent note, potential plans to return to OR tomorrow, trending inflammatory markers, drainage, and pt exam. Stable non septic        Recommendations:   · Continue Cefazolin 2g q24 (renally dosed per pharmacy) for MSSA SSTI.   · Potential OR tomorrow for repeat washout, will follow ortho plans   · Nephrology ANGE management. Will follow plan regarding further w/u.   · Repeat Emanuel Stain pending.   · Anticipate SSTI course from time of last washout  8/6  · Patient and plan reviewed with ID staff, Dr. Samson.   · ID will follow given complicated case and monitoring of RF and need for dose adjustments.

## 2022-08-12 NOTE — PROGRESS NOTES
[facility] - Telemetry Stepdown  Infectious Disease  Progress Note    Patient Name: Wilfredo Thomas  MRN: [id number]  Admission Date: 7/26/2022  Length of Stay: 17 days  Attending Physician: Mervat Johnson MD  Primary Care Provider: Aylin Wayne DO    Isolation Status: No active isolations  Assessment/Plan:      * Abscess of left thigh     46 yo male with history of DMII and HTN admitted for DKA and left thigh abscess. MRI of left femur was notable for ruptured tensor fascia jose luis mm with associated large hematoma, as well as small area of potential osteonecrosis. Bedside aspiration of the fluid collection was notable for a cell count of >200k (>80%segs), cultures + staph species. He was taken to the OR on 7/29 with orthopedic surgery for surgical I&D. Surgical cultures are positive for MSSA. Per ortho surgery, no concerns for bone involvement. Patient was initially on Vancomycin and Zosyn. Vanc trough supratherapeutic and patient has developed an ANGE. He is currently on Cefazolin.       Recent new onset left wrist pain, today stating improvement. Minimal swelling noted today, ROM improving. Pt states pain decreasing since he had steroid shot. He is s/p wrist arthroscopy with hardware placement in 3/2022. Wife noticed a boil on his wrist. S/p joint aspiration, white count 2050k, 88% segs. No crystals noted. Left wrist aspiration from 8/5/22-  NG. Xray of wrist with no acute concerns.      Pt with ANGE, Cr 9.5 today. Nephrology managing. Autoimmune workup ordered, pending. Bedside ultrasound completed and no hydronephrosis noted per nephrology note on 8/11/22. Emanuel stain negative on 8/3/22. Reordered on 8/11/22, pending.     Otherwise, afebrile. HDS. No leukocytosis. OR cultures 8/6 L thigh now show MSSA.  Leg pain improved.  ANGE continues to worsened. 2D echo without mention of vegetations.  Per ortho recent note, potential plans to return to OR tomorrow, trending inflammatory markers, drainage, and pt exam. Stable  non septic        Recommendations:   · Continue Cefazolin 2g q24 (renally dosed per pharmacy) for MSSA SSTI.   · Potential OR tomorrow for repeat washout, will follow ortho plans   · Nephrology ANGE management. Will follow plan regarding further w/u.   · Repeat Emanuel Stain pending.   · Anticipate SSTI course from time of last washout 8/6  · Patient and plan reviewed with ID staff, Dr. Samson.   · ID will follow given complicated case and monitoring of RF and need for dose adjustments.                 Thank you for your consult. I will follow-up with patient. Please contact us if you have any additional questions.    Steven Caicedo NP  Infectious Disease  [facility] - Telemetry Stepdown    Subjective:     Principal Problem:Abscess of left thigh    HPI: Mr. Thomas is a 47 year old male with a PMH of DM2 (poorly controlled), HTN, GERD, HLD, obesity who [place] [facility] cc of SOBx1 day. Pt was also reporting significant pain and swelling along his left lateral proximal thigh. Pt reports this pain started over the last 2 weeks, which worsened. He initially was seen outpatient in which he was diagnoised with a mm strain and given 800 mg ibuprofen. From there he went to the ED on 7/22 d/t worsening pain. He was treated with prednisone/morphine shot, and well as a prednisone oral pack. Pt denied recent injury, with the exception of soreness to his left lower shin following bumping into a cabinet. He denies open wounds/abrasions from this injury but states the soreness started in his left thigh following the improvement of discomfort from his left shin. Pt reports having been treated for a boil on his central/right buttocks in the end of June. 2022. Pt states he was seen in Urgent Care in [place] and the boil was drained. He was given an oral abx in which he completed.     To note, pt reports having a recent left wrist fracture, ligament rupture following an accident with his riding lawnmower. States he  underwent surgery and 2 screws were placed on 3/31/22 at [facility]. Denies associated infection/complications. Denies pain in the site currently.    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 Denies having pets. Denies recent fishing/hunting. Denies hx of immunocompromising conditions.     To note, pt was found to be in DKA with blood sugars >400, treated by critical team, which has since resolved. Pt was recently started on insulin by his PCP, which he had not started. Pt latest A1C 10.     CT of left thigh notable for fluid collections suspicious for hematoma/seroma. MRI of left femur was notable for ruptured tensor fascia jose luis mm with associated large hematoma, as well as small area of potential osteonecrosis. Bedside aspiration of the fluid collection was notable for a cell count of >200k >80%segs, cultures + staph spp. Pt was taken for I&D with Dr. Graff with ortho surgery on 7/29, abscesses were noted and washed out, cultures collected + staph spp. Per pt, states that Dr. Graff is planning to return to surgery tomorrow, 8/1/22 for repeat washout.     Pt was intially with leukocytosis, but has since down trended to 11k. Blood cultures from 7/26 NGTD.     Pt is currently on zosyn, vancomycin, and clindamycin. ID was consulted for abx recs regarding left TFL infection.            Interval History:   No AEON.   Worsening ANGE, reporting urination, no plans for HD at this time per nephrology. Cr to 9.5 today.   L thigh improving  All thigh cultures with MSSA  The patient denies any recent fever, chills, or sweats.  Reports left wrist pain improving. Swelling decreased. ROM increased. TTP.        Review of Systems   Constitutional:  Negative for chills, diaphoresis, fatigue and fever.   Respiratory:  Negative for cough and shortness of breath.    Cardiovascular:  Positive for leg swelling (upper left thigh). Negative for chest pain and palpitations.    Gastrointestinal:  Negative for abdominal distention and abdominal pain.   Genitourinary:  Negative for difficulty urinating and dysuria.   Musculoskeletal:  Positive for arthralgias (left wrist) and myalgias (upper left thigh). Negative for joint swelling.   Skin:  Positive for wound (upper left thigh). Negative for color change and rash.   Allergic/Immunologic: Negative for immunocompromised state.   Neurological:  Negative for dizziness, tremors, weakness, numbness and headaches.   Psychiatric/Behavioral:  Negative for agitation and decreased concentration. The patient is not nervous/anxious.    Objective:     Vital Signs (Most Recent):  Temp: 97 °F (36.1 °C) (08/12/22 0738)  Pulse: 75 (08/12/22 0805)  Resp: 18 (08/12/22 0738)  BP: (!) 156/83 (08/12/22 0738)  SpO2: 97 % (08/12/22 0738)   Vital Signs (24h Range):  Temp:  [97 °F (36.1 °C)-98.8 °F (37.1 °C)] 97 °F (36.1 °C)  Pulse:  [] 75  Resp:  [18-20] 18  SpO2:  [94 %-99 %] 97 %  BP: (135-162)/(67-85) 156/83     Weight: 104.3 kg (229 lb 15 oz)  Body mass index is 36.01 kg/m².    Estimated Creatinine Clearance: 10.8 mL/min (A) (based on SCr of 9.5 mg/dL (H)).    Physical Exam  Vitals and nursing note reviewed.   Constitutional:       General: He is not in acute distress.     Appearance: Normal appearance. He is well-developed. He is obese. He is not ill-appearing, toxic-appearing or diaphoretic.   HENT:      Head: Normocephalic and atraumatic.      Nose: Nose normal.      Mouth/Throat:      Dentition: Does not have dentures.      Pharynx: No oropharyngeal exudate.   Eyes:      General: No scleral icterus.     Conjunctiva/sclera: Conjunctivae normal.   Cardiovascular:      Rate and Rhythm: Normal rate and regular rhythm.   Pulmonary:      Effort: Pulmonary effort is normal. No respiratory distress.      Breath sounds: Normal breath sounds.   Abdominal:      General: There is no distension.      Palpations: Abdomen is soft.      Tenderness: There is no  abdominal tenderness.   Musculoskeletal:         General: No swelling.   Skin:     General: Skin is warm and dry.      Findings: No erythema or rash.      Comments: Incision to left thigh. Dressed, CDI. Surgical drain with dark bloody output.   Neurological:      General: No focal deficit present.      Mental Status: He is alert and oriented to person, place, and time. Mental status is at baseline.      Motor: No weakness.      Gait: Gait normal.   Psychiatric:         Mood and Affect: Mood normal.         Behavior: Behavior normal.         Thought Content: Thought content normal.         Judgment: Judgment normal.       Significant Labs: CBC:   Recent Labs   Lab 08/11/22 0415 08/12/22 0307   WBC 4.40 4.01   HGB 8.2* 8.2*   HCT 25.1* 24.7*    209       CMP:   Recent Labs   Lab 08/11/22 0415 08/12/22 0307    136   K 4.4 4.4    103   CO2 21* 22*   GLU 84 119*   BUN 58* 61*   CREATININE 9.3* 9.5*   CALCIUM 8.8 9.0   PROT 5.6* 5.8*   ALBUMIN 1.8* 1.9*   BILITOT 0.2 0.2   ALKPHOS 70 75   AST 14 13   ALT <5* <5*   ANIONGAP 11 11       Microbiology Results (last 7 days)       Procedure Component Value Units Date/Time    Aerobic culture [[id number]]  (Abnormal) Collected: 08/06/22 1153    Order Status: Completed Specimen: Wound from Hip, Left Updated: 08/10/22 1214     Aerobic Bacterial Culture STAPHYLOCOCCUS AUREUS  Moderate  For susceptibility see order #[id number]      Narrative:      #1 Left hip fluid    Aerobic culture [[id number]]  (Abnormal)  (Susceptibility) Collected: 08/06/22 1153    Order Status: Completed Specimen: Wound from Hip, Left Updated: 08/10/22 1214     Aerobic Bacterial Culture STAPHYLOCOCCUS AUREUS  Few      Narrative:      #2 Left hip fluid    Culture, Anaerobe [[id number]] Collected: 08/06/22 1153    Order Status: Completed Specimen: Body Fluid from Hip, Left Updated: 08/10/22 1035     Anaerobic Culture No anaerobes isolated    Narrative:      #2 Left hip fluid    Culture,  Anaerobe [[id number]] Collected: 08/06/22 1153    Order Status: Completed Specimen: Body Fluid from Hip, Left Updated: 08/10/22 1034     Anaerobic Culture No anaerobes isolated    Narrative:      #1 Left hip fluid    Blood culture [[id number]] Collected: 08/05/22 0850    Order Status: Completed Specimen: Blood from Antecubital, Right Arm Updated: 08/10/22 1012     Blood Culture, Routine No growth after 5 days.    Blood culture [[id number]] Collected: 08/05/22 0852    Order Status: Completed Specimen: Blood from Peripheral, Right Hand Updated: 08/10/22 1012     Blood Culture, Routine No growth after 5 days.    Fungus culture [[id number]] Collected: 08/05/22 0817    Order Status: Completed Specimen: Joint Fluid from Wrist, Left Updated: 08/09/22 1307     Fungus (Mycology) Culture Culture in progress    AFB Culture & Smear [[id number]] Collected: 08/05/22 0817    Order Status: Completed Specimen: Joint Fluid from Wrist, Left Updated: 08/08/22 1529     AFB Culture & Smear Culture in progress     AFB CULTURE STAIN No acid fast bacilli seen.    Aerobic culture [[id number]] Collected: 08/05/22 0817    Order Status: Completed Specimen: Bone from Wrist, Left Updated: 08/08/22 0540     Aerobic Bacterial Culture No growth    Gram stain [[id number]] Collected: 08/05/22 0817    Order Status: Completed Specimen: Joint Fluid from Wrist, Left Updated: 08/05/22 1038     Gram Stain Result No WBC's      No organisms seen          Recent Lab Results  (Last 5 results in the past 24 hours)        08/12/22  0714   08/12/22  0307   08/11/22  2110   08/11/22  2008   08/11/22  1743        Albumin   1.9             Alkaline Phosphatase   75             ALT   <5             Anion Gap   11             AST   13             Baso #   0.04             Basophil %   1.0             BILIRUBIN TOTAL   0.2  Comment: For infants and newborns, interpretation of results should be based  on gestational age, weight and in agreement with  clinical  observations.    Premature Infant recommended reference ranges:  Up to 24 hours.............<8.0 mg/dL  Up to 48 hours............<12.0 mg/dL  3-5 days..................<15.0 mg/dL  6-29 days.................<15.0 mg/dL               BUN   61             Calcium   9.0             Chloride   103             CO2   22             CPK         9       Creatinine   9.5             Differential Method   Automated             eGFR   6.2             Eos #   0.4             Eosinophil %   10.7             Glucose   119             Gran # (ANC)   2.2             Gran %   54.7             Hematocrit   24.7             Hemoglobin   8.2             Immature Grans (Abs)   0.09  Comment: Mild elevation in immature granulocytes is non specific and   can be seen in a variety of conditions including stress response,   acute inflammation, trauma and pregnancy. Correlation with other   laboratory and clinical findings is essential.               Immature Granulocytes   2.2             Lymph #   0.9             Lymph %   22.7             Magnesium   2.1             MCH   29.9             MCHC   33.2             MCV   90             Mono #   0.4             Mono %   8.7             MPV   9.6             nRBC   0             Phosphorus   7.2             Platelets   209             POCT Glucose 143     197   213         Potassium   4.4             PROTEIN TOTAL   5.8             RBC   2.74             RDW   13.0             Sodium   136             WBC   4.01                                    Significant Imaging:     Imaging Results              CT Thigh With Contrast Left (Final result)  Result time 07/27/22 01:13:09      Final result by Jori Hopkins DO (07/27/22 01:13:09)                   Impression:      Large heterogeneous fluid collection in the anterolateral left proximal thigh soft tissues, concerning for a soft tissue hematoma or Preston-Melissa lesion.  A neoplastic process is not entirely excluded.  Recommend close  interval follow-up to assess for stability/resolution.      Electronically signed by: Jori Hopkins  Date:    07/27/2022  Time:    01:13               Narrative:    EXAMINATION:  CT THIGH WITH CONTRAST LEFT    CLINICAL HISTORY:  Soft tissue mass, thigh, deep;    TECHNIQUE:  Axial CT images of the left thigh with sagittal and coronal reformats after the intravenous administration of 50 mL Omnipaque 350.    COMPARISON:  None.    FINDINGS:  Bone: Bone mineralization is normal.  There is no evidence of an acute fracture or dislocation.  Alignment is normal.    Soft tissues: There is a large heterogeneous fluid collection in the left anterolateral proximal thigh measuring approximately 16 x 7 x 6 cm.  The collection appears to be centered within the subcutaneous tissues or superficial fascia and abuts the gluteus musculature, the sartorius muscle, the rectus femoris, and the vastus lateralis.  There is soft tissue edema in the surrounding subcutaneous tissues.  Muscle bulk is normal.    Articulations: The left femoroacetabular joint is unremarkable.  The pubic symphysis is unremarkable.  The left knee is unremarkable.  No large joint effusion or significant cartilage loss.    Miscellaneous: Neurovascular structures are grossly intact.  There is moderate calcified atherosclerosis of the left femoral and popliteal arteries.                                       CTA Chest Non-Coronary (PE Study) (Final result)  Result time 07/26/22 21:36:01      Final result by Jori Hopkins DO (07/26/22 21:36:01)                   Impression:      No large central or lobar pulmonary embolism.      Electronically signed by: Jori Hopkins  Date:    07/26/2022  Time:    21:36               Narrative:    EXAMINATION:  CTA CHEST NON CORONARY    CLINICAL HISTORY:  Pulmonary embolism (PE) suspected, high prob;    TECHNIQUE:  Low dose axial images, sagittal and coronal reformations were obtained from the thoracic inlet to the lung bases  following the IV administration of 100 mL of Omnipaque 350.  Contrast timing was optimized to evaluate the pulmonary arteries.  Maximum intensity projection images were provided for review.    COMPARISON:  Chest radiograph from earlier the same date.    FINDINGS:  Pulmonary vasculature: Satisfactory opacification of the pulmonary arterial system.  Motion artifact significantly limits evaluation of the segmental and subsegmental pulmonary arteries.  There is no large central or lobar pulmonary embolism.    Aorta: Left-sided aortic arch.  No aneurysm and no significant atherosclerosis.    Base of Neck: No significant abnormality.    Thoracic soft tissues: Normal.    Heart: Normal size. No effusion.    Amena/Mediastinum: No pathologic russ enlargement.    Airways: The large airways are patent. No foci of endobronchial filling.    Lungs/Pleura: Clear lungs. No pleural effusion or thickening.    Esophagus: Normal.    Upper Abdomen: No abnormality of the partially imaged upper abdomen.    Bones: No acute fracture. No suspicious lytic or sclerotic lesions.                                       X-Ray Chest 1 View (Final result)  Result time 07/26/22 21:23:42   Procedure changed from X-Ray Chest PA And Lateral     Final result by Olman Saucedo MD (07/26/22 21:23:42)                   Impression:      No convincing radiographic evidence of acute intrathoracic process on this single view..      Electronically signed by: Olman Saucedo MD  Date:    07/26/2022  Time:    21:23               Narrative:    EXAMINATION:  XR CHEST 1 VIEW    CLINICAL HISTORY:  shortness of breath;    TECHNIQUE:  Single frontal view of the chest was performed.    COMPARISON:  None    FINDINGS:  Cardiac monitoring leads overlie the chest.  Cardiac silhouette appears within normal limits.  No confluent airspace consolidation identified.  No significant volume of pleural fluid or pneumothorax appreciated.  The visualized osseous structures  demonstrate mild degenerative changes.

## 2022-08-12 NOTE — PROGRESS NOTES
"[facility] - Tele[facility] Step[facility]  Nephrology  Progress Note    Patient Name: Wilfredo Thomas  MRN: [id number]  Admission Date: 7/26/2022  Hospital Length of Stay: 17 days  Attending Provider: Mervat Johnson MD   Primary Care Physician: Aylin Wayne DO  Principal Problem:Abscess of left thigh    Subjective:     HPI: 46 y/o M hx of HTN, IDDM2, GERD obesity, HLD presented to the ED with complaint of 1 day of worsening SOB. Wife present at bedside. Patient states that he noticed yesterday he was feeling some shortness of breath and couldn't seem to catch his breath. His wife went to check on him this morning and noticed that he was breathing "fast and heavy" and he sounded like he was slurring his words. Wife was concerned about a stroke, so he brought him to the ED for evaluation.     Patient also reporting significant pain and swelling along his left lateral proximal thigh. He states he has noticed worsening pain over the past 2 weeks. He denies any acute trauma to the area. He was seen by ortho last week and was told to take ibu-profen for a muscle strain. This did not help his pain, so he presented again on 7/22 and was given a prednisone injection into his thigh. This also did not help his pain, and now the pain and swelling have advanced to the point that he has difficulty with ambulation. Patient denies fever, chills, nausea, vomiting.      Of note, patient has hx of poorly controlled diabetes. He was recently started on insulin by his PCP, but he has not taken any insulin since being prescribed it.      In the ED, patient hypertensive and tachycardic to the 130s. Tachypneic with RR in the 40s. On CBC, WBC 26.9, On CMP patient hyperkalemic to 5.5, CorNa 136, Bicarb 5. Cr elevated to 1.9 from BL 0.83. UA, BHB pending at time of admission. CTA without evidence of pulmonary embolism. Critical Care Medicine consulted given severe acidosis.               Overview/Hospital Course:     Patient admitted to the MICU for " management of severe DKA. Metabolic acidosis improving on insulin drip. He also has had left thigh pain for 1-2 months. There is a large mass on his left thigh that is tender to palpation, CT revealed hematoma vs soft tissue growth. General surgery consulted and recommended IR consult. IR consulted, no indication for tap. 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 Ortho consulted and performed bedside aspiration of possible abscess which revealed >200K cells >80% segs. Will hold off on antibiotics for now per ortho recs, ortho is taking patient to the OR for irrigation of the thigh, will start antibiotics afterwards.         7/29 Metabolic acidosis slowly improving. Ortho taking patient to the OR for irrigation of the left thigh, will start antibiotics afterwards.   7/30 Transfer to hospital medicine . S/p I&D  of left thigh Abscess on7/29/22 - MRI - uptured tensor fascia jose luis (TFL) muscle with associated large hematoma. gram stain -Moderate Gram positive cocci in clusters .cultures pending.  continue Vancomycin, clindamycin and Zosyn . Bicarb 15. K replaced . off insulin drip on SQ insulin.  7/31 ID and endocrine consulted.  abscess with Calcium pyrophosphate crystals with unknown significance potassium and P replaced. Bicarbonate WNL . aerobic culture 7/28 STAPHYLOCOCCUS SPECIES . Glucose in 300s .PRN imodium  for diarrhea. PT/OT consulted - WBAT. Surgical drain with purulent output  8/1 PICC team consulted for IV access. vanc trough at 11.4. monitor for vanomycin toxicity. possible OR tomorrow for washout  may need bone biopsy to r/o osteomyelitis.Discontinued clindamycin.   8/2 glucose in 300s.  Increased Levemir  to 14 units BID and Aspart  10 units TIDWM. K replaced         Nephrology consulyed for ANGE         Interval History: no acute events overnight. Scr stable 9.5 today today from 9.3 yesterday . had 2400cc of urineOPT in last 24 hours nrg  900 cc     Review of patient's allergies indicates:  No Known Allergies  Current Facility-Administered Medications   Medication Frequency    acetaminophen tablet 1,000 mg Q8H PRN    amLODIPine tablet 5 mg Daily    atorvastatin tablet 20 mg Daily    ceFAZolin 2 gram in dextrose 5% 100 mL IVPB (premix) Daily    COVID-19 vac, forrest(Pfizer)(PF) (Pfizer COVID-19) 30 mcg/0.3 mL injection 0.3 mL vaccine x 1 dose    dextrose 10% bolus 125 mL PRN    dextrose 10% bolus 250 mL PRN    glucagon (human recombinant) injection 1 mg PRN    glucose chewable tablet 16 g PRN    glucose chewable tablet 24 g PRN    heparin (porcine) injection 5,000 Units Q8H    insulin aspart U-100 pen 1-10 Units QID (AC + HS) PRN    insulin aspart U-100 pen 8 Units TIDWM    insulin detemir U-100 pen 10 Units BID    morphine injection 3 mg Q6H PRN    ondansetron injection 4 mg Q6H PRN    oxyCODONE immediate release tablet 5 mg Q4H PRN    senna-docusate 8.6-50 mg per tablet 2 tablet Daily    sevelamer carbonate tablet 800 mg TID WM    sodium chloride 0.9% flush 10 mL PRN       Objective:     Vital Signs (Most Recent):  Temp: 98.8 °F (37.1 °C) (08/12/22 0343)  Pulse: 87 (08/12/22 0343)  Resp: 19 (08/12/22 0639)  BP: 137/67 (08/12/22 0343)  SpO2: 95 % (08/12/22 0546)  O2 Device (Oxygen Therapy): room air (08/12/22 0343) Vital Signs (24h Range):  Temp:  [98 °F (36.7 °C)-98.8 °F (37.1 °C)] 98.8 °F (37.1 °C)  Pulse:  [] 87  Resp:  [18-20] 19  SpO2:  [94 %-99 %] 95 %  BP: (135-162)/(67-87) 137/67     Weight: 104.3 kg (229 lb 15 oz) (08/11/22 1000)  Body mass index is 36.01 kg/m².  Body surface area is 2.22 meters squared.    I/O last 3 completed shifts:  In: 1620 [P.O.:1600; I.V.:20]  Out: 3840 [Urine:3700; Drains:140]    Physical Exam  Vitals reviewed.   Constitutional:       Appearance: Normal appearance.   HENT:      Head: Normocephalic and atraumatic.      Right Ear: External ear normal.      Left Ear: External ear normal.       Nose: Nose normal.      Mouth/Throat:      Pharynx: Oropharynx is clear.   Eyes:      Extraocular Movements: Extraocular movements intact.      Conjunctiva/sclera: Conjunctivae normal.   Cardiovascular:      Rate and Rhythm: Normal rate and regular rhythm.      Pulses: Normal pulses.   Pulmonary:      Effort: Pulmonary effort is normal.      Breath sounds: Normal breath sounds.   Abdominal:      General: Abdomen is flat. There is no distension.   Musculoskeletal:         General: Normal range of motion.      Cervical back: Normal range of motion.      Right lower leg: Edema present.      Left lower leg: Edema present.   Skin:     General: Skin is warm and dry.   Neurological:      General: No focal deficit present.      Mental Status: He is alert and oriented to person, place, and time.   Psychiatric:         Mood and Affect: Mood normal.         Behavior: Behavior normal.       Significant Labs:  All labs within the past 24 hours have been reviewed.       Assessment/Plan:     * Abscess of left thigh  Per primary    ANGE (acute kidney injury)  ANGE non oliguric Scr slightly worse  today 9.5  from 9.3 yesterday . had 2400 cc of urineOPT s/p lasix 200 IV times one   Likely vancomycin induced ANGE (ATI/ATN) in the setting of trough of 40 and also was on zosyn which will increase risk of vanco induced ANGE.   Strict Is and Os   Renal US normal   Euvolemic     Plan:   Will continue to monitor RFP daily   No indication for HD at this time.   Bedside US done on 8/11/22 no hydro   Will follow on autoimmune work up but the we still think the most likely cause of his ANGE is vancomycin                           Angie Eugene MD  Nephrology  [facility] - Tele[facility]

## 2022-08-12 NOTE — PROGRESS NOTES
"      Josafat Chun - Telemetry [facility] Medicine  Telemedicine Progress Note    Patient Name: Wilfredo Thomas  MRN: [id number]  Patient Class: IP- Inpatient   Admission Date: 7/26/2022  Length of Stay: 17 days  Attending Physician: Mervat Johnson MD  Primary Care Provider: Aylin Wayne DO          Subjective:     Principal Problem:Abscess of left thigh        HPI:  46 y/o M hx of HTN, IDDM2, GERD obesity, HLD presented to the ED with complaint of 1 day of worsening SOB. Wife present at bedside. Patient states that he noticed yesterday he was feeling some shortness of breath and couldn't seem to catch his breath. His wife went to check on him this morning and noticed that he was breathing "fast and heavy" and he sounded like he was slurring his words. Wife was concerned about a stroke, so he brought him to the ED for evaluation.     Patient also reporting significant pain and swelling along his left lateral proximal thigh. He states he has noticed worsening pain over the past 2 weeks. He denies any acute trauma to the area. He was seen by ortho last week and was told to take ibu-profen for a muscle strain. This did not help his pain, so he presented again on 7/22 and was given a prednisone injection into his thigh. This also did not help his pain, and now the pain and swelling have advanced to the point that he has difficulty with ambulation. Patient denies fever, chills, nausea, vomiting.      Of note, patient has hx of poorly controlled diabetes. He was recently started on insulin by his PCP, but he has not taken any insulin since being prescribed it.      In the ED, patient hypertensive and tachycardic to the 130s. Tachypneic with RR in the 40s. On CBC, WBC 26.9, On CMP patient hyperkalemic to 5.5, CorNa 136, Bicarb 5. Cr elevated to 1.9 from BL 0.83. UA, BHB pending at time of admission. CTA without evidence of pulmonary embolism. Critical Care Medicine consulted given severe acidosis.       "       Overview/Hospital Course:    Patient admitted to the MICU for management of severe DKA. Metabolic acidosis improving on insulin drip. He also has had left thigh pain for 1-2 months. There is a large mass on his left thigh that is tender to palpation, CT revealed hematoma vs soft tissue growth. General surgery consulted and recommended IR consult. IR consulted, no indication for tap.   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 Ortho consulted and performed bedside aspiration of possible abscess which revealed >200K cells >80% segs. Will hold off on antibiotics for now per ortho recs, ortho is taking patient to the OR for irrigation of the thigh, will start antibiotics afterwards.         7/29 Metabolic acidosis slowly improving. Ortho taking patient to the OR for irrigation of the left thigh, will start antibiotics afterwards.   7/30 Transfer to hospital medicine . S/p I&D  of left thigh Abscess on7/29/22 - MRI - uptured tensor fascia jose luis (TFL) muscle with associated large hematoma. gram stain -Moderate Gram positive cocci in clusters .cultures pending.  continue Vancomycin, clindamycin and Zosyn . Bicarb 15. K replaced . off insulin drip on SQ insulin.  7/31 ID and endocrine consulted.  abscess with Calcium pyrophosphate crystals with unknown significance potassium and P replaced. Bicarbonate WNL . aerobic culture 7/28 STAPHYLOCOCCUS SPECIES . Glucose in 300s .PRN imodium  for diarrhea. PT/OT consulted - WBAT. Surgical drain with purulent output  8/1 PICC team consulted for IV access. vanc trough at 11.4. monitor for vanomycin toxicity. possible OR tomorrow for washout  may need bone biopsy to r/o osteomyelitis.Discontinued clindamycin.   8/2 glucose in 300s.  Increased Levemir  to 14 units BID and Aspart  10 units TIDWM. K replaced   8/3 ANGE with cr 0.7--> 2.7. likely vanc induced ATN with levels 40. urine studies ordered renal sonogram WNL   nephrology consulted. started on NS 100ml/h x 10h and follow up renal panel.Zosyn and  vancomycin discontinued. Strict IO monitor. condom catheter . switched to Ancef for MSSA on culture  Interval History:  8/4- MSSA- see below. /85 VSS. On room air, eating, BM on 8/2, good UO. Appreciate ortho recs. On Cephazolin, detim 14 bid and aspart 12 ac.  8/5-Cr still rising, cr =6.  Wrist still hurting. Ortho to tap it to eval for infection and gout today. /90   Pulse 85  AF, no other signs of infection.  Will discuss w Nephrology- received NS x one liter yesterday. Will repeat today. Suspect auto-diuresis.   8/6: washout with ortho today. Cr continues to rise (7.1), nephrology following  8/7: Cr 7.4, phos increasing; tolerated surgery well yesterday, intra-op cultures pending  8/8: Cr increased to 8.1, hyperphosphatemia again noted; continues to make urine, no acute indication for HD per nephrology. Intra-op cultures with S.aureus, continued cefazolin pending final C&S results.        This encounter was provided through telemedicine.  Patient was transferred to the telemedicine service on:  08/9/2022   The [facility] 7/26/2022  8:24 PM.  Present with the patient at the time of the telemed/virtual assessment: Telepresenter    Interval History/Overnight Events:   Clinical record since admit reviewed.  Patient able to provide history.    Drain output is decreased - LE edema decreased after lasix;  Left thigh pain continues to be controlled with current pain regimen and he is ambulatory; Ortho considering repeat I&D    Review of Systems   Constitutional:  Positive for fatigue. Negative for activity change and fever.   Respiratory:  Negative for cough and shortness of breath.    Cardiovascular:  Positive for leg swelling. Negative for chest pain.   Gastrointestinal:  Negative for diarrhea and vomiting.   Musculoskeletal:  Positive for myalgias.      Inpatient Medications:  Scheduled  Meds:   amLODIPine  5 mg Oral Daily    atorvastatin  20 mg Oral Daily    ceFAZolin (ANCEF) IVPB  2 g Intravenous Daily    heparin (porcine)  5,000 Units Subcutaneous Q8H    insulin aspart U-100  8 Units Subcutaneous TIDWM    insulin detemir U-100  10 Units Subcutaneous BID    senna-docusate 8.6-50 mg  2 tablet Oral Daily    sevelamer carbonate  800 mg Oral TID WM     Continuous Infusions:  PRN Meds:.acetaminophen, sars-cov-2 (covid-19), dextrose 10%, dextrose 10%, glucagon (human recombinant), glucose, glucose, insulin aspart U-100, morphine, ondansetron, oxyCODONE, sodium chloride 0.9%      Objective:     Temp:  [97 °F (36.1 °C)-98.8 °F (37.1 °C)] 97 °F (36.1 °C)  Pulse:  [] 75  Resp:  [18-20] 18  SpO2:  [94 %-99 %] 97 %  BP: (135-162)/(67-85) 156/83      Intake/Output Summary (Last 24 hours) at 8/12/2022 1025  Last data filed at 8/12/2022 0600  Gross per 24 hour   Intake 1080 ml   Output 2455 ml   Net -1375 ml          Body mass index is 36.01 kg/m².    Physical Exam  Vitals and nursing note reviewed.   Constitutional:       General: He is not in acute distress.     Appearance: Normal appearance. He is normal weight. He is not ill-appearing.   HENT:      Head: Normocephalic and atraumatic.      Right Ear: Hearing normal.      Left Ear: Hearing normal.      Nose: Nose normal.   Eyes:      General: No scleral icterus.        Right eye: No discharge.         Left eye: No discharge.      Extraocular Movements: Extraocular movements intact.   Cardiovascular:      Rate and Rhythm: Normal rate.   Pulmonary:      Effort: Pulmonary effort is normal. No accessory muscle usage or respiratory distress.   Musculoskeletal:      Right lower leg: Edema present.      Left lower leg: Edema present.      Comments: Dressing to left thigh with drain (serosanguineous drainage)   Neurological:      General: No focal deficit present.      Mental Status: He is alert and oriented to person, place, and time.      Cranial  Nerves: No cranial nerve deficit.      Motor: No weakness.   Psychiatric:         Attention and Perception: Attention normal.         Mood and Affect: Mood normal.         Speech: Speech normal.         Behavior: Behavior is cooperative.        Labs:  Recent Results (from the past 24 hour(s))   POCT glucose    Collection Time: 08/11/22 11:41 AM   Result Value Ref Range    POCT Glucose 198 (H) 70 - 110 mg/dL   C3 complement    Collection Time: 08/11/22 12:43 PM   Result Value Ref Range    Complement (C-3) 125 50 - 180 mg/dL   C4 complement    Collection Time: 08/11/22 12:43 PM   Result Value Ref Range    Complement (C-4) 27 11 - 44 mg/dL   POCT glucose    Collection Time: 08/11/22  4:09 PM   Result Value Ref Range    POCT Glucose 167 (H) 70 - 110 mg/dL   CK    Collection Time: 08/11/22  5:43 PM   Result Value Ref Range    CPK 9 (L) 20 - 200 U/L   POCT glucose    Collection Time: 08/11/22  8:08 PM   Result Value Ref Range    POCT Glucose 213 (H) 70 - 110 mg/dL   POCT glucose    Collection Time: 08/11/22  9:10 PM   Result Value Ref Range    POCT Glucose 197 (H) 70 - 110 mg/dL   Phosphorus    Collection Time: 08/12/22  3:07 AM   Result Value Ref Range    Phosphorus 7.2 (H) 2.7 - 4.5 mg/dL   Magnesium    Collection Time: 08/12/22  3:07 AM   Result Value Ref Range    Magnesium 2.1 1.6 - 2.6 mg/dL   Comprehensive Metabolic Panel    Collection Time: 08/12/22  3:07 AM   Result Value Ref Range    Sodium 136 136 - 145 mmol/L    Potassium 4.4 3.5 - 5.1 mmol/L    Chloride 103 95 - 110 mmol/L    CO2 22 (L) 23 - 29 mmol/L    Glucose 119 (H) 70 - 110 mg/dL    BUN 61 (H) 6 - 20 mg/dL    Creatinine 9.5 (H) 0.5 - 1.4 mg/dL    Calcium 9.0 8.7 - 10.5 mg/dL    Total Protein 5.8 (L) 6.0 - 8.4 g/dL    Albumin 1.9 (L) 3.5 - 5.2 g/dL    Total Bilirubin 0.2 0.1 - 1.0 mg/dL    Alkaline Phosphatase 75 55 - 135 U/L    AST 13 10 - 40 U/L    ALT <5 (L) 10 - 44 U/L    Anion Gap 11 8 - 16 mmol/L    eGFR 6.2 (A) >60 mL/min/1.73 m^2   CBC Auto  Differential    Collection Time: 08/12/22  3:07 AM   Result Value Ref Range    WBC 4.01 3.90 - 12.70 K/uL    RBC 2.74 (L) 4.60 - 6.20 M/uL    Hemoglobin 8.2 (L) 14.0 - 18.0 g/dL    Hematocrit 24.7 (L) 40.0 - 54.0 %    MCV 90 82 - 98 fL    MCH 29.9 27.0 - 31.0 pg    MCHC 33.2 32.0 - 36.0 g/dL    RDW 13.0 11.5 - 14.5 %    Platelets 209 150 - 450 K/uL    MPV 9.6 9.2 - 12.9 fL    Immature Granulocytes 2.2 (H) 0.0 - 0.5 %    Gran # (ANC) 2.2 1.8 - 7.7 K/uL    Immature Grans (Abs) 0.09 (H) 0.00 - 0.04 K/uL    Lymph # 0.9 (L) 1.0 - 4.8 K/uL    Mono # 0.4 0.3 - 1.0 K/uL    Eos # 0.4 0.0 - 0.5 K/uL    Baso # 0.04 0.00 - 0.20 K/uL    nRBC 0 0 /100 WBC    Gran % 54.7 38.0 - 73.0 %    Lymph % 22.7 18.0 - 48.0 %    Mono % 8.7 4.0 - 15.0 %    Eosinophil % 10.7 (H) 0.0 - 8.0 %    Basophil % 1.0 0.0 - 1.9 %    Differential Method Automated    POCT glucose    Collection Time: 08/12/22  7:14 AM   Result Value Ref Range    POCT Glucose 143 (H) 70 - 110 mg/dL        Lab Results   Component Value Date    [id number] Negative 08/06/2022       Recent Labs   Lab 08/10/22  0246 08/11/22  0415 08/12/22  0307   WBC 4.70 4.40 4.01   LYMPH 18.3  0.9* 20.5  0.9* 22.7  0.9*   HGB 8.2* 8.2* 8.2*   HCT 24.9* 25.1* 24.7*    220 209       Recent Labs   Lab 08/10/22  0246 08/11/22  0415 08/12/22  0307   * 136 136   K 4.3 4.4 4.4    104 103   CO2 21* 21* 22*   BUN 56* 58* 61*   CREATININE 8.9* 9.3* 9.5*   * 84 119*   CALCIUM 8.9 8.8 9.0   MG 2.0 2.2 2.1   PHOS 6.4* 6.7* 7.2*       Recent Labs   Lab 08/10/22  0246 08/11/22  0415 08/12/22  0307   ALKPHOS 69 70 75   ALT <5* <5* <5*   AST 12 14 13   ALBUMIN 1.7* 1.8* 1.9*   PROT 5.5* 5.6* 5.8*   BILITOT 0.2 0.2 0.2          Recent Labs     08/11/22  0807 08/11/22  1743   CRP 43.7*  --    CPK  --  9*         All labs within the last 24 hours were reviewed.     Microbiology:  Microbiology Results (last 7 days)       Procedure Component Value Units Date/Time    Aerobic culture  [[id number]]  (Abnormal) Collected: 08/06/22 1153    Order Status: Completed Specimen: Wound from Hip, Left Updated: 08/10/22 1214     Aerobic Bacterial Culture STAPHYLOCOCCUS AUREUS  Moderate  For susceptibility see order #[id number]      Narrative:      #1 Left hip fluid    Aerobic culture [[id number]]  (Abnormal)  (Susceptibility) Collected: 08/06/22 1153    Order Status: Completed Specimen: Wound from Hip, Left Updated: 08/10/22 1214     Aerobic Bacterial Culture STAPHYLOCOCCUS AUREUS  Few      Narrative:      #2 Left hip fluid    Culture, Anaerobe [[id number]] Collected: 08/06/22 1153    Order Status: Completed Specimen: Body Fluid from Hip, Left Updated: 08/10/22 1035     Anaerobic Culture No anaerobes isolated    Narrative:      #2 Left hip fluid    Culture, Anaerobe [[id number]] Collected: 08/06/22 1153    Order Status: Completed Specimen: Body Fluid from Hip, Left Updated: 08/10/22 1034     Anaerobic Culture No anaerobes isolated    Narrative:      #1 Left hip fluid    Blood culture [[id number]] Collected: 08/05/22 0850    Order Status: Completed Specimen: Blood from Antecubital, Right Arm Updated: 08/10/22 1012     Blood Culture, Routine No growth after 5 days.    Blood culture [[id number]] Collected: 08/05/22 0852    Order Status: Completed Specimen: Blood from Peripheral, Right Hand Updated: 08/10/22 1012     Blood Culture, Routine No growth after 5 days.    Fungus culture [[id number]] Collected: 08/05/22 0817    Order Status: Completed Specimen: Joint Fluid from Wrist, Left Updated: 08/09/22 1307     Fungus (Mycology) Culture Culture in progress    AFB Culture & Smear [[id number]] Collected: 08/05/22 0817    Order Status: Completed Specimen: Joint Fluid from Wrist, Left Updated: 08/08/22 1529     AFB Culture & Smear Culture in progress     AFB CULTURE STAIN No acid fast bacilli seen.    Aerobic culture [[id number]] Collected: 08/05/22 0817    Order Status: Completed Specimen: Bone from Wrist, Left Updated:  08/08/22 0540     Aerobic Bacterial Culture No growth    Gram stain [[id number]] Collected: 08/05/22 0817    Order Status: Completed Specimen: Joint Fluid from Wrist, Left Updated: 08/05/22 1038     Gram Stain Result No WBC's      No organisms seen              Imaging  ECG Results              EKG 12-lead (Final result)  Result time 07/27/22 07:53:05      Final result by Interface, Lab In [facility] (07/27/22 07:53:05)                   Narrative:    Test Reason : R00.0,    Vent. Rate : 138 BPM     Atrial Rate : 138 BPM     P-R Int : 126 ms          QRS Dur : 086 ms      QT Int : 286 ms       P-R-T Axes : 051 002 052 degrees     QTc Int : 433 ms    Sinus tachycardia  Otherwise normal ECG  No previous ECGs available  Confirmed by Crow HARRIS MD ([id number]) on 7/27/2022 7:53:00 AM    Referred By: [email or web address]   SELF           Confirmed By:Crow HARRIS MD                                    Results for orders placed during the hospital encounter of 07/26/22    Echo    Interpretation Summary  · The left ventricle is normal in size with normal systolic function.  · The estimated ejection fraction is 65%.  · Normal left ventricular diastolic function.  · Normal right ventricular size with normal right ventricular systolic function.  · The estimated PA systolic pressure is 16 mmHg.  · Normal central venous pressure (3 mmHg).      Echo  · The left ventricle is normal in size with normal systolic function.  · The estimated ejection fraction is 65%.  · Normal left ventricular diastolic function.  · Normal right ventricular size with normal right ventricular systolic   function.  · The estimated PA systolic pressure is 16 mmHg.  · Normal central venous pressure (3 mmHg).         All imaging within the last 24 hours was reviewed.       Discharge Planning   FILEMON: 8/15/2022     Code Status: Full Code   Is the patient medically ready for discharge?: No    Reason for patient still in hospital (select all that apply): Patient trending condition,  Laboratory test, Treatment, Consult recommendations, and Pending disposition  Discharge Plan A: Home with family            Assessment/Plan:      * Abscess of left thigh  - Afebrile, no leukocytosis  - Continue Ancef  - S/p OR with Ortho 8/6 for repeat washout  - Intra-op cultures with S.aureus: continuing Ancef for MSSA  - PRN analgesics provided  - Continuing to monitor; maintain drain for now    Pain and swelling of left wrist  - Tapped by Ortho, fluid reviewed: does not appear to be gout or septic arthritis, likely pseudogout (calcium pyrophosphate crystals on previous LE aspiration)  - PRN analgesics provided  -improved after steroid injection    Anemia of chronic disease  - H/H slowly declining  - transfuse for Hgb < 7  - continue to monitor     Hematoma of left thigh  - See Abscess of L thigh    ANGE (acute kidney injury)  Estimated Creatinine Clearance: 10.8 mL/min (A) (based on SCr of 9.5 mg/dL (H)).  - Hyperphosphatemia also noted - start binder  - Continues to make urine  - Nephrology following: no acute indication for HD at this time  - Renally dosing all medications  - Avoid nephrotoxins    Hyperlipidemia associated with type 2 diabetes mellitus  - Continue home statin     Class 2 severe obesity due to excess calories with serious comorbidity and body mass index (BMI) of 36.0 to 36.9 in adult  - Body mass index is 36.01 kg/m².  - Needs dietary and lifestyle modifications in relation to health    Hypertension associated with type 2 diabetes mellitus  - Continue home regimen of amlodipine; lisinopril held due to poor renal function  - continue to monitor and further titrate antihypertensives as clinically indicated     Type 2 diabetes mellitus with hyperglycemia, with long-term current use of insulin  - DKA has resolved  - Endocrinology consulted and managing.      VTE Risk Mitigation (From admission, onward)         Ordered     heparin (porcine) injection 5,000 Units  Every 8 hours         08/07/22 1010      Place sequential compression device  Until discontinued         08/06/22 0855     Place sequential compression device  Until discontinued         07/29/22 0158     IP VTE HIGH RISK PATIENT  Once         07/29/22 0158     Place SAVANNAH hose  Until discontinued         07/26/22 2240     Place sequential compression device  Until discontinued         07/26/22 2240              High Risk Conditions:  Patient has a condition that poses threat to life and bodily function: Acute Renal Failure      I have assessed these findings virtually using a telemed platform and with assistance of the bedside nurse.        The attending portion of this evaluation, treatment, and documentation was performed per Mervat Johnson MD via Telemedicine AudioVisual using the secure SpreadShout software platform with 2 way audio/video. The provider was located off-site and the patient is located in the hospital. The aforementioned video software was utilized to document the relevant history and physical exam    Mervat Johnson MD  Department of Hospital Medicine   [facility] - Telemetry Stepdown

## 2022-08-12 NOTE — SUBJECTIVE & OBJECTIVE
"Principal Problem:Abscess of left thigh    Principal Orthopedic Problem: Left thigh I&D 7/29 and 8/6  Staph species on thigh abscess cultures    Interval History  S/p repeat L thigh I&D 8/6/22. NAEON, patient stable, pain controlled. No acute complaints this morning. Drain with 80cc serosainguinous output yesterday after 290cc day before. Patient feels like he is improving. Cx Staph aureus from I&D. Cr 9.5, nephro following. CK 7 - does not appear to be ongoing myonecrosis.      Review of patient's allergies indicates:  No Known Allergies    Current Facility-Administered Medications   Medication    acetaminophen tablet 1,000 mg    amLODIPine tablet 5 mg    atorvastatin tablet 20 mg    ceFAZolin 2 gram in dextrose 5% 100 mL IVPB (premix)    COVID-19 vac, forrest(Pfizer)(PF) (Pfizer COVID-19) 30 mcg/0.3 mL injection 0.3 mL    dextrose 10% bolus 125 mL    dextrose 10% bolus 250 mL    glucagon (human recombinant) injection 1 mg    glucose chewable tablet 16 g    glucose chewable tablet 24 g    heparin (porcine) injection 5,000 Units    insulin aspart U-100 pen 1-10 Units    insulin aspart U-100 pen 8 Units    insulin detemir U-100 pen 10 Units    morphine injection 3 mg    ondansetron injection 4 mg    oxyCODONE immediate release tablet 5 mg    senna-docusate 8.6-50 mg per tablet 2 tablet    sevelamer carbonate tablet 800 mg    sodium chloride 0.9% flush 10 mL     Objective:     Vital Signs (Most Recent):  Temp: 97 °F (36.1 °C) (08/12/22 0738)  Pulse: 75 (08/12/22 0805)  Resp: 18 (08/12/22 0738)  BP: (!) 156/83 (08/12/22 0738)  SpO2: 97 % (08/12/22 0738) Vital Signs (24h Range):  Temp:  [97 °F (36.1 °C)-98.8 °F (37.1 °C)] 97 °F (36.1 °C)  Pulse:  [] 75  Resp:  [18-20] 18  SpO2:  [94 %-99 %] 97 %  BP: (135-162)/(67-85) 156/83     Weight: 104.3 kg (229 lb 15 oz)  Height: 5' 7" (170.2 cm)  Body mass index is 36.01 kg/m².      Intake/Output Summary (Last 24 hours) at 8/12/2022 1003  Last data filed at 8/12/2022 " 0600  Gross per 24 hour   Intake 1080 ml   Output 2455 ml   Net -1375 ml         Ortho/SPM Exam  Left lower extremity  Left thigh dressing cdi  Drain in place, ss output  No erythema of thigh, no signficant swelling  Compartments soft and compressible  Painless ROM of hip and knee  NVI        CBC:   Recent Labs   Lab 08/11/22  0415 08/12/22  0307   WBC 4.40 4.01   HGB 8.2* 8.2*   HCT 25.1* 24.7*    209       CMP:   Recent Labs   Lab 08/11/22 0415 08/12/22  0307    136   K 4.4 4.4    103   CO2 21* 22*   GLU 84 119*   BUN 58* 61*   CREATININE 9.3* 9.5*   CALCIUM 8.8 9.0   PROT 5.6* 5.8*   ALBUMIN 1.8* 1.9*   BILITOT 0.2 0.2   ALKPHOS 70 75   AST 14 13   ALT <5* <5*   ANIONGAP 11 11         All pertinent labs within the past 24 hours have been reviewed.    Significant Imaging: I have reviewed and interpreted all pertinent imaging results/findings.

## 2022-08-12 NOTE — PLAN OF CARE
Problem: Adult Inpatient Plan of Care  Goal: Plan of Care Review  Outcome: Ongoing, Progressing     Problem: Adult Inpatient Plan of Care  Goal: Patient-Specific Goal (Individualized)  Outcome: Ongoing, Progressing     Problem: Adult Inpatient Plan of Care  Goal: Absence of Hospital-Acquired Illness or Injury  Outcome: Ongoing, Progressing     POC reviewed with pt. Answered all questions. A&Ox4. Complains of pain to left thigh site - PRN medication with moderate relief noted/reported. NPO since 0111. Tele in place. Bed in lowest position, call light within reach, non skid socks on, bed alarm refused, instructed to call staff for needs with call light - pt verbalized understanding.

## 2022-08-12 NOTE — SUBJECTIVE & OBJECTIVE
Interval History: no acute events overnight. Scr stable 9.5 today today from 9.3 yesterday . had 2400cc of urineOPT in last 24 hours nrg 900 cc     Review of patient's allergies indicates:  No Known Allergies  Current Facility-Administered Medications   Medication Frequency    acetaminophen tablet 1,000 mg Q8H PRN    amLODIPine tablet 5 mg Daily    atorvastatin tablet 20 mg Daily    ceFAZolin 2 gram in dextrose 5% 100 mL IVPB (premix) Daily    COVID-19 vac, forrest(Pfizer)(PF) (Pfizer COVID-19) 30 mcg/0.3 mL injection 0.3 mL vaccine x 1 dose    dextrose 10% bolus 125 mL PRN    dextrose 10% bolus 250 mL PRN    glucagon (human recombinant) injection 1 mg PRN    glucose chewable tablet 16 g PRN    glucose chewable tablet 24 g PRN    heparin (porcine) injection 5,000 Units Q8H    insulin aspart U-100 pen 1-10 Units QID (AC + HS) PRN    insulin aspart U-100 pen 8 Units TIDWM    insulin detemir U-100 pen 10 Units BID    morphine injection 3 mg Q6H PRN    ondansetron injection 4 mg Q6H PRN    oxyCODONE immediate release tablet 5 mg Q4H PRN    senna-docusate 8.6-50 mg per tablet 2 tablet Daily    sevelamer carbonate tablet 800 mg TID WM    sodium chloride 0.9% flush 10 mL PRN       Objective:     Vital Signs (Most Recent):  Temp: 98.8 °F (37.1 °C) (08/12/22 0343)  Pulse: 87 (08/12/22 0343)  Resp: 19 (08/12/22 0639)  BP: 137/67 (08/12/22 0343)  SpO2: 95 % (08/12/22 0546)  O2 Device (Oxygen Therapy): room air (08/12/22 0343) Vital Signs (24h Range):  Temp:  [98 °F (36.7 °C)-98.8 °F (37.1 °C)] 98.8 °F (37.1 °C)  Pulse:  [] 87  Resp:  [18-20] 19  SpO2:  [94 %-99 %] 95 %  BP: (135-162)/(67-87) 137/67     Weight: 104.3 kg (229 lb 15 oz) (08/11/22 1000)  Body mass index is 36.01 kg/m².  Body surface area is 2.22 meters squared.    I/O last 3 completed shifts:  In: 1620 [P.O.:1600; I.V.:20]  Out: 3840 [Urine:3700; Drains:140]    Physical Exam  Vitals reviewed.   Constitutional:       Appearance: Normal appearance.   HENT:       Head: Normocephalic and atraumatic.      Right Ear: External ear normal.      Left Ear: External ear normal.      Nose: Nose normal.      Mouth/Throat:      Pharynx: Oropharynx is clear.   Eyes:      Extraocular Movements: Extraocular movements intact.      Conjunctiva/sclera: Conjunctivae normal.   Cardiovascular:      Rate and Rhythm: Normal rate and regular rhythm.      Pulses: Normal pulses.   Pulmonary:      Effort: Pulmonary effort is normal.      Breath sounds: Normal breath sounds.   Abdominal:      General: Abdomen is flat. There is no distension.   Musculoskeletal:         General: Normal range of motion.      Cervical back: Normal range of motion.      Right lower leg: Edema present.      Left lower leg: Edema present.   Skin:     General: Skin is warm and dry.   Neurological:      General: No focal deficit present.      Mental Status: He is alert and oriented to person, place, and time.   Psychiatric:         Mood and Affect: Mood normal.         Behavior: Behavior normal.       Significant Labs:  All labs within the past 24 hours have been reviewed.

## 2022-08-12 NOTE — PLAN OF CARE
[facility] - Telemetry Stepdown  Discharge Reassessment    Primary Care Provider: Aylin Wayne DO    Expected Discharge Date: 8/15/2022    Reassessment (most recent)     Discharge Reassessment - 08/12/22 1354        Discharge Reassessment    Assessment Type Discharge Planning Reassessment     Did the patient's condition or plan change since previous assessment? No     Discharge Plan discussed with: Patient     Communicated FILEMON with patient/caregiver Yes     Discharge Plan A Home Health     Discharge Plan B Home with family     DME Needed Upon Discharge  other (see comments)   TBD    Discharge Barriers Identified None     Why the patient remains in the hospital Requires continued medical care        Post-Acute Status    Post-Acute Authorization Home Health     Home Health Status Pending medical clearance/testing               Marnie De La Torre RN  Ext [phone]

## 2022-08-13 LAB
ALBUMIN SERPL BCP-MCNC: 1.9 G/DL (ref 3.5–5.2)
ALP SERPL-CCNC: 69 U/L (ref 55–135)
ALT SERPL W/O P-5'-P-CCNC: <5 U/L (ref 10–44)
ANION GAP SERPL CALC-SCNC: 10 MMOL/L (ref 8–16)
AST SERPL-CCNC: 13 U/L (ref 10–40)
BASOPHILS # BLD AUTO: 0.03 K/UL (ref 0–0.2)
BASOPHILS NFR BLD: 0.9 % (ref 0–1.9)
BILIRUB SERPL-MCNC: 0.1 MG/DL (ref 0.1–1)
BUN SERPL-MCNC: 58 MG/DL (ref 6–20)
CALCIUM SERPL-MCNC: 8.6 MG/DL (ref 8.7–10.5)
CHLORIDE SERPL-SCNC: 102 MMOL/L (ref 95–110)
CO2 SERPL-SCNC: 23 MMOL/L (ref 23–29)
CREAT SERPL-MCNC: 9.6 MG/DL (ref 0.5–1.4)
CRP SERPL-MCNC: 24.9 MG/L (ref 0–8.2)
DIFFERENTIAL METHOD: ABNORMAL
EOSINOPHIL # BLD AUTO: 0.4 K/UL (ref 0–0.5)
EOSINOPHIL NFR BLD: 12 % (ref 0–8)
ERYTHROCYTE [DISTWIDTH] IN BLOOD BY AUTOMATED COUNT: 12.8 % (ref 11.5–14.5)
ERYTHROCYTE [SEDIMENTATION RATE] IN BLOOD BY PHOTOMETRIC METHOD: 58 MM/HR (ref 0–23)
EST. GFR  (NO RACE VARIABLE): 6.1 ML/MIN/1.73 M^2
GLUCOSE SERPL-MCNC: 226 MG/DL (ref 70–110)
HCT VFR BLD AUTO: 22.6 % (ref 40–54)
HGB BLD-MCNC: 7.5 G/DL (ref 14–18)
IMM GRANULOCYTES # BLD AUTO: 0.05 K/UL (ref 0–0.04)
IMM GRANULOCYTES NFR BLD AUTO: 1.4 % (ref 0–0.5)
LYMPHOCYTES # BLD AUTO: 0.6 K/UL (ref 1–4.8)
LYMPHOCYTES NFR BLD: 18 % (ref 18–48)
MAGNESIUM SERPL-MCNC: 1.9 MG/DL (ref 1.6–2.6)
MCH RBC QN AUTO: 30.1 PG (ref 27–31)
MCHC RBC AUTO-ENTMCNC: 33.2 G/DL (ref 32–36)
MCV RBC AUTO: 91 FL (ref 82–98)
MONOCYTES # BLD AUTO: 0.3 K/UL (ref 0.3–1)
MONOCYTES NFR BLD: 9.1 % (ref 4–15)
NEUTROPHILS # BLD AUTO: 2.1 K/UL (ref 1.8–7.7)
NEUTROPHILS NFR BLD: 58.6 % (ref 38–73)
NRBC BLD-RTO: 0 /100 WBC
PHOSPHATE SERPL-MCNC: 6.3 MG/DL (ref 2.7–4.5)
PLATELET # BLD AUTO: 178 K/UL (ref 150–450)
PMV BLD AUTO: 9.4 FL (ref 9.2–12.9)
POCT GLUCOSE: 122 MG/DL (ref 70–110)
POCT GLUCOSE: 127 MG/DL (ref 70–110)
POCT GLUCOSE: 235 MG/DL (ref 70–110)
POTASSIUM SERPL-SCNC: 4.4 MMOL/L (ref 3.5–5.1)
PROT SERPL-MCNC: 5.7 G/DL (ref 6–8.4)
RBC # BLD AUTO: 2.49 M/UL (ref 4.6–6.2)
SODIUM SERPL-SCNC: 135 MMOL/L (ref 136–145)
WBC # BLD AUTO: 3.5 K/UL (ref 3.9–12.7)

## 2022-08-13 PROCEDURE — 83735 ASSAY OF MAGNESIUM: CPT | Performed by: STUDENT IN AN ORGANIZED HEALTH CARE EDUCATION/TRAINING PROGRAM

## 2022-08-13 PROCEDURE — 63600175 PHARM REV CODE 636 W HCPCS: Performed by: STUDENT IN AN ORGANIZED HEALTH CARE EDUCATION/TRAINING PROGRAM

## 2022-08-13 PROCEDURE — 99233 SBSQ HOSP IP/OBS HIGH 50: CPT | Mod: 95,,, | Performed by: INTERNAL MEDICINE

## 2022-08-13 PROCEDURE — 85652 RBC SED RATE AUTOMATED: CPT

## 2022-08-13 PROCEDURE — 20600001 HC STEP DOWN PRIVATE ROOM

## 2022-08-13 PROCEDURE — 80053 COMPREHEN METABOLIC PANEL: CPT | Performed by: STUDENT IN AN ORGANIZED HEALTH CARE EDUCATION/TRAINING PROGRAM

## 2022-08-13 PROCEDURE — 25000003 PHARM REV CODE 250: Performed by: PHYSICIAN ASSISTANT

## 2022-08-13 PROCEDURE — 25000003 PHARM REV CODE 250: Performed by: HOSPITALIST

## 2022-08-13 PROCEDURE — 63600175 PHARM REV CODE 636 W HCPCS: Performed by: PHYSICIAN ASSISTANT

## 2022-08-13 PROCEDURE — 84100 ASSAY OF PHOSPHORUS: CPT | Performed by: STUDENT IN AN ORGANIZED HEALTH CARE EDUCATION/TRAINING PROGRAM

## 2022-08-13 PROCEDURE — 25000003 PHARM REV CODE 250

## 2022-08-13 PROCEDURE — 25000003 PHARM REV CODE 250: Performed by: INTERNAL MEDICINE

## 2022-08-13 PROCEDURE — 86140 C-REACTIVE PROTEIN: CPT

## 2022-08-13 PROCEDURE — 85025 COMPLETE CBC W/AUTO DIFF WBC: CPT | Performed by: STUDENT IN AN ORGANIZED HEALTH CARE EDUCATION/TRAINING PROGRAM

## 2022-08-13 PROCEDURE — 99233 PR SUBSEQUENT HOSPITAL CARE,LEVL III: ICD-10-PCS | Mod: 95,,, | Performed by: INTERNAL MEDICINE

## 2022-08-13 RX ORDER — AMLODIPINE BESYLATE 5 MG/1
5 TABLET ORAL 2 TIMES DAILY
Status: DISCONTINUED | OUTPATIENT
Start: 2022-08-13 | End: 2022-08-16

## 2022-08-13 RX ADMIN — SENNOSIDES AND DOCUSATE SODIUM 2 TABLET: 50; 8.6 TABLET ORAL at 09:08

## 2022-08-13 RX ADMIN — HEPARIN SODIUM 5000 UNITS: 5000 INJECTION INTRAVENOUS; SUBCUTANEOUS at 08:08

## 2022-08-13 RX ADMIN — INSULIN ASPART 4 UNITS: 100 INJECTION, SOLUTION INTRAVENOUS; SUBCUTANEOUS at 11:08

## 2022-08-13 RX ADMIN — HEPARIN SODIUM 5000 UNITS: 5000 INJECTION INTRAVENOUS; SUBCUTANEOUS at 06:08

## 2022-08-13 RX ADMIN — INSULIN DETEMIR 10 UNITS: 100 INJECTION, SOLUTION SUBCUTANEOUS at 09:08

## 2022-08-13 RX ADMIN — OXYCODONE 5 MG: 5 TABLET ORAL at 10:08

## 2022-08-13 RX ADMIN — SEVELAMER CARBONATE 800 MG: 800 TABLET, FILM COATED ORAL at 11:08

## 2022-08-13 RX ADMIN — SEVELAMER CARBONATE 800 MG: 800 TABLET, FILM COATED ORAL at 09:08

## 2022-08-13 RX ADMIN — OXYCODONE 5 MG: 5 TABLET ORAL at 06:08

## 2022-08-13 RX ADMIN — INSULIN ASPART 1 UNITS: 100 INJECTION, SOLUTION INTRAVENOUS; SUBCUTANEOUS at 08:08

## 2022-08-13 RX ADMIN — INSULIN ASPART 8 UNITS: 100 INJECTION, SOLUTION INTRAVENOUS; SUBCUTANEOUS at 09:08

## 2022-08-13 RX ADMIN — AMLODIPINE BESYLATE 5 MG: 5 TABLET ORAL at 09:08

## 2022-08-13 RX ADMIN — INSULIN ASPART 8 UNITS: 100 INJECTION, SOLUTION INTRAVENOUS; SUBCUTANEOUS at 04:08

## 2022-08-13 RX ADMIN — AMLODIPINE BESYLATE 5 MG: 5 TABLET ORAL at 08:08

## 2022-08-13 RX ADMIN — CEFAZOLIN 2 G: 10 INJECTION, POWDER, FOR SOLUTION INTRAVENOUS at 09:08

## 2022-08-13 RX ADMIN — OXYCODONE 5 MG: 5 TABLET ORAL at 04:08

## 2022-08-13 RX ADMIN — OXYCODONE 5 MG: 5 TABLET ORAL at 08:08

## 2022-08-13 RX ADMIN — INSULIN ASPART 8 UNITS: 100 INJECTION, SOLUTION INTRAVENOUS; SUBCUTANEOUS at 11:08

## 2022-08-13 RX ADMIN — HEPARIN SODIUM 5000 UNITS: 5000 INJECTION INTRAVENOUS; SUBCUTANEOUS at 04:08

## 2022-08-13 RX ADMIN — SEVELAMER CARBONATE 800 MG: 800 TABLET, FILM COATED ORAL at 04:08

## 2022-08-13 RX ADMIN — ATORVASTATIN CALCIUM 20 MG: 20 TABLET, FILM COATED ORAL at 09:08

## 2022-08-13 RX ADMIN — INSULIN DETEMIR 10 UNITS: 100 INJECTION, SOLUTION SUBCUTANEOUS at 08:08

## 2022-08-13 NOTE — PROGRESS NOTES
"      [facility] - Telemetry [facility] Medicine  Telemedicine Progress Note    Patient Name: Wilfredo Thomas  MRN: [id number]  Patient Class: IP- Inpatient   Admission Date: 7/26/2022  Length of Stay: 18 days  Attending Physician: Mervat Johnson MD  Primary Care Provider: Aylin Wayne DO          Subjective:     Principal Problem:Abscess of left thigh        HPI:  48 y/o M hx of HTN, IDDM2, GERD obesity, HLD presented to the ED with complaint of 1 day of worsening SOB. Wife present at bedside. Patient states that he noticed yesterday he was feeling some shortness of breath and couldn't seem to catch his breath. His wife went to check on him this morning and noticed that he was breathing "fast and heavy" and he sounded like he was slurring his words. Wife was concerned about a stroke, so he brought him to the ED for evaluation.     Patient also reporting significant pain and swelling along his left lateral proximal thigh. He states he has noticed worsening pain over the past 2 weeks. He denies any acute trauma to the area. He was seen by ortho last week and was told to take ibu-profen for a muscle strain. This did not help his pain, so he presented again on 7/22 and was given a prednisone injection into his thigh. This also did not help his pain, and now the pain and swelling have advanced to the point that he has difficulty with ambulation. Patient denies fever, chills, nausea, vomiting.      Of note, patient has hx of poorly controlled diabetes. He was recently started on insulin by his PCP, but he has not taken any insulin since being prescribed it.      In the ED, patient hypertensive and tachycardic to the 130s. Tachypneic with RR in the 40s. On CBC, WBC 26.9, On CMP patient hyperkalemic to 5.5, CorNa 136, Bicarb 5. Cr elevated to 1.9 from BL 0.83. UA, BHB pending at time of admission. CTA without evidence of pulmonary embolism. Critical Care Medicine consulted given severe acidosis.       "       Overview/Hospital Course:    Patient admitted to the MICU for management of severe DKA. Metabolic acidosis improving on insulin drip. He also has had left thigh pain for 1-2 months. There is a large mass on his left thigh that is tender to palpation, CT revealed hematoma vs soft tissue growth. General surgery consulted and recommended IR consult. IR consulted, no indication for tap.   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 Ortho consulted and performed bedside aspiration of possible abscess which revealed >200K cells >80% segs. Will hold off on antibiotics for now per ortho recs, ortho is taking patient to the OR for irrigation of the thigh, will start antibiotics afterwards.         7/29 Metabolic acidosis slowly improving. Ortho taking patient to the OR for irrigation of the left thigh, will start antibiotics afterwards.   7/30 Transfer to hospital medicine . S/p I&D  of left thigh Abscess on7/29/22 - MRI - uptured tensor fascia jose luis (TFL) muscle with associated large hematoma. gram stain -Moderate Gram positive cocci in clusters .cultures pending.  continue Vancomycin, clindamycin and Zosyn . Bicarb 15. K replaced . off insulin drip on SQ insulin.  7/31 ID and endocrine consulted.  abscess with Calcium pyrophosphate crystals with unknown significance potassium and P replaced. Bicarbonate WNL . aerobic culture 7/28 STAPHYLOCOCCUS SPECIES . Glucose in 300s .PRN imodium  for diarrhea. PT/OT consulted - WBAT. Surgical drain with purulent output  8/1 PICC team consulted for IV access. vanc trough at 11.4. monitor for vanomycin toxicity. possible OR tomorrow for washout  may need bone biopsy to r/o osteomyelitis.Discontinued clindamycin.   8/2 glucose in 300s.  Increased Levemir  to 14 units BID and Aspart  10 units TIDWM. K replaced   8/3 ANGE with cr 0.7--> 2.7. likely vanc induced ATN with levels 40. urine studies ordered renal sonogram WNL   nephrology consulted. started on NS 100ml/h x 10h and follow up renal panel.Zosyn and  vancomycin discontinued. Strict IO monitor. condom catheter . switched to Ancef for MSSA on culture  Interval History:  8/4- MSSA- see below. /85 VSS. On room air, eating, BM on 8/2, good UO. Appreciate ortho recs. On Cephazolin, detim 14 bid and aspart 12 ac.  8/5-Cr still rising, cr =6.  Wrist still hurting. Ortho to tap it to eval for infection and gout today. /90   Pulse 85  AF, no other signs of infection.  Will discuss w Nephrology- received NS x one liter yesterday. Will repeat today. Suspect auto-diuresis.   8/6: washout with ortho today. Cr continues to rise (7.1), nephrology following  8/7: Cr 7.4, phos increasing; tolerated surgery well yesterday, intra-op cultures pending  8/8: Cr increased to 8.1, hyperphosphatemia again noted; continues to make urine, no acute indication for HD per nephrology. Intra-op cultures with S.aureus, continued cefazolin pending final C&S results.        This encounter was provided through telemedicine.  Patient was transferred to the telemedicine service on:  08/9/2022   The [facility] 7/26/2022  8:24 PM.  Present with the patient at the time of the telemed/virtual assessment: Telepresenter    Interval History/Overnight Events:   Clinical record since admit reviewed.  Patient able to provide history.    Drain output 70 cc in 24 hours; Cr stable at 9.5 with good UOP so hoping for improvement soon; his thigh is improving and pain is controlled; Hgb decreased to 8.5    Review of Systems   Constitutional:  Positive for fatigue. Negative for activity change and fever.   Respiratory:  Negative for cough and shortness of breath.    Cardiovascular:  Positive for leg swelling. Negative for chest pain.   Gastrointestinal:  Negative for diarrhea and vomiting.   Musculoskeletal:  Positive for myalgias.      Inpatient Medications:  Scheduled Meds:   amLODIPine   5 mg Oral Daily    atorvastatin  20 mg Oral Daily    ceFAZolin (ANCEF) IVPB  2 g Intravenous Daily    heparin (porcine)  5,000 Units Subcutaneous Q8H    insulin aspart U-100  8 Units Subcutaneous TIDWM    insulin detemir U-100  10 Units Subcutaneous BID    senna-docusate 8.6-50 mg  2 tablet Oral Daily    sevelamer carbonate  800 mg Oral TID WM     Continuous Infusions:  PRN Meds:.acetaminophen, sars-cov-2 (covid-19), dextrose 10%, dextrose 10%, glucagon (human recombinant), glucose, glucose, insulin aspart U-100, morphine, ondansetron, oxyCODONE, sodium chloride 0.9%      Objective:     Temp:  [97.8 °F (36.6 °C)-98.4 °F (36.9 °C)] 97.8 °F (36.6 °C)  Pulse:  [79-95] 79  Resp:  [17-20] 18  SpO2:  [95 %-99 %] 96 %  BP: (146-180)/() 146/77      Intake/Output Summary (Last 24 hours) at 8/13/2022 0919  Last data filed at 8/13/2022 0620  Gross per 24 hour   Intake 1300 ml   Output 2170 ml   Net -870 ml          Body mass index is 36.01 kg/m².    Physical Exam  Vitals and nursing note reviewed.   Constitutional:       General: He is not in acute distress.     Appearance: Normal appearance. He is normal weight. He is not ill-appearing.   HENT:      Head: Normocephalic and atraumatic.      Right Ear: Hearing normal.      Left Ear: Hearing normal.      Nose: Nose normal.   Eyes:      General: No scleral icterus.        Right eye: No discharge.         Left eye: No discharge.      Extraocular Movements: Extraocular movements intact.   Cardiovascular:      Rate and Rhythm: Normal rate.   Pulmonary:      Effort: Pulmonary effort is normal. No accessory muscle usage or respiratory distress.   Musculoskeletal:      Right lower leg: Edema present.      Left lower leg: Edema present.      Comments: Dressing to left thigh with drain (serosanguineous drainage)   Neurological:      General: No focal deficit present.      Mental Status: He is alert and oriented to person, place, and time.      Cranial Nerves: No cranial  nerve deficit.      Motor: No weakness.   Psychiatric:         Attention and Perception: Attention normal.         Mood and Affect: Mood normal.         Speech: Speech normal.         Behavior: Behavior is cooperative.        Labs:  Recent Results (from the past 24 hour(s))   POCT glucose    Collection Time: 08/12/22 11:24 AM   Result Value Ref Range    POCT Glucose 181 (H) 70 - 110 mg/dL   Emanuel's Stain, Urine Random    Collection Time: 08/12/22 12:00 PM   Result Value Ref Range    Emanuel's Stain, Ur Occ eosinophils seen (A) No eosinophils seen   POCT glucose    Collection Time: 08/12/22  4:56 PM   Result Value Ref Range    POCT Glucose 183 (H) 70 - 110 mg/dL   POCT glucose    Collection Time: 08/12/22  8:21 PM   Result Value Ref Range    POCT Glucose 178 (H) 70 - 110 mg/dL   POCT glucose    Collection Time: 08/13/22  7:32 AM   Result Value Ref Range    POCT Glucose 127 (H) 70 - 110 mg/dL        Lab Results   Component Value Date    [id number] Negative 08/06/2022       Recent Labs   Lab 08/11/22 0415 08/12/22  0307 08/13/22  1043   WBC 4.40 4.01 3.50*   LYMPH 20.5  0.9* 22.7  0.9* 18.0  0.6*   HGB 8.2* 8.2* 7.5*   HCT 25.1* 24.7* 22.6*    209 178     Recent Labs   Lab 08/11/22 0415 08/12/22  0307 08/13/22  1043    136 135*   K 4.4 4.4 4.4    103 102   CO2 21* 22* 23   BUN 58* 61* 58*   CREATININE 9.3* 9.5* 9.6*   GLU 84 119* 226*   CALCIUM 8.8 9.0 8.6*   MG 2.2 2.1 1.9   PHOS 6.7* 7.2* 6.3*     Recent Labs   Lab 08/11/22 0415 08/12/22  0307 08/13/22  1043   ALKPHOS 70 75 69   ALT <5* <5* <5*   AST 14 13 13   ALBUMIN 1.8* 1.9* 1.9*   PROT 5.6* 5.8* 5.7*   BILITOT 0.2 0.2 0.1        Recent Labs     08/11/22  0807 08/11/22  1743   CRP 43.7*  --    CPK  --  9*         All labs within the last 24 hours were reviewed.     Microbiology:  Microbiology Results (last 7 days)       Procedure Component Value Units Date/Time    Aerobic culture [[id number]]  (Abnormal) Collected: 08/06/22 1153    Order  Status: Completed Specimen: Wound from Hip, Left Updated: 08/10/22 1214     Aerobic Bacterial Culture STAPHYLOCOCCUS AUREUS  Moderate  For susceptibility see order #[id number]      Narrative:      #1 Left hip fluid    Aerobic culture [[id number]]  (Abnormal)  (Susceptibility) Collected: 08/06/22 1153    Order Status: Completed Specimen: Wound from Hip, Left Updated: 08/10/22 1214     Aerobic Bacterial Culture STAPHYLOCOCCUS AUREUS  Few      Narrative:      #2 Left hip fluid    Culture, Anaerobe [[id number]] Collected: 08/06/22 1153    Order Status: Completed Specimen: Body Fluid from Hip, Left Updated: 08/10/22 1035     Anaerobic Culture No anaerobes isolated    Narrative:      #2 Left hip fluid    Culture, Anaerobe [[id number]] Collected: 08/06/22 1153    Order Status: Completed Specimen: Body Fluid from Hip, Left Updated: 08/10/22 1034     Anaerobic Culture No anaerobes isolated    Narrative:      #1 Left hip fluid    Blood culture [[id number]] Collected: 08/05/22 0850    Order Status: Completed Specimen: Blood from Antecubital, Right Arm Updated: 08/10/22 1012     Blood Culture, Routine No growth after 5 days.    Blood culture [[id number]] Collected: 08/05/22 0852    Order Status: Completed Specimen: Blood from Peripheral, Right Hand Updated: 08/10/22 1012     Blood Culture, Routine No growth after 5 days.    Fungus culture [[id number]] Collected: 08/05/22 0817    Order Status: Completed Specimen: Joint Fluid from Wrist, Left Updated: 08/09/22 1307     Fungus (Mycology) Culture Culture in progress    AFB Culture & Smear [[id number]] Collected: 08/05/22 0817    Order Status: Completed Specimen: Joint Fluid from Wrist, Left Updated: 08/08/22 1529     AFB Culture & Smear Culture in progress     AFB CULTURE STAIN No acid fast bacilli seen.    Aerobic culture [[id number]] Collected: 08/05/22 0817    Order Status: Completed Specimen: Bone from Wrist, Left Updated: 08/08/22 0540     Aerobic Bacterial Culture No growth               Imaging  ECG Results              EKG 12-lead (Final result)  Result time 07/27/22 07:53:05      Final result by Interface, Lab In seven (07/27/22 07:53:05)                   Narrative:    Test Reason : R00.0,    Vent. Rate : 138 BPM     Atrial Rate : 138 BPM     P-R Int : 126 ms          QRS Dur : 086 ms      QT Int : 286 ms       P-R-T Axes : 051 002 052 degrees     QTc Int : 433 ms    Sinus tachycardia  Otherwise normal ECG  No previous ECGs available  Confirmed by Crow HARRIS MD ([id number]) on 7/27/2022 7:53:00 AM    Referred By: [email or web address]   SELF           Confirmed By:Crow HARRIS MD                                    Results for orders placed during the hospital encounter of 07/26/22    Echo    Interpretation Summary  · The left ventricle is normal in size with normal systolic function.  · The estimated ejection fraction is 65%.  · Normal left ventricular diastolic function.  · Normal right ventricular size with normal right ventricular systolic function.  · The estimated PA systolic pressure is 16 mmHg.  · Normal central venous pressure (3 mmHg).      Echo  · The left ventricle is normal in size with normal systolic function.  · The estimated ejection fraction is 65%.  · Normal left ventricular diastolic function.  · Normal right ventricular size with normal right ventricular systolic   function.  · The estimated PA systolic pressure is 16 mmHg.  · Normal central venous pressure (3 mmHg).         All imaging within the last 24 hours was reviewed.       Discharge Planning   FILEMON: 8/15/2022     Code Status: Full Code   Is the patient medically ready for discharge?: No    Reason for patient still in hospital (select all that apply): Patient trending condition, Laboratory test, Treatment, Consult recommendations, and Pending disposition  Discharge Plan A: Home Health            Assessment/Plan:      * Abscess of left thigh  - Afebrile, no leukocytosis  - Continue Ancef  - S/p OR with Ortho 8/6 for repeat  washout  - Intra-op cultures with S.aureus: continuing Ancef for MSSA  - PRN analgesics provided  - Continuing to monitor; maintain drain for now    Pain and swelling of left wrist  - Tapped by Ortho, fluid reviewed: does not appear to be gout or septic arthritis, likely pseudogout (calcium pyrophosphate crystals on previous LE aspiration)  - PRN analgesics provided  -improved after steroid injection    Anemia of chronic disease  - H/H slowly declining  - transfuse for Hgb < 7  - continue to monitor     Hematoma of left thigh  - See Abscess of L thigh    ANGE (acute kidney injury)  Estimated Creatinine Clearance: 10.7 mL/min (A) (based on SCr of 9.6 mg/dL (H)).  - Hyperphosphatemia also noted - start binder  - Continues to make urine  - Nephrology following: no acute indication for HD at this time  - Renally dosing all medications  - Avoid nephrotoxins    Hyperlipidemia associated with type 2 diabetes mellitus  - Continue home statin     Class 2 severe obesity due to excess calories with serious comorbidity and body mass index (BMI) of 36.0 to 36.9 in adult  - Body mass index is 36.01 kg/m².  - Needs dietary and lifestyle modifications in relation to health    Hypertension associated with type 2 diabetes mellitus  - Continue home regimen of amlodipine; lisinopril held due to poor renal function  - continue to monitor and further titrate antihypertensives as clinically indicated   -increase amlodipine to BID on 8/13 for elevated BP; avoid hypotension with ANGE    Type 2 diabetes mellitus with hyperglycemia, with long-term current use of insulin  - DKA has resolved  - Endocrinology consulted and managing.      VTE Risk Mitigation (From admission, onward)         Ordered     heparin (porcine) injection 5,000 Units  Every 8 hours         08/07/22 1010     Place sequential compression device  Until discontinued         08/06/22 0855     Place sequential compression device  Until discontinued         07/29/22 0158     IP  VTE HIGH RISK PATIENT  Once         07/29/22 0158     Place SAVANNAH hose  Until discontinued         07/26/22 2240     Place sequential compression device  Until discontinued         07/26/22 2240              High Risk Conditions:  Patient has a condition that poses threat to life and bodily function: Acute Renal Failure      I have assessed these findings virtually using a telemed platform and with assistance of the bedside nurse.      The attending portion of this evaluation, treatment, and documentation was performed per Mervat Johnson MD via Telemedicine AudioVisual using the secure [employer] software platform with 2 way audio/video. The provider was located off-site and the patient is located in the hospital. The aforementioned video software was utilized to document the relevant history and physical exam    Mervat Johnson MD  Department of Hospital Medicine   [facility] - Telemetry Stepdown

## 2022-08-13 NOTE — ASSESSMENT & PLAN NOTE
- Continue home regimen of amlodipine; lisinopril held due to poor renal function  - continue to monitor and further titrate antihypertensives as clinically indicated   -increase amlodipine to BID on 8/13 for elevated BP; avoid hypotension with ANGE

## 2022-08-13 NOTE — PLAN OF CARE
Problem: Adult Inpatient Plan of Care  Goal: Plan of Care Review  Outcome: Ongoing, Progressing     Problem: Adult Inpatient Plan of Care  Goal: Optimal Comfort and Wellbeing  Outcome: Ongoing, Progressing     AAOx4.  VSS.  Plan of care reviewed and discussed.  MANNY SIMS

## 2022-08-13 NOTE — SUBJECTIVE & OBJECTIVE
This encounter was provided through telemedicine.  Patient was transferred to the telemedicine service on:  08/9/2022   The patient location is: 8072/8072 A admitted 7/26/2022  8:24 PM.  Present with the patient at the time of the telemed/virtual assessment: Telepresenter    Interval History/Overnight Events:   Clinical record since admit reviewed.  Patient able to provide history.    Drain output 70 cc in 24 hours; Cr stable at 9.5 with good UOP so hoping for improvement soon; his thigh is improving and pain is controlled; Hgb decreased to 8.5    Review of Systems   Constitutional:  Positive for fatigue. Negative for activity change and fever.   Respiratory:  Negative for cough and shortness of breath.    Cardiovascular:  Positive for leg swelling. Negative for chest pain.   Gastrointestinal:  Negative for diarrhea and vomiting.   Musculoskeletal:  Positive for myalgias.      Inpatient Medications:  Scheduled Meds:   amLODIPine  5 mg Oral Daily    atorvastatin  20 mg Oral Daily    ceFAZolin (ANCEF) IVPB  2 g Intravenous Daily    heparin (porcine)  5,000 Units Subcutaneous Q8H    insulin aspart U-100  8 Units Subcutaneous TIDWM    insulin detemir U-100  10 Units Subcutaneous BID    senna-docusate 8.6-50 mg  2 tablet Oral Daily    sevelamer carbonate  800 mg Oral TID WM     Continuous Infusions:  PRN Meds:.acetaminophen, sars-cov-2 (covid-19), dextrose 10%, dextrose 10%, glucagon (human recombinant), glucose, glucose, insulin aspart U-100, morphine, ondansetron, oxyCODONE, sodium chloride 0.9%      Objective:     Temp:  [97.8 °F (36.6 °C)-98.4 °F (36.9 °C)] 97.8 °F (36.6 °C)  Pulse:  [79-95] 79  Resp:  [17-20] 18  SpO2:  [95 %-99 %] 96 %  BP: (146-180)/() 146/77      Intake/Output Summary (Last 24 hours) at 8/13/2022 0919  Last data filed at 8/13/2022 0620  Gross per 24 hour   Intake 1300 ml   Output 2170 ml   Net -870 ml          Body mass index is 36.01 kg/m².    Physical Exam  Vitals and nursing note  reviewed.   Constitutional:       General: He is not in acute distress.     Appearance: Normal appearance. He is normal weight. He is not ill-appearing.   HENT:      Head: Normocephalic and atraumatic.      Right Ear: Hearing normal.      Left Ear: Hearing normal.      Nose: Nose normal.   Eyes:      General: No scleral icterus.        Right eye: No discharge.         Left eye: No discharge.      Extraocular Movements: Extraocular movements intact.   Cardiovascular:      Rate and Rhythm: Normal rate.   Pulmonary:      Effort: Pulmonary effort is normal. No accessory muscle usage or respiratory distress.   Musculoskeletal:      Right lower leg: Edema present.      Left lower leg: Edema present.      Comments: Dressing to left thigh with drain (serosanguineous drainage)   Neurological:      General: No focal deficit present.      Mental Status: He is alert and oriented to person, place, and time.      Cranial Nerves: No cranial nerve deficit.      Motor: No weakness.   Psychiatric:         Attention and Perception: Attention normal.         Mood and Affect: Mood normal.         Speech: Speech normal.         Behavior: Behavior is cooperative.        Labs:  Recent Results (from the past 24 hour(s))   POCT glucose    Collection Time: 08/12/22 11:24 AM   Result Value Ref Range    POCT Glucose 181 (H) 70 - 110 mg/dL   Emanuel's Stain, Urine Random    Collection Time: 08/12/22 12:00 PM   Result Value Ref Range    Emanuel's Stain, Ur Occ eosinophils seen (A) No eosinophils seen   POCT glucose    Collection Time: 08/12/22  4:56 PM   Result Value Ref Range    POCT Glucose 183 (H) 70 - 110 mg/dL   POCT glucose    Collection Time: 08/12/22  8:21 PM   Result Value Ref Range    POCT Glucose 178 (H) 70 - 110 mg/dL   POCT glucose    Collection Time: 08/13/22  7:32 AM   Result Value Ref Range    POCT Glucose 127 (H) 70 - 110 mg/dL        Lab Results   Component Value Date    [id number] Negative 08/06/2022       Recent Labs   Lab  08/11/22  0415 08/12/22  0307 08/13/22  1043   WBC 4.40 4.01 3.50*   LYMPH 20.5  0.9* 22.7  0.9* 18.0  0.6*   HGB 8.2* 8.2* 7.5*   HCT 25.1* 24.7* 22.6*    209 178     Recent Labs   Lab 08/11/22  0415 08/12/22  0307 08/13/22  1043    136 135*   K 4.4 4.4 4.4    103 102   CO2 21* 22* 23   BUN 58* 61* 58*   CREATININE 9.3* 9.5* 9.6*   GLU 84 119* 226*   CALCIUM 8.8 9.0 8.6*   MG 2.2 2.1 1.9   PHOS 6.7* 7.2* 6.3*     Recent Labs   Lab 08/11/22 0415 08/12/22  0307 08/13/22  1043   ALKPHOS 70 75 69   ALT <5* <5* <5*   AST 14 13 13   ALBUMIN 1.8* 1.9* 1.9*   PROT 5.6* 5.8* 5.7*   BILITOT 0.2 0.2 0.1        Recent Labs     08/11/22  0807 08/11/22  1743   CRP 43.7*  --    CPK  --  9*         All labs within the last 24 hours were reviewed.     Microbiology:  Microbiology Results (last 7 days)       Procedure Component Value Units Date/Time    Aerobic culture [[id number]]  (Abnormal) Collected: 08/06/22 1153    Order Status: Completed Specimen: Wound from Hip, Left Updated: 08/10/22 1214     Aerobic Bacterial Culture STAPHYLOCOCCUS AUREUS  Moderate  For susceptibility see order #[id number]      Narrative:      #1 Left hip fluid    Aerobic culture [[id number]]  (Abnormal)  (Susceptibility) Collected: 08/06/22 1153    Order Status: Completed Specimen: Wound from Hip, Left Updated: 08/10/22 1214     Aerobic Bacterial Culture STAPHYLOCOCCUS AUREUS  Few      Narrative:      #2 Left hip fluid    Culture, Anaerobe [[id number]] Collected: 08/06/22 1153    Order Status: Completed Specimen: Body Fluid from Hip, Left Updated: 08/10/22 1035     Anaerobic Culture No anaerobes isolated    Narrative:      #2 Left hip fluid    Culture, Anaerobe [[id number]] Collected: 08/06/22 1153    Order Status: Completed Specimen: Body Fluid from Hip, Left Updated: 08/10/22 1034     Anaerobic Culture No anaerobes isolated    Narrative:      #1 Left hip fluid    Blood culture [[id number]] Collected: 08/05/22 0850    Order Status:  Completed Specimen: Blood from Antecubital, Right Arm Updated: 08/10/22 1012     Blood Culture, Routine No growth after 5 days.    Blood culture [[id number]] Collected: 08/05/22 0852    Order Status: Completed Specimen: Blood from Peripheral, Right Hand Updated: 08/10/22 1012     Blood Culture, Routine No growth after 5 days.    Fungus culture [[id number]] Collected: 08/05/22 0817    Order Status: Completed Specimen: Joint Fluid from Wrist, Left Updated: 08/09/22 1307     Fungus (Mycology) Culture Culture in progress    AFB Culture & Smear [[id number]] Collected: 08/05/22 0817    Order Status: Completed Specimen: Joint Fluid from Wrist, Left Updated: 08/08/22 1529     AFB Culture & Smear Culture in progress     AFB CULTURE STAIN No acid fast bacilli seen.    Aerobic culture [[id number]] Collected: 08/05/22 0817    Order Status: Completed Specimen: Bone from Wrist, Left Updated: 08/08/22 0540     Aerobic Bacterial Culture No growth              Imaging  ECG Results              EKG 12-lead (Final result)  Result time 07/27/22 07:53:05      Final result by Interface, Lab In [facility] (07/27/22 07:53:05)                   Narrative:    Test Reason : R00.0,    Vent. Rate : 138 BPM     Atrial Rate : 138 BPM     P-R Int : 126 ms          QRS Dur : 086 ms      QT Int : 286 ms       P-R-T Axes : 051 002 052 degrees     QTc Int : 433 ms    Sinus tachycardia  Otherwise normal ECG  No previous ECGs available  Confirmed by Crow HARRIS MD ([id number]) on 7/27/2022 7:53:00 AM    Referred By: [email or web address]   SELF           Confirmed By:Crow HARRIS MD                                    Results for orders placed during the hospital encounter of 07/26/22    Echo    Interpretation Summary  · The left ventricle is normal in size with normal systolic function.  · The estimated ejection fraction is 65%.  · Normal left ventricular diastolic function.  · Normal right ventricular size with normal right ventricular systolic function.  · The estimated PA  systolic pressure is 16 mmHg.  · Normal central venous pressure (3 mmHg).      Echo  · The left ventricle is normal in size with normal systolic function.  · The estimated ejection fraction is 65%.  · Normal left ventricular diastolic function.  · Normal right ventricular size with normal right ventricular systolic   function.  · The estimated PA systolic pressure is 16 mmHg.  · Normal central venous pressure (3 mmHg).         All imaging within the last 24 hours was reviewed.       Discharge Planning   FILEMON: 8/15/2022     Code Status: Full Code   Is the patient medically ready for discharge?: No    Reason for patient still in hospital (select all that apply): Patient trending condition, Laboratory test, Treatment, Consult recommendations, and Pending disposition  Discharge Plan A: Home Health

## 2022-08-13 NOTE — ASSESSMENT & PLAN NOTE
Estimated Creatinine Clearance: 10.7 mL/min (A) (based on SCr of 9.6 mg/dL (H)).  - Hyperphosphatemia also noted - start binder  - Continues to make urine  - Nephrology following: no acute indication for HD at this time  - Renally dosing all medications  - Avoid nephrotoxins

## 2022-08-14 LAB
ABO + RH BLD: NORMAL
ALBUMIN SERPL BCP-MCNC: 2.1 G/DL (ref 3.5–5.2)
ALP SERPL-CCNC: 84 U/L (ref 55–135)
ALT SERPL W/O P-5'-P-CCNC: <5 U/L (ref 10–44)
ANION GAP SERPL CALC-SCNC: 13 MMOL/L (ref 8–16)
AST SERPL-CCNC: 14 U/L (ref 10–40)
BASOPHILS # BLD AUTO: 0.03 K/UL (ref 0–0.2)
BASOPHILS NFR BLD: 0.8 % (ref 0–1.9)
BILIRUB SERPL-MCNC: 0.2 MG/DL (ref 0.1–1)
BLD GP AB SCN CELLS X3 SERPL QL: NORMAL
BUN SERPL-MCNC: 64 MG/DL (ref 6–20)
CALCIUM SERPL-MCNC: 8.8 MG/DL (ref 8.7–10.5)
CHLORIDE SERPL-SCNC: 105 MMOL/L (ref 95–110)
CO2 SERPL-SCNC: 22 MMOL/L (ref 23–29)
CREAT SERPL-MCNC: 8.9 MG/DL (ref 0.5–1.4)
DIFFERENTIAL METHOD: ABNORMAL
EOSINOPHIL # BLD AUTO: 0.5 K/UL (ref 0–0.5)
EOSINOPHIL NFR BLD: 14.4 % (ref 0–8)
ERYTHROCYTE [DISTWIDTH] IN BLOOD BY AUTOMATED COUNT: 12.9 % (ref 11.5–14.5)
EST. GFR  (NO RACE VARIABLE): 6.7 ML/MIN/1.73 M^2
FERRITIN SERPL-MCNC: 674 NG/ML (ref 20–300)
FOLATE SERPL-MCNC: 10.5 NG/ML (ref 4–24)
GLUCOSE SERPL-MCNC: 131 MG/DL (ref 70–110)
HCT VFR BLD AUTO: 25.2 % (ref 40–54)
HGB BLD-MCNC: 8.2 G/DL (ref 14–18)
IMM GRANULOCYTES # BLD AUTO: 0.05 K/UL (ref 0–0.04)
IMM GRANULOCYTES NFR BLD AUTO: 1.3 % (ref 0–0.5)
IRON SERPL-MCNC: 51 UG/DL (ref 45–160)
LYMPHOCYTES # BLD AUTO: 0.7 K/UL (ref 1–4.8)
LYMPHOCYTES NFR BLD: 19.8 % (ref 18–48)
MAGNESIUM SERPL-MCNC: 2.1 MG/DL (ref 1.6–2.6)
MCH RBC QN AUTO: 30 PG (ref 27–31)
MCHC RBC AUTO-ENTMCNC: 32.5 G/DL (ref 32–36)
MCV RBC AUTO: 92 FL (ref 82–98)
MONOCYTES # BLD AUTO: 0.4 K/UL (ref 0.3–1)
MONOCYTES NFR BLD: 9.9 % (ref 4–15)
NEUTROPHILS # BLD AUTO: 2 K/UL (ref 1.8–7.7)
NEUTROPHILS NFR BLD: 53.8 % (ref 38–73)
NRBC BLD-RTO: 0 /100 WBC
PHOSPHATE SERPL-MCNC: 6.2 MG/DL (ref 2.7–4.5)
PLATELET # BLD AUTO: 175 K/UL (ref 150–450)
PMV BLD AUTO: 9.4 FL (ref 9.2–12.9)
POCT GLUCOSE: 110 MG/DL (ref 70–110)
POCT GLUCOSE: 150 MG/DL (ref 70–110)
POCT GLUCOSE: 161 MG/DL (ref 70–110)
POCT GLUCOSE: 171 MG/DL (ref 70–110)
POCT GLUCOSE: 227 MG/DL (ref 70–110)
POTASSIUM SERPL-SCNC: 4.8 MMOL/L (ref 3.5–5.1)
PROT SERPL-MCNC: 6.1 G/DL (ref 6–8.4)
RBC # BLD AUTO: 2.73 M/UL (ref 4.6–6.2)
SATURATED IRON: 21 % (ref 20–50)
SODIUM SERPL-SCNC: 140 MMOL/L (ref 136–145)
TOTAL IRON BINDING CAPACITY: 244 UG/DL (ref 250–450)
TRANSFERRIN SERPL-MCNC: 165 MG/DL (ref 200–375)
VIT B12 SERPL-MCNC: 563 PG/ML (ref 210–950)
WBC # BLD AUTO: 3.74 K/UL (ref 3.9–12.7)

## 2022-08-14 PROCEDURE — 63600175 PHARM REV CODE 636 W HCPCS: Performed by: INTERNAL MEDICINE

## 2022-08-14 PROCEDURE — 25000003 PHARM REV CODE 250: Performed by: HOSPITALIST

## 2022-08-14 PROCEDURE — 63600175 PHARM REV CODE 636 W HCPCS: Performed by: STUDENT IN AN ORGANIZED HEALTH CARE EDUCATION/TRAINING PROGRAM

## 2022-08-14 PROCEDURE — 85025 COMPLETE CBC W/AUTO DIFF WBC: CPT | Performed by: STUDENT IN AN ORGANIZED HEALTH CARE EDUCATION/TRAINING PROGRAM

## 2022-08-14 PROCEDURE — 82728 ASSAY OF FERRITIN: CPT | Performed by: INTERNAL MEDICINE

## 2022-08-14 PROCEDURE — 84100 ASSAY OF PHOSPHORUS: CPT | Performed by: STUDENT IN AN ORGANIZED HEALTH CARE EDUCATION/TRAINING PROGRAM

## 2022-08-14 PROCEDURE — 25000003 PHARM REV CODE 250: Performed by: INTERNAL MEDICINE

## 2022-08-14 PROCEDURE — 86850 RBC ANTIBODY SCREEN: CPT | Performed by: INTERNAL MEDICINE

## 2022-08-14 PROCEDURE — 82607 VITAMIN B-12: CPT | Performed by: INTERNAL MEDICINE

## 2022-08-14 PROCEDURE — 99233 SBSQ HOSP IP/OBS HIGH 50: CPT | Mod: 95,,, | Performed by: INTERNAL MEDICINE

## 2022-08-14 PROCEDURE — 25000003 PHARM REV CODE 250

## 2022-08-14 PROCEDURE — 99233 PR SUBSEQUENT HOSPITAL CARE,LEVL III: ICD-10-PCS | Mod: 95,,, | Performed by: INTERNAL MEDICINE

## 2022-08-14 PROCEDURE — 82746 ASSAY OF FOLIC ACID SERUM: CPT | Performed by: INTERNAL MEDICINE

## 2022-08-14 PROCEDURE — 83735 ASSAY OF MAGNESIUM: CPT | Performed by: STUDENT IN AN ORGANIZED HEALTH CARE EDUCATION/TRAINING PROGRAM

## 2022-08-14 PROCEDURE — 80053 COMPREHEN METABOLIC PANEL: CPT | Performed by: STUDENT IN AN ORGANIZED HEALTH CARE EDUCATION/TRAINING PROGRAM

## 2022-08-14 PROCEDURE — 84466 ASSAY OF TRANSFERRIN: CPT | Performed by: INTERNAL MEDICINE

## 2022-08-14 PROCEDURE — 20600001 HC STEP DOWN PRIVATE ROOM

## 2022-08-14 PROCEDURE — 97116 GAIT TRAINING THERAPY: CPT | Mod: CQ

## 2022-08-14 RX ORDER — CEFAZOLIN SODIUM 2 G/50ML
2 SOLUTION INTRAVENOUS
Status: DISCONTINUED | OUTPATIENT
Start: 2022-08-14 | End: 2022-08-14

## 2022-08-14 RX ORDER — CEFAZOLIN SODIUM/D5W 2 G/100 ML
2 PLASTIC BAG, INJECTION (ML) INTRAVENOUS
Status: DISCONTINUED | OUTPATIENT
Start: 2022-08-14 | End: 2022-08-14

## 2022-08-14 RX ADMIN — OXYCODONE 5 MG: 5 TABLET ORAL at 10:08

## 2022-08-14 RX ADMIN — OXYCODONE 5 MG: 5 TABLET ORAL at 02:08

## 2022-08-14 RX ADMIN — SENNOSIDES AND DOCUSATE SODIUM 2 TABLET: 50; 8.6 TABLET ORAL at 08:08

## 2022-08-14 RX ADMIN — INSULIN DETEMIR 10 UNITS: 100 INJECTION, SOLUTION SUBCUTANEOUS at 09:08

## 2022-08-14 RX ADMIN — INSULIN DETEMIR 10 UNITS: 100 INJECTION, SOLUTION SUBCUTANEOUS at 08:08

## 2022-08-14 RX ADMIN — HEPARIN SODIUM 5000 UNITS: 5000 INJECTION INTRAVENOUS; SUBCUTANEOUS at 09:08

## 2022-08-14 RX ADMIN — INSULIN ASPART 8 UNITS: 100 INJECTION, SOLUTION INTRAVENOUS; SUBCUTANEOUS at 06:08

## 2022-08-14 RX ADMIN — SEVELAMER CARBONATE 800 MG: 800 TABLET, FILM COATED ORAL at 06:08

## 2022-08-14 RX ADMIN — AMLODIPINE BESYLATE 5 MG: 5 TABLET ORAL at 08:08

## 2022-08-14 RX ADMIN — OXYCODONE 5 MG: 5 TABLET ORAL at 06:08

## 2022-08-14 RX ADMIN — INSULIN ASPART 4 UNITS: 100 INJECTION, SOLUTION INTRAVENOUS; SUBCUTANEOUS at 12:08

## 2022-08-14 RX ADMIN — HEPARIN SODIUM 5000 UNITS: 5000 INJECTION INTRAVENOUS; SUBCUTANEOUS at 06:08

## 2022-08-14 RX ADMIN — DEXTROSE 2 G: 50 INJECTION, SOLUTION INTRAVENOUS at 08:08

## 2022-08-14 RX ADMIN — OXYCODONE 5 MG: 5 TABLET ORAL at 08:08

## 2022-08-14 RX ADMIN — ATORVASTATIN CALCIUM 20 MG: 20 TABLET, FILM COATED ORAL at 08:08

## 2022-08-14 RX ADMIN — SEVELAMER CARBONATE 800 MG: 800 TABLET, FILM COATED ORAL at 12:08

## 2022-08-14 RX ADMIN — HEPARIN SODIUM 5000 UNITS: 5000 INJECTION INTRAVENOUS; SUBCUTANEOUS at 02:08

## 2022-08-14 RX ADMIN — INSULIN ASPART 8 UNITS: 100 INJECTION, SOLUTION INTRAVENOUS; SUBCUTANEOUS at 12:08

## 2022-08-14 RX ADMIN — INSULIN ASPART 8 UNITS: 100 INJECTION, SOLUTION INTRAVENOUS; SUBCUTANEOUS at 08:08

## 2022-08-14 RX ADMIN — SEVELAMER CARBONATE 800 MG: 800 TABLET, FILM COATED ORAL at 08:08

## 2022-08-14 NOTE — ASSESSMENT & PLAN NOTE
- Afebrile, no leukocytosis  - Continue Ancef  - S/p OR with Ortho 8/6 for repeat washout  - Intra-op cultures with S.aureus: continuing Ancef for MSSA  - PRN analgesics provided  - Continuing to monitor; maintain drain for now - Ortho continues to monitor

## 2022-08-14 NOTE — PROGRESS NOTES
[facility] - Telemetry Stepdown  Orthopedics  Progress Note    Patient Name: Wilfredo Thomas  MRN: [id number]  Admission Date: 7/26/2022  Hospital Length of Stay: 19 days  Attending Provider: Mervat Johnson MD  Primary Care Provider: Aylin Wayne DO  Follow-up For: Procedure(s) (LRB):  Incision and Drainage - LEFT THIGH. (Left)    Post-Operative Day: 8 Days Post-Op  Subjective:     Principal Problem:Abscess of left thigh    Principal Orthopedic Problem: Left thigh I&D 7/29 and 8/6  Staph species on thigh abscess cultures    Interval History  S/p repeat L thigh I&D 8/6/22. NAEON, patient stable, pain controlled. No acute complaints this morning. Drain with 70cc serosainguinous output. Patient feels like he is baseline, not worsening      Review of patient's allergies indicates:  No Known Allergies    Current Facility-Administered Medications   Medication    acetaminophen tablet 1,000 mg    amLODIPine tablet 5 mg    atorvastatin tablet 20 mg    ceFAZolin 2 gram in dextrose 5% 100 mL IVPB (premix)    COVID-19 vac, forrest(Pfizer)(PF) (Pfizer COVID-19) 30 mcg/0.3 mL injection 0.3 mL    dextrose 10% bolus 125 mL    dextrose 10% bolus 250 mL    glucagon (human recombinant) injection 1 mg    glucose chewable tablet 16 g    glucose chewable tablet 24 g    heparin (porcine) injection 5,000 Units    insulin aspart U-100 pen 1-10 Units    insulin aspart U-100 pen 8 Units    insulin detemir U-100 pen 10 Units    morphine injection 3 mg    ondansetron injection 4 mg    oxyCODONE immediate release tablet 5 mg    senna-docusate 8.6-50 mg per tablet 2 tablet    sevelamer carbonate tablet 800 mg    sodium chloride 0.9% flush 10 mL     Objective:     Vital Signs (Most Recent):  Temp: 98.5 °F (36.9 °C) (08/14/22 0700)  Pulse: 90 (08/14/22 0700)  Resp: 20 (08/14/22 0700)  BP: (!) 152/80 (08/14/22 0700)  SpO2: (!) 94 % (08/14/22 0700) Vital Signs (24h Range):  Temp:  [98.2 °F (36.8 °C)-98.6 °F (37 °C)] 98.5 °F (36.9  "°C)  Pulse:  [78-98] 90  Resp:  [18-20] 20  SpO2:  [93 %-98 %] 94 %  BP: (141-170)/(76-95) 152/80     Weight: 104.3 kg (229 lb 15 oz)  Height: 5' 7" (170.2 cm)  Body mass index is 36.01 kg/m².      Intake/Output Summary (Last 24 hours) at 8/14/2022 0842  Last data filed at 8/14/2022 0622  Gross per 24 hour   Intake 1200 ml   Output 2295 ml   Net -1095 ml         Ortho/SPM Exam  Left lower extremity  Left thigh dressing cdi  Drain in place, ss output  No erythema of thigh, no signficant swelling  Compartments soft and compressible  Painless ROM of hip and knee  NVI        CBC:   Recent Labs   Lab 08/13/22  1043 08/14/22  0426   WBC 3.50* 3.74*   HGB 7.5* 8.2*   HCT 22.6* 25.2*    175       CMP:   Recent Labs   Lab 08/13/22  1043 08/14/22  0426   * 140   K 4.4 4.8    105   CO2 23 22*   * 131*   BUN 58* 64*   CREATININE 9.6* 8.9*   CALCIUM 8.6* 8.8   PROT 5.7* 6.1   ALBUMIN 1.9* 2.1*   BILITOT 0.1 0.2   ALKPHOS 69 84   AST 13 14   ALT <5* <5*   ANIONGAP 10 13         All pertinent labs within the past 24 hours have been reviewed.    Significant Imaging: I have reviewed and interpreted all pertinent imaging results/findings.      Assessment/Plan:     * Abscess of left thigh  47M with DM admitted 7/26/22 for DKA, ortho following for left lateral thigh abscess. s/p I&D and drain placement left thigh 7/29/22. Cx +MSSA. Had high drain purulent output after first I&D, was taken back to OR 8/6/22 for repeat I&D. Drain in place, dressings CDI. Nephrology on board for ANGE.     -- ANGE nephrology managing   -- DVT SQH  -- Ancef for MSSA per ID    -- Pain MM  -- Drain output 70cc, will keep drain  -- CRP downtrending 8/9, continue trending ESR/CRP  -- Encourage ambulation    Dispo: New CRP tomorrow, npo MN as precaution, tight BG control.           Joseph Patterson MD  Orthopedics  [facility] - Telemetry Stepdown  "

## 2022-08-14 NOTE — PT/OT/SLP PROGRESS
Physical Therapy Treatment    Patient Name:  Wilfredo Thomas   MRN:  [id number]    Recommendations:     Discharge Recommendations:  outpatient PT   Discharge Equipment Recommendations: shower chair, cane, straight   Barriers to discharge: None    Assessment:     Wilfredo Thomas is a 47 y.o. male admitted with a medical diagnosis of Abscess of left thigh.  He presents with the following impairments/functional limitations:  impaired balance (impaired endurance; pain; decreased upper extremity function; impaired fine motor) . Pt continues to remain motivated and cooperative with treatment session. Pt Progressing with PT Intervention. Pt with no LOB with gait with SPC.. Pt would continue to benefit from skilled PT to address overall functional mobility, goals , and to return to functional baseline.  Goals remain appropriate.      Rehab Prognosis: Good; patient would benefit from acute skilled PT services to address these deficits and reach maximum level of function.    Recent Surgery: Procedure(s) (LRB):  Incision and Drainage - LEFT THIGH. (Left) 8 Days Post-Op    Plan:     During this hospitalization, patient to be seen 3 x/week to address the identified rehab impairments via gait training, therapeutic activities, therapeutic exercises, neuromuscular re-education and progress toward the following goals:    · Plan of Care Expires:  08/26/22    Subjective     Patient/Family Comments/goals: I will try walking with a cane  Pain/Comfort:  · Pain Rating 1:  (not rated)  · Location - Side 1: Left  · Location 1: wrist  · Pain Addressed 1: Reposition, Distraction      Objective:     Communicated with RN prior to session.  Patient found ambulatory in room/bach with JOHANN drain, telemetry upon PT entry to room.     General Precautions: Standard, fall   Orthopedic Precautions:LLE weight bearing as tolerated   Braces: N/A  Respiratory Status: Room air     Functional Mobility:  · Transfers:     · Sit to Stand:  supervision with no  AD  Gait: 250 ft with no AD stand by assistance with 2 episode of LOB , pt able to self recover. Pt amb with SC with SBA for safety x 150 ft with no LOB  · Pt demo wide MIKEY, decreased step length on L,  antalgic gait, decreased gait speed, R hip drop in L stance    AM-PAC 6 CLICK MOBILITY  Turning over in bed (including adjusting bedclothes, sheets and blankets)?: 4  Sitting down on and standing up from a chair with arms (e.g., wheelchair, bedside commode, etc.): 4  Moving from lying on back to sitting on the side of the bed?: 4  Moving to and from a bed to a chair (including a wheelchair)?: 4  Need to walk in hospital room?: 4  Climbing 3-5 steps with a railing?: 3  Basic Mobility Total Score: 23       Therapeutic Activities and Exercises:    Therapist provided instruction and educated of  patient on progress, safety,d/c,PT POC,   proper body mechanics, energy conservation, and fall prevention strategies during tasks listed above, on the effects of prolonged immobility and the importance of performing OOB activity and exercises to promote healing and reduce recovery time     Updated white board with appropriate PT mobility information for medical team notification      Donned an extra gown  Call nursing/pct to transfer to chair/use bathroom. Pt stated understanding     Bedside table in front of patient and area set up for function, convenience, and safety. RN aware of patient's mobility needs and status. Questions/concerns addressed within PTA scope of practice; patient  with no further questions. Time was provided for active listening, discussion of health disposition, and discussion of safe discharge. Pt?verbalized?agreement     Patient left up in chair with all lines intact, call button in reach and nsg notified..    GOALS:   Multidisciplinary Problems     Physical Therapy Goals        Problem: Physical Therapy    Goal Priority Disciplines Outcome Goal Variances Interventions   Physical Therapy Goal     PT,  PT/OT Ongoing, Progressing     Description: Goals to be met by: 22     Patient will increase functional independence with mobility by performin. Supine to sit with Catron  2. Sit to supine with Catron  3. Sit to stand transfer with Catron  4. Gait  x >150 feet with Contact Guard Assistance using Rolling Walker. - Met   Ambulate 400' with supervision assistance with no AD.   5. Lower extremity exercise program x 20 reps per handout, with independence                       Time Tracking:     PT Received On: 22  PT Start Time: 718     PT Stop Time: 742  PT Total Time (min): 24 min     Billable Minutes: Gait Training 24    Treatment Type: Treatment  PT/PTA: PTA     PTA Visit Number: 2     2022

## 2022-08-14 NOTE — PLAN OF CARE
ID follow up note  Patient not seen today. Chart reviewed. Afebrile and VSS. WBC 3.74k, Cr 8.9. Inflammatory markers down trending. Wrights stain positive. Ortho surgery and nephrology following. Patient will be made NPO at midnight for possible repeat washout tomorrow if team feels indicated. Autoimmune workup in process.         Recommendations:   · Emanuel stain positive. Questionable if beta lactam therapy may be contributing to current renal function. Will discontinue Cefazolin. Plan to start Daptomycin in a.m for MSSA (received a dose of Cefazolin already today). CPK ordered. Will confirm Daptomycin susceptibility with micro lab.  · Follow up Orthopedic surgery plans  · Continue ANGE management per Nephrology  · ID will follow up with the patient tomorrow.      Discussed antibiotic plan wit Dr. Johnson.    Please secure chat for any questions. Thank you.  Giselle Nichols PA-C

## 2022-08-14 NOTE — ASSESSMENT & PLAN NOTE
Estimated Creatinine Clearance: 11.6 mL/min (A) (based on SCr of 8.9 mg/dL (H)). - improved  - Hyperphosphatemia also noted - start binder  - Continues to make urine  - Nephrology following: no acute indication for HD at this time  - Renally dosing all medications  - Avoid nephrotoxins

## 2022-08-14 NOTE — SUBJECTIVE & OBJECTIVE
This encounter was provided through telemedicine.  Patient was transferred to the telemedicine service on:  08/9/2022   The patient location is: [id number] A admitted 7/26/2022  8:24 PM.  Present with the patient at the time of the telemed/virtual assessment: Telepresenter    Interval History/Overnight Events:   Clinical record since admit reviewed.  Patient able to provide history.    Drain output 70 cc in 24 hours; but only one shift documented; Cr improved to 8.9; potassium 4.8 so low potassium diet     Review of Systems   Constitutional:  Positive for fatigue. Negative for activity change and fever.   Respiratory:  Negative for cough and shortness of breath.    Cardiovascular:  Positive for leg swelling. Negative for chest pain.   Gastrointestinal:  Negative for diarrhea and vomiting.   Musculoskeletal:  Positive for myalgias.      Inpatient Medications:  Scheduled Meds:   amLODIPine  5 mg Oral BID    atorvastatin  20 mg Oral Daily    heparin (porcine)  5,000 Units Subcutaneous Q8H    insulin aspart U-100  8 Units Subcutaneous TIDWM    insulin detemir U-100  10 Units Subcutaneous BID    senna-docusate 8.6-50 mg  2 tablet Oral Daily    sevelamer carbonate  800 mg Oral TID WM     Continuous Infusions:  PRN Meds:.acetaminophen, sars-cov-2 (covid-19), dextrose 10%, dextrose 10%, glucagon (human recombinant), glucose, glucose, insulin aspart U-100, morphine, ondansetron, oxyCODONE, sodium chloride 0.9%      Objective:     Temp:  [98.2 °F (36.8 °C)-98.6 °F (37 °C)] 98.5 °F (36.9 °C)  Pulse:  [78-98] 90  Resp:  [18-20] 20  SpO2:  [93 %-98 %] 94 %  BP: (141-170)/(76-95) 152/80      Intake/Output Summary (Last 24 hours) at 8/14/2022 0931  Last data filed at 8/14/2022 0622  Gross per 24 hour   Intake 1200 ml   Output 2295 ml   Net -1095 ml          Body mass index is 36.01 kg/m².    Physical Exam  Vitals and nursing note reviewed.   Constitutional:       General: He is not in acute distress.     Appearance: Normal  appearance. He is normal weight. He is not ill-appearing.   HENT:      Head: Normocephalic and atraumatic.      Right Ear: Hearing normal.      Left Ear: Hearing normal.      Nose: Nose normal.   Eyes:      General: No scleral icterus.        Right eye: No discharge.         Left eye: No discharge.      Extraocular Movements: Extraocular movements intact.   Cardiovascular:      Rate and Rhythm: Normal rate.   Pulmonary:      Effort: Pulmonary effort is normal. No accessory muscle usage or respiratory distress.   Musculoskeletal:      Right lower leg: Edema present.      Left lower leg: Edema present.      Comments: Dressing to left thigh with drain (serosanguineous drainage)   Neurological:      General: No focal deficit present.      Mental Status: He is alert and oriented to person, place, and time.      Cranial Nerves: No cranial nerve deficit.      Motor: No weakness.   Psychiatric:         Attention and Perception: Attention normal.         Mood and Affect: Mood normal.         Speech: Speech normal.         Behavior: Behavior is cooperative.        Labs:  Recent Results (from the past 24 hour(s))   Phosphorus    Collection Time: 08/13/22 10:43 AM   Result Value Ref Range    Phosphorus 6.3 (H) 2.7 - 4.5 mg/dL   Magnesium    Collection Time: 08/13/22 10:43 AM   Result Value Ref Range    Magnesium 1.9 1.6 - 2.6 mg/dL   Comprehensive Metabolic Panel    Collection Time: 08/13/22 10:43 AM   Result Value Ref Range    Sodium 135 (L) 136 - 145 mmol/L    Potassium 4.4 3.5 - 5.1 mmol/L    Chloride 102 95 - 110 mmol/L    CO2 23 23 - 29 mmol/L    Glucose 226 (H) 70 - 110 mg/dL    BUN 58 (H) 6 - 20 mg/dL    Creatinine 9.6 (H) 0.5 - 1.4 mg/dL    Calcium 8.6 (L) 8.7 - 10.5 mg/dL    Total Protein 5.7 (L) 6.0 - 8.4 g/dL    Albumin 1.9 (L) 3.5 - 5.2 g/dL    Total Bilirubin 0.1 0.1 - 1.0 mg/dL    Alkaline Phosphatase 69 55 - 135 U/L    AST 13 10 - 40 U/L    ALT <5 (L) 10 - 44 U/L    Anion Gap 10 8 - 16 mmol/L    eGFR 6.1 (A) >60  mL/min/1.73 m^2   CBC Auto Differential    Collection Time: 08/13/22 10:43 AM   Result Value Ref Range    WBC 3.50 (L) 3.90 - 12.70 K/uL    RBC 2.49 (L) 4.60 - 6.20 M/uL    Hemoglobin 7.5 (L) 14.0 - 18.0 g/dL    Hematocrit 22.6 (L) 40.0 - 54.0 %    MCV 91 82 - 98 fL    MCH 30.1 27.0 - 31.0 pg    MCHC 33.2 32.0 - 36.0 g/dL    RDW 12.8 11.5 - 14.5 %    Platelets 178 150 - 450 K/uL    MPV 9.4 9.2 - 12.9 fL    Immature Granulocytes 1.4 (H) 0.0 - 0.5 %    Gran # (ANC) 2.1 1.8 - 7.7 K/uL    Immature Grans (Abs) 0.05 (H) 0.00 - 0.04 K/uL    Lymph # 0.6 (L) 1.0 - 4.8 K/uL    Mono # 0.3 0.3 - 1.0 K/uL    Eos # 0.4 0.0 - 0.5 K/uL    Baso # 0.03 0.00 - 0.20 K/uL    nRBC 0 0 /100 WBC    Gran % 58.6 38.0 - 73.0 %    Lymph % 18.0 18.0 - 48.0 %    Mono % 9.1 4.0 - 15.0 %    Eosinophil % 12.0 (H) 0.0 - 8.0 %    Basophil % 0.9 0.0 - 1.9 %    Differential Method Automated    Sedimentation rate    Collection Time: 08/13/22 10:43 AM   Result Value Ref Range    Sed Rate 58 (H) 0 - 23 mm/Hr   C-Reactive Protein    Collection Time: 08/13/22 10:43 AM   Result Value Ref Range    CRP 24.9 (H) 0.0 - 8.2 mg/L   POCT glucose    Collection Time: 08/13/22 11:34 AM   Result Value Ref Range    POCT Glucose 235 (H) 70 - 110 mg/dL   POCT glucose    Collection Time: 08/13/22  4:05 PM   Result Value Ref Range    POCT Glucose 122 (H) 70 - 110 mg/dL   POCT glucose    Collection Time: 08/13/22  8:37 PM   Result Value Ref Range    POCT Glucose 171 (H) 70 - 110 mg/dL   Phosphorus    Collection Time: 08/14/22  4:26 AM   Result Value Ref Range    Phosphorus 6.2 (H) 2.7 - 4.5 mg/dL   Magnesium    Collection Time: 08/14/22  4:26 AM   Result Value Ref Range    Magnesium 2.1 1.6 - 2.6 mg/dL   Comprehensive Metabolic Panel    Collection Time: 08/14/22  4:26 AM   Result Value Ref Range    Sodium 140 136 - 145 mmol/L    Potassium 4.8 3.5 - 5.1 mmol/L    Chloride 105 95 - 110 mmol/L    CO2 22 (L) 23 - 29 mmol/L    Glucose 131 (H) 70 - 110 mg/dL    BUN 64 (H) 6 - 20  mg/dL    Creatinine 8.9 (H) 0.5 - 1.4 mg/dL    Calcium 8.8 8.7 - 10.5 mg/dL    Total Protein 6.1 6.0 - 8.4 g/dL    Albumin 2.1 (L) 3.5 - 5.2 g/dL    Total Bilirubin 0.2 0.1 - 1.0 mg/dL    Alkaline Phosphatase 84 55 - 135 U/L    AST 14 10 - 40 U/L    ALT <5 (L) 10 - 44 U/L    Anion Gap 13 8 - 16 mmol/L    eGFR 6.7 (A) >60 mL/min/1.73 m^2   CBC Auto Differential    Collection Time: 08/14/22  4:26 AM   Result Value Ref Range    WBC 3.74 (L) 3.90 - 12.70 K/uL    RBC 2.73 (L) 4.60 - 6.20 M/uL    Hemoglobin 8.2 (L) 14.0 - 18.0 g/dL    Hematocrit 25.2 (L) 40.0 - 54.0 %    MCV 92 82 - 98 fL    MCH 30.0 27.0 - 31.0 pg    MCHC 32.5 32.0 - 36.0 g/dL    RDW 12.9 11.5 - 14.5 %    Platelets 175 150 - 450 K/uL    MPV 9.4 9.2 - 12.9 fL    Immature Granulocytes 1.3 (H) 0.0 - 0.5 %    Gran # (ANC) 2.0 1.8 - 7.7 K/uL    Immature Grans (Abs) 0.05 (H) 0.00 - 0.04 K/uL    Lymph # 0.7 (L) 1.0 - 4.8 K/uL    Mono # 0.4 0.3 - 1.0 K/uL    Eos # 0.5 0.0 - 0.5 K/uL    Baso # 0.03 0.00 - 0.20 K/uL    nRBC 0 0 /100 WBC    Gran % 53.8 38.0 - 73.0 %    Lymph % 19.8 18.0 - 48.0 %    Mono % 9.9 4.0 - 15.0 %    Eosinophil % 14.4 (H) 0.0 - 8.0 %    Basophil % 0.8 0.0 - 1.9 %    Differential Method Automated    Type & Screen    Collection Time: 08/14/22  4:26 AM   Result Value Ref Range    Group & Rh B POS     Indirect Nguyen NEG    Vitamin B12    Collection Time: 08/14/22  4:26 AM   Result Value Ref Range    Vitamin B-12 563 210 - 950 pg/mL   Folate    Collection Time: 08/14/22  4:26 AM   Result Value Ref Range    Folate 10.5 4.0 - 24.0 ng/mL   Iron and TIBC    Collection Time: 08/14/22  4:26 AM   Result Value Ref Range    Iron 51 45 - 160 ug/dL    Transferrin 165 (L) 200 - 375 mg/dL    TIBC 244 (L) 250 - 450 ug/dL    Saturated Iron 21 20 - 50 %   Ferritin    Collection Time: 08/14/22  4:26 AM   Result Value Ref Range    Ferritin 674 (H) 20.0 - 300.0 ng/mL   POCT glucose    Collection Time: 08/14/22  7:31 AM   Result Value Ref Range    POCT Glucose 150  (H) 70 - 110 mg/dL        Lab Results   Component Value Date    [id number] Negative 08/06/2022       Recent Labs   Lab 08/12/22  0307 08/13/22  1043 08/14/22  0426   WBC 4.01 3.50* 3.74*   LYMPH 22.7  0.9* 18.0  0.6* 19.8  0.7*   HGB 8.2* 7.5* 8.2*   HCT 24.7* 22.6* 25.2*    178 175       Recent Labs   Lab 08/12/22  0307 08/13/22  1043 08/14/22  0426    135* 140   K 4.4 4.4 4.8    102 105   CO2 22* 23 22*   BUN 61* 58* 64*   CREATININE 9.5* 9.6* 8.9*   * 226* 131*   CALCIUM 9.0 8.6* 8.8   MG 2.1 1.9 2.1   PHOS 7.2* 6.3* 6.2*       Recent Labs   Lab 08/12/22 0307 08/13/22  1043 08/14/22  0426   ALKPHOS 75 69 84   ALT <5* <5* <5*   AST 13 13 14   ALBUMIN 1.9* 1.9* 2.1*   PROT 5.8* 5.7* 6.1   BILITOT 0.2 0.1 0.2          Recent Labs     08/11/22  1743 08/13/22  1043 08/14/22  0426   FERRITIN  --   --  674*   CRP  --  24.9*  --    CPK 9*  --   --          All labs within the last 24 hours were reviewed.     Microbiology:  Microbiology Results (last 7 days)       Procedure Component Value Units Date/Time    Aerobic culture [[id number]]  (Abnormal) Collected: 08/06/22 1153    Order Status: Completed Specimen: Wound from Hip, Left Updated: 08/10/22 1214     Aerobic Bacterial Culture STAPHYLOCOCCUS AUREUS  Moderate  For susceptibility see order #[id number]      Narrative:      #1 Left hip fluid    Aerobic culture [[id number]]  (Abnormal)  (Susceptibility) Collected: 08/06/22 1153    Order Status: Completed Specimen: Wound from Hip, Left Updated: 08/10/22 1214     Aerobic Bacterial Culture STAPHYLOCOCCUS AUREUS  Few      Narrative:      #2 Left hip fluid    Culture, Anaerobe [[id number]] Collected: 08/06/22 1153    Order Status: Completed Specimen: Body Fluid from Hip, Left Updated: 08/10/22 1035     Anaerobic Culture No anaerobes isolated    Narrative:      #2 Left hip fluid    Culture, Anaerobe [[id number]] Collected: 08/06/22 1153    Order Status: Completed Specimen: Body Fluid from Hip, Left  Updated: 08/10/22 1034     Anaerobic Culture No anaerobes isolated    Narrative:      #1 Left hip fluid    Blood culture [[id number]] Collected: 08/05/22 0850    Order Status: Completed Specimen: Blood from Antecubital, Right Arm Updated: 08/10/22 1012     Blood Culture, Routine No growth after 5 days.    Blood culture [[id number]] Collected: 08/05/22 0852    Order Status: Completed Specimen: Blood from Peripheral, Right Hand Updated: 08/10/22 1012     Blood Culture, Routine No growth after 5 days.    Fungus culture [[id number]] Collected: 08/05/22 0817    Order Status: Completed Specimen: Joint Fluid from Wrist, Left Updated: 08/09/22 1307     Fungus (Mycology) Culture Culture in progress    AFB Culture & Smear [[id number]] Collected: 08/05/22 0817    Order Status: Completed Specimen: Joint Fluid from Wrist, Left Updated: 08/08/22 1529     AFB Culture & Smear Culture in progress     AFB CULTURE STAIN No acid fast bacilli seen.    Aerobic culture [[id number]] Collected: 08/05/22 0817    Order Status: Completed Specimen: Bone from Wrist, Left Updated: 08/08/22 0540     Aerobic Bacterial Culture No growth              Imaging  ECG Results              EKG 12-lead (Final result)  Result time 07/27/22 07:53:05      Final result by Interface, Lab In [facility] (07/27/22 07:53:05)                   Narrative:    Test Reason : R00.0,    Vent. Rate : 138 BPM     Atrial Rate : 138 BPM     P-R Int : 126 ms          QRS Dur : 086 ms      QT Int : 286 ms       P-R-T Axes : 051 002 052 degrees     QTc Int : 433 ms    Sinus tachycardia  Otherwise normal ECG  No previous ECGs available  Confirmed by Crow HARRIS MD ([id number]) on 7/27/2022 7:53:00 AM    Referred By: [email or web address]   SELF           Confirmed By:Crow HARRIS MD                                    Results for orders placed during the hospital encounter of 07/26/22    Echo    Interpretation Summary  · The left ventricle is normal in size with normal systolic function.  · The estimated  ejection fraction is 65%.  · Normal left ventricular diastolic function.  · Normal right ventricular size with normal right ventricular systolic function.  · The estimated PA systolic pressure is 16 mmHg.  · Normal central venous pressure (3 mmHg).      Echo  · The left ventricle is normal in size with normal systolic function.  · The estimated ejection fraction is 65%.  · Normal left ventricular diastolic function.  · Normal right ventricular size with normal right ventricular systolic   function.  · The estimated PA systolic pressure is 16 mmHg.  · Normal central venous pressure (3 mmHg).         All imaging within the last 24 hours was reviewed.       Discharge Planning   FILEMON: 8/15/2022     Code Status: Full Code   Is the patient medically ready for discharge?: No    Reason for patient still in hospital (select all that apply): Patient trending condition, Laboratory test, Treatment, Consult recommendations, and Pending disposition  Discharge Plan A: Home Health

## 2022-08-14 NOTE — ASSESSMENT & PLAN NOTE
47M with DM admitted 7/26/22 for DKA, ortho following for left lateral thigh abscess. s/p I&D and drain placement left thigh 7/29/22. Cx +MSSA. Had high drain purulent output after first I&D, was taken back to OR 8/6/22 for repeat I&D. Drain in place, dressings CDI. Nephrology on board for ANGE.     -- ANGE nephrology managing   -- DVT SQH  -- Ancef for MSSA per ID    -- Pain MM  -- Drain output 70cc, will keep drain  -- CRP downtrending 8/9, continue trending ESR/CRP  -- Encourage ambulation    Dispo: New CRP tomorrow, npo MN as precaution, tight BG control.

## 2022-08-14 NOTE — SUBJECTIVE & OBJECTIVE
"Principal Problem:Abscess of left thigh    Principal Orthopedic Problem: Left thigh I&D 7/29 and 8/6  Staph species on thigh abscess cultures    Interval History  S/p repeat L thigh I&D 8/6/22. NAEON, patient stable, pain controlled. No acute complaints this morning. Drain with 70cc serosainguinous output. Patient feels like he is baseline, not worsening      Review of patient's allergies indicates:  No Known Allergies    Current Facility-Administered Medications   Medication    acetaminophen tablet 1,000 mg    amLODIPine tablet 5 mg    atorvastatin tablet 20 mg    ceFAZolin 2 gram in dextrose 5% 100 mL IVPB (premix)    COVID-19 vac, forrest(Pfizer)(PF) (Pfizer COVID-19) 30 mcg/0.3 mL injection 0.3 mL    dextrose 10% bolus 125 mL    dextrose 10% bolus 250 mL    glucagon (human recombinant) injection 1 mg    glucose chewable tablet 16 g    glucose chewable tablet 24 g    heparin (porcine) injection 5,000 Units    insulin aspart U-100 pen 1-10 Units    insulin aspart U-100 pen 8 Units    insulin detemir U-100 pen 10 Units    morphine injection 3 mg    ondansetron injection 4 mg    oxyCODONE immediate release tablet 5 mg    senna-docusate 8.6-50 mg per tablet 2 tablet    sevelamer carbonate tablet 800 mg    sodium chloride 0.9% flush 10 mL     Objective:     Vital Signs (Most Recent):  Temp: 98.5 °F (36.9 °C) (08/14/22 0700)  Pulse: 90 (08/14/22 0700)  Resp: 20 (08/14/22 0700)  BP: (!) 152/80 (08/14/22 0700)  SpO2: (!) 94 % (08/14/22 0700) Vital Signs (24h Range):  Temp:  [98.2 °F (36.8 °C)-98.6 °F (37 °C)] 98.5 °F (36.9 °C)  Pulse:  [78-98] 90  Resp:  [18-20] 20  SpO2:  [93 %-98 %] 94 %  BP: (141-170)/(76-95) 152/80     Weight: 104.3 kg (229 lb 15 oz)  Height: 5' 7" (170.2 cm)  Body mass index is 36.01 kg/m².      Intake/Output Summary (Last 24 hours) at 8/14/2022 0842  Last data filed at 8/14/2022 0622  Gross per 24 hour   Intake 1200 ml   Output 2295 ml   Net -1095 ml         Ortho/SPM Exam  Left lower extremity  Left " thigh dressing cdi  Drain in place, ss output  No erythema of thigh, no signficant swelling  Compartments soft and compressible  Painless ROM of hip and knee  NVI        CBC:   Recent Labs   Lab 08/13/22  1043 08/14/22  0426   WBC 3.50* 3.74*   HGB 7.5* 8.2*   HCT 22.6* 25.2*    175       CMP:   Recent Labs   Lab 08/13/22  1043 08/14/22  0426   * 140   K 4.4 4.8    105   CO2 23 22*   * 131*   BUN 58* 64*   CREATININE 9.6* 8.9*   CALCIUM 8.6* 8.8   PROT 5.7* 6.1   ALBUMIN 1.9* 2.1*   BILITOT 0.1 0.2   ALKPHOS 69 84   AST 13 14   ALT <5* <5*   ANIONGAP 10 13         All pertinent labs within the past 24 hours have been reviewed.    Significant Imaging: I have reviewed and interpreted all pertinent imaging results/findings.

## 2022-08-14 NOTE — PLAN OF CARE
Problem: Adult Inpatient Plan of Care  Goal: Plan of Care Review  Outcome: Ongoing, Progressing  Goal: Patient-Specific Goal (Individualized)  Outcome: Ongoing, Progressing  Goal: Absence of Hospital-Acquired Illness or Injury  Outcome: Ongoing, Progressing  Goal: Optimal Comfort and Wellbeing  Outcome: Ongoing, Progressing  Goal: Readiness for Transition of Care  Outcome: Ongoing, Progressing     Problem: Fluid and Electrolyte Imbalance (Acute Kidney Injury/Impairment)  Goal: Fluid and Electrolyte Balance  Outcome: Ongoing, Progressing     Problem: Fall Injury Risk  Goal: Absence of Fall and Fall-Related Injury  Outcome: Ongoing, Progressing

## 2022-08-14 NOTE — PROGRESS NOTES
"      Josafat Chun - Telemetry [facility] Medicine  Telemedicine Progress Note    Patient Name: Wilfredo Thomas  MRN: [id number]  Patient Class: IP- Inpatient   Admission Date: 7/26/2022  Length of Stay: 19 days  Attending Physician: Mervat Johnson MD  Primary Care Provider: Aylin Wayne DO          Subjective:     Principal Problem:Abscess of left thigh        HPI:  48 y/o M hx of HTN, IDDM2, GERD obesity, HLD presented to the ED with complaint of 1 day of worsening SOB. Wife present at bedside. Patient states that he noticed yesterday he was feeling some shortness of breath and couldn't seem to catch his breath. His wife went to check on him this morning and noticed that he was breathing "fast and heavy" and he sounded like he was slurring his words. Wife was concerned about a stroke, so he brought him to the ED for evaluation.     Patient also reporting significant pain and swelling along his left lateral proximal thigh. He states he has noticed worsening pain over the past 2 weeks. He denies any acute trauma to the area. He was seen by ortho last week and was told to take ibu-profen for a muscle strain. This did not help his pain, so he presented again on 7/22 and was given a prednisone injection into his thigh. This also did not help his pain, and now the pain and swelling have advanced to the point that he has difficulty with ambulation. Patient denies fever, chills, nausea, vomiting.      Of note, patient has hx of poorly controlled diabetes. He was recently started on insulin by his PCP, but he has not taken any insulin since being prescribed it.      In the ED, patient hypertensive and tachycardic to the 130s. Tachypneic with RR in the 40s. On CBC, WBC 26.9, On CMP patient hyperkalemic to 5.5, CorNa 136, Bicarb 5. Cr elevated to 1.9 from BL 0.83. UA, BHB pending at time of admission. CTA without evidence of pulmonary embolism. Critical Care Medicine consulted given severe acidosis.       "       Overview/Hospital Course:    Patient admitted to the MICU for management of severe DKA. Metabolic acidosis improving on insulin drip. He also has had left thigh pain for 1-2 months. There is a large mass on his left thigh that is tender to palpation, CT revealed hematoma vs soft tissue growth. General surgery consulted and recommended IR consult. IR consulted, no indication for tap.   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 Ortho consulted and performed bedside aspiration of possible abscess which revealed >200K cells >80% segs. Will hold off on antibiotics for now per ortho recs, ortho is taking patient to the OR for irrigation of the thigh, will start antibiotics afterwards.         7/29 Metabolic acidosis slowly improving. Ortho taking patient to the OR for irrigation of the left thigh, will start antibiotics afterwards.   7/30 Transfer to hospital medicine . S/p I&D  of left thigh Abscess on7/29/22 - MRI - uptured tensor fascia jose luis (TFL) muscle with associated large hematoma. gram stain -Moderate Gram positive cocci in clusters .cultures pending.  continue Vancomycin, clindamycin and Zosyn . Bicarb 15. K replaced . off insulin drip on SQ insulin.  7/31 ID and endocrine consulted.  abscess with Calcium pyrophosphate crystals with unknown significance potassium and P replaced. Bicarbonate WNL . aerobic culture 7/28 STAPHYLOCOCCUS SPECIES . Glucose in 300s .PRN imodium  for diarrhea. PT/OT consulted - WBAT. Surgical drain with purulent output  8/1 PICC team consulted for IV access. vanc trough at 11.4. monitor for vanomycin toxicity. possible OR tomorrow for washout  may need bone biopsy to r/o osteomyelitis.Discontinued clindamycin.   8/2 glucose in 300s.  Increased Levemir  to 14 units BID and Aspart  10 units TIDWM. K replaced   8/3 ANGE with cr 0.7--> 2.7. likely vanc induced ATN with levels 40. urine studies ordered renal sonogram WNL   nephrology consulted. started on NS 100ml/h x 10h and follow up renal panel.Zosyn and  vancomycin discontinued. Strict IO monitor. condom catheter . switched to Ancef for MSSA on culture  Interval History:  8/4- MSSA- see below. /85 VSS. On room air, eating, BM on 8/2, good UO. Appreciate ortho recs. On Cephazolin, detim 14 bid and aspart 12 ac.  8/5-Cr still rising, cr =6.  Wrist still hurting. Ortho to tap it to eval for infection and gout today. /90   Pulse 85  AF, no other signs of infection.  Will discuss w Nephrology- received NS x one liter yesterday. Will repeat today. Suspect auto-diuresis.   8/6: washout with ortho today. Cr continues to rise (7.1), nephrology following  8/7: Cr 7.4, phos increasing; tolerated surgery well yesterday, intra-op cultures pending  8/8: Cr increased to 8.1, hyperphosphatemia again noted; continues to make urine, no acute indication for HD per nephrology. Intra-op cultures with S.aureus, continued cefazolin pending final C&S results.        This encounter was provided through telemedicine.  Patient was transferred to the telemedicine service on:  08/9/2022   The patient location is: [facility] admitted 7/26/2022  8:24 PM.  Present with the patient at the time of the telemed/virtual assessment: Telepresenter    Interval History/Overnight Events:   Clinical record since admit reviewed.  Patient able to provide history.    Drain output 70 cc in 24 hours; but only one shift documented; Cr improved to 8.9; potassium 4.8 so low potassium diet     Review of Systems   Constitutional:  Positive for fatigue. Negative for activity change and fever.   Respiratory:  Negative for cough and shortness of breath.    Cardiovascular:  Positive for leg swelling. Negative for chest pain.   Gastrointestinal:  Negative for diarrhea and vomiting.   Musculoskeletal:  Positive for myalgias.      Inpatient Medications:  Scheduled Meds:   amLODIPine  5 mg Oral BID    atorvastatin  20 mg  Oral Daily    heparin (porcine)  5,000 Units Subcutaneous Q8H    insulin aspart U-100  8 Units Subcutaneous TIDWM    insulin detemir U-100  10 Units Subcutaneous BID    senna-docusate 8.6-50 mg  2 tablet Oral Daily    sevelamer carbonate  800 mg Oral TID WM     Continuous Infusions:  PRN Meds:.acetaminophen, sars-cov-2 (covid-19), dextrose 10%, dextrose 10%, glucagon (human recombinant), glucose, glucose, insulin aspart U-100, morphine, ondansetron, oxyCODONE, sodium chloride 0.9%      Objective:     Temp:  [98.2 °F (36.8 °C)-98.6 °F (37 °C)] 98.5 °F (36.9 °C)  Pulse:  [78-98] 90  Resp:  [18-20] 20  SpO2:  [93 %-98 %] 94 %  BP: (141-170)/(76-95) 152/80      Intake/Output Summary (Last 24 hours) at 8/14/2022 0931  Last data filed at 8/14/2022 0622  Gross per 24 hour   Intake 1200 ml   Output 2295 ml   Net -1095 ml          Body mass index is 36.01 kg/m².    Physical Exam  Vitals and nursing note reviewed.   Constitutional:       General: He is not in acute distress.     Appearance: Normal appearance. He is normal weight. He is not ill-appearing.   HENT:      Head: Normocephalic and atraumatic.      Right Ear: Hearing normal.      Left Ear: Hearing normal.      Nose: Nose normal.   Eyes:      General: No scleral icterus.        Right eye: No discharge.         Left eye: No discharge.      Extraocular Movements: Extraocular movements intact.   Cardiovascular:      Rate and Rhythm: Normal rate.   Pulmonary:      Effort: Pulmonary effort is normal. No accessory muscle usage or respiratory distress.   Musculoskeletal:      Right lower leg: Edema present.      Left lower leg: Edema present.      Comments: Dressing to left thigh with drain (serosanguineous drainage)   Neurological:      General: No focal deficit present.      Mental Status: He is alert and oriented to person, place, and time.      Cranial Nerves: No cranial nerve deficit.      Motor: No weakness.   Psychiatric:         Attention and Perception:  Attention normal.         Mood and Affect: Mood normal.         Speech: Speech normal.         Behavior: Behavior is cooperative.        Labs:  Recent Results (from the past 24 hour(s))   Phosphorus    Collection Time: 08/13/22 10:43 AM   Result Value Ref Range    Phosphorus 6.3 (H) 2.7 - 4.5 mg/dL   Magnesium    Collection Time: 08/13/22 10:43 AM   Result Value Ref Range    Magnesium 1.9 1.6 - 2.6 mg/dL   Comprehensive Metabolic Panel    Collection Time: 08/13/22 10:43 AM   Result Value Ref Range    Sodium 135 (L) 136 - 145 mmol/L    Potassium 4.4 3.5 - 5.1 mmol/L    Chloride 102 95 - 110 mmol/L    CO2 23 23 - 29 mmol/L    Glucose 226 (H) 70 - 110 mg/dL    BUN 58 (H) 6 - 20 mg/dL    Creatinine 9.6 (H) 0.5 - 1.4 mg/dL    Calcium 8.6 (L) 8.7 - 10.5 mg/dL    Total Protein 5.7 (L) 6.0 - 8.4 g/dL    Albumin 1.9 (L) 3.5 - 5.2 g/dL    Total Bilirubin 0.1 0.1 - 1.0 mg/dL    Alkaline Phosphatase 69 55 - 135 U/L    AST 13 10 - 40 U/L    ALT <5 (L) 10 - 44 U/L    Anion Gap 10 8 - 16 mmol/L    eGFR 6.1 (A) >60 mL/min/1.73 m^2   CBC Auto Differential    Collection Time: 08/13/22 10:43 AM   Result Value Ref Range    WBC 3.50 (L) 3.90 - 12.70 K/uL    RBC 2.49 (L) 4.60 - 6.20 M/uL    Hemoglobin 7.5 (L) 14.0 - 18.0 g/dL    Hematocrit 22.6 (L) 40.0 - 54.0 %    MCV 91 82 - 98 fL    MCH 30.1 27.0 - 31.0 pg    MCHC 33.2 32.0 - 36.0 g/dL    RDW 12.8 11.5 - 14.5 %    Platelets 178 150 - 450 K/uL    MPV 9.4 9.2 - 12.9 fL    Immature Granulocytes 1.4 (H) 0.0 - 0.5 %    Gran # (ANC) 2.1 1.8 - 7.7 K/uL    Immature Grans (Abs) 0.05 (H) 0.00 - 0.04 K/uL    Lymph # 0.6 (L) 1.0 - 4.8 K/uL    Mono # 0.3 0.3 - 1.0 K/uL    Eos # 0.4 0.0 - 0.5 K/uL    Baso # 0.03 0.00 - 0.20 K/uL    nRBC 0 0 /100 WBC    Gran % 58.6 38.0 - 73.0 %    Lymph % 18.0 18.0 - 48.0 %    Mono % 9.1 4.0 - 15.0 %    Eosinophil % 12.0 (H) 0.0 - 8.0 %    Basophil % 0.9 0.0 - 1.9 %    Differential Method Automated    Sedimentation rate    Collection Time: 08/13/22 10:43 AM    Result Value Ref Range    Sed Rate 58 (H) 0 - 23 mm/Hr   C-Reactive Protein    Collection Time: 08/13/22 10:43 AM   Result Value Ref Range    CRP 24.9 (H) 0.0 - 8.2 mg/L   POCT glucose    Collection Time: 08/13/22 11:34 AM   Result Value Ref Range    POCT Glucose 235 (H) 70 - 110 mg/dL   POCT glucose    Collection Time: 08/13/22  4:05 PM   Result Value Ref Range    POCT Glucose 122 (H) 70 - 110 mg/dL   POCT glucose    Collection Time: 08/13/22  8:37 PM   Result Value Ref Range    POCT Glucose 171 (H) 70 - 110 mg/dL   Phosphorus    Collection Time: 08/14/22  4:26 AM   Result Value Ref Range    Phosphorus 6.2 (H) 2.7 - 4.5 mg/dL   Magnesium    Collection Time: 08/14/22  4:26 AM   Result Value Ref Range    Magnesium 2.1 1.6 - 2.6 mg/dL   Comprehensive Metabolic Panel    Collection Time: 08/14/22  4:26 AM   Result Value Ref Range    Sodium 140 136 - 145 mmol/L    Potassium 4.8 3.5 - 5.1 mmol/L    Chloride 105 95 - 110 mmol/L    CO2 22 (L) 23 - 29 mmol/L    Glucose 131 (H) 70 - 110 mg/dL    BUN 64 (H) 6 - 20 mg/dL    Creatinine 8.9 (H) 0.5 - 1.4 mg/dL    Calcium 8.8 8.7 - 10.5 mg/dL    Total Protein 6.1 6.0 - 8.4 g/dL    Albumin 2.1 (L) 3.5 - 5.2 g/dL    Total Bilirubin 0.2 0.1 - 1.0 mg/dL    Alkaline Phosphatase 84 55 - 135 U/L    AST 14 10 - 40 U/L    ALT <5 (L) 10 - 44 U/L    Anion Gap 13 8 - 16 mmol/L    eGFR 6.7 (A) >60 mL/min/1.73 m^2   CBC Auto Differential    Collection Time: 08/14/22  4:26 AM   Result Value Ref Range    WBC 3.74 (L) 3.90 - 12.70 K/uL    RBC 2.73 (L) 4.60 - 6.20 M/uL    Hemoglobin 8.2 (L) 14.0 - 18.0 g/dL    Hematocrit 25.2 (L) 40.0 - 54.0 %    MCV 92 82 - 98 fL    MCH 30.0 27.0 - 31.0 pg    MCHC 32.5 32.0 - 36.0 g/dL    RDW 12.9 11.5 - 14.5 %    Platelets 175 150 - 450 K/uL    MPV 9.4 9.2 - 12.9 fL    Immature Granulocytes 1.3 (H) 0.0 - 0.5 %    Gran # (ANC) 2.0 1.8 - 7.7 K/uL    Immature Grans (Abs) 0.05 (H) 0.00 - 0.04 K/uL    Lymph # 0.7 (L) 1.0 - 4.8 K/uL    Mono # 0.4 0.3 - 1.0 K/uL     Eos # 0.5 0.0 - 0.5 K/uL    Baso # 0.03 0.00 - 0.20 K/uL    nRBC 0 0 /100 WBC    Gran % 53.8 38.0 - 73.0 %    Lymph % 19.8 18.0 - 48.0 %    Mono % 9.9 4.0 - 15.0 %    Eosinophil % 14.4 (H) 0.0 - 8.0 %    Basophil % 0.8 0.0 - 1.9 %    Differential Method Automated    Type & Screen    Collection Time: 08/14/22  4:26 AM   Result Value Ref Range    Group & Rh B POS     Indirect Nguyen NEG    Vitamin B12    Collection Time: 08/14/22  4:26 AM   Result Value Ref Range    Vitamin B-12 563 210 - 950 pg/mL   Folate    Collection Time: 08/14/22  4:26 AM   Result Value Ref Range    Folate 10.5 4.0 - 24.0 ng/mL   Iron and TIBC    Collection Time: 08/14/22  4:26 AM   Result Value Ref Range    Iron 51 45 - 160 ug/dL    Transferrin 165 (L) 200 - 375 mg/dL    TIBC 244 (L) 250 - 450 ug/dL    Saturated Iron 21 20 - 50 %   Ferritin    Collection Time: 08/14/22  4:26 AM   Result Value Ref Range    Ferritin 674 (H) 20.0 - 300.0 ng/mL   POCT glucose    Collection Time: 08/14/22  7:31 AM   Result Value Ref Range    POCT Glucose 150 (H) 70 - 110 mg/dL        Lab Results   Component Value Date    [id number] Negative 08/06/2022       Recent Labs   Lab 08/12/22 0307 08/13/22  1043 08/14/22  0426   WBC 4.01 3.50* 3.74*   LYMPH 22.7  0.9* 18.0  0.6* 19.8  0.7*   HGB 8.2* 7.5* 8.2*   HCT 24.7* 22.6* 25.2*    178 175       Recent Labs   Lab 08/12/22 0307 08/13/22  1043 08/14/22  0426    135* 140   K 4.4 4.4 4.8    102 105   CO2 22* 23 22*   BUN 61* 58* 64*   CREATININE 9.5* 9.6* 8.9*   * 226* 131*   CALCIUM 9.0 8.6* 8.8   MG 2.1 1.9 2.1   PHOS 7.2* 6.3* 6.2*       Recent Labs   Lab 08/12/22  0307 08/13/22  1043 08/14/22  0426   ALKPHOS 75 69 84   ALT <5* <5* <5*   AST 13 13 14   ALBUMIN 1.9* 1.9* 2.1*   PROT 5.8* 5.7* 6.1   BILITOT 0.2 0.1 0.2          Recent Labs     08/11/22  1743 08/13/22  1043 08/14/22  0426   FERRITIN  --   --  674*   CRP  --  24.9*  --    CPK 9*  --   --          All labs within the last 24  hours were reviewed.     Microbiology:  Microbiology Results (last 7 days)       Procedure Component Value Units Date/Time    Aerobic culture [[id number]]  (Abnormal) Collected: 08/06/22 1153    Order Status: Completed Specimen: Wound from Hip, Left Updated: 08/10/22 1214     Aerobic Bacterial Culture STAPHYLOCOCCUS AUREUS  Moderate  For susceptibility see order #[id number]      Narrative:      #1 Left hip fluid    Aerobic culture [[id number]]  (Abnormal)  (Susceptibility) Collected: 08/06/22 1153    Order Status: Completed Specimen: Wound from Hip, Left Updated: 08/10/22 1214     Aerobic Bacterial Culture STAPHYLOCOCCUS AUREUS  Few      Narrative:      #2 Left hip fluid    Culture, Anaerobe [[id number]] Collected: 08/06/22 1153    Order Status: Completed Specimen: Body Fluid from Hip, Left Updated: 08/10/22 1035     Anaerobic Culture No anaerobes isolated    Narrative:      #2 Left hip fluid    Culture, Anaerobe [[id number]] Collected: 08/06/22 1153    Order Status: Completed Specimen: Body Fluid from Hip, Left Updated: 08/10/22 1034     Anaerobic Culture No anaerobes isolated    Narrative:      #1 Left hip fluid    Blood culture [[id number]] Collected: 08/05/22 0850    Order Status: Completed Specimen: Blood from Antecubital, Right Arm Updated: 08/10/22 1012     Blood Culture, Routine No growth after 5 days.    Blood culture [[id number]] Collected: 08/05/22 0852    Order Status: Completed Specimen: Blood from Peripheral, Right Hand Updated: 08/10/22 1012     Blood Culture, Routine No growth after 5 days.    Fungus culture [[id number]] Collected: 08/05/22 0817    Order Status: Completed Specimen: Joint Fluid from Wrist, Left Updated: 08/09/22 1307     Fungus (Mycology) Culture Culture in progress    AFB Culture & Smear [[id number]] Collected: 08/05/22 0817    Order Status: Completed Specimen: Joint Fluid from Wrist, Left Updated: 08/08/22 1529     AFB Culture & Smear Culture in progress     AFB CULTURE STAIN No acid fast  bacilli seen.    Aerobic culture [[id number]] Collected: 08/05/22 0817    Order Status: Completed Specimen: Bone from Wrist, Left Updated: 08/08/22 0540     Aerobic Bacterial Culture No growth              Imaging  ECG Results              EKG 12-lead (Final result)  Result time 07/27/22 07:53:05      Final result by Interface, Lab In [facility] (07/27/22 07:53:05)                   Narrative:    Test Reason : R00.0,    Vent. Rate : 138 BPM     Atrial Rate : 138 BPM     P-R Int : 126 ms          QRS Dur : 086 ms      QT Int : 286 ms       P-R-T Axes : 051 002 052 degrees     QTc Int : 433 ms    Sinus tachycardia  Otherwise normal ECG  No previous ECGs available  Confirmed by Crow HARRIS MD ([id number]) on 7/27/2022 7:53:00 AM    Referred By: AIMEEERR   SELF           Confirmed By:Corw HARRIS MD                                    Results for orders placed during the hospital encounter of 07/26/22    Echo    Interpretation Summary  · The left ventricle is normal in size with normal systolic function.  · The estimated ejection fraction is 65%.  · Normal left ventricular diastolic function.  · Normal right ventricular size with normal right ventricular systolic function.  · The estimated PA systolic pressure is 16 mmHg.  · Normal central venous pressure (3 mmHg).      Echo  · The left ventricle is normal in size with normal systolic function.  · The estimated ejection fraction is 65%.  · Normal left ventricular diastolic function.  · Normal right ventricular size with normal right ventricular systolic   function.  · The estimated PA systolic pressure is 16 mmHg.  · Normal central venous pressure (3 mmHg).         All imaging within the last 24 hours was reviewed.       Discharge Planning   FILEMON: 8/15/2022     Code Status: Full Code   Is the patient medically ready for discharge?: No    Reason for patient still in hospital (select all that apply): Patient trending condition, Laboratory test, Treatment, Consult recommendations, and  Pending disposition  Discharge Plan A: Home Health            Assessment/Plan:      * Abscess of left thigh  - Afebrile, no leukocytosis  - Continue Ancef  - S/p OR with Ortho 8/6 for repeat washout  - Intra-op cultures with S.aureus: continuing Ancef for MSSA  - PRN analgesics provided  - Continuing to monitor; maintain drain for now - Ortho continues to monitor    Pain and swelling of left wrist  - Tapped by Ortho, fluid reviewed: does not appear to be gout or septic arthritis, likely pseudogout (calcium pyrophosphate crystals on previous LE aspiration)  - PRN analgesics provided  -improved after steroid injection    Anemia of chronic disease  - H/H slowly declining  - transfuse for Hgb < 7  - continue to monitor     Hematoma of left thigh  - See Abscess of L thigh    ANGE (acute kidney injury)  Estimated Creatinine Clearance: 11.6 mL/min (A) (based on SCr of 8.9 mg/dL (H)). - improved  - Hyperphosphatemia also noted - start binder  - Continues to make urine  - Nephrology following: no acute indication for HD at this time  - Renally dosing all medications  - Avoid nephrotoxins      Hyperlipidemia associated with type 2 diabetes mellitus  - Continue home statin     Class 2 severe obesity due to excess calories with serious comorbidity and body mass index (BMI) of 36.0 to 36.9 in adult  - Body mass index is 36.01 kg/m².  - Needs dietary and lifestyle modifications in relation to health    Hypertension associated with type 2 diabetes mellitus  - Continue home regimen of amlodipine; lisinopril held due to poor renal function  - continue to monitor and further titrate antihypertensives as clinically indicated   -increase amlodipine to BID on 8/13 for elevated BP; avoid hypotension with ANGE    Type 2 diabetes mellitus with hyperglycemia, with long-term current use of insulin  - DKA has resolved  - Endocrinology consulted and managing.      VTE Risk Mitigation (From admission, onward)         Ordered     heparin (porcine)  injection 5,000 Units  Every 8 hours         08/07/22 1010     Place sequential compression device  Until discontinued         08/06/22 0855     Place sequential compression device  Until discontinued         07/29/22 0158     IP VTE HIGH RISK PATIENT  Once         07/29/22 0158     Place SAVANNAH hose  Until discontinued         07/26/22 2240     Place sequential compression device  Until discontinued         07/26/22 2240                High Risk Conditions:  Patient has a condition that poses threat to life and bodily function: Acute Renal Failure    I have assessed these findings virtually using a telemed platform and with assistance of the bedside nurse.      The attending portion of this evaluation, treatment, and documentation was performed per Mervat Johnson MD via Telemedicine AudioVisual using the secure [employer] software platform with 2 way audio/video. The provider was located off-site and the patient is located in the hospital. The aforementioned video software was utilized to document the relevant history and physical exam    Mervat Johnson MD  Department of Hospital Medicine   [facility] - Telemetry Stepdown

## 2022-08-15 LAB
ALBUMIN SERPL BCP-MCNC: 2.2 G/DL (ref 3.5–5.2)
ALP SERPL-CCNC: 71 U/L (ref 55–135)
ALT SERPL W/O P-5'-P-CCNC: <5 U/L (ref 10–44)
ANCA AB TITR SER IF: NORMAL TITER
ANION GAP SERPL CALC-SCNC: 13 MMOL/L (ref 8–16)
AST SERPL-CCNC: 15 U/L (ref 10–40)
BACTERIA SPEC AEROBE CULT: ABNORMAL
BASOPHILS # BLD AUTO: 0.02 K/UL (ref 0–0.2)
BASOPHILS NFR BLD: 0.6 % (ref 0–1.9)
BILIRUB SERPL-MCNC: 0.2 MG/DL (ref 0.1–1)
BUN SERPL-MCNC: 62 MG/DL (ref 6–20)
CALCIUM SERPL-MCNC: 9 MG/DL (ref 8.7–10.5)
CHLORIDE SERPL-SCNC: 106 MMOL/L (ref 95–110)
CK SERPL-CCNC: 13 U/L (ref 20–200)
CO2 SERPL-SCNC: 20 MMOL/L (ref 23–29)
CREAT SERPL-MCNC: 8.5 MG/DL (ref 0.5–1.4)
CRP SERPL-MCNC: 24.1 MG/L (ref 0–8.2)
DIFFERENTIAL METHOD: ABNORMAL
EOSINOPHIL # BLD AUTO: 0.5 K/UL (ref 0–0.5)
EOSINOPHIL NFR BLD: 14.1 % (ref 0–8)
ERYTHROCYTE [DISTWIDTH] IN BLOOD BY AUTOMATED COUNT: 13 % (ref 11.5–14.5)
ERYTHROCYTE [SEDIMENTATION RATE] IN BLOOD BY PHOTOMETRIC METHOD: 65 MM/HR (ref 0–23)
EST. GFR  (NO RACE VARIABLE): 7.1 ML/MIN/1.73 M^2
GLUCOSE SERPL-MCNC: 102 MG/DL (ref 70–110)
HCT VFR BLD AUTO: 24.3 % (ref 40–54)
HGB BLD-MCNC: 8 G/DL (ref 14–18)
IMM GRANULOCYTES # BLD AUTO: 0.05 K/UL (ref 0–0.04)
IMM GRANULOCYTES NFR BLD AUTO: 1.4 % (ref 0–0.5)
LYMPHOCYTES # BLD AUTO: 0.7 K/UL (ref 1–4.8)
LYMPHOCYTES NFR BLD: 21 % (ref 18–48)
MAGNESIUM SERPL-MCNC: 2.1 MG/DL (ref 1.6–2.6)
MCH RBC QN AUTO: 30.3 PG (ref 27–31)
MCHC RBC AUTO-ENTMCNC: 32.9 G/DL (ref 32–36)
MCV RBC AUTO: 92 FL (ref 82–98)
MONOCYTES # BLD AUTO: 0.4 K/UL (ref 0.3–1)
MONOCYTES NFR BLD: 10.1 % (ref 4–15)
MYELOPEROXIDASE AB SER-ACNC: 3 UNITS
NEUTROPHILS # BLD AUTO: 1.8 K/UL (ref 1.8–7.7)
NEUTROPHILS NFR BLD: 52.8 % (ref 38–73)
NRBC BLD-RTO: 0 /100 WBC
P-ANCA TITR SER IF: NORMAL TITER
PHOSPHATE SERPL-MCNC: 5.8 MG/DL (ref 2.7–4.5)
PLATELET # BLD AUTO: 174 K/UL (ref 150–450)
PMV BLD AUTO: 9.5 FL (ref 9.2–12.9)
POCT GLUCOSE: 323 MG/DL (ref 70–110)
POCT GLUCOSE: 71 MG/DL (ref 70–110)
POTASSIUM SERPL-SCNC: 4.8 MMOL/L (ref 3.5–5.1)
PROT SERPL-MCNC: 6.3 G/DL (ref 6–8.4)
RBC # BLD AUTO: 2.64 M/UL (ref 4.6–6.2)
SODIUM SERPL-SCNC: 139 MMOL/L (ref 136–145)
WBC # BLD AUTO: 3.48 K/UL (ref 3.9–12.7)

## 2022-08-15 PROCEDURE — 83735 ASSAY OF MAGNESIUM: CPT | Performed by: STUDENT IN AN ORGANIZED HEALTH CARE EDUCATION/TRAINING PROGRAM

## 2022-08-15 PROCEDURE — 85025 COMPLETE CBC W/AUTO DIFF WBC: CPT | Performed by: STUDENT IN AN ORGANIZED HEALTH CARE EDUCATION/TRAINING PROGRAM

## 2022-08-15 PROCEDURE — 84100 ASSAY OF PHOSPHORUS: CPT | Performed by: STUDENT IN AN ORGANIZED HEALTH CARE EDUCATION/TRAINING PROGRAM

## 2022-08-15 PROCEDURE — 97530 THERAPEUTIC ACTIVITIES: CPT

## 2022-08-15 PROCEDURE — 82550 ASSAY OF CK (CPK): CPT | Performed by: PHYSICIAN ASSISTANT

## 2022-08-15 PROCEDURE — 25000003 PHARM REV CODE 250: Performed by: INTERNAL MEDICINE

## 2022-08-15 PROCEDURE — 80053 COMPREHEN METABOLIC PANEL: CPT | Performed by: STUDENT IN AN ORGANIZED HEALTH CARE EDUCATION/TRAINING PROGRAM

## 2022-08-15 PROCEDURE — 63600175 PHARM REV CODE 636 W HCPCS: Performed by: STUDENT IN AN ORGANIZED HEALTH CARE EDUCATION/TRAINING PROGRAM

## 2022-08-15 PROCEDURE — 99233 PR SUBSEQUENT HOSPITAL CARE,LEVL III: ICD-10-PCS | Mod: ,,, | Performed by: REGISTERED NURSE

## 2022-08-15 PROCEDURE — 25000003 PHARM REV CODE 250: Performed by: HOSPITALIST

## 2022-08-15 PROCEDURE — 25000003 PHARM REV CODE 250

## 2022-08-15 PROCEDURE — 20600001 HC STEP DOWN PRIVATE ROOM

## 2022-08-15 PROCEDURE — 63600175 PHARM REV CODE 636 W HCPCS: Performed by: REGISTERED NURSE

## 2022-08-15 PROCEDURE — 86140 C-REACTIVE PROTEIN: CPT

## 2022-08-15 PROCEDURE — 25000003 PHARM REV CODE 250: Performed by: REGISTERED NURSE

## 2022-08-15 PROCEDURE — 99233 SBSQ HOSP IP/OBS HIGH 50: CPT | Mod: ,,, | Performed by: REGISTERED NURSE

## 2022-08-15 PROCEDURE — 99233 SBSQ HOSP IP/OBS HIGH 50: CPT | Mod: 95,,, | Performed by: INTERNAL MEDICINE

## 2022-08-15 PROCEDURE — 85652 RBC SED RATE AUTOMATED: CPT

## 2022-08-15 PROCEDURE — 99233 PR SUBSEQUENT HOSPITAL CARE,LEVL III: ICD-10-PCS | Mod: 95,,, | Performed by: INTERNAL MEDICINE

## 2022-08-15 RX ORDER — HYDRALAZINE HYDROCHLORIDE 25 MG/1
25 TABLET, FILM COATED ORAL EVERY 8 HOURS PRN
Status: DISCONTINUED | OUTPATIENT
Start: 2022-08-15 | End: 2022-08-24 | Stop reason: HOSPADM

## 2022-08-15 RX ADMIN — HEPARIN SODIUM 5000 UNITS: 5000 INJECTION INTRAVENOUS; SUBCUTANEOUS at 01:08

## 2022-08-15 RX ADMIN — OXYCODONE 5 MG: 5 TABLET ORAL at 09:08

## 2022-08-15 RX ADMIN — OXYCODONE 5 MG: 5 TABLET ORAL at 06:08

## 2022-08-15 RX ADMIN — INSULIN ASPART 2 UNITS: 100 INJECTION, SOLUTION INTRAVENOUS; SUBCUTANEOUS at 09:08

## 2022-08-15 RX ADMIN — INSULIN ASPART 8 UNITS: 100 INJECTION, SOLUTION INTRAVENOUS; SUBCUTANEOUS at 12:08

## 2022-08-15 RX ADMIN — AMLODIPINE BESYLATE 5 MG: 5 TABLET ORAL at 09:08

## 2022-08-15 RX ADMIN — AMLODIPINE BESYLATE 5 MG: 5 TABLET ORAL at 08:08

## 2022-08-15 RX ADMIN — INSULIN DETEMIR 10 UNITS: 100 INJECTION, SOLUTION SUBCUTANEOUS at 09:08

## 2022-08-15 RX ADMIN — DAPTOMYCIN 625 MG: 350 INJECTION, POWDER, LYOPHILIZED, FOR SOLUTION INTRAVENOUS at 01:08

## 2022-08-15 RX ADMIN — HEPARIN SODIUM 5000 UNITS: 5000 INJECTION INTRAVENOUS; SUBCUTANEOUS at 06:08

## 2022-08-15 RX ADMIN — SENNOSIDES AND DOCUSATE SODIUM 2 TABLET: 50; 8.6 TABLET ORAL at 08:08

## 2022-08-15 RX ADMIN — SEVELAMER CARBONATE 800 MG: 800 TABLET, FILM COATED ORAL at 12:08

## 2022-08-15 RX ADMIN — INSULIN ASPART 8 UNITS: 100 INJECTION, SOLUTION INTRAVENOUS; SUBCUTANEOUS at 09:08

## 2022-08-15 RX ADMIN — ATORVASTATIN CALCIUM 20 MG: 20 TABLET, FILM COATED ORAL at 08:08

## 2022-08-15 RX ADMIN — OXYCODONE 5 MG: 5 TABLET ORAL at 03:08

## 2022-08-15 RX ADMIN — OXYCODONE 5 MG: 5 TABLET ORAL at 10:08

## 2022-08-15 RX ADMIN — HEPARIN SODIUM 5000 UNITS: 5000 INJECTION INTRAVENOUS; SUBCUTANEOUS at 09:08

## 2022-08-15 NOTE — SUBJECTIVE & OBJECTIVE
Interval History:   No AEON.   ANGE improving, reporting urination, no plans for HD at this time per nephrology. Cr to 8.5 today.  All thigh cultures with MSSA          Review of Systems   Constitutional:  Negative for chills, diaphoresis, fatigue and fever.   Respiratory:  Negative for cough and shortness of breath.    Cardiovascular:  Positive for leg swelling (upper left thigh). Negative for chest pain and palpitations.   Gastrointestinal:  Negative for abdominal distention and abdominal pain.   Genitourinary:  Negative for difficulty urinating and dysuria.   Musculoskeletal:  Positive for arthralgias (left wrist) and myalgias (upper left thigh). Negative for joint swelling.   Skin:  Positive for wound (upper left thigh). Negative for color change and rash.   Allergic/Immunologic: Negative for immunocompromised state.   Neurological:  Negative for dizziness, tremors, weakness, numbness and headaches.   Psychiatric/Behavioral:  Negative for agitation and decreased concentration. The patient is not nervous/anxious.    Objective:     Vital Signs (Most Recent):  Temp: 98.1 °F (36.7 °C) (08/15/22 1116)  Pulse: 85 (08/15/22 1116)  Resp: 18 (08/15/22 1116)  BP: (!) 142/74 (08/15/22 1116)  SpO2: (!) 92 % (08/15/22 1116)   Vital Signs (24h Range):  Temp:  [97.8 °F (36.6 °C)-98.4 °F (36.9 °C)] 98.1 °F (36.7 °C)  Pulse:  [83-90] 85  Resp:  [18-20] 18  SpO2:  [92 %-97 %] 92 %  BP: (120-165)/(53-91) 142/74     Weight: 104.3 kg (229 lb 15 oz)  Body mass index is 36.01 kg/m².    Estimated Creatinine Clearance: 12.1 mL/min (A) (based on SCr of 8.5 mg/dL (H)).    Physical Exam  Vitals and nursing note reviewed.   Constitutional:       General: He is not in acute distress.     Appearance: Normal appearance. He is well-developed. He is obese. He is not ill-appearing, toxic-appearing or diaphoretic.   HENT:      Head: Normocephalic and atraumatic.      Nose: Nose normal.      Mouth/Throat:      Dentition: Does not have dentures.       Pharynx: No oropharyngeal exudate.   Eyes:      General: No scleral icterus.     Conjunctiva/sclera: Conjunctivae normal.   Cardiovascular:      Rate and Rhythm: Normal rate and regular rhythm.   Pulmonary:      Effort: Pulmonary effort is normal. No respiratory distress.      Breath sounds: Normal breath sounds.   Abdominal:      General: There is no distension.      Palpations: Abdomen is soft.      Tenderness: There is no abdominal tenderness.   Musculoskeletal:         General: No swelling.   Skin:     General: Skin is warm and dry.      Findings: No erythema or rash.      Comments: Incision to left thigh. Dressed, CDI. Surgical drain with dark bloody output.   Neurological:      General: No focal deficit present.      Mental Status: He is alert and oriented to person, place, and time. Mental status is at baseline.      Motor: No weakness.      Gait: Gait normal.   Psychiatric:         Mood and Affect: Mood normal.         Behavior: Behavior normal.         Thought Content: Thought content normal.         Judgment: Judgment normal.       Significant Labs: CBC:   Recent Labs   Lab 08/14/22  0426 08/15/22  0723   WBC 3.74* 3.48*   HGB 8.2* 8.0*   HCT 25.2* 24.3*    174       CMP:   Recent Labs   Lab 08/14/22  0426 08/15/22  0723    139   K 4.8 4.8    106   CO2 22* 20*   * 102   BUN 64* 62*   CREATININE 8.9* 8.5*   CALCIUM 8.8 9.0   PROT 6.1 6.3   ALBUMIN 2.1* 2.2*   BILITOT 0.2 0.2   ALKPHOS 84 71   AST 14 15   ALT <5* <5*   ANIONGAP 13 13       Microbiology Results (last 7 days)       Procedure Component Value Units Date/Time    Aerobic culture [[id number]]  (Abnormal)  (Susceptibility) Collected: 08/06/22 1153    Order Status: Completed Specimen: Wound from Hip, Left Updated: 08/15/22 1002     Aerobic Bacterial Culture STAPHYLOCOCCUS AUREUS  Few      Narrative:      #2 Left hip fluid    Aerobic culture [[id number]]  (Abnormal) Collected: 08/06/22 1153    Order Status: Completed Specimen:  Wound from Hip, Left Updated: 08/10/22 1214     Aerobic Bacterial Culture STAPHYLOCOCCUS AUREUS  Moderate  For susceptibility see order #[id number]      Narrative:      #1 Left hip fluid    Culture, Anaerobe [[id number]] Collected: 08/06/22 1153    Order Status: Completed Specimen: Body Fluid from Hip, Left Updated: 08/10/22 1035     Anaerobic Culture No anaerobes isolated    Narrative:      #2 Left hip fluid    Culture, Anaerobe [[id number]] Collected: 08/06/22 1153    Order Status: Completed Specimen: Body Fluid from Hip, Left Updated: 08/10/22 1034     Anaerobic Culture No anaerobes isolated    Narrative:      #1 Left hip fluid    Blood culture [[id number]] Collected: 08/05/22 0850    Order Status: Completed Specimen: Blood from Antecubital, Right Arm Updated: 08/10/22 1012     Blood Culture, Routine No growth after 5 days.    Blood culture [[id number]] Collected: 08/05/22 0852    Order Status: Completed Specimen: Blood from Peripheral, Right Hand Updated: 08/10/22 1012     Blood Culture, Routine No growth after 5 days.    Fungus culture [[id number]] Collected: 08/05/22 0817    Order Status: Completed Specimen: Joint Fluid from Wrist, Left Updated: 08/09/22 1307     Fungus (Mycology) Culture Culture in progress    AFB Culture & Smear [[id number]] Collected: 08/05/22 0817    Order Status: Completed Specimen: Joint Fluid from Wrist, Left Updated: 08/08/22 1529     AFB Culture & Smear Culture in progress     AFB CULTURE STAIN No acid fast bacilli seen.          Recent Lab Results         08/15/22  1117   08/15/22  0723   08/14/22  2102   08/14/22  1515        Albumin   2.2           Alkaline Phosphatase   71           ALT   <5           Anion Gap   13           AST   15           Baso #   0.02           Basophil %   0.6           BILIRUBIN TOTAL   0.2  Comment: For infants and newborns, interpretation of results should be based  on gestational age, weight and in agreement with clinical  observations.    Premature Infant  recommended reference ranges:  Up to 24 hours.............<8.0 mg/dL  Up to 48 hours............<12.0 mg/dL  3-5 days..................<15.0 mg/dL  6-29 days.................<15.0 mg/dL             BUN   62           Calcium   9.0           Chloride   106           CO2   20           CPK   13           Creatinine   8.5           CRP   24.1           Differential Method   Automated           eGFR   7.1           Eos #   0.5           Eosinophil %   14.1           Glucose   102           Gran # (ANC)   1.8           Gran %   52.8           Hematocrit   24.3           Hemoglobin   8.0           Immature Grans (Abs)   0.05  Comment: Mild elevation in immature granulocytes is non specific and   can be seen in a variety of conditions including stress response,   acute inflammation, trauma and pregnancy. Correlation with other   laboratory and clinical findings is essential.             Immature Granulocytes   1.4           Lymph #   0.7           Lymph %   21.0           Magnesium   2.1           MCH   30.3           MCHC   32.9           MCV   92           Mono #   0.4           Mono %   10.1           MPV   9.5           nRBC   0           Phosphorus   5.8           Platelets   174           POCT Glucose 323     161   110       Potassium   4.8           PROTEIN TOTAL   6.3           RBC   2.64           RDW   13.0           Sed Rate   65           Sodium   139           WBC   3.48                   Significant Imaging:     Imaging Results              CT Thigh With Contrast Left (Final result)  Result time 07/27/22 01:13:09      Final result by Jori Hopkins DO (07/27/22 01:13:09)                   Impression:      Large heterogeneous fluid collection in the anterolateral left proximal thigh soft tissues, concerning for a soft tissue hematoma or Preston-Melissa lesion.  A neoplastic process is not entirely excluded.  Recommend close interval follow-up to assess for stability/resolution.      Electronically signed  by: Jori Hopkins  Date:    07/27/2022  Time:    01:13               Narrative:    EXAMINATION:  CT THIGH WITH CONTRAST LEFT    CLINICAL HISTORY:  Soft tissue mass, thigh, deep;    TECHNIQUE:  Axial CT images of the left thigh with sagittal and coronal reformats after the intravenous administration of 50 mL Omnipaque 350.    COMPARISON:  None.    FINDINGS:  Bone: Bone mineralization is normal.  There is no evidence of an acute fracture or dislocation.  Alignment is normal.    Soft tissues: There is a large heterogeneous fluid collection in the left anterolateral proximal thigh measuring approximately 16 x 7 x 6 cm.  The collection appears to be centered within the subcutaneous tissues or superficial fascia and abuts the gluteus musculature, the sartorius muscle, the rectus femoris, and the vastus lateralis.  There is soft tissue edema in the surrounding subcutaneous tissues.  Muscle bulk is normal.    Articulations: The left femoroacetabular joint is unremarkable.  The pubic symphysis is unremarkable.  The left knee is unremarkable.  No large joint effusion or significant cartilage loss.    Miscellaneous: Neurovascular structures are grossly intact.  There is moderate calcified atherosclerosis of the left femoral and popliteal arteries.                                       CTA Chest Non-Coronary (PE Study) (Final result)  Result time 07/26/22 21:36:01      Final result by Jori Hopkins DO (07/26/22 21:36:01)                   Impression:      No large central or lobar pulmonary embolism.      Electronically signed by: Jori Hopkins  Date:    07/26/2022  Time:    21:36               Narrative:    EXAMINATION:  CTA CHEST NON CORONARY    CLINICAL HISTORY:  Pulmonary embolism (PE) suspected, high prob;    TECHNIQUE:  Low dose axial images, sagittal and coronal reformations were obtained from the thoracic inlet to the lung bases following the IV administration of 100 mL of Omnipaque 350.  Contrast timing was  optimized to evaluate the pulmonary arteries.  Maximum intensity projection images were provided for review.    COMPARISON:  Chest radiograph from earlier the same date.    FINDINGS:  Pulmonary vasculature: Satisfactory opacification of the pulmonary arterial system.  Motion artifact significantly limits evaluation of the segmental and subsegmental pulmonary arteries.  There is no large central or lobar pulmonary embolism.    Aorta: Left-sided aortic arch.  No aneurysm and no significant atherosclerosis.    Base of Neck: No significant abnormality.    Thoracic soft tissues: Normal.    Heart: Normal size. No effusion.    Amena/Mediastinum: No pathologic russ enlargement.    Airways: The large airways are patent. No foci of endobronchial filling.    Lungs/Pleura: Clear lungs. No pleural effusion or thickening.    Esophagus: Normal.    Upper Abdomen: No abnormality of the partially imaged upper abdomen.    Bones: No acute fracture. No suspicious lytic or sclerotic lesions.                                       X-Ray Chest 1 View (Final result)  Result time 07/26/22 21:23:42   Procedure changed from X-Ray Chest PA And Lateral     Final result by Olman Saucedo MD (07/26/22 21:23:42)                   Impression:      No convincing radiographic evidence of acute intrathoracic process on this single view..      Electronically signed by: Olman Saucedo MD  Date:    07/26/2022  Time:    21:23               Narrative:    EXAMINATION:  XR CHEST 1 VIEW    CLINICAL HISTORY:  shortness of breath;    TECHNIQUE:  Single frontal view of the chest was performed.    COMPARISON:  None    FINDINGS:  Cardiac monitoring leads overlie the chest.  Cardiac silhouette appears within normal limits.  No confluent airspace consolidation identified.  No significant volume of pleural fluid or pneumothorax appreciated.  The visualized osseous structures demonstrate mild degenerative changes.

## 2022-08-15 NOTE — PT/OT/SLP PROGRESS
"Occupational Therapy   Treatment    Name: Wilfredo Thomas  MRN: [id number]  Admitting Diagnosis:  Abscess of left thigh  9 Days Post-Op    Recommendations:     Discharge Recommendations: outpatient OT  Discharge Equipment Recommendations:  shower chair, cane, straight  Barriers to discharge:  None    Assessment:     Wilfredo Thomas is a 47 y.o. male with a medical diagnosis of Abscess of left thigh.  He presents pleasant and agreeable to therapy upon entry to room w/ good tolerance to session. Performance deficits affecting function are impaired balance.     Rehab Prognosis:  Good; patient would benefit from acute skilled OT services to address these deficits and reach maximum level of function.       Plan:     Patient to be seen 2 x/week to address the above listed problems via self-care/home management, therapeutic activities, therapeutic exercises  · Plan of Care Expires: 08/31/22  · Plan of Care Reviewed with: patient    Subjective   "Im still concerned with putting on my socks"  Pain/Comfort:  · Pain Rating 1: 0/10    Objective:     Communicated with: RN prior to session.  Patient found supine with JOHANN drain, telemetry upon OT entry to room.    General Precautions: Standard, fall   Orthopedic Precautions:LLE weight bearing as tolerated   Braces: N/A  Respiratory Status: Room air     Occupational Performance:     Bed Mobility:    · Patient completed Rolling/Turning to Right with supervision  · Patient completed Supine to Sit with supervision     Functional Mobility/Transfers:  · Patient completed Sit <> Stand Transfer with supervision  with  no assistive device   · Patient completed Bed <> Chair Transfer using Step Transfer technique with supervision with no assistive device  · Functional Mobility: pt completed functional ambulation in room to simulate household mobility requiring supvsn and no AD    Activities of Daily Living:  · Not completed this session 2/2 session focusing on adherence to HEP and safety " precautions and pt stating he completed all ADLs.      [facility] 6 Click ADL: 23    Treatment & Education:  Pt educated on scope of practice and importance of daily functional mobility.   Pt educated on HEP  Pt educated on safety precautions when getting into and out of car  Pt educated on AD that can be used for socks   White board updated to reflect pt status and visual reminder included on board instructing her to call for nurse as needed.    Patient left up in chair with all lines intact, call button in reach and RN notifiedEducation:      GOALS:   Multidisciplinary Problems     Occupational Therapy Goals        Problem: Occupational Therapy    Goal Priority Disciplines Outcome Interventions   Occupational Therapy Goal     OT, PT/OT Ongoing, Progressing    Description: Goals set on 8/1, with expiration date 8/15:  Patient will increase functional independence with ADLs by performing:    Bed mobility with [place]  Grooming while standing at sink with [place]  UB Dressing with [place]ot.  LB Dressing with [place].  Toileting from toilet with [place] for hygiene and clothing management.   Functional mobility of household and community distance with [place] and AD as needed  Pt will demonstrate understanding of education provided regarding energy conservation and task modification through teach-back method.  Pt will demonstrate [place] in HEP for BUE strengthening GM/FM exercises to improve functional performance.                        Time Tracking:     OT Date of Treatment: 08/15/22  OT Start Time: 1419  OT Stop Time: 1435  OT Total Time (min): 16 min    Billable Minutes:Therapeutic Activity 16    OT/GORDO: OT          8/15/2022

## 2022-08-15 NOTE — PROGRESS NOTES
[facility] - Telemetry Stepdown  Orthopedics  Progress Note    Attg Note:  I agree with the resident's assessment and plan.  Drain output decreasing.  Patient improving.    Rob Graff MD      Patient Name: Wilfredo Thomas  MRN: [id number]  Admission Date: 7/26/2022  Hospital Length of Stay: 20 days  Attending Provider: Mervat Johnson MD  Primary Care Provider: Aylin Wayne DO  Follow-up For: Procedure(s) (LRB):  Incision and Drainage - LEFT THIGH. (Left)    Post-Operative Day: 9 Days Post-Op  Subjective:     Principal Problem:Abscess of left thigh    Principal Orthopedic Problem: Left thigh I&D 7/29 and 8/6  Staph species on thigh abscess cultures    Interval History  S/p repeat L thigh I&D 8/6/22. NAEON, patient stable, pain controlled. No acute complaints this morning. Drain with 30cc serosainguinous output. Patient feels like he is baseline, not worsening      Review of patient's allergies indicates:  No Known Allergies    Current Facility-Administered Medications   Medication    acetaminophen tablet 1,000 mg    amLODIPine tablet 5 mg    atorvastatin tablet 20 mg    COVID-19 vac, forrest(Pfizer)(PF) (Pfizer COVID-19) 30 mcg/0.3 mL injection 0.3 mL    dextrose 10% bolus 125 mL    dextrose 10% bolus 250 mL    glucagon (human recombinant) injection 1 mg    glucose chewable tablet 16 g    glucose chewable tablet 24 g    heparin (porcine) injection 5,000 Units    insulin aspart U-100 pen 1-10 Units    insulin aspart U-100 pen 8 Units    insulin detemir U-100 pen 10 Units    morphine injection 3 mg    ondansetron injection 4 mg    oxyCODONE immediate release tablet 5 mg    senna-docusate 8.6-50 mg per tablet 2 tablet    sevelamer carbonate tablet 800 mg    sodium chloride 0.9% flush 10 mL     Objective:     Vital Signs (Most Recent):  Temp: 98.1 °F (36.7 °C) (08/15/22 0315)  Pulse: 90 (08/15/22 0315)  Resp: 20 (08/14/22 2034)  BP: 133/67 (08/15/22 0315)  SpO2: (!) 92 % (08/15/22 0315) Vital Signs (24h Range):  Temp:   "[97.8 °F (36.6 °C)-98.5 °F (36.9 °C)] 98.1 °F (36.7 °C)  Pulse:  [77-90] 90  Resp:  [18-20] 20  SpO2:  [92 %-97 %] 92 %  BP: (120-165)/(53-91) 133/67     Weight: 104.3 kg (229 lb 15 oz)  Height: 5' 7" (170.2 cm)  Body mass index is 36.01 kg/m².      Intake/Output Summary (Last 24 hours) at 8/15/2022 0605  Last data filed at 8/14/2022 1800  Gross per 24 hour   Intake 1800 ml   Output 1825 ml   Net -25 ml         Ortho/SPM Exam  Left lower extremity  Left thigh dressing cdi  Drain in place, ss output  No erythema of thigh, no signficant swelling  Compartments soft and compressible  Painless ROM of hip and knee  NVI        CBC:   Recent Labs   Lab 08/13/22  1043 08/14/22  0426   WBC 3.50* 3.74*   HGB 7.5* 8.2*   HCT 22.6* 25.2*    175       CMP:   Recent Labs   Lab 08/13/22  1043 08/14/22  0426   * 140   K 4.4 4.8    105   CO2 23 22*   * 131*   BUN 58* 64*   CREATININE 9.6* 8.9*   CALCIUM 8.6* 8.8   PROT 5.7* 6.1   ALBUMIN 1.9* 2.1*   BILITOT 0.1 0.2   ALKPHOS 69 84   AST 13 14   ALT <5* <5*   ANIONGAP 10 13         All pertinent labs within the past 24 hours have been reviewed.    Significant Imaging: I have reviewed and interpreted all pertinent imaging results/findings.      Assessment/Plan:     * Abscess of left thigh  47M with DM admitted 7/26/22 for DKA, ortho following for left lateral thigh abscess. s/p I&D and drain placement left thigh 7/29/22. Cx +MSSA. Had high drain purulent output after first I&D, was taken back to OR 8/6/22 for repeat I&D. Drain in place, dressings CDI. Nephrology on board for ANGE.     -- ANGE nephrology managing   -- DVT SQ  -- Plan to start daptomycin in am per ID    -- Pain MM  -- Drain output 30cc, will keep drain  -- CRP downtrending, continue trending ESR/CRP  -- Encourage ambulation    Dispo: New CRP today, NPO as precaution, tight BG control.           Aron Vieyra MD  Orthopedics  [facility] - Telemetry Stepdown  "

## 2022-08-15 NOTE — ASSESSMENT & PLAN NOTE
46 yo male with history of DMII and HTN admitted for DKA and left thigh abscess. MRI of left femur was notable for ruptured tensor fascia jose luis mm with associated large hematoma, as well as small area of potential osteonecrosis. Bedside aspiration of the fluid collection was notable for a cell count of >200k (>80%segs), cultures + staph species. He was taken to the OR on 7/29 with orthopedic surgery for surgical I&D. Surgical cultures are positive for MSSA. Per ortho surgery, no concerns for bone involvement. Patient was initially on Vancomycin and Zosyn. Vanc trough supratherapeutic and patient has developed an ANGE. He is currently on Cefazolin.       Recent new onset left wrist pain, today stating improvement. Minimal swelling noted today, ROM improving. Pt states pain decreasing since he had steroid shot. He is s/p wrist arthroscopy with hardware placement in 3/2022. Wife noticed a boil on his wrist. S/p joint aspiration, white count 2050k, 88% segs. No crystals noted. Left wrist aspiration from 8/5/22-  NG. Xray of wrist with no acute concerns.      Pt with ANGE, Cr decreased to 8.5 today. Nephrology managing. Autoimmune workup ordered, pending. Bedside ultrasound completed and no hydronephrosis noted per nephrology note on 8/11/22. Emanuel stain negative on 8/3/22. Reordered on 8/11/22, positive. Cefazolin stopped, daptomycin started today on 8/15/22.     Otherwise, afebrile. HDS. No leukocytosis. OR cultures 8/6 L thigh now show MSSA.  Leg pain improved.  ANGE continues to worsened. 2D echo without mention of vegetations.  Per ortho recent note on 8/15, continues with potential plans to return to OR tomorrow, trending inflammatory markers, drainage, and pt exam. Stable non septic        Recommendations:   · Continue Daptomycin 6mg/kg/dose q48 hours (renally dosed) for MSSA SSTI. (dose reviewed with ID PharmD)  · Potential OR tomorrow for repeat washout, will follow ortho plans   · Nephrology ANGE management.  Will follow plan regarding further w/u.   · Anticipate SSTI course from time of last washout 8/6  · Patient and plan reviewed with ID staff, Dr. Graves. ID will follow.

## 2022-08-15 NOTE — ASSESSMENT & PLAN NOTE
Estimated Creatinine Clearance: 12.1 mL/min (A) (based on SCr of 8.5 mg/dL (H)). - improved  - Hyperphosphatemia also noted - start binder  - Continues to make urine  - Nephrology following: no acute indication for HD at this time  - Renally dosing all medications  - Avoid nephrotoxins

## 2022-08-15 NOTE — SUBJECTIVE & OBJECTIVE
"Principal Problem:Abscess of left thigh    Principal Orthopedic Problem: Left thigh I&D 7/29 and 8/6  Staph species on thigh abscess cultures    Interval History  S/p repeat L thigh I&D 8/6/22. NAEON, patient stable, pain controlled. No acute complaints this morning. Drain with 30cc serosainguinous output. Patient feels like he is baseline, not worsening      Review of patient's allergies indicates:  No Known Allergies    Current Facility-Administered Medications   Medication    acetaminophen tablet 1,000 mg    amLODIPine tablet 5 mg    atorvastatin tablet 20 mg    COVID-19 vac, forrest(Pfizer)(PF) (Pfizer COVID-19) 30 mcg/0.3 mL injection 0.3 mL    dextrose 10% bolus 125 mL    dextrose 10% bolus 250 mL    glucagon (human recombinant) injection 1 mg    glucose chewable tablet 16 g    glucose chewable tablet 24 g    heparin (porcine) injection 5,000 Units    insulin aspart U-100 pen 1-10 Units    insulin aspart U-100 pen 8 Units    insulin detemir U-100 pen 10 Units    morphine injection 3 mg    ondansetron injection 4 mg    oxyCODONE immediate release tablet 5 mg    senna-docusate 8.6-50 mg per tablet 2 tablet    sevelamer carbonate tablet 800 mg    sodium chloride 0.9% flush 10 mL     Objective:     Vital Signs (Most Recent):  Temp: 98.1 °F (36.7 °C) (08/15/22 0315)  Pulse: 90 (08/15/22 0315)  Resp: 20 (08/14/22 2034)  BP: 133/67 (08/15/22 0315)  SpO2: (!) 92 % (08/15/22 0315) Vital Signs (24h Range):  Temp:  [97.8 °F (36.6 °C)-98.5 °F (36.9 °C)] 98.1 °F (36.7 °C)  Pulse:  [77-90] 90  Resp:  [18-20] 20  SpO2:  [92 %-97 %] 92 %  BP: (120-165)/(53-91) 133/67     Weight: 104.3 kg (229 lb 15 oz)  Height: 5' 7" (170.2 cm)  Body mass index is 36.01 kg/m².      Intake/Output Summary (Last 24 hours) at 8/15/2022 0605  Last data filed at 8/14/2022 1800  Gross per 24 hour   Intake 1800 ml   Output 1825 ml   Net -25 ml         Ortho/SPM Exam  Left lower extremity  Left thigh dressing cdi  Drain in place, ss output  No erythema " of thigh, no signficant swelling  Compartments soft and compressible  Painless ROM of hip and knee  NVI        CBC:   Recent Labs   Lab 08/13/22  1043 08/14/22  0426   WBC 3.50* 3.74*   HGB 7.5* 8.2*   HCT 22.6* 25.2*    175       CMP:   Recent Labs   Lab 08/13/22  1043 08/14/22  0426   * 140   K 4.4 4.8    105   CO2 23 22*   * 131*   BUN 58* 64*   CREATININE 9.6* 8.9*   CALCIUM 8.6* 8.8   PROT 5.7* 6.1   ALBUMIN 1.9* 2.1*   BILITOT 0.1 0.2   ALKPHOS 69 84   AST 13 14   ALT <5* <5*   ANIONGAP 10 13         All pertinent labs within the past 24 hours have been reviewed.    Significant Imaging: I have reviewed and interpreted all pertinent imaging results/findings.

## 2022-08-15 NOTE — PROGRESS NOTES
[facility] - Telemetry Stepdown  Infectious Disease  Progress Note    Patient Name: Wilfredo Thomas  MRN: [id number]  Admission Date: 7/26/2022  Length of Stay: 20 days  Attending Physician: Mervat Johnson MD  Primary Care Provider: Aylin Wayne DO    Isolation Status: No active isolations  Assessment/Plan:      * Abscess of left thigh     46 yo male with history of DMII and HTN admitted for DKA and left thigh abscess. MRI of left femur was notable for ruptured tensor fascia jose luis mm with associated large hematoma, as well as small area of potential osteonecrosis. Bedside aspiration of the fluid collection was notable for a cell count of >200k (>80%segs), cultures + staph species. He was taken to the OR on 7/29 with orthopedic surgery for surgical I&D. Surgical cultures are positive for MSSA. Per ortho surgery, no concerns for bone involvement. Patient was initially on Vancomycin and Zosyn. Vanc trough supratherapeutic and patient has developed an ANGE. He is currently on Cefazolin.       Recent new onset left wrist pain, today stating improvement. Minimal swelling noted today, ROM improving. Pt states pain decreasing since he had steroid shot. He is s/p wrist arthroscopy with hardware placement in 3/2022. Wife noticed a boil on his wrist. S/p joint aspiration, white count 2050k, 88% segs. No crystals noted. Left wrist aspiration from 8/5/22-  NG. Xray of wrist with no acute concerns.      Pt with ANGE, Cr decreased to 8.5 today. Nephrology managing. Autoimmune workup ordered, pending. Bedside ultrasound completed and no hydronephrosis noted per nephrology note on 8/11/22. Emanuel stain negative on 8/3/22. Reordered on 8/11/22, positive. Cefazolin stopped, daptomycin started today on 8/15/22.     Otherwise, afebrile. HDS. No leukocytosis. OR cultures 8/6 L thigh now show MSSA.  Leg pain improved.  ANGE continues to worsened. 2D echo without mention of vegetations.  Per ortho recent note on 8/15, continues with potential  plans to return to OR tomorrow, trending inflammatory markers, drainage, and pt exam. Stable non septic        Recommendations:   · Continue Daptomycin 6mg/kg/dose q48 hours (renally dosed) for MSSA SSTI. (dose reviewed with ID PharmD)  · Potential OR tomorrow for repeat washout, will follow ortho plans   · Nephrology ANGE management. Will follow plan regarding further w/u.   · Anticipate SSTI course from time of last washout 8/6  · Patient and plan reviewed with ID staff, Dr. Graves. ID will follow.            Thank you for your consult. I will follow-up with patient. Please contact us if you have any additional questions.    Steven Caicedo, SANDRO  Infectious Disease  [facility] - Telemetry Stepdown    Subjective:     Principal Problem:Abscess of left thigh    HPI: Mr. Thomas is a 47 year old male with a PMH of DM2 (poorly controlled), HTN, GERD, HLD, obesity who initially presented to [facility] ED with cc of SOBx1 day. Pt was also reporting significant pain and swelling along his left lateral proximal thigh. Pt reports this pain started over the last 2 weeks, which worsened. He initially was seen outpatient in which he was diagnoised with a mm strain and given 800 mg ibuprofen. From there he went to the ED on 7/22 d/t worsening pain. He was treated with prednisone/morphine shot, and well as a prednisone oral pack. Pt denied recent injury, with the exception of soreness to his left lower shin following bumping into a cabinet. He denies open wounds/abrasions from this injury but states the soreness started in his left thigh following the improvement of discomfort from his left shin. Pt reports having been treated for a boil on his central/right buttocks in the end of June. 2022. Pt states he was seen in Urgent Care in [place] and the boil was drained. He was given an oral abx in which he completed.     To note, pt reports having a recent left wrist fracture, ligament rupture following an accident with his riding lawnmower.  States he underwent surgery and 2 screws were placed on 3/31/22 at [facility]. Denies associated infection/complications. Denies pain in the site currently.    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 Denies having pets. Denies recent fishing/hunting. Denies hx of immunocompromising conditions.     To note, pt was found to be in DKA with blood sugars >400, treated by critical team, which has since resolved. Pt was recently started on insulin by his PCP, which he had not started. Pt latest A1C 10.     CT of left thigh notable for fluid collections suspicious for hematoma/seroma. MRI of left femur was notable for ruptured tensor fascia jose luis mm with associated large hematoma, as well as small area of potential osteonecrosis. Bedside aspiration of the fluid collection was notable for a cell count of >200k >80%segs, cultures + staph spp. Pt was taken for I&D with Dr. Graff with ortho surgery on 7/29, abscesses were noted and washed out, cultures collected + staph spp. Per pt, states that Dr. Graff is planning to return to surgery tomorrow, 8/1/22 for repeat washout.     Pt was intially with leukocytosis, but has since down trended to 11k. Blood cultures from 7/26 NGTD.     Pt is currently on zosyn, vancomycin, and clindamycin. ID was consulted for abx recs regarding left TFL infection.            Interval History:   No AEON.   ANGE improving, reporting urination, no plans for HD at this time per nephrology. Cr to 8.5 today.  All thigh cultures with MSSA          Review of Systems   Constitutional:  Negative for chills, diaphoresis, fatigue and fever.   Respiratory:  Negative for cough and shortness of breath.    Cardiovascular:  Positive for leg swelling (upper left thigh). Negative for chest pain and palpitations.   Gastrointestinal:  Negative for abdominal distention and abdominal pain.   Genitourinary:  Negative for difficulty urinating and dysuria.    Musculoskeletal:  Positive for arthralgias (left wrist) and myalgias (upper left thigh). Negative for joint swelling.   Skin:  Positive for wound (upper left thigh). Negative for color change and rash.   Allergic/Immunologic: Negative for immunocompromised state.   Neurological:  Negative for dizziness, tremors, weakness, numbness and headaches.   Psychiatric/Behavioral:  Negative for agitation and decreased concentration. The patient is not nervous/anxious.    Objective:     Vital Signs (Most Recent):  Temp: 98.1 °F (36.7 °C) (08/15/22 1116)  Pulse: 85 (08/15/22 1116)  Resp: 18 (08/15/22 1116)  BP: (!) 142/74 (08/15/22 1116)  SpO2: (!) 92 % (08/15/22 1116)   Vital Signs (24h Range):  Temp:  [97.8 °F (36.6 °C)-98.4 °F (36.9 °C)] 98.1 °F (36.7 °C)  Pulse:  [83-90] 85  Resp:  [18-20] 18  SpO2:  [92 %-97 %] 92 %  BP: (120-165)/(53-91) 142/74     Weight: 104.3 kg (229 lb 15 oz)  Body mass index is 36.01 kg/m².    Estimated Creatinine Clearance: 12.1 mL/min (A) (based on SCr of 8.5 mg/dL (H)).    Physical Exam  Vitals and nursing note reviewed.   Constitutional:       General: He is not in acute distress.     Appearance: Normal appearance. He is well-developed. He is obese. He is not ill-appearing, toxic-appearing or diaphoretic.   HENT:      Head: Normocephalic and atraumatic.      Nose: Nose normal.      Mouth/Throat:      Dentition: Does not have dentures.      Pharynx: No oropharyngeal exudate.   Eyes:      General: No scleral icterus.     Conjunctiva/sclera: Conjunctivae normal.   Cardiovascular:      Rate and Rhythm: Normal rate and regular rhythm.   Pulmonary:      Effort: Pulmonary effort is normal. No respiratory distress.      Breath sounds: Normal breath sounds.   Abdominal:      General: There is no distension.      Palpations: Abdomen is soft.      Tenderness: There is no abdominal tenderness.   Musculoskeletal:         General: No swelling.   Skin:     General: Skin is warm and dry.      Findings: No  erythema or rash.      Comments: Incision to left thigh. Dressed, CDI. Surgical drain with dark bloody output.   Neurological:      General: No focal deficit present.      Mental Status: He is alert and oriented to person, place, and time. Mental status is at baseline.      Motor: No weakness.      Gait: Gait normal.   Psychiatric:         Mood and Affect: Mood normal.         Behavior: Behavior normal.         Thought Content: Thought content normal.         Judgment: Judgment normal.       Significant Labs: CBC:   Recent Labs   Lab 08/14/22  0426 08/15/22  0723   WBC 3.74* 3.48*   HGB 8.2* 8.0*   HCT 25.2* 24.3*    174       CMP:   Recent Labs   Lab 08/14/22  0426 08/15/22  0723    139   K 4.8 4.8    106   CO2 22* 20*   * 102   BUN 64* 62*   CREATININE 8.9* 8.5*   CALCIUM 8.8 9.0   PROT 6.1 6.3   ALBUMIN 2.1* 2.2*   BILITOT 0.2 0.2   ALKPHOS 84 71   AST 14 15   ALT <5* <5*   ANIONGAP 13 13       Microbiology Results (last 7 days)       Procedure Component Value Units Date/Time    Aerobic culture [[id number]]  (Abnormal)  (Susceptibility) Collected: 08/06/22 1153    Order Status: Completed Specimen: Wound from Hip, Left Updated: 08/15/22 1002     Aerobic Bacterial Culture STAPHYLOCOCCUS AUREUS  Few      Narrative:      #2 Left hip fluid    Aerobic culture [[id number]]  (Abnormal) Collected: 08/06/22 1153    Order Status: Completed Specimen: Wound from Hip, Left Updated: 08/10/22 1214     Aerobic Bacterial Culture STAPHYLOCOCCUS AUREUS  Moderate  For susceptibility see order #[id number]      Narrative:      #1 Left hip fluid    Culture, Anaerobe [[id number]] Collected: 08/06/22 1153    Order Status: Completed Specimen: Body Fluid from Hip, Left Updated: 08/10/22 1035     Anaerobic Culture No anaerobes isolated    Narrative:      #2 Left hip fluid    Culture, Anaerobe [[id number]] Collected: 08/06/22 1153    Order Status: Completed Specimen: Body Fluid from Hip, Left Updated: 08/10/22 1034      Anaerobic Culture No anaerobes isolated    Narrative:      #1 Left hip fluid    Blood culture [[id number]] Collected: 08/05/22 0850    Order Status: Completed Specimen: Blood from Antecubital, Right Arm Updated: 08/10/22 1012     Blood Culture, Routine No growth after 5 days.    Blood culture [[id number]] Collected: 08/05/22 0852    Order Status: Completed Specimen: Blood from Peripheral, Right Hand Updated: 08/10/22 1012     Blood Culture, Routine No growth after 5 days.    Fungus culture [[id number]] Collected: 08/05/22 0817    Order Status: Completed Specimen: Joint Fluid from Wrist, Left Updated: 08/09/22 1307     Fungus (Mycology) Culture Culture in progress    AFB Culture & Smear [[id number]] Collected: 08/05/22 0817    Order Status: Completed Specimen: Joint Fluid from Wrist, Left Updated: 08/08/22 1529     AFB Culture & Smear Culture in progress     AFB CULTURE STAIN No acid fast bacilli seen.          Recent Lab Results         08/15/22  1117   08/15/22  0723   08/14/22  2102   08/14/22  1515        Albumin   2.2           Alkaline Phosphatase   71           ALT   <5           Anion Gap   13           AST   15           Baso #   0.02           Basophil %   0.6           BILIRUBIN TOTAL   0.2  Comment: For infants and newborns, interpretation of results should be based  on gestational age, weight and in agreement with clinical  observations.    Premature Infant recommended reference ranges:  Up to 24 hours.............<8.0 mg/dL  Up to 48 hours............<12.0 mg/dL  3-5 days..................<15.0 mg/dL  6-29 days.................<15.0 mg/dL             BUN   62           Calcium   9.0           Chloride   106           CO2   20           CPK   13           Creatinine   8.5           CRP   24.1           Differential Method   Automated           eGFR   7.1           Eos #   0.5           Eosinophil %   14.1           Glucose   102           Gran # (ANC)   1.8           Gran %   52.8           Hematocrit    24.3           Hemoglobin   8.0           Immature Grans (Abs)   0.05  Comment: Mild elevation in immature granulocytes is non specific and   can be seen in a variety of conditions including stress response,   acute inflammation, trauma and pregnancy. Correlation with other   laboratory and clinical findings is essential.             Immature Granulocytes   1.4           Lymph #   0.7           Lymph %   21.0           Magnesium   2.1           MCH   30.3           MCHC   32.9           MCV   92           Mono #   0.4           Mono %   10.1           MPV   9.5           nRBC   0           Phosphorus   5.8           Platelets   174           POCT Glucose 323     161   110       Potassium   4.8           PROTEIN TOTAL   6.3           RBC   2.64           RDW   13.0           Sed Rate   65           Sodium   139           WBC   3.48                   Significant Imaging:     Imaging Results              CT Thigh With Contrast Left (Final result)  Result time 07/27/22 01:13:09      Final result by Jori Hopkins DO (07/27/22 01:13:09)                   Impression:      Large heterogeneous fluid collection in the anterolateral left proximal thigh soft tissues, concerning for a soft tissue hematoma or Preston-Melissa lesion.  A neoplastic process is not entirely excluded.  Recommend close interval follow-up to assess for stability/resolution.      Electronically signed by: Jori Hopkins  Date:    07/27/2022  Time:    01:13               Narrative:    EXAMINATION:  CT THIGH WITH CONTRAST LEFT    CLINICAL HISTORY:  Soft tissue mass, thigh, deep;    TECHNIQUE:  Axial CT images of the left thigh with sagittal and coronal reformats after the intravenous administration of 50 mL Omnipaque 350.    COMPARISON:  None.    FINDINGS:  Bone: Bone mineralization is normal.  There is no evidence of an acute fracture or dislocation.  Alignment is normal.    Soft tissues: There is a large heterogeneous fluid collection in the left  anterolateral proximal thigh measuring approximately 16 x 7 x 6 cm.  The collection appears to be centered within the subcutaneous tissues or superficial fascia and abuts the gluteus musculature, the sartorius muscle, the rectus femoris, and the vastus lateralis.  There is soft tissue edema in the surrounding subcutaneous tissues.  Muscle bulk is normal.    Articulations: The left femoroacetabular joint is unremarkable.  The pubic symphysis is unremarkable.  The left knee is unremarkable.  No large joint effusion or significant cartilage loss.    Miscellaneous: Neurovascular structures are grossly intact.  There is moderate calcified atherosclerosis of the left femoral and popliteal arteries.                                       CTA Chest Non-Coronary (PE Study) (Final result)  Result time 07/26/22 21:36:01      Final result by Jori Hopkins DO (07/26/22 21:36:01)                   Impression:      No large central or lobar pulmonary embolism.      Electronically signed by: Jori Hopkins  Date:    07/26/2022  Time:    21:36               Narrative:    EXAMINATION:  CTA CHEST NON CORONARY    CLINICAL HISTORY:  Pulmonary embolism (PE) suspected, high prob;    TECHNIQUE:  Low dose axial images, sagittal and coronal reformations were obtained from the thoracic inlet to the lung bases following the IV administration of 100 mL of Omnipaque 350.  Contrast timing was optimized to evaluate the pulmonary arteries.  Maximum intensity projection images were provided for review.    COMPARISON:  Chest radiograph from earlier the same date.    FINDINGS:  Pulmonary vasculature: Satisfactory opacification of the pulmonary arterial system.  Motion artifact significantly limits evaluation of the segmental and subsegmental pulmonary arteries.  There is no large central or lobar pulmonary embolism.    Aorta: Left-sided aortic arch.  No aneurysm and no significant atherosclerosis.    Base of Neck: No significant  abnormality.    Thoracic soft tissues: Normal.    Heart: Normal size. No effusion.    Amena/Mediastinum: No pathologic russ enlargement.    Airways: The large airways are patent. No foci of endobronchial filling.    Lungs/Pleura: Clear lungs. No pleural effusion or thickening.    Esophagus: Normal.    Upper Abdomen: No abnormality of the partially imaged upper abdomen.    Bones: No acute fracture. No suspicious lytic or sclerotic lesions.                                       X-Ray Chest 1 View (Final result)  Result time 07/26/22 21:23:42   Procedure changed from X-Ray Chest PA And Lateral     Final result by Olman Saucedo MD (07/26/22 21:23:42)                   Impression:      No convincing radiographic evidence of acute intrathoracic process on this single view..      Electronically signed by: Olman Saucedo MD  Date:    07/26/2022  Time:    21:23               Narrative:    EXAMINATION:  XR CHEST 1 VIEW    CLINICAL HISTORY:  shortness of breath;    TECHNIQUE:  Single frontal view of the chest was performed.    COMPARISON:  None    FINDINGS:  Cardiac monitoring leads overlie the chest.  Cardiac silhouette appears within normal limits.  No confluent airspace consolidation identified.  No significant volume of pleural fluid or pneumothorax appreciated.  The visualized osseous structures demonstrate mild degenerative changes.

## 2022-08-15 NOTE — ASSESSMENT & PLAN NOTE
- Afebrile, no leukocytosis  - Continue Ancef  - S/p OR with Ortho 8/6 for repeat washout  - Intra-op cultures with S.aureus: changed to Ancef for MSSA then daptomycin on 8/15  - ID following  - PRN analgesics provided  - Continuing to monitor; maintain drain for now - Ortho continues to monitor

## 2022-08-15 NOTE — PROGRESS NOTES
"      Josafat Chun - Telemetry [facility] Medicine  Telemedicine Progress Note    Patient Name: Wilfredo Thomas  MRN: [id number]  Patient Class: IP- Inpatient   Admission Date: 7/26/2022  Length of Stay: 20 days  Attending Physician: Mervat Johnson MD  Primary Care Provider: Aylin Wayne DO          Subjective:     Principal Problem:Abscess of left thigh        HPI:  46 y/o M hx of HTN, IDDM2, GERD obesity, HLD presented to the ED with complaint of 1 day of worsening SOB. Wife present at bedside. Patient states that he noticed yesterday he was feeling some shortness of breath and couldn't seem to catch his breath. His wife went to check on him this morning and noticed that he was breathing "fast and heavy" and he sounded like he was slurring his words. Wife was concerned about a stroke, so he brought him to the ED for evaluation.     Patient also reporting significant pain and swelling along his left lateral proximal thigh. He states he has noticed worsening pain over the past 2 weeks. He denies any acute trauma to the area. He was seen by ortho last week and was told to take ibu-profen for a muscle strain. This did not help his pain, so he presented again on 7/22 and was given a prednisone injection into his thigh. This also did not help his pain, and now the pain and swelling have advanced to the point that he has difficulty with ambulation. Patient denies fever, chills, nausea, vomiting.      Of note, patient has hx of poorly controlled diabetes. He was recently started on insulin by his PCP, but he has not taken any insulin since being prescribed it.      In the ED, patient hypertensive and tachycardic to the 130s. Tachypneic with RR in the 40s. On CBC, WBC 26.9, On CMP patient hyperkalemic to 5.5, CorNa 136, Bicarb 5. Cr elevated to 1.9 from BL 0.83. UA, BHB pending at time of admission. CTA without evidence of pulmonary embolism. Critical Care Medicine consulted given severe acidosis.       "       Overview/Hospital Course:    Patient admitted to the MICU for management of severe DKA. Metabolic acidosis improving on insulin drip. He also has had left thigh pain for 1-2 months. There is a large mass on his left thigh that is tender to palpation, CT revealed hematoma vs soft tissue growth. General surgery consulted and recommended IR consult. IR consulted, no indication for tap.   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 Ortho consulted and performed bedside aspiration of possible abscess which revealed >200K cells >80% segs. Will hold off on antibiotics for now per ortho recs, ortho is taking patient to the OR for irrigation of the thigh, will start antibiotics afterwards.         7/29 Metabolic acidosis slowly improving. Ortho taking patient to the OR for irrigation of the left thigh, will start antibiotics afterwards.   7/30 Transfer to hospital medicine . S/p I&D  of left thigh Abscess on7/29/22 - MRI - uptured tensor fascia jose luis (TFL) muscle with associated large hematoma. gram stain -Moderate Gram positive cocci in clusters .cultures pending.  continue Vancomycin, clindamycin and Zosyn . Bicarb 15. K replaced . off insulin drip on SQ insulin.  7/31 ID and endocrine consulted.  abscess with Calcium pyrophosphate crystals with unknown significance potassium and P replaced. Bicarbonate WNL . aerobic culture 7/28 STAPHYLOCOCCUS SPECIES . Glucose in 300s .PRN imodium  for diarrhea. PT/OT consulted - WBAT. Surgical drain with purulent output  8/1 PICC team consulted for IV access. vanc trough at 11.4. monitor for vanomycin toxicity. possible OR tomorrow for washout  may need bone biopsy to r/o osteomyelitis.Discontinued clindamycin.   8/2 glucose in 300s.  Increased Levemir  to 14 units BID and Aspart  10 units TIDWM. K replaced   8/3 ANGE with cr 0.7--> 2.7. likely vanc induced ATN with levels 40. urine studies ordered renal sonogram WNL   nephrology consulted. started on NS 100ml/h x 10h and follow up renal panel.Zosyn and  vancomycin discontinued. Strict IO monitor. condom catheter . switched to Ancef for MSSA on culture  Interval History:  8/4- MSSA- see below. /85 VSS. On room air, eating, BM on 8/2, good UO. Appreciate ortho recs. On Cephazolin, detim 14 bid and aspart 12 ac.  8/5-Cr still rising, cr =6.  Wrist still hurting. Ortho to tap it to eval for infection and gout today. /90   Pulse 85  AF, no other signs of infection.  Will discuss w Nephrology- received NS x one liter yesterday. Will repeat today. Suspect auto-diuresis.   8/6: washout with ortho today. Cr continues to rise (7.1), nephrology following  8/7: Cr 7.4, phos increasing; tolerated surgery well yesterday, intra-op cultures pending  8/8: Cr increased to 8.1, hyperphosphatemia again noted; continues to make urine, no acute indication for HD per nephrology. Intra-op cultures with S.aureus, continued cefazolin pending final C&S results.        This encounter was provided through telemedicine.  Patient was transferred to the telemedicine service on:  08/9/2022   The [facility] 7/26/2022  8:24 PM.  Present with the patient at the time of the telemed/virtual assessment: Telepresenter    Interval History/Overnight Events:   Clinical record since admit reviewed.  Patient able to provide history.    Drain output 50 cc in 24 hours; pain unchanged to left thigh;  Cr improved to 8.5; patient in good spirits about improvement      Review of Systems   Constitutional:  Positive for fatigue. Negative for activity change and fever.   Respiratory:  Negative for cough and shortness of breath.    Cardiovascular:  Positive for leg swelling. Negative for chest pain.   Gastrointestinal:  Negative for diarrhea and vomiting.   Musculoskeletal:  Positive for myalgias.      Inpatient Medications:  Scheduled Meds:   amLODIPine  5 mg Oral BID    atorvastatin  20  mg Oral Daily    heparin (porcine)  5,000 Units Subcutaneous Q8H    insulin aspart U-100  8 Units Subcutaneous TIDWM    insulin detemir U-100  10 Units Subcutaneous BID    senna-docusate 8.6-50 mg  2 tablet Oral Daily    sevelamer carbonate  800 mg Oral TID WM     Continuous Infusions:  PRN Meds:.acetaminophen, sars-cov-2 (covid-19), dextrose 10%, dextrose 10%, glucagon (human recombinant), glucose, glucose, insulin aspart U-100, morphine, ondansetron, oxyCODONE, sodium chloride 0.9%      Objective:     Temp:  [97.8 °F (36.6 °C)-98.4 °F (36.9 °C)] 98.4 °F (36.9 °C)  Pulse:  [77-90] 83  Resp:  [18-20] 18  SpO2:  [92 %-97 %] 93 %  BP: (120-165)/(53-91) 159/87      Intake/Output Summary (Last 24 hours) at 8/15/2022 0956  Last data filed at 8/15/2022 0800  Gross per 24 hour   Intake 1600 ml   Output 2725 ml   Net -1125 ml          Body mass index is 36.01 kg/m².    Physical Exam  Vitals and nursing note reviewed.   Constitutional:       General: He is not in acute distress.     Appearance: Normal appearance. He is normal weight. He is not ill-appearing.   HENT:      Head: Normocephalic and atraumatic.      Right Ear: Hearing normal.      Left Ear: Hearing normal.      Nose: Nose normal.   Eyes:      General: No scleral icterus.        Right eye: No discharge.         Left eye: No discharge.      Extraocular Movements: Extraocular movements intact.   Cardiovascular:      Rate and Rhythm: Normal rate.   Pulmonary:      Effort: Pulmonary effort is normal. No accessory muscle usage or respiratory distress.   Musculoskeletal:      Right lower leg: Edema present.      Left lower leg: Edema present.      Comments: Dressing to left thigh with drain (serosanguineous drainage)   Neurological:      General: No focal deficit present.      Mental Status: He is alert and oriented to person, place, and time.      Cranial Nerves: No cranial nerve deficit.      Motor: No weakness.   Psychiatric:         Attention and Perception:  Attention normal.         Mood and Affect: Mood normal.         Speech: Speech normal.         Behavior: Behavior is cooperative.        Labs:  Recent Results (from the past 24 hour(s))   POCT glucose    Collection Time: 08/14/22 11:31 AM   Result Value Ref Range    POCT Glucose 227 (H) 70 - 110 mg/dL   POCT glucose    Collection Time: 08/14/22  3:15 PM   Result Value Ref Range    POCT Glucose 110 70 - 110 mg/dL   POCT glucose    Collection Time: 08/14/22  9:02 PM   Result Value Ref Range    POCT Glucose 161 (H) 70 - 110 mg/dL   Phosphorus    Collection Time: 08/15/22  7:23 AM   Result Value Ref Range    Phosphorus 5.8 (H) 2.7 - 4.5 mg/dL   Magnesium    Collection Time: 08/15/22  7:23 AM   Result Value Ref Range    Magnesium 2.1 1.6 - 2.6 mg/dL   Comprehensive Metabolic Panel    Collection Time: 08/15/22  7:23 AM   Result Value Ref Range    Sodium 139 136 - 145 mmol/L    Potassium 4.8 3.5 - 5.1 mmol/L    Chloride 106 95 - 110 mmol/L    CO2 20 (L) 23 - 29 mmol/L    Glucose 102 70 - 110 mg/dL    BUN 62 (H) 6 - 20 mg/dL    Creatinine 8.5 (H) 0.5 - 1.4 mg/dL    Calcium 9.0 8.7 - 10.5 mg/dL    Total Protein 6.3 6.0 - 8.4 g/dL    Albumin 2.2 (L) 3.5 - 5.2 g/dL    Total Bilirubin 0.2 0.1 - 1.0 mg/dL    Alkaline Phosphatase 71 55 - 135 U/L    AST 15 10 - 40 U/L    ALT <5 (L) 10 - 44 U/L    Anion Gap 13 8 - 16 mmol/L    eGFR 7.1 (A) >60 mL/min/1.73 m^2   CBC Auto Differential    Collection Time: 08/15/22  7:23 AM   Result Value Ref Range    WBC 3.48 (L) 3.90 - 12.70 K/uL    RBC 2.64 (L) 4.60 - 6.20 M/uL    Hemoglobin 8.0 (L) 14.0 - 18.0 g/dL    Hematocrit 24.3 (L) 40.0 - 54.0 %    MCV 92 82 - 98 fL    MCH 30.3 27.0 - 31.0 pg    MCHC 32.9 32.0 - 36.0 g/dL    RDW 13.0 11.5 - 14.5 %    Platelets 174 150 - 450 K/uL    MPV 9.5 9.2 - 12.9 fL    Immature Granulocytes 1.4 (H) 0.0 - 0.5 %    Gran # (ANC) 1.8 1.8 - 7.7 K/uL    Immature Grans (Abs) 0.05 (H) 0.00 - 0.04 K/uL    Lymph # 0.7 (L) 1.0 - 4.8 K/uL    Mono # 0.4 0.3 - 1.0 K/uL     Eos # 0.5 0.0 - 0.5 K/uL    Baso # 0.02 0.00 - 0.20 K/uL    nRBC 0 0 /100 WBC    Gran % 52.8 38.0 - 73.0 %    Lymph % 21.0 18.0 - 48.0 %    Mono % 10.1 4.0 - 15.0 %    Eosinophil % 14.1 (H) 0.0 - 8.0 %    Basophil % 0.6 0.0 - 1.9 %    Differential Method Automated    Sedimentation rate    Collection Time: 08/15/22  7:23 AM   Result Value Ref Range    Sed Rate 65 (H) 0 - 23 mm/Hr   C-Reactive Protein    Collection Time: 08/15/22  7:23 AM   Result Value Ref Range    CRP 24.1 (H) 0.0 - 8.2 mg/L   CK    Collection Time: 08/15/22  7:23 AM   Result Value Ref Range    CPK 13 (L) 20 - 200 U/L        Lab Results   Component Value Date    [id number] Negative 08/06/2022       Recent Labs   Lab 08/13/22  1043 08/14/22  0426 08/15/22  0723   WBC 3.50* 3.74* 3.48*   LYMPH 18.0  0.6* 19.8  0.7* 21.0  0.7*   HGB 7.5* 8.2* 8.0*   HCT 22.6* 25.2* 24.3*    175 174       Recent Labs   Lab 08/13/22  1043 08/14/22  0426 08/15/22  0723   * 140 139   K 4.4 4.8 4.8    105 106   CO2 23 22* 20*   BUN 58* 64* 62*   CREATININE 9.6* 8.9* 8.5*   * 131* 102   CALCIUM 8.6* 8.8 9.0   MG 1.9 2.1 2.1   PHOS 6.3* 6.2* 5.8*       Recent Labs   Lab 08/13/22  1043 08/14/22  0426 08/15/22  0723   ALKPHOS 69 84 71   ALT <5* <5* <5*   AST 13 14 15   ALBUMIN 1.9* 2.1* 2.2*   PROT 5.7* 6.1 6.3   BILITOT 0.1 0.2 0.2          Recent Labs     08/13/22  1043 08/14/22  0426 08/15/22  0723   FERRITIN  --  674*  --    CRP 24.9*  --  24.1*   CPK  --   --  13*         All labs within the last 24 hours were reviewed.     Microbiology:  Microbiology Results (last 7 days)       Procedure Component Value Units Date/Time    Aerobic culture [[id number]]  (Abnormal) Collected: 08/06/22 1153    Order Status: Completed Specimen: Wound from Hip, Left Updated: 08/10/22 1214     Aerobic Bacterial Culture STAPHYLOCOCCUS AUREUS  Moderate  For susceptibility see order #[id number]      Narrative:      #1 Left hip fluid    Aerobic culture [[id number]]   (Abnormal)  (Susceptibility) Collected: 08/06/22 1153    Order Status: Completed Specimen: Wound from Hip, Left Updated: 08/10/22 1214     Aerobic Bacterial Culture STAPHYLOCOCCUS AUREUS  Few      Narrative:      #2 Left hip fluid    Culture, Anaerobe [[id number]] Collected: 08/06/22 1153    Order Status: Completed Specimen: Body Fluid from Hip, Left Updated: 08/10/22 1035     Anaerobic Culture No anaerobes isolated    Narrative:      #2 Left hip fluid    Culture, Anaerobe [[id number]] Collected: 08/06/22 1153    Order Status: Completed Specimen: Body Fluid from Hip, Left Updated: 08/10/22 1034     Anaerobic Culture No anaerobes isolated    Narrative:      #1 Left hip fluid    Blood culture [[id number]] Collected: 08/05/22 0850    Order Status: Completed Specimen: Blood from Antecubital, Right Arm Updated: 08/10/22 1012     Blood Culture, Routine No growth after 5 days.    Blood culture [[id number]] Collected: 08/05/22 0852    Order Status: Completed Specimen: Blood from Peripheral, Right Hand Updated: 08/10/22 1012     Blood Culture, Routine No growth after 5 days.    Fungus culture [[id number]] Collected: 08/05/22 0817    Order Status: Completed Specimen: Joint Fluid from Wrist, Left Updated: 08/09/22 1307     Fungus (Mycology) Culture Culture in progress    AFB Culture & Smear [[id number]] Collected: 08/05/22 0817    Order Status: Completed Specimen: Joint Fluid from Wrist, Left Updated: 08/08/22 1529     AFB Culture & Smear Culture in progress     AFB CULTURE STAIN No acid fast bacilli seen.              Imaging  ECG Results              EKG 12-lead (Final result)  Result time 07/27/22 07:53:05      Final result by Interface, Lab In [facility] (07/27/22 07:53:05)                   Narrative:    Test Reason : R00.0,    Vent. Rate : 138 BPM     Atrial Rate : 138 BPM     P-R Int : 126 ms          QRS Dur : 086 ms      QT Int : 286 ms       P-R-T Axes : 051 002 052 degrees     QTc Int : 433 ms    Sinus tachycardia  Otherwise  normal ECG  No previous ECGs available  Confirmed by Crow HARRIS MD ([id number]) on 7/27/2022 7:53:00 AM    Referred By: [email or web address]   SELF           Confirmed By:Crow HARRIS MD                                    Results for orders placed during the hospital encounter of 07/26/22    Echo    Interpretation Summary  · The left ventricle is normal in size with normal systolic function.  · The estimated ejection fraction is 65%.  · Normal left ventricular diastolic function.  · Normal right ventricular size with normal right ventricular systolic function.  · The estimated PA systolic pressure is 16 mmHg.  · Normal central venous pressure (3 mmHg).      Echo  · The left ventricle is normal in size with normal systolic function.  · The estimated ejection fraction is 65%.  · Normal left ventricular diastolic function.  · Normal right ventricular size with normal right ventricular systolic   function.  · The estimated PA systolic pressure is 16 mmHg.  · Normal central venous pressure (3 mmHg).         All imaging within the last 24 hours was reviewed.       Discharge Planning   FILEMON: 8/19/2022     Code Status: Full Code   Is the patient medically ready for discharge?: No    Reason for patient still in hospital (select all that apply): Patient trending condition, Laboratory test, Treatment, Consult recommendations, and Pending disposition  Discharge Plan A: Home Health            Assessment/Plan:      * Abscess of left thigh  - Afebrile, no leukocytosis  - Continue Ancef  - S/p OR with Ortho 8/6 for repeat washout  - Intra-op cultures with S.aureus: changed to Ancef for MSSA then daptomycin on 8/15  - ID following  - PRN analgesics provided  - Continuing to monitor; maintain drain for now - Ortho continues to monitor    Pain and swelling of left wrist  - Tapped by Ortho, fluid reviewed: does not appear to be gout or septic arthritis, likely pseudogout (calcium pyrophosphate crystals on previous LE aspiration)  - PRN analgesics  provided  -improved after steroid injection    Anemia of chronic disease  - H/H slowly declining  - transfuse for Hgb < 7  - continue to monitor     Hematoma of left thigh  - See Abscess of L thigh    ANGE (acute kidney injury)  Estimated Creatinine Clearance: 12.1 mL/min (A) (based on SCr of 8.5 mg/dL (H)). - improved  - Hyperphosphatemia also noted - start binder  - Continues to make urine  - Nephrology following: no acute indication for HD at this time  - Renally dosing all medications  - Avoid nephrotoxins      Hyperlipidemia associated with type 2 diabetes mellitus  - Continue home statin     Class 2 severe obesity due to excess calories with serious comorbidity and body mass index (BMI) of 36.0 to 36.9 in adult  - Body mass index is 36.01 kg/m².  - Needs dietary and lifestyle modifications in relation to health    Hypertension associated with type 2 diabetes mellitus  - Continue home regimen of amlodipine; lisinopril held due to poor renal function  - continue to monitor and further titrate antihypertensives as clinically indicated   -increase amlodipine to BID on 8/13 for elevated BP; avoid hypotension with ANGE    Type 2 diabetes mellitus with hyperglycemia, with long-term current use of insulin  - DKA has resolved  - Endocrinology consulted and managing.      VTE Risk Mitigation (From admission, onward)         Ordered     heparin (porcine) injection 5,000 Units  Every 8 hours         08/07/22 1010     Place sequential compression device  Until discontinued         08/06/22 0855     Place sequential compression device  Until discontinued         07/29/22 0158     IP VTE HIGH RISK PATIENT  Once         07/29/22 0158     Place SAVANNAH hose  Until discontinued         07/26/22 2240     Place sequential compression device  Until discontinued         07/26/22 2240                High Risk Conditions:  Patient has a condition that poses threat to life and bodily function: Acute Renal Failure        I have assessed  these findings virtually using a telemed platform and with assistance of the bedside nurse.        The attending portion of this evaluation, treatment, and documentation was performed per Mervat Johnson MD via Telemedicine AudioVisual using the secure [employer] software platform with 2 way audio/video. The provider was located off-site and the patient is located in the hospital. The aforementioned video software was utilized to document the relevant history and physical exam    Mervat Johnson MD  Department of Hospital Medicine   [facility] - Telemetry Stepdown

## 2022-08-15 NOTE — PLAN OF CARE
Message  Return to work or school:   Ainsley Summers is under my professional care   He was seen in my office on 12/20/16             Signatures   Electronically signed by : Georges Bruner MD; Dec 21 2016  8:56AM EST                       (Author) [facility]    Met with the patient to discuss home infusion. Procedure was postponed today so I will continue to follow. Will resume teaching when he's closer to discharge.

## 2022-08-15 NOTE — ASSESSMENT & PLAN NOTE
47M with DM admitted 7/26/22 for DKA, ortho following for left lateral thigh abscess. s/p I&D and drain placement left thigh 7/29/22. Cx +MSSA. Had high drain purulent output after first I&D, was taken back to OR 8/6/22 for repeat I&D. Drain in place, dressings CDI. Nephrology on board for ANGE.     -- ANGE nephrology managing   -- DVT SQH  -- Plan to start daptomycin in am per ID    -- Pain MM  -- Drain output 30cc, will keep drain  -- CRP downtrending, continue trending ESR/CRP  -- Encourage ambulation    Dispo: New CRP today, NPO as precaution, tight BG control.

## 2022-08-15 NOTE — SUBJECTIVE & OBJECTIVE
This encounter was provided through telemedicine.  Patient was transferred to the telemedicine service on:  08/9/2022   The patient location is: [id number] A admitted 7/26/2022  8:24 PM.  Present with the patient at the time of the telemed/virtual assessment: Telepresenter    Interval History/Overnight Events:   Clinical record since admit reviewed.  Patient able to provide history.    Drain output 50 cc in 24 hours; pain unchanged to left thigh;  Cr improved to 8.5; patient in good spirits about improvement      Review of Systems   Constitutional:  Positive for fatigue. Negative for activity change and fever.   Respiratory:  Negative for cough and shortness of breath.    Cardiovascular:  Positive for leg swelling. Negative for chest pain.   Gastrointestinal:  Negative for diarrhea and vomiting.   Musculoskeletal:  Positive for myalgias.      Inpatient Medications:  Scheduled Meds:   amLODIPine  5 mg Oral BID    atorvastatin  20 mg Oral Daily    heparin (porcine)  5,000 Units Subcutaneous Q8H    insulin aspart U-100  8 Units Subcutaneous TIDWM    insulin detemir U-100  10 Units Subcutaneous BID    senna-docusate 8.6-50 mg  2 tablet Oral Daily    sevelamer carbonate  800 mg Oral TID WM     Continuous Infusions:  PRN Meds:.acetaminophen, sars-cov-2 (covid-19), dextrose 10%, dextrose 10%, glucagon (human recombinant), glucose, glucose, insulin aspart U-100, morphine, ondansetron, oxyCODONE, sodium chloride 0.9%      Objective:     Temp:  [97.8 °F (36.6 °C)-98.4 °F (36.9 °C)] 98.4 °F (36.9 °C)  Pulse:  [77-90] 83  Resp:  [18-20] 18  SpO2:  [92 %-97 %] 93 %  BP: (120-165)/(53-91) 159/87      Intake/Output Summary (Last 24 hours) at 8/15/2022 0956  Last data filed at 8/15/2022 0800  Gross per 24 hour   Intake 1600 ml   Output 2725 ml   Net -1125 ml          Body mass index is 36.01 kg/m².    Physical Exam  Vitals and nursing note reviewed.   Constitutional:       General: He is not in acute distress.     Appearance:  Normal appearance. He is normal weight. He is not ill-appearing.   HENT:      Head: Normocephalic and atraumatic.      Right Ear: Hearing normal.      Left Ear: Hearing normal.      Nose: Nose normal.   Eyes:      General: No scleral icterus.        Right eye: No discharge.         Left eye: No discharge.      Extraocular Movements: Extraocular movements intact.   Cardiovascular:      Rate and Rhythm: Normal rate.   Pulmonary:      Effort: Pulmonary effort is normal. No accessory muscle usage or respiratory distress.   Musculoskeletal:      Right lower leg: Edema present.      Left lower leg: Edema present.      Comments: Dressing to left thigh with drain (serosanguineous drainage)   Neurological:      General: No focal deficit present.      Mental Status: He is alert and oriented to person, place, and time.      Cranial Nerves: No cranial nerve deficit.      Motor: No weakness.   Psychiatric:         Attention and Perception: Attention normal.         Mood and Affect: Mood normal.         Speech: Speech normal.         Behavior: Behavior is cooperative.        Labs:  Recent Results (from the past 24 hour(s))   POCT glucose    Collection Time: 08/14/22 11:31 AM   Result Value Ref Range    POCT Glucose 227 (H) 70 - 110 mg/dL   POCT glucose    Collection Time: 08/14/22  3:15 PM   Result Value Ref Range    POCT Glucose 110 70 - 110 mg/dL   POCT glucose    Collection Time: 08/14/22  9:02 PM   Result Value Ref Range    POCT Glucose 161 (H) 70 - 110 mg/dL   Phosphorus    Collection Time: 08/15/22  7:23 AM   Result Value Ref Range    Phosphorus 5.8 (H) 2.7 - 4.5 mg/dL   Magnesium    Collection Time: 08/15/22  7:23 AM   Result Value Ref Range    Magnesium 2.1 1.6 - 2.6 mg/dL   Comprehensive Metabolic Panel    Collection Time: 08/15/22  7:23 AM   Result Value Ref Range    Sodium 139 136 - 145 mmol/L    Potassium 4.8 3.5 - 5.1 mmol/L    Chloride 106 95 - 110 mmol/L    CO2 20 (L) 23 - 29 mmol/L    Glucose 102 70 - 110 mg/dL     BUN 62 (H) 6 - 20 mg/dL    Creatinine 8.5 (H) 0.5 - 1.4 mg/dL    Calcium 9.0 8.7 - 10.5 mg/dL    Total Protein 6.3 6.0 - 8.4 g/dL    Albumin 2.2 (L) 3.5 - 5.2 g/dL    Total Bilirubin 0.2 0.1 - 1.0 mg/dL    Alkaline Phosphatase 71 55 - 135 U/L    AST 15 10 - 40 U/L    ALT <5 (L) 10 - 44 U/L    Anion Gap 13 8 - 16 mmol/L    eGFR 7.1 (A) >60 mL/min/1.73 m^2   CBC Auto Differential    Collection Time: 08/15/22  7:23 AM   Result Value Ref Range    WBC 3.48 (L) 3.90 - 12.70 K/uL    RBC 2.64 (L) 4.60 - 6.20 M/uL    Hemoglobin 8.0 (L) 14.0 - 18.0 g/dL    Hematocrit 24.3 (L) 40.0 - 54.0 %    MCV 92 82 - 98 fL    MCH 30.3 27.0 - 31.0 pg    MCHC 32.9 32.0 - 36.0 g/dL    RDW 13.0 11.5 - 14.5 %    Platelets 174 150 - 450 K/uL    MPV 9.5 9.2 - 12.9 fL    Immature Granulocytes 1.4 (H) 0.0 - 0.5 %    Gran # (ANC) 1.8 1.8 - 7.7 K/uL    Immature Grans (Abs) 0.05 (H) 0.00 - 0.04 K/uL    Lymph # 0.7 (L) 1.0 - 4.8 K/uL    Mono # 0.4 0.3 - 1.0 K/uL    Eos # 0.5 0.0 - 0.5 K/uL    Baso # 0.02 0.00 - 0.20 K/uL    nRBC 0 0 /100 WBC    Gran % 52.8 38.0 - 73.0 %    Lymph % 21.0 18.0 - 48.0 %    Mono % 10.1 4.0 - 15.0 %    Eosinophil % 14.1 (H) 0.0 - 8.0 %    Basophil % 0.6 0.0 - 1.9 %    Differential Method Automated    Sedimentation rate    Collection Time: 08/15/22  7:23 AM   Result Value Ref Range    Sed Rate 65 (H) 0 - 23 mm/Hr   C-Reactive Protein    Collection Time: 08/15/22  7:23 AM   Result Value Ref Range    CRP 24.1 (H) 0.0 - 8.2 mg/L   CK    Collection Time: 08/15/22  7:23 AM   Result Value Ref Range    CPK 13 (L) 20 - 200 U/L        Lab Results   Component Value Date    [id number] Negative 08/06/2022       Recent Labs   Lab 08/13/22  1043 08/14/22  0426 08/15/22  0723   WBC 3.50* 3.74* 3.48*   LYMPH 18.0  0.6* 19.8  0.7* 21.0  0.7*   HGB 7.5* 8.2* 8.0*   HCT 22.6* 25.2* 24.3*    175 174       Recent Labs   Lab 08/13/22  1043 08/14/22  0426 08/15/22  0723   * 140 139   K 4.4 4.8 4.8    105 106   CO2 23 22*  20*   BUN 58* 64* 62*   CREATININE 9.6* 8.9* 8.5*   * 131* 102   CALCIUM 8.6* 8.8 9.0   MG 1.9 2.1 2.1   PHOS 6.3* 6.2* 5.8*       Recent Labs   Lab 08/13/22  1043 08/14/22  0426 08/15/22  0723   ALKPHOS 69 84 71   ALT <5* <5* <5*   AST 13 14 15   ALBUMIN 1.9* 2.1* 2.2*   PROT 5.7* 6.1 6.3   BILITOT 0.1 0.2 0.2          Recent Labs     08/13/22  1043 08/14/22  0426 08/15/22  0723   FERRITIN  --  674*  --    CRP 24.9*  --  24.1*   CPK  --   --  13*         All labs within the last 24 hours were reviewed.     Microbiology:  Microbiology Results (last 7 days)       Procedure Component Value Units Date/Time    Aerobic culture [[id number]]  (Abnormal) Collected: 08/06/22 1153    Order Status: Completed Specimen: Wound from Hip, Left Updated: 08/10/22 1214     Aerobic Bacterial Culture STAPHYLOCOCCUS AUREUS  Moderate  For susceptibility see order #[id number]      Narrative:      #1 Left hip fluid    Aerobic culture [[id number]]  (Abnormal)  (Susceptibility) Collected: 08/06/22 1153    Order Status: Completed Specimen: Wound from Hip, Left Updated: 08/10/22 1214     Aerobic Bacterial Culture STAPHYLOCOCCUS AUREUS  Few      Narrative:      #2 Left hip fluid    Culture, Anaerobe [[id number]] Collected: 08/06/22 1153    Order Status: Completed Specimen: Body Fluid from Hip, Left Updated: 08/10/22 1035     Anaerobic Culture No anaerobes isolated    Narrative:      #2 Left hip fluid    Culture, Anaerobe [[id number]] Collected: 08/06/22 1153    Order Status: Completed Specimen: Body Fluid from Hip, Left Updated: 08/10/22 1034     Anaerobic Culture No anaerobes isolated    Narrative:      #1 Left hip fluid    Blood culture [[id number]] Collected: 08/05/22 0850    Order Status: Completed Specimen: Blood from Antecubital, Right Arm Updated: 08/10/22 1012     Blood Culture, Routine No growth after 5 days.    Blood culture [[id number]] Collected: 08/05/22 0852    Order Status: Completed Specimen: Blood from Peripheral, Right Hand  Updated: 08/10/22 1012     Blood Culture, Routine No growth after 5 days.    Fungus culture [[id number]] Collected: 08/05/22 0817    Order Status: Completed Specimen: Joint Fluid from Wrist, Left Updated: 08/09/22 1307     Fungus (Mycology) Culture Culture in progress    AFB Culture & Smear [[id number]] Collected: 08/05/22 0817    Order Status: Completed Specimen: Joint Fluid from Wrist, Left Updated: 08/08/22 1529     AFB Culture & Smear Culture in progress     AFB CULTURE STAIN No acid fast bacilli seen.              Imaging  ECG Results              EKG 12-lead (Final result)  Result time 07/27/22 07:53:05      Final result by Interface, Lab In [facility] (07/27/22 07:53:05)                   Narrative:    Test Reason : R00.0,    Vent. Rate : 138 BPM     Atrial Rate : 138 BPM     P-R Int : 126 ms          QRS Dur : 086 ms      QT Int : 286 ms       P-R-T Axes : 051 002 052 degrees     QTc Int : 433 ms    Sinus tachycardia  Otherwise normal ECG  No previous ECGs available  Confirmed by Crow HARRIS MD ([id number]) on 7/27/2022 7:53:00 AM    Referred By: [email or web address]   SELF           Confirmed By:Crow HARRIS MD                                    Results for orders placed during the hospital encounter of 07/26/22    Echo    Interpretation Summary  · The left ventricle is normal in size with normal systolic function.  · The estimated ejection fraction is 65%.  · Normal left ventricular diastolic function.  · Normal right ventricular size with normal right ventricular systolic function.  · The estimated PA systolic pressure is 16 mmHg.  · Normal central venous pressure (3 mmHg).      Echo  · The left ventricle is normal in size with normal systolic function.  · The estimated ejection fraction is 65%.  · Normal left ventricular diastolic function.  · Normal right ventricular size with normal right ventricular systolic   function.  · The estimated PA systolic pressure is 16 mmHg.  · Normal central venous pressure (3 mmHg).         All  imaging within the last 24 hours was reviewed.       Discharge Planning   FILEMON: 8/19/2022     Code Status: Full Code   Is the patient medically ready for discharge?: No    Reason for patient still in hospital (select all that apply): Patient trending condition, Laboratory test, Treatment, Consult recommendations, and Pending disposition  Discharge Plan A: Home Health

## 2022-08-16 LAB
ALBUMIN SERPL BCP-MCNC: 2.2 G/DL (ref 3.5–5.2)
ALP SERPL-CCNC: 66 U/L (ref 55–135)
ALT SERPL W/O P-5'-P-CCNC: <5 U/L (ref 10–44)
ANION GAP SERPL CALC-SCNC: 9 MMOL/L (ref 8–16)
AST SERPL-CCNC: 14 U/L (ref 10–40)
BASOPHILS # BLD AUTO: 0.02 K/UL (ref 0–0.2)
BASOPHILS NFR BLD: 0.5 % (ref 0–1.9)
BILIRUB SERPL-MCNC: 0.3 MG/DL (ref 0.1–1)
BUN SERPL-MCNC: 60 MG/DL (ref 6–20)
CALCIUM SERPL-MCNC: 8.8 MG/DL (ref 8.7–10.5)
CHLORIDE SERPL-SCNC: 104 MMOL/L (ref 95–110)
CO2 SERPL-SCNC: 23 MMOL/L (ref 23–29)
CREAT SERPL-MCNC: 7.8 MG/DL (ref 0.5–1.4)
DIFFERENTIAL METHOD: ABNORMAL
EOSINOPHIL # BLD AUTO: 0.6 K/UL (ref 0–0.5)
EOSINOPHIL NFR BLD: 15.8 % (ref 0–8)
ERYTHROCYTE [DISTWIDTH] IN BLOOD BY AUTOMATED COUNT: 13 % (ref 11.5–14.5)
EST. GFR  (NO RACE VARIABLE): 7.8 ML/MIN/1.73 M^2
FINAL PATHOLOGIC DIAGNOSIS: NORMAL
GLUCOSE SERPL-MCNC: 99 MG/DL (ref 70–110)
HCT VFR BLD AUTO: 23.7 % (ref 40–54)
HGB BLD-MCNC: 7.6 G/DL (ref 14–18)
IMM GRANULOCYTES # BLD AUTO: 0.04 K/UL (ref 0–0.04)
IMM GRANULOCYTES NFR BLD AUTO: 1.1 % (ref 0–0.5)
LYMPHOCYTES # BLD AUTO: 0.8 K/UL (ref 1–4.8)
LYMPHOCYTES NFR BLD: 22.6 % (ref 18–48)
Lab: NORMAL
MAGNESIUM SERPL-MCNC: 2.1 MG/DL (ref 1.6–2.6)
MCH RBC QN AUTO: 30.8 PG (ref 27–31)
MCHC RBC AUTO-ENTMCNC: 32.1 G/DL (ref 32–36)
MCV RBC AUTO: 96 FL (ref 82–98)
MONOCYTES # BLD AUTO: 0.4 K/UL (ref 0.3–1)
MONOCYTES NFR BLD: 10.1 % (ref 4–15)
NEUTROPHILS # BLD AUTO: 1.8 K/UL (ref 1.8–7.7)
NEUTROPHILS NFR BLD: 49.9 % (ref 38–73)
NRBC BLD-RTO: 0 /100 WBC
PHOSPHATE SERPL-MCNC: 5.8 MG/DL (ref 2.7–4.5)
PLATELET # BLD AUTO: 172 K/UL (ref 150–450)
PMV BLD AUTO: 9.6 FL (ref 9.2–12.9)
POCT GLUCOSE: 104 MG/DL (ref 70–110)
POCT GLUCOSE: 119 MG/DL (ref 70–110)
POCT GLUCOSE: 137 MG/DL (ref 70–110)
POCT GLUCOSE: 216 MG/DL (ref 70–110)
POCT GLUCOSE: 220 MG/DL (ref 70–110)
POTASSIUM SERPL-SCNC: 4.5 MMOL/L (ref 3.5–5.1)
PROT SERPL-MCNC: 6 G/DL (ref 6–8.4)
RBC # BLD AUTO: 2.47 M/UL (ref 4.6–6.2)
SODIUM SERPL-SCNC: 136 MMOL/L (ref 136–145)
WBC # BLD AUTO: 3.67 K/UL (ref 3.9–12.7)

## 2022-08-16 PROCEDURE — 25000003 PHARM REV CODE 250

## 2022-08-16 PROCEDURE — 84100 ASSAY OF PHOSPHORUS: CPT | Performed by: STUDENT IN AN ORGANIZED HEALTH CARE EDUCATION/TRAINING PROGRAM

## 2022-08-16 PROCEDURE — 99233 SBSQ HOSP IP/OBS HIGH 50: CPT | Mod: 95,,, | Performed by: INTERNAL MEDICINE

## 2022-08-16 PROCEDURE — 25000003 PHARM REV CODE 250: Performed by: HOSPITALIST

## 2022-08-16 PROCEDURE — 80053 COMPREHEN METABOLIC PANEL: CPT | Performed by: STUDENT IN AN ORGANIZED HEALTH CARE EDUCATION/TRAINING PROGRAM

## 2022-08-16 PROCEDURE — 63600175 PHARM REV CODE 636 W HCPCS: Performed by: STUDENT IN AN ORGANIZED HEALTH CARE EDUCATION/TRAINING PROGRAM

## 2022-08-16 PROCEDURE — 20600001 HC STEP DOWN PRIVATE ROOM

## 2022-08-16 PROCEDURE — 99233 PR SUBSEQUENT HOSPITAL CARE,LEVL III: ICD-10-PCS | Mod: ,,, | Performed by: REGISTERED NURSE

## 2022-08-16 PROCEDURE — 97116 GAIT TRAINING THERAPY: CPT

## 2022-08-16 PROCEDURE — 99233 SBSQ HOSP IP/OBS HIGH 50: CPT | Mod: ,,, | Performed by: REGISTERED NURSE

## 2022-08-16 PROCEDURE — 97110 THERAPEUTIC EXERCISES: CPT

## 2022-08-16 PROCEDURE — 85025 COMPLETE CBC W/AUTO DIFF WBC: CPT | Performed by: STUDENT IN AN ORGANIZED HEALTH CARE EDUCATION/TRAINING PROGRAM

## 2022-08-16 PROCEDURE — 36415 COLL VENOUS BLD VENIPUNCTURE: CPT | Performed by: STUDENT IN AN ORGANIZED HEALTH CARE EDUCATION/TRAINING PROGRAM

## 2022-08-16 PROCEDURE — 25000003 PHARM REV CODE 250: Performed by: INTERNAL MEDICINE

## 2022-08-16 PROCEDURE — 83735 ASSAY OF MAGNESIUM: CPT | Performed by: STUDENT IN AN ORGANIZED HEALTH CARE EDUCATION/TRAINING PROGRAM

## 2022-08-16 PROCEDURE — 99233 PR SUBSEQUENT HOSPITAL CARE,LEVL III: ICD-10-PCS | Mod: 95,,, | Performed by: INTERNAL MEDICINE

## 2022-08-16 RX ORDER — HYDRALAZINE HYDROCHLORIDE 25 MG/1
25 TABLET, FILM COATED ORAL 2 TIMES DAILY
Status: DISCONTINUED | OUTPATIENT
Start: 2022-08-16 | End: 2022-08-24 | Stop reason: HOSPADM

## 2022-08-16 RX ORDER — NIFEDIPINE 30 MG/1
60 TABLET, EXTENDED RELEASE ORAL DAILY
Status: DISCONTINUED | OUTPATIENT
Start: 2022-08-17 | End: 2022-08-17

## 2022-08-16 RX ORDER — ISOSORBIDE DINITRATE 10 MG/1
10 TABLET ORAL 2 TIMES DAILY
Status: DISCONTINUED | OUTPATIENT
Start: 2022-08-16 | End: 2022-08-17

## 2022-08-16 RX ORDER — NIFEDIPINE 30 MG/1
30 TABLET, EXTENDED RELEASE ORAL DAILY
Status: COMPLETED | OUTPATIENT
Start: 2022-08-16 | End: 2022-08-16

## 2022-08-16 RX ADMIN — AMLODIPINE BESYLATE 5 MG: 5 TABLET ORAL at 08:08

## 2022-08-16 RX ADMIN — OXYCODONE 5 MG: 5 TABLET ORAL at 11:08

## 2022-08-16 RX ADMIN — HYDRALAZINE HYDROCHLORIDE 25 MG: 25 TABLET, FILM COATED ORAL at 09:08

## 2022-08-16 RX ADMIN — NIFEDIPINE 30 MG: 30 TABLET, FILM COATED, EXTENDED RELEASE ORAL at 09:08

## 2022-08-16 RX ADMIN — ATORVASTATIN CALCIUM 20 MG: 20 TABLET, FILM COATED ORAL at 08:08

## 2022-08-16 RX ADMIN — HYDRALAZINE HYDROCHLORIDE 25 MG: 25 TABLET ORAL at 10:08

## 2022-08-16 RX ADMIN — OXYCODONE 5 MG: 5 TABLET ORAL at 06:08

## 2022-08-16 RX ADMIN — HEPARIN SODIUM 5000 UNITS: 5000 INJECTION INTRAVENOUS; SUBCUTANEOUS at 02:08

## 2022-08-16 RX ADMIN — ISOSORBIDE DINITRATE 10 MG: 10 TABLET ORAL at 02:08

## 2022-08-16 RX ADMIN — SEVELAMER CARBONATE 800 MG: 800 TABLET, FILM COATED ORAL at 11:08

## 2022-08-16 RX ADMIN — INSULIN ASPART 8 UNITS: 100 INJECTION, SOLUTION INTRAVENOUS; SUBCUTANEOUS at 01:08

## 2022-08-16 RX ADMIN — INSULIN ASPART 8 UNITS: 100 INJECTION, SOLUTION INTRAVENOUS; SUBCUTANEOUS at 08:08

## 2022-08-16 RX ADMIN — INSULIN ASPART 2 UNITS: 100 INJECTION, SOLUTION INTRAVENOUS; SUBCUTANEOUS at 09:08

## 2022-08-16 RX ADMIN — SEVELAMER CARBONATE 800 MG: 800 TABLET, FILM COATED ORAL at 07:08

## 2022-08-16 RX ADMIN — HYDRALAZINE HYDROCHLORIDE 25 MG: 25 TABLET, FILM COATED ORAL at 02:08

## 2022-08-16 RX ADMIN — HEPARIN SODIUM 5000 UNITS: 5000 INJECTION INTRAVENOUS; SUBCUTANEOUS at 10:08

## 2022-08-16 RX ADMIN — SEVELAMER CARBONATE 800 MG: 800 TABLET, FILM COATED ORAL at 05:08

## 2022-08-16 RX ADMIN — OXYCODONE 5 MG: 5 TABLET ORAL at 05:08

## 2022-08-16 RX ADMIN — OXYCODONE 5 MG: 5 TABLET ORAL at 10:08

## 2022-08-16 RX ADMIN — INSULIN DETEMIR 10 UNITS: 100 INJECTION, SOLUTION SUBCUTANEOUS at 08:08

## 2022-08-16 RX ADMIN — HEPARIN SODIUM 5000 UNITS: 5000 INJECTION INTRAVENOUS; SUBCUTANEOUS at 06:08

## 2022-08-16 RX ADMIN — ISOSORBIDE DINITRATE 10 MG: 10 TABLET ORAL at 09:08

## 2022-08-16 RX ADMIN — INSULIN DETEMIR 10 UNITS: 100 INJECTION, SOLUTION SUBCUTANEOUS at 09:08

## 2022-08-16 RX ADMIN — SENNOSIDES AND DOCUSATE SODIUM 2 TABLET: 50; 8.6 TABLET ORAL at 08:08

## 2022-08-16 NOTE — ASSESSMENT & PLAN NOTE
47M with DM admitted 7/26/22 for DKA, ortho following for left lateral thigh abscess. s/p I&D and drain placement left thigh 7/29/22. Cx +MSSA. Had high drain purulent output after first I&D, was taken back to OR 8/6/22 for repeat I&D. Drain in place, dressings CDI. Nephrology on board for ANGE.     -- ANGE nephrology managing   -- DVT SQH  -- Daptomycin per ID (possible adverse reaction to beta lactam)  -- Pain MM  -- Drain output 60cc last 24 hours, will keep drain. Seems to be slowly downtrending  -- Continue trending ESR/CRP  -- Encourage ambulation    Dispo: Observe drain, tight BG control, manage ANGE

## 2022-08-16 NOTE — PROGRESS NOTES
[facility] - Telemetry Stepdown  Orthopedics  Progress Note    Attg Note:  I agree with the resident's assessment and plan.  Drain output 60 in the last 24 hours (40/20).  Improving clinically.    Rob Graff MD      Patient Name: Wilfredo Thomas  MRN: [id number]  Admission Date: 7/26/2022  Hospital Length of Stay: 21 days  Attending Provider: Mervat Johnson MD  Primary Care Provider: Aylin Wayne,   Follow-up For: Procedure(s) (LRB):  Incision and Drainage - LEFT THIGH. (Left)    Post-Operative Day: 10 Days Post-Op  Subjective:     Principal Problem:Abscess of left thigh    Principal Orthopedic Problem: Left thigh I&D 7/29 and 8/6  Staph species on thigh abscess cultures    Interval History  S/p repeat L thigh I&D 8/6/22. NAEON, patient stable, pain controlled. No acute complaints this morning. Drain with 40cc yesterday, 20 cc overnight serosainguinous output. Patient feels like he is baseline, not worsening. Cr beginning to downtrend.      Review of patient's allergies indicates:  No Known Allergies    Current Facility-Administered Medications   Medication    acetaminophen tablet 1,000 mg    amLODIPine tablet 5 mg    atorvastatin tablet 20 mg    COVID-19 vac, forrest(Pfizer)(PF) (Pfizer COVID-19) 30 mcg/0.3 mL injection 0.3 mL    DAPTOmycin (CUBICIN) 625 mg in sodium chloride 0.9% 50 mL IVPB    dextrose 10% bolus 125 mL    dextrose 10% bolus 250 mL    glucagon (human recombinant) injection 1 mg    glucose chewable tablet 16 g    glucose chewable tablet 24 g    heparin (porcine) injection 5,000 Units    hydrALAZINE tablet 25 mg    insulin aspart U-100 pen 1-10 Units    insulin aspart U-100 pen 8 Units    insulin detemir U-100 pen 10 Units    morphine injection 3 mg    ondansetron injection 4 mg    oxyCODONE immediate release tablet 5 mg    senna-docusate 8.6-50 mg per tablet 2 tablet    sevelamer carbonate tablet 800 mg    sodium chloride 0.9% flush 10 mL     Objective:     Vital Signs (Most Recent):  Temp: 98.4 °F  "(36.9 °C) (08/16/22 0702)  Pulse: 85 (08/16/22 0702)  Resp: 18 (08/16/22 0702)  BP: (!) 170/92 (08/16/22 0702)  SpO2: 95 % (08/16/22 0702) Vital Signs (24h Range):  Temp:  [97.9 °F (36.6 °C)-98.4 °F (36.9 °C)] 98.4 °F (36.9 °C)  Pulse:  [85-92] 85  Resp:  [16-18] 18  SpO2:  [92 %-96 %] 95 %  BP: (138-170)/(74-92) 170/92     Weight: 104.3 kg (229 lb 15 oz)  Height: 5' 7" (170.2 cm)  Body mass index is 36.01 kg/m².      Intake/Output Summary (Last 24 hours) at 8/16/2022 0900  Last data filed at 8/16/2022 0657  Gross per 24 hour   Intake 480 ml   Output 2810 ml   Net -2330 ml         Ortho/SPM Exam  Left lower extremity  Left thigh dressing cdi  Drain in place, ss output  No erythema of thigh, no signficant swelling  Compartments soft and compressible  Painless ROM of hip and knee  NVI        CBC:   Recent Labs   Lab 08/15/22  0723 08/16/22  0333   WBC 3.48* 3.67*   HGB 8.0* 7.6*   HCT 24.3* 23.7*    172       CMP:   Recent Labs   Lab 08/15/22  0723 08/16/22  0333    136   K 4.8 4.5    104   CO2 20* 23    99   BUN 62* 60*   CREATININE 8.5* 7.8*   CALCIUM 9.0 8.8   PROT 6.3 6.0   ALBUMIN 2.2* 2.2*   BILITOT 0.2 0.3   ALKPHOS 71 66   AST 15 14   ALT <5* <5*   ANIONGAP 13 9         All pertinent labs within the past 24 hours have been reviewed.    Significant Imaging: I have reviewed and interpreted all pertinent imaging results/findings.      Assessment/Plan:     * Abscess of left thigh  47M with DM admitted 7/26/22 for DKA, ortho following for left lateral thigh abscess. s/p I&D and drain placement left thigh 7/29/22. Cx +MSSA. Had high drain purulent output after first I&D, was taken back to OR 8/6/22 for repeat I&D. Drain in place, dressings CDI. Nephrology on board for ANGE.     -- ANGE nephrology managing   -- DVT SQH  -- Daptomycin per ID (possible adverse reaction to beta lactam)  -- Pain MM  -- Drain output 60cc last 24 hours, will keep drain. Seems to be slowly downtrending  -- Continue " trending ESR/CRP  -- Encourage ambulation    Dispo: Observe drain, tight BG control, manage ANGE          Aron Vieyra MD  Orthopedics  [facility] - Telemetry Stepdown

## 2022-08-16 NOTE — SUBJECTIVE & OBJECTIVE
Interval History:   No AEON.   ANGE continues to improve, Cr. 7.8 today. Continues with neutropenia today, WBC 3.67. will follow.   All thigh cultures with MSSA          Review of Systems   Constitutional:  Negative for chills, diaphoresis, fatigue and fever.   Respiratory:  Negative for cough and shortness of breath.    Cardiovascular:  Positive for leg swelling (upper left thigh). Negative for chest pain and palpitations.   Gastrointestinal:  Negative for abdominal distention and abdominal pain.   Genitourinary:  Negative for difficulty urinating and dysuria.   Musculoskeletal:  Positive for arthralgias (left wrist) and myalgias (upper left thigh). Negative for joint swelling.   Skin:  Positive for wound (upper left thigh). Negative for color change and rash.   Allergic/Immunologic: Negative for immunocompromised state.   Neurological:  Negative for dizziness, tremors, weakness, numbness and headaches.   Psychiatric/Behavioral:  Negative for agitation and decreased concentration. The patient is not nervous/anxious.    Objective:     Vital Signs (Most Recent):  Temp: 98.4 °F (36.9 °C) (08/16/22 0702)  Pulse: 85 (08/16/22 0702)  Resp: 18 (08/16/22 1112)  BP: (!) 170/89 (08/16/22 0900)  SpO2: 95 % (08/16/22 0702)   Vital Signs (24h Range):  Temp:  [97.9 °F (36.6 °C)-98.4 °F (36.9 °C)] 98.4 °F (36.9 °C)  Pulse:  [85-92] 85  Resp:  [16-18] 18  SpO2:  [93 %-96 %] 95 %  BP: (138-170)/(75-92) 170/89     Weight: 104.3 kg (229 lb 15 oz)  Body mass index is 36.01 kg/m².    Estimated Creatinine Clearance: 13.2 mL/min (A) (based on SCr of 7.8 mg/dL (H)).    Physical Exam  Vitals and nursing note reviewed.   Constitutional:       General: He is not in acute distress.     Appearance: Normal appearance. He is well-developed. He is obese. He is not ill-appearing, toxic-appearing or diaphoretic.   HENT:      Head: Normocephalic and atraumatic.      Nose: Nose normal.      Mouth/Throat:      Dentition: Does not have dentures.       Pharynx: No oropharyngeal exudate.   Eyes:      General: No scleral icterus.     Conjunctiva/sclera: Conjunctivae normal.   Cardiovascular:      Rate and Rhythm: Normal rate and regular rhythm.   Pulmonary:      Effort: Pulmonary effort is normal. No respiratory distress.      Breath sounds: Normal breath sounds.   Abdominal:      General: There is no distension.      Palpations: Abdomen is soft.      Tenderness: There is no abdominal tenderness.   Musculoskeletal:         General: No swelling.   Skin:     General: Skin is warm and dry.      Findings: No erythema or rash.      Comments: Incision to left thigh. Dressed, CDI. Surgical drain with dark bloody output.   Neurological:      General: No focal deficit present.      Mental Status: He is alert and oriented to person, place, and time. Mental status is at baseline.      Motor: No weakness.      Gait: Gait normal.   Psychiatric:         Mood and Affect: Mood normal.         Behavior: Behavior normal.         Thought Content: Thought content normal.         Judgment: Judgment normal.       Significant Labs: CBC:   Recent Labs   Lab 08/15/22  0723 08/16/22  0333   WBC 3.48* 3.67*   HGB 8.0* 7.6*   HCT 24.3* 23.7*    172       CMP:   Recent Labs   Lab 08/15/22  0723 08/16/22  0333    136   K 4.8 4.5    104   CO2 20* 23    99   BUN 62* 60*   CREATININE 8.5* 7.8*   CALCIUM 9.0 8.8   PROT 6.3 6.0   ALBUMIN 2.2* 2.2*   BILITOT 0.2 0.3   ALKPHOS 71 66   AST 15 14   ALT <5* <5*   ANIONGAP 13 9       Microbiology Results (last 7 days)       Procedure Component Value Units Date/Time    Aerobic culture [[id number]]  (Abnormal)  (Susceptibility) Collected: 08/06/22 1153    Order Status: Completed Specimen: Wound from Hip, Left Updated: 08/15/22 1002     Aerobic Bacterial Culture STAPHYLOCOCCUS AUREUS  Few      Narrative:      #2 Left hip fluid    Aerobic culture [[id number]]  (Abnormal) Collected: 08/06/22 1153    Order Status: Completed Specimen:  Wound from Hip, Left Updated: 08/10/22 1214     Aerobic Bacterial Culture STAPHYLOCOCCUS AUREUS  Moderate  For susceptibility see order #[id number]      Narrative:      #1 Left hip fluid    Culture, Anaerobe [[id number]] Collected: 08/06/22 1153    Order Status: Completed Specimen: Body Fluid from Hip, Left Updated: 08/10/22 1035     Anaerobic Culture No anaerobes isolated    Narrative:      #2 Left hip fluid    Culture, Anaerobe [[id number]] Collected: 08/06/22 1153    Order Status: Completed Specimen: Body Fluid from Hip, Left Updated: 08/10/22 1034     Anaerobic Culture No anaerobes isolated    Narrative:      #1 Left hip fluid    Blood culture [[id number]] Collected: 08/05/22 0850    Order Status: Completed Specimen: Blood from Antecubital, Right Arm Updated: 08/10/22 1012     Blood Culture, Routine No growth after 5 days.    Blood culture [[id number]] Collected: 08/05/22 0852    Order Status: Completed Specimen: Blood from Peripheral, Right Hand Updated: 08/10/22 1012     Blood Culture, Routine No growth after 5 days.    Fungus culture [[id number]] Collected: 08/05/22 0817    Order Status: Completed Specimen: Joint Fluid from Wrist, Left Updated: 08/09/22 1307     Fungus (Mycology) Culture Culture in progress          Recent Lab Results  (Last 5 results in the past 24 hours)        08/16/22  0706   08/16/22  0333   08/15/22  2138   08/15/22  1711   08/15/22  1117        Albumin   2.2             Alkaline Phosphatase   66             ALT   <5             Anion Gap   9             AST   14             Baso #   0.02             Basophil %   0.5             BILIRUBIN TOTAL   0.3  Comment: For infants and newborns, interpretation of results should be based  on gestational age, weight and in agreement with clinical  observations.    Premature Infant recommended reference ranges:  Up to 24 hours.............<8.0 mg/dL  Up to 48 hours............<12.0 mg/dL  3-5 days..................<15.0 mg/dL  6-29  days.................<15.0 mg/dL               BUN   60             Calcium   8.8             Chloride   104             CO2   23             Creatinine   7.8             Differential Method   Automated             eGFR   7.8             Eos #   0.6             Eosinophil %   15.8             Glucose   99             Gran # (ANC)   1.8             Gran %   49.9             Hematocrit   23.7             Hemoglobin   7.6             Immature Grans (Abs)   0.04  Comment: Mild elevation in immature granulocytes is non specific and   can be seen in a variety of conditions including stress response,   acute inflammation, trauma and pregnancy. Correlation with other   laboratory and clinical findings is essential.               Immature Granulocytes   1.1             Lymph #   0.8             Lymph %   22.6             Magnesium   2.1             MCH   30.8             MCHC   32.1             MCV   96             Mono #   0.4             Mono %   10.1             MPV   9.6             nRBC   0             Phosphorus   5.8             Platelets   172             POCT Glucose 119     216   71   323       Potassium   4.5             PROTEIN TOTAL   6.0             RBC   2.47             RDW   13.0             Sodium   136             WBC   3.67                                    Significant Imaging:     Imaging Results              CT Thigh With Contrast Left (Final result)  Result time 07/27/22 01:13:09      Final result by Jori Hopkisn DO (07/27/22 01:13:09)                   Impression:      Large heterogeneous fluid collection in the anterolateral left proximal thigh soft tissues, concerning for a soft tissue hematoma or Preston-Melissa lesion.  A neoplastic process is not entirely excluded.  Recommend close interval follow-up to assess for stability/resolution.      Electronically signed by: Jori Hopkins  Date:    07/27/2022  Time:    01:13               Narrative:    EXAMINATION:  CT THIGH WITH CONTRAST  LEFT    CLINICAL HISTORY:  Soft tissue mass, thigh, deep;    TECHNIQUE:  Axial CT images of the left thigh with sagittal and coronal reformats after the intravenous administration of 50 mL Omnipaque 350.    COMPARISON:  None.    FINDINGS:  Bone: Bone mineralization is normal.  There is no evidence of an acute fracture or dislocation.  Alignment is normal.    Soft tissues: There is a large heterogeneous fluid collection in the left anterolateral proximal thigh measuring approximately 16 x 7 x 6 cm.  The collection appears to be centered within the subcutaneous tissues or superficial fascia and abuts the gluteus musculature, the sartorius muscle, the rectus femoris, and the vastus lateralis.  There is soft tissue edema in the surrounding subcutaneous tissues.  Muscle bulk is normal.    Articulations: The left femoroacetabular joint is unremarkable.  The pubic symphysis is unremarkable.  The left knee is unremarkable.  No large joint effusion or significant cartilage loss.    Miscellaneous: Neurovascular structures are grossly intact.  There is moderate calcified atherosclerosis of the left femoral and popliteal arteries.                                       CTA Chest Non-Coronary (PE Study) (Final result)  Result time 07/26/22 21:36:01      Final result by Jori Hopkins DO (07/26/22 21:36:01)                   Impression:      No large central or lobar pulmonary embolism.      Electronically signed by: Jori Hopkins  Date:    07/26/2022  Time:    21:36               Narrative:    EXAMINATION:  CTA CHEST NON CORONARY    CLINICAL HISTORY:  Pulmonary embolism (PE) suspected, high prob;    TECHNIQUE:  Low dose axial images, sagittal and coronal reformations were obtained from the thoracic inlet to the lung bases following the IV administration of 100 mL of Omnipaque 350.  Contrast timing was optimized to evaluate the pulmonary arteries.  Maximum intensity projection images were provided for  review.    COMPARISON:  Chest radiograph from earlier the same date.    FINDINGS:  Pulmonary vasculature: Satisfactory opacification of the pulmonary arterial system.  Motion artifact significantly limits evaluation of the segmental and subsegmental pulmonary arteries.  There is no large central or lobar pulmonary embolism.    Aorta: Left-sided aortic arch.  No aneurysm and no significant atherosclerosis.    Base of Neck: No significant abnormality.    Thoracic soft tissues: Normal.    Heart: Normal size. No effusion.    Amena/Mediastinum: No pathologic russ enlargement.    Airways: The large airways are patent. No foci of endobronchial filling.    Lungs/Pleura: Clear lungs. No pleural effusion or thickening.    Esophagus: Normal.    Upper Abdomen: No abnormality of the partially imaged upper abdomen.    Bones: No acute fracture. No suspicious lytic or sclerotic lesions.                                       X-Ray Chest 1 View (Final result)  Result time 07/26/22 21:23:42   Procedure changed from X-Ray Chest PA And Lateral     Final result by Olman Saucedo MD (07/26/22 21:23:42)                   Impression:      No convincing radiographic evidence of acute intrathoracic process on this single view..      Electronically signed by: Olman Saucedo MD  Date:    07/26/2022  Time:    21:23               Narrative:    EXAMINATION:  XR CHEST 1 VIEW    CLINICAL HISTORY:  shortness of breath;    TECHNIQUE:  Single frontal view of the chest was performed.    COMPARISON:  None    FINDINGS:  Cardiac monitoring leads overlie the chest.  Cardiac silhouette appears within normal limits.  No confluent airspace consolidation identified.  No significant volume of pleural fluid or pneumothorax appreciated.  The visualized osseous structures demonstrate mild degenerative changes.

## 2022-08-16 NOTE — PLAN OF CARE
Patient in bed. AAO x 4. RA. No distress. Reports pain to L thigh at drain site and to L wrist. New IV placed to R lateral FA. Patient discuss long term goals ss far as Diabetes management. States fears about poor renal function. Bed locked and lowered. Call bell in reach. [facility]      Problem: Diabetic Ketoacidosis  Goal: Fluid and Electrolyte Balance with Absence of Ketosis  Outcome: Ongoing, Progressing     Problem: Adult Inpatient Plan of Care  Goal: Plan of Care Review  Outcome: Ongoing, Progressing     Problem: Oral Intake Inadequate (Acute Kidney Injury/Impairment)  Goal: Optimal Nutrition Intake  Outcome: Ongoing, Progressing     Problem: Renal Function Impairment (Acute Kidney Injury/Impairment)  Goal: Effective Renal Function  Outcome: Ongoing, Progressing

## 2022-08-16 NOTE — ASSESSMENT & PLAN NOTE
46 yo male with history of DMII and HTN admitted for DKA and left thigh abscess. MRI of left femur was notable for ruptured tensor fascia jose luis mm with associated large hematoma, as well as small area of potential osteonecrosis. Bedside aspiration of the fluid collection was notable for a cell count of >200k (>80%segs), cultures + staph species. He was taken to the OR on 7/29 with orthopedic surgery for surgical I&D. Surgical cultures are positive for MSSA. Per ortho surgery, no concerns for bone involvement. Patient was initially on Vancomycin and Zosyn. Vanc trough supratherapeutic and patient has developed an ANGE. He is currently on Cefazolin.       Recent new onset left wrist pain, remains stating improvement. Minimal swelling noted today, ROM improving. Received steroid shot per primary team which pt noted improvement. He is s/p wrist arthroscopy with hardware placement in 3/2022. Wife noticed a boil on his wrist. S/p joint aspiration, white count 2050k, 88% segs. No crystals noted. Left wrist aspiration from 8/5/22-  NG. Xray of wrist with no acute concerns.      Pt with ANGE, Cr decreased to 7.8 today. Nephrology managing. Autoimmune workup ordered, pending. Bedside ultrasound completed and no hydronephrosis noted per nephrology note on 8/11/22. Emanuel stain negative on 8/3/22. Reordered on 8/11/22, positive. Cefazolin stopped, daptomycin started yesterday on 8/15/22. CPK 13, will need repeat on 8/20.    Otherwise, afebrile. HDS. No leukocytosis. OR cultures 8/6 L thigh + MSSA.  Leg pain improved.  ANGE continues to worsened. 2D echo without mention of vegetations.  Per ortho recent note on 8/16- no plans to return to OR. Pt reports decreased drainage from left JOHANN drain. Stable non septic      Recommendations:   · Continue Daptomycin 6mg/kg/dose q48 hours (renally dosed) for MSSA SSTI. (dose reviewed with ID PharmD)  · Continue to follow ortho surgical plans.   · Nephrology ANGE management. Will follow plan  regarding further w/u.   · Anticipate SSTI course from time of last washout 8/6 - As of now, continue EOC on 8/20/22.   · Patient and plan reviewed with ID staff, Dr. Graves. ID will follow.

## 2022-08-16 NOTE — PLAN OF CARE
Recommendations     1. Continue current Diabetic, Low K diet - add Low Phosphorus diet restrictions if necessary.  2. RD to monitor & follow-up.     Goals: Meet % EEN, EPN by RD f/u date  Nutrition Goal Status: goal met  Communication of RD Recs: reviewed with RN

## 2022-08-16 NOTE — SUBJECTIVE & OBJECTIVE
"Principal Problem:Abscess of left thigh    Principal Orthopedic Problem: Left thigh I&D 7/29 and 8/6  Staph species on thigh abscess cultures    Interval History  S/p repeat L thigh I&D 8/6/22. NAEON, patient stable, pain controlled. No acute complaints this morning. Drain with 40cc yesterday, 20 cc overnight serosainguinous output. Patient feels like he is baseline, not worsening. Cr beginning to downtrend.      Review of patient's allergies indicates:  No Known Allergies    Current Facility-Administered Medications   Medication    acetaminophen tablet 1,000 mg    amLODIPine tablet 5 mg    atorvastatin tablet 20 mg    COVID-19 vac, forrest(Pfizer)(PF) (Pfizer COVID-19) 30 mcg/0.3 mL injection 0.3 mL    DAPTOmycin (CUBICIN) 625 mg in sodium chloride 0.9% 50 mL IVPB    dextrose 10% bolus 125 mL    dextrose 10% bolus 250 mL    glucagon (human recombinant) injection 1 mg    glucose chewable tablet 16 g    glucose chewable tablet 24 g    heparin (porcine) injection 5,000 Units    hydrALAZINE tablet 25 mg    insulin aspart U-100 pen 1-10 Units    insulin aspart U-100 pen 8 Units    insulin detemir U-100 pen 10 Units    morphine injection 3 mg    ondansetron injection 4 mg    oxyCODONE immediate release tablet 5 mg    senna-docusate 8.6-50 mg per tablet 2 tablet    sevelamer carbonate tablet 800 mg    sodium chloride 0.9% flush 10 mL     Objective:     Vital Signs (Most Recent):  Temp: 98.4 °F (36.9 °C) (08/16/22 0702)  Pulse: 85 (08/16/22 0702)  Resp: 18 (08/16/22 0702)  BP: (!) 170/92 (08/16/22 0702)  SpO2: 95 % (08/16/22 0702) Vital Signs (24h Range):  Temp:  [97.9 °F (36.6 °C)-98.4 °F (36.9 °C)] 98.4 °F (36.9 °C)  Pulse:  [85-92] 85  Resp:  [16-18] 18  SpO2:  [92 %-96 %] 95 %  BP: (138-170)/(74-92) 170/92     Weight: 104.3 kg (229 lb 15 oz)  Height: 5' 7" (170.2 cm)  Body mass index is 36.01 kg/m².      Intake/Output Summary (Last 24 hours) at 8/16/2022 0900  Last data filed at 8/16/2022 0657  Gross per 24 hour   Intake " 480 ml   Output 2810 ml   Net -2330 ml         Ortho/SPM Exam  Left lower extremity  Left thigh dressing cdi  Drain in place, ss output  No erythema of thigh, no signficant swelling  Compartments soft and compressible  Painless ROM of hip and knee  NVI        CBC:   Recent Labs   Lab 08/15/22  0723 08/16/22  0333   WBC 3.48* 3.67*   HGB 8.0* 7.6*   HCT 24.3* 23.7*    172       CMP:   Recent Labs   Lab 08/15/22  0723 08/16/22  0333    136   K 4.8 4.5    104   CO2 20* 23    99   BUN 62* 60*   CREATININE 8.5* 7.8*   CALCIUM 9.0 8.8   PROT 6.3 6.0   ALBUMIN 2.2* 2.2*   BILITOT 0.2 0.3   ALKPHOS 71 66   AST 15 14   ALT <5* <5*   ANIONGAP 13 9         All pertinent labs within the past 24 hours have been reviewed.    Significant Imaging: I have reviewed and interpreted all pertinent imaging results/findings.

## 2022-08-16 NOTE — PROGRESS NOTES
"  [facility] - Telemetry Stepdown  Adult Nutrition  Consult Note    SUMMARY     Recommendations    1. Continue current Diabetic, Low K diet - add Low Phosphorus diet restrictions if necessary.  2. RD to monitor & follow-up.    Goals: Meet % EEN, EPN by RD f/u date  Nutrition Goal Status: goal met  Communication of RD Recs: reviewed with RN    Assessment and Plan    Nutrition Problem:  Excessive CHO intake     Related to (etiology):   Food and nutrition related knowledge deficit      Signs and Symptoms (as evidenced by):   A1C 10.1     Interventions(treatment strategy):  Collaboration of nutrition care w/ other providers  Nutrition education      Nutrition Diagnosis Status:   Continues     Reason for Assessment    Reason For Assessment: RD follow-up  Diagnosis: other (see comments) (DKA)  Relevant Medical History: DM, HTN  Interdisciplinary Rounds: did not attend    General Information Comments: Pt continues to tolerate diet w/ 75% PO intake. S/p I & D of L. thigh. Diabetic diet education complete 7/27. At initial RD assessment, pt reported good appetite PTA & stable weight. Appears nourished w/ no indicators of malnutrition.   Nutrition Discharge Planning: Dietary compliance    Nutrition/Diet History    Spiritual, Cultural Beliefs, Druze Practices, Values that Affect Care: no  Factors Affecting Nutritional Intake: None identified at this time    Anthropometrics    Temp: 98.4 °F (36.9 °C)  Height Method: Stated  Height: 5' 7" (170.2 cm)  Height (inches): 67 in  Weight Method: Standard Scale  Weight: 104.3 kg (229 lb 15 oz)  Weight (lb): 229.94 lb  Ideal Body Weight (IBW), Male: 148 lb  % Ideal Body Weight, Male (lb): 155.36 %  BMI (Calculated): 36  BMI Grade: 35 - 39.9 - obesity - grade II    Lab/Procedures/Meds    Pertinent Labs Reviewed: reviewed  Pertinent Labs Comments: BUN 60, Creat 7.8, GFR 7.8, P 5.8  Pertinent Medications Reviewed: reviewed  Pertinent Medications Comments: Statin    Estimated/Assessed " Needs    Weight Used For Calorie Calculations: 104.3 kg (229 lb 15 oz)     Energy Calorie Requirements (kcal): 2054 kcal/d  Energy Need Method: Burnet-St Jeor (1.1 PAL)     Protein Requirements: 104-115 g/d (1-1.1 g/kg)  Weight Used For Protein Calculations: 104.3 kg (229 lb 15 oz)     Estimated Fluid Requirement Method: other (see comments) (Per MD or 1 mL/kcal)  RDA Method (mL): 2054    Nutrition Prescription Ordered    Current Diet Order: 2000 kcal ADA, Low K  Oral Nutrition Supplement: Boost Glucose Control    Evaluation of Received Nutrient/Fluid Intake    I/O: -10.2L Since 8/2    Comments: LBM: 8/15    Tolerance: tolerating    Nutrition Risk    Level of Risk/Frequency of Follow-up:  (1x/week)     Monitor and Evaluation    Food and Nutrient Intake: food and beverage intake, energy intake  Physical Activity and Function: nutrition-related ADLs and IADLs  Anthropometric Measurements: weight, weight change  Biochemical Data, Medical Tests and Procedures: glucose/endocrine profile, lipid profile, inflammatory profile, gastrointestinal profile, electrolyte and renal panel  Nutrition-Focused Physical Findings: overall appearance     Nutrition Follow-Up    RD Follow-up?: Yes

## 2022-08-16 NOTE — PT/OT/SLP PROGRESS
Physical Therapy  Treatment    Patient Name:  Wilfredo Thomas   MRN:  [id number]    Recommendations:     Discharge Recommendations:  outpatient PT   Discharge Equipment Recommendations: shower chair, cane, straight   Barriers to discharge: decreased functional mobility, fall risk and decreased caregiver support    Assessment:     Wilfredo Thomas is a 47 y.o. male admitted with a medical diagnosis of Abscess of left thigh.  Pt demonstrates the below listed impairments with decreased tolerance to functional mobility, impaired endurance and gait instability being the most limiting.  Pt demonstrates good tolerance to out of bed mobility and is willing to ambulate out in hallway.  Pt requires skilled PT due to patient's status as: a fall risk to allow safe d/c home.     Impairments and functional limitations:  impaired balance, impaired endurance, impaired functional mobility, pain, decreased lower extremity function.  These deficits affect their roles and responsibilities in which they were able to complete prior to admit.  Rehab Prognosis:   Good ; patient would benefit from acute skilled PT services 3 x/week to address these deficits, improve quality of life, focus on recovery of impairments, provide patient/caregiver education, reduce fall risk, and reach maximum level of function.  Pt is highly  motivated to participated in skilled PT.    Recent Surgery:   Procedure(s) (LRB):  Incision and Drainage - LEFT THIGH. (Left) 10 Days Post-Op    Plan:     During this hospitalization, patient to be seen 3 x/week to address the identified rehab impairments via gait training, therapeutic activities, therapeutic exercises, neuromuscular re-education and progress toward the following goals:    · Plan of Care Expires:  08/26/22    Subjective     Chief Complaint: decreased tolerance to functional mobility  Patient/Family Comments/Goals: Return home  Pain/Comfort:  · Pain Rating 1: other (see comments) (not rated)  · Location -  Side 1: Left  · Location - Orientation 1: lateral  · Location 1: thigh  · Pain Addressed 1: Reposition, Distraction  · Pain Rating Post-Intervention 1: other (see comments) (not stated)    Objective:     Communicated with RN prior to session.  Patient found up in chair with telemetry upon PT entry to room.     General Precautions: Standard, fall   Orthopedic Precautions:LLE weight bearing as tolerated   Braces: N/A  Oxygen Device:      Functional Mobility:  · Bed Mobility:  Seated in chair at start of session and returned to chair  · Head of bed position: n/a    · Transfers:  Sit to Stand: independence with no AD    · Gait: Patient ambulated 200'x2 with single point cane and then no AD and supervision. Patient demonstrates occasional unsteady gait. All lines remained intact throughout ambulation trial, mask donned for out of room ambulation.   · Pt tolerates ambulation with SPC and then for second bout has no AD  · Some L veering however no LOB this day    · Balance:   Position Score Time   Static Sitting GOOD: Takes MODERATE challenges n/a   Dynamic Sitting GOOD-: Maintains balance through MODERATE excursions of active trunk motion, but inconstantly  n/a   Static Standing FAIR+: Takes MINIMAL challenges n/a   Dynamic Standing FAIR: Maintains without assist, but is unable to take any challenges n/a       AM-PAC 6 CLICK MOBILITY  Turning over in bed (including adjusting bedclothes, sheets and blankets)?: 4  Sitting down on and standing up from a chair with arms (e.g., wheelchair, bedside commode, etc.): 4  Moving from lying on back to sitting on the side of the bed?: 4  Moving to and from a bed to a chair (including a wheelchair)?: 4  Need to walk in hospital room?: 3  Climbing 3-5 steps with a railing?: 3  Basic Mobility Total Score: 22     Therapeutic Activities:  Patient educated on role of acute care PT and PT POC, safety while in hospital including calling nurse for mobility, call light usage, benefits of out of  bed mobility, breathing technique, fall risk, assistive device use, bed mobility , transfers, gait technique, positioning, posture, risks of prolonged bed rest, possible discharge disposition  and benefits of continued PT by explanation and demonstration.    Patient demonstrates good understanding of education provided this day.   Whiteboard updated   Education on heating and icing     Therapeutic Exercises:  Patient participated in: stretches to R LE for tightness, hamstring and hip ER      Patient left up in chair with all lines intact, call button in reach, RN notified and family present.    GOALS:   Multidisciplinary Problems     Physical Therapy Goals        Problem: Physical Therapy    Goal Priority Disciplines Outcome Goal Variances Interventions   Physical Therapy Goal     PT, PT/OT Ongoing, Progressing     Description: Goals to be met by: 22     Patient will increase functional independence with mobility by performin. Supine to sit with [place]  2. Sit to supine with [place]  3. Sit to stand transfer with [place]  4. Gait  x >150 feet with Contact Guard Assistance using Rolling Walker. - Met   Ambulate 400' with supervision assistance with no AD.   5. Lower extremity exercise program x 20 reps per handout, with independence                       Time Tracking:     PT Received On: 22  PT Start Time: 1354     PT Stop Time: 1417  PT Total Time (min): 23 min     Billable Minutes: Gait Training 15 and Therapeutic Exercise 8    Treatment Type: Treatment  PT/PTA: PT     PTA Visit Number: 0     2022

## 2022-08-16 NOTE — PROGRESS NOTES
[facility] - Telemetry Stepdown  Infectious Disease  Progress Note    Patient Name: Wilfredo Thomas  MRN: [id number]  Admission Date: 7/26/2022  Length of Stay: 21 days  Attending Physician: Mervat Johnson MD  Primary Care Provider: Aylin Wayne DO    Isolation Status: No active isolations  Assessment/Plan:      * Abscess of left thigh     48 yo male with history of DMII and HTN admitted for DKA and left thigh abscess. MRI of left femur was notable for ruptured tensor fascia jose luis mm with associated large hematoma, as well as small area of potential osteonecrosis. Bedside aspiration of the fluid collection was notable for a cell count of >200k (>80%segs), cultures + staph species. He was taken to the OR on 7/29 with orthopedic surgery for surgical I&D. Surgical cultures are positive for MSSA. Per ortho surgery, no concerns for bone involvement. Patient was initially on Vancomycin and Zosyn. Vanc trough supratherapeutic and patient has developed an ANGE. He is currently on Cefazolin.       Recent new onset left wrist pain, remains stating improvement. Minimal swelling noted today, ROM improving. Received steroid shot per primary team which pt noted improvement. He is s/p wrist arthroscopy with hardware placement in 3/2022. Wife noticed a boil on his wrist. S/p joint aspiration, white count 2050k, 88% segs. No crystals noted. Left wrist aspiration from 8/5/22-  NG. Xray of wrist with no acute concerns.      Pt with ANGE, Cr decreased to 7.8 today. Nephrology managing. Autoimmune workup ordered, pending. Bedside ultrasound completed and no hydronephrosis noted per nephrology note on 8/11/22. Emanuel stain negative on 8/3/22. Reordered on 8/11/22, positive. Cefazolin stopped, daptomycin started yesterday on 8/15/22. CPK 13, will need repeat on 8/20.    Otherwise, afebrile. HDS. No leukocytosis. OR cultures 8/6 L thigh + MSSA.  Leg pain improved.  ANGE continues to worsened. 2D echo without mention of vegetations.  Per ortho  recent note on 8/16- no plans to return to OR. Pt reports decreased drainage from left JOHANN drain. Stable non septic      Recommendations:   · Continue Daptomycin 6mg/kg/dose q48 hours (renally dosed) for MSSA SSTI. (dose reviewed with ID PharmD)  · Continue to follow ortho surgical plans.   · Nephrology ANGE management. Will follow plan regarding further w/u.   · Anticipate SSTI course from time of last washout 8/6 - As of now, continue EOC on 8/20/22.   · Patient and plan reviewed with ID staff, Dr. Graves. ID will follow.                Thank you for your consult. I will follow-up with patient. Please contact us if you have any additional questions.    Steven Caicedo NP  Infectious Disease  [facility] - Telemetry Stepdown    Subjective:     Principal Problem:Abscess of left thigh    HPI: Mr. Thomas is a 47 year old male with a PMH of DM2 (poorly controlled), HTN, GERD, HLD, obesity who [facility] with cc of SOBx1 day. Pt was also reporting significant pain and swelling along his left lateral proximal thigh. Pt reports this pain started over the last 2 weeks, which worsened. He initially was seen outpatient in which he was diagnoised with a mm strain and given 800 mg ibuprofen. From there he went to the ED on 7/22 d/t worsening pain. He was treated with prednisone/morphine shot, and well as a prednisone oral pack. Pt denied recent injury, with the exception of soreness to his left lower shin following bumping into a cabinet. He denies open wounds/abrasions from this injury but states the soreness started in his left thigh following the improvement of discomfort from his left shin. Pt reports having been treated for a boil on his central/right buttocks in the end of June. 2022. Pt states he was seen in Urgent Care in [place] and the boil was drained. He was given an oral abx in which he completed.     To note, pt reports having a recent left wrist fracture, ligament rupture following an accident with  his riding lawnmower. States he underwent surgery and 2 screws were placed on 3/31/22 at [facility]. Denies associated infection/complications. Denies pain in the site currently.    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 Denies having pets. Denies recent fishing/hunting. Denies hx of immunocompromising conditions.     To note, pt was found to be in DKA with blood sugars >400, treated by critical team, which has since resolved. Pt was recently started on insulin by his PCP, which he had not started. Pt latest A1C 10.     CT of left thigh notable for fluid collections suspicious for hematoma/seroma. MRI of left femur was notable for ruptured tensor fascia jose luis mm with associated large hematoma, as well as small area of potential osteonecrosis. Bedside aspiration of the fluid collection was notable for a cell count of >200k >80%segs, cultures + staph spp. Pt was taken for I&D with Dr. Graff with ortho surgery on 7/29, abscesses were noted and washed out, cultures collected + staph spp. Per pt, states that Dr. Graff is planning to return to surgery tomorrow, 8/1/22 for repeat washout.     Pt was intially with leukocytosis, but has since down trended to 11k. Blood cultures from 7/26 NGTD.     Pt is currently on zosyn, vancomycin, and clindamycin. ID was consulted for abx recs regarding left TFL infection.            Interval History:   No AEON.   ANGE continues to improve, Cr. 7.8 today. Continues with neutropenia today, WBC 3.67. will follow.   All thigh cultures with MSSA          Review of Systems   Constitutional:  Negative for chills, diaphoresis, fatigue and fever.   Respiratory:  Negative for cough and shortness of breath.    Cardiovascular:  Positive for leg swelling (upper left thigh). Negative for chest pain and palpitations.   Gastrointestinal:  Negative for abdominal distention and abdominal pain.   Genitourinary:  Negative for difficulty urinating and  dysuria.   Musculoskeletal:  Positive for arthralgias (left wrist) and myalgias (upper left thigh). Negative for joint swelling.   Skin:  Positive for wound (upper left thigh). Negative for color change and rash.   Allergic/Immunologic: Negative for immunocompromised state.   Neurological:  Negative for dizziness, tremors, weakness, numbness and headaches.   Psychiatric/Behavioral:  Negative for agitation and decreased concentration. The patient is not nervous/anxious.    Objective:     Vital Signs (Most Recent):  Temp: 98.4 °F (36.9 °C) (08/16/22 0702)  Pulse: 85 (08/16/22 0702)  Resp: 18 (08/16/22 1112)  BP: (!) 170/89 (08/16/22 0900)  SpO2: 95 % (08/16/22 0702)   Vital Signs (24h Range):  Temp:  [97.9 °F (36.6 °C)-98.4 °F (36.9 °C)] 98.4 °F (36.9 °C)  Pulse:  [85-92] 85  Resp:  [16-18] 18  SpO2:  [93 %-96 %] 95 %  BP: (138-170)/(75-92) 170/89     Weight: 104.3 kg (229 lb 15 oz)  Body mass index is 36.01 kg/m².    Estimated Creatinine Clearance: 13.2 mL/min (A) (based on SCr of 7.8 mg/dL (H)).    Physical Exam  Vitals and nursing note reviewed.   Constitutional:       General: He is not in acute distress.     Appearance: Normal appearance. He is well-developed. He is obese. He is not ill-appearing, toxic-appearing or diaphoretic.   HENT:      Head: Normocephalic and atraumatic.      Nose: Nose normal.      Mouth/Throat:      Dentition: Does not have dentures.      Pharynx: No oropharyngeal exudate.   Eyes:      General: No scleral icterus.     Conjunctiva/sclera: Conjunctivae normal.   Cardiovascular:      Rate and Rhythm: Normal rate and regular rhythm.   Pulmonary:      Effort: Pulmonary effort is normal. No respiratory distress.      Breath sounds: Normal breath sounds.   Abdominal:      General: There is no distension.      Palpations: Abdomen is soft.      Tenderness: There is no abdominal tenderness.   Musculoskeletal:         General: No swelling.   Skin:     General: Skin is warm and dry.      Findings: No  erythema or rash.      Comments: Incision to left thigh. Dressed, CDI. Surgical drain with dark bloody output.   Neurological:      General: No focal deficit present.      Mental Status: He is alert and oriented to person, place, and time. Mental status is at baseline.      Motor: No weakness.      Gait: Gait normal.   Psychiatric:         Mood and Affect: Mood normal.         Behavior: Behavior normal.         Thought Content: Thought content normal.         Judgment: Judgment normal.       Significant Labs: CBC:   Recent Labs   Lab 08/15/22  0723 08/16/22  0333   WBC 3.48* 3.67*   HGB 8.0* 7.6*   HCT 24.3* 23.7*    172       CMP:   Recent Labs   Lab 08/15/22  0723 08/16/22  0333    136   K 4.8 4.5    104   CO2 20* 23    99   BUN 62* 60*   CREATININE 8.5* 7.8*   CALCIUM 9.0 8.8   PROT 6.3 6.0   ALBUMIN 2.2* 2.2*   BILITOT 0.2 0.3   ALKPHOS 71 66   AST 15 14   ALT <5* <5*   ANIONGAP 13 9       Microbiology Results (last 7 days)       Procedure Component Value Units Date/Time    Aerobic culture [[id number]]  (Abnormal)  (Susceptibility) Collected: 08/06/22 1153    Order Status: Completed Specimen: Wound from Hip, Left Updated: 08/15/22 1002     Aerobic Bacterial Culture STAPHYLOCOCCUS AUREUS  Few      Narrative:      #2 Left hip fluid    Aerobic culture [[id number]]  (Abnormal) Collected: 08/06/22 1153    Order Status: Completed Specimen: Wound from Hip, Left Updated: 08/10/22 1214     Aerobic Bacterial Culture STAPHYLOCOCCUS AUREUS  Moderate  For susceptibility see order #[id number]      Narrative:      #1 Left hip fluid    Culture, Anaerobe [[id number]] Collected: 08/06/22 1153    Order Status: Completed Specimen: Body Fluid from Hip, Left Updated: 08/10/22 1035     Anaerobic Culture No anaerobes isolated    Narrative:      #2 Left hip fluid    Culture, Anaerobe [[id number]] Collected: 08/06/22 1153    Order Status: Completed Specimen: Body Fluid from Hip, Left Updated: 08/10/22 1034      Anaerobic Culture No anaerobes isolated    Narrative:      #1 Left hip fluid    Blood culture [[id number]] Collected: 08/05/22 0850    Order Status: Completed Specimen: Blood from Antecubital, Right Arm Updated: 08/10/22 1012     Blood Culture, Routine No growth after 5 days.    Blood culture [[id number]] Collected: 08/05/22 0852    Order Status: Completed Specimen: Blood from Peripheral, Right Hand Updated: 08/10/22 1012     Blood Culture, Routine No growth after 5 days.    Fungus culture [[id number]] Collected: 08/05/22 0817    Order Status: Completed Specimen: Joint Fluid from Wrist, Left Updated: 08/09/22 1307     Fungus (Mycology) Culture Culture in progress          Recent Lab Results  (Last 5 results in the past 24 hours)        08/16/22  0706   08/16/22  0333   08/15/22  2138   08/15/22  1711   08/15/22  1117        Albumin   2.2             Alkaline Phosphatase   66             ALT   <5             Anion Gap   9             AST   14             Baso #   0.02             Basophil %   0.5             BILIRUBIN TOTAL   0.3  Comment: For infants and newborns, interpretation of results should be based  on gestational age, weight and in agreement with clinical  observations.    Premature Infant recommended reference ranges:  Up to 24 hours.............<8.0 mg/dL  Up to 48 hours............<12.0 mg/dL  3-5 days..................<15.0 mg/dL  6-29 days.................<15.0 mg/dL               BUN   60             Calcium   8.8             Chloride   104             CO2   23             Creatinine   7.8             Differential Method   Automated             eGFR   7.8             Eos #   0.6             Eosinophil %   15.8             Glucose   99             Gran # (ANC)   1.8             Gran %   49.9             Hematocrit   23.7             Hemoglobin   7.6             Immature Grans (Abs)   0.04  Comment: Mild elevation in immature granulocytes is non specific and   can be seen in a variety of conditions  including stress response,   acute inflammation, trauma and pregnancy. Correlation with other   laboratory and clinical findings is essential.               Immature Granulocytes   1.1             Lymph #   0.8             Lymph %   22.6             Magnesium   2.1             MCH   30.8             MCHC   32.1             MCV   96             Mono #   0.4             Mono %   10.1             MPV   9.6             nRBC   0             Phosphorus   5.8             Platelets   172             POCT Glucose 119     216   71   323       Potassium   4.5             PROTEIN TOTAL   6.0             RBC   2.47             RDW   13.0             Sodium   136             WBC   3.67                                    Significant Imaging:     Imaging Results              CT Thigh With Contrast Left (Final result)  Result time 07/27/22 01:13:09      Final result by Jori Hopkins DO (07/27/22 01:13:09)                   Impression:      Large heterogeneous fluid collection in the anterolateral left proximal thigh soft tissues, concerning for a soft tissue hematoma or Preston-Melissa lesion.  A neoplastic process is not entirely excluded.  Recommend close interval follow-up to assess for stability/resolution.      Electronically signed by: Jori Hopkins  Date:    07/27/2022  Time:    01:13               Narrative:    EXAMINATION:  CT THIGH WITH CONTRAST LEFT    CLINICAL HISTORY:  Soft tissue mass, thigh, deep;    TECHNIQUE:  Axial CT images of the left thigh with sagittal and coronal reformats after the intravenous administration of 50 mL Omnipaque 350.    COMPARISON:  None.    FINDINGS:  Bone: Bone mineralization is normal.  There is no evidence of an acute fracture or dislocation.  Alignment is normal.    Soft tissues: There is a large heterogeneous fluid collection in the left anterolateral proximal thigh measuring approximately 16 x 7 x 6 cm.  The collection appears to be centered within the subcutaneous tissues or  superficial fascia and abuts the gluteus musculature, the sartorius muscle, the rectus femoris, and the vastus lateralis.  There is soft tissue edema in the surrounding subcutaneous tissues.  Muscle bulk is normal.    Articulations: The left femoroacetabular joint is unremarkable.  The pubic symphysis is unremarkable.  The left knee is unremarkable.  No large joint effusion or significant cartilage loss.    Miscellaneous: Neurovascular structures are grossly intact.  There is moderate calcified atherosclerosis of the left femoral and popliteal arteries.                                       CTA Chest Non-Coronary (PE Study) (Final result)  Result time 07/26/22 21:36:01      Final result by Jori Hopkins DO (07/26/22 21:36:01)                   Impression:      No large central or lobar pulmonary embolism.      Electronically signed by: Jori Hopkins  Date:    07/26/2022  Time:    21:36               Narrative:    EXAMINATION:  CTA CHEST NON CORONARY    CLINICAL HISTORY:  Pulmonary embolism (PE) suspected, high prob;    TECHNIQUE:  Low dose axial images, sagittal and coronal reformations were obtained from the thoracic inlet to the lung bases following the IV administration of 100 mL of Omnipaque 350.  Contrast timing was optimized to evaluate the pulmonary arteries.  Maximum intensity projection images were provided for review.    COMPARISON:  Chest radiograph from earlier the same date.    FINDINGS:  Pulmonary vasculature: Satisfactory opacification of the pulmonary arterial system.  Motion artifact significantly limits evaluation of the segmental and subsegmental pulmonary arteries.  There is no large central or lobar pulmonary embolism.    Aorta: Left-sided aortic arch.  No aneurysm and no significant atherosclerosis.    Base of Neck: No significant abnormality.    Thoracic soft tissues: Normal.    Heart: Normal size. No effusion.    Amena/Mediastinum: No pathologic russ enlargement.    Airways: The large  airways are patent. No foci of endobronchial filling.    Lungs/Pleura: Clear lungs. No pleural effusion or thickening.    Esophagus: Normal.    Upper Abdomen: No abnormality of the partially imaged upper abdomen.    Bones: No acute fracture. No suspicious lytic or sclerotic lesions.                                       X-Ray Chest 1 View (Final result)  Result time 07/26/22 21:23:42   Procedure changed from X-Ray Chest PA And Lateral     Final result by Olman Saucedo MD (07/26/22 21:23:42)                   Impression:      No convincing radiographic evidence of acute intrathoracic process on this single view..      Electronically signed by: Olman Saucedo MD  Date:    07/26/2022  Time:    21:23               Narrative:    EXAMINATION:  XR CHEST 1 VIEW    CLINICAL HISTORY:  shortness of breath;    TECHNIQUE:  Single frontal view of the chest was performed.    COMPARISON:  None    FINDINGS:  Cardiac monitoring leads overlie the chest.  Cardiac silhouette appears within normal limits.  No confluent airspace consolidation identified.  No significant volume of pleural fluid or pneumothorax appreciated.  The visualized osseous structures demonstrate mild degenerative changes.

## 2022-08-16 NOTE — CARE UPDATE
ANGE from vanco toxicity improving   Scr 7.8 today   Can be discharged from renal standpoint when scr less than 7   Follow up with nephrology as OPT in one week  post discharge   Will sign off   Recall prn   Discussed with Karo

## 2022-08-17 PROBLEM — A41.9 SEPSIS: Status: ACTIVE | Noted: 2022-08-17

## 2022-08-17 LAB
ALBUMIN SERPL BCP-MCNC: 2.3 G/DL (ref 3.5–5.2)
ALP SERPL-CCNC: 67 U/L (ref 55–135)
ALT SERPL W/O P-5'-P-CCNC: <5 U/L (ref 10–44)
ANION GAP SERPL CALC-SCNC: 11 MMOL/L (ref 8–16)
AST SERPL-CCNC: 16 U/L (ref 10–40)
BACTERIA #/AREA URNS AUTO: NORMAL /HPF
BASOPHILS # BLD AUTO: 0.02 K/UL (ref 0–0.2)
BASOPHILS NFR BLD: 0.6 % (ref 0–1.9)
BILIRUB SERPL-MCNC: 0.3 MG/DL (ref 0.1–1)
BILIRUB UR QL STRIP: NEGATIVE
BUN SERPL-MCNC: 60 MG/DL (ref 6–20)
CALCIUM SERPL-MCNC: 8.9 MG/DL (ref 8.7–10.5)
CHLORIDE SERPL-SCNC: 107 MMOL/L (ref 95–110)
CK SERPL-CCNC: 11 U/L (ref 20–200)
CLARITY UR REFRACT.AUTO: CLEAR
CO2 SERPL-SCNC: 22 MMOL/L (ref 23–29)
COLOR UR AUTO: COLORLESS
CREAT SERPL-MCNC: 7.4 MG/DL (ref 0.5–1.4)
CRP SERPL-MCNC: 24.9 MG/L (ref 0–8.2)
DIFFERENTIAL METHOD: ABNORMAL
EOSINOPHIL # BLD AUTO: 0.5 K/UL (ref 0–0.5)
EOSINOPHIL NFR BLD: 15.1 % (ref 0–8)
ERYTHROCYTE [DISTWIDTH] IN BLOOD BY AUTOMATED COUNT: 13.1 % (ref 11.5–14.5)
ERYTHROCYTE [SEDIMENTATION RATE] IN BLOOD BY PHOTOMETRIC METHOD: 50 MM/HR (ref 0–23)
EST. GFR  (NO RACE VARIABLE): 8.4 ML/MIN/1.73 M^2
GLUCOSE SERPL-MCNC: 127 MG/DL (ref 70–110)
GLUCOSE UR QL STRIP: ABNORMAL
HCT VFR BLD AUTO: 22.8 % (ref 40–54)
HGB BLD-MCNC: 7.4 G/DL (ref 14–18)
HGB UR QL STRIP: NEGATIVE
HYALINE CASTS UR QL AUTO: 0 /LPF
IMM GRANULOCYTES # BLD AUTO: 0.03 K/UL (ref 0–0.04)
IMM GRANULOCYTES NFR BLD AUTO: 1 % (ref 0–0.5)
KETONES UR QL STRIP: NEGATIVE
LACTATE SERPL-SCNC: 1.3 MMOL/L (ref 0.5–2.2)
LEUKOCYTE ESTERASE UR QL STRIP: NEGATIVE
LYMPHOCYTES # BLD AUTO: 0.8 K/UL (ref 1–4.8)
LYMPHOCYTES NFR BLD: 26 % (ref 18–48)
MAGNESIUM SERPL-MCNC: 2 MG/DL (ref 1.6–2.6)
MCH RBC QN AUTO: 30.1 PG (ref 27–31)
MCHC RBC AUTO-ENTMCNC: 32.5 G/DL (ref 32–36)
MCV RBC AUTO: 93 FL (ref 82–98)
MICROSCOPIC COMMENT: NORMAL
MONOCYTES # BLD AUTO: 0.3 K/UL (ref 0.3–1)
MONOCYTES NFR BLD: 8.4 % (ref 4–15)
NEUTROPHILS # BLD AUTO: 1.5 K/UL (ref 1.8–7.7)
NEUTROPHILS NFR BLD: 48.9 % (ref 38–73)
NITRITE UR QL STRIP: NEGATIVE
NRBC BLD-RTO: 0 /100 WBC
PH UR STRIP: 7 [PH] (ref 5–8)
PHOSPHATE SERPL-MCNC: 5.5 MG/DL (ref 2.7–4.5)
PLATELET # BLD AUTO: 169 K/UL (ref 150–450)
PMV BLD AUTO: 9.8 FL (ref 9.2–12.9)
POCT GLUCOSE: 137 MG/DL (ref 70–110)
POCT GLUCOSE: 153 MG/DL (ref 70–110)
POCT GLUCOSE: 99 MG/DL (ref 70–110)
POTASSIUM SERPL-SCNC: 4.6 MMOL/L (ref 3.5–5.1)
PROCALCITONIN SERPL IA-MCNC: 0.47 NG/ML
PROT SERPL-MCNC: 6.3 G/DL (ref 6–8.4)
PROT UR QL STRIP: ABNORMAL
RBC # BLD AUTO: 2.46 M/UL (ref 4.6–6.2)
RBC #/AREA URNS AUTO: 2 /HPF (ref 0–4)
SARS-COV-2 RNA RESP QL NAA+PROBE: NOT DETECTED
SODIUM SERPL-SCNC: 140 MMOL/L (ref 136–145)
SP GR UR STRIP: 1.01 (ref 1–1.03)
URN SPEC COLLECT METH UR: ABNORMAL
WBC # BLD AUTO: 3.11 K/UL (ref 3.9–12.7)
WBC #/AREA URNS AUTO: 3 /HPF (ref 0–5)

## 2022-08-17 PROCEDURE — 20600001 HC STEP DOWN PRIVATE ROOM

## 2022-08-17 PROCEDURE — 83735 ASSAY OF MAGNESIUM: CPT | Performed by: STUDENT IN AN ORGANIZED HEALTH CARE EDUCATION/TRAINING PROGRAM

## 2022-08-17 PROCEDURE — 86140 C-REACTIVE PROTEIN: CPT

## 2022-08-17 PROCEDURE — 99233 PR SUBSEQUENT HOSPITAL CARE,LEVL III: ICD-10-PCS | Mod: ,,, | Performed by: REGISTERED NURSE

## 2022-08-17 PROCEDURE — 25000003 PHARM REV CODE 250: Performed by: STUDENT IN AN ORGANIZED HEALTH CARE EDUCATION/TRAINING PROGRAM

## 2022-08-17 PROCEDURE — 63600175 PHARM REV CODE 636 W HCPCS: Performed by: REGISTERED NURSE

## 2022-08-17 PROCEDURE — 87040 BLOOD CULTURE FOR BACTERIA: CPT | Performed by: INTERNAL MEDICINE

## 2022-08-17 PROCEDURE — 25000003 PHARM REV CODE 250: Performed by: INTERNAL MEDICINE

## 2022-08-17 PROCEDURE — 25000003 PHARM REV CODE 250: Performed by: REGISTERED NURSE

## 2022-08-17 PROCEDURE — 63600175 PHARM REV CODE 636 W HCPCS: Performed by: STUDENT IN AN ORGANIZED HEALTH CARE EDUCATION/TRAINING PROGRAM

## 2022-08-17 PROCEDURE — 99233 SBSQ HOSP IP/OBS HIGH 50: CPT | Mod: ,,, | Performed by: REGISTERED NURSE

## 2022-08-17 PROCEDURE — 99233 PR SUBSEQUENT HOSPITAL CARE,LEVL III: ICD-10-PCS | Mod: 95,,, | Performed by: INTERNAL MEDICINE

## 2022-08-17 PROCEDURE — 82550 ASSAY OF CK (CPK): CPT | Performed by: INTERNAL MEDICINE

## 2022-08-17 PROCEDURE — 85025 COMPLETE CBC W/AUTO DIFF WBC: CPT | Performed by: STUDENT IN AN ORGANIZED HEALTH CARE EDUCATION/TRAINING PROGRAM

## 2022-08-17 PROCEDURE — 25000003 PHARM REV CODE 250

## 2022-08-17 PROCEDURE — 36415 COLL VENOUS BLD VENIPUNCTURE: CPT

## 2022-08-17 PROCEDURE — 85652 RBC SED RATE AUTOMATED: CPT

## 2022-08-17 PROCEDURE — 84100 ASSAY OF PHOSPHORUS: CPT | Performed by: STUDENT IN AN ORGANIZED HEALTH CARE EDUCATION/TRAINING PROGRAM

## 2022-08-17 PROCEDURE — 83605 ASSAY OF LACTIC ACID: CPT | Performed by: INTERNAL MEDICINE

## 2022-08-17 PROCEDURE — U0005 INFEC AGEN DETEC AMPLI PROBE: HCPCS | Performed by: INTERNAL MEDICINE

## 2022-08-17 PROCEDURE — 93010 ELECTROCARDIOGRAM REPORT: CPT | Mod: ,,, | Performed by: INTERNAL MEDICINE

## 2022-08-17 PROCEDURE — 93010 EKG 12-LEAD: ICD-10-PCS | Mod: ,,, | Performed by: INTERNAL MEDICINE

## 2022-08-17 PROCEDURE — 36415 COLL VENOUS BLD VENIPUNCTURE: CPT | Performed by: INTERNAL MEDICINE

## 2022-08-17 PROCEDURE — U0003 INFECTIOUS AGENT DETECTION BY NUCLEIC ACID (DNA OR RNA); SEVERE ACUTE RESPIRATORY SYNDROME CORONAVIRUS 2 (SARS-COV-2) (CORONAVIRUS DISEASE [COVID-19]), AMPLIFIED PROBE TECHNIQUE, MAKING USE OF HIGH THROUGHPUT TECHNOLOGIES AS DESCRIBED BY CMS-2020-01-R: HCPCS | Performed by: INTERNAL MEDICINE

## 2022-08-17 PROCEDURE — 99233 SBSQ HOSP IP/OBS HIGH 50: CPT | Mod: 95,,, | Performed by: INTERNAL MEDICINE

## 2022-08-17 PROCEDURE — 25000003 PHARM REV CODE 250: Performed by: HOSPITALIST

## 2022-08-17 PROCEDURE — 81001 URINALYSIS AUTO W/SCOPE: CPT | Performed by: INTERNAL MEDICINE

## 2022-08-17 PROCEDURE — 80053 COMPREHEN METABOLIC PANEL: CPT | Performed by: STUDENT IN AN ORGANIZED HEALTH CARE EDUCATION/TRAINING PROGRAM

## 2022-08-17 PROCEDURE — 84145 PROCALCITONIN (PCT): CPT | Performed by: INTERNAL MEDICINE

## 2022-08-17 PROCEDURE — 93005 ELECTROCARDIOGRAM TRACING: CPT

## 2022-08-17 PROCEDURE — 27000207 HC ISOLATION

## 2022-08-17 RX ORDER — AMLODIPINE BESYLATE 5 MG/1
5 TABLET ORAL 2 TIMES DAILY
Status: DISCONTINUED | OUTPATIENT
Start: 2022-08-17 | End: 2022-08-24 | Stop reason: HOSPADM

## 2022-08-17 RX ADMIN — OXYCODONE 5 MG: 5 TABLET ORAL at 04:08

## 2022-08-17 RX ADMIN — INSULIN DETEMIR 10 UNITS: 100 INJECTION, SOLUTION SUBCUTANEOUS at 10:08

## 2022-08-17 RX ADMIN — ACETAMINOPHEN 1000 MG: 500 TABLET ORAL at 11:08

## 2022-08-17 RX ADMIN — OXYCODONE 5 MG: 5 TABLET ORAL at 10:08

## 2022-08-17 RX ADMIN — SEVELAMER CARBONATE 800 MG: 800 TABLET, FILM COATED ORAL at 05:08

## 2022-08-17 RX ADMIN — INSULIN DETEMIR 10 UNITS: 100 INJECTION, SOLUTION SUBCUTANEOUS at 08:08

## 2022-08-17 RX ADMIN — AMLODIPINE BESYLATE 5 MG: 5 TABLET ORAL at 10:08

## 2022-08-17 RX ADMIN — SEVELAMER CARBONATE 800 MG: 800 TABLET, FILM COATED ORAL at 08:08

## 2022-08-17 RX ADMIN — OXYCODONE 5 MG: 5 TABLET ORAL at 06:08

## 2022-08-17 RX ADMIN — ACETAMINOPHEN 1000 MG: 500 TABLET ORAL at 08:08

## 2022-08-17 RX ADMIN — HEPARIN SODIUM 5000 UNITS: 5000 INJECTION INTRAVENOUS; SUBCUTANEOUS at 10:08

## 2022-08-17 RX ADMIN — INSULIN ASPART 8 UNITS: 100 INJECTION, SOLUTION INTRAVENOUS; SUBCUTANEOUS at 08:08

## 2022-08-17 RX ADMIN — HYDRALAZINE HYDROCHLORIDE 25 MG: 25 TABLET ORAL at 03:08

## 2022-08-17 RX ADMIN — ATORVASTATIN CALCIUM 20 MG: 20 TABLET, FILM COATED ORAL at 10:08

## 2022-08-17 RX ADMIN — NIFEDIPINE 60 MG: 30 TABLET, FILM COATED, EXTENDED RELEASE ORAL at 10:08

## 2022-08-17 RX ADMIN — HYDRALAZINE HYDROCHLORIDE 25 MG: 25 TABLET, FILM COATED ORAL at 10:08

## 2022-08-17 RX ADMIN — SENNOSIDES AND DOCUSATE SODIUM 2 TABLET: 50; 8.6 TABLET ORAL at 10:08

## 2022-08-17 RX ADMIN — DAPTOMYCIN 625 MG: 350 INJECTION, POWDER, LYOPHILIZED, FOR SOLUTION INTRAVENOUS at 01:08

## 2022-08-17 RX ADMIN — HEPARIN SODIUM 5000 UNITS: 5000 INJECTION INTRAVENOUS; SUBCUTANEOUS at 06:08

## 2022-08-17 RX ADMIN — INSULIN ASPART 1 UNITS: 100 INJECTION, SOLUTION INTRAVENOUS; SUBCUTANEOUS at 10:08

## 2022-08-17 RX ADMIN — ISOSORBIDE DINITRATE 10 MG: 10 TABLET ORAL at 10:08

## 2022-08-17 NOTE — PROGRESS NOTES
[facility] - Telemetry Stepdown  Infectious Disease  Progress Note    Patient Name: Wilfredo Thomas  MRN: [id number]  Admission Date: 7/26/2022  Length of Stay: 22 days  Attending Physician: Mervat Johnson MD  Primary Care Provider: Aylin Wayne DO    Isolation Status: Airborne and Contact and Droplet  Assessment/Plan:      * Abscess of left thigh     46 yo male with history of DMII and HTN admitted for DKA and left thigh abscess. MRI of left femur was notable for ruptured tensor fascia jose luis mm with associated large hematoma, as well as small area of potential osteonecrosis. Bedside aspiration of the fluid collection was notable for a cell count of >200k (>80%segs), cultures + staph species. He was taken to the OR on 7/29 with orthopedic surgery for surgical I&D. Surgical cultures are positive for MSSA. Per ortho surgery, no concerns for bone involvement. Patient was initially on Vancomycin and Zosyn. Vanc trough supratherapeutic and patient has developed an ANGE. He was switched to cefazolin on Wednesday Aug 3rd. Emanuel stain + on Aug 13th and was then switched to Daptomycin.      Recent new onset left wrist pain, remains stating improvement. Minimal swelling noted today, ROM improving. Received steroid shot per primary team which pt noted improvement. He is s/p wrist arthroscopy with hardware placement in 3/2022. Wife noticed a boil on his wrist. S/p joint aspiration, white count 2050k, 88% segs. No crystals noted. Left wrist aspiration from 8/5/22-  NG. Xray of wrist with no acute concerns.      Pt with ANGE, Cr decreased to 7.4 today. Nephrology managing. Autoimmune workup ordered, pending. Bedside ultrasound completed and no hydronephrosis noted per nephrology note on 8/11/22. Emanuel stain negative on 8/3/22. Reordered on 8/11/22, positive. Cefazolin stopped, daptomycin started yesterday on 8/15/22. CPK 13, will need repeat on 8/20.    Febrile to 102 today, tachycardic. No leukocytosis. OR cultures 8/6 L thigh +  MSSA.  Leg pain improved.  ANGE continues to worsened. 2D echo without mention of vegetations.  Per ortho recent note on 8/16- no plans to return to OR. Today noting minimal drainage but purulent. Stable non septic    Infectious w/u started, Blood cultures ordered and pending. Sed rate 50 (decreased from  65), CRP stable at 25. CPK 11 (from 13). Lactate 1.3. procal .47. UA negative. Chest xray with no acute concerns. Covid test negative. Denies URI symptoms. EKG with no acute concerns. No sick contacts that he or wife is aware of. Reports chills/rigors started a couple days ago but only lasted <5 mins, today started again and persisted, also with fever. Denies calf pain, worsening leg pain, abdominal pain, or wrist pain.       Recommendations:   · Continue Daptomycin 6mg/kg/dose q48 hours (renally dosed) for MSSA SSTI. (dose reviewed with ID PharmD)  · Continue to follow ortho surgical plans.   · Nephrology ANGE management. Will follow plan regarding further w/u.   · Consider re imaging of left thigh if concerns for new loculations/abscess formation.   · Consider bilateral lower leg ultrasound if concerns for DVT.   · Will follow blood culture data and tailor abx as needed.   · Patient plan reviewed with ID staff, Dr. Graves. ID will follow.            Thank you for your consult. I will follow-up with patient. Please contact us if you have any additional questions.    Steven Caicedo NP  Infectious Disease  [facility] - Telemetry Stepdown    Subjective:     Principal Problem:Abscess of left thigh    HPI: Mr. Thomas is a 47 year old male with a PMH of DM2 (poorly controlled), HTN, GERD, HLD, obesity who initially presented to [facility] [place] with cc of SOBx1 day. Pt was also reporting significant pain and swelling along his left lateral proximal thigh. Pt reports this pain started over the last 2 weeks, which worsened. He initially was seen outpatient in which he was diagnoised with a mm strain and given 800 mg ibuprofen. From  there he went to the ED on 7/22 d/t worsening pain. He was treated with prednisone/morphine shot, and well as a prednisone oral pack. Pt denied recent injury, with the exception of soreness to his left lower shin following bumping into a cabinet. He denies open wounds/abrasions from this injury but states the soreness started in his left thigh following the improvement of discomfort from his left shin. Pt reports having been treated for a boil on his central/right buttocks in the end of June. 2022. Pt states he was seen in Urgent Care in [place] and the boil was drained. He was given an oral abx in which he completed.     To note, pt reports having a recent left wrist fracture, ligament rupture following an accident with his riding lawnmower. States he underwent surgery and 2 screws were placed on 3/31/22 at [facility]. Denies associated infection/complications. Denies pain in the site currently.    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 Denies having pets. Denies recent fishing/hunting. Denies hx of immunocompromising conditions.     To note, pt was found to be in DKA with blood sugars >400, treated by critical team, which has since resolved. Pt was recently started on insulin by his PCP, which he had not started. Pt latest A1C 10.     CT of left thigh notable for fluid collections suspicious for hematoma/seroma. MRI of left femur was notable for ruptured tensor fascia jose luis mm with associated large hematoma, as well as small area of potential osteonecrosis. Bedside aspiration of the fluid collection was notable for a cell count of >200k >80%segs, cultures + staph spp. Pt was taken for I&D with Dr. Graff with ortho surgery on 7/29, abscesses were noted and washed out, cultures collected + staph spp. Per pt, states that Dr. Graff is planning to return to surgery tomorrow, 8/1/22 for repeat washout.     Pt was intially with leukocytosis, but has since down  trended to 11k. Blood cultures from 7/26 NGTD.     Pt is currently on zosyn, vancomycin, and clindamycin. ID was consulted for abx recs regarding left TFL infection.            Interval History:   No AEON.   ANGE continues to improve, Cr. 7.4 today. Continues with neutropenia today, WBC 3.11. will follow.   All thigh cultures with MSSA.     Spiked fever to 102 today. Pt with chills/body aches. Blood cultures pending.           Review of Systems   Constitutional:  Negative for chills, diaphoresis, fatigue and fever.   Respiratory:  Negative for cough and shortness of breath.    Cardiovascular:  Positive for leg swelling (upper left thigh). Negative for chest pain and palpitations.   Gastrointestinal:  Negative for abdominal distention and abdominal pain.   Genitourinary:  Negative for difficulty urinating and dysuria.   Musculoskeletal:  Positive for arthralgias (left wrist) and myalgias (upper left thigh). Negative for joint swelling.   Skin:  Positive for wound (upper left thigh). Negative for color change and rash.   Allergic/Immunologic: Negative for immunocompromised state.   Neurological:  Negative for dizziness, tremors, weakness, numbness and headaches.   Psychiatric/Behavioral:  Negative for agitation and decreased concentration. The patient is not nervous/anxious.    Objective:     Vital Signs (Most Recent):  Temp: 99.9 °F (37.7 °C) (08/17/22 1537)  Pulse: (!) 117 (08/17/22 1537)  Resp: 18 (08/17/22 1633)  BP: 117/66 (08/17/22 1537)  SpO2: (!) 93 % (08/17/22 1537)   Vital Signs (24h Range):  Temp:  [98.2 °F (36.8 °C)-102 °F (38.9 °C)] 99.9 °F (37.7 °C)  Pulse:  [] 117  Resp:  [17-22] 18  SpO2:  [93 %-97 %] 93 %  BP: (117-178)/() 117/66     Weight: 104.3 kg (229 lb 15 oz)  Body mass index is 36.01 kg/m².    Estimated Creatinine Clearance: 13.9 mL/min (A) (based on SCr of 7.4 mg/dL (H)).    Physical Exam  Vitals and nursing note reviewed.   Constitutional:       General: He is not in acute  distress.     Appearance: Normal appearance. He is well-developed. He is obese. He is not ill-appearing, toxic-appearing or diaphoretic.   HENT:      Head: Normocephalic and atraumatic.      Nose: Nose normal.      Mouth/Throat:      Dentition: Does not have dentures.      Pharynx: No oropharyngeal exudate.   Eyes:      General: No scleral icterus.     Conjunctiva/sclera: Conjunctivae normal.   Cardiovascular:      Rate and Rhythm: Normal rate and regular rhythm.   Pulmonary:      Effort: Pulmonary effort is normal. No respiratory distress.      Breath sounds: Normal breath sounds.   Abdominal:      General: There is no distension.      Palpations: Abdomen is soft.      Tenderness: There is no abdominal tenderness.   Musculoskeletal:         General: No swelling.   Skin:     General: Skin is warm and dry.      Findings: No erythema or rash.      Comments: Incision to left thigh. Dressed, CDI. Surgical drain with dark bloody output.   Neurological:      General: No focal deficit present.      Mental Status: He is alert and oriented to person, place, and time. Mental status is at baseline.      Motor: No weakness.      Gait: Gait normal.   Psychiatric:         Mood and Affect: Mood normal.         Behavior: Behavior normal.         Thought Content: Thought content normal.         Judgment: Judgment normal.       Significant Labs: CBC:   Recent Labs   Lab 08/16/22  0333 08/17/22  0800   WBC 3.67* 3.11*   HGB 7.6* 7.4*   HCT 23.7* 22.8*    169       CMP:   Recent Labs   Lab 08/16/22  0333 08/17/22  0800    140   K 4.5 4.6    107   CO2 23 22*   GLU 99 127*   BUN 60* 60*   CREATININE 7.8* 7.4*   CALCIUM 8.8 8.9   PROT 6.0 6.3   ALBUMIN 2.2* 2.3*   BILITOT 0.3 0.3   ALKPHOS 66 67   AST 14 16   ALT <5* <5*   ANIONGAP 9 11       Microbiology Results (last 7 days)       Procedure Component Value Units Date/Time    Blood culture [[id number]]     Order Status: Canceled Specimen: Blood     Blood culture  [[id number]]     Order Status: Canceled Specimen: Blood     Blood culture [[id number]] Collected: 08/17/22 1231    Order Status: Sent Specimen: Blood Updated: 08/17/22 1238    Blood culture [[id number]] Collected: 08/17/22 1223    Order Status: Sent Specimen: Blood Updated: 08/17/22 1238    Fungus culture [[id number]] Collected: 07/29/22 1251    Order Status: Completed Specimen: Abscess from Leg, Left Updated: 08/17/22 0904     Fungus (Mycology) Culture Culture in progress      No fungus isolated after 2 weeks    Narrative:      Left thigh abscess #1    Fungus culture [[id number]] Collected: 07/29/22 1300    Order Status: Completed Specimen: Abscess from Leg, Left Updated: 08/17/22 0904     Fungus (Mycology) Culture Culture in progress      No fungus isolated after 2 weeks    Narrative:      Left thigh abscess #2    Fungus culture [[id number]] Collected: 07/28/22 1800    Order Status: Completed Specimen: Abscess from Leg, Left Updated: 08/17/22 0904     Fungus (Mycology) Culture Culture in progress      No fungus isolated after 2 weeks    Narrative:      LEFT THIGH ABSCESS    Aerobic culture [[id number]]  (Abnormal)  (Susceptibility) Collected: 08/06/22 1153    Order Status: Completed Specimen: Wound from Hip, Left Updated: 08/15/22 1002     Aerobic Bacterial Culture STAPHYLOCOCCUS AUREUS  Few      Narrative:      #2 Left hip fluid          Recent Lab Results  (Last 5 results in the past 24 hours)        08/17/22  1436   08/17/22  1432   08/17/22  1224   08/17/22  1126   08/17/22  0800        Procalcitonin     0.47  Comment: A concentration < 0.25 ng/mL represents a low risk of bacterial   infection.  Procalcitonin may not be accurate among patients with localized   infection, recent trauma or major surgery, immunosuppressed state,   invasive fungal infection, renal dysfunction. Decisions regarding   initiation or continuation of antibiotic therapy should not be based   solely on procalcitonin levels.             Albumin          2.3       Alkaline Phosphatase         67       ALT         <5       Anion Gap         11       Appearance, UA Clear               AST         16       Bacteria, UA Rare               Baso #         0.02       Basophil %         0.6       Bilirubin (UA) Negative               BILIRUBIN TOTAL         0.3  Comment: For infants and newborns, interpretation of results should be based  on gestational age, weight and in agreement with clinical  observations.    Premature Infant recommended reference ranges:  Up to 24 hours.............<8.0 mg/dL  Up to 48 hours............<12.0 mg/dL  3-5 days..................<15.0 mg/dL  6-29 days.................<15.0 mg/dL         BUN         60       Calcium         8.9       Chloride         107       CO2         22       Color, UA Colorless               CPK   11             Creatinine         7.4       CRP         24.9       Differential Method         Automated       eGFR         8.4       Eos #         0.5       Eosinophil %         15.1       Glucose         127       Glucose, UA 1+               Gran # (ANC)         1.5       Gran %         48.9       Hematocrit         22.8       Hemoglobin         7.4       Hyaline Casts, UA 0               Immature Grans (Abs)         0.03  Comment: Mild elevation in immature granulocytes is non specific and   can be seen in a variety of conditions including stress response,   acute inflammation, trauma and pregnancy. Correlation with other   laboratory and clinical findings is essential.         Immature Granulocytes         1.0       Ketones, UA Negative               Lactate, Jarad     1.3  Comment: Falsely low lactic acid results can be found in samples   containing >=13.0 mg/dL total bilirubin and/or >=3.5 mg/dL   direct bilirubin.             Leukocytes, UA Negative               Lymph #         0.8       Lymph %         26.0       Magnesium         2.0       MCH         30.1       MCHC         32.5       MCV         93        Microscopic Comment SEE COMMENT  Comment: Other formed elements not mentioned in the report are not   present in the microscopic examination.                  Mono #         0.3       Mono %         8.4       MPV         9.8       NITRITE UA Negative               nRBC         0       Occult Blood UA Negative               pH, UA 7.0               Phosphorus         5.5       Platelets         169       POCT Glucose       153         Potassium         4.6       PROTEIN TOTAL         6.3       Protein, UA 1+  Comment: Recommend a 24 hour urine protein or a urine   protein/creatinine ratio if globulin induced proteinuria is  clinically suspected.                 RBC         2.46       RBC, UA 2               RDW         13.1       Sed Rate         50       Sodium         140       Specific Markle, UA 1.010               Specimen UA Urine, Clean Catch               WBC, UA 3               WBC         3.11                              Significant Imaging:     Imaging Results              CT Thigh With Contrast Left (Final result)  Result time 07/27/22 01:13:09      Final result by Jori Hopkins DO (07/27/22 01:13:09)                   Impression:      Large heterogeneous fluid collection in the anterolateral left proximal thigh soft tissues, concerning for a soft tissue hematoma or Preston-Melissa lesion.  A neoplastic process is not entirely excluded.  Recommend close interval follow-up to assess for stability/resolution.      Electronically signed by: Jori Hopkins  Date:    07/27/2022  Time:    01:13               Narrative:    EXAMINATION:  CT THIGH WITH CONTRAST LEFT    CLINICAL HISTORY:  Soft tissue mass, thigh, deep;    TECHNIQUE:  Axial CT images of the left thigh with sagittal and coronal reformats after the intravenous administration of 50 mL Omnipaque 350.    COMPARISON:  None.    FINDINGS:  Bone: Bone mineralization is normal.  There is no evidence of an acute fracture or dislocation.  Alignment is  normal.    Soft tissues: There is a large heterogeneous fluid collection in the left anterolateral proximal thigh measuring approximately 16 x 7 x 6 cm.  The collection appears to be centered within the subcutaneous tissues or superficial fascia and abuts the gluteus musculature, the sartorius muscle, the rectus femoris, and the vastus lateralis.  There is soft tissue edema in the surrounding subcutaneous tissues.  Muscle bulk is normal.    Articulations: The left femoroacetabular joint is unremarkable.  The pubic symphysis is unremarkable.  The left knee is unremarkable.  No large joint effusion or significant cartilage loss.    Miscellaneous: Neurovascular structures are grossly intact.  There is moderate calcified atherosclerosis of the left femoral and popliteal arteries.                                       CTA Chest Non-Coronary (PE Study) (Final result)  Result time 07/26/22 21:36:01      Final result by Jori Hopkins DO (07/26/22 21:36:01)                   Impression:      No large central or lobar pulmonary embolism.      Electronically signed by: Jori Hopkins  Date:    07/26/2022  Time:    21:36               Narrative:    EXAMINATION:  CTA CHEST NON CORONARY    CLINICAL HISTORY:  Pulmonary embolism (PE) suspected, high prob;    TECHNIQUE:  Low dose axial images, sagittal and coronal reformations were obtained from the thoracic inlet to the lung bases following the IV administration of 100 mL of Omnipaque 350.  Contrast timing was optimized to evaluate the pulmonary arteries.  Maximum intensity projection images were provided for review.    COMPARISON:  Chest radiograph from earlier the same date.    FINDINGS:  Pulmonary vasculature: Satisfactory opacification of the pulmonary arterial system.  Motion artifact significantly limits evaluation of the segmental and subsegmental pulmonary arteries.  There is no large central or lobar pulmonary embolism.    Aorta: Left-sided aortic arch.  No aneurysm and  no significant atherosclerosis.    Base of Neck: No significant abnormality.    Thoracic soft tissues: Normal.    Heart: Normal size. No effusion.    Amena/Mediastinum: No pathologic russ enlargement.    Airways: The large airways are patent. No foci of endobronchial filling.    Lungs/Pleura: Clear lungs. No pleural effusion or thickening.    Esophagus: Normal.    Upper Abdomen: No abnormality of the partially imaged upper abdomen.    Bones: No acute fracture. No suspicious lytic or sclerotic lesions.                                       X-Ray Chest 1 View (Final result)  Result time 07/26/22 21:23:42   Procedure changed from X-Ray Chest PA And Lateral     Final result by Olman Saucedo MD (07/26/22 21:23:42)                   Impression:      No convincing radiographic evidence of acute intrathoracic process on this single view..      Electronically signed by: Olman Saucedo MD  Date:    07/26/2022  Time:    21:23               Narrative:    EXAMINATION:  XR CHEST 1 VIEW    CLINICAL HISTORY:  shortness of breath;    TECHNIQUE:  Single frontal view of the chest was performed.    COMPARISON:  None    FINDINGS:  Cardiac monitoring leads overlie the chest.  Cardiac silhouette appears within normal limits.  No confluent airspace consolidation identified.  No significant volume of pleural fluid or pneumothorax appreciated.  The visualized osseous structures demonstrate mild degenerative changes.

## 2022-08-17 NOTE — SUBJECTIVE & OBJECTIVE
Principal Problem:Abscess of left thigh    Principal Orthopedic Problem: Left thigh I&D 7/29 and 8/6  Staph species on thigh abscess cultures    Interval History: S/p repeat L thigh I&D 8/6/22. Pt's drain output last 24 hrs 60cc (same as prior 24 hr). Drain output has been at plateau after declining over the weekend. He was feeling well, walking the halls this am, but then developed fever 101-102 degrees, tachycardia (-132), and acute onset chills. SpO2 93-95%, CXR negative. ESR/CRP/CK all steady. Cr slightly improving. UA negative. Lactate negative. Covid test and blood cultures pending.       Review of patient's allergies indicates:  No Known Allergies    Current Facility-Administered Medications   Medication    acetaminophen tablet 1,000 mg    amLODIPine tablet 5 mg    atorvastatin tablet 20 mg    COVID-19 vac, forrest(Pfizer)(PF) (Pfizer COVID-19) 30 mcg/0.3 mL injection 0.3 mL    DAPTOmycin (CUBICIN) 625 mg in sodium chloride 0.9% 50 mL IVPB    dextrose 10% bolus 125 mL    dextrose 10% bolus 250 mL    glucagon (human recombinant) injection 1 mg    glucose chewable tablet 16 g    glucose chewable tablet 24 g    heparin (porcine) injection 5,000 Units    hydrALAZINE tablet 25 mg    hydrALAZINE tablet 25 mg    insulin aspart U-100 pen 1-10 Units    insulin aspart U-100 pen 8 Units    insulin detemir U-100 pen 10 Units    morphine injection 3 mg    ondansetron injection 4 mg    oxyCODONE immediate release tablet 5 mg    senna-docusate 8.6-50 mg per tablet 2 tablet    sevelamer carbonate tablet 800 mg    sodium chloride 0.9% flush 10 mL     Objective:     Vital Signs (Most Recent):  Temp: 99.9 °F (37.7 °C) (08/17/22 1537)  Pulse: (!) 117 (08/17/22 1537)  Resp: 18 (08/17/22 1633)  BP: 117/66 (08/17/22 1537)  SpO2: (!) 93 % (08/17/22 1537) Vital Signs (24h Range):  Temp:  [98.2 °F (36.8 °C)-102 °F (38.9 °C)] 99.9 °F (37.7 °C)  Pulse:  [] 117  Resp:  [17-22] 18  SpO2:  [93 %-97 %] 93 %  BP: (117-178)/()  "117/66     Weight: 104.3 kg (229 lb 15 oz)  Height: 5' 7" (170.2 cm)  Body mass index is 36.01 kg/m².      Intake/Output Summary (Last 24 hours) at 8/17/2022 1702  Last data filed at 8/17/2022 1500  Gross per 24 hour   Intake --   Output 3005 ml   Net -3005 ml         Ortho/SPM Exam  Left lower extremity  Left thigh dressing cdi  Drain in place, ss output  No erythema of thigh, no signficant swelling  Compartments soft and compressible  Painless ROM of hip and knee  NVI        CBC:   Recent Labs   Lab 08/16/22  0333 08/17/22  0800   WBC 3.67* 3.11*   HGB 7.6* 7.4*   HCT 23.7* 22.8*    169       CMP:   Recent Labs   Lab 08/16/22  0333 08/17/22  0800    140   K 4.5 4.6    107   CO2 23 22*   GLU 99 127*   BUN 60* 60*   CREATININE 7.8* 7.4*   CALCIUM 8.8 8.9   PROT 6.0 6.3   ALBUMIN 2.2* 2.3*   BILITOT 0.3 0.3   ALKPHOS 66 67   AST 14 16   ALT <5* <5*   ANIONGAP 9 11         All pertinent labs within the past 24 hours have been reviewed.    Significant Imaging: I have reviewed and interpreted all pertinent imaging results/findings.    "

## 2022-08-17 NOTE — ASSESSMENT & PLAN NOTE
-patient with fever to 102 and tachycardia on 8/17 - repeat blood cultures, UA, COVID swab pending  -CXR without infiltrate, lactate nl, procalcitonin mildly elevated (due to ANGE)  -remains on dapto for MSSA abscess to thigh  -eosinophils nl and no leukocytosis  -ID and ortho notified of change  -minimize IVF due to improving NAGE with signs of fluid retention

## 2022-08-17 NOTE — SUBJECTIVE & OBJECTIVE
This encounter was provided through telemedicine.  Patient was transferred to the telemedicine service on:  08/9/2022   The patient location is: 8072/8072 A admitted 7/26/2022  8:24 PM.  Present with the patient at the time of the telemed/virtual assessment: Telepresenter    Interval History/Overnight Events:     Patient able to provide history.    Patient seen ambulating in the bach without difficulty in the am then called to the room for acute chills and temp 101.9 -  he says he has been having chills briefly for 2 days but were self limited so staff not told lactate neg and procal minimally elevated likely due to renal insufficiency; CXR neg; Cr continues to improve to 7.4; ID and Ortho notified of change  -ECG with sinus tach      Review of Systems   Constitutional:  Positive for fatigue and fever. Negative for activity change.   HENT:  Negative for sore throat.    Respiratory:  Negative for cough and shortness of breath.    Cardiovascular:  Positive for leg swelling. Negative for chest pain.   Gastrointestinal:  Negative for diarrhea and vomiting.   Genitourinary:  Negative for dysuria.   Musculoskeletal:  Positive for myalgias.   Skin:  Negative for rash.      Inpatient Medications:  Scheduled Meds:   atorvastatin  20 mg Oral Daily    DAPTOmycin (CUBICIN) IV (PEDS and ADULTS)  6 mg/kg Intravenous Q48H    heparin (porcine)  5,000 Units Subcutaneous Q8H    hydrALAZINE  25 mg Oral BID    And    isosorbide dinitrate  10 mg Oral BID    insulin aspart U-100  8 Units Subcutaneous TIDWM    insulin detemir U-100  10 Units Subcutaneous BID    NIFEdipine  60 mg Oral Daily    senna-docusate 8.6-50 mg  2 tablet Oral Daily    sevelamer carbonate  800 mg Oral TID WM     Continuous Infusions:  PRN Meds:.acetaminophen, sars-cov-2 (covid-19), dextrose 10%, dextrose 10%, glucagon (human recombinant), glucose, glucose, hydrALAZINE, insulin aspart U-100, morphine, ondansetron, oxyCODONE, sodium chloride 0.9%      Objective:      Temp:  [98.2 °F (36.8 °C)-101.1 °F (38.4 °C)] 101.1 °F (38.4 °C)  Pulse:  [] 132  Resp:  [17-18] 18  SpO2:  [94 %-98 %] 95 %  BP: (130-175)/() 175/97      Intake/Output Summary (Last 24 hours) at 8/17/2022 1200  Last data filed at 8/17/2022 0700  Gross per 24 hour   Intake 800 ml   Output 3235 ml   Net -2435 ml          Body mass index is 36.01 kg/m².    Physical Exam  Vitals and nursing note reviewed.   Constitutional:       General: He is not in acute distress.     Appearance: Normal appearance. He is normal weight. He is not ill-appearing.   HENT:      Head: Normocephalic and atraumatic.      Right Ear: Hearing normal.      Left Ear: Hearing normal.      Nose: Nose normal.   Eyes:      General: No scleral icterus.        Right eye: No discharge.         Left eye: No discharge.      Extraocular Movements: Extraocular movements intact.   Cardiovascular:      Rate and Rhythm: Normal rate.   Pulmonary:      Effort: Pulmonary effort is normal. No accessory muscle usage or respiratory distress.   Musculoskeletal:      Right lower leg: Edema present.      Left lower leg: Edema present.      Comments: Dressing to left thigh with drain (serosanguineous drainage)   Neurological:      General: No focal deficit present.      Mental Status: He is alert and oriented to person, place, and time.      Cranial Nerves: No cranial nerve deficit.      Motor: No weakness.   Psychiatric:         Attention and Perception: Attention normal.         Mood and Affect: Mood normal.         Speech: Speech normal.         Behavior: Behavior is cooperative.        Labs:  Recent Results (from the past 24 hour(s))   POCT glucose    Collection Time: 08/16/22  3:24 PM   Result Value Ref Range    POCT Glucose 104 70 - 110 mg/dL   POCT glucose    Collection Time: 08/16/22  9:14 PM   Result Value Ref Range    POCT Glucose 220 (H) 70 - 110 mg/dL   POCT glucose    Collection Time: 08/17/22  7:25 AM   Result Value Ref Range    POCT  Glucose 137 (H) 70 - 110 mg/dL   Phosphorus    Collection Time: 08/17/22  8:00 AM   Result Value Ref Range    Phosphorus 5.5 (H) 2.7 - 4.5 mg/dL   Magnesium    Collection Time: 08/17/22  8:00 AM   Result Value Ref Range    Magnesium 2.0 1.6 - 2.6 mg/dL   Comprehensive Metabolic Panel    Collection Time: 08/17/22  8:00 AM   Result Value Ref Range    Sodium 140 136 - 145 mmol/L    Potassium 4.6 3.5 - 5.1 mmol/L    Chloride 107 95 - 110 mmol/L    CO2 22 (L) 23 - 29 mmol/L    Glucose 127 (H) 70 - 110 mg/dL    BUN 60 (H) 6 - 20 mg/dL    Creatinine 7.4 (H) 0.5 - 1.4 mg/dL    Calcium 8.9 8.7 - 10.5 mg/dL    Total Protein 6.3 6.0 - 8.4 g/dL    Albumin 2.3 (L) 3.5 - 5.2 g/dL    Total Bilirubin 0.3 0.1 - 1.0 mg/dL    Alkaline Phosphatase 67 55 - 135 U/L    AST 16 10 - 40 U/L    ALT <5 (L) 10 - 44 U/L    Anion Gap 11 8 - 16 mmol/L    eGFR 8.4 (A) >60 mL/min/1.73 m^2   CBC Auto Differential    Collection Time: 08/17/22  8:00 AM   Result Value Ref Range    WBC 3.11 (L) 3.90 - 12.70 K/uL    RBC 2.46 (L) 4.60 - 6.20 M/uL    Hemoglobin 7.4 (L) 14.0 - 18.0 g/dL    Hematocrit 22.8 (L) 40.0 - 54.0 %    MCV 93 82 - 98 fL    MCH 30.1 27.0 - 31.0 pg    MCHC 32.5 32.0 - 36.0 g/dL    RDW 13.1 11.5 - 14.5 %    Platelets 169 150 - 450 K/uL    MPV 9.8 9.2 - 12.9 fL    Immature Granulocytes 1.0 (H) 0.0 - 0.5 %    Gran # (ANC) 1.5 (L) 1.8 - 7.7 K/uL    Immature Grans (Abs) 0.03 0.00 - 0.04 K/uL    Lymph # 0.8 (L) 1.0 - 4.8 K/uL    Mono # 0.3 0.3 - 1.0 K/uL    Eos # 0.5 0.0 - 0.5 K/uL    Baso # 0.02 0.00 - 0.20 K/uL    nRBC 0 0 /100 WBC    Gran % 48.9 38.0 - 73.0 %    Lymph % 26.0 18.0 - 48.0 %    Mono % 8.4 4.0 - 15.0 %    Eosinophil % 15.1 (H) 0.0 - 8.0 %    Basophil % 0.6 0.0 - 1.9 %    Differential Method Automated    Sedimentation rate    Collection Time: 08/17/22  8:00 AM   Result Value Ref Range    Sed Rate 50 (H) 0 - 23 mm/Hr   C-Reactive Protein    Collection Time: 08/17/22  8:00 AM   Result Value Ref Range    CRP 24.9 (H) 0.0 - 8.2  mg/L   POCT glucose    Collection Time: 08/17/22 11:26 AM   Result Value Ref Range    POCT Glucose 153 (H) 70 - 110 mg/dL        Lab Results   Component Value Date    JAL48LETOADG Negative 08/06/2022       Recent Labs   Lab 08/15/22  0723 08/16/22  0333 08/17/22  0800   WBC 3.48* 3.67* 3.11*   LYMPH 21.0  0.7* 22.6  0.8* 26.0  0.8*   HGB 8.0* 7.6* 7.4*   HCT 24.3* 23.7* 22.8*    172 169       Recent Labs   Lab 08/15/22  0723 08/16/22  0333 08/17/22  0800    136 140   K 4.8 4.5 4.6    104 107   CO2 20* 23 22*   BUN 62* 60* 60*   CREATININE 8.5* 7.8* 7.4*    99 127*   CALCIUM 9.0 8.8 8.9   MG 2.1 2.1 2.0   PHOS 5.8* 5.8* 5.5*       Recent Labs   Lab 08/15/22  0723 08/16/22  0333 08/17/22  0800   ALKPHOS 71 66 67   ALT <5* <5* <5*   AST 15 14 16   ALBUMIN 2.2* 2.2* 2.3*   PROT 6.3 6.0 6.3   BILITOT 0.2 0.3 0.3          Recent Labs     08/15/22  0723 08/17/22  0800   CRP 24.1* 24.9*   CPK 13*  --          All labs within the last 24 hours were reviewed.     Microbiology:  Microbiology Results (last 7 days)       Procedure Component Value Units Date/Time    Blood culture [[id number]]     Order Status: Sent Specimen: Blood     Blood culture [[id number]]     Order Status: Sent Specimen: Blood     Fungus culture [[id number]] Collected: 07/29/22 1251    Order Status: Completed Specimen: Abscess from Leg, Left Updated: 08/17/22 0904     Fungus (Mycology) Culture Culture in progress      No fungus isolated after 2 weeks    Narrative:      Left thigh abscess #1    Fungus culture [[id number]] Collected: 07/29/22 1300    Order Status: Completed Specimen: Abscess from Leg, Left Updated: 08/17/22 0904     Fungus (Mycology) Culture Culture in progress      No fungus isolated after 2 weeks    Narrative:      Left thigh abscess #2    Fungus culture [[id number]] Collected: 07/28/22 1800    Order Status: Completed Specimen: Abscess from Leg, Left Updated: 08/17/22 0904     Fungus (Mycology) Culture Culture in  progress      No fungus isolated after 2 weeks    Narrative:      LEFT THIGH ABSCESS    Aerobic culture [[id number]]  (Abnormal)  (Susceptibility) Collected: 08/06/22 1153    Order Status: Completed Specimen: Wound from Hip, Left Updated: 08/15/22 1002     Aerobic Bacterial Culture STAPHYLOCOCCUS AUREUS  Few      Narrative:      #2 Left hip fluid    Aerobic culture [[id number]]  (Abnormal) Collected: 08/06/22 1153    Order Status: Completed Specimen: Wound from Hip, Left Updated: 08/10/22 1214     Aerobic Bacterial Culture STAPHYLOCOCCUS AUREUS  Moderate  For susceptibility see order #[id number]      Narrative:      #1 Left hip fluid              Imaging  ECG Results              EKG 12-lead (Final result)  Result time 07/27/22 07:53:05      Final result by Interface, Lab In [facility] (07/27/22 07:53:05)                   Narrative:    Test Reason : R00.0,    Vent. Rate : 138 BPM     Atrial Rate : 138 BPM     P-R Int : 126 ms          QRS Dur : 086 ms      QT Int : 286 ms       P-R-T Axes : 051 002 052 degrees     QTc Int : 433 ms    Sinus tachycardia  Otherwise normal ECG  No previous ECGs available  Confirmed by Crow HARRIS MD ([id number]) on 7/27/2022 7:53:00 AM    Referred By: [email or web address]   SELF           Confirmed By:Crow HARRIS MD                                    Results for orders placed during the hospital encounter of 07/26/22    Echo    Interpretation Summary  · The left ventricle is normal in size with normal systolic function.  · The estimated ejection fraction is 65%.  · Normal left ventricular diastolic function.  · Normal right ventricular size with normal right ventricular systolic function.  · The estimated PA systolic pressure is 16 mmHg.  · Normal central venous pressure (3 mmHg).      Echo  · The left ventricle is normal in size with normal systolic function.  · The estimated ejection fraction is 65%.  · Normal left ventricular diastolic function.  · Normal right ventricular size with normal right  ventricular systolic   function.  · The estimated PA systolic pressure is 16 mmHg.  · Normal central venous pressure (3 mmHg).         All imaging within the last 24 hours was reviewed.       Discharge Planning   FILEMON: 8/19/2022     Code Status: Full Code   Is the patient medically ready for discharge?: No    Reason for patient still in hospital (select all that apply): Patient trending condition, Laboratory test, Treatment, Consult recommendations, and Pending disposition  Discharge Plan A: Home Health

## 2022-08-17 NOTE — CARE UPDATE
"RAPID RESPONSE NURSE CHART REVIEW        Chart Reviewed: 08/17/2022, 2:44 PM    MRN: [id number]  Bed: 8072/8072 A    Dx: Abscess of left thigh    Wilfredo Thomas has a past medical history of Diabetes, Gout, Hyperlipidemia, and Hypertension.    Last VS: BP (!) 143/84 (BP Location: Right arm, Patient Position: Lying)   Pulse (!) 113   Temp (!) 102 °F (38.9 °C) (Oral)   Resp 20   Ht 5' 7" (1.702 m)   Wt 104.3 kg (229 lb 15 oz)   SpO2 (!) 94%   BMI 36.01 kg/m²     24H Vital Sign Range:  Temp:  [98.2 °F (36.8 °C)-102 °F (38.9 °C)]   Pulse:  []   Resp:  [17-22]   BP: (130-178)/()   SpO2:  [93 %-98 %]     Level of Consciousness (AVPU): alert    Recent Labs     08/15/22  0723 08/16/22  0333 08/17/22  0800   WBC 3.48* 3.67* 3.11*   HGB 8.0* 7.6* 7.4*   HCT 24.3* 23.7* 22.8*    172 169       Recent Labs     08/15/22  0723 08/16/22  0333 08/17/22  0800    136 140   K 4.8 4.5 4.6    104 107   CO2 20* 23 22*   CREATININE 8.5* 7.8* 7.4*    99 127*   PHOS 5.8* 5.8* 5.5*   MG 2.1 2.1 2.0        No results for input(s): PH, PCO2, PO2, HCO3, POCSATURATED, BE in the last 72 hours.     OXYGEN:  Flow (L/min): 1.5     O2 Device (Oxygen Therapy): room air    MEWS score: 5    Please call [phone] for further concerns or assistance.    Daisy Pederson RN        "

## 2022-08-17 NOTE — ASSESSMENT & PLAN NOTE
46 yo male with history of DMII and HTN admitted for DKA and left thigh abscess. MRI of left femur was notable for ruptured tensor fascia jose luis mm with associated large hematoma, as well as small area of potential osteonecrosis. Bedside aspiration of the fluid collection was notable for a cell count of >200k (>80%segs), cultures + staph species. He was taken to the OR on 7/29 with orthopedic surgery for surgical I&D. Surgical cultures are positive for MSSA. Per ortho surgery, no concerns for bone involvement. Patient was initially on Vancomycin and Zosyn. Vanc trough supratherapeutic and patient has developed an ANGE. He was switched to cefazolin on Wednesday Aug 3rd. Emanuel stain + on Aug 13th and was then switched to Daptomycin.      Recent new onset left wrist pain, remains stating improvement. Minimal swelling noted today, ROM improving. Received steroid shot per primary team which pt noted improvement. He is s/p wrist arthroscopy with hardware placement in 3/2022. Wife noticed a boil on his wrist. S/p joint aspiration, white count 2050k, 88% segs. No crystals noted. Left wrist aspiration from 8/5/22-  NG. Xray of wrist with no acute concerns.      Pt with ANGE, Cr decreased to 7.4 today. Nephrology managing. Autoimmune workup ordered, pending. Bedside ultrasound completed and no hydronephrosis noted per nephrology note on 8/11/22. Emanuel stain negative on 8/3/22. Reordered on 8/11/22, positive. Cefazolin stopped, daptomycin started yesterday on 8/15/22. CPK 13, will need repeat on 8/20.    Febrile to 102 today, tachycardic. No leukocytosis. OR cultures 8/6 L thigh + MSSA.  Leg pain improved.  ANGE continues to worsened. 2D echo without mention of vegetations.  Per ortho recent note on 8/16- no plans to return to OR. Today noting minimal drainage but purulent. Stable non septic    Infectious w/u started, Blood cultures ordered and pending. Sed rate 50 (decreased from  65), CRP stable at 25. CPK 11 (from 13).  Lactate 1.3. procal .47. UA negative. Chest xray with no acute concerns. Covid test negative. Denies URI symptoms. EKG with no acute concerns. No sick contacts that he or wife is aware of. Reports chills/rigors started a couple days ago but only lasted <5 mins, today started again and persisted, also with fever. Denies calf pain, worsening leg pain, abdominal pain, or wrist pain.       Recommendations:   · Continue Daptomycin 6mg/kg/dose q48 hours (renally dosed) for MSSA SSTI. (dose reviewed with ID PharmD)  · Continue to follow ortho surgical plans.   · Nephrology ANGE management. Will follow plan regarding further w/u.   · Consider re imaging of left thigh if concerns for new loculations/abscess formation.   · Consider bilateral lower leg ultrasound if concerns for DVT.   · Will follow blood culture data and tailor abx as needed.   · Patient plan reviewed with ID staff, Dr. Graves. ID will follow.

## 2022-08-17 NOTE — ASSESSMENT & PLAN NOTE
Estimated Creatinine Clearance: 13.9 mL/min (A) (based on SCr of 7.4 mg/dL (H)). - improved  - Hyperphosphatemia also noted - start binder  - Continues to make urine  - Nephrology following: no acute indication for HD at this time  - Renally dosing all medications  - Avoid nephrotoxins  - nephrology has cleared for discharge once Cr < 7

## 2022-08-17 NOTE — ASSESSMENT & PLAN NOTE
- Afebrile, no leukocytosis  - Continue Ancef  - S/p OR with Ortho 8/6 for repeat washout  - Intra-op cultures with S.aureus: changed to Ancef for MSSA then daptomycin on 8/15; CPK 11  - ID following  - PRN analgesics provided  - Continuing to monitor; maintain drain for now - Ortho continues to monitor

## 2022-08-17 NOTE — ASSESSMENT & PLAN NOTE
- Continue home regimen of amlodipine; lisinopril held due to poor renal function  - continue to monitor and further titrate antihypertensives as clinically indicated   -increase amlodipine to BID on 8/13 for elevated BP; avoid hypotension with AGNE

## 2022-08-17 NOTE — SUBJECTIVE & OBJECTIVE
This encounter was provided through telemedicine.  Patient was transferred to the telemedicine service on:  08/9/2022   The patient location is: [id number] A admitted 7/26/2022  8:24 PM.  Present with the patient at the time of the telemed/virtual assessment: Telepresenter    Interval History/Overnight Events:   Clinical record since admit reviewed.  Patient able to provide history.    Drain output 60 cc in 24 hours; pain unchanged to left thigh; patient ambulatory;  Cr improved to 7.8      Review of Systems   Constitutional:  Positive for fatigue. Negative for activity change and fever.   Respiratory:  Negative for cough and shortness of breath.    Cardiovascular:  Positive for leg swelling. Negative for chest pain.   Gastrointestinal:  Negative for diarrhea and vomiting.   Musculoskeletal:  Positive for myalgias.      Inpatient Medications:  Scheduled Meds:   atorvastatin  20 mg Oral Daily    DAPTOmycin (CUBICIN) IV (PEDS and ADULTS)  6 mg/kg Intravenous Q48H    heparin (porcine)  5,000 Units Subcutaneous Q8H    hydrALAZINE  25 mg Oral BID    And    isosorbide dinitrate  10 mg Oral BID    insulin aspart U-100  8 Units Subcutaneous TIDWM    insulin detemir U-100  10 Units Subcutaneous BID    [START ON 8/17/2022] NIFEdipine  60 mg Oral Daily    senna-docusate 8.6-50 mg  2 tablet Oral Daily    sevelamer carbonate  800 mg Oral TID WM     Continuous Infusions:  PRN Meds:.acetaminophen, sars-cov-2 (covid-19), dextrose 10%, dextrose 10%, glucagon (human recombinant), glucose, glucose, hydrALAZINE, insulin aspart U-100, morphine, ondansetron, oxyCODONE, sodium chloride 0.9%      Objective:     Temp:  [97.9 °F (36.6 °C)-99.2 °F (37.3 °C)] 98.2 °F (36.8 °C)  Pulse:  [84-95] 93  Resp:  [16-18] 18  SpO2:  [94 %-100 %] 97 %  BP: (130-181)/() 175/98      Intake/Output Summary (Last 24 hours) at 8/16/2022 2156  Last data filed at 8/16/2022 1700  Gross per 24 hour   Intake 1040 ml   Output 2700 ml   Net -1660 ml           Body mass index is 36.01 kg/m².    Physical Exam  Vitals and nursing note reviewed.   Constitutional:       General: He is not in acute distress.     Appearance: Normal appearance. He is normal weight. He is not ill-appearing.   HENT:      Head: Normocephalic and atraumatic.      Right Ear: Hearing normal.      Left Ear: Hearing normal.      Nose: Nose normal.   Eyes:      General: No scleral icterus.        Right eye: No discharge.         Left eye: No discharge.      Extraocular Movements: Extraocular movements intact.   Cardiovascular:      Rate and Rhythm: Normal rate.   Pulmonary:      Effort: Pulmonary effort is normal. No accessory muscle usage or respiratory distress.   Musculoskeletal:      Right lower leg: Edema present.      Left lower leg: Edema present.      Comments: Dressing to left thigh with drain (serosanguineous drainage)   Neurological:      General: No focal deficit present.      Mental Status: He is alert and oriented to person, place, and time.      Cranial Nerves: No cranial nerve deficit.      Motor: No weakness.   Psychiatric:         Attention and Perception: Attention normal.         Mood and Affect: Mood normal.         Speech: Speech normal.         Behavior: Behavior is cooperative.        Labs:  Recent Results (from the past 24 hour(s))   Phosphorus    Collection Time: 08/16/22  3:33 AM   Result Value Ref Range    Phosphorus 5.8 (H) 2.7 - 4.5 mg/dL   Magnesium    Collection Time: 08/16/22  3:33 AM   Result Value Ref Range    Magnesium 2.1 1.6 - 2.6 mg/dL   Comprehensive Metabolic Panel    Collection Time: 08/16/22  3:33 AM   Result Value Ref Range    Sodium 136 136 - 145 mmol/L    Potassium 4.5 3.5 - 5.1 mmol/L    Chloride 104 95 - 110 mmol/L    CO2 23 23 - 29 mmol/L    Glucose 99 70 - 110 mg/dL    BUN 60 (H) 6 - 20 mg/dL    Creatinine 7.8 (H) 0.5 - 1.4 mg/dL    Calcium 8.8 8.7 - 10.5 mg/dL    Total Protein 6.0 6.0 - 8.4 g/dL    Albumin 2.2 (L) 3.5 - 5.2 g/dL    Total Bilirubin  0.3 0.1 - 1.0 mg/dL    Alkaline Phosphatase 66 55 - 135 U/L    AST 14 10 - 40 U/L    ALT <5 (L) 10 - 44 U/L    Anion Gap 9 8 - 16 mmol/L    eGFR 7.8 (A) >60 mL/min/1.73 m^2   CBC Auto Differential    Collection Time: 08/16/22  3:33 AM   Result Value Ref Range    WBC 3.67 (L) 3.90 - 12.70 K/uL    RBC 2.47 (L) 4.60 - 6.20 M/uL    Hemoglobin 7.6 (L) 14.0 - 18.0 g/dL    Hematocrit 23.7 (L) 40.0 - 54.0 %    MCV 96 82 - 98 fL    MCH 30.8 27.0 - 31.0 pg    MCHC 32.1 32.0 - 36.0 g/dL    RDW 13.0 11.5 - 14.5 %    Platelets 172 150 - 450 K/uL    MPV 9.6 9.2 - 12.9 fL    Immature Granulocytes 1.1 (H) 0.0 - 0.5 %    Gran # (ANC) 1.8 1.8 - 7.7 K/uL    Immature Grans (Abs) 0.04 0.00 - 0.04 K/uL    Lymph # 0.8 (L) 1.0 - 4.8 K/uL    Mono # 0.4 0.3 - 1.0 K/uL    Eos # 0.6 (H) 0.0 - 0.5 K/uL    Baso # 0.02 0.00 - 0.20 K/uL    nRBC 0 0 /100 WBC    Gran % 49.9 38.0 - 73.0 %    Lymph % 22.6 18.0 - 48.0 %    Mono % 10.1 4.0 - 15.0 %    Eosinophil % 15.8 (H) 0.0 - 8.0 %    Basophil % 0.5 0.0 - 1.9 %    Differential Method Automated    POCT glucose    Collection Time: 08/16/22  7:06 AM   Result Value Ref Range    POCT Glucose 119 (H) 70 - 110 mg/dL   POCT glucose    Collection Time: 08/16/22 11:34 AM   Result Value Ref Range    POCT Glucose 137 (H) 70 - 110 mg/dL   POCT glucose    Collection Time: 08/16/22  3:24 PM   Result Value Ref Range    POCT Glucose 104 70 - 110 mg/dL   POCT glucose    Collection Time: 08/16/22  9:14 PM   Result Value Ref Range    POCT Glucose 220 (H) 70 - 110 mg/dL        Lab Results   Component Value Date    [id number] Negative 08/06/2022       Recent Labs   Lab 08/14/22  0426 08/15/22  0723 08/16/22  0333   WBC 3.74* 3.48* 3.67*   LYMPH 19.8  0.7* 21.0  0.7* 22.6  0.8*   HGB 8.2* 8.0* 7.6*   HCT 25.2* 24.3* 23.7*    174 172       Recent Labs   Lab 08/14/22  0426 08/15/22  0723 08/16/22  0333    139 136   K 4.8 4.8 4.5    106 104   CO2 22* 20* 23   BUN 64* 62* 60*   CREATININE 8.9* 8.5* 7.8*    * 102 99   CALCIUM 8.8 9.0 8.8   MG 2.1 2.1 2.1   PHOS 6.2* 5.8* 5.8*       Recent Labs   Lab 08/14/22 0426 08/15/22  0723 08/16/22  0333   ALKPHOS 84 71 66   ALT <5* <5* <5*   AST 14 15 14   ALBUMIN 2.1* 2.2* 2.2*   PROT 6.1 6.3 6.0   BILITOT 0.2 0.2 0.3          Recent Labs     08/14/22 0426 08/15/22  0723   FERRITIN 674*  --    CRP  --  24.1*   CPK  --  13*         All labs within the last 24 hours were reviewed.     Microbiology:  Microbiology Results (last 7 days)       Procedure Component Value Units Date/Time    Aerobic culture [[id number]]  (Abnormal)  (Susceptibility) Collected: 08/06/22 1153    Order Status: Completed Specimen: Wound from Hip, Left Updated: 08/15/22 1002     Aerobic Bacterial Culture STAPHYLOCOCCUS AUREUS  Few      Narrative:      #2 Left hip fluid    Aerobic culture [[id number]]  (Abnormal) Collected: 08/06/22 1153    Order Status: Completed Specimen: Wound from Hip, Left Updated: 08/10/22 1214     Aerobic Bacterial Culture STAPHYLOCOCCUS AUREUS  Moderate  For susceptibility see order #[id number]      Narrative:      #1 Left hip fluid    Culture, Anaerobe [[id number]] Collected: 08/06/22 1153    Order Status: Completed Specimen: Body Fluid from Hip, Left Updated: 08/10/22 1035     Anaerobic Culture No anaerobes isolated    Narrative:      #2 Left hip fluid    Culture, Anaerobe [[id number]] Collected: 08/06/22 1153    Order Status: Completed Specimen: Body Fluid from Hip, Left Updated: 08/10/22 1034     Anaerobic Culture No anaerobes isolated    Narrative:      #1 Left hip fluid    Blood culture [[id number]] Collected: 08/05/22 0850    Order Status: Completed Specimen: Blood from Antecubital, Right Arm Updated: 08/10/22 1012     Blood Culture, Routine No growth after 5 days.    Blood culture [[id number]] Collected: 08/05/22 0852    Order Status: Completed Specimen: Blood from Peripheral, Right Hand Updated: 08/10/22 1012     Blood Culture, Routine No growth after 5 days.               Imaging  ECG Results              EKG 12-lead (Final result)  Result time 07/27/22 07:53:05      Final result by Interface, Lab In seven (07/27/22 07:53:05)                   Narrative:    Test Reason : R00.0,    Vent. Rate : 138 BPM     Atrial Rate : 138 BPM     P-R Int : 126 ms          QRS Dur : 086 ms      QT Int : 286 ms       P-R-T Axes : 051 002 052 degrees     QTc Int : 433 ms    Sinus tachycardia  Otherwise normal ECG  No previous ECGs available  Confirmed by Crow HARRIS MD ([id number]) on 7/27/2022 7:53:00 AM    Referred By: [email or web address]   SELF           Confirmed By:Crow HARRIS MD                                    Results for orders placed during the hospital encounter of 07/26/22    Echo    Interpretation Summary  · The left ventricle is normal in size with normal systolic function.  · The estimated ejection fraction is 65%.  · Normal left ventricular diastolic function.  · Normal right ventricular size with normal right ventricular systolic function.  · The estimated PA systolic pressure is 16 mmHg.  · Normal central venous pressure (3 mmHg).      Echo  · The left ventricle is normal in size with normal systolic function.  · The estimated ejection fraction is 65%.  · Normal left ventricular diastolic function.  · Normal right ventricular size with normal right ventricular systolic   function.  · The estimated PA systolic pressure is 16 mmHg.  · Normal central venous pressure (3 mmHg).         All imaging within the last 24 hours was reviewed.       Discharge Planning   FILEMON: 8/19/2022     Code Status: Full Code   Is the patient medically ready for discharge?: No    Reason for patient still in hospital (select all that apply): Patient trending condition, Laboratory test, Treatment, Consult recommendations, and Pending disposition  Discharge Plan A: Home Health

## 2022-08-17 NOTE — SUBJECTIVE & OBJECTIVE
Interval History:   No AEON.   ANGE continues to improve, Cr. 7.4 today. Continues with neutropenia today, WBC 3.11. will follow.   All thigh cultures with MSSA.     Spiked fever to 102 today. Pt with chills/body aches. Blood cultures pending.           Review of Systems   Constitutional:  Negative for chills, diaphoresis, fatigue and fever.   Respiratory:  Negative for cough and shortness of breath.    Cardiovascular:  Positive for leg swelling (upper left thigh). Negative for chest pain and palpitations.   Gastrointestinal:  Negative for abdominal distention and abdominal pain.   Genitourinary:  Negative for difficulty urinating and dysuria.   Musculoskeletal:  Positive for arthralgias (left wrist) and myalgias (upper left thigh). Negative for joint swelling.   Skin:  Positive for wound (upper left thigh). Negative for color change and rash.   Allergic/Immunologic: Negative for immunocompromised state.   Neurological:  Negative for dizziness, tremors, weakness, numbness and headaches.   Psychiatric/Behavioral:  Negative for agitation and decreased concentration. The patient is not nervous/anxious.    Objective:     Vital Signs (Most Recent):  Temp: 99.9 °F (37.7 °C) (08/17/22 1537)  Pulse: (!) 117 (08/17/22 1537)  Resp: 18 (08/17/22 1633)  BP: 117/66 (08/17/22 1537)  SpO2: (!) 93 % (08/17/22 1537)   Vital Signs (24h Range):  Temp:  [98.2 °F (36.8 °C)-102 °F (38.9 °C)] 99.9 °F (37.7 °C)  Pulse:  [] 117  Resp:  [17-22] 18  SpO2:  [93 %-97 %] 93 %  BP: (117-178)/() 117/66     Weight: 104.3 kg (229 lb 15 oz)  Body mass index is 36.01 kg/m².    Estimated Creatinine Clearance: 13.9 mL/min (A) (based on SCr of 7.4 mg/dL (H)).    Physical Exam  Vitals and nursing note reviewed.   Constitutional:       General: He is not in acute distress.     Appearance: Normal appearance. He is well-developed. He is obese. He is not ill-appearing, toxic-appearing or diaphoretic.   HENT:      Head: Normocephalic and atraumatic.       Nose: Nose normal.      Mouth/Throat:      Dentition: Does not have dentures.      Pharynx: No oropharyngeal exudate.   Eyes:      General: No scleral icterus.     Conjunctiva/sclera: Conjunctivae normal.   Cardiovascular:      Rate and Rhythm: Normal rate and regular rhythm.   Pulmonary:      Effort: Pulmonary effort is normal. No respiratory distress.      Breath sounds: Normal breath sounds.   Abdominal:      General: There is no distension.      Palpations: Abdomen is soft.      Tenderness: There is no abdominal tenderness.   Musculoskeletal:         General: No swelling.   Skin:     General: Skin is warm and dry.      Findings: No erythema or rash.      Comments: Incision to left thigh. Dressed, CDI. Surgical drain with dark bloody output.   Neurological:      General: No focal deficit present.      Mental Status: He is alert and oriented to person, place, and time. Mental status is at baseline.      Motor: No weakness.      Gait: Gait normal.   Psychiatric:         Mood and Affect: Mood normal.         Behavior: Behavior normal.         Thought Content: Thought content normal.         Judgment: Judgment normal.       Significant Labs: CBC:   Recent Labs   Lab 08/16/22  0333 08/17/22  0800   WBC 3.67* 3.11*   HGB 7.6* 7.4*   HCT 23.7* 22.8*    169       CMP:   Recent Labs   Lab 08/16/22  0333 08/17/22  0800    140   K 4.5 4.6    107   CO2 23 22*   GLU 99 127*   BUN 60* 60*   CREATININE 7.8* 7.4*   CALCIUM 8.8 8.9   PROT 6.0 6.3   ALBUMIN 2.2* 2.3*   BILITOT 0.3 0.3   ALKPHOS 66 67   AST 14 16   ALT <5* <5*   ANIONGAP 9 11       Microbiology Results (last 7 days)       Procedure Component Value Units Date/Time    Blood culture [[id number]]     Order Status: Canceled Specimen: Blood     Blood culture [[id number]]     Order Status: Canceled Specimen: Blood     Blood culture [[id number]] Collected: 08/17/22 1231    Order Status: Sent Specimen: Blood Updated: 08/17/22 1238    Blood culture  [[id number]] Collected: 08/17/22 1223    Order Status: Sent Specimen: Blood Updated: 08/17/22 1238    Fungus culture [[id number]] Collected: 07/29/22 1251    Order Status: Completed Specimen: Abscess from Leg, Left Updated: 08/17/22 0904     Fungus (Mycology) Culture Culture in progress      No fungus isolated after 2 weeks    Narrative:      Left thigh abscess #1    Fungus culture [[id number]] Collected: 07/29/22 1300    Order Status: Completed Specimen: Abscess from Leg, Left Updated: 08/17/22 0904     Fungus (Mycology) Culture Culture in progress      No fungus isolated after 2 weeks    Narrative:      Left thigh abscess #2    Fungus culture [[id number]] Collected: 07/28/22 1800    Order Status: Completed Specimen: Abscess from Leg, Left Updated: 08/17/22 0904     Fungus (Mycology) Culture Culture in progress      No fungus isolated after 2 weeks    Narrative:      LEFT THIGH ABSCESS    Aerobic culture [[id number]]  (Abnormal)  (Susceptibility) Collected: 08/06/22 1153    Order Status: Completed Specimen: Wound from Hip, Left Updated: 08/15/22 1002     Aerobic Bacterial Culture STAPHYLOCOCCUS AUREUS  Few      Narrative:      #2 Left hip fluid          Recent Lab Results  (Last 5 results in the past 24 hours)        08/17/22  1436   08/17/22  1432   08/17/22  1224   08/17/22  1126   08/17/22  0800        Procalcitonin     0.47  Comment: A concentration < 0.25 ng/mL represents a low risk of bacterial   infection.  Procalcitonin may not be accurate among patients with localized   infection, recent trauma or major surgery, immunosuppressed state,   invasive fungal infection, renal dysfunction. Decisions regarding   initiation or continuation of antibiotic therapy should not be based   solely on procalcitonin levels.             Albumin         2.3       Alkaline Phosphatase         67       ALT         <5       Anion Gap         11       Appearance, UA Clear               AST         16       Bacteria, UA Rare                Baso #         0.02       Basophil %         0.6       Bilirubin (UA) Negative               BILIRUBIN TOTAL         0.3  Comment: For infants and newborns, interpretation of results should be based  on gestational age, weight and in agreement with clinical  observations.    Premature Infant recommended reference ranges:  Up to 24 hours.............<8.0 mg/dL  Up to 48 hours............<12.0 mg/dL  3-5 days..................<15.0 mg/dL  6-29 days.................<15.0 mg/dL         BUN         60       Calcium         8.9       Chloride         107       CO2         22       Color, UA Colorless               CPK   11             Creatinine         7.4       CRP         24.9       Differential Method         Automated       eGFR         8.4       Eos #         0.5       Eosinophil %         15.1       Glucose         127       Glucose, UA 1+               Gran # (ANC)         1.5       Gran %         48.9       Hematocrit         22.8       Hemoglobin         7.4       Hyaline Casts, UA 0               Immature Grans (Abs)         0.03  Comment: Mild elevation in immature granulocytes is non specific and   can be seen in a variety of conditions including stress response,   acute inflammation, trauma and pregnancy. Correlation with other   laboratory and clinical findings is essential.         Immature Granulocytes         1.0       Ketones, UA Negative               Lactate, Jarad     1.3  Comment: Falsely low lactic acid results can be found in samples   containing >=13.0 mg/dL total bilirubin and/or >=3.5 mg/dL   direct bilirubin.             Leukocytes, UA Negative               Lymph #         0.8       Lymph %         26.0       Magnesium         2.0       MCH         30.1       MCHC         32.5       MCV         93       Microscopic Comment SEE COMMENT  Comment: Other formed elements not mentioned in the report are not   present in the microscopic examination.                  Mono #         0.3       Mono %          8.4       MPV         9.8       NITRITE UA Negative               nRBC         0       Occult Blood UA Negative               pH, UA 7.0               Phosphorus         5.5       Platelets         169       POCT Glucose       153         Potassium         4.6       PROTEIN TOTAL         6.3       Protein, UA 1+  Comment: Recommend a 24 hour urine protein or a urine   protein/creatinine ratio if globulin induced proteinuria is  clinically suspected.                 RBC         2.46       RBC, UA 2               RDW         13.1       Sed Rate         50       Sodium         140       Specific [place], UA 1.010               Specimen UA Urine, Clean Catch               WBC, UA 3               WBC         3.11                              Significant Imaging:     Imaging Results              CT Thigh With Contrast Left (Final result)  Result time 07/27/22 01:13:09      Final result by Jori Hopkins DO (07/27/22 01:13:09)                   Impression:      Large heterogeneous fluid collection in the anterolateral left proximal thigh soft tissues, concerning for a soft tissue hematoma or Preston-Melissa lesion.  A neoplastic process is not entirely excluded.  Recommend close interval follow-up to assess for stability/resolution.      Electronically signed by: Jori Hopkins  Date:    07/27/2022  Time:    01:13               Narrative:    EXAMINATION:  CT THIGH WITH CONTRAST LEFT    CLINICAL HISTORY:  Soft tissue mass, thigh, deep;    TECHNIQUE:  Axial CT images of the left thigh with sagittal and coronal reformats after the intravenous administration of 50 mL Omnipaque 350.    COMPARISON:  None.    FINDINGS:  Bone: Bone mineralization is normal.  There is no evidence of an acute fracture or dislocation.  Alignment is normal.    Soft tissues: There is a large heterogeneous fluid collection in the left anterolateral proximal thigh measuring approximately 16 x 7 x 6 cm.  The collection appears to be centered  within the subcutaneous tissues or superficial fascia and abuts the gluteus musculature, the sartorius muscle, the rectus femoris, and the vastus lateralis.  There is soft tissue edema in the surrounding subcutaneous tissues.  Muscle bulk is normal.    Articulations: The left femoroacetabular joint is unremarkable.  The pubic symphysis is unremarkable.  The left knee is unremarkable.  No large joint effusion or significant cartilage loss.    Miscellaneous: Neurovascular structures are grossly intact.  There is moderate calcified atherosclerosis of the left femoral and popliteal arteries.                                       CTA Chest Non-Coronary (PE Study) (Final result)  Result time 07/26/22 21:36:01      Final result by Jori Hopkins DO (07/26/22 21:36:01)                   Impression:      No large central or lobar pulmonary embolism.      Electronically signed by: Jori Hopkins  Date:    07/26/2022  Time:    21:36               Narrative:    EXAMINATION:  CTA CHEST NON CORONARY    CLINICAL HISTORY:  Pulmonary embolism (PE) suspected, high prob;    TECHNIQUE:  Low dose axial images, sagittal and coronal reformations were obtained from the thoracic inlet to the lung bases following the IV administration of 100 mL of Omnipaque 350.  Contrast timing was optimized to evaluate the pulmonary arteries.  Maximum intensity projection images were provided for review.    COMPARISON:  Chest radiograph from earlier the same date.    FINDINGS:  Pulmonary vasculature: Satisfactory opacification of the pulmonary arterial system.  Motion artifact significantly limits evaluation of the segmental and subsegmental pulmonary arteries.  There is no large central or lobar pulmonary embolism.    Aorta: Left-sided aortic arch.  No aneurysm and no significant atherosclerosis.    Base of Neck: No significant abnormality.    Thoracic soft tissues: Normal.    Heart: Normal size. No effusion.    Amena/Mediastinum: No pathologic russ  enlargement.    Airways: The large airways are patent. No foci of endobronchial filling.    Lungs/Pleura: Clear lungs. No pleural effusion or thickening.    Esophagus: Normal.    Upper Abdomen: No abnormality of the partially imaged upper abdomen.    Bones: No acute fracture. No suspicious lytic or sclerotic lesions.                                       X-Ray Chest 1 View (Final result)  Result time 07/26/22 21:23:42   Procedure changed from X-Ray Chest PA And Lateral     Final result by Olman Saucedo MD (07/26/22 21:23:42)                   Impression:      No convincing radiographic evidence of acute intrathoracic process on this single view..      Electronically signed by: Olman Saucedo MD  Date:    07/26/2022  Time:    21:23               Narrative:    EXAMINATION:  XR CHEST 1 VIEW    CLINICAL HISTORY:  shortness of breath;    TECHNIQUE:  Single frontal view of the chest was performed.    COMPARISON:  None    FINDINGS:  Cardiac monitoring leads overlie the chest.  Cardiac silhouette appears within normal limits.  No confluent airspace consolidation identified.  No significant volume of pleural fluid or pneumothorax appreciated.  The visualized osseous structures demonstrate mild degenerative changes.

## 2022-08-17 NOTE — PROGRESS NOTES
"      [facility] - Telemetry [facility] Medicine  Telemedicine Progress Note    Patient Name: Wilfredo Thomas  MRN: [id number]  Patient Class: IP- Inpatient   Admission Date: 7/26/2022  Length of Stay: 22 days  Attending Physician: Mervat Johnson MD  Primary Care Provider: Aylin Wayne DO          Subjective:     Principal Problem:Abscess of left thigh        HPI:  46 y/o M hx of HTN, IDDM2, GERD obesity, HLD presented to the ED with complaint of 1 day of worsening SOB. Wife present at bedside. Patient states that he noticed yesterday he was feeling some shortness of breath and couldn't seem to catch his breath. His wife went to check on him this morning and noticed that he was breathing "fast and heavy" and he sounded like he was slurring his words. Wife was concerned about a stroke, so he brought him to the ED for evaluation.     Patient also reporting significant pain and swelling along his left lateral proximal thigh. He states he has noticed worsening pain over the past 2 weeks. He denies any acute trauma to the area. He was seen by ortho last week and was told to take ibu-profen for a muscle strain. This did not help his pain, so he presented again on 7/22 and was given a prednisone injection into his thigh. This also did not help his pain, and now the pain and swelling have advanced to the point that he has difficulty with ambulation. Patient denies fever, chills, nausea, vomiting.      Of note, patient has hx of poorly controlled diabetes. He was recently started on insulin by his PCP, but he has not taken any insulin since being prescribed it.      In the ED, patient hypertensive and tachycardic to the 130s. Tachypneic with RR in the 40s. On CBC, WBC 26.9, On CMP patient hyperkalemic to 5.5, CorNa 136, Bicarb 5. Cr elevated to 1.9 from BL 0.83. UA, BHB pending at time of admission. CTA without evidence of pulmonary embolism. Critical Care Medicine consulted given severe acidosis.       "       Overview/Hospital Course:    Patient admitted to the MICU for management of severe DKA. Metabolic acidosis improving on insulin drip. He also has had left thigh pain for 1-2 months. There is a large mass on his left thigh that is tender to palpation, CT revealed hematoma vs soft tissue growth. General surgery consulted and recommended IR consult. IR consulted, no indication for tap.   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 Ortho consulted and performed bedside aspiration of possible abscess which revealed >200K cells >80% segs. Will hold off on antibiotics for now per ortho recs, ortho is taking patient to the OR for irrigation of the thigh, will start antibiotics afterwards.         7/29 Metabolic acidosis slowly improving. Ortho taking patient to the OR for irrigation of the left thigh, will start antibiotics afterwards.   7/30 Transfer to hospital medicine . S/p I&D  of left thigh Abscess on7/29/22 - MRI - uptured tensor fascia jose luis (TFL) muscle with associated large hematoma. gram stain -Moderate Gram positive cocci in clusters .cultures pending.  continue Vancomycin, clindamycin and Zosyn . Bicarb 15. K replaced . off insulin drip on SQ insulin.  7/31 ID and endocrine consulted.  abscess with Calcium pyrophosphate crystals with unknown significance potassium and P replaced. Bicarbonate WNL . aerobic culture 7/28 STAPHYLOCOCCUS SPECIES . Glucose in 300s .PRN imodium  for diarrhea. PT/OT consulted - WBAT. Surgical drain with purulent output  8/1 PICC team consulted for IV access. vanc trough at 11.4. monitor for vanomycin toxicity. possible OR tomorrow for washout  may need bone biopsy to r/o osteomyelitis.Discontinued clindamycin.   8/2 glucose in 300s.  Increased Levemir  to 14 units BID and Aspart  10 units TIDWM. K replaced   8/3 ANGE with cr 0.7--> 2.7. likely vanc induced ATN with levels 40. urine studies ordered renal sonogram WNL   nephrology consulted. started on NS 100ml/h x 10h and follow up renal panel.Zosyn and  vancomycin discontinued. Strict IO monitor. condom catheter . switched to Ancef for MSSA on culture  Interval History:  8/4- MSSA- see below. /85 VSS. On room air, eating, BM on 8/2, good UO. Appreciate ortho recs. On Cephazolin, detim 14 bid and aspart 12 ac.  8/5-Cr still rising, cr =6.  Wrist still hurting. Ortho to tap it to eval for infection and gout today. /90   Pulse 85  AF, no other signs of infection.  Will discuss w Nephrology- received NS x one liter yesterday. Will repeat today. Suspect auto-diuresis.   8/6: washout with ortho today. Cr continues to rise (7.1), nephrology following  8/7: Cr 7.4, phos increasing; tolerated surgery well yesterday, intra-op cultures pending  8/8: Cr increased to 8.1, hyperphosphatemia again noted; continues to make urine, no acute indication for HD per nephrology. Intra-op cultures with S.aureus, continued cefazolin pending final C&S results.        This encounter was provided through telemedicine.  Patient was transferred to the telemedicine service on:  08/9/2022   The patient location is: [facility] admitted 7/26/2022  8:24 PM.  Present with the patient at the time of the telemed/virtual assessment: Telepresenter    Interval History/Overnight Events:     Patient able to provide history.    Patient seen ambulating in the bach without difficulty in the am then called to the room for acute chills and temp 101.9 -  he says he has been having chills briefly for 2 days but were self limited so staff not told lactate neg and procal minimally elevated likely due to renal insufficiency; CXR neg; Cr continues to improve to 7.4; ID and Ortho notified of change  -ECG with sinus tach      Review of Systems   Constitutional:  Positive for fatigue and fever. Negative for activity change.   HENT:  Negative for sore throat.    Respiratory:  Negative for cough and shortness of breath.     Cardiovascular:  Positive for leg swelling. Negative for chest pain.   Gastrointestinal:  Negative for diarrhea and vomiting.   Genitourinary:  Negative for dysuria.   Musculoskeletal:  Positive for myalgias.   Skin:  Negative for rash.      Inpatient Medications:  Scheduled Meds:   atorvastatin  20 mg Oral Daily    DAPTOmycin (CUBICIN) IV (PEDS and ADULTS)  6 mg/kg Intravenous Q48H    heparin (porcine)  5,000 Units Subcutaneous Q8H    hydrALAZINE  25 mg Oral BID    And    isosorbide dinitrate  10 mg Oral BID    insulin aspart U-100  8 Units Subcutaneous TIDWM    insulin detemir U-100  10 Units Subcutaneous BID    NIFEdipine  60 mg Oral Daily    senna-docusate 8.6-50 mg  2 tablet Oral Daily    sevelamer carbonate  800 mg Oral TID WM     Continuous Infusions:  PRN Meds:.acetaminophen, sars-cov-2 (covid-19), dextrose 10%, dextrose 10%, glucagon (human recombinant), glucose, glucose, hydrALAZINE, insulin aspart U-100, morphine, ondansetron, oxyCODONE, sodium chloride 0.9%      Objective:     Temp:  [98.2 °F (36.8 °C)-101.1 °F (38.4 °C)] 101.1 °F (38.4 °C)  Pulse:  [] 132  Resp:  [17-18] 18  SpO2:  [94 %-98 %] 95 %  BP: (130-175)/() 175/97      Intake/Output Summary (Last 24 hours) at 8/17/2022 1200  Last data filed at 8/17/2022 0700  Gross per 24 hour   Intake 800 ml   Output 3235 ml   Net -2435 ml          Body mass index is 36.01 kg/m².    Physical Exam  Vitals and nursing note reviewed.   Constitutional:       General: He is not in acute distress.     Appearance: Normal appearance. He is normal weight. He is not ill-appearing.   HENT:      Head: Normocephalic and atraumatic.      Right Ear: Hearing normal.      Left Ear: Hearing normal.      Nose: Nose normal.   Eyes:      General: No scleral icterus.        Right eye: No discharge.         Left eye: No discharge.      Extraocular Movements: Extraocular movements intact.   Cardiovascular:      Rate and Rhythm: Normal rate.   Pulmonary:       Effort: Pulmonary effort is normal. No accessory muscle usage or respiratory distress.   Musculoskeletal:      Right lower leg: Edema present.      Left lower leg: Edema present.      Comments: Dressing to left thigh with drain (serosanguineous drainage)   Neurological:      General: No focal deficit present.      Mental Status: He is alert and oriented to person, place, and time.      Cranial Nerves: No cranial nerve deficit.      Motor: No weakness.   Psychiatric:         Attention and Perception: Attention normal.         Mood and Affect: Mood normal.         Speech: Speech normal.         Behavior: Behavior is cooperative.        Labs:  Recent Results (from the past 24 hour(s))   POCT glucose    Collection Time: 08/16/22  3:24 PM   Result Value Ref Range    POCT Glucose 104 70 - 110 mg/dL   POCT glucose    Collection Time: 08/16/22  9:14 PM   Result Value Ref Range    POCT Glucose 220 (H) 70 - 110 mg/dL   POCT glucose    Collection Time: 08/17/22  7:25 AM   Result Value Ref Range    POCT Glucose 137 (H) 70 - 110 mg/dL   Phosphorus    Collection Time: 08/17/22  8:00 AM   Result Value Ref Range    Phosphorus 5.5 (H) 2.7 - 4.5 mg/dL   Magnesium    Collection Time: 08/17/22  8:00 AM   Result Value Ref Range    Magnesium 2.0 1.6 - 2.6 mg/dL   Comprehensive Metabolic Panel    Collection Time: 08/17/22  8:00 AM   Result Value Ref Range    Sodium 140 136 - 145 mmol/L    Potassium 4.6 3.5 - 5.1 mmol/L    Chloride 107 95 - 110 mmol/L    CO2 22 (L) 23 - 29 mmol/L    Glucose 127 (H) 70 - 110 mg/dL    BUN 60 (H) 6 - 20 mg/dL    Creatinine 7.4 (H) 0.5 - 1.4 mg/dL    Calcium 8.9 8.7 - 10.5 mg/dL    Total Protein 6.3 6.0 - 8.4 g/dL    Albumin 2.3 (L) 3.5 - 5.2 g/dL    Total Bilirubin 0.3 0.1 - 1.0 mg/dL    Alkaline Phosphatase 67 55 - 135 U/L    AST 16 10 - 40 U/L    ALT <5 (L) 10 - 44 U/L    Anion Gap 11 8 - 16 mmol/L    eGFR 8.4 (A) >60 mL/min/1.73 m^2   CBC Auto Differential    Collection Time: 08/17/22  8:00 AM   Result  Value Ref Range    WBC 3.11 (L) 3.90 - 12.70 K/uL    RBC 2.46 (L) 4.60 - 6.20 M/uL    Hemoglobin 7.4 (L) 14.0 - 18.0 g/dL    Hematocrit 22.8 (L) 40.0 - 54.0 %    MCV 93 82 - 98 fL    MCH 30.1 27.0 - 31.0 pg    MCHC 32.5 32.0 - 36.0 g/dL    RDW 13.1 11.5 - 14.5 %    Platelets 169 150 - 450 K/uL    MPV 9.8 9.2 - 12.9 fL    Immature Granulocytes 1.0 (H) 0.0 - 0.5 %    Gran # (ANC) 1.5 (L) 1.8 - 7.7 K/uL    Immature Grans (Abs) 0.03 0.00 - 0.04 K/uL    Lymph # 0.8 (L) 1.0 - 4.8 K/uL    Mono # 0.3 0.3 - 1.0 K/uL    Eos # 0.5 0.0 - 0.5 K/uL    Baso # 0.02 0.00 - 0.20 K/uL    nRBC 0 0 /100 WBC    Gran % 48.9 38.0 - 73.0 %    Lymph % 26.0 18.0 - 48.0 %    Mono % 8.4 4.0 - 15.0 %    Eosinophil % 15.1 (H) 0.0 - 8.0 %    Basophil % 0.6 0.0 - 1.9 %    Differential Method Automated    Sedimentation rate    Collection Time: 08/17/22  8:00 AM   Result Value Ref Range    Sed Rate 50 (H) 0 - 23 mm/Hr   C-Reactive Protein    Collection Time: 08/17/22  8:00 AM   Result Value Ref Range    CRP 24.9 (H) 0.0 - 8.2 mg/L   POCT glucose    Collection Time: 08/17/22 11:26 AM   Result Value Ref Range    POCT Glucose 153 (H) 70 - 110 mg/dL        Lab Results   Component Value Date    [id number] Negative 08/06/2022       Recent Labs   Lab 08/15/22  0723 08/16/22  0333 08/17/22  0800   WBC 3.48* 3.67* 3.11*   LYMPH 21.0  0.7* 22.6  0.8* 26.0  0.8*   HGB 8.0* 7.6* 7.4*   HCT 24.3* 23.7* 22.8*    172 169       Recent Labs   Lab 08/15/22  0723 08/16/22  0333 08/17/22  0800    136 140   K 4.8 4.5 4.6    104 107   CO2 20* 23 22*   BUN 62* 60* 60*   CREATININE 8.5* 7.8* 7.4*    99 127*   CALCIUM 9.0 8.8 8.9   MG 2.1 2.1 2.0   PHOS 5.8* 5.8* 5.5*       Recent Labs   Lab 08/15/22  0723 08/16/22  0333 08/17/22  0800   ALKPHOS 71 66 67   ALT <5* <5* <5*   AST 15 14 16   ALBUMIN 2.2* 2.2* 2.3*   PROT 6.3 6.0 6.3   BILITOT 0.2 0.3 0.3          Recent Labs     08/15/22  0723 08/17/22  0800   CRP 24.1* 24.9*   CPK 13*  --           All labs within the last 24 hours were reviewed.     Microbiology:  Microbiology Results (last 7 days)       Procedure Component Value Units Date/Time    Blood culture [[id number]]     Order Status: Sent Specimen: Blood     Blood culture [[id number]]     Order Status: Sent Specimen: Blood     Fungus culture [[id number]] Collected: 07/29/22 1251    Order Status: Completed Specimen: Abscess from Leg, Left Updated: 08/17/22 0904     Fungus (Mycology) Culture Culture in progress      No fungus isolated after 2 weeks    Narrative:      Left thigh abscess #1    Fungus culture [[id number]] Collected: 07/29/22 1300    Order Status: Completed Specimen: Abscess from Leg, Left Updated: 08/17/22 0904     Fungus (Mycology) Culture Culture in progress      No fungus isolated after 2 weeks    Narrative:      Left thigh abscess #2    Fungus culture [[id number]] Collected: 07/28/22 1800    Order Status: Completed Specimen: Abscess from Leg, Left Updated: 08/17/22 0904     Fungus (Mycology) Culture Culture in progress      No fungus isolated after 2 weeks    Narrative:      LEFT THIGH ABSCESS    Aerobic culture [[id number]]  (Abnormal)  (Susceptibility) Collected: 08/06/22 1153    Order Status: Completed Specimen: Wound from Hip, Left Updated: 08/15/22 1002     Aerobic Bacterial Culture STAPHYLOCOCCUS AUREUS  Few      Narrative:      #2 Left hip fluid    Aerobic culture [[id number]]  (Abnormal) Collected: 08/06/22 1153    Order Status: Completed Specimen: Wound from Hip, Left Updated: 08/10/22 1214     Aerobic Bacterial Culture STAPHYLOCOCCUS AUREUS  Moderate  For susceptibility see order #[id number]      Narrative:      #1 Left hip fluid              Imaging  ECG Results              EKG 12-lead (Final result)  Result time 07/27/22 07:53:05      Final result by Interface, Lab In [facility] (07/27/22 07:53:05)                   Narrative:    Test Reason : R00.0,    Vent. Rate : 138 BPM     Atrial Rate : 138 BPM     P-R Int : 126 ms           QRS Dur : 086 ms      QT Int : 286 ms       P-R-T Axes : 051 002 052 degrees     QTc Int : 433 ms    Sinus tachycardia  Otherwise normal ECG  No previous ECGs available  Confirmed by Crow HARRIS MD ([id number]) on 7/27/2022 7:53:00 AM    Referred By: CRISTIAN   SELF           Confirmed By:Crow HARRIS MD                                    Results for orders placed during the hospital encounter of 07/26/22    Echo    Interpretation Summary  · The left ventricle is normal in size with normal systolic function.  · The estimated ejection fraction is 65%.  · Normal left ventricular diastolic function.  · Normal right ventricular size with normal right ventricular systolic function.  · The estimated PA systolic pressure is 16 mmHg.  · Normal central venous pressure (3 mmHg).      Echo  · The left ventricle is normal in size with normal systolic function.  · The estimated ejection fraction is 65%.  · Normal left ventricular diastolic function.  · Normal right ventricular size with normal right ventricular systolic   function.  · The estimated PA systolic pressure is 16 mmHg.  · Normal central venous pressure (3 mmHg).         All imaging within the last 24 hours was reviewed.       Discharge Planning   FILEMON: 8/19/2022     Code Status: Full Code   Is the patient medically ready for discharge?: No    Reason for patient still in hospital (select all that apply): Patient trending condition, Laboratory test, Treatment, Consult recommendations, and Pending disposition  Discharge Plan A: Home Health            Assessment/Plan:      * Abscess of left thigh  - Afebrile, no leukocytosis  - Continue Ancef  - S/p OR with Ortho 8/6 for repeat washout  - Intra-op cultures with S.aureus: changed to Ancef for MSSA then daptomycin on 8/15; CPK 11  - ID following  - PRN analgesics provided  - Continuing to monitor; maintain drain for now - Ortho continues to monitor    Sepsis  -patient with fever to 102 and tachycardia on 8/17 - repeat blood  cultures, UA, COVID swab pending  -CXR without infiltrate, lactate nl, procalcitonin mildly elevated (due to ANGE)  -remains on dapto for MSSA abscess to thigh  -eosinophils nl and no leukocytosis  -ID and ortho notified of change  -minimize IVF due to improving ANGE with signs of fluid retention      Pain and swelling of left wrist  - Tapped by Ortho, fluid reviewed: does not appear to be gout or septic arthritis, likely pseudogout (calcium pyrophosphate crystals on previous LE aspiration)  - PRN analgesics provided  -improved after steroid injection    Anemia of chronic disease  - H/H slowly declining  - transfuse for Hgb < 7  - continue to monitor     Hematoma of left thigh  - See Abscess of L thigh    ANGE (acute kidney injury)  Estimated Creatinine Clearance: 13.9 mL/min (A) (based on SCr of 7.4 mg/dL (H)). - improved  - Hyperphosphatemia also noted - start binder  - Continues to make urine  - Nephrology following: no acute indication for HD at this time  - Renally dosing all medications  - Avoid nephrotoxins  - nephrology has cleared for discharge once Cr < 7      Hyperlipidemia associated with type 2 diabetes mellitus  - Continue home statin     Class 2 severe obesity due to excess calories with serious comorbidity and body mass index (BMI) of 36.0 to 36.9 in adult  - Body mass index is 36.01 kg/m².  - Needs dietary and lifestyle modifications in relation to health    Hypertension associated with type 2 diabetes mellitus  - Continue home regimen of amlodipine; lisinopril held due to poor renal function  - continue to monitor and further titrate antihypertensives as clinically indicated   -increase amlodipine to BID on 8/13 for elevated BP; avoid hypotension with ANGE    Type 2 diabetes mellitus with hyperglycemia, with long-term current use of insulin  - DKA has resolved  - Endocrinology consulted and managing.      VTE Risk Mitigation (From admission, onward)         Ordered     heparin (porcine) injection 5,000  Units  Every 8 hours         08/07/22 1010     Place sequential compression device  Until discontinued         08/06/22 0855     Place sequential compression device  Until discontinued         07/29/22 0158     IP VTE HIGH RISK PATIENT  Once         07/29/22 0158     Place SAVANNAH hose  Until discontinued         07/26/22 2240     Place sequential compression device  Until discontinued         07/26/22 2240              High Risk Conditions:    Patient has a condition that poses threat to life and bodily function: Acute Renal Failure and sepsis      I have assessed these findings virtually using a telemed platform and with assistance of the bedside nurse.      The attending portion of this evaluation, treatment, and documentation was performed per Mervat Johnson MD via Telemedicine AudioVisual using the secure [employer] software platform with 2 way audio/video. The provider was located off-site and the patient is located in the hospital. The aforementioned video software was utilized to document the relevant history and physical exam     This patient does not meet criteria for [facility] medicine service at this time due to having patient has a condition likely to benefit from in-depth physical exam due to sepsis of unknown origin being treated for MSSA abscess. Will hand back to In-house service..      Mervat Johnson MD  Department of Hospital Medicine   [facility] - Telemetry Stepdown

## 2022-08-17 NOTE — ASSESSMENT & PLAN NOTE
Estimated Creatinine Clearance: 13.2 mL/min (A) (based on SCr of 7.8 mg/dL (H)). - improved  - Hyperphosphatemia also noted - start binder  - Continues to make urine  - Nephrology following: no acute indication for HD at this time  - Renally dosing all medications  - Avoid nephrotoxins  - nephrology has cleared for discharge once Cr < 7

## 2022-08-17 NOTE — PROGRESS NOTES
"      [facility] - Telemetry [facility] Medicine  Telemedicine Progress Note    Patient Name: Wilfredo Thomas  MRN: [id number]  Patient Class: IP- Inpatient   Admission Date: 7/26/2022  Length of Stay: 21 days  Attending Physician: Mervat Johnson MD  Primary Care Provider: Aylin Wayne DO          Subjective:     Principal Problem:Abscess of left thigh        HPI:  48 y/o M hx of HTN, IDDM2, GERD obesity, HLD presented to the ED with complaint of 1 day of worsening SOB. Wife present at bedside. Patient states that he noticed yesterday he was feeling some shortness of breath and couldn't seem to catch his breath. His wife went to check on him this morning and noticed that he was breathing "fast and heavy" and he sounded like he was slurring his words. Wife was concerned about a stroke, so he brought him to the ED for evaluation.     Patient also reporting significant pain and swelling along his left lateral proximal thigh. He states he has noticed worsening pain over the past 2 weeks. He denies any acute trauma to the area. He was seen by ortho last week and was told to take ibu-profen for a muscle strain. This did not help his pain, so he presented again on 7/22 and was given a prednisone injection into his thigh. This also did not help his pain, and now the pain and swelling have advanced to the point that he has difficulty with ambulation. Patient denies fever, chills, nausea, vomiting.      Of note, patient has hx of poorly controlled diabetes. He was recently started on insulin by his PCP, but he has not taken any insulin since being prescribed it.      In the ED, patient hypertensive and tachycardic to the 130s. Tachypneic with RR in the 40s. On CBC, WBC 26.9, On CMP patient hyperkalemic to 5.5, CorNa 136, Bicarb 5. Cr elevated to 1.9 from BL 0.83. UA, BHB pending at time of admission. CTA without evidence of pulmonary embolism. Critical Care Medicine consulted given severe acidosis.       "       Overview/Hospital Course:    Patient admitted to the MICU for management of severe DKA. Metabolic acidosis improving on insulin drip. He also has had left thigh pain for 1-2 months. There is a large mass on his left thigh that is tender to palpation, CT revealed hematoma vs soft tissue growth. General surgery consulted and recommended IR consult. IR consulted, no indication for tap.   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 Ortho consulted and performed bedside aspiration of possible abscess which revealed >200K cells >80% segs. Will hold off on antibiotics for now per ortho recs, ortho is taking patient to the OR for irrigation of the thigh, will start antibiotics afterwards.         7/29 Metabolic acidosis slowly improving. Ortho taking patient to the OR for irrigation of the left thigh, will start antibiotics afterwards.   7/30 Transfer to hospital medicine . S/p I&D  of left thigh Abscess on7/29/22 - MRI - uptured tensor fascia jose luis (TFL) muscle with associated large hematoma. gram stain -Moderate Gram positive cocci in clusters .cultures pending.  continue Vancomycin, clindamycin and Zosyn . Bicarb 15. K replaced . off insulin drip on SQ insulin.  7/31 ID and endocrine consulted.  abscess with Calcium pyrophosphate crystals with unknown significance potassium and P replaced. Bicarbonate WNL . aerobic culture 7/28 STAPHYLOCOCCUS SPECIES . Glucose in 300s .PRN imodium  for diarrhea. PT/OT consulted - WBAT. Surgical drain with purulent output  8/1 PICC team consulted for IV access. vanc trough at 11.4. monitor for vanomycin toxicity. possible OR tomorrow for washout  may need bone biopsy to r/o osteomyelitis.Discontinued clindamycin.   8/2 glucose in 300s.  Increased Levemir  to 14 units BID and Aspart  10 units TIDWM. K replaced   8/3 ANGE with cr 0.7--> 2.7. likely vanc induced ATN with levels 40. urine studies ordered renal sonogram WNL   nephrology consulted. started on NS 100ml/h x 10h and follow up renal panel.Zosyn and  vancomycin discontinued. Strict IO monitor. condom catheter . switched to Ancef for MSSA on culture  Interval History:  8/4- MSSA- see below. /85 VSS. On room air, eating, BM on 8/2, good UO. Appreciate ortho recs. On Cephazolin, detim 14 bid and aspart 12 ac.  8/5-Cr still rising, cr =6.  Wrist still hurting. Ortho to tap it to eval for infection and gout today. /90   Pulse 85  AF, no other signs of infection.  Will discuss w Nephrology- received NS x one liter yesterday. Will repeat today. Suspect auto-diuresis.   8/6: washout with ortho today. Cr continues to rise (7.1), nephrology following  8/7: Cr 7.4, phos increasing; tolerated surgery well yesterday, intra-op cultures pending  8/8: Cr increased to 8.1, hyperphosphatemia again noted; continues to make urine, no acute indication for HD per nephrology. Intra-op cultures with S.aureus, continued cefazolin pending final C&S results.        This encounter was provided through telemedicine.  Patient was transferred to the telemedicine service on:  08/9/2022   The [facility]: [facility] admitted 7/26/2022  8:24 PM.  Present with the patient at the time of the telemed/virtual assessment: Telepresenter    Interval History/Overnight Events:   Clinical record since admit reviewed.  Patient able to provide history.    Drain output 60 cc in 24 hours; pain unchanged to left thigh; patient ambulatory;  Cr improved to 7.8      Review of Systems   Constitutional:  Positive for fatigue. Negative for activity change and fever.   Respiratory:  Negative for cough and shortness of breath.    Cardiovascular:  Positive for leg swelling. Negative for chest pain.   Gastrointestinal:  Negative for diarrhea and vomiting.   Musculoskeletal:  Positive for myalgias.      Inpatient Medications:  Scheduled Meds:   atorvastatin  20 mg Oral Daily    DAPTOmycin (CUBICIN) IV (PEDS and  ADULTS)  6 mg/kg Intravenous Q48H    heparin (porcine)  5,000 Units Subcutaneous Q8H    hydrALAZINE  25 mg Oral BID    And    isosorbide dinitrate  10 mg Oral BID    insulin aspart U-100  8 Units Subcutaneous TIDWM    insulin detemir U-100  10 Units Subcutaneous BID    [START ON 8/17/2022] NIFEdipine  60 mg Oral Daily    senna-docusate 8.6-50 mg  2 tablet Oral Daily    sevelamer carbonate  800 mg Oral TID WM     Continuous Infusions:  PRN Meds:.acetaminophen, sars-cov-2 (covid-19), dextrose 10%, dextrose 10%, glucagon (human recombinant), glucose, glucose, hydrALAZINE, insulin aspart U-100, morphine, ondansetron, oxyCODONE, sodium chloride 0.9%      Objective:     Temp:  [97.9 °F (36.6 °C)-99.2 °F (37.3 °C)] 98.2 °F (36.8 °C)  Pulse:  [84-95] 93  Resp:  [16-18] 18  SpO2:  [94 %-100 %] 97 %  BP: (130-181)/() 175/98      Intake/Output Summary (Last 24 hours) at 8/16/2022 2156  Last data filed at 8/16/2022 1700  Gross per 24 hour   Intake 1040 ml   Output 2700 ml   Net -1660 ml          Body mass index is 36.01 kg/m².    Physical Exam  Vitals and nursing note reviewed.   Constitutional:       General: He is not in acute distress.     Appearance: Normal appearance. He is normal weight. He is not ill-appearing.   HENT:      Head: Normocephalic and atraumatic.      Right Ear: Hearing normal.      Left Ear: Hearing normal.      Nose: Nose normal.   Eyes:      General: No scleral icterus.        Right eye: No discharge.         Left eye: No discharge.      Extraocular Movements: Extraocular movements intact.   Cardiovascular:      Rate and Rhythm: Normal rate.   Pulmonary:      Effort: Pulmonary effort is normal. No accessory muscle usage or respiratory distress.   Musculoskeletal:      Right lower leg: Edema present.      Left lower leg: Edema present.      Comments: Dressing to left thigh with drain (serosanguineous drainage)   Neurological:      General: No focal deficit present.      Mental Status: He is  alert and oriented to person, place, and time.      Cranial Nerves: No cranial nerve deficit.      Motor: No weakness.   Psychiatric:         Attention and Perception: Attention normal.         Mood and Affect: Mood normal.         Speech: Speech normal.         Behavior: Behavior is cooperative.        Labs:  Recent Results (from the past 24 hour(s))   Phosphorus    Collection Time: 08/16/22  3:33 AM   Result Value Ref Range    Phosphorus 5.8 (H) 2.7 - 4.5 mg/dL   Magnesium    Collection Time: 08/16/22  3:33 AM   Result Value Ref Range    Magnesium 2.1 1.6 - 2.6 mg/dL   Comprehensive Metabolic Panel    Collection Time: 08/16/22  3:33 AM   Result Value Ref Range    Sodium 136 136 - 145 mmol/L    Potassium 4.5 3.5 - 5.1 mmol/L    Chloride 104 95 - 110 mmol/L    CO2 23 23 - 29 mmol/L    Glucose 99 70 - 110 mg/dL    BUN 60 (H) 6 - 20 mg/dL    Creatinine 7.8 (H) 0.5 - 1.4 mg/dL    Calcium 8.8 8.7 - 10.5 mg/dL    Total Protein 6.0 6.0 - 8.4 g/dL    Albumin 2.2 (L) 3.5 - 5.2 g/dL    Total Bilirubin 0.3 0.1 - 1.0 mg/dL    Alkaline Phosphatase 66 55 - 135 U/L    AST 14 10 - 40 U/L    ALT <5 (L) 10 - 44 U/L    Anion Gap 9 8 - 16 mmol/L    eGFR 7.8 (A) >60 mL/min/1.73 m^2   CBC Auto Differential    Collection Time: 08/16/22  3:33 AM   Result Value Ref Range    WBC 3.67 (L) 3.90 - 12.70 K/uL    RBC 2.47 (L) 4.60 - 6.20 M/uL    Hemoglobin 7.6 (L) 14.0 - 18.0 g/dL    Hematocrit 23.7 (L) 40.0 - 54.0 %    MCV 96 82 - 98 fL    MCH 30.8 27.0 - 31.0 pg    MCHC 32.1 32.0 - 36.0 g/dL    RDW 13.0 11.5 - 14.5 %    Platelets 172 150 - 450 K/uL    MPV 9.6 9.2 - 12.9 fL    Immature Granulocytes 1.1 (H) 0.0 - 0.5 %    Gran # (ANC) 1.8 1.8 - 7.7 K/uL    Immature Grans (Abs) 0.04 0.00 - 0.04 K/uL    Lymph # 0.8 (L) 1.0 - 4.8 K/uL    Mono # 0.4 0.3 - 1.0 K/uL    Eos # 0.6 (H) 0.0 - 0.5 K/uL    Baso # 0.02 0.00 - 0.20 K/uL    nRBC 0 0 /100 WBC    Gran % 49.9 38.0 - 73.0 %    Lymph % 22.6 18.0 - 48.0 %    Mono % 10.1 4.0 - 15.0 %    Eosinophil %  15.8 (H) 0.0 - 8.0 %    Basophil % 0.5 0.0 - 1.9 %    Differential Method Automated    POCT glucose    Collection Time: 08/16/22  7:06 AM   Result Value Ref Range    POCT Glucose 119 (H) 70 - 110 mg/dL   POCT glucose    Collection Time: 08/16/22 11:34 AM   Result Value Ref Range    POCT Glucose 137 (H) 70 - 110 mg/dL   POCT glucose    Collection Time: 08/16/22  3:24 PM   Result Value Ref Range    POCT Glucose 104 70 - 110 mg/dL   POCT glucose    Collection Time: 08/16/22  9:14 PM   Result Value Ref Range    POCT Glucose 220 (H) 70 - 110 mg/dL        Lab Results   Component Value Date    [id number] Negative 08/06/2022       Recent Labs   Lab 08/14/22  0426 08/15/22  0723 08/16/22  0333   WBC 3.74* 3.48* 3.67*   LYMPH 19.8  0.7* 21.0  0.7* 22.6  0.8*   HGB 8.2* 8.0* 7.6*   HCT 25.2* 24.3* 23.7*    174 172       Recent Labs   Lab 08/14/22  0426 08/15/22  0723 08/16/22  0333    139 136   K 4.8 4.8 4.5    106 104   CO2 22* 20* 23   BUN 64* 62* 60*   CREATININE 8.9* 8.5* 7.8*   * 102 99   CALCIUM 8.8 9.0 8.8   MG 2.1 2.1 2.1   PHOS 6.2* 5.8* 5.8*       Recent Labs   Lab 08/14/22  0426 08/15/22  0723 08/16/22  0333   ALKPHOS 84 71 66   ALT <5* <5* <5*   AST 14 15 14   ALBUMIN 2.1* 2.2* 2.2*   PROT 6.1 6.3 6.0   BILITOT 0.2 0.2 0.3          Recent Labs     08/14/22  0426 08/15/22  0723   FERRITIN 674*  --    CRP  --  24.1*   CPK  --  13*         All labs within the last 24 hours were reviewed.     Microbiology:  Microbiology Results (last 7 days)       Procedure Component Value Units Date/Time    Aerobic culture [[id number]]  (Abnormal)  (Susceptibility) Collected: 08/06/22 1153    Order Status: Completed Specimen: Wound from Hip, Left Updated: 08/15/22 1002     Aerobic Bacterial Culture STAPHYLOCOCCUS AUREUS  Few      Narrative:      #2 Left hip fluid    Aerobic culture [[id number]]  (Abnormal) Collected: 08/06/22 1153    Order Status: Completed Specimen: Wound from Hip, Left Updated: 08/10/22  1214     Aerobic Bacterial Culture STAPHYLOCOCCUS AUREUS  Moderate  For susceptibility see order #[id number]      Narrative:      #1 Left hip fluid    Culture, Anaerobe [[id number]] Collected: 08/06/22 1153    Order Status: Completed Specimen: Body Fluid from Hip, Left Updated: 08/10/22 1035     Anaerobic Culture No anaerobes isolated    Narrative:      #2 Left hip fluid    Culture, Anaerobe [[id number]] Collected: 08/06/22 1153    Order Status: Completed Specimen: Body Fluid from Hip, Left Updated: 08/10/22 1034     Anaerobic Culture No anaerobes isolated    Narrative:      #1 Left hip fluid    Blood culture [[id number]] Collected: 08/05/22 0850    Order Status: Completed Specimen: Blood from Antecubital, Right Arm Updated: 08/10/22 1012     Blood Culture, Routine No growth after 5 days.    Blood culture [[id number]] Collected: 08/05/22 0852    Order Status: Completed Specimen: Blood from Peripheral, Right Hand Updated: 08/10/22 1012     Blood Culture, Routine No growth after 5 days.              Imaging  ECG Results              EKG 12-lead (Final result)  Result time 07/27/22 07:53:05      Final result by Interface, Lab In [facility] (07/27/22 07:53:05)                   Narrative:    Test Reason : R00.0,    Vent. Rate : 138 BPM     Atrial Rate : 138 BPM     P-R Int : 126 ms          QRS Dur : 086 ms      QT Int : 286 ms       P-R-T Axes : 051 002 052 degrees     QTc Int : 433 ms    Sinus tachycardia  Otherwise normal ECG  No previous ECGs available  Confirmed by Crow HARRIS MD ([id number]) on 7/27/2022 7:53:00 AM    Referred By: [email or web address]   SELF           Confirmed By:Crow HARRIS MD                                    Results for orders placed during the hospital encounter of 07/26/22    Echo    Interpretation Summary  · The left ventricle is normal in size with normal systolic function.  · The estimated ejection fraction is 65%.  · Normal left ventricular diastolic function.  · Normal right ventricular size with normal right  ventricular systolic function.  · The estimated PA systolic pressure is 16 mmHg.  · Normal central venous pressure (3 mmHg).      Echo  · The left ventricle is normal in size with normal systolic function.  · The estimated ejection fraction is 65%.  · Normal left ventricular diastolic function.  · Normal right ventricular size with normal right ventricular systolic   function.  · The estimated PA systolic pressure is 16 mmHg.  · Normal central venous pressure (3 mmHg).         All imaging within the last 24 hours was reviewed.       Discharge Planning   FILEMON: 8/19/2022     Code Status: Full Code   Is the patient medically ready for discharge?: No    Reason for patient still in hospital (select all that apply): Patient trending condition, Laboratory test, Treatment, Consult recommendations, and Pending disposition  Discharge Plan A: Home Health            Assessment/Plan:      * Abscess of left thigh  - Afebrile, no leukocytosis  - Continue Ancef  - S/p OR with Ortho 8/6 for repeat washout  - Intra-op cultures with S.aureus: changed to Ancef for MSSA then daptomycin on 8/15  - ID following  - PRN analgesics provided  - Continuing to monitor; maintain drain for now - Ortho continues to monitor    Pain and swelling of left wrist  - Tapped by Ortho, fluid reviewed: does not appear to be gout or septic arthritis, likely pseudogout (calcium pyrophosphate crystals on previous LE aspiration)  - PRN analgesics provided  -improved after steroid injection    Anemia of chronic disease  - H/H slowly declining  - transfuse for Hgb < 7  - continue to monitor     Hematoma of left thigh  - See Abscess of L thigh    ANGE (acute kidney injury)  Estimated Creatinine Clearance: 13.2 mL/min (A) (based on SCr of 7.8 mg/dL (H)). - improved  - Hyperphosphatemia also noted - start binder  - Continues to make urine  - Nephrology following: no acute indication for HD at this time  - Renally dosing all medications  - Avoid nephrotoxins  -  nephrology has cleared for discharge once Cr < 7      Hyperlipidemia associated with type 2 diabetes mellitus  - Continue home statin     Class 2 severe obesity due to excess calories with serious comorbidity and body mass index (BMI) of 36.0 to 36.9 in adult  - Body mass index is 36.01 kg/m².  - Needs dietary and lifestyle modifications in relation to health    Hypertension associated with type 2 diabetes mellitus  - Continue home regimen of amlodipine; lisinopril held due to poor renal function  - continue to monitor and further titrate antihypertensives as clinically indicated   -increase amlodipine to BID on 8/13 for elevated BP; avoid hypotension with ANGE    Type 2 diabetes mellitus with hyperglycemia, with long-term current use of insulin  - DKA has resolved  - Endocrinology consulted and managing.      VTE Risk Mitigation (From admission, onward)         Ordered     heparin (porcine) injection 5,000 Units  Every 8 hours         08/07/22 1010     Place sequential compression device  Until discontinued         08/06/22 0855     Place sequential compression device  Until discontinued         07/29/22 0158     IP VTE HIGH RISK PATIENT  Once         07/29/22 0158     Place SAVANNAH hose  Until discontinued         07/26/22 2240     Place sequential compression device  Until discontinued         07/26/22 2240              High Risk Conditions:  Patient has a condition that poses threat to life and bodily function: Acute Renal Failure    I have assessed these findings virtually using a telemed platform and with assistance of the bedside nurse.      The attending portion of this evaluation, treatment, and documentation was performed per Mervat Johnson MD via Telemedicine AudioVisual using the secure [employer] software platform with 2 way audio/video. The provider was located off-site and the patient is located in the hospital. The aforementioned video software was utilized to document the relevant history and physical  exam    Mervat Johnson MD  Department of Hospital Medicine   [facility] - Telemetry Stepdown

## 2022-08-18 PROBLEM — R50.9 FEVER: Status: ACTIVE | Noted: 2022-08-18

## 2022-08-18 LAB
ALBUMIN SERPL BCP-MCNC: 2.3 G/DL (ref 3.5–5.2)
ALP SERPL-CCNC: 70 U/L (ref 55–135)
ALT SERPL W/O P-5'-P-CCNC: <5 U/L (ref 10–44)
ANION GAP SERPL CALC-SCNC: 13 MMOL/L (ref 8–16)
AST SERPL-CCNC: 32 U/L (ref 10–40)
BASOPHILS # BLD AUTO: 0.03 K/UL (ref 0–0.2)
BASOPHILS NFR BLD: 0.4 % (ref 0–1.9)
BILIRUB SERPL-MCNC: 0.5 MG/DL (ref 0.1–1)
BUN SERPL-MCNC: 57 MG/DL (ref 6–20)
CALCIUM SERPL-MCNC: 8.1 MG/DL (ref 8.7–10.5)
CHLORIDE SERPL-SCNC: 104 MMOL/L (ref 95–110)
CO2 SERPL-SCNC: 20 MMOL/L (ref 23–29)
CREAT SERPL-MCNC: 7 MG/DL (ref 0.5–1.4)
DIFFERENTIAL METHOD: ABNORMAL
EOSINOPHIL # BLD AUTO: 0.1 K/UL (ref 0–0.5)
EOSINOPHIL NFR BLD: 0.7 % (ref 0–8)
ERYTHROCYTE [DISTWIDTH] IN BLOOD BY AUTOMATED COUNT: 13.3 % (ref 11.5–14.5)
EST. GFR  (NO RACE VARIABLE): 8.9 ML/MIN/1.73 M^2
GLUCOSE SERPL-MCNC: 170 MG/DL (ref 70–110)
HCT VFR BLD AUTO: 22.1 % (ref 40–54)
HGB BLD-MCNC: 7.3 G/DL (ref 14–18)
IMM GRANULOCYTES # BLD AUTO: 0.04 K/UL (ref 0–0.04)
IMM GRANULOCYTES NFR BLD AUTO: 0.6 % (ref 0–0.5)
LYMPHOCYTES # BLD AUTO: 0.4 K/UL (ref 1–4.8)
LYMPHOCYTES NFR BLD: 5.5 % (ref 18–48)
MAGNESIUM SERPL-MCNC: 1.7 MG/DL (ref 1.6–2.6)
MCH RBC QN AUTO: 30 PG (ref 27–31)
MCHC RBC AUTO-ENTMCNC: 33 G/DL (ref 32–36)
MCV RBC AUTO: 91 FL (ref 82–98)
MONOCYTES # BLD AUTO: 0.3 K/UL (ref 0.3–1)
MONOCYTES NFR BLD: 4 % (ref 4–15)
NEUTROPHILS # BLD AUTO: 6.2 K/UL (ref 1.8–7.7)
NEUTROPHILS NFR BLD: 88.8 % (ref 38–73)
NRBC BLD-RTO: 0 /100 WBC
PHOSPHATE SERPL-MCNC: 4.2 MG/DL (ref 2.7–4.5)
PLATELET # BLD AUTO: 170 K/UL (ref 150–450)
PMV BLD AUTO: 10.3 FL (ref 9.2–12.9)
POCT GLUCOSE: 109 MG/DL (ref 70–110)
POCT GLUCOSE: 136 MG/DL (ref 70–110)
POCT GLUCOSE: 159 MG/DL (ref 70–110)
POCT GLUCOSE: 178 MG/DL (ref 70–110)
POCT GLUCOSE: 191 MG/DL (ref 70–110)
POTASSIUM SERPL-SCNC: 4.9 MMOL/L (ref 3.5–5.1)
PROT SERPL-MCNC: 5.7 G/DL (ref 6–8.4)
RBC # BLD AUTO: 2.43 M/UL (ref 4.6–6.2)
SODIUM SERPL-SCNC: 137 MMOL/L (ref 136–145)
WBC # BLD AUTO: 6.94 K/UL (ref 3.9–12.7)

## 2022-08-18 PROCEDURE — 25000003 PHARM REV CODE 250: Performed by: HOSPITALIST

## 2022-08-18 PROCEDURE — 97116 GAIT TRAINING THERAPY: CPT | Mod: CQ

## 2022-08-18 PROCEDURE — 99233 SBSQ HOSP IP/OBS HIGH 50: CPT | Mod: ,,, | Performed by: HOSPITALIST

## 2022-08-18 PROCEDURE — 63600175 PHARM REV CODE 636 W HCPCS: Performed by: STUDENT IN AN ORGANIZED HEALTH CARE EDUCATION/TRAINING PROGRAM

## 2022-08-18 PROCEDURE — 80053 COMPREHEN METABOLIC PANEL: CPT | Performed by: STUDENT IN AN ORGANIZED HEALTH CARE EDUCATION/TRAINING PROGRAM

## 2022-08-18 PROCEDURE — 99233 SBSQ HOSP IP/OBS HIGH 50: CPT | Mod: ,,, | Performed by: NURSE PRACTITIONER

## 2022-08-18 PROCEDURE — 85025 COMPLETE CBC W/AUTO DIFF WBC: CPT | Performed by: STUDENT IN AN ORGANIZED HEALTH CARE EDUCATION/TRAINING PROGRAM

## 2022-08-18 PROCEDURE — 84100 ASSAY OF PHOSPHORUS: CPT | Performed by: STUDENT IN AN ORGANIZED HEALTH CARE EDUCATION/TRAINING PROGRAM

## 2022-08-18 PROCEDURE — 25000003 PHARM REV CODE 250: Performed by: STUDENT IN AN ORGANIZED HEALTH CARE EDUCATION/TRAINING PROGRAM

## 2022-08-18 PROCEDURE — 20600001 HC STEP DOWN PRIVATE ROOM

## 2022-08-18 PROCEDURE — 99233 PR SUBSEQUENT HOSPITAL CARE,LEVL III: ICD-10-PCS | Mod: ,,, | Performed by: NURSE PRACTITIONER

## 2022-08-18 PROCEDURE — 36415 COLL VENOUS BLD VENIPUNCTURE: CPT | Performed by: STUDENT IN AN ORGANIZED HEALTH CARE EDUCATION/TRAINING PROGRAM

## 2022-08-18 PROCEDURE — 83735 ASSAY OF MAGNESIUM: CPT | Performed by: STUDENT IN AN ORGANIZED HEALTH CARE EDUCATION/TRAINING PROGRAM

## 2022-08-18 PROCEDURE — 25000003 PHARM REV CODE 250

## 2022-08-18 PROCEDURE — 99233 PR SUBSEQUENT HOSPITAL CARE,LEVL III: ICD-10-PCS | Mod: ,,, | Performed by: HOSPITALIST

## 2022-08-18 PROCEDURE — 25000003 PHARM REV CODE 250: Performed by: INTERNAL MEDICINE

## 2022-08-18 RX ADMIN — HEPARIN SODIUM 5000 UNITS: 5000 INJECTION INTRAVENOUS; SUBCUTANEOUS at 01:08

## 2022-08-18 RX ADMIN — OXYCODONE 5 MG: 5 TABLET ORAL at 06:08

## 2022-08-18 RX ADMIN — OXYCODONE 5 MG: 5 TABLET ORAL at 09:08

## 2022-08-18 RX ADMIN — HEPARIN SODIUM 5000 UNITS: 5000 INJECTION INTRAVENOUS; SUBCUTANEOUS at 06:08

## 2022-08-18 RX ADMIN — HEPARIN SODIUM 5000 UNITS: 5000 INJECTION INTRAVENOUS; SUBCUTANEOUS at 09:08

## 2022-08-18 RX ADMIN — AMLODIPINE BESYLATE 5 MG: 5 TABLET ORAL at 09:08

## 2022-08-18 RX ADMIN — INSULIN ASPART 2 UNITS: 100 INJECTION, SOLUTION INTRAVENOUS; SUBCUTANEOUS at 11:08

## 2022-08-18 RX ADMIN — INSULIN DETEMIR 10 UNITS: 100 INJECTION, SOLUTION SUBCUTANEOUS at 08:08

## 2022-08-18 RX ADMIN — SENNOSIDES AND DOCUSATE SODIUM 2 TABLET: 50; 8.6 TABLET ORAL at 08:08

## 2022-08-18 RX ADMIN — HYDRALAZINE HYDROCHLORIDE 25 MG: 25 TABLET, FILM COATED ORAL at 09:08

## 2022-08-18 RX ADMIN — SEVELAMER CARBONATE 800 MG: 800 TABLET, FILM COATED ORAL at 11:08

## 2022-08-18 RX ADMIN — INSULIN ASPART 8 UNITS: 100 INJECTION, SOLUTION INTRAVENOUS; SUBCUTANEOUS at 11:08

## 2022-08-18 RX ADMIN — OXYCODONE 5 MG: 5 TABLET ORAL at 01:08

## 2022-08-18 RX ADMIN — SEVELAMER CARBONATE 800 MG: 800 TABLET, FILM COATED ORAL at 05:08

## 2022-08-18 RX ADMIN — ATORVASTATIN CALCIUM 20 MG: 20 TABLET, FILM COATED ORAL at 08:08

## 2022-08-18 RX ADMIN — ACETAMINOPHEN 1000 MG: 500 TABLET ORAL at 07:08

## 2022-08-18 RX ADMIN — INSULIN DETEMIR 10 UNITS: 100 INJECTION, SOLUTION SUBCUTANEOUS at 09:08

## 2022-08-18 NOTE — ASSESSMENT & PLAN NOTE
- DKA has resolved  - Endocrinology consulted and managing.  Recent Labs     08/16/22  1524 08/16/22  2114 08/17/22  0725 08/17/22  1126 08/17/22  1536 08/18/22  0752   POCTGLUCOSE 104 220* 137* 153* 99 191*

## 2022-08-18 NOTE — PROGRESS NOTES
[facility] - Telemetry Stepdown  Orthopedics  Progress Note    Patient Name: Wilfredo Thomas  MRN: [id number]  Admission Date: 7/26/2022  Hospital Length of Stay: 22 days  Attending Provider: Mervat Johnson MD  Primary Care Provider: Aylin Wayne DO  Follow-up For: Procedure(s) (LRB):  Incision and Drainage - LEFT THIGH. (Left)    Post-Operative Day: 11 Days Post-Op  Subjective:     Principal Problem:Abscess of left thigh    Principal Orthopedic Problem: Left thigh I&D 7/29 and 8/6  Staph species on thigh abscess cultures    Interval History: S/p repeat L thigh I&D 8/6/22. Pt's drain output last 24 hrs 60cc (same as prior 24 hr). Drain output has been at plateau after declining over the weekend. He was feeling well, walking the halls this am, but then developed fever 101-102 degrees, tachycardia (-132), and acute onset chills. SpO2 93-95%, CXR negative. ESR/CRP/CK all steady. Cr slightly improving. UA negative. Lactate negative. Covid test and blood cultures pending.       Review of patient's allergies indicates:  No Known Allergies    Current Facility-Administered Medications   Medication    acetaminophen tablet 1,000 mg    amLODIPine tablet 5 mg    atorvastatin tablet 20 mg    COVID-19 vac, forrest(Pfizer)(PF) (Pfizer COVID-19) 30 mcg/0.3 mL injection 0.3 mL    DAPTOmycin (CUBICIN) 625 mg in sodium chloride 0.9% 50 mL IVPB    dextrose 10% bolus 125 mL    dextrose 10% bolus 250 mL    glucagon (human recombinant) injection 1 mg    glucose chewable tablet 16 g    glucose chewable tablet 24 g    heparin (porcine) injection 5,000 Units    hydrALAZINE tablet 25 mg    hydrALAZINE tablet 25 mg    insulin aspart U-100 pen 1-10 Units    insulin aspart U-100 pen 8 Units    insulin detemir U-100 pen 10 Units    morphine injection 3 mg    ondansetron injection 4 mg    oxyCODONE immediate release tablet 5 mg    senna-docusate 8.6-50 mg per tablet 2 tablet    sevelamer carbonate tablet 800 mg    sodium  "chloride 0.9% flush 10 mL     Objective:     Vital Signs (Most Recent):  Temp: 99.9 °F (37.7 °C) (08/17/22 1537)  Pulse: (!) 117 (08/17/22 1537)  Resp: 18 (08/17/22 1633)  BP: 117/66 (08/17/22 1537)  SpO2: (!) 93 % (08/17/22 1537) Vital Signs (24h Range):  Temp:  [98.2 °F (36.8 °C)-102 °F (38.9 °C)] 99.9 °F (37.7 °C)  Pulse:  [] 117  Resp:  [17-22] 18  SpO2:  [93 %-97 %] 93 %  BP: (117-178)/() 117/66     Weight: 104.3 kg (229 lb 15 oz)  Height: 5' 7" (170.2 cm)  Body mass index is 36.01 kg/m².      Intake/Output Summary (Last 24 hours) at 8/17/2022 1702  Last data filed at 8/17/2022 1500  Gross per 24 hour   Intake --   Output 3005 ml   Net -3005 ml         Ortho/SPM Exam  Left lower extremity  Left thigh dressing cdi  Drain in place, ss output  No erythema of thigh, no signficant swelling  Compartments soft and compressible  Painless ROM of hip and knee  NVI        CBC:   Recent Labs   Lab 08/16/22  0333 08/17/22  0800   WBC 3.67* 3.11*   HGB 7.6* 7.4*   HCT 23.7* 22.8*    169       CMP:   Recent Labs   Lab 08/16/22  0333 08/17/22  0800    140   K 4.5 4.6    107   CO2 23 22*   GLU 99 127*   BUN 60* 60*   CREATININE 7.8* 7.4*   CALCIUM 8.8 8.9   PROT 6.0 6.3   ALBUMIN 2.2* 2.3*   BILITOT 0.3 0.3   ALKPHOS 66 67   AST 14 16   ALT <5* <5*   ANIONGAP 9 11         All pertinent labs within the past 24 hours have been reviewed.    Significant Imaging: I have reviewed and interpreted all pertinent imaging results/findings.      Assessment/Plan:     * Abscess of left thigh  47M with DM admitted 7/26/22 for DKA, ortho following for left lateral thigh abscess. s/p I&D and drain placement left thigh 7/29/22. Cx +MSSA. Had high drain purulent output after first I&D, was taken back to OR 8/6/22 for repeat I&D. Drain in place, dressings CDI. Nephrology on board for ANGE.     -- ANGE nephrology managing   -- DVT SQH  -- Daptomycin per ID (possible adverse reaction to beta lactam)  -- Pain MM  -- Drain " output 60cc last 24 hours, will monitor  -- Evaluate for additional source  -- Continue trending ESR/CRP  -- Encourage ambulation    Dispo: Observe drain, tight BG control, manage ANGE          Aron Vieyra MD  Orthopedics  [facility] - Telemetry Stepdown

## 2022-08-18 NOTE — SUBJECTIVE & OBJECTIVE
Interval History:   Continues to have fever - 100.5 this morning, but fever curve trending down.  Feels well.  Reports ambulating in halls. Minimal pain  LE US negative for DVT  CXR clear . O2 Sats 91-93%  Lactate negative   COVID negative  U/A negative   Repeat blood cx NGTD  WBC 6.9  Drain output continues to decrease - no warmth or TTP to left thigh.     Review of Systems   Constitutional:  Negative for chills, diaphoresis, fatigue and fever.   Respiratory:  Negative for cough and shortness of breath.    Cardiovascular:  Positive for leg swelling (upper left thigh). Negative for chest pain and palpitations.   Gastrointestinal:  Negative for abdominal distention and abdominal pain.   Genitourinary:  Negative for difficulty urinating and dysuria.   Musculoskeletal:  Positive for arthralgias (left wrist) and myalgias (upper left thigh). Negative for joint swelling.   Skin:  Positive for wound (upper left thigh). Negative for color change and rash.   Allergic/Immunologic: Negative for immunocompromised state.   Neurological:  Negative for dizziness, tremors, weakness, numbness and headaches.   Psychiatric/Behavioral:  Negative for agitation and decreased concentration. The patient is not nervous/anxious.    Objective:     Vital Signs (Most Recent):  Temp: 98.6 °F (37 °C) (08/18/22 0853)  Pulse: (!) 111 (08/18/22 0750)  Resp: 18 (08/18/22 0750)  BP: 118/60 (08/18/22 0750)  SpO2: (!) 93 % (08/18/22 0750)   Vital Signs (24h Range):  Temp:  [98.6 °F (37 °C)-102 °F (38.9 °C)] 98.6 °F (37 °C)  Pulse:  [102-132] 111  Resp:  [17-22] 18  SpO2:  [91 %-97 %] 93 %  BP: (114-178)/() 118/60     Weight: 104.3 kg (229 lb 15 oz)  Body mass index is 36.01 kg/m².    Estimated Creatinine Clearance: 14.7 mL/min (A) (based on SCr of 7 mg/dL (H)).    Physical Exam  Vitals and nursing note reviewed.   Constitutional:       General: He is not in acute distress.     Appearance: Normal appearance. He is well-developed. He is obese. He is  not ill-appearing, toxic-appearing or diaphoretic.   HENT:      Head: Normocephalic and atraumatic.      Nose: Nose normal.      Mouth/Throat:      Dentition: Does not have dentures.      Pharynx: No oropharyngeal exudate.   Eyes:      General: No scleral icterus.     Conjunctiva/sclera: Conjunctivae normal.   Cardiovascular:      Rate and Rhythm: Normal rate and regular rhythm.   Pulmonary:      Effort: Pulmonary effort is normal. No respiratory distress.      Breath sounds: Normal breath sounds.   Abdominal:      General: There is no distension.      Palpations: Abdomen is soft.      Tenderness: There is no abdominal tenderness.   Musculoskeletal:         General: No swelling.   Skin:     General: Skin is warm and dry.      Findings: No erythema or rash.      Comments: Incision to left thigh. Dressed, CDI. Surgical drain with dark bloody output.   Neurological:      General: No focal deficit present.      Mental Status: He is alert and oriented to person, place, and time. Mental status is at baseline.      Motor: No weakness.      Gait: Gait normal.   Psychiatric:         Mood and Affect: Mood normal.         Behavior: Behavior normal.         Thought Content: Thought content normal.         Judgment: Judgment normal.       Significant Labs: CBC:   Recent Labs   Lab 08/17/22  0800 08/18/22  0537   WBC 3.11* 6.94   HGB 7.4* 7.3*   HCT 22.8* 22.1*    170       CMP:   Recent Labs   Lab 08/17/22  0800 08/18/22  0537    137   K 4.6 4.9    104   CO2 22* 20*   * 170*   BUN 60* 57*   CREATININE 7.4* 7.0*   CALCIUM 8.9 8.1*   PROT 6.3 5.7*   ALBUMIN 2.3* 2.3*   BILITOT 0.3 0.5   ALKPHOS 67 70   AST 16 32   ALT <5* <5*   ANIONGAP 11 13       Microbiology Results (last 7 days)       Procedure Component Value Units Date/Time    Blood culture [[id number]] Collected: 08/17/22 1231    Order Status: Completed Specimen: Blood Updated: 08/17/22 1945     Blood Culture, Routine No Growth to date    Blood  culture [[id number]] Collected: 08/17/22 1223    Order Status: Completed Specimen: Blood Updated: 08/17/22 1945     Blood Culture, Routine No Growth to date    Blood culture [[id number]]     Order Status: Canceled Specimen: Blood     Blood culture [[id number]]     Order Status: Canceled Specimen: Blood     Fungus culture [[id number]] Collected: 07/29/22 1251    Order Status: Completed Specimen: Abscess from Leg, Left Updated: 08/17/22 0904     Fungus (Mycology) Culture Culture in progress      No fungus isolated after 2 weeks    Narrative:      Left thigh abscess #1    Fungus culture [[id number]] Collected: 07/29/22 1300    Order Status: Completed Specimen: Abscess from Leg, Left Updated: 08/17/22 0904     Fungus (Mycology) Culture Culture in progress      No fungus isolated after 2 weeks    Narrative:      Left thigh abscess #2    Fungus culture [[id number]] Collected: 07/28/22 1800    Order Status: Completed Specimen: Abscess from Leg, Left Updated: 08/17/22 0904     Fungus (Mycology) Culture Culture in progress      No fungus isolated after 2 weeks    Narrative:      LEFT THIGH ABSCESS    Aerobic culture [[id number]]  (Abnormal)  (Susceptibility) Collected: 08/06/22 1153    Order Status: Completed Specimen: Wound from Hip, Left Updated: 08/15/22 1002     Aerobic Bacterial Culture STAPHYLOCOCCUS AUREUS  Few      Narrative:      #2 Left hip fluid          Recent Lab Results  (Last 5 results in the past 24 hours)        08/18/22  0752   08/18/22  0537   08/17/22  1536   08/17/22  1457   08/17/22  1436        Albumin   2.3             Alkaline Phosphatase   70             ALT   <5             Anion Gap   13             Appearance, UA         Clear       AST   32             Bacteria, UA         Rare       Baso #   0.03             Basophil %   0.4             Bilirubin (UA)         Negative       BILIRUBIN TOTAL   0.5  Comment: For infants and newborns, interpretation of results should be based  on gestational age, weight  and in agreement with clinical  observations.    Premature Infant recommended reference ranges:  Up to 24 hours.............<8.0 mg/dL  Up to 48 hours............<12.0 mg/dL  3-5 days..................<15.0 mg/dL  6-29 days.................<15.0 mg/dL               BUN   57             Calcium   8.1             Chloride   104             CO2   20             Color, UA         Colorless       CPK               Creatinine   7.0             Differential Method   Automated             eGFR   8.9             Eos #   0.1             Eosinophil %   0.7             Glucose   170             Glucose, UA         1+       Gran # (ANC)   6.2             Gran %   88.8             Hematocrit   22.1             Hemoglobin   7.3             Hyaline Casts, UA         0       Immature Grans (Abs)   0.04  Comment: Mild elevation in immature granulocytes is non specific and   can be seen in a variety of conditions including stress response,   acute inflammation, trauma and pregnancy. Correlation with other   laboratory and clinical findings is essential.               Immature Granulocytes   0.6             Ketones, UA         Negative       Leukocytes, UA         Negative       Lymph #   0.4             Lymph %   5.5             Magnesium   1.7             MCH   30.0             MCHC   33.0             MCV   91             Microscopic Comment         SEE COMMENT  Comment: Other formed elements not mentioned in the report are not   present in the microscopic examination.          Mono #   0.3             Mono %   4.0             MPV   10.3             NITRITE UA         Negative       nRBC   0             Occult Blood UA         Negative       pH, UA         7.0       Phosphorus   4.2             Platelets   170             POCT Glucose 191     99           Potassium   4.9             PROTEIN TOTAL   5.7             Protein, UA         1+  Comment: Recommend a 24 hour urine protein or a urine   protein/creatinine ratio if globulin  induced proteinuria is  clinically suspected.         RBC   2.43             RBC, UA         2       RDW   13.3             SARS-CoV2 (COVID-19) Qualitative PCR       Not Detected  Comment: This test utilizes a real-time reverse transcription  polymerase chain reaction procedure to amplify and   detect the SARS-CoV-2 and detect the SARS-CoV-2 N2 and E nucleic  acid targets. The analytical sensitivity (limit of detection) of   this assay is 250 copies/mL.    A Detected result implies that the patient is infected with the  SARS-CoV-2 virus and is presumed to be contagious.    A Not Detected result implies that the SARS-CoV-2 target nucleic  acids are not present above the limit of detection.  It does not  rule out the possibility of COVID-19 and should not be the sole  basis for treatment decisions. If COVID-19 is strongly suspected  based on clinical and epidemiological history, re-testing should  be considered.    This test is only for use under Food and Drug   Administration s Emergency Use Authorization (EUA).   Commercial reagents are provided by [email or web address].  Performance characteristics of the EUA have been   independently verified by [facility]   Department of Pathology and Laboratory Medicine.             Sodium   137             Specific [place], UA         1.010       Specimen UA         Urine, Clean Catch       WBC, UA         3       WBC   6.94                                    Significant Imaging:     Imaging Results              CT Thigh With Contrast Left (Final result)  Result time 07/27/22 01:13:09      Final result by Jori Hopkins DO (07/27/22 01:13:09)                   Impression:      Large heterogeneous fluid collection in the anterolateral left proximal thigh soft tissues, concerning for a soft tissue hematoma or Preston-Melissa lesion.  A neoplastic process is not entirely excluded.  Recommend close interval follow-up to assess for stability/resolution.      Electronically signed  by: Jori Hopkins  Date:    07/27/2022  Time:    01:13               Narrative:    EXAMINATION:  CT THIGH WITH CONTRAST LEFT    CLINICAL HISTORY:  Soft tissue mass, thigh, deep;    TECHNIQUE:  Axial CT images of the left thigh with sagittal and coronal reformats after the intravenous administration of 50 mL Omnipaque 350.    COMPARISON:  None.    FINDINGS:  Bone: Bone mineralization is normal.  There is no evidence of an acute fracture or dislocation.  Alignment is normal.    Soft tissues: There is a large heterogeneous fluid collection in the left anterolateral proximal thigh measuring approximately 16 x 7 x 6 cm.  The collection appears to be centered within the subcutaneous tissues or superficial fascia and abuts the gluteus musculature, the sartorius muscle, the rectus femoris, and the vastus lateralis.  There is soft tissue edema in the surrounding subcutaneous tissues.  Muscle bulk is normal.    Articulations: The left femoroacetabular joint is unremarkable.  The pubic symphysis is unremarkable.  The left knee is unremarkable.  No large joint effusion or significant cartilage loss.    Miscellaneous: Neurovascular structures are grossly intact.  There is moderate calcified atherosclerosis of the left femoral and popliteal arteries.                                       CTA Chest Non-Coronary (PE Study) (Final result)  Result time 07/26/22 21:36:01      Final result by Jori Hopkins DO (07/26/22 21:36:01)                   Impression:      No large central or lobar pulmonary embolism.      Electronically signed by: Jori Hopkins  Date:    07/26/2022  Time:    21:36               Narrative:    EXAMINATION:  CTA CHEST NON CORONARY    CLINICAL HISTORY:  Pulmonary embolism (PE) suspected, high prob;    TECHNIQUE:  Low dose axial images, sagittal and coronal reformations were obtained from the thoracic inlet to the lung bases following the IV administration of 100 mL of Omnipaque 350.  Contrast timing was  optimized to evaluate the pulmonary arteries.  Maximum intensity projection images were provided for review.    COMPARISON:  Chest radiograph from earlier the same date.    FINDINGS:  Pulmonary vasculature: Satisfactory opacification of the pulmonary arterial system.  Motion artifact significantly limits evaluation of the segmental and subsegmental pulmonary arteries.  There is no large central or lobar pulmonary embolism.    Aorta: Left-sided aortic arch.  No aneurysm and no significant atherosclerosis.    Base of Neck: No significant abnormality.    Thoracic soft tissues: Normal.    Heart: Normal size. No effusion.    Amena/Mediastinum: No pathologic russ enlargement.    Airways: The large airways are patent. No foci of endobronchial filling.    Lungs/Pleura: Clear lungs. No pleural effusion or thickening.    Esophagus: Normal.    Upper Abdomen: No abnormality of the partially imaged upper abdomen.    Bones: No acute fracture. No suspicious lytic or sclerotic lesions.                                       X-Ray Chest 1 View (Final result)  Result time 07/26/22 21:23:42   Procedure changed from X-Ray Chest PA And Lateral     Final result by Olman Saucedo MD (07/26/22 21:23:42)                   Impression:      No convincing radiographic evidence of acute intrathoracic process on this single view..      Electronically signed by: Olman Saucedo MD  Date:    07/26/2022  Time:    21:23               Narrative:    EXAMINATION:  XR CHEST 1 VIEW    CLINICAL HISTORY:  shortness of breath;    TECHNIQUE:  Single frontal view of the chest was performed.    COMPARISON:  None    FINDINGS:  Cardiac monitoring leads overlie the chest.  Cardiac silhouette appears within normal limits.  No confluent airspace consolidation identified.  No significant volume of pleural fluid or pneumothorax appreciated.  The visualized osseous structures demonstrate mild degenerative changes.

## 2022-08-18 NOTE — ASSESSMENT & PLAN NOTE
Uncertain etiology  On 8/17, improved for now, on ancef  Still tachycardia  Wbc 6  Blood cult 8/17 - NGSF  drain output improving  covid negative.   cxr negative 8/17  UA negative 8/17  Will chat w Ortho regarding further plans for debridement

## 2022-08-18 NOTE — PLAN OF CARE
AAO x 4. RA. No distress. Tele monitor battery replaced. IV site intact. Reports pain to left wrist and left thigh. Febrile, 100.5 tylenol given once. Drain to left thigh draining tan drainage with red clot streaks. BLE edema. Transported to ultrasound @2030. Wife at bed side. No episodes of chills overnight. Bed locked and lowered. Call light in reach.      Problem: Diabetic Ketoacidosis  Goal: Fluid and Electrolyte Balance with Absence of Ketosis  Outcome: Ongoing, Progressing     Problem: Adult Inpatient Plan of Care  Goal: Plan of Care Review  Outcome: Ongoing, Progressing  Goal: Absence of Hospital-Acquired Illness or Injury  Outcome: Ongoing, Progressing     Problem: Fluid and Electrolyte Imbalance (Acute Kidney Injury/Impairment)  Goal: Fluid and Electrolyte Balance  Outcome: Ongoing, Progressing

## 2022-08-18 NOTE — ASSESSMENT & PLAN NOTE
- Afebrile, no leukocytosis  - Continue Ancef  - S/p OR with Ortho 8/6 for repeat washout  - Intra-op cultures with S.aureus: changed to Ancef for MSSA then daptomycin on 8/15; CPK 11  - ID following  - PRN analgesics provided  - Continuing to monitor; maintain drain for now - Ortho continues to monitor  8/18- drain output 35 cc

## 2022-08-18 NOTE — ASSESSMENT & PLAN NOTE
47 year old with DMII and HTN admitted for DKA and left thigh abscess.      MRI of left femur notable for ruptured tensor fascia jose luis mm with associated large hematoma, as well as small area of potential osteonecrosis.  Bedside aspiration of the fluid collection was notable for a cell count of >200k (>80%segs), cultures + staph species. Underwent I&D on 7/29 and 8/6.  Surgical cultures are positive for MSSA.  Per ortho surgery, no concerns for bone involvement. Initially on Vancomycin and Zosyn, course complicated by ANGE.   Vancomycin discontinue and switched to cefazolin on Wednesday Aug 3rd but Emanuel's stain + and continued deterioration in renal fx with concern for beta lactam related AIN, so cefazolin stopped and Daptomycin started for MSSA     Hospital course complicated by acute onset left wrist pain.  History of wrist arthroscopy with hardware placement 3/2022.   S/p joint aspiration, white count 2050k, 88% segs. No crystals noted.Cultures no growth.    Received steroid shot per primary team which pt noted improvement.  Xray of wrist with no acute concerns.      ANGE improving.  Creatinine down to 7 today. Followed by Nephrology.  Autoimmune workup ordered, pending.      Fever to 102 yesterday, t max 100.5 today. Unclear source. Repeat blood cx pending, NGTD.  BLE US negative for DVT.  U/A negative. CXR negative.  COVID negative.  Minimal drainage drom left thigh drain; no increase in pain or swelling or erythema.  2D echo negative. Inflammatory markers stable.  No significant complaints, ambulating in halls.     Recommendations:   · Continue Daptomycin 6mg/kg/dose q48 hours (renally dosed) for MSSA SSTI.  · Follow up blood cultures and trend fever curve   · Will follow up tomorrow with further recommendations.  Anticipate either completion of 14 days IV abx from 8/6 for SSTI or possible transition to orals.   · Continued management of ANGE by Nephrology    Data reviewed and plan discussed with ID staff,  Dr. Graves

## 2022-08-18 NOTE — ASSESSMENT & PLAN NOTE
Estimated Creatinine Clearance: 14.7 mL/min (A) (based on SCr of 7 mg/dL (H)). - improved  - Hyperphosphatemia also noted - start binder  - Continues to make urine  - Nephrology following: no acute indication for HD at this time  - Renally dosing all medications  - Avoid nephrotoxins  - nephrology has cleared for discharge once Cr < 7

## 2022-08-18 NOTE — PT/OT/SLP PROGRESS
Physical Therapy Treatment    Patient Name:  Wilfredo Thomas   MRN:  [id number]    Recommendations:     Discharge Recommendations:  outpatient PT   Discharge Equipment Recommendations: shower chair, cane, straight   Barriers to discharge: decreased functional mobility, fall risk and decreased caregiver support    Assessment:     Wilfredo Thomas is a 47 y.o. male admitted with a medical diagnosis of Abscess of left thigh.  He presents with the following impairments/functional limitations:   (impaired balance; impaired endurance; impaired functional mobility; pain; decreased lower extremity function) . presents with  improved overall functional mobility requiring decreased assistance and increased activity tolerance to perform functional mobility. Pt with no LOb with gait with SC. Pt would continue to benefit from skilled PT to address overall functional mobility, to return to functional baseline and goals. Goals remain appropriate.      Rehab Prognosis: Good; patient would benefit from acute skilled PT services to address these deficits and reach maximum level of function.    Recent Surgery: Procedure(s) (LRB):  Incision and Drainage - LEFT THIGH. (Left) 12 Days Post-Op    Plan:     During this hospitalization, patient to be seen 3 x/week to address the identified rehab impairments via gait training, therapeutic activities, therapeutic exercises, neuromuscular re-education and progress toward the following goals:    · Plan of Care Expires:  08/26/22    Subjective     Chief Complaint: I had a fever yesterday but today I am feeling better  Pain/Comfort:  · Pain Rating 1:  (not rated)  · Location - Side 1: Left  · Location - Orientation 1: lateral  · Location 1: thigh  · Pain Addressed 1: Reposition, Distraction      Objective:     Communicated with RN prior to session.  Patient found sitting edge of bed with telemetry upon PT entry to room.     General Precautions: Standard, fall   Orthopedic Precautions:LLE weight  bearing as tolerated   Braces: N/A  Respiratory Status: Room air     Functional Mobility:  · Transfers:     · Sit to Stand:  supervision with no AD  Gait: 300 ft  with SC with S for safety  with no LOB  · Pt demo  decreased gait speed, occasional unsteady gait. mask donned for out of room ambulation    AM-PAC 6 CLICK MOBILITY  Turning over in bed (including adjusting bedclothes, sheets and blankets)?: 4  Sitting down on and standing up from a chair with arms (e.g., wheelchair, bedside commode, etc.): 4  Moving from lying on back to sitting on the side of the bed?: 4  Moving to and from a bed to a chair (including a wheelchair)?: 4  Need to walk in hospital room?: 3  Climbing 3-5 steps with a railing?: 3  Basic Mobility Total Score: 22       Therapeutic Activities and Exercises:   Therapist provided instruction and educated of  patient on progress, safety,d/c,PT POC,   proper body mechanics, energy conservation, and fall prevention strategies during tasks listed above, on the effects of prolonged immobility and the importance of performing OOB activity and exercises to promote healing and reduce recovery time     Updated white board with appropriate PT mobility information for medical team notification      Donned an extra gown  Call nursing/pct to transfer to chair/use bathroom. Pt stated understanding     Bedside table in front of patient and area set up for function, convenience, and safety. RN aware of patient's mobility needs and status. Questions/concerns addressed within PTA scope of practice; patient  with no further questions. Time was provided for active listening, discussion of health disposition, and discussion of safe discharge. Pt?verbalized?agreement     Patient left sitting edge of bed with all lines intact, call button in reach, nsg notified and family present..    GOALS:   Multidisciplinary Problems     Physical Therapy Goals        Problem: Physical Therapy    Goal Priority Disciplines Outcome Goal  Variances Interventions   Physical Therapy Goal     PT, PT/OT Ongoing, Progressing     Description: Goals to be met by: 22     Patient will increase functional independence with mobility by performin. Supine to sit with [place]  2. Sit to supine with [place]  3. Sit to stand transfer with [place]  4. Gait  x >150 feet with Contact Guard Assistance using Rolling Walker. - Met   Ambulate 400' with supervision assistance with no AD.   5. Lower extremity exercise program x 20 reps per handout, with independence                       Time Tracking:     PT Received On: 22  PT Start Time: 1140     PT Stop Time: 1150  PT Total Time (min): 10 min     Billable Minutes: Gait Training 10    Treatment Type: Treatment  PT/PTA: PTA     PTA Visit Number: 1     2022

## 2022-08-18 NOTE — ASSESSMENT & PLAN NOTE
Pseudogout  - Tapped by Ortho, fluid reviewed: does not appear to be gout or septic arthritis, likely pseudogout (calcium pyrophosphate crystals on previous LE aspiration)  - PRN analgesics provided  -improved after steroid injection

## 2022-08-18 NOTE — ASSESSMENT & PLAN NOTE
- Continue home regimen of amlodipine; lisinopril held due to poor renal function  - continue to monitor and further titrate antihypertensives as clinically indicated   -increase amlodipine to BID on 8/13 for elevated BP; avoid hypotension with ANGE  8/18- /60

## 2022-08-18 NOTE — PROGRESS NOTES
"[facility] - Telemetry [facility] Medicine  Progress Note    Patient Name: Wilfredo Thomas  MRN: [id number]  Patient Class: IP- Inpatient   Admission Date: 7/26/2022  Length of Stay: 23 days  Attending Physician: Dorota Reynoso MD  Primary Care Provider: Aylin Wayne DO        Subjective:     Principal Problem:Abscess of left thigh        HPI:  48 y/o M hx of HTN, IDDM2, GERD obesity, HLD presented to the ED with complaint of 1 day of worsening SOB. Wife present at bedside. Patient states that he noticed yesterday he was feeling some shortness of breath and couldn't seem to catch his breath. His wife went to check on him this morning and noticed that he was breathing "fast and heavy" and he sounded like he was slurring his words. Wife was concerned about a stroke, so he brought him to the ED for evaluation.     Patient also reporting significant pain and swelling along his left lateral proximal thigh. He states he has noticed worsening pain over the past 2 weeks. He denies any acute trauma to the area. He was seen by ortho last week and was told to take ibu-profen for a muscle strain. This did not help his pain, so he presented again on 7/22 and was given a prednisone injection into his thigh. This also did not help his pain, and now the pain and swelling have advanced to the point that he has difficulty with ambulation. Patient denies fever, chills, nausea, vomiting.      Of note, patient has hx of poorly controlled diabetes. He was recently started on insulin by his PCP, but he has not taken any insulin since being prescribed it.      In the ED, patient hypertensive and tachycardic to the 130s. Tachypneic with RR in the 40s. On CBC, WBC 26.9, On CMP patient hyperkalemic to 5.5, CorNa 136, Bicarb 5. Cr elevated to 1.9 from BL 0.83. UA, BHB pending at time of admission. CTA without evidence of pulmonary embolism. Critical Care Medicine consulted given severe acidosis.             Overview/Hospital " Course:    Patient admitted to the MICU for management of severe DKA. Metabolic acidosis improving on insulin drip. He also has had left thigh pain for 1-2 months. There is a large mass on his left thigh that is tender to palpation, CT revealed hematoma vs soft tissue growth. General surgery consulted and recommended IR consult. IR consulted, no indication for tap.   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 Ortho consulted and performed bedside aspiration of possible abscess which revealed >200K cells >80% segs. Will hold off on antibiotics for now per ortho recs, ortho is taking patient to the OR for irrigation of the thigh, will start antibiotics afterwards.         7/29 Metabolic acidosis slowly improving. Ortho taking patient to the OR for irrigation of the left thigh, will start antibiotics afterwards.   7/30 Transfer to hospital medicine . S/p I&D  of left thigh Abscess on7/29/22 - MRI - uptured tensor fascia jose luis (TFL) muscle with associated large hematoma. gram stain -Moderate Gram positive cocci in clusters .cultures pending.  continue Vancomycin, clindamycin and Zosyn . Bicarb 15. K replaced . off insulin drip on SQ insulin.  7/31 ID and endocrine consulted.  abscess with Calcium pyrophosphate crystals with unknown significance potassium and P replaced. Bicarbonate WNL . aerobic culture 7/28 STAPHYLOCOCCUS SPECIES . Glucose in 300s .PRN imodium  for diarrhea. PT/OT consulted - WBAT. Surgical drain with purulent output  8/1 PICC team consulted for IV access. vanc trough at 11.4. monitor for vanomycin toxicity. possible OR tomorrow for washout  may need bone biopsy to r/o osteomyelitis.Discontinued clindamycin.   8/2 glucose in 300s.  Increased Levemir  to 14 units BID and Aspart  10 units TIDWM. K replaced   8/3 ANGE with cr 0.7--> 2.7. likely vanc induced ATN with levels 40. urine studies ordered renal sonogram WNL  nephrology consulted.  started on NS 100ml/h x 10h and follow up renal panel.Zosyn and  vancomycin discontinued. Strict IO monitor. condom catheter . switched to Ancef for MSSA on culture  Interval History:  8/4- MSSA- see below. /85 VSS. On room air, eating, BM on 8/2, good UO. Appreciate ortho recs. On Cephazolin, detim 14 bid and aspart 12 ac.  8/5-Cr still rising, cr =6.  Wrist still hurting. Ortho to tap it to eval for infection and gout today. /90   Pulse 85  AF, no other signs of infection.  Will discuss w Nephrology- received NS x one liter yesterday. Will repeat today. Suspect auto-diuresis.   8/6: washout with ortho today. Cr continues to rise (7.1), nephrology following  8/7: Cr 7.4, phos increasing; tolerated surgery well yesterday, intra-op cultures pending  8/8: Cr increased to 8.1, hyperphosphatemia again noted; continues to make urine, no acute indication for HD per nephrology. Intra-op cultures with S.aureus, continued cefazolin pending final C&S results.   Interval History:   8/18- 47M with DM admitted 7/26/22 for DKA, ortho following for left lateral thigh abscess. s/p I&D and drain placement left thigh 7/29/22. Cx +MSSA. Had high drain purulent output after first I&D, was taken back to OR 8/6/22 for repeat I&D. Drain in place, dressings CDI. Nephrology on board for ANGE.   pt transferred from [facility], had fever yesterday,  -> 90.  Wbc -ok. Covid negative. Drain output paula to 35 cc. Pt on Ancef.   No urine output, cr rising, Nephrology following, no indication for HD presently. Ortho may need to repeat debridement.          Interval History: see above    Review of Systems   Constitutional:  Negative for activity change, appetite change, chills, fatigue and fever.   HENT:  Negative for congestion, facial swelling, nosebleeds, sore throat and trouble swallowing.    Respiratory:  Negative for apnea, cough, choking, chest tightness, shortness of breath, wheezing and stridor.    Cardiovascular:   Negative for chest pain and leg swelling.   Gastrointestinal:  Negative for abdominal distention, abdominal pain, blood in stool, constipation, diarrhea, nausea and vomiting.   Genitourinary:  Negative for difficulty urinating, dysuria, flank pain, frequency, hematuria and urgency.   Musculoskeletal:  Negative for arthralgias, back pain, gait problem, neck pain and neck stiffness.   Skin:  Negative for color change, rash and wound.   Neurological:  Negative for dizziness, tremors, seizures, syncope, facial asymmetry, speech difficulty, weakness, light-headedness, numbness and headaches.   Psychiatric/Behavioral:  Negative for agitation, behavioral problems, confusion, decreased concentration, dysphoric mood, hallucinations, self-injury and sleep disturbance. The patient is not nervous/anxious and is not hyperactive.    Objective:     Vital Signs (Most Recent):  Temp: 98.6 °F (37 °C) (08/18/22 0853)  Pulse: (!) 111 (08/18/22 0750)  Resp: 18 (08/18/22 0750)  BP: 118/60 (08/18/22 0750)  SpO2: (!) 93 % (08/18/22 0750)   Vital Signs (24h Range):  Temp:  [98.6 °F (37 °C)-102 °F (38.9 °C)] 98.6 °F (37 °C)  Pulse:  [102-132] 111  Resp:  [17-22] 18  SpO2:  [91 %-97 %] 93 %  BP: (114-178)/() 118/60     Weight: 104.3 kg (229 lb 15 oz)  Body mass index is 36.01 kg/m².    Intake/Output Summary (Last 24 hours) at 8/18/2022 0919  Last data filed at 8/18/2022 0853  Gross per 24 hour   Intake --   Output 2385 ml   Net -2385 ml        Physical Exam  Constitutional:       General: He is not in acute distress.     Appearance: Normal appearance. He is not ill-appearing, toxic-appearing or diaphoretic.   HENT:      Head: Normocephalic and atraumatic.      Nose: Nose normal.      Mouth/Throat:      Mouth: Mucous membranes are moist.      Pharynx: Oropharynx is clear.   Eyes:      General: No scleral icterus.     Extraocular Movements: Extraocular movements intact.      Conjunctiva/sclera: Conjunctivae normal.      Pupils: Pupils are  equal, round, and reactive to light.   Cardiovascular:      Rate and Rhythm: Normal rate and regular rhythm.      Pulses: Normal pulses.      Heart sounds: Normal heart sounds.   Pulmonary:      Effort: Pulmonary effort is normal. No respiratory distress.      Breath sounds: Normal breath sounds. No stridor. No wheezing, rhonchi or rales.   Chest:      Chest wall: No tenderness.   Abdominal:      General: Abdomen is flat. Bowel sounds are normal. There is no distension.      Palpations: Abdomen is soft.      Tenderness: There is no abdominal tenderness. There is no right CVA tenderness, guarding or rebound.   Musculoskeletal:         General: Swelling and tenderness present. No deformity or signs of injury. Normal range of motion.      Cervical back: Normal range of motion and neck supple. No rigidity or tenderness.      Right lower leg: No edema.      Left lower leg: No edema.      Comments: Left lat thigh is bandaged   Skin:     General: Skin is warm and dry.      Coloration: Skin is not jaundiced.      Findings: No erythema or rash.   Neurological:      General: No focal deficit present.      Mental Status: He is alert and oriented to person, place, and time. Mental status is at baseline.      Motor: No weakness.   Psychiatric:         Mood and Affect: Mood normal.         Behavior: Behavior normal.         Thought Content: Thought content normal.         Judgment: Judgment normal.       Significant Labs: All pertinent labs within the past 24 hours have been reviewed.  CBC:   Recent Labs   Lab 08/17/22  0800 08/18/22  0537   WBC 3.11* 6.94   HGB 7.4* 7.3*   HCT 22.8* 22.1*    170       CMP:   Recent Labs   Lab 08/17/22  0800 08/18/22  0537    137   K 4.6 4.9    104   CO2 22* 20*   * 170*   BUN 60* 57*   CREATININE 7.4* 7.0*   CALCIUM 8.9 8.1*   PROT 6.3 5.7*   ALBUMIN 2.3* 2.3*   BILITOT 0.3 0.5   ALKPHOS 67 70   AST 16 32   ALT <5* <5*   ANIONGAP 11 13         Significant Imaging: I  have reviewed all pertinent imaging results/findings within the past 24 hours.      Assessment/Plan:      * Abscess of left thigh  - Afebrile, no leukocytosis  - Continue Ancef  - S/p OR with Ortho 8/6 for repeat washout  - Intra-op cultures with S.aureus: changed to Ancef for MSSA then daptomycin on 8/15; CPK 11  - ID following  - PRN analgesics provided  - Continuing to monitor; maintain drain for now - Ortho continues to monitor  8/18- drain output 35 cc    ANGE (acute kidney injury)  Estimated Creatinine Clearance: 14.7 mL/min (A) (based on SCr of 7 mg/dL (H)). - improved  - Hyperphosphatemia also noted - start binder  - Continues to make urine  - Nephrology following: no acute indication for HD at this time  - Renally dosing all medications  - Avoid nephrotoxins  - nephrology has cleared for discharge once Cr < 7      Fever  Uncertain etiology  On 8/17, improved for now, on ancef  Still tachycardia  Wbc 6  Blood cult 8/17 - NGSF  drain output improving  covid negative.   cxr negative 8/17  UA negative 8/17  Will chat w Ortho regarding further plans for debridement      Pain and swelling of left wrist  Pseudogout  - Tapped by Ortho, fluid reviewed: does not appear to be gout or septic arthritis, likely pseudogout (calcium pyrophosphate crystals on previous LE aspiration)  - PRN analgesics provided  -improved after steroid injection    Sepsis  -patient with fever to 102 and tachycardia on 8/17 - repeat blood cultures, UA, COVID swab pending  -CXR without infiltrate, lactate nl, procalcitonin mildly elevated (due to ANGE)  -remains on dapto for MSSA abscess to thigh  -eosinophils nl and no leukocytosis  -ID and ortho notified of change  -minimize IVF due to improving ANGE with signs of fluid retention  8/18- on acef      Type 2 diabetes mellitus with hyperglycemia, with long-term current use of insulin  - DKA has resolved  - Endocrinology consulted and managing.  Recent Labs     08/16/22  1524 08/16/22  0953  08/17/22  0725 08/17/22  1126 08/17/22  1536 08/18/22  0752   POCTGLUCOSE 104 220* 137* 153* 99 191*         Hypertension associated with type 2 diabetes mellitus  - Continue home regimen of amlodipine; lisinopril held due to poor renal function  - continue to monitor and further titrate antihypertensives as clinically indicated   -increase amlodipine to BID on 8/13 for elevated BP; avoid hypotension with ANGE  8/18- /60      Hematoma of left thigh  - See Abscess of L thigh    Anemia of chronic disease  - H/H slowly declining  - transfuse for Hgb < 7  - continue to monitor     Class 2 severe obesity due to excess calories with serious comorbidity and body mass index (BMI) of 36.0 to 36.9 in adult  - Body mass index is 36.01 kg/m².  - Needs dietary and lifestyle modifications in relation to health    Hyperlipidemia associated with type 2 diabetes mellitus  - Continue home statin       VTE Risk Mitigation (From admission, onward)         Ordered     heparin (porcine) injection 5,000 Units  Every 8 hours         08/07/22 1010     Place sequential compression device  Until discontinued         08/06/22 0855     Place sequential compression device  Until discontinued         07/29/22 0158     IP VTE HIGH RISK PATIENT  Once         07/29/22 0158     Place SAVANNAH hose  Until discontinued         07/26/22 2240     Place sequential compression device  Until discontinued         07/26/22 2240                Discharge Planning   FILEMON: 8/19/2022     Code Status: Full Code   Is the patient medically ready for discharge?: No    Reason for patient still in hospital (select all that apply): Patient trending condition  Discharge Plan A: Home Health          Dorota Reynoso MD  Department of Hospital Medicine   [facility] - Telemetry Stepdown

## 2022-08-18 NOTE — ASSESSMENT & PLAN NOTE
47M with DM admitted 7/26/22 for DKA, ortho following for left lateral thigh abscess. s/p I&D and drain placement left thigh 7/29/22. Cx +MSSA. Had high drain purulent output after first I&D, was taken back to OR 8/6/22 for repeat I&D. Drain in place, dressings CDI. Nephrology on board for ANGE. New onset fever 8/17, source workup initiated. At this point drain output is decreasing, no warmth or TTP to left thigh - it would not appear that thigh abscess is source from our standpoint. Will continue to closely monitor situation.    -- ANGE nephrology managing   -- DVT SQH  -- Daptomycin per ID (possible adverse reaction to beta lactam)  -- Pain MM  -- Drain output 35cc last 24 hours, will monitor  -- Evaluate for additional source. Decreased SpO2 - possible atelectasis?  -- Continue trending ESR/CRP  -- Encourage ambulation    Dispo: Observe drain, tight BG control, manage ANGE

## 2022-08-18 NOTE — PROGRESS NOTES
[facility] - Telemetry Stepdown  Infectious Disease  Progress Note    Patient Name: Wilfredo Thomas  MRN: [id number]  Admission Date: 7/26/2022  Length of Stay: 23 days  Attending Physician: Dorota Reynoso MD  Primary Care Provider: Aylin Wayne DO    Isolation Status: No active isolations  Assessment/Plan:      * Abscess of left thigh     47 year old with DMII and HTN admitted for DKA and left thigh abscess.      MRI of left femur notable for ruptured tensor fascia jose luis mm with associated large hematoma, as well as small area of potential osteonecrosis.  Bedside aspiration of the fluid collection was notable for a cell count of >200k (>80%segs), cultures + staph species. Underwent I&D on 7/29 and 8/6.  Surgical cultures are positive for MSSA.  Per ortho surgery, no concerns for bone involvement. Initially on Vancomycin and Zosyn, course complicated by ANGE.   Vancomycin discontinue and switched to cefazolin on Wednesday Aug 3rd but Emanuel's stain + and continued deterioration in renal fx with concern for beta lactam related AIN, so cefazolin stopped and Daptomycin started for MSSA     Hospital course complicated by acute onset left wrist pain.  History of wrist arthroscopy with hardware placement 3/2022.   S/p joint aspiration, white count 2050k, 88% segs. No crystals noted.Cultures no growth.    Received steroid shot per primary team which pt noted improvement.  Xray of wrist with no acute concerns.      ANGE improving.  Creatinine down to 7 today. Followed by Nephrology.  Autoimmune workup ordered, pending.      Fever to 102 yesterday, t max 100.5 today. Unclear source. Repeat blood cx pending, NGTD.  BLE US negative for DVT.  U/A negative. CXR negative.  COVID negative.  Minimal drainage drom left thigh drain; no increase in pain or swelling or erythema.  2D echo negative. Inflammatory markers stable.  No significant complaints, ambulating in halls.     Recommendations:   · Continue Daptomycin 6mg/kg/dose q48 hours  (renally dosed) for MSSA SSTI.  · Follow up blood cultures and trend fever curve   · Will follow up tomorrow with further recommendations.  Anticipate either completion of 14 days IV abx from 8/6 for SSTI or possible transition to orals.   · Continued management of ANGE by Nephrology    Data reviewed and plan discussed with ID staff, Dr. Graves    Fever  See assessment/plan above.   Fever curve downtrending. Additional infectious workup unremarkable thus far.     ANGE (acute kidney injury)  Management per Nephrology.  Daptomycin renally dosed.       Thank you.   Please call for any questions or concerns.  Lexi Healy, APRN, ANP-C  Spectra [phone]  Subjective:     Principal Problem:Abscess of left thigh    HPI: Mr. Thomas is a 47 year old male with a PMH of DM2 (poorly controlled), HTN, GERD, HLD, obesity who initially presented to [facility] ED with cc of SOBx1 day. Pt was also reporting significant pain and swelling along his left lateral proximal thigh. Pt reports this pain started over the last 2 weeks, which worsened. He initially was seen outpatient in which he was diagnoised with a mm strain and given 800 mg ibuprofen. From there he went to the ED on 7/22 d/t worsening pain. He was treated with prednisone/morphine shot, and well as a prednisone oral pack. Pt denied recent injury, with the exception of soreness to his left lower shin following bumping into a cabinet. He denies open wounds/abrasions from this injury but states the soreness started in his left thigh following the improvement of discomfort from his left shin. Pt reports having been treated for a boil on his central/right buttocks in the end of June. 2022. Pt states he was seen in Urgent Care in [place] and the boil was drained. He was given an oral abx in which he completed.     To note, pt reports having a recent left wrist fracture, ligament rupture following an accident with his riding lawnmower. States he underwent surgery and 2 screws were placed on  3/31/22 at [facility]. Denies associated infection/complications. Denies pain in the site currently.    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 Denies having pets. Denies recent fishing/hunting. Denies hx of immunocompromising conditions.     To note, pt was found to be in DKA with blood sugars >400, treated by critical team, which has since resolved. Pt was recently started on insulin by his PCP, which he had not started. Pt latest A1C 10.     CT of left thigh notable for fluid collections suspicious for hematoma/seroma. MRI of left femur was notable for ruptured tensor fascia jose luis mm with associated large hematoma, as well as small area of potential osteonecrosis. Bedside aspiration of the fluid collection was notable for a cell count of >200k >80%segs, cultures + staph spp. Pt was taken for I&D with Dr. Graff with ortho surgery on 7/29, abscesses were noted and washed out, cultures collected + staph spp. Per pt, states that Dr. Graff is planning to return to surgery tomorrow, 8/1/22 for repeat washout.     Pt was intially with leukocytosis, but has since down trended to 11k. Blood cultures from 7/26 NGTD.     Pt is currently on zosyn, vancomycin, and clindamycin. ID was consulted for abx recs regarding left TFL infection.            Interval History:   Continues to have fever - 100.5 this morning, but fever curve trending down.  Feels well.  Reports ambulating in halls. Minimal pain  LE US negative for DVT  CXR clear . O2 Sats 91-93%  Lactate negative   COVID negative  U/A negative   Repeat blood cx NGTD  WBC 6.9  Drain output continues to decrease - no warmth or TTP to left thigh.     Review of Systems   Constitutional:  Negative for chills, diaphoresis, fatigue and fever.   Respiratory:  Negative for cough and shortness of breath.    Cardiovascular:  Positive for leg swelling (upper left thigh). Negative for chest pain and palpitations.   Gastrointestinal:   Negative for abdominal distention and abdominal pain.   Genitourinary:  Negative for difficulty urinating and dysuria.   Musculoskeletal:  Positive for arthralgias (left wrist) and myalgias (upper left thigh). Negative for joint swelling.   Skin:  Positive for wound (upper left thigh). Negative for color change and rash.   Allergic/Immunologic: Negative for immunocompromised state.   Neurological:  Negative for dizziness, tremors, weakness, numbness and headaches.   Psychiatric/Behavioral:  Negative for agitation and decreased concentration. The patient is not nervous/anxious.    Objective:     Vital Signs (Most Recent):  Temp: 98.6 °F (37 °C) (08/18/22 0853)  Pulse: (!) 111 (08/18/22 0750)  Resp: 18 (08/18/22 0750)  BP: 118/60 (08/18/22 0750)  SpO2: (!) 93 % (08/18/22 0750)   Vital Signs (24h Range):  Temp:  [98.6 °F (37 °C)-102 °F (38.9 °C)] 98.6 °F (37 °C)  Pulse:  [102-132] 111  Resp:  [17-22] 18  SpO2:  [91 %-97 %] 93 %  BP: (114-178)/() 118/60     Weight: 104.3 kg (229 lb 15 oz)  Body mass index is 36.01 kg/m².    Estimated Creatinine Clearance: 14.7 mL/min (A) (based on SCr of 7 mg/dL (H)).    Physical Exam  Vitals and nursing note reviewed.   Constitutional:       General: He is not in acute distress.     Appearance: Normal appearance. He is well-developed. He is obese. He is not ill-appearing, toxic-appearing or diaphoretic.   HENT:      Head: Normocephalic and atraumatic.      Nose: Nose normal.      Mouth/Throat:      Dentition: Does not have dentures.      Pharynx: No oropharyngeal exudate.   Eyes:      General: No scleral icterus.     Conjunctiva/sclera: Conjunctivae normal.   Cardiovascular:      Rate and Rhythm: Normal rate and regular rhythm.   Pulmonary:      Effort: Pulmonary effort is normal. No respiratory distress.      Breath sounds: Normal breath sounds.   Abdominal:      General: There is no distension.      Palpations: Abdomen is soft.      Tenderness: There is no abdominal tenderness.    Musculoskeletal:         General: No swelling.   Skin:     General: Skin is warm and dry.      Findings: No erythema or rash.      Comments: Incision to left thigh. Dressed, CDI. Surgical drain with dark bloody output.   Neurological:      General: No focal deficit present.      Mental Status: He is alert and oriented to person, place, and time. Mental status is at baseline.      Motor: No weakness.      Gait: Gait normal.   Psychiatric:         Mood and Affect: Mood normal.         Behavior: Behavior normal.         Thought Content: Thought content normal.         Judgment: Judgment normal.       Significant Labs: CBC:   Recent Labs   Lab 08/17/22  0800 08/18/22  0537   WBC 3.11* 6.94   HGB 7.4* 7.3*   HCT 22.8* 22.1*    170       CMP:   Recent Labs   Lab 08/17/22  0800 08/18/22  0537    137   K 4.6 4.9    104   CO2 22* 20*   * 170*   BUN 60* 57*   CREATININE 7.4* 7.0*   CALCIUM 8.9 8.1*   PROT 6.3 5.7*   ALBUMIN 2.3* 2.3*   BILITOT 0.3 0.5   ALKPHOS 67 70   AST 16 32   ALT <5* <5*   ANIONGAP 11 13       Microbiology Results (last 7 days)       Procedure Component Value Units Date/Time    Blood culture [[id number]] Collected: 08/17/22 1231    Order Status: Completed Specimen: Blood Updated: 08/17/22 1945     Blood Culture, Routine No Growth to date    Blood culture [[id number]] Collected: 08/17/22 1223    Order Status: Completed Specimen: Blood Updated: 08/17/22 1945     Blood Culture, Routine No Growth to date    Blood culture [[id number]]     Order Status: Canceled Specimen: Blood     Blood culture [[id number]]     Order Status: Canceled Specimen: Blood     Fungus culture [[id number]] Collected: 07/29/22 1251    Order Status: Completed Specimen: Abscess from Leg, Left Updated: 08/17/22 0904     Fungus (Mycology) Culture Culture in progress      No fungus isolated after 2 weeks    Narrative:      Left thigh abscess #1    Fungus culture [[id number]] Collected: 07/29/22 1300    Order Status:  Completed Specimen: Abscess from Leg, Left Updated: 08/17/22 0904     Fungus (Mycology) Culture Culture in progress      No fungus isolated after 2 weeks    Narrative:      Left thigh abscess #2    Fungus culture [[id number]] Collected: 07/28/22 1800    Order Status: Completed Specimen: Abscess from Leg, Left Updated: 08/17/22 0904     Fungus (Mycology) Culture Culture in progress      No fungus isolated after 2 weeks    Narrative:      LEFT THIGH ABSCESS    Aerobic culture [[id number]]  (Abnormal)  (Susceptibility) Collected: 08/06/22 1153    Order Status: Completed Specimen: Wound from Hip, Left Updated: 08/15/22 1002     Aerobic Bacterial Culture STAPHYLOCOCCUS AUREUS  Few      Narrative:      #2 Left hip fluid          Recent Lab Results  (Last 5 results in the past 24 hours)        08/18/22  0752   08/18/22  0537   08/17/22  1536   08/17/22  1457   08/17/22  1436        Albumin   2.3             Alkaline Phosphatase   70             ALT   <5             Anion Gap   13             Appearance, UA         Clear       AST   32             Bacteria, UA         Rare       Baso #   0.03             Basophil %   0.4             Bilirubin (UA)         Negative       BILIRUBIN TOTAL   0.5  Comment: For infants and newborns, interpretation of results should be based  on gestational age, weight and in agreement with clinical  observations.    Premature Infant recommended reference ranges:  Up to 24 hours.............<8.0 mg/dL  Up to 48 hours............<12.0 mg/dL  3-5 days..................<15.0 mg/dL  6-29 days.................<15.0 mg/dL               BUN   57             Calcium   8.1             Chloride   104             CO2   20             Color, UA         Colorless       CPK               Creatinine   7.0             Differential Method   Automated             eGFR   8.9             Eos #   0.1             Eosinophil %   0.7             Glucose   170             Glucose, UA         1+       Gran # (ANC)   6.2              Gran %   88.8             Hematocrit   22.1             Hemoglobin   7.3             Hyaline Casts, UA         0       Immature Grans (Abs)   0.04  Comment: Mild elevation in immature granulocytes is non specific and   can be seen in a variety of conditions including stress response,   acute inflammation, trauma and pregnancy. Correlation with other   laboratory and clinical findings is essential.               Immature Granulocytes   0.6             Ketones, UA         Negative       Leukocytes, UA         Negative       Lymph #   0.4             Lymph %   5.5             Magnesium   1.7             MCH   30.0             MCHC   33.0             MCV   91             Microscopic Comment         SEE COMMENT  Comment: Other formed elements not mentioned in the report are not   present in the microscopic examination.          Mono #   0.3             Mono %   4.0             MPV   10.3             NITRITE UA         Negative       nRBC   0             Occult Blood UA         Negative       pH, UA         7.0       Phosphorus   4.2             Platelets   170             POCT Glucose 191     99           Potassium   4.9             PROTEIN TOTAL   5.7             Protein, UA         1+  Comment: Recommend a 24 hour urine protein or a urine   protein/creatinine ratio if globulin induced proteinuria is  clinically suspected.         RBC   2.43             RBC, UA         2       RDW   13.3             SARS-CoV2 (COVID-19) Qualitative PCR       Not Detected  Comment: This test utilizes a real-time reverse transcription  polymerase chain reaction procedure to amplify and   detect the SARS-CoV-2 and detect the SARS-CoV-2 N2 and E nucleic  acid targets. The analytical sensitivity (limit of detection) of   this assay is 250 copies/mL.    A Detected result implies that the patient is infected with the  SARS-CoV-2 virus and is presumed to be contagious.    A Not Detected result implies that the SARS-CoV-2 target  nucleic  acids are not present above the limit of detection.  It does not  rule out the possibility of COVID-19 and should not be the sole  basis for treatment decisions. If COVID-19 is strongly suspected  based on clinical and epidemiological history, re-testing should  be considered.    This test is only for use under Food and Drug   Administration s Emergency Use Authorization (EUA).   Commercial reagents are provided by [employer].  Performance characteristics of the EUA have been   independently verified by [facility]   Department of Pathology and Laboratory Medicine.             Sodium   137             Specific [place], UA         1.010       Specimen UA         Urine, Clean Catch       WBC, UA         3       WBC   6.94                                    Significant Imaging:     Imaging Results              CT Thigh With Contrast Left (Final result)  Result time 07/27/22 01:13:09      Final result by Jori Hopkins DO (07/27/22 01:13:09)                   Impression:      Large heterogeneous fluid collection in the anterolateral left proximal thigh soft tissues, concerning for a soft tissue hematoma or Preston-Melissa lesion.  A neoplastic process is not entirely excluded.  Recommend close interval follow-up to assess for stability/resolution.      Electronically signed by: Jori Hopkins  Date:    07/27/2022  Time:    01:13               Narrative:    EXAMINATION:  CT THIGH WITH CONTRAST LEFT    CLINICAL HISTORY:  Soft tissue mass, thigh, deep;    TECHNIQUE:  Axial CT images of the left thigh with sagittal and coronal reformats after the intravenous administration of 50 mL Omnipaque 350.    COMPARISON:  None.    FINDINGS:  Bone: Bone mineralization is normal.  There is no evidence of an acute fracture or dislocation.  Alignment is normal.    Soft tissues: There is a large heterogeneous fluid collection in the left anterolateral proximal thigh measuring approximately 16 x 7 x 6 cm.  The collection  appears to be centered within the subcutaneous tissues or superficial fascia and abuts the gluteus musculature, the sartorius muscle, the rectus femoris, and the vastus lateralis.  There is soft tissue edema in the surrounding subcutaneous tissues.  Muscle bulk is normal.    Articulations: The left femoroacetabular joint is unremarkable.  The pubic symphysis is unremarkable.  The left knee is unremarkable.  No large joint effusion or significant cartilage loss.    Miscellaneous: Neurovascular structures are grossly intact.  There is moderate calcified atherosclerosis of the left femoral and popliteal arteries.                                       CTA Chest Non-Coronary (PE Study) (Final result)  Result time 07/26/22 21:36:01      Final result by Jori Hopkins DO (07/26/22 21:36:01)                   Impression:      No large central or lobar pulmonary embolism.      Electronically signed by: Jori Hopkins  Date:    07/26/2022  Time:    21:36               Narrative:    EXAMINATION:  CTA CHEST NON CORONARY    CLINICAL HISTORY:  Pulmonary embolism (PE) suspected, high prob;    TECHNIQUE:  Low dose axial images, sagittal and coronal reformations were obtained from the thoracic inlet to the lung bases following the IV administration of 100 mL of Omnipaque 350.  Contrast timing was optimized to evaluate the pulmonary arteries.  Maximum intensity projection images were provided for review.    COMPARISON:  Chest radiograph from earlier the same date.    FINDINGS:  Pulmonary vasculature: Satisfactory opacification of the pulmonary arterial system.  Motion artifact significantly limits evaluation of the segmental and subsegmental pulmonary arteries.  There is no large central or lobar pulmonary embolism.    Aorta: Left-sided aortic arch.  No aneurysm and no significant atherosclerosis.    Base of Neck: No significant abnormality.    Thoracic soft tissues: Normal.    Heart: Normal size. No effusion.    Amena/Mediastinum:  No pathologic russ enlargement.    Airways: The large airways are patent. No foci of endobronchial filling.    Lungs/Pleura: Clear lungs. No pleural effusion or thickening.    Esophagus: Normal.    Upper Abdomen: No abnormality of the partially imaged upper abdomen.    Bones: No acute fracture. No suspicious lytic or sclerotic lesions.                                       X-Ray Chest 1 View (Final result)  Result time 07/26/22 21:23:42   Procedure changed from X-Ray Chest PA And Lateral     Final result by Olman Saucedo MD (07/26/22 21:23:42)                   Impression:      No convincing radiographic evidence of acute intrathoracic process on this single view..      Electronically signed by: Olman Saucedo MD  Date:    07/26/2022  Time:    21:23               Narrative:    EXAMINATION:  XR CHEST 1 VIEW    CLINICAL HISTORY:  shortness of breath;    TECHNIQUE:  Single frontal view of the chest was performed.    COMPARISON:  None    FINDINGS:  Cardiac monitoring leads overlie the chest.  Cardiac silhouette appears within normal limits.  No confluent airspace consolidation identified.  No significant volume of pleural fluid or pneumothorax appreciated.  The visualized osseous structures demonstrate mild degenerative changes.

## 2022-08-18 NOTE — SUBJECTIVE & OBJECTIVE
Interval History: see above    Review of Systems   Constitutional:  Negative for activity change, appetite change, chills, fatigue and fever.   HENT:  Negative for congestion, facial swelling, nosebleeds, sore throat and trouble swallowing.    Respiratory:  Negative for apnea, cough, choking, chest tightness, shortness of breath, wheezing and stridor.    Cardiovascular:  Negative for chest pain and leg swelling.   Gastrointestinal:  Negative for abdominal distention, abdominal pain, blood in stool, constipation, diarrhea, nausea and vomiting.   Genitourinary:  Negative for difficulty urinating, dysuria, flank pain, frequency, hematuria and urgency.   Musculoskeletal:  Negative for arthralgias, back pain, gait problem, neck pain and neck stiffness.   Skin:  Negative for color change, rash and wound.   Neurological:  Negative for dizziness, tremors, seizures, syncope, facial asymmetry, speech difficulty, weakness, light-headedness, numbness and headaches.   Psychiatric/Behavioral:  Negative for agitation, behavioral problems, confusion, decreased concentration, dysphoric mood, hallucinations, self-injury and sleep disturbance. The patient is not nervous/anxious and is not hyperactive.    Objective:     Vital Signs (Most Recent):  Temp: 98.6 °F (37 °C) (08/18/22 0853)  Pulse: (!) 111 (08/18/22 0750)  Resp: 18 (08/18/22 0750)  BP: 118/60 (08/18/22 0750)  SpO2: (!) 93 % (08/18/22 0750)   Vital Signs (24h Range):  Temp:  [98.6 °F (37 °C)-102 °F (38.9 °C)] 98.6 °F (37 °C)  Pulse:  [102-132] 111  Resp:  [17-22] 18  SpO2:  [91 %-97 %] 93 %  BP: (114-178)/() 118/60     Weight: 104.3 kg (229 lb 15 oz)  Body mass index is 36.01 kg/m².    Intake/Output Summary (Last 24 hours) at 8/18/2022 0919  Last data filed at 8/18/2022 0853  Gross per 24 hour   Intake --   Output 2385 ml   Net -2385 ml        Physical Exam  Constitutional:       General: He is not in acute distress.     Appearance: Normal appearance. He is not  ill-appearing, toxic-appearing or diaphoretic.   HENT:      Head: Normocephalic and atraumatic.      Nose: Nose normal.      Mouth/Throat:      Mouth: Mucous membranes are moist.      Pharynx: Oropharynx is clear.   Eyes:      General: No scleral icterus.     Extraocular Movements: Extraocular movements intact.      Conjunctiva/sclera: Conjunctivae normal.      Pupils: Pupils are equal, round, and reactive to light.   Cardiovascular:      Rate and Rhythm: Normal rate and regular rhythm.      Pulses: Normal pulses.      Heart sounds: Normal heart sounds.   Pulmonary:      Effort: Pulmonary effort is normal. No respiratory distress.      Breath sounds: Normal breath sounds. No stridor. No wheezing, rhonchi or rales.   Chest:      Chest wall: No tenderness.   Abdominal:      General: Abdomen is flat. Bowel sounds are normal. There is no distension.      Palpations: Abdomen is soft.      Tenderness: There is no abdominal tenderness. There is no right CVA tenderness, guarding or rebound.   Musculoskeletal:         General: Swelling and tenderness present. No deformity or signs of injury. Normal range of motion.      Cervical back: Normal range of motion and neck supple. No rigidity or tenderness.      Right lower leg: No edema.      Left lower leg: No edema.      Comments: Left lat thigh is bandaged   Skin:     General: Skin is warm and dry.      Coloration: Skin is not jaundiced.      Findings: No erythema or rash.   Neurological:      General: No focal deficit present.      Mental Status: He is alert and oriented to person, place, and time. Mental status is at baseline.      Motor: No weakness.   Psychiatric:         Mood and Affect: Mood normal.         Behavior: Behavior normal.         Thought Content: Thought content normal.         Judgment: Judgment normal.       Significant Labs: All pertinent labs within the past 24 hours have been reviewed.  CBC:   Recent Labs   Lab 08/17/22  0800 08/18/22  0537   WBC 3.11*  6.94   HGB 7.4* 7.3*   HCT 22.8* 22.1*    170       CMP:   Recent Labs   Lab 08/17/22  0800 08/18/22  0537    137   K 4.6 4.9    104   CO2 22* 20*   * 170*   BUN 60* 57*   CREATININE 7.4* 7.0*   CALCIUM 8.9 8.1*   PROT 6.3 5.7*   ALBUMIN 2.3* 2.3*   BILITOT 0.3 0.5   ALKPHOS 67 70   AST 16 32   ALT <5* <5*   ANIONGAP 11 13         Significant Imaging: I have reviewed all pertinent imaging results/findings within the past 24 hours.

## 2022-08-18 NOTE — ASSESSMENT & PLAN NOTE
-patient with fever to 102 and tachycardia on 8/17 - repeat blood cultures, UA, COVID swab pending  -CXR without infiltrate, lactate nl, procalcitonin mildly elevated (due to ANGE)  -remains on dapto for MSSA abscess to thigh  -eosinophils nl and no leukocytosis  -ID and ortho notified of change  -minimize IVF due to improving ANGE with signs of fluid retention  8/18- on acef

## 2022-08-18 NOTE — NURSING
"Messaged Dr. Johnson that patient had a sudden onset of chills. Vital signs were taken- temp of 99.7, HR of 117, BP was 165/91 and respirations were 18. Pt states that in the past week he has had "chill" spells, but recovers rather quickly and this one was prolonged. VS were continued to be taken, while Dr. Johnson "video-ed in," to assess the patient.  Temp reached 102, Tylenol was given, and patient morning meds were taken at this time. Dr. Johnson ordered stat EKG, Chest xray, urinalysis, labs, and a covid swab (all of which were obtained).  Patient's wife came to the bedside and the patient finally settled out. At around 1530 patient vital signs were trending back in the right direction with a temp of 99.9, , 94%, and 117/66. During "episode," patient assessment was negative- pt complained of no SOB, Chest pain/ pressure, or any other symptoms, only feelings of being very cold. Pt was assessed throughout the rest of the shift, remained safe, free of falls, and states he was feeling much better.  "

## 2022-08-18 NOTE — PLAN OF CARE
Problem: Adult Inpatient Plan of Care  Goal: Absence of Hospital-Acquired Illness or Injury  Outcome: Ongoing, Progressing  Intervention: Identify and Manage Fall Risk  Flowsheets (Taken 8/18/2022 1650)  Safety Promotion/Fall Prevention:   assistive device/personal item within reach   side rails raised x 2   lighting adjusted   medications reviewed  Plan of care was reviewed with the ptJonh GR. Glucose monitoring was done. Supplemental insulin was given. Intake and output was documented. No major changes throughout the day. Safety precautions were in placed.

## 2022-08-18 NOTE — SUBJECTIVE & OBJECTIVE
"Principal Problem:Abscess of left thigh    Principal Orthopedic Problem: Left thigh I&D 7/29 and 8/6  Staph species on thigh abscess cultures    Interval History: S/p repeat L thigh I&D 8/6/22. Pt's drain output last 24 hrs 35cc (had been 60 cc/day on the two prior days). Seems to be slowly downtrending. Tmax 102 yesterday, Tmax 100.9 this am. SpO2 91-93%, CXR negative yesterday. ESR/CRP/CK all steady yesterday. Cr continues to slowly downtrend. UA lactate, covid negative. Repeat Bcx NGTD.       Review of patient's allergies indicates:  No Known Allergies    Current Facility-Administered Medications   Medication    acetaminophen tablet 1,000 mg    amLODIPine tablet 5 mg    atorvastatin tablet 20 mg    COVID-19 vac, forrest(Pfizer)(PF) (Pfizer COVID-19) 30 mcg/0.3 mL injection 0.3 mL    DAPTOmycin (CUBICIN) 625 mg in sodium chloride 0.9% 50 mL IVPB    dextrose 10% bolus 125 mL    dextrose 10% bolus 250 mL    glucagon (human recombinant) injection 1 mg    glucose chewable tablet 16 g    glucose chewable tablet 24 g    heparin (porcine) injection 5,000 Units    hydrALAZINE tablet 25 mg    hydrALAZINE tablet 25 mg    insulin aspart U-100 pen 1-10 Units    insulin aspart U-100 pen 8 Units    insulin detemir U-100 pen 10 Units    morphine injection 3 mg    ondansetron injection 4 mg    oxyCODONE immediate release tablet 5 mg    senna-docusate 8.6-50 mg per tablet 2 tablet    sevelamer carbonate tablet 800 mg    sodium chloride 0.9% flush 10 mL     Objective:     Vital Signs (Most Recent):  Temp: 98.6 °F (37 °C) (08/18/22 0853)  Pulse: (!) 111 (08/18/22 0750)  Resp: 18 (08/18/22 0750)  BP: 118/60 (08/18/22 0750)  SpO2: (!) 93 % (08/18/22 0750) Vital Signs (24h Range):  Temp:  [98.6 °F (37 °C)-102 °F (38.9 °C)] 98.6 °F (37 °C)  Pulse:  [102-132] 111  Resp:  [17-22] 18  SpO2:  [91 %-97 %] 93 %  BP: (114-178)/() 118/60     Weight: 104.3 kg (229 lb 15 oz)  Height: 5' 7" (170.2 cm)  Body mass index is 36.01 " kg/m².      Intake/Output Summary (Last 24 hours) at 8/18/2022 0934  Last data filed at 8/18/2022 0853  Gross per 24 hour   Intake --   Output 2385 ml   Net -2385 ml         Ortho/SPM Exam  Left lower extremity  Left thigh dressing cdi  Drain in place, serous output, minimal exudative material  No erythema of thigh, no signficant swelling  Compartments soft and compressible  Painless ROM of hip and knee  NVI        CBC:   Recent Labs   Lab 08/17/22  0800 08/18/22  0537   WBC 3.11* 6.94   HGB 7.4* 7.3*   HCT 22.8* 22.1*    170       CMP:   Recent Labs   Lab 08/17/22  0800 08/18/22  0537    137   K 4.6 4.9    104   CO2 22* 20*   * 170*   BUN 60* 57*   CREATININE 7.4* 7.0*   CALCIUM 8.9 8.1*   PROT 6.3 5.7*   ALBUMIN 2.3* 2.3*   BILITOT 0.3 0.5   ALKPHOS 67 70   AST 16 32   ALT <5* <5*   ANIONGAP 11 13         All pertinent labs within the past 24 hours have been reviewed.    Significant Imaging: I have reviewed and interpreted all pertinent imaging results/findings.

## 2022-08-18 NOTE — CARE UPDATE
RAPID RESPONSE NURSE ROUND       Rounding completed with charge RN, Cecil for tachycardia. No additional concerns verbalized at this time. Instructed to call [phone] for further concerns or assistance.

## 2022-08-18 NOTE — PROGRESS NOTES
[facility] - Telemetry Stepdown  Orthopedics  Progress Note    Attg Note:  I agree with the resident's assessment and plan.  Patient still with functional drain in place and limited output.  Increase in fevers an inflammatory markers.  Would start looking at other places given his sepsis for possible emboli/infection.    Rob Graff MD      Patient Name: Wilfredo Thomas  MRN: [id number]  Admission Date: 7/26/2022  Hospital Length of Stay: 23 days  Attending Provider: Dorota Reynoso MD  Primary Care Provider: Aylin Wayne DO  Follow-up For: Procedure(s) (LRB):  Incision and Drainage - LEFT THIGH. (Left)    Post-Operative Day: 12 Days Post-Op  Subjective:     Principal Problem:Abscess of left thigh    Principal Orthopedic Problem: Left thigh I&D 7/29 and 8/6  Staph species on thigh abscess cultures    Interval History: S/p repeat L thigh I&D 8/6/22. Pt's drain output last 24 hrs 35cc (had been 60 cc/day on the two prior days). Seems to be slowly downtrending. Tmax 102 yesterday, Tmax 100.9 this am. SpO2 91-93%, CXR negative yesterday. ESR/CRP/CK all steady yesterday. Cr continues to slowly downtrend. UA lactate, covid negative. Repeat Bcx NGTD.       Review of patient's allergies indicates:  No Known Allergies    Current Facility-Administered Medications   Medication    acetaminophen tablet 1,000 mg    amLODIPine tablet 5 mg    atorvastatin tablet 20 mg    COVID-19 vac, forrest(Pfizer)(PF) (Pfizer COVID-19) 30 mcg/0.3 mL injection 0.3 mL    DAPTOmycin (CUBICIN) 625 mg in sodium chloride 0.9% 50 mL IVPB    dextrose 10% bolus 125 mL    dextrose 10% bolus 250 mL    glucagon (human recombinant) injection 1 mg    glucose chewable tablet 16 g    glucose chewable tablet 24 g    heparin (porcine) injection 5,000 Units    hydrALAZINE tablet 25 mg    hydrALAZINE tablet 25 mg    insulin aspart U-100 pen 1-10 Units    insulin aspart U-100 pen 8 Units    insulin detemir U-100 pen 10 Units    morphine injection 3 mg    ondansetron  "injection 4 mg    oxyCODONE immediate release tablet 5 mg    senna-docusate 8.6-50 mg per tablet 2 tablet    sevelamer carbonate tablet 800 mg    sodium chloride 0.9% flush 10 mL     Objective:     Vital Signs (Most Recent):  Temp: 98.6 °F (37 °C) (08/18/22 0853)  Pulse: (!) 111 (08/18/22 0750)  Resp: 18 (08/18/22 0750)  BP: 118/60 (08/18/22 0750)  SpO2: (!) 93 % (08/18/22 0750) Vital Signs (24h Range):  Temp:  [98.6 °F (37 °C)-102 °F (38.9 °C)] 98.6 °F (37 °C)  Pulse:  [102-132] 111  Resp:  [17-22] 18  SpO2:  [91 %-97 %] 93 %  BP: (114-178)/() 118/60     Weight: 104.3 kg (229 lb 15 oz)  Height: 5' 7" (170.2 cm)  Body mass index is 36.01 kg/m².      Intake/Output Summary (Last 24 hours) at 8/18/2022 0934  Last data filed at 8/18/2022 0853  Gross per 24 hour   Intake --   Output 2385 ml   Net -2385 ml         Ortho/SPM Exam  Left lower extremity  Left thigh dressing cdi  Drain in place, serous output, minimal exudative material  No erythema of thigh, no signficant swelling  Compartments soft and compressible  Painless ROM of hip and knee  NVI        CBC:   Recent Labs   Lab 08/17/22  0800 08/18/22  0537   WBC 3.11* 6.94   HGB 7.4* 7.3*   HCT 22.8* 22.1*    170       CMP:   Recent Labs   Lab 08/17/22  0800 08/18/22  0537    137   K 4.6 4.9    104   CO2 22* 20*   * 170*   BUN 60* 57*   CREATININE 7.4* 7.0*   CALCIUM 8.9 8.1*   PROT 6.3 5.7*   ALBUMIN 2.3* 2.3*   BILITOT 0.3 0.5   ALKPHOS 67 70   AST 16 32   ALT <5* <5*   ANIONGAP 11 13         All pertinent labs within the past 24 hours have been reviewed.    Significant Imaging: I have reviewed and interpreted all pertinent imaging results/findings.      Assessment/Plan:     * Abscess of left thigh  47M with DM admitted 7/26/22 for DKA, ortho following for left lateral thigh abscess. s/p I&D and drain placement left thigh 7/29/22. Cx +MSSA. Had high drain purulent output after first I&D, was taken back to OR 8/6/22 for repeat I&D. Drain " in place, dressings CDI. Nephrology on board for ANGE. New onset fever 8/17, source workup initiated. At this point drain output is decreasing, no warmth or TTP to left thigh - it would not appear that thigh abscess is source from our standpoint. Will continue to closely monitor situation.    -- ANGE nephrology managing   -- DVT SQH  -- Daptomycin per ID (possible adverse reaction to beta lactam)  -- Pain MM  -- Drain output 35cc last 24 hours, will monitor  -- Evaluate for additional source. Decreased SpO2 - possible atelectasis?  -- Continue trending ESR/CRP  -- Encourage ambulation    Dispo: Observe drain, tight BG control, manage ANGE          Aron Vieyra MD  Orthopedics  [facility] - Telemetry Stepdown

## 2022-08-18 NOTE — ASSESSMENT & PLAN NOTE
47M with DM admitted 7/26/22 for DKA, ortho following for left lateral thigh abscess. s/p I&D and drain placement left thigh 7/29/22. Cx +MSSA. Had high drain purulent output after first I&D, was taken back to OR 8/6/22 for repeat I&D. Drain in place, dressings CDI. Nephrology on board for ANGE.     -- ANGE nephrology managing   -- DVT SQH  -- Daptomycin per ID (possible adverse reaction to beta lactam)  -- Pain MM  -- Drain output 60cc last 24 hours, will monitor  -- Evaluate for additional source  -- Continue trending ESR/CRP  -- Encourage ambulation    Dispo: Observe drain, tight BG control, manage ANGE

## 2022-08-18 NOTE — ASSESSMENT & PLAN NOTE
See assessment/plan above.   Fever curve downtrending. Additional infectious workup unremarkable thus far.

## 2022-08-18 NOTE — PT/OT/SLP PROGRESS
Occupational Therapy      Patient Name:  Wilfredo Thomas   MRN:  [id number]    Patient not seen today secondary to worked w/OT. Will follow-up perPOC.    8/18/2022

## 2022-08-19 PROBLEM — M54.2 ACUTE NECK PAIN: Status: ACTIVE | Noted: 2022-08-19

## 2022-08-19 LAB
ALBUMIN SERPL BCP-MCNC: 2.5 G/DL (ref 3.5–5.2)
ALP SERPL-CCNC: 72 U/L (ref 55–135)
ALT SERPL W/O P-5'-P-CCNC: 7 U/L (ref 10–44)
ANION GAP SERPL CALC-SCNC: 14 MMOL/L (ref 8–16)
AST SERPL-CCNC: 26 U/L (ref 10–40)
BASOPHILS # BLD AUTO: 0.02 K/UL (ref 0–0.2)
BASOPHILS NFR BLD: 0.3 % (ref 0–1.9)
BILIRUB SERPL-MCNC: 0.5 MG/DL (ref 0.1–1)
BUN SERPL-MCNC: 60 MG/DL (ref 6–20)
CALCIUM SERPL-MCNC: 9.1 MG/DL (ref 8.7–10.5)
CHLORIDE SERPL-SCNC: 103 MMOL/L (ref 95–110)
CO2 SERPL-SCNC: 23 MMOL/L (ref 23–29)
CREAT SERPL-MCNC: 6.1 MG/DL (ref 0.5–1.4)
CRP SERPL-MCNC: 195.2 MG/L (ref 0–8.2)
DIFFERENTIAL METHOD: ABNORMAL
EOSINOPHIL # BLD AUTO: 0.4 K/UL (ref 0–0.5)
EOSINOPHIL NFR BLD: 6.9 % (ref 0–8)
ERYTHROCYTE [DISTWIDTH] IN BLOOD BY AUTOMATED COUNT: 13.6 % (ref 11.5–14.5)
ERYTHROCYTE [SEDIMENTATION RATE] IN BLOOD BY PHOTOMETRIC METHOD: 58 MM/HR (ref 0–23)
EST. GFR  (NO RACE VARIABLE): 10.5 ML/MIN/1.73 M^2
GLUCOSE SERPL-MCNC: 82 MG/DL (ref 70–110)
HCT VFR BLD AUTO: 24.4 % (ref 40–54)
HGB BLD-MCNC: 8.1 G/DL (ref 14–18)
IMM GRANULOCYTES # BLD AUTO: 0.03 K/UL (ref 0–0.04)
IMM GRANULOCYTES NFR BLD AUTO: 0.5 % (ref 0–0.5)
LACTATE SERPL-SCNC: 0.8 MMOL/L (ref 0.5–2.2)
LYMPHOCYTES # BLD AUTO: 1.2 K/UL (ref 1–4.8)
LYMPHOCYTES NFR BLD: 19.2 % (ref 18–48)
MAGNESIUM SERPL-MCNC: 1.9 MG/DL (ref 1.6–2.6)
MCH RBC QN AUTO: 30.8 PG (ref 27–31)
MCHC RBC AUTO-ENTMCNC: 33.2 G/DL (ref 32–36)
MCV RBC AUTO: 93 FL (ref 82–98)
MONOCYTES # BLD AUTO: 0.4 K/UL (ref 0.3–1)
MONOCYTES NFR BLD: 5.9 % (ref 4–15)
NEUTROPHILS # BLD AUTO: 4.1 K/UL (ref 1.8–7.7)
NEUTROPHILS NFR BLD: 67.2 % (ref 38–73)
NRBC BLD-RTO: 0 /100 WBC
PHOSPHATE SERPL-MCNC: 4.4 MG/DL (ref 2.7–4.5)
PLATELET # BLD AUTO: 197 K/UL (ref 150–450)
PMV BLD AUTO: 11 FL (ref 9.2–12.9)
POCT GLUCOSE: 107 MG/DL (ref 70–110)
POCT GLUCOSE: 124 MG/DL (ref 70–110)
POCT GLUCOSE: 196 MG/DL (ref 70–110)
POCT GLUCOSE: 94 MG/DL (ref 70–110)
POCT GLUCOSE: 98 MG/DL (ref 70–110)
POTASSIUM SERPL-SCNC: 4.2 MMOL/L (ref 3.5–5.1)
PROT SERPL-MCNC: 7 G/DL (ref 6–8.4)
RBC # BLD AUTO: 2.63 M/UL (ref 4.6–6.2)
SODIUM SERPL-SCNC: 140 MMOL/L (ref 136–145)
WBC # BLD AUTO: 6.13 K/UL (ref 3.9–12.7)

## 2022-08-19 PROCEDURE — 36415 COLL VENOUS BLD VENIPUNCTURE: CPT

## 2022-08-19 PROCEDURE — 63600175 PHARM REV CODE 636 W HCPCS: Performed by: HOSPITALIST

## 2022-08-19 PROCEDURE — 25000003 PHARM REV CODE 250: Performed by: HOSPITALIST

## 2022-08-19 PROCEDURE — 99233 PR SUBSEQUENT HOSPITAL CARE,LEVL III: ICD-10-PCS | Mod: ,,, | Performed by: NURSE PRACTITIONER

## 2022-08-19 PROCEDURE — 25000003 PHARM REV CODE 250: Performed by: INTERNAL MEDICINE

## 2022-08-19 PROCEDURE — 85652 RBC SED RATE AUTOMATED: CPT

## 2022-08-19 PROCEDURE — 25500020 PHARM REV CODE 255: Performed by: HOSPITALIST

## 2022-08-19 PROCEDURE — 83735 ASSAY OF MAGNESIUM: CPT | Performed by: STUDENT IN AN ORGANIZED HEALTH CARE EDUCATION/TRAINING PROGRAM

## 2022-08-19 PROCEDURE — 36415 COLL VENOUS BLD VENIPUNCTURE: CPT | Performed by: HOSPITALIST

## 2022-08-19 PROCEDURE — 99233 PR SUBSEQUENT HOSPITAL CARE,LEVL III: ICD-10-PCS | Mod: ,,, | Performed by: HOSPITALIST

## 2022-08-19 PROCEDURE — A9585 GADOBUTROL INJECTION: HCPCS | Performed by: HOSPITALIST

## 2022-08-19 PROCEDURE — 85025 COMPLETE CBC W/AUTO DIFF WBC: CPT | Performed by: STUDENT IN AN ORGANIZED HEALTH CARE EDUCATION/TRAINING PROGRAM

## 2022-08-19 PROCEDURE — 63600175 PHARM REV CODE 636 W HCPCS: Performed by: REGISTERED NURSE

## 2022-08-19 PROCEDURE — 84100 ASSAY OF PHOSPHORUS: CPT | Performed by: STUDENT IN AN ORGANIZED HEALTH CARE EDUCATION/TRAINING PROGRAM

## 2022-08-19 PROCEDURE — 25000003 PHARM REV CODE 250: Performed by: STUDENT IN AN ORGANIZED HEALTH CARE EDUCATION/TRAINING PROGRAM

## 2022-08-19 PROCEDURE — 80053 COMPREHEN METABOLIC PANEL: CPT | Performed by: STUDENT IN AN ORGANIZED HEALTH CARE EDUCATION/TRAINING PROGRAM

## 2022-08-19 PROCEDURE — 25000003 PHARM REV CODE 250: Performed by: REGISTERED NURSE

## 2022-08-19 PROCEDURE — 83605 ASSAY OF LACTIC ACID: CPT | Performed by: HOSPITALIST

## 2022-08-19 PROCEDURE — 99233 SBSQ HOSP IP/OBS HIGH 50: CPT | Mod: ,,, | Performed by: NURSE PRACTITIONER

## 2022-08-19 PROCEDURE — 63600175 PHARM REV CODE 636 W HCPCS: Performed by: STUDENT IN AN ORGANIZED HEALTH CARE EDUCATION/TRAINING PROGRAM

## 2022-08-19 PROCEDURE — 86140 C-REACTIVE PROTEIN: CPT

## 2022-08-19 PROCEDURE — 87040 BLOOD CULTURE FOR BACTERIA: CPT | Performed by: HOSPITALIST

## 2022-08-19 PROCEDURE — 25000003 PHARM REV CODE 250

## 2022-08-19 PROCEDURE — 63600175 PHARM REV CODE 636 W HCPCS

## 2022-08-19 PROCEDURE — 99233 SBSQ HOSP IP/OBS HIGH 50: CPT | Mod: ,,, | Performed by: HOSPITALIST

## 2022-08-19 PROCEDURE — 20600001 HC STEP DOWN PRIVATE ROOM

## 2022-08-19 RX ORDER — CEFEPIME HYDROCHLORIDE 1 G/50ML
1 INJECTION, SOLUTION INTRAVENOUS
Status: DISCONTINUED | OUTPATIENT
Start: 2022-08-19 | End: 2022-08-20

## 2022-08-19 RX ORDER — LORAZEPAM 2 MG/ML
0.5 INJECTION INTRAMUSCULAR ONCE
Status: COMPLETED | OUTPATIENT
Start: 2022-08-19 | End: 2022-08-19

## 2022-08-19 RX ORDER — METHOCARBAMOL 500 MG/1
500 TABLET, FILM COATED ORAL 4 TIMES DAILY
Status: DISCONTINUED | OUTPATIENT
Start: 2022-08-19 | End: 2022-08-24 | Stop reason: HOSPADM

## 2022-08-19 RX ORDER — SEVELAMER CARBONATE FOR ORAL SUSPENSION 800 MG/1
0.8 POWDER, FOR SUSPENSION ORAL
Status: DISCONTINUED | OUTPATIENT
Start: 2022-08-19 | End: 2022-08-24 | Stop reason: HOSPADM

## 2022-08-19 RX ORDER — GADOBUTROL 604.72 MG/ML
10 INJECTION INTRAVENOUS
Status: COMPLETED | OUTPATIENT
Start: 2022-08-19 | End: 2022-08-19

## 2022-08-19 RX ADMIN — OXYCODONE 5 MG: 5 TABLET ORAL at 02:08

## 2022-08-19 RX ADMIN — SENNOSIDES AND DOCUSATE SODIUM 2 TABLET: 50; 8.6 TABLET ORAL at 08:08

## 2022-08-19 RX ADMIN — ACETAMINOPHEN 1000 MG: 500 TABLET ORAL at 10:08

## 2022-08-19 RX ADMIN — HYDRALAZINE HYDROCHLORIDE 25 MG: 25 TABLET, FILM COATED ORAL at 09:08

## 2022-08-19 RX ADMIN — AMLODIPINE BESYLATE 5 MG: 5 TABLET ORAL at 08:08

## 2022-08-19 RX ADMIN — HYDRALAZINE HYDROCHLORIDE 25 MG: 25 TABLET, FILM COATED ORAL at 08:08

## 2022-08-19 RX ADMIN — GADOBUTROL 10 ML: 604.72 INJECTION INTRAVENOUS at 04:08

## 2022-08-19 RX ADMIN — HYDRALAZINE HYDROCHLORIDE 25 MG: 25 TABLET ORAL at 01:08

## 2022-08-19 RX ADMIN — INSULIN ASPART 8 UNITS: 100 INJECTION, SOLUTION INTRAVENOUS; SUBCUTANEOUS at 12:08

## 2022-08-19 RX ADMIN — OXYCODONE 5 MG: 5 TABLET ORAL at 06:08

## 2022-08-19 RX ADMIN — METHOCARBAMOL 500 MG: 500 TABLET ORAL at 09:08

## 2022-08-19 RX ADMIN — ACETAMINOPHEN 1000 MG: 500 TABLET ORAL at 01:08

## 2022-08-19 RX ADMIN — AMLODIPINE BESYLATE 5 MG: 5 TABLET ORAL at 09:08

## 2022-08-19 RX ADMIN — INSULIN ASPART 8 UNITS: 100 INJECTION, SOLUTION INTRAVENOUS; SUBCUTANEOUS at 04:08

## 2022-08-19 RX ADMIN — OXYCODONE 5 MG: 5 TABLET ORAL at 09:08

## 2022-08-19 RX ADMIN — DAPTOMYCIN 625 MG: 350 INJECTION, POWDER, LYOPHILIZED, FOR SOLUTION INTRAVENOUS at 12:08

## 2022-08-19 RX ADMIN — INSULIN DETEMIR 10 UNITS: 100 INJECTION, SOLUTION SUBCUTANEOUS at 09:08

## 2022-08-19 RX ADMIN — LORAZEPAM 0.5 MG: 2 INJECTION INTRAMUSCULAR; INTRAVENOUS at 02:08

## 2022-08-19 RX ADMIN — HEPARIN SODIUM 5000 UNITS: 5000 INJECTION INTRAVENOUS; SUBCUTANEOUS at 10:08

## 2022-08-19 RX ADMIN — HEPARIN SODIUM 5000 UNITS: 5000 INJECTION INTRAVENOUS; SUBCUTANEOUS at 01:08

## 2022-08-19 RX ADMIN — METHOCARBAMOL 500 MG: 500 TABLET ORAL at 05:08

## 2022-08-19 RX ADMIN — INSULIN ASPART 2 UNITS: 100 INJECTION, SOLUTION INTRAVENOUS; SUBCUTANEOUS at 12:08

## 2022-08-19 RX ADMIN — SEVELAMER CARBONATE 0.8 G: 800 POWDER, FOR SUSPENSION ORAL at 11:08

## 2022-08-19 RX ADMIN — ATORVASTATIN CALCIUM 20 MG: 20 TABLET, FILM COATED ORAL at 08:08

## 2022-08-19 RX ADMIN — SEVELAMER CARBONATE 0.8 G: 800 POWDER, FOR SUSPENSION ORAL at 05:08

## 2022-08-19 RX ADMIN — HEPARIN SODIUM 5000 UNITS: 5000 INJECTION INTRAVENOUS; SUBCUTANEOUS at 06:08

## 2022-08-19 RX ADMIN — MORPHINE SULFATE 3 MG: 2 INJECTION, SOLUTION INTRAMUSCULAR; INTRAVENOUS at 11:08

## 2022-08-19 RX ADMIN — CEFEPIME HYDROCHLORIDE 1 G: 1 INJECTION, SOLUTION INTRAVENOUS at 09:08

## 2022-08-19 NOTE — ASSESSMENT & PLAN NOTE
Estimated Creatinine Clearance: 16.9 mL/min (A) (based on SCr of 6.1 mg/dL (H)). - improved  - Hyperphosphatemia also noted - start binder  - Continues to make urine  - Nephrology following: no acute indication for HD at this time  - Renally dosing all medications  - Avoid nephrotoxins  - nephrology has cleared for discharge once Cr < 7    8/19 - cr 6.1

## 2022-08-19 NOTE — PLAN OF CARE
Pt started the shift c/o neck and shoulder pain a U/S and MRI were done and we are managing pain, he also has some issues with BP and elevated temp to which Md ordered a cooling blanket and told to monitor. Nd also reordered blood cultures will monitor.   Problem: Adult Inpatient Plan of Care  Goal: Plan of Care Review  Outcome: Ongoing, Progressing  Goal: Patient-Specific Goal (Individualized)  Outcome: Ongoing, Progressing  Goal: Absence of Hospital-Acquired Illness or Injury  Outcome: Ongoing, Progressing  Goal: Optimal Comfort and Wellbeing  Outcome: Ongoing, Progressing  Goal: Readiness for Transition of Care  Outcome: Ongoing, Progressing     Problem: Diabetes Comorbidity  Goal: Blood Glucose Level Within Targeted Range  Outcome: Ongoing, Progressing     Problem: Fluid and Electrolyte Imbalance (Acute Kidney Injury/Impairment)  Goal: Fluid and Electrolyte Balance  Outcome: Ongoing, Progressing     Problem: Oral Intake Inadequate (Acute Kidney Injury/Impairment)  Goal: Optimal Nutrition Intake  Outcome: Ongoing, Progressing     Problem: Renal Function Impairment (Acute Kidney Injury/Impairment)  Goal: Effective Renal Function  Outcome: Ongoing, Progressing     Problem: Skin Injury Risk Increased  Goal: Skin Health and Integrity  Outcome: Ongoing, Progressing     Problem: Fall Injury Risk  Goal: Absence of Fall and Fall-Related Injury  Outcome: Ongoing, Progressing     Problem: Adjustment to Illness (Sepsis/Septic Shock)  Goal: Optimal Coping  Outcome: Ongoing, Progressing     Problem: Bleeding (Sepsis/Septic Shock)  Goal: Absence of Bleeding  Outcome: Ongoing, Progressing     Problem: Glycemic Control Impaired (Sepsis/Septic Shock)  Goal: Blood Glucose Level Within Desired Range  Outcome: Ongoing, Progressing     Problem: Infection Progression (Sepsis/Septic Shock)  Goal: Absence of Infection Signs and Symptoms  Outcome: Ongoing, Progressing     Problem: Nutrition Impaired (Sepsis/Septic Shock)  Goal: Optimal  Nutrition Intake  Outcome: Ongoing, Progressing

## 2022-08-19 NOTE — ASSESSMENT & PLAN NOTE
Pseudogout  - Tapped by Ortho, fluid reviewed: does not appear to be gout or septic arthritis, likely pseudogout (calcium pyrophosphate crystals on previous LE aspiration)  - PRN analgesics provided  -improved after steroid injection  8/19- resolved

## 2022-08-19 NOTE — ASSESSMENT & PLAN NOTE
- Continue home regimen of amlodipine; lisinopril held due to poor renal function  - continue to monitor and further titrate antihypertensives as clinically indicated   -increase amlodipine to BID on 8/13 for elevated BP; avoid hypotension with ANGE  8/18- /60  -stable

## 2022-08-19 NOTE — PT/OT/SLP PROGRESS
Occupational Therapy      Patient Name:  Wilfredo Thomas   MRN:  [id number]    Patient not seen today secondary to  Attempted at 1250 pm with c/o neck pain and stating he could barely move. Will follow-up per POC.    8/19/2022

## 2022-08-19 NOTE — ASSESSMENT & PLAN NOTE
- DKA has resolved  - Endocrinology consulted and managing.  Recent Labs     08/17/22  1536 08/17/22  2224 08/18/22  0752 08/18/22  1114 08/18/22  1640 08/18/22  2132   POCTGLUCOSE 99 159* 191* 178* 109 136*

## 2022-08-19 NOTE — PLAN OF CARE
Patient A/O x4, independently ambulates with walker to the restroom and uses urinal to void. VSS and on RA. JOHANN bulb drained 30cc total during this shift. Pt has new onset of neck pain, pain meds given Q4H PRN. Call light in reach. No signs of respiratory distress noted.       Problem: Diabetic Ketoacidosis  Goal: Fluid and Electrolyte Balance with Absence of Ketosis  8/19/2022 0856 by Sita Ro RN  Outcome: Met     Problem: Adult Inpatient Plan of Care  Goal: Plan of Care Review  8/19/2022 0856 by Sita Ro RN  Outcome: Ongoing, Progressing    Goal: Optimal Comfort and Wellbeing  8/19/2022 0856 by Sita Ro RN  Outcome: Ongoing, Progressing       Problem: Diabetes Comorbidity  Goal: Blood Glucose Level Within Targeted Range  Outcome: Ongoing, Progressing     Problem: Infection Progression (Sepsis/Septic Shock)  Goal: Absence of Infection Signs and Symptoms  Outcome: Ongoing, Progressing     Problem: Fall Injury Risk  Goal: Absence of Fall and Fall-Related Injury  Outcome: Ongoing, Progressing     Problem: Skin Injury Risk Increased  Goal: Skin Health and Integrity  Outcome: Ongoing, Progressing

## 2022-08-19 NOTE — SUBJECTIVE & OBJECTIVE
Interval History:   T max 99   CRP with spike to 195 (prior 24)  Drain output continues to decrease  Complaining of new onset neck and upper back pain.   Blood cx NGTD  MRI spine pending.  Soft tissue US left thigh with two fluid collections medial and superior to incision:  3.5 X .6 X1.8 and 3.0 X .6 X 2.3.     Fever spike to 101.5 late this afternoon after visit    Review of Systems   Constitutional:  Negative for chills, diaphoresis, fatigue and fever.   HENT: Negative.     Respiratory:  Negative for cough and shortness of breath.    Cardiovascular:  Positive for leg swelling (upper left thigh). Negative for chest pain and palpitations.   Gastrointestinal:  Negative for abdominal distention and abdominal pain.   Genitourinary:  Negative for difficulty urinating and dysuria.   Musculoskeletal:  Positive for arthralgias (left wrist), myalgias (upper left thigh) and neck pain. Negative for joint swelling.   Skin:  Positive for wound (upper left thigh). Negative for color change and rash.   Allergic/Immunologic: Negative for immunocompromised state.   Neurological:  Negative for dizziness, tremors, weakness, numbness and headaches.   Psychiatric/Behavioral:  Negative for agitation and decreased concentration. The patient is not nervous/anxious.    Objective:     Vital Signs (Most Recent):  Temp: 98.8 °F (37.1 °C) (08/19/22 0858)  Pulse: 104 (08/19/22 0858)  Resp: 18 (08/19/22 0858)  BP: (!) 148/95 (08/19/22 0858)  SpO2: 97 % (08/19/22 0858)   Vital Signs (24h Range):  Temp:  [98.5 °F (36.9 °C)-99.4 °F (37.4 °C)] 98.8 °F (37.1 °C)  Pulse:  [] 104  Resp:  [16-18] 18  SpO2:  [94 %-97 %] 97 %  BP: ()/(58-95) 148/95     Weight: 104.3 kg (229 lb 15 oz)  Body mass index is 36.01 kg/m².    Estimated Creatinine Clearance: 16.9 mL/min (A) (based on SCr of 6.1 mg/dL (H)).    Physical Exam  Vitals and nursing note reviewed.   Constitutional:       General: He is not in acute distress.     Appearance: Normal  appearance. He is well-developed. He is obese. He is not ill-appearing, toxic-appearing or diaphoretic.   HENT:      Head: Normocephalic and atraumatic.      Nose: Nose normal.      Mouth/Throat:      Dentition: Does not have dentures.      Pharynx: No oropharyngeal exudate.   Eyes:      General: No scleral icterus.     Conjunctiva/sclera: Conjunctivae normal.   Cardiovascular:      Rate and Rhythm: Normal rate and regular rhythm.   Pulmonary:      Effort: Pulmonary effort is normal. No respiratory distress.      Breath sounds: Normal breath sounds.   Abdominal:      General: There is no distension.      Palpations: Abdomen is soft.      Tenderness: There is no abdominal tenderness. There is no guarding.   Musculoskeletal:         General: Tenderness (paraspinal cervical tenderness/.trapezius tenderness) present. No swelling.   Skin:     General: Skin is warm and dry.      Findings: No erythema or rash.      Comments: Left thigh with dressings in place. C/d/I. Surgical drain with dark bloody output.    IV site c/d/I.  No cord   Neurological:      Mental Status: He is alert and oriented to person, place, and time. Mental status is at baseline.   Psychiatric:         Mood and Affect: Mood normal.       Significant Labs: CBC:   Recent Labs   Lab 08/18/22  0537 08/19/22  0506   WBC 6.94 6.13   HGB 7.3* 8.1*   HCT 22.1* 24.4*    197       CMP:   Recent Labs   Lab 08/18/22  0537 08/19/22  0506    140   K 4.9 4.2    103   CO2 20* 23   * 82   BUN 57* 60*   CREATININE 7.0* 6.1*   CALCIUM 8.1* 9.1   PROT 5.7* 7.0   ALBUMIN 2.3* 2.5*   BILITOT 0.5 0.5   ALKPHOS 70 72   AST 32 26   ALT <5* 7*   ANIONGAP 13 14       Microbiology Results (last 7 days)       Procedure Component Value Units Date/Time    Blood culture [[id number]] Collected: 08/17/22 1231    Order Status: Completed Specimen: Blood Updated: 08/18/22 1412     Blood Culture, Routine No Growth to date      No Growth to date    Blood culture  [[id number]] Collected: 08/17/22 1223    Order Status: Completed Specimen: Blood Updated: 08/18/22 1412     Blood Culture, Routine No Growth to date      No Growth to date    Blood culture [[id number]]     Order Status: Canceled Specimen: Blood     Blood culture [[id number]]     Order Status: Canceled Specimen: Blood     Fungus culture [[id number]] Collected: 07/29/22 1251    Order Status: Completed Specimen: Abscess from Leg, Left Updated: 08/17/22 0904     Fungus (Mycology) Culture Culture in progress      No fungus isolated after 2 weeks    Narrative:      Left thigh abscess #1    Fungus culture [[id number]] Collected: 07/29/22 1300    Order Status: Completed Specimen: Abscess from Leg, Left Updated: 08/17/22 0904     Fungus (Mycology) Culture Culture in progress      No fungus isolated after 2 weeks    Narrative:      Left thigh abscess #2    Fungus culture [[id number]] Collected: 07/28/22 1800    Order Status: Completed Specimen: Abscess from Leg, Left Updated: 08/17/22 0904     Fungus (Mycology) Culture Culture in progress      No fungus isolated after 2 weeks    Narrative:      LEFT THIGH ABSCESS    Aerobic culture [[id number]]  (Abnormal)  (Susceptibility) Collected: 08/06/22 1153    Order Status: Completed Specimen: Wound from Hip, Left Updated: 08/15/22 1002     Aerobic Bacterial Culture STAPHYLOCOCCUS AUREUS  Few      Narrative:      #2 Left hip fluid          Recent Lab Results  (Last 5 results in the past 24 hours)        08/19/22  0903   08/19/22  0506   08/18/22  2132   08/18/22  1640   08/18/22  1114        Albumin   2.5             Alkaline Phosphatase   72             ALT   7             Anion Gap   14             AST   26             Baso #   0.02             Basophil %   0.3             BILIRUBIN TOTAL   0.5  Comment: For infants and newborns, interpretation of results should be based  on gestational age, weight and in agreement with clinical  observations.    Premature Infant recommended reference  ranges:  Up to 24 hours.............<8.0 mg/dL  Up to 48 hours............<12.0 mg/dL  3-5 days..................<15.0 mg/dL  6-29 days.................<15.0 mg/dL               BUN   60             Calcium   9.1             Chloride   103             CO2   23             Creatinine   6.1             CRP   195.2             Differential Method   Automated             eGFR   10.5             Eos #   0.4             Eosinophil %   6.9             Glucose   82             Gran # (ANC)   4.1             Gran %   67.2             Hematocrit   24.4             Hemoglobin   8.1             Immature Grans (Abs)   0.03  Comment: Mild elevation in immature granulocytes is non specific and   can be seen in a variety of conditions including stress response,   acute inflammation, trauma and pregnancy. Correlation with other   laboratory and clinical findings is essential.               Immature Granulocytes   0.5             Lymph #   1.2             Lymph %   19.2             Magnesium   1.9             MCH   30.8             MCHC   33.2             MCV   93             Mono #   0.4             Mono %   5.9             MPV   11.0             nRBC   0             Phosphorus   4.4             Platelets   197             POCT Glucose 94     136   109   178       Potassium   4.2             PROTEIN TOTAL   7.0             RBC   2.63             RDW   13.6             Sed Rate   58             Sodium   140             WBC   6.13                                    Significant Imaging:     Imaging Results              CT Thigh With Contrast Left (Final result)  Result time 07/27/22 01:13:09      Final result by Jori Hopkins DO (07/27/22 01:13:09)                   Impression:      Large heterogeneous fluid collection in the anterolateral left proximal thigh soft tissues, concerning for a soft tissue hematoma or Preston-Melissa lesion.  A neoplastic process is not entirely excluded.  Recommend close interval follow-up to assess for  stability/resolution.      Electronically signed by: Jori Hopkins  Date:    07/27/2022  Time:    01:13               Narrative:    EXAMINATION:  CT THIGH WITH CONTRAST LEFT    CLINICAL HISTORY:  Soft tissue mass, thigh, deep;    TECHNIQUE:  Axial CT images of the left thigh with sagittal and coronal reformats after the intravenous administration of 50 mL Omnipaque 350.    COMPARISON:  None.    FINDINGS:  Bone: Bone mineralization is normal.  There is no evidence of an acute fracture or dislocation.  Alignment is normal.    Soft tissues: There is a large heterogeneous fluid collection in the left anterolateral proximal thigh measuring approximately 16 x 7 x 6 cm.  The collection appears to be centered within the subcutaneous tissues or superficial fascia and abuts the gluteus musculature, the sartorius muscle, the rectus femoris, and the vastus lateralis.  There is soft tissue edema in the surrounding subcutaneous tissues.  Muscle bulk is normal.    Articulations: The left femoroacetabular joint is unremarkable.  The pubic symphysis is unremarkable.  The left knee is unremarkable.  No large joint effusion or significant cartilage loss.    Miscellaneous: Neurovascular structures are grossly intact.  There is moderate calcified atherosclerosis of the left femoral and popliteal arteries.                                       CTA Chest Non-Coronary (PE Study) (Final result)  Result time 07/26/22 21:36:01      Final result by Jori Hopkins DO (07/26/22 21:36:01)                   Impression:      No large central or lobar pulmonary embolism.      Electronically signed by: Jori Hopkins  Date:    07/26/2022  Time:    21:36               Narrative:    EXAMINATION:  CTA CHEST NON CORONARY    CLINICAL HISTORY:  Pulmonary embolism (PE) suspected, high prob;    TECHNIQUE:  Low dose axial images, sagittal and coronal reformations were obtained from the thoracic inlet to the lung bases following the IV administration of 100  mL of Omnipaque 350.  Contrast timing was optimized to evaluate the pulmonary arteries.  Maximum intensity projection images were provided for review.    COMPARISON:  Chest radiograph from earlier the same date.    FINDINGS:  Pulmonary vasculature: Satisfactory opacification of the pulmonary arterial system.  Motion artifact significantly limits evaluation of the segmental and subsegmental pulmonary arteries.  There is no large central or lobar pulmonary embolism.    Aorta: Left-sided aortic arch.  No aneurysm and no significant atherosclerosis.    Base of Neck: No significant abnormality.    Thoracic soft tissues: Normal.    Heart: Normal size. No effusion.    Amena/Mediastinum: No pathologic russ enlargement.    Airways: The large airways are patent. No foci of endobronchial filling.    Lungs/Pleura: Clear lungs. No pleural effusion or thickening.    Esophagus: Normal.    Upper Abdomen: No abnormality of the partially imaged upper abdomen.    Bones: No acute fracture. No suspicious lytic or sclerotic lesions.                                       X-Ray Chest 1 View (Final result)  Result time 07/26/22 21:23:42   Procedure changed from X-Ray Chest PA And Lateral     Final result by Olman Saucedo MD (07/26/22 21:23:42)                   Impression:      No convincing radiographic evidence of acute intrathoracic process on this single view..      Electronically signed by: Olman Saucedo MD  Date:    07/26/2022  Time:    21:23               Narrative:    EXAMINATION:  XR CHEST 1 VIEW    CLINICAL HISTORY:  shortness of breath;    TECHNIQUE:  Single frontal view of the chest was performed.    COMPARISON:  None    FINDINGS:  Cardiac monitoring leads overlie the chest.  Cardiac silhouette appears within normal limits.  No confluent airspace consolidation identified.  No significant volume of pleural fluid or pneumothorax appreciated.  The visualized osseous structures demonstrate mild degenerative changes.

## 2022-08-19 NOTE — SUBJECTIVE & OBJECTIVE
"Principal Problem:Abscess of left thigh    Principal Orthopedic Problem: Left thigh I&D 7/29 and 8/6  Staph species on thigh abscess cultures    Interval History: S/p repeat L thigh I&D 8/6/22. No drain output recorded last 24 hrs, output had been slowing (60cc > 60cc > 35cc). Tmax 99 yesterday. Labs pending this am. Repeat Bcx NGTD.    Review of patient's allergies indicates:  No Known Allergies    Current Facility-Administered Medications   Medication    acetaminophen tablet 1,000 mg    amLODIPine tablet 5 mg    atorvastatin tablet 20 mg    COVID-19 vac, forrest(Pfizer)(PF) (Pfizer COVID-19) 30 mcg/0.3 mL injection 0.3 mL    DAPTOmycin (CUBICIN) 625 mg in sodium chloride 0.9% 50 mL IVPB    dextrose 10% bolus 125 mL    dextrose 10% bolus 250 mL    glucagon (human recombinant) injection 1 mg    glucose chewable tablet 16 g    glucose chewable tablet 24 g    heparin (porcine) injection 5,000 Units    hydrALAZINE tablet 25 mg    hydrALAZINE tablet 25 mg    insulin aspart U-100 pen 1-10 Units    insulin aspart U-100 pen 8 Units    insulin detemir U-100 pen 10 Units    morphine injection 3 mg    ondansetron injection 4 mg    oxyCODONE immediate release tablet 5 mg    senna-docusate 8.6-50 mg per tablet 2 tablet    sevelamer carbonate tablet 800 mg    sodium chloride 0.9% flush 10 mL     Objective:     Vital Signs (Most Recent):  Temp: 98.7 °F (37.1 °C) (08/19/22 0356)  Pulse: 103 (08/19/22 0356)  Resp: 18 (08/19/22 0622)  BP: 138/71 (08/19/22 0356)  SpO2: (!) 94 % (08/19/22 0356) Vital Signs (24h Range):  Temp:  [98.5 °F (36.9 °C)-100.9 °F (38.3 °C)] 98.7 °F (37.1 °C)  Pulse:  [] 103  Resp:  [16-18] 18  SpO2:  [93 %-97 %] 94 %  BP: ()/(58-84) 138/71     Weight: 104.3 kg (229 lb 15 oz)  Height: 5' 7" (170.2 cm)  Body mass index is 36.01 kg/m².      Intake/Output Summary (Last 24 hours) at 8/19/2022 0716  Last data filed at 8/18/2022 1600  Gross per 24 hour   Intake 480 ml   Output 900 ml   Net -420 ml "         Ortho/SPM Exam  Left lower extremity  Left thigh dressing cdi  Drain in place, serous output, minimal exudative material  No erythema of thigh, no signficant swelling  Compartments soft and compressible  Painless ROM of hip and knee  NVI        CBC:   Recent Labs   Lab 08/17/22  0800 08/18/22  0537   WBC 3.11* 6.94   HGB 7.4* 7.3*   HCT 22.8* 22.1*    170       CMP:   Recent Labs   Lab 08/17/22  0800 08/18/22  0537    137   K 4.6 4.9    104   CO2 22* 20*   * 170*   BUN 60* 57*   CREATININE 7.4* 7.0*   CALCIUM 8.9 8.1*   PROT 6.3 5.7*   ALBUMIN 2.3* 2.3*   BILITOT 0.3 0.5   ALKPHOS 67 70   AST 16 32   ALT <5* <5*   ANIONGAP 11 13         All pertinent labs within the past 24 hours have been reviewed.    Significant Imaging: I have reviewed and interpreted all pertinent imaging results/findings.

## 2022-08-19 NOTE — SUBJECTIVE & OBJECTIVE
Interval History: see above    Review of Systems   Constitutional:  Negative for activity change, appetite change, chills, fatigue and fever.   HENT:  Negative for congestion, facial swelling, nosebleeds, sore throat and trouble swallowing.    Respiratory:  Negative for apnea, cough, choking, chest tightness, shortness of breath, wheezing and stridor.    Cardiovascular:  Negative for chest pain and leg swelling.   Gastrointestinal:  Negative for abdominal distention, abdominal pain, blood in stool, constipation, diarrhea, nausea and vomiting.   Genitourinary:  Negative for difficulty urinating, dysuria, flank pain, frequency, hematuria and urgency.   Musculoskeletal:  Negative for arthralgias, back pain, gait problem, neck pain and neck stiffness.   Skin:  Negative for color change, rash and wound.   Neurological:  Negative for dizziness, tremors, seizures, syncope, facial asymmetry, speech difficulty, weakness, light-headedness, numbness and headaches.   Psychiatric/Behavioral:  Negative for agitation, behavioral problems, confusion, decreased concentration, dysphoric mood, hallucinations, self-injury and sleep disturbance. The patient is not nervous/anxious and is not hyperactive.    Objective:     Vital Signs (Most Recent):  Temp: 98.7 °F (37.1 °C) (08/19/22 0356)  Pulse: 92 (08/19/22 0744)  Resp: 18 (08/19/22 0622)  BP: 138/71 (08/19/22 0356)  SpO2: (!) 94 % (08/19/22 0356)   Vital Signs (24h Range):  Temp:  [98.5 °F (36.9 °C)-99.4 °F (37.4 °C)] 98.7 °F (37.1 °C)  Pulse:  [] 92  Resp:  [16-18] 18  SpO2:  [94 %-97 %] 94 %  BP: ()/(58-84) 138/71     Weight: 104.3 kg (229 lb 15 oz)  Body mass index is 36.01 kg/m².    Intake/Output Summary (Last 24 hours) at 8/19/2022 0837  Last data filed at 8/19/2022 0600  Gross per 24 hour   Intake 1100 ml   Output 2960 ml   Net -1860 ml        Physical Exam  Constitutional:       General: He is not in acute distress.     Appearance: Normal appearance. He is not  ill-appearing, toxic-appearing or diaphoretic.   HENT:      Head: Normocephalic and atraumatic.      Nose: Nose normal.      Mouth/Throat:      Mouth: Mucous membranes are moist.      Pharynx: Oropharynx is clear.   Eyes:      General: No scleral icterus.     Extraocular Movements: Extraocular movements intact.      Conjunctiva/sclera: Conjunctivae normal.      Pupils: Pupils are equal, round, and reactive to light.   Neck:      Comments: Tender post neck and left trapezious  Cardiovascular:      Rate and Rhythm: Normal rate and regular rhythm.      Pulses: Normal pulses.      Heart sounds: Normal heart sounds.   Pulmonary:      Effort: Pulmonary effort is normal. No respiratory distress.      Breath sounds: Normal breath sounds. No stridor. No wheezing, rhonchi or rales.   Chest:      Chest wall: No tenderness.   Abdominal:      General: Abdomen is flat. Bowel sounds are normal. There is no distension.      Palpations: Abdomen is soft.      Tenderness: There is no abdominal tenderness. There is no right CVA tenderness, guarding or rebound.   Musculoskeletal:         General: Swelling and tenderness present. No deformity or signs of injury. Normal range of motion.      Cervical back: Normal range of motion and neck supple. Tenderness present. No rigidity.      Right lower leg: No edema.      Left lower leg: No edema.      Comments: Left lat thigh is bandaged   Lymphadenopathy:      Cervical: No cervical adenopathy.   Skin:     General: Skin is warm and dry.      Coloration: Skin is not jaundiced.      Findings: No erythema or rash.   Neurological:      General: No focal deficit present.      Mental Status: He is alert and oriented to person, place, and time. Mental status is at baseline.      Motor: No weakness.   Psychiatric:         Mood and Affect: Mood normal.         Behavior: Behavior normal.         Thought Content: Thought content normal.         Judgment: Judgment normal.       Significant Labs: All  pertinent labs within the past 24 hours have been reviewed.  CBC:   Recent Labs   Lab 08/18/22  0537 08/19/22  0506   WBC 6.94 6.13   HGB 7.3* 8.1*   HCT 22.1* 24.4*    197       CMP:   Recent Labs   Lab 08/18/22  0537 08/19/22  0506    140   K 4.9 4.2    103   CO2 20* 23   * 82   BUN 57* 60*   CREATININE 7.0* 6.1*   CALCIUM 8.1* 9.1   PROT 5.7* 7.0   ALBUMIN 2.3* 2.5*   BILITOT 0.5 0.5   ALKPHOS 70 72   AST 32 26   ALT <5* 7*   ANIONGAP 13 14         Significant Imaging: I have reviewed all pertinent imaging results/findings within the past 24 hours.

## 2022-08-19 NOTE — ASSESSMENT & PLAN NOTE
- Afebrile, no leukocytosis  - Continue Ancef  - S/p OR with Ortho 8/6 for repeat washout  - Intra-op cultures with S.aureus: changed to Ancef for MSSA then daptomycin on 8/15; CPK 11  - ID following  - PRN analgesics provided  - Continuing to monitor; maintain drain for now - Ortho continues to monitor  8/18- drain output 35 cc  Per ID: Continue Daptomycin 6mg/kg/dose q48 hours (renally dosed) for MSSA SSTI. Anticipate either completion of 14 days IV abx from 8/6 for SSTI or possible transition to orals.   8/19- will US for pockets of fluid, consider CT

## 2022-08-19 NOTE — PROGRESS NOTES
[facility] - Telemetry Stepdown  Orthopedics  Progress Note    Attg Note:  I agree with the resident's assessment and plan.  Patient with neck and back pain and continued lower output from his thigh drain.  Advanced imaging of his spine is indicated.    Rob Graff MD      Patient Name: Wilfredo Thomas  MRN: [id number]  Admission Date: 7/26/2022  Hospital Length of Stay: 24 days  Attending Provider: Dorota Reynoso MD  Primary Care Provider: Aylin Wayne DO  Follow-up For: Procedure(s) (LRB):  Incision and Drainage - LEFT THIGH. (Left)    Post-Operative Day: 13 Days Post-Op  Subjective:     Principal Problem:Abscess of left thigh    Principal Orthopedic Problem: Left thigh I&D 7/29 and 8/6  Staph species on thigh abscess cultures    Interval History: S/p repeat L thigh I&D 8/6/22. Drain output 30cc last 24 hrs, output downtrending (60cc > 60cc > 35cc). Tmax 99 yesterday.  (had been steadily downtrending to 24 at last 3 values). WBC 6. Repeat Bcx NGTD. Patient complaining of new onset neck/upper back pain since last night.    Review of patient's allergies indicates:  No Known Allergies    Current Facility-Administered Medications   Medication    acetaminophen tablet 1,000 mg    amLODIPine tablet 5 mg    atorvastatin tablet 20 mg    COVID-19 vac, forrest(Pfizer)(PF) (Pfizer COVID-19) 30 mcg/0.3 mL injection 0.3 mL    DAPTOmycin (CUBICIN) 625 mg in sodium chloride 0.9% 50 mL IVPB    dextrose 10% bolus 125 mL    dextrose 10% bolus 250 mL    glucagon (human recombinant) injection 1 mg    glucose chewable tablet 16 g    glucose chewable tablet 24 g    heparin (porcine) injection 5,000 Units    hydrALAZINE tablet 25 mg    hydrALAZINE tablet 25 mg    insulin aspart U-100 pen 1-10 Units    insulin aspart U-100 pen 8 Units    insulin detemir U-100 pen 10 Units    morphine injection 3 mg    ondansetron injection 4 mg    oxyCODONE immediate release tablet 5 mg    senna-docusate 8.6-50 mg per tablet 2 tablet    sevelamer  "carbonate tablet 800 mg    sodium chloride 0.9% flush 10 mL     Objective:     Vital Signs (Most Recent):  Temp: 98.7 °F (37.1 °C) (08/19/22 0356)  Pulse: 103 (08/19/22 0356)  Resp: 18 (08/19/22 0622)  BP: 138/71 (08/19/22 0356)  SpO2: (!) 94 % (08/19/22 0356) Vital Signs (24h Range):  Temp:  [98.5 °F (36.9 °C)-100.9 °F (38.3 °C)] 98.7 °F (37.1 °C)  Pulse:  [] 103  Resp:  [16-18] 18  SpO2:  [93 %-97 %] 94 %  BP: ()/(58-84) 138/71     Weight: 104.3 kg (229 lb 15 oz)  Height: 5' 7" (170.2 cm)  Body mass index is 36.01 kg/m².      Intake/Output Summary (Last 24 hours) at 8/19/2022 0716  Last data filed at 8/18/2022 1600  Gross per 24 hour   Intake 480 ml   Output 900 ml   Net -420 ml         Ortho/SPM Exam  AOx4  NAD  Well perfused extremities  Non labored breathing    TTP cervical/upper throracic vertebra  Shooting pain with neck and BUE ROM  Sensation intact BUE  Motor intact BUE    Left lower extremity  Left thigh dressing cdi  Drain in place, serous output, minimal exudative material  No erythema of thigh, no signficant swelling  Compartments soft and compressible  Painless ROM of hip and knee  NVI        CBC:   Recent Labs   Lab 08/17/22  0800 08/18/22  0537   WBC 3.11* 6.94   HGB 7.4* 7.3*   HCT 22.8* 22.1*    170       CMP:   Recent Labs   Lab 08/17/22  0800 08/18/22  0537    137   K 4.6 4.9    104   CO2 22* 20*   * 170*   BUN 60* 57*   CREATININE 7.4* 7.0*   CALCIUM 8.9 8.1*   PROT 6.3 5.7*   ALBUMIN 2.3* 2.3*   BILITOT 0.3 0.5   ALKPHOS 67 70   AST 16 32   ALT <5* <5*   ANIONGAP 11 13         All pertinent labs within the past 24 hours have been reviewed.    Significant Imaging: I have reviewed and interpreted all pertinent imaging results/findings.      Assessment/Plan:     * Abscess of left thigh  47M with DM admitted 7/26/22 for DKA, ortho following for left lateral thigh abscess. s/p I&D and drain placement left thigh 7/29/22. Cx +MSSA. Had high drain purulent output " after first I&D, was taken back to OR 8/6/22 for repeat I&D. Drain in place, dressings CDI. Nephrology on board for ANGE. New onset fever 8/17, source workup initiated. At this point drain output is decreasing, no warmth or TTP to left thigh - it would not appear that thigh abscess is source from our standpoint. Will continue to closely monitor situation. New onset neck pain today, concern for spine infection.     -- Will order MRI to evaluate  -- ANGE nephrology managing   -- DVT SQH  -- Daptomycin per ID (possible adverse reaction to beta lactam)  -- Pain MM  -- Drain output 35cc last 24 hours, will monitor  -- Continue to evaluate for additional source of fever.  -- Continue trending ESR/CRP  -- Encourage ambulation    Dispo: Observe drain, tight BG control, manage ANGE          Aron Vieyra MD  Orthopedics  [facility] - Telemetry Stepdown

## 2022-08-19 NOTE — ASSESSMENT & PLAN NOTE
47M with DM admitted 7/26/22 for DKA, ortho following for left lateral thigh abscess. s/p I&D and drain placement left thigh 7/29/22. Cx +MSSA. Had high drain purulent output after first I&D, was taken back to OR 8/6/22 for repeat I&D. Drain in place, dressings CDI. Nephrology on board for ANGE. New onset fever 8/17, source workup initiated. At this point drain output is decreasing, no warmth or TTP to left thigh - it would not appear that thigh abscess is source from our standpoint. Will continue to closely monitor situation.    -- ANGE nephrology managing   -- DVT SQH  -- Daptomycin per ID (possible adverse reaction to beta lactam)  -- Pain MM  -- Drain output 35cc last 24 hours, will monitor  -- Continue to evaluate for additional source of fever. Decreased SpO2 - possible atelectasis?  -- Continue trending ESR/CRP  -- Encourage ambulation    Dispo: Observe drain, tight BG control, manage ANGE

## 2022-08-19 NOTE — PROGRESS NOTES
"[facility] - Tele[facility] Medicine  Progress Note    Patient Name: Wilfredo Thomas  MRN: [id number]  Patient Class: IP- Inpatient   Admission Date: 7/26/2022  Length of Stay: 24 days  Attending Physician: Dorota Reynoso MD  Primary Care Provider: Aylin Wayne DO        Subjective:     Principal Problem:Abscess of left thigh        HPI:  46 y/o M hx of HTN, IDDM2, GERD obesity, HLD presented to the ED with complaint of 1 day of worsening SOB. Wife present at bedside. Patient states that he noticed yesterday he was feeling some shortness of breath and couldn't seem to catch his breath. His wife went to check on him this morning and noticed that he was breathing "fast and heavy" and he sounded like he was slurring his words. Wife was concerned about a stroke, so he brought him to the ED for evaluation.     Patient also reporting significant pain and swelling along his left lateral proximal thigh. He states he has noticed worsening pain over the past 2 weeks. He denies any acute trauma to the area. He was seen by ortho last week and was told to take ibu-profen for a muscle strain. This did not help his pain, so he presented again on 7/22 and was given a prednisone injection into his thigh. This also did not help his pain, and now the pain and swelling have advanced to the point that he has difficulty with ambulation. Patient denies fever, chills, nausea, vomiting.      Of note, patient has hx of poorly controlled diabetes. He was recently started on insulin by his PCP, but he has not taken any insulin since being prescribed it.      In the ED, patient hypertensive and tachycardic to the 130s. Tachypneic with RR in the 40s. On CBC, WBC 26.9, On CMP patient hyperkalemic to 5.5, CorNa 136, Bicarb 5. Cr elevated to 1.9 from BL 0.83. UA, BHB pending at time of admission. CTA without evidence of pulmonary embolism. Critical Care Medicine consulted given severe acidosis.             Overview/Hospital " Course:    Patient admitted to the MICU for management of severe DKA. Metabolic acidosis improving on insulin drip. He also has had left thigh pain for 1-2 months. There is a large mass on his left thigh that is tender to palpation, CT revealed hematoma vs soft tissue growth. General surgery consulted and recommended IR consult. IR consulted, no indication for tap.   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 Ortho consulted and performed bedside aspiration of possible abscess which revealed >200K cells >80% segs. Will hold off on antibiotics for now per ortho recs, ortho is taking patient to the OR for irrigation of the thigh, will start antibiotics afterwards.         7/29 Metabolic acidosis slowly improving. Ortho taking patient to the OR for irrigation of the left thigh, will start antibiotics afterwards.   7/30 Transfer to hospital medicine . S/p I&D  of left thigh Abscess on7/29/22 - MRI - uptured tensor fascia jose luis (TFL) muscle with associated large hematoma. gram stain -Moderate Gram positive cocci in clusters .cultures pending.  continue Vancomycin, clindamycin and Zosyn . Bicarb 15. K replaced . off insulin drip on SQ insulin.  7/31 ID and endocrine consulted.  abscess with Calcium pyrophosphate crystals with unknown significance potassium and P replaced. Bicarbonate WNL . aerobic culture 7/28 STAPHYLOCOCCUS SPECIES . Glucose in 300s .PRN imodium  for diarrhea. PT/OT consulted - WBAT. Surgical drain with purulent output  8/1 PICC team consulted for IV access. vanc trough at 11.4. monitor for vanomycin toxicity. possible OR tomorrow for washout  may need bone biopsy to r/o osteomyelitis.Discontinued clindamycin.   8/2 glucose in 300s.  Increased Levemir  to 14 units BID and Aspart  10 units TIDWM. K replaced   8/3 ANGE with cr 0.7--> 2.7. likely vanc induced ATN with levels 40. urine studies ordered renal sonogram WNL  nephrology consulted.  started on NS 100ml/h x 10h and follow up renal panel.Zosyn and  vancomycin discontinued. Strict IO monitor. condom catheter . switched to Ancef for MSSA on culture  Interval History:  8/4- MSSA- see below. /85 VSS. On room air, eating, BM on 8/2, good UO. Appreciate ortho recs. On Cephazolin, detim 14 bid and aspart 12 ac.  8/5-Cr still rising, cr =6.  Wrist still hurting. Ortho to tap it to eval for infection and gout today. /90   Pulse 85  AF, no other signs of infection.  Will discuss w Nephrology- received NS x one liter yesterday. Will repeat today. Suspect auto-diuresis.   8/6: washout with ortho today. Cr continues to rise (7.1), nephrology following  8/7: Cr 7.4, phos increasing; tolerated surgery well yesterday, intra-op cultures pending  8/8: Cr increased to 8.1, hyperphosphatemia again noted; continues to make urine, no acute indication for HD per nephrology. Intra-op cultures with S.aureus, continued cefazolin pending final C&S results.   Interval History:   8/18- 47M with DM admitted 7/26/22 for DKA, ortho following for left lateral thigh abscess. s/p I&D and drain placement left thigh 7/29/22. Cx +MSSA. Had high drain purulent output after first I&D, was taken back to OR 8/6/22 for repeat I&D. Drain in place, dressings CDI. Nephrology on board for ANGE.   pt transferred from telemedicine, had fever yesterday,  -> 90.  Wbc -ok. Covid negative. Drain output paula to 35 cc. Pt on Ancef.   No urine output, cr rising, Nephrology following, no indication for HD presently. Ortho may need to repeat debridement.      9/19- /71   Pulse 92 AF. Appreciate ortho and ID input, pt on daptomycin. T max 99.4. drain functioning. Blood cult 8/17 are neg so far, CRP and procal were higher. Will US the wound site for pockets of fluid.   - had fever and rigors  after receiving dapto today, MRI spine - neck ordered for acute neck/ back pain. Blood culture, lactic acid, robaxin ordered  - still  febrile. Add cefepime, cooling blanket, cannot have NSAID    Interval History: see above    Review of Systems   Constitutional:  Negative for activity change, appetite change, chills, fatigue and fever.   HENT:  Negative for congestion, facial swelling, nosebleeds, sore throat and trouble swallowing.    Respiratory:  Negative for apnea, cough, choking, chest tightness, shortness of breath, wheezing and stridor.    Cardiovascular:  Negative for chest pain and leg swelling.   Gastrointestinal:  Negative for abdominal distention, abdominal pain, blood in stool, constipation, diarrhea, nausea and vomiting.   Genitourinary:  Negative for difficulty urinating, dysuria, flank pain, frequency, hematuria and urgency.   Musculoskeletal:  Negative for arthralgias, back pain, gait problem, neck pain and neck stiffness.   Skin:  Negative for color change, rash and wound.   Neurological:  Negative for dizziness, tremors, seizures, syncope, facial asymmetry, speech difficulty, weakness, light-headedness, numbness and headaches.   Psychiatric/Behavioral:  Negative for agitation, behavioral problems, confusion, decreased concentration, dysphoric mood, hallucinations, self-injury and sleep disturbance. The patient is not nervous/anxious and is not hyperactive.    Objective:     Vital Signs (Most Recent):  Temp: 98.7 °F (37.1 °C) (08/19/22 0356)  Pulse: 92 (08/19/22 0744)  Resp: 18 (08/19/22 0622)  BP: 138/71 (08/19/22 0356)  SpO2: (!) 94 % (08/19/22 0356)   Vital Signs (24h Range):  Temp:  [98.5 °F (36.9 °C)-99.4 °F (37.4 °C)] 98.7 °F (37.1 °C)  Pulse:  [] 92  Resp:  [16-18] 18  SpO2:  [94 %-97 %] 94 %  BP: ()/(58-84) 138/71     Weight: 104.3 kg (229 lb 15 oz)  Body mass index is 36.01 kg/m².    Intake/Output Summary (Last 24 hours) at 8/19/2022 0837  Last data filed at 8/19/2022 0600  Gross per 24 hour   Intake 1100 ml   Output 2960 ml   Net -1860 ml        Physical Exam  Constitutional:       General: He is not in acute  distress.     Appearance: Normal appearance. He is not ill-appearing, toxic-appearing or diaphoretic.   HENT:      Head: Normocephalic and atraumatic.      Nose: Nose normal.      Mouth/Throat:      Mouth: Mucous membranes are moist.      Pharynx: Oropharynx is clear.   Eyes:      General: No scleral icterus.     Extraocular Movements: Extraocular movements intact.      Conjunctiva/sclera: Conjunctivae normal.      Pupils: Pupils are equal, round, and reactive to light.   Neck:      Comments: Tender post neck and left trapezious  Cardiovascular:      Rate and Rhythm: Normal rate and regular rhythm.      Pulses: Normal pulses.      Heart sounds: Normal heart sounds.   Pulmonary:      Effort: Pulmonary effort is normal. No respiratory distress.      Breath sounds: Normal breath sounds. No stridor. No wheezing, rhonchi or rales.   Chest:      Chest wall: No tenderness.   Abdominal:      General: Abdomen is flat. Bowel sounds are normal. There is no distension.      Palpations: Abdomen is soft.      Tenderness: There is no abdominal tenderness. There is no right CVA tenderness, guarding or rebound.   Musculoskeletal:         General: Swelling and tenderness present. No deformity or signs of injury. Normal range of motion.      Cervical back: Normal range of motion and neck supple. Tenderness present. No rigidity.      Right lower leg: No edema.      Left lower leg: No edema.      Comments: Left lat thigh is bandaged   Lymphadenopathy:      Cervical: No cervical adenopathy.   Skin:     General: Skin is warm and dry.      Coloration: Skin is not jaundiced.      Findings: No erythema or rash.   Neurological:      General: No focal deficit present.      Mental Status: He is alert and oriented to person, place, and time. Mental status is at baseline.      Motor: No weakness.   Psychiatric:         Mood and Affect: Mood normal.         Behavior: Behavior normal.         Thought Content: Thought content normal.          Judgment: Judgment normal.       Significant Labs: All pertinent labs within the past 24 hours have been reviewed.  CBC:   Recent Labs   Lab 08/18/22  0537 08/19/22  0506   WBC 6.94 6.13   HGB 7.3* 8.1*   HCT 22.1* 24.4*    197       CMP:   Recent Labs   Lab 08/18/22  0537 08/19/22  0506    140   K 4.9 4.2    103   CO2 20* 23   * 82   BUN 57* 60*   CREATININE 7.0* 6.1*   CALCIUM 8.1* 9.1   PROT 5.7* 7.0   ALBUMIN 2.3* 2.5*   BILITOT 0.5 0.5   ALKPHOS 70 72   AST 32 26   ALT <5* 7*   ANIONGAP 13 14         Significant Imaging: I have reviewed all pertinent imaging results/findings within the past 24 hours.      Assessment/Plan:      * Abscess of left thigh  - Afebrile, no leukocytosis  - Continue Ancef  - S/p OR with Ortho 8/6 for repeat washout  - Intra-op cultures with S.aureus: changed to Ancef for MSSA then daptomycin on 8/15; CPK 11  - ID following  - PRN analgesics provided  - Continuing to monitor; maintain drain for now - Ortho continues to monitor  8/18- drain output 35 cc  Per ID: Continue Daptomycin 6mg/kg/dose q48 hours (renally dosed) for MSSA SSTI. Anticipate either completion of 14 days IV abx from 8/6 for SSTI or possible transition to orals.   8/19- will US for pockets of fluid, consider CT      ANGE (acute kidney injury)  Estimated Creatinine Clearance: 16.9 mL/min (A) (based on SCr of 6.1 mg/dL (H)). - improved  - Hyperphosphatemia also noted - start binder  - Continues to make urine  - Nephrology following: no acute indication for HD at this time  - Renally dosing all medications  - Avoid nephrotoxins  - nephrology has cleared for discharge once Cr < 7    8/19 - cr 6.1      Acute neck pain  MRI- pending  - has muscular spasm on exam  Robaxin added      Fever  Uncertain etiology  On 8/17, improved for now, on ancef  Still tachycardia  Wbc 6  Blood cult 8/17 - NGSF  drain output improving  covid negative.   cxr negative 8/17  UA negative 8/17  Will chat w Ortho regarding  further plans for debridement  8/19- US wound site for pockets of fluid,   Fluid collection located medial to the bandage: 3.5 x 0.6 x 1.8 cm.  Fluid collection located superior to bandage: 3.0 x 0.6 x 2.3 cm.  CRP rising. Drain output improved- gradually reducing  Fever after receiving Daptomycin today. Will discuss w ID. Repeated blood culture and lactic acid      Pain and swelling of left wrist  Pseudogout  - Tapped by Ortho, fluid reviewed: does not appear to be gout or septic arthritis, likely pseudogout (calcium pyrophosphate crystals on previous LE aspiration)  - PRN analgesics provided  -improved after steroid injection  8/19- resolved    Sepsis  -patient with fever to 102 and tachycardia on 8/17 - repeat blood cultures, UA, COVID swab pending  -CXR without infiltrate, lactate nl, procalcitonin mildly elevated (due to ANGE)  -remains on dapto for MSSA abscess to thigh  -eosinophils nl and no leukocytosis  -ID and ortho notified of change  -minimize IVF due to improving ANGE with signs of fluid retention  8/19- on daptomycin      Type 2 diabetes mellitus with hyperglycemia, with long-term current use of insulin  - DKA has resolved  - Endocrinology consulted and managing.  Recent Labs     08/17/22  1536 08/17/22  2224 08/18/22  0752 08/18/22  1114 08/18/22  1640 08/18/22  2132   POCTGLUCOSE 99 159* 191* 178* 109 136*         Hypertension associated with type 2 diabetes mellitus  - Continue home regimen of amlodipine; lisinopril held due to poor renal function  - continue to monitor and further titrate antihypertensives as clinically indicated   -increase amlodipine to BID on 8/13 for elevated BP; avoid hypotension with ANGE  8/18- /60  -stable      Hematoma of left thigh  - See Abscess of L thigh    Anemia of chronic disease  - H/H slowly declining  - transfuse for Hgb < 7  - continue to monitor     Class 2 severe obesity due to excess calories with serious comorbidity and body mass index (BMI) of 36.0 to  36.9 in adult  - Body mass index is 36.01 kg/m².  - Needs dietary and lifestyle modifications in relation to health    Hyperlipidemia associated with type 2 diabetes mellitus  - Continue home statin     VTE Risk Mitigation (From admission, onward)         Ordered     heparin (porcine) injection 5,000 Units  Every 8 hours         08/07/22 1010     Place sequential compression device  Until discontinued         08/06/22 0855     Place sequential compression device  Until discontinued         07/29/22 0158     IP VTE HIGH RISK PATIENT  Once         07/29/22 0158     Place SAVANNAH hose  Until discontinued         07/26/22 2240     Place sequential compression device  Until discontinued         07/26/22 2240                Discharge Planning   FILEMON: 8/20/2022     Code Status: Full Code   Is the patient medically ready for discharge?: No    Reason for patient still in hospital (select all that apply): Patient trending condition  Discharge Plan A: Home Health          Dorota Reynoso MD  Department of Hospital Medicine   [facility] - Tele[facility] Stepdown

## 2022-08-19 NOTE — ASSESSMENT & PLAN NOTE
Uncertain etiology  On 8/17 and 8/19.   Still tachycardia  Wbc 6  Blood cult 8/17 - NGSF  drain output improving  covid negative.   cxr negative 8/17  UA negative 8/17  Will chat w Ortho regarding further plans for debridement  8/19- US wound site for pockets of fluid,   Fluid collection located medial to the bandage: 3.5 x 0.6 x 1.8 cm.  Fluid collection located superior to bandage: 3.0 x 0.6 x 2.3 cm.  CRP rising. Drain output improved- gradually reducing  Fever after receiving Daptomycin today. Will discuss w ID. Repeated blood culture and lactic acid    8/20 - cefepime added to dapto  last night, developed blisters at IV site. MRI revealed evidence of celluilits and consolidation NAVDEEP pneumonia

## 2022-08-19 NOTE — ASSESSMENT & PLAN NOTE
Uncertain etiology  On 8/17, improved for now, on ancef  Still tachycardia  Wbc 6  Blood cult 8/17 - NGSF  drain output improving  covid negative.   cxr negative 8/17  UA negative 8/17  Will chat w Ortho regarding further plans for debridement  8/19- US wound site for pockets of fluid,   Fluid collection located medial to the bandage: 3.5 x 0.6 x 1.8 cm.  Fluid collection located superior to bandage: 3.0 x 0.6 x 2.3 cm.  CRP rising. Drain output improved- gradually reducing  Fever after receiving Daptomycin today. Will discuss w ID. Repeated blood culture and lactic acid

## 2022-08-19 NOTE — ASSESSMENT & PLAN NOTE
-patient with fever to 102 and tachycardia on 8/17 - repeat blood cultures, UA, COVID swab pending  -CXR without infiltrate, lactate nl, procalcitonin mildly elevated (due to ANGE)  -remains on dapto for MSSA abscess to thigh  -eosinophils nl and no leukocytosis  -ID and ortho notified of change  -minimize IVF due to improving ANGE with signs of fluid retention  8/19- on daptomycin

## 2022-08-20 PROBLEM — L23.1 ALLERGIC CONTACT DERMATITIS DUE TO ADHESIVES: Status: ACTIVE | Noted: 2022-08-20

## 2022-08-20 PROBLEM — I80.8 SUPERFICIAL THROMBOPHLEBITIS OF RIGHT UPPER EXTREMITY: Status: ACTIVE | Noted: 2022-08-20

## 2022-08-20 LAB
ABO + RH BLD: NORMAL
ALBUMIN SERPL BCP-MCNC: 2.1 G/DL (ref 3.5–5.2)
ALP SERPL-CCNC: 60 U/L (ref 55–135)
ALT SERPL W/O P-5'-P-CCNC: <5 U/L (ref 10–44)
ANION GAP SERPL CALC-SCNC: 11 MMOL/L (ref 8–16)
AST SERPL-CCNC: 19 U/L (ref 10–40)
BASOPHILS # BLD AUTO: 0.01 K/UL (ref 0–0.2)
BASOPHILS NFR BLD: 0.2 % (ref 0–1.9)
BILIRUB SERPL-MCNC: 0.6 MG/DL (ref 0.1–1)
BLD GP AB SCN CELLS X3 SERPL QL: NORMAL
BLD PROD TYP BPU: NORMAL
BLOOD UNIT EXPIRATION DATE: NORMAL
BLOOD UNIT TYPE CODE: 7300
BLOOD UNIT TYPE: NORMAL
BUN SERPL-MCNC: 52 MG/DL (ref 6–20)
CALCIUM SERPL-MCNC: 8.6 MG/DL (ref 8.7–10.5)
CHLORIDE SERPL-SCNC: 105 MMOL/L (ref 95–110)
CK SERPL-CCNC: <7 U/L (ref 20–200)
CO2 SERPL-SCNC: 23 MMOL/L (ref 23–29)
CODING SYSTEM: NORMAL
CREAT SERPL-MCNC: 5.2 MG/DL (ref 0.5–1.4)
DIFFERENTIAL METHOD: ABNORMAL
DISPENSE STATUS: NORMAL
EOSINOPHIL # BLD AUTO: 0.3 K/UL (ref 0–0.5)
EOSINOPHIL NFR BLD: 6.9 % (ref 0–8)
ERYTHROCYTE [DISTWIDTH] IN BLOOD BY AUTOMATED COUNT: 13.7 % (ref 11.5–14.5)
EST. GFR  (NO RACE VARIABLE): 12.8 ML/MIN/1.73 M^2
GLUCOSE SERPL-MCNC: 81 MG/DL (ref 70–110)
HCT VFR BLD AUTO: 21.1 % (ref 40–54)
HGB BLD-MCNC: 6.8 G/DL (ref 14–18)
IMM GRANULOCYTES # BLD AUTO: 0.03 K/UL (ref 0–0.04)
IMM GRANULOCYTES NFR BLD AUTO: 0.6 % (ref 0–0.5)
LYMPHOCYTES # BLD AUTO: 0.9 K/UL (ref 1–4.8)
LYMPHOCYTES NFR BLD: 18.9 % (ref 18–48)
MAGNESIUM SERPL-MCNC: 1.8 MG/DL (ref 1.6–2.6)
MCH RBC QN AUTO: 30.1 PG (ref 27–31)
MCHC RBC AUTO-ENTMCNC: 32.2 G/DL (ref 32–36)
MCV RBC AUTO: 93 FL (ref 82–98)
MONOCYTES # BLD AUTO: 0.3 K/UL (ref 0.3–1)
MONOCYTES NFR BLD: 6.7 % (ref 4–15)
NEUTROPHILS # BLD AUTO: 3.2 K/UL (ref 1.8–7.7)
NEUTROPHILS NFR BLD: 66.7 % (ref 38–73)
NRBC BLD-RTO: 0 /100 WBC
PHOSPHATE SERPL-MCNC: 4.8 MG/DL (ref 2.7–4.5)
PLATELET # BLD AUTO: 168 K/UL (ref 150–450)
PMV BLD AUTO: 10.7 FL (ref 9.2–12.9)
POCT GLUCOSE: 121 MG/DL (ref 70–110)
POCT GLUCOSE: 124 MG/DL (ref 70–110)
POCT GLUCOSE: 141 MG/DL (ref 70–110)
POCT GLUCOSE: 77 MG/DL (ref 70–110)
POCT GLUCOSE: 77 MG/DL (ref 70–110)
POTASSIUM SERPL-SCNC: 3.7 MMOL/L (ref 3.5–5.1)
PROT SERPL-MCNC: 6.2 G/DL (ref 6–8.4)
RBC # BLD AUTO: 2.26 M/UL (ref 4.6–6.2)
SODIUM SERPL-SCNC: 139 MMOL/L (ref 136–145)
TRANS ERYTHROCYTES VOL PATIENT: NORMAL ML
WBC # BLD AUTO: 4.77 K/UL (ref 3.9–12.7)

## 2022-08-20 PROCEDURE — 86901 BLOOD TYPING SEROLOGIC RH(D): CPT | Performed by: HOSPITALIST

## 2022-08-20 PROCEDURE — P9021 RED BLOOD CELLS UNIT: HCPCS | Performed by: HOSPITALIST

## 2022-08-20 PROCEDURE — 83735 ASSAY OF MAGNESIUM: CPT | Performed by: STUDENT IN AN ORGANIZED HEALTH CARE EDUCATION/TRAINING PROGRAM

## 2022-08-20 PROCEDURE — 99233 SBSQ HOSP IP/OBS HIGH 50: CPT | Mod: ,,, | Performed by: HOSPITALIST

## 2022-08-20 PROCEDURE — 82550 ASSAY OF CK (CPK): CPT | Performed by: NURSE PRACTITIONER

## 2022-08-20 PROCEDURE — 36415 COLL VENOUS BLD VENIPUNCTURE: CPT | Performed by: HOSPITALIST

## 2022-08-20 PROCEDURE — 25000003 PHARM REV CODE 250: Performed by: HOSPITALIST

## 2022-08-20 PROCEDURE — 20600001 HC STEP DOWN PRIVATE ROOM

## 2022-08-20 PROCEDURE — 84100 ASSAY OF PHOSPHORUS: CPT | Performed by: STUDENT IN AN ORGANIZED HEALTH CARE EDUCATION/TRAINING PROGRAM

## 2022-08-20 PROCEDURE — 25000003 PHARM REV CODE 250

## 2022-08-20 PROCEDURE — 63600175 PHARM REV CODE 636 W HCPCS

## 2022-08-20 PROCEDURE — 80053 COMPREHEN METABOLIC PANEL: CPT | Performed by: STUDENT IN AN ORGANIZED HEALTH CARE EDUCATION/TRAINING PROGRAM

## 2022-08-20 PROCEDURE — 99233 SBSQ HOSP IP/OBS HIGH 50: CPT | Mod: ,,, | Performed by: INTERNAL MEDICINE

## 2022-08-20 PROCEDURE — 99233 PR SUBSEQUENT HOSPITAL CARE,LEVL III: ICD-10-PCS | Mod: ,,, | Performed by: INTERNAL MEDICINE

## 2022-08-20 PROCEDURE — 36415 COLL VENOUS BLD VENIPUNCTURE: CPT | Performed by: NURSE PRACTITIONER

## 2022-08-20 PROCEDURE — 25000003 PHARM REV CODE 250: Performed by: INTERNAL MEDICINE

## 2022-08-20 PROCEDURE — 63600175 PHARM REV CODE 636 W HCPCS: Performed by: HOSPITALIST

## 2022-08-20 PROCEDURE — 99233 PR SUBSEQUENT HOSPITAL CARE,LEVL III: ICD-10-PCS | Mod: ,,, | Performed by: HOSPITALIST

## 2022-08-20 PROCEDURE — 85025 COMPLETE CBC W/AUTO DIFF WBC: CPT | Performed by: STUDENT IN AN ORGANIZED HEALTH CARE EDUCATION/TRAINING PROGRAM

## 2022-08-20 PROCEDURE — 63600175 PHARM REV CODE 636 W HCPCS: Performed by: STUDENT IN AN ORGANIZED HEALTH CARE EDUCATION/TRAINING PROGRAM

## 2022-08-20 PROCEDURE — 86920 COMPATIBILITY TEST SPIN: CPT | Performed by: HOSPITALIST

## 2022-08-20 PROCEDURE — 36430 TRANSFUSION BLD/BLD COMPNT: CPT

## 2022-08-20 RX ORDER — TRIAMCINOLONE ACETONIDE 1 MG/G
CREAM TOPICAL 2 TIMES DAILY
Status: DISCONTINUED | OUTPATIENT
Start: 2022-08-20 | End: 2022-08-24 | Stop reason: HOSPADM

## 2022-08-20 RX ORDER — HYDROCODONE BITARTRATE AND ACETAMINOPHEN 500; 5 MG/1; MG/1
TABLET ORAL
Status: DISCONTINUED | OUTPATIENT
Start: 2022-08-20 | End: 2022-08-22

## 2022-08-20 RX ORDER — CEFEPIME HYDROCHLORIDE 1 G/50ML
2 INJECTION, SOLUTION INTRAVENOUS
Status: DISCONTINUED | OUTPATIENT
Start: 2022-08-20 | End: 2022-08-20

## 2022-08-20 RX ADMIN — INSULIN DETEMIR 10 UNITS: 100 INJECTION, SOLUTION SUBCUTANEOUS at 09:08

## 2022-08-20 RX ADMIN — HYDRALAZINE HYDROCHLORIDE 25 MG: 25 TABLET, FILM COATED ORAL at 09:08

## 2022-08-20 RX ADMIN — OXYCODONE 5 MG: 5 TABLET ORAL at 01:08

## 2022-08-20 RX ADMIN — TRIAMCINOLONE ACETONIDE: 1 CREAM TOPICAL at 09:08

## 2022-08-20 RX ADMIN — INSULIN ASPART 8 UNITS: 100 INJECTION, SOLUTION INTRAVENOUS; SUBCUTANEOUS at 09:08

## 2022-08-20 RX ADMIN — SEVELAMER CARBONATE 0.8 G: 800 POWDER, FOR SUSPENSION ORAL at 06:08

## 2022-08-20 RX ADMIN — METHOCARBAMOL 500 MG: 500 TABLET ORAL at 06:08

## 2022-08-20 RX ADMIN — AMLODIPINE BESYLATE 5 MG: 5 TABLET ORAL at 09:08

## 2022-08-20 RX ADMIN — OXYCODONE 5 MG: 5 TABLET ORAL at 10:08

## 2022-08-20 RX ADMIN — METHOCARBAMOL 500 MG: 500 TABLET ORAL at 09:08

## 2022-08-20 RX ADMIN — MORPHINE SULFATE 3 MG: 2 INJECTION, SOLUTION INTRAMUSCULAR; INTRAVENOUS at 03:08

## 2022-08-20 RX ADMIN — OXYCODONE 5 MG: 5 TABLET ORAL at 06:08

## 2022-08-20 RX ADMIN — INSULIN ASPART 8 UNITS: 100 INJECTION, SOLUTION INTRAVENOUS; SUBCUTANEOUS at 01:08

## 2022-08-20 RX ADMIN — OXYCODONE 5 MG: 5 TABLET ORAL at 09:08

## 2022-08-20 RX ADMIN — HEPARIN SODIUM 5000 UNITS: 5000 INJECTION INTRAVENOUS; SUBCUTANEOUS at 08:08

## 2022-08-20 RX ADMIN — METHOCARBAMOL 500 MG: 500 TABLET ORAL at 12:08

## 2022-08-20 RX ADMIN — SEVELAMER CARBONATE 0.8 G: 800 POWDER, FOR SUSPENSION ORAL at 09:08

## 2022-08-20 RX ADMIN — METHOCARBAMOL 500 MG: 500 TABLET ORAL at 10:08

## 2022-08-20 RX ADMIN — ATORVASTATIN CALCIUM 20 MG: 20 TABLET, FILM COATED ORAL at 09:08

## 2022-08-20 RX ADMIN — CEFEPIME HYDROCHLORIDE 2 G: 2 INJECTION, SOLUTION INTRAVENOUS at 09:08

## 2022-08-20 RX ADMIN — SENNOSIDES AND DOCUSATE SODIUM 2 TABLET: 50; 8.6 TABLET ORAL at 09:08

## 2022-08-20 RX ADMIN — HEPARIN SODIUM 5000 UNITS: 5000 INJECTION INTRAVENOUS; SUBCUTANEOUS at 01:08

## 2022-08-20 RX ADMIN — HEPARIN SODIUM 5000 UNITS: 5000 INJECTION INTRAVENOUS; SUBCUTANEOUS at 09:08

## 2022-08-20 RX ADMIN — CEFEPIME HYDROCHLORIDE 1 G: 1 INJECTION, SOLUTION INTRAVENOUS at 10:08

## 2022-08-20 RX ADMIN — SEVELAMER CARBONATE 0.8 G: 800 POWDER, FOR SUSPENSION ORAL at 12:08

## 2022-08-20 NOTE — PROGRESS NOTES
Pharmacist Renal Dose Adjustment Note    Wilfredo Thomas is a 47 y.o. male being treated with the medication cefepime    Patient Data:    Vital Signs (Most Recent):  Temp: 98.1 °F (36.7 °C) (08/20/22 0900)  Pulse: 102 (08/20/22 0900)  Resp: 18 (08/20/22 0900)  BP: (!) 161/95 (08/20/22 0900)  SpO2: 95 % (08/20/22 0900)   Vital Signs (72h Range):  Temp:  [98.1 °F (36.7 °C)-102 °F (38.9 °C)]   Pulse:  []   Resp:  [16-22]   BP: ()/()   SpO2:  [91 %-97 %]      Recent Labs   Lab 08/18/22  0537 08/19/22  0506 08/20/22  0250   CREATININE 7.0* 6.1* 5.2*     Serum creatinine: 5.2 mg/dL (H) 08/20/22 0250  Estimated creatinine clearance: 19.8 mL/min (A)    Medication:cefepime dose: 1g frequency q12h will be changed to medication:cefepime dose:2g frequency:q24h    Pharmacist's Name: Enrico Tovar  Pharmacist's Extension: [phone]

## 2022-08-20 NOTE — ASSESSMENT & PLAN NOTE
Estimated Creatinine Clearance: 19.8 mL/min (A) (based on SCr of 5.2 mg/dL (H)). - improved  - Hyperphosphatemia also noted - start binder  - Continues to make urine  - Nephrology following: no acute indication for HD at this time  - Renally dosing all medications  - Avoid nephrotoxins  - nephrology has cleared for discharge once Cr < 7    8/20 - cr 6.1 -> 5.2

## 2022-08-20 NOTE — NURSING
A/O x4, VSS except for an elevated temp. 100.8 Axillary temp, Pt refused cooling blanket. Pain med given for neck and shoulder discomfort. He and spouse requested Tylenol for elevated temp, which was given at 2225. Temp 98.5 during midnight vitals and patient had no complaints, ambulated to restroom, showered  and linen changed out. Cefepime infused but RFA PIV infiltrated, removed adhesive based dressing and patient developed a significant amount of excoriation to PIV site. Area cleansed with normal saline, pat dry, Xeroform gauze applied, wrapped with kerlix and secured with tape. 22G PIV inserted to left forearm without incident. 22G PIV placed to left forearm. 0513: BG 77, patient given apple juice. Rechecked BG 20 min later and was 121. No signs of respiratory distress and call light in reach.

## 2022-08-20 NOTE — SUBJECTIVE & OBJECTIVE
"Principal Problem:Abscess of left thigh    Principal Orthopedic Problem: Left thigh I&D 7/29 and 8/6  Staph species on thigh abscess cultures    Interval History: S/p repeat L thigh I&D 8/6/22. Drain 10cc yesterday afternoon, no output recorded this am. Tmax 101.5 yesterday. Hb 6.8. MRI spine showed cellulitis and incidentally seen lung consolidation. ID started cefepime. Cr downtrending.    Review of patient's allergies indicates:  No Known Allergies    Current Facility-Administered Medications   Medication    acetaminophen tablet 1,000 mg    amLODIPine tablet 5 mg    atorvastatin tablet 20 mg    cefepime in dextrose 5 % 1 gram/50 mL IVPB 1 g    COVID-19 vac, forrest(Pfizer)(PF) (Pfizer COVID-19) 30 mcg/0.3 mL injection 0.3 mL    DAPTOmycin (CUBICIN) 625 mg in sodium chloride 0.9% 50 mL IVPB    dextrose 10% bolus 125 mL    dextrose 10% bolus 250 mL    glucagon (human recombinant) injection 1 mg    glucose chewable tablet 16 g    glucose chewable tablet 24 g    heparin (porcine) injection 5,000 Units    hydrALAZINE tablet 25 mg    hydrALAZINE tablet 25 mg    insulin aspart U-100 pen 1-10 Units    insulin aspart U-100 pen 8 Units    insulin detemir U-100 pen 10 Units    methocarbamoL tablet 500 mg    morphine injection 3 mg    ondansetron injection 4 mg    oxyCODONE immediate release tablet 5 mg    senna-docusate 8.6-50 mg per tablet 2 tablet    sevelamer carbonate pwpk 0.8 g    sodium chloride 0.9% flush 10 mL     Objective:     Vital Signs (Most Recent):  Temp: 98.1 °F (36.7 °C) (08/20/22 0354)  Pulse: 76 (08/20/22 0354)  Resp: 16 (08/20/22 0307)  BP: 130/85 (08/20/22 0354)  SpO2: (!) 93 % (08/20/22 0354) Vital Signs (24h Range):  Temp:  [98.1 °F (36.7 °C)-101.5 °F (38.6 °C)] 98.1 °F (36.7 °C)  Pulse:  [] 76  Resp:  [16-20] 16  SpO2:  [93 %-97 %] 93 %  BP: (121-169)/(71-95) 130/85     Weight: 104.3 kg (229 lb 15 oz)  Height: 5' 7" (170.2 cm)  Body mass index is 36.01 kg/m².      Intake/Output Summary (Last 24 " hours) at 8/20/2022 0637  Last data filed at 8/19/2022 1900  Gross per 24 hour   Intake --   Output 1410 ml   Net -1410 ml         Ortho/SPM Exam  Left lower extremity  Left thigh dressing cdi  Drain in place, serous output, minimal exudative material  No erythema of thigh, no signficant swelling  Compartments soft and compressible  Painless ROM of hip and knee  NVI        CBC:   Recent Labs   Lab 08/19/22  0506 08/20/22  0250   WBC 6.13 4.77   HGB 8.1* 6.8*   HCT 24.4* 21.1*    168       CMP:   Recent Labs   Lab 08/19/22  0506 08/20/22  0250    139   K 4.2 3.7    105   CO2 23 23   GLU 82 81   BUN 60* 52*   CREATININE 6.1* 5.2*   CALCIUM 9.1 8.6*   PROT 7.0 6.2   ALBUMIN 2.5* 2.1*   BILITOT 0.5 0.6   ALKPHOS 72 60   AST 26 19   ALT 7* <5*   ANIONGAP 14 11         All pertinent labs within the past 24 hours have been reviewed.    Significant Imaging: I have reviewed and interpreted all pertinent imaging results/findings.

## 2022-08-20 NOTE — ASSESSMENT & PLAN NOTE
MRI- see above. evidecen of cellulitis, soft tissue swelling  - has muscular spasm on exam  Robaxin added  9/20 - AF since adding cefepime. Ortho following. Appreciate assistnce

## 2022-08-20 NOTE — ASSESSMENT & PLAN NOTE
- DKA has resolved  - Endocrinology consulted and managing.  Recent Labs     08/19/22  1239 08/19/22  1736 08/19/22  2128 08/20/22  0511 08/20/22  0538 08/20/22  0910   POCTGLUCOSE 196* 98 124* 77 121* 141*

## 2022-08-20 NOTE — SUBJECTIVE & OBJECTIVE
Interval History:   Afebrile X 24 hours. No leukoctosis.  Hgb 6.8  Rash to right forearm at IV site with overlying cellulitis, palpable cord.  Appears to be adhesive dermatitis/thrombophlebitis  Continues to complain of neck/upper back pain .  MRI negative for osteodiscitis/epidural abscess. Does show cellulitis in paraspinal muscles/soft tissue . No discrete fluid collection. Orthopedics has evaluated   Blood cultures NGTD  Left thigh drainage remains minimal  Creatinine continues to downtrend    Review of Systems   Constitutional:  Negative for chills, diaphoresis, fatigue and fever.   HENT: Negative.     Respiratory:  Negative for cough and shortness of breath.    Cardiovascular:  Positive for leg swelling (upper left thigh). Negative for chest pain and palpitations.   Gastrointestinal:  Negative for abdominal distention and abdominal pain.   Genitourinary:  Negative for difficulty urinating and dysuria.   Musculoskeletal:  Positive for arthralgias (left wrist), myalgias (upper left thigh; tenderrness right forearm) and neck pain. Negative for joint swelling.   Skin:  Positive for wound (upper left thigh). Negative for color change and rash.   Allergic/Immunologic: Negative for immunocompromised state.   Neurological:  Negative for dizziness, tremors, weakness, numbness and headaches.   Psychiatric/Behavioral:  Negative for agitation and decreased concentration. The patient is not nervous/anxious.    Objective:     Vital Signs (Most Recent):  Temp: 98.5 °F (36.9 °C) (08/20/22 2020)  Pulse: 91 (08/20/22 2020)  Resp: 16 (08/20/22 2020)  BP: (!) 162/89 (08/20/22 2020)  SpO2: (!) 94 % (08/20/22 2020)   Vital Signs (24h Range):  Temp:  [98.1 °F (36.7 °C)-98.9 °F (37.2 °C)] 98.5 °F (36.9 °C)  Pulse:  [] 91  Resp:  [16-18] 16  SpO2:  [93 %-97 %] 94 %  BP: (121-162)/(71-95) 162/89     Weight: 104.3 kg (229 lb 15 oz)  Body mass index is 36.01 kg/m².    Estimated Creatinine Clearance: 19.8 mL/min (A) (based on SCr of  5.2 mg/dL (H)).    Physical Exam  Vitals and nursing note reviewed.   Constitutional:       General: He is not in acute distress.     Appearance: Normal appearance. He is well-developed. He is obese. He is not ill-appearing, toxic-appearing or diaphoretic.   HENT:      Head: Normocephalic and atraumatic.      Nose: Nose normal.      Mouth/Throat:      Dentition: Does not have dentures.      Pharynx: No oropharyngeal exudate.   Eyes:      General: No scleral icterus.     Conjunctiva/sclera: Conjunctivae normal.   Cardiovascular:      Rate and Rhythm: Normal rate and regular rhythm.   Pulmonary:      Effort: Pulmonary effort is normal. No respiratory distress.      Breath sounds: Normal breath sounds. No wheezing or rales.   Abdominal:      General: There is no distension.      Palpations: Abdomen is soft.      Tenderness: There is no abdominal tenderness. There is no guarding.   Musculoskeletal:         General: Tenderness (paraspinal cervical tenderness and erythema) present. No swelling.   Skin:     General: Skin is warm and dry.      Findings: Erythema (right forearm. Right upper back) present. No rash.      Comments: Left thigh with dressings in place. C/d/I. Small amount purulent drainage    Right wrist forearm with rash/dermatitis above prior IV site.  Forearm erythematous, with palpable venous cord. See photos below   Neurological:      Mental Status: He is alert and oriented to person, place, and time. Mental status is at baseline.   Psychiatric:         Mood and Affect: Mood normal.         Behavior: Behavior normal.             Significant Labs: CBC:   Recent Labs   Lab 08/19/22  0506 08/20/22  0250   WBC 6.13 4.77   HGB 8.1* 6.8*   HCT 24.4* 21.1*    168       CMP:   Recent Labs   Lab 08/19/22  0506 08/20/22  0250    139   K 4.2 3.7    105   CO2 23 23   GLU 82 81   BUN 60* 52*   CREATININE 6.1* 5.2*   CALCIUM 9.1 8.6*   PROT 7.0 6.2   ALBUMIN 2.5* 2.1*   BILITOT 0.5 0.6   ALKPHOS 72 60    AST 26 19   ALT 7* <5*   ANIONGAP 14 11       Microbiology Results (last 7 days)       Procedure Component Value Units Date/Time    Blood culture [[id number]] Collected: 08/17/22 1231    Order Status: Completed Specimen: Blood Updated: 08/20/22 1412     Blood Culture, Routine No Growth to date      No Growth to date      No Growth to date      No Growth to date    Blood culture [[id number]] Collected: 08/17/22 1223    Order Status: Completed Specimen: Blood Updated: 08/20/22 1412     Blood Culture, Routine No Growth to date      No Growth to date      No Growth to date      No Growth to date    Blood culture [[id number]] Collected: 08/19/22 2254    Order Status: Completed Specimen: Blood Updated: 08/20/22 0715     Blood Culture, Routine No Growth to date    Narrative:      Collection has been rescheduled by DEAN at 08/19/2022 17:54 Reason:   Hard stick  Collection has been rescheduled by DEAN at 08/19/2022 17:54 Reason:   Hard stick let nurse cathy know [id number]    Blood culture [[id number]]     Order Status: Canceled Specimen: Blood     Blood culture [[id number]]     Order Status: Canceled Specimen: Blood     Fungus culture [[id number]] Collected: 07/29/22 1251    Order Status: Completed Specimen: Abscess from Leg, Left Updated: 08/17/22 0904     Fungus (Mycology) Culture Culture in progress      No fungus isolated after 2 weeks    Narrative:      Left thigh abscess #1    Fungus culture [[id number]] Collected: 07/29/22 1300    Order Status: Completed Specimen: Abscess from Leg, Left Updated: 08/17/22 0904     Fungus (Mycology) Culture Culture in progress      No fungus isolated after 2 weeks    Narrative:      Left thigh abscess #2    Fungus culture [[id number]] Collected: 07/28/22 1800    Order Status: Completed Specimen: Abscess from Leg, Left Updated: 08/17/22 0904     Fungus (Mycology) Culture Culture in progress      No fungus isolated after 2 weeks    Narrative:      LEFT THIGH ABSCESS    Aerobic culture [[id number]]   (Abnormal)  (Susceptibility) Collected: 08/06/22 1153    Order Status: Completed Specimen: Wound from Hip, Left Updated: 08/15/22 1002     Aerobic Bacterial Culture STAPHYLOCOCCUS AUREUS  Few      Narrative:      #2 Left hip fluid          Recent Lab Results  (Last 5 results in the past 24 hours)        08/20/22  1822   08/20/22  1350   08/20/22  1311   08/20/22  0910   08/20/22  0538        Unit Blood Type Code   7300  [P]             Unit Expiration   [id number]  [P]             Unit Blood Type   B POS  [P]             Albumin               Alkaline Phosphatase               ALT               Anion Gap               AST               Baso #               Basophil %               BILIRUBIN TOTAL               BUN               Calcium               Chloride               CO2               CODING SYSTEM   [id number]  [P]             CPK               Creatinine               Differential Method               DISPENSE STATUS   ISSUED  [P]             eGFR               Eos #               Eosinophil %               Glucose               Gran # (ANC)               Gran %               Group & Rh   B POS             Hematocrit               Hemoglobin               Immature Grans (Abs)               Immature Granulocytes               INDIRECT YEE   NEG             Lymph #               Lymph %               Magnesium               MCH               MCHC               MCV               Mono #               Mono %               MPV               nRBC               Phosphorus               Platelets               POCT Glucose 77     124   141   121       Potassium               Product Code   [id number]  [P]             PROTEIN TOTAL               RBC               RDW               Sodium               UNIT NUMBER   [id number]  [P]             WBC                                       [P] - Preliminary Result               Significant Imaging:     Imaging Results              CT Thigh With Contrast Left (Final  result)  Result time 07/27/22 01:13:09      Final result by Jori Hopkins DO (07/27/22 01:13:09)                   Impression:      Large heterogeneous fluid collection in the anterolateral left proximal thigh soft tissues, concerning for a soft tissue hematoma or Preston-Melissa lesion.  A neoplastic process is not entirely excluded.  Recommend close interval follow-up to assess for stability/resolution.      Electronically signed by: Jori Hopkins  Date:    07/27/2022  Time:    01:13               Narrative:    EXAMINATION:  CT THIGH WITH CONTRAST LEFT    CLINICAL HISTORY:  Soft tissue mass, thigh, deep;    TECHNIQUE:  Axial CT images of the left thigh with sagittal and coronal reformats after the intravenous administration of 50 mL Omnipaque 350.    COMPARISON:  None.    FINDINGS:  Bone: Bone mineralization is normal.  There is no evidence of an acute fracture or dislocation.  Alignment is normal.    Soft tissues: There is a large heterogeneous fluid collection in the left anterolateral proximal thigh measuring approximately 16 x 7 x 6 cm.  The collection appears to be centered within the subcutaneous tissues or superficial fascia and abuts the gluteus musculature, the sartorius muscle, the rectus femoris, and the vastus lateralis.  There is soft tissue edema in the surrounding subcutaneous tissues.  Muscle bulk is normal.    Articulations: The left femoroacetabular joint is unremarkable.  The pubic symphysis is unremarkable.  The left knee is unremarkable.  No large joint effusion or significant cartilage loss.    Miscellaneous: Neurovascular structures are grossly intact.  There is moderate calcified atherosclerosis of the left femoral and popliteal arteries.                                       CTA Chest Non-Coronary (PE Study) (Final result)  Result time 07/26/22 21:36:01      Final result by Jori Hopkins DO (07/26/22 21:36:01)                   Impression:      No large central or lobar pulmonary  embolism.      Electronically signed by: Jori Hopkins  Date:    07/26/2022  Time:    21:36               Narrative:    EXAMINATION:  CTA CHEST NON CORONARY    CLINICAL HISTORY:  Pulmonary embolism (PE) suspected, high prob;    TECHNIQUE:  Low dose axial images, sagittal and coronal reformations were obtained from the thoracic inlet to the lung bases following the IV administration of 100 mL of Omnipaque 350.  Contrast timing was optimized to evaluate the pulmonary arteries.  Maximum intensity projection images were provided for review.    COMPARISON:  Chest radiograph from earlier the same date.    FINDINGS:  Pulmonary vasculature: Satisfactory opacification of the pulmonary arterial system.  Motion artifact significantly limits evaluation of the segmental and subsegmental pulmonary arteries.  There is no large central or lobar pulmonary embolism.    Aorta: Left-sided aortic arch.  No aneurysm and no significant atherosclerosis.    Base of Neck: No significant abnormality.    Thoracic soft tissues: Normal.    Heart: Normal size. No effusion.    Amena/Mediastinum: No pathologic russ enlargement.    Airways: The large airways are patent. No foci of endobronchial filling.    Lungs/Pleura: Clear lungs. No pleural effusion or thickening.    Esophagus: Normal.    Upper Abdomen: No abnormality of the partially imaged upper abdomen.    Bones: No acute fracture. No suspicious lytic or sclerotic lesions.                                       X-Ray Chest 1 View (Final result)  Result time 07/26/22 21:23:42   Procedure changed from X-Ray Chest PA And Lateral     Final result by Olman Saucedo MD (07/26/22 21:23:42)                   Impression:      No convincing radiographic evidence of acute intrathoracic process on this single view..      Electronically signed by: Olman Saucedo MD  Date:    07/26/2022  Time:    21:23               Narrative:    EXAMINATION:  XR CHEST 1 VIEW    CLINICAL HISTORY:  shortness of  breath;    TECHNIQUE:  Single frontal view of the chest was performed.    COMPARISON:  None    FINDINGS:  Cardiac monitoring leads overlie the chest.  Cardiac silhouette appears within normal limits.  No confluent airspace consolidation identified.  No significant volume of pleural fluid or pneumothorax appreciated.  The visualized osseous structures demonstrate mild degenerative changes.

## 2022-08-20 NOTE — PROGRESS NOTES
"Josafat [facility]un - Telemetry [facility] Medicine  Progress Note    Patient Name: Wilfredo Thomas  MRN: [id number]  Patient Class: IP- Inpatient   Admission Date: 7/26/2022  Length of Stay: 25 days  Attending Physician: Dorota Reynoso MD  Primary Care Provider: Aylin Wayne DO        Subjective:     Principal Problem:Abscess of left thigh        HPI:  48 y/o M hx of HTN, IDDM2, GERD obesity, HLD presented to the ED with complaint of 1 day of worsening SOB. Wife present at bedside. Patient states that he noticed yesterday he was feeling some shortness of breath and couldn't seem to catch his breath. His wife went to check on him this morning and noticed that he was breathing "fast and heavy" and he sounded like he was slurring his words. Wife was concerned about a stroke, so he brought him to the ED for evaluation.     Patient also reporting significant pain and swelling along his left lateral proximal thigh. He states he has noticed worsening pain over the past 2 weeks. He denies any acute trauma to the area. He was seen by ortho last week and was told to take ibu-profen for a muscle strain. This did not help his pain, so he presented again on 7/22 and was given a prednisone injection into his thigh. This also did not help his pain, and now the pain and swelling have advanced to the point that he has difficulty with ambulation. Patient denies fever, chills, nausea, vomiting.      Of note, patient has hx of poorly controlled diabetes. He was recently started on insulin by his PCP, but he has not taken any insulin since being prescribed it.      In the ED, patient hypertensive and tachycardic to the 130s. Tachypneic with RR in the 40s. On CBC, WBC 26.9, On CMP patient hyperkalemic to 5.5, CorNa 136, Bicarb 5. Cr elevated to 1.9 from BL 0.83. UA, BHB pending at time of admission. CTA without evidence of pulmonary embolism. Critical Care Medicine consulted given severe acidosis.             Overview/Hospital " Course:    Patient admitted to the MICU for management of severe DKA. Metabolic acidosis improving on insulin drip. He also has had left thigh pain for 1-2 months. There is a large mass on his left thigh that is tender to palpation, CT revealed hematoma vs soft tissue growth. General surgery consulted and recommended IR consult. IR consulted, no indication for tap.   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 Ortho consulted and performed bedside aspiration of possible abscess which revealed >200K cells >80% segs. Will hold off on antibiotics for now per ortho recs, ortho is taking patient to the OR for irrigation of the thigh, will start antibiotics afterwards.         7/29 Metabolic acidosis slowly improving. Ortho taking patient to the OR for irrigation of the left thigh, will start antibiotics afterwards.   7/30 Transfer to hospital medicine . S/p I&D  of left thigh Abscess on7/29/22 - MRI - uptured tensor fascia jose luis (TFL) muscle with associated large hematoma. gram stain -Moderate Gram positive cocci in clusters .cultures pending.  continue Vancomycin, clindamycin and Zosyn . Bicarb 15. K replaced . off insulin drip on SQ insulin.  7/31 ID and endocrine consulted.  abscess with Calcium pyrophosphate crystals with unknown significance potassium and P replaced. Bicarbonate WNL . aerobic culture 7/28 STAPHYLOCOCCUS SPECIES . Glucose in 300s .PRN imodium  for diarrhea. PT/OT consulted - WBAT. Surgical drain with purulent output  8/1 PICC team consulted for IV access. vanc trough at 11.4. monitor for vanomycin toxicity. possible OR tomorrow for washout  may need bone biopsy to r/o osteomyelitis.Discontinued clindamycin.   8/2 glucose in 300s.  Increased Levemir  to 14 units BID and Aspart  10 units TIDWM. K replaced   8/3 ANGE with cr 0.7--> 2.7. likely vanc induced ATN with levels 40. urine studies ordered renal sonogram WNL  nephrology consulted.  started on NS 100ml/h x 10h and follow up renal panel.Zosyn and  vancomycin discontinued. Strict IO monitor. condom catheter . switched to Ancef for MSSA on culture  Interval History:  8/4- MSSA- see below. /85 VSS. On room air, eating, BM on 8/2, good UO. Appreciate ortho recs. On Cephazolin, detim 14 bid and aspart 12 ac.  8/5-Cr still rising, cr =6.  Wrist still hurting. Ortho to tap it to eval for infection and gout today. /90   Pulse 85  AF, no other signs of infection.  Will discuss w Nephrology- received NS x one liter yesterday. Will repeat today. Suspect auto-diuresis.   8/6: washout with ortho today. Cr continues to rise (7.1), nephrology following  8/7: Cr 7.4, phos increasing; tolerated surgery well yesterday, intra-op cultures pending  8/8: Cr increased to 8.1, hyperphosphatemia again noted; continues to make urine, no acute indication for HD per nephrology. Intra-op cultures with S.aureus, continued cefazolin pending final C&S results.   Interval History:   8/18- 47M with DM admitted 7/26/22 for DKA, ortho following for left lateral thigh abscess. s/p I&D and drain placement left thigh 7/29/22. Cx +MSSA. Had high drain purulent output after first I&D, was taken back to OR 8/6/22 for repeat I&D. Drain in place, dressings CDI. Nephrology on board for ANGE.   pt transferred from telemedicine, had fever yesterday,  -> 90.  Wbc -ok. Covid negative. Drain output paula to 35 cc. Pt on Ancef.   No urine output, cr rising, Nephrology following, no indication for HD presently. Ortho may need to repeat debridement.      8/19- /71   Pulse 92 AF. Appreciate ortho and ID input, pt on daptomycin. T max 99.4. drain functioning. Blood cult 8/17 are neg so far, CRP and procal were higher. Will US the wound site for pockets of fluid.   - had fever and rigors  after receiving dapto today, MRI spine - neck ordered for acute neck/ back pain. Blood culture, lactic acid, robaxin ordered.   - still  febrile. Add cefepime, cooling blanket, cannot have NSAID    8/20 - pt developed blisters around IV site.   MRI- No evidence of discitis-osteomyelitis.  No epidural fluid collections.  Edema/enhancement in the posterior paraspinal musculature/superficial soft tissues at the level of C7 concerning for cellulitis/infection.  No rim enhancing collections to suggest abscess. Mild degenerative change of the spine as detailed above.  Consolidation in the left upper lobe, possible infection or noninfectious inflammation and new from CTA chest 07/26/2022.  On dapto and cefepime, lactade - 0.8, ck -nl, cr improving 5.2. Hb 6.8.  pt is AF since adding Cefipime. Will discuss w ID. For now keep antibiotcs the same. next dapto is tomorrow and we may change things before then.  He is on renal doses and renal function improving.   rash is contact dermatitis, in areas of tape. Do not think this monkey px  CT scan of the chest to eval pneumonia.  Might change antibiotics. We are suspecting drug related pneumonia and rash. isolation precautions until cleared by ID to make sure they concur.          Interval History: see above    Review of Systems   Constitutional:  Negative for activity change, appetite change, chills, fatigue and fever.   HENT:  Negative for congestion, facial swelling, nosebleeds, sore throat and trouble swallowing.    Respiratory:  Negative for apnea, cough, choking, chest tightness, shortness of breath, wheezing and stridor.    Cardiovascular:  Negative for chest pain and leg swelling.   Gastrointestinal:  Negative for abdominal distention, abdominal pain, blood in stool, constipation, diarrhea, nausea and vomiting.   Genitourinary:  Negative for difficulty urinating, dysuria, flank pain, frequency, hematuria and urgency.   Musculoskeletal:  Positive for neck pain. Negative for arthralgias, back pain, gait problem and neck stiffness.   Skin:  Positive for rash (left wrist). Negative for color change and wound.    Neurological:  Negative for dizziness, tremors, seizures, syncope, facial asymmetry, speech difficulty, weakness, light-headedness, numbness and headaches.   Psychiatric/Behavioral:  Negative for agitation, behavioral problems, confusion, decreased concentration, dysphoric mood, hallucinations, self-injury and sleep disturbance. The patient is not nervous/anxious and is not hyperactive.    Objective:     Vital Signs (Most Recent):  Temp: 98.1 °F (36.7 °C) (08/20/22 0900)  Pulse: 102 (08/20/22 0900)  Resp: 18 (08/20/22 0900)  BP: (!) 161/95 (08/20/22 0900)  SpO2: 95 % (08/20/22 0900)   Vital Signs (24h Range):  Temp:  [98.1 °F (36.7 °C)-101.5 °F (38.6 °C)] 98.1 °F (36.7 °C)  Pulse:  [] 102  Resp:  [16-20] 18  SpO2:  [93 %-96 %] 95 %  BP: (121-169)/(71-95) 161/95     Weight: 104.3 kg (229 lb 15 oz)  Body mass index is 36.01 kg/m².    Intake/Output Summary (Last 24 hours) at 8/20/2022 0907  Last data filed at 8/20/2022 0800  Gross per 24 hour   Intake 2000 ml   Output 3880 ml   Net -1880 ml        Physical Exam  Constitutional:       General: He is not in acute distress.     Appearance: Normal appearance. He is not ill-appearing, toxic-appearing or diaphoretic.   HENT:      Head: Normocephalic and atraumatic.      Nose: Nose normal.      Mouth/Throat:      Mouth: Mucous membranes are moist.      Pharynx: Oropharynx is clear.   Eyes:      General: No scleral icterus.     Extraocular Movements: Extraocular movements intact.      Conjunctiva/sclera: Conjunctivae normal.      Pupils: Pupils are equal, round, and reactive to light.   Neck:      Comments: Tender post neck and left trapezious  Imporoved ROM, less muscular stiffness left trap.   Cardiovascular:      Rate and Rhythm: Normal rate and regular rhythm.      Pulses: Normal pulses.      Heart sounds: Normal heart sounds.   Pulmonary:      Effort: Pulmonary effort is normal. No respiratory distress.      Breath sounds: Normal breath sounds. No stridor. No  wheezing, rhonchi or rales.   Chest:      Chest wall: No tenderness.   Abdominal:      General: Abdomen is flat. Bowel sounds are normal. There is no distension.      Palpations: Abdomen is soft.      Tenderness: There is no abdominal tenderness. There is no right CVA tenderness, guarding or rebound.   Musculoskeletal:         General: Swelling and tenderness present. No deformity or signs of injury. Normal range of motion.      Cervical back: Normal range of motion and neck supple. Tenderness present. No rigidity.      Right lower leg: No edema.      Left lower leg: No edema.      Comments: Left lat thigh is bandaged  Left forearm - superficial thrombophlebitis and contact dermatitis related to IV and tape.   Lymphadenopathy:      Cervical: No cervical adenopathy.   Skin:     General: Skin is warm and dry.      Coloration: Skin is not jaundiced.      Findings: Rash (papulosquamour erythema in setting of rtape exposure) present. No erythema.   Neurological:      General: No focal deficit present.      Mental Status: He is alert and oriented to person, place, and time. Mental status is at baseline.      Motor: No weakness.   Psychiatric:         Mood and Affect: Mood normal.         Behavior: Behavior normal.         Thought Content: Thought content normal.         Judgment: Judgment normal.       Significant Labs: All pertinent labs within the past 24 hours have been reviewed.  CBC:   Recent Labs   Lab 08/19/22  0506 08/20/22  0250   WBC 6.13 4.77   HGB 8.1* 6.8*   HCT 24.4* 21.1*    168       CMP:   Recent Labs   Lab 08/19/22  0506 08/20/22  0250    139   K 4.2 3.7    105   CO2 23 23   GLU 82 81   BUN 60* 52*   CREATININE 6.1* 5.2*   CALCIUM 9.1 8.6*   PROT 7.0 6.2   ALBUMIN 2.5* 2.1*   BILITOT 0.5 0.6   ALKPHOS 72 60   AST 26 19   ALT 7* <5*   ANIONGAP 14 11         Significant Imaging: I have reviewed all pertinent imaging results/findings within the past 24 hours.      Assessment/Plan:      *  Abscess of left thigh  - Afebrile, no leukocytosis  - Continue Ancef  - S/p OR with Ortho 8/6 for repeat washout  - Intra-op cultures with S.aureus: changed to Ancef for MSSA then daptomycin on 8/15; CPK 11  - ID following  - PRN analgesics provided  - Continuing to monitor; maintain drain for now - Ortho continues to monitor  8/18- drain output 35 cc  Per ID: Continue Daptomycin 6mg/kg/dose q48 hours (renally dosed) for MSSA SSTI. Anticipate either completion of 14 days IV abx from 8/6 for SSTI or possible transition to orals.   8/19- will US for pockets of fluid, consider CT      ANGE (acute kidney injury)  Estimated Creatinine Clearance: 19.8 mL/min (A) (based on SCr of 5.2 mg/dL (H)). - improved  - Hyperphosphatemia also noted - start binder  - Continues to make urine  - Nephrology following: no acute indication for HD at this time  - Renally dosing all medications  - Avoid nephrotoxins  - nephrology has cleared for discharge once Cr < 7    8/20 - cr 6.1 -> 5.2      Acute neck pain  MRI- see above. evidecen of cellulitis, soft tissue swelling  - has muscular spasm on exam  Robaxin added  9/20 - AF since adding cefepime. Ortho following. Appreciate assistnce      Fever  Uncertain etiology  On 8/17 and 8/19.   Still tachycardia  Wbc 6  Blood cult 8/17 - NGSF  drain output improving  covid negative.   cxr negative 8/17  UA negative 8/17  Will chat w Ortho regarding further plans for debridement  8/19- US wound site for pockets of fluid,   Fluid collection located medial to the bandage: 3.5 x 0.6 x 1.8 cm.  Fluid collection located superior to bandage: 3.0 x 0.6 x 2.3 cm.  CRP rising. Drain output improved- gradually reducing  Fever after receiving Daptomycin today. Will discuss w ID. Repeated blood culture and lactic acid    8/20 - cefepime added to dapto  last night, developed blisters at IV site. MRI revealed evidence of celluilits and consolidation NAVDEEP pneumonia      Pain and swelling of left wrist  Pseudogout  -  Tapped by Ortho, fluid reviewed: does not appear to be gout or septic arthritis, likely pseudogout (calcium pyrophosphate crystals on previous LE aspiration)  - PRN analgesics provided  -improved after steroid injection  8/19- resolved    Sepsis  -patient with fever to 102 and tachycardia on 8/17 - repeat blood cultures, UA, COVID swab pending  -CXR without infiltrate, lactate nl, procalcitonin mildly elevated (due to ANGE)  -remains on dapto for MSSA abscess to thigh  -eosinophils nl and no leukocytosis  -ID and ortho notified of change  -minimize IVF due to improving ANGE with signs of fluid retention  8/19- on daptomycin      Type 2 diabetes mellitus with hyperglycemia, with long-term current use of insulin  - DKA has resolved  - Endocrinology consulted and managing.  Recent Labs     08/19/22  1239 08/19/22  1736 08/19/22  2128 08/20/22  0511 08/20/22  0538 08/20/22  0910   POCTGLUCOSE 196* 98 124* 77 121* 141*         Hypertension associated with type 2 diabetes mellitus  - Continue home regimen of amlodipine; lisinopril held due to poor renal function  - continue to monitor and further titrate antihypertensives as clinically indicated   -increase amlodipine to BID on 8/13 for elevated BP; avoid hypotension with ANGE  8/18- /60  -stable      Hematoma of left thigh  - See Abscess of L thigh    Anemia of chronic disease  - H/H slowly declining  - transfuse for Hgb < 7  - continue to monitor   8/20 - Hb 6.8 - transfuse one unit PRBCs    Class 2 severe obesity due to excess calories with serious comorbidity and body mass index (BMI) of 36.0 to 36.9 in adult  - Body mass index is 36.01 kg/m².  - Needs dietary and lifestyle modifications in relation to health    Hyperlipidemia associated with type 2 diabetes mellitus  - Continue home statin       VTE Risk Mitigation (From admission, onward)         Ordered     heparin (porcine) injection 5,000 Units  Every 8 hours         08/07/22 1010     Place sequential  compression device  Until discontinued         08/06/22 0855     Place sequential compression device  Until discontinued         07/29/22 0158     IP VTE HIGH RISK PATIENT  Once         07/29/22 0158     Place SAVANNAH hose  Until discontinued         07/26/22 2240     Place sequential compression device  Until discontinued         07/26/22 2240                Discharge Planning   FILEMON: 8/24/2022     Code Status: Full Code   Is the patient medically ready for discharge?: No    Reason for patient still in hospital (select all that apply): Patient trending condition  Discharge Plan A: Home Health        Dorota Reynoso MD  Department of Hospital Medicine   [facility] - Telemetry Stepdown

## 2022-08-20 NOTE — PROGRESS NOTES
[facility] - Tele[facility] Stepdown  Infectious Disease  Progress Note    Patient Name: Wilfredo Thomas  MRN: [id number]  Admission Date: 7/26/2022  Length of Stay: 24 days  Attending Physician: Dorota Reynoso MD  Primary Care Provider: Aylin Wayne DO    Isolation Status: No active isolations  Assessment/Plan:      * Abscess of left thigh     47 year old with DMII and HTN admitted for DKA and left thigh abscess.      MRI of left femur notable for ruptured tensor fascia jose luis mm with associated large hematoma, as well as small area of potential osteonecrosis.  Bedside aspiration of the fluid collection was notable for a cell count of >200k (>80%segs), cultures + staph species. Underwent I&D on 7/29 and 8/6.  Surgical cultures positive for MSSA.  Per ortho surgery, no concerns for bone involvement. Initially on Vancomycin and Zosyn, course complicated by ANGE.   Vancomycin discontinue and switched to cefazolin on Wednesday Aug 3rd but Emanuel's stain + and continued deterioration in renal fx with concern for beta lactam related AIN, so cefazolin stopped and Daptomycin started for MSSA     Hospital course complicated by acute onset left wrist pain.  History of wrist arthroscopy with hardware placement 3/2022.   S/p joint aspiration, white count 2050k, 88% segs. No crystals noted.Cultures no growth.    Received steroid shot per primary team which pt noted improvement.  Xray of wrist with no acute concerns.      ANGE improving.  Creatinine down to 6.1 today. Followed by Nephrology.  Autoimmune workup ordered, pending.      Fever 8/17 to 102.  Unclear source. Repeat blood cx  NGTD.  BLE US negative for DVT.  U/A negative. CXR was  negative.  COVID negative.  Fever recurred today to 101.5  with new onset severe neck/upper back pain with spike in CRP to 195.  Soft tissue US left thigh with two fluid collections medial and superior to incision.       MRI C/T/L spine this afternoon negative for epidural abscess or discitis.  There is  edema of paraspinal muscles/superficial soft tissues at level of C7 concerning for cellulitis. No fluid collections.  Incidentally noted consolidation in left upper lobe - possible infection or non-infectious inflammation. O2 sats 94-96% on RA. Cefepime started by Primary Team. Repeat blood cultures ordered.  Lactate wnl    Recommendations:   · Continue Daptomycin 6mg/kg/dose q48 hours (renally dosed) for MSSA SSTI.  · Continue IV cefepime for now.  Monitor renal function closely   · Follow up blood cultures and trend fever curve   · CK in am ordered  · Follow up Orthopedic plans  · Will follow up tomorrow    Data reviewed and plan discussed with ID staff, Dr. Graves    Fever  See assessment/plan above.      ANGE (acute kidney injury)  Management per Nephrology.  Daptomycin renally dosed.  Creatine continues to trend down      Thank you.   Please call for any questions or concerns.  HILARIO Delarosa, ANP-C  Spectra [phone]    Subjective:     Principal Problem:Abscess of left thigh    HPI: Mr. Thomas is a 47 year old male with a PMH of DM2 (poorly controlled), HTN, GERD, HLD, obesity who initially presented to [facility] ED with cc of SOBx1 day. Pt was also reporting significant pain and swelling along his left lateral proximal thigh. Pt reports this pain started over the last 2 weeks, which worsened. He initially was seen outpatient in which he was diagnoised with a mm strain and given 800 mg ibuprofen. From there he went to the ED on 7/22 d/t worsening pain. He was treated with prednisone/morphine shot, and well as a prednisone oral pack. Pt denied recent injury, with the exception of soreness to his left lower shin following bumping into a cabinet. He denies open wounds/abrasions from this injury but states the soreness started in his left thigh following the improvement of discomfort from his left shin. Pt reports having been treated for a boil on his central/right buttocks in the end of June. 2022. Pt states he was seen in  Urgent Care in [place] and the boil was drained. He was given an oral abx in which he completed.     To note, pt reports having a recent left wrist fracture, ligament rupture following an accident with his riding lawnmower. States he underwent surgery and 2 screws were placed on 3/31/22 at [facility]. Denies associated infection/complications. Denies pain in the site currently.    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 Denies having pets. Denies recent fishing/hunting. Denies hx of immunocompromising conditions.     To note, pt was found to be in DKA with blood sugars >400, treated by critical team, which has since resolved. Pt was recently started on insulin by his PCP, which he had not started. Pt latest A1C 10.     CT of left thigh notable for fluid collections suspicious for hematoma/seroma. MRI of left femur was notable for ruptured tensor fascia jose luis mm with associated large hematoma, as well as small area of potential osteonecrosis. Bedside aspiration of the fluid collection was notable for a cell count of >200k >80%segs, cultures + staph spp. Pt was taken for I&D with Dr. Graff with ortho surgery on 7/29, abscesses were noted and washed out, cultures collected + staph spp. Per pt, states that Dr. Graff is planning to return to surgery tomorrow, 8/1/22 for repeat washout.     Pt was intially with leukocytosis, but has since down trended to 11k. Blood cultures from 7/26 NGTD.     Pt is currently on zosyn, vancomycin, and clindamycin. ID was consulted for abx recs regarding left TFL infection.            Interval History:   T max 99   CRP with spike to 195 (prior 24)  Drain output continues to decrease  Complaining of new onset neck and upper back pain.   Blood cx NGTD  MRI spine pending.  Soft tissue US left thigh with two fluid collections medial and superior to incision:  3.5 X .6 X1.8 and 3.0 X .6 X 2.3.     Fever spike to 101.5 late this afternoon  after visit    Review of Systems   Constitutional:  Negative for chills, diaphoresis, fatigue and fever.   HENT: Negative.     Respiratory:  Negative for cough and shortness of breath.    Cardiovascular:  Positive for leg swelling (upper left thigh). Negative for chest pain and palpitations.   Gastrointestinal:  Negative for abdominal distention and abdominal pain.   Genitourinary:  Negative for difficulty urinating and dysuria.   Musculoskeletal:  Positive for arthralgias (left wrist), myalgias (upper left thigh) and neck pain. Negative for joint swelling.   Skin:  Positive for wound (upper left thigh). Negative for color change and rash.   Allergic/Immunologic: Negative for immunocompromised state.   Neurological:  Negative for dizziness, tremors, weakness, numbness and headaches.   Psychiatric/Behavioral:  Negative for agitation and decreased concentration. The patient is not nervous/anxious.    Objective:     Vital Signs (Most Recent):  Temp: 98.8 °F (37.1 °C) (08/19/22 0858)  Pulse: 104 (08/19/22 0858)  Resp: 18 (08/19/22 0858)  BP: (!) 148/95 (08/19/22 0858)  SpO2: 97 % (08/19/22 0858)   Vital Signs (24h Range):  Temp:  [98.5 °F (36.9 °C)-99.4 °F (37.4 °C)] 98.8 °F (37.1 °C)  Pulse:  [] 104  Resp:  [16-18] 18  SpO2:  [94 %-97 %] 97 %  BP: ()/(58-95) 148/95     Weight: 104.3 kg (229 lb 15 oz)  Body mass index is 36.01 kg/m².    Estimated Creatinine Clearance: 16.9 mL/min (A) (based on SCr of 6.1 mg/dL (H)).    Physical Exam  Vitals and nursing note reviewed.   Constitutional:       General: He is not in acute distress.     Appearance: Normal appearance. He is well-developed. He is obese. He is not ill-appearing, toxic-appearing or diaphoretic.   HENT:      Head: Normocephalic and atraumatic.      Nose: Nose normal.      Mouth/Throat:      Dentition: Does not have dentures.      Pharynx: No oropharyngeal exudate.   Eyes:      General: No scleral icterus.     Conjunctiva/sclera: Conjunctivae normal.    Cardiovascular:      Rate and Rhythm: Normal rate and regular rhythm.   Pulmonary:      Effort: Pulmonary effort is normal. No respiratory distress.      Breath sounds: Normal breath sounds.   Abdominal:      General: There is no distension.      Palpations: Abdomen is soft.      Tenderness: There is no abdominal tenderness. There is no guarding.   Musculoskeletal:         General: Tenderness (paraspinal cervical tenderness/.trapezius tenderness) present. No swelling.   Skin:     General: Skin is warm and dry.      Findings: No erythema or rash.      Comments: Left thigh with dressings in place. C/d/I. Surgical drain with dark bloody output.    IV site c/d/I.  No cord   Neurological:      Mental Status: He is alert and oriented to person, place, and time. Mental status is at baseline.   Psychiatric:         Mood and Affect: Mood normal.       Significant Labs: CBC:   Recent Labs   Lab 08/18/22  0537 08/19/22  0506   WBC 6.94 6.13   HGB 7.3* 8.1*   HCT 22.1* 24.4*    197       CMP:   Recent Labs   Lab 08/18/22  0537 08/19/22  0506    140   K 4.9 4.2    103   CO2 20* 23   * 82   BUN 57* 60*   CREATININE 7.0* 6.1*   CALCIUM 8.1* 9.1   PROT 5.7* 7.0   ALBUMIN 2.3* 2.5*   BILITOT 0.5 0.5   ALKPHOS 70 72   AST 32 26   ALT <5* 7*   ANIONGAP 13 14       Microbiology Results (last 7 days)       Procedure Component Value Units Date/Time    Blood culture [[id number]] Collected: 08/17/22 1231    Order Status: Completed Specimen: Blood Updated: 08/18/22 1412     Blood Culture, Routine No Growth to date      No Growth to date    Blood culture [[id number]] Collected: 08/17/22 1223    Order Status: Completed Specimen: Blood Updated: 08/18/22 1412     Blood Culture, Routine No Growth to date      No Growth to date    Blood culture [[id number]]     Order Status: Canceled Specimen: Blood     Blood culture [[id number]]     Order Status: Canceled Specimen: Blood     Fungus culture [[id number]] Collected: 07/29/22  1251    Order Status: Completed Specimen: Abscess from Leg, Left Updated: 08/17/22 0904     Fungus (Mycology) Culture Culture in progress      No fungus isolated after 2 weeks    Narrative:      Left thigh abscess #1    Fungus culture [[id number]] Collected: 07/29/22 1300    Order Status: Completed Specimen: Abscess from Leg, Left Updated: 08/17/22 0904     Fungus (Mycology) Culture Culture in progress      No fungus isolated after 2 weeks    Narrative:      Left thigh abscess #2    Fungus culture [[id number]] Collected: 07/28/22 1800    Order Status: Completed Specimen: Abscess from Leg, Left Updated: 08/17/22 0904     Fungus (Mycology) Culture Culture in progress      No fungus isolated after 2 weeks    Narrative:      LEFT THIGH ABSCESS    Aerobic culture [[id number]]  (Abnormal)  (Susceptibility) Collected: 08/06/22 1153    Order Status: Completed Specimen: Wound from Hip, Left Updated: 08/15/22 1002     Aerobic Bacterial Culture STAPHYLOCOCCUS AUREUS  Few      Narrative:      #2 Left hip fluid          Recent Lab Results  (Last 5 results in the past 24 hours)        08/19/22  0903   08/19/22  0506   08/18/22  2132   08/18/22  1640   08/18/22  1114        Albumin   2.5             Alkaline Phosphatase   72             ALT   7             Anion Gap   14             AST   26             Baso #   0.02             Basophil %   0.3             BILIRUBIN TOTAL   0.5  Comment: For infants and newborns, interpretation of results should be based  on gestational age, weight and in agreement with clinical  observations.    Premature Infant recommended reference ranges:  Up to 24 hours.............<8.0 mg/dL  Up to 48 hours............<12.0 mg/dL  3-5 days..................<15.0 mg/dL  6-29 days.................<15.0 mg/dL               BUN   60             Calcium   9.1             Chloride   103             CO2   23             Creatinine   6.1             CRP   195.2             Differential Method   Automated              eGFR   10.5             Eos #   0.4             Eosinophil %   6.9             Glucose   82             Gran # (ANC)   4.1             Gran %   67.2             Hematocrit   24.4             Hemoglobin   8.1             Immature Grans (Abs)   0.03  Comment: Mild elevation in immature granulocytes is non specific and   can be seen in a variety of conditions including stress response,   acute inflammation, trauma and pregnancy. Correlation with other   laboratory and clinical findings is essential.               Immature Granulocytes   0.5             Lymph #   1.2             Lymph %   19.2             Magnesium   1.9             MCH   30.8             MCHC   33.2             MCV   93             Mono #   0.4             Mono %   5.9             MPV   11.0             nRBC   0             Phosphorus   4.4             Platelets   197             POCT Glucose 94     136   109   178       Potassium   4.2             PROTEIN TOTAL   7.0             RBC   2.63             RDW   13.6             Sed Rate   58             Sodium   140             WBC   6.13                                    Significant Imaging:     Imaging Results              CT Thigh With Contrast Left (Final result)  Result time 07/27/22 01:13:09      Final result by Jori Hopkins DO (07/27/22 01:13:09)                   Impression:      Large heterogeneous fluid collection in the anterolateral left proximal thigh soft tissues, concerning for a soft tissue hematoma or Preston-Melissa lesion.  A neoplastic process is not entirely excluded.  Recommend close interval follow-up to assess for stability/resolution.      Electronically signed by: Jori Hopkins  Date:    07/27/2022  Time:    01:13               Narrative:    EXAMINATION:  CT THIGH WITH CONTRAST LEFT    CLINICAL HISTORY:  Soft tissue mass, thigh, deep;    TECHNIQUE:  Axial CT images of the left thigh with sagittal and coronal reformats after the intravenous administration of 50 mL Omnipaque  350.    COMPARISON:  None.    FINDINGS:  Bone: Bone mineralization is normal.  There is no evidence of an acute fracture or dislocation.  Alignment is normal.    Soft tissues: There is a large heterogeneous fluid collection in the left anterolateral proximal thigh measuring approximately 16 x 7 x 6 cm.  The collection appears to be centered within the subcutaneous tissues or superficial fascia and abuts the gluteus musculature, the sartorius muscle, the rectus femoris, and the vastus lateralis.  There is soft tissue edema in the surrounding subcutaneous tissues.  Muscle bulk is normal.    Articulations: The left femoroacetabular joint is unremarkable.  The pubic symphysis is unremarkable.  The left knee is unremarkable.  No large joint effusion or significant cartilage loss.    Miscellaneous: Neurovascular structures are grossly intact.  There is moderate calcified atherosclerosis of the left femoral and popliteal arteries.                                       CTA Chest Non-Coronary (PE Study) (Final result)  Result time 07/26/22 21:36:01      Final result by Jori Hopkins DO (07/26/22 21:36:01)                   Impression:      No large central or lobar pulmonary embolism.      Electronically signed by: Jori Hopkins  Date:    07/26/2022  Time:    21:36               Narrative:    EXAMINATION:  CTA CHEST NON CORONARY    CLINICAL HISTORY:  Pulmonary embolism (PE) suspected, high prob;    TECHNIQUE:  Low dose axial images, sagittal and coronal reformations were obtained from the thoracic inlet to the lung bases following the IV administration of 100 mL of Omnipaque 350.  Contrast timing was optimized to evaluate the pulmonary arteries.  Maximum intensity projection images were provided for review.    COMPARISON:  Chest radiograph from earlier the same date.    FINDINGS:  Pulmonary vasculature: Satisfactory opacification of the pulmonary arterial system.  Motion artifact significantly limits evaluation of the  segmental and subsegmental pulmonary arteries.  There is no large central or lobar pulmonary embolism.    Aorta: Left-sided aortic arch.  No aneurysm and no significant atherosclerosis.    Base of Neck: No significant abnormality.    Thoracic soft tissues: Normal.    Heart: Normal size. No effusion.    Amena/Mediastinum: No pathologic russ enlargement.    Airways: The large airways are patent. No foci of endobronchial filling.    Lungs/Pleura: Clear lungs. No pleural effusion or thickening.    Esophagus: Normal.    Upper Abdomen: No abnormality of the partially imaged upper abdomen.    Bones: No acute fracture. No suspicious lytic or sclerotic lesions.                                       X-Ray Chest 1 View (Final result)  Result time 07/26/22 21:23:42   Procedure changed from X-Ray Chest PA And Lateral     Final result by Olman Saucedo MD (07/26/22 21:23:42)                   Impression:      No convincing radiographic evidence of acute intrathoracic process on this single view..      Electronically signed by: Olman Saucedo MD  Date:    07/26/2022  Time:    21:23               Narrative:    EXAMINATION:  XR CHEST 1 VIEW    CLINICAL HISTORY:  shortness of breath;    TECHNIQUE:  Single frontal view of the chest was performed.    COMPARISON:  None    FINDINGS:  Cardiac monitoring leads overlie the chest.  Cardiac silhouette appears within normal limits.  No confluent airspace consolidation identified.  No significant volume of pleural fluid or pneumothorax appreciated.  The visualized osseous structures demonstrate mild degenerative changes.

## 2022-08-20 NOTE — ASSESSMENT & PLAN NOTE
47 year old with DMII and HTN admitted for DKA and left thigh abscess.      MRI of left femur notable for ruptured tensor fascia jose luis mm with associated large hematoma, as well as small area of potential osteonecrosis.  Bedside aspiration of the fluid collection was notable for a cell count of >200k (>80%segs), cultures + staph species. Underwent I&D on 7/29 and 8/6.  Surgical cultures positive for MSSA.  Per ortho surgery, no concerns for bone involvement. Initially on Vancomycin and Zosyn, course complicated by ANGE.   Vancomycin discontinue and switched to cefazolin on Wednesday Aug 3rd but Emanuel's stain + and continued deterioration in renal fx with concern for beta lactam related AIN, so cefazolin stopped and Daptomycin started for MSSA     Hospital course complicated by acute onset left wrist pain.  History of wrist arthroscopy with hardware placement 3/2022.   S/p joint aspiration, white count 2050k, 88% segs. No crystals noted.Cultures no growth.    Received steroid shot per primary team which pt noted improvement.  Xray of wrist with no acute concerns.      ANGE improving.  Creatinine down to 6.1 today. Followed by Nephrology.  Autoimmune workup ordered, pending.      Fever 8/17 to 102.  Unclear source. Repeat blood cx  NGTD.  BLE US negative for DVT.  U/A negative. CXR was  negative.  COVID negative.  Fever recurred today to 101.5  with new onset severe neck/upper back pain with spike in CRP to 195.  Soft tissue US left thigh with two fluid collections medial and superior to incision.       MRI C/T/L spine this afternoon negative for epidural abscess or discitis.  There is edema of paraspinal muscles/superficial soft tissues at level of C7 concerning for cellulitis. No fluid collections.  Incidentally noted consolidation in left upper lobe - possible infection or non-infectious inflammation. O2 sats 94-96% on RA. Cefepime started by Primary Team. Repeat blood cultures ordered.  Lactate  wnl    Recommendations:   · Continue Daptomycin 6mg/kg/dose q48 hours (renally dosed) for MSSA SSTI.  · Continue IV cefepime for now.  Monitor renal function closely   · Follow up blood cultures and trend fever curve   · CK in am ordered  · Follow up Orthopedic plans  · Will follow up tomorrow    Data reviewed and plan discussed with ID staff, Dr. Graves

## 2022-08-20 NOTE — PROGRESS NOTES
[facility] - Tele[facility] Stepdown  Orthopedics  Progress Note    Patient Name: Wilfredo Thomas  MRN: [id number]  Admission Date: 7/26/2022  Hospital Length of Stay: 25 days  Attending Provider: Dorota Reynoso MD  Primary Care Provider: Aylin Wayne DO  Follow-up For: Procedure(s) (LRB):  Incision and Drainage - LEFT THIGH. (Left)    Post-Operative Day: 14 Days Post-Op  Subjective:     Principal Problem:Abscess of left thigh    Principal Orthopedic Problem: Left thigh I&D 7/29 and 8/6  Staph species on thigh abscess cultures    Interval History: S/p repeat L thigh I&D 8/6/22. Drain 10cc yesterday afternoon, no output recorded this am. Tmax 101.5 yesterday. Hb 6.8. MRI spine showed cellulitis and incidentally seen lung consolidation. ID started cefepime. Cr downtrending.    Review of patient's allergies indicates:  No Known Allergies    Current Facility-Administered Medications   Medication    acetaminophen tablet 1,000 mg    amLODIPine tablet 5 mg    atorvastatin tablet 20 mg    cefepime in dextrose 5 % 1 gram/50 mL IVPB 1 g    COVID-19 vac, forrest(Pfizer)(PF) (Pfizer COVID-19) 30 mcg/0.3 mL injection 0.3 mL    DAPTOmycin (CUBICIN) 625 mg in sodium chloride 0.9% 50 mL IVPB    dextrose 10% bolus 125 mL    dextrose 10% bolus 250 mL    glucagon (human recombinant) injection 1 mg    glucose chewable tablet 16 g    glucose chewable tablet 24 g    heparin (porcine) injection 5,000 Units    hydrALAZINE tablet 25 mg    hydrALAZINE tablet 25 mg    insulin aspart U-100 pen 1-10 Units    insulin aspart U-100 pen 8 Units    insulin detemir U-100 pen 10 Units    methocarbamoL tablet 500 mg    morphine injection 3 mg    ondansetron injection 4 mg    oxyCODONE immediate release tablet 5 mg    senna-docusate 8.6-50 mg per tablet 2 tablet    sevelamer carbonate pwpk 0.8 g    sodium chloride 0.9% flush 10 mL     Objective:     Vital Signs (Most Recent):  Temp: 98.1 °F (36.7 °C) (08/20/22 0354)  Pulse: 76 (08/20/22  "0354)  Resp: 16 (08/20/22 0307)  BP: 130/85 (08/20/22 0354)  SpO2: (!) 93 % (08/20/22 0354) Vital Signs (24h Range):  Temp:  [98.1 °F (36.7 °C)-101.5 °F (38.6 °C)] 98.1 °F (36.7 °C)  Pulse:  [] 76  Resp:  [16-20] 16  SpO2:  [93 %-97 %] 93 %  BP: (121-169)/(71-95) 130/85     Weight: 104.3 kg (229 lb 15 oz)  Height: 5' 7" (170.2 cm)  Body mass index is 36.01 kg/m².      Intake/Output Summary (Last 24 hours) at 8/20/2022 0637  Last data filed at 8/19/2022 1900  Gross per 24 hour   Intake --   Output 1410 ml   Net -1410 ml         Ortho/SPM Exam  Left lower extremity  Left thigh dressing cdi  Drain in place, serous output, minimal exudative material  No erythema of thigh, no signficant swelling  Compartments soft and compressible  Painless ROM of hip and knee  NVI        CBC:   Recent Labs   Lab 08/19/22  0506 08/20/22  0250   WBC 6.13 4.77   HGB 8.1* 6.8*   HCT 24.4* 21.1*    168       CMP:   Recent Labs   Lab 08/19/22  0506 08/20/22  0250    139   K 4.2 3.7    105   CO2 23 23   GLU 82 81   BUN 60* 52*   CREATININE 6.1* 5.2*   CALCIUM 9.1 8.6*   PROT 7.0 6.2   ALBUMIN 2.5* 2.1*   BILITOT 0.5 0.6   ALKPHOS 72 60   AST 26 19   ALT 7* <5*   ANIONGAP 14 11         All pertinent labs within the past 24 hours have been reviewed.    Significant Imaging: I have reviewed and interpreted all pertinent imaging results/findings.      Assessment/Plan:     * Abscess of left thigh  47M with DM admitted 7/26/22 for DKA, ortho following for left lateral thigh abscess. s/p I&D and drain placement left thigh 7/29/22. Cx +MSSA. Had high drain purulent output after first I&D, was taken back to OR 8/6/22 for repeat I&D. Drain in place, dressings CDI. Nephrology on board for ANGE. New onset fever 8/17, source workup initiated. No fluid collections on MRI spine, but there was an incidentally seen lung consolidation. SpO2 93% this am. Will continue to closely monitor situation.    -- ANGE nephrology managing   -- DVT " SQH  -- Daptomycin and cefepime per ID  -- Pain MM  -- Drain output 10cc yesterday afternoon, will monitor  -- Continue to evaluate for additional source of fever  -- Continue trending ESR/CRP  -- Encourage ambulation    Dispo: Observe drain, tight BG control, manage ANGE          Aron Vieyra MD  Orthopedics  [facility] - Telemetry Stepdown

## 2022-08-20 NOTE — ASSESSMENT & PLAN NOTE
47M with DM admitted 7/26/22 for DKA, ortho following for left lateral thigh abscess. s/p I&D and drain placement left thigh 7/29/22. Cx +MSSA. Had high drain purulent output after first I&D, was taken back to OR 8/6/22 for repeat I&D. Drain in place, dressings CDI. Nephrology on board for ANGE. New onset fever 8/17, source workup initiated. No fluid collections on MRI spine, but there was an incidentally seen lung consolidation. SpO2 93% this am. Will continue to closely monitor situation.    -- ANGE nephrology managing   -- DVT SQH  -- Daptomycin and cefepime per ID  -- Pain MM  -- Drain output 10cc yesterday afternoon, will monitor  -- Continue to evaluate for additional source of fever  -- Continue trending ESR/CRP  -- Encourage ambulation    Dispo: Observe drain, tight BG control, manage ANGE

## 2022-08-20 NOTE — SUBJECTIVE & OBJECTIVE
Interval History: see above    Review of Systems   Constitutional:  Negative for activity change, appetite change, chills, fatigue and fever.   HENT:  Negative for congestion, facial swelling, nosebleeds, sore throat and trouble swallowing.    Respiratory:  Negative for apnea, cough, choking, chest tightness, shortness of breath, wheezing and stridor.    Cardiovascular:  Negative for chest pain and leg swelling.   Gastrointestinal:  Negative for abdominal distention, abdominal pain, blood in stool, constipation, diarrhea, nausea and vomiting.   Genitourinary:  Negative for difficulty urinating, dysuria, flank pain, frequency, hematuria and urgency.   Musculoskeletal:  Positive for neck pain. Negative for arthralgias, back pain, gait problem and neck stiffness.   Skin:  Positive for rash (left wrist). Negative for color change and wound.   Neurological:  Negative for dizziness, tremors, seizures, syncope, facial asymmetry, speech difficulty, weakness, light-headedness, numbness and headaches.   Psychiatric/Behavioral:  Negative for agitation, behavioral problems, confusion, decreased concentration, dysphoric mood, hallucinations, self-injury and sleep disturbance. The patient is not nervous/anxious and is not hyperactive.    Objective:     Vital Signs (Most Recent):  Temp: 98.1 °F (36.7 °C) (08/20/22 0900)  Pulse: 102 (08/20/22 0900)  Resp: 18 (08/20/22 0900)  BP: (!) 161/95 (08/20/22 0900)  SpO2: 95 % (08/20/22 0900)   Vital Signs (24h Range):  Temp:  [98.1 °F (36.7 °C)-101.5 °F (38.6 °C)] 98.1 °F (36.7 °C)  Pulse:  [] 102  Resp:  [16-20] 18  SpO2:  [93 %-96 %] 95 %  BP: (121-169)/(71-95) 161/95     Weight: 104.3 kg (229 lb 15 oz)  Body mass index is 36.01 kg/m².    Intake/Output Summary (Last 24 hours) at 8/20/2022 0907  Last data filed at 8/20/2022 0800  Gross per 24 hour   Intake 2000 ml   Output 3880 ml   Net -1880 ml        Physical Exam  Constitutional:       General: He is not in acute distress.      Appearance: Normal appearance. He is not ill-appearing, toxic-appearing or diaphoretic.   HENT:      Head: Normocephalic and atraumatic.      Nose: Nose normal.      Mouth/Throat:      Mouth: Mucous membranes are moist.      Pharynx: Oropharynx is clear.   Eyes:      General: No scleral icterus.     Extraocular Movements: Extraocular movements intact.      Conjunctiva/sclera: Conjunctivae normal.      Pupils: Pupils are equal, round, and reactive to light.   Neck:      Comments: Tender post neck and left trapezious  Imporoved ROM, less muscular stiffness left trap.   Cardiovascular:      Rate and Rhythm: Normal rate and regular rhythm.      Pulses: Normal pulses.      Heart sounds: Normal heart sounds.   Pulmonary:      Effort: Pulmonary effort is normal. No respiratory distress.      Breath sounds: Normal breath sounds. No stridor. No wheezing, rhonchi or rales.   Chest:      Chest wall: No tenderness.   Abdominal:      General: Abdomen is flat. Bowel sounds are normal. There is no distension.      Palpations: Abdomen is soft.      Tenderness: There is no abdominal tenderness. There is no right CVA tenderness, guarding or rebound.   Musculoskeletal:         General: Swelling and tenderness present. No deformity or signs of injury. Normal range of motion.      Cervical back: Normal range of motion and neck supple. Tenderness present. No rigidity.      Right lower leg: No edema.      Left lower leg: No edema.      Comments: Left lat thigh is bandaged  Left forearm - superficial thrombophlebitis and contact dermatitis related to IV and tape.   Lymphadenopathy:      Cervical: No cervical adenopathy.   Skin:     General: Skin is warm and dry.      Coloration: Skin is not jaundiced.      Findings: Rash (papulosquamour erythema in setting of rtape exposure) present. No erythema.   Neurological:      General: No focal deficit present.      Mental Status: He is alert and oriented to person, place, and time. Mental status is  at baseline.      Motor: No weakness.   Psychiatric:         Mood and Affect: Mood normal.         Behavior: Behavior normal.         Thought Content: Thought content normal.         Judgment: Judgment normal.       Significant Labs: All pertinent labs within the past 24 hours have been reviewed.  CBC:   Recent Labs   Lab 08/19/22  0506 08/20/22  0250   WBC 6.13 4.77   HGB 8.1* 6.8*   HCT 24.4* 21.1*    168       CMP:   Recent Labs   Lab 08/19/22  0506 08/20/22  0250    139   K 4.2 3.7    105   CO2 23 23   GLU 82 81   BUN 60* 52*   CREATININE 6.1* 5.2*   CALCIUM 9.1 8.6*   PROT 7.0 6.2   ALBUMIN 2.5* 2.1*   BILITOT 0.5 0.6   ALKPHOS 72 60   AST 26 19   ALT 7* <5*   ANIONGAP 14 11         Significant Imaging: I have reviewed all pertinent imaging results/findings within the past 24 hours.

## 2022-08-20 NOTE — ASSESSMENT & PLAN NOTE
MRI- see above. evidecen of cellulitis, soft tissue swelling  - has muscular spasm on exam  Robaxin added  8/20 - AF since adding cefepime. Ortho following. Appreciate assistance  8/21- resolving

## 2022-08-20 NOTE — ASSESSMENT & PLAN NOTE
- H/H slowly declining  - transfuse for Hgb < 7  - continue to monitor   8/20 - Hb 6.8 - transfuse one unit PRBCs

## 2022-08-21 PROBLEM — J18.9 PNEUMONIA OF BOTH LUNGS: Status: ACTIVE | Noted: 2022-08-21

## 2022-08-21 LAB
ALBUMIN SERPL BCP-MCNC: 2.3 G/DL (ref 3.5–5.2)
ALP SERPL-CCNC: 65 U/L (ref 55–135)
ALT SERPL W/O P-5'-P-CCNC: 10 U/L (ref 10–44)
ANION GAP SERPL CALC-SCNC: 9 MMOL/L (ref 8–16)
AST SERPL-CCNC: 23 U/L (ref 10–40)
BASOPHILS # BLD AUTO: 0.03 K/UL (ref 0–0.2)
BASOPHILS NFR BLD: 0.7 % (ref 0–1.9)
BILIRUB SERPL-MCNC: 0.5 MG/DL (ref 0.1–1)
BUN SERPL-MCNC: 50 MG/DL (ref 6–20)
CALCIUM SERPL-MCNC: 9.2 MG/DL (ref 8.7–10.5)
CHLORIDE SERPL-SCNC: 107 MMOL/L (ref 95–110)
CO2 SERPL-SCNC: 25 MMOL/L (ref 23–29)
CREAT SERPL-MCNC: 3.7 MG/DL (ref 0.5–1.4)
CRP SERPL-MCNC: 120.4 MG/L (ref 0–8.2)
DIFFERENTIAL METHOD: ABNORMAL
EOSINOPHIL # BLD AUTO: 0.4 K/UL (ref 0–0.5)
EOSINOPHIL NFR BLD: 10.1 % (ref 0–8)
ERYTHROCYTE [DISTWIDTH] IN BLOOD BY AUTOMATED COUNT: 13.5 % (ref 11.5–14.5)
ERYTHROCYTE [SEDIMENTATION RATE] IN BLOOD BY PHOTOMETRIC METHOD: 75 MM/HR (ref 0–23)
EST. GFR  (NO RACE VARIABLE): 19.2 ML/MIN/1.73 M^2
GLUCOSE SERPL-MCNC: 92 MG/DL (ref 70–110)
HCT VFR BLD AUTO: 24 % (ref 40–54)
HGB BLD-MCNC: 7.9 G/DL (ref 14–18)
IMM GRANULOCYTES # BLD AUTO: 0.03 K/UL (ref 0–0.04)
IMM GRANULOCYTES NFR BLD AUTO: 0.7 % (ref 0–0.5)
LYMPHOCYTES # BLD AUTO: 1 K/UL (ref 1–4.8)
LYMPHOCYTES NFR BLD: 25.4 % (ref 18–48)
MAGNESIUM SERPL-MCNC: 1.9 MG/DL (ref 1.6–2.6)
MCH RBC QN AUTO: 29.6 PG (ref 27–31)
MCHC RBC AUTO-ENTMCNC: 32.9 G/DL (ref 32–36)
MCV RBC AUTO: 90 FL (ref 82–98)
MONOCYTES # BLD AUTO: 0.4 K/UL (ref 0.3–1)
MONOCYTES NFR BLD: 9.6 % (ref 4–15)
NEUTROPHILS # BLD AUTO: 2.2 K/UL (ref 1.8–7.7)
NEUTROPHILS NFR BLD: 53.5 % (ref 38–73)
NRBC BLD-RTO: 0 /100 WBC
PHOSPHATE SERPL-MCNC: 4.6 MG/DL (ref 2.7–4.5)
PLATELET # BLD AUTO: 192 K/UL (ref 150–450)
PMV BLD AUTO: 10.3 FL (ref 9.2–12.9)
POCT GLUCOSE: 118 MG/DL (ref 70–110)
POCT GLUCOSE: 125 MG/DL (ref 70–110)
POCT GLUCOSE: 141 MG/DL (ref 70–110)
POCT GLUCOSE: 181 MG/DL (ref 70–110)
POCT GLUCOSE: 98 MG/DL (ref 70–110)
POTASSIUM SERPL-SCNC: 4 MMOL/L (ref 3.5–5.1)
PROT SERPL-MCNC: 6.4 G/DL (ref 6–8.4)
RBC # BLD AUTO: 2.67 M/UL (ref 4.6–6.2)
SODIUM SERPL-SCNC: 141 MMOL/L (ref 136–145)
WBC # BLD AUTO: 4.06 K/UL (ref 3.9–12.7)

## 2022-08-21 PROCEDURE — 20600001 HC STEP DOWN PRIVATE ROOM

## 2022-08-21 PROCEDURE — 85652 RBC SED RATE AUTOMATED: CPT

## 2022-08-21 PROCEDURE — 85025 COMPLETE CBC W/AUTO DIFF WBC: CPT | Performed by: STUDENT IN AN ORGANIZED HEALTH CARE EDUCATION/TRAINING PROGRAM

## 2022-08-21 PROCEDURE — 99233 PR SUBSEQUENT HOSPITAL CARE,LEVL III: ICD-10-PCS | Mod: ,,, | Performed by: HOSPITALIST

## 2022-08-21 PROCEDURE — 25000003 PHARM REV CODE 250: Performed by: HOSPITALIST

## 2022-08-21 PROCEDURE — 99233 SBSQ HOSP IP/OBS HIGH 50: CPT | Mod: ,,, | Performed by: NURSE PRACTITIONER

## 2022-08-21 PROCEDURE — 25000003 PHARM REV CODE 250

## 2022-08-21 PROCEDURE — 99233 PR SUBSEQUENT HOSPITAL CARE,LEVL III: ICD-10-PCS | Mod: 25,,, | Performed by: ORTHOPAEDIC SURGERY

## 2022-08-21 PROCEDURE — 63600175 PHARM REV CODE 636 W HCPCS: Performed by: STUDENT IN AN ORGANIZED HEALTH CARE EDUCATION/TRAINING PROGRAM

## 2022-08-21 PROCEDURE — 25000003 PHARM REV CODE 250: Performed by: INTERNAL MEDICINE

## 2022-08-21 PROCEDURE — 99233 SBSQ HOSP IP/OBS HIGH 50: CPT | Mod: ,,, | Performed by: HOSPITALIST

## 2022-08-21 PROCEDURE — 80053 COMPREHEN METABOLIC PANEL: CPT | Performed by: STUDENT IN AN ORGANIZED HEALTH CARE EDUCATION/TRAINING PROGRAM

## 2022-08-21 PROCEDURE — 83735 ASSAY OF MAGNESIUM: CPT | Performed by: STUDENT IN AN ORGANIZED HEALTH CARE EDUCATION/TRAINING PROGRAM

## 2022-08-21 PROCEDURE — 86140 C-REACTIVE PROTEIN: CPT

## 2022-08-21 PROCEDURE — 25000003 PHARM REV CODE 250: Performed by: NURSE PRACTITIONER

## 2022-08-21 PROCEDURE — 99233 PR SUBSEQUENT HOSPITAL CARE,LEVL III: ICD-10-PCS | Mod: ,,, | Performed by: NURSE PRACTITIONER

## 2022-08-21 PROCEDURE — 84100 ASSAY OF PHOSPHORUS: CPT | Performed by: STUDENT IN AN ORGANIZED HEALTH CARE EDUCATION/TRAINING PROGRAM

## 2022-08-21 PROCEDURE — 99233 SBSQ HOSP IP/OBS HIGH 50: CPT | Mod: 25,,, | Performed by: ORTHOPAEDIC SURGERY

## 2022-08-21 RX ORDER — LEVOFLOXACIN 750 MG/1
750 TABLET ORAL
Status: DISCONTINUED | OUTPATIENT
Start: 2022-08-21 | End: 2022-08-24 | Stop reason: HOSPADM

## 2022-08-21 RX ORDER — LINEZOLID 600 MG/1
600 TABLET, FILM COATED ORAL EVERY 12 HOURS
Status: DISCONTINUED | OUTPATIENT
Start: 2022-08-21 | End: 2022-08-24 | Stop reason: HOSPADM

## 2022-08-21 RX ADMIN — OXYCODONE 5 MG: 5 TABLET ORAL at 09:08

## 2022-08-21 RX ADMIN — INSULIN DETEMIR 10 UNITS: 100 INJECTION, SOLUTION SUBCUTANEOUS at 08:08

## 2022-08-21 RX ADMIN — SEVELAMER CARBONATE 0.8 G: 800 POWDER, FOR SUSPENSION ORAL at 12:08

## 2022-08-21 RX ADMIN — LEVOFLOXACIN 750 MG: 750 TABLET, FILM COATED ORAL at 01:08

## 2022-08-21 RX ADMIN — METHOCARBAMOL 500 MG: 500 TABLET ORAL at 12:08

## 2022-08-21 RX ADMIN — TRIAMCINOLONE ACETONIDE: 1 CREAM TOPICAL at 08:08

## 2022-08-21 RX ADMIN — HEPARIN SODIUM 5000 UNITS: 5000 INJECTION INTRAVENOUS; SUBCUTANEOUS at 01:08

## 2022-08-21 RX ADMIN — INSULIN ASPART 8 UNITS: 100 INJECTION, SOLUTION INTRAVENOUS; SUBCUTANEOUS at 04:08

## 2022-08-21 RX ADMIN — INSULIN DETEMIR 10 UNITS: 100 INJECTION, SOLUTION SUBCUTANEOUS at 09:08

## 2022-08-21 RX ADMIN — SEVELAMER CARBONATE 0.8 G: 800 POWDER, FOR SUSPENSION ORAL at 08:08

## 2022-08-21 RX ADMIN — HEPARIN SODIUM 5000 UNITS: 5000 INJECTION INTRAVENOUS; SUBCUTANEOUS at 09:08

## 2022-08-21 RX ADMIN — LINEZOLID 600 MG: 600 TABLET, FILM COATED ORAL at 09:08

## 2022-08-21 RX ADMIN — METHOCARBAMOL 500 MG: 500 TABLET ORAL at 08:08

## 2022-08-21 RX ADMIN — SENNOSIDES AND DOCUSATE SODIUM 2 TABLET: 50; 8.6 TABLET ORAL at 08:08

## 2022-08-21 RX ADMIN — SEVELAMER CARBONATE 0.8 G: 800 POWDER, FOR SUSPENSION ORAL at 04:08

## 2022-08-21 RX ADMIN — METHOCARBAMOL 500 MG: 500 TABLET ORAL at 09:08

## 2022-08-21 RX ADMIN — HYDRALAZINE HYDROCHLORIDE 25 MG: 25 TABLET, FILM COATED ORAL at 08:08

## 2022-08-21 RX ADMIN — HEPARIN SODIUM 5000 UNITS: 5000 INJECTION INTRAVENOUS; SUBCUTANEOUS at 06:08

## 2022-08-21 RX ADMIN — LINEZOLID 600 MG: 600 TABLET, FILM COATED ORAL at 01:08

## 2022-08-21 RX ADMIN — OXYCODONE 5 MG: 5 TABLET ORAL at 04:08

## 2022-08-21 RX ADMIN — ATORVASTATIN CALCIUM 20 MG: 20 TABLET, FILM COATED ORAL at 08:08

## 2022-08-21 RX ADMIN — INSULIN ASPART 2 UNITS: 100 INJECTION, SOLUTION INTRAVENOUS; SUBCUTANEOUS at 04:08

## 2022-08-21 RX ADMIN — AMLODIPINE BESYLATE 5 MG: 5 TABLET ORAL at 08:08

## 2022-08-21 RX ADMIN — OXYCODONE 5 MG: 5 TABLET ORAL at 12:08

## 2022-08-21 RX ADMIN — INSULIN ASPART 8 UNITS: 100 INJECTION, SOLUTION INTRAVENOUS; SUBCUTANEOUS at 12:08

## 2022-08-21 RX ADMIN — TRIAMCINOLONE ACETONIDE: 1 CREAM TOPICAL at 09:08

## 2022-08-21 RX ADMIN — HYDRALAZINE HYDROCHLORIDE 25 MG: 25 TABLET, FILM COATED ORAL at 09:08

## 2022-08-21 RX ADMIN — INSULIN ASPART 8 UNITS: 100 INJECTION, SOLUTION INTRAVENOUS; SUBCUTANEOUS at 08:08

## 2022-08-21 RX ADMIN — OXYCODONE 5 MG: 5 TABLET ORAL at 08:08

## 2022-08-21 RX ADMIN — AMLODIPINE BESYLATE 5 MG: 5 TABLET ORAL at 09:08

## 2022-08-21 RX ADMIN — METHOCARBAMOL 500 MG: 500 TABLET ORAL at 04:08

## 2022-08-21 NOTE — PLAN OF CARE
Problem: Adult Inpatient Plan of Care  Goal: Plan of Care Review  Outcome: Ongoing, Progressing  Goal: Patient-Specific Goal (Individualized)  Outcome: Ongoing, Progressing  Goal: Absence of Hospital-Acquired Illness or Injury  Outcome: Ongoing, Progressing  Goal: Optimal Comfort and Wellbeing  Outcome: Ongoing, Progressing  Goal: Readiness for Transition of Care  Outcome: Ongoing, Progressing     Problem: Diabetes Comorbidity  Goal: Blood Glucose Level Within Targeted Range  Outcome: Ongoing, Progressing     Problem: Fluid and Electrolyte Imbalance (Acute Kidney Injury/Impairment)  Goal: Fluid and Electrolyte Balance  Outcome: Ongoing, Progressing     Problem: Oral Intake Inadequate (Acute Kidney Injury/Impairment)  Goal: Optimal Nutrition Intake  Outcome: Ongoing, Progressing     Problem: Renal Function Impairment (Acute Kidney Injury/Impairment)  Goal: Effective Renal Function  Outcome: Ongoing, Progressing     Problem: Skin Injury Risk Increased  Goal: Skin Health and Integrity  Outcome: Ongoing, Progressing     Problem: Fall Injury Risk  Goal: Absence of Fall and Fall-Related Injury  Outcome: Ongoing, Progressing     Problem: Adjustment to Illness (Sepsis/Septic Shock)  Goal: Optimal Coping  Outcome: Ongoing, Progressing     Problem: Bleeding (Sepsis/Septic Shock)  Goal: Absence of Bleeding  Outcome: Ongoing, Progressing     Problem: Glycemic Control Impaired (Sepsis/Septic Shock)  Goal: Blood Glucose Level Within Desired Range  Outcome: Ongoing, Progressing     Problem: Infection Progression (Sepsis/Septic Shock)  Goal: Absence of Infection Signs and Symptoms  Outcome: Ongoing, Progressing     Problem: Nutrition Impaired (Sepsis/Septic Shock)  Goal: Optimal Nutrition Intake  Outcome: Ongoing, Progressing     Problem: Fluid Imbalance (Pneumonia)  Goal: Fluid Balance  Outcome: Ongoing, Progressing     Problem: Respiratory Compromise (Pneumonia)  Goal: Effective Oxygenation and Ventilation  Outcome: Ongoing,  Progressing

## 2022-08-21 NOTE — SUBJECTIVE & OBJECTIVE
"Principal Problem:Abscess of left thigh    Principal Orthopedic Problem: Left thigh I&D 7/29 and 8/6  Staph species on thigh abscess cultures    Interval History: S/p repeat L thigh I&D 8/6/22. Drain output from last 24 hr not yet recorded this am but there is not much in bulb on my exam. Prior day's output was 30 cc. Afebrile yesterday. Pt received 1 unit RBC for Hb 6.8. Cefepime stopped. Pt states left leg feels good.    Review of patient's allergies indicates:  No Known Allergies    Current Facility-Administered Medications   Medication    0.9%  NaCl infusion (for blood administration)    acetaminophen tablet 1,000 mg    amLODIPine tablet 5 mg    atorvastatin tablet 20 mg    COVID-19 vac, forrest(Pfizer)(PF) (Pfizer COVID-19) 30 mcg/0.3 mL injection 0.3 mL    DAPTOmycin (CUBICIN) 625 mg in sodium chloride 0.9% 50 mL IVPB    dextrose 10% bolus 125 mL    dextrose 10% bolus 250 mL    glucagon (human recombinant) injection 1 mg    glucose chewable tablet 16 g    glucose chewable tablet 24 g    heparin (porcine) injection 5,000 Units    hydrALAZINE tablet 25 mg    hydrALAZINE tablet 25 mg    insulin aspart U-100 pen 1-10 Units    insulin aspart U-100 pen 8 Units    insulin detemir U-100 pen 10 Units    methocarbamoL tablet 500 mg    morphine injection 3 mg    ondansetron injection 4 mg    oxyCODONE immediate release tablet 5 mg    senna-docusate 8.6-50 mg per tablet 2 tablet    sevelamer carbonate pwpk 0.8 g    sodium chloride 0.9% flush 10 mL    triamcinolone acetonide 0.1% cream     Objective:     Vital Signs (Most Recent):  Temp: 98.2 °F (36.8 °C) (08/21/22 0427)  Pulse: 82 (08/21/22 0427)  Resp: 18 (08/21/22 0427)  BP: 133/76 (08/21/22 0427)  SpO2: (!) 94 % (08/21/22 0427) Vital Signs (24h Range):  Temp:  [98.1 °F (36.7 °C)-98.9 °F (37.2 °C)] 98.2 °F (36.8 °C)  Pulse:  [] 82  Resp:  [16-18] 18  SpO2:  [94 %-97 %] 94 %  BP: (132-162)/(76-95) 133/76     Weight: 104.3 kg (229 lb 15 oz)  Height: 5' 7" (170.2 " cm)  Body mass index is 36.01 kg/m².      Intake/Output Summary (Last 24 hours) at 8/21/2022 0634  Last data filed at 8/20/2022 0800  Gross per 24 hour   Intake 2000 ml   Output 2450 ml   Net -450 ml         Ortho/SPM Exam  Left lower extremity  Left thigh dressing cdi  Drain in place, serous output, minimal exudative material  No erythema of thigh, no signficant swelling  Compartments soft and compressible  Painless ROM of hip and knee  NVI        CBC:   Recent Labs   Lab 08/20/22  0250   WBC 4.77   HGB 6.8*   HCT 21.1*          CMP:   Recent Labs   Lab 08/20/22  0250      K 3.7      CO2 23   GLU 81   BUN 52*   CREATININE 5.2*   CALCIUM 8.6*   PROT 6.2   ALBUMIN 2.1*   BILITOT 0.6   ALKPHOS 60   AST 19   ALT <5*   ANIONGAP 11         All pertinent labs within the past 24 hours have been reviewed.    Significant Imaging: I have reviewed and interpreted all pertinent imaging results/findings.

## 2022-08-21 NOTE — ASSESSMENT & PLAN NOTE
- Afebrile, no leukocytosis  - Continue Ancef  - S/p OR with Ortho 8/6 for repeat washout  - Intra-op cultures with S.aureus: changed to Ancef for MSSA then daptomycin on 8/15; CPK 11  - ID following  - PRN analgesics provided  - Continuing to monitor; maintain drain for now - Ortho continues to monitor  8/18- drain output 35 cc  Per ID: Continue Daptomycin 6mg/kg/dose q48 hours (renally dosed) for MSSA SSTI. Anticipate either completion of 14 days IV abx from 8/6 for SSTI or possible transition to orals.   8/19- will US for pockets of fluid, consider CT  8/21- drainage diminishing

## 2022-08-21 NOTE — ASSESSMENT & PLAN NOTE
47M with DM admitted 7/26/22 for DKA, ortho following for left lateral thigh abscess. s/p I&D and drain placement left thigh 7/29/22. Cx +MSSA. Had high drain purulent output after first I&D, was taken back to OR 8/6/22 for repeat I&D. Drain in place, dressings CDI. Nephrology on board for ANGE. New onset fever 8/17, source workup initiated. No fluid collections on MRI spine, but there was an incidentally seen lung consolidation. Will continue to closely monitor situation.    -- ANGE nephrology managing   -- DVT SQH  -- Daptomycin and cefepime per ID  -- Pain MM  -- Drain output not yet recorded this am, will monitor  -- Continue to evaluate for additional source of fever  -- Continue trending ESR/CRP  -- Encourage ambulation    Dispo: Observe drain, tight BG control, manage ANGE

## 2022-08-21 NOTE — ASSESSMENT & PLAN NOTE
- DKA has resolved  - Endocrinology consulted and managing.  Recent Labs     08/19/22  2128 08/20/22  0511 08/20/22  0538 08/20/22  0910 08/20/22  1311 08/20/22  1822   POCTGLUCOSE 124* 77 121* 141* 124* 77

## 2022-08-21 NOTE — ASSESSMENT & PLAN NOTE
- H/H slowly declining  - transfuse for Hgb < 7  - continue to monitor   8/21 - Hb 6.8 - transfused one unit PRBCs -> 7.9

## 2022-08-21 NOTE — ASSESSMENT & PLAN NOTE
Multifocal pneumonia within the bilateral lungs.  - Nodular opacities in right upper lobe 0.7 x 1.4 cm.  Unclear if these are consolidations or solid nodules.    - Close short-term follow-up is recommended.  - on dapto  - will discuss w ID, likely change antibiotics.

## 2022-08-21 NOTE — PROGRESS NOTES
[facility] - Telemetry Stepdown  Orthopedics  Progress Note    Patient Name: Wilfredo Thomas  MRN: [id number]  Admission Date: 7/26/2022  Hospital Length of Stay: 26 days  Attending Provider: Dorota Reynoso MD  Primary Care Provider: Aylin Wayne DO  Follow-up For: Procedure(s) (LRB):  Incision and Drainage - LEFT THIGH. (Left)    Post-Operative Day: 15 Days Post-Op  Subjective:     Principal Problem:Abscess of left thigh    Principal Orthopedic Problem: Left thigh I&D 7/29 and 8/6  Staph species on thigh abscess cultures    Interval History: S/p repeat L thigh I&D 8/6/22. Drain output from last 24 hr not yet recorded this am but there is not much in bulb on my exam. Prior day's output was 30 cc. Afebrile yesterday. Pt received 1 unit RBC for Hb 6.8. Cefepime stopped. Pt states left leg feels good.    Review of patient's allergies indicates:  No Known Allergies    Current Facility-Administered Medications   Medication    0.9%  NaCl infusion (for blood administration)    acetaminophen tablet 1,000 mg    amLODIPine tablet 5 mg    atorvastatin tablet 20 mg    COVID-19 vac, forrest(Pfizer)(PF) (Pfizer COVID-19) 30 mcg/0.3 mL injection 0.3 mL    DAPTOmycin (CUBICIN) 625 mg in sodium chloride 0.9% 50 mL IVPB    dextrose 10% bolus 125 mL    dextrose 10% bolus 250 mL    glucagon (human recombinant) injection 1 mg    glucose chewable tablet 16 g    glucose chewable tablet 24 g    heparin (porcine) injection 5,000 Units    hydrALAZINE tablet 25 mg    hydrALAZINE tablet 25 mg    insulin aspart U-100 pen 1-10 Units    insulin aspart U-100 pen 8 Units    insulin detemir U-100 pen 10 Units    methocarbamoL tablet 500 mg    morphine injection 3 mg    ondansetron injection 4 mg    oxyCODONE immediate release tablet 5 mg    senna-docusate 8.6-50 mg per tablet 2 tablet    sevelamer carbonate pwpk 0.8 g    sodium chloride 0.9% flush 10 mL    triamcinolone acetonide 0.1% cream     Objective:     Vital Signs (Most  "Recent):  Temp: 98.2 °F (36.8 °C) (08/21/22 0427)  Pulse: 82 (08/21/22 0427)  Resp: 18 (08/21/22 0427)  BP: 133/76 (08/21/22 0427)  SpO2: (!) 94 % (08/21/22 0427) Vital Signs (24h Range):  Temp:  [98.1 °F (36.7 °C)-98.9 °F (37.2 °C)] 98.2 °F (36.8 °C)  Pulse:  [] 82  Resp:  [16-18] 18  SpO2:  [94 %-97 %] 94 %  BP: (132-162)/(76-95) 133/76     Weight: 104.3 kg (229 lb 15 oz)  Height: 5' 7" (170.2 cm)  Body mass index is 36.01 kg/m².      Intake/Output Summary (Last 24 hours) at 8/21/2022 0634  Last data filed at 8/20/2022 0800  Gross per 24 hour   Intake 2000 ml   Output 2450 ml   Net -450 ml         Ortho/SPM Exam  Left lower extremity  Left thigh dressing cdi  Drain in place, serous output, minimal exudative material  No erythema of thigh, no signficant swelling  Compartments soft and compressible  Painless ROM of hip and knee  NVI        CBC:   Recent Labs   Lab 08/20/22  0250   WBC 4.77   HGB 6.8*   HCT 21.1*          CMP:   Recent Labs   Lab 08/20/22  0250      K 3.7      CO2 23   GLU 81   BUN 52*   CREATININE 5.2*   CALCIUM 8.6*   PROT 6.2   ALBUMIN 2.1*   BILITOT 0.6   ALKPHOS 60   AST 19   ALT <5*   ANIONGAP 11         All pertinent labs within the past 24 hours have been reviewed.    Significant Imaging: I have reviewed and interpreted all pertinent imaging results/findings.      Assessment/Plan:     * Abscess of left thigh  47M with DM admitted 7/26/22 for DKA, ortho following for left lateral thigh abscess. s/p I&D and drain placement left thigh 7/29/22. Cx +MSSA. Had high drain purulent output after first I&D, was taken back to OR 8/6/22 for repeat I&D. Drain in place, dressings CDI. Nephrology on board for ANGE. New onset fever 8/17, source workup initiated. No fluid collections on MRI spine, but there was an incidentally seen lung consolidation. Will continue to closely monitor situation.    -- ANGE nephrology managing   -- DVT SQH  -- Daptomycin and cefepime per ID  -- Pain " MM  -- Drain output not yet recorded this am, will monitor  -- Continue to evaluate for additional source of fever  -- Continue trending ESR/CRP  -- Encourage ambulation    Dispo: Observe drain, tight BG control, manage ANGE          Aron Vieyra MD  Orthopedics  [facility] - Telemetry Stepdown

## 2022-08-21 NOTE — ASSESSMENT & PLAN NOTE
-patient with fever to 102 and tachycardia on 8/17 - repeat blood cultures, UA, COVID swab pending  -CXR without infiltrate, lactate nl, procalcitonin mildly elevated (due to ANGE)  -remains on dapto for MSSA abscess to thigh  -eosinophils nl and no leukocytosis  -ID and ortho notified of change  -minimize IVF due to improving ANGE with signs of fluid retention  8/21- on daptomycin

## 2022-08-21 NOTE — ASSESSMENT & PLAN NOTE
Estimated Creatinine Clearance: 27.8 mL/min (A) (based on SCr of 3.7 mg/dL (H)). - improved  - Hyperphosphatemia also noted - start binder  - Continues to make urine  - Nephrology following: no acute indication for HD at this time  - Renally dosing all medications  - Avoid nephrotoxins  - nephrology has cleared for discharge once Cr < 7    8/21 - cr 6.1 -> 5.2 -> 3.7

## 2022-08-21 NOTE — ASSESSMENT & PLAN NOTE
Uncertain etiology  On 8/17 and 8/19.   Still tachycardia  Wbc 6  Blood cult 8/17 - NGSF  drain output improving  covid negative.   cxr negative 8/17  UA negative 8/17  Will chat w Ortho regarding further plans for debridement  8/19- US wound site for pockets of fluid,   Fluid collection located medial to the bandage: 3.5 x 0.6 x 1.8 cm.  Fluid collection located superior to bandage: 3.0 x 0.6 x 2.3 cm.  CRP rising. Drain output improved- gradually reducing  Fever after receiving Daptomycin today. Will discuss w ID. Repeated blood culture and lactic acid    8/20 - cefepime added to dapto  last night, developed blisters at IV site. MRI revealed evidence of celluilits and consolidation NAVDEEP pneumonia  8/21- CT chest pending, on dapto. Blood cult negative

## 2022-08-21 NOTE — PROGRESS NOTES
"[facility] - Telemetry [facility] Medicine  Progress Note    Patient Name: Wilfredo Thomas  MRN: [id number]  Patient Class: IP- Inpatient   Admission Date: 7/26/2022  Length of Stay: 26 days  Attending Physician: Dorota Reynoso MD  Primary Care Provider: Aylin Wayne DO        Subjective:     Principal Problem:Abscess of left thigh        HPI:  48 y/o M hx of HTN, IDDM2, GERD obesity, HLD presented to the ED with complaint of 1 day of worsening SOB. Wife present at bedside. Patient states that he noticed yesterday he was feeling some shortness of breath and couldn't seem to catch his breath. His wife went to check on him this morning and noticed that he was breathing "fast and heavy" and he sounded like he was slurring his words. Wife was concerned about a stroke, so he brought him to the ED for evaluation.     Patient also reporting significant pain and swelling along his left lateral proximal thigh. He states he has noticed worsening pain over the past 2 weeks. He denies any acute trauma to the area. He was seen by ortho last week and was told to take ibu-profen for a muscle strain. This did not help his pain, so he presented again on 7/22 and was given a prednisone injection into his thigh. This also did not help his pain, and now the pain and swelling have advanced to the point that he has difficulty with ambulation. Patient denies fever, chills, nausea, vomiting.      Of note, patient has hx of poorly controlled diabetes. He was recently started on insulin by his PCP, but he has not taken any insulin since being prescribed it.      In the ED, patient hypertensive and tachycardic to the 130s. Tachypneic with RR in the 40s. On CBC, WBC 26.9, On CMP patient hyperkalemic to 5.5, CorNa 136, Bicarb 5. Cr elevated to 1.9 from BL 0.83. UA, BHB pending at time of admission. CTA without evidence of pulmonary embolism. Critical Care Medicine consulted given severe acidosis.             Overview/Hospital " Course:    Patient admitted to the MICU for management of severe DKA. Metabolic acidosis improving on insulin drip. He also has had left thigh pain for 1-2 months. There is a large mass on his left thigh that is tender to palpation, CT revealed hematoma vs soft tissue growth. General surgery consulted and recommended IR consult. IR consulted, no indication for tap.   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 Ortho consulted and performed bedside aspiration of possible abscess which revealed >200K cells >80% segs. Will hold off on antibiotics for now per ortho recs, ortho is taking patient to the OR for irrigation of the thigh, will start antibiotics afterwards.         7/29 Metabolic acidosis slowly improving. Ortho taking patient to the OR for irrigation of the left thigh, will start antibiotics afterwards.   7/30 Transfer to hospital medicine . S/p I&D  of left thigh Abscess on7/29/22 - MRI - uptured tensor fascia jose luis (TFL) muscle with associated large hematoma. gram stain -Moderate Gram positive cocci in clusters .cultures pending.  continue Vancomycin, clindamycin and Zosyn . Bicarb 15. K replaced . off insulin drip on SQ insulin.  7/31 ID and endocrine consulted.  abscess with Calcium pyrophosphate crystals with unknown significance potassium and P replaced. Bicarbonate WNL . aerobic culture 7/28 STAPHYLOCOCCUS SPECIES . Glucose in 300s .PRN imodium  for diarrhea. PT/OT consulted - WBAT. Surgical drain with purulent output  8/1 PICC team consulted for IV access. vanc trough at 11.4. monitor for vanomycin toxicity. possible OR tomorrow for washout  may need bone biopsy to r/o osteomyelitis.Discontinued clindamycin.   8/2 glucose in 300s.  Increased Levemir  to 14 units BID and Aspart  10 units TIDWM. K replaced   8/3 ANGE with cr 0.7--> 2.7. likely vanc induced ATN with levels 40. urine studies ordered renal sonogram WNL  nephrology consulted.  started on NS 100ml/h x 10h and follow up renal panel.Zosyn and  vancomycin discontinued. Strict IO monitor. condom catheter . switched to Ancef for MSSA on culture  Interval History:  8/4- MSSA- see below. /85 VSS. On room air, eating, BM on 8/2, good UO. Appreciate ortho recs. On Cephazolin, detim 14 bid and aspart 12 ac.  8/5-Cr still rising, cr =6.  Wrist still hurting. Ortho to tap it to eval for infection and gout today. /90   Pulse 85  AF, no other signs of infection.  Will discuss w Nephrology- received NS x one liter yesterday. Will repeat today. Suspect auto-diuresis.   8/6: washout with ortho today. Cr continues to rise (7.1), nephrology following  8/7: Cr 7.4, phos increasing; tolerated surgery well yesterday, intra-op cultures pending  8/8: Cr increased to 8.1, hyperphosphatemia again noted; continues to make urine, no acute indication for HD per nephrology. Intra-op cultures with S.aureus, continued cefazolin pending final C&S results.   Interval History:   8/18- 47M with DM admitted 7/26/22 for DKA, ortho following for left lateral thigh abscess. s/p I&D and drain placement left thigh 7/29/22. Cx +MSSA. Had high drain purulent output after first I&D, was taken back to OR 8/6/22 for repeat I&D. Drain in place, dressings CDI. Nephrology on board for ANGE.   pt transferred from telemedicine, had fever yesterday,  -> 90.  Wbc -ok. Covid negative. Drain output paula to 35 cc. Pt on Ancef.   No urine output, cr rising, Nephrology following, no indication for HD presently. Ortho may need to repeat debridement.      8/19- /71   Pulse 92 AF. Appreciate ortho and ID input, pt on daptomycin. T max 99.4. drain functioning. Blood cult 8/17 are neg so far, CRP and procal were higher. Will US the wound site for pockets of fluid.   - had fever and rigors  after receiving dapto today, MRI spine - neck ordered for acute neck/ back pain. Blood culture, lactic acid, robaxin ordered.   - still  febrile. Add cefepime, cooling blanket, cannot have NSAID    8/20 - pt developed blisters around IV site.   MRI- No evidence of discitis-osteomyelitis.  No epidural fluid collections.  Edema/enhancement in the posterior paraspinal musculature/superficial soft tissues at the level of C7 concerning for cellulitis/infection.  No rim enhancing collections to suggest abscess. Mild degenerative change of the spine as detailed above.  Consolidation in the left upper lobe, possible infection or noninfectious inflammation and new from CTA chest 07/26/2022.  On dapto and cefepime, lactade - 0.8, ck -nl, cr improving 5.2. Hb 6.8.  pt is AF since adding Cefipime. Will discuss w ID. For now keep antibiotcs the same. next dapto is tomorrow and we may change things before then.  He is on renal doses and renal function improving.   rash is contact dermatitis, in areas of tape. Do not think this monkey px  CT scan of the chest to eval pneumonia.  Might change antibiotics. We are suspecting drug related pneumonia and rash. isolation precautions until cleared by ID to make sure they concur.    8/21- cefepime stopped.pt still AF, good UO 2400cc, cr continues to improve.  It becomes febrile again will choose levoquin. CT chest done  - Multifocal pneumonia within the bilateral lungs. Nodular opacities in right upper lobe 0.7 x 1.4 cm.  Unclear if these are consolidations or solid nodules.    Appreciate ID input and f/u. Right thrombophlebitis and contact dermatitis improving. Daptomycin dose today.  Received one unit PRBCs yesterday - Hb 7.9. ID to change antibiotics to levoquin and Zyvox.      Interval History: see above    Review of Systems   Constitutional:  Negative for activity change, appetite change, chills, fatigue and fever.        Able to sit in chair. Neck pain improving, Right wrist/ forearm pain improving   HENT:  Negative for congestion, facial swelling, nosebleeds, sore throat and trouble swallowing.    Respiratory:   Negative for apnea, cough, choking, chest tightness, shortness of breath, wheezing and stridor.    Cardiovascular:  Negative for chest pain and leg swelling.   Gastrointestinal:  Negative for abdominal distention, abdominal pain, blood in stool, constipation, diarrhea, nausea and vomiting.   Genitourinary:  Negative for difficulty urinating, dysuria, flank pain, frequency, hematuria and urgency.   Musculoskeletal:  Positive for neck pain. Negative for arthralgias, back pain, gait problem and neck stiffness.        Right Forearm thrombophlebitis   Skin:  Positive for rash (right  wrist). Negative for color change and wound.   Neurological:  Negative for dizziness, tremors, seizures, syncope, facial asymmetry, speech difficulty, weakness, light-headedness, numbness and headaches.   Psychiatric/Behavioral:  Negative for agitation, behavioral problems, confusion, decreased concentration, dysphoric mood, hallucinations, self-injury and sleep disturbance. The patient is not nervous/anxious and is not hyperactive.    Objective:     Vital Signs (Most Recent):  Temp: 98.2 °F (36.8 °C) (08/21/22 0427)  Pulse: 82 (08/21/22 0427)  Resp: 18 (08/21/22 0427)  BP: 133/76 (08/21/22 0427)  SpO2: (!) 94 % (08/21/22 0427)   Vital Signs (24h Range):  Temp:  [98.1 °F (36.7 °C)-98.9 °F (37.2 °C)] 98.2 °F (36.8 °C)  Pulse:  [] 82  Resp:  [16-18] 18  SpO2:  [94 %-97 %] 94 %  BP: (132-162)/(76-95) 133/76     Weight: 104.3 kg (229 lb 15 oz)  Body mass index is 36.01 kg/m².    Intake/Output Summary (Last 24 hours) at 8/21/2022 0749  Last data filed at 8/20/2022 0800  Gross per 24 hour   Intake 2000 ml   Output 2450 ml   Net -450 ml        Physical Exam  Constitutional:       General: He is not in acute distress.     Appearance: Normal appearance. He is not ill-appearing, toxic-appearing or diaphoretic.   HENT:      Head: Normocephalic and atraumatic.      Nose: Nose normal.      Mouth/Throat:      Mouth: Mucous membranes are moist.       Pharynx: Oropharynx is clear.   Eyes:      General: No scleral icterus.     Extraocular Movements: Extraocular movements intact.      Conjunctiva/sclera: Conjunctivae normal.      Pupils: Pupils are equal, round, and reactive to light.   Neck:      Comments: Tender post neck and left trapezious  Imporoved ROM, less muscular stiffness left trap.   Cardiovascular:      Rate and Rhythm: Normal rate and regular rhythm.      Pulses: Normal pulses.      Heart sounds: Normal heart sounds.   Pulmonary:      Effort: Pulmonary effort is normal. No respiratory distress.      Breath sounds: Normal breath sounds. No stridor. No wheezing, rhonchi or rales.   Chest:      Chest wall: No tenderness.   Abdominal:      General: Abdomen is flat. Bowel sounds are normal. There is no distension.      Palpations: Abdomen is soft.      Tenderness: There is no abdominal tenderness. There is no right CVA tenderness, guarding or rebound.   Musculoskeletal:         General: Swelling and tenderness present. No deformity or signs of injury. Normal range of motion.      Cervical back: Normal range of motion and neck supple. Tenderness present. No rigidity.      Right lower leg: No edema.      Left lower leg: No edema.      Comments: Left lat thigh is bandaged  Right  forearm - superficial thrombophlebitis and contact dermatitis related to IV and tape.   Lymphadenopathy:      Cervical: No cervical adenopathy.   Skin:     General: Skin is warm and dry.      Coloration: Skin is not jaundiced.      Findings: Rash (papulosquamour erythema in setting of rtape exposure) present. No erythema.   Neurological:      General: No focal deficit present.      Mental Status: He is alert and oriented to person, place, and time. Mental status is at baseline.      Motor: No weakness.   Psychiatric:         Mood and Affect: Mood normal.         Behavior: Behavior normal.         Thought Content: Thought content normal.         Judgment: Judgment normal.        Significant Labs: All pertinent labs within the past 24 hours have been reviewed.  CBC:   Recent Labs   Lab 08/20/22  0250 08/21/22  0640   WBC 4.77 4.06   HGB 6.8* 7.9*   HCT 21.1* 24.0*    192       CMP:   Recent Labs   Lab 08/20/22  0250 08/21/22  0640    141   K 3.7 4.0    107   CO2 23 25   GLU 81 92   BUN 52* 50*   CREATININE 5.2* 3.7*   CALCIUM 8.6* 9.2   PROT 6.2 6.4   ALBUMIN 2.1* 2.3*   BILITOT 0.6 0.5   ALKPHOS 60 65   AST 19 23   ALT <5* 10   ANIONGAP 11 9         Significant Imaging: I have reviewed all pertinent imaging results/findings within the past 24 hours.      Assessment/Plan:      * Abscess of left thigh  - Afebrile, no leukocytosis  - Continue Ancef  - S/p OR with Ortho 8/6 for repeat washout  - Intra-op cultures with S.aureus: changed to Ancef for MSSA then daptomycin on 8/15; CPK 11  - ID following  - PRN analgesics provided  - Continuing to monitor; maintain drain for now - Ortho continues to monitor  8/18- drain output 35 cc  Per ID: Continue Daptomycin 6mg/kg/dose q48 hours (renally dosed) for MSSA SSTI. Anticipate either completion of 14 days IV abx from 8/6 for SSTI or possible transition to orals.   8/19- will US for pockets of fluid, consider CT  8/21- drainage diminishing      Pneumonia of both lungs  Multifocal pneumonia within the bilateral lungs.  - Nodular opacities in right upper lobe 0.7 x 1.4 cm.  Unclear if these are consolidations or solid nodules.    - Close short-term follow-up is recommended.  - on dapto  - will discuss w ID, likely change antibiotics.         ANGE (acute kidney injury)  Estimated Creatinine Clearance: 27.8 mL/min (A) (based on SCr of 3.7 mg/dL (H)). - improved  - Hyperphosphatemia also noted - start binder  - Continues to make urine  - Nephrology following: no acute indication for HD at this time  - Renally dosing all medications  - Avoid nephrotoxins  - nephrology has cleared for discharge once Cr < 7    8/21 - cr 6.1 -> 5.2 ->  3.7      Allergic contact dermatitis due to adhesives  triamcinolon cream  Elevate ext.       Superficial thrombophlebitis of right upper extremity  Elevate ext  Warm packs      Acute neck pain  MRI- see above. evidecen of cellulitis, soft tissue swelling  - has muscular spasm on exam  Robaxin added  8/20 - AF since adding cefepime. Ortho following. Appreciate assistance  8/21- resolving        Fever  Uncertain etiology  On 8/17 and 8/19.   Still tachycardia  Wbc 6  Blood cult 8/17 - NGSF  drain output improving  covid negative.   cxr negative 8/17  UA negative 8/17  Will chat w Ortho regarding further plans for debridement  8/19- US wound site for pockets of fluid,   Fluid collection located medial to the bandage: 3.5 x 0.6 x 1.8 cm.  Fluid collection located superior to bandage: 3.0 x 0.6 x 2.3 cm.  CRP rising. Drain output improved- gradually reducing  Fever after receiving Daptomycin today. Will discuss w ID. Repeated blood culture and lactic acid    8/20 - cefepime added to dapto  last night, developed blisters at IV site. MRI revealed evidence of celluilits and consolidation NAVDEEP pneumonia  8/21- CT chest pending, on dapto. Blood cult negative      Pain and swelling of left wrist  Pseudogout  - Tapped by Ortho, fluid reviewed: does not appear to be gout or septic arthritis, likely pseudogout (calcium pyrophosphate crystals on previous LE aspiration)  - PRN analgesics provided  -improved after steroid injection  8/19- resolved  8/21- pain and swelling of the right wrist is superfiscial thrombophlebitis and contact dermatitis    Sepsis  -patient with fever to 102 and tachycardia on 8/17 - repeat blood cultures, UA, COVID swab pending  -CXR without infiltrate, lactate nl, procalcitonin mildly elevated (due to ANGE)  -remains on dapto for MSSA abscess to thigh  -eosinophils nl and no leukocytosis  -ID and ortho notified of change  -minimize IVF due to improving ANGE with signs of fluid retention  8/21- on  daptomycin      Type 2 diabetes mellitus with hyperglycemia, with long-term current use of insulin  - DKA has resolved  - Endocrinology consulted and managing.  Recent Labs     08/19/22  2128 08/20/22  0511 08/20/22  0538 08/20/22  0910 08/20/22  1311 08/20/22  1822   POCTGLUCOSE 124* 77 121* 141* 124* 77         Hypertension associated with type 2 diabetes mellitus  - Continue home regimen of amlodipine; lisinopril held due to poor renal function  - continue to monitor and further titrate antihypertensives as clinically indicated   -increase amlodipine to BID on 8/13 for elevated BP; avoid hypotension with ANGE  8/18- /60  -stable      Hematoma of left thigh  - See Abscess of L thigh    Anemia of chronic disease  - H/H slowly declining  - transfuse for Hgb < 7  - continue to monitor   8/21 - Hb 6.8 - transfused one unit PRBCs -> 7.9    Class 2 severe obesity due to excess calories with serious comorbidity and body mass index (BMI) of 36.0 to 36.9 in adult  - Body mass index is 36.01 kg/m².  - Needs dietary and lifestyle modifications in relation to health    Hyperlipidemia associated with type 2 diabetes mellitus  - Continue home statin     VTE Risk Mitigation (From admission, onward)         Ordered     heparin (porcine) injection 5,000 Units  Every 8 hours         08/07/22 1010     Place sequential compression device  Until discontinued         08/06/22 0855     Place sequential compression device  Until discontinued         07/29/22 0158     IP VTE HIGH RISK PATIENT  Once         07/29/22 0158     Place SAVANNAH hose  Until discontinued         07/26/22 2240     Place sequential compression device  Until discontinued         07/26/22 2240                Discharge Planning   FILEMON: 8/24/2022     Code Status: Full Code   Is the patient medically ready for discharge?: No    Reason for patient still in hospital (select all that apply): Patient trending condition  Discharge Plan A: Home Health                  Dorota  MD [place]  Department of Hospital Medicine   [facility] - Telemetry Stepdown

## 2022-08-21 NOTE — SUBJECTIVE & OBJECTIVE
Interval History: see above    Review of Systems   Constitutional:  Negative for activity change, appetite change, chills, fatigue and fever.        Able to sit in chair. Neck pain improving, Right wrist/ forearm pain improving   HENT:  Negative for congestion, facial swelling, nosebleeds, sore throat and trouble swallowing.    Respiratory:  Negative for apnea, cough, choking, chest tightness, shortness of breath, wheezing and stridor.    Cardiovascular:  Negative for chest pain and leg swelling.   Gastrointestinal:  Negative for abdominal distention, abdominal pain, blood in stool, constipation, diarrhea, nausea and vomiting.   Genitourinary:  Negative for difficulty urinating, dysuria, flank pain, frequency, hematuria and urgency.   Musculoskeletal:  Positive for neck pain. Negative for arthralgias, back pain, gait problem and neck stiffness.        Right Forearm thrombophlebitis   Skin:  Positive for rash (right  wrist). Negative for color change and wound.   Neurological:  Negative for dizziness, tremors, seizures, syncope, facial asymmetry, speech difficulty, weakness, light-headedness, numbness and headaches.   Psychiatric/Behavioral:  Negative for agitation, behavioral problems, confusion, decreased concentration, dysphoric mood, hallucinations, self-injury and sleep disturbance. The patient is not nervous/anxious and is not hyperactive.    Objective:     Vital Signs (Most Recent):  Temp: 98.2 °F (36.8 °C) (08/21/22 0427)  Pulse: 82 (08/21/22 0427)  Resp: 18 (08/21/22 0427)  BP: 133/76 (08/21/22 0427)  SpO2: (!) 94 % (08/21/22 0427)   Vital Signs (24h Range):  Temp:  [98.1 °F (36.7 °C)-98.9 °F (37.2 °C)] 98.2 °F (36.8 °C)  Pulse:  [] 82  Resp:  [16-18] 18  SpO2:  [94 %-97 %] 94 %  BP: (132-162)/(76-95) 133/76     Weight: 104.3 kg (229 lb 15 oz)  Body mass index is 36.01 kg/m².    Intake/Output Summary (Last 24 hours) at 8/21/2022 0724  Last data filed at 8/20/2022 0800  Gross per 24 hour   Intake 2000  ml   Output 2450 ml   Net -450 ml        Physical Exam  Constitutional:       General: He is not in acute distress.     Appearance: Normal appearance. He is not ill-appearing, toxic-appearing or diaphoretic.   HENT:      Head: Normocephalic and atraumatic.      Nose: Nose normal.      Mouth/Throat:      Mouth: Mucous membranes are moist.      Pharynx: Oropharynx is clear.   Eyes:      General: No scleral icterus.     Extraocular Movements: Extraocular movements intact.      Conjunctiva/sclera: Conjunctivae normal.      Pupils: Pupils are equal, round, and reactive to light.   Neck:      Comments: Tender post neck and left trapezious  Imporoved ROM, less muscular stiffness left trap.   Cardiovascular:      Rate and Rhythm: Normal rate and regular rhythm.      Pulses: Normal pulses.      Heart sounds: Normal heart sounds.   Pulmonary:      Effort: Pulmonary effort is normal. No respiratory distress.      Breath sounds: Normal breath sounds. No stridor. No wheezing, rhonchi or rales.   Chest:      Chest wall: No tenderness.   Abdominal:      General: Abdomen is flat. Bowel sounds are normal. There is no distension.      Palpations: Abdomen is soft.      Tenderness: There is no abdominal tenderness. There is no right CVA tenderness, guarding or rebound.   Musculoskeletal:         General: Swelling and tenderness present. No deformity or signs of injury. Normal range of motion.      Cervical back: Normal range of motion and neck supple. Tenderness present. No rigidity.      Right lower leg: No edema.      Left lower leg: No edema.      Comments: Left lat thigh is bandaged  Right  forearm - superficial thrombophlebitis and contact dermatitis related to IV and tape.   Lymphadenopathy:      Cervical: No cervical adenopathy.   Skin:     General: Skin is warm and dry.      Coloration: Skin is not jaundiced.      Findings: Rash (papulosquamour erythema in setting of rtape exposure) present. No erythema.   Neurological:       General: No focal deficit present.      Mental Status: He is alert and oriented to person, place, and time. Mental status is at baseline.      Motor: No weakness.   Psychiatric:         Mood and Affect: Mood normal.         Behavior: Behavior normal.         Thought Content: Thought content normal.         Judgment: Judgment normal.       Significant Labs: All pertinent labs within the past 24 hours have been reviewed.  CBC:   Recent Labs   Lab 08/20/22  0250 08/21/22  0640   WBC 4.77 4.06   HGB 6.8* 7.9*   HCT 21.1* 24.0*    192       CMP:   Recent Labs   Lab 08/20/22  0250 08/21/22  0640    141   K 3.7 4.0    107   CO2 23 25   GLU 81 92   BUN 52* 50*   CREATININE 5.2* 3.7*   CALCIUM 8.6* 9.2   PROT 6.2 6.4   ALBUMIN 2.1* 2.3*   BILITOT 0.6 0.5   ALKPHOS 60 65   AST 19 23   ALT <5* 10   ANIONGAP 11 9         Significant Imaging: I have reviewed all pertinent imaging results/findings within the past 24 hours.

## 2022-08-21 NOTE — PROGRESS NOTES
[facility] - Telemetry Stepdown  Infectious Disease  Progress Note    Patient Name: Wilfredo Thomas  MRN: [id number]  Admission Date: 7/26/2022  Length of Stay: 25 days  Attending Physician: Dorota Reynoso MD  Primary Care Provider: Aylin Wayne DO    Isolation Status: No active isolations  Assessment/Plan:      * Abscess of left thigh     47 year old with DMII and HTN admitted for DKA and left thigh abscess.      MRI of left femur notable for ruptured tensor fascia jose luis mm with associated large hematoma, as well as small area of potential osteonecrosis.  Bedside aspiration of the fluid collection was notable for a cell count of >200k (>80%segs), cultures + staph species. Underwent I&D on 7/29 and 8/6.  Surgical cultures positive for MSSA.  Per ortho surgery, no concerns for bone involvement.      Initially on Vancomycin and Zosyn, but course complicated by ANGE.   Vancomycin discontinued and switched to cefazolin on 8/3,  but subsequent Emanuel's stain + and with continued deterioration in renal fx with concerned for beta lactam related AIN, so cefazolin stopped and Daptomycin started for MSSA     Hospital course complicated by acute onset left wrist pain.  History of wrist arthroscopy with hardware placement 3/2022.   S/p joint aspiration, white count 2050k, 88% segs. No crystals noted. Cultures no growth.   Received steroid shot per primary team which pt noted improvement.  Xray of wrist with no acute concerns.          Patient developed fever 8/17 and again on 8/19.  CRP spike 24>>192.  New onset neck paraspinal pain/erythema on 8/19. MRI C/T/L spine 8/19 negative for epidural abscess or discitis, but significant for edema of paraspinal muscles/superficial soft tissues at level of C7 concerning for cellulitis. No discrete fluid collections.  Incidentally noted consolidation in left upper lobe - possible infection or non-infectious inflammation. O2 sats 94-96% on RA. Patient denies cough, SOB.  Cefepime started by  Primary Team      All repeat blood cx NGTD.  BLE US 8/17 negative for DVT.  U/A negative. Soft tissue US left thigh with two residual  fluid collections medial and superior to incision. Orthopedics continues to follow. No additional washout planned.      Noted today to have ? New onset rash/irritation to right forearm above IV site (IV removed today) with proximal erythema/warmth and palpable venous cord. Consistent with adhesive dermatitis and superficial thrombophlebitis.  Possible  source of recurrent fever.        Recommendations:   · Continue Daptomycin 6mg/kg/dose q48 hours (renally dosed) for now for MSSA  · Discontinue IV cefepime.  Avoiding cepahlosporins given concern for possible betalactam related ANGE vs prior vanc.  Low suspicion for pulmonary infection, but if recurrent fever/respiratory symptoms, increased O2 requirements, may add levaquin (renally dosed) for pulmonary and empiric gram negative coverage  · Recommend CT chest to further evaluate incidental consolidation noted on MRI.    · Monitor for worsening cervical/paraspinal pain, upper back cellulitis/edema. If worsens, may need repeat imaging to evaluate for developing organized abscess.   · Will follow up CT chest and follow up tomorrow     Patient seen by, and plan discussed with, ID staff, Dr. Lawson  Discussed with Primary Team, Dr. Reynoso        Fever  See assessment/plan above.      ANGE (acute kidney injury)  Management per Nephrology.   Creatine continues to downtrend      Thank you.   Please call for any questions or concerns.  HILARIO Delarosa, ANP-C  Spectra [phone]    Subjective:     Principal Problem:Abscess of left thigh    HPI: Mr. Thomas is a 47 year old male with a PMH of DM2 (poorly controlled), HTN, GERD, HLD, obesity who initially presented to [facility] ED with cc of SOBx1 day. Pt was also reporting significant pain and swelling along his left lateral proximal thigh. Pt reports this pain started over the last 2 weeks, which worsened.  He initially was seen outpatient in which he was diagnoised with a mm strain and given 800 mg ibuprofen. From there he went to the ED on 7/22 d/t worsening pain. He was treated with prednisone/morphine shot, and well as a prednisone oral pack. Pt denied recent injury, with the exception of soreness to his left lower shin following bumping into a cabinet. He denies open wounds/abrasions from this injury but states the soreness started in his left thigh following the improvement of discomfort from his left shin. Pt reports having been treated for a boil on his central/right buttocks in the end of June. 2022. Pt states he was seen in Urgent Care in [place] and the boil was drained. He was given an oral abx in which he completed.     To note, pt reports having a recent left wrist fracture, ligament rupture following an accident with his riding lawnmower. States he underwent surgery and 2 screws were placed on 3/31/22 at [facility]. Denies associated infection/complications. Denies pain in the site currently.    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 Denies having pets. Denies recent fishing/hunting. Denies hx of immunocompromising conditions.     To note, pt was found to be in DKA with blood sugars >400, treated by critical team, which has since resolved. Pt was recently started on insulin by his PCP, which he had not started. Pt latest A1C 10.     CT of left thigh notable for fluid collections suspicious for hematoma/seroma. MRI of left femur was notable for ruptured tensor fascia jose luis mm with associated large hematoma, as well as small area of potential osteonecrosis. Bedside aspiration of the fluid collection was notable for a cell count of >200k >80%segs, cultures + staph spp. Pt was taken for I&D with Dr. Graff with ortho surgery on 7/29, abscesses were noted and washed out, cultures collected + staph spp. Per pt, states that Dr. Graff is planning to return  to surgery tomorrow, 8/1/22 for repeat washout.     Pt was intially with leukocytosis, but has since down trended to 11k. Blood cultures from 7/26 NGTD.     Pt is currently on zosyn, vancomycin, and clindamycin. ID was consulted for abx recs regarding left TFL infection.            Interval History:   Afebrile X 24 hours. No leukoctosis.  Hgb 6.8  Rash to right forearm at IV site with overlying cellulitis, palpable cord.  Appears to be adhesive dermatitis/thrombophlebitis  Continues to complain of neck/upper back pain .  MRI negative for osteodiscitis/epidural abscess. Does show cellulitis in paraspinal muscles/soft tissue . No discrete fluid collection. Orthopedics has evaluated   Blood cultures NGTD  Left thigh drainage remains minimal  Creatinine continues to downtrend    Review of Systems   Constitutional:  Negative for chills, diaphoresis, fatigue and fever.   HENT: Negative.     Respiratory:  Negative for cough and shortness of breath.    Cardiovascular:  Positive for leg swelling (upper left thigh). Negative for chest pain and palpitations.   Gastrointestinal:  Negative for abdominal distention and abdominal pain.   Genitourinary:  Negative for difficulty urinating and dysuria.   Musculoskeletal:  Positive for arthralgias (left wrist), myalgias (upper left thigh; tenderrness right forearm) and neck pain. Negative for joint swelling.   Skin:  Positive for wound (upper left thigh). Negative for color change and rash.   Allergic/Immunologic: Negative for immunocompromised state.   Neurological:  Negative for dizziness, tremors, weakness, numbness and headaches.   Psychiatric/Behavioral:  Negative for agitation and decreased concentration. The patient is not nervous/anxious.    Objective:     Vital Signs (Most Recent):  Temp: 98.5 °F (36.9 °C) (08/20/22 2020)  Pulse: 91 (08/20/22 2020)  Resp: 16 (08/20/22 2020)  BP: (!) 162/89 (08/20/22 2020)  SpO2: (!) 94 % (08/20/22 2020)   Vital Signs (24h Range):  Temp:   [98.1 °F (36.7 °C)-98.9 °F (37.2 °C)] 98.5 °F (36.9 °C)  Pulse:  [] 91  Resp:  [16-18] 16  SpO2:  [93 %-97 %] 94 %  BP: (121-162)/(71-95) 162/89     Weight: 104.3 kg (229 lb 15 oz)  Body mass index is 36.01 kg/m².    Estimated Creatinine Clearance: 19.8 mL/min (A) (based on SCr of 5.2 mg/dL (H)).    Physical Exam  Vitals and nursing note reviewed.   Constitutional:       General: He is not in acute distress.     Appearance: Normal appearance. He is well-developed. He is obese. He is not ill-appearing, toxic-appearing or diaphoretic.   HENT:      Head: Normocephalic and atraumatic.      Nose: Nose normal.      Mouth/Throat:      Dentition: Does not have dentures.      Pharynx: No oropharyngeal exudate.   Eyes:      General: No scleral icterus.     Conjunctiva/sclera: Conjunctivae normal.   Cardiovascular:      Rate and Rhythm: Normal rate and regular rhythm.   Pulmonary:      Effort: Pulmonary effort is normal. No respiratory distress.      Breath sounds: Normal breath sounds. No wheezing or rales.   Abdominal:      General: There is no distension.      Palpations: Abdomen is soft.      Tenderness: There is no abdominal tenderness. There is no guarding.   Musculoskeletal:         General: Tenderness (paraspinal cervical tenderness and erythema) present. No swelling.   Skin:     General: Skin is warm and dry.      Findings: Erythema (right forearm. Right upper back) present. No rash.      Comments: Left thigh with dressings in place. C/d/I. Small amount purulent drainage    Right wrist forearm with rash/dermatitis above prior IV site.  Forearm erythematous, with palpable venous cord. See photos below   Neurological:      Mental Status: He is alert and oriented to person, place, and time. Mental status is at baseline.   Psychiatric:         Mood and Affect: Mood normal.         Behavior: Behavior normal.             Significant Labs: CBC:   Recent Labs   Lab 08/19/22  0506 08/20/22  0250   WBC 6.13 4.77   HGB  8.1* 6.8*   HCT 24.4* 21.1*    168       CMP:   Recent Labs   Lab 08/19/22  0506 08/20/22  0250    139   K 4.2 3.7    105   CO2 23 23   GLU 82 81   BUN 60* 52*   CREATININE 6.1* 5.2*   CALCIUM 9.1 8.6*   PROT 7.0 6.2   ALBUMIN 2.5* 2.1*   BILITOT 0.5 0.6   ALKPHOS 72 60   AST 26 19   ALT 7* <5*   ANIONGAP 14 11       Microbiology Results (last 7 days)       Procedure Component Value Units Date/Time    Blood culture [[id number]] Collected: 08/17/22 1231    Order Status: Completed Specimen: Blood Updated: 08/20/22 1412     Blood Culture, Routine No Growth to date      No Growth to date      No Growth to date      No Growth to date    Blood culture [[id number]] Collected: 08/17/22 1223    Order Status: Completed Specimen: Blood Updated: 08/20/22 1412     Blood Culture, Routine No Growth to date      No Growth to date      No Growth to date      No Growth to date    Blood culture [[id number]] Collected: 08/19/22 2254    Order Status: Completed Specimen: Blood Updated: 08/20/22 0715     Blood Culture, Routine No Growth to date    Narrative:      Collection has been rescheduled by DEAN at 08/19/2022 17:54 Reason:   Hard stick  Collection has been rescheduled by DRIvette at 08/19/2022 17:54 Reason:   Hard stick let nurse cathy know [phone]    Blood culture [[id number]]     Order Status: Canceled Specimen: Blood     Blood culture [[id number]]     Order Status: Canceled Specimen: Blood     Fungus culture [[id number]] Collected: 07/29/22 1251    Order Status: Completed Specimen: Abscess from Leg, Left Updated: 08/17/22 0904     Fungus (Mycology) Culture Culture in progress      No fungus isolated after 2 weeks    Narrative:      Left thigh abscess #1    Fungus culture [[id number]] Collected: 07/29/22 1300    Order Status: Completed Specimen: Abscess from Leg, Left Updated: 08/17/22 0904     Fungus (Mycology) Culture Culture in progress      No fungus isolated after 2 weeks    Narrative:      Left thigh abscess #2     Fungus culture [[id number]] Collected: 07/28/22 1800    Order Status: Completed Specimen: Abscess from Leg, Left Updated: 08/17/22 0904     Fungus (Mycology) Culture Culture in progress      No fungus isolated after 2 weeks    Narrative:      LEFT THIGH ABSCESS    Aerobic culture [[id number]]  (Abnormal)  (Susceptibility) Collected: 08/06/22 1153    Order Status: Completed Specimen: Wound from Hip, Left Updated: 08/15/22 1002     Aerobic Bacterial Culture STAPHYLOCOCCUS AUREUS  Few      Narrative:      #2 Left hip fluid          Recent Lab Results  (Last 5 results in the past 24 hours)        08/20/22  1822   08/20/22  1350   08/20/22  1311   08/20/22  0910   08/20/22  0538        Unit Blood Type Code   7300  [P]             Unit Expiration   [id number]  [P]             Unit Blood Type   B POS  [P]             Albumin               Alkaline Phosphatase               ALT               Anion Gap               AST               Baso #               Basophil %               BILIRUBIN TOTAL               BUN               Calcium               Chloride               CO2               CODING SYSTEM   EYWB896  [P]             CPK               Creatinine               Differential Method               DISPENSE STATUS   ISSUED  [P]             eGFR               Eos #               Eosinophil %               Glucose               Gran # (ANC)               Gran %               Group & Rh   B POS             Hematocrit               Hemoglobin               Immature Grans (Abs)               Immature Granulocytes               INDIRECT YEE   NEG             Lymph #               Lymph %               Magnesium               MCH               MCHC               MCV               Mono #               Mono %               MPV               nRBC               Phosphorus               Platelets               POCT Glucose 77     124   141   121       Potassium               Product Code   [id number]  [P]             PROTEIN TOTAL                RBC               RDW               Sodium               UNIT NUMBER   [id number]  [P]             WBC                                       [P] - Preliminary Result               Significant Imaging:     Imaging Results              CT Thigh With Contrast Left (Final result)  Result time 07/27/22 01:13:09      Final result by Jori Hopkins DO (07/27/22 01:13:09)                   Impression:      Large heterogeneous fluid collection in the anterolateral left proximal thigh soft tissues, concerning for a soft tissue hematoma or Preston-Melissa lesion.  A neoplastic process is not entirely excluded.  Recommend close interval follow-up to assess for stability/resolution.      Electronically signed by: Jori Hopkins  Date:    07/27/2022  Time:    01:13               Narrative:    EXAMINATION:  CT THIGH WITH CONTRAST LEFT    CLINICAL HISTORY:  Soft tissue mass, thigh, deep;    TECHNIQUE:  Axial CT images of the left thigh with sagittal and coronal reformats after the intravenous administration of 50 mL Omnipaque 350.    COMPARISON:  None.    FINDINGS:  Bone: Bone mineralization is normal.  There is no evidence of an acute fracture or dislocation.  Alignment is normal.    Soft tissues: There is a large heterogeneous fluid collection in the left anterolateral proximal thigh measuring approximately 16 x 7 x 6 cm.  The collection appears to be centered within the subcutaneous tissues or superficial fascia and abuts the gluteus musculature, the sartorius muscle, the rectus femoris, and the vastus lateralis.  There is soft tissue edema in the surrounding subcutaneous tissues.  Muscle bulk is normal.    Articulations: The left femoroacetabular joint is unremarkable.  The pubic symphysis is unremarkable.  The left knee is unremarkable.  No large joint effusion or significant cartilage loss.    Miscellaneous: Neurovascular structures are grossly intact.  There is moderate calcified atherosclerosis of the left  femoral and popliteal arteries.                                       CTA Chest Non-Coronary (PE Study) (Final result)  Result time 07/26/22 21:36:01      Final result by Jori Hopkins DO (07/26/22 21:36:01)                   Impression:      No large central or lobar pulmonary embolism.      Electronically signed by: Jori Hopkins  Date:    07/26/2022  Time:    21:36               Narrative:    EXAMINATION:  CTA CHEST NON CORONARY    CLINICAL HISTORY:  Pulmonary embolism (PE) suspected, high prob;    TECHNIQUE:  Low dose axial images, sagittal and coronal reformations were obtained from the thoracic inlet to the lung bases following the IV administration of 100 mL of Omnipaque 350.  Contrast timing was optimized to evaluate the pulmonary arteries.  Maximum intensity projection images were provided for review.    COMPARISON:  Chest radiograph from earlier the same date.    FINDINGS:  Pulmonary vasculature: Satisfactory opacification of the pulmonary arterial system.  Motion artifact significantly limits evaluation of the segmental and subsegmental pulmonary arteries.  There is no large central or lobar pulmonary embolism.    Aorta: Left-sided aortic arch.  No aneurysm and no significant atherosclerosis.    Base of Neck: No significant abnormality.    Thoracic soft tissues: Normal.    Heart: Normal size. No effusion.    Amena/Mediastinum: No pathologic russ enlargement.    Airways: The large airways are patent. No foci of endobronchial filling.    Lungs/Pleura: Clear lungs. No pleural effusion or thickening.    Esophagus: Normal.    Upper Abdomen: No abnormality of the partially imaged upper abdomen.    Bones: No acute fracture. No suspicious lytic or sclerotic lesions.                                       X-Ray Chest 1 View (Final result)  Result time 07/26/22 21:23:42   Procedure changed from X-Ray Chest PA And Lateral     Final result by Olman Saucedo MD (07/26/22 21:23:42)                   Impression:       No convincing radiographic evidence of acute intrathoracic process on this single view..      Electronically signed by: Olman Saucedo MD  Date:    07/26/2022  Time:    21:23               Narrative:    EXAMINATION:  XR CHEST 1 VIEW    CLINICAL HISTORY:  shortness of breath;    TECHNIQUE:  Single frontal view of the chest was performed.    COMPARISON:  None    FINDINGS:  Cardiac monitoring leads overlie the chest.  Cardiac silhouette appears within normal limits.  No confluent airspace consolidation identified.  No significant volume of pleural fluid or pneumothorax appreciated.  The visualized osseous structures demonstrate mild degenerative changes.

## 2022-08-21 NOTE — ASSESSMENT & PLAN NOTE
47 year old with DMII and HTN admitted for DKA and left thigh abscess.      MRI of left femur notable for ruptured tensor fascia jose luis mm with associated large hematoma, as well as small area of potential osteonecrosis.  Bedside aspiration of the fluid collection was notable for a cell count of >200k (>80%segs), cultures + staph species. Underwent I&D on 7/29 and 8/6.  Surgical cultures positive for MSSA.  Per ortho surgery, no concerns for bone involvement.      Initially on Vancomycin and Zosyn, but course complicated by ANGE.   Vancomycin discontinued and switched to cefazolin on 8/3,  but subsequent Emanuel's stain + and with continued deterioration in renal fx with concerned for beta lactam related AIN, so cefazolin stopped and Daptomycin started for MSSA     Hospital course complicated by acute onset left wrist pain.  History of wrist arthroscopy with hardware placement 3/2022.   S/p joint aspiration, white count 2050k, 88% segs. No crystals noted. Cultures no growth.   Received steroid shot per primary team which pt noted improvement.  Xray of wrist with no acute concerns.          Patient developed fever 8/17 and again on 8/19.  CRP spike 24>>192.  New onset neck paraspinal pain/erythema on 8/19. MRI C/T/L spine 8/19 negative for epidural abscess or discitis, but significant for edema of paraspinal muscles/superficial soft tissues at level of C7 concerning for cellulitis. No discrete fluid collections.  Incidentally noted consolidation in left upper lobe - possible infection or non-infectious inflammation. O2 sats 94-96% on RA. Patient denies cough, SOB.  Cefepime started by Primary Team      All repeat blood cx NGTD.  BLE US 8/17 negative for DVT.  U/A negative. Soft tissue US left thigh with two residual  fluid collections medial and superior to incision. Orthopedics continues to follow. No additional washout planned.      Noted today to have ? New onset rash/irritation to right forearm above IV site (IV  removed today) with proximal erythema/warmth and palpable venous cord. Consistent with adhesive dermatitis and superficial thrombophlebitis.  Possible  source of recurrent fever.        Recommendations:   · Continue Daptomycin 6mg/kg/dose q48 hours (renally dosed) for now for MSSA  · Discontinue IV cefepime.  Avoiding cepahlosporins given concern for possible betalactam related ANGE vs prior vanc.  Low suspicion for pulmonary infection, but if recurrent fever/respiratory symptoms, increased O2 requirements, may add levaquin (renally dosed) for pulmonary and empiric gram negative coverage  · Recommend CT chest to further evaluate incidental consolidation noted on MRI.    · Monitor for worsening cervical/paraspinal pain, upper back cellulitis/edema. If worsens, may need repeat imaging to evaluate for developing organized abscess.   · Will follow up CT chest and follow up tomorrow     Patient seen by, and plan discussed with, ID staff, Dr. Lawson  Discussed with Primary Team, Dr. Reynoso

## 2022-08-21 NOTE — ASSESSMENT & PLAN NOTE
Pseudogout  - Tapped by Ortho, fluid reviewed: does not appear to be gout or septic arthritis, likely pseudogout (calcium pyrophosphate crystals on previous LE aspiration)  - PRN analgesics provided  -improved after steroid injection  8/19- resolved  8/21- pain and swelling of the right wrist is superfiscial thrombophlebitis and contact dermatitis

## 2022-08-22 LAB
ALBUMIN SERPL BCP-MCNC: 2.3 G/DL (ref 3.5–5.2)
ALP SERPL-CCNC: 71 U/L (ref 55–135)
ALT SERPL W/O P-5'-P-CCNC: 6 U/L (ref 10–44)
ANION GAP SERPL CALC-SCNC: 12 MMOL/L (ref 8–16)
AST SERPL-CCNC: 20 U/L (ref 10–40)
BACTERIA BLD CULT: NORMAL
BACTERIA BLD CULT: NORMAL
BASOPHILS # BLD AUTO: 0.03 K/UL (ref 0–0.2)
BASOPHILS NFR BLD: 0.7 % (ref 0–1.9)
BILIRUB SERPL-MCNC: 0.5 MG/DL (ref 0.1–1)
BUN SERPL-MCNC: 39 MG/DL (ref 6–20)
CALCIUM SERPL-MCNC: 8.8 MG/DL (ref 8.7–10.5)
CHLORIDE SERPL-SCNC: 106 MMOL/L (ref 95–110)
CO2 SERPL-SCNC: 24 MMOL/L (ref 23–29)
CREAT SERPL-MCNC: 3.2 MG/DL (ref 0.5–1.4)
DIFFERENTIAL METHOD: ABNORMAL
EOSINOPHIL # BLD AUTO: 0.5 K/UL (ref 0–0.5)
EOSINOPHIL NFR BLD: 13 % (ref 0–8)
ERYTHROCYTE [DISTWIDTH] IN BLOOD BY AUTOMATED COUNT: 13.2 % (ref 11.5–14.5)
EST. GFR  (NO RACE VARIABLE): 22.8 ML/MIN/1.73 M^2
GLUCOSE SERPL-MCNC: 83 MG/DL (ref 70–110)
HCT VFR BLD AUTO: 24.5 % (ref 40–54)
HGB BLD-MCNC: 8 G/DL (ref 14–18)
IMM GRANULOCYTES # BLD AUTO: 0.08 K/UL (ref 0–0.04)
IMM GRANULOCYTES NFR BLD AUTO: 1.9 % (ref 0–0.5)
LYMPHOCYTES # BLD AUTO: 1 K/UL (ref 1–4.8)
LYMPHOCYTES NFR BLD: 23.2 % (ref 18–48)
MAGNESIUM SERPL-MCNC: 1.6 MG/DL (ref 1.6–2.6)
MCH RBC QN AUTO: 30.5 PG (ref 27–31)
MCHC RBC AUTO-ENTMCNC: 32.7 G/DL (ref 32–36)
MCV RBC AUTO: 94 FL (ref 82–98)
MONOCYTES # BLD AUTO: 0.4 K/UL (ref 0.3–1)
MONOCYTES NFR BLD: 9.4 % (ref 4–15)
NEUTROPHILS # BLD AUTO: 2.1 K/UL (ref 1.8–7.7)
NEUTROPHILS NFR BLD: 51.8 % (ref 38–73)
NRBC BLD-RTO: 0 /100 WBC
PHOSPHATE SERPL-MCNC: 4 MG/DL (ref 2.7–4.5)
PLATELET # BLD AUTO: 207 K/UL (ref 150–450)
PMV BLD AUTO: 10.2 FL (ref 9.2–12.9)
POCT GLUCOSE: 105 MG/DL (ref 70–110)
POCT GLUCOSE: 153 MG/DL (ref 70–110)
POCT GLUCOSE: 210 MG/DL (ref 70–110)
POCT GLUCOSE: 92 MG/DL (ref 70–110)
POTASSIUM SERPL-SCNC: 3.7 MMOL/L (ref 3.5–5.1)
PROT SERPL-MCNC: 6.4 G/DL (ref 6–8.4)
RBC # BLD AUTO: 2.62 M/UL (ref 4.6–6.2)
SODIUM SERPL-SCNC: 142 MMOL/L (ref 136–145)
WBC # BLD AUTO: 4.14 K/UL (ref 3.9–12.7)

## 2022-08-22 PROCEDURE — 83735 ASSAY OF MAGNESIUM: CPT | Performed by: STUDENT IN AN ORGANIZED HEALTH CARE EDUCATION/TRAINING PROGRAM

## 2022-08-22 PROCEDURE — 25000003 PHARM REV CODE 250: Performed by: HOSPITALIST

## 2022-08-22 PROCEDURE — 25000003 PHARM REV CODE 250

## 2022-08-22 PROCEDURE — 99233 PR SUBSEQUENT HOSPITAL CARE,LEVL III: ICD-10-PCS | Mod: ,,, | Performed by: HOSPITALIST

## 2022-08-22 PROCEDURE — 63600175 PHARM REV CODE 636 W HCPCS: Performed by: STUDENT IN AN ORGANIZED HEALTH CARE EDUCATION/TRAINING PROGRAM

## 2022-08-22 PROCEDURE — 99233 PR SUBSEQUENT HOSPITAL CARE,LEVL III: ICD-10-PCS | Mod: ,,, | Performed by: REGISTERED NURSE

## 2022-08-22 PROCEDURE — 25000003 PHARM REV CODE 250: Performed by: INTERNAL MEDICINE

## 2022-08-22 PROCEDURE — 80053 COMPREHEN METABOLIC PANEL: CPT | Performed by: STUDENT IN AN ORGANIZED HEALTH CARE EDUCATION/TRAINING PROGRAM

## 2022-08-22 PROCEDURE — 25000003 PHARM REV CODE 250: Performed by: NURSE PRACTITIONER

## 2022-08-22 PROCEDURE — 36415 COLL VENOUS BLD VENIPUNCTURE: CPT | Performed by: STUDENT IN AN ORGANIZED HEALTH CARE EDUCATION/TRAINING PROGRAM

## 2022-08-22 PROCEDURE — 84100 ASSAY OF PHOSPHORUS: CPT | Performed by: STUDENT IN AN ORGANIZED HEALTH CARE EDUCATION/TRAINING PROGRAM

## 2022-08-22 PROCEDURE — 99233 SBSQ HOSP IP/OBS HIGH 50: CPT | Mod: ,,, | Performed by: REGISTERED NURSE

## 2022-08-22 PROCEDURE — 99233 SBSQ HOSP IP/OBS HIGH 50: CPT | Mod: ,,, | Performed by: HOSPITALIST

## 2022-08-22 PROCEDURE — 85025 COMPLETE CBC W/AUTO DIFF WBC: CPT | Performed by: STUDENT IN AN ORGANIZED HEALTH CARE EDUCATION/TRAINING PROGRAM

## 2022-08-22 PROCEDURE — 20600001 HC STEP DOWN PRIVATE ROOM

## 2022-08-22 RX ORDER — LEVOFLOXACIN 750 MG/1
750 TABLET ORAL
Qty: 7 TABLET | Refills: 0 | Status: CANCELLED | OUTPATIENT
Start: 2022-08-23 | End: 2022-09-06

## 2022-08-22 RX ADMIN — METHOCARBAMOL 500 MG: 500 TABLET ORAL at 09:08

## 2022-08-22 RX ADMIN — OXYCODONE 5 MG: 5 TABLET ORAL at 09:08

## 2022-08-22 RX ADMIN — SEVELAMER CARBONATE 0.8 G: 800 POWDER, FOR SUSPENSION ORAL at 12:08

## 2022-08-22 RX ADMIN — OXYCODONE 5 MG: 5 TABLET ORAL at 05:08

## 2022-08-22 RX ADMIN — LINEZOLID 600 MG: 600 TABLET, FILM COATED ORAL at 09:08

## 2022-08-22 RX ADMIN — INSULIN ASPART 8 UNITS: 100 INJECTION, SOLUTION INTRAVENOUS; SUBCUTANEOUS at 04:08

## 2022-08-22 RX ADMIN — INSULIN DETEMIR 10 UNITS: 100 INJECTION, SOLUTION SUBCUTANEOUS at 09:08

## 2022-08-22 RX ADMIN — TRIAMCINOLONE ACETONIDE: 1 CREAM TOPICAL at 09:08

## 2022-08-22 RX ADMIN — HEPARIN SODIUM 5000 UNITS: 5000 INJECTION INTRAVENOUS; SUBCUTANEOUS at 09:08

## 2022-08-22 RX ADMIN — INSULIN ASPART 8 UNITS: 100 INJECTION, SOLUTION INTRAVENOUS; SUBCUTANEOUS at 08:08

## 2022-08-22 RX ADMIN — INSULIN ASPART 1 UNITS: 100 INJECTION, SOLUTION INTRAVENOUS; SUBCUTANEOUS at 09:08

## 2022-08-22 RX ADMIN — METHOCARBAMOL 500 MG: 500 TABLET ORAL at 12:08

## 2022-08-22 RX ADMIN — ATORVASTATIN CALCIUM 20 MG: 20 TABLET, FILM COATED ORAL at 09:08

## 2022-08-22 RX ADMIN — METHOCARBAMOL 500 MG: 500 TABLET ORAL at 04:08

## 2022-08-22 RX ADMIN — HYDRALAZINE HYDROCHLORIDE 25 MG: 25 TABLET, FILM COATED ORAL at 09:08

## 2022-08-22 RX ADMIN — OXYCODONE 5 MG: 5 TABLET ORAL at 01:08

## 2022-08-22 RX ADMIN — AMLODIPINE BESYLATE 5 MG: 5 TABLET ORAL at 09:08

## 2022-08-22 RX ADMIN — SEVELAMER CARBONATE 0.8 G: 800 POWDER, FOR SUSPENSION ORAL at 09:08

## 2022-08-22 RX ADMIN — HEPARIN SODIUM 5000 UNITS: 5000 INJECTION INTRAVENOUS; SUBCUTANEOUS at 02:08

## 2022-08-22 RX ADMIN — INSULIN ASPART 8 UNITS: 100 INJECTION, SOLUTION INTRAVENOUS; SUBCUTANEOUS at 12:08

## 2022-08-22 RX ADMIN — SEVELAMER CARBONATE 0.8 G: 800 POWDER, FOR SUSPENSION ORAL at 04:08

## 2022-08-22 RX ADMIN — HEPARIN SODIUM 5000 UNITS: 5000 INJECTION INTRAVENOUS; SUBCUTANEOUS at 06:08

## 2022-08-22 RX ADMIN — SENNOSIDES AND DOCUSATE SODIUM 2 TABLET: 50; 8.6 TABLET ORAL at 09:08

## 2022-08-22 RX ADMIN — INSULIN ASPART 4 UNITS: 100 INJECTION, SOLUTION INTRAVENOUS; SUBCUTANEOUS at 12:08

## 2022-08-22 NOTE — ASSESSMENT & PLAN NOTE
Pseudogout  - Tapped by Ortho, fluid reviewed: does not appear to be gout or septic arthritis, likely pseudogout (calcium pyrophosphate crystals on previous LE aspiration)  - PRN analgesics provided  -improved after steroid injection  8/19- resolved  8/22- pain and swelling of the right wrist is superfiscial thrombophlebitis and contact dermatitis, improving

## 2022-08-22 NOTE — ASSESSMENT & PLAN NOTE
-patient with fever to 102 and tachycardia on 8/17 - repeat blood cultures, UA, COVID swab pending  -CXR without infiltrate, lactate nl, procalcitonin mildly elevated (due to ANGE)  -remains on dapto for MSSA abscess to thigh  -eosinophils nl and no leukocytosis  -ID and ortho notified of change  -minimize IVF due to improving ANGE with signs of fluid retention  8/21- on daptomycin, which may be cause of multifocal pneumonia  8/22- on levoquin and zyvox. AF.

## 2022-08-22 NOTE — ASSESSMENT & PLAN NOTE
Uncertain etiology  On 8/17 and 8/19.   Still tachycardia  Wbc 6  Blood cult 8/17 - NGSF  drain output improving  covid negative.   cxr negative 8/17  UA negative 8/17  Will chat w Ortho regarding further plans for debridement  8/19- US wound site for pockets of fluid,   Fluid collection located medial to the bandage: 3.5 x 0.6 x 1.8 cm.  Fluid collection located superior to bandage: 3.0 x 0.6 x 2.3 cm.  CRP rising. Drain output improved- gradually reducing  Fever after receiving Daptomycin today. Will discuss w ID. Repeated blood culture and lactic acid    8/20 - cefepime added to dapto  last night, developed blisters at IV site. MRI revealed evidence of celluilits and consolidation NAVDEEP pneumonia  8/21- CT chest w multifocal pneumonia, on dapto. Blood cult negative

## 2022-08-22 NOTE — SUBJECTIVE & OBJECTIVE
Interval History: see above    Review of Systems   Constitutional:  Negative for activity change, appetite change, chills, fatigue and fever.        Able to sit in chair. Neck pain improving, Right wrist/ forearm pain improving   HENT:  Negative for congestion, facial swelling, nosebleeds, sore throat and trouble swallowing.    Respiratory:  Negative for apnea, cough, choking, chest tightness, shortness of breath, wheezing and stridor.    Cardiovascular:  Negative for chest pain and leg swelling.   Gastrointestinal:  Negative for abdominal distention, abdominal pain, blood in stool, constipation, diarrhea, nausea and vomiting.   Genitourinary:  Negative for difficulty urinating, dysuria, flank pain, frequency, hematuria and urgency.   Musculoskeletal:  Positive for neck pain. Negative for arthralgias, back pain, gait problem and neck stiffness.        Right Forearm thrombophlebitis   Skin:  Positive for rash (right  wrist). Negative for color change and wound.   Neurological:  Negative for dizziness, tremors, seizures, syncope, facial asymmetry, speech difficulty, weakness, light-headedness, numbness and headaches.   Psychiatric/Behavioral:  Negative for agitation, behavioral problems, confusion, decreased concentration, dysphoric mood, hallucinations, self-injury and sleep disturbance. The patient is not nervous/anxious and is not hyperactive.    Objective:     Vital Signs (Most Recent):  Temp: 98.6 °F (37 °C) (08/22/22 0740)  Pulse: 88 (08/22/22 0740)  Resp: 14 (08/22/22 0740)  BP: (!) 154/81 (08/22/22 0740)  SpO2: 97 % (08/22/22 0740)   Vital Signs (24h Range):  Temp:  [98 °F (36.7 °C)-98.6 °F (37 °C)] 98.6 °F (37 °C)  Pulse:  [] 88  Resp:  [14-20] 14  SpO2:  [92 %-98 %] 97 %  BP: (130-156)/(77-87) 154/81     Weight: 104.3 kg (229 lb 15 oz)  Body mass index is 36.01 kg/m².    Intake/Output Summary (Last 24 hours) at 8/22/2022 0809  Last data filed at 8/21/2022 2100  Gross per 24 hour   Intake --   Output  1720 ml   Net -1720 ml        Physical Exam  Constitutional:       General: He is not in acute distress.     Appearance: Normal appearance. He is not ill-appearing, toxic-appearing or diaphoretic.   HENT:      Head: Normocephalic and atraumatic.      Nose: Nose normal.      Mouth/Throat:      Mouth: Mucous membranes are moist.      Pharynx: Oropharynx is clear.   Eyes:      General: No scleral icterus.     Extraocular Movements: Extraocular movements intact.      Conjunctiva/sclera: Conjunctivae normal.      Pupils: Pupils are equal, round, and reactive to light.   Neck:      Comments: Tender post neck and left trapezious  Imporoved ROM, less muscular stiffness left trap.   Cardiovascular:      Rate and Rhythm: Normal rate and regular rhythm.      Pulses: Normal pulses.      Heart sounds: Normal heart sounds.   Pulmonary:      Effort: Pulmonary effort is normal. No respiratory distress.      Breath sounds: Normal breath sounds. No stridor. No wheezing, rhonchi or rales.   Chest:      Chest wall: No tenderness.   Abdominal:      General: Abdomen is flat. Bowel sounds are normal. There is no distension.      Palpations: Abdomen is soft.      Tenderness: There is no abdominal tenderness. There is no right CVA tenderness, guarding or rebound.   Musculoskeletal:         General: Swelling and tenderness present. No deformity or signs of injury. Normal range of motion.      Cervical back: Normal range of motion and neck supple. Tenderness present. No rigidity.      Right lower leg: No edema.      Left lower leg: No edema.      Comments: Left lat thigh is bandaged  Right  forearm - superficial thrombophlebitis and contact dermatitis related to IV and tape.   Lymphadenopathy:      Cervical: No cervical adenopathy.   Skin:     General: Skin is warm and dry.      Coloration: Skin is not jaundiced.      Findings: Rash (papulosquamour erythema in setting of rtape exposure) present. No erythema.   Neurological:      General: No  focal deficit present.      Mental Status: He is alert and oriented to person, place, and time. Mental status is at baseline.      Motor: No weakness.   Psychiatric:         Mood and Affect: Mood normal.         Behavior: Behavior normal.         Thought Content: Thought content normal.         Judgment: Judgment normal.       Significant Labs: All pertinent labs within the past 24 hours have been reviewed.  CBC:   Recent Labs   Lab 08/21/22  0640 08/22/22  0325   WBC 4.06 4.14   HGB 7.9* 8.0*   HCT 24.0* 24.5*    207       CMP:   Recent Labs   Lab 08/21/22  0640 08/22/22  0325    142   K 4.0 3.7    106   CO2 25 24   GLU 92 83   BUN 50* 39*   CREATININE 3.7* 3.2*   CALCIUM 9.2 8.8   PROT 6.4 6.4   ALBUMIN 2.3* 2.3*   BILITOT 0.5 0.5   ALKPHOS 65 71   AST 23 20   ALT 10 6*   ANIONGAP 9 12         Significant Imaging: I have reviewed all pertinent imaging results/findings within the past 24 hours.

## 2022-08-22 NOTE — ASSESSMENT & PLAN NOTE
47M with DM admitted 7/26/22 for DKA, ortho following for left lateral thigh abscess. s/p I&D and drain placement left thigh 7/29/22. Cx +MSSA. Had high drain purulent output after first I&D, was taken back to OR 8/6/22 for repeat I&D. Drain in place, dressings CDI. Nephrology on board for ANGE. New onset fever 8/17, source workup initiated. No fluid collections on MRI spine, but there was an incidentally seen lung consolidation. CT showing bilateral multifocal pneumonia. Will continue to closely monitor situation.    -- ANGE nephrology managing   -- DVT SQH  -- linezolid and levofloxacin per ID  -- Pain MM  -- Drain output 20cc yesterday, will monitor  -- Continue trending ESR/CRP  -- Encourage ambulation    Dispo: Observe drain, tight BG control, manage ANGE

## 2022-08-22 NOTE — ASSESSMENT & PLAN NOTE
47 year old with DMII and HTN admitted for DKA and left thigh abscess.      MRI of left femur notable for ruptured tensor fascia jose luis with associated large hematoma, as well as small area of potential osteonecrosis.  Bedside aspiration - cell count of >200k (>80%segs), cultures + MRSA.  Underwent I&D on 7/29 and 8/6.  Surgical cultures positive for MSSA.  Per ortho surgery, no concerns for bone involvement.      Initially on Vancomycin and Zosyn, but course complicated by ANGE.   Vancomycin discontinued and switched to cefazolin on 8/3,  but subsequent Emanuel's stain + and with continued deterioration in renal fx with concerned for beta lactam related AIN, so cefazolin stopped and Daptomycin started for MSSA     Hospital course complicated by acute onset left wrist pain.  History of wrist arthroscopy with hardware placement 3/2022.   S/p joint aspiration, white count 2050k, 88% segs. No crystals noted. Cultures no growth.   Received steroid shot per primary team which pt noted improvement.  Xray of wrist with no acute concerns.          Patient developed fever 8/17 and again on 8/19.  CRP spike 24>>192.  New onset neck paraspinal pain/erythema on 8/19. MRI C/T/L spine 8/19 negative for epidural abscess or discitis, but significant for edema of paraspinal muscles/superficial soft tissues at level of C7 concerning for cellulitis. No discrete fluid collections.  Incidentally noted consolidation in left upper lobe - possible infection or non-infectious inflammation.      All repeat blood cx NGTD.  BLE US 8/17 negative for DVT.  U/A negative. Soft tissue US left thigh with two residual  fluid collections medial and superior to incision. Orthopedics continues to follow. No additional washout planned.  Yesterday developed new onset rash/irritation to right forearm above IV site (IV removed) with proximal erythema/warmth and palpable venous cord. Consistent with adhesive dermatitis and superficial thrombophlebitis.   Possible source of recurrent fever.       Dedicated CT chest performed last night showing multifocal PNA of bilateral lungs with additional nodular opacities which could represent consolidations or solid nodules.  I spoke personally with the reading Radiologist who reports that the opacities in upper lobes looks like classic consolidation.  The RUL nodular opacities are less clearly characterized - could be opacities or could be soft tissue nodules. Possibly could represent septic emboli.  There are no classic signs (e.g GGO,patchy inflammatory infiltrates, interlobular septal thickening, or effusions which would be consistent with eosinophilic PNA, and this would be unlikely.  Given apparent metastatic sites of infection, wonder if he hasn't had a transient bacteremias despite negative blood cultures.     Patient without respiratory complaints - no cough, SOB, sputum production.  Has been afebrile since yesterday.  Overall feels much improved with improvement in neck/back pain. CRP downtrending.     Recommendations:   · Discontinue IV  Daptomycin given concern for possibility of MSSA infection in lungs and start linezolid 600 mg orally q 12 hours for MSSA coverage.  Monitor platelet count while on linezolid, especially in setting of renal dysfunction.  · Start levaquin 750 mg orally q 48 hours (renally dosed) for gram negative pulmonary coverage  · Continue to monitor for worsening cervical/paraspinal pain, upper back cellulitis/edema. If worsens, may need repeat imaging to evaluate for developing organized abscess.   · Will follow up tomorrow  · Extended discussion with patient and his wife regarding CT findings, antibiotic changes, potential ADRs with linezolid and levaquin.  All questions answered.   · Will need short term close follow up of CT chest and nodular opacities.     Data reviewed and plan discussed with, ID staff, Dr. Lawson  Secure chat with Primary Team, Dr. Reynoso,  regarding CT and antibiotic  changes

## 2022-08-22 NOTE — PROGRESS NOTES
[facility] - Telemetry Stepdown  Orthopedics  Progress Note    Attg Note:  I agree with the resident's assessment and plan.    Rob Graff MD      Patient Name: Wilfredo Thomas  MRN: [id number]  Admission Date: 7/26/2022  Hospital Length of Stay: 27 days  Attending Provider: Dorota Reynoso MD  Primary Care Provider: Aylin Wayne,   Follow-up For: Procedure(s) (LRB):  Incision and Drainage - LEFT THIGH. (Left)    Post-Operative Day: 16 Days Post-Op  Subjective:     Principal Problem:Abscess of left thigh    Principal Orthopedic Problem: Left thigh I&D 7/29 and 8/6  Staph species on thigh abscess cultures    Interval History: S/p repeat L thigh I&D 8/6/22. Drain output 20cc yesterday, downtrending. Afebrile yesterday. Hb 8 following 1 unit RBC. CT showing multifocal pneumonia bilaterally. Abx switched to levofloxacin and linezolid. Pt states left leg feels good.    Review of patient's allergies indicates:  No Known Allergies    Current Facility-Administered Medications   Medication    0.9%  NaCl infusion (for blood administration)    acetaminophen tablet 1,000 mg    amLODIPine tablet 5 mg    atorvastatin tablet 20 mg    COVID-19 vac, forrest(Pfizer)(PF) (Pfizer COVID-19) 30 mcg/0.3 mL injection 0.3 mL    dextrose 10% bolus 125 mL    dextrose 10% bolus 250 mL    glucagon (human recombinant) injection 1 mg    glucose chewable tablet 16 g    glucose chewable tablet 24 g    heparin (porcine) injection 5,000 Units    hydrALAZINE tablet 25 mg    hydrALAZINE tablet 25 mg    insulin aspart U-100 pen 1-10 Units    insulin aspart U-100 pen 8 Units    insulin detemir U-100 pen 10 Units    levoFLOXacin tablet 750 mg    linezolid tablet 600 mg    methocarbamoL tablet 500 mg    morphine injection 3 mg    ondansetron injection 4 mg    oxyCODONE immediate release tablet 5 mg    senna-docusate 8.6-50 mg per tablet 2 tablet    sevelamer carbonate pwpk 0.8 g    sodium chloride 0.9% flush 10 mL    triamcinolone acetonide 0.1% cream  "    Objective:     Vital Signs (Most Recent):  Temp: 98 °F (36.7 °C) (08/22/22 0414)  Pulse: 74 (08/22/22 0414)  Resp: 18 (08/22/22 0414)  BP: 136/79 (08/22/22 0414)  SpO2: 95 % (08/22/22 0414) Vital Signs (24h Range):  Temp:  [98 °F (36.7 °C)-98.3 °F (36.8 °C)] 98 °F (36.7 °C)  Pulse:  [] 74  Resp:  [18-20] 18  SpO2:  [92 %-98 %] 95 %  BP: (130-156)/(77-87) 136/79     Weight: 104.3 kg (229 lb 15 oz)  Height: 5' 7" (170.2 cm)  Body mass index is 36.01 kg/m².      Intake/Output Summary (Last 24 hours) at 8/22/2022 0643  Last data filed at 8/21/2022 2100  Gross per 24 hour   Intake --   Output 1720 ml   Net -1720 ml         Ortho/SPM Exam  Left lower extremity  Left thigh dressing cdi  Drain in place, serous output, minimal exudative material  No erythema of thigh, no signficant swelling  Compartments soft and compressible  Painless ROM of hip and knee  NVI        CBC:   Recent Labs   Lab 08/21/22  0640 08/22/22  0325   WBC 4.06 4.14   HGB 7.9* 8.0*   HCT 24.0* 24.5*    207       CMP:   Recent Labs   Lab 08/21/22  0640 08/22/22  0325    142   K 4.0 3.7    106   CO2 25 24   GLU 92 83   BUN 50* 39*   CREATININE 3.7* 3.2*   CALCIUM 9.2 8.8   PROT 6.4 6.4   ALBUMIN 2.3* 2.3*   BILITOT 0.5 0.5   ALKPHOS 65 71   AST 23 20   ALT 10 6*   ANIONGAP 9 12         All pertinent labs within the past 24 hours have been reviewed.    Significant Imaging: I have reviewed and interpreted all pertinent imaging results/findings.      Assessment/Plan:     * Abscess of left thigh  47M with DM admitted 7/26/22 for DKA, ortho following for left lateral thigh abscess. s/p I&D and drain placement left thigh 7/29/22. Cx +MSSA. Had high drain purulent output after first I&D, was taken back to OR 8/6/22 for repeat I&D. Drain in place, dressings CDI. Nephrology on board for ANGE. New onset fever 8/17, source workup initiated. No fluid collections on MRI spine, but there was an incidentally seen lung consolidation. CT showing " bilateral multifocal pneumonia. Will continue to closely monitor situation.    -- ANGE nephrology managing   -- DVT SQH  -- linezolid and levofloxacin per ID  -- Pain MM  -- Drain output 20cc yesterday, will monitor  -- Continue trending ESR/CRP  -- Encourage ambulation    Dispo: Observe drain, tight BG control, manage ANGE          Aron Vieyra MD  Orthopedics  [facility] - Telemetry Stepdown

## 2022-08-22 NOTE — PROGRESS NOTES
[facility] - Telemetry Stepdown  Infectious Disease  Progress Note    Patient Name: Wilfredo Thomas  MRN: [id number]  Admission Date: 7/26/2022  Length of Stay: 26 days  Attending Physician: Dorota Reynoso MD  Primary Care Provider: Aylin Wayne DO    Isolation Status: No active isolations  Assessment/Plan:      * Abscess of left thigh     47 year old with DMII and HTN admitted for DKA and left thigh abscess.      MRI of left femur notable for ruptured tensor fascia jose luis with associated large hematoma, as well as small area of potential osteonecrosis.  Bedside aspiration - cell count of >200k (>80%segs), cultures + MRSA.  Underwent I&D on 7/29 and 8/6.  Surgical cultures positive for MSSA.  Per ortho surgery, no concerns for bone involvement.      Initially on Vancomycin and Zosyn, but course complicated by ANGE.   Vancomycin discontinued and switched to cefazolin on 8/3,  but subsequent Emanuel's stain + and with continued deterioration in renal fx with concerned for beta lactam related AIN, so cefazolin stopped and Daptomycin started for MSSA     Hospital course complicated by acute onset left wrist pain.  History of wrist arthroscopy with hardware placement 3/2022.   S/p joint aspiration, white count 2050k, 88% segs. No crystals noted. Cultures no growth.   Received steroid shot per primary team which pt noted improvement.  Xray of wrist with no acute concerns.          Patient developed fever 8/17 and again on 8/19.  CRP spike 24>>192.  New onset neck paraspinal pain/erythema on 8/19. MRI C/T/L spine 8/19 negative for epidural abscess or discitis, but significant for edema of paraspinal muscles/superficial soft tissues at level of C7 concerning for cellulitis. No discrete fluid collections.  Incidentally noted consolidation in left upper lobe - possible infection or non-infectious inflammation.      All repeat blood cx NGTD.  BLE US 8/17 negative for DVT.  U/A negative. Soft tissue US left thigh with two residual   fluid collections medial and superior to incision. Orthopedics continues to follow. No additional washout planned.  Yesterday developed new onset rash/irritation to right forearm above IV site (IV removed) with proximal erythema/warmth and palpable venous cord. Consistent with adhesive dermatitis and superficial thrombophlebitis.  Possible source of recurrent fever.       Dedicated CT chest performed last night showing multifocal PNA of bilateral lungs with additional nodular opacities which could represent consolidations or solid nodules.  I spoke personally with the reading Radiologist who reports that the opacities in upper lobes looks like classic consolidation.  The RUL nodular opacities are less clearly characterized - could be opacities or could be soft tissue nodules. Possibly could represent septic emboli.  There are no classic signs (e.g GGO,patchy inflammatory infiltrates, interlobular septal thickening, or effusions which would be consistent with eosinophilic PNA, and this would be unlikely.  Given apparent metastatic sites of infection, wonder if he hasn't had a transient bacteremias despite negative blood cultures.     Patient without respiratory complaints - no cough, SOB, sputum production.  Has been afebrile since yesterday.  Overall feels much improved with improvement in neck/back pain. CRP downtrending.     Recommendations:   · Discontinue IV  Daptomycin given concern for possibility of MSSA infection in lungs and start linezolid 600 mg orally q 12 hours for MSSA coverage.  Monitor platelet count while on linezolid, especially in setting of renal dysfunction.  · Start levaquin 750 mg orally q 48 hours (renally dosed) for gram negative pulmonary coverage  · Continue to monitor for worsening cervical/paraspinal pain, upper back cellulitis/edema. If worsens, may need repeat imaging to evaluate for developing organized abscess.   · Will follow up tomorrow  · Extended discussion with patient and his  wife regarding CT findings, antibiotic changes, potential ADRs with linezolid and levaquin.  All questions answered.   · Will need short term close follow up of CT chest and nodular opacities.     Data reviewed and plan discussed with, ID staff, Dr. Lawson  Secure chat with Primary Team, Dr. Reynoso,  regarding CT and antibiotic changes        Fever  See assessment/plan above.  Afebrile X 24 hours.     ANGE (acute kidney injury)  Management per Nephrology.   Creatine continues to downtrend - 3.7 today      Thank you.   Please call for any questions or concerns.  Lexi Healy, HILARIO, ANP-C  Spectra [phone]    Subjective:     Principal Problem:Abscess of left thigh    HPI: Mr. Thomas is a 47 year old male with a PMH of DM2 (poorly controlled), HTN, GERD, HLD, obesity who [place] [facility] cc of SOBx1 day. Pt was also reporting significant pain and swelling along his left lateral proximal thigh. Pt reports this pain started over the last 2 weeks, which worsened. He initially was seen outpatient in which he was diagnoised with a mm strain and given 800 mg ibuprofen. From there he went to the ED on 7/22 d/t worsening pain. He was treated with prednisone/morphine shot, and well as a prednisone oral pack. Pt denied recent injury, with the exception of soreness to his left lower shin following bumping into a cabinet. He denies open wounds/abrasions from this injury but states the soreness started in his left thigh following the improvement of discomfort from his left shin. Pt reports having been treated for a boil on his central/right buttocks in the end of June. 2022. Pt states he was seen in Urgent Care in [place] and the boil was drained. He was given an oral abx in which he completed.     To note, pt reports having a recent left wrist fracture, ligament rupture following an accident with his riding lawnmower. States he underwent surgery and 2 screws were placed on 3/31/22 at [facility]. Denies associated  infection/complications. Denies pain in the site currently.    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 Denies having pets. Denies recent fishing/hunting. Denies hx of immunocompromising conditions.     To note, pt was found to be in DKA with blood sugars >400, treated by critical team, which has since resolved. Pt was recently started on insulin by his PCP, which he had not started. Pt latest A1C 10.     CT of left thigh notable for fluid collections suspicious for hematoma/seroma. MRI of left femur was notable for ruptured tensor fascia jose luis mm with associated large hematoma, as well as small area of potential osteonecrosis. Bedside aspiration of the fluid collection was notable for a cell count of >200k >80%segs, cultures + staph spp. Pt was taken for I&D with Dr. Graff with ortho surgery on 7/29, abscesses were noted and washed out, cultures collected + staph spp. Per pt, states that Dr. Graff is planning to return to surgery tomorrow, 8/1/22 for repeat washout.     Pt was intially with leukocytosis, but has since down trended to 11k. Blood cultures from 7/26 NGTD.     Pt is currently on zosyn, vancomycin, and clindamycin. ID was consulted for abx recs regarding left TFL infection.            Interval History:   CT chest showing multifocal PNA bilateral lungs, with additional nodular opacities in RUL which could represent consolidations or solid nodules  Patient sitting up in chair today, reports improved neck/back pain.  Improved right forearm pain   Denies cough, SOB, chest discomfort  Creatinine continues to improve  Afebrile, no leukocytosis.     Review of Systems   Constitutional:  Negative for chills, diaphoresis, fatigue and fever.   HENT: Negative.     Respiratory:  Negative for cough and shortness of breath.    Cardiovascular:  Positive for leg swelling (upper left thigh). Negative for chest pain and palpitations.   Gastrointestinal:  Negative  for abdominal distention and abdominal pain.   Genitourinary:  Negative for difficulty urinating and dysuria.   Musculoskeletal:  Positive for myalgias (upper left thigh; tenderrness right forearm - improved) and neck pain. Negative for arthralgias and joint swelling.   Skin:  Positive for wound (upper left thigh). Negative for color change and rash.   Allergic/Immunologic: Negative for immunocompromised state.   Neurological:  Negative for dizziness, tremors, weakness, numbness and headaches.   Psychiatric/Behavioral:  Negative for agitation and decreased concentration. The patient is not nervous/anxious.    Objective:     Vital Signs (Most Recent):  Temp: 98.1 °F (36.7 °C) (08/21/22 1619)  Pulse: 92 (08/21/22 1619)  Resp: 18 (08/21/22 1646)  BP: 139/84 (08/21/22 1619)  SpO2: 98 % (08/21/22 1619) Vital Signs (24h Range):  Temp:  [98 °F (36.7 °C)-98.5 °F (36.9 °C)] 98.1 °F (36.7 °C)  Pulse:  [] 92  Resp:  [16-20] 18  SpO2:  [92 %-98 %] 98 %  BP: (132-162)/(76-89) 139/84     Weight: 104.3 kg (229 lb 15 oz)  Body mass index is 36.01 kg/m².    Estimated Creatinine Clearance: 27.8 mL/min (A) (based on SCr of 3.7 mg/dL (H)).    Physical Exam  Vitals and nursing note reviewed.   Constitutional:       General: He is not in acute distress.     Appearance: Normal appearance. He is well-developed. He is obese. He is not ill-appearing, toxic-appearing or diaphoretic.   HENT:      Head: Normocephalic and atraumatic.      Mouth/Throat:      Dentition: Does not have dentures.   Eyes:      General: No scleral icterus.     Conjunctiva/sclera: Conjunctivae normal.   Cardiovascular:      Rate and Rhythm: Normal rate and regular rhythm.      Heart sounds: No murmur heard.  Pulmonary:      Effort: Pulmonary effort is normal. No respiratory distress.      Breath sounds: Normal breath sounds. No wheezing or rales.      Comments: Room air    Abdominal:      General: There is no distension.      Palpations: Abdomen is soft.       Tenderness: There is no abdominal tenderness.   Musculoskeletal:         General: Tenderness (paraspinal cervical tenderness/right upper back - improved.  Erythema resolved) present. No swelling.   Skin:     General: Skin is warm and dry.      Findings: Erythema (right forearm - resolving) present. No rash.      Comments: Left thigh with dressings in place - c/d/i  Small amount purulent drainage present    Right wrist forearm with rash/dermatitis above prior IV site. Improving   Forearm erythema improving.  Palpable venous cord.      Neurological:      Mental Status: He is alert and oriented to person, place, and time. Mental status is at baseline.   Psychiatric:         Mood and Affect: Mood normal.         Behavior: Behavior normal.       Significant Labs: Blood Culture:   Recent Labs   Lab 08/05/22  0850 08/05/22  0852 08/17/22  1223 08/17/22  1231 08/19/22  2254   LABBLOO No growth after 5 days. No growth after 5 days. No Growth to date  No Growth to date  No Growth to date  No Growth to date  No Growth to date No Growth to date  No Growth to date  No Growth to date  No Growth to date  No Growth to date No Growth to date  No Growth to date     CBC:   Recent Labs   Lab 08/20/22  0250 08/21/22  0640   WBC 4.77 4.06   HGB 6.8* 7.9*   HCT 21.1* 24.0*    192     CMP:   Recent Labs   Lab 08/20/22  0250 08/21/22  0640    141   K 3.7 4.0    107   CO2 23 25   GLU 81 92   BUN 52* 50*   CREATININE 5.2* 3.7*   CALCIUM 8.6* 9.2   PROT 6.2 6.4   ALBUMIN 2.1* 2.3*   BILITOT 0.6 0.5   ALKPHOS 60 65   AST 19 23   ALT <5* 10   ANIONGAP 11 9     Wound Culture:   Recent Labs   Lab 07/28/22  1800 07/29/22  1251 07/29/22  1300 08/05/22  0817 08/06/22  1153   LABAERO STAPHYLOCOCCUS AUREUS  Many  * STAPHYLOCOCCUS AUREUS  Many  For susceptibility see order #[id number]  * STAPHYLOCOCCUS AUREUS  Many  * No growth STAPHYLOCOCCUS AUREUS  Few  *  STAPHYLOCOCCUS AUREUS  Moderate  For susceptibility see order  #[id number]  *       Significant Imaging: I have reviewed all pertinent imaging results/findings within the past 24 hours.

## 2022-08-22 NOTE — CONSULTS
PharmD Consult received for:    1.) Renally adjust all medications:     Estimated Creatinine Clearance: 32.2 mL/min (A) (based on SCr of 3.2 mg/dL (H)).   Medications reviewed, no dose adjustments needed      Pharmacy will sign off, please re-consult as needed.    Thank you for the consult,  Cristiana Jimenez, PharmD, [facility]  Internal Medicine Clinical Pharmacy Specialist   Ext [phone]    **Note: Consults are reviewed Monday-Friday 7:00am-3:30pm. The above recommendations are only suggested. The recommendations should be considered in conjunction with all patient factors.**

## 2022-08-22 NOTE — ASSESSMENT & PLAN NOTE
Estimated Creatinine Clearance: 32.2 mL/min (A) (based on SCr of 3.2 mg/dL (H)). - improved  - Hyperphosphatemia also noted - start binder  - Continues to make urine  - Nephrology following: no acute indication for HD at this time  - Renally dosing all medications  - Avoid nephrotoxins  - nephrology has cleared for discharge once Cr < 7    8/22 - cr 6.1 -> 5.2 -> 3.7 -> 3.2

## 2022-08-22 NOTE — ASSESSMENT & PLAN NOTE
Multifocal pneumonia within the bilateral lungs.  - Nodular opacities in right upper lobe 0.7 x 1.4 cm.  Unclear if these are consolidations or solid nodules.    - Close short-term follow-up is recommended.  - on dapto  - will discuss w ID, likely change antibiotics.   8/22- on levoquin and zyvox. AF, asymptomatic

## 2022-08-22 NOTE — ASSESSMENT & PLAN NOTE
Multifocal pneumonia within the bilateral lungs.  - Nodular opacities in right upper lobe 0.7 x 1.4 cm.  Unclear if these are consolidations or solid nodules.    - Close short-term follow-up is recommended.  - on dapto  - will discuss w ID, likely change antibiotics.   8/23- on levoquin and zyvox. AF, asymptomatic.    · Continue linezolid 600 mg orally q 12 hours for possibility of MSSA infection in lungs.  Monitor platelet count while on linezolid, especially in setting of renal dysfunction.  · Continue levaquin 750 mg orally q 48 hours (renally dosed) for gram negative pulmonary coverage.   · Will treat for 7d, EOC 7/26/22.

## 2022-08-22 NOTE — ASSESSMENT & PLAN NOTE
- H/H slowly declining  - transfuse for Hgb < 7  - continue to monitor   8/22 - Hb 6.8 - transfused one unit PRBCs -> 7.9 ->8.0

## 2022-08-22 NOTE — SUBJECTIVE & OBJECTIVE
Interval History:   CT chest showing multifocal PNA bilateral lungs, with additional nodular opacities in RUL which could represent consolidations or solid nodules  Patient sitting up in chair today, reports improved neck/back pain.  Improved right forearm pain   Denies cough, SOB, chest discomfort  Creatinine continues to improve  Afebrile, no leukocytosis.     Review of Systems   Constitutional:  Negative for chills, diaphoresis, fatigue and fever.   HENT: Negative.     Respiratory:  Negative for cough and shortness of breath.    Cardiovascular:  Positive for leg swelling (upper left thigh). Negative for chest pain and palpitations.   Gastrointestinal:  Negative for abdominal distention and abdominal pain.   Genitourinary:  Negative for difficulty urinating and dysuria.   Musculoskeletal:  Positive for myalgias (upper left thigh; tenderrness right forearm - improved) and neck pain. Negative for arthralgias and joint swelling.   Skin:  Positive for wound (upper left thigh). Negative for color change and rash.   Allergic/Immunologic: Negative for immunocompromised state.   Neurological:  Negative for dizziness, tremors, weakness, numbness and headaches.   Psychiatric/Behavioral:  Negative for agitation and decreased concentration. The patient is not nervous/anxious.    Objective:     Vital Signs (Most Recent):  Temp: 98.1 °F (36.7 °C) (08/21/22 1619)  Pulse: 92 (08/21/22 1619)  Resp: 18 (08/21/22 1646)  BP: 139/84 (08/21/22 1619)  SpO2: 98 % (08/21/22 1619) Vital Signs (24h Range):  Temp:  [98 °F (36.7 °C)-98.5 °F (36.9 °C)] 98.1 °F (36.7 °C)  Pulse:  [] 92  Resp:  [16-20] 18  SpO2:  [92 %-98 %] 98 %  BP: (132-162)/(76-89) 139/84     Weight: 104.3 kg (229 lb 15 oz)  Body mass index is 36.01 kg/m².    Estimated Creatinine Clearance: 27.8 mL/min (A) (based on SCr of 3.7 mg/dL (H)).    Physical Exam  Vitals and nursing note reviewed.   Constitutional:       General: He is not in acute distress.     Appearance:  Normal appearance. He is well-developed. He is obese. He is not ill-appearing, toxic-appearing or diaphoretic.   HENT:      Head: Normocephalic and atraumatic.      Mouth/Throat:      Dentition: Does not have dentures.   Eyes:      General: No scleral icterus.     Conjunctiva/sclera: Conjunctivae normal.   Cardiovascular:      Rate and Rhythm: Normal rate and regular rhythm.      Heart sounds: No murmur heard.  Pulmonary:      Effort: Pulmonary effort is normal. No respiratory distress.      Breath sounds: Normal breath sounds. No wheezing or rales.      Comments: Room air    Abdominal:      General: There is no distension.      Palpations: Abdomen is soft.      Tenderness: There is no abdominal tenderness.   Musculoskeletal:         General: Tenderness (paraspinal cervical tenderness/right upper back - improved.  Erythema resolved) present. No swelling.   Skin:     General: Skin is warm and dry.      Findings: Erythema (right forearm - resolving) present. No rash.      Comments: Left thigh with dressings in place - c/d/i  Small amount purulent drainage present    Right wrist forearm with rash/dermatitis above prior IV site. Improving   Forearm erythema improving.  Palpable venous cord.      Neurological:      Mental Status: He is alert and oriented to person, place, and time. Mental status is at baseline.   Psychiatric:         Mood and Affect: Mood normal.         Behavior: Behavior normal.       Significant Labs: Blood Culture:   Recent Labs   Lab 08/05/22  0850 08/05/22  0852 08/17/22  1223 08/17/22  1231 08/19/22  2254   LABBLOO No growth after 5 days. No growth after 5 days. No Growth to date  No Growth to date  No Growth to date  No Growth to date  No Growth to date No Growth to date  No Growth to date  No Growth to date  No Growth to date  No Growth to date No Growth to date  No Growth to date     CBC:   Recent Labs   Lab 08/20/22  0250 08/21/22  0640   WBC 4.77 4.06   HGB 6.8* 7.9*   HCT 21.1*  24.0*    192     CMP:   Recent Labs   Lab 08/20/22  0250 08/21/22  0640    141   K 3.7 4.0    107   CO2 23 25   GLU 81 92   BUN 52* 50*   CREATININE 5.2* 3.7*   CALCIUM 8.6* 9.2   PROT 6.2 6.4   ALBUMIN 2.1* 2.3*   BILITOT 0.6 0.5   ALKPHOS 60 65   AST 19 23   ALT <5* 10   ANIONGAP 11 9     Wound Culture:   Recent Labs   Lab 07/28/22  1800 07/29/22  1251 07/29/22  1300 08/05/22  0817 08/06/22  1153   LABAERO STAPHYLOCOCCUS AUREUS  Many  * STAPHYLOCOCCUS AUREUS  Many  For susceptibility see order #[id number]  * STAPHYLOCOCCUS AUREUS  Many  * No growth STAPHYLOCOCCUS AUREUS  Few  *  STAPHYLOCOCCUS AUREUS  Moderate  For susceptibility see order #[id number]  *       Significant Imaging: I have reviewed all pertinent imaging results/findings within the past 24 hours.

## 2022-08-22 NOTE — ASSESSMENT & PLAN NOTE
- Afebrile, no leukocytosis  - Continue Ancef  - S/p OR with Ortho 8/6 for repeat washout  - Intra-op cultures with S.aureus: changed to Ancef for MSSA then daptomycin on 8/15; CPK 11  - ID following  - PRN analgesics provided  - Continuing to monitor; maintain drain for now - Ortho continues to monitor  8/18- drain output 35 cc  Per ID: Continue Daptomycin 6mg/kg/dose q48 hours (renally dosed) for MSSA SSTI. Anticipate either completion of 14 days IV abx from 8/6 for SSTI or possible transition to orals.   8/19- will US for pockets of fluid, consider CT  8/22- drainage diminishing, 20cc

## 2022-08-22 NOTE — SUBJECTIVE & OBJECTIVE
"Principal Problem:Abscess of left thigh    Principal Orthopedic Problem: Left thigh I&D 7/29 and 8/6  Staph species on thigh abscess cultures    Interval History: S/p repeat L thigh I&D 8/6/22. Drain output 20cc yesterday, downtrending. Afebrile yesterday. Hb 8 following 1 unit RBC. CT showing multifocal pneumonia bilaterally. Abx switched to levofloxacin and linezolid. Pt states left leg feels good.    Review of patient's allergies indicates:  No Known Allergies    Current Facility-Administered Medications   Medication    0.9%  NaCl infusion (for blood administration)    acetaminophen tablet 1,000 mg    amLODIPine tablet 5 mg    atorvastatin tablet 20 mg    COVID-19 vac, forrest(Pfizer)(PF) (Pfizer COVID-19) 30 mcg/0.3 mL injection 0.3 mL    dextrose 10% bolus 125 mL    dextrose 10% bolus 250 mL    glucagon (human recombinant) injection 1 mg    glucose chewable tablet 16 g    glucose chewable tablet 24 g    heparin (porcine) injection 5,000 Units    hydrALAZINE tablet 25 mg    hydrALAZINE tablet 25 mg    insulin aspart U-100 pen 1-10 Units    insulin aspart U-100 pen 8 Units    insulin detemir U-100 pen 10 Units    levoFLOXacin tablet 750 mg    linezolid tablet 600 mg    methocarbamoL tablet 500 mg    morphine injection 3 mg    ondansetron injection 4 mg    oxyCODONE immediate release tablet 5 mg    senna-docusate 8.6-50 mg per tablet 2 tablet    sevelamer carbonate pwpk 0.8 g    sodium chloride 0.9% flush 10 mL    triamcinolone acetonide 0.1% cream     Objective:     Vital Signs (Most Recent):  Temp: 98 °F (36.7 °C) (08/22/22 0414)  Pulse: 74 (08/22/22 0414)  Resp: 18 (08/22/22 0414)  BP: 136/79 (08/22/22 0414)  SpO2: 95 % (08/22/22 0414) Vital Signs (24h Range):  Temp:  [98 °F (36.7 °C)-98.3 °F (36.8 °C)] 98 °F (36.7 °C)  Pulse:  [] 74  Resp:  [18-20] 18  SpO2:  [92 %-98 %] 95 %  BP: (130-156)/(77-87) 136/79     Weight: 104.3 kg (229 lb 15 oz)  Height: 5' 7" (170.2 cm)  Body mass index is 36.01 " kg/m².      Intake/Output Summary (Last 24 hours) at 8/22/2022 0643  Last data filed at 8/21/2022 2100  Gross per 24 hour   Intake --   Output 1720 ml   Net -1720 ml         Ortho/SPM Exam  Left lower extremity  Left thigh dressing cdi  Drain in place, serous output, minimal exudative material  No erythema of thigh, no signficant swelling  Compartments soft and compressible  Painless ROM of hip and knee  NVI        CBC:   Recent Labs   Lab 08/21/22  0640 08/22/22  0325   WBC 4.06 4.14   HGB 7.9* 8.0*   HCT 24.0* 24.5*    207       CMP:   Recent Labs   Lab 08/21/22  0640 08/22/22  0325    142   K 4.0 3.7    106   CO2 25 24   GLU 92 83   BUN 50* 39*   CREATININE 3.7* 3.2*   CALCIUM 9.2 8.8   PROT 6.4 6.4   ALBUMIN 2.3* 2.3*   BILITOT 0.5 0.5   ALKPHOS 65 71   AST 23 20   ALT 10 6*   ANIONGAP 9 12         All pertinent labs within the past 24 hours have been reviewed.    Significant Imaging: I have reviewed and interpreted all pertinent imaging results/findings.

## 2022-08-22 NOTE — ASSESSMENT & PLAN NOTE
- DKA has resolved  - Endocrinology consulted and managing.  Recent Labs     08/20/22  2100 08/21/22  0816 08/21/22  1133 08/21/22  1620 08/21/22  2104 08/22/22  0741   POCTGLUCOSE 125* 98 141* 181* 118* 105

## 2022-08-22 NOTE — ASSESSMENT & PLAN NOTE
47 year old with DMII and HTN admitted for DKA and left thigh abscess.      MRI of left femur notable for ruptured tensor fascia jose luis with associated large hematoma, as well as small area of potential osteonecrosis.  Bedside aspiration - cell count of >200k (>80%segs), cultures + MRSA.  Underwent I&D on 7/29 and 8/6.  Surgical cultures positive for MSSA.  Per ortho surgery, no concerns for bone involvement.      Initially on Vancomycin and Zosyn, but course complicated by ANGE.   Vancomycin discontinued and switched to cefazolin on 8/3,  but subsequent Emanuel's stain + and with continued deterioration in renal fx with concerned for beta lactam related AIN, so cefazolin stopped and Daptomycin started for MSSA     Hospital course complicated by acute onset left wrist pain.  History of wrist arthroscopy with hardware placement 3/2022.   S/p joint aspiration, white count 2050k, 88% segs. No crystals noted. Cultures no growth.   Received steroid shot per primary team which pt noted improvement.  Xray of wrist with no acute concerns.          Patient developed fever 8/17 and again on 8/19.  CRP spike 24>>192, today 120.  New onset neck paraspinal pain/erythema on 8/19. MRI C/T/L spine 8/19 negative for epidural abscess or discitis, but significant for edema of paraspinal muscles/superficial soft tissues at level of C7 concerning for cellulitis. No discrete fluid collections.  Incidentally noted consolidation in left upper lobe - possible infection or non-infectious inflammation.      All repeat blood cx NGTD.  BLE US 8/17 negative for DVT.  U/A negative. Soft tissue US left thigh with two residual  fluid collections medial and superior to incision. Orthopedics continues to follow. No additional washout planned.  Yesterday developed new onset rash/irritation to right forearm above IV site (IV removed) with proximal erythema/warmth and palpable venous cord. Consistent with adhesive dermatitis and superficial  thrombophlebitis.  Possible source of recurrent fever.       Dedicated CT chest performed last night showing multifocal PNA of bilateral lungs with additional nodular opacities which could represent consolidations or solid nodules.  Kike Healy NP, personally spoke with the reading Radiologist who reports that the opacities in upper lobes looks like classic consolidation.  The RUL nodular opacities are less clearly characterized - could be opacities or could be soft tissue nodules. Possibly could represent septic emboli.  There are no classic signs (e.g GGO,patchy inflammatory infiltrates, interlobular septal thickening, or effusions which would be consistent with eosinophilic PNA, and this would be unlikely.  Given apparent metastatic sites of infection, wonder if he hasn't had a transient bacteremias despite negative blood cultures.     Patient remains without respiratory complaints - no cough, SOB, sputum production.  Has been afebrile since yesterday.  Overall feels much improved with improvement in neck/back pain, but taking robaxin 4x daily. CRP downtrending.     Recommendations:   · Continue linezolid 600 mg orally q 12 hours for possibility of MSSA infection in lungs.  Monitor platelet count while on linezolid, especially in setting of renal dysfunction.  · Continue levaquin 750 mg orally q 48 hours (renally dosed) for gram negative pulmonary coverage.   · Will treat for 7d, EOC 7/26/22.   · Continue to monitor for worsening cervical/paraspinal pain, upper back cellulitis/edema. If worsens, may need repeat imaging to evaluate for developing organized abscess.   · Will follow up tomorrow  · Extended discussion with patient and his wife regarding CT findings, antibiotic changes, potential ADRs with linezolid and levaquin.  All questions answered. Verbalized understanding.   · Plan reviewed with ID staff, Dr. Joaquin. ID will follow.

## 2022-08-22 NOTE — PROGRESS NOTES
"[facility] - [facility] Medicine  Progress Note    Patient Name: Wilfredo Thomas  MRN: [id number]  Patient Class: IP- Inpatient   Admission Date: 7/26/2022  Length of Stay: 27 days  Attending Physician: Dorota Reynoso MD  Primary Care Provider: Aylin Wayne DO        Subjective:     Principal Problem:Abscess of left thigh        HPI:  46 y/o M hx of HTN, IDDM2, GERD obesity, HLD presented to the ED with complaint of 1 day of worsening SOB. Wife present at bedside. Patient states that he noticed yesterday he was feeling some shortness of breath and couldn't seem to catch his breath. His wife went to check on him this morning and noticed that he was breathing "fast and heavy" and he sounded like he was slurring his words. Wife was concerned about a stroke, so he brought him to the ED for evaluation.     Patient also reporting significant pain and swelling along his left lateral proximal thigh. He states he has noticed worsening pain over the past 2 weeks. He denies any acute trauma to the area. He was seen by ortho last week and was told to take ibu-profen for a muscle strain. This did not help his pain, so he presented again on 7/22 and was given a prednisone injection into his thigh. This also did not help his pain, and now the pain and swelling have advanced to the point that he has difficulty with ambulation. Patient denies fever, chills, nausea, vomiting.      Of note, patient has hx of poorly controlled diabetes. He was recently started on insulin by his PCP, but he has not taken any insulin since being prescribed it.      In the ED, patient hypertensive and tachycardic to the 130s. Tachypneic with RR in the 40s. On CBC, WBC 26.9, On CMP patient hyperkalemic to 5.5, CorNa 136, Bicarb 5. Cr elevated to 1.9 from BL 0.83. UA, BHB pending at time of admission. CTA without evidence of pulmonary embolism. Critical Care Medicine consulted given severe acidosis.             Overview/Hospital " Course:    Patient admitted to the MICU for management of severe DKA. Metabolic acidosis improving on insulin drip. He also has had left thigh pain for 1-2 months. There is a large mass on his left thigh that is tender to palpation, CT revealed hematoma vs soft tissue growth. General surgery consulted and recommended IR consult. IR consulted, no indication for tap.   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 Ortho consulted and performed bedside aspiration of possible abscess which revealed >200K cells >80% segs. Will hold off on antibiotics for now per ortho recs, ortho is taking patient to the OR for irrigation of the thigh, will start antibiotics afterwards.         7/29 Metabolic acidosis slowly improving. Ortho taking patient to the OR for irrigation of the left thigh, will start antibiotics afterwards.   7/30 Transfer to hospital medicine . S/p I&D  of left thigh Abscess on7/29/22 - MRI - uptured tensor fascia jose luis (TFL) muscle with associated large hematoma. gram stain -Moderate Gram positive cocci in clusters .cultures pending.  continue Vancomycin, clindamycin and Zosyn . Bicarb 15. K replaced . off insulin drip on SQ insulin.  7/31 ID and endocrine consulted.  abscess with Calcium pyrophosphate crystals with unknown significance potassium and P replaced. Bicarbonate WNL . aerobic culture 7/28 STAPHYLOCOCCUS SPECIES . Glucose in 300s .PRN imodium  for diarrhea. PT/OT consulted - WBAT. Surgical drain with purulent output  8/1 PICC team consulted for IV access. vanc trough at 11.4. monitor for vanomycin toxicity. possible OR tomorrow for washout  may need bone biopsy to r/o osteomyelitis.Discontinued clindamycin.   8/2 glucose in 300s.  Increased Levemir  to 14 units BID and Aspart  10 units TIDWM. K replaced   8/3 ANGE with cr 0.7--> 2.7. likely vanc induced ATN with levels 40. urine studies ordered renal sonogram WNL  nephrology consulted.  started on NS 100ml/h x 10h and follow up renal panel.Zosyn and  vancomycin discontinued. Strict IO monitor. condom catheter . switched to Ancef for MSSA on culture  Interval History:  8/4- MSSA- see below. /85 VSS. On room air, eating, BM on 8/2, good UO. Appreciate ortho recs. On Cephazolin, detim 14 bid and aspart 12 ac.  8/5-Cr still rising, cr =6.  Wrist still hurting. Ortho to tap it to eval for infection and gout today. /90   Pulse 85  AF, no other signs of infection.  Will discuss w Nephrology- received NS x one liter yesterday. Will repeat today. Suspect auto-diuresis.   8/6: washout with ortho today. Cr continues to rise (7.1), nephrology following  8/7: Cr 7.4, phos increasing; tolerated surgery well yesterday, intra-op cultures pending  8/8: Cr increased to 8.1, hyperphosphatemia again noted; continues to make urine, no acute indication for HD per nephrology. Intra-op cultures with S.aureus, continued cefazolin pending final C&S results.   Interval History:   8/18- 47M with DM admitted 7/26/22 for DKA, ortho following for left lateral thigh abscess. s/p I&D and drain placement left thigh 7/29/22. Cx +MSSA. Had high drain purulent output after first I&D, was taken back to OR 8/6/22 for repeat I&D. Drain in place, dressings CDI. Nephrology on board for ANGE.   pt transferred from telemedicine, had fever yesterday,  -> 90.  Wbc -ok. Covid negative. Drain output paula to 35 cc. Pt on Ancef.   No urine output, cr rising, Nephrology following, no indication for HD presently. Ortho may need to repeat debridement.      8/19- /71   Pulse 92 AF. Appreciate ortho and ID input, pt on daptomycin. T max 99.4. drain functioning. Blood cult 8/17 are neg so far, CRP and procal were higher. Will US the wound site for pockets of fluid.   - had fever and rigors  after receiving dapto today, MRI spine - neck ordered for acute neck/ back pain. Blood culture, lactic acid, robaxin ordered.   - still  febrile. Add cefepime, cooling blanket, cannot have NSAID    8/20 - pt developed blisters around IV site.   MRI- No evidence of discitis-osteomyelitis.  No epidural fluid collections.  Edema/enhancement in the posterior paraspinal musculature/superficial soft tissues at the level of C7 concerning for cellulitis/infection.  No rim enhancing collections to suggest abscess. Mild degenerative change of the spine as detailed above.  Consolidation in the left upper lobe, possible infection or noninfectious inflammation and new from CTA chest 07/26/2022.  On dapto and cefepime, lactade - 0.8, ck -nl, cr improving 5.2. Hb 6.8.  pt is AF since adding Cefipime. Will discuss w ID. For now keep antibiotcs the same. next dapto is tomorrow and we may change things before then.  He is on renal doses and renal function improving.   rash is contact dermatitis, in areas of tape. Do not think this monkey px  CT scan of the chest to eval pneumonia.  Might change antibiotics. We are suspecting drug related pneumonia and rash. isolation precautions until cleared by ID to make sure they concur.  8/21- cefepime stopped.pt still AF, good UO 2400cc, cr continues to improve.  It becomes febrile again will choose levoquin. CT chest done  - Multifocal pneumonia within the bilateral lungs. Nodular opacities in right upper lobe 0.7 x 1.4 cm.  Unclear if these are consolidations or solid nodules.    Appreciate ID input and f/u. Right thrombophlebitis and contact dermatitis improving. Daptomycin dose today.  Received one unit PRBCs yesterday - Hb 7.9. ID to change antibiotics to levoquin and Zyvox.    8/22- cr 3.2, drain output 20cc, on levoquin and zyvox, still AF. No symptoms of pneumonia -     Need length of antibiotics per ID  Need drain removal per Ortho      Interval History: see above    Review of Systems   Constitutional:  Negative for activity change, appetite change, chills, fatigue and fever.        Able to sit in chair. Neck pain  improving, Right wrist/ forearm pain improving   HENT:  Negative for congestion, facial swelling, nosebleeds, sore throat and trouble swallowing.    Respiratory:  Negative for apnea, cough, choking, chest tightness, shortness of breath, wheezing and stridor.    Cardiovascular:  Negative for chest pain and leg swelling.   Gastrointestinal:  Negative for abdominal distention, abdominal pain, blood in stool, constipation, diarrhea, nausea and vomiting.   Genitourinary:  Negative for difficulty urinating, dysuria, flank pain, frequency, hematuria and urgency.   Musculoskeletal:  Positive for neck pain. Negative for arthralgias, back pain, gait problem and neck stiffness.        Right Forearm thrombophlebitis   Skin:  Positive for rash (right  wrist). Negative for color change and wound.   Neurological:  Negative for dizziness, tremors, seizures, syncope, facial asymmetry, speech difficulty, weakness, light-headedness, numbness and headaches.   Psychiatric/Behavioral:  Negative for agitation, behavioral problems, confusion, decreased concentration, dysphoric mood, hallucinations, self-injury and sleep disturbance. The patient is not nervous/anxious and is not hyperactive.    Objective:     Vital Signs (Most Recent):  Temp: 98.6 °F (37 °C) (08/22/22 0740)  Pulse: 88 (08/22/22 0740)  Resp: 14 (08/22/22 0740)  BP: (!) 154/81 (08/22/22 0740)  SpO2: 97 % (08/22/22 0740)   Vital Signs (24h Range):  Temp:  [98 °F (36.7 °C)-98.6 °F (37 °C)] 98.6 °F (37 °C)  Pulse:  [] 88  Resp:  [14-20] 14  SpO2:  [92 %-98 %] 97 %  BP: (130-156)/(77-87) 154/81     Weight: 104.3 kg (229 lb 15 oz)  Body mass index is 36.01 kg/m².    Intake/Output Summary (Last 24 hours) at 8/22/2022 0809  Last data filed at 8/21/2022 2100  Gross per 24 hour   Intake --   Output 1720 ml   Net -1720 ml        Physical Exam  Constitutional:       General: He is not in acute distress.     Appearance: Normal appearance. He is not ill-appearing, toxic-appearing or  diaphoretic.   HENT:      Head: Normocephalic and atraumatic.      Nose: Nose normal.      Mouth/Throat:      Mouth: Mucous membranes are moist.      Pharynx: Oropharynx is clear.   Eyes:      General: No scleral icterus.     Extraocular Movements: Extraocular movements intact.      Conjunctiva/sclera: Conjunctivae normal.      Pupils: Pupils are equal, round, and reactive to light.   Neck:      Comments: Tender post neck and left trapezious  Imporoved ROM, less muscular stiffness left trap.   Cardiovascular:      Rate and Rhythm: Normal rate and regular rhythm.      Pulses: Normal pulses.      Heart sounds: Normal heart sounds.   Pulmonary:      Effort: Pulmonary effort is normal. No respiratory distress.      Breath sounds: Normal breath sounds. No stridor. No wheezing, rhonchi or rales.   Chest:      Chest wall: No tenderness.   Abdominal:      General: Abdomen is flat. Bowel sounds are normal. There is no distension.      Palpations: Abdomen is soft.      Tenderness: There is no abdominal tenderness. There is no right CVA tenderness, guarding or rebound.   Musculoskeletal:         General: Swelling and tenderness present. No deformity or signs of injury. Normal range of motion.      Cervical back: Normal range of motion and neck supple. Tenderness present. No rigidity.      Right lower leg: No edema.      Left lower leg: No edema.      Comments: Left lat thigh is bandaged  Right  forearm - superficial thrombophlebitis and contact dermatitis related to IV and tape.   Lymphadenopathy:      Cervical: No cervical adenopathy.   Skin:     General: Skin is warm and dry.      Coloration: Skin is not jaundiced.      Findings: Rash (papulosquamour erythema in setting of rtape exposure) present. No erythema.   Neurological:      General: No focal deficit present.      Mental Status: He is alert and oriented to person, place, and time. Mental status is at baseline.      Motor: No weakness.   Psychiatric:         Mood and  Affect: Mood normal.         Behavior: Behavior normal.         Thought Content: Thought content normal.         Judgment: Judgment normal.       Significant Labs: All pertinent labs within the past 24 hours have been reviewed.  CBC:   Recent Labs   Lab 08/21/22  0640 08/22/22  0325   WBC 4.06 4.14   HGB 7.9* 8.0*   HCT 24.0* 24.5*    207       CMP:   Recent Labs   Lab 08/21/22  0640 08/22/22  0325    142   K 4.0 3.7    106   CO2 25 24   GLU 92 83   BUN 50* 39*   CREATININE 3.7* 3.2*   CALCIUM 9.2 8.8   PROT 6.4 6.4   ALBUMIN 2.3* 2.3*   BILITOT 0.5 0.5   ALKPHOS 65 71   AST 23 20   ALT 10 6*   ANIONGAP 9 12         Significant Imaging: I have reviewed all pertinent imaging results/findings within the past 24 hours.      Assessment/Plan:      * Abscess of left thigh  - Afebrile, no leukocytosis  - Continue Ancef  - S/p OR with Ortho 8/6 for repeat washout  - Intra-op cultures with S.aureus: changed to Ancef for MSSA then daptomycin on 8/15; CPK 11  - ID following  - PRN analgesics provided  - Continuing to monitor; maintain drain for now - Ortho continues to monitor  8/18- drain output 35 cc  Per ID: Continue Daptomycin 6mg/kg/dose q48 hours (renally dosed) for MSSA SSTI. Anticipate either completion of 14 days IV abx from 8/6 for SSTI or possible transition to orals.   8/19- will US for pockets of fluid, consider CT  8/22- drainage diminishing, 20cc      Pneumonia of both lungs  Multifocal pneumonia within the bilateral lungs.  - Nodular opacities in right upper lobe 0.7 x 1.4 cm.  Unclear if these are consolidations or solid nodules.    - Close short-term follow-up is recommended.  - on dapto  - will discuss w ID, likely change antibiotics.   8/22- on levoquin and zyvox. AF, asymptomatic        ANGE (acute kidney injury)  Estimated Creatinine Clearance: 32.2 mL/min (A) (based on SCr of 3.2 mg/dL (H)). - improved  - Hyperphosphatemia also noted - start binder  - Continues to make urine  -  Nephrology following: no acute indication for HD at this time  - Renally dosing all medications  - Avoid nephrotoxins  - nephrology has cleared for discharge once Cr < 7    8/22 - cr 6.1 -> 5.2 -> 3.7 -> 3.2    Allergic contact dermatitis due to adhesives  triamcinolon cream  Elevate ext.   Avoid tape      Superficial thrombophlebitis of right upper extremity  Elevate ext  Warm packs  -improving      Acute neck pain  MRI- see above. evidecen of cellulitis, soft tissue swelling  - has muscular spasm on exam  Robaxin added  8/20 - AF since adding cefepime. Ortho following. Appreciate assistance  8/22- resolving        Fever  Uncertain etiology  On 8/17 and 8/19.   Still tachycardia  Wbc 6  Blood cult 8/17 - NGSF  drain output improving  covid negative.   cxr negative 8/17  UA negative 8/17  Will chat w Ortho regarding further plans for debridement  8/19- US wound site for pockets of fluid,   Fluid collection located medial to the bandage: 3.5 x 0.6 x 1.8 cm.  Fluid collection located superior to bandage: 3.0 x 0.6 x 2.3 cm.  CRP rising. Drain output improved- gradually reducing  Fever after receiving Daptomycin today. Will discuss w ID. Repeated blood culture and lactic acid    8/20 - cefepime added to dapto  last night, developed blisters at IV site. MRI revealed evidence of celluilits and consolidation NAVDEEP pneumonia  8/21- CT chest w multifocal pneumonia, on dapto. Blood cult negative      Pain and swelling of left wrist  Pseudogout  - Tapped by Ortho, fluid reviewed: does not appear to be gout or septic arthritis, likely pseudogout (calcium pyrophosphate crystals on previous LE aspiration)  - PRN analgesics provided  -improved after steroid injection  8/19- resolved  8/22- pain and swelling of the right wrist is superfiscial thrombophlebitis and contact dermatitis, improving    Sepsis  -patient with fever to 102 and tachycardia on 8/17 - repeat blood cultures, UA, COVID swab pending  -CXR without infiltrate,  lactate nl, procalcitonin mildly elevated (due to ANGE)  -remains on dapto for MSSA abscess to thigh  -eosinophils nl and no leukocytosis  -ID and ortho notified of change  -minimize IVF due to improving ANGE with signs of fluid retention  8/21- on daptomycin, which may be cause of multifocal pneumonia  8/22- on levoquin and zyvox. AF.       Type 2 diabetes mellitus with hyperglycemia, with long-term current use of insulin  - DKA has resolved  - Endocrinology consulted and managing.  Recent Labs     08/20/22  2100 08/21/22  0816 08/21/22  1133 08/21/22  1620 08/21/22  2104 08/22/22  0741   POCTGLUCOSE 125* 98 141* 181* 118* 105         Hypertension associated with type 2 diabetes mellitus  - Continue home regimen of amlodipine; lisinopril held due to poor renal function  - continue to monitor and further titrate antihypertensives as clinically indicated   -increase amlodipine to BID on 8/13 for elevated BP; avoid hypotension with ANGE  8/18- /60  -stable      Hematoma of left thigh  - See Abscess of L thigh    Anemia of chronic disease  - H/H slowly declining  - transfuse for Hgb < 7  - continue to monitor   8/22 - Hb 6.8 - transfused one unit PRBCs -> 7.9 ->8.0    Class 2 severe obesity due to excess calories with serious comorbidity and body mass index (BMI) of 36.0 to 36.9 in adult  - Body mass index is 36.01 kg/m².  - Needs dietary and lifestyle modifications in relation to health    Hyperlipidemia associated with type 2 diabetes mellitus  - Continue home statin       VTE Risk Mitigation (From admission, onward)         Ordered     heparin (porcine) injection 5,000 Units  Every 8 hours         08/07/22 1010     Place sequential compression device  Until discontinued         08/06/22 0855     Place sequential compression device  Until discontinued         07/29/22 0158     IP VTE HIGH RISK PATIENT  Once         07/29/22 0158     Place SAVANNAH hose  Until discontinued         07/26/22 2240     Place sequential  compression device  Until discontinued         07/26/22 0397                Discharge Planning   FILEMON: 8/24/2022     Code Status: Full Code   Is the patient medically ready for discharge?: No    Reason for patient still in hospital (select all that apply): Patient trending condition  Discharge Plan A: Home Health            Dorota Reynoso MD  Department of Hospital Medicine   [facility]

## 2022-08-22 NOTE — SUBJECTIVE & OBJECTIVE
Interval History:   Reporting improved neck/back pain.  Improved right forearm pain   Denies cough, SOB, chest discomfort  Creatinine continues to improve  Afebrile, no leukocytosis.     Review of Systems   Constitutional:  Negative for chills, diaphoresis, fatigue and fever.   HENT: Negative.     Respiratory:  Negative for cough and shortness of breath.    Cardiovascular:  Positive for leg swelling (upper left thigh). Negative for chest pain and palpitations.   Gastrointestinal:  Negative for abdominal distention and abdominal pain.   Genitourinary:  Negative for difficulty urinating and dysuria.   Musculoskeletal:  Positive for myalgias (upper left thigh; tenderrness right forearm - improved) and neck pain. Negative for arthralgias and joint swelling.   Skin:  Positive for wound (upper left thigh). Negative for color change and rash.   Allergic/Immunologic: Negative for immunocompromised state.   Neurological:  Negative for dizziness, tremors, weakness, numbness and headaches.   Psychiatric/Behavioral:  Negative for agitation and decreased concentration. The patient is not nervous/anxious.    Objective:     Vital Signs (Most Recent):  Temp: 97.9 °F (36.6 °C) (08/22/22 1149)  Pulse: 88 (08/22/22 1149)  Resp: 14 (08/22/22 1334)  BP: 119/76 (08/22/22 1149)  SpO2: 99 % (08/22/22 1303) Vital Signs (24h Range):  Temp:  [97.9 °F (36.6 °C)-98.6 °F (37 °C)] 97.9 °F (36.6 °C)  Pulse:  [74-98] 88  Resp:  [14-20] 14  SpO2:  [95 %-99 %] 99 %  BP: (119-156)/(76-87) 119/76     Weight: 104.3 kg (229 lb 15 oz)  Body mass index is 36.01 kg/m².    Estimated Creatinine Clearance: 32.2 mL/min (A) (based on SCr of 3.2 mg/dL (H)).    Physical Exam  Vitals and nursing note reviewed.   Constitutional:       General: He is not in acute distress.     Appearance: Normal appearance. He is well-developed. He is obese. He is not ill-appearing, toxic-appearing or diaphoretic.   HENT:      Head: Normocephalic and atraumatic.      Mouth/Throat:       Dentition: Does not have dentures.   Eyes:      General: No scleral icterus.     Conjunctiva/sclera: Conjunctivae normal.   Cardiovascular:      Rate and Rhythm: Normal rate and regular rhythm.      Heart sounds: No murmur heard.  Pulmonary:      Effort: Pulmonary effort is normal. No respiratory distress.      Breath sounds: Normal breath sounds. No wheezing or rales.      Comments: Room air    Abdominal:      General: There is no distension.      Palpations: Abdomen is soft.      Tenderness: There is no abdominal tenderness.   Musculoskeletal:         General: Tenderness (paraspinal cervical tenderness/right upper back.) present. No swelling.   Skin:     General: Skin is warm and dry.      Findings: Erythema (right forearm - resolving) present. No rash.      Comments: Left thigh with dressings in place - c/d/i  Small amount purulent drainage present in drain.    Right wrist forearm with rash/dermatitis above prior IV site. Improving   Forearm erythema improving.  Palpable venous cord.      Neurological:      Mental Status: He is alert and oriented to person, place, and time. Mental status is at baseline.   Psychiatric:         Mood and Affect: Mood normal.         Behavior: Behavior normal.       Significant Labs: Blood Culture:   Recent Labs   Lab 08/05/22  0850 08/05/22  0852 08/17/22  1223 08/17/22  1231 08/19/22  2254   LABBLOO No growth after 5 days. No growth after 5 days. No growth after 5 days. No growth after 5 days. No Growth to date  No Growth to date  No Growth to date       CBC:   Recent Labs   Lab 08/21/22  0640 08/22/22  0325   WBC 4.06 4.14   HGB 7.9* 8.0*   HCT 24.0* 24.5*    207       CMP:   Recent Labs   Lab 08/21/22  0640 08/22/22  0325    142   K 4.0 3.7    106   CO2 25 24   GLU 92 83   BUN 50* 39*   CREATININE 3.7* 3.2*   CALCIUM 9.2 8.8   PROT 6.4 6.4   ALBUMIN 2.3* 2.3*   BILITOT 0.5 0.5   ALKPHOS 65 71   AST 23 20   ALT 10 6*   ANIONGAP 9 12       Wound Culture:    Recent Labs   Lab 07/28/22  1800 07/29/22  1251 07/29/22  1300 08/05/22  0817 08/06/22  1153   LABAERO STAPHYLOCOCCUS AUREUS  Many  * STAPHYLOCOCCUS AUREUS  Many  For susceptibility see order #[id number]  * STAPHYLOCOCCUS AUREUS  Many  * No growth STAPHYLOCOCCUS AUREUS  Few  *  STAPHYLOCOCCUS AUREUS  Moderate  For susceptibility see order #[id number]  *         Significant Imaging: I have reviewed all pertinent imaging results/findings within the past 24 hours.

## 2022-08-22 NOTE — ASSESSMENT & PLAN NOTE
MRI- see above. evidecen of cellulitis, soft tissue swelling  - has muscular spasm on exam  Robaxin added  8/20 - AF since adding cefepime. Ortho following. Appreciate assistance  8/22- resolving

## 2022-08-22 NOTE — PROGRESS NOTES
[facility] - Telemetry Stepdown  Infectious Disease  Progress Note    Patient Name: Wilfredo Thomas  MRN: [id number]  Admission Date: 7/26/2022  Length of Stay: 27 days  Attending Physician: Dorota Reynoso MD  Primary Care Provider: Aylin Wayne DO    Isolation Status: No active isolations  Assessment/Plan:      * Abscess of left thigh     47 year old with DMII and HTN admitted for DKA and left thigh abscess.      MRI of left femur notable for ruptured tensor fascia jose luis with associated large hematoma, as well as small area of potential osteonecrosis.  Bedside aspiration - cell count of >200k (>80%segs), cultures + MRSA.  Underwent I&D on 7/29 and 8/6.  Surgical cultures positive for MSSA.  Per ortho surgery, no concerns for bone involvement.      Initially on Vancomycin and Zosyn, but course complicated by ANGE.   Vancomycin discontinued and switched to cefazolin on 8/3,  but subsequent Emanuel's stain + and with continued deterioration in renal fx with concerned for beta lactam related AIN, so cefazolin stopped and Daptomycin started for MSSA     Hospital course complicated by acute onset left wrist pain.  History of wrist arthroscopy with hardware placement 3/2022.   S/p joint aspiration, white count 2050k, 88% segs. No crystals noted. Cultures no growth.   Received steroid shot per primary team which pt noted improvement.  Xray of wrist with no acute concerns.          Patient developed fever 8/17 and again on 8/19.  CRP spike 24>>192, today 120.  New onset neck paraspinal pain/erythema on 8/19. MRI C/T/L spine 8/19 negative for epidural abscess or discitis, but significant for edema of paraspinal muscles/superficial soft tissues at level of C7 concerning for cellulitis. No discrete fluid collections.  Incidentally noted consolidation in left upper lobe - possible infection or non-infectious inflammation.      All repeat blood cx NGTD.  BLE US 8/17 negative for DVT.  U/A negative. Soft tissue US left thigh with two  residual  fluid collections medial and superior to incision. Orthopedics continues to follow. No additional washout planned.  Yesterday developed new onset rash/irritation to right forearm above IV site (IV removed) with proximal erythema/warmth and palpable venous cord. Consistent with adhesive dermatitis and superficial thrombophlebitis.  Possible source of recurrent fever.       Dedicated CT chest performed last night showing multifocal PNA of bilateral lungs with additional nodular opacities which could represent consolidations or solid nodules.  Kike Healy NP, personally spoke with the reading Radiologist who reports that the opacities in upper lobes looks like classic consolidation.  The RUL nodular opacities are less clearly characterized - could be opacities or could be soft tissue nodules. Possibly could represent septic emboli.  There are no classic signs (e.g GGO,patchy inflammatory infiltrates, interlobular septal thickening, or effusions which would be consistent with eosinophilic PNA, and this would be unlikely.  Given apparent metastatic sites of infection, wonder if he hasn't had a transient bacteremias despite negative blood cultures.     Patient remains without respiratory complaints - no cough, SOB, sputum production.  Has been afebrile since yesterday.  Overall feels much improved with improvement in neck/back pain, but taking robaxin 4x daily. CRP downtrending.     Recommendations:   · Continue linezolid 600 mg orally q 12 hours for possibility of MSSA infection in lungs.  Monitor platelet count while on linezolid, especially in setting of renal dysfunction.  · Continue levaquin 750 mg orally q 48 hours (renally dosed) for gram negative pulmonary coverage.   · Will treat for 7d, EOC 7/26/22.   · Continue to monitor for worsening cervical/paraspinal pain, upper back cellulitis/edema. If worsens, may need repeat imaging to evaluate for developing organized abscess.   · Will follow up  tomorrow  · Extended discussion with patient and his wife regarding CT findings, antibiotic changes, potential ADRs with linezolid and levaquin.  All questions answered. Verbalized understanding.   · Plan reviewed with ID staff, Dr. Joaquin. ID will follow.                   Thank you for your consult. I will follow-up with patient. Please contact us if you have any additional questions.    Steven Caicedo NP  Infectious Disease  [facility] - Telemetry Stepdown    Subjective:     Principal Problem:Abscess of left thigh    HPI: Mr. Thomas is a 47 year old male with a PMH of DM2 (poorly controlled), HTN, GERD, HLD, obesity who initially presented to [facility] [place] [facility] ED with cc of SOBx1 day. Pt was also reporting significant pain and swelling along his left lateral proximal thigh. Pt reports this pain started over the last 2 weeks, which worsened. He initially was seen outpatient in which he was diagnoised with a mm strain and given 800 mg ibuprofen. From there he went to the ED on 7/22 d/t worsening pain. He was treated with prednisone/morphine shot, and well as a prednisone oral pack. Pt denied recent injury, with the exception of soreness to his left lower shin following bumping into a cabinet. He denies open wounds/abrasions from this injury but states the soreness started in his left thigh following the improvement of discomfort from his left shin. Pt reports having been treated for a boil on his central/right buttocks in the end of June. 2022. Pt states he was seen in Urgent Care in [place] and the boil was drained. He was given an oral abx in which he completed.     To note, pt reports having a recent left wrist fracture, ligament rupture following an accident with his riding lawnmower. States he underwent surgery and 2 screws were placed on 3/31/22 at [facility]. Denies associated infection/complications. Denies pain in the site currently.    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 Denies having pets. Denies recent fishing/hunting. Denies hx of immunocompromising conditions.     To note, pt was found to be in DKA with blood sugars >400, treated by critical team, which has since resolved. Pt was recently started on insulin by his PCP, which he had not started. Pt latest A1C 10.     CT of left thigh notable for fluid collections suspicious for hematoma/seroma. MRI of left femur was notable for ruptured tensor fascia jose luis mm with associated large hematoma, as well as small area of potential osteonecrosis. Bedside aspiration of the fluid collection was notable for a cell count of >200k >80%segs, cultures + staph spp. Pt was taken for I&D with Dr. Graff with ortho surgery on 7/29, abscesses were noted and washed out, cultures collected + staph spp. Per pt, states that Dr. Graff is planning to return to surgery tomorrow, 8/1/22 for repeat washout.     Pt was intially with leukocytosis, but has since down trended to 11k. Blood cultures from 7/26 NGTD.     Pt is currently on zosyn, vancomycin, and clindamycin. ID was consulted for abx recs regarding left TFL infection.            Interval History:   Reporting improved neck/back pain.  Improved right forearm pain   Denies cough, SOB, chest discomfort  Creatinine continues to improve  Afebrile, no leukocytosis.     Review of Systems   Constitutional:  Negative for chills, diaphoresis, fatigue and fever.   HENT: Negative.     Respiratory:  Negative for cough and shortness of breath.    Cardiovascular:  Positive for leg swelling (upper left thigh). Negative for chest pain and palpitations.   Gastrointestinal:  Negative for abdominal distention and abdominal pain.   Genitourinary:  Negative for difficulty urinating and dysuria.   Musculoskeletal:  Positive for myalgias (upper left thigh; tenderrness right forearm - improved) and neck pain. Negative for arthralgias and joint swelling.   Skin:  Positive for wound (upper left thigh). Negative for  color change and rash.   Allergic/Immunologic: Negative for immunocompromised state.   Neurological:  Negative for dizziness, tremors, weakness, numbness and headaches.   Psychiatric/Behavioral:  Negative for agitation and decreased concentration. The patient is not nervous/anxious.    Objective:     Vital Signs (Most Recent):  Temp: 97.9 °F (36.6 °C) (08/22/22 1149)  Pulse: 88 (08/22/22 1149)  Resp: 14 (08/22/22 1334)  BP: 119/76 (08/22/22 1149)  SpO2: 99 % (08/22/22 1303) Vital Signs (24h Range):  Temp:  [97.9 °F (36.6 °C)-98.6 °F (37 °C)] 97.9 °F (36.6 °C)  Pulse:  [74-98] 88  Resp:  [14-20] 14  SpO2:  [95 %-99 %] 99 %  BP: (119-156)/(76-87) 119/76     Weight: 104.3 kg (229 lb 15 oz)  Body mass index is 36.01 kg/m².    Estimated Creatinine Clearance: 32.2 mL/min (A) (based on SCr of 3.2 mg/dL (H)).    Physical Exam  Vitals and nursing note reviewed.   Constitutional:       General: He is not in acute distress.     Appearance: Normal appearance. He is well-developed. He is obese. He is not ill-appearing, toxic-appearing or diaphoretic.   HENT:      Head: Normocephalic and atraumatic.      Mouth/Throat:      Dentition: Does not have dentures.   Eyes:      General: No scleral icterus.     Conjunctiva/sclera: Conjunctivae normal.   Cardiovascular:      Rate and Rhythm: Normal rate and regular rhythm.      Heart sounds: No murmur heard.  Pulmonary:      Effort: Pulmonary effort is normal. No respiratory distress.      Breath sounds: Normal breath sounds. No wheezing or rales.      Comments: Room air    Abdominal:      General: There is no distension.      Palpations: Abdomen is soft.      Tenderness: There is no abdominal tenderness.   Musculoskeletal:         General: Tenderness (paraspinal cervical tenderness/right upper back.) present. No swelling.   Skin:     General: Skin is warm and dry.      Findings: Erythema (right forearm - resolving) present. No rash.      Comments: Left thigh with dressings in place -  c/d/i  Small amount purulent drainage present in drain.    Right wrist forearm with rash/dermatitis above prior IV site. Improving   Forearm erythema improving.  Palpable venous cord.      Neurological:      Mental Status: He is alert and oriented to person, place, and time. Mental status is at baseline.   Psychiatric:         Mood and Affect: Mood normal.         Behavior: Behavior normal.       Significant Labs: Blood Culture:   Recent Labs   Lab 08/05/22  0850 08/05/22  0852 08/17/22  1223 08/17/22  1231 08/19/22  2254   LABBLOO No growth after 5 days. No growth after 5 days. No growth after 5 days. No growth after 5 days. No Growth to date  No Growth to date  No Growth to date       CBC:   Recent Labs   Lab 08/21/22  0640 08/22/22  0325   WBC 4.06 4.14   HGB 7.9* 8.0*   HCT 24.0* 24.5*    207       CMP:   Recent Labs   Lab 08/21/22  0640 08/22/22  0325    142   K 4.0 3.7    106   CO2 25 24   GLU 92 83   BUN 50* 39*   CREATININE 3.7* 3.2*   CALCIUM 9.2 8.8   PROT 6.4 6.4   ALBUMIN 2.3* 2.3*   BILITOT 0.5 0.5   ALKPHOS 65 71   AST 23 20   ALT 10 6*   ANIONGAP 9 12       Wound Culture:   Recent Labs   Lab 07/28/22  1800 07/29/22  1251 07/29/22  1300 08/05/22  0817 08/06/22  1153   LABAERO STAPHYLOCOCCUS AUREUS  Many  * STAPHYLOCOCCUS AUREUS  Many  For susceptibility see order #[id number]  * STAPHYLOCOCCUS AUREUS  Many  * No growth STAPHYLOCOCCUS AUREUS  Few  *  STAPHYLOCOCCUS AUREUS  Moderate  For susceptibility see order #[id number]  *         Significant Imaging: I have reviewed all pertinent imaging results/findings within the past 24 hours.

## 2022-08-23 LAB
ALBUMIN SERPL BCP-MCNC: 2.4 G/DL (ref 3.5–5.2)
ALP SERPL-CCNC: 69 U/L (ref 55–135)
ALT SERPL W/O P-5'-P-CCNC: 10 U/L (ref 10–44)
ANION GAP SERPL CALC-SCNC: 8 MMOL/L (ref 8–16)
AST SERPL-CCNC: 20 U/L (ref 10–40)
BASOPHILS # BLD AUTO: 0.04 K/UL (ref 0–0.2)
BASOPHILS NFR BLD: 0.7 % (ref 0–1.9)
BILIRUB SERPL-MCNC: 0.4 MG/DL (ref 0.1–1)
BUN SERPL-MCNC: 33 MG/DL (ref 6–20)
CALCIUM SERPL-MCNC: 8.8 MG/DL (ref 8.7–10.5)
CHLORIDE SERPL-SCNC: 109 MMOL/L (ref 95–110)
CO2 SERPL-SCNC: 24 MMOL/L (ref 23–29)
CREAT SERPL-MCNC: 2.7 MG/DL (ref 0.5–1.4)
CRP SERPL-MCNC: 30.7 MG/L (ref 0–8.2)
DIFFERENTIAL METHOD: ABNORMAL
EOSINOPHIL # BLD AUTO: 0.6 K/UL (ref 0–0.5)
EOSINOPHIL NFR BLD: 11.6 % (ref 0–8)
ERYTHROCYTE [DISTWIDTH] IN BLOOD BY AUTOMATED COUNT: 13.2 % (ref 11.5–14.5)
ERYTHROCYTE [SEDIMENTATION RATE] IN BLOOD BY PHOTOMETRIC METHOD: 68 MM/HR (ref 0–23)
EST. GFR  (NO RACE VARIABLE): 28 ML/MIN/1.73 M^2
GLUCOSE SERPL-MCNC: 118 MG/DL (ref 70–110)
HCT VFR BLD AUTO: 23.2 % (ref 40–54)
HGB BLD-MCNC: 7.6 G/DL (ref 14–18)
IMM GRANULOCYTES # BLD AUTO: 0.15 K/UL (ref 0–0.04)
IMM GRANULOCYTES NFR BLD AUTO: 2.8 % (ref 0–0.5)
LYMPHOCYTES # BLD AUTO: 1.3 K/UL (ref 1–4.8)
LYMPHOCYTES NFR BLD: 23.6 % (ref 18–48)
MAGNESIUM SERPL-MCNC: 1.6 MG/DL (ref 1.6–2.6)
MCH RBC QN AUTO: 30.2 PG (ref 27–31)
MCHC RBC AUTO-ENTMCNC: 32.8 G/DL (ref 32–36)
MCV RBC AUTO: 92 FL (ref 82–98)
MONOCYTES # BLD AUTO: 0.4 K/UL (ref 0.3–1)
MONOCYTES NFR BLD: 8.2 % (ref 4–15)
NEUTROPHILS # BLD AUTO: 2.8 K/UL (ref 1.8–7.7)
NEUTROPHILS NFR BLD: 53.1 % (ref 38–73)
NRBC BLD-RTO: 0 /100 WBC
PHOSPHATE SERPL-MCNC: 3.8 MG/DL (ref 2.7–4.5)
PLATELET # BLD AUTO: 236 K/UL (ref 150–450)
PMV BLD AUTO: 10.3 FL (ref 9.2–12.9)
POCT GLUCOSE: 147 MG/DL (ref 70–110)
POCT GLUCOSE: 179 MG/DL (ref 70–110)
POCT GLUCOSE: 220 MG/DL (ref 70–110)
POCT GLUCOSE: 94 MG/DL (ref 70–110)
POTASSIUM SERPL-SCNC: 3.8 MMOL/L (ref 3.5–5.1)
PROT SERPL-MCNC: 6.2 G/DL (ref 6–8.4)
RBC # BLD AUTO: 2.52 M/UL (ref 4.6–6.2)
SODIUM SERPL-SCNC: 141 MMOL/L (ref 136–145)
WBC # BLD AUTO: 5.34 K/UL (ref 3.9–12.7)

## 2022-08-23 PROCEDURE — 83735 ASSAY OF MAGNESIUM: CPT | Performed by: STUDENT IN AN ORGANIZED HEALTH CARE EDUCATION/TRAINING PROGRAM

## 2022-08-23 PROCEDURE — 86140 C-REACTIVE PROTEIN: CPT

## 2022-08-23 PROCEDURE — 25000003 PHARM REV CODE 250: Performed by: STUDENT IN AN ORGANIZED HEALTH CARE EDUCATION/TRAINING PROGRAM

## 2022-08-23 PROCEDURE — 84100 ASSAY OF PHOSPHORUS: CPT | Performed by: STUDENT IN AN ORGANIZED HEALTH CARE EDUCATION/TRAINING PROGRAM

## 2022-08-23 PROCEDURE — 25000003 PHARM REV CODE 250: Performed by: INTERNAL MEDICINE

## 2022-08-23 PROCEDURE — 25000003 PHARM REV CODE 250

## 2022-08-23 PROCEDURE — 99232 PR SUBSEQUENT HOSPITAL CARE,LEVL II: ICD-10-PCS | Mod: ,,, | Performed by: HOSPITALIST

## 2022-08-23 PROCEDURE — 99233 SBSQ HOSP IP/OBS HIGH 50: CPT | Mod: ,,, | Performed by: REGISTERED NURSE

## 2022-08-23 PROCEDURE — 25000003 PHARM REV CODE 250: Performed by: HOSPITALIST

## 2022-08-23 PROCEDURE — 63600175 PHARM REV CODE 636 W HCPCS: Performed by: STUDENT IN AN ORGANIZED HEALTH CARE EDUCATION/TRAINING PROGRAM

## 2022-08-23 PROCEDURE — 25000003 PHARM REV CODE 250: Performed by: NURSE PRACTITIONER

## 2022-08-23 PROCEDURE — 99232 SBSQ HOSP IP/OBS MODERATE 35: CPT | Mod: ,,, | Performed by: HOSPITALIST

## 2022-08-23 PROCEDURE — 85652 RBC SED RATE AUTOMATED: CPT

## 2022-08-23 PROCEDURE — 85025 COMPLETE CBC W/AUTO DIFF WBC: CPT | Performed by: STUDENT IN AN ORGANIZED HEALTH CARE EDUCATION/TRAINING PROGRAM

## 2022-08-23 PROCEDURE — 80053 COMPREHEN METABOLIC PANEL: CPT | Performed by: STUDENT IN AN ORGANIZED HEALTH CARE EDUCATION/TRAINING PROGRAM

## 2022-08-23 PROCEDURE — 97535 SELF CARE MNGMENT TRAINING: CPT

## 2022-08-23 PROCEDURE — 20600001 HC STEP DOWN PRIVATE ROOM

## 2022-08-23 PROCEDURE — 36415 COLL VENOUS BLD VENIPUNCTURE: CPT

## 2022-08-23 PROCEDURE — 99233 PR SUBSEQUENT HOSPITAL CARE,LEVL III: ICD-10-PCS | Mod: ,,, | Performed by: REGISTERED NURSE

## 2022-08-23 RX ADMIN — HYDRALAZINE HYDROCHLORIDE 25 MG: 25 TABLET, FILM COATED ORAL at 09:08

## 2022-08-23 RX ADMIN — SENNOSIDES AND DOCUSATE SODIUM 2 TABLET: 50; 8.6 TABLET ORAL at 08:08

## 2022-08-23 RX ADMIN — METHOCARBAMOL 500 MG: 500 TABLET ORAL at 12:08

## 2022-08-23 RX ADMIN — INSULIN DETEMIR 10 UNITS: 100 INJECTION, SOLUTION SUBCUTANEOUS at 08:08

## 2022-08-23 RX ADMIN — OXYCODONE 5 MG: 5 TABLET ORAL at 09:08

## 2022-08-23 RX ADMIN — METHOCARBAMOL 500 MG: 500 TABLET ORAL at 09:08

## 2022-08-23 RX ADMIN — INSULIN ASPART 8 UNITS: 100 INJECTION, SOLUTION INTRAVENOUS; SUBCUTANEOUS at 04:08

## 2022-08-23 RX ADMIN — LEVOFLOXACIN 750 MG: 750 TABLET, FILM COATED ORAL at 12:08

## 2022-08-23 RX ADMIN — TRIAMCINOLONE ACETONIDE: 1 CREAM TOPICAL at 09:08

## 2022-08-23 RX ADMIN — INSULIN ASPART 8 UNITS: 100 INJECTION, SOLUTION INTRAVENOUS; SUBCUTANEOUS at 07:08

## 2022-08-23 RX ADMIN — INSULIN ASPART 8 UNITS: 100 INJECTION, SOLUTION INTRAVENOUS; SUBCUTANEOUS at 12:08

## 2022-08-23 RX ADMIN — INSULIN ASPART 4 UNITS: 100 INJECTION, SOLUTION INTRAVENOUS; SUBCUTANEOUS at 12:08

## 2022-08-23 RX ADMIN — SEVELAMER CARBONATE 0.8 G: 800 POWDER, FOR SUSPENSION ORAL at 12:08

## 2022-08-23 RX ADMIN — METHOCARBAMOL 500 MG: 500 TABLET ORAL at 04:08

## 2022-08-23 RX ADMIN — OXYCODONE 5 MG: 5 TABLET ORAL at 08:08

## 2022-08-23 RX ADMIN — HYDRALAZINE HYDROCHLORIDE 25 MG: 25 TABLET, FILM COATED ORAL at 08:08

## 2022-08-23 RX ADMIN — OXYCODONE 5 MG: 5 TABLET ORAL at 02:08

## 2022-08-23 RX ADMIN — ATORVASTATIN CALCIUM 20 MG: 20 TABLET, FILM COATED ORAL at 08:08

## 2022-08-23 RX ADMIN — INSULIN ASPART 2 UNITS: 100 INJECTION, SOLUTION INTRAVENOUS; SUBCUTANEOUS at 04:08

## 2022-08-23 RX ADMIN — METHOCARBAMOL 500 MG: 500 TABLET ORAL at 08:08

## 2022-08-23 RX ADMIN — SEVELAMER CARBONATE 0.8 G: 800 POWDER, FOR SUSPENSION ORAL at 04:08

## 2022-08-23 RX ADMIN — LINEZOLID 600 MG: 600 TABLET, FILM COATED ORAL at 08:08

## 2022-08-23 RX ADMIN — SEVELAMER CARBONATE 0.8 G: 800 POWDER, FOR SUSPENSION ORAL at 07:08

## 2022-08-23 RX ADMIN — AMLODIPINE BESYLATE 5 MG: 5 TABLET ORAL at 08:08

## 2022-08-23 RX ADMIN — LINEZOLID 600 MG: 600 TABLET, FILM COATED ORAL at 09:08

## 2022-08-23 RX ADMIN — AMLODIPINE BESYLATE 5 MG: 5 TABLET ORAL at 09:08

## 2022-08-23 RX ADMIN — HEPARIN SODIUM 5000 UNITS: 5000 INJECTION INTRAVENOUS; SUBCUTANEOUS at 05:08

## 2022-08-23 RX ADMIN — TRIAMCINOLONE ACETONIDE: 1 CREAM TOPICAL at 08:08

## 2022-08-23 RX ADMIN — INSULIN DETEMIR 10 UNITS: 100 INJECTION, SOLUTION SUBCUTANEOUS at 09:08

## 2022-08-23 NOTE — PROGRESS NOTES
[facility] - Telemetry Stepdown  Orthopedics  Progress Note    Patient Name: Wilfredo Thomas  MRN: [id number]  Admission Date: 7/26/2022  Hospital Length of Stay: 28 days  Attending Provider: Dorota Reynoso MD  Primary Care Provider: Alyin Wayne DO  Follow-up For: Procedure(s) (LRB):  Incision and Drainage - LEFT THIGH. (Left)    Post-Operative Day: 17 Days Post-Op  Subjective:     Principal Problem:Abscess of left thigh    Principal Orthopedic Problem: Left thigh I&D 7/29 and 8/6  Staph species on thigh abscess cultures    Interval History: S/p repeat L thigh I&D 8/6/22. Drain output 15cc yesterday, 20cc day prior. Afebrile yesterday. Pt walking the halls prior to my exam, states left leg feels good. Now on PO linezolid and levaquin    Review of patient's allergies indicates:   Allergen Reactions    Adhesive tape-silicones      It was a White tape ( uncertain if related to silicone) 8/22/2022       Current Facility-Administered Medications   Medication    acetaminophen tablet 1,000 mg    amLODIPine tablet 5 mg    atorvastatin tablet 20 mg    COVID-19 vac, forrest(Pfizer)(PF) (Pfizer COVID-19) 30 mcg/0.3 mL injection 0.3 mL    dextrose 10% bolus 125 mL    dextrose 10% bolus 250 mL    glucagon (human recombinant) injection 1 mg    glucose chewable tablet 16 g    glucose chewable tablet 24 g    heparin (porcine) injection 5,000 Units    hydrALAZINE tablet 25 mg    hydrALAZINE tablet 25 mg    insulin aspart U-100 pen 1-10 Units    insulin aspart U-100 pen 8 Units    insulin detemir U-100 pen 10 Units    levoFLOXacin tablet 750 mg    linezolid tablet 600 mg    methocarbamoL tablet 500 mg    morphine injection 3 mg    ondansetron injection 4 mg    oxyCODONE immediate release tablet 5 mg    senna-docusate 8.6-50 mg per tablet 2 tablet    sevelamer carbonate pwpk 0.8 g    sodium chloride 0.9% flush 10 mL    triamcinolone acetonide 0.1% cream     Objective:     Vital Signs (Most Recent):  Temp: 97.2  "°F (36.2 °C) (08/23/22 0728)  Pulse: 70 (08/23/22 0728)  Resp: 18 (08/23/22 0818)  BP: 138/76 (08/23/22 0728)  SpO2: (!) 92 % (08/23/22 0728) Vital Signs (24h Range):  Temp:  [97.2 °F (36.2 °C)-98.2 °F (36.8 °C)] 97.2 °F (36.2 °C)  Pulse:  [69-88] 70  Resp:  [14-20] 18  SpO2:  [92 %-99 %] 92 %  BP: (117-140)/(71-85) 138/76     Weight: 104.3 kg (229 lb 15 oz)  Height: 5' 7" (170.2 cm)  Body mass index is 36.01 kg/m².      Intake/Output Summary (Last 24 hours) at 8/23/2022 0842  Last data filed at 8/22/2022 1800  Gross per 24 hour   Intake --   Output 1115 ml   Net -1115 ml         Ortho/SPM Exam  Left lower extremity  Left thigh dressing cdi  Drain in place, serous output, minimal exudative material  No erythema of thigh, no signficant swelling  Compartments soft and compressible  Painless ROM of hip and knee  NVI        CBC:   Recent Labs   Lab 08/22/22  0325 08/23/22  0413   WBC 4.14 5.34   HGB 8.0* 7.6*   HCT 24.5* 23.2*    236       CMP:   Recent Labs   Lab 08/22/22  0325 08/23/22  0413    141   K 3.7 3.8    109   CO2 24 24   GLU 83 118*   BUN 39* 33*   CREATININE 3.2* 2.7*   CALCIUM 8.8 8.8   PROT 6.4 6.2   ALBUMIN 2.3* 2.4*   BILITOT 0.5 0.4   ALKPHOS 71 69   AST 20 20   ALT 6* 10   ANIONGAP 12 8         All pertinent labs within the past 24 hours have been reviewed.    Significant Imaging: I have reviewed and interpreted all pertinent imaging results/findings.      Assessment/Plan:     * Abscess of left thigh  47M with DM admitted 7/26/22 for DKA, ortho following for left lateral thigh abscess. s/p I&D and drain placement left thigh 7/29/22. Cx +MSSA. Had high drain purulent output after first I&D, was taken back to OR 8/6/22 for repeat I&D. Drain in place, dressings CDI. Cr improving, nephrology on board for ANGE. New onset fever 8/17, source workup initiated. No fluid collections on MRI spine, but there was an incidentally seen lung consolidation. CT showing bilateral multifocal pneumonia. " Drain output is slowing. Will continue to closely monitor situation.    -- ANGE nephrology managing   -- DVT SQH  -- linezolid and levofloxacin per ID  -- Pain MM  -- Drain output 15cc yesterday, will consider pulling tomorrow if stable  -- Continue trending ESR/CRP  -- Encourage ambulation    Dispo: Observe drain, tight BG control, manage ANGE          Aron Vieyra MD  Orthopedics  [facility] - Telemetry Stepdown

## 2022-08-23 NOTE — ASSESSMENT & PLAN NOTE
47 year old with DMII and HTN admitted for DKA and left thigh abscess.      MRI of left femur notable for ruptured tensor fascia jose luis with associated large hematoma, as well as small area of potential osteonecrosis.  Bedside aspiration - cell count of >200k (>80%segs), cultures + MRSA.  Underwent I&D on 7/29 and 8/6.  Surgical cultures positive for MSSA.  Per ortho surgery, no concerns for bone involvement.      Initially on Vancomycin and Zosyn, but course complicated by ANGE.   Vancomycin discontinued and switched to cefazolin on 8/3,  but subsequent Emanuel's stain + and with continued deterioration in renal fx with concerned for beta lactam related AIN, so cefazolin stopped and Daptomycin started for MSSA. Daptomycin was stopped following onset of fever on 8/21, last dose received on 8/19. Was started on PO linezolid and levaquin on 8/21 with no issues thus far.      Hospital course complicated by acute onset left wrist pain.  History of wrist arthroscopy with hardware placement 3/2022.   S/p joint aspiration, white count 2050k, 88% segs. No crystals noted. Cultures no growth.   Received steroid shot per primary team which pt noted improvement.  Xray of wrist with no acute concerns.          Patient developed fever 8/17 and again on 8/19.  CRP spike 24>>192, today 30.  New onset neck paraspinal pain/erythema on 8/19. MRI C/T/L spine 8/19 negative for epidural abscess or discitis, but significant for edema of paraspinal muscles/superficial soft tissues at level of C7 concerning for cellulitis. No discrete fluid collections.  Incidentally noted consolidation in left upper lobe - possible infection or non-infectious inflammation.      All repeat blood cx NGTD.  BLE US 8/17 negative for DVT.  U/A negative. Soft tissue US left thigh with two residual  fluid collections medial and superior to incision. Orthopedics continues to follow. No additional washout planned.  Yesterday developed new onset rash/irritation to  right forearm above IV site (IV removed) with proximal erythema/warmth and palpable venous cord. Consistent with adhesive dermatitis and superficial thrombophlebitis.  Possible source of recurrent fever.       Dedicated CT chest performed on showing multifocal PNA of bilateral lungs with additional nodular opacities which could represent consolidations or solid nodules.  Kike Healy NP, personally spoke with the reading Radiologist who reports that the opacities in upper lobes looks like classic consolidation.  The RUL nodular opacities are less clearly characterized - could be opacities or could be soft tissue nodules. Possibly could represent septic emboli.  There are no classic signs (e.g GGO,patchy inflammatory infiltrates, interlobular septal thickening, or effusions which would be consistent with eosinophilic PNA, and this would be unlikely.  Given apparent metastatic sites of infection, wonder if he hasn't had a transient bacteremias despite negative blood cultures.     Patient remains without respiratory complaints - no cough, SOB, sputum production.  Has been afebrile since yesterday.  Overall feels much improved with improvement in neck/back pain, but taking robaxin 4x daily. CRP downtrending.     Recommendations:   · Continue linezolid 600 mg orally q 12 hours for possibility of MSSA infection in lungs.  Monitor platelet count while on linezolid, especially in setting of renal dysfunction.  · Continue levaquin 750 mg orally q 48 hours (renally dosed) for gram negative pulmonary coverage.   · Will treat for 7d, EOC 7/26/22.   · Will plan to follow up outpatient, ID will schedule.   · Plan reviewed with ID staff, Dr. Joaquin. ID will sign off.

## 2022-08-23 NOTE — SUBJECTIVE & OBJECTIVE
Interval History: see above    Review of Systems   Constitutional:  Negative for activity change, appetite change, chills, fatigue and fever.        Able to sit in chair. Neck pain improving, Right wrist/ forearm pain improving   HENT:  Negative for congestion, facial swelling, nosebleeds, sore throat and trouble swallowing.    Respiratory:  Negative for apnea, cough, choking, chest tightness, shortness of breath, wheezing and stridor.    Cardiovascular:  Negative for chest pain and leg swelling.   Gastrointestinal:  Negative for abdominal distention, abdominal pain, blood in stool, constipation, diarrhea, nausea and vomiting.   Genitourinary:  Negative for difficulty urinating, dysuria, flank pain, frequency, hematuria and urgency.   Musculoskeletal:  Positive for neck pain. Negative for arthralgias, back pain, gait problem and neck stiffness.        Right Forearm thrombophlebitis   Skin:  Positive for rash (right  wrist). Negative for color change and wound.   Neurological:  Negative for dizziness, tremors, seizures, syncope, facial asymmetry, speech difficulty, weakness, light-headedness, numbness and headaches.   Psychiatric/Behavioral:  Negative for agitation, behavioral problems, confusion, decreased concentration, dysphoric mood, hallucinations, self-injury and sleep disturbance. The patient is not nervous/anxious and is not hyperactive.    Objective:     Vital Signs (Most Recent):  Temp: 97.2 °F (36.2 °C) (08/23/22 0728)  Pulse: 70 (08/23/22 0728)  Resp: 18 (08/23/22 0728)  BP: 138/76 (08/23/22 0728)  SpO2: (!) 92 % (08/23/22 0728)   Vital Signs (24h Range):  Temp:  [97.2 °F (36.2 °C)-98.2 °F (36.8 °C)] 97.2 °F (36.2 °C)  Pulse:  [69-88] 70  Resp:  [14-20] 18  SpO2:  [92 %-99 %] 92 %  BP: (117-140)/(71-85) 138/76     Weight: 104.3 kg (229 lb 15 oz)  Body mass index is 36.01 kg/m².    Intake/Output Summary (Last 24 hours) at 8/23/2022 0809  Last data filed at 8/22/2022 1800  Gross per 24 hour   Intake --    Output 1115 ml   Net -1115 ml        Physical Exam  Constitutional:       General: He is not in acute distress.     Appearance: Normal appearance. He is not ill-appearing, toxic-appearing or diaphoretic.   HENT:      Head: Normocephalic and atraumatic.      Nose: Nose normal.      Mouth/Throat:      Mouth: Mucous membranes are moist.      Pharynx: Oropharynx is clear.   Eyes:      General: No scleral icterus.     Extraocular Movements: Extraocular movements intact.      Conjunctiva/sclera: Conjunctivae normal.      Pupils: Pupils are equal, round, and reactive to light.   Neck:      Comments: Tender post neck and left trapezious  Imporoved ROM, less muscular stiffness left trap.   Cardiovascular:      Rate and Rhythm: Normal rate and regular rhythm.      Pulses: Normal pulses.      Heart sounds: Normal heart sounds.   Pulmonary:      Effort: Pulmonary effort is normal. No respiratory distress.      Breath sounds: Normal breath sounds. No stridor. No wheezing, rhonchi or rales.   Chest:      Chest wall: No tenderness.   Abdominal:      General: Abdomen is flat. Bowel sounds are normal. There is no distension.      Palpations: Abdomen is soft.      Tenderness: There is no abdominal tenderness. There is no right CVA tenderness, guarding or rebound.   Musculoskeletal:         General: Swelling and tenderness present. No deformity or signs of injury. Normal range of motion.      Cervical back: Normal range of motion and neck supple. Tenderness present. No rigidity.      Right lower leg: No edema.      Left lower leg: No edema.      Comments: Left lat thigh is bandaged  Right  forearm - superficial thrombophlebitis and contact dermatitis related to IV and tape.   Lymphadenopathy:      Cervical: No cervical adenopathy.   Skin:     General: Skin is warm and dry.      Coloration: Skin is not jaundiced.      Findings: Rash (papulosquamour erythema in setting of rtape exposure) present. No erythema.   Neurological:       General: No focal deficit present.      Mental Status: He is alert and oriented to person, place, and time. Mental status is at baseline.      Motor: No weakness.   Psychiatric:         Mood and Affect: Mood normal.         Behavior: Behavior normal.         Thought Content: Thought content normal.         Judgment: Judgment normal.       Significant Labs: All pertinent labs within the past 24 hours have been reviewed.  CBC:   Recent Labs   Lab 08/22/22  0325 08/23/22  0413   WBC 4.14 5.34   HGB 8.0* 7.6*   HCT 24.5* 23.2*    236       CMP:   Recent Labs   Lab 08/22/22  0325 08/23/22  0413    141   K 3.7 3.8    109   CO2 24 24   GLU 83 118*   BUN 39* 33*   CREATININE 3.2* 2.7*   CALCIUM 8.8 8.8   PROT 6.4 6.2   ALBUMIN 2.3* 2.4*   BILITOT 0.5 0.4   ALKPHOS 71 69   AST 20 20   ALT 6* 10   ANIONGAP 12 8         Significant Imaging: I have reviewed all pertinent imaging results/findings within the past 24 hours.

## 2022-08-23 NOTE — ASSESSMENT & PLAN NOTE
Pseudogout  - Tapped by Ortho, fluid reviewed: does not appear to be gout or septic arthritis, likely pseudogout (calcium pyrophosphate crystals on previous LE aspiration)  - PRN analgesics provided  -improved after steroid injection  8/19- resolved  8/22- pain and swelling of the right wrist is superfiscial thrombophlebitis and contact dermatitis, improving  -resolved

## 2022-08-23 NOTE — ASSESSMENT & PLAN NOTE
- DKA has resolved  - Endocrinology consulted and managing.  Recent Labs     08/21/22  2104 08/22/22  0741 08/22/22  1212 08/22/22  1654 08/22/22  2108 08/23/22  0729   POCTGLUCOSE 118* 105 210* 92 153* 147*

## 2022-08-23 NOTE — ASSESSMENT & PLAN NOTE
Uncertain etiology  On 8/17 and 8/19.   Still tachycardia  Wbc 6  Blood cult 8/17 - NGSF  drain output improving  covid negative.   cxr negative 8/17  UA negative 8/17  Will chat w Ortho regarding further plans for debridement  8/19- US wound site for pockets of fluid,   Fluid collection located medial to the bandage: 3.5 x 0.6 x 1.8 cm.  Fluid collection located superior to bandage: 3.0 x 0.6 x 2.3 cm.  CRP rising. Drain output improved- gradually reducing  Fever after receiving Daptomycin today. Will discuss w ID. Repeated blood culture and lactic acid    8/20 - cefepime added to dapto  last night, developed blisters at IV site. MRI revealed evidence of celluilits and consolidation NAVDEEP pneumonia  8/21- CT chest w multifocal pneumonia, on dapto. Blood cult negative  8/23- resolved

## 2022-08-23 NOTE — PROGRESS NOTES
[facility] - Telemetry Stepdown  Infectious Disease  Progress Note    Patient Name: Wilfredo Thomas  MRN: [id number]  Admission Date: 7/26/2022  Length of Stay: 28 days  Attending Physician: Dorota Reynoso MD  Primary Care Provider: Aylin Wayne DO    Isolation Status: No active isolations  Assessment/Plan:      * Abscess of left thigh     47 year old with DMII and HTN admitted for DKA and left thigh abscess.      MRI of left femur notable for ruptured tensor fascia jose luis with associated large hematoma, as well as small area of potential osteonecrosis.  Bedside aspiration - cell count of >200k (>80%segs), cultures + MRSA.  Underwent I&D on 7/29 and 8/6.  Surgical cultures positive for MSSA.  Per ortho surgery, no concerns for bone involvement.      Initially on Vancomycin and Zosyn, but course complicated by ANGE.   Vancomycin discontinued and switched to cefazolin on 8/3,  but subsequent Emanuel's stain + and with continued deterioration in renal fx with concerned for beta lactam related AIN, so cefazolin stopped and Daptomycin started for MSSA. Daptomycin was stopped following onset of fever on 8/21, last dose received on 8/19. Was started on PO linezolid and levaquin on 8/21 with no issues thus far.      Hospital course complicated by acute onset left wrist pain.  History of wrist arthroscopy with hardware placement 3/2022.   S/p joint aspiration, white count 2050k, 88% segs. No crystals noted. Cultures no growth.   Received steroid shot per primary team which pt noted improvement.  Xray of wrist with no acute concerns.          Patient developed fever 8/17 and again on 8/19.  CRP spike 24>>192, today 30.  New onset neck paraspinal pain/erythema on 8/19. MRI C/T/L spine 8/19 negative for epidural abscess or discitis, but significant for edema of paraspinal muscles/superficial soft tissues at level of C7 concerning for cellulitis. No discrete fluid collections.  Incidentally noted consolidation in left upper lobe -  possible infection or non-infectious inflammation.      All repeat blood cx NGTD.  BLE US 8/17 negative for DVT.  U/A negative. Soft tissue US left thigh with two residual  fluid collections medial and superior to incision. Orthopedics continues to follow. No additional washout planned.  Yesterday developed new onset rash/irritation to right forearm above IV site (IV removed) with proximal erythema/warmth and palpable venous cord. Consistent with adhesive dermatitis and superficial thrombophlebitis.  Possible source of recurrent fever.       Dedicated CT chest performed on showing multifocal PNA of bilateral lungs with additional nodular opacities which could represent consolidations or solid nodules.  Kike Healy NP, personally spoke with the reading Radiologist who reports that the opacities in upper lobes looks like classic consolidation.  The RUL nodular opacities are less clearly characterized - could be opacities or could be soft tissue nodules. Possibly could represent septic emboli.  There are no classic signs (e.g GGO,patchy inflammatory infiltrates, interlobular septal thickening, or effusions which would be consistent with eosinophilic PNA, and this would be unlikely.  Given apparent metastatic sites of infection, wonder if he hasn't had a transient bacteremias despite negative blood cultures.     Patient remains without respiratory complaints - no cough, SOB, sputum production.  Has been afebrile since yesterday.  Overall feels much improved with improvement in neck/back pain, but taking robaxin 4x daily. CRP downtrending.     Recommendations:   · Continue linezolid 600 mg orally q 12 hours for possibility of MSSA infection in lungs.  Monitor platelet count while on linezolid, especially in setting of renal dysfunction.  · Continue levaquin 750 mg orally q 48 hours (renally dosed) for gram negative pulmonary coverage.   · Will treat for 7d, EOC 7/26/22.   · Will plan to follow up outpatient, ID will  schedule.   · Plan reviewed with ID staff, Dr. Joaquin. ID will sign off.           ANGE (acute kidney injury)  Management per Nephrology.   Creatine continues to downtrend - 2.7 today.             Thank you for your consult. I will sign off. Please contact us if you have any additional questions.    Steven Caicedo NP  Infectious Disease  [facility] - Telemetry Stepdown    Subjective:     Principal Problem:Abscess of left thigh    HPI: Mr. Thomas is a 47 year old male with a PMH of DM2 (poorly controlled), HTN, GERD, HLD, obesity who [place] [facility] [place] of SOBx1 day. Pt was also reporting significant pain and swelling along his left lateral proximal thigh. Pt reports this pain started over the last 2 weeks, which worsened. He initially was seen outpatient in which he was diagnoised with a mm strain and given 800 mg ibuprofen. From there he went to the ED on 7/22 d/t worsening pain. He was treated with prednisone/morphine shot, and well as a prednisone oral pack. Pt denied recent injury, with the exception of soreness to his left lower shin following bumping into a cabinet. He denies open wounds/abrasions from this injury but states the soreness started in his left thigh following the improvement of discomfort from his left shin. Pt reports having been treated for a boil on his central/right buttocks in the end of June. 2022. Pt states he was seen in Urgent Care in [place] and the boil was drained. He was given an oral abx in which he completed.     To note, pt reports having a recent left wrist fracture, ligament rupture following an accident with his riding lawnmower. States he underwent surgery and 2 screws were placed on 3/31/22 at [facility]. Denies associated infection/complications. Denies pain in the site currently.    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 Denies having pets. Denies recent fishing/hunting. Denies hx of immunocompromising  conditions.     To note, pt was found to be in DKA with blood sugars >400, treated by critical team, which has since resolved. Pt was recently started on insulin by his PCP, which he had not started. Pt latest A1C 10.     CT of left thigh notable for fluid collections suspicious for hematoma/seroma. MRI of left femur was notable for ruptured tensor fascia jose luis mm with associated large hematoma, as well as small area of potential osteonecrosis. Bedside aspiration of the fluid collection was notable for a cell count of >200k >80%segs, cultures + staph spp. Pt was taken for I&D with Dr. Graff with ortho surgery on 7/29, abscesses were noted and washed out, cultures collected + staph spp. Per pt, states that Dr. Graff is planning to return to surgery tomorrow, 8/1/22 for repeat washout.     Pt was intially with leukocytosis, but has since down trended to 11k. Blood cultures from 7/26 NGTD.     Pt is currently on zosyn, vancomycin, and clindamycin. ID was consulted for abx recs regarding left TFL infection.            Interval History:   Continues to report improving neck/back pain.  Denies cough, SOB, chest discomfort  Creatinine continues to improve  Afebrile, no leukocytosis.     Review of Systems   Constitutional:  Negative for chills, diaphoresis, fatigue and fever.   HENT: Negative.     Respiratory:  Negative for cough and shortness of breath.    Cardiovascular:  Positive for leg swelling (upper left thigh). Negative for chest pain and palpitations.   Gastrointestinal:  Negative for abdominal distention and abdominal pain.   Genitourinary:  Negative for difficulty urinating and dysuria.   Musculoskeletal:  Positive for myalgias (upper left thigh; tenderrness right forearm - improved) and neck pain (improving). Negative for arthralgias and joint swelling.   Skin:  Positive for wound (upper left thigh). Negative for color change and rash.   Allergic/Immunologic: Negative for immunocompromised state.    Neurological:  Negative for dizziness, tremors, weakness, numbness and headaches.   Psychiatric/Behavioral:  Negative for agitation and decreased concentration. The patient is not nervous/anxious.    Objective:     Vital Signs (Most Recent):  Temp: 98 °F (36.7 °C) (08/23/22 1142)  Pulse: 79 (08/23/22 1142)  Resp: 18 (08/23/22 1404)  BP: 119/77 (08/23/22 1142)  SpO2: (!) 94 % (08/23/22 1142) Vital Signs (24h Range):  Temp:  [97.2 °F (36.2 °C)-98.2 °F (36.8 °C)] 98 °F (36.7 °C)  Pulse:  [69-79] 79  Resp:  [14-20] 18  SpO2:  [92 %-97 %] 94 %  BP: (117-140)/(71-85) 119/77     Weight: 104.3 kg (229 lb 15 oz)  Body mass index is 36.01 kg/m².    Estimated Creatinine Clearance: 38.1 mL/min (A) (based on SCr of 2.7 mg/dL (H)).    Physical Exam  Vitals and nursing note reviewed.   Constitutional:       General: He is not in acute distress.     Appearance: Normal appearance. He is well-developed. He is obese. He is not ill-appearing, toxic-appearing or diaphoretic.   HENT:      Head: Normocephalic and atraumatic.      Mouth/Throat:      Dentition: Does not have dentures.   Eyes:      General: No scleral icterus.     Conjunctiva/sclera: Conjunctivae normal.   Cardiovascular:      Rate and Rhythm: Normal rate and regular rhythm.      Heart sounds: No murmur heard.  Pulmonary:      Effort: Pulmonary effort is normal. No respiratory distress.      Breath sounds: Normal breath sounds. No wheezing or rales.      Comments: Room air    Abdominal:      General: There is no distension.      Palpations: Abdomen is soft.      Tenderness: There is no abdominal tenderness.   Musculoskeletal:         General: Tenderness (paraspinal cervical tenderness/right upper back, notes improvement) present. No swelling.   Skin:     General: Skin is warm and dry.      Findings: Erythema (right forearm - resolving) present. No rash.      Comments: Left thigh with dressings in place - c/d/i  Small amount purulent drainage present in drain.    Right  wrist forearm with rash/dermatitis above prior IV site. Improving   Forearm erythema improving.  Palpable venous cord.      Neurological:      Mental Status: He is alert and oriented to person, place, and time. Mental status is at baseline.   Psychiatric:         Mood and Affect: Mood normal.         Behavior: Behavior normal.         Thought Content: Thought content normal.         Judgment: Judgment normal.       Significant Labs: Blood Culture:   Recent Labs   Lab 08/05/22  0850 08/05/22  0852 08/17/22  1223 08/17/22  1231 08/19/22  2254   LABBLOO No growth after 5 days. No growth after 5 days. No growth after 5 days. No growth after 5 days. No Growth to date  No Growth to date  No Growth to date  No Growth to date       CBC:   Recent Labs   Lab 08/22/22  0325 08/23/22  0413   WBC 4.14 5.34   HGB 8.0* 7.6*   HCT 24.5* 23.2*    236       CMP:   Recent Labs   Lab 08/22/22  0325 08/23/22  0413    141   K 3.7 3.8    109   CO2 24 24   GLU 83 118*   BUN 39* 33*   CREATININE 3.2* 2.7*   CALCIUM 8.8 8.8   PROT 6.4 6.2   ALBUMIN 2.3* 2.4*   BILITOT 0.5 0.4   ALKPHOS 71 69   AST 20 20   ALT 6* 10   ANIONGAP 12 8       Wound Culture:   Recent Labs   Lab 07/28/22  1800 07/29/22  1251 07/29/22  1300 08/05/22  0817 08/06/22  1153   LABAERO STAPHYLOCOCCUS AUREUS  Many  * STAPHYLOCOCCUS AUREUS  Many  For susceptibility see order #[id number]  * STAPHYLOCOCCUS AUREUS  Many  * No growth STAPHYLOCOCCUS AUREUS  Few  *  STAPHYLOCOCCUS AUREUS  Moderate  For susceptibility see order #[id number]  *         Significant Imaging: I have reviewed all pertinent imaging results/findings within the past 24 hours.

## 2022-08-23 NOTE — PLAN OF CARE
[facility] - Telemetry Stepdown  Discharge Reassessment    Primary Care Provider: Aylin Wayne DO    Expected Discharge Date: 8/24/2022    Reassessment (most recent)     Discharge Reassessment - 08/23/22 1456        Discharge Reassessment    Assessment Type Discharge Planning Reassessment     Discharge Plan discussed with: Patient     Discharge Plan A Home Health     Discharge Plan B Home with family     DME Needed Upon Discharge  other (see comments)   TBD    Discharge Barriers Identified None     Why the patient remains in the hospital Requires continued medical care        Post-Acute Status    Post-Acute Authorization Home Health     Home Health Status Pending medical clearance/testing               Marnie De La Torre RN  Ext [phone]

## 2022-08-23 NOTE — ASSESSMENT & PLAN NOTE
-patient with fever to 102 and tachycardia on 8/17 - repeat blood cultures, UA, COVID swab pending  -CXR without infiltrate, lactate nl, procalcitonin mildly elevated (due to ANGE)  -remains on dapto for MSSA abscess to thigh  -eosinophils nl and no leukocytosis  -ID and ortho notified of change  -minimize IVF due to improving ANGE with signs of fluid retention  8/21- on daptomycin, which may be cause of multifocal pneumonia  8/22- on levoquin and zyvox. AF.   resolved

## 2022-08-23 NOTE — PROGRESS NOTES
"[facility] Medicine  Progress Note    Patient Name: Wilfredo Thomas  MRN: [id number]  Patient Class: IP- Inpatient   Admission Date: 7/26/2022  Length of Stay: 28 days  Attending Physician: Dorota Reynoso MD  Primary Care Provider: Aylin Wayne DO        Subjective:     Principal Problem:Abscess of left thigh        HPI:  46 y/o M hx of HTN, IDDM2, GERD obesity, HLD presented to the ED with complaint of 1 day of worsening SOB. Wife present at bedside. Patient states that he noticed yesterday he was feeling some shortness of breath and couldn't seem to catch his breath. His wife went to check on him this morning and noticed that he was breathing "fast and heavy" and he sounded like he was slurring his words. Wife was concerned about a stroke, so he brought him to the ED for evaluation.     Patient also reporting significant pain and swelling along his left lateral proximal thigh. He states he has noticed worsening pain over the past 2 weeks. He denies any acute trauma to the area. He was seen by ortho last week and was told to take ibu-profen for a muscle strain. This did not help his pain, so he presented again on 7/22 and was given a prednisone injection into his thigh. This also did not help his pain, and now the pain and swelling have advanced to the point that he has difficulty with ambulation. Patient denies fever, chills, nausea, vomiting.      Of note, patient has hx of poorly controlled diabetes. He was recently started on insulin by his PCP, but he has not taken any insulin since being prescribed it.      In the ED, patient hypertensive and tachycardic to the 130s. Tachypneic with RR in the 40s. On CBC, WBC 26.9, On CMP patient hyperkalemic to 5.5, CorNa 136, Bicarb 5. Cr elevated to 1.9 from BL 0.83. UA, BHB pending at time of admission. CTA without evidence of pulmonary embolism. Critical Care Medicine consulted given severe acidosis.             Overview/Hospital " Course:    Patient admitted to the MICU for management of severe DKA. Metabolic acidosis improving on insulin drip. He also has had left thigh pain for 1-2 months. There is a large mass on his left thigh that is tender to palpation, CT revealed hematoma vs soft tissue growth. General surgery consulted and recommended IR consult. IR consulted, no indication for tap.   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 Ortho consulted and performed bedside aspiration of possible abscess which revealed >200K cells >80% segs. Will hold off on antibiotics for now per ortho recs, ortho is taking patient to the OR for irrigation of the thigh, will start antibiotics afterwards.         7/29 Metabolic acidosis slowly improving. Ortho taking patient to the OR for irrigation of the left thigh, will start antibiotics afterwards.   7/30 Transfer to hospital medicine . S/p I&D  of left thigh Abscess on7/29/22 - MRI - uptured tensor fascia jose luis (TFL) muscle with associated large hematoma. gram stain -Moderate Gram positive cocci in clusters .cultures pending.  continue Vancomycin, clindamycin and Zosyn . Bicarb 15. K replaced . off insulin drip on SQ insulin.  7/31 ID and endocrine consulted.  abscess with Calcium pyrophosphate crystals with unknown significance potassium and P replaced. Bicarbonate WNL . aerobic culture 7/28 STAPHYLOCOCCUS SPECIES . Glucose in 300s .PRN imodium  for diarrhea. PT/OT consulted - WBAT. Surgical drain with purulent output  8/1 PICC team consulted for IV access. vanc trough at 11.4. monitor for vanomycin toxicity. possible OR tomorrow for washout  may need bone biopsy to r/o osteomyelitis.Discontinued clindamycin.   8/2 glucose in 300s.  Increased Levemir  to 14 units BID and Aspart  10 units TIDWM. K replaced   8/3 ANGE with cr 0.7--> 2.7. likely vanc induced ATN with levels 40. urine studies ordered renal sonogram WNL  nephrology consulted.  started on NS 100ml/h x 10h and follow up renal panel.Zosyn and  vancomycin discontinued. Strict IO monitor. condom catheter . switched to Ancef for MSSA on culture  Interval History:  8/4- MSSA- see below. /85 VSS. On room air, eating, BM on 8/2, good UO. Appreciate ortho recs. On Cephazolin, detim 14 bid and aspart 12 ac.  8/5-Cr still rising, cr =6.  Wrist still hurting. Ortho to tap it to eval for infection and gout today. /90   Pulse 85  AF, no other signs of infection.  Will discuss w Nephrology- received NS x one liter yesterday. Will repeat today. Suspect auto-diuresis.   8/6: washout with ortho today. Cr continues to rise (7.1), nephrology following  8/7: Cr 7.4, phos increasing; tolerated surgery well yesterday, intra-op cultures pending  8/8: Cr increased to 8.1, hyperphosphatemia again noted; continues to make urine, no acute indication for HD per nephrology. Intra-op cultures with S.aureus, continued cefazolin pending final C&S results.   Interval History:   8/18- 47M with DM admitted 7/26/22 for DKA, ortho following for left lateral thigh abscess. s/p I&D and drain placement left thigh 7/29/22. Cx +MSSA. Had high drain purulent output after first I&D, was taken back to OR 8/6/22 for repeat I&D. Drain in place, dressings CDI. Nephrology on board for ANGE.   pt transferred from telemedicine, had fever yesterday,  -> 90.  Wbc -ok. Covid negative. Drain output paula to 35 cc. Pt on Ancef.   No urine output, cr rising, Nephrology following, no indication for HD presently. Ortho may need to repeat debridement.      8/19- /71   Pulse 92 AF. Appreciate ortho and ID input, pt on daptomycin. T max 99.4. drain functioning. Blood cult 8/17 are neg so far, CRP and procal were higher. Will US the wound site for pockets of fluid.   - had fever and rigors  after receiving dapto today, MRI spine - neck ordered for acute neck/ back pain. Blood culture, lactic acid, robaxin ordered.   - still  febrile. Add cefepime, cooling blanket, cannot have NSAID    8/20 - pt developed blisters around IV site.   MRI- No evidence of discitis-osteomyelitis.  No epidural fluid collections.  Edema/enhancement in the posterior paraspinal musculature/superficial soft tissues at the level of C7 concerning for cellulitis/infection.  No rim enhancing collections to suggest abscess. Mild degenerative change of the spine as detailed above.  Consolidation in the left upper lobe, possible infection or noninfectious inflammation and new from CTA chest 07/26/2022.  On dapto and cefepime, lactade - 0.8, ck -nl, cr improving 5.2. Hb 6.8.  pt is AF since adding Cefipime. Will discuss w ID. For now keep antibiotcs the same. next dapto is tomorrow and we may change things before then.  He is on renal doses and renal function improving.   rash is contact dermatitis, in areas of tape. Do not think this monkey px  CT scan of the chest to eval pneumonia.  Might change antibiotics. We are suspecting drug related pneumonia and rash. isolation precautions until cleared by ID to make sure they concur.  8/21- cefepime stopped.pt still AF, good UO 2400cc, cr continues to improve.  It becomes febrile again will choose levoquin. CT chest done  - Multifocal pneumonia within the bilateral lungs. Nodular opacities in right upper lobe 0.7 x 1.4 cm.  Unclear if these are consolidations or solid nodules.    Appreciate ID input and f/u. Right thrombophlebitis and contact dermatitis improving. Daptomycin dose today.  Received one unit PRBCs yesterday - Hb 7.9. ID to change antibiotics to levoquin and Zyvox.    8/22- cr 3.2, drain output 20cc, on levoquin and zyvox, still AF. No symptoms of pneumonia -   8/23- eating 75%, drain output 15 cc, on zyvox and levoquin, seems to be doing well. Up to chair with improved mobility. Cr 2.7      Interval History: see above    Review of Systems   Constitutional:  Negative for activity change, appetite change, chills,  fatigue and fever.        Able to sit in chair. Neck pain improving, Right wrist/ forearm pain improving   HENT:  Negative for congestion, facial swelling, nosebleeds, sore throat and trouble swallowing.    Respiratory:  Negative for apnea, cough, choking, chest tightness, shortness of breath, wheezing and stridor.    Cardiovascular:  Negative for chest pain and leg swelling.   Gastrointestinal:  Negative for abdominal distention, abdominal pain, blood in stool, constipation, diarrhea, nausea and vomiting.   Genitourinary:  Negative for difficulty urinating, dysuria, flank pain, frequency, hematuria and urgency.   Musculoskeletal:  Positive for neck pain. Negative for arthralgias, back pain, gait problem and neck stiffness.        Right Forearm thrombophlebitis   Skin:  Positive for rash (right  wrist). Negative for color change and wound.   Neurological:  Negative for dizziness, tremors, seizures, syncope, facial asymmetry, speech difficulty, weakness, light-headedness, numbness and headaches.   Psychiatric/Behavioral:  Negative for agitation, behavioral problems, confusion, decreased concentration, dysphoric mood, hallucinations, self-injury and sleep disturbance. The patient is not nervous/anxious and is not hyperactive.    Objective:     Vital Signs (Most Recent):  Temp: 97.2 °F (36.2 °C) (08/23/22 0728)  Pulse: 70 (08/23/22 0728)  Resp: 18 (08/23/22 0728)  BP: 138/76 (08/23/22 0728)  SpO2: (!) 92 % (08/23/22 0728)   Vital Signs (24h Range):  Temp:  [97.2 °F (36.2 °C)-98.2 °F (36.8 °C)] 97.2 °F (36.2 °C)  Pulse:  [69-88] 70  Resp:  [14-20] 18  SpO2:  [92 %-99 %] 92 %  BP: (117-140)/(71-85) 138/76     Weight: 104.3 kg (229 lb 15 oz)  Body mass index is 36.01 kg/m².    Intake/Output Summary (Last 24 hours) at 8/23/2022 0809  Last data filed at 8/22/2022 1800  Gross per 24 hour   Intake --   Output 1115 ml   Net -1115 ml        Physical Exam  Constitutional:       General: He is not in acute distress.      Appearance: Normal appearance. He is not ill-appearing, toxic-appearing or diaphoretic.   HENT:      Head: Normocephalic and atraumatic.      Nose: Nose normal.      Mouth/Throat:      Mouth: Mucous membranes are moist.      Pharynx: Oropharynx is clear.   Eyes:      General: No scleral icterus.     Extraocular Movements: Extraocular movements intact.      Conjunctiva/sclera: Conjunctivae normal.      Pupils: Pupils are equal, round, and reactive to light.   Neck:      Comments: Tender post neck and left trapezious  Imporoved ROM, less muscular stiffness left trap.   Cardiovascular:      Rate and Rhythm: Normal rate and regular rhythm.      Pulses: Normal pulses.      Heart sounds: Normal heart sounds.   Pulmonary:      Effort: Pulmonary effort is normal. No respiratory distress.      Breath sounds: Normal breath sounds. No stridor. No wheezing, rhonchi or rales.   Chest:      Chest wall: No tenderness.   Abdominal:      General: Abdomen is flat. Bowel sounds are normal. There is no distension.      Palpations: Abdomen is soft.      Tenderness: There is no abdominal tenderness. There is no right CVA tenderness, guarding or rebound.   Musculoskeletal:         General: Swelling and tenderness present. No deformity or signs of injury. Normal range of motion.      Cervical back: Normal range of motion and neck supple. Tenderness present. No rigidity.      Right lower leg: No edema.      Left lower leg: No edema.      Comments: Left lat thigh is bandaged  Right  forearm - superficial thrombophlebitis and contact dermatitis related to IV and tape.   Lymphadenopathy:      Cervical: No cervical adenopathy.   Skin:     General: Skin is warm and dry.      Coloration: Skin is not jaundiced.      Findings: Rash (papulosquamour erythema in setting of rtape exposure) present. No erythema.   Neurological:      General: No focal deficit present.      Mental Status: He is alert and oriented to person, place, and time. Mental status  is at baseline.      Motor: No weakness.   Psychiatric:         Mood and Affect: Mood normal.         Behavior: Behavior normal.         Thought Content: Thought content normal.         Judgment: Judgment normal.       Significant Labs: All pertinent labs within the past 24 hours have been reviewed.  CBC:   Recent Labs   Lab 08/22/22  0325 08/23/22 0413   WBC 4.14 5.34   HGB 8.0* 7.6*   HCT 24.5* 23.2*    236       CMP:   Recent Labs   Lab 08/22/22  0325 08/23/22  0413    141   K 3.7 3.8    109   CO2 24 24   GLU 83 118*   BUN 39* 33*   CREATININE 3.2* 2.7*   CALCIUM 8.8 8.8   PROT 6.4 6.2   ALBUMIN 2.3* 2.4*   BILITOT 0.5 0.4   ALKPHOS 71 69   AST 20 20   ALT 6* 10   ANIONGAP 12 8         Significant Imaging: I have reviewed all pertinent imaging results/findings within the past 24 hours.      Assessment/Plan:      * Abscess of left thigh  - Afebrile, no leukocytosis  - Continue Ancef  - S/p OR with Ortho 8/6 for repeat washout  - Intra-op cultures with S.aureus: changed to Ancef for MSSA then daptomycin on 8/15; CPK 11  - ID following  - PRN analgesics provided  - Continuing to monitor; maintain drain for now - Ortho continues to monitor  8/18- drain output 35 cc  Per ID: Continue Daptomycin 6mg/kg/dose q48 hours (renally dosed) for MSSA SSTI. Anticipate either completion of 14 days IV abx from 8/6 for SSTI or possible transition to orals.   8/19- will US for pockets of fluid, consider CT  8/23- drainage diminishing, 20cc -> 15 cc      Pneumonia of both lungs  Multifocal pneumonia within the bilateral lungs.  - Nodular opacities in right upper lobe 0.7 x 1.4 cm.  Unclear if these are consolidations or solid nodules.    - Close short-term follow-up is recommended.  - on dapto  - will discuss w ID, likely change antibiotics.   8/23- on levoquin and zyvox. AF, asymptomatic.    · Continue linezolid 600 mg orally q 12 hours for possibility of MSSA infection in lungs.  Monitor platelet count while on  linezolid, especially in setting of renal dysfunction.  · Continue levaquin 750 mg orally q 48 hours (renally dosed) for gram negative pulmonary coverage.   · Will treat for 7d, EOC 7/26/22.           ANGE (acute kidney injury)  Estimated Creatinine Clearance: 38.1 mL/min (A) (based on SCr of 2.7 mg/dL (H)). - improved  - Hyperphosphatemia also noted - start binder  - Continues to make urine  - Nephrology following: no acute indication for HD at this time  - Renally dosing all medications  - Avoid nephrotoxins  - nephrology has cleared for discharge once Cr < 7    8/23 - cr 6.1 -> 5.2 -> 3.7 -> 3.2 -> 2.7    Allergic contact dermatitis due to adhesives  triamcinolon cream  Elevate ext.   Avoid tape      Superficial thrombophlebitis of right upper extremity  Elevate ext  Warm packs  -improving      Acute neck pain  MRI- see above. evidecen of cellulitis, soft tissue swelling  - has muscular spasm on exam  Robaxin added  8/20 - AF since adding cefepime. Ortho following. Appreciate assistance  8/23- resolving. Continue to monitor for worsening cervical/paraspinal pain, upper back cellulitis/edema. If worsens, may need repeat imaging to evaluate for developing organized abscess.        Fever  Uncertain etiology  On 8/17 and 8/19.   Still tachycardia  Wbc 6  Blood cult 8/17 - NGSF  drain output improving  covid negative.   cxr negative 8/17  UA negative 8/17  Will chat w Ortho regarding further plans for debridement  8/19- US wound site for pockets of fluid,   Fluid collection located medial to the bandage: 3.5 x 0.6 x 1.8 cm.  Fluid collection located superior to bandage: 3.0 x 0.6 x 2.3 cm.  CRP rising. Drain output improved- gradually reducing  Fever after receiving Daptomycin today. Will discuss w ID. Repeated blood culture and lactic acid    8/20 - cefepime added to dapto  last night, developed blisters at IV site. MRI revealed evidence of celluilits and consolidation NAVDEEP pneumonia  8/21- CT chest w multifocal  pneumonia, on dapto. Blood cult negative  8/23- resolved      Pain and swelling of left wrist  Pseudogout  - Tapped by Ortho, fluid reviewed: does not appear to be gout or septic arthritis, likely pseudogout (calcium pyrophosphate crystals on previous LE aspiration)  - PRN analgesics provided  -improved after steroid injection  8/19- resolved  8/22- pain and swelling of the right wrist is superfiscial thrombophlebitis and contact dermatitis, improving  -resolved    Sepsis  -patient with fever to 102 and tachycardia on 8/17 - repeat blood cultures, UA, COVID swab pending  -CXR without infiltrate, lactate nl, procalcitonin mildly elevated (due to ANGE)  -remains on dapto for MSSA abscess to thigh  -eosinophils nl and no leukocytosis  -ID and ortho notified of change  -minimize IVF due to improving ANGE with signs of fluid retention  8/21- on daptomycin, which may be cause of multifocal pneumonia  8/22- on levoquin and zyvox. AF.   resolved      Type 2 diabetes mellitus with hyperglycemia, with long-term current use of insulin  - DKA has resolved  - Endocrinology consulted and managing.  Recent Labs     08/21/22  2104 08/22/22  0741 08/22/22  1212 08/22/22  1654 08/22/22  2108 08/23/22  0729   POCTGLUCOSE 118* 105 210* 92 153* 147*         Hypertension associated with type 2 diabetes mellitus  - Continue home regimen of amlodipine; lisinopril held due to poor renal function  - continue to monitor and further titrate antihypertensives as clinically indicated   -increase amlodipine to BID on 8/13 for elevated BP; avoid hypotension with ANGE  8/18- /60  -stable      Hematoma of left thigh  - See Abscess of L thigh    Anemia of chronic disease  - H/H slowly declining  - transfuse for Hgb < 7  - continue to monitor   8/22 - Hb 6.8 - transfused one unit PRBCs -> 7.9 ->8.0    Class 2 severe obesity due to excess calories with serious comorbidity and body mass index (BMI) of 36.0 to 36.9 in adult  - Body mass index is 36.01  kg/m².  - Needs dietary and lifestyle modifications in relation to health    Hyperlipidemia associated with type 2 diabetes mellitus  - Continue home statin       VTE Risk Mitigation (From admission, onward)         Ordered     heparin (porcine) injection 5,000 Units  Every 8 hours         08/07/22 1010     Place sequential compression device  Until discontinued         08/06/22 0855     Place sequential compression device  Until discontinued         07/29/22 0158     IP VTE HIGH RISK PATIENT  Once         07/29/22 0158     Place SAVANNAH hose  Until discontinued         07/26/22 2240     Place sequential compression device  Until discontinued         07/26/22 2240                Discharge Planning   FILEMON: 8/23/2022     Code Status: Full Code   Is the patient medically ready for discharge?: No    Reason for patient still in hospital (select all that apply): Patient trending condition  Discharge Plan A: Home Health          Dorota Reynoso MD  Department of Hospital Medicine   [facility] - Tele[facility]

## 2022-08-23 NOTE — ASSESSMENT & PLAN NOTE
Estimated Creatinine Clearance: 38.1 mL/min (A) (based on SCr of 2.7 mg/dL (H)). - improved  - Hyperphosphatemia also noted - start binder  - Continues to make urine  - Nephrology following: no acute indication for HD at this time  - Renally dosing all medications  - Avoid nephrotoxins  - nephrology has cleared for discharge once Cr < 7    8/23 - cr 6.1 -> 5.2 -> 3.7 -> 3.2 -> 2.7

## 2022-08-23 NOTE — PLAN OF CARE
Problem: Adult Inpatient Plan of Care  Goal: Absence of Hospital-Acquired Illness or Injury  Outcome: Ongoing, Progressing     Problem: Adult Inpatient Plan of Care  Goal: Plan of Care Review  Outcome: Ongoing, Progressing     Problem: Adult Inpatient Plan of Care  Goal: Optimal Comfort and Wellbeing  Outcome: Ongoing, Progressing   AAOx4.  Wife remains at bedside.  Pt refused his heparin injection earlier.  VSS.  Plan of care discussed and reviewed.  Pt verbalizes understanding.  Anxious to be discharged.  Tolerated PT well today and took several walks around the unit.   MANNY SIMS.

## 2022-08-23 NOTE — ASSESSMENT & PLAN NOTE
MRI- see above. evidecen of cellulitis, soft tissue swelling  - has muscular spasm on exam  Robaxin added  8/20 - AF since adding cefepime. Ortho following. Appreciate assistance  8/23- resolving. Continue to monitor for worsening cervical/paraspinal pain, upper back cellulitis/edema. If worsens, may need repeat imaging to evaluate for developing organized abscess.

## 2022-08-23 NOTE — PT/OT/SLP PROGRESS
Occupational Therapy   Treatment    Name: Wilfredo Thomas  MRN: [id number]  Admitting Diagnosis:  Abscess of left thigh  17 Days Post-Op    Recommendations:     Discharge Recommendations: outpatient OT  Discharge Equipment Recommendations:  shower chair, hip kit, cane, straight  Barriers to discharge:  None    Assessment:     Wilfredo Thomas is a 47 y.o. male with a medical diagnosis of Abscess of left thigh.  He presents pleasant and agreeable to therapy w/ improved neck pain and mobility. Performance deficits affecting function are impaired endurance, impaired balance, impaired functional mobility, decreased lower extremity function.     Rehab Prognosis:  Good; patient would benefit from acute skilled OT services to address these deficits and reach maximum level of function.       Plan:     Patient to be seen 2 x/week to address the above listed problems via self-care/home management, therapeutic activities, therapeutic exercises  · Plan of Care Expires: 08/31/22  · Plan of Care Reviewed with: patient, spouse    Subjective     Pain/Comfort:  · Pain Rating 1:  (not rated)    Objective:     Communicated with: RN prior to session.  Patient found up in chair with telemetry upon OT entry to room.    General Precautions: Standard, fall   Orthopedic Precautions:LLE weight bearing as tolerated   Braces: N/A  Respiratory Status: Room air     Occupational Performance:     Bed Mobility:    · Not observed this session 2/2 pt being found in chair and returning to chair.    Functional Mobility/Transfers:  · Functional Mobility: Not completed this session 2/2 session focused on LB dressing while adhering to WB and ABD precuations.    Activities of Daily Living:  · Lower Body Dressing: supervision pt doffed shoes using long shoe horn while seated in chair. pt donned socks seated in chair using sock aid w good tolerance and demonstrated understanding.      [facility] 6 Click ADL: 22    Treatment & Education:  Pt educated on scope of  practice and importance of daily functional mobility.   Pt educated on proper use of sockaid and shoe horn while adhering to ABD precautions  Pt updated on POC  White board updated to reflect pt status and visual reminder included on board instructing her to call for nurse as needed.    Patient left up in chair with call button in reach, RN  notified and wife and RN presentEducation:      GOALS:   Multidisciplinary Problems     Occupational Therapy Goals        Problem: Occupational Therapy    Goal Priority Disciplines Outcome Interventions   Occupational Therapy Goal     OT, PT/OT Ongoing, Progressing    Description: Goals set on 8/1, with expiration date 8/15:  Patient will increase functional independence with ADLs by performing:    Bed mobility with Troutman  Grooming while standing at sink with Troutman  UB Dressing with Troutman.  LB Dressing with Troutman.  Toileting from toilet with Troutman for hygiene and clothing management.   Functional mobility of household and community distance with Troutman and AD as needed  Pt will demonstrate understanding of education provided regarding energy conservation and task modification through teach-back method.  Pt will demonstrate Troutman in HEP for BUE strengthening GM/FM exercises to improve functional performance.                        Time Tracking:     OT Date of Treatment: 08/23/22  OT Start Time: 1220  OT Stop Time: 1229  OT Total Time (min): 9 min    Billable Minutes:Self Care/Home Management 9    OT/GORDO: OT          8/23/2022

## 2022-08-23 NOTE — PT/OT/SLP PROGRESS
Physical Therapy      Patient Name:  Wilfredo Thomas   MRN:  [id number]    Patient not seen today secondary to pt reports he has been up and walking with his wife. Pt stood and amb'd around room w/ Supervision only. Will follow-up as time allows.

## 2022-08-23 NOTE — SUBJECTIVE & OBJECTIVE
Interval History:   Continues to report improving neck/back pain.  Denies cough, SOB, chest discomfort  Creatinine continues to improve  Afebrile, no leukocytosis.     Review of Systems   Constitutional:  Negative for chills, diaphoresis, fatigue and fever.   HENT: Negative.     Respiratory:  Negative for cough and shortness of breath.    Cardiovascular:  Positive for leg swelling (upper left thigh). Negative for chest pain and palpitations.   Gastrointestinal:  Negative for abdominal distention and abdominal pain.   Genitourinary:  Negative for difficulty urinating and dysuria.   Musculoskeletal:  Positive for myalgias (upper left thigh; tenderrness right forearm - improved) and neck pain (improving). Negative for arthralgias and joint swelling.   Skin:  Positive for wound (upper left thigh). Negative for color change and rash.   Allergic/Immunologic: Negative for immunocompromised state.   Neurological:  Negative for dizziness, tremors, weakness, numbness and headaches.   Psychiatric/Behavioral:  Negative for agitation and decreased concentration. The patient is not nervous/anxious.    Objective:     Vital Signs (Most Recent):  Temp: 98 °F (36.7 °C) (08/23/22 1142)  Pulse: 79 (08/23/22 1142)  Resp: 18 (08/23/22 1404)  BP: 119/77 (08/23/22 1142)  SpO2: (!) 94 % (08/23/22 1142) Vital Signs (24h Range):  Temp:  [97.2 °F (36.2 °C)-98.2 °F (36.8 °C)] 98 °F (36.7 °C)  Pulse:  [69-79] 79  Resp:  [14-20] 18  SpO2:  [92 %-97 %] 94 %  BP: (117-140)/(71-85) 119/77     Weight: 104.3 kg (229 lb 15 oz)  Body mass index is 36.01 kg/m².    Estimated Creatinine Clearance: 38.1 mL/min (A) (based on SCr of 2.7 mg/dL (H)).    Physical Exam  Vitals and nursing note reviewed.   Constitutional:       General: He is not in acute distress.     Appearance: Normal appearance. He is well-developed. He is obese. He is not ill-appearing, toxic-appearing or diaphoretic.   HENT:      Head: Normocephalic and atraumatic.      Mouth/Throat:       Dentition: Does not have dentures.   Eyes:      General: No scleral icterus.     Conjunctiva/sclera: Conjunctivae normal.   Cardiovascular:      Rate and Rhythm: Normal rate and regular rhythm.      Heart sounds: No murmur heard.  Pulmonary:      Effort: Pulmonary effort is normal. No respiratory distress.      Breath sounds: Normal breath sounds. No wheezing or rales.      Comments: Room air    Abdominal:      General: There is no distension.      Palpations: Abdomen is soft.      Tenderness: There is no abdominal tenderness.   Musculoskeletal:         General: Tenderness (paraspinal cervical tenderness/right upper back, notes improvement) present. No swelling.   Skin:     General: Skin is warm and dry.      Findings: Erythema (right forearm - resolving) present. No rash.      Comments: Left thigh with dressings in place - c/d/i  Small amount purulent drainage present in drain.    Right wrist forearm with rash/dermatitis above prior IV site. Improving   Forearm erythema improving.  Palpable venous cord.      Neurological:      Mental Status: He is alert and oriented to person, place, and time. Mental status is at baseline.   Psychiatric:         Mood and Affect: Mood normal.         Behavior: Behavior normal.         Thought Content: Thought content normal.         Judgment: Judgment normal.       Significant Labs: Blood Culture:   Recent Labs   Lab 08/05/22  0850 08/05/22  0852 08/17/22  1223 08/17/22  1231 08/19/22  2254   LABBLOO No growth after 5 days. No growth after 5 days. No growth after 5 days. No growth after 5 days. No Growth to date  No Growth to date  No Growth to date  No Growth to date       CBC:   Recent Labs   Lab 08/22/22  0325 08/23/22  0413   WBC 4.14 5.34   HGB 8.0* 7.6*   HCT 24.5* 23.2*    236       CMP:   Recent Labs   Lab 08/22/22  0325 08/23/22  0413    141   K 3.7 3.8    109   CO2 24 24   GLU 83 118*   BUN 39* 33*   CREATININE 3.2* 2.7*   CALCIUM 8.8 8.8   PROT 6.4  6.2   ALBUMIN 2.3* 2.4*   BILITOT 0.5 0.4   ALKPHOS 71 69   AST 20 20   ALT 6* 10   ANIONGAP 12 8       Wound Culture:   Recent Labs   Lab 07/28/22  1800 07/29/22  1251 07/29/22  1300 08/05/22  0817 08/06/22  1153   LABAERO STAPHYLOCOCCUS AUREUS  Many  * STAPHYLOCOCCUS AUREUS  Many  For susceptibility see order #[id number]  * STAPHYLOCOCCUS AUREUS  Many  * No growth STAPHYLOCOCCUS AUREUS  Few  *  STAPHYLOCOCCUS AUREUS  Moderate  For susceptibility see order #[id number]  *         Significant Imaging: I have reviewed all pertinent imaging results/findings within the past 24 hours.

## 2022-08-23 NOTE — ASSESSMENT & PLAN NOTE
47M with DM admitted 7/26/22 for DKA, ortho following for left lateral thigh abscess. s/p I&D and drain placement left thigh 7/29/22. Cx +MSSA. Had high drain purulent output after first I&D, was taken back to OR 8/6/22 for repeat I&D. Drain in place, dressings CDI. Cr improving, nephrology on board for ANGE. New onset fever 8/17, source workup initiated. No fluid collections on MRI spine, but there was an incidentally seen lung consolidation. CT showing bilateral multifocal pneumonia. Drain output is slowing. Will continue to closely monitor situation.    -- ANGE nephrology managing   -- DVT SQH  -- linezolid and levofloxacin per ID  -- Pain MM  -- Drain output 15cc yesterday, will consider pulling tomorrow if stable  -- Continue trending ESR/CRP  -- Encourage ambulation    Dispo: Observe drain, tight BG control, manage ANGE

## 2022-08-23 NOTE — ASSESSMENT & PLAN NOTE
- Afebrile, no leukocytosis  - Continue Ancef  - S/p OR with Ortho 8/6 for repeat washout  - Intra-op cultures with S.aureus: changed to Ancef for MSSA then daptomycin on 8/15; CPK 11  - ID following  - PRN analgesics provided  - Continuing to monitor; maintain drain for now - Ortho continues to monitor  8/18- drain output 35 cc  Per ID: Continue Daptomycin 6mg/kg/dose q48 hours (renally dosed) for MSSA SSTI. Anticipate either completion of 14 days IV abx from 8/6 for SSTI or possible transition to orals.   8/19- will US for pockets of fluid, consider CT  8/23- drainage diminishing, 20cc -> 15 cc

## 2022-08-23 NOTE — SUBJECTIVE & OBJECTIVE
"Principal Problem:Abscess of left thigh    Principal Orthopedic Problem: Left thigh I&D 7/29 and 8/6  Staph species on thigh abscess cultures    Interval History: S/p repeat L thigh I&D 8/6/22. Drain output 15cc yesterday, 20cc day prior. Afebrile yesterday. Pt walking the halls prior to my exam, states left leg feels good. Now on PO linezolid and levaquin    Review of patient's allergies indicates:   Allergen Reactions    Adhesive tape-silicones      It was a White tape ( uncertain if related to silicone) 8/22/2022       Current Facility-Administered Medications   Medication    acetaminophen tablet 1,000 mg    amLODIPine tablet 5 mg    atorvastatin tablet 20 mg    COVID-19 vac, forrest(Pfizer)(PF) (Pfizer COVID-19) 30 mcg/0.3 mL injection 0.3 mL    dextrose 10% bolus 125 mL    dextrose 10% bolus 250 mL    glucagon (human recombinant) injection 1 mg    glucose chewable tablet 16 g    glucose chewable tablet 24 g    heparin (porcine) injection 5,000 Units    hydrALAZINE tablet 25 mg    hydrALAZINE tablet 25 mg    insulin aspart U-100 pen 1-10 Units    insulin aspart U-100 pen 8 Units    insulin detemir U-100 pen 10 Units    levoFLOXacin tablet 750 mg    linezolid tablet 600 mg    methocarbamoL tablet 500 mg    morphine injection 3 mg    ondansetron injection 4 mg    oxyCODONE immediate release tablet 5 mg    senna-docusate 8.6-50 mg per tablet 2 tablet    sevelamer carbonate pwpk 0.8 g    sodium chloride 0.9% flush 10 mL    triamcinolone acetonide 0.1% cream     Objective:     Vital Signs (Most Recent):  Temp: 97.2 °F (36.2 °C) (08/23/22 0728)  Pulse: 70 (08/23/22 0728)  Resp: 18 (08/23/22 0818)  BP: 138/76 (08/23/22 0728)  SpO2: (!) 92 % (08/23/22 0728) Vital Signs (24h Range):  Temp:  [97.2 °F (36.2 °C)-98.2 °F (36.8 °C)] 97.2 °F (36.2 °C)  Pulse:  [69-88] 70  Resp:  [14-20] 18  SpO2:  [92 %-99 %] 92 %  BP: (117-140)/(71-85) 138/76     Weight: 104.3 kg (229 lb 15 oz)  Height: 5' 7" (170.2 cm)  Body mass index is 36.01 " kg/m².      Intake/Output Summary (Last 24 hours) at 8/23/2022 0842  Last data filed at 8/22/2022 1800  Gross per 24 hour   Intake --   Output 1115 ml   Net -1115 ml         Ortho/SPM Exam  Left lower extremity  Left thigh dressing cdi  Drain in place, serous output, minimal exudative material  No erythema of thigh, no signficant swelling  Compartments soft and compressible  Painless ROM of hip and knee  NVI        CBC:   Recent Labs   Lab 08/22/22  0325 08/23/22  0413   WBC 4.14 5.34   HGB 8.0* 7.6*   HCT 24.5* 23.2*    236       CMP:   Recent Labs   Lab 08/22/22  0325 08/23/22  0413    141   K 3.7 3.8    109   CO2 24 24   GLU 83 118*   BUN 39* 33*   CREATININE 3.2* 2.7*   CALCIUM 8.8 8.8   PROT 6.4 6.2   ALBUMIN 2.3* 2.4*   BILITOT 0.5 0.4   ALKPHOS 71 69   AST 20 20   ALT 6* 10   ANIONGAP 12 8         All pertinent labs within the past 24 hours have been reviewed.    Significant Imaging: I have reviewed and interpreted all pertinent imaging results/findings.

## 2022-08-24 VITALS
BODY MASS INDEX: 36.09 KG/M2 | HEART RATE: 75 BPM | DIASTOLIC BLOOD PRESSURE: 78 MMHG | WEIGHT: 229.94 LBS | TEMPERATURE: 98 F | OXYGEN SATURATION: 96 % | SYSTOLIC BLOOD PRESSURE: 132 MMHG | RESPIRATION RATE: 18 BRPM | HEIGHT: 67 IN

## 2022-08-24 LAB
ALBUMIN SERPL BCP-MCNC: 2.6 G/DL (ref 3.5–5.2)
ALP SERPL-CCNC: 73 U/L (ref 55–135)
ALT SERPL W/O P-5'-P-CCNC: 14 U/L (ref 10–44)
ANION GAP SERPL CALC-SCNC: 10 MMOL/L (ref 8–16)
AST SERPL-CCNC: 24 U/L (ref 10–40)
BASOPHILS NFR BLD: 4 % (ref 0–1.9)
BILIRUB SERPL-MCNC: 0.4 MG/DL (ref 0.1–1)
BUN SERPL-MCNC: 27 MG/DL (ref 6–20)
CALCIUM SERPL-MCNC: 8.9 MG/DL (ref 8.7–10.5)
CHLORIDE SERPL-SCNC: 109 MMOL/L (ref 95–110)
CO2 SERPL-SCNC: 24 MMOL/L (ref 23–29)
CREAT SERPL-MCNC: 2.7 MG/DL (ref 0.5–1.4)
DIFFERENTIAL METHOD: ABNORMAL
EOSINOPHIL NFR BLD: 10 % (ref 0–8)
ERYTHROCYTE [DISTWIDTH] IN BLOOD BY AUTOMATED COUNT: 13 % (ref 11.5–14.5)
EST. GFR  (NO RACE VARIABLE): 28 ML/MIN/1.73 M^2
GLUCOSE SERPL-MCNC: 86 MG/DL (ref 70–110)
HCT VFR BLD AUTO: 26.4 % (ref 40–54)
HGB BLD-MCNC: 8.3 G/DL (ref 14–18)
IMM GRANULOCYTES # BLD AUTO: ABNORMAL K/UL (ref 0–0.04)
IMM GRANULOCYTES NFR BLD AUTO: ABNORMAL % (ref 0–0.5)
LYMPHOCYTES NFR BLD: 18 % (ref 18–48)
MAGNESIUM SERPL-MCNC: 1.5 MG/DL (ref 1.6–2.6)
MCH RBC QN AUTO: 29.9 PG (ref 27–31)
MCHC RBC AUTO-ENTMCNC: 31.4 G/DL (ref 32–36)
MCV RBC AUTO: 95 FL (ref 82–98)
MONOCYTES NFR BLD: 7 % (ref 4–15)
MYELOCYTES NFR BLD MANUAL: 1 %
NEUTROPHILS NFR BLD: 59 % (ref 38–73)
NEUTS BAND NFR BLD MANUAL: 1 %
NRBC BLD-RTO: 0 /100 WBC
PHOSPHATE SERPL-MCNC: 4 MG/DL (ref 2.7–4.5)
PLATELET # BLD AUTO: 307 K/UL (ref 150–450)
PLATELET BLD QL SMEAR: ABNORMAL
PMV BLD AUTO: 10.5 FL (ref 9.2–12.9)
POCT GLUCOSE: 118 MG/DL (ref 70–110)
POCT GLUCOSE: 181 MG/DL (ref 70–110)
POCT GLUCOSE: 95 MG/DL (ref 70–110)
POLYCHROMASIA BLD QL SMEAR: ABNORMAL
POTASSIUM SERPL-SCNC: 3.8 MMOL/L (ref 3.5–5.1)
PROT SERPL-MCNC: 6.5 G/DL (ref 6–8.4)
RBC # BLD AUTO: 2.78 M/UL (ref 4.6–6.2)
SODIUM SERPL-SCNC: 143 MMOL/L (ref 136–145)
WBC # BLD AUTO: 5.76 K/UL (ref 3.9–12.7)

## 2022-08-24 PROCEDURE — 25000003 PHARM REV CODE 250: Performed by: NURSE PRACTITIONER

## 2022-08-24 PROCEDURE — 36415 COLL VENOUS BLD VENIPUNCTURE: CPT | Performed by: STUDENT IN AN ORGANIZED HEALTH CARE EDUCATION/TRAINING PROGRAM

## 2022-08-24 PROCEDURE — 83735 ASSAY OF MAGNESIUM: CPT | Performed by: STUDENT IN AN ORGANIZED HEALTH CARE EDUCATION/TRAINING PROGRAM

## 2022-08-24 PROCEDURE — 99232 PR SUBSEQUENT HOSPITAL CARE,LEVL II: ICD-10-PCS | Mod: ,,, | Performed by: HOSPITALIST

## 2022-08-24 PROCEDURE — 25000003 PHARM REV CODE 250: Performed by: INTERNAL MEDICINE

## 2022-08-24 PROCEDURE — 85007 BL SMEAR W/DIFF WBC COUNT: CPT | Performed by: STUDENT IN AN ORGANIZED HEALTH CARE EDUCATION/TRAINING PROGRAM

## 2022-08-24 PROCEDURE — 80053 COMPREHEN METABOLIC PANEL: CPT | Performed by: STUDENT IN AN ORGANIZED HEALTH CARE EDUCATION/TRAINING PROGRAM

## 2022-08-24 PROCEDURE — 25000003 PHARM REV CODE 250: Performed by: HOSPITALIST

## 2022-08-24 PROCEDURE — 25000003 PHARM REV CODE 250

## 2022-08-24 PROCEDURE — 84100 ASSAY OF PHOSPHORUS: CPT | Performed by: STUDENT IN AN ORGANIZED HEALTH CARE EDUCATION/TRAINING PROGRAM

## 2022-08-24 PROCEDURE — 99232 SBSQ HOSP IP/OBS MODERATE 35: CPT | Mod: ,,, | Performed by: HOSPITALIST

## 2022-08-24 PROCEDURE — 85027 COMPLETE CBC AUTOMATED: CPT | Performed by: STUDENT IN AN ORGANIZED HEALTH CARE EDUCATION/TRAINING PROGRAM

## 2022-08-24 RX ORDER — INSULIN ASPART 100 [IU]/ML
1-10 INJECTION, SOLUTION INTRAVENOUS; SUBCUTANEOUS
Qty: 6 ML | Refills: 0 | Status: SHIPPED | OUTPATIENT
Start: 2022-08-24 | End: 2022-08-24 | Stop reason: SDUPTHER

## 2022-08-24 RX ORDER — ACETAMINOPHEN 500 MG
500 TABLET ORAL EVERY 6 HOURS PRN
Qty: 30 TABLET | Refills: 0 | Status: SHIPPED | OUTPATIENT
Start: 2022-08-24 | End: 2023-08-24

## 2022-08-24 RX ORDER — AMOXICILLIN 250 MG
2 CAPSULE ORAL DAILY
Qty: 60 TABLET | Refills: 11 | Status: SHIPPED | OUTPATIENT
Start: 2022-08-24 | End: 2022-09-29

## 2022-08-24 RX ORDER — INSULIN ASPART 100 [IU]/ML
8 INJECTION, SOLUTION INTRAVENOUS; SUBCUTANEOUS 3 TIMES DAILY
Qty: 15 ML | Refills: 11 | Status: SHIPPED | OUTPATIENT
Start: 2022-08-24 | End: 2022-09-08 | Stop reason: SDUPTHER

## 2022-08-24 RX ORDER — TRIAMCINOLONE ACETONIDE 1 MG/G
CREAM TOPICAL 2 TIMES DAILY
Qty: 15 G | Refills: 0 | Status: SHIPPED | OUTPATIENT
Start: 2022-08-24 | End: 2024-02-08

## 2022-08-24 RX ORDER — LINEZOLID 600 MG/1
600 TABLET, FILM COATED ORAL EVERY 12 HOURS
Qty: 4 TABLET | Refills: 0 | Status: SHIPPED | OUTPATIENT
Start: 2022-08-24 | End: 2022-08-26

## 2022-08-24 RX ORDER — OXYCODONE HYDROCHLORIDE 5 MG/1
5 TABLET ORAL EVERY 6 HOURS PRN
Qty: 28 TABLET | Refills: 0 | Status: SHIPPED | OUTPATIENT
Start: 2022-08-24 | End: 2022-08-31

## 2022-08-24 RX ORDER — AMLODIPINE BESYLATE 10 MG/1
5 TABLET ORAL 2 TIMES DAILY
Qty: 30 TABLET | Refills: 11 | Status: SHIPPED | OUTPATIENT
Start: 2022-08-24 | End: 2022-09-29

## 2022-08-24 RX ORDER — SEVELAMER CARBONATE FOR ORAL SUSPENSION 800 MG/1
0.8 POWDER, FOR SUSPENSION ORAL
Qty: 90 PACKET | Refills: 11 | Status: SHIPPED | OUTPATIENT
Start: 2022-08-24 | End: 2022-09-29

## 2022-08-24 RX ORDER — HYDRALAZINE HYDROCHLORIDE 25 MG/1
25 TABLET, FILM COATED ORAL 2 TIMES DAILY
Qty: 60 TABLET | Refills: 11 | Status: SHIPPED | OUTPATIENT
Start: 2022-08-24 | End: 2022-09-29

## 2022-08-24 RX ORDER — METHOCARBAMOL 500 MG/1
500 TABLET, FILM COATED ORAL 4 TIMES DAILY
Qty: 40 TABLET | Refills: 0 | Status: SHIPPED | OUTPATIENT
Start: 2022-08-24 | End: 2022-09-03

## 2022-08-24 RX ORDER — LEVOFLOXACIN 750 MG/1
750 TABLET ORAL
Qty: 1 TABLET | Refills: 0 | Status: SHIPPED | OUTPATIENT
Start: 2022-08-25 | End: 2022-08-26

## 2022-08-24 RX ADMIN — INSULIN ASPART 8 UNITS: 100 INJECTION, SOLUTION INTRAVENOUS; SUBCUTANEOUS at 07:08

## 2022-08-24 RX ADMIN — METHOCARBAMOL 500 MG: 500 TABLET ORAL at 12:08

## 2022-08-24 RX ADMIN — SENNOSIDES AND DOCUSATE SODIUM 2 TABLET: 50; 8.6 TABLET ORAL at 08:08

## 2022-08-24 RX ADMIN — INSULIN ASPART 8 UNITS: 100 INJECTION, SOLUTION INTRAVENOUS; SUBCUTANEOUS at 04:08

## 2022-08-24 RX ADMIN — INSULIN ASPART 2 UNITS: 100 INJECTION, SOLUTION INTRAVENOUS; SUBCUTANEOUS at 12:08

## 2022-08-24 RX ADMIN — METHOCARBAMOL 500 MG: 500 TABLET ORAL at 08:08

## 2022-08-24 RX ADMIN — INSULIN DETEMIR 10 UNITS: 100 INJECTION, SOLUTION SUBCUTANEOUS at 08:08

## 2022-08-24 RX ADMIN — SEVELAMER CARBONATE 0.8 G: 800 POWDER, FOR SUSPENSION ORAL at 12:08

## 2022-08-24 RX ADMIN — METHOCARBAMOL 500 MG: 500 TABLET ORAL at 04:08

## 2022-08-24 RX ADMIN — SEVELAMER CARBONATE 0.8 G: 800 POWDER, FOR SUSPENSION ORAL at 08:08

## 2022-08-24 RX ADMIN — TRIAMCINOLONE ACETONIDE: 1 CREAM TOPICAL at 08:08

## 2022-08-24 RX ADMIN — AMLODIPINE BESYLATE 5 MG: 5 TABLET ORAL at 08:08

## 2022-08-24 RX ADMIN — HYDRALAZINE HYDROCHLORIDE 25 MG: 25 TABLET, FILM COATED ORAL at 08:08

## 2022-08-24 RX ADMIN — LINEZOLID 600 MG: 600 TABLET, FILM COATED ORAL at 08:08

## 2022-08-24 RX ADMIN — ATORVASTATIN CALCIUM 20 MG: 20 TABLET, FILM COATED ORAL at 08:08

## 2022-08-24 RX ADMIN — INSULIN ASPART 8 UNITS: 100 INJECTION, SOLUTION INTRAVENOUS; SUBCUTANEOUS at 12:08

## 2022-08-24 RX ADMIN — OXYCODONE 5 MG: 5 TABLET ORAL at 04:08

## 2022-08-24 RX ADMIN — OXYCODONE 5 MG: 5 TABLET ORAL at 08:08

## 2022-08-24 NOTE — DISCHARGE SUMMARY
"[facility] - [facility] Medicine  Discharge Summary      Patient Name: Wilfredo Thomas  MRN: [id number]  Patient Class: IP- Inpatient  Admission Date: 7/26/2022  Hospital Length of Stay: 29 days  Discharge Date and Time: No discharge date for patient encounter.  Attending Physician: Dorota Reynoso MD   Discharging Provider: Droota Reynoso MD  Primary Care Provider: [facility] Medicine Team: [facility]  Dorota Reynoso MD    HPI:   48 y/o M hx of HTN, IDDM2, GERD obesity, HLD presented to the ED with complaint of 1 day of worsening SOB. Wife present at bedside. Patient states that he noticed yesterday he was feeling some shortness of breath and couldn't seem to catch his breath. His wife went to check on him this morning and noticed that he was breathing "fast and heavy" and he sounded like he was slurring his words. Wife was concerned about a stroke, so he brought him to the ED for evaluation.     Patient also reporting significant pain and swelling along his left lateral proximal thigh. He states he has noticed worsening pain over the past 2 weeks. He denies any acute trauma to the area. He was seen by ortho last week and was told to take ibu-profen for a muscle strain. This did not help his pain, so he presented again on 7/22 and was given a prednisone injection into his thigh. This also did not help his pain, and now the pain and swelling have advanced to the point that he has difficulty with ambulation. Patient denies fever, chills, nausea, vomiting.      Of note, patient has hx of poorly controlled diabetes. He was recently started on insulin by his PCP, but he has not taken any insulin since being prescribed it.      In the ED, patient hypertensive and tachycardic to the 130s. Tachypneic with RR in the 40s. On CBC, WBC 26.9, On CMP patient hyperkalemic to 5.5, CorNa 136, Bicarb 5. Cr elevated to 1.9 from BL 0.83. UA, BHB pending at time of admission. CTA without evidence " of pulmonary embolism. Critical Care Medicine consulted given severe acidosis.             Procedure(s) (LRB):  Incision and Drainage - LEFT THIGH. (Left)      Hospital Course:     Patient admitted to the MICU for management of severe DKA. Metabolic acidosis improving on insulin drip. He also has had left thigh pain for 1-2 months. There is a large mass on his left thigh that is tender to palpation, CT revealed hematoma vs soft tissue growth. General surgery consulted and recommended IR consult. IR consulted, no indication for tap.   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 Ortho consulted and performed bedside aspiration of possible abscess which revealed >200K cells >80% segs. Will hold off on antibiotics for now per ortho recs, ortho is taking patient to the OR for irrigation of the thigh, will start antibiotics afterwards.         7/29 Metabolic acidosis slowly improving. Ortho taking patient to the OR for irrigation of the left thigh, will start antibiotics afterwards.   7/30 Transfer to hospital medicine . S/p I&D  of left thigh Abscess on7/29/22 - MRI - uptured tensor fascia jose luis (TFL) muscle with associated large hematoma. gram stain -Moderate Gram positive cocci in clusters .cultures pending.  continue Vancomycin, clindamycin and Zosyn . Bicarb 15. K replaced . off insulin drip on SQ insulin.  7/31 ID and endocrine consulted.  abscess with Calcium pyrophosphate crystals with unknown significance potassium and P replaced. Bicarbonate WNL . aerobic culture 7/28 STAPHYLOCOCCUS SPECIES . Glucose in 300s .PRN imodium  for diarrhea. PT/OT consulted - WBAT. Surgical drain with purulent output  8/1 PICC team consulted for IV access. vanc trough at 11.4. monitor for vanomycin toxicity. possible OR tomorrow for washout  may need bone biopsy to r/o osteomyelitis.Discontinued clindamycin.   8/2 glucose in 300s.  Increased Levemir  to 14 units BID and  Aspart  10 units TIDWM. K replaced   8/3 ANGE with cr 0.7--> 2.7. likely vanc induced ATN with levels 40. urine studies ordered renal sonogram WNL  nephrology consulted. started on NS 100ml/h x 10h and follow up renal panel.Zosyn and  vancomycin discontinued. Strict IO monitor. condom catheter . switched to Ancef for MSSA on culture  Interval History:  8/4- MSSA- see below. /85 VSS. On room air, eating, BM on 8/2, good UO. Appreciate ortho recs. On Cephazolin, detim 14 bid and aspart 12 ac.  8/5-Cr still rising, cr =6.  Wrist still hurting. Ortho to tap it to eval for infection and gout today. /90   Pulse 85  AF, no other signs of infection.  Will discuss w Nephrology- received NS x one liter yesterday. Will repeat today. Suspect auto-diuresis.   8/6: washout with ortho today. Cr continues to rise (7.1), nephrology following  8/7: Cr 7.4, phos increasing; tolerated surgery well yesterday, intra-op cultures pending  8/8: Cr increased to 8.1, hyperphosphatemia again noted; continues to make urine, no acute indication for HD per nephrology. Intra-op cultures with S.aureus, continued cefazolin pending final C&S results.   Interval History:   8/18- 47M with DM admitted 7/26/22 for DKA, ortho following for left lateral thigh abscess. s/p I&D and drain placement left thigh 7/29/22. Cx +MSSA. Had high drain purulent output after first I&D, was taken back to OR 8/6/22 for repeat I&D. Drain in place, dressings CDI. Nephrology on board for ANGE.   pt transferred from telemedicine, had fever yesterday,  -> 90.  Wbc -ok. Covid negative. Drain output paula to 35 cc. Pt on Ancef.   No urine output, cr rising, Nephrology following, no indication for HD presently. Ortho may need to repeat debridement.      8/19- /71   Pulse 92 AF. Appreciate ortho and ID input, pt on daptomycin. T max 99.4. drain functioning. Blood cult 8/17 are neg so far, CRP and procal were higher. Will US the wound site for pockets of  fluid.   - had fever and rigors  after receiving dapto today, MRI spine - neck ordered for acute neck/ back pain. Blood culture, lactic acid, robaxin ordered.   - still febrile. Add cefepime, cooling blanket, cannot have NSAID    8/20 - pt developed blisters around IV site.   MRI- No evidence of discitis-osteomyelitis.  No epidural fluid collections.  Edema/enhancement in the posterior paraspinal musculature/superficial soft tissues at the level of C7 concerning for cellulitis/infection.  No rim enhancing collections to suggest abscess. Mild degenerative change of the spine as detailed above.  Consolidation in the left upper lobe, possible infection or noninfectious inflammation and new from CTA chest 07/26/2022.  On dapto and cefepime, lactade - 0.8, ck -nl, cr improving 5.2. Hb 6.8.  pt is AF since adding Cefipime. Will discuss w ID. For now keep antibiotcs the same. next dapto is tomorrow and we may change things before then.  He is on renal doses and renal function improving.   rash is contact dermatitis, in areas of tape. Do not think this monkey px  CT scan of the chest to eval pneumonia.  Might change antibiotics. We are suspecting drug related pneumonia and rash. isolation precautions until cleared by ID to make sure they concur.  8/21- cefepime stopped.pt still AF, good UO 2400cc, cr continues to improve.  It becomes febrile again will choose levoquin. CT chest done  - Multifocal pneumonia within the bilateral lungs. Nodular opacities in right upper lobe 0.7 x 1.4 cm.  Unclear if these are consolidations or solid nodules.    Appreciate ID input and f/u. Right thrombophlebitis and contact dermatitis improving. Daptomycin dose today.  Received one unit PRBCs yesterday - Hb 7.9. ID to change antibiotics to levoquin and Zyvox.    8/22- cr 3.2, drain output 20cc, on levoquin and zyvox, still AF. No symptoms of pneumonia -   8/23- eating 75%, drain output 15 cc, on zyvox and levoquin, seems to be doing well. Up  to chair with improved mobility. Cr 2.7.Drain output- pending       Goals of Care Treatment Preferences:  Code Status: Full Code      Consults:   Consults (From admission, onward)        Status Ordering Provider     Hospital Medicine PharmD Consult  Once        Provider:  (Not yet assigned)    Completed MAREK LOBATO     Inpatient virtual consult to Hospital Medicine  Once        Provider:  (Not yet assigned)    Completed PRECIOUS BUSTAMANTE     Inpatient consult to Nephrology  Once        Provider:  (Not yet assigned)    Completed FRANCOIS BEAL     Inpatient consult to PICC team ([facility])  Once        Provider:  (Not yet assigned)    Completed FRANCOIS BEAL     Inpatient consult to Registered Dietitian/Nutritionist  Once        Provider:  (Not yet assigned)    Completed FRANCOIS BEAL     Inpatient consult to Endocrinology  Once        Provider:  (Not yet assigned)    Completed FRANCOIS BEAL     Inpatient consult to Infectious Diseases  Once        Provider:  (Not yet assigned)    Completed FATIMAH BEAULIEU     Inpatient consult to Orthopedic Surgery  Once        Provider:  (Not yet assigned)    Completed BLANCHE SENIOR     Inpatient consult to Interventional Radiology  Once        Provider:  (Not yet assigned)    Completed FATIMAH BEAULIEU     Inpatient consult to General Surgery  Once        Provider:  (Not yet assigned)    Completed REAL RICKETTS     Inpatient consult to Registered Dietitian/Nutritionist  Once        Provider:  (Not yet assigned)    Completed FATIMAH BEAULIEU     Inpatient consult to Critical Care Medicine  Once        Provider:  (Not yet assigned)    Completed YARED GARCIA          * Abscess of left thigh  - Afebrile, no leukocytosis  - Continue Ancef  - S/p OR with Ortho 8/6 for repeat washout  - Intra-op cultures with S.aureus: changed to Ancef for MSSA then daptomycin on 8/15; CPK 11  - ID following  - PRN analgesics provided  - Continuing to monitor; maintain drain for now  - Ortho continues to monitor  8/18- drain output 35 cc  Per ID: Continue Daptomycin 6mg/kg/dose q48 hours (renally dosed) for MSSA SSTI. Anticipate either completion of 14 days IV abx from 8/6 for SSTI or possible transition to orals.   8/19- will US for pockets of fluid, consider CT  8/23- drainage diminishing, 20cc -> 15 cc      Pneumonia of both lungs  Multifocal pneumonia within the bilateral lungs.  - Nodular opacities in right upper lobe 0.7 x 1.4 cm.  Unclear if these are consolidations or solid nodules.    - Close short-term follow-up is recommended.  - on dapto  - will discuss w ID, likely change antibiotics.   8/23- on levoquin and zyvox. AF, asymptomatic.    · Continue linezolid 600 mg orally q 12 hours for possibility of MSSA infection in lungs.  Monitor platelet count while on linezolid, especially in setting of renal dysfunction.  · Continue levaquin 750 mg orally q 48 hours (renally dosed) for gram negative pulmonary coverage.   · Will treat for 7d, EOC 7/26/22.           ANGE (acute kidney injury)  Estimated Creatinine Clearance: 38.1 mL/min (A) (based on SCr of 2.7 mg/dL (H)). - improved  - Hyperphosphatemia also noted - start binder  - Continues to make urine  - Nephrology following: no acute indication for HD at this time  - Renally dosing all medications  - Avoid nephrotoxins  - nephrology has cleared for discharge once Cr < 7    8/24 - cr 6.1 -> 5.2 -> 3.7 -> 3.2 -> 2.7 -> 2.7    Allergic contact dermatitis due to adhesives  triamcinolon cream  Elevate ext.   Avoid tape      Superficial thrombophlebitis of right upper extremity  Elevate ext  Warm packs  -improving      Acute neck pain  MRI- see above. evidecen of cellulitis, soft tissue swelling  - has muscular spasm on exam  Robaxin added  8/20 - AF since adding cefepime. Ortho following. Appreciate assistance  8/23- resolving. Continue to monitor for worsening cervical/paraspinal pain, upper back cellulitis/edema. If worsens, may need repeat  imaging to evaluate for developing organized abscess.        Fever  Uncertain etiology  On 8/17 and 8/19.   Still tachycardia  Wbc 6  Blood cult 8/17 - NGSF  drain output improving  covid negative.   cxr negative 8/17  UA negative 8/17  Will chat w Ortho regarding further plans for debridement  8/19- US wound site for pockets of fluid,   Fluid collection located medial to the bandage: 3.5 x 0.6 x 1.8 cm.  Fluid collection located superior to bandage: 3.0 x 0.6 x 2.3 cm.  CRP rising. Drain output improved- gradually reducing  Fever after receiving Daptomycin today. Will discuss w ID. Repeated blood culture and lactic acid    8/20 - cefepime added to dapto  last night, developed blisters at IV site. MRI revealed evidence of celluilits and consolidation NAVDEEP pneumonia  8/21- CT chest w multifocal pneumonia, on dapto. Blood cult negative  8/23- resolved      Pain and swelling of left wrist  Pseudogout  - Tapped by Ortho, fluid reviewed: does not appear to be gout or septic arthritis, likely pseudogout (calcium pyrophosphate crystals on previous LE aspiration)  - PRN analgesics provided  -improved after steroid injection  8/19- resolved  8/22- pain and swelling of the right wrist is superfiscial thrombophlebitis and contact dermatitis, improving  -resolved    Sepsis  -patient with fever to 102 and tachycardia on 8/17 - repeat blood cultures, UA, COVID swab pending  -CXR without infiltrate, lactate nl, procalcitonin mildly elevated (due to ANGE)  -remains on dapto for MSSA abscess to thigh  -eosinophils nl and no leukocytosis  -ID and ortho notified of change  -minimize IVF due to improving ANGE with signs of fluid retention  8/21- on daptomycin, which may be cause of multifocal pneumonia  8/22- on levoquin and zyvox. AF.   resolved      Type 2 diabetes mellitus with hyperglycemia, with long-term current use of insulin  - DKA has resolved  - Endocrinology consulted and managing.  Recent Labs     08/23/22  1141 08/23/22  1550  08/23/22  2103 08/24/22  0712 08/24/22  1141 08/24/22  1556   POCTGLUCOSE 220* 179* 94 95 181* 118*         Hypertension associated with type 2 diabetes mellitus  - Continue home regimen of amlodipine; lisinopril held due to poor renal function  - continue to monitor and further titrate antihypertensives as clinically indicated   -increase amlodipine to BID on 8/13 for elevated BP; avoid hypotension with ANGE  8/18- /60  -stable      Hematoma of left thigh  - See Abscess of L thigh    Anemia of chronic disease  - H/H slowly declining  - transfuse for Hgb < 7  - continue to monitor   8/22 - Hb 6.8 - transfused one unit PRBCs -> 7.9 ->8.0    Class 2 severe obesity due to excess calories with serious comorbidity and body mass index (BMI) of 36.0 to 36.9 in adult  - Body mass index is 36.01 kg/m².  - Needs dietary and lifestyle modifications in relation to health    Hyperlipidemia associated with type 2 diabetes mellitus  - Continue home statin     Final Active Diagnoses:    Diagnosis Date Noted POA    PRINCIPAL PROBLEM:  Abscess of left thigh [L02.416]  Yes    Pneumonia of both lungs [J18.9] 08/21/2022 Yes    ANGE (acute kidney injury) [N17.9] 07/26/2022 No    Superficial thrombophlebitis of right upper extremity [I80.8] 08/20/2022 No    Allergic contact dermatitis due to adhesives [L23.1] 08/20/2022 No    Acute neck pain [M54.2] 08/19/2022 Yes    Fever [R50.9] 08/18/2022 Yes    Pain and swelling of left wrist [M25.532, M25.432] 08/05/2022 No    Sepsis [A41.9] 08/17/2022 Yes    Type 2 diabetes mellitus with hyperglycemia, with long-term current use of insulin [E11.65, Z79.4] 01/10/2020 Not Applicable    Hypertension associated with type 2 diabetes mellitus [E11.59, I15.2] 01/10/2020 Yes    Hematoma of left thigh [S70.12XA] 07/28/2022 Yes    Anemia of chronic disease [D63.8] 07/30/2022 Yes    Class 2 severe obesity due to excess calories with serious comorbidity and body mass index (BMI) of 36.0 to  36.9 in adult [E66.01, Z68.36] 01/10/2020 Not Applicable    Hyperlipidemia associated with type 2 diabetes mellitus [E11.69, E78.5] 02/07/2020 Yes      Problems Resolved During this Admission:    Diagnosis Date Noted Date Resolved POA    Acute gout of left wrist [M10.9] 08/05/2022 08/06/2022 No    DKA (diabetic ketoacidosis) [E11.10] 07/26/2022 08/06/2022 Yes    Hypokalemia [E87.6] 07/30/2022 08/06/2022 Yes    Hyponatremia [E87.1] 07/30/2022 08/06/2022 Yes    Left leg pain [M79.605] 07/28/2022 08/10/2022 Yes    Elevated d-dimer [R79.89] 07/26/2022 08/06/2022 Yes    Leukocytosis [D72.829] 07/26/2022 08/06/2022 Yes    Hyperkalemia [E87.5] 07/26/2022 07/31/2022 Unknown       Discharged Condition: good    Disposition: Home or Self Care    Follow Up:    Patient Instructions:      Ambulatory referral/consult to Orthopedics   Standing Status: Future   Referral Priority: Routine Referral Type: Consultation   Requested Specialty: Orthopedic Surgery   Number of Visits Requested: 1     Ambulatory referral/consult to Endocrinology   Standing Status: Future   Referral Priority: Routine Referral Type: Consultation   Requested Specialty: Endocrinology   Number of Visits Requested: 1     Ambulatory referral/consult to Internal Medicine   Standing Status: Future   Referral Priority: Routine Referral Type: Consultation   Referral Reason: Specialty Services Required   Requested Specialty: Internal Medicine   Number of Visits Requested: 1     Ambulatory referral/consult to Infectious Disease   Standing Status: Future   Referral Priority: Routine Referral Type: Consultation   Referral Reason: Specialty Services Required   Requested Specialty: Infectious Diseases     Ambulatory referral/consult to Nephrology   Standing Status: Future   Referral Priority: Routine Referral Type: Consultation   Referral Reason: Specialty Services Required   Requested Specialty: Nephrology   Number of Visits Requested: 1       Significant Diagnostic  Studies: Labs:   CMP   Recent Labs   Lab 08/23/22  0413 08/24/22 0437    143   K 3.8 3.8    109   CO2 24 24   * 86   BUN 33* 27*   CREATININE 2.7* 2.7*   CALCIUM 8.8 8.9   PROT 6.2 6.5   ALBUMIN 2.4* 2.6*   BILITOT 0.4 0.4   ALKPHOS 69 73   AST 20 24   ALT 10 14   ANIONGAP 8 10    and CBC   Recent Labs   Lab 08/23/22  0413 08/24/22 0437   WBC 5.34 5.76   HGB 7.6* 8.3*   HCT 23.2* 26.4*    307       Pending Diagnostic Studies:     Procedure Component Value Units Date/Time    Basic metabolic panel [[id number]] Collected: 07/27/22 1351    Order Status: Sent Lab Status: In process Updated: 07/27/22 1352    Specimen: Blood     Protein / creatinine ratio, urine [[id number]] Collected: 08/11/22 1103    Order Status: Sent Lab Status: In process Updated: 08/11/22 1118    Specimen: Urine, Clean Catch     Urinalysis [[id number]] Collected: 08/03/22 0951    Order Status: Sent Lab Status: In process Updated: 08/03/22 1030    Specimen: Urine          Medications:  Reconciled Home Medications:      Medication List      START taking these medications    acetaminophen 500 MG tablet  Commonly known as: TYLENOL  Take 1 tablet (500 mg total) by mouth every 6 (six) hours as needed for Pain.     amLODIPine 5 MG tablet  Commonly known as: NORVASC  Take 1 tablet (5 mg total) by mouth 2 (two) times daily.     hydrALAZINE 25 MG tablet  Commonly known as: APRESOLINE  Take 1 tablet (25 mg total) by mouth 2 (two) times a day.     * insulin aspart U-100 100 unit/mL (3 mL) Inpn pen  Commonly known as: NovoLOG  Inject 1-10 Units into the skin before meals and at bedtime as needed (Hyperglycemia).     * insulin aspart U-100 100 unit/mL (3 mL) Inpn pen  Commonly known as: NovoLOG  Inject 8 Units into the skin 3 (three) times daily.     insulin detemir U-100 100 unit/mL (3 mL) Inpn pen  Commonly known as: Levemir FLEXTOUCH  Inject 10 Units into the skin 2 (two) times daily.     levoFLOXacin 750 MG tablet  Commonly known as:  LEVAQUIN  Take 1 tablet (750 mg total) by mouth every 48 hours. for 1 dose  Start taking on: August 25, 2022     linezolid 600 mg Tab  Commonly known as: ZYVOX  Take 1 tablet (600 mg total) by mouth every 12 (twelve) hours. for 2 days     methocarbamoL 500 MG Tab  Commonly known as: ROBAXIN  Take 1 tablet (500 mg total) by mouth 4 (four) times daily. for 10 days     oxyCODONE 5 MG immediate release tablet  Commonly known as: ROXICODONE  Take 1 tablet (5 mg total) by mouth every 6 (six) hours as needed.     senna-docusate 8.6-50 mg 8.6-50 mg per tablet  Commonly known as: PERICOLACE  Take 2 tablets by mouth once daily.     sevelamer carbonate 0.8 gram Pwpk  Commonly known as: RENVELA  Take 1 packet (0.8 g total) by mouth 3 (three) times daily with meals.     triamcinolone acetonide 0.1% 0.1 % cream  Commonly known as: KENALOG  Apply topically 2 (two) times daily. for 7 days         * This list has 2 medication(s) that are the same as other medications prescribed for you. Read the directions carefully, and ask your doctor or other care provider to review them with you.            CONTINUE taking these medications    atorvastatin 20 MG tablet  Commonly known as: LIPITOR  Take 1 tablet (20 mg total) by mouth once daily.     blood sugar diagnostic Strp  1 strip by Misc.(Non-Drug; Combo Route) route once daily.     blood-glucose meter kit  Use as instructed     lancets Misc  1 lancet by Misc.(Non-Drug; Combo Route) route once daily.        STOP taking these medications    JARDIANCE 25 mg tablet  Generic drug: empagliflozin     lisinopriL 20 MG tablet  Commonly known as: PRINIVIL,ZESTRIL     metFORMIN 500 MG ER 24hr tablet  Commonly known as: GLUCOPHAGE-XR     TRULICITY 3 mg/0.5 mL pen injector  Generic drug: dulaglutide        ASK your doctor about these medications    LANTUS SOLOSTAR U-100 INSULIN glargine 100 units/mL SubQ pen  Generic drug: insulin  Inject 20 Units into the skin once daily.            Indwelling  Lines/Drains at time of discharge:   Lines/Drains/Airways     Drain  Duration                Closed/Suction Drain 08/06/22 1203 Left Thigh Bulb 15 Fr. 18 days                Time spent on the discharge of patient: 35 minutes     f/u ID, ortho, Nephrology, endocrine, int med    Dorota Reynoso MD  Department of Hospital Medicine  [facility] - [facility]

## 2022-08-24 NOTE — PLAN OF CARE
Problem: Adult Inpatient Plan of Care  Goal: Plan of Care Review  Outcome: Ongoing, Progressing     AAOx4.  VSS.  Scant amount to 0 ml milky residue noted in compressed suction bulb.  Safety measures in place.  Care plan reviewed and discussed.  Pt is ready to go home.  WCTM.

## 2022-08-24 NOTE — PT/OT/SLP DISCHARGE
Physical Therapy Discharge Summary    Name: Wilfredo Thomas  MRN: [id number]   Principal Problem: Abscess of left thigh     Patient Discharged from acute Physical Therapy on 2022.  Please refer to prior PT noted date on 2022 for functional status.     Assessment:     Patient has met all goals and is not appropriate for therapy.    Patient reports ambulating a few times per day in hallway with no AD. Patient also reports understanding of HEP, and reports compliance with HEP. Educated on if patient feels he needs acute PT services again during stay, to consult with provider for PT services again.  Objective:     GOALS:   Multidisciplinary Problems     Physical Therapy Goals        Problem: Physical Therapy    Goal Priority Disciplines Outcome Goal Variances Interventions   Physical Therapy Goal     PT, PT/OT Ongoing, Progressing     Description: Goals to be met by: 22     Patient will increase functional independence with mobility by performin. Supine to sit with Parmer  2. Sit to supine with Parmer  3. Sit to stand transfer with Parmer  4. Gait  x >150 feet with Contact Guard Assistance using Rolling Walker. - Met   Ambulate 400' with supervision assistance with no AD.   5. Lower extremity exercise program x 20 reps per handout, with independence                       Reasons for Discontinuation of Therapy Services  Satisfactory goal achievement.      Plan:     Patient Discharged to: Outpatient Therapy Services.      2022

## 2022-08-24 NOTE — ASSESSMENT & PLAN NOTE
Estimated Creatinine Clearance: 38.1 mL/min (A) (based on SCr of 2.7 mg/dL (H)). - improved  - Hyperphosphatemia also noted - start binder  - Continues to make urine  - Nephrology following: no acute indication for HD at this time  - Renally dosing all medications  - Avoid nephrotoxins  - nephrology has cleared for discharge once Cr < 7    8/24 - cr 6.1 -> 5.2 -> 3.7 -> 3.2 -> 2.7 -> 2.7

## 2022-08-24 NOTE — ASSESSMENT & PLAN NOTE
- DKA has resolved  - Endocrinology consulted and managing.  Recent Labs     08/22/22  2108 08/23/22  0729 08/23/22  1141 08/23/22  1550 08/23/22  2103 08/24/22  0712   POCTGLUCOSE 153* 147* 220* 179* 94 95

## 2022-08-24 NOTE — ASSESSMENT & PLAN NOTE
- DKA has resolved  - Endocrinology consulted and managing.  Recent Labs     08/23/22  1141 08/23/22  1550 08/23/22  2103 08/24/22  0712 08/24/22  1141 08/24/22  1556   POCTGLUCOSE 220* 179* 94 95 181* 118*

## 2022-08-24 NOTE — SUBJECTIVE & OBJECTIVE
Interval History: see above    Review of Systems   Constitutional:  Negative for activity change, appetite change, chills, fatigue and fever.        Able to sit in chair. Neck pain improving, Right wrist/ forearm pain improving   HENT:  Negative for congestion, facial swelling, nosebleeds, sore throat and trouble swallowing.    Respiratory:  Negative for apnea, cough, choking, chest tightness, shortness of breath, wheezing and stridor.    Cardiovascular:  Negative for chest pain and leg swelling.   Gastrointestinal:  Negative for abdominal distention, abdominal pain, blood in stool, constipation, diarrhea, nausea and vomiting.   Genitourinary:  Negative for difficulty urinating, dysuria, flank pain, frequency, hematuria and urgency.   Musculoskeletal:  Positive for neck pain. Negative for arthralgias, back pain, gait problem and neck stiffness.        Right Forearm thrombophlebitis   Skin:  Positive for rash (right  wrist). Negative for color change and wound.   Neurological:  Negative for dizziness, tremors, seizures, syncope, facial asymmetry, speech difficulty, weakness, light-headedness, numbness and headaches.   Psychiatric/Behavioral:  Negative for agitation, behavioral problems, confusion, decreased concentration, dysphoric mood, hallucinations, self-injury and sleep disturbance. The patient is not nervous/anxious and is not hyperactive.    Objective:     Vital Signs (Most Recent):  Temp: 98 °F (36.7 °C) (08/24/22 0708)  Pulse: 78 (08/24/22 0708)  Resp: 18 (08/24/22 0708)  BP: 136/84 (08/24/22 0708)  SpO2: 97 % (08/24/22 0708)   Vital Signs (24h Range):  Temp:  [98 °F (36.7 °C)-98.3 °F (36.8 °C)] 98 °F (36.7 °C)  Pulse:  [74-92] 78  Resp:  [18-20] 18  SpO2:  [94 %-98 %] 97 %  BP: (119-151)/(77-84) 136/84     Weight: 104.3 kg (229 lb 15 oz)  Body mass index is 36.01 kg/m².    Intake/Output Summary (Last 24 hours) at 8/24/2022 0747  Last data filed at 8/23/2022 1500  Gross per 24 hour   Intake 840 ml   Output  800 ml   Net 40 ml        Physical Exam  Constitutional:       General: He is not in acute distress.     Appearance: Normal appearance. He is not ill-appearing, toxic-appearing or diaphoretic.   HENT:      Head: Normocephalic and atraumatic.      Nose: Nose normal.      Mouth/Throat:      Mouth: Mucous membranes are moist.      Pharynx: Oropharynx is clear.   Eyes:      General: No scleral icterus.     Extraocular Movements: Extraocular movements intact.      Conjunctiva/sclera: Conjunctivae normal.      Pupils: Pupils are equal, round, and reactive to light.   Neck:      Comments: Tender post neck and left trapezious  Imporoved ROM, less muscular stiffness left trap.   Cardiovascular:      Rate and Rhythm: Normal rate and regular rhythm.      Pulses: Normal pulses.      Heart sounds: Normal heart sounds.   Pulmonary:      Effort: Pulmonary effort is normal. No respiratory distress.      Breath sounds: Normal breath sounds. No stridor. No wheezing, rhonchi or rales.   Chest:      Chest wall: No tenderness.   Abdominal:      General: Abdomen is flat. Bowel sounds are normal. There is no distension.      Palpations: Abdomen is soft.      Tenderness: There is no abdominal tenderness. There is no right CVA tenderness, guarding or rebound.   Musculoskeletal:         General: Swelling and tenderness present. No deformity or signs of injury. Normal range of motion.      Cervical back: Normal range of motion and neck supple. Tenderness present. No rigidity.      Right lower leg: No edema.      Left lower leg: No edema.      Comments: Left lat thigh is bandaged  Right  forearm - superficial thrombophlebitis and contact dermatitis related to IV and tape.   Lymphadenopathy:      Cervical: No cervical adenopathy.   Skin:     General: Skin is warm and dry.      Coloration: Skin is not jaundiced.      Findings: Rash (papulosquamour erythema in setting of rtape exposure) present. No erythema.   Neurological:      General: No focal  deficit present.      Mental Status: He is alert and oriented to person, place, and time. Mental status is at baseline.      Motor: No weakness.   Psychiatric:         Mood and Affect: Mood normal.         Behavior: Behavior normal.         Thought Content: Thought content normal.         Judgment: Judgment normal.       Significant Labs: All pertinent labs within the past 24 hours have been reviewed.  CBC:   Recent Labs   Lab 08/23/22  0413   WBC 5.34   HGB 7.6*   HCT 23.2*          CMP:   Recent Labs   Lab 08/23/22 0413 08/24/22  0437    143   K 3.8 3.8    109   CO2 24 24   * 86   BUN 33* 27*   CREATININE 2.7* 2.7*   CALCIUM 8.8 8.9   PROT 6.2 6.5   ALBUMIN 2.4* 2.6*   BILITOT 0.4 0.4   ALKPHOS 69 73   AST 20 24   ALT 10 14   ANIONGAP 8 10         Significant Imaging: I have reviewed all pertinent imaging results/findings within the past 24 hours.

## 2022-08-24 NOTE — PROGRESS NOTES
"[facility] - Telemetry [facility] Medicine  Progress Note    Patient Name: Wilfredo Thomas  MRN: [id number]  Patient Class: IP- Inpatient   Admission Date: 7/26/2022  Length of Stay: 29 days  Attending Physician: Dorota Reynoso MD  Primary Care Provider: Aylin Wayne DO        Subjective:     Principal Problem:Abscess of left thigh        HPI:  46 y/o M hx of HTN, IDDM2, GERD obesity, HLD presented to the ED with complaint of 1 day of worsening SOB. Wife present at bedside. Patient states that he noticed yesterday he was feeling some shortness of breath and couldn't seem to catch his breath. His wife went to check on him this morning and noticed that he was breathing "fast and heavy" and he sounded like he was slurring his words. Wife was concerned about a stroke, so he brought him to the ED for evaluation.     Patient also reporting significant pain and swelling along his left lateral proximal thigh. He states he has noticed worsening pain over the past 2 weeks. He denies any acute trauma to the area. He was seen by ortho last week and was told to take ibu-profen for a muscle strain. This did not help his pain, so he presented again on 7/22 and was given a prednisone injection into his thigh. This also did not help his pain, and now the pain and swelling have advanced to the point that he has difficulty with ambulation. Patient denies fever, chills, nausea, vomiting.      Of note, patient has hx of poorly controlled diabetes. He was recently started on insulin by his PCP, but he has not taken any insulin since being prescribed it.      In the ED, patient hypertensive and tachycardic to the 130s. Tachypneic with RR in the 40s. On CBC, WBC 26.9, On CMP patient hyperkalemic to 5.5, CorNa 136, Bicarb 5. Cr elevated to 1.9 from BL 0.83. UA, BHB pending at time of admission. CTA without evidence of pulmonary embolism. Critical Care Medicine consulted given severe acidosis.             Overview/Hospital " Course:    Patient admitted to the MICU for management of severe DKA. Metabolic acidosis improving on insulin drip. He also has had left thigh pain for 1-2 months. There is a large mass on his left thigh that is tender to palpation, CT revealed hematoma vs soft tissue growth. General surgery consulted and recommended IR consult. IR consulted, no indication for tap.   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 Ortho consulted and performed bedside aspiration of possible abscess which revealed >200K cells >80% segs. Will hold off on antibiotics for now per ortho recs, ortho is taking patient to the OR for irrigation of the thigh, will start antibiotics afterwards.         7/29 Metabolic acidosis slowly improving. Ortho taking patient to the OR for irrigation of the left thigh, will start antibiotics afterwards.   7/30 Transfer to hospital medicine . S/p I&D  of left thigh Abscess on7/29/22 - MRI - uptured tensor fascia jose luis (TFL) muscle with associated large hematoma. gram stain -Moderate Gram positive cocci in clusters .cultures pending.  continue Vancomycin, clindamycin and Zosyn . Bicarb 15. K replaced . off insulin drip on SQ insulin.  7/31 ID and endocrine consulted.  abscess with Calcium pyrophosphate crystals with unknown significance potassium and P replaced. Bicarbonate WNL . aerobic culture 7/28 STAPHYLOCOCCUS SPECIES . Glucose in 300s .PRN imodium  for diarrhea. PT/OT consulted - WBAT. Surgical drain with purulent output  8/1 PICC team consulted for IV access. vanc trough at 11.4. monitor for vanomycin toxicity. possible OR tomorrow for washout  may need bone biopsy to r/o osteomyelitis.Discontinued clindamycin.   8/2 glucose in 300s.  Increased Levemir  to 14 units BID and Aspart  10 units TIDWM. K replaced   8/3 ANGE with cr 0.7--> 2.7. likely vanc induced ATN with levels 40. urine studies ordered renal sonogram WNL  nephrology consulted.  started on NS 100ml/h x 10h and follow up renal panel.Zosyn and  vancomycin discontinued. Strict IO monitor. condom catheter . switched to Ancef for MSSA on culture  Interval History:  8/4- MSSA- see below. /85 VSS. On room air, eating, BM on 8/2, good UO. Appreciate ortho recs. On Cephazolin, detim 14 bid and aspart 12 ac.  8/5-Cr still rising, cr =6.  Wrist still hurting. Ortho to tap it to eval for infection and gout today. /90   Pulse 85  AF, no other signs of infection.  Will discuss w Nephrology- received NS x one liter yesterday. Will repeat today. Suspect auto-diuresis.   8/6: washout with ortho today. Cr continues to rise (7.1), nephrology following  8/7: Cr 7.4, phos increasing; tolerated surgery well yesterday, intra-op cultures pending  8/8: Cr increased to 8.1, hyperphosphatemia again noted; continues to make urine, no acute indication for HD per nephrology. Intra-op cultures with S.aureus, continued cefazolin pending final C&S results.   Interval History:   8/18- 47M with DM admitted 7/26/22 for DKA, ortho following for left lateral thigh abscess. s/p I&D and drain placement left thigh 7/29/22. Cx +MSSA. Had high drain purulent output after first I&D, was taken back to OR 8/6/22 for repeat I&D. Drain in place, dressings CDI. Nephrology on board for ANGE.   pt transferred from telemedicine, had fever yesterday,  -> 90.  Wbc -ok. Covid negative. Drain output paula to 35 cc. Pt on Ancef.   No urine output, cr rising, Nephrology following, no indication for HD presently. Ortho may need to repeat debridement.      8/19- /71   Pulse 92 AF. Appreciate ortho and ID input, pt on daptomycin. T max 99.4. drain functioning. Blood cult 8/17 are neg so far, CRP and procal were higher. Will US the wound site for pockets of fluid.   - had fever and rigors  after receiving dapto today, MRI spine - neck ordered for acute neck/ back pain. Blood culture, lactic acid, robaxin ordered.   - still  febrile. Add cefepime, cooling blanket, cannot have NSAID    8/20 - pt developed blisters around IV site.   MRI- No evidence of discitis-osteomyelitis.  No epidural fluid collections.  Edema/enhancement in the posterior paraspinal musculature/superficial soft tissues at the level of C7 concerning for cellulitis/infection.  No rim enhancing collections to suggest abscess. Mild degenerative change of the spine as detailed above.  Consolidation in the left upper lobe, possible infection or noninfectious inflammation and new from CTA chest 07/26/2022.  On dapto and cefepime, lactade - 0.8, ck -nl, cr improving 5.2. Hb 6.8.  pt is AF since adding Cefipime. Will discuss w ID. For now keep antibiotcs the same. next dapto is tomorrow and we may change things before then.  He is on renal doses and renal function improving.   rash is contact dermatitis, in areas of tape. Do not think this monkey px  CT scan of the chest to eval pneumonia.  Might change antibiotics. We are suspecting drug related pneumonia and rash. isolation precautions until cleared by ID to make sure they concur.  8/21- cefepime stopped.pt still AF, good UO 2400cc, cr continues to improve.  It becomes febrile again will choose levoquin. CT chest done  - Multifocal pneumonia within the bilateral lungs. Nodular opacities in right upper lobe 0.7 x 1.4 cm.  Unclear if these are consolidations or solid nodules.    Appreciate ID input and f/u. Right thrombophlebitis and contact dermatitis improving. Daptomycin dose today.  Received one unit PRBCs yesterday - Hb 7.9. ID to change antibiotics to levoquin and Zyvox.    8/22- cr 3.2, drain output 20cc, on levoquin and zyvox, still AF. No symptoms of pneumonia -   8/23- eating 75%, drain output 15 cc, on zyvox and levoquin, seems to be doing well. Up to chair with improved mobility. Cr 2.7.Drain output- pending      Interval History: see above    Review of Systems   Constitutional:  Negative for activity change,  appetite change, chills, fatigue and fever.        Able to sit in chair. Neck pain improving, Right wrist/ forearm pain improving   HENT:  Negative for congestion, facial swelling, nosebleeds, sore throat and trouble swallowing.    Respiratory:  Negative for apnea, cough, choking, chest tightness, shortness of breath, wheezing and stridor.    Cardiovascular:  Negative for chest pain and leg swelling.   Gastrointestinal:  Negative for abdominal distention, abdominal pain, blood in stool, constipation, diarrhea, nausea and vomiting.   Genitourinary:  Negative for difficulty urinating, dysuria, flank pain, frequency, hematuria and urgency.   Musculoskeletal:  Positive for neck pain. Negative for arthralgias, back pain, gait problem and neck stiffness.        Right Forearm thrombophlebitis   Skin:  Positive for rash (right  wrist). Negative for color change and wound.   Neurological:  Negative for dizziness, tremors, seizures, syncope, facial asymmetry, speech difficulty, weakness, light-headedness, numbness and headaches.   Psychiatric/Behavioral:  Negative for agitation, behavioral problems, confusion, decreased concentration, dysphoric mood, hallucinations, self-injury and sleep disturbance. The patient is not nervous/anxious and is not hyperactive.    Objective:     Vital Signs (Most Recent):  Temp: 98 °F (36.7 °C) (08/24/22 0708)  Pulse: 78 (08/24/22 0708)  Resp: 18 (08/24/22 0708)  BP: 136/84 (08/24/22 0708)  SpO2: 97 % (08/24/22 0708)   Vital Signs (24h Range):  Temp:  [98 °F (36.7 °C)-98.3 °F (36.8 °C)] 98 °F (36.7 °C)  Pulse:  [74-92] 78  Resp:  [18-20] 18  SpO2:  [94 %-98 %] 97 %  BP: (119-151)/(77-84) 136/84     Weight: 104.3 kg (229 lb 15 oz)  Body mass index is 36.01 kg/m².    Intake/Output Summary (Last 24 hours) at 8/24/2022 0747  Last data filed at 8/23/2022 1500  Gross per 24 hour   Intake 840 ml   Output 800 ml   Net 40 ml        Physical Exam  Constitutional:       General: He is not in acute  distress.     Appearance: Normal appearance. He is not ill-appearing, toxic-appearing or diaphoretic.   HENT:      Head: Normocephalic and atraumatic.      Nose: Nose normal.      Mouth/Throat:      Mouth: Mucous membranes are moist.      Pharynx: Oropharynx is clear.   Eyes:      General: No scleral icterus.     Extraocular Movements: Extraocular movements intact.      Conjunctiva/sclera: Conjunctivae normal.      Pupils: Pupils are equal, round, and reactive to light.   Neck:      Comments: Tender post neck and left trapezious  Imporoved ROM, less muscular stiffness left trap.   Cardiovascular:      Rate and Rhythm: Normal rate and regular rhythm.      Pulses: Normal pulses.      Heart sounds: Normal heart sounds.   Pulmonary:      Effort: Pulmonary effort is normal. No respiratory distress.      Breath sounds: Normal breath sounds. No stridor. No wheezing, rhonchi or rales.   Chest:      Chest wall: No tenderness.   Abdominal:      General: Abdomen is flat. Bowel sounds are normal. There is no distension.      Palpations: Abdomen is soft.      Tenderness: There is no abdominal tenderness. There is no right CVA tenderness, guarding or rebound.   Musculoskeletal:         General: Swelling and tenderness present. No deformity or signs of injury. Normal range of motion.      Cervical back: Normal range of motion and neck supple. Tenderness present. No rigidity.      Right lower leg: No edema.      Left lower leg: No edema.      Comments: Left lat thigh is bandaged  Right  forearm - superficial thrombophlebitis and contact dermatitis related to IV and tape.   Lymphadenopathy:      Cervical: No cervical adenopathy.   Skin:     General: Skin is warm and dry.      Coloration: Skin is not jaundiced.      Findings: Rash (papulosquamour erythema in setting of rtape exposure) present. No erythema.   Neurological:      General: No focal deficit present.      Mental Status: He is alert and oriented to person, place, and time.  Mental status is at baseline.      Motor: No weakness.   Psychiatric:         Mood and Affect: Mood normal.         Behavior: Behavior normal.         Thought Content: Thought content normal.         Judgment: Judgment normal.       Significant Labs: All pertinent labs within the past 24 hours have been reviewed.  CBC:   Recent Labs   Lab 08/23/22  0413   WBC 5.34   HGB 7.6*   HCT 23.2*          CMP:   Recent Labs   Lab 08/23/22  0413 08/24/22  0437    143   K 3.8 3.8    109   CO2 24 24   * 86   BUN 33* 27*   CREATININE 2.7* 2.7*   CALCIUM 8.8 8.9   PROT 6.2 6.5   ALBUMIN 2.4* 2.6*   BILITOT 0.4 0.4   ALKPHOS 69 73   AST 20 24   ALT 10 14   ANIONGAP 8 10         Significant Imaging: I have reviewed all pertinent imaging results/findings within the past 24 hours.      Assessment/Plan:      * Abscess of left thigh  - Afebrile, no leukocytosis  - Continue Ancef  - S/p OR with Ortho 8/6 for repeat washout  - Intra-op cultures with S.aureus: changed to Ancef for MSSA then daptomycin on 8/15; CPK 11  - ID following  - PRN analgesics provided  - Continuing to monitor; maintain drain for now - Ortho continues to monitor  8/18- drain output 35 cc  Per ID: Continue Daptomycin 6mg/kg/dose q48 hours (renally dosed) for MSSA SSTI. Anticipate either completion of 14 days IV abx from 8/6 for SSTI or possible transition to orals.   8/19- will US for pockets of fluid, consider CT  8/23- drainage diminishing, 20cc -> 15 cc      Pneumonia of both lungs  Multifocal pneumonia within the bilateral lungs.  - Nodular opacities in right upper lobe 0.7 x 1.4 cm.  Unclear if these are consolidations or solid nodules.    - Close short-term follow-up is recommended.  - on dapto  - will discuss w ID, likely change antibiotics.   8/23- on levoquin and zyvox. AF, asymptomatic.    · Continue linezolid 600 mg orally q 12 hours for possibility of MSSA infection in lungs.  Monitor platelet count while on linezolid, especially  in setting of renal dysfunction.  · Continue levaquin 750 mg orally q 48 hours (renally dosed) for gram negative pulmonary coverage.   · Will treat for 7d, EOC 7/26/22.           ANGE (acute kidney injury)  Estimated Creatinine Clearance: 38.1 mL/min (A) (based on SCr of 2.7 mg/dL (H)). - improved  - Hyperphosphatemia also noted - start binder  - Continues to make urine  - Nephrology following: no acute indication for HD at this time  - Renally dosing all medications  - Avoid nephrotoxins  - nephrology has cleared for discharge once Cr < 7    8/24 - cr 6.1 -> 5.2 -> 3.7 -> 3.2 -> 2.7 -> 2.7    Allergic contact dermatitis due to adhesives  triamcinolon cream  Elevate ext.   Avoid tape      Superficial thrombophlebitis of right upper extremity  Elevate ext  Warm packs  -improving      Acute neck pain  MRI- see above. evidecen of cellulitis, soft tissue swelling  - has muscular spasm on exam  Robaxin added  8/20 - AF since adding cefepime. Ortho following. Appreciate assistance  8/23- resolving. Continue to monitor for worsening cervical/paraspinal pain, upper back cellulitis/edema. If worsens, may need repeat imaging to evaluate for developing organized abscess.        Fever  Uncertain etiology  On 8/17 and 8/19.   Still tachycardia  Wbc 6  Blood cult 8/17 - NGSF  drain output improving  covid negative.   cxr negative 8/17  UA negative 8/17  Will chat w Ortho regarding further plans for debridement  8/19- US wound site for pockets of fluid,   Fluid collection located medial to the bandage: 3.5 x 0.6 x 1.8 cm.  Fluid collection located superior to bandage: 3.0 x 0.6 x 2.3 cm.  CRP rising. Drain output improved- gradually reducing  Fever after receiving Daptomycin today. Will discuss w ID. Repeated blood culture and lactic acid    8/20 - cefepime added to dapto  last night, developed blisters at IV site. MRI revealed evidence of celluilits and consolidation NAVDEEP pneumonia  8/21- CT chest w multifocal pneumonia, on dapto.  Blood cult negative  8/23- resolved      Pain and swelling of left wrist  Pseudogout  - Tapped by Ortho, fluid reviewed: does not appear to be gout or septic arthritis, likely pseudogout (calcium pyrophosphate crystals on previous LE aspiration)  - PRN analgesics provided  -improved after steroid injection  8/19- resolved  8/22- pain and swelling of the right wrist is superfiscial thrombophlebitis and contact dermatitis, improving  -resolved    Sepsis  -patient with fever to 102 and tachycardia on 8/17 - repeat blood cultures, UA, COVID swab pending  -CXR without infiltrate, lactate nl, procalcitonin mildly elevated (due to ANGE)  -remains on dapto for MSSA abscess to thigh  -eosinophils nl and no leukocytosis  -ID and ortho notified of change  -minimize IVF due to improving ANGE with signs of fluid retention  8/21- on daptomycin, which may be cause of multifocal pneumonia  8/22- on levoquin and zyvox. AF.   resolved      Type 2 diabetes mellitus with hyperglycemia, with long-term current use of insulin  - DKA has resolved  - Endocrinology consulted and managing.  Recent Labs     08/22/22  2108 08/23/22  0729 08/23/22  1141 08/23/22  1550 08/23/22  2103 08/24/22  0712   POCTGLUCOSE 153* 147* 220* 179* 94 95         Hypertension associated with type 2 diabetes mellitus  - Continue home regimen of amlodipine; lisinopril held due to poor renal function  - continue to monitor and further titrate antihypertensives as clinically indicated   -increase amlodipine to BID on 8/13 for elevated BP; avoid hypotension with ANGE  8/18- /60  -stable      Hematoma of left thigh  - See Abscess of L thigh    Anemia of chronic disease  - H/H slowly declining  - transfuse for Hgb < 7  - continue to monitor   8/22 - Hb 6.8 - transfused one unit PRBCs -> 7.9 ->8.0    Class 2 severe obesity due to excess calories with serious comorbidity and body mass index (BMI) of 36.0 to 36.9 in adult  - Body mass index is 36.01 kg/m².  - Needs  dietary and lifestyle modifications in relation to health    Hyperlipidemia associated with type 2 diabetes mellitus  - Continue home statin     VTE Risk Mitigation (From admission, onward)         Ordered     heparin (porcine) injection 5,000 Units  Every 8 hours         08/07/22 1010     Place sequential compression device  Until discontinued         08/06/22 0855     Place sequential compression device  Until discontinued         07/29/22 0158     IP VTE HIGH RISK PATIENT  Once         07/29/22 0158     Place SAVANNAH hose  Until discontinued         07/26/22 2240     Place sequential compression device  Until discontinued         07/26/22 2240                Discharge Planning   FILEMON: 8/24/2022     Code Status: Full Code   Is the patient medically ready for discharge?: No    Reason for patient still in hospital (select all that apply): Patient trending condition  Discharge Plan A: Home Health          Dorota eRynoso MD  Department of Hospital Medicine   [facility] - Telemetry Stepdown

## 2022-08-25 ENCOUNTER — PATIENT OUTREACH (OUTPATIENT)
Dept: ADMINISTRATIVE | Facility: CLINIC | Age: 47
End: 2022-08-25
Payer: COMMERCIAL

## 2022-08-25 LAB — BACTERIA BLD CULT: NORMAL

## 2022-08-25 NOTE — PLAN OF CARE
[facility] - Telemetry Stepdown  Discharge Final Note    Primary Care Provider: Aylin Wayne DO    Expected Discharge Date: 8/24/2022       Future Appointments   Date Time Provider Department Center   8/29/2022  1:00 PM Lyric Sanchez PA-C [facility] ORTH[facility]   9/12/2022 11:30 AM Steven Potter NP [facility] ID [facility]         Final Discharge Note (most recent)     Final Note - 08/25/22 0801        Final Note    Assessment Type Final Discharge Note     Anticipated Discharge Disposition Home or Self Care     What phone number can be called within the next 1-3 days to see how you are doing after discharge? [phone]     Hospital Resources/Appts/Education Provided Appointments scheduled and added to AVS   Ambulatory referrals sent to Nephrology and Endocrinology.       Post-Acute Status    Post-Acute Authorization Other     Other Status No Post-Acute Service Needs                 Important Message from Medicare      Marnie De La Torre RN  Ext [phone]

## 2022-08-26 NOTE — PROGRESS NOTES
2nd Attempt made to reach patient for TCC call. Left voicemail please call [phone] leave first name, last name, and  for Dominique to return call.

## 2022-08-26 NOTE — PROGRESS NOTES
3rd Attempt made to reach patient for TCC call. Left voicemail please call [phone] leave first name, last name, and  for Dominique and I will return the call.

## 2022-08-29 ENCOUNTER — LAB VISIT (OUTPATIENT)
Dept: LAB | Facility: HOSPITAL | Age: 47
End: 2022-08-29
Payer: COMMERCIAL

## 2022-08-29 ENCOUNTER — OFFICE VISIT (OUTPATIENT)
Dept: ORTHOPEDICS | Facility: CLINIC | Age: 47
End: 2022-08-29
Payer: COMMERCIAL

## 2022-08-29 VITALS
HEART RATE: 90 BPM | SYSTOLIC BLOOD PRESSURE: 137 MMHG | BODY MASS INDEX: 36.09 KG/M2 | HEIGHT: 67 IN | TEMPERATURE: 99 F | DIASTOLIC BLOOD PRESSURE: 85 MMHG | RESPIRATION RATE: 18 BRPM | WEIGHT: 229.94 LBS

## 2022-08-29 DIAGNOSIS — T14.8XXA WOUND INFECTION: Primary | ICD-10-CM

## 2022-08-29 DIAGNOSIS — T14.8XXA WOUND INFECTION: ICD-10-CM

## 2022-08-29 DIAGNOSIS — L08.9 WOUND INFECTION: Primary | ICD-10-CM

## 2022-08-29 DIAGNOSIS — L02.416 ABSCESS OF LEFT THIGH: ICD-10-CM

## 2022-08-29 DIAGNOSIS — L08.9 WOUND INFECTION: ICD-10-CM

## 2022-08-29 LAB
ALBUMIN SERPL BCP-MCNC: 3.5 G/DL (ref 3.5–5.2)
ALP SERPL-CCNC: 90 U/L (ref 55–135)
ALT SERPL W/O P-5'-P-CCNC: 22 U/L (ref 10–44)
ANION GAP SERPL CALC-SCNC: 13 MMOL/L (ref 8–16)
AST SERPL-CCNC: 28 U/L (ref 10–40)
BASOPHILS # BLD AUTO: 0.11 K/UL (ref 0–0.2)
BASOPHILS NFR BLD: 1.3 % (ref 0–1.9)
BILIRUB SERPL-MCNC: 0.3 MG/DL (ref 0.1–1)
BUN SERPL-MCNC: 31 MG/DL (ref 6–20)
CALCIUM SERPL-MCNC: 9.8 MG/DL (ref 8.7–10.5)
CHLORIDE SERPL-SCNC: 105 MMOL/L (ref 95–110)
CO2 SERPL-SCNC: 24 MMOL/L (ref 23–29)
CREAT SERPL-MCNC: 1.6 MG/DL (ref 0.5–1.4)
CRP SERPL-MCNC: 7 MG/L (ref 0–8.2)
DIFFERENTIAL METHOD: ABNORMAL
EOSINOPHIL # BLD AUTO: 0.4 K/UL (ref 0–0.5)
EOSINOPHIL NFR BLD: 4.7 % (ref 0–8)
ERYTHROCYTE [DISTWIDTH] IN BLOOD BY AUTOMATED COUNT: 13.2 % (ref 11.5–14.5)
EST. GFR  (NO RACE VARIABLE): 52.5 ML/MIN/1.73 M^2
GLUCOSE SERPL-MCNC: 122 MG/DL (ref 70–110)
HCT VFR BLD AUTO: 28.1 % (ref 40–54)
HGB BLD-MCNC: 9 G/DL (ref 14–18)
IMM GRANULOCYTES # BLD AUTO: 0.08 K/UL (ref 0–0.04)
IMM GRANULOCYTES NFR BLD AUTO: 0.9 % (ref 0–0.5)
LYMPHOCYTES # BLD AUTO: 1.9 K/UL (ref 1–4.8)
LYMPHOCYTES NFR BLD: 22.4 % (ref 18–48)
MCH RBC QN AUTO: 29.6 PG (ref 27–31)
MCHC RBC AUTO-ENTMCNC: 32 G/DL (ref 32–36)
MCV RBC AUTO: 92 FL (ref 82–98)
MONOCYTES # BLD AUTO: 0.6 K/UL (ref 0.3–1)
MONOCYTES NFR BLD: 7.5 % (ref 4–15)
NEUTROPHILS # BLD AUTO: 5.4 K/UL (ref 1.8–7.7)
NEUTROPHILS NFR BLD: 63.2 % (ref 38–73)
NRBC BLD-RTO: 0 /100 WBC
PLATELET # BLD AUTO: 331 K/UL (ref 150–450)
PMV BLD AUTO: 9.6 FL (ref 9.2–12.9)
POTASSIUM SERPL-SCNC: 4.7 MMOL/L (ref 3.5–5.1)
PROT SERPL-MCNC: 7.7 G/DL (ref 6–8.4)
RBC # BLD AUTO: 3.04 M/UL (ref 4.6–6.2)
SODIUM SERPL-SCNC: 142 MMOL/L (ref 136–145)
WBC # BLD AUTO: 8.52 K/UL (ref 3.9–12.7)

## 2022-08-29 PROCEDURE — 85025 COMPLETE CBC W/AUTO DIFF WBC: CPT | Performed by: NURSE PRACTITIONER

## 2022-08-29 PROCEDURE — 4010F PR ACE/ARB THEARPY RXD/TAKEN: ICD-10-PCS | Mod: CPTII,S$GLB,, | Performed by: PHYSICIAN ASSISTANT

## 2022-08-29 PROCEDURE — 3079F PR MOST RECENT DIASTOLIC BLOOD PRESSURE 80-89 MM HG: ICD-10-PCS | Mod: CPTII,S$GLB,, | Performed by: PHYSICIAN ASSISTANT

## 2022-08-29 PROCEDURE — 3062F POS MACROALBUMINURIA REV: CPT | Mod: CPTII,S$GLB,, | Performed by: PHYSICIAN ASSISTANT

## 2022-08-29 PROCEDURE — 3046F HEMOGLOBIN A1C LEVEL >9.0%: CPT | Mod: CPTII,S$GLB,, | Performed by: PHYSICIAN ASSISTANT

## 2022-08-29 PROCEDURE — 3066F NEPHROPATHY DOC TX: CPT | Mod: CPTII,S$GLB,, | Performed by: PHYSICIAN ASSISTANT

## 2022-08-29 PROCEDURE — 3062F PR POS MACROALBUMINURIA RESULT DOCUMENTED/REVIEW: ICD-10-PCS | Mod: CPTII,S$GLB,, | Performed by: PHYSICIAN ASSISTANT

## 2022-08-29 PROCEDURE — 99999 PR PBB SHADOW E&M-EST. PATIENT-LVL IV: CPT | Mod: PBBFAC,,, | Performed by: PHYSICIAN ASSISTANT

## 2022-08-29 PROCEDURE — 3079F DIAST BP 80-89 MM HG: CPT | Mod: CPTII,S$GLB,, | Performed by: PHYSICIAN ASSISTANT

## 2022-08-29 PROCEDURE — 3066F PR DOCUMENTATION OF TREATMENT FOR NEPHROPATHY: ICD-10-PCS | Mod: CPTII,S$GLB,, | Performed by: PHYSICIAN ASSISTANT

## 2022-08-29 PROCEDURE — 3008F PR BODY MASS INDEX (BMI) DOCUMENTED: ICD-10-PCS | Mod: CPTII,S$GLB,, | Performed by: PHYSICIAN ASSISTANT

## 2022-08-29 PROCEDURE — 4010F ACE/ARB THERAPY RXD/TAKEN: CPT | Mod: CPTII,S$GLB,, | Performed by: PHYSICIAN ASSISTANT

## 2022-08-29 PROCEDURE — 3075F PR MOST RECENT SYSTOLIC BLOOD PRESS GE 130-139MM HG: ICD-10-PCS | Mod: CPTII,S$GLB,, | Performed by: PHYSICIAN ASSISTANT

## 2022-08-29 PROCEDURE — 3008F BODY MASS INDEX DOCD: CPT | Mod: CPTII,S$GLB,, | Performed by: PHYSICIAN ASSISTANT

## 2022-08-29 PROCEDURE — 1159F MED LIST DOCD IN RCRD: CPT | Mod: CPTII,S$GLB,, | Performed by: PHYSICIAN ASSISTANT

## 2022-08-29 PROCEDURE — 3046F PR MOST RECENT HEMOGLOBIN A1C LEVEL > 9.0%: ICD-10-PCS | Mod: CPTII,S$GLB,, | Performed by: PHYSICIAN ASSISTANT

## 2022-08-29 PROCEDURE — 36415 COLL VENOUS BLD VENIPUNCTURE: CPT | Performed by: NURSE PRACTITIONER

## 2022-08-29 PROCEDURE — 99999 PR PBB SHADOW E&M-EST. PATIENT-LVL IV: ICD-10-PCS | Mod: PBBFAC,,, | Performed by: PHYSICIAN ASSISTANT

## 2022-08-29 PROCEDURE — 99024 PR POST-OP FOLLOW-UP VISIT: ICD-10-PCS | Mod: S$GLB,,, | Performed by: PHYSICIAN ASSISTANT

## 2022-08-29 PROCEDURE — 86140 C-REACTIVE PROTEIN: CPT | Performed by: NURSE PRACTITIONER

## 2022-08-29 PROCEDURE — 80053 COMPREHEN METABOLIC PANEL: CPT | Performed by: NURSE PRACTITIONER

## 2022-08-29 PROCEDURE — 3075F SYST BP GE 130 - 139MM HG: CPT | Mod: CPTII,S$GLB,, | Performed by: PHYSICIAN ASSISTANT

## 2022-08-29 PROCEDURE — 99024 POSTOP FOLLOW-UP VISIT: CPT | Mod: S$GLB,,, | Performed by: PHYSICIAN ASSISTANT

## 2022-08-29 PROCEDURE — 1159F PR MEDICATION LIST DOCUMENTED IN MEDICAL RECORD: ICD-10-PCS | Mod: CPTII,S$GLB,, | Performed by: PHYSICIAN ASSISTANT

## 2022-08-31 LAB
FUNGUS SPEC CULT: NORMAL

## 2022-09-01 ENCOUNTER — TELEPHONE (OUTPATIENT)
Dept: INFECTIOUS DISEASES | Facility: HOSPITAL | Age: 47
End: 2022-09-01
Payer: COMMERCIAL

## 2022-09-01 NOTE — TELEPHONE ENCOUNTER
Pt called to check on how he was doing off antibiotics and to review recent lab work. Denies fever, body aches, chills. Notes significant improvement in neck and wrist pain. Notes improvement in thigh swelling and redness, states there is a small area of redness on his thigh, but it has improved every day since being home. Overall states he feels good with minimal complaints. Labs reviewed, WBC stable. CRP normalized. Has follow up with endocrine scheduled, awaiting nephrology appt. Follow up with ID on 9/12. Instructed pt to call or seek immediate medical attention for any fevers, chills, sweats, diarrhea, n/v or increase in pain, swelling, drainage from left thigh. Questions encouraged and answered. Pt noted understanding.

## 2022-09-02 NOTE — PROGRESS NOTES
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  He takes no home anticoagulation.  Denies history of MI, CVA, DVT, PE, or cancer.     Lab Results   Component Value Date    HGBA1C 10.1 (H) 07/26/2022       Estimated body mass index is 36.01 kg/m² as calculated from the following:    Height as of this encounter: 5' 7" (1.702 m).    Weight as of this encounter: 104.3 kg (229 lb 15 oz).      HPI: Mr. Thomas is a 47 year old male who was admitted to the hospital for DKA and orthopedics was consulted for pain in lateral thigh.   RADS: MRI scan showing tear in the TFL with adjacent hematoma.  07/29/22:: I and D thigh, left  08/06/22: : I and D L thigh     Mr. Thomas is here today for a post-operative visit    Interval History:  he reports that he is doing ok. Reports feeling better.   he is at home . he is not participating in PT/OT. He was at rehab was recently discharged   Pain is controlled.  he is  taking pain medication.    He was taking antibiotics as prescribed, recently finished. Has follow up with ID mid next month. He does not have recent labs.   he denies fever, chills, and sweats .       Physical exam:    Patient arrives to exam room: walkied in.  Patient is alone   Dressing taken down.  Incision is clean, dry and intact.  Sutures removed without difficulty.   Healing well no signs of breakdown .     RADS: All pertinent images were reviewed by myself:   none done today    Assessment:  Post-op visit ( 3 weeks)    Plan:  Current care, treatment plan, precautions, activity level/ modifications, limitations, rehabilitation exercises and proposed future treatment were discussed with the patient. We discussed the need to monitor for changes in symptoms and condition and report them to the physician.  Discussed importance of " compliance with all appointments and follow up examinations.     WOUND CARE ORDERS  - The patient was advised to keep the incision clean and dry for the next 24 hours after which he may wash the area with antibacterial soap in the shower. Will not submerge until the incision is completely healed  -Patient was advised to monitor wound closely and multiple times daily for any problems. Call clinic immediately or report to ED for immediate medical attention for any complications including reopening of wound, drainage, purulence, redness, streaking, odor, pain out of proportion, fever, chills, etc.       ACTIVITY:   - as tolerated  -range of motion as tolerated    -WBAT        PAIN MEDICATION:   - Multimodal pain control  - Pain medication: refill was not needed  - Pain medication refill policy provided to patient for review, yes.    - Patient was informed a multi-modal approach is used to treat their pain. With the goal to get off of narcotic pain medication and discontinue as soon as possible.   - ice and elevation to reduce pain and swelling     OTHER:   Contacted ID, will get updated labs and contact patient with next step in treatment.   - needs to follow up with ID.     FOLLOW UP:   - Patient will follow up in the clinic in 4 weeks.      If there are any questions prior to scheduled follow up, the patient was instructed to contact the office

## 2022-09-07 ENCOUNTER — PATIENT MESSAGE (OUTPATIENT)
Dept: ENDOCRINOLOGY | Facility: CLINIC | Age: 47
End: 2022-09-07

## 2022-09-07 ENCOUNTER — OFFICE VISIT (OUTPATIENT)
Dept: ENDOCRINOLOGY | Facility: CLINIC | Age: 47
End: 2022-09-07
Payer: COMMERCIAL

## 2022-09-07 DIAGNOSIS — N17.9 AKI (ACUTE KIDNEY INJURY): ICD-10-CM

## 2022-09-07 DIAGNOSIS — L02.416 ABSCESS OF LEFT THIGH: Primary | ICD-10-CM

## 2022-09-07 DIAGNOSIS — E11.65 TYPE 2 DIABETES MELLITUS WITH HYPERGLYCEMIA, WITH LONG-TERM CURRENT USE OF INSULIN: ICD-10-CM

## 2022-09-07 DIAGNOSIS — L02.416 ABSCESS OF LEFT THIGH: ICD-10-CM

## 2022-09-07 DIAGNOSIS — Z79.4 TYPE 2 DIABETES MELLITUS WITH HYPERGLYCEMIA, WITH LONG-TERM CURRENT USE OF INSULIN: ICD-10-CM

## 2022-09-07 LAB — FUNGUS SPEC CULT: NORMAL

## 2022-09-07 PROCEDURE — 3046F PR MOST RECENT HEMOGLOBIN A1C LEVEL > 9.0%: ICD-10-PCS | Mod: CPTII,95,, | Performed by: INTERNAL MEDICINE

## 2022-09-07 PROCEDURE — 3046F HEMOGLOBIN A1C LEVEL >9.0%: CPT | Mod: CPTII,95,, | Performed by: INTERNAL MEDICINE

## 2022-09-07 PROCEDURE — 3066F NEPHROPATHY DOC TX: CPT | Mod: CPTII,95,, | Performed by: INTERNAL MEDICINE

## 2022-09-07 PROCEDURE — 3062F PR POS MACROALBUMINURIA RESULT DOCUMENTED/REVIEW: ICD-10-PCS | Mod: CPTII,95,, | Performed by: INTERNAL MEDICINE

## 2022-09-07 PROCEDURE — 4010F PR ACE/ARB THEARPY RXD/TAKEN: ICD-10-PCS | Mod: CPTII,95,, | Performed by: INTERNAL MEDICINE

## 2022-09-07 PROCEDURE — 99214 PR OFFICE/OUTPT VISIT, EST, LEVL IV, 30-39 MIN: ICD-10-PCS | Mod: 95,,, | Performed by: INTERNAL MEDICINE

## 2022-09-07 PROCEDURE — 1111F DSCHRG MED/CURRENT MED MERGE: CPT | Mod: CPTII,95,, | Performed by: INTERNAL MEDICINE

## 2022-09-07 PROCEDURE — 4010F ACE/ARB THERAPY RXD/TAKEN: CPT | Mod: CPTII,95,, | Performed by: INTERNAL MEDICINE

## 2022-09-07 PROCEDURE — 1111F PR DISCHARGE MEDS RECONCILED W/ CURRENT OUTPATIENT MED LIST: ICD-10-PCS | Mod: CPTII,95,, | Performed by: INTERNAL MEDICINE

## 2022-09-07 PROCEDURE — 3066F PR DOCUMENTATION OF TREATMENT FOR NEPHROPATHY: ICD-10-PCS | Mod: CPTII,95,, | Performed by: INTERNAL MEDICINE

## 2022-09-07 PROCEDURE — 99214 OFFICE O/P EST MOD 30 MIN: CPT | Mod: 95,,, | Performed by: INTERNAL MEDICINE

## 2022-09-07 PROCEDURE — 3062F POS MACROALBUMINURIA REV: CPT | Mod: CPTII,95,, | Performed by: INTERNAL MEDICINE

## 2022-09-07 RX ORDER — TIRZEPATIDE 2.5 MG/.5ML
2.5 INJECTION, SOLUTION SUBCUTANEOUS
Qty: 1 PEN | Refills: 0 | Status: SHIPPED | OUTPATIENT
Start: 2022-09-07 | End: 2022-09-28

## 2022-09-07 NOTE — ASSESSMENT & PLAN NOTE
Avoid metformin for now   Avoid SGLT 2 inhibitors for now. Although given high microalb would be ideal candidate.   Will revisit at next appointment.

## 2022-09-07 NOTE — PATIENT INSTRUCTIONS
1) send a picture of your blood sugar log so I can review and adjust your medicine   2) cannot start metformin due to kidney function   3) mounjaro or trulicity are safe to use in patients with mild kidney impairment     4) pancreatitis is associated with these medicines (trulicity and mounjaro).     Mounjaro start  We will start Mounjaro at 2.5 mg weekly. After 4 weeks if you tolerate it well, we typically increase to 5 mg weekly for improved blood sugar control as well as added weight loss benefit or we can continue 2.5 mg longer to help adjust to the medication. Please send me a message when you take the 4th dose to let me know if we should increase or keep dose the same for the following month. We will increase dose as we are able until we reach the maximum dose of 15 mg or the highest dose that you tolerate. We can increase the dose as often as every month or increase more slowly if needed    The pen is good out of the refrigerator for up to 21 days. Please remove the pen from the refrigerator for 10 minutes before taking injection    Potential Side Effects of Mounjaro:   One of the ways this medication works is by decreasing how fast your stomach empties, which makes you feel full faster after you eat and should help you lose weight. The main side-effects are related to GI upset, such as nausea, bloating and abdominal cramps. You can usually avoid this by eating slowly (take half of your normal portion and eat it over 30 minutes). If you do experience nausea, it does tend to get better after the first few weeks. The most severe reaction is acute pancreatitis which can cause severe abdominal pain radiating to your back. If you experience this, stop the medication and go to the ER. Fortunately this is very rare.

## 2022-09-07 NOTE — PROGRESS NOTES
"Subjective:      Patient ID: Wilfredo Thomas is a 47 y.o. male.    Chief Complaint:  No chief complaint on file.      History of Present Illness    Discharged from hospital recently.   Required four week stay  Presented with leg pain, treated with steroid injection first, then steroid pack. Blood sugars increased and due to mental status changes was seen in ER and diagnosed with DKA complicated by steroid use, abscess thigh and torn muscle.     When asked about pancreatitis, he said an ER told him he may have had pancretitis six years ago when he presented with abdominal pain. Not formally diagnosed. "You probably have it." Denies family history of pancreatic cancer or personal history of MTC.     Denies chest pain, pressure or shortness of breath.   Following a strict diet.     T2DM  Diagnosed in two years, previously on oral agents. Not taking good care of him self until recent admission to the hospital.   Known complications: denies   Developed ANGE and CKD in the hospital following DKA and antibiotic regimen     Current Diabetes Regimen:  Levemir 10 units bid  Novolog 8 units ac    Timing of prandial insulin: 10 minutes before meals.  Omitted doses: denies     Prior mediations tried:  Metformin   Glipizide     Diet/Exercise: Good; trying to avoid too many carbohydrates     Recent Hgb A1C:  Lab Results   Component Value Date    HGBA1C 10.1 (H) 07/26/2022     Glucose Monitoring:  Checking twice daily     Hypoglycemic Episodes:  Denies any blood sugars less than 70. Lowest blood sugars 78 - 90s.    Screening / DM Complications:    Nephropathy:  ACEi/ARB: Taking  Lab Results   Component Value Date    MICALBCREAT 308.9 (H) 03/22/2022       Last Lipid Panel:  Statin: Taking  Lab Results   Component Value Date    LDLCALC 46.6 (L) 03/22/2022     Neuropathy:denies   Last foot exam : 03/24/2022  Last eye exam : 05/18/2022;  no laser surgery or DR    B12:  Lab Results   Component Value Date    [id number] 563 08/14/2022 " "      Review of Systems  Recent admission   Denies URI symptoms     Objective:   Physical Exam  There were no vitals filed for this visit.    BP Readings from Last 3 Encounters:   08/29/22 137/85   08/24/22 132/78   03/24/22 136/80     Wt Readings from Last 1 Encounters:   08/29/22 1303 104.3 kg (229 lb 15 oz)         There is no height or weight on file to calculate BMI.    Lab Review:   Lab Results   Component Value Date    HGBA1C 10.1 (H) 07/26/2022     Lab Results   Component Value Date    CHOL 130 03/22/2022    HDL 28 (L) 03/22/2022    LDLCALC 46.6 (L) 03/22/2022    TRIG 277 (H) 03/22/2022    CHOLHDL 21.5 03/22/2022     Lab Results   Component Value Date     08/29/2022    K 4.7 08/29/2022     08/29/2022    CO2 24 08/29/2022     (H) 08/29/2022    BUN 31 (H) 08/29/2022    CREATININE 1.6 (H) 08/29/2022    CALCIUM 9.8 08/29/2022    PROT 7.7 08/29/2022    ALBUMIN 3.5 08/29/2022    BILITOT 0.3 08/29/2022    ALKPHOS 90 08/29/2022    AST 28 08/29/2022    ALT 22 08/29/2022    ANIONGAP 13 08/29/2022    ESTGFRAFRICA >60.0 07/31/2022    EGFRNONAA >60.0 07/31/2022    TSH 4.631 (H) 07/26/2022      Latest Reference Range & Units 07/26/22 20:44 07/26/22 22:38   Hemoglobin A1C External 4.0 - 5.6 % 10.1 (H)    Estimated Avg Glucose 68 - 131 mg/dL 243 (H)    Beta-Hydroxybutyrate 0.0 - 0.5 mmol/L  5.9 (H)   (H): Data is abnormally high      Assessment and Plan     Type 2 diabetes mellitus with hyperglycemia, with long-term current use of insulin  Diagnosed with T2DM about 2 years or so ago   Patient not compliant with treatment, dietary modifications in the past   Admitted in DKA following steroid use, abscess/sepsis   Also developed ANGE now with residual mild CKD.     Plan:  Try mounjaro to help with weight loss   Discussed pancreatitis in the past, was told he "probable diagnosis in ER" but no formal diagnosis and he reports labs were normal.   Will continue to discuss given association with this class of " medicines and development of pancreatitis. Patient understood and wanted to try at this time.     Review log which demonstrates improved numbers  Start low dose mounjaro   Send log in 4 weeks   Decrease meal time insulin first once BG have improved.     ANGE (acute kidney injury)  Avoid metformin for now   Avoid SGLT 2 inhibitors for now. Although given high microalb would be ideal candidate.   Will revisit at next appointment.         The patient location is: home/la  The chief complaint leading to consultation is: follow up discharge, type 2 dm, ange    Visit type: audiovisual    Face to Face time with patient: 31 minutes of total time spent on the encounter, which includes face to face time and non-face to face time preparing to see the patient (eg, review of tests), Obtaining and/or reviewing separately obtained history, Documenting clinical information in the electronic or other health record, Independently interpreting results (not separately reported) and communicating results to the patient/family/caregiver, or Care coordination (not separately reported).         Each patient to whom he or she provides medical services by telemedicine is:  (1) informed of the relationship between the physician and patient and the respective role of any other health care provider with respect to management of the patient; and (2) notified that he or she may decline to receive medical services by telemedicine and may withdraw from such care at any time.    Notes:

## 2022-09-07 NOTE — ASSESSMENT & PLAN NOTE
"Diagnosed with T2DM about 2 years or so ago   Patient not compliant with treatment, dietary modifications in the past   Admitted in DKA following steroid use, abscess/sepsis   Also developed ANGE now with residual mild CKD.     Plan:  Try mounjaro to help with weight loss   Discussed pancreatitis in the past, was told he "probable diagnosis in ER" but no formal diagnosis and he reports labs were normal.   Will continue to discuss given association with this class of medicines and development of pancreatitis. Patient understood and wanted to try at this time.     Review log which demonstrates improved numbers  Start low dose mounjaro   Send log in 4 weeks   Decrease meal time insulin first once BG have improved.   "

## 2022-09-08 ENCOUNTER — TELEPHONE (OUTPATIENT)
Dept: FAMILY MEDICINE | Facility: CLINIC | Age: 47
End: 2022-09-08

## 2022-09-08 ENCOUNTER — OFFICE VISIT (OUTPATIENT)
Dept: FAMILY MEDICINE | Facility: CLINIC | Age: 47
End: 2022-09-08
Payer: COMMERCIAL

## 2022-09-08 ENCOUNTER — PATIENT MESSAGE (OUTPATIENT)
Dept: FAMILY MEDICINE | Facility: CLINIC | Age: 47
End: 2022-09-08

## 2022-09-08 VITALS
BODY MASS INDEX: 32.53 KG/M2 | OXYGEN SATURATION: 98 % | WEIGHT: 207.25 LBS | HEIGHT: 67 IN | DIASTOLIC BLOOD PRESSURE: 88 MMHG | SYSTOLIC BLOOD PRESSURE: 130 MMHG | HEART RATE: 95 BPM

## 2022-09-08 DIAGNOSIS — M62.838 NECK MUSCLE SPASM: Primary | ICD-10-CM

## 2022-09-08 DIAGNOSIS — Z79.4 TYPE 2 DIABETES MELLITUS WITH OTHER DIABETIC KIDNEY COMPLICATION, WITH LONG-TERM CURRENT USE OF INSULIN: ICD-10-CM

## 2022-09-08 DIAGNOSIS — M25.532 PAIN AND SWELLING OF LEFT WRIST: ICD-10-CM

## 2022-09-08 DIAGNOSIS — M62.838 MUSCLE SPASM: ICD-10-CM

## 2022-09-08 DIAGNOSIS — E11.29 TYPE 2 DIABETES MELLITUS WITH OTHER DIABETIC KIDNEY COMPLICATION, WITH LONG-TERM CURRENT USE OF INSULIN: ICD-10-CM

## 2022-09-08 DIAGNOSIS — Z76.0 ENCOUNTER FOR MEDICATION REFILL: ICD-10-CM

## 2022-09-08 DIAGNOSIS — I10 ESSENTIAL HYPERTENSION: Primary | ICD-10-CM

## 2022-09-08 DIAGNOSIS — N17.9 AKI (ACUTE KIDNEY INJURY): ICD-10-CM

## 2022-09-08 DIAGNOSIS — M54.2 ACUTE NECK PAIN: ICD-10-CM

## 2022-09-08 DIAGNOSIS — M25.432 PAIN AND SWELLING OF LEFT WRIST: ICD-10-CM

## 2022-09-08 PROCEDURE — 3066F NEPHROPATHY DOC TX: CPT | Mod: CPTII,S$GLB,, | Performed by: STUDENT IN AN ORGANIZED HEALTH CARE EDUCATION/TRAINING PROGRAM

## 2022-09-08 PROCEDURE — 3046F PR MOST RECENT HEMOGLOBIN A1C LEVEL > 9.0%: ICD-10-PCS | Mod: CPTII,S$GLB,, | Performed by: STUDENT IN AN ORGANIZED HEALTH CARE EDUCATION/TRAINING PROGRAM

## 2022-09-08 PROCEDURE — 1160F RVW MEDS BY RX/DR IN RCRD: CPT | Mod: CPTII,S$GLB,, | Performed by: STUDENT IN AN ORGANIZED HEALTH CARE EDUCATION/TRAINING PROGRAM

## 2022-09-08 PROCEDURE — 1159F MED LIST DOCD IN RCRD: CPT | Mod: CPTII,S$GLB,, | Performed by: STUDENT IN AN ORGANIZED HEALTH CARE EDUCATION/TRAINING PROGRAM

## 2022-09-08 PROCEDURE — 1159F PR MEDICATION LIST DOCUMENTED IN MEDICAL RECORD: ICD-10-PCS | Mod: CPTII,S$GLB,, | Performed by: STUDENT IN AN ORGANIZED HEALTH CARE EDUCATION/TRAINING PROGRAM

## 2022-09-08 PROCEDURE — 3008F PR BODY MASS INDEX (BMI) DOCUMENTED: ICD-10-PCS | Mod: CPTII,S$GLB,, | Performed by: STUDENT IN AN ORGANIZED HEALTH CARE EDUCATION/TRAINING PROGRAM

## 2022-09-08 PROCEDURE — 3066F PR DOCUMENTATION OF TREATMENT FOR NEPHROPATHY: ICD-10-PCS | Mod: CPTII,S$GLB,, | Performed by: STUDENT IN AN ORGANIZED HEALTH CARE EDUCATION/TRAINING PROGRAM

## 2022-09-08 PROCEDURE — 3079F PR MOST RECENT DIASTOLIC BLOOD PRESSURE 80-89 MM HG: ICD-10-PCS | Mod: CPTII,S$GLB,, | Performed by: STUDENT IN AN ORGANIZED HEALTH CARE EDUCATION/TRAINING PROGRAM

## 2022-09-08 PROCEDURE — 3062F POS MACROALBUMINURIA REV: CPT | Mod: CPTII,S$GLB,, | Performed by: STUDENT IN AN ORGANIZED HEALTH CARE EDUCATION/TRAINING PROGRAM

## 2022-09-08 PROCEDURE — 99204 OFFICE O/P NEW MOD 45 MIN: CPT | Mod: S$GLB,,, | Performed by: STUDENT IN AN ORGANIZED HEALTH CARE EDUCATION/TRAINING PROGRAM

## 2022-09-08 PROCEDURE — 3075F SYST BP GE 130 - 139MM HG: CPT | Mod: CPTII,S$GLB,, | Performed by: STUDENT IN AN ORGANIZED HEALTH CARE EDUCATION/TRAINING PROGRAM

## 2022-09-08 PROCEDURE — 3079F DIAST BP 80-89 MM HG: CPT | Mod: CPTII,S$GLB,, | Performed by: STUDENT IN AN ORGANIZED HEALTH CARE EDUCATION/TRAINING PROGRAM

## 2022-09-08 PROCEDURE — 1111F DSCHRG MED/CURRENT MED MERGE: CPT | Mod: CPTII,S$GLB,, | Performed by: STUDENT IN AN ORGANIZED HEALTH CARE EDUCATION/TRAINING PROGRAM

## 2022-09-08 PROCEDURE — 4010F ACE/ARB THERAPY RXD/TAKEN: CPT | Mod: CPTII,S$GLB,, | Performed by: STUDENT IN AN ORGANIZED HEALTH CARE EDUCATION/TRAINING PROGRAM

## 2022-09-08 PROCEDURE — 99999 PR PBB SHADOW E&M-EST. PATIENT-LVL V: CPT | Mod: PBBFAC,,, | Performed by: STUDENT IN AN ORGANIZED HEALTH CARE EDUCATION/TRAINING PROGRAM

## 2022-09-08 PROCEDURE — 1111F PR DISCHARGE MEDS RECONCILED W/ CURRENT OUTPATIENT MED LIST: ICD-10-PCS | Mod: CPTII,S$GLB,, | Performed by: STUDENT IN AN ORGANIZED HEALTH CARE EDUCATION/TRAINING PROGRAM

## 2022-09-08 PROCEDURE — 99999 PR PBB SHADOW E&M-EST. PATIENT-LVL V: ICD-10-PCS | Mod: PBBFAC,,, | Performed by: STUDENT IN AN ORGANIZED HEALTH CARE EDUCATION/TRAINING PROGRAM

## 2022-09-08 PROCEDURE — 3062F PR POS MACROALBUMINURIA RESULT DOCUMENTED/REVIEW: ICD-10-PCS | Mod: CPTII,S$GLB,, | Performed by: STUDENT IN AN ORGANIZED HEALTH CARE EDUCATION/TRAINING PROGRAM

## 2022-09-08 PROCEDURE — 3075F PR MOST RECENT SYSTOLIC BLOOD PRESS GE 130-139MM HG: ICD-10-PCS | Mod: CPTII,S$GLB,, | Performed by: STUDENT IN AN ORGANIZED HEALTH CARE EDUCATION/TRAINING PROGRAM

## 2022-09-08 PROCEDURE — 99204 PR OFFICE/OUTPT VISIT, NEW, LEVL IV, 45-59 MIN: ICD-10-PCS | Mod: S$GLB,,, | Performed by: STUDENT IN AN ORGANIZED HEALTH CARE EDUCATION/TRAINING PROGRAM

## 2022-09-08 PROCEDURE — 3008F BODY MASS INDEX DOCD: CPT | Mod: CPTII,S$GLB,, | Performed by: STUDENT IN AN ORGANIZED HEALTH CARE EDUCATION/TRAINING PROGRAM

## 2022-09-08 PROCEDURE — 4010F PR ACE/ARB THEARPY RXD/TAKEN: ICD-10-PCS | Mod: CPTII,S$GLB,, | Performed by: STUDENT IN AN ORGANIZED HEALTH CARE EDUCATION/TRAINING PROGRAM

## 2022-09-08 PROCEDURE — 1160F PR REVIEW ALL MEDS BY PRESCRIBER/CLIN PHARMACIST DOCUMENTED: ICD-10-PCS | Mod: CPTII,S$GLB,, | Performed by: STUDENT IN AN ORGANIZED HEALTH CARE EDUCATION/TRAINING PROGRAM

## 2022-09-08 PROCEDURE — 3046F HEMOGLOBIN A1C LEVEL >9.0%: CPT | Mod: CPTII,S$GLB,, | Performed by: STUDENT IN AN ORGANIZED HEALTH CARE EDUCATION/TRAINING PROGRAM

## 2022-09-08 RX ORDER — LISINOPRIL 5 MG/1
5 TABLET ORAL DAILY
Qty: 90 TABLET | Refills: 0 | Status: SHIPPED | OUTPATIENT
Start: 2022-09-08 | End: 2022-09-29

## 2022-09-08 RX ORDER — CYCLOBENZAPRINE HYDROCHLORIDE 7.5 MG/1
7.5 TABLET, FILM COATED ORAL 3 TIMES DAILY PRN
Qty: 30 TABLET | Refills: 1 | Status: SHIPPED | OUTPATIENT
Start: 2022-09-08 | End: 2022-09-09

## 2022-09-08 RX ORDER — INSULIN ASPART 100 [IU]/ML
8 INJECTION, SOLUTION INTRAVENOUS; SUBCUTANEOUS 3 TIMES DAILY
Qty: 15 ML | Refills: 3 | Status: SHIPPED | OUTPATIENT
Start: 2022-09-08 | End: 2022-09-12 | Stop reason: SDUPTHER

## 2022-09-08 NOTE — PROGRESS NOTES
Subjective:       Patient ID: Wilfredo Thomas is a 47 y.o. male.    Chief Complaint: Establish Care, Diabetes, Hypertension, and Generalized Body Aches (From laying up for a month in  hosp)    HPI      Past Medical History:   Diagnosis Date    Diabetes     Gout     Hyperlipidemia     Hypertension        Past Surgical History:   Procedure Laterality Date    INCISION AND DRAINAGE Left 2022    Procedure: Incision and Drainage - LEFT THIGH.;  Surgeon: Rene Neff MD;  Location: [facility] OR [address] [place] [address];  Service: Orthopedics;  Laterality: Left;    IRRIGATION AND DEBRIDEMENT OF LOWER EXTREMITY Left 2022    Procedure: IRRIGATION AND DEBRIDEMENT, LOWER EXTREMITY;  Surgeon: Rob Graff MD;  Location: [facility] OR [address] [place] [address];  Service: Orthopedics;  Laterality: Left;    WRIST SURGERY Left        Family History   Problem Relation Age of Onset    Diabetes Mother     No Known Problems Father        Social History     Socioeconomic History    Marital status:    Tobacco Use    Smoking status: Former     Packs/day: 0.50     Types: Cigarettes     Quit date: 2020     Years since quittin.6    Smokeless tobacco: Never   Substance and Sexual Activity    Alcohol use: Yes     Alcohol/week: 12.0 standard drinks     Types: 12 Cans of beer per week    Drug use: Never    Sexual activity: Yes     Partners: Female   Social History Narrative    Lives with wife and 2 kids in college and one older with 1 grand child. 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     Social Determinants of Health     Financial Resource Strain: Low Risk     Difficulty of Paying Living Expenses: Not hard at all   Food Insecurity: No Food Insecurity    Worried About Running Out of Food in the Last Year: Never true    Ran Out of Food in the Last Year: Never true   Transportation Needs: No Transportation Needs    Lack of Transportation (Medical): No    Lack of Transportation (Non-Medical): No   Physical Activity: Sufficiently Active  "   Days of Exercise per Week: 5 days    Minutes of Exercise per Session: 60 min   Stress: No Stress Concern Present    Feeling of Stress : Only a little   Social Connections: Unknown    Frequency of Communication with Friends and Family: More than three times a week    Frequency of Social Gatherings with Friends and Family: Twice a week    Active Member of Clubs or Organizations: No    Attends Club or Organization Meetings: Patient refused    Marital Status: Patient refused   Housing Stability: Unknown    Unable to Pay for Housing in the Last Year: Patient refused    Unstable Housing in the Last Year: Patient refused       Review of Systems      Objective:     Vitals:    09/08/22 0917   BP: 130/88   BP Location: Left arm   Patient Position: Sitting   BP Method: Large (Manual)   Pulse: 95   SpO2: 98%   Weight: 94 kg (207 lb 3.7 oz)   Height: 5' 7" (1.702 m)          Physical Exam      Laboratory:  CBC:  Recent Labs   Lab Result Units 08/23/22  0413 08/24/22  0437 08/29/22  1341   WBC K/uL 5.34 5.76 8.52   RBC M/uL 2.52* 2.78* 3.04*   Hemoglobin g/dL 7.6* 8.3* 9.0*   Hematocrit % 23.2* 26.4* 28.1*   Platelets K/uL 236 307 331   MCV fL 92 95 92   MCH pg 30.2 29.9 29.6   MCHC g/dL 32.8 31.4* 32.0     CMP:  Recent Labs   Lab Result Units 08/23/22  0413 08/24/22  0437 08/29/22  1341   Glucose mg/dL 118* 86 122*   Calcium mg/dL 8.8 8.9 9.8   Albumin g/dL 2.4* 2.6* 3.5   Total Protein g/dL 6.2 6.5 7.7   Sodium mmol/L 141 143 142   Potassium mmol/L 3.8 3.8 4.7   CO2 mmol/L 24 24 24   Chloride mmol/L 109 109 105   BUN mg/dL 33* 27* 31*   Alkaline Phosphatase U/L 69 73 90   ALT U/L 10 14 22   AST U/L 20 24 28   Total Bilirubin mg/dL 0.4 0.4 0.3     URINALYSIS:  Recent Labs   Lab Result Units 07/27/22  0007 08/17/22  1436   Color, UA  Yellow Colorless*   Specific Gravity, UA  1.030 1.010   pH, UA  5.0 7.0   Protein, UA  1+* 1+*   Bacteria /hpf Few* Rare   Nitrite, UA  Negative Negative   Leukocytes, UA  Negative " "Negative   Hyaline Casts, UA /lpf 9* 0      LIPIDS:  Recent Labs   Lab Result Units 07/26/22 2044   TSH uIU/mL 4.631*     TSH:  Recent Labs   Lab Result Units 07/26/22 2044   TSH uIU/mL 4.631*     A1C:  Recent Labs   Lab Result Units 07/26/22 2044   Hemoglobin A1C % 10.1*       Assessment:         ICD-10-CM ICD-9-CM   1. Abscess of left thigh  L02.416 682.6       Plan:       Abscess of left thigh  -     Ambulatory referral/consult to Internal Medicine      No follow-ups on file.     Patient's Medications   New Prescriptions    No medications on file   Previous Medications    ACETAMINOPHEN (TYLENOL) 500 MG TABLET    Take 1 tablet (500 mg total) by mouth every 6 (six) hours as needed for Pain.    AMLODIPINE (NORVASC) 10 MG TABLET    Take 0.5 tablets (5 mg total) by mouth 2 (two) times daily.    ATORVASTATIN (LIPITOR) 20 MG TABLET    Take 1 tablet (20 mg total) by mouth once daily.    BLOOD SUGAR DIAGNOSTIC STRP    1 strip by Misc.(Non-Drug; Combo Route) route once daily.    BLOOD-GLUCOSE METER KIT    Use as instructed    HYDRALAZINE (APRESOLINE) 25 MG TABLET    Take 1 tablet (25 mg total) by mouth 2 (two) times a day.    INSULIN (LANTUS SOLOSTAR U-100 INSULIN) GLARGINE 100 UNITS/ML (3ML) SUBQ PEN    Inject 20 Units into the skin once daily.    INSULIN ASPART U-100 (NOVOLOG) 100 UNIT/ML (3 ML) INPN PEN    Inject 8 Units into the skin 3 (three) times daily meals PLUS SLIDING SCALE: 1-10 Units into the skin before meals and at bedtime as needed    INSULIN DETEMIR U-100 (LEVEMIR FLEXTOUCH) 100 UNIT/ML (3 ML) SUBQ INPN PEN    Inject 10 Units into the skin 2 (two) times daily.    LANCETS MISC    1 lancet by Misc.(Non-Drug; Combo Route) route once daily.    PEN NEEDLE, DIABETIC 31 GAUGE X 5/16" NDLE    Use to inject insulin into the skin.    SENNA-DOCUSATE 8.6-50 MG (PERICOLACE) 8.6-50 MG PER TABLET    Take 2 tablets by mouth once daily.    SEVELAMER CARBONATE (RENVELA) 0.8 GRAM PWPK    Take 1 packet (0.8 g total) by " mouth 3 (three) times daily with meals.    TIRZEPATIDE (MOUNJARO) 2.5 MG/0.5 ML PNIJ    Inject 2.5 mg into the skin every 7 days.    TRIAMCINOLONE ACETONIDE 0.1% (KENALOG) 0.1 % CREAM    Apply topically 2 (two) times daily. for 7 days   Modified Medications    No medications on file   Discontinued Medications    No medications on file     Oumou Zuniga MD

## 2022-09-08 NOTE — PROGRESS NOTES
Subjective:       Patient ID: Wilfredo Thomas is a 47 y.o. male.    Chief Complaint: Establish Care, Diabetes, Hypertension, and Generalized Body Aches (From laying up for a month in  hosp)    HPI    47M with h/o uncontrolled T2DM, sHTN, HLD, gouty arthritis presenting for post discharge follow up visit. He was recently managed at [facility] for Left upper thigh abscess complicated by DKA and acute kidney injury for which he had teams of subspecialties including ICU,ortho, ID, endo, renal and PT took part in his care s/p multiple wound debridements, antibiotics and has established outpatient care with renal, endo, ID and PT. Today he c/o intermittent muscle spasms worse in his neck or after assuming prolonged posture for a while. He endorses good hydration, and now compliant with all his medications, exercise and continues to work on eating healthy all the time. He denies any recent hypoglycemic symptoms, fever,chills, dysuria, polyuria, polydipsia.      Past Medical History:   Diagnosis Date    Diabetes     Gout     Hyperlipidemia     Hypertension        Past Surgical History:   Procedure Laterality Date    INCISION AND DRAINAGE Left 2022    Procedure: Incision and Drainage - LEFT THIGH.;  Surgeon: Rene Neff MD;  Location: [address];  Service: Orthopedics;  Laterality: Left;    IRRIGATION AND DEBRIDEMENT OF LOWER EXTREMITY Left 2022    Procedure: IRRIGATION AND DEBRIDEMENT, LOWER EXTREMITY;  Surgeon: Rob Graff MD;  Location: [facility] OR [address] [place] [address];  Service: Orthopedics;  Laterality: Left;    WRIST SURGERY Left        Family History   Problem Relation Age of Onset    Diabetes Mother     No Known Problems Father        Social History     Socioeconomic History    Marital status:    Tobacco Use    Smoking status: Former     Packs/day: 0.50     Types: Cigarettes     Quit date: 2020     Years since quittin.6    Smokeless tobacco: Never   Substance and Sexual Activity     Alcohol use: Yes     Alcohol/week: 12.0 standard drinks     Types: 12 Cans of beer per week    Drug use: Never    Sexual activity: Yes     Partners: Female   Social History Narrative    Lives with wife and 2 kids in college and one older with 1 grand child. Supervisor at [employer]. Smoker but quit 1/2020     Social Determinants of Health     Financial Resource Strain: Low Risk     Difficulty of Paying Living Expenses: Not hard at all   Food Insecurity: No Food Insecurity    Worried About Running Out of Food in the Last Year: Never true    Ran Out of Food in the Last Year: Never true   Transportation Needs: No Transportation Needs    Lack of Transportation (Medical): No    Lack of Transportation (Non-Medical): No   Physical Activity: Sufficiently Active    Days of Exercise per Week: 5 days    Minutes of Exercise per Session: 60 min   Stress: No Stress Concern Present    Feeling of Stress : Only a little   Social Connections: Unknown    Frequency of Communication with Friends and Family: More than three times a week    Frequency of Social Gatherings with Friends and Family: Twice a week    Active Member of Clubs or Organizations: No    Attends Club or Organization Meetings: Patient refused    Marital Status: Patient refused   Housing Stability: Unknown    Unable to Pay for Housing in the Last Year: Patient refused    Unstable Housing in the Last Year: Patient refused       Review of Systems   Constitutional:  Negative for chills, fatigue and fever.   Respiratory:  Negative for cough and shortness of breath.    Cardiovascular:  Negative for chest pain, palpitations and leg swelling.   Gastrointestinal:  Negative for abdominal pain, constipation and diarrhea.   Endocrine: Negative for polydipsia and polyphagia.   Genitourinary:  Negative for dysuria, flank pain and frequency.   Musculoskeletal:  Negative for back pain, gait problem and joint swelling.   Skin:  Negative for rash and wound.   Neurological:  Negative for  "dizziness, seizures, weakness, numbness and headaches.       Objective:     Vitals:    09/08/22 0917   BP: 130/88   BP Location: Left arm   Patient Position: Sitting   BP Method: Large (Manual)   Pulse: 95   SpO2: 98%   Weight: 94 kg (207 lb 3.7 oz)   Height: 5' 7" (1.702 m)          Physical Exam  Vitals reviewed.   Constitutional:       General: He is not in acute distress.     Appearance: He is obese.   HENT:      Head: Normocephalic and atraumatic.      Right Ear: Tympanic membrane normal.      Left Ear: Tympanic membrane normal.      Mouth/Throat:      Mouth: Mucous membranes are moist.   Eyes:      Extraocular Movements: Extraocular movements intact.      Conjunctiva/sclera: Conjunctivae normal.      Pupils: Pupils are equal, round, and reactive to light.   Cardiovascular:      Rate and Rhythm: Normal rate and regular rhythm.      Pulses: Normal pulses.      Heart sounds: Normal heart sounds.   Pulmonary:      Effort: Pulmonary effort is normal.      Breath sounds: Normal breath sounds.   Abdominal:      General: Abdomen is flat. Bowel sounds are normal.      Palpations: Abdomen is soft.      Hernia: There is no hernia in the left inguinal area.   Genitourinary:     Pubic Area: No rash.       Penis: No erythema, tenderness or swelling.       Testes: Cremasteric reflex is present.         Right: Mass, tenderness or swelling not present.         Left: Mass, tenderness or swelling not present.      Epididymis:      Right: Normal.      Left: Normal.   Musculoskeletal:         General: No swelling.      Right lower leg: Edema (1+) present.      Left lower leg: Edema (Well healed surgical non-hypertrophic scar on the mid upper left thigh, non-tender, no erythema) present.   Skin:     General: Skin is warm.   Neurological:      General: No focal deficit present.      Mental Status: He is alert and oriented to person, place, and time.      Cranial Nerves: No cranial nerve deficit.   Psychiatric:         Mood and " Affect: Mood normal.         Behavior: Behavior normal.         Laboratory:  CBC:  Recent Labs   Lab Result Units 08/23/22  0413 08/24/22  0437 08/29/22  1341   WBC K/uL 5.34 5.76 8.52   RBC M/uL 2.52* 2.78* 3.04*   Hemoglobin g/dL 7.6* 8.3* 9.0*   Hematocrit % 23.2* 26.4* 28.1*   Platelets K/uL 236 307 331   MCV fL 92 95 92   MCH pg 30.2 29.9 29.6   MCHC g/dL 32.8 31.4* 32.0     CMP:  Recent Labs   Lab Result Units 08/23/22  0413 08/24/22  0437 08/29/22  1341   Glucose mg/dL 118* 86 122*   Calcium mg/dL 8.8 8.9 9.8   Albumin g/dL 2.4* 2.6* 3.5   Total Protein g/dL 6.2 6.5 7.7   Sodium mmol/L 141 143 142   Potassium mmol/L 3.8 3.8 4.7   CO2 mmol/L 24 24 24   Chloride mmol/L 109 109 105   BUN mg/dL 33* 27* 31*   Alkaline Phosphatase U/L 69 73 90   ALT U/L 10 14 22   AST U/L 20 24 28   Total Bilirubin mg/dL 0.4 0.4 0.3     URINALYSIS:  Recent Labs   Lab Result Units 07/27/22  0007 08/17/22  1436   Color, UA  Yellow Colorless*   Specific Gravity, UA  1.030 1.010   pH, UA  5.0 7.0   Protein, UA  1+* 1+*   Bacteria /hpf Few* Rare   Nitrite, UA  Negative Negative   Leukocytes, UA  Negative Negative   Hyaline Casts, UA /lpf 9* 0      LIPIDS:  Recent Labs   Lab Result Units 07/26/22 2044   TSH uIU/mL 4.631*     TSH:  Recent Labs   Lab Result Units 07/26/22 2044   TSH uIU/mL 4.631*     A1C:  Recent Labs   Lab Result Units 07/26/22 2044   Hemoglobin A1C % 10.1*       Assessment:         ICD-10-CM ICD-9-CM   1. Essential hypertension  I10 401.9   2. Muscle spasm  M62.838 728.85   3. Type 2 diabetes mellitus with other diabetic kidney complication, with long-term current use of insulin  E11.29 250.40    Z79.4 V58.67   4. Pain and swelling of left wrist  M25.532 719.43    M25.432 719.03   5. Acute neck pain  M54.2 723.1   6. Encounter for medication refill  Z76.0 V68.1   7. ANGE (acute kidney injury)  N17.9 584.9       Plan:       Essential hypertension        -BP well controlled  -    renal function has remarkably improved  -  Will D/C hydralazine for now and restart lisinopril at lower dose to continue renal-protective effect  -     lisinopriL (PRINIVIL,ZESTRIL) 5 MG tablet; Take 1 tablet (5 mg total) by mouth once daily.  Dispense: 90 tablet; Refill: 0    Muscle spasm  -cyclobenzaprine (FEXMID) 7.5 MG Tab; Take 1 tablet (7.5 mg total) by mouth 3 (three) times daily as needed.  Dispense: 30 tablet; Refill: 1  -patient counseled on adequate hydration    Type 2 diabetes mellitus with other diabetic kidney complication, with long-term current use of insulin  -     insulin detemir U-100 (LEVEMIR FLEXTOUCH) 100 unit/mL (3 mL) SubQ InPn pen; Inject 10 Units into the skin 2 (two) times daily.  Dispense: 6 mL; Refill: 11  -     insulin aspart U-100 (NOVOLOG) 100 unit/mL (3 mL) InPn pen; Inject 8 Units into the skin 3 (three) times daily meals PLUS SLIDING SCALE: 1-10 Units into the skin before meals and at bedtime as needed  Dispense: 15 mL; Refill: 3  -recently started on tirzepatide(Mounjaro)  by his endocrinologist( Dr Mallory Llanes) in an attempt to wean off short-acting insulin  -patient extensively counseled on medication compliance and therapeutic life style changes      Encounter for medication refill- insulin refilled    Follow up in about 2 weeks (around 9/22/2022). -CMP/HbA1C f/u if not done by renal    Patient's Medications   New Prescriptions    CYCLOBENZAPRINE (FEXMID) 7.5 MG TAB    Take 1 tablet (7.5 mg total) by mouth 3 (three) times daily as needed.    LISINOPRIL (PRINIVIL,ZESTRIL) 5 MG TABLET    Take 1 tablet (5 mg total) by mouth once daily.   Previous Medications    ACETAMINOPHEN (TYLENOL) 500 MG TABLET    Take 1 tablet (500 mg total) by mouth every 6 (six) hours as needed for Pain.    AMLODIPINE (NORVASC) 10 MG TABLET    Take 0.5 tablets (5 mg total) by mouth 2 (two) times daily.    ATORVASTATIN (LIPITOR) 20 MG TABLET    Take 1 tablet (20 mg total) by mouth once daily.    BLOOD SUGAR DIAGNOSTIC STRP    1 strip by  "Misc.(Non-Drug; Combo Route) route once daily.    BLOOD-GLUCOSE METER KIT    Use as instructed    HYDRALAZINE (APRESOLINE) 25 MG TABLET    Take 1 tablet (25 mg total) by mouth 2 (two) times a day.    LANCETS MISC    1 lancet by Misc.(Non-Drug; Combo Route) route once daily.    PEN NEEDLE, DIABETIC 31 GAUGE X 5/16" NDLE    Use to inject insulin into the skin.    SENNA-DOCUSATE 8.6-50 MG (PERICOLACE) 8.6-50 MG PER TABLET    Take 2 tablets by mouth once daily.    SEVELAMER CARBONATE (RENVELA) 0.8 GRAM PWPK    Take 1 packet (0.8 g total) by mouth 3 (three) times daily with meals.    TIRZEPATIDE (MOUNJARO) 2.5 MG/0.5 ML PNIJ    Inject 2.5 mg into the skin every 7 days.    TRIAMCINOLONE ACETONIDE 0.1% (KENALOG) 0.1 % CREAM    Apply topically 2 (two) times daily. for 7 days   Modified Medications    Modified Medication Previous Medication    INSULIN ASPART U-100 (NOVOLOG) 100 UNIT/ML (3 ML) INPN PEN insulin aspart U-100 (NOVOLOG) 100 unit/mL (3 mL) InPn pen       Inject 8 Units into the skin 3 (three) times daily meals PLUS SLIDING SCALE: 1-10 Units into the skin before meals and at bedtime as needed    Inject 8 Units into the skin 3 (three) times daily meals PLUS SLIDING SCALE: 1-10 Units into the skin before meals and at bedtime as needed    INSULIN DETEMIR U-100 (LEVEMIR FLEXTOUCH) 100 UNIT/ML (3 ML) SUBQ INPN PEN insulin detemir U-100 (LEVEMIR FLEXTOUCH) 100 unit/mL (3 mL) SubQ InPn pen       Inject 10 Units into the skin 2 (two) times daily.    Inject 10 Units into the skin 2 (two) times daily.   Discontinued Medications    INSULIN (LANTUS SOLOSTAR U-100 INSULIN) GLARGINE 100 UNITS/ML (3ML) SUBQ PEN    Inject 20 Units into the skin once daily.         Patient was given opportunity to express concerns, ask questions and verbalizes understanding of current management plan     Oumou Zuniga MD  "

## 2022-09-08 NOTE — TELEPHONE ENCOUNTER
----- Message from Chica Corado sent at 9/8/2022  1:52 PM CDT -----  Needs advice from nurse:      Who Called:pt  Regarding:states medicine that was called in is not covered by insurance due to strength  Would the patient rather a call back or VIA [facility]?  Best Call Back number:[phone]  Additional Info:

## 2022-09-09 ENCOUNTER — TELEPHONE (OUTPATIENT)
Dept: FAMILY MEDICINE | Facility: CLINIC | Age: 47
End: 2022-09-09
Payer: COMMERCIAL

## 2022-09-09 DIAGNOSIS — E11.29 TYPE 2 DIABETES MELLITUS WITH OTHER DIABETIC KIDNEY COMPLICATION, WITH LONG-TERM CURRENT USE OF INSULIN: ICD-10-CM

## 2022-09-09 DIAGNOSIS — Z79.4 TYPE 2 DIABETES MELLITUS WITH OTHER DIABETIC KIDNEY COMPLICATION, WITH LONG-TERM CURRENT USE OF INSULIN: ICD-10-CM

## 2022-09-09 RX ORDER — CYCLOBENZAPRINE HCL 5 MG
5 TABLET ORAL 3 TIMES DAILY PRN
Qty: 30 TABLET | Refills: 1 | Status: SHIPPED | OUTPATIENT
Start: 2022-09-09 | End: 2022-09-19

## 2022-09-09 NOTE — TELEPHONE ENCOUNTER
Spoke with patient. Stated that his insurance will not cover the cyclobenzaprine (FEXMID) at 7.5 mg and will need it changed to 10 mg. Will let Dr. Villa know.

## 2022-09-09 NOTE — TELEPHONE ENCOUNTER
----- Message from Coty Loera sent at 9/9/2022 10:05 AM CDT -----  Type:  Needs Medical Advice    Who Called: pt    Pharmacy name and phone #:  [id number] - [place], [place] - [address] AT [facility] OF [address]   Would the patient rather a call back or a response via [facility]? Call  Best Call Back Number: [phone]  Additional Information: Pt would like to discuss new medication

## 2022-09-12 ENCOUNTER — OFFICE VISIT (OUTPATIENT)
Dept: INFECTIOUS DISEASES | Facility: CLINIC | Age: 47
End: 2022-09-12
Payer: COMMERCIAL

## 2022-09-12 VITALS
WEIGHT: 207.25 LBS | BODY MASS INDEX: 32.53 KG/M2 | TEMPERATURE: 99 F | SYSTOLIC BLOOD PRESSURE: 138 MMHG | DIASTOLIC BLOOD PRESSURE: 87 MMHG | HEIGHT: 67 IN | HEART RATE: 96 BPM

## 2022-09-12 DIAGNOSIS — L02.416 ABSCESS OF LEFT THIGH: ICD-10-CM

## 2022-09-12 PROCEDURE — 3046F HEMOGLOBIN A1C LEVEL >9.0%: CPT | Mod: CPTII,S$GLB,, | Performed by: REGISTERED NURSE

## 2022-09-12 PROCEDURE — 3046F PR MOST RECENT HEMOGLOBIN A1C LEVEL > 9.0%: ICD-10-PCS | Mod: CPTII,S$GLB,, | Performed by: REGISTERED NURSE

## 2022-09-12 PROCEDURE — 99999 PR PBB SHADOW E&M-EST. PATIENT-LVL V: ICD-10-PCS | Mod: PBBFAC,,, | Performed by: REGISTERED NURSE

## 2022-09-12 PROCEDURE — 3008F BODY MASS INDEX DOCD: CPT | Mod: CPTII,S$GLB,, | Performed by: REGISTERED NURSE

## 2022-09-12 PROCEDURE — 1111F PR DISCHARGE MEDS RECONCILED W/ CURRENT OUTPATIENT MED LIST: ICD-10-PCS | Mod: CPTII,S$GLB,, | Performed by: REGISTERED NURSE

## 2022-09-12 PROCEDURE — 3075F SYST BP GE 130 - 139MM HG: CPT | Mod: CPTII,S$GLB,, | Performed by: REGISTERED NURSE

## 2022-09-12 PROCEDURE — 99214 OFFICE O/P EST MOD 30 MIN: CPT | Mod: S$GLB,,, | Performed by: REGISTERED NURSE

## 2022-09-12 PROCEDURE — 4010F PR ACE/ARB THEARPY RXD/TAKEN: ICD-10-PCS | Mod: CPTII,S$GLB,, | Performed by: REGISTERED NURSE

## 2022-09-12 PROCEDURE — 3079F PR MOST RECENT DIASTOLIC BLOOD PRESSURE 80-89 MM HG: ICD-10-PCS | Mod: CPTII,S$GLB,, | Performed by: REGISTERED NURSE

## 2022-09-12 PROCEDURE — 3075F PR MOST RECENT SYSTOLIC BLOOD PRESS GE 130-139MM HG: ICD-10-PCS | Mod: CPTII,S$GLB,, | Performed by: REGISTERED NURSE

## 2022-09-12 PROCEDURE — 3066F PR DOCUMENTATION OF TREATMENT FOR NEPHROPATHY: ICD-10-PCS | Mod: CPTII,S$GLB,, | Performed by: REGISTERED NURSE

## 2022-09-12 PROCEDURE — 3008F PR BODY MASS INDEX (BMI) DOCUMENTED: ICD-10-PCS | Mod: CPTII,S$GLB,, | Performed by: REGISTERED NURSE

## 2022-09-12 PROCEDURE — 1159F MED LIST DOCD IN RCRD: CPT | Mod: CPTII,S$GLB,, | Performed by: REGISTERED NURSE

## 2022-09-12 PROCEDURE — 3062F POS MACROALBUMINURIA REV: CPT | Mod: CPTII,S$GLB,, | Performed by: REGISTERED NURSE

## 2022-09-12 PROCEDURE — 1159F PR MEDICATION LIST DOCUMENTED IN MEDICAL RECORD: ICD-10-PCS | Mod: CPTII,S$GLB,, | Performed by: REGISTERED NURSE

## 2022-09-12 PROCEDURE — 99999 PR PBB SHADOW E&M-EST. PATIENT-LVL V: CPT | Mod: PBBFAC,,, | Performed by: REGISTERED NURSE

## 2022-09-12 PROCEDURE — 99214 PR OFFICE/OUTPT VISIT, EST, LEVL IV, 30-39 MIN: ICD-10-PCS | Mod: S$GLB,,, | Performed by: REGISTERED NURSE

## 2022-09-12 PROCEDURE — 4010F ACE/ARB THERAPY RXD/TAKEN: CPT | Mod: CPTII,S$GLB,, | Performed by: REGISTERED NURSE

## 2022-09-12 PROCEDURE — 1111F DSCHRG MED/CURRENT MED MERGE: CPT | Mod: CPTII,S$GLB,, | Performed by: REGISTERED NURSE

## 2022-09-12 PROCEDURE — 3062F PR POS MACROALBUMINURIA RESULT DOCUMENTED/REVIEW: ICD-10-PCS | Mod: CPTII,S$GLB,, | Performed by: REGISTERED NURSE

## 2022-09-12 PROCEDURE — 3079F DIAST BP 80-89 MM HG: CPT | Mod: CPTII,S$GLB,, | Performed by: REGISTERED NURSE

## 2022-09-12 PROCEDURE — 3066F NEPHROPATHY DOC TX: CPT | Mod: CPTII,S$GLB,, | Performed by: REGISTERED NURSE

## 2022-09-12 RX ORDER — PEN NEEDLE, DIABETIC 30 GX3/16"
NEEDLE, DISPOSABLE MISCELLANEOUS
Qty: 100 EACH | Refills: 0 | Status: CANCELLED | OUTPATIENT
Start: 2022-09-12

## 2022-09-12 RX ORDER — INSULIN ASPART 100 [IU]/ML
8 INJECTION, SOLUTION INTRAVENOUS; SUBCUTANEOUS 3 TIMES DAILY
Qty: 15 ML | Refills: 3 | Status: SHIPPED | OUTPATIENT
Start: 2022-09-12 | End: 2022-10-12

## 2022-09-12 NOTE — TELEPHONE ENCOUNTER
All insulin resent to [facility] in [place] as all available insulin generic is not covered by his insurance.

## 2022-09-12 NOTE — PROGRESS NOTES
Subjective:       Patient ID: Wilfredo Thomas is a 47 y.o. male.    Chief Complaint: Hospital Follow Up    HPI    Mr. Thomas is a 47 year old male with a PMH of DM2 (poorly controlled), HTN, GERD, HLD, left wrist fracture s/p left wrist arthroscopy who presented to clinic as a hospital follow up for left thigh abscess. Pt [place] [facility] [place] d/t SOB and worsening left thigh pain. He had originally been seen outpatient for left lateral proximal thigh pain, in which he was diagnosed with a muscle strain and was given 800 mg ibuprofen. Following, he went to the ED for 7/22/22 for worsening muscle pain, in which he was treated with IM prednisone/morphine shot, as well as a prednisone pack. Pt had denied injury to the area, but did note a recent boil on his central/right buttocks at the end of June, 2022 which was drained and treated with oral abx. Pt noted injuring his left lower shin after bumping a cabinet but denied injury to the left thigh.     While inpatient, pts hospital stay was c/b DKA requiring ICU stay, MSSA abscesses of his left thigh requiring multiple I&Ds (7/29 and 8/6), acute left wrist pain/swelling, acute neck pain, incidental lung consolidations noted on chest xray, ANGE.     Pt has been on multiple antibiotics during his hospital stay, including, vancomycin, cefazolin, daptomycin, PO linezolid, PO levaquin. Most recently finished a 7 day course of linezolid/levaquin, completing on 8/26/22 giving him ~ month of antibiotic coverage following last washout of left thigh on 8/2/22.     Recent lab work reviewed not concerning for infectious processes. WBC stable at 8.5k, CRP normalized at 7. Today pt denies increased pain, swelling, redness from left thigh. Denies drainage. Denies fever, body aches, chills. States he will be starting PT soon. States he started walking following suture removal (about 2 weeks ago), ~0.5-1 mile a day. Denies neck pain. Denies left wrist pain. Reports seeing  his wrist surgeon recently and the surgeon had no concerns, repeated xray of left wrist with no concerns. Reports good blood sugar control, monitoring TID. Being followed with endocrinologist and reporting good diet control. Reports urination and good fluid intake. Reports having an appt with nephrology tomorrow. Reports returning to primary care in 2 weeks to hopefully get the okay to return to work.     Left thigh 9/12:          Review of Systems   Constitutional:  Negative for appetite change, chills, diaphoresis, fatigue and fever.   HENT: Negative.     Eyes: Negative.    Respiratory:  Negative for cough, chest tightness and shortness of breath.    Cardiovascular:  Negative for chest pain, palpitations and leg swelling.   Gastrointestinal:  Negative for abdominal pain, constipation, diarrhea, nausea and vomiting.   Genitourinary:  Negative for difficulty urinating and dysuria.   Musculoskeletal:  Negative for arthralgias and myalgias.   Allergic/Immunologic: Negative for environmental allergies and food allergies.   Neurological:  Negative for dizziness, weakness, numbness and headaches.   Hematological:  Negative for adenopathy.   Psychiatric/Behavioral:  Negative for agitation and confusion. The patient is not nervous/anxious.        Objective:      Physical Exam  Constitutional:       General: He is not in acute distress.     Appearance: He is well-developed. He is not diaphoretic.   HENT:      Head: Normocephalic and atraumatic.      Mouth/Throat:      Dentition: Normal dentition. Does not have dentures. No dental caries or dental abscesses.      Pharynx: No oropharyngeal exudate.   Eyes:      General: No scleral icterus.     Conjunctiva/sclera: Conjunctivae normal.   Cardiovascular:      Rate and Rhythm: Normal rate and regular rhythm.      Heart sounds: No murmur heard.  Pulmonary:      Effort: Pulmonary effort is normal. No respiratory distress.      Breath sounds: Normal breath sounds. No wheezing or  rales.   Abdominal:      General: Bowel sounds are normal. There is no distension.      Palpations: Abdomen is soft.      Tenderness: There is no abdominal tenderness. There is no guarding.   Musculoskeletal:      Cervical back: Neck supple.   Lymphadenopathy:      Cervical: No cervical adenopathy.   Skin:     General: Skin is warm and dry.      Findings: No erythema or rash.   Neurological:      Mental Status: He is alert and oriented to person, place, and time.   Psychiatric:         Behavior: Behavior normal.         Thought Content: Thought content normal.         Judgment: Judgment normal.       Assessment:       Problem List Items Addressed This Visit          ID    Abscess of left thigh         Plan:       Recommendations:   - Okay to monitor off antibiotics   - Call or seek immediate medical attention for any fevers, chills, sweats, diarrhea, n/v or increase in pain, swelling, drainage from wound  - Return to clinic PRN          30 minutes of total time was spent on this encounter, which includes face to face time and non-face to face time preparing to see the patient (eg, review of tests), Obtaining and/or reviewing separately obtained history, documenting clinical information in the electronic or other health record, independently interpreting results (not separately reported) and communicating results to the patient/family/caregiver, or care coordination (not separately reported).

## 2022-09-13 ENCOUNTER — OFFICE VISIT (OUTPATIENT)
Dept: NEPHROLOGY | Facility: CLINIC | Age: 47
End: 2022-09-13
Payer: COMMERCIAL

## 2022-09-13 ENCOUNTER — PATIENT MESSAGE (OUTPATIENT)
Dept: FAMILY MEDICINE | Facility: CLINIC | Age: 47
End: 2022-09-13
Payer: COMMERCIAL

## 2022-09-13 ENCOUNTER — LAB VISIT (OUTPATIENT)
Dept: LAB | Facility: HOSPITAL | Age: 47
End: 2022-09-13
Attending: INTERNAL MEDICINE
Payer: COMMERCIAL

## 2022-09-13 VITALS
DIASTOLIC BLOOD PRESSURE: 87 MMHG | SYSTOLIC BLOOD PRESSURE: 130 MMHG | OXYGEN SATURATION: 99 % | HEART RATE: 91 BPM | WEIGHT: 208.31 LBS | HEIGHT: 67 IN | BODY MASS INDEX: 32.7 KG/M2

## 2022-09-13 DIAGNOSIS — N17.9 AKI (ACUTE KIDNEY INJURY): ICD-10-CM

## 2022-09-13 PROBLEM — R29.898 DECREASED STRENGTH OF LOWER EXTREMITY: Status: ACTIVE | Noted: 2022-09-13

## 2022-09-13 PROBLEM — Z74.09 DECREASED FUNCTIONAL MOBILITY AND ENDURANCE: Status: ACTIVE | Noted: 2022-09-13

## 2022-09-13 LAB
ANION GAP SERPL CALC-SCNC: 7 MMOL/L (ref 8–16)
BUN SERPL-MCNC: 16 MG/DL (ref 6–20)
CALCIUM SERPL-MCNC: 9.8 MG/DL (ref 8.7–10.5)
CHLORIDE SERPL-SCNC: 108 MMOL/L (ref 95–110)
CO2 SERPL-SCNC: 25 MMOL/L (ref 23–29)
CREAT SERPL-MCNC: 0.9 MG/DL (ref 0.5–1.4)
EST. GFR  (NO RACE VARIABLE): >60 ML/MIN/1.73 M^2
GLUCOSE SERPL-MCNC: 85 MG/DL (ref 70–110)
POTASSIUM SERPL-SCNC: 4.1 MMOL/L (ref 3.5–5.1)
SODIUM SERPL-SCNC: 140 MMOL/L (ref 136–145)

## 2022-09-13 PROCEDURE — 1159F MED LIST DOCD IN RCRD: CPT | Mod: CPTII,S$GLB,, | Performed by: INTERNAL MEDICINE

## 2022-09-13 PROCEDURE — 3062F POS MACROALBUMINURIA REV: CPT | Mod: CPTII,S$GLB,, | Performed by: INTERNAL MEDICINE

## 2022-09-13 PROCEDURE — 3075F PR MOST RECENT SYSTOLIC BLOOD PRESS GE 130-139MM HG: ICD-10-PCS | Mod: CPTII,S$GLB,, | Performed by: INTERNAL MEDICINE

## 2022-09-13 PROCEDURE — 3046F PR MOST RECENT HEMOGLOBIN A1C LEVEL > 9.0%: ICD-10-PCS | Mod: CPTII,S$GLB,, | Performed by: INTERNAL MEDICINE

## 2022-09-13 PROCEDURE — 3062F PR POS MACROALBUMINURIA RESULT DOCUMENTED/REVIEW: ICD-10-PCS | Mod: CPTII,S$GLB,, | Performed by: INTERNAL MEDICINE

## 2022-09-13 PROCEDURE — 1111F DSCHRG MED/CURRENT MED MERGE: CPT | Mod: CPTII,S$GLB,, | Performed by: INTERNAL MEDICINE

## 2022-09-13 PROCEDURE — 1111F PR DISCHARGE MEDS RECONCILED W/ CURRENT OUTPATIENT MED LIST: ICD-10-PCS | Mod: CPTII,S$GLB,, | Performed by: INTERNAL MEDICINE

## 2022-09-13 PROCEDURE — 3079F PR MOST RECENT DIASTOLIC BLOOD PRESSURE 80-89 MM HG: ICD-10-PCS | Mod: CPTII,S$GLB,, | Performed by: INTERNAL MEDICINE

## 2022-09-13 PROCEDURE — 3075F SYST BP GE 130 - 139MM HG: CPT | Mod: CPTII,S$GLB,, | Performed by: INTERNAL MEDICINE

## 2022-09-13 PROCEDURE — 3008F PR BODY MASS INDEX (BMI) DOCUMENTED: ICD-10-PCS | Mod: CPTII,S$GLB,, | Performed by: INTERNAL MEDICINE

## 2022-09-13 PROCEDURE — 4010F PR ACE/ARB THEARPY RXD/TAKEN: ICD-10-PCS | Mod: CPTII,S$GLB,, | Performed by: INTERNAL MEDICINE

## 2022-09-13 PROCEDURE — 99999 PR PBB SHADOW E&M-EST. PATIENT-LVL IV: CPT | Mod: PBBFAC,,, | Performed by: INTERNAL MEDICINE

## 2022-09-13 PROCEDURE — 3066F PR DOCUMENTATION OF TREATMENT FOR NEPHROPATHY: ICD-10-PCS | Mod: CPTII,S$GLB,, | Performed by: INTERNAL MEDICINE

## 2022-09-13 PROCEDURE — 1159F PR MEDICATION LIST DOCUMENTED IN MEDICAL RECORD: ICD-10-PCS | Mod: CPTII,S$GLB,, | Performed by: INTERNAL MEDICINE

## 2022-09-13 PROCEDURE — 4010F ACE/ARB THERAPY RXD/TAKEN: CPT | Mod: CPTII,S$GLB,, | Performed by: INTERNAL MEDICINE

## 2022-09-13 PROCEDURE — 80048 BASIC METABOLIC PNL TOTAL CA: CPT | Performed by: INTERNAL MEDICINE

## 2022-09-13 PROCEDURE — 3008F BODY MASS INDEX DOCD: CPT | Mod: CPTII,S$GLB,, | Performed by: INTERNAL MEDICINE

## 2022-09-13 PROCEDURE — 3079F DIAST BP 80-89 MM HG: CPT | Mod: CPTII,S$GLB,, | Performed by: INTERNAL MEDICINE

## 2022-09-13 PROCEDURE — 36415 COLL VENOUS BLD VENIPUNCTURE: CPT | Performed by: INTERNAL MEDICINE

## 2022-09-13 PROCEDURE — 3066F NEPHROPATHY DOC TX: CPT | Mod: CPTII,S$GLB,, | Performed by: INTERNAL MEDICINE

## 2022-09-13 PROCEDURE — 99999 PR PBB SHADOW E&M-EST. PATIENT-LVL IV: ICD-10-PCS | Mod: PBBFAC,,, | Performed by: INTERNAL MEDICINE

## 2022-09-13 PROCEDURE — 99213 OFFICE O/P EST LOW 20 MIN: CPT | Mod: S$GLB,,, | Performed by: INTERNAL MEDICINE

## 2022-09-13 PROCEDURE — 99213 PR OFFICE/OUTPT VISIT, EST, LEVL III, 20-29 MIN: ICD-10-PCS | Mod: S$GLB,,, | Performed by: INTERNAL MEDICINE

## 2022-09-13 PROCEDURE — 3046F HEMOGLOBIN A1C LEVEL >9.0%: CPT | Mod: CPTII,S$GLB,, | Performed by: INTERNAL MEDICINE

## 2022-09-13 RX ORDER — PEN NEEDLE, DIABETIC 30 GX3/16"
NEEDLE, DISPOSABLE MISCELLANEOUS
Qty: 100 EACH | Refills: 0 | Status: CANCELLED | OUTPATIENT
Start: 2022-09-12

## 2022-09-13 NOTE — PROGRESS NOTES
Progress Note  Nephrology      Referring physician: Oumou Zuniga MD    Reason for visit: ANGE    SUBJECTIVE:   47 y.o. male  has a past medical history of Diabetes, Gout, Hyperlipidemia, and Hypertension. who is following up post discharge after he sustained ANGE due to Vanco toxicity. He had normal kidney function at baseline, and his cr peaked at around 9 mg/dL while in the hospital, now cr has been trending down nicely. Patient feels well today, no urinary or cardiac symptoms, no NSAIDs intake.          OBJECTIVE:     There were no vitals filed for this visit.       Physical Exam:  General: no distress, well nourished  HENT: PERRLA, Normal mouth nose and ears.  Neck: no JVD and thyroid not enlarged, symmetric, no tenderness/mass/nodules  Lungs: clear to auscultation bilaterally and normal respiratory effort  Cardiovascular: regular rate and rhythm, S1, S2 normal, no murmur, click, rub or gallop.   Abdomen: soft, non-tender non-distented; bowel sounds normal  Skin: No rashes or lesions  Musculoskeletal:no edema in LE, no deformities.   Lymph Nodes: No cervical or supraclavicular adenopathy  Neurologic: Normal strength and tone. No focal numbness or weakness    Dialysis Access: Not applicable.        Medications:    Current Outpatient Medications:     acetaminophen (TYLENOL) 500 MG tablet, Take 1 tablet (500 mg total) by mouth every 6 (six) hours as needed for Pain., Disp: 30 tablet, Rfl: 0    amLODIPine (NORVASC) 10 MG tablet, Take 0.5 tablets (5 mg total) by mouth 2 (two) times daily., Disp: 30 tablet, Rfl: 11    atorvastatin (LIPITOR) 20 MG tablet, Take 1 tablet (20 mg total) by mouth once daily., Disp: 90 tablet, Rfl: 3    blood sugar diagnostic Strp, 1 strip by Misc.(Non-Drug; Combo Route) route once daily., Disp: 100 each, Rfl: 3    blood-glucose meter kit, Use as instructed, Disp: 1 each, Rfl: 0    cyclobenzaprine (FLEXERIL) 5 MG tablet, Take 1 tablet (5 mg total) by mouth 3 (three) times daily as  "needed for Muscle spasms., Disp: 30 tablet, Rfl: 1    hydrALAZINE (APRESOLINE) 25 MG tablet, Take 1 tablet (25 mg total) by mouth 2 (two) times a day., Disp: 60 tablet, Rfl: 11    insulin aspart U-100 (NOVOLOG) 100 unit/mL (3 mL) InPn pen, Inject 8 Units into the skin 3 (three) times daily meals PLUS SLIDING SCALE: 1-10 Units into the skin before meals and at bedtime as needed, Disp: 15 mL, Rfl: 3    insulin detemir U-100 (LEVEMIR FLEXTOUCH) 100 unit/mL (3 mL) SubQ InPn pen, Inject 10 Units into the skin 2 (two) times daily., Disp: 15 mL, Rfl: 3    lancets Misc, 1 lancet by Misc.(Non-Drug; Combo Route) route once daily., Disp: 100 each, Rfl: 3    lisinopriL (PRINIVIL,ZESTRIL) 5 MG tablet, Take 1 tablet (5 mg total) by mouth once daily., Disp: 90 tablet, Rfl: 0    pen needle, diabetic 31 gauge x 5/16" Ndle, Use to inject insulin into the skin., Disp: 100 each, Rfl: 0    senna-docusate 8.6-50 mg (PERICOLACE) 8.6-50 mg per tablet, Take 2 tablets by mouth once daily., Disp: 60 tablet, Rfl: 11    sevelamer carbonate (RENVELA) 0.8 gram PwPk, Take 1 packet (0.8 g total) by mouth 3 (three) times daily with meals., Disp: 90 packet, Rfl: 11    tirzepatide (MOUNJARO) 2.5 mg/0.5 mL PnIj, Inject 2.5 mg into the skin every 7 days., Disp: 1 pen, Rfl: 0    triamcinolone acetonide 0.1% (KENALOG) 0.1 % cream, Apply topically 2 (two) times daily. for 7 days, Disp: 15 g, Rfl: 0         Laboratory:  Lab Results   Component Value Date    CREATININE 1.6 (H) 08/29/2022       Prot/Creat Ratio, Urine   Date Value Ref Range Status   08/03/2022 3.97 (H) 0.00 - 0.20 Final       Lab Results   Component Value Date     08/29/2022    K 4.7 08/29/2022    CO2 24 08/29/2022       last PTH   Lab Results   Component Value Date    CALCIUM 9.8 08/29/2022    PHOS 4.0 08/24/2022       Lab Results   Component Value Date    HGB 9.0 (L) 08/29/2022        Lab Results   Component Value Date    HGBA1C 10.1 (H) 07/26/2022       Lab Results   Component Value " Date    LDLCALC 46.6 (L) 03/22/2022       Other Labs were reviewed      ASSESSMENT/PLAN:     ANGE/ATN  -from vanc toxicity  -Normal Cr a baseline      PLAN:  -Repeat BMP today, if normal no need to follow up with nephrology  -Avoid NSAIDs intake        RTC in 8 months with labs      LIGIA LOPEZ MD  NEPHROLOGY ATTENDING

## 2022-09-14 ENCOUNTER — TELEPHONE (OUTPATIENT)
Dept: NEPHROLOGY | Facility: CLINIC | Age: 47
End: 2022-09-14
Payer: COMMERCIAL

## 2022-09-14 RX ORDER — PEN NEEDLE, DIABETIC 30 GX3/16"
NEEDLE, DISPOSABLE MISCELLANEOUS
Qty: 100 EACH | Refills: 0 | Status: SHIPPED | OUTPATIENT
Start: 2022-09-14 | End: 2022-09-29 | Stop reason: SDUPTHER

## 2022-09-15 LAB
ACID FAST MOD KINY STN SPEC: NORMAL
MYCOBACTERIUM SPEC QL CULT: NORMAL

## 2022-09-16 ENCOUNTER — TELEPHONE (OUTPATIENT)
Dept: FAMILY MEDICINE | Facility: CLINIC | Age: 47
End: 2022-09-16
Payer: COMMERCIAL

## 2022-09-16 LAB
ACID FAST MOD KINY STN SPEC: NORMAL
ACID FAST MOD KINY STN SPEC: NORMAL
MYCOBACTERIUM SPEC QL CULT: NORMAL
MYCOBACTERIUM SPEC QL CULT: NORMAL

## 2022-09-19 ENCOUNTER — PATIENT MESSAGE (OUTPATIENT)
Dept: FAMILY MEDICINE | Facility: CLINIC | Age: 47
End: 2022-09-19
Payer: COMMERCIAL

## 2022-09-23 LAB
ACID FAST MOD KINY STN SPEC: NORMAL
MYCOBACTERIUM SPEC QL CULT: NORMAL

## 2022-09-27 ENCOUNTER — TELEPHONE (OUTPATIENT)
Dept: GASTROENTEROLOGY | Facility: CLINIC | Age: 47
End: 2022-09-27
Payer: COMMERCIAL

## 2022-09-27 NOTE — LETTER
September 27, 2022    Wilfredo López Martha  [address] [facility] [place] [address]             [facility] - Gastroenterology  [address], [place]   [facility] [address]  Phone: [phone]  Fax: [phone] Dear Mr. Thomas:    We have attempted to contact you to schedule a screening colonoscopy that was ordered by your doctor. Please contact the office to schedule at [phone].       If you have any questions or concerns, please don't hesitate to call.    Sincerely,        Kimberly Baez MD

## 2022-09-28 ENCOUNTER — PATIENT MESSAGE (OUTPATIENT)
Dept: ENDOCRINOLOGY | Facility: CLINIC | Age: 47
End: 2022-09-28
Payer: COMMERCIAL

## 2022-09-28 RX ORDER — TIRZEPATIDE 5 MG/.5ML
5 INJECTION, SOLUTION SUBCUTANEOUS
Qty: 4 PEN | Refills: 1 | Status: SHIPPED | OUTPATIENT
Start: 2022-09-28 | End: 2022-11-01

## 2022-09-29 ENCOUNTER — OFFICE VISIT (OUTPATIENT)
Dept: FAMILY MEDICINE | Facility: CLINIC | Age: 47
End: 2022-09-29
Payer: COMMERCIAL

## 2022-09-29 VITALS
HEART RATE: 96 BPM | BODY MASS INDEX: 31.83 KG/M2 | WEIGHT: 202.81 LBS | DIASTOLIC BLOOD PRESSURE: 72 MMHG | HEIGHT: 67 IN | OXYGEN SATURATION: 96 % | SYSTOLIC BLOOD PRESSURE: 120 MMHG

## 2022-09-29 DIAGNOSIS — D64.89 ANEMIA DUE TO OTHER CAUSE, NOT CLASSIFIED: ICD-10-CM

## 2022-09-29 DIAGNOSIS — I10 ESSENTIAL HYPERTENSION: Primary | ICD-10-CM

## 2022-09-29 DIAGNOSIS — E11.9 TYPE 2 DIABETES MELLITUS WITHOUT COMPLICATION, WITHOUT LONG-TERM CURRENT USE OF INSULIN: ICD-10-CM

## 2022-09-29 PROCEDURE — 4010F ACE/ARB THERAPY RXD/TAKEN: CPT | Mod: CPTII,S$GLB,, | Performed by: STUDENT IN AN ORGANIZED HEALTH CARE EDUCATION/TRAINING PROGRAM

## 2022-09-29 PROCEDURE — 3074F PR MOST RECENT SYSTOLIC BLOOD PRESSURE < 130 MM HG: ICD-10-PCS | Mod: CPTII,S$GLB,, | Performed by: STUDENT IN AN ORGANIZED HEALTH CARE EDUCATION/TRAINING PROGRAM

## 2022-09-29 PROCEDURE — 1160F PR REVIEW ALL MEDS BY PRESCRIBER/CLIN PHARMACIST DOCUMENTED: ICD-10-PCS | Mod: CPTII,S$GLB,, | Performed by: STUDENT IN AN ORGANIZED HEALTH CARE EDUCATION/TRAINING PROGRAM

## 2022-09-29 PROCEDURE — 3066F NEPHROPATHY DOC TX: CPT | Mod: CPTII,S$GLB,, | Performed by: STUDENT IN AN ORGANIZED HEALTH CARE EDUCATION/TRAINING PROGRAM

## 2022-09-29 PROCEDURE — 1160F RVW MEDS BY RX/DR IN RCRD: CPT | Mod: CPTII,S$GLB,, | Performed by: STUDENT IN AN ORGANIZED HEALTH CARE EDUCATION/TRAINING PROGRAM

## 2022-09-29 PROCEDURE — 3046F HEMOGLOBIN A1C LEVEL >9.0%: CPT | Mod: CPTII,S$GLB,, | Performed by: STUDENT IN AN ORGANIZED HEALTH CARE EDUCATION/TRAINING PROGRAM

## 2022-09-29 PROCEDURE — 3046F PR MOST RECENT HEMOGLOBIN A1C LEVEL > 9.0%: ICD-10-PCS | Mod: CPTII,S$GLB,, | Performed by: STUDENT IN AN ORGANIZED HEALTH CARE EDUCATION/TRAINING PROGRAM

## 2022-09-29 PROCEDURE — 3008F PR BODY MASS INDEX (BMI) DOCUMENTED: ICD-10-PCS | Mod: CPTII,S$GLB,, | Performed by: STUDENT IN AN ORGANIZED HEALTH CARE EDUCATION/TRAINING PROGRAM

## 2022-09-29 PROCEDURE — 3062F PR POS MACROALBUMINURIA RESULT DOCUMENTED/REVIEW: ICD-10-PCS | Mod: CPTII,S$GLB,, | Performed by: STUDENT IN AN ORGANIZED HEALTH CARE EDUCATION/TRAINING PROGRAM

## 2022-09-29 PROCEDURE — 99213 PR OFFICE/OUTPT VISIT, EST, LEVL III, 20-29 MIN: ICD-10-PCS | Mod: S$GLB,,, | Performed by: STUDENT IN AN ORGANIZED HEALTH CARE EDUCATION/TRAINING PROGRAM

## 2022-09-29 PROCEDURE — 3078F DIAST BP <80 MM HG: CPT | Mod: CPTII,S$GLB,, | Performed by: STUDENT IN AN ORGANIZED HEALTH CARE EDUCATION/TRAINING PROGRAM

## 2022-09-29 PROCEDURE — 3062F POS MACROALBUMINURIA REV: CPT | Mod: CPTII,S$GLB,, | Performed by: STUDENT IN AN ORGANIZED HEALTH CARE EDUCATION/TRAINING PROGRAM

## 2022-09-29 PROCEDURE — 1159F PR MEDICATION LIST DOCUMENTED IN MEDICAL RECORD: ICD-10-PCS | Mod: CPTII,S$GLB,, | Performed by: STUDENT IN AN ORGANIZED HEALTH CARE EDUCATION/TRAINING PROGRAM

## 2022-09-29 PROCEDURE — 99999 PR PBB SHADOW E&M-EST. PATIENT-LVL IV: ICD-10-PCS | Mod: PBBFAC,,, | Performed by: STUDENT IN AN ORGANIZED HEALTH CARE EDUCATION/TRAINING PROGRAM

## 2022-09-29 PROCEDURE — 1159F MED LIST DOCD IN RCRD: CPT | Mod: CPTII,S$GLB,, | Performed by: STUDENT IN AN ORGANIZED HEALTH CARE EDUCATION/TRAINING PROGRAM

## 2022-09-29 PROCEDURE — 4010F PR ACE/ARB THEARPY RXD/TAKEN: ICD-10-PCS | Mod: CPTII,S$GLB,, | Performed by: STUDENT IN AN ORGANIZED HEALTH CARE EDUCATION/TRAINING PROGRAM

## 2022-09-29 PROCEDURE — 3074F SYST BP LT 130 MM HG: CPT | Mod: CPTII,S$GLB,, | Performed by: STUDENT IN AN ORGANIZED HEALTH CARE EDUCATION/TRAINING PROGRAM

## 2022-09-29 PROCEDURE — 99213 OFFICE O/P EST LOW 20 MIN: CPT | Mod: S$GLB,,, | Performed by: STUDENT IN AN ORGANIZED HEALTH CARE EDUCATION/TRAINING PROGRAM

## 2022-09-29 PROCEDURE — 3008F BODY MASS INDEX DOCD: CPT | Mod: CPTII,S$GLB,, | Performed by: STUDENT IN AN ORGANIZED HEALTH CARE EDUCATION/TRAINING PROGRAM

## 2022-09-29 PROCEDURE — 3078F PR MOST RECENT DIASTOLIC BLOOD PRESSURE < 80 MM HG: ICD-10-PCS | Mod: CPTII,S$GLB,, | Performed by: STUDENT IN AN ORGANIZED HEALTH CARE EDUCATION/TRAINING PROGRAM

## 2022-09-29 PROCEDURE — 99999 PR PBB SHADOW E&M-EST. PATIENT-LVL IV: CPT | Mod: PBBFAC,,, | Performed by: STUDENT IN AN ORGANIZED HEALTH CARE EDUCATION/TRAINING PROGRAM

## 2022-09-29 PROCEDURE — 3066F PR DOCUMENTATION OF TREATMENT FOR NEPHROPATHY: ICD-10-PCS | Mod: CPTII,S$GLB,, | Performed by: STUDENT IN AN ORGANIZED HEALTH CARE EDUCATION/TRAINING PROGRAM

## 2022-09-29 RX ORDER — METFORMIN HYDROCHLORIDE 750 MG/1
750 TABLET, EXTENDED RELEASE ORAL
Qty: 30 TABLET | Refills: 11 | Status: SHIPPED | OUTPATIENT
Start: 2022-09-29 | End: 2023-05-11 | Stop reason: SDUPTHER

## 2022-09-29 RX ORDER — PEN NEEDLE, DIABETIC 30 GX3/16"
NEEDLE, DISPOSABLE MISCELLANEOUS
Qty: 100 EACH | Refills: 11 | Status: SHIPPED | OUTPATIENT
Start: 2022-09-29 | End: 2023-02-10 | Stop reason: SDUPTHER

## 2022-09-29 RX ORDER — LANCETS
1 EACH MISCELLANEOUS DAILY
Qty: 100 EACH | Refills: 11 | Status: SHIPPED | OUTPATIENT
Start: 2022-09-29

## 2022-09-29 RX ORDER — LISINOPRIL 10 MG/1
10 TABLET ORAL DAILY
Qty: 90 TABLET | Refills: 6 | Status: SHIPPED | OUTPATIENT
Start: 2022-09-29 | End: 2023-05-11

## 2022-09-29 NOTE — PROGRESS NOTES
Subjective:       Patient ID: Wilfredo Thomas is a 47 y.o. male.    Chief Complaint: Follow-up and Diabetes (2 week f/u)    HPI    47M with h/o uncontrolled T2DM, sHTN, HLD, gouty arthritis  established care with me 2 weeks ago for follow up today. He endorses no new complaints and feels great health-wisely. Home BS readings range 90s-140s (fasting and PP respectively), he has been compliant with all medications and has significantly improved his dietary habits.    All recent labs and medications reviewed with patient.  Renal function : back to baseline      Past Medical History:   Diagnosis Date    Diabetes     Gout     Hyperlipidemia     Hypertension        Past Surgical History:   Procedure Laterality Date    INCISION AND DRAINAGE Left 2022    Procedure: Incision and Drainage - LEFT THIGH.;  Surgeon: Rene Neff MD;  Location: [facility] OR [address] [place] [address];  Service: Orthopedics;  Laterality: Left;    IRRIGATION AND DEBRIDEMENT OF LOWER EXTREMITY Left 2022    Procedure: IRRIGATION AND DEBRIDEMENT, LOWER EXTREMITY;  Surgeon: Rob Graff MD;  Location: [facility] OR [address] [place] [address];  Service: Orthopedics;  Laterality: Left;    WRIST SURGERY Left        Family History   Problem Relation Age of Onset    Diabetes Mother     No Known Problems Father        Social History     Socioeconomic History    Marital status:    Tobacco Use    Smoking status: Former     Packs/day: 0.50     Types: Cigarettes     Quit date: 2020     Years since quittin.7    Smokeless tobacco: Never   Substance and Sexual Activity    Alcohol use: Yes     Alcohol/week: 12.0 standard drinks     Types: 12 Cans of beer per week    Drug use: Never    Sexual activity: Yes     Partners: Female   Social History Narrative    Lives with wife and 2 kids in college and one older with 1 grand child. Supervisor at [employer]. Smoker but quit 2020     Social Determinants of Health     Financial Resource Strain: Unknown    Difficulty of Paying Living Expenses:  "Patient refused   Food Insecurity: Unknown    Worried About Running Out of Food in the Last Year: Patient refused    Ran Out of Food in the Last Year: Patient refused   Transportation Needs: Unknown    Lack of Transportation (Medical): Patient refused    Lack of Transportation (Non-Medical): No   Physical Activity: Sufficiently Active    Days of Exercise per Week: 6 days    Minutes of Exercise per Session: 30 min   Stress: No Stress Concern Present    Feeling of Stress : Not at all   Social Connections: Unknown    Frequency of Communication with Friends and Family: Three times a week    Frequency of Social Gatherings with Friends and Family: Once a week    Active Member of Clubs or Organizations: No    Attends Club or Organization Meetings: Never    Marital Status:    Housing Stability: Unknown    Unable to Pay for Housing in the Last Year: Patient refused    Number of Places Lived in the Last Year: 1    Unstable Housing in the Last Year: No       Review of Systems   Constitutional:  Negative for chills, fatigue and fever.   Respiratory:  Negative for cough and shortness of breath.    Cardiovascular:  Negative for chest pain, palpitations and leg swelling.   Gastrointestinal:  Negative for abdominal pain, constipation and diarrhea.   Endocrine: Negative for polydipsia and polyphagia.   Genitourinary:  Negative for dysuria, flank pain and frequency.   Musculoskeletal:  Negative for back pain, gait problem and joint swelling.   Skin:  Negative for rash and wound.   Neurological:  Negative for dizziness, seizures, weakness, numbness and headaches.       Objective:     Vitals:    09/29/22 0959   BP: 120/72   BP Location: Left arm   Patient Position: Sitting   BP Method: Large (Manual)   Pulse: 96   SpO2: 96%   Weight: 92 kg (202 lb 13.2 oz)   Height: 5' 7" (1.702 m)          Physical Exam  Vitals reviewed.   Constitutional:       General: He is not in acute distress.     Appearance: He is obese.   HENT:      " Head: Normocephalic and atraumatic.      Right Ear: Tympanic membrane normal.      Left Ear: Tympanic membrane normal.      Mouth/Throat:      Mouth: Mucous membranes are moist.   Eyes:      Extraocular Movements: Extraocular movements intact.      Conjunctiva/sclera: Conjunctivae normal.      Pupils: Pupils are equal, round, and reactive to light.   Cardiovascular:      Rate and Rhythm: Normal rate and regular rhythm.      Pulses: Normal pulses.      Heart sounds: Normal heart sounds.   Pulmonary:      Effort: Pulmonary effort is normal.      Breath sounds: Normal breath sounds.   Abdominal:      General: Abdomen is flat. Bowel sounds are normal.      Palpations: Abdomen is soft.      Hernia: There is no hernia in the left inguinal area.   Genitourinary:     Pubic Area: No rash.       Penis: No erythema, tenderness or swelling.       Testes: Cremasteric reflex is present.         Right: Mass, tenderness or swelling not present.         Left: Mass, tenderness or swelling not present.      Epididymis:      Right: Normal.      Left: Normal.   Musculoskeletal:         General: No swelling.      Right lower leg: Edema (1+) present.      Left lower leg: Edema (Well healed surgical non-hypertrophic scar on the mid upper left thigh, non-tender, no erythema) present.   Skin:     General: Skin is warm.   Neurological:      General: No focal deficit present.      Mental Status: He is alert and oriented to person, place, and time.      Cranial Nerves: No cranial nerve deficit.   Psychiatric:         Mood and Affect: Mood normal.         Behavior: Behavior normal.         Laboratory:  CBC:  Recent Labs   Lab Result Units 08/23/22  0413 08/24/22  0437 08/29/22  1341   WBC K/uL 5.34 5.76 8.52   RBC M/uL 2.52* 2.78* 3.04*   Hemoglobin g/dL 7.6* 8.3* 9.0*   Hematocrit % 23.2* 26.4* 28.1*   Platelets K/uL 236 307 331   MCV fL 92 95 92   MCH pg 30.2 29.9 29.6   MCHC g/dL 32.8 31.4* 32.0     CMP:  Recent Labs   Lab Result Units  08/23/22  0413 08/24/22  0437 08/29/22  1341 09/13/22  1626   Glucose mg/dL 118* 86 122* 85   Calcium mg/dL 8.8 8.9 9.8 9.8   Albumin g/dL 2.4* 2.6* 3.5  --    Total Protein g/dL 6.2 6.5 7.7  --    Sodium mmol/L 141 143 142 140   Potassium mmol/L 3.8 3.8 4.7 4.1   CO2 mmol/L 24 24 24 25   Chloride mmol/L 109 109 105 108   BUN mg/dL 33* 27* 31* 16   Alkaline Phosphatase U/L 69 73 90  --    ALT U/L 10 14 22  --    AST U/L 20 24 28  --    Total Bilirubin mg/dL 0.4 0.4 0.3  --      URINALYSIS:  Recent Labs   Lab Result Units 07/27/22  0007 08/17/22  1436   Color, UA  Yellow Colorless*   Specific Gravity, UA  1.030 1.010   pH, UA  5.0 7.0   Protein, UA  1+* 1+*   Bacteria /hpf Few* Rare   Nitrite, UA  Negative Negative   Leukocytes, UA  Negative Negative   Hyaline Casts, UA /lpf 9* 0      LIPIDS:  Recent Labs   Lab Result Units 07/26/22  2044   TSH uIU/mL 4.631*     TSH:  Recent Labs   Lab Result Units 07/26/22 2044   TSH uIU/mL 4.631*     A1C:  Recent Labs   Lab Result Units 07/26/22 2044   Hemoglobin A1C % 10.1*       Assessment:         ICD-10-CM ICD-9-CM   1. Essential hypertension  I10 401.9   2. Type 2 diabetes mellitus without complication, without long-term current use of insulin  E11.9 250.00   3. Anemia due to other cause, not classified  D64.89 285.8   4. BMI 31.0-31.9,adult  Z68.31 V85.31       Plan:       Essential hypertension          -BP well controlled  -  renal function back to baseline      Type 2 diabetes mellitus  -HbA1C (07/22)-@ 10  -Home BS readings range 90s-140s(occasionally)  -Will resume Metformin @ 750mg ER daily  -Mounjaro @ 5mg qweekly now from 2.5mg  -d/c bolus insulin for now, c/w basal @ 10U BID  -he also follows up with [facility] (Dr durand)  -c/w therapeutic life style changes    Anemia  -likely 2/2 BM suppression from severe sepsis  -HgB improving  -CBC follow up next clinic visit    BMI 31.77  -therapeutic life style changes reinforced    HCM: declined all due vaccine for now    Follow  "up in about 7 weeks (around 11/17/2022). -CBC/HbA1C f/u     Patient's Medications   New Prescriptions    LISINOPRIL 10 MG TABLET    Take 1 tablet (10 mg total) by mouth once daily.    METFORMIN (GLUCOPHAGE-XR) 750 MG ER 24HR TABLET    Take 1 tablet (750 mg total) by mouth daily with breakfast.   Previous Medications    ACETAMINOPHEN (TYLENOL) 500 MG TABLET    Take 1 tablet (500 mg total) by mouth every 6 (six) hours as needed for Pain.    ATORVASTATIN (LIPITOR) 20 MG TABLET    Take 1 tablet (20 mg total) by mouth once daily.    BLOOD-GLUCOSE METER KIT    Use as instructed    INSULIN ASPART U-100 (NOVOLOG) 100 UNIT/ML (3 ML) INPN PEN    Inject 8 Units into the skin 3 (three) times daily meals PLUS SLIDING SCALE: 1-10 Units into the skin before meals and at bedtime as needed    INSULIN DETEMIR U-100 (LEVEMIR FLEXTOUCH) 100 UNIT/ML (3 ML) SUBQ INPN PEN    Inject 10 Units into the skin 2 (two) times daily.    TIRZEPATIDE (MOUNJARO) 5 MG/0.5 ML PNIJ    Inject 5 mg into the skin every 7 days.    TRIAMCINOLONE ACETONIDE 0.1% (KENALOG) 0.1 % CREAM    Apply topically 2 (two) times daily. for 7 days   Modified Medications    Modified Medication Previous Medication    BLOOD SUGAR DIAGNOSTIC STRP blood sugar diagnostic Strp       1 strip by Misc.(Non-Drug; Combo Route) route once daily.    1 strip by Misc.(Non-Drug; Combo Route) route once daily.    LANCETS MISC [place]ancets [facility]       1 lancet by Misc.(Non-Drug; Combo Route) route once daily.    1 lancet by Misc.(Non-Drug; Combo Route) route once daily.    PEN NEEDLE, DIABETIC 31 GAUGE X 5/16" NDLE pen needle, diabetic 31 gauge x 5/16" Ndle       Use to inject insulin into the skin.    Use to inject insulin into the skin.   Discontinued Medications    AMLODIPINE (NORVASC) 10 MG TABLET    Take 0.5 tablets (5 mg total) by mouth 2 (two) times daily.    HYDRALAZINE (APRESOLINE) 25 MG TABLET    Take 1 tablet (25 mg total) by mouth 2 (two) times a day.    LISINOPRIL (PRINIVIL,ZESTRIL) 5 " MG TABLET    Take 1 tablet (5 mg total) by mouth once daily.    SENNA-DOCUSATE 8.6-50 MG (PERICOLACE) 8.6-50 MG PER TABLET    Take 2 tablets by mouth once daily.    SEVELAMER CARBONATE (RENVELA) 0.8 GRAM PWPK    Take 1 packet (0.8 g total) by mouth 3 (three) times daily with meals.         Patient was given opportunity to express concerns, ask questions and verbalizes understanding of current management plan     Oumou Zuniga MD

## 2022-10-10 ENCOUNTER — PATIENT MESSAGE (OUTPATIENT)
Dept: FAMILY MEDICINE | Facility: CLINIC | Age: 47
End: 2022-10-10
Payer: COMMERCIAL

## 2022-10-12 DIAGNOSIS — Z79.4 TYPE 2 DIABETES MELLITUS WITH OTHER DIABETIC KIDNEY COMPLICATION, WITH LONG-TERM CURRENT USE OF INSULIN: ICD-10-CM

## 2022-10-12 DIAGNOSIS — E11.29 TYPE 2 DIABETES MELLITUS WITH OTHER DIABETIC KIDNEY COMPLICATION, WITH LONG-TERM CURRENT USE OF INSULIN: ICD-10-CM

## 2022-10-19 ENCOUNTER — PATIENT OUTREACH (OUTPATIENT)
Dept: ADMINISTRATIVE | Facility: HOSPITAL | Age: 47
End: 2022-10-19
Payer: COMMERCIAL

## 2022-10-27 ENCOUNTER — PATIENT MESSAGE (OUTPATIENT)
Dept: ADMINISTRATIVE | Facility: HOSPITAL | Age: 47
End: 2022-10-27
Payer: COMMERCIAL

## 2022-10-27 ENCOUNTER — PATIENT OUTREACH (OUTPATIENT)
Dept: ADMINISTRATIVE | Facility: HOSPITAL | Age: 47
End: 2022-10-27
Payer: COMMERCIAL

## 2022-10-27 NOTE — PROGRESS NOTES
Care Everywhere updates requested and reviewed.  Immunizations reconciled. Media reports reviewed.  Duplicate HM overrides and  orders removed.  Overdue HM topic chart audit and/or requested.  Overdue lab testing linked to upcoming lab appointments if applies.      Health Maintenance Due   Topic Date Due    COVID-19 Vaccine (1) Never done    Pneumococcal Vaccines (Age 0-64) (1 - PCV) Never done    TETANUS VACCINE  Never done    Colorectal Cancer Screening  Never done    Influenza Vaccine (1) Never done

## 2022-10-28 ENCOUNTER — PATIENT MESSAGE (OUTPATIENT)
Dept: ENDOCRINOLOGY | Facility: CLINIC | Age: 47
End: 2022-10-28
Payer: COMMERCIAL

## 2022-10-28 NOTE — PROGRESS NOTES
C3 nurse attempted to contact Wilfredo Thomas for a TCC post hospital discharge follow up call. No answer. Left voicemail with callback information. The patient does not have a scheduled HOSFU appointment. Message sent to PCP staff for assistance with scheduling visit with patient.               no

## 2022-11-01 RX ORDER — TIRZEPATIDE 7.5 MG/.5ML
7.5 INJECTION, SOLUTION SUBCUTANEOUS
Qty: 4 PEN | Refills: 1 | Status: SHIPPED | OUTPATIENT
Start: 2022-11-01 | End: 2022-12-14 | Stop reason: SDUPTHER

## 2022-11-07 ENCOUNTER — TELEPHONE (OUTPATIENT)
Dept: ENDOCRINOLOGY | Facility: CLINIC | Age: 47
End: 2022-11-07
Payer: COMMERCIAL

## 2022-11-07 NOTE — TELEPHONE ENCOUNTER
----- Message from Kayla Kennedy sent at 11/7/2022 11:29 AM CST -----  Contact: pt @ [phone]  Wilfredo Thomas calling regarding Patient Advice (message) for #pt  would like to go closer to his house to get lab work asking if you can send it there. Asking for call back

## 2022-11-10 ENCOUNTER — OFFICE VISIT (OUTPATIENT)
Dept: FAMILY MEDICINE | Facility: CLINIC | Age: 47
End: 2022-11-10
Payer: COMMERCIAL

## 2022-11-10 VITALS
OXYGEN SATURATION: 97 % | SYSTOLIC BLOOD PRESSURE: 138 MMHG | HEIGHT: 67 IN | HEART RATE: 88 BPM | DIASTOLIC BLOOD PRESSURE: 70 MMHG | WEIGHT: 195.81 LBS | BODY MASS INDEX: 30.73 KG/M2

## 2022-11-10 DIAGNOSIS — I10 ESSENTIAL HYPERTENSION: ICD-10-CM

## 2022-11-10 DIAGNOSIS — E11.9 TYPE 2 DIABETES MELLITUS WITHOUT COMPLICATION, WITH LONG-TERM CURRENT USE OF INSULIN: ICD-10-CM

## 2022-11-10 DIAGNOSIS — M79.661 PAIN AND SWELLING OF RIGHT LOWER LEG: Primary | ICD-10-CM

## 2022-11-10 DIAGNOSIS — Z79.4 TYPE 2 DIABETES MELLITUS WITHOUT COMPLICATION, WITH LONG-TERM CURRENT USE OF INSULIN: ICD-10-CM

## 2022-11-10 DIAGNOSIS — M79.89 PAIN AND SWELLING OF RIGHT LOWER LEG: Primary | ICD-10-CM

## 2022-11-10 PROCEDURE — 99999 PR PBB SHADOW E&M-EST. PATIENT-LVL IV: CPT | Mod: PBBFAC,,, | Performed by: STUDENT IN AN ORGANIZED HEALTH CARE EDUCATION/TRAINING PROGRAM

## 2022-11-10 PROCEDURE — 3078F DIAST BP <80 MM HG: CPT | Mod: CPTII,S$GLB,, | Performed by: STUDENT IN AN ORGANIZED HEALTH CARE EDUCATION/TRAINING PROGRAM

## 2022-11-10 PROCEDURE — 4010F ACE/ARB THERAPY RXD/TAKEN: CPT | Mod: CPTII,S$GLB,, | Performed by: STUDENT IN AN ORGANIZED HEALTH CARE EDUCATION/TRAINING PROGRAM

## 2022-11-10 PROCEDURE — 1159F MED LIST DOCD IN RCRD: CPT | Mod: CPTII,S$GLB,, | Performed by: STUDENT IN AN ORGANIZED HEALTH CARE EDUCATION/TRAINING PROGRAM

## 2022-11-10 PROCEDURE — 1159F PR MEDICATION LIST DOCUMENTED IN MEDICAL RECORD: ICD-10-PCS | Mod: CPTII,S$GLB,, | Performed by: STUDENT IN AN ORGANIZED HEALTH CARE EDUCATION/TRAINING PROGRAM

## 2022-11-10 PROCEDURE — 99213 PR OFFICE/OUTPT VISIT, EST, LEVL III, 20-29 MIN: ICD-10-PCS | Mod: S$GLB,,, | Performed by: STUDENT IN AN ORGANIZED HEALTH CARE EDUCATION/TRAINING PROGRAM

## 2022-11-10 PROCEDURE — 3008F PR BODY MASS INDEX (BMI) DOCUMENTED: ICD-10-PCS | Mod: CPTII,S$GLB,, | Performed by: STUDENT IN AN ORGANIZED HEALTH CARE EDUCATION/TRAINING PROGRAM

## 2022-11-10 PROCEDURE — 3044F PR MOST RECENT HEMOGLOBIN A1C LEVEL <7.0%: ICD-10-PCS | Mod: CPTII,S$GLB,, | Performed by: STUDENT IN AN ORGANIZED HEALTH CARE EDUCATION/TRAINING PROGRAM

## 2022-11-10 PROCEDURE — 3066F PR DOCUMENTATION OF TREATMENT FOR NEPHROPATHY: ICD-10-PCS | Mod: CPTII,S$GLB,, | Performed by: STUDENT IN AN ORGANIZED HEALTH CARE EDUCATION/TRAINING PROGRAM

## 2022-11-10 PROCEDURE — 3008F BODY MASS INDEX DOCD: CPT | Mod: CPTII,S$GLB,, | Performed by: STUDENT IN AN ORGANIZED HEALTH CARE EDUCATION/TRAINING PROGRAM

## 2022-11-10 PROCEDURE — 3078F PR MOST RECENT DIASTOLIC BLOOD PRESSURE < 80 MM HG: ICD-10-PCS | Mod: CPTII,S$GLB,, | Performed by: STUDENT IN AN ORGANIZED HEALTH CARE EDUCATION/TRAINING PROGRAM

## 2022-11-10 PROCEDURE — 3075F PR MOST RECENT SYSTOLIC BLOOD PRESS GE 130-139MM HG: ICD-10-PCS | Mod: CPTII,S$GLB,, | Performed by: STUDENT IN AN ORGANIZED HEALTH CARE EDUCATION/TRAINING PROGRAM

## 2022-11-10 PROCEDURE — 1160F RVW MEDS BY RX/DR IN RCRD: CPT | Mod: CPTII,S$GLB,, | Performed by: STUDENT IN AN ORGANIZED HEALTH CARE EDUCATION/TRAINING PROGRAM

## 2022-11-10 PROCEDURE — 3075F SYST BP GE 130 - 139MM HG: CPT | Mod: CPTII,S$GLB,, | Performed by: STUDENT IN AN ORGANIZED HEALTH CARE EDUCATION/TRAINING PROGRAM

## 2022-11-10 PROCEDURE — 3066F NEPHROPATHY DOC TX: CPT | Mod: CPTII,S$GLB,, | Performed by: STUDENT IN AN ORGANIZED HEALTH CARE EDUCATION/TRAINING PROGRAM

## 2022-11-10 PROCEDURE — 3062F PR POS MACROALBUMINURIA RESULT DOCUMENTED/REVIEW: ICD-10-PCS | Mod: CPTII,S$GLB,, | Performed by: STUDENT IN AN ORGANIZED HEALTH CARE EDUCATION/TRAINING PROGRAM

## 2022-11-10 PROCEDURE — 4010F PR ACE/ARB THEARPY RXD/TAKEN: ICD-10-PCS | Mod: CPTII,S$GLB,, | Performed by: STUDENT IN AN ORGANIZED HEALTH CARE EDUCATION/TRAINING PROGRAM

## 2022-11-10 PROCEDURE — 3044F HG A1C LEVEL LT 7.0%: CPT | Mod: CPTII,S$GLB,, | Performed by: STUDENT IN AN ORGANIZED HEALTH CARE EDUCATION/TRAINING PROGRAM

## 2022-11-10 PROCEDURE — 99999 PR PBB SHADOW E&M-EST. PATIENT-LVL IV: ICD-10-PCS | Mod: PBBFAC,,, | Performed by: STUDENT IN AN ORGANIZED HEALTH CARE EDUCATION/TRAINING PROGRAM

## 2022-11-10 PROCEDURE — 1160F PR REVIEW ALL MEDS BY PRESCRIBER/CLIN PHARMACIST DOCUMENTED: ICD-10-PCS | Mod: CPTII,S$GLB,, | Performed by: STUDENT IN AN ORGANIZED HEALTH CARE EDUCATION/TRAINING PROGRAM

## 2022-11-10 PROCEDURE — 99213 OFFICE O/P EST LOW 20 MIN: CPT | Mod: S$GLB,,, | Performed by: STUDENT IN AN ORGANIZED HEALTH CARE EDUCATION/TRAINING PROGRAM

## 2022-11-10 PROCEDURE — 3062F POS MACROALBUMINURIA REV: CPT | Mod: CPTII,S$GLB,, | Performed by: STUDENT IN AN ORGANIZED HEALTH CARE EDUCATION/TRAINING PROGRAM

## 2022-11-10 RX ORDER — TRAMADOL HYDROCHLORIDE 100 MG/1
100 TABLET, EXTENDED RELEASE ORAL 2 TIMES DAILY
Qty: 14 TABLET | Refills: 0 | Status: SHIPPED | OUTPATIENT
Start: 2022-11-10 | End: 2022-11-17

## 2022-11-10 RX ORDER — NABUMETONE 750 MG/1
750 TABLET, FILM COATED ORAL 2 TIMES DAILY
Qty: 28 TABLET | Refills: 0 | Status: SHIPPED | OUTPATIENT
Start: 2022-11-10 | End: 2022-11-22

## 2022-11-10 RX ORDER — PHENYLPROPANOLAMINE/CLEMASTINE 75-1.34MG
2 TABLET, EXTENDED RELEASE ORAL DAILY PRN
COMMUNITY
End: 2022-11-22 | Stop reason: ALTCHOICE

## 2022-11-10 RX ORDER — HYDROCODONE BITARTRATE AND ACETAMINOPHEN 5; 325 MG/1; MG/1
1 TABLET ORAL EVERY 8 HOURS PRN
COMMUNITY
Start: 2022-07-22 | End: 2024-02-29

## 2022-11-10 RX ORDER — MULTIVITAMIN
1 TABLET ORAL
COMMUNITY

## 2022-11-10 NOTE — LETTER
November 10, 2022      [facility]  [address], [address]  [place] [address]  Phone: [phone]  Fax: [phone]       Patient: Wilfredo Thomas  YOB: 1975  Date of Visit: 11/10/2022    To Whom It May Concern:    Wilfredo Thomas was at our facility on 11/10/2022. He may return to work on  12/01/2022 with no restrictions. If you have any questions or concerns, or if I can be of further assistance, please do not hesitate to contact my office.    Sincerely,    Oumou Zuniga MD

## 2022-11-10 NOTE — PROGRESS NOTES
[facility] Note    Chief Complaint    No chief complaint on file.    History of Present Illness      HPI    Wilfredo Thomas is a 47 y.o. male who presents with:    Problem List Addressed This Visit:  {There are no diagnoses linked to this encounter. (Refresh or delete this SmartLink)}     Health Maintenance   Topic Date Due    TETANUS VACCINE  Never done    Lipid Panel  2023    Foot Exam  2023    Hemoglobin A1c  2023    Eye Exam  2023    Low Dose Statin  10/12/2023    Hepatitis C Screening  Completed       Past Medical History:   Diagnosis Date    Diabetes     Gout     Hyperlipidemia     Hypertension        Past Surgical History:   Procedure Laterality Date    INCISION AND DRAINAGE Left 2022    Procedure: Incision and Drainage - LEFT THIGH.;  Surgeon: Rene Neff MD;  Location: [facility] OR [address] [place] [address];  Service: Orthopedics;  Laterality: Left;    IRRIGATION AND DEBRIDEMENT OF LOWER EXTREMITY Left 2022    Procedure: IRRIGATION AND DEBRIDEMENT, LOWER EXTREMITY;  Surgeon: Rob Graff MD;  Location: [facility] OR [address] [place] [address];  Service: Orthopedics;  Laterality: Left;    WRIST SURGERY Left        family history includes Diabetes in his mother; No Known Problems in his father.    Social History     Tobacco Use    Smoking status: Former     Packs/day: 0.50     Types: Cigarettes     Quit date: 2020     Years since quittin.8    Smokeless tobacco: Never   Substance Use Topics    Alcohol use: Yes     Alcohol/week: 12.0 standard drinks     Types: 12 Cans of beer per week    Drug use: Never       Review of Systems    Outpatient Encounter Medications as of 11/10/2022   Medication Sig Dispense Refill    acetaminophen (TYLENOL) 500 MG tablet Take 1 tablet (500 mg total) by mouth every 6 (six) hours as needed for Pain. 30 tablet 0    atorvastatin (LIPITOR) 20 MG tablet Take 1 tablet (20 mg total) by mouth once daily. 90 tablet 3    blood sugar diagnostic Strp 1 strip by  "Misc.(Non-Drug; Combo Route) route once daily. 100 each 11    blood-glucose meter kit Use as instructed 1 each 0    insulin detemir U-100 (LEVEMIR FLEXTOUCH) 100 unit/mL (3 mL) SubQ InPn pen Inject 10 Units into the skin 2 (two) times daily. 15 mL 11    lancets Misc 1 lancet by Misc.(Non-Drug; Combo Route) route once daily. 100 each 11    lisinopriL 10 MG tablet Take 1 tablet (10 mg total) by mouth once daily. 90 tablet 6    metFORMIN (GLUCOPHAGE-XR) 750 MG ER 24hr tablet Take 1 tablet (750 mg total) by mouth daily with breakfast. 30 tablet 11    pen needle, diabetic 31 gauge x 5/16" Ndle Use to inject insulin into the skin. 100 each 11    tirzepatide (MOUNJARO) 7.5 mg/0.5 mL PnIj Inject 7.5 mg into the skin every 7 days. 4 pen 1    triamcinolone acetonide 0.1% (KENALOG) 0.1 % cream Apply topically 2 (two) times daily. for 7 days 15 g 0     No facility-administered encounter medications on file as of 11/10/2022.        Review of patient's allergies indicates:   Allergen Reactions    Adhesive tape-silicones      It was a White tape ( uncertain if related to silicone) 8/22/2022       Physical Exam       ]    Physical Exam     Laboratory:  CBC:  Recent Labs   Lab Result Units 08/24/22  0437 08/29/22  1341 11/09/22  0911   WBC K/uL 5.76 8.52 5.50   RBC M/uL 2.78* 3.04* 4.60   Hemoglobin g/dL 8.3* 9.0* 13.7*   Hematocrit % 26.4* 28.1* 40.2   Platelets K/uL 307 331 168   MCV fL 95 92 87   MCH pg 29.9 29.6 29.8   MCHC g/dL 31.4* 32.0 34.1     CMP:  Recent Labs   Lab Result Units 08/23/22  0413 08/24/22  0437 08/29/22  1341 09/13/22  1626   Glucose mg/dL 118* 86 122* 85   Calcium mg/dL 8.8 8.9 9.8 9.8   Albumin g/dL 2.4* 2.6* 3.5  --    Total Protein g/dL 6.2 6.5 7.7  --    Sodium mmol/L 141 143 142 140   Potassium mmol/L 3.8 3.8 4.7 4.1   CO2 mmol/L 24 24 24 25   Chloride mmol/L 109 109 105 108   BUN mg/dL 33* 27* 31* 16   Alkaline Phosphatase U/L 69 73 90  --    ALT U/L 10 14 22  --    AST U/L 20 24 28  --    Total " Bilirubin mg/dL 0.4 0.4 0.3  --      URINALYSIS:  Recent Labs   Lab Result Units 08/17/22  1436   Color, UA  Colorless*   Specific Gravity, UA  1.010   pH, UA  7.0   Protein, UA  1+*   Bacteria /hpf Rare   Nitrite, UA  Negative   Leukocytes, UA  Negative   Hyaline Casts, UA /lpf 0      LIPIDS:  No results for input(s): TSH, HDL, CHOL, TRIG, LDLCALC, CHOLHDL, NONHDLCHOL, TOTALCHOLEST in the last 2160 hours.  TSH:  No results for input(s): TSH in the last 2160 hours.  A1C:  Recent Labs   Lab Result Units 11/09/22  0911   Hemoglobin A1C % 5.5       Radiology:      Assessment/Plan     Wilfredo Thomas is a 47 y.o.male with:    There are no diagnoses linked to this encounter.    -Continue current medications and maintain follow up with specialists.      Patient verbalizes understanding and agrees with current treatment plan.      Oumou Zuniga MD  [facility] [place]

## 2022-11-10 NOTE — PROGRESS NOTES
Subjective:       Patient ID: Wilfredo Thomas is a 47 y.o. male.    Chief Complaint: Follow-up    HPI    47M with h/o T2DM, sHTN, HLD, gouty arthritis for follow up today. He endorses severe right anterior leg pain after bumping into an object while cleaning his house in the past 3-4days unresponsive to tylenol, ibuprofen , otherwise feels great health-wisely. Home BS readings range 90s-110s (fasting and PP respectively), he has been compliant with all medications and has significantly improved his dietary habits.    All recent labs and medications reviewed  and findings discussed with patient.  HbA1c down to 5.5 from 11  Renal function : back to baseline      Past Medical History:   Diagnosis Date    Diabetes     Gout     Hyperlipidemia     Hypertension        Past Surgical History:   Procedure Laterality Date    INCISION AND DRAINAGE Left 2022    Procedure: Incision and Drainage - LEFT THIGH.;  Surgeon: Rene Neff MD;  Location: [facility] OR [address] [place] [address];  Service: Orthopedics;  Laterality: Left;    IRRIGATION AND DEBRIDEMENT OF LOWER EXTREMITY Left 2022    Procedure: IRRIGATION AND DEBRIDEMENT, LOWER EXTREMITY;  Surgeon: Rob Graff MD;  Location: [facility] OR [address] [place] [address];  Service: Orthopedics;  Laterality: Left;    WRIST SURGERY Left        Family History   Problem Relation Age of Onset    Diabetes Mother     No Known Problems Father        Social History     Socioeconomic History    Marital status:    Tobacco Use    Smoking status: Former     Packs/day: 0.50     Types: Cigarettes     Quit date: 2020     Years since quittin.8    Smokeless tobacco: Never   Substance and Sexual Activity    Alcohol use: Yes     Alcohol/week: 12.0 standard drinks     Types: 12 Cans of beer per week    Drug use: Never    Sexual activity: Yes     Partners: Female   Social History Narrative    Lives with wife and 2 kids in college and one older with 1 grand child. 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     Social  "Determinants of Health     Financial Resource Strain: Low Risk     Difficulty of Paying Living Expenses: Not very hard   Food Insecurity: Unknown    Worried About Running Out of Food in the Last Year: Patient refused    Ran Out of Food in the Last Year: Patient refused   Transportation Needs: Unknown    Lack of Transportation (Medical): No    Lack of Transportation (Non-Medical): Patient refused   Physical Activity: Sufficiently Active    Days of Exercise per Week: 6 days    Minutes of Exercise per Session: 60 min   Stress: No Stress Concern Present    Feeling of Stress : Not at all   Social Connections: Unknown    Frequency of Communication with Friends and Family: Twice a week    Frequency of Social Gatherings with Friends and Family: Twice a week    Active Member of Clubs or Organizations: No    Attends Club or Organization Meetings: Never    Marital Status:    Housing Stability: Unknown    Unable to Pay for Housing in the Last Year: Patient refused    Number of Places Lived in the Last Year: 1    Unstable Housing in the Last Year: Patient refused       Review of Systems   Constitutional:  Negative for chills, fatigue and fever.   Respiratory:  Negative for cough and shortness of breath.    Cardiovascular:  Negative for chest pain, palpitations and leg swelling.   Gastrointestinal:  Negative for abdominal pain, constipation and diarrhea.   Endocrine: Negative for polydipsia and polyphagia.   Genitourinary:  Negative for dysuria, flank pain and frequency.   Musculoskeletal:  Negative for back pain, gait problem and joint swelling.   Skin:  Negative for rash and wound.   Neurological:  Negative for dizziness, seizures, weakness, numbness and headaches.       Objective:     Vitals:    11/10/22 0957   BP: 138/70   Pulse: 88   SpO2: 97%   Weight: 88.8 kg (195 lb 12.8 oz)   Height: 5' 7" (1.702 m)            Physical Exam  Vitals reviewed.   Constitutional:       General: He is not in acute distress.     " Appearance: He is obese.   HENT:      Head: Normocephalic and atraumatic.      Right Ear: Tympanic membrane normal.      Left Ear: Tympanic membrane normal.      Mouth/Throat:      Mouth: Mucous membranes are moist.   Eyes:      Extraocular Movements: Extraocular movements intact.      Conjunctiva/sclera: Conjunctivae normal.      Pupils: Pupils are equal, round, and reactive to light.   Cardiovascular:      Rate and Rhythm: Normal rate and regular rhythm.      Pulses: Normal pulses.      Heart sounds: Normal heart sounds.   Pulmonary:      Effort: Pulmonary effort is normal.      Breath sounds: Normal breath sounds.   Abdominal:      General: Abdomen is flat. Bowel sounds are normal.      Palpations: Abdomen is soft.      Hernia: There is no hernia in the left inguinal area.   Genitourinary:     Pubic Area: No rash.       Penis: No erythema, tenderness or swelling.       Testes: Cremasteric reflex is present.         Right: Mass, tenderness or swelling not present.         Left: Mass, tenderness or swelling not present.      Epididymis:      Right: Normal.      Left: Normal.   Musculoskeletal:         General: Swelling (with healing abrasions on the mid anterior shin) present. Tenderness: with healing abrasions on the mid anterior shin.     Right lower leg: No edema (1+).      Left lower leg: No edema (Well healed surgical non-hypertrophic scar on the mid upper left thigh, non-tender, no erythema).   Skin:     General: Skin is warm.   Neurological:      General: No focal deficit present.      Mental Status: He is alert and oriented to person, place, and time.      Cranial Nerves: No cranial nerve deficit.   Psychiatric:         Mood and Affect: Mood normal.         Behavior: Behavior normal.         Laboratory:  CBC:  Recent Labs   Lab Result Units 08/24/22  0437 08/29/22  1341 11/09/22  0911   WBC K/uL 5.76 8.52 5.50   RBC M/uL 2.78* 3.04* 4.60   Hemoglobin g/dL 8.3* 9.0* 13.7*   Hematocrit % 26.4* 28.1* 40.2    Platelets K/uL 307 331 168   MCV fL 95 92 87   MCH pg 29.9 29.6 29.8   MCHC g/dL 31.4* 32.0 34.1     CMP:  Recent Labs   Lab Result Units 08/23/22  0413 08/24/22  0437 08/29/22  1341 09/13/22  1626   Glucose mg/dL 118* 86 122* 85   Calcium mg/dL 8.8 8.9 9.8 9.8   Albumin g/dL 2.4* 2.6* 3.5  --    Total Protein g/dL 6.2 6.5 7.7  --    Sodium mmol/L 141 143 142 140   Potassium mmol/L 3.8 3.8 4.7 4.1   CO2 mmol/L 24 24 24 25   Chloride mmol/L 109 109 105 108   BUN mg/dL 33* 27* 31* 16   Alkaline Phosphatase U/L 69 73 90  --    ALT U/L 10 14 22  --    AST U/L 20 24 28  --    Total Bilirubin mg/dL 0.4 0.4 0.3  --      URINALYSIS:  Recent Labs   Lab Result Units 08/17/22  1436   Color, UA  Colorless*   Specific Gravity, UA  1.010   pH, UA  7.0   Protein, UA  1+*   Bacteria /hpf Rare   Nitrite, UA  Negative   Leukocytes, UA  Negative   Hyaline Casts, UA /lpf 0      LIPIDS:  No results for input(s): TSH, HDL, CHOL, TRIG, LDLCALC, CHOLHDL, NONHDLCHOL, TOTALCHOLEST in the last 2160 hours.    TSH:  No results for input(s): TSH in the last 2160 hours.    A1C:  Recent Labs   Lab Result Units 11/09/22  0911   Hemoglobin A1C % 5.5       Assessment:         ICD-10-CM ICD-9-CM   1. Pain and swelling of right lower leg  M79.661 729.5    M79.89 729.81   2. Type 2 diabetes mellitus without complication, with long-term current use of insulin  E11.9 250.00    Z79.4 V58.67   3. BMI 30.0-30.9,adult  Z68.30 V85.30   4. Essential hypertension  I10 401.9         Plan:       Traumatic Right LE pain  -abrasions healing as expected  -no signs of DVT or infection  -unresponsive to OTC tylenol, ibuprofen  -trial of tramadol and nabumetone     2. Essential hypertension          -BP well controlled  -  renal function back to baseline  -c/w current regimen      3. Type 2 diabetes mellitus  -now well controlled, HbA1C down to 5.5 from 11  -Home BS readings range 90s-110s(occasionally)  -c/w Metformin @ 750mg ER daily  -c/w Mounjaro @ 7.5mg  "qweekly  -decrease basal to 10U a.m only from BID (due to work schedule)  -he does not need endo follow up again  -c/w therapeutic life style changes    4. BMI   -down to 30 from 31.77  -therapeutic life style changes reinforced    HCM: declined all due vaccine for now, will continue to address next clinic visit    Follow up in about 3 months (around 2/10/2023). -HbA1C    Patient's Medications   New Prescriptions    NABUMETONE (RELAFEN) 750 MG TABLET    Take 1 tablet (750 mg total) by mouth 2 (two) times daily. To be taken with meal for 2 days    TRAMADOL (ULTRAM-ER) 100 MG TB24    Take 1 tablet (100 mg total) by mouth 2 (two) times daily. for 7 days   Previous Medications    ACETAMINOPHEN (TYLENOL) 500 MG TABLET    Take 1 tablet (500 mg total) by mouth every 6 (six) hours as needed for Pain.    ATORVASTATIN (LIPITOR) 20 MG TABLET    Take 1 tablet (20 mg total) by mouth once daily.    BLOOD SUGAR DIAGNOSTIC STRP    1 strip by Misc.(Non-Drug; Combo Route) route once daily.    BLOOD-GLUCOSE METER KIT    Use as instructed    HYDROCODONE-ACETAMINOPHEN (NORCO) 5-325 MG PER TABLET    Take 1 tablet by mouth every 8 (eight) hours as needed.    IBUPROFEN 200 MG CAP    Take 2 capsules by mouth daily as needed.    INSULIN DETEMIR U-100 (LEVEMIR FLEXTOUCH) 100 UNIT/ML (3 ML) SUBQ INPN PEN    Inject 10 Units into the skin 2 (two) times daily.    LANCETS MISC    1 lancet by Misc.(Non-Drug; Combo Route) route once daily.    LISINOPRIL 10 MG TABLET    Take 1 tablet (10 mg total) by mouth once daily.    METFORMIN (GLUCOPHAGE-XR) 750 MG ER 24HR TABLET    Take 1 tablet (750 mg total) by mouth daily with breakfast.    MULTIVITAMIN (THERAGRAN) PER TABLET    Take 2 tablets by mouth.    PEN NEEDLE, DIABETIC 31 GAUGE X 5/16" NDLE    Use to inject insulin into the skin.    TIRZEPATIDE (MOUNJARO) 7.5 MG/0.5 ML PNIJ    Inject 7.5 mg into the skin every 7 days.    TRIAMCINOLONE ACETONIDE 0.1% (KENALOG) 0.1 % CREAM    Apply topically 2 (two) " times daily. for 7 days   Modified Medications    No medications on file   Discontinued Medications    No medications on file         Patient was given opportunity to express concerns, ask questions and verbalizes understanding of current management plan     Oumou Zuniga MD

## 2022-11-21 PROBLEM — A41.9 SEPSIS: Status: RESOLVED | Noted: 2022-08-17 | Resolved: 2022-11-21

## 2022-11-21 PROBLEM — J18.9 PNEUMONIA OF BOTH LUNGS: Status: RESOLVED | Noted: 2022-08-21 | Resolved: 2022-11-21

## 2022-12-12 PROBLEM — N17.9 AKI (ACUTE KIDNEY INJURY): Status: RESOLVED | Noted: 2022-07-26 | Resolved: 2022-12-12

## 2022-12-14 ENCOUNTER — PATIENT MESSAGE (OUTPATIENT)
Dept: FAMILY MEDICINE | Facility: CLINIC | Age: 47
End: 2022-12-14
Payer: COMMERCIAL

## 2022-12-14 RX ORDER — TIRZEPATIDE 7.5 MG/.5ML
7.5 INJECTION, SOLUTION SUBCUTANEOUS
Qty: 4 PEN | Refills: 1 | Status: SHIPPED | OUTPATIENT
Start: 2022-12-14 | End: 2023-02-10 | Stop reason: SDUPTHER

## 2022-12-29 ENCOUNTER — TELEPHONE (OUTPATIENT)
Dept: GASTROENTEROLOGY | Facility: CLINIC | Age: 47
End: 2022-12-29
Payer: COMMERCIAL

## 2023-01-27 ENCOUNTER — PATIENT OUTREACH (OUTPATIENT)
Dept: ADMINISTRATIVE | Facility: HOSPITAL | Age: 48
End: 2023-01-27
Payer: COMMERCIAL

## 2023-01-27 NOTE — PROGRESS NOTES
Care Everywhere updates requested and reviewed.  Immunizations reconciled. Media reports reviewed.  Duplicate HM overrides and  orders removed.  Overdue HM topic chart audit and/or requested.  Overdue lab testing linked to upcoming lab appointments if applies.      Health Maintenance Due   Topic Date Due    Diabetes Urine Screening  2023    Foot Exam  2023

## 2023-02-10 ENCOUNTER — OFFICE VISIT (OUTPATIENT)
Dept: FAMILY MEDICINE | Facility: CLINIC | Age: 48
End: 2023-02-10
Payer: COMMERCIAL

## 2023-02-10 VITALS
DIASTOLIC BLOOD PRESSURE: 88 MMHG | BODY MASS INDEX: 32.47 KG/M2 | WEIGHT: 206.88 LBS | HEIGHT: 67 IN | OXYGEN SATURATION: 98 % | SYSTOLIC BLOOD PRESSURE: 138 MMHG | HEART RATE: 71 BPM

## 2023-02-10 DIAGNOSIS — I10 ESSENTIAL HYPERTENSION: ICD-10-CM

## 2023-02-10 DIAGNOSIS — E11.9 TYPE 2 DIABETES MELLITUS WITHOUT COMPLICATION, WITH LONG-TERM CURRENT USE OF INSULIN: Primary | ICD-10-CM

## 2023-02-10 DIAGNOSIS — Z79.4 TYPE 2 DIABETES MELLITUS WITHOUT COMPLICATION, WITH LONG-TERM CURRENT USE OF INSULIN: Primary | ICD-10-CM

## 2023-02-10 DIAGNOSIS — E78.2 MIXED HYPERLIPIDEMIA: ICD-10-CM

## 2023-02-10 PROCEDURE — 99214 PR OFFICE/OUTPT VISIT, EST, LEVL IV, 30-39 MIN: ICD-10-PCS | Mod: S$GLB,,, | Performed by: STUDENT IN AN ORGANIZED HEALTH CARE EDUCATION/TRAINING PROGRAM

## 2023-02-10 PROCEDURE — 4010F ACE/ARB THERAPY RXD/TAKEN: CPT | Mod: CPTII,S$GLB,, | Performed by: STUDENT IN AN ORGANIZED HEALTH CARE EDUCATION/TRAINING PROGRAM

## 2023-02-10 PROCEDURE — 1160F PR REVIEW ALL MEDS BY PRESCRIBER/CLIN PHARMACIST DOCUMENTED: ICD-10-PCS | Mod: CPTII,S$GLB,, | Performed by: STUDENT IN AN ORGANIZED HEALTH CARE EDUCATION/TRAINING PROGRAM

## 2023-02-10 PROCEDURE — 99214 OFFICE O/P EST MOD 30 MIN: CPT | Mod: S$GLB,,, | Performed by: STUDENT IN AN ORGANIZED HEALTH CARE EDUCATION/TRAINING PROGRAM

## 2023-02-10 PROCEDURE — 3044F HG A1C LEVEL LT 7.0%: CPT | Mod: CPTII,S$GLB,, | Performed by: STUDENT IN AN ORGANIZED HEALTH CARE EDUCATION/TRAINING PROGRAM

## 2023-02-10 PROCEDURE — 4010F PR ACE/ARB THEARPY RXD/TAKEN: ICD-10-PCS | Mod: CPTII,S$GLB,, | Performed by: STUDENT IN AN ORGANIZED HEALTH CARE EDUCATION/TRAINING PROGRAM

## 2023-02-10 PROCEDURE — 1160F RVW MEDS BY RX/DR IN RCRD: CPT | Mod: CPTII,S$GLB,, | Performed by: STUDENT IN AN ORGANIZED HEALTH CARE EDUCATION/TRAINING PROGRAM

## 2023-02-10 PROCEDURE — 3079F PR MOST RECENT DIASTOLIC BLOOD PRESSURE 80-89 MM HG: ICD-10-PCS | Mod: CPTII,S$GLB,, | Performed by: STUDENT IN AN ORGANIZED HEALTH CARE EDUCATION/TRAINING PROGRAM

## 2023-02-10 PROCEDURE — 99999 PR PBB SHADOW E&M-EST. PATIENT-LVL IV: ICD-10-PCS | Mod: PBBFAC,,, | Performed by: STUDENT IN AN ORGANIZED HEALTH CARE EDUCATION/TRAINING PROGRAM

## 2023-02-10 PROCEDURE — 3075F SYST BP GE 130 - 139MM HG: CPT | Mod: CPTII,S$GLB,, | Performed by: STUDENT IN AN ORGANIZED HEALTH CARE EDUCATION/TRAINING PROGRAM

## 2023-02-10 PROCEDURE — 1159F PR MEDICATION LIST DOCUMENTED IN MEDICAL RECORD: ICD-10-PCS | Mod: CPTII,S$GLB,, | Performed by: STUDENT IN AN ORGANIZED HEALTH CARE EDUCATION/TRAINING PROGRAM

## 2023-02-10 PROCEDURE — 1159F MED LIST DOCD IN RCRD: CPT | Mod: CPTII,S$GLB,, | Performed by: STUDENT IN AN ORGANIZED HEALTH CARE EDUCATION/TRAINING PROGRAM

## 2023-02-10 PROCEDURE — 3075F PR MOST RECENT SYSTOLIC BLOOD PRESS GE 130-139MM HG: ICD-10-PCS | Mod: CPTII,S$GLB,, | Performed by: STUDENT IN AN ORGANIZED HEALTH CARE EDUCATION/TRAINING PROGRAM

## 2023-02-10 PROCEDURE — 3008F PR BODY MASS INDEX (BMI) DOCUMENTED: ICD-10-PCS | Mod: CPTII,S$GLB,, | Performed by: STUDENT IN AN ORGANIZED HEALTH CARE EDUCATION/TRAINING PROGRAM

## 2023-02-10 PROCEDURE — 3008F BODY MASS INDEX DOCD: CPT | Mod: CPTII,S$GLB,, | Performed by: STUDENT IN AN ORGANIZED HEALTH CARE EDUCATION/TRAINING PROGRAM

## 2023-02-10 PROCEDURE — 3044F PR MOST RECENT HEMOGLOBIN A1C LEVEL <7.0%: ICD-10-PCS | Mod: CPTII,S$GLB,, | Performed by: STUDENT IN AN ORGANIZED HEALTH CARE EDUCATION/TRAINING PROGRAM

## 2023-02-10 PROCEDURE — 3079F DIAST BP 80-89 MM HG: CPT | Mod: CPTII,S$GLB,, | Performed by: STUDENT IN AN ORGANIZED HEALTH CARE EDUCATION/TRAINING PROGRAM

## 2023-02-10 PROCEDURE — 99999 PR PBB SHADOW E&M-EST. PATIENT-LVL IV: CPT | Mod: PBBFAC,,, | Performed by: STUDENT IN AN ORGANIZED HEALTH CARE EDUCATION/TRAINING PROGRAM

## 2023-02-10 RX ORDER — PEN NEEDLE, DIABETIC 30 GX3/16"
NEEDLE, DISPOSABLE MISCELLANEOUS
Qty: 100 EACH | Refills: 11 | Status: SHIPPED | OUTPATIENT
Start: 2023-02-10

## 2023-02-10 RX ORDER — ALPRAZOLAM 0.5 MG/1
TABLET ORAL
COMMUNITY
Start: 2022-12-30 | End: 2023-03-24

## 2023-02-10 RX ORDER — DOCUSATE SODIUM 100 MG/1
100 CAPSULE, LIQUID FILLED ORAL 2 TIMES DAILY PRN
COMMUNITY
Start: 2023-01-17 | End: 2024-02-08

## 2023-02-10 RX ORDER — TIRZEPATIDE 7.5 MG/.5ML
7.5 INJECTION, SOLUTION SUBCUTANEOUS
Qty: 4 PEN | Refills: 3 | Status: SHIPPED | OUTPATIENT
Start: 2023-02-10 | End: 2023-05-11 | Stop reason: SDUPTHER

## 2023-02-10 NOTE — PROGRESS NOTES
Subjective:       Patient ID: Wilfredo Thomas is a 47 y.o. male.    Chief Complaint: No chief complaint on file.      HPI    47M with T2DM, sHTN, HLD, gouty arthritis for follow up today. He recently had left meniscal repair 3 weeks ago and has been quite non-compliant with healthy eating habits and has only begun to exercise slowly. He also states he was out of Mounjaro for about 1 month due to shortage from pharmacy. Otherwise he has no new complaints today.    Past Medical History:   Diagnosis Date    Diabetes     Gout     Hyperlipidemia     Hypertension        Past Surgical History:   Procedure Laterality Date    INCISION AND DRAINAGE Left 8/6/2022    Procedure: Incision and Drainage - LEFT THIGH.;  Surgeon: Rene eNff MD;  Location: [facility] OR [address] [place] [address];  Service: Orthopedics;  Laterality: Left;    IRRIGATION AND DEBRIDEMENT OF LOWER EXTREMITY Left 7/29/2022    Procedure: IRRIGATION AND DEBRIDEMENT, LOWER EXTREMITY;  Surgeon: Rob Graff MD;  Location: [facility] OR [address] [place] [address];  Service: Orthopedics;  Laterality: Left;    WRIST SURGERY Left        Family History   Problem Relation Age of Onset    Diabetes Mother     No Known Problems Father        Social History     Socioeconomic History    Marital status:    Tobacco Use    Smoking status: Former     Packs/day: 0.50     Types: Cigarettes     Quit date: 1/2/2020     Years since quitting: 3.1    Smokeless tobacco: Never   Substance and Sexual Activity    Alcohol use: Yes     Alcohol/week: 12.0 standard drinks     Types: 12 Cans of beer per week    Drug use: Never    Sexual activity: Yes     Partners: Female   Social History Narrative    Lives with wife and 2 kids in college and one older with 1 grand child. 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     Social Determinants of Health     Financial Resource Strain: Unknown    Difficulty of Paying Living Expenses: Patient refused   Food Insecurity: Unknown    Worried About Running Out of Food in the Last Year:  "Patient refused    Ran Out of Food in the Last Year: Patient refused   Transportation Needs: Unknown    Lack of Transportation (Medical): Patient refused    Lack of Transportation (Non-Medical): Patient refused   Physical Activity: Sufficiently Active    Days of Exercise per Week: 5 days    Minutes of Exercise per Session: 60 min   Stress: Unknown    Feeling of Stress : Patient refused   Social Connections: Unknown    Frequency of Communication with Friends and Family: More than three times a week    Frequency of Social Gatherings with Friends and Family: Three times a week    Active Member of Clubs or Organizations: No    Attends Club or Organization Meetings: Never    Marital Status:    Housing Stability: Unknown    Unable to Pay for Housing in the Last Year: Patient refused    Number of Places Lived in the Last Year: 1    Unstable Housing in the Last Year: Patient refused       Review of Systems   Constitutional:  Negative for chills, fatigue and fever.   Respiratory:  Negative for cough and shortness of breath.    Cardiovascular:  Negative for chest pain, palpitations and leg swelling.   Gastrointestinal:  Negative for abdominal pain, constipation and diarrhea.   Endocrine: Negative for polydipsia and polyphagia.   Genitourinary:  Negative for dysuria, flank pain and frequency.   Musculoskeletal:  Negative for back pain, gait problem and joint swelling.   Skin:  Negative for rash and wound.   Neurological:  Negative for dizziness, seizures, weakness, numbness and headaches.       Objective:     Vitals:    02/10/23 0929   BP: 138/88   BP Location: Left arm   Patient Position: Sitting   BP Method: Medium (Manual)   Pulse: 71   SpO2: 98%   Weight: 93.8 kg (206 lb 14.4 oz)   Height: 5' 7" (1.702 m)            Physical Exam  Vitals reviewed.   Constitutional:       General: He is not in acute distress.     Appearance: He is obese.   HENT:      Head: Normocephalic and atraumatic.      Right Ear: Tympanic " membrane normal.      Left Ear: Tympanic membrane normal.      Mouth/Throat:      Mouth: Mucous membranes are moist.   Eyes:      Extraocular Movements: Extraocular movements intact.      Conjunctiva/sclera: Conjunctivae normal.      Pupils: Pupils are equal, round, and reactive to light.   Cardiovascular:      Rate and Rhythm: Normal rate and regular rhythm.      Pulses: Normal pulses.      Heart sounds: Normal heart sounds.   Pulmonary:      Effort: Pulmonary effort is normal.      Breath sounds: Normal breath sounds.   Abdominal:      General: Abdomen is flat. Bowel sounds are normal.      Palpations: Abdomen is soft.      Hernia: There is no hernia in the left inguinal area.   Genitourinary:     Pubic Area: No rash.       Penis: No erythema, tenderness or swelling.       Testes: Cremasteric reflex is present.         Right: Mass, tenderness or swelling not present.         Left: Mass, tenderness or swelling not present.      Epididymis:      Right: Normal.      Left: Normal.   Musculoskeletal:         General: Swelling (with healing abrasions on the mid anterior shin) present. Tenderness: with healing abrasions on the mid anterior shin.     Right lower leg: No edema (1+).      Left lower leg: No edema (Well healed surgical non-hypertrophic scar on the mid upper left thigh, non-tender, no erythema).   Skin:     General: Skin is warm.   Neurological:      General: No focal deficit present.      Mental Status: He is alert and oriented to person, place, and time.      Cranial Nerves: No cranial nerve deficit.   Psychiatric:         Mood and Affect: Mood normal.         Behavior: Behavior normal.         Laboratory:  CBC:  No results for input(s): WBC, RBC, HGB, HCT, PLT, MCV, MCH, MCHC in the last 2160 hours.    CMP:  No results for input(s): GLU, CALCIUM, ALBUMIN, PROT, NA, K, CO2, CL, BUN, ALKPHOS, ALT, AST, BILITOT in the last 2160 hours.    Invalid input(s): CREATININ    URINALYSIS:  No results for input(s):  COLORU, CLARITYU, SPECGRAV, PHUR, PROTEINUA, GLUCOSEU, BILIRUBINCON, BLOODU, WBCU, RBCU, BACTERIA, MUCUS, NITRITE, LEUKOCYTESUR, UROBILINOGEN, HYALINECASTS in the last 2160 hours.     LIPIDS:  No results for input(s): TSH, HDL, CHOL, TRIG, LDLCALC, CHOLHDL, NONHDLCHOL, TOTALCHOLEST in the last 2160 hours.    TSH:  No results for input(s): TSH in the last 2160 hours.    A1C:  Recent Labs   Lab Result Units 02/09/23  0616   Hemoglobin A1C % 6.7*         Assessment:         ICD-10-CM ICD-9-CM   1. Type 2 diabetes mellitus without complication, with long-term current use of insulin  E11.9 250.00    Z79.4 V58.67   2. Type 2 diabetes mellitus with other diabetic kidney complication, with long-term current use of insulin  E11.29 250.40    Z79.4 V58.67   3. Type 2 diabetes mellitus without complication, without long-term current use of insulin  E11.9 250.00         Plan:         1. Type 2 diabetes mellitus  -now well controlled, but Hba1C slowing increasing again due to life style  -Home BS readings range 90s-110s(occasionally)  -c/w Metformin @ 750mg ER daily  -c/w Mounjaro @ 7.5mg qweekly  -c/w basal (levemir) @ 10U  -refill sent to pharmacy  -he does not need endo follow up again  -c/w therapeutic life style changes    2. BMI   -worsening again  -therapeutic life style modifications reinforced    3. sHTN  -well controlled  -therapeutic life style changes reinforced    4. Mixed HLD  -c/w statin and life style modifications    HCM: declined all due vaccine for now, will continue to address next clinic visit        Patient's Medications   New Prescriptions    No medications on file   Previous Medications    ACETAMINOPHEN (TYLENOL) 500 MG TABLET    Take 1 tablet (500 mg total) by mouth every 6 (six) hours as needed for Pain.    ALPRAZOLAM (XANAX) 0.5 MG TABLET    TAKE 1 TABLET BY MOUTH ONE HOUR PRIOR TO PROCEDURE. MAY TAKE 1 TABLET BY MOUTH RIGHT BEFORE IF NEEDED    ATORVASTATIN (LIPITOR) 20 MG TABLET    Take 1 tablet (20 mg  "total) by mouth once daily.    BLOOD SUGAR DIAGNOSTIC STRP    1 strip by Misc.(Non-Drug; Combo Route) route once daily.    BLOOD-GLUCOSE METER KIT    Use as instructed    DOCUSATE SODIUM (COLACE) 100 MG CAPSULE    Take 100 mg by mouth 2 (two) times daily as needed.    HYDROCODONE-ACETAMINOPHEN (NORCO) 5-325 MG PER TABLET    Take 1 tablet by mouth every 8 (eight) hours as needed.    LANCETS MISC    1 lancet by Misc.(Non-Drug; Combo Route) route once daily.    LISINOPRIL 10 MG TABLET    Take 1 tablet (10 mg total) by mouth once daily.    METFORMIN (GLUCOPHAGE-XR) 750 MG ER 24HR TABLET    Take 1 tablet (750 mg total) by mouth daily with breakfast.    MULTIVITAMIN (THERAGRAN) PER TABLET    Take 2 tablets by mouth.    NABUMETONE (RELAFEN) 750 MG TABLET    TAKE 1 TABLET(750 MG) BY MOUTH TWICE DAILY WITH MEAL FOR 2 DAYS AS DIRECTED    TRIAMCINOLONE ACETONIDE 0.1% (KENALOG) 0.1 % CREAM    Apply topically 2 (two) times daily. for 7 days   Modified Medications    Modified Medication Previous Medication    INSULIN DETEMIR U-100 (LEVEMIR FLEXTOUCH) 100 UNIT/ML (3 ML) SUBQ INPN PEN insulin detemir U-100 (LEVEMIR FLEXTOUCH) 100 unit/mL (3 mL) SubQ InPn pen       Inject 10 Units into the skin once daily.    Inject 10 Units into the skin 2 (two) times daily.    PEN NEEDLE, DIABETIC 31 GAUGE X 5/16" NDLE pen needle, diabetic 31 gauge x 5/16" Ndle       Use to inject insulin into the skin.    Use to inject insulin into the skin.    TIRZEPATIDE (MOUNJARO) 7.5 MG/0.5 ML PNIJ tirzepatide (MOUNJARO) 7.5 mg/0.5 mL PnIj       Inject 7.5 mg into the skin every 7 days.    Inject 7.5 mg into the skin every 7 days.   Discontinued Medications    No medications on file         Patient was given opportunity to express concerns, ask questions and verbalizes understanding of current management plan     Dr Oumou Zuniga MD        "

## 2023-02-24 ENCOUNTER — PATIENT MESSAGE (OUTPATIENT)
Dept: FAMILY MEDICINE | Facility: CLINIC | Age: 48
End: 2023-02-24
Payer: COMMERCIAL

## 2023-03-23 ENCOUNTER — PATIENT MESSAGE (OUTPATIENT)
Dept: FAMILY MEDICINE | Facility: CLINIC | Age: 48
End: 2023-03-23
Payer: COMMERCIAL

## 2023-03-24 ENCOUNTER — TELEPHONE (OUTPATIENT)
Dept: FAMILY MEDICINE | Facility: CLINIC | Age: 48
End: 2023-03-24
Payer: COMMERCIAL

## 2023-03-24 DIAGNOSIS — F41.8 SITUATIONAL ANXIETY: Primary | ICD-10-CM

## 2023-03-24 RX ORDER — ALPRAZOLAM 0.5 MG/1
0.5 TABLET ORAL DAILY
Qty: 2 TABLET | Refills: 0 | Status: SHIPPED | OUTPATIENT
Start: 2023-03-24 | End: 2023-10-09

## 2023-03-24 NOTE — TELEPHONE ENCOUNTER
Patient called in requesting for medication for anxiety as he experiences anxiety each time he flies    Ass: Situational Anxiety   Plan : Xanax  0.5mg X 2doses sent to pharmacy,patient informed

## 2023-03-28 ENCOUNTER — PATIENT MESSAGE (OUTPATIENT)
Dept: FAMILY MEDICINE | Facility: CLINIC | Age: 48
End: 2023-03-28
Payer: COMMERCIAL

## 2023-03-29 DIAGNOSIS — E11.9 TYPE 2 DIABETES MELLITUS WITHOUT COMPLICATION: ICD-10-CM

## 2023-04-01 ENCOUNTER — PATIENT MESSAGE (OUTPATIENT)
Dept: FAMILY MEDICINE | Facility: CLINIC | Age: 48
End: 2023-04-01
Payer: COMMERCIAL

## 2023-04-01 DIAGNOSIS — E11.65 TYPE 2 DIABETES MELLITUS WITH HYPERGLYCEMIA, WITHOUT LONG-TERM CURRENT USE OF INSULIN: ICD-10-CM

## 2023-04-01 DIAGNOSIS — E78.2 MIXED HYPERLIPIDEMIA: ICD-10-CM

## 2023-04-03 ENCOUNTER — PATIENT MESSAGE (OUTPATIENT)
Dept: ADMINISTRATIVE | Facility: HOSPITAL | Age: 48
End: 2023-04-03
Payer: COMMERCIAL

## 2023-04-03 RX ORDER — ATORVASTATIN CALCIUM 20 MG/1
20 TABLET, FILM COATED ORAL DAILY
Qty: 90 TABLET | Refills: 3 | Status: SHIPPED | OUTPATIENT
Start: 2023-04-03 | End: 2024-02-28

## 2023-04-11 ENCOUNTER — PATIENT MESSAGE (OUTPATIENT)
Dept: ADMINISTRATIVE | Facility: HOSPITAL | Age: 48
End: 2023-04-11
Payer: COMMERCIAL

## 2023-04-27 ENCOUNTER — PATIENT MESSAGE (OUTPATIENT)
Dept: ADMINISTRATIVE | Facility: HOSPITAL | Age: 48
End: 2023-04-27
Payer: COMMERCIAL

## 2023-04-27 ENCOUNTER — PATIENT OUTREACH (OUTPATIENT)
Dept: ADMINISTRATIVE | Facility: HOSPITAL | Age: 48
End: 2023-04-27
Payer: COMMERCIAL

## 2023-04-27 NOTE — PROGRESS NOTES
Care Everywhere updates requested and reviewed.  Immunizations reconciled. Media reports reviewed.  Duplicate HM overrides and  orders removed.  Overdue HM topic chart audit and/or requested.  Overdue lab testing linked to upcoming lab appointments if applies.        Health Maintenance Due   Topic Date Due    Lipid Panel  2023    Foot Exam  2023    Diabetes Urine Screening  2023    Eye Exam  2023

## 2023-05-04 ENCOUNTER — PATIENT MESSAGE (OUTPATIENT)
Dept: FAMILY MEDICINE | Facility: CLINIC | Age: 48
End: 2023-05-04
Payer: COMMERCIAL

## 2023-05-11 ENCOUNTER — OFFICE VISIT (OUTPATIENT)
Dept: FAMILY MEDICINE | Facility: CLINIC | Age: 48
End: 2023-05-11
Payer: COMMERCIAL

## 2023-05-11 VITALS
SYSTOLIC BLOOD PRESSURE: 150 MMHG | OXYGEN SATURATION: 97 % | WEIGHT: 206.88 LBS | HEART RATE: 78 BPM | BODY MASS INDEX: 32.47 KG/M2 | HEIGHT: 67 IN | DIASTOLIC BLOOD PRESSURE: 90 MMHG

## 2023-05-11 DIAGNOSIS — E11.29 TYPE 2 DIABETES MELLITUS WITH MICROALBUMINURIA, WITH LONG-TERM CURRENT USE OF INSULIN: Primary | ICD-10-CM

## 2023-05-11 DIAGNOSIS — E78.5 DYSLIPIDEMIA: ICD-10-CM

## 2023-05-11 DIAGNOSIS — Z79.4 TYPE 2 DIABETES MELLITUS WITH MICROALBUMINURIA, WITH LONG-TERM CURRENT USE OF INSULIN: Primary | ICD-10-CM

## 2023-05-11 DIAGNOSIS — R80.9 TYPE 2 DIABETES MELLITUS WITH MICROALBUMINURIA, WITH LONG-TERM CURRENT USE OF INSULIN: Primary | ICD-10-CM

## 2023-05-11 DIAGNOSIS — I10 ESSENTIAL HYPERTENSION: ICD-10-CM

## 2023-05-11 PROCEDURE — 3062F PR POS MACROALBUMINURIA RESULT DOCUMENTED/REVIEW: ICD-10-PCS | Mod: CPTII,S$GLB,, | Performed by: STUDENT IN AN ORGANIZED HEALTH CARE EDUCATION/TRAINING PROGRAM

## 2023-05-11 PROCEDURE — 1159F MED LIST DOCD IN RCRD: CPT | Mod: CPTII,S$GLB,, | Performed by: STUDENT IN AN ORGANIZED HEALTH CARE EDUCATION/TRAINING PROGRAM

## 2023-05-11 PROCEDURE — 99999 PR PBB SHADOW E&M-EST. PATIENT-LVL V: CPT | Mod: PBBFAC,,, | Performed by: STUDENT IN AN ORGANIZED HEALTH CARE EDUCATION/TRAINING PROGRAM

## 2023-05-11 PROCEDURE — 3066F NEPHROPATHY DOC TX: CPT | Mod: CPTII,S$GLB,, | Performed by: STUDENT IN AN ORGANIZED HEALTH CARE EDUCATION/TRAINING PROGRAM

## 2023-05-11 PROCEDURE — 3080F PR MOST RECENT DIASTOLIC BLOOD PRESSURE >= 90 MM HG: ICD-10-PCS | Mod: CPTII,S$GLB,, | Performed by: STUDENT IN AN ORGANIZED HEALTH CARE EDUCATION/TRAINING PROGRAM

## 2023-05-11 PROCEDURE — 3062F POS MACROALBUMINURIA REV: CPT | Mod: CPTII,S$GLB,, | Performed by: STUDENT IN AN ORGANIZED HEALTH CARE EDUCATION/TRAINING PROGRAM

## 2023-05-11 PROCEDURE — 4010F ACE/ARB THERAPY RXD/TAKEN: CPT | Mod: CPTII,S$GLB,, | Performed by: STUDENT IN AN ORGANIZED HEALTH CARE EDUCATION/TRAINING PROGRAM

## 2023-05-11 PROCEDURE — 3008F PR BODY MASS INDEX (BMI) DOCUMENTED: ICD-10-PCS | Mod: CPTII,S$GLB,, | Performed by: STUDENT IN AN ORGANIZED HEALTH CARE EDUCATION/TRAINING PROGRAM

## 2023-05-11 PROCEDURE — 3044F PR MOST RECENT HEMOGLOBIN A1C LEVEL <7.0%: ICD-10-PCS | Mod: CPTII,S$GLB,, | Performed by: STUDENT IN AN ORGANIZED HEALTH CARE EDUCATION/TRAINING PROGRAM

## 2023-05-11 PROCEDURE — 3077F SYST BP >= 140 MM HG: CPT | Mod: CPTII,S$GLB,, | Performed by: STUDENT IN AN ORGANIZED HEALTH CARE EDUCATION/TRAINING PROGRAM

## 2023-05-11 PROCEDURE — 1160F RVW MEDS BY RX/DR IN RCRD: CPT | Mod: CPTII,S$GLB,, | Performed by: STUDENT IN AN ORGANIZED HEALTH CARE EDUCATION/TRAINING PROGRAM

## 2023-05-11 PROCEDURE — 99214 OFFICE O/P EST MOD 30 MIN: CPT | Mod: S$GLB,,, | Performed by: STUDENT IN AN ORGANIZED HEALTH CARE EDUCATION/TRAINING PROGRAM

## 2023-05-11 PROCEDURE — 1160F PR REVIEW ALL MEDS BY PRESCRIBER/CLIN PHARMACIST DOCUMENTED: ICD-10-PCS | Mod: CPTII,S$GLB,, | Performed by: STUDENT IN AN ORGANIZED HEALTH CARE EDUCATION/TRAINING PROGRAM

## 2023-05-11 PROCEDURE — 99214 PR OFFICE/OUTPT VISIT, EST, LEVL IV, 30-39 MIN: ICD-10-PCS | Mod: S$GLB,,, | Performed by: STUDENT IN AN ORGANIZED HEALTH CARE EDUCATION/TRAINING PROGRAM

## 2023-05-11 PROCEDURE — 3008F BODY MASS INDEX DOCD: CPT | Mod: CPTII,S$GLB,, | Performed by: STUDENT IN AN ORGANIZED HEALTH CARE EDUCATION/TRAINING PROGRAM

## 2023-05-11 PROCEDURE — 99999 PR PBB SHADOW E&M-EST. PATIENT-LVL V: ICD-10-PCS | Mod: PBBFAC,,, | Performed by: STUDENT IN AN ORGANIZED HEALTH CARE EDUCATION/TRAINING PROGRAM

## 2023-05-11 PROCEDURE — 4010F PR ACE/ARB THEARPY RXD/TAKEN: ICD-10-PCS | Mod: CPTII,S$GLB,, | Performed by: STUDENT IN AN ORGANIZED HEALTH CARE EDUCATION/TRAINING PROGRAM

## 2023-05-11 PROCEDURE — 3080F DIAST BP >= 90 MM HG: CPT | Mod: CPTII,S$GLB,, | Performed by: STUDENT IN AN ORGANIZED HEALTH CARE EDUCATION/TRAINING PROGRAM

## 2023-05-11 PROCEDURE — 1159F PR MEDICATION LIST DOCUMENTED IN MEDICAL RECORD: ICD-10-PCS | Mod: CPTII,S$GLB,, | Performed by: STUDENT IN AN ORGANIZED HEALTH CARE EDUCATION/TRAINING PROGRAM

## 2023-05-11 PROCEDURE — 3044F HG A1C LEVEL LT 7.0%: CPT | Mod: CPTII,S$GLB,, | Performed by: STUDENT IN AN ORGANIZED HEALTH CARE EDUCATION/TRAINING PROGRAM

## 2023-05-11 PROCEDURE — 3077F PR MOST RECENT SYSTOLIC BLOOD PRESSURE >= 140 MM HG: ICD-10-PCS | Mod: CPTII,S$GLB,, | Performed by: STUDENT IN AN ORGANIZED HEALTH CARE EDUCATION/TRAINING PROGRAM

## 2023-05-11 PROCEDURE — 3066F PR DOCUMENTATION OF TREATMENT FOR NEPHROPATHY: ICD-10-PCS | Mod: CPTII,S$GLB,, | Performed by: STUDENT IN AN ORGANIZED HEALTH CARE EDUCATION/TRAINING PROGRAM

## 2023-05-11 RX ORDER — METFORMIN HYDROCHLORIDE 750 MG/1
1500 TABLET, EXTENDED RELEASE ORAL
Qty: 180 TABLET | Refills: 3 | Status: SHIPPED | OUTPATIENT
Start: 2023-05-11 | End: 2024-02-20 | Stop reason: SDUPTHER

## 2023-05-11 RX ORDER — LISINOPRIL 30 MG/1
30 TABLET ORAL DAILY
Qty: 90 TABLET | Refills: 3 | Status: SHIPPED | OUTPATIENT
Start: 2023-05-11 | End: 2023-10-09

## 2023-05-11 RX ORDER — TIRZEPATIDE 7.5 MG/.5ML
7.5 INJECTION, SOLUTION SUBCUTANEOUS
Qty: 12 PEN | Refills: 3 | Status: SHIPPED | OUTPATIENT
Start: 2023-05-11 | End: 2023-10-09

## 2023-05-11 NOTE — PROGRESS NOTES
Subjective:       Patient ID: Wilfredo Thomas is a 47 y.o. male.    Chief Complaint: Follow-up      HPI    47M with T2DM, sHTN, HLD, gouty arthritis for follow up today. He endorses no new complaints today and now back to work since recovery from meniscal tear. He has been non-compliant with healthy eating habits and has only begun to exercise slowly.    Past Medical History:   Diagnosis Date    Diabetes     Gout     Hyperlipidemia     Hypertension        Past Surgical History:   Procedure Laterality Date    INCISION AND DRAINAGE Left 8/6/2022    Procedure: Incision and Drainage - LEFT THIGH.;  Surgeon: Rene Neff MD;  Location: [address];  Service: Orthopedics;  Laterality: Left;    IRRIGATION AND DEBRIDEMENT OF LOWER EXTREMITY Left 7/29/2022    Procedure: IRRIGATION AND DEBRIDEMENT, LOWER EXTREMITY;  Surgeon: Rob Graff MD;  Location: [facility] OR [address] [place] [address];  Service: Orthopedics;  Laterality: Left;    WRIST SURGERY Left        Family History   Problem Relation Age of Onset    Diabetes Mother     No Known Problems Father        Social History     Socioeconomic History    Marital status:    Tobacco Use    Smoking status: Former     Packs/day: 0.50     Types: Cigarettes     Quit date: 1/2/2020     Years since quitting: 3.3    Smokeless tobacco: Never   Substance and Sexual Activity    Alcohol use: Yes     Alcohol/week: 12.0 standard drinks     Types: 12 Cans of beer per week    Drug use: Never    Sexual activity: Yes     Partners: Female   Social History Narrative    Lives with wife and 2 kids in college and one older with 1 grand child. 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     Social Determinants of Health     Financial Resource Strain: Unknown    Difficulty of Paying Living Expenses: Patient refused   Food Insecurity: Unknown    Worried About Running Out of Food in the Last Year: Patient refused    Ran Out of Food in the Last Year: Patient refused   Transportation Needs: Unknown    Lack  "of Transportation (Medical): Patient refused    Lack of Transportation (Non-Medical): Patient refused   Physical Activity: Unknown    Days of Exercise per Week: Patient refused    Minutes of Exercise per Session: 60 min   Stress: Unknown    Feeling of Stress : Patient refused   Social Connections: Unknown    Frequency of Communication with Friends and Family: More than three times a week    Frequency of Social Gatherings with Friends and Family: Three times a week    Active Member of Clubs or Organizations: Patient refused    Attends Club or Organization Meetings: Never    Marital Status: Patient refused   Housing Stability: Unknown    Unable to Pay for Housing in the Last Year: Patient refused    Number of Places Lived in the Last Year: 1    Unstable Housing in the Last Year: Patient refused       Review of Systems   Constitutional:  Negative for chills, fatigue and fever.   Respiratory:  Negative for cough and shortness of breath.    Cardiovascular:  Negative for chest pain, palpitations and leg swelling.   Gastrointestinal:  Negative for abdominal pain, constipation and diarrhea.   Endocrine: Negative for polydipsia and polyphagia.   Genitourinary:  Negative for dysuria, flank pain and frequency.   Musculoskeletal:  Negative for back pain, gait problem and joint swelling.   Skin:  Negative for rash and wound.   Neurological:  Negative for dizziness, seizures, weakness, numbness and headaches.       Objective:     Vitals:    05/11/23 0900   BP: (!) 150/90   Pulse: 78   SpO2: 97%   Weight: 93.8 kg (206 lb 14.4 oz)   Height: 5' 7" (1.702 m)            Physical Exam  Vitals reviewed.   Constitutional:       General: He is not in acute distress.     Appearance: He is obese.   HENT:      Head: Normocephalic and atraumatic.      Right Ear: Tympanic membrane normal.      Left Ear: Tympanic membrane normal.      Mouth/Throat:      Mouth: Mucous membranes are moist.   Eyes:      Extraocular Movements: Extraocular movements " intact.      Conjunctiva/sclera: Conjunctivae normal.      Pupils: Pupils are equal, round, and reactive to light.   Cardiovascular:      Rate and Rhythm: Normal rate and regular rhythm.      Pulses: Normal pulses.      Heart sounds: Normal heart sounds.   Pulmonary:      Effort: Pulmonary effort is normal.      Breath sounds: Normal breath sounds.   Abdominal:      General: Abdomen is flat. Bowel sounds are normal.      Palpations: Abdomen is soft.      Hernia: There is no hernia in the left inguinal area.   Genitourinary:     Pubic Area: No rash.       Penis: No erythema, tenderness or swelling.       Testes: Cremasteric reflex is present.         Right: Mass, tenderness or swelling not present.         Left: Mass, tenderness or swelling not present.      Epididymis:      Right: Normal.      Left: Normal.   Musculoskeletal:         General: Swelling (with healing abrasions on the mid anterior shin) present. Tenderness: with healing abrasions on the mid anterior shin.     Right lower leg: No edema (1+).      Left lower leg: No edema (Well healed surgical non-hypertrophic scar on the mid upper left thigh, non-tender, no erythema).   Skin:     General: Skin is warm.   Neurological:      General: No focal deficit present.      Mental Status: He is alert and oriented to person, place, and time.      Cranial Nerves: No cranial nerve deficit.   Psychiatric:         Mood and Affect: Mood normal.         Behavior: Behavior normal.         Laboratory:  CBC:  No results for input(s): WBC, RBC, HGB, HCT, PLT, MCV, MCH, MCHC in the last 2160 hours.    CMP:  No results for input(s): GLU, CALCIUM, ALBUMIN, PROT, NA, K, CO2, CL, BUN, ALKPHOS, ALT, AST, BILITOT in the last 2160 hours.    Invalid input(s): CREATININ    URINALYSIS:  No results for input(s): COLORU, CLARITYU, SPECGRAV, PHUR, PROTEINUA, GLUCOSEU, BILIRUBINCON, BLOODU, WBCU, RBCU, BACTERIA, MUCUS, NITRITE, LEUKOCYTESUR, UROBILINOGEN, HYALINECASTS in the last 2160  hours.     LIPIDS:  Recent Labs   Lab Result Units 05/10/23  0608   HDL mg/dL 39*   Cholesterol mg/dL 124   Triglycerides mg/dL 126   LDL Cholesterol mg/dL 59.8*   HDL/Cholesterol Ratio % 31.5   Non-HDL Cholesterol mg/dL 85   Total Cholesterol/HDL Ratio  3.2       TSH:  No results for input(s): TSH in the last 2160 hours.    A1C:  Recent Labs   Lab Result Units 05/10/23  0608   Hemoglobin A1C % 6.8*       Assessment:         ICD-10-CM ICD-9-CM   1. Type 2 diabetes mellitus with microalbuminuria, with long-term current use of insulin  E11.29 250.40    R80.9 791.0    Z79.4 V58.67   2. Essential hypertension  I10 401.9   3. BMI 32.0-32.9,adult  Z68.32 V85.32   4. Dyslipidemia  E78.5 272.4         Plan:         1. Type 2 diabetes mellitus with diabetic nephropathy  -now well controlled, but Hba1C slowing increasing again due to life style  -Annual microalbumin/Cr @ >400  -Home BS readings range 90s-110s(occasionally)  -increase metformin to 1500 mg daily  -c/w Mounjaro @ 7.5mg qweekly  -Decrease levemir to 5U  -increase lisinopril to 30mg from 10mg  -refill sent to pharmacy  -he does not need endo follow up again  -c/w therapeutic life style changes    2. BMI 32  -worsening again  -therapeutic life style modifications reinforced    3. sHTN  -uncontrolled  -increase lisinopril to 30 mg daily  -therapeutic life style changes reinforced    4. Mixed HLD  -c/w statin and life style modifications    HCM: declined all due vaccine for now, will continue to address next clinic visit        Patient's Medications   New Prescriptions    LISINOPRIL (PRINIVIL,ZESTRIL) 30 MG TABLET    Take 1 tablet (30 mg total) by mouth once daily.   Previous Medications    ACETAMINOPHEN (TYLENOL) 500 MG TABLET    Take 1 tablet (500 mg total) by mouth every 6 (six) hours as needed for Pain.    ALPRAZOLAM (XANAX) 0.5 MG TABLET    Take 1 tablet (0.5 mg total) by mouth once daily. To be taken 30 minutes before flight for 2 days    ATORVASTATIN (LIPITOR)  "20 MG TABLET    Take 1 tablet (20 mg total) by mouth once daily.    BLOOD SUGAR DIAGNOSTIC STRP    1 strip by Misc.(Non-Drug; Combo Route) route once daily.    BLOOD-GLUCOSE METER KIT    Use as instructed    DOCUSATE SODIUM (COLACE) 100 MG CAPSULE    Take 100 mg by mouth 2 (two) times daily as needed.    HYDROCODONE-ACETAMINOPHEN (NORCO) 5-325 MG PER TABLET    Take 1 tablet by mouth every 8 (eight) hours as needed.    INSULIN DETEMIR U-100 (LEVEMIR FLEXTOUCH) 100 UNIT/ML (3 ML) SUBQ INPN PEN    Inject 10 Units into the skin once daily.    LANCETS MISC    1 lancet by Misc.(Non-Drug; Combo Route) route once daily.    MULTIVITAMIN (THERAGRAN) PER TABLET    Take 2 tablets by mouth.    NABUMETONE (RELAFEN) 750 MG TABLET    TAKE 1 TABLET(750 MG) BY MOUTH TWICE DAILY WITH MEAL FOR 2 DAYS AS DIRECTED    PEN NEEDLE, DIABETIC 31 GAUGE X 5/16" NDLE    Use to inject insulin into the skin.    TRIAMCINOLONE ACETONIDE 0.1% (KENALOG) 0.1 % CREAM    Apply topically 2 (two) times daily. for 7 days   Modified Medications    Modified Medication Previous Medication    METFORMIN (GLUCOPHAGE-XR) 750 MG ER 24HR TABLET metFORMIN (GLUCOPHAGE-XR) 750 MG ER 24hr tablet       Take 2 tablets (1,500 mg total) by mouth daily with breakfast.    Take 1 tablet (750 mg total) by mouth daily with breakfast.    TIRZEPATIDE (MOUNJARO) 7.5 MG/0.5 ML PNIJ tirzepatide (MOUNJARO) 7.5 mg/0.5 mL PnIj       Inject 7.5 mg into the skin every 7 days.    Inject 7.5 mg into the skin every 7 days.   Discontinued Medications    LISINOPRIL 10 MG TABLET    Take 1 tablet (10 mg total) by mouth once daily.         Patient was given opportunity to express concerns, ask questions and verbalizes understanding of current management plan     Dr Oumou Zuniga MD          "

## 2023-05-15 ENCOUNTER — PATIENT MESSAGE (OUTPATIENT)
Dept: ADMINISTRATIVE | Facility: HOSPITAL | Age: 48
End: 2023-05-15
Payer: COMMERCIAL

## 2023-07-31 ENCOUNTER — PATIENT OUTREACH (OUTPATIENT)
Dept: ADMINISTRATIVE | Facility: HOSPITAL | Age: 48
End: 2023-07-31
Payer: COMMERCIAL

## 2023-07-31 NOTE — PROGRESS NOTES
Care Everywhere updates requested and reviewed.  Immunizations reconciled. Media reports reviewed.  Duplicate HM overrides and  orders removed.  Overdue HM topic chart audit and/or requested.  Overdue lab testing linked to upcoming lab appointments if applies.          Health Maintenance Due   Topic Date Due    Shingles Vaccine (1 of 2) Never done

## 2023-09-20 ENCOUNTER — PATIENT OUTREACH (OUTPATIENT)
Dept: ADMINISTRATIVE | Facility: HOSPITAL | Age: 48
End: 2023-09-20
Payer: COMMERCIAL

## 2023-09-20 DIAGNOSIS — E11.29 TYPE 2 DIABETES MELLITUS WITH MICROALBUMINURIA, WITH LONG-TERM CURRENT USE OF INSULIN: ICD-10-CM

## 2023-09-20 DIAGNOSIS — Z79.4 TYPE 2 DIABETES MELLITUS WITH MICROALBUMINURIA, WITH LONG-TERM CURRENT USE OF INSULIN: ICD-10-CM

## 2023-09-20 DIAGNOSIS — R80.9 TYPE 2 DIABETES MELLITUS WITH MICROALBUMINURIA, WITH LONG-TERM CURRENT USE OF INSULIN: ICD-10-CM

## 2023-09-20 NOTE — PROGRESS NOTES
Care Everywhere updates requested and reviewed.  Immunizations reconciled. Media reports reviewed.  Duplicate HM overrides and  orders removed.  Overdue HM topic chart audit and/or requested.  Overdue lab testing linked to upcoming lab appointments if applies.          Health Maintenance Due   Topic Date Due    Shingles Vaccine (1 of 2) Never done    Influenza Vaccine (1) Never done

## 2023-09-25 ENCOUNTER — PATIENT MESSAGE (OUTPATIENT)
Dept: ADMINISTRATIVE | Facility: HOSPITAL | Age: 48
End: 2023-09-25
Payer: COMMERCIAL

## 2023-09-27 ENCOUNTER — PATIENT OUTREACH (OUTPATIENT)
Dept: ADMINISTRATIVE | Facility: HOSPITAL | Age: 48
End: 2023-09-27
Payer: COMMERCIAL

## 2023-10-01 ENCOUNTER — PATIENT MESSAGE (OUTPATIENT)
Dept: FAMILY MEDICINE | Facility: CLINIC | Age: 48
End: 2023-10-01
Payer: COMMERCIAL

## 2023-10-09 ENCOUNTER — PATIENT OUTREACH (OUTPATIENT)
Dept: ADMINISTRATIVE | Facility: HOSPITAL | Age: 48
End: 2023-10-09
Payer: COMMERCIAL

## 2023-10-09 ENCOUNTER — OFFICE VISIT (OUTPATIENT)
Dept: FAMILY MEDICINE | Facility: CLINIC | Age: 48
End: 2023-10-09
Payer: COMMERCIAL

## 2023-10-09 VITALS
SYSTOLIC BLOOD PRESSURE: 154 MMHG | HEIGHT: 67 IN | OXYGEN SATURATION: 98 % | WEIGHT: 207.25 LBS | BODY MASS INDEX: 32.53 KG/M2 | HEART RATE: 77 BPM | DIASTOLIC BLOOD PRESSURE: 90 MMHG

## 2023-10-09 DIAGNOSIS — E11.29 TYPE 2 DIABETES MELLITUS WITH MICROALBUMINURIA, WITH LONG-TERM CURRENT USE OF INSULIN: ICD-10-CM

## 2023-10-09 DIAGNOSIS — M79.672 LEFT FOOT PAIN: Primary | ICD-10-CM

## 2023-10-09 DIAGNOSIS — Z79.4 TYPE 2 DIABETES MELLITUS WITH MICROALBUMINURIA, WITH LONG-TERM CURRENT USE OF INSULIN: ICD-10-CM

## 2023-10-09 DIAGNOSIS — E78.2 MIXED HYPERLIPIDEMIA: ICD-10-CM

## 2023-10-09 DIAGNOSIS — R80.9 TYPE 2 DIABETES MELLITUS WITH MICROALBUMINURIA, WITH LONG-TERM CURRENT USE OF INSULIN: ICD-10-CM

## 2023-10-09 DIAGNOSIS — I10 ESSENTIAL HYPERTENSION: ICD-10-CM

## 2023-10-09 PROCEDURE — 99214 OFFICE O/P EST MOD 30 MIN: CPT | Mod: S$GLB,,, | Performed by: STUDENT IN AN ORGANIZED HEALTH CARE EDUCATION/TRAINING PROGRAM

## 2023-10-09 PROCEDURE — 3051F HG A1C>EQUAL 7.0%<8.0%: CPT | Mod: CPTII,S$GLB,, | Performed by: STUDENT IN AN ORGANIZED HEALTH CARE EDUCATION/TRAINING PROGRAM

## 2023-10-09 PROCEDURE — 3008F PR BODY MASS INDEX (BMI) DOCUMENTED: ICD-10-PCS | Mod: CPTII,S$GLB,, | Performed by: STUDENT IN AN ORGANIZED HEALTH CARE EDUCATION/TRAINING PROGRAM

## 2023-10-09 PROCEDURE — 99999 PR PBB SHADOW E&M-EST. PATIENT-LVL V: ICD-10-PCS | Mod: PBBFAC,,, | Performed by: STUDENT IN AN ORGANIZED HEALTH CARE EDUCATION/TRAINING PROGRAM

## 2023-10-09 PROCEDURE — 3066F NEPHROPATHY DOC TX: CPT | Mod: CPTII,S$GLB,, | Performed by: STUDENT IN AN ORGANIZED HEALTH CARE EDUCATION/TRAINING PROGRAM

## 2023-10-09 PROCEDURE — 4010F PR ACE/ARB THEARPY RXD/TAKEN: ICD-10-PCS | Mod: CPTII,S$GLB,, | Performed by: STUDENT IN AN ORGANIZED HEALTH CARE EDUCATION/TRAINING PROGRAM

## 2023-10-09 PROCEDURE — 4010F ACE/ARB THERAPY RXD/TAKEN: CPT | Mod: CPTII,S$GLB,, | Performed by: STUDENT IN AN ORGANIZED HEALTH CARE EDUCATION/TRAINING PROGRAM

## 2023-10-09 PROCEDURE — 3051F PR MOST RECENT HEMOGLOBIN A1C LEVEL 7.0 - < 8.0%: ICD-10-PCS | Mod: CPTII,S$GLB,, | Performed by: STUDENT IN AN ORGANIZED HEALTH CARE EDUCATION/TRAINING PROGRAM

## 2023-10-09 PROCEDURE — 3066F PR DOCUMENTATION OF TREATMENT FOR NEPHROPATHY: ICD-10-PCS | Mod: CPTII,S$GLB,, | Performed by: STUDENT IN AN ORGANIZED HEALTH CARE EDUCATION/TRAINING PROGRAM

## 2023-10-09 PROCEDURE — 3062F PR POS MACROALBUMINURIA RESULT DOCUMENTED/REVIEW: ICD-10-PCS | Mod: CPTII,S$GLB,, | Performed by: STUDENT IN AN ORGANIZED HEALTH CARE EDUCATION/TRAINING PROGRAM

## 2023-10-09 PROCEDURE — 3080F PR MOST RECENT DIASTOLIC BLOOD PRESSURE >= 90 MM HG: ICD-10-PCS | Mod: CPTII,S$GLB,, | Performed by: STUDENT IN AN ORGANIZED HEALTH CARE EDUCATION/TRAINING PROGRAM

## 2023-10-09 PROCEDURE — 3080F DIAST BP >= 90 MM HG: CPT | Mod: CPTII,S$GLB,, | Performed by: STUDENT IN AN ORGANIZED HEALTH CARE EDUCATION/TRAINING PROGRAM

## 2023-10-09 PROCEDURE — 1159F PR MEDICATION LIST DOCUMENTED IN MEDICAL RECORD: ICD-10-PCS | Mod: CPTII,S$GLB,, | Performed by: STUDENT IN AN ORGANIZED HEALTH CARE EDUCATION/TRAINING PROGRAM

## 2023-10-09 PROCEDURE — 1160F RVW MEDS BY RX/DR IN RCRD: CPT | Mod: CPTII,S$GLB,, | Performed by: STUDENT IN AN ORGANIZED HEALTH CARE EDUCATION/TRAINING PROGRAM

## 2023-10-09 PROCEDURE — 1160F PR REVIEW ALL MEDS BY PRESCRIBER/CLIN PHARMACIST DOCUMENTED: ICD-10-PCS | Mod: CPTII,S$GLB,, | Performed by: STUDENT IN AN ORGANIZED HEALTH CARE EDUCATION/TRAINING PROGRAM

## 2023-10-09 PROCEDURE — 1159F MED LIST DOCD IN RCRD: CPT | Mod: CPTII,S$GLB,, | Performed by: STUDENT IN AN ORGANIZED HEALTH CARE EDUCATION/TRAINING PROGRAM

## 2023-10-09 PROCEDURE — 3008F BODY MASS INDEX DOCD: CPT | Mod: CPTII,S$GLB,, | Performed by: STUDENT IN AN ORGANIZED HEALTH CARE EDUCATION/TRAINING PROGRAM

## 2023-10-09 PROCEDURE — 3077F SYST BP >= 140 MM HG: CPT | Mod: CPTII,S$GLB,, | Performed by: STUDENT IN AN ORGANIZED HEALTH CARE EDUCATION/TRAINING PROGRAM

## 2023-10-09 PROCEDURE — 3077F PR MOST RECENT SYSTOLIC BLOOD PRESSURE >= 140 MM HG: ICD-10-PCS | Mod: CPTII,S$GLB,, | Performed by: STUDENT IN AN ORGANIZED HEALTH CARE EDUCATION/TRAINING PROGRAM

## 2023-10-09 PROCEDURE — 3062F POS MACROALBUMINURIA REV: CPT | Mod: CPTII,S$GLB,, | Performed by: STUDENT IN AN ORGANIZED HEALTH CARE EDUCATION/TRAINING PROGRAM

## 2023-10-09 PROCEDURE — 99214 PR OFFICE/OUTPT VISIT, EST, LEVL IV, 30-39 MIN: ICD-10-PCS | Mod: S$GLB,,, | Performed by: STUDENT IN AN ORGANIZED HEALTH CARE EDUCATION/TRAINING PROGRAM

## 2023-10-09 PROCEDURE — 99999 PR PBB SHADOW E&M-EST. PATIENT-LVL V: CPT | Mod: PBBFAC,,, | Performed by: STUDENT IN AN ORGANIZED HEALTH CARE EDUCATION/TRAINING PROGRAM

## 2023-10-09 RX ORDER — CELECOXIB 200 MG/1
200 CAPSULE ORAL 2 TIMES DAILY PRN
COMMUNITY
Start: 2023-05-10 | End: 2024-02-08

## 2023-10-09 RX ORDER — LISINOPRIL 40 MG/1
40 TABLET ORAL DAILY
Qty: 90 TABLET | Refills: 3 | Status: SHIPPED | OUTPATIENT
Start: 2023-10-09 | End: 2024-10-08

## 2023-10-09 NOTE — PROGRESS NOTES
Updates were requested from care everywhere.  Health Maintenance has been updated.  LINKS immunization registry triggered.  Immunizations were reconciled.  Pt declined flu vaccine 10/09/2023

## 2023-10-11 ENCOUNTER — OFFICE VISIT (OUTPATIENT)
Dept: PODIATRY | Facility: CLINIC | Age: 48
End: 2023-10-11
Payer: COMMERCIAL

## 2023-10-11 VITALS — HEIGHT: 67 IN | BODY MASS INDEX: 32.6 KG/M2 | WEIGHT: 207.69 LBS

## 2023-10-11 DIAGNOSIS — M21.869 ACQUIRED POSTERIOR EQUINUS, UNSPECIFIED LATERALITY: ICD-10-CM

## 2023-10-11 DIAGNOSIS — M72.2 PLANTAR FASCIITIS OF LEFT FOOT: Primary | ICD-10-CM

## 2023-10-11 PROCEDURE — 4010F PR ACE/ARB THEARPY RXD/TAKEN: ICD-10-PCS | Mod: CPTII,S$GLB,, | Performed by: PODIATRIST

## 2023-10-11 PROCEDURE — 3051F PR MOST RECENT HEMOGLOBIN A1C LEVEL 7.0 - < 8.0%: ICD-10-PCS | Mod: CPTII,S$GLB,, | Performed by: PODIATRIST

## 2023-10-11 PROCEDURE — 20550 PR INJECT TENDON SHEATH/LIGAMENT: ICD-10-PCS | Mod: LT,S$GLB,, | Performed by: PODIATRIST

## 2023-10-11 PROCEDURE — 1160F RVW MEDS BY RX/DR IN RCRD: CPT | Mod: CPTII,S$GLB,, | Performed by: PODIATRIST

## 2023-10-11 PROCEDURE — 99999 PR PBB SHADOW E&M-EST. PATIENT-LVL III: ICD-10-PCS | Mod: PBBFAC,,, | Performed by: PODIATRIST

## 2023-10-11 PROCEDURE — 20550 NJX 1 TENDON SHEATH/LIGAMENT: CPT | Mod: LT,S$GLB,, | Performed by: PODIATRIST

## 2023-10-11 PROCEDURE — 1159F MED LIST DOCD IN RCRD: CPT | Mod: CPTII,S$GLB,, | Performed by: PODIATRIST

## 2023-10-11 PROCEDURE — 3008F BODY MASS INDEX DOCD: CPT | Mod: CPTII,S$GLB,, | Performed by: PODIATRIST

## 2023-10-11 PROCEDURE — 99203 PR OFFICE/OUTPT VISIT, NEW, LEVL III, 30-44 MIN: ICD-10-PCS | Mod: 25,S$GLB,, | Performed by: PODIATRIST

## 2023-10-11 PROCEDURE — 99999 PR PBB SHADOW E&M-EST. PATIENT-LVL III: CPT | Mod: PBBFAC,,, | Performed by: PODIATRIST

## 2023-10-11 PROCEDURE — 3051F HG A1C>EQUAL 7.0%<8.0%: CPT | Mod: CPTII,S$GLB,, | Performed by: PODIATRIST

## 2023-10-11 PROCEDURE — 3066F NEPHROPATHY DOC TX: CPT | Mod: CPTII,S$GLB,, | Performed by: PODIATRIST

## 2023-10-11 PROCEDURE — 3062F PR POS MACROALBUMINURIA RESULT DOCUMENTED/REVIEW: ICD-10-PCS | Mod: CPTII,S$GLB,, | Performed by: PODIATRIST

## 2023-10-11 PROCEDURE — 1159F PR MEDICATION LIST DOCUMENTED IN MEDICAL RECORD: ICD-10-PCS | Mod: CPTII,S$GLB,, | Performed by: PODIATRIST

## 2023-10-11 PROCEDURE — 3008F PR BODY MASS INDEX (BMI) DOCUMENTED: ICD-10-PCS | Mod: CPTII,S$GLB,, | Performed by: PODIATRIST

## 2023-10-11 PROCEDURE — 99203 OFFICE O/P NEW LOW 30 MIN: CPT | Mod: 25,S$GLB,, | Performed by: PODIATRIST

## 2023-10-11 PROCEDURE — 4010F ACE/ARB THERAPY RXD/TAKEN: CPT | Mod: CPTII,S$GLB,, | Performed by: PODIATRIST

## 2023-10-11 PROCEDURE — 3062F POS MACROALBUMINURIA REV: CPT | Mod: CPTII,S$GLB,, | Performed by: PODIATRIST

## 2023-10-11 PROCEDURE — 1160F PR REVIEW ALL MEDS BY PRESCRIBER/CLIN PHARMACIST DOCUMENTED: ICD-10-PCS | Mod: CPTII,S$GLB,, | Performed by: PODIATRIST

## 2023-10-11 PROCEDURE — 3066F PR DOCUMENTATION OF TREATMENT FOR NEPHROPATHY: ICD-10-PCS | Mod: CPTII,S$GLB,, | Performed by: PODIATRIST

## 2023-10-11 RX ORDER — DEXAMETHASONE SODIUM PHOSPHATE 4 MG/ML
4 INJECTION, SOLUTION INTRA-ARTICULAR; INTRALESIONAL; INTRAMUSCULAR; INTRAVENOUS; SOFT TISSUE
Status: DISCONTINUED | OUTPATIENT
Start: 2023-10-11 | End: 2023-10-11 | Stop reason: HOSPADM

## 2023-10-11 RX ADMIN — DEXAMETHASONE SODIUM PHOSPHATE 4 MG: 4 INJECTION, SOLUTION INTRA-ARTICULAR; INTRALESIONAL; INTRAMUSCULAR; INTRAVENOUS; SOFT TISSUE at 03:10

## 2023-10-11 NOTE — PROGRESS NOTES
[facility] - PODIATRY  [address]  [address]  [place] [address]  Dept: [phone]  Dept Fax: [phone]    Enrico Bazzi Jr., DPM     Assessment:   MDM    Coding  1. Acquired posterior equinus, unspecified laterality - Left Foot        2. Plantar fasciitis of left foot  Ambulatory referral/consult to Podiatry          Plan:     Tendon Sheath    Date/Time: 10/11/2023 3:15 PM    Performed by: Enrico Bazzi Jr., DPM  Authorized by: Enrico Bazzi Jr., DPM    Consent Done?:  Yes (Verbal)  Indications:  Pain  Site marked: the procedure site was marked    Timeout: prior to procedure the correct patient, procedure, and site was verified    Prep: patient was prepped and draped in usual sterile fashion      Local anesthesia used?: Yes    Anesthesia:  Local infiltration  Local anesthetic:  Co-phenylcaine spray  Anesthetic total (ml):  2    Location:  Foot  Foot joint: L PF.  Ultrasonic guidance for needle placement?: No    Needle size:  25 G  Approach:  Medial  Medications:  4 mg dexAMETHasone 4 mg/mL  Patient tolerance:  Patient tolerated the procedure well with no immediate complications      Wilfredo was seen today for foot pain.    Diagnoses and all orders for this visit:    Acquired posterior equinus, unspecified laterality - Left Foot    Plantar fasciitis of left foot  -     Ambulatory referral/consult to Podiatry    Other orders  -     Tendon Sheath        -pt seen, evaluated, and managed  -dx discussed in detail. All questions/concerns addressed  -all tx options discussed. All alternatives, risks, benefits of all txs discussed  -the patient was educated about the diagnosis  -We discussed conservative care options possible including but not limited to shoe wear and/or padding, bracing/strapping, at home ROM, formal PT, medical therapy, injection therapy  - The utilization of NSAIDs can be considered but their benefit has to be tempered against the risk of  GI/ concerns  - A steroid injection can be undertaken.  We did discuss the potential mechanism of action of this shot.  Understanding that multiple injections at the same anatomic site do have deleterious effects on the soft tissue.  Generic risks include: steroid flare (advised to ice if necessary), skin hypo-pgimentation (which can be permanent and unsightly), elevation of blood sugar, subcutaneous atrophy (can be permanent) and infection.   -XR/imaging reviewed by me: agree with read  -labs reviewed by me: ok for vgel  -implemented icing/stretching regimen  -heel lifts dispensed        -rxs dispensed: none  -referrals: none  -WB: wbat      Follow up in about 6 weeks (around 11/22/2023).    Subjective:      Patient ID: Wilfredo Thomas is a 48 y.o. male.    Chief Complaint:   Chief Complaint   Patient presents with    Foot Pain     Left foot        CC - foot pain: patient presents to the podiatry clinic  with complaint of  left foot pain. Onset of the symptoms was several months ago. Precipitating event: unk. Current symptoms include: ability to bear weight, but with some pain, ronak the heel, swelling and worsening symptoms after a period of inactivity. Aggravating factors: walking and certain shoegear. Symptoms have gradually worsened. Patient has had no prior foot problems. Evaluation to date: plain films: normal. Treatment to date: avoidance of offending activity. Patients rates pain 9/10 on pain scale.      Foot Pain        Last Podiatry Enc: Visit date not found  Last Enc w/ Me: Visit date not found    Outside reports reviewed: historical medical records.  Family hx: as below  Past Medical History:   Diagnosis Date    Diabetes     Gout     Hyperlipidemia     Hypertension      Past Surgical History:   Procedure Laterality Date    INCISION AND DRAINAGE Left 8/6/2022    Procedure: Incision and Drainage - LEFT THIGH.;  Surgeon: Rene Neff MD;  Location: [facility] OR [address] [place] [address];  Service: Orthopedics;  Laterality:  "Left;    IRRIGATION AND DEBRIDEMENT OF LOWER EXTREMITY Left 7/29/2022    Procedure: IRRIGATION AND DEBRIDEMENT, LOWER EXTREMITY;  Surgeon: Rob Graff MD;  Location: [facility] OR [address] [place] [address];  Service: Orthopedics;  Laterality: Left;    WRIST SURGERY Left      Family History   Problem Relation Age of Onset    Diabetes Mother     No Known Problems Father      Current Outpatient Medications   Medication Sig Dispense Refill    atorvastatin (LIPITOR) 20 MG tablet Take 1 tablet (20 mg total) by mouth once daily. 90 tablet 3    blood sugar diagnostic Strp 1 strip by Misc.(Non-Drug; Combo Route) route once daily. 100 each 11    blood-glucose meter kit Use as instructed 1 each 0    celecoxib (CELEBREX) 200 MG capsule Take 200 mg by mouth 2 (two) times daily as needed.      docusate sodium (COLACE) 100 MG capsule Take 100 mg by mouth 2 (two) times daily as needed.      HYDROcodone-acetaminophen (NORCO) 5-325 mg per tablet Take 1 tablet by mouth every 8 (eight) hours as needed.      lancets Misc 1 lancet by Misc.(Non-Drug; Combo Route) route once daily. 100 each 11    lisinopriL (PRINIVIL,ZESTRIL) 40 MG tablet Take 1 tablet (40 mg total) by mouth once daily. 90 tablet 3    metFORMIN (GLUCOPHAGE-XR) 750 MG ER 24hr tablet Take 2 tablets (1,500 mg total) by mouth daily with breakfast. 180 tablet 3    multivitamin (THERAGRAN) per tablet Take 2 tablets by mouth.      nabumetone (RELAFEN) 750 MG tablet TAKE 1 TABLET(750 MG) BY MOUTH TWICE DAILY WITH MEAL FOR 2 DAYS AS DIRECTED 28 tablet 0    pen needle, diabetic 31 gauge x 5/16" Ndle Use to inject insulin into the skin. 100 each 11    tirzepatide 10 mg/0.5 mL PnIj Inject 10 mg into the skin every 7 days. 12 pen 3    insulin detemir U-100 (LEVEMIR FLEXTOUCH) 100 unit/mL (3 mL) SubQ InPn pen Inject 10 Units into the skin once daily. 12.5 mL 3    triamcinolone acetonide 0.1% (KENALOG) 0.1 % cream Apply topically 2 (two) times daily. for 7 days (Patient not taking: Reported on 5/11/2023) " 15 g 0     No current facility-administered medications for this visit.     Review of patient's allergies indicates:   Allergen Reactions    Adhesive tape-silicones      It was a White tape ( uncertain if related to silicone) 8/22/2022     Social History     Socioeconomic History    Marital status:    Tobacco Use    Smoking status: Former     Current packs/day: 0.00     Types: Cigarettes     Quit date: 1/2/2020     Years since quitting: 3.7    Smokeless tobacco: Never   Substance and Sexual Activity    Alcohol use: Yes     Alcohol/week: 12.0 standard drinks of alcohol     Types: 12 Cans of beer per week    Drug use: Never    Sexual activity: Yes     Partners: Female   Social History Narrative    Lives with wife and 2 kids in college and one older with 1 grand child. 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     Social Determinants of Health     Financial Resource Strain: Unknown (10/9/2023)    Overall Financial Resource Strain (CARDIA)     Difficulty of Paying Living Expenses: Patient refused   Food Insecurity: Unknown (10/9/2023)    Hunger Vital Sign     Worried About Running Out of Food in the Last Year: Patient refused     Ran Out of Food in the Last Year: Patient refused   Transportation Needs: Unknown (10/9/2023)    PRAPARE - Transportation     Lack of Transportation (Medical): Patient refused     Lack of Transportation (Non-Medical): Patient refused   Physical Activity: Unknown (10/9/2023)    Exercise Vital Sign     Days of Exercise per Week: Patient refused     Minutes of Exercise per Session: 60 min   Stress: Unknown (10/9/2023)    [employer] - Occupational Stress Questionnaire     Feeling of Stress : Patient refused   Social Connections: Unknown (10/9/2023)    Social Connection and Isolation Panel [NHANES]     Frequency of Communication with Friends and Family: Patient refused     Frequency of Social Gatherings with Friends and Family: Patient refused     Active Member  "of Clubs or Organizations: Patient refused     Attends Club or Organization Meetings: Patient refused     Marital Status: Patient refused   Housing Stability: Unknown (10/9/2023)    Housing Stability Vital Sign     Unable to Pay for Housing in the Last Year: Patient refused     Unstable Housing in the Last Year: Patient refused       ROS    REVIEW OF SYSTEMS: Negative as documented below as well as positive findings in bold.       Constitutional  Respiratory  Gastrointestinal  Skin   - Fever - Cough - Heartburn - Rash   - Chills - Spit blood - Nausea - Itching   - Weight Loss - Shortness of breath - Vomiting - Nail pain   - Malaise/Fatigue - Wheezing - Abdominal Pain  Wound/Ulcer   - Weight Gain   - Blood in Stool  Poor wound healing       - Diarrhea          Cardiovascular  Genitourinary  Neurological  HEENT   - Chest Pain - Dysuria - Burning Sensation of feet - Headache   - Palpitations - Hematuria - Tingling / Paresthesia - Congestion   - Pain at night in legs - Flank Pain - Dizziness - Sore Throat   - Cramping   - Tremor - Blurred Vision   - Leg Swelling   - Sensory Change - Double Vision   - Dizzy when standing   - Speech Change - Eye Redness       - Focal Weakness - Dry Eyes       - Loss of Consciousness          Endocrine  Musculoskeletal  Psychiatric   - Cold intolerance - Muscle Pain - Depression   - Heat intolerance - Neck Pain - Insomnia   - Anemia - Joint Pain - Memory Loss   -  Easy bruising, bleeding - Heel pain - Anxiety      Toe Pain        Leg/Ankle/Foot Pain         Objective:     Ht 5' 7" (1.702 m)   Wt 94.2 kg (207 lb 10.8 oz)   BMI 32.53 kg/m²   Vitals:    10/11/23 1525   Weight: 94.2 kg (207 lb 10.8 oz)   Height: 5' 7" (1.702 m)   PainSc:   8   PainLoc: Foot       Physical Exam    General Appearance:   Patient appears well developed, well nourished  Patient appears stated age    Psychiatric:   Patient is oriented to time, place, and person.  Patient has appropriate mood and " affect    Neck:  Trachea Midline  No visible masses    Respiratory/Ears:  No distress or labored breathing.  Able to differentiate between normal talking voice and whisper.  Able to follow commands    Eyes:  Visual Acuity intact  Lids and conjunctivae normal. No discoloration noted.    Foot Exam  Physical Exam  Ortho Exam  Ortho/SPM Exam  Foot/Ankle Musculoskeletal Exam    L LE exam con't:  V:  DP 2/4, PT 2/4   CRT< 3s to all digits tested   Tibial and popliteal lymph nodes are w/o abnormality   Edema: absent, varicosities: absent    N:  Patient displays normal ankle reflexes   SILT in SP/DP/T/Ron/Saph distributions    Ortho: +Motor EHL/FHL/TA/GA   equinus deformity present  There is moderate pain with palpation of L medial calcaneal tubercle  Compartments soft/compressible. No pain on passive stretch of big toe. No calf  Pain.    Derm:  skin intact, skin warm and dry, skin without ulcers or lesions, skin without induration, nails normal, texture turgor well hydrated      Imaging / Labs:      X-Ray Foot Complete Left    Result Date: 10/9/2023  EXAMINATION: XR FOOT COMPLETE 3 VIEW LEFT CLINICAL HISTORY: .  Pain in left foot TECHNIQUE: AP, lateral and oblique views of the left foot were performed. FINDINGS: There is no fracture, dislocation, or bony erosion.     As above. Electronically signed by: Alcon Alonso MD Date:    10/09/2023 Time:    09:53        Note: This was dictated using a computer transcription program. Although proofread, it may contain computer transcription errors and phonetic errors. Other human proofreading errors may also exist. Corrections may be performed at a later time. Please contact us for any clarification if needed.    Enrico Bazzi DPM  [facility]

## 2023-10-11 NOTE — PATIENT INSTRUCTIONS
Heel Pain (Plantar Fasciitis)        Heel pain is most often caused by plantar fasciitis, a condition that is sometimes also called heel spur syndrome when a spur is present. Heel pain may also be due to other causes, such as a stress fracture, tendonitis, arthritis, nerve irritation or, rarely, a cyst.    Because there are several potential causes, it is important to have heel pain properly diagnosed. A foot and ankle surgeon is able to distinguish between all the possibilities and to determine the underlying source of your heel pain.    What Is Plantar Fasciitis?  Heel pain is often caused by plantar fasciitis  Plantar fasciitis is an inflammation of the band of tissue (the plantar fascia) that extends from the heel to the toes. In this condition, the fascia first becomes irritated and then inflamed, resulting in heel pain.    Causes  The most common cause of plantar fasciitis relates to faulty structure of the foot. For example, people who have problems with their arches, either overly flat feet or high-arched feet, are more prone to developing plantar fasciitis.    Wearing nonsupportive footwear on hard, flat surfaces puts abnormal strain on the plantar fascia and can also lead to plantar fasciitis. This is particularly evident when ones job requires long hours on the feet. Obesity and overuse may also contribute to plantar fasciitis.    Symptoms  The symptoms of plantar fasciitis are:    Pain on the bottom of the heel  Pain in the arch of the foot  Pain that is usually worse upon arising  Pain that increases over a period of months  Swelling on the bottom of the heel     People with plantar fasciitis often describe the pain as worse when they get up in the morning or after they have been sitting for long periods of time. After a few minutes of walking, the pain decreases because walking stretches the fascia. For some people, the pain subsides but returns after spending long periods of time on their  feet.    Diagnosis  To arrive at a diagnosis, the foot and ankle surgeon will obtain your medical history and examine your foot. Throughout this process, the surgeon rules out all possible causes for your heel pain other than plantar fasciitis.    In addition, diagnostic imaging studies, such as x-rays or other imaging modalities, may be used to distinguish the different types of heel pain. Sometimes heel spurs are found in patients with plantar fasciitis, but these are rarely a source of pain. When they are present, the condition may be diagnosed as plantar fasciitis/heel spur syndrome.    Nonsurgical Treatment  Treatment of plantar fasciitis begins with first-line strategies, which you can begin at home:    -Stretching exercises. Exercises that stretch out the calf muscles help ease pain and assist with recovery.  -Avoid going barefoot. When you walk without shoes, you put undue strain and stress on your plantar fascia.  -Ice. Putting an ice pack on your heel for 20 minutes several times a day helps reduce inflammation. Place a thin towel between the ice and your heel; do not apply ice directly to the skin.  -Limit activities. Cut down on extended physical activities to give your heel a rest.  -Shoe modifications. Wearing supportive shoes that have good arch support and a slightly raised heel reduces stress on the plantar fascia.  -Medications. Oral nonsteroidal anti-inflammatory drugs (NSAIDs), such as ibuprofen, may be recommended to reduce pain and inflammation.     If you still have pain after several weeks, see your foot and ankle surgeon, who may add one or more of these treatment approaches:    -Padding, taping and strapping. Placing pads in the shoe softens the impact of walking. Taping and strapping help support the foot and reduce strain on the fascia.  -Orthotic devices. Custom orthotic devices that fit into your shoe help correct the underlying structural abnormalities causing the plantar  fasciitis.  -Injection therapy. In some cases, corticosteroid injections are used to help reduce the inflammation and relieve pain.  -Removable walking cast. A removable walking cast may be used to keep your foot immobile for a few weeks to allow it to rest and heal.  -Night splint. Wearing a night splint allows you to maintain an extended stretch of the plantar fascia while sleeping. This may help reduce the morning pain experienced by some patients.  -Physical therapy. Exercises and other physical therapy measures may be used to help provide relief.     When Is Surgery Needed?  Although most patients with plantar fasciitis respond to nonsurgical treatment, a small percentage of patients may require surgery. If, after several months of nonsurgical treatment, you continue to have heel pain, surgery will be considered. Your foot and ankle surgeon will discuss the surgical options with you and determine which approach would be most beneficial for you.    Long-Term Care  No matter what kind of treatment you undergo for plantar fasciitis, the underlying causes that led to this condition may remain. Therefore, you will need to continue with preventive measures. Wearing supportive shoes, stretching and using custom orthotic devices are the mainstay of long-term treatment for plantar fasciitis.            Understanding Heel Pain  Your heel is the back part of your foot. A band of tissue called the plantar fascia connects the heel bone to the bones in the ball of your foot. Nerves run from the heel up the inside of your ankle and into your leg. When you feel pain in the bottom of your heel, the plantar fascia may be inflamed. Overuse, Achilles tightness, or excess body weight can cause the tissue to tear or pull away from the bone. Sometimes the inflamed plantar fascia also irritates a nerve, causing more pain.    What causes heel pain?  Wearing shoes with poor cushioning can irritate the tissue in your heel (plantar  fascia). Being overweight or standing for long periods can also irritate the tissue. Running, walking, tennis, and other sports that put stress on the heels can cause tiny tears in the tissue. If your lower leg muscles are tight, this is more likely to occur. A tight Achilles tendon will also contribute to heel pain.  Symptoms  You may feel pain on the bottom or on the inside edge of your heel. The pain may be sharp when you get out of bed or when you stand up after sitting for a while. You may feel a dull ache in your heel after youve been standing for a long time on a hard surface. Running can also cause a dull ache.  Preventing future problems  To prevent future heel pain, wear shoes with well-cushioned heels. And do exercises prescribed by your healthcare provider to stretch the plantar fascia and the muscles in the lower leg.   Date Last Reviewed: 9/10/2015  © 7091-4102 [employer]. [address] [place] [address]. All rights reserved. This information is not intended as a substitute for professional medical care. Always follow your healthcare professional's instructions.      Treating Plantar Fasciitis  First, your healthcare provider tries to determine the cause of your problem in order to suggest ways to relieve pain. If your pain is due to poor foot mechanics, custom-made shoe inserts (orthoses) may help.    Reduce symptoms  To relieve mild symptoms, try aspirin, ibuprofen, or other medicines as directed. Rubbing ice on the affected area may also help.  To reduce severe pain and swelling, your healthcare provider may prescribe pills or injections or a walking cast in some instances. Physical therapy, such as ultrasound or a daily stretching program, may also be recommended. Surgery is rarely required.  To reduce symptoms caused by poor foot mechanics, your foot may be taped. This supports the arch and temporarily controls movement. Night splints may also help by stretching the  fascia.  Control movement  If taping helps, your healthcare provider may prescribe orthoses. Built from plaster casts of your feet, these inserts control the way your foot moves. As a result, your symptoms should go away.  Reduce overuse  Every time your foot strikes the ground, the plantar fascia is stretched. You can reduce the strain on the plantar fascia and the possibility of overuse by following these suggestions:  Lose any excess weight.  Avoid running on hard or uneven ground.  Use orthoses at all times in your shoes and house slippers.  If surgery is needed  Your healthcare provider may consider surgery if other types of treatment don't control your pain. During surgery, the plantar fascia is partially cut to release tension. As you heal, fibrous tissue fills the space between the heel bone and the plantar fascia.   Date Last Reviewed: 10/14/2015  © 6306-3428 [employer]. [address] [place] [address]. All rights reserved. This information is not intended as a substitute for professional medical care. Always follow your healthcare professional's instructions.      Equinus          What Is Equinus?    Equinus is a condition in which the upward bending motion of the ankle joint is limited. Someone with equinus lacks the flexibility to bring the top of the foot toward the front of the leg. Equinus can occur in one or both feet. When it involves both feet, the limitation of motion is sometimes worse in one foot than in the other.    People with equinus develop ways to compensate for their limited ankle motion, and this often leads to other foot, leg or back problems. The most common methods of compensation are flattening of the arch or picking up the heel early when walking, placing increased pressure on the ball of the foot. Other patients compensate by toe walking, while a smaller number take steps by bending abnormally at the hip or knee.    Causes  There are several possible causes  for the limited range of ankle motion. Often, it is due to tightness in the Achilles tendon or calf muscles (the soleus muscle and/or gastrocnemius muscle). In some patients, this tightness is congenital (present at birth), and sometimes it is an inherited trait. Other patients acquire the tightness from being in a cast, being on crutches or frequently wearing high-heeled shoes. In addition, diabetes can affect the fibers of the Achilles tendon and cause tightness. Sometimes equinus is related to a bone blocking the ankle motion. For example, a fragment of a broken bone following an ankle injury, or bone block, can get in the way and restrict motion. Equinus may also result from one leg being shorter than the other. Less often, equinus is caused by spasms in the calf muscle. These spasms may be signs of an underlying neurologic disorder.      Foot Problems Related to Equinus  Depending on how a patient compensates for the inability to bend properly at the ankle, a variety of foot conditions can develop, including:    Plantar fasciitis (arch/heel pain)  Calf cramping  Tendonitis (inflammation in the Achilles tendon)  Metatarsalgia (pain and/or callusing on the ball of the foot)  Flatfoot  Arthritis of the midfoot (middle area of the foot)  Pressure sores on the ball of the foot or the arch  Bunions and hammertoes  Ankle pain  Shin splints     Diagnosis  Most patients with equinus are unaware they have this condition when they first visit the doctor. Instead, they come to the doctor seeking relief for foot problems associated with equinus.    To diagnose equinus, the foot and ankle surgeon will evaluate the ankle's range of motion when the knee is flexed (bent) as well as extended (straightened). This enables the surgeon to identify whether the tendon or muscle is tight and to assess whether bone is interfering with ankle motion. X-rays may also be ordered. In some cases, the foot and ankle surgeon may refer the  patient for neurologic evaluation.    Nonsurgical Treatment  Treatment includes strategies aimed at relieving the symptoms and conditions associated with equinus. In addition, the patient is treated for the equinus itself through one or more of the following options:    Night splint. The foot may be placed in a splint at night to keep it in a position that helps reduce tightness of the calf muscle.  Heel lifts. Placing heel lifts inside the shoes or wearing shoes with a moderate heel takes stress off the Achilles tendon when walking and may reduce symptoms.  Arch supports or orthotic devices. Custom orthotic devices that fit into the shoe are often prescribed to keep weight distributed properly and to help control muscle/tendon imbalance.  Physical therapy. To help remedy muscle tightness, exercises that stretch the calf muscle(s) are recommended.     When Is Surgery Needed?  In some cases, surgery may be needed to correct the cause of equinus if it is related to a tight tendon or a bone blocking the ankle motion. The foot and ankle surgeon will determine the type of procedure that is best suited to the individual patient.                Ankle Dorsiflexion/Plantarflexion (Flexibility)    Sit on the floor or in bed with your legs straight in front of you.  Point both feet. Then flex both feet.  Do this 10 to 30 times in a row.  Repeat this exercise 2 times a day, or as instructed.  Date Last Reviewed: 5/1/2016 © 2000-2016 [employer]. [address] [place] [address]. All rights reserved. This information is not intended as a substitute for professional medical care. Always follow your healthcare professional's instructions.          Arch retraining    These exercises are for your right foot. Switch sides for your left foot.  Sit in a chair or stand with both feet flat on the floor. Press down with the ball of your right foot, but only on the left side of the foot, just under the big  toe.  Then pull the bottom of your big toe back toward your heel. This should pull up the arch of your foot. Dont flex your toes while doing this. It is a subtle movement of the arch.  Hold for 5 seconds. Relax.  Date Last Reviewed: 3/10/2016  © 0158-1124 [employer]. [address] [place] [address]. All rights reserved. This information is not intended as a substitute for professional medical care. Always follow your healthcare professional's instructions.        Soleus Stretch (Flexibility)    Stand facing a wall from 3 feet away. Take one step toward the wall with your right foot.  Place both palms on the wall. Bend both knees and lean forward. Keep both heels on the floor.  Hold for 30 to 60 seconds. Then relax both legs. Repeat the exercise 2 times.  Switch legs and repeat.  Repeat this exercise 3 times a day, or as instructed.     Tip: Dont bounce while youre stretching.   Date Last Reviewed: 3/10/2016  © 6062-4601 [employer]. [address] [place] [address]. All rights reserved. This information is not intended as a substitute for professional medical care. Always follow your healthcare professional's instructions.          Recommended OTC orthotics:  -powerstep  -superfeet    Recommended shoegear:  -new balance  -ascics  -elvis finch

## 2023-11-06 ENCOUNTER — PATIENT OUTREACH (OUTPATIENT)
Dept: ADMINISTRATIVE | Facility: HOSPITAL | Age: 48
End: 2023-11-06
Payer: COMMERCIAL

## 2023-11-06 NOTE — PROGRESS NOTES
Care Everywhere updates requested and reviewed.  Immunizations reconciled. Media reports reviewed.  Duplicate HM overrides and  orders removed.  Overdue HM topic chart audit and/or requested.  Overdue lab testing linked to upcoming lab appointments if applies.        There are no preventive care reminders to display for this patient.

## 2023-11-29 ENCOUNTER — OFFICE VISIT (OUTPATIENT)
Dept: PODIATRY | Facility: CLINIC | Age: 48
End: 2023-11-29
Payer: COMMERCIAL

## 2023-11-29 VITALS — BODY MASS INDEX: 33.2 KG/M2 | HEIGHT: 67 IN | WEIGHT: 211.56 LBS | RESPIRATION RATE: 18 BRPM

## 2023-11-29 DIAGNOSIS — M72.2 PLANTAR FASCIITIS OF LEFT FOOT: Primary | ICD-10-CM

## 2023-11-29 DIAGNOSIS — M21.869 ACQUIRED POSTERIOR EQUINUS, UNSPECIFIED LATERALITY: ICD-10-CM

## 2023-11-29 PROCEDURE — 1160F RVW MEDS BY RX/DR IN RCRD: CPT | Mod: CPTII,S$GLB,, | Performed by: PODIATRIST

## 2023-11-29 PROCEDURE — 99999 PR PBB SHADOW E&M-EST. PATIENT-LVL V: CPT | Mod: PBBFAC,,, | Performed by: PODIATRIST

## 2023-11-29 PROCEDURE — 3062F POS MACROALBUMINURIA REV: CPT | Mod: CPTII,S$GLB,, | Performed by: PODIATRIST

## 2023-11-29 PROCEDURE — 4010F ACE/ARB THERAPY RXD/TAKEN: CPT | Mod: CPTII,S$GLB,, | Performed by: PODIATRIST

## 2023-11-29 PROCEDURE — 1159F MED LIST DOCD IN RCRD: CPT | Mod: CPTII,S$GLB,, | Performed by: PODIATRIST

## 2023-11-29 PROCEDURE — 3008F BODY MASS INDEX DOCD: CPT | Mod: CPTII,S$GLB,, | Performed by: PODIATRIST

## 2023-11-29 PROCEDURE — 3008F PR BODY MASS INDEX (BMI) DOCUMENTED: ICD-10-PCS | Mod: CPTII,S$GLB,, | Performed by: PODIATRIST

## 2023-11-29 PROCEDURE — 3051F HG A1C>EQUAL 7.0%<8.0%: CPT | Mod: CPTII,S$GLB,, | Performed by: PODIATRIST

## 2023-11-29 PROCEDURE — 3051F PR MOST RECENT HEMOGLOBIN A1C LEVEL 7.0 - < 8.0%: ICD-10-PCS | Mod: CPTII,S$GLB,, | Performed by: PODIATRIST

## 2023-11-29 PROCEDURE — 99999 PR PBB SHADOW E&M-EST. PATIENT-LVL V: ICD-10-PCS | Mod: PBBFAC,,, | Performed by: PODIATRIST

## 2023-11-29 PROCEDURE — 20550 PR INJECT TENDON SHEATH/LIGAMENT: ICD-10-PCS | Mod: LT,S$GLB,, | Performed by: PODIATRIST

## 2023-11-29 PROCEDURE — 3066F PR DOCUMENTATION OF TREATMENT FOR NEPHROPATHY: ICD-10-PCS | Mod: CPTII,S$GLB,, | Performed by: PODIATRIST

## 2023-11-29 PROCEDURE — 4010F PR ACE/ARB THEARPY RXD/TAKEN: ICD-10-PCS | Mod: CPTII,S$GLB,, | Performed by: PODIATRIST

## 2023-11-29 PROCEDURE — 99213 OFFICE O/P EST LOW 20 MIN: CPT | Mod: 25,S$GLB,, | Performed by: PODIATRIST

## 2023-11-29 PROCEDURE — 1160F PR REVIEW ALL MEDS BY PRESCRIBER/CLIN PHARMACIST DOCUMENTED: ICD-10-PCS | Mod: CPTII,S$GLB,, | Performed by: PODIATRIST

## 2023-11-29 PROCEDURE — 99213 PR OFFICE/OUTPT VISIT, EST, LEVL III, 20-29 MIN: ICD-10-PCS | Mod: 25,S$GLB,, | Performed by: PODIATRIST

## 2023-11-29 PROCEDURE — 20550 NJX 1 TENDON SHEATH/LIGAMENT: CPT | Mod: LT,S$GLB,, | Performed by: PODIATRIST

## 2023-11-29 PROCEDURE — 3066F NEPHROPATHY DOC TX: CPT | Mod: CPTII,S$GLB,, | Performed by: PODIATRIST

## 2023-11-29 PROCEDURE — 3062F PR POS MACROALBUMINURIA RESULT DOCUMENTED/REVIEW: ICD-10-PCS | Mod: CPTII,S$GLB,, | Performed by: PODIATRIST

## 2023-11-29 PROCEDURE — 1159F PR MEDICATION LIST DOCUMENTED IN MEDICAL RECORD: ICD-10-PCS | Mod: CPTII,S$GLB,, | Performed by: PODIATRIST

## 2023-11-29 RX ORDER — TRIAMCINOLONE ACETONIDE 40 MG/ML
40 INJECTION, SUSPENSION INTRA-ARTICULAR; INTRAMUSCULAR
Status: DISCONTINUED | OUTPATIENT
Start: 2023-11-29 | End: 2023-11-29 | Stop reason: HOSPADM

## 2023-11-29 RX ADMIN — TRIAMCINOLONE ACETONIDE 40 MG: 40 INJECTION, SUSPENSION INTRA-ARTICULAR; INTRAMUSCULAR at 01:11

## 2023-11-29 NOTE — PATIENT INSTRUCTIONS
Heel Pain (Plantar Fasciitis)        Heel pain is most often caused by plantar fasciitis, a condition that is sometimes also called heel spur syndrome when a spur is present. Heel pain may also be due to other causes, such as a stress fracture, tendonitis, arthritis, nerve irritation or, rarely, a cyst.    Because there are several potential causes, it is important to have heel pain properly diagnosed. A foot and ankle surgeon is able to distinguish between all the possibilities and to determine the underlying source of your heel pain.    What Is Plantar Fasciitis?  Heel pain is often caused by plantar fasciitis  Plantar fasciitis is an inflammation of the band of tissue (the plantar fascia) that extends from the heel to the toes. In this condition, the fascia first becomes irritated and then inflamed, resulting in heel pain.    Causes  The most common cause of plantar fasciitis relates to faulty structure of the foot. For example, people who have problems with their arches, either overly flat feet or high-arched feet, are more prone to developing plantar fasciitis.    Wearing nonsupportive footwear on hard, flat surfaces puts abnormal strain on the plantar fascia and can also lead to plantar fasciitis. This is particularly evident when ones job requires long hours on the feet. Obesity and overuse may also contribute to plantar fasciitis.    Symptoms  The symptoms of plantar fasciitis are:    Pain on the bottom of the heel  Pain in the arch of the foot  Pain that is usually worse upon arising  Pain that increases over a period of months  Swelling on the bottom of the heel     People with plantar fasciitis often describe the pain as worse when they get up in the morning or after they have been sitting for long periods of time. After a few minutes of walking, the pain decreases because walking stretches the fascia. For some people, the pain subsides but returns after spending long periods of time on their  feet.    Diagnosis  To arrive at a diagnosis, the foot and ankle surgeon will obtain your medical history and examine your foot. Throughout this process, the surgeon rules out all possible causes for your heel pain other than plantar fasciitis.    In addition, diagnostic imaging studies, such as x-rays or other imaging modalities, may be used to distinguish the different types of heel pain. Sometimes heel spurs are found in patients with plantar fasciitis, but these are rarely a source of pain. When they are present, the condition may be diagnosed as plantar fasciitis/heel spur syndrome.    Nonsurgical Treatment  Treatment of plantar fasciitis begins with first-line strategies, which you can begin at home:    -Stretching exercises. Exercises that stretch out the calf muscles help ease pain and assist with recovery.  -Avoid going barefoot. When you walk without shoes, you put undue strain and stress on your plantar fascia.  -Ice. Putting an ice pack on your heel for 20 minutes several times a day helps reduce inflammation. Place a thin towel between the ice and your heel; do not apply ice directly to the skin.  -Limit activities. Cut down on extended physical activities to give your heel a rest.  -Shoe modifications. Wearing supportive shoes that have good arch support and a slightly raised heel reduces stress on the plantar fascia.  -Medications. Oral nonsteroidal anti-inflammatory drugs (NSAIDs), such as ibuprofen, may be recommended to reduce pain and inflammation.     If you still have pain after several weeks, see your foot and ankle surgeon, who may add one or more of these treatment approaches:    -Padding, taping and strapping. Placing pads in the shoe softens the impact of walking. Taping and strapping help support the foot and reduce strain on the fascia.  -Orthotic devices. Custom orthotic devices that fit into your shoe help correct the underlying structural abnormalities causing the plantar  fasciitis.  -Injection therapy. In some cases, corticosteroid injections are used to help reduce the inflammation and relieve pain.  -Removable walking cast. A removable walking cast may be used to keep your foot immobile for a few weeks to allow it to rest and heal.  -Night splint. Wearing a night splint allows you to maintain an extended stretch of the plantar fascia while sleeping. This may help reduce the morning pain experienced by some patients.  -Physical therapy. Exercises and other physical therapy measures may be used to help provide relief.     When Is Surgery Needed?  Although most patients with plantar fasciitis respond to nonsurgical treatment, a small percentage of patients may require surgery. If, after several months of nonsurgical treatment, you continue to have heel pain, surgery will be considered. Your foot and ankle surgeon will discuss the surgical options with you and determine which approach would be most beneficial for you.    Long-Term Care  No matter what kind of treatment you undergo for plantar fasciitis, the underlying causes that led to this condition may remain. Therefore, you will need to continue with preventive measures. Wearing supportive shoes, stretching and using custom orthotic devices are the mainstay of long-term treatment for plantar fasciitis.            Understanding Heel Pain  Your heel is the back part of your foot. A band of tissue called the plantar fascia connects the heel bone to the bones in the ball of your foot. Nerves run from the heel up the inside of your ankle and into your leg. When you feel pain in the bottom of your heel, the plantar fascia may be inflamed. Overuse, Achilles tightness, or excess body weight can cause the tissue to tear or pull away from the bone. Sometimes the inflamed plantar fascia also irritates a nerve, causing more pain.    What causes heel pain?  Wearing shoes with poor cushioning can irritate the tissue in your heel (plantar  fascia). Being overweight or standing for long periods can also irritate the tissue. Running, walking, tennis, and other sports that put stress on the heels can cause tiny tears in the tissue. If your lower leg muscles are tight, this is more likely to occur. A tight Achilles tendon will also contribute to heel pain.  Symptoms  You may feel pain on the bottom or on the inside edge of your heel. The pain may be sharp when you get out of bed or when you stand up after sitting for a while. You may feel a dull ache in your heel after youve been standing for a long time on a hard surface. Running can also cause a dull ache.  Preventing future problems  To prevent future heel pain, wear shoes with well-cushioned heels. And do exercises prescribed by your healthcare provider to stretch the plantar fascia and the muscles in the lower leg.   Date Last Reviewed: 9/10/2015  © 5830-3836 [employer]. [address] [place] [address]. All rights reserved. This information is not intended as a substitute for professional medical care. Always follow your healthcare professional's instructions.      Treating Plantar Fasciitis  First, your healthcare provider tries to determine the cause of your problem in order to suggest ways to relieve pain. If your pain is due to poor foot mechanics, custom-made shoe inserts (orthoses) may help.    Reduce symptoms  To relieve mild symptoms, try aspirin, ibuprofen, or other medicines as directed. Rubbing ice on the affected area may also help.  To reduce severe pain and swelling, your healthcare provider may prescribe pills or injections or a walking cast in some instances. Physical therapy, such as ultrasound or a daily stretching program, may also be recommended. Surgery is rarely required.  To reduce symptoms caused by poor foot mechanics, your foot may be taped. This supports the arch and temporarily controls movement. Night splints may also help by stretching the  fascia.  Control movement  If taping helps, your healthcare provider may prescribe orthoses. Built from plaster casts of your feet, these inserts control the way your foot moves. As a result, your symptoms should go away.  Reduce overuse  Every time your foot strikes the ground, the plantar fascia is stretched. You can reduce the strain on the plantar fascia and the possibility of overuse by following these suggestions:  Lose any excess weight.  Avoid running on hard or uneven ground.  Use orthoses at all times in your shoes and house slippers.  If surgery is needed  Your healthcare provider may consider surgery if other types of treatment don't control your pain. During surgery, the plantar fascia is partially cut to release tension. As you heal, fibrous tissue fills the space between the heel bone and the plantar fascia.   Date Last Reviewed: 10/14/2015  © 8665-6576 [employer]. [address] [place] [address]. All rights reserved. This information is not intended as a substitute for professional medical care. Always follow your healthcare professional's instructions.      Equinus          What Is Equinus?    Equinus is a condition in which the upward bending motion of the ankle joint is limited. Someone with equinus lacks the flexibility to bring the top of the foot toward the front of the leg. Equinus can occur in one or both feet. When it involves both feet, the limitation of motion is sometimes worse in one foot than in the other.    People with equinus develop ways to compensate for their limited ankle motion, and this often leads to other foot, leg or back problems. The most common methods of compensation are flattening of the arch or picking up the heel early when walking, placing increased pressure on the ball of the foot. Other patients compensate by toe walking, while a smaller number take steps by bending abnormally at the hip or knee.    Causes  There are several possible causes  for the limited range of ankle motion. Often, it is due to tightness in the Achilles tendon or calf muscles (the soleus muscle and/or gastrocnemius muscle). In some patients, this tightness is congenital (present at birth), and sometimes it is an inherited trait. Other patients acquire the tightness from being in a cast, being on crutches or frequently wearing high-heeled shoes. In addition, diabetes can affect the fibers of the Achilles tendon and cause tightness. Sometimes equinus is related to a bone blocking the ankle motion. For example, a fragment of a broken bone following an ankle injury, or bone block, can get in the way and restrict motion. Equinus may also result from one leg being shorter than the other. Less often, equinus is caused by spasms in the calf muscle. These spasms may be signs of an underlying neurologic disorder.      Foot Problems Related to Equinus  Depending on how a patient compensates for the inability to bend properly at the ankle, a variety of foot conditions can develop, including:    Plantar fasciitis (arch/heel pain)  Calf cramping  Tendonitis (inflammation in the Achilles tendon)  Metatarsalgia (pain and/or callusing on the ball of the foot)  Flatfoot  Arthritis of the midfoot (middle area of the foot)  Pressure sores on the ball of the foot or the arch  Bunions and hammertoes  Ankle pain  Shin splints     Diagnosis  Most patients with equinus are unaware they have this condition when they first visit the doctor. Instead, they come to the doctor seeking relief for foot problems associated with equinus.    To diagnose equinus, the foot and ankle surgeon will evaluate the ankle's range of motion when the knee is flexed (bent) as well as extended (straightened). This enables the surgeon to identify whether the tendon or muscle is tight and to assess whether bone is interfering with ankle motion. X-rays may also be ordered. In some cases, the foot and ankle surgeon may refer the  patient for neurologic evaluation.    Nonsurgical Treatment  Treatment includes strategies aimed at relieving the symptoms and conditions associated with equinus. In addition, the patient is treated for the equinus itself through one or more of the following options:    Night splint. The foot may be placed in a splint at night to keep it in a position that helps reduce tightness of the calf muscle.  Heel lifts. Placing heel lifts inside the shoes or wearing shoes with a moderate heel takes stress off the Achilles tendon when walking and may reduce symptoms.  Arch supports or orthotic devices. Custom orthotic devices that fit into the shoe are often prescribed to keep weight distributed properly and to help control muscle/tendon imbalance.  Physical therapy. To help remedy muscle tightness, exercises that stretch the calf muscle(s) are recommended.     When Is Surgery Needed?  In some cases, surgery may be needed to correct the cause of equinus if it is related to a tight tendon or a bone blocking the ankle motion. The foot and ankle surgeon will determine the type of procedure that is best suited to the individual patient.                Ankle Dorsiflexion/Plantarflexion (Flexibility)    Sit on the floor or in bed with your legs straight in front of you.  Point both feet. Then flex both feet.  Do this 10 to 30 times in a row.  Repeat this exercise 2 times a day, or as instructed.  Date Last Reviewed: 5/1/2016 © 2000-2016 [employer]. [address] [place] [address]. All rights reserved. This information is not intended as a substitute for professional medical care. Always follow your healthcare professional's instructions.          Arch retraining    These exercises are for your right foot. Switch sides for your left foot.  Sit in a chair or stand with both feet flat on the floor. Press down with the ball of your right foot, but only on the left side of the foot, just under the big  toe.  Then pull the bottom of your big toe back toward your heel. This should pull up the arch of your foot. Dont flex your toes while doing this. It is a subtle movement of the arch.  Hold for 5 seconds. Relax.  Date Last Reviewed: 3/10/2016  © 5703-2741 [employer]. [address] [place] [address]. All rights reserved. This information is not intended as a substitute for professional medical care. Always follow your healthcare professional's instructions.        Soleus Stretch (Flexibility)    Stand facing a wall from 3 feet away. Take one step toward the wall with your right foot.  Place both palms on the wall. Bend both knees and lean forward. Keep both heels on the floor.  Hold for 30 to 60 seconds. Then relax both legs. Repeat the exercise 2 times.  Switch legs and repeat.  Repeat this exercise 3 times a day, or as instructed.     Tip: Dont bounce while youre stretching.   Date Last Reviewed: 3/10/2016  © 9336-9082 [employer]. [address] [place] [address]. All rights reserved. This information is not intended as a substitute for professional medical care. Always follow your healthcare professional's instructions.          Recommended OTC orthotics:  -powerstep  -superfeet    Recommended shoegear:  -new balance  -ascics  -elvis finch

## 2023-11-29 NOTE — PROGRESS NOTES
[facility] - PODIATRY  [address]  [address]  [place] [address]  Dept: [phone]  Dept Fax: [phone]    Enrico Bazzi Jr., DPM     Assessment:   MDM    Coding  1. Plantar fasciitis of left foot  Ambulatory referral/consult to Physical/Occupational Therapy    Tendon Sheath      2. Acquired posterior equinus, unspecified laterality - Left Foot  Ambulatory referral/consult to Physical/Occupational Therapy          Plan:     Tendon Sheath    Date/Time: 11/29/2023 1:45 PM    Performed by: Enrico Bazzi Jr., DPM  Authorized by: Enrico Bazzi Jr., DPM    Consent Done?:  Yes (Verbal)  Indications:  Pain  Site marked: the procedure site was marked    Timeout: prior to procedure the correct patient, procedure, and site was verified    Prep: patient was prepped and draped in usual sterile fashion      Local anesthesia used?: Yes    Anesthesia:  Local infiltration  Local anesthetic:  Co-phenylcaine spray  Anesthetic total (ml):  2    Location:  Foot  Foot joint: L PF.  Ultrasonic guidance for needle placement?: No    Needle size:  25 G  Approach:  Medial  Medications:  40 mg triamcinolone acetonide 40 mg/mL  Patient tolerance:  Patient tolerated the procedure well with no immediate complications      Wilfredo was seen today for plantar fasciitis.    Diagnoses and all orders for this visit:    Plantar fasciitis of left foot  -     Ambulatory referral/consult to Physical/Occupational Therapy; Future  -     Tendon Sheath    Acquired posterior equinus, unspecified laterality - Left Foot  -     Ambulatory referral/consult to Physical/Occupational Therapy; Future        -pt seen, evaluated, and managed  -dx discussed in detail. All questions/concerns addressed  -all tx options discussed. All alternatives, risks, benefits of all txs discussed  -the patient was educated about the diagnosis  -We discussed conservative care options possible including but not limited to shoe  wear and/or padding, bracing/strapping, at home ROM, formal PT, medical therapy, injection therapy  - The utilization of NSAIDs can be considered but their benefit has to be tempered against the risk of GI/ concerns  - A steroid injection can be undertaken.  We did discuss the potential mechanism of action of this shot.  Understanding that multiple injections at the same anatomic site do have deleterious effects on the soft tissue.  Generic risks include: steroid flare (advised to ice if necessary), skin hypo-pgimentation (which can be permanent and unsightly), elevation of blood sugar, subcutaneous atrophy (can be permanent) and infection.   -XR/imaging reviewed by me: agree with read  -labs reviewed by me: ok for vgel  -implemented icing/stretching regimen  -heel lifts dispensed      -rxs dispensed: none  -referrals: PT  -WB: wbat      Follow up in about 8 weeks (around 1/24/2024).    Subjective:      Patient ID: Wilfredo Thomas is a 48 y.o. male.    Chief Complaint:   Chief Complaint   Patient presents with    Plantar Fasciitis     Left        CC - foot pain: patient presents to the podiatry clinic  with complaint of  left foot pain. Onset of the symptoms was several months ago. Precipitating event: unk. Current symptoms include: ability to bear weight, but with some pain, ronak the heel, swelling and worsening symptoms after a period of inactivity. Aggravating factors: walking and certain shoegear. Symptoms have gradually worsened. Patient has had no prior foot problems. Evaluation to date: plain films: normal. Treatment to date: avoidance of offending activity. Patients rates pain 9/10 on pain scale.      11/29/23:  Hx as above. Pain improving but not gone after dex inj last visit    Foot Pain        Last Podiatry Enc: Visit date not found  Last Enc w/ Me: Visit date not found    Outside reports reviewed: historical medical records.  Family hx: as below  Past Medical History:   Diagnosis Date    Diabetes      "Gout     Hyperlipidemia     Hypertension      Past Surgical History:   Procedure Laterality Date    INCISION AND DRAINAGE Left 8/6/2022    Procedure: Incision and Drainage - LEFT THIGH.;  Surgeon: Rene Neff MD;  Location: [facility] OR [address] [place] [address];  Service: Orthopedics;  Laterality: Left;    IRRIGATION AND DEBRIDEMENT OF LOWER EXTREMITY Left 7/29/2022    Procedure: IRRIGATION AND DEBRIDEMENT, LOWER EXTREMITY;  Surgeon: Rob Graff MD;  Location: [facility] OR [address] [place] [address];  Service: Orthopedics;  Laterality: Left;    WRIST SURGERY Left      Family History   Problem Relation Age of Onset    Diabetes Mother     No Known Problems Father      Current Outpatient Medications   Medication Sig Dispense Refill    atorvastatin (LIPITOR) 20 MG tablet Take 1 tablet (20 mg total) by mouth once daily. 90 tablet 3    blood sugar diagnostic Strp 1 strip by Misc.(Non-Drug; Combo Route) route once daily. 100 each 11    blood-glucose meter kit Use as instructed 1 each 0    celecoxib (CELEBREX) 200 MG capsule Take 200 mg by mouth 2 (two) times daily as needed.      docusate sodium (COLACE) 100 MG capsule Take 100 mg by mouth 2 (two) times daily as needed.      HYDROcodone-acetaminophen (NORCO) 5-325 mg per tablet Take 1 tablet by mouth every 8 (eight) hours as needed.      lancets Misc 1 lancet by Misc.(Non-Drug; Combo Route) route once daily. 100 each 11    lisinopriL (PRINIVIL,ZESTRIL) 40 MG tablet Take 1 tablet (40 mg total) by mouth once daily. 90 tablet 3    metFORMIN (GLUCOPHAGE-XR) 750 MG ER 24hr tablet Take 2 tablets (1,500 mg total) by mouth daily with breakfast. 180 tablet 3    multivitamin (THERAGRAN) per tablet Take 2 tablets by mouth.      nabumetone (RELAFEN) 750 MG tablet TAKE 1 TABLET(750 MG) BY MOUTH TWICE DAILY WITH MEAL FOR 2 DAYS AS DIRECTED 28 tablet 0    pen needle, diabetic 31 gauge x 5/16" Ndle Use to inject insulin into the skin. 100 each 11    tirzepatide 10 mg/0.5 mL PnIj Inject 10 mg into the skin every 7 days. 12 " pen 3    insulin detemir U-100 (LEVEMIR FLEXTOUCH) 100 unit/mL (3 mL) SubQ InPn pen Inject 10 Units into the skin once daily. 12.5 mL 3    triamcinolone acetonide 0.1% (KENALOG) 0.1 % cream Apply topically 2 (two) times daily. for 7 days (Patient not taking: Reported on 5/11/2023) 15 g 0     No current facility-administered medications for this visit.     Review of patient's allergies indicates:   Allergen Reactions    Adhesive tape-silicones      It was a White tape ( uncertain if related to silicone) 8/22/2022     Social History     Socioeconomic History    Marital status:    Tobacco Use    Smoking status: Former     Current packs/day: 0.00     Types: Cigarettes     Quit date: 1/2/2020     Years since quitting: 3.9    Smokeless tobacco: Never   Substance and Sexual Activity    Alcohol use: Yes     Alcohol/week: 12.0 standard drinks of alcohol     Types: 12 Cans of beer per week    Drug use: Never    Sexual activity: Yes     Partners: Female   Social History Narrative    Lives with wife and 2 kids in college and one older with 1 grand child. 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     Social Determinants of Health     Financial Resource Strain: Unknown (10/9/2023)    Overall Financial Resource Strain (CARDIA)     Difficulty of Paying Living Expenses: Patient refused   Food Insecurity: Unknown (10/9/2023)    Hunger Vital Sign     Worried About Running Out of Food in the Last Year: Patient refused     Ran Out of Food in the Last Year: Patient refused   Transportation Needs: Unknown (10/9/2023)    PRAPARE - Transportation     Lack of Transportation (Medical): Patient refused     Lack of Transportation (Non-Medical): Patient refused   Physical Activity: Unknown (10/9/2023)    Exercise Vital Sign     Days of Exercise per Week: Patient refused   Stress: Unknown (10/9/2023)    [employer] [place] [employer] - Occupational Stress Questionnaire     Feeling of Stress : Patient refused   Social Connections:  "Unknown (10/9/2023)    Social Connection and Isolation Panel [NHANES]     Frequency of Communication with Friends and Family: Patient refused     Frequency of Social Gatherings with Friends and Family: Patient refused     Active Member of Clubs or Organizations: Patient refused     Attends Club or Organization Meetings: Patient refused     Marital Status: Patient refused   Housing Stability: Unknown (10/9/2023)    Housing Stability Vital Sign     Unable to Pay for Housing in the Last Year: Patient refused     Unstable Housing in the Last Year: Patient refused       ROS    REVIEW OF SYSTEMS: Negative as documented below as well as positive findings in bold.       Constitutional  Respiratory  Gastrointestinal  Skin   - Fever - Cough - Heartburn - Rash   - Chills - Spit blood - Nausea - Itching   - Weight Loss - Shortness of breath - Vomiting - Nail pain   - Malaise/Fatigue - Wheezing - Abdominal Pain  Wound/Ulcer   - Weight Gain   - Blood in Stool  Poor wound healing       - Diarrhea          Cardiovascular  Genitourinary  Neurological  HEENT   - Chest Pain - Dysuria - Burning Sensation of feet - Headache   - Palpitations - Hematuria - Tingling / Paresthesia - Congestion   - Pain at night in legs - Flank Pain - Dizziness - Sore Throat   - Cramping   - Tremor - Blurred Vision   - Leg Swelling   - Sensory Change - Double Vision   - Dizzy when standing   - Speech Change - Eye Redness       - Focal Weakness - Dry Eyes       - Loss of Consciousness          Endocrine  Musculoskeletal  Psychiatric   - Cold intolerance - Muscle Pain - Depression   - Heat intolerance - Neck Pain - Insomnia   - Anemia - Joint Pain - Memory Loss   -  Easy bruising, bleeding - Heel pain - Anxiety      Toe Pain        Leg/Ankle/Foot Pain         Objective:     Resp 18   Ht 5' 7" (1.702 m)   Wt 95.9 kg (211 lb 8.5 oz)   BMI 33.13 kg/m²   Vitals:    11/29/23 1352   Resp: 18   Weight: 95.9 kg (211 lb 8.5 oz)   Height: 5' 7" (1.702 m)   PainSc:   " 6   PainLoc: Foot         Physical Exam    General Appearance:   Patient appears well developed, well nourished  Patient appears stated age    Psychiatric:   Patient is oriented to time, place, and person.  Patient has appropriate mood and affect    Neck:  Trachea Midline  No visible masses    Respiratory/Ears:  No distress or labored breathing.  Able to differentiate between normal talking voice and whisper.  Able to follow commands    Eyes:  Visual Acuity intact  Lids and conjunctivae normal. No discoloration noted.    Foot Exam  Physical Exam  Ortho Exam  Ortho/SPM Exam  Foot/Ankle Musculoskeletal Exam    L LE exam con't:  V:  DP 2/4, PT 2/4   CRT< 3s to all digits tested   Tibial and popliteal lymph nodes are w/o abnormality   Edema: absent, varicosities: absent    N:  Patient displays normal ankle reflexes   SILT in SP/DP/T/Ron/Saph distributions    Ortho: +Motor EHL/FHL/TA/GA   equinus deformity present  There is moderate pain with palpation of L medial calcaneal tubercle  Compartments soft/compressible. No pain on passive stretch of big toe. No calf  Pain.    Derm:  skin intact, skin warm and dry, skin without ulcers or lesions, skin without induration, nails normal, texture turgor well hydrated      Imaging / Labs:      X-Ray Foot Complete Left    Result Date: 10/9/2023  EXAMINATION: XR FOOT COMPLETE 3 VIEW LEFT CLINICAL HISTORY: .  Pain in left foot TECHNIQUE: AP, lateral and oblique views of the left foot were performed. FINDINGS: There is no fracture, dislocation, or bony erosion.     As above. Electronically signed by: Alcon Alonso MD Date:    10/09/2023 Time:    09:53        Note: This was dictated using a computer transcription program. Although proofread, it may contain computer transcription errors and phonetic errors. Other human proofreading errors may also exist. Corrections may be performed at a later time. Please contact us for any clarification if needed.    Robert Marroquin, DPM [facility]

## 2023-12-04 ENCOUNTER — PATIENT MESSAGE (OUTPATIENT)
Dept: FAMILY MEDICINE | Facility: CLINIC | Age: 48
End: 2023-12-04
Payer: COMMERCIAL

## 2023-12-05 DIAGNOSIS — F41.1 ANXIETY REACTION: Primary | ICD-10-CM

## 2023-12-05 RX ORDER — ALPRAZOLAM 0.25 MG/1
0.25 TABLET ORAL 2 TIMES DAILY PRN
Qty: 4 TABLET | Refills: 0 | Status: SHIPPED | OUTPATIENT
Start: 2023-12-05 | End: 2024-02-08

## 2023-12-05 NOTE — PROGRESS NOTES
Subjective:       Patient ID: Wilfredo Thomas is a 48 y.o. male.    Chief Complaint: Foot Pain      HPI    47M with T2DM, sHTN, HLD, gouty arthritis for follow up on chronic conditions in addition c/o progressively worsening left heel pain unresponsive to high dose NSAIDS, foot massage and various OTC supplements, rated 10/10 on pain and severe enough to prevent him from ambulating properly. He has been non-compliant with healthy eating habits and does not exercise due to poor work schedule.     Past Medical History:   Diagnosis Date    Diabetes     Gout     Hyperlipidemia     Hypertension        Past Surgical History:   Procedure Laterality Date    INCISION AND DRAINAGE Left 8/6/2022    Procedure: Incision and Drainage - LEFT THIGH.;  Surgeon: Rene Neff MD;  Location: [facility] OR [address] [place] [address];  Service: Orthopedics;  Laterality: Left;    IRRIGATION AND DEBRIDEMENT OF LOWER EXTREMITY Left 7/29/2022    Procedure: IRRIGATION AND DEBRIDEMENT, LOWER EXTREMITY;  Surgeon: Rob Graff MD;  Location: [facility] OR [address] [place] [address];  Service: Orthopedics;  Laterality: Left;    WRIST SURGERY Left        Family History   Problem Relation Age of Onset    Diabetes Mother     No Known Problems Father        Social History     Socioeconomic History    Marital status:    Tobacco Use    Smoking status: Former     Current packs/day: 0.00     Types: Cigarettes     Quit date: 1/2/2020     Years since quitting: 3.7    Smokeless tobacco: Never   Substance and Sexual Activity    Alcohol use: Yes     Alcohol/week: 12.0 standard drinks of alcohol     Types: 12 Cans of beer per week    Drug use: Never    Sexual activity: Yes     Partners: Female   Social History Narrative    Lives with wife and 2 kids in college and one older with 1 grand child. 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     Social Determinants of Health     Financial Resource Strain: Unknown (10/9/2023)    Overall Financial Resource Strain (CARDIA)     Difficulty of Paying  Living Expenses: Patient refused   Food Insecurity: Unknown (10/9/2023)    Hunger Vital Sign     Worried About Running Out of Food in the Last Year: Patient refused     Ran Out of Food in the Last Year: Patient refused   Transportation Needs: Unknown (10/9/2023)    PRAPARE - Transportation     Lack of Transportation (Medical): Patient refused     Lack of Transportation (Non-Medical): Patient refused   Physical Activity: Unknown (10/9/2023)    Exercise Vital Sign     Days of Exercise per Week: Patient refused     Minutes of Exercise per Session: 60 min   Stress: Unknown (10/9/2023)    [employer] [place] - Occupational Stress Questionnaire     Feeling of Stress : Patient refused   Social Connections: Unknown (10/9/2023)    Social Connection and Isolation Panel [NHANES]     Frequency of Communication with Friends and Family: Patient refused     Frequency of Social Gatherings with Friends and Family: Patient refused     Active Member of Clubs or Organizations: Patient refused     Attends Club or Organization Meetings: Patient refused     Marital Status: Patient refused   Housing Stability: Unknown (10/9/2023)    Housing Stability Vital Sign     Unable to Pay for Housing in the Last Year: Patient refused     Unstable Housing in the Last Year: Patient refused       Review of Systems   Constitutional:  Negative for chills, fatigue and fever.   Respiratory:  Negative for cough and shortness of breath.    Cardiovascular:  Negative for chest pain, palpitations and leg swelling.   Gastrointestinal:  Negative for abdominal pain, constipation and diarrhea.   Endocrine: Negative for polydipsia and polyphagia.   Genitourinary:  Negative for dysuria, flank pain and frequency.   Musculoskeletal:  Negative for back pain, gait problem and joint swelling.   Skin:  Negative for rash and wound.   Neurological:  Negative for dizziness, seizures, weakness, numbness and headaches.         Objective:     Vitals:     "10/09/23 0857 10/09/23 0916   BP: (!) 160/100 (!) 154/90   Pulse: 77    SpO2: 98%    Weight: 94 kg (207 lb 3.7 oz)    Height: 5' 7" (1.702 m)             Physical Exam  Vitals reviewed.   Constitutional:       General: He is not in acute distress.     Appearance: He is obese.   HENT:      Head: Normocephalic and atraumatic.      Right Ear: Tympanic membrane normal.      Left Ear: Tympanic membrane normal.      Mouth/Throat:      Mouth: Mucous membranes are moist.   Eyes:      Extraocular Movements: Extraocular movements intact.      Conjunctiva/sclera: Conjunctivae normal.      Pupils: Pupils are equal, round, and reactive to light.   Cardiovascular:      Rate and Rhythm: Normal rate and regular rhythm.      Pulses: Normal pulses.      Heart sounds: Normal heart sounds.   Pulmonary:      Effort: Pulmonary effort is normal.      Breath sounds: Normal breath sounds.   Abdominal:      General: Abdomen is flat. Bowel sounds are normal.      Palpations: Abdomen is soft.      Hernia: There is no hernia in the left inguinal area.   Genitourinary:     Pubic Area: No rash.       Penis: No erythema, tenderness or swelling.       Testes: Cremasteric reflex is present.         Right: Mass, tenderness or swelling not present.         Left: Mass, tenderness or swelling not present.      Epididymis:      Right: Normal.      Left: Normal.   Musculoskeletal:         General: No swelling.      Right lower leg: No edema (1+).      Left lower leg: No edema.   Skin:     General: Skin is warm.   Neurological:      General: No focal deficit present.      Mental Status: He is alert and oriented to person, place, and time.      Cranial Nerves: No cranial nerve deficit.      Gait: Gait abnormal (antalgic gait).   Psychiatric:         Mood and Affect: Mood normal.         Behavior: Behavior normal.           Laboratory:  CBC:  No results for input(s): "WBC", "RBC", "HGB", "HCT", "PLT", "MCV", "MCH", "MCHC" in the last 2160 " "hours.    CMP:  Recent Labs   Lab Result Units 10/06/23  0609   Glucose mg/dL 136*   Calcium mg/dL 8.5*   Albumin g/dL 4.1   Total Protein g/dL 7.5   Sodium mmol/L 127*   Potassium mmol/L 4.6   CO2 mmol/L 26   Chloride mmol/L 92*   BUN mg/dL 20   Alkaline Phosphatase U/L 73   ALT U/L 47*   AST U/L 45   Total Bilirubin mg/dL 1.6*         URINALYSIS:  No results for input(s): "COLORU", "CLARITYU", "SPECGRAV", "PHUR", "PROTEINUA", "GLUCOSEU", "BILIRUBINCON", "BLOODU", "WBCU", "RBCU", "BACTERIA", "MUCUS", "NITRITE", "LEUKOCYTESUR", "UROBILINOGEN", "HYALINECASTS" in the last 2160 hours.     LIPIDS:  No results for input(s): "TSH", "HDL", "CHOL", "TRIG", "LDLCALC", "CHOLHDL", "NONHDLCHOL", "TOTALCHOLEST" in the last 2160 hours.      TSH:  No results for input(s): "TSH" in the last 2160 hours.    A1C:  Recent Labs   Lab Result Units 10/06/23  0609   Hemoglobin A1C % 7.4*         Assessment:         ICD-10-CM ICD-9-CM   1. Left foot pain  M79.672 729.5   2. Type 2 diabetes mellitus with microalbuminuria, with long-term current use of insulin  E11.29 250.40    R80.9 791.0    Z79.4 V58.67   3. Essential hypertension  I10 401.9         Plan:       Left Heel pain  -likely plantar fasciitis r/o other foot pathology  -unresponsive to high dose NSAIDS, foot therapy and other OTC regimen  -XR foot ordered   -refer podiatry for further evaluation and mgt    2. Type 2 diabetes mellitus with diabetic nephropathy  -worsening again, likely due to poor dietary habits and sedentary life style  - Hba1C slowing increasing, now @ 7.4  -Annual microalbumin/Cr @ >400  -Home BS readings range 90s-110s(occasionally)  -c/w metformin to 1500 mg daily  -increase Mounjaro to 10mg q weekly   -increase levemir to 8U at bedtime  -increase lisinopril to 40mg  -refill sent to pharmacy  -he does not need endo follow up again  -c/w therapeutic life style changes    3. BMI 32  -worsening again  -therapeutic life style modifications reinforced    4. " "sHTN  -uncontrolled, possibly due to pain  -increase lisinopril to 40 mg daily  -therapeutic life style changes reinforced    5. Mixed HLD  -c/w statin and life style modifications    HCM: declined all due vaccine for now, will continue to address next clinic visit        Patient's Medications   New Prescriptions    LISINOPRIL (PRINIVIL,ZESTRIL) 40 MG TABLET    Take 1 tablet (40 mg total) by mouth once daily.    TIRZEPATIDE 10 MG/0.5 ML PNIJ    Inject 10 mg into the skin every 7 days.   Previous Medications    ATORVASTATIN (LIPITOR) 20 MG TABLET    Take 1 tablet (20 mg total) by mouth once daily.    BLOOD SUGAR DIAGNOSTIC STRP    1 strip by Misc.(Non-Drug; Combo Route) route once daily.    BLOOD-GLUCOSE METER KIT    Use as instructed    CELECOXIB (CELEBREX) 200 MG CAPSULE    Take 200 mg by mouth 2 (two) times daily as needed.    DOCUSATE SODIUM (COLACE) 100 MG CAPSULE    Take 100 mg by mouth 2 (two) times daily as needed.    HYDROCODONE-ACETAMINOPHEN (NORCO) 5-325 MG PER TABLET    Take 1 tablet by mouth every 8 (eight) hours as needed.    INSULIN DETEMIR U-100 (LEVEMIR FLEXTOUCH) 100 UNIT/ML (3 ML) SUBQ INPN PEN    Inject 10 Units into the skin once daily.    LANCETS MISC    1 lancet by Misc.(Non-Drug; Combo Route) route once daily.    METFORMIN (GLUCOPHAGE-XR) 750 MG ER 24HR TABLET    Take 2 tablets (1,500 mg total) by mouth daily with breakfast.    MULTIVITAMIN (THERAGRAN) PER TABLET    Take 2 tablets by mouth.    NABUMETONE (RELAFEN) 750 MG TABLET    TAKE 1 TABLET(750 MG) BY MOUTH TWICE DAILY WITH MEAL FOR 2 DAYS AS DIRECTED    PEN NEEDLE, DIABETIC 31 GAUGE X 5/16" NDLE    Use to inject insulin into the skin.    TRIAMCINOLONE ACETONIDE 0.1% (KENALOG) 0.1 % CREAM    Apply topically 2 (two) times daily. for 7 days   Modified Medications    No medications on file   Discontinued Medications    ALPRAZOLAM (XANAX) 0.5 MG TABLET    Take 1 tablet (0.5 mg total) by mouth once daily. To be taken 30 minutes before flight " for 2 days    LISINOPRIL (PRINIVIL,ZESTRIL) 30 MG TABLET    Take 1 tablet (30 mg total) by mouth once daily.    TIRZEPATIDE (MOUNJARO) 7.5 MG/0.5 ML PNIJ    Inject 7.5 mg into the skin every 7 days.         Patient was given opportunity to express concerns, ask questions and verbalizes understanding of current management plan     Dr Oumou Zuniga MD            used

## 2023-12-06 PROBLEM — M25.672 STIFFNESS OF ANKLE JOINT, LEFT: Status: ACTIVE | Noted: 2023-12-06

## 2023-12-06 PROBLEM — R53.1 WEAKNESS: Status: ACTIVE | Noted: 2023-12-06

## 2024-01-24 ENCOUNTER — OFFICE VISIT (OUTPATIENT)
Dept: PODIATRY | Facility: CLINIC | Age: 49
End: 2024-01-24
Payer: COMMERCIAL

## 2024-01-24 ENCOUNTER — TELEPHONE (OUTPATIENT)
Dept: PODIATRY | Facility: CLINIC | Age: 49
End: 2024-01-24

## 2024-01-24 VITALS — BODY MASS INDEX: 33.75 KG/M2 | WEIGHT: 215.06 LBS | RESPIRATION RATE: 18 BRPM | HEIGHT: 67 IN

## 2024-01-24 DIAGNOSIS — M72.2 PLANTAR FASCIITIS OF LEFT FOOT: Primary | ICD-10-CM

## 2024-01-24 DIAGNOSIS — M21.869 ACQUIRED POSTERIOR EQUINUS, UNSPECIFIED LATERALITY: ICD-10-CM

## 2024-01-24 PROCEDURE — 99213 OFFICE O/P EST LOW 20 MIN: CPT | Mod: S$GLB,,, | Performed by: PODIATRIST

## 2024-01-24 PROCEDURE — 99999 PR PBB SHADOW E&M-EST. PATIENT-LVL IV: CPT | Mod: PBBFAC,,, | Performed by: PODIATRIST

## 2024-01-24 NOTE — TELEPHONE ENCOUNTER
----- Message from [place] sent at 1/24/2024  1:22 PM CST -----  Needs advice from nurse:      Who Called:[facility]   Regarding:pt needs new orders for PT for [facility]  Would the patient rather a call back or VIA [facility]?  Best Call Back number:  Additional Info:

## 2024-01-24 NOTE — PATIENT INSTRUCTIONS
Heel Pain (Plantar Fasciitis)        Heel pain is most often caused by plantar fasciitis, a condition that is sometimes also called heel spur syndrome when a spur is present. Heel pain may also be due to other causes, such as a stress fracture, tendonitis, arthritis, nerve irritation or, rarely, a cyst.    Because there are several potential causes, it is important to have heel pain properly diagnosed. A foot and ankle surgeon is able to distinguish between all the possibilities and to determine the underlying source of your heel pain.    What Is Plantar Fasciitis?  Heel pain is often caused by plantar fasciitis  Plantar fasciitis is an inflammation of the band of tissue (the plantar fascia) that extends from the heel to the toes. In this condition, the fascia first becomes irritated and then inflamed, resulting in heel pain.    Causes  The most common cause of plantar fasciitis relates to faulty structure of the foot. For example, people who have problems with their arches, either overly flat feet or high-arched feet, are more prone to developing plantar fasciitis.    Wearing nonsupportive footwear on hard, flat surfaces puts abnormal strain on the plantar fascia and can also lead to plantar fasciitis. This is particularly evident when ones job requires long hours on the feet. Obesity and overuse may also contribute to plantar fasciitis.    Symptoms  The symptoms of plantar fasciitis are:    Pain on the bottom of the heel  Pain in the arch of the foot  Pain that is usually worse upon arising  Pain that increases over a period of months  Swelling on the bottom of the heel     People with plantar fasciitis often describe the pain as worse when they get up in the morning or after they have been sitting for long periods of time. After a few minutes of walking, the pain decreases because walking stretches the fascia. For some people, the pain subsides but returns after spending long periods of time on their  feet.    Diagnosis  To arrive at a diagnosis, the foot and ankle surgeon will obtain your medical history and examine your foot. Throughout this process, the surgeon rules out all possible causes for your heel pain other than plantar fasciitis.    In addition, diagnostic imaging studies, such as x-rays or other imaging modalities, may be used to distinguish the different types of heel pain. Sometimes heel spurs are found in patients with plantar fasciitis, but these are rarely a source of pain. When they are present, the condition may be diagnosed as plantar fasciitis/heel spur syndrome.    Nonsurgical Treatment  Treatment of plantar fasciitis begins with first-line strategies, which you can begin at home:    -Stretching exercises. Exercises that stretch out the calf muscles help ease pain and assist with recovery.  -Avoid going barefoot. When you walk without shoes, you put undue strain and stress on your plantar fascia.  -Ice. Putting an ice pack on your heel for 20 minutes several times a day helps reduce inflammation. Place a thin towel between the ice and your heel; do not apply ice directly to the skin.  -Limit activities. Cut down on extended physical activities to give your heel a rest.  -Shoe modifications. Wearing supportive shoes that have good arch support and a slightly raised heel reduces stress on the plantar fascia.  -Medications. Oral nonsteroidal anti-inflammatory drugs (NSAIDs), such as ibuprofen, may be recommended to reduce pain and inflammation.     If you still have pain after several weeks, see your foot and ankle surgeon, who may add one or more of these treatment approaches:    -Padding, taping and strapping. Placing pads in the shoe softens the impact of walking. Taping and strapping help support the foot and reduce strain on the fascia.  -Orthotic devices. Custom orthotic devices that fit into your shoe help correct the underlying structural abnormalities causing the plantar  fasciitis.  -Injection therapy. In some cases, corticosteroid injections are used to help reduce the inflammation and relieve pain.  -Removable walking cast. A removable walking cast may be used to keep your foot immobile for a few weeks to allow it to rest and heal.  -Night splint. Wearing a night splint allows you to maintain an extended stretch of the plantar fascia while sleeping. This may help reduce the morning pain experienced by some patients.  -Physical therapy. Exercises and other physical therapy measures may be used to help provide relief.     When Is Surgery Needed?  Although most patients with plantar fasciitis respond to nonsurgical treatment, a small percentage of patients may require surgery. If, after several months of nonsurgical treatment, you continue to have heel pain, surgery will be considered. Your foot and ankle surgeon will discuss the surgical options with you and determine which approach would be most beneficial for you.    Long-Term Care  No matter what kind of treatment you undergo for plantar fasciitis, the underlying causes that led to this condition may remain. Therefore, you will need to continue with preventive measures. Wearing supportive shoes, stretching and using custom orthotic devices are the mainstay of long-term treatment for plantar fasciitis.            Understanding Heel Pain  Your heel is the back part of your foot. A band of tissue called the plantar fascia connects the heel bone to the bones in the ball of your foot. Nerves run from the heel up the inside of your ankle and into your leg. When you feel pain in the bottom of your heel, the plantar fascia may be inflamed. Overuse, Achilles tightness, or excess body weight can cause the tissue to tear or pull away from the bone. Sometimes the inflamed plantar fascia also irritates a nerve, causing more pain.    What causes heel pain?  Wearing shoes with poor cushioning can irritate the tissue in your heel (plantar  fascia). Being overweight or standing for long periods can also irritate the tissue. Running, walking, tennis, and other sports that put stress on the heels can cause tiny tears in the tissue. If your lower leg muscles are tight, this is more likely to occur. A tight Achilles tendon will also contribute to heel pain.  Symptoms  You may feel pain on the bottom or on the inside edge of your heel. The pain may be sharp when you get out of bed or when you stand up after sitting for a while. You may feel a dull ache in your heel after youve been standing for a long time on a hard surface. Running can also cause a dull ache.  Preventing future problems  To prevent future heel pain, wear shoes with well-cushioned heels. And do exercises prescribed by your healthcare provider to stretch the plantar fascia and the muscles in the lower leg.   Date Last Reviewed: 9/10/2015  © 6543-9152 [employer]. [address] [place] [address]. All rights reserved. This information is not intended as a substitute for professional medical care. Always follow your healthcare professional's instructions.      Treating Plantar Fasciitis  First, your healthcare provider tries to determine the cause of your problem in order to suggest ways to relieve pain. If your pain is due to poor foot mechanics, custom-made shoe inserts (orthoses) may help.    Reduce symptoms  To relieve mild symptoms, try aspirin, ibuprofen, or other medicines as directed. Rubbing ice on the affected area may also help.  To reduce severe pain and swelling, your healthcare provider may prescribe pills or injections or a walking cast in some instances. Physical therapy, such as ultrasound or a daily stretching program, may also be recommended. Surgery is rarely required.  To reduce symptoms caused by poor foot mechanics, your foot may be taped. This supports the arch and temporarily controls movement. Night splints may also help by stretching the  fascia.  Control movement  If taping helps, your healthcare provider may prescribe orthoses. Built from plaster casts of your feet, these inserts control the way your foot moves. As a result, your symptoms should go away.  Reduce overuse  Every time your foot strikes the ground, the plantar fascia is stretched. You can reduce the strain on the plantar fascia and the possibility of overuse by following these suggestions:  Lose any excess weight.  Avoid running on hard or uneven ground.  Use orthoses at all times in your shoes and house slippers.  If surgery is needed  Your healthcare provider may consider surgery if other types of treatment don't control your pain. During surgery, the plantar fascia is partially cut to release tension. As you heal, fibrous tissue fills the space between the heel bone and the plantar fascia.   Date Last Reviewed: 10/14/2015  © 5328-5527 [employer]. [address] [place] [address]. All rights reserved. This information is not intended as a substitute for professional medical care. Always follow your healthcare professional's instructions.      Equinus          What Is Equinus?    Equinus is a condition in which the upward bending motion of the ankle joint is limited. Someone with equinus lacks the flexibility to bring the top of the foot toward the front of the leg. Equinus can occur in one or both feet. When it involves both feet, the limitation of motion is sometimes worse in one foot than in the other.    People with equinus develop ways to compensate for their limited ankle motion, and this often leads to other foot, leg or back problems. The most common methods of compensation are flattening of the arch or picking up the heel early when walking, placing increased pressure on the ball of the foot. Other patients compensate by toe walking, while a smaller number take steps by bending abnormally at the hip or knee.    Causes  There are several possible causes  for the limited range of ankle motion. Often, it is due to tightness in the Achilles tendon or calf muscles (the soleus muscle and/or gastrocnemius muscle). In some patients, this tightness is congenital (present at birth), and sometimes it is an inherited trait. Other patients acquire the tightness from being in a cast, being on crutches or frequently wearing high-heeled shoes. In addition, diabetes can affect the fibers of the Achilles tendon and cause tightness. Sometimes equinus is related to a bone blocking the ankle motion. For example, a fragment of a broken bone following an ankle injury, or bone block, can get in the way and restrict motion. Equinus may also result from one leg being shorter than the other. Less often, equinus is caused by spasms in the calf muscle. These spasms may be signs of an underlying neurologic disorder.      Foot Problems Related to Equinus  Depending on how a patient compensates for the inability to bend properly at the ankle, a variety of foot conditions can develop, including:    Plantar fasciitis (arch/heel pain)  Calf cramping  Tendonitis (inflammation in the Achilles tendon)  Metatarsalgia (pain and/or callusing on the ball of the foot)  Flatfoot  Arthritis of the midfoot (middle area of the foot)  Pressure sores on the ball of the foot or the arch  Bunions and hammertoes  Ankle pain  Shin splints     Diagnosis  Most patients with equinus are unaware they have this condition when they first visit the doctor. Instead, they come to the doctor seeking relief for foot problems associated with equinus.    To diagnose equinus, the foot and ankle surgeon will evaluate the ankle's range of motion when the knee is flexed (bent) as well as extended (straightened). This enables the surgeon to identify whether the tendon or muscle is tight and to assess whether bone is interfering with ankle motion. X-rays may also be ordered. In some cases, the foot and ankle surgeon may refer the  patient for neurologic evaluation.    Nonsurgical Treatment  Treatment includes strategies aimed at relieving the symptoms and conditions associated with equinus. In addition, the patient is treated for the equinus itself through one or more of the following options:    Night splint. The foot may be placed in a splint at night to keep it in a position that helps reduce tightness of the calf muscle.  Heel lifts. Placing heel lifts inside the shoes or wearing shoes with a moderate heel takes stress off the Achilles tendon when walking and may reduce symptoms.  Arch supports or orthotic devices. Custom orthotic devices that fit into the shoe are often prescribed to keep weight distributed properly and to help control muscle/tendon imbalance.  Physical therapy. To help remedy muscle tightness, exercises that stretch the calf muscle(s) are recommended.     When Is Surgery Needed?  In some cases, surgery may be needed to correct the cause of equinus if it is related to a tight tendon or a bone blocking the ankle motion. The foot and ankle surgeon will determine the type of procedure that is best suited to the individual patient.                Ankle Dorsiflexion/Plantarflexion (Flexibility)    Sit on the floor or in bed with your legs straight in front of you.  Point both feet. Then flex both feet.  Do this 10 to 30 times in a row.  Repeat this exercise 2 times a day, or as instructed.  Date Last Reviewed: 5/1/2016 © 2000-2016 [employer]. [address] [place] [address]. All rights reserved. This information is not intended as a substitute for professional medical care. Always follow your healthcare professional's instructions.          Arch retraining    These exercises are for your right foot. Switch sides for your left foot.  Sit in a chair or stand with both feet flat on the floor. Press down with the ball of your right foot, but only on the left side of the foot, just under the big  toe.  Then pull the bottom of your big toe back toward your heel. This should pull up the arch of your foot. Dont flex your toes while doing this. It is a subtle movement of the arch.  Hold for 5 seconds. Relax.  Date Last Reviewed: 3/10/2016  © 6168-1698 [employer]. [address] [place] [address]. All rights reserved. This information is not intended as a substitute for professional medical care. Always follow your healthcare professional's instructions.        Soleus Stretch (Flexibility)    Stand facing a wall from 3 feet away. Take one step toward the wall with your right foot.  Place both palms on the wall. Bend both knees and lean forward. Keep both heels on the floor.  Hold for 30 to 60 seconds. Then relax both legs. Repeat the exercise 2 times.  Switch legs and repeat.  Repeat this exercise 3 times a day, or as instructed.     Tip: Dont bounce while youre stretching.   Date Last Reviewed: 3/10/2016  © 1699-5195 [employer]. [address] [place] [address]. All rights reserved. This information is not intended as a substitute for professional medical care. Always follow your healthcare professional's instructions.          Recommended OTC orthotics:  -powerstep  -superfeet    Recommended shoegear:  -new balance  -ascics  -elvis finch

## 2024-01-24 NOTE — PROGRESS NOTES
[facility] - PODIATRY  [address]  [address]  [place] [address]  Dept: [phone]  Dept Fax: [phone]    Enrico Bazzi Jr., DPM     Assessment:   MDM    Coding  1. Plantar fasciitis of left foot  MRI Foot (Hindfoot) Left Without Contrast      2. Acquired posterior equinus, unspecified laterality - Left Foot            Plan:     Procedures    Wilfredo was seen today for plantar fasciitis.    Diagnoses and all orders for this visit:    Plantar fasciitis of left foot  -     MRI Foot (Hindfoot) Left Without Contrast; Future    Acquired posterior equinus, unspecified laterality - Left Foot        -pt seen, evaluated, and managed  -dx discussed in detail. All questions/concerns addressed  -all tx options discussed. All alternatives, risks, benefits of all txs discussed  -the patient was educated about the diagnosis  -PT has failed conservative care options possible including but not limited to shoe wear and/or padding, bracing/strapping, at home ROM, formal PT, medical therapy, injection therapy  -XR/imaging reviewed by me: agree with read  -labs reviewed by me: ok for vgel  -cont icing/stretching regimen  -MRI ordered to better characterize      -rxs dispensed: none  -referrals: none  -WB: wbat      Follow up in about 4 weeks (around 2/21/2024).    Subjective:      Patient ID: Wilfredo Thomas is a 48 y.o. male.    Chief Complaint:   Chief Complaint   Patient presents with    Plantar Fasciitis     Left        CC - foot pain: patient presents to the podiatry clinic  with complaint of  left foot pain. Onset of the symptoms was several months ago. Precipitating event: unk. Current symptoms include: ability to bear weight, but with some pain, ronak the heel, swelling and worsening symptoms after a period of inactivity. Aggravating factors: walking and certain shoegear. Symptoms have gradually worsened. Patient has had no prior foot problems. Evaluation to date: plain  films: normal. Treatment to date: avoidance of offending activity. Patients rates pain 9/10 on pain scale.      1/24/24:  Hx as above. Pain improving but not gone after inj therapy and PT. Pt taking relafen.    Foot Pain        Last Podiatry Enc: Visit date not found  Last Enc w/ Me: Visit date not found    Outside reports reviewed: historical medical records.  Family hx: as below  Past Medical History:   Diagnosis Date    Diabetes     Gout     Hyperlipidemia     Hypertension      Past Surgical History:   Procedure Laterality Date    INCISION AND DRAINAGE Left 8/6/2022    Procedure: Incision and Drainage - LEFT THIGH.;  Surgeon: Rene Neff MD;  Location: [address];  Service: Orthopedics;  Laterality: Left;    IRRIGATION AND DEBRIDEMENT OF LOWER EXTREMITY Left 7/29/2022    Procedure: IRRIGATION AND DEBRIDEMENT, LOWER EXTREMITY;  Surgeon: Rob Graff MD;  Location: [facility] OR [address] [place] [address];  Service: Orthopedics;  Laterality: Left;    WRIST SURGERY Left      Family History   Problem Relation Age of Onset    Diabetes Mother     No Known Problems Father      Current Outpatient Medications   Medication Sig Dispense Refill    atorvastatin (LIPITOR) 20 MG tablet Take 1 tablet (20 mg total) by mouth once daily. 90 tablet 3    blood sugar diagnostic Strp 1 strip by Misc.(Non-Drug; Combo Route) route once daily. 100 each 11    blood-glucose meter kit Use as instructed 1 each 0    celecoxib (CELEBREX) 200 MG capsule Take 200 mg by mouth 2 (two) times daily as needed.      docusate sodium (COLACE) 100 MG capsule Take 100 mg by mouth 2 (two) times daily as needed.      HYDROcodone-acetaminophen (NORCO) 5-325 mg per tablet Take 1 tablet by mouth every 8 (eight) hours as needed.      lancets Misc 1 lancet by Misc.(Non-Drug; Combo Route) route once daily. 100 each 11    lisinopriL (PRINIVIL,ZESTRIL) 40 MG tablet Take 1 tablet (40 mg total) by mouth once daily. 90 tablet 3    metFORMIN (GLUCOPHAGE-XR) 750 MG ER 24hr tablet  "Take 2 tablets (1,500 mg total) by mouth daily with breakfast. 180 tablet 3    multivitamin (THERAGRAN) per tablet Take 2 tablets by mouth.      nabumetone (RELAFEN) 750 MG tablet TAKE 1 TABLET(750 MG) BY MOUTH TWICE DAILY WITH MEAL FOR 2 DAYS AS DIRECTED 28 tablet 0    pen needle, diabetic 31 gauge x 5/16" Ndle Use to inject insulin into the skin. 100 each 11    tirzepatide 10 mg/0.5 mL PnIj Inject 10 mg into the skin every 7 days. 12 pen 3    ALPRAZolam (XANAX) 0.25 MG tablet Take 1 tablet (0.25 mg total) by mouth 2 (two) times daily as needed for Anxiety. 4 tablet 0    insulin detemir U-100 (LEVEMIR FLEXTOUCH) 100 unit/mL (3 mL) SubQ InPn pen Inject 10 Units into the skin once daily. 12.5 mL 3    triamcinolone acetonide 0.1% (KENALOG) 0.1 % cream Apply topically 2 (two) times daily. for 7 days (Patient not taking: Reported on 2023) 15 g 0     No current facility-administered medications for this visit.     Review of patient's allergies indicates:   Allergen Reactions    Adhesive tape-silicones      It was a White tape ( uncertain if related to silicone) 2022     Social History     Socioeconomic History    Marital status:    Tobacco Use    Smoking status: Former     Current packs/day: 0.00     Types: Cigarettes     Quit date: 2020     Years since quittin.0    Smokeless tobacco: Never   Substance and Sexual Activity    Alcohol use: Yes     Alcohol/week: 12.0 standard drinks of alcohol     Types: 12 Cans of beer per week    Drug use: Never    Sexual activity: Yes     Partners: Female   Social History Narrative    Lives with wife and 2 kids in college and one older with 1 grand child. 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     Social Determinants of Health     Financial Resource Strain: Patient Declined (10/9/2023)    Overall Financial Resource Strain (CARDIA)     Difficulty of Paying Living Expenses: Patient declined   Food Insecurity: Patient Declined (10/9/2023)    Hunger Vital Sign     " Worried About Running Out of Food in the Last Year: Patient declined     Ran Out of Food in the Last Year: Patient declined   Transportation Needs: Patient Declined (10/9/2023)    PRAPARE - Transportation     Lack of Transportation (Medical): Patient declined     Lack of Transportation (Non-Medical): Patient declined   Physical Activity: Unknown (10/9/2023)    Exercise Vital Sign     Days of Exercise per Week: Patient declined   Stress: Patient Declined (10/9/2023)    [employer] - Occupational Stress Questionnaire     Feeling of Stress : Patient declined   Social Connections: Unknown (10/9/2023)    Social Connection and Isolation Panel [NHANES]     Frequency of Communication with Friends and Family: Patient declined     Frequency of Social Gatherings with Friends and Family: Patient declined     Active Member of Clubs or Organizations: Patient declined     Attends Club or Organization Meetings: Patient declined     Marital Status: Patient declined   Housing Stability: Unknown (10/9/2023)    Housing Stability Vital Sign     Unable to Pay for Housing in the Last Year: Patient refused     Unstable Housing in the Last Year: Patient refused       ROS    REVIEW OF SYSTEMS: Negative as documented below as well as positive findings in bold.       Constitutional  Respiratory  Gastrointestinal  Skin   - Fever - Cough - Heartburn - Rash   - Chills - Spit blood - Nausea - Itching   - Weight Loss - Shortness of breath - Vomiting - Nail pain   - Malaise/Fatigue - Wheezing - Abdominal Pain  Wound/Ulcer   - Weight Gain   - Blood in Stool  Poor wound healing       - Diarrhea          Cardiovascular  Genitourinary  Neurological  HEENT   - Chest Pain - Dysuria - Burning Sensation of feet - Headache   - Palpitations - Hematuria - Tingling / Paresthesia - Congestion   - Pain at night in legs - Flank Pain - Dizziness - Sore Throat   - Cramping   - Tremor - Blurred Vision   - Leg Swelling   - Sensory Change -  "Double Vision   - Dizzy when standing   - Speech Change - Eye Redness       - Focal Weakness - Dry Eyes       - Loss of Consciousness          Endocrine  Musculoskeletal  Psychiatric   - Cold intolerance - Muscle Pain - Depression   - Heat intolerance - Neck Pain - Insomnia   - Anemia - Joint Pain - Memory Loss   -  Easy bruising, bleeding - Heel pain - Anxiety      Toe Pain        Leg/Ankle/Foot Pain         Objective:     Resp 18   Ht 5' 7" (1.702 m)   Wt 97.6 kg (215 lb 0.9 oz)   BMI 33.68 kg/m²   Vitals:    01/24/24 1259   Resp: 18   Weight: 97.6 kg (215 lb 0.9 oz)   Height: 5' 7" (1.702 m)   PainSc:   5   PainLoc: Foot           Physical Exam    General Appearance:   Patient appears well developed, well nourished  Patient appears stated age    Psychiatric:   Patient is oriented to time, place, and person.  Patient has appropriate mood and affect    Neck:  Trachea Midline  No visible masses    Respiratory/Ears:  No distress or labored breathing.  Able to differentiate between normal talking voice and whisper.  Able to follow commands    Eyes:  Visual Acuity intact  Lids and conjunctivae normal. No discoloration noted.    Foot Exam  Physical Exam  Ortho Exam  Ortho/SPM Exam  Foot/Ankle Musculoskeletal Exam    L LE exam con't:  V:  DP 2/4, PT 2/4   CRT< 3s to all digits tested   Tibial and popliteal lymph nodes are w/o abnormality   Edema: absent, varicosities: absent    N:  Patient displays normal ankle reflexes   SILT in SP/DP/T/Ron/Saph distributions    Ortho: +Motor EHL/FHL/TA/GA   equinus deformity present  There is moderate pain with palpation of L medial calcaneal tubercle  Compartments soft/compressible. No pain on passive stretch of big toe. No calf  Pain.    Derm:  skin intact, skin warm and dry, skin without ulcers or lesions, skin without induration, nails normal, texture turgor well hydrated      Imaging / Labs:      X-Ray Foot Complete Left    Result Date: 10/9/2023  EXAMINATION: XR FOOT COMPLETE " 3 VIEW LEFT CLINICAL HISTORY: .  Pain in left foot TECHNIQUE: AP, lateral and oblique views of the left foot were performed. FINDINGS: There is no fracture, dislocation, or bony erosion.     As above. Electronically signed by: Alcon Alonso MD Date:    10/09/2023 Time:    09:53        Note: This was dictated using a computer transcription program. Although proofread, it may contain computer transcription errors and phonetic errors. Other human proofreading errors may also exist. Corrections may be performed at a later time. Please contact us for any clarification if needed.    Enrico Bazzi DPM  [facility]

## 2024-01-25 ENCOUNTER — PATIENT MESSAGE (OUTPATIENT)
Dept: ADMINISTRATIVE | Facility: HOSPITAL | Age: 49
End: 2024-01-25
Payer: COMMERCIAL

## 2024-01-25 ENCOUNTER — PATIENT OUTREACH (OUTPATIENT)
Dept: ADMINISTRATIVE | Facility: HOSPITAL | Age: 49
End: 2024-01-25
Payer: COMMERCIAL

## 2024-02-05 ENCOUNTER — TELEPHONE (OUTPATIENT)
Dept: PODIATRY | Facility: CLINIC | Age: 49
End: 2024-02-05
Payer: COMMERCIAL

## 2024-02-05 NOTE — TELEPHONE ENCOUNTER
----- Message from Zuleika Bazan sent at 2/5/2024  2:40 PM CST -----  Type:  Patient Advice     Who Called:pt     Does the patient know what this is regarding?:PT   Would the patient rather a call back or a response via [facility]? Call   Best Call Back Number: [phone]  Additional Information:     Pt is requesting a call back regarding when does he start PT

## 2024-02-05 NOTE — TELEPHONE ENCOUNTER
Patient called regarding restarting PT and  Per Dr Bazzi will wait till after Mri appt before placing PT referral  patient verbalized understanding

## 2024-02-05 NOTE — TELEPHONE ENCOUNTER
----- Message from Lea White sent at 2/5/2024  2:55 PM CST -----  Type:  Patient Returning Call    Who Called: pt  Who Left Message for Patient: pt said Perla  Does the patient know what this is regarding?:  Would the patient rather a call back or a response via [facility]? call  Best Call Back Number: [phone]  Additional Information:

## 2024-02-08 ENCOUNTER — OFFICE VISIT (OUTPATIENT)
Dept: FAMILY MEDICINE | Facility: CLINIC | Age: 49
End: 2024-02-08
Payer: COMMERCIAL

## 2024-02-08 VITALS
HEIGHT: 67 IN | WEIGHT: 218.56 LBS | BODY MASS INDEX: 34.3 KG/M2 | HEART RATE: 97 BPM | OXYGEN SATURATION: 97 % | SYSTOLIC BLOOD PRESSURE: 180 MMHG | DIASTOLIC BLOOD PRESSURE: 98 MMHG | TEMPERATURE: 98 F

## 2024-02-08 DIAGNOSIS — E78.2 MIXED HYPERLIPIDEMIA: ICD-10-CM

## 2024-02-08 DIAGNOSIS — G62.9 NEUROPATHY: ICD-10-CM

## 2024-02-08 DIAGNOSIS — Z79.4 TYPE 2 DIABETES MELLITUS WITH MICROALBUMINURIA, WITH LONG-TERM CURRENT USE OF INSULIN: ICD-10-CM

## 2024-02-08 DIAGNOSIS — E66.09 CLASS 1 OBESITY DUE TO EXCESS CALORIES WITH SERIOUS COMORBIDITY AND BODY MASS INDEX (BMI) OF 34.0 TO 34.9 IN ADULT: ICD-10-CM

## 2024-02-08 DIAGNOSIS — F32.1 CURRENT MODERATE EPISODE OF MAJOR DEPRESSIVE DISORDER WITHOUT PRIOR EPISODE: ICD-10-CM

## 2024-02-08 DIAGNOSIS — E11.29 TYPE 2 DIABETES MELLITUS WITH MICROALBUMINURIA, WITH LONG-TERM CURRENT USE OF INSULIN: ICD-10-CM

## 2024-02-08 DIAGNOSIS — I10 ESSENTIAL HYPERTENSION: ICD-10-CM

## 2024-02-08 DIAGNOSIS — M72.2 PLANTAR FASCIITIS OF LEFT FOOT: ICD-10-CM

## 2024-02-08 DIAGNOSIS — R80.9 TYPE 2 DIABETES MELLITUS WITH MICROALBUMINURIA, WITH LONG-TERM CURRENT USE OF INSULIN: ICD-10-CM

## 2024-02-08 PROCEDURE — 99999 PR PBB SHADOW E&M-EST. PATIENT-LVL IV: CPT | Mod: PBBFAC,,, | Performed by: STUDENT IN AN ORGANIZED HEALTH CARE EDUCATION/TRAINING PROGRAM

## 2024-02-08 PROCEDURE — 99214 OFFICE O/P EST MOD 30 MIN: CPT | Mod: S$GLB,,, | Performed by: STUDENT IN AN ORGANIZED HEALTH CARE EDUCATION/TRAINING PROGRAM

## 2024-02-08 RX ORDER — AMLODIPINE BESYLATE 10 MG/1
10 TABLET ORAL DAILY
Qty: 90 TABLET | Refills: 3 | Status: SHIPPED | OUTPATIENT
Start: 2024-02-08 | End: 2025-02-07

## 2024-02-08 RX ORDER — GABAPENTIN 300 MG/1
300 CAPSULE ORAL 3 TIMES DAILY
Qty: 90 CAPSULE | Refills: 3 | Status: SHIPPED | OUTPATIENT
Start: 2024-02-08 | End: 2025-02-07

## 2024-02-08 RX ORDER — DULOXETIN HYDROCHLORIDE 60 MG/1
60 CAPSULE, DELAYED RELEASE ORAL DAILY
Qty: 90 CAPSULE | Refills: 3 | Status: SHIPPED | OUTPATIENT
Start: 2024-02-08 | End: 2024-04-15 | Stop reason: SINTOL

## 2024-02-08 RX ORDER — LIDOCAINE AND PRILOCAINE 25; 25 MG/G; MG/G
CREAM TOPICAL 2 TIMES DAILY PRN
Qty: 60 G | Refills: 0 | Status: SHIPPED | OUTPATIENT
Start: 2024-02-08

## 2024-02-08 NOTE — PROGRESS NOTES
Subjective:       Patient ID: Wilfredo Thomas is a 48 y.o. male.    Chief Complaint: Follow-up and Foot Pain (Left foot pain)      HPI    47M with T2DM, sHTN, HLD, gouty arthritis for follow up on chronic conditions in addition has multiple complaints :    He c/o progressively worsening left heel pain unresponsive to high dose NSAIDS, foot massage and various OTC supplements, rated 10/10 on pain and severe enough to prevent him from ambulating properly. He is s/p evaluation by podiatrist , was given steroid shot twice at same spot with no relief of symptoms , s/p MRI showed plantar fasciitis with osteonecrosis , he has been scheduled for f/u on 02/26/2024 with his podiatrist . He expresses frustration about not getting better   He has been non-compliant with healthy eating habits and does not exercise due to poor work schedule and foot pain  He has also been quite depressed lately he lost his mother about 2 weeks after trisha and has other family issues going on which is beginning to affect his mental health and overall well being.     Past Medical History:   Diagnosis Date    Diabetes     Gout     Hyperlipidemia     Hypertension        Past Surgical History:   Procedure Laterality Date    INCISION AND DRAINAGE Left 8/6/2022    Procedure: Incision and Drainage - LEFT THIGH.;  Surgeon: Rene Neff MD;  Location: [facility] OR [address] [place] [address];  Service: Orthopedics;  Laterality: Left;    IRRIGATION AND DEBRIDEMENT OF LOWER EXTREMITY Left 7/29/2022    Procedure: IRRIGATION AND DEBRIDEMENT, LOWER EXTREMITY;  Surgeon: Rob Graff MD;  Location: [facility] OR [address] [place] [address];  Service: Orthopedics;  Laterality: Left;    WRIST SURGERY Left        Family History   Problem Relation Age of Onset    Diabetes Mother     No Known Problems Father        Social History     Socioeconomic History    Marital status:    Tobacco Use    Smoking status: Former     Current packs/day: 0.00     Types: Cigarettes     Quit date: 1/2/2020     Years  since quittin.1    Smokeless tobacco: Never   Substance and Sexual Activity    Alcohol use: Yes     Alcohol/week: 12.0 standard drinks of alcohol     Types: 12 Cans of beer per week    Drug use: Never    Sexual activity: Yes     Partners: Female   Social History Narrative    Lives with wife and 2 kids in college and one older with 1 grand child. 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     Social Determinants of Health     Financial Resource Strain: Patient Declined (2024)    Overall Financial Resource Strain (CARDIA)     Difficulty of Paying Living Expenses: Patient declined   Food Insecurity: Patient Declined (2024)    Hunger Vital Sign     Worried About Running Out of Food in the Last Year: Patient declined     Ran Out of Food in the Last Year: Patient declined   Transportation Needs: Patient Declined (2024)    PRAPARE - Transportation     Lack of Transportation (Medical): Patient declined     Lack of Transportation (Non-Medical): Patient declined   Physical Activity: Unknown (2024)    Exercise Vital Sign     Days of Exercise per Week: Patient declined   Stress: Stress Concern Present (2024)    [employer] - Occupational Stress Questionnaire     Feeling of Stress : To some extent   Social Connections: Unknown (2024)    Social Connection and Isolation Panel [NHANES]     Frequency of Communication with Friends and Family: Patient declined     Frequency of Social Gatherings with Friends and Family: Patient declined     Active Member of Clubs or Organizations: Patient declined     Attends Club or Organization Meetings: Patient declined     Marital Status: Patient declined   Housing Stability: Patient Declined (2024)    Housing Stability Vital Sign     Unable to Pay for Housing in the Last Year: Patient declined     Unstable Housing in the Last Year: Patient declined       Review of Systems   Constitutional:  Negative for chills, fatigue and fever.  "  Respiratory:  Negative for cough and shortness of breath.    Cardiovascular:  Negative for chest pain, palpitations and leg swelling.   Gastrointestinal:  Negative for abdominal pain, constipation and diarrhea.   Endocrine: Negative for polydipsia and polyphagia.   Genitourinary:  Negative for dysuria, flank pain and frequency.   Musculoskeletal:  Positive for arthralgias and gait problem. Negative for back pain and joint swelling.   Skin:  Negative for rash and wound.   Neurological:  Negative for dizziness, seizures, weakness, numbness and headaches.   Psychiatric/Behavioral:  Positive for dysphoric mood. Negative for suicidal ideas.          Objective:     Vitals:    02/08/24 1527 02/08/24 1639   BP: (!) 212/118 (!) 180/98   BP Location: Right arm    Patient Position: Sitting    BP Method: Small (Manual)    Pulse: 97    Temp: 98.2 °F (36.8 °C)    TempSrc: Temporal    SpO2: 97%    Weight: 99.2 kg (218 lb 9.4 oz)    Height: 5' 7" (1.702 m)             Physical Exam  Vitals reviewed.   Constitutional:       General: He is not in acute distress.     Appearance: He is obese.   HENT:      Mouth/Throat:      Mouth: Mucous membranes are moist.   Cardiovascular:      Rate and Rhythm: Normal rate and regular rhythm.      Pulses: Normal pulses.      Heart sounds: Normal heart sounds.   Pulmonary:      Effort: Pulmonary effort is normal.      Breath sounds: Normal breath sounds.   Abdominal:      Hernia: There is no hernia in the left inguinal area.   Genitourinary:     Pubic Area: No rash.       Penis: No erythema, tenderness or swelling.       Testes: Cremasteric reflex is present.         Right: Mass, tenderness or swelling not present.         Left: Mass, tenderness or swelling not present.      Epididymis:      Right: Normal.      Left: Normal.   Musculoskeletal:         General: No swelling.      Right lower leg: No edema (1+).      Left lower leg: No edema.   Skin:     General: Skin is warm.   Neurological:      " "General: No focal deficit present.      Mental Status: He is alert and oriented to person, place, and time.      Cranial Nerves: No cranial nerve deficit.      Gait: Gait abnormal (antalgic gait).   Psychiatric:         Mood and Affect: Mood normal.         Behavior: Behavior normal.           Laboratory:  CBC:  No results for input(s): "WBC", "RBC", "HGB", "HCT", "PLT", "MCV", "MCH", "MCHC" in the last 2160 hours.    CMP:  No results for input(s): "GLU", "CALCIUM", "ALBUMIN", "PROT", "NA", "K", "CO2", "CL", "BUN", "ALKPHOS", "ALT", "AST", "BILITOT" in the last 2160 hours.    Invalid input(s): "CREATININ"    URINALYSIS:  No results for input(s): "COLORU", "CLARITYU", "SPECGRAV", "PHUR", "PROTEINUA", "GLUCOSEU", "BILIRUBINCON", "BLOODU", "WBCU", "RBCU", "BACTERIA", "MUCUS", "NITRITE", "LEUKOCYTESUR", "UROBILINOGEN", "HYALINECASTS" in the last 2160 hours.     LIPIDS:  No results for input(s): "TSH", "HDL", "CHOL", "TRIG", "LDLCALC", "CHOLHDL", "NONHDLCHOL", "TOTALCHOLEST" in the last 2160 hours.      TSH:  No results for input(s): "TSH" in the last 2160 hours.    A1C:  No results for input(s): "HGBA1C" in the last 2160 hours.      : MRI left foot 02/02/2024  Achilles: Normal size and signal     Plantar Fascia: There is marked thickening/increased signal at the origin of the plantar fascia, consistent with plantar fasciitis. Small discrete partial-thickness tear noted measuring 0.3 cm (series 5, image 15). There is prominent subcortical marrow edema within the adjacent calcaneus.  The soft tissue edema noted in the musculature adjacent to the medial and lateral bands, suggesting muscle strain.  No fluid collections.     Joints: No joint effusions. Tibiotalar and subtalar cartilage maintained.     Ligaments: Anterior and posterior inferior tibiofibular ligaments are intact.  Anterior talofibular, calcaneofibular and posterior talofibular ligaments are intact.  Deep and superficial components of deltoid ligament are " intact.  Spring ligament complex and Lisfranc ligament are intact.     Tendons: Medial ankle flexor, dorsal ankle extensor, and peroneus tendons are intact.     Bones:  No fracture.  Bone lesion in the distal diaphysis of the tibia, partially imaged, suggestive of osteonecrosis.     Miscellaneous: The sinus tarsi and tarsal tunnel are unremarkable.     Impression:     Plantar fasciitis, as above.     Partially imaged distal tibial bone lesion, suggestive of osteonecrosis.    Assessment:         ICD-10-CM ICD-9-CM   1. Plantar fasciitis of left foot  M72.2 728.71   2. Mixed hyperlipidemia  E78.2 272.2   3. Type 2 diabetes mellitus with microalbuminuria, with long-term current use of insulin  E11.29 250.40    R80.9 791.0    Z79.4 V58.67   4. Essential hypertension  I10 401.9   5. Neuropathy  G62.9 355.9   6. Current moderate episode of major depressive disorder without prior episode  F32.1 296.22   7. Class 1 obesity due to excess calories with serious comorbidity and body mass index (BMI) of 34.0 to 34.9 in adult  E66.09 278.00    Z68.34 V85.34         Plan:       Left Heel pain  -2/2  plantar fasciitis  -unresponsive to high dose NSAIDS, foot therapy and other OTC regimen  -s/p steroid injection X 2 , no improvement as well  -Trial of Emla cream BID+ gabapentin 300mg nightly + cymbalta  -f/u with podiatry already established    2. Type 2 diabetes mellitus with diabetic nephropathy  -worsening again, likely due to poor dietary habits and sedentary life style  - Hba1C slowing increasing, now @ 7.4  -Annual microalbumin/Cr @ >400  -Home BS readings range 90s-110s(occasionally)  -c/w metformin to 1500 mg daily  -c/w Mounjaro to 10mg q weekly   -c/w levemir @ 10U at bedtime  -c/w lisinopril to 40mg  -c/w therapeutic life style changes    3. BMI 34  -worsening again  -therapeutic life style modifications reinforced    4. sHTN  -uncontrolled and worsening  -start on amlodipine 10mg daily, c/w lisinopril 40mg  "daily  -therapeutic life style changes reinforced    5. Mixed HLD  -c/w statin and life style modifications    6. MDD  -start on cymbalta    HCM: declined all due vaccine for now, will continue to address next clinic visit        Patient's Medications   New Prescriptions    AMLODIPINE (NORVASC) 10 MG TABLET    Take 1 tablet (10 mg total) by mouth once daily.    DULOXETINE (CYMBALTA) 60 MG CAPSULE    Take 1 capsule (60 mg total) by mouth once daily.    GABAPENTIN (NEURONTIN) 300 MG CAPSULE    Take 1 capsule (300 mg total) by mouth 3 (three) times daily.    LIDOCAINE-PRILOCAINE (EMLA) CREAM    Apply topically 2 (two) times daily as needed.   Previous Medications    ATORVASTATIN (LIPITOR) 20 MG TABLET    Take 1 tablet (20 mg total) by mouth once daily.    BLOOD SUGAR DIAGNOSTIC STRP    1 strip by Misc.(Non-Drug; Combo Route) route once daily.    BLOOD-GLUCOSE METER KIT    Use as instructed    HYDROCODONE-ACETAMINOPHEN (NORCO) 5-325 MG PER TABLET    Take 1 tablet by mouth every 8 (eight) hours as needed.    INSULIN DETEMIR U-100 (LEVEMIR FLEXTOUCH) 100 UNIT/ML (3 ML) SUBQ INPN PEN    Inject 10 Units into the skin once daily.    LANCETS MISC    1 lancet by Misc.(Non-Drug; Combo Route) route once daily.    LISINOPRIL (PRINIVIL,ZESTRIL) 40 MG TABLET    Take 1 tablet (40 mg total) by mouth once daily.    METFORMIN (GLUCOPHAGE-XR) 750 MG ER 24HR TABLET    Take 2 tablets (1,500 mg total) by mouth daily with breakfast.    MULTIVITAMIN (THERAGRAN) PER TABLET    Take 2 tablets by mouth.    PEN NEEDLE, DIABETIC 31 GAUGE X 5/16" NDLE    Use to inject insulin into the skin.    TIRZEPATIDE 10 MG/0.5 ML PNIJ    Inject 10 mg into the skin every 7 days.   Modified Medications    No medications on file   Discontinued Medications    ALPRAZOLAM (XANAX) 0.25 MG TABLET    Take 1 tablet (0.25 mg total) by mouth 2 (two) times daily as needed for Anxiety.    CELECOXIB (CELEBREX) 200 MG CAPSULE    Take 200 mg by mouth 2 (two) times daily as " needed.    DOCUSATE SODIUM (COLACE) 100 MG CAPSULE    Take 100 mg by mouth 2 (two) times daily as needed.    NABUMETONE (RELAFEN) 750 MG TABLET    TAKE 1 TABLET(750 MG) BY MOUTH TWICE DAILY WITH MEAL FOR 2 DAYS AS DIRECTED    TRIAMCINOLONE ACETONIDE 0.1% (KENALOG) 0.1 % CREAM    Apply topically 2 (two) times daily. for 7 days         Patient was given opportunity to express concerns, ask questions and verbalizes understanding of current management plan     Dr Oumou Zuniga MD

## 2024-02-09 ENCOUNTER — TELEPHONE (OUTPATIENT)
Dept: FAMILY MEDICINE | Facility: CLINIC | Age: 49
End: 2024-02-09
Payer: COMMERCIAL

## 2024-02-09 DIAGNOSIS — R80.9 TYPE 2 DIABETES MELLITUS WITH MICROALBUMINURIA, WITH LONG-TERM CURRENT USE OF INSULIN: Primary | ICD-10-CM

## 2024-02-09 DIAGNOSIS — E11.29 TYPE 2 DIABETES MELLITUS WITH MICROALBUMINURIA, WITH LONG-TERM CURRENT USE OF INSULIN: Primary | ICD-10-CM

## 2024-02-09 DIAGNOSIS — Z79.4 TYPE 2 DIABETES MELLITUS WITH MICROALBUMINURIA, WITH LONG-TERM CURRENT USE OF INSULIN: Primary | ICD-10-CM

## 2024-02-09 RX ORDER — INSULIN DETEMIR 100 [IU]/ML
12 INJECTION, SOLUTION SUBCUTANEOUS NIGHTLY
Qty: 25 ML | Refills: 1 | Status: SHIPPED | OUTPATIENT
Start: 2024-02-09 | End: 2024-02-12

## 2024-02-09 RX ORDER — TIRZEPATIDE 12.5 MG/.5ML
12.5 INJECTION, SOLUTION SUBCUTANEOUS
Qty: 12 PEN | Refills: 3 | Status: SHIPPED | OUTPATIENT
Start: 2024-02-09 | End: 2024-03-05

## 2024-02-09 NOTE — TELEPHONE ENCOUNTER
----- Message from Oumou Zuniga MD sent at 2/9/2024 12:57 PM CST -----  Please let patient know :     Your A1C continues to get worse and you already know why . I have also increased your levemir to 12U at bedtime and mounjaro to 12.5mg every week. Thanks

## 2024-02-09 NOTE — TELEPHONE ENCOUNTER
----- Message from Simona Ferrell sent at 2/9/2024  2:37 PM CST -----  Type:  Patient Returning Call    Who Called: pt  Who Left Message for Patient: ELIGIO  Does the patient know what this is regarding?:  Would the patient rather a call back or a response via [email or web address]?   Best Call Back Number:[phone]  Additional Information:

## 2024-02-12 ENCOUNTER — TELEPHONE (OUTPATIENT)
Dept: FAMILY MEDICINE | Facility: CLINIC | Age: 49
End: 2024-02-12
Payer: COMMERCIAL

## 2024-02-12 DIAGNOSIS — Z79.4 TYPE 2 DIABETES MELLITUS WITH MICROALBUMINURIA, WITH LONG-TERM CURRENT USE OF INSULIN: Primary | ICD-10-CM

## 2024-02-12 DIAGNOSIS — R80.9 TYPE 2 DIABETES MELLITUS WITH MICROALBUMINURIA, WITH LONG-TERM CURRENT USE OF INSULIN: Primary | ICD-10-CM

## 2024-02-12 DIAGNOSIS — E11.29 TYPE 2 DIABETES MELLITUS WITH MICROALBUMINURIA, WITH LONG-TERM CURRENT USE OF INSULIN: Primary | ICD-10-CM

## 2024-02-12 RX ORDER — INSULIN GLARGINE 100 [IU]/ML
12 INJECTION, SOLUTION SUBCUTANEOUS DAILY
Qty: 15 ML | Refills: 3 | Status: SHIPPED | OUTPATIENT
Start: 2024-02-12 | End: 2024-03-05 | Stop reason: SDUPTHER

## 2024-02-12 NOTE — TELEPHONE ENCOUNTER
[facility] sent a drug change request the levemir is not covered. They said the alternative that's covered is Lantus Solos inj 100/ML

## 2024-02-14 ENCOUNTER — PATIENT MESSAGE (OUTPATIENT)
Dept: ORTHOPEDICS | Facility: CLINIC | Age: 49
End: 2024-02-14
Payer: COMMERCIAL

## 2024-02-14 ENCOUNTER — PATIENT MESSAGE (OUTPATIENT)
Dept: PODIATRY | Facility: CLINIC | Age: 49
End: 2024-02-14
Payer: COMMERCIAL

## 2024-02-20 DIAGNOSIS — E11.29 TYPE 2 DIABETES MELLITUS WITH MICROALBUMINURIA, WITH LONG-TERM CURRENT USE OF INSULIN: ICD-10-CM

## 2024-02-20 DIAGNOSIS — Z79.4 TYPE 2 DIABETES MELLITUS WITH MICROALBUMINURIA, WITH LONG-TERM CURRENT USE OF INSULIN: ICD-10-CM

## 2024-02-20 DIAGNOSIS — R80.9 TYPE 2 DIABETES MELLITUS WITH MICROALBUMINURIA, WITH LONG-TERM CURRENT USE OF INSULIN: ICD-10-CM

## 2024-02-20 RX ORDER — METFORMIN HYDROCHLORIDE 750 MG/1
1500 TABLET, EXTENDED RELEASE ORAL
Qty: 180 TABLET | Refills: 3 | Status: SHIPPED | OUTPATIENT
Start: 2024-02-20 | End: 2025-02-19

## 2024-02-20 NOTE — TELEPHONE ENCOUNTER
Called pt to see if he was had picked up the lidocaine cream, pt verbalized he did  the medication. I had gotten a fax from [facility] say meds needed a pa.       Pt would also like to have his metformin sent to [facility] [place] he is out of this medication.       Pt states that he was never able to  the Mounjaro 12.5  he hasn't had it in two weeks pt verbalized that his BS levels are not higher than the last appointment he has been focus on his diet. Please advise on what he should do.

## 2024-02-21 ENCOUNTER — OFFICE VISIT (OUTPATIENT)
Dept: PODIATRY | Facility: CLINIC | Age: 49
End: 2024-02-21
Payer: COMMERCIAL

## 2024-02-21 ENCOUNTER — TELEPHONE (OUTPATIENT)
Dept: PODIATRY | Facility: CLINIC | Age: 49
End: 2024-02-21

## 2024-02-21 VITALS — BODY MASS INDEX: 34.21 KG/M2 | HEIGHT: 67 IN | RESPIRATION RATE: 18 BRPM | WEIGHT: 218 LBS

## 2024-02-21 DIAGNOSIS — M72.2 PLANTAR FASCIITIS OF LEFT FOOT: Primary | ICD-10-CM

## 2024-02-21 DIAGNOSIS — M21.869 ACQUIRED POSTERIOR EQUINUS, UNSPECIFIED LATERALITY: ICD-10-CM

## 2024-02-21 PROCEDURE — 99213 OFFICE O/P EST LOW 20 MIN: CPT | Mod: S$GLB,,, | Performed by: PODIATRIST

## 2024-02-21 PROCEDURE — 99999 PR PBB SHADOW E&M-EST. PATIENT-LVL V: CPT | Mod: PBBFAC,,, | Performed by: PODIATRIST

## 2024-02-21 NOTE — PROGRESS NOTES
[facility] - PODIATRY  [address]  [address]  [place] [address]  Dept: [phone]  Dept Fax: [phone]    Enrico Bazzi Jr., DPM     Assessment:   MDM    Coding  1. Plantar fasciitis of left foot  EKG 12-lead    X-Ray Chest PA And Lateral    Hemoglobin A1C    Comprehensive Metabolic Panel    Prealbumin    CBC Auto Differential      2. Acquired posterior equinus, unspecified laterality - Left Foot  EKG 12-lead    X-Ray Chest PA And Lateral    Hemoglobin A1C    Comprehensive Metabolic Panel    Prealbumin    CBC Auto Differential          Plan:     Procedures    Wilfredo was seen today for foot pain.    Diagnoses and all orders for this visit:    Plantar fasciitis of left foot  -     EKG 12-lead; Future  -     X-Ray Chest PA And Lateral; Future  -     Hemoglobin A1C; Future  -     Comprehensive Metabolic Panel; Future  -     Prealbumin; Future  -     CBC Auto Differential; Future    Acquired posterior equinus, unspecified laterality - Left Foot  -     EKG 12-lead; Future  -     X-Ray Chest PA And Lateral; Future  -     Hemoglobin A1C; Future  -     Comprehensive Metabolic Panel; Future  -     Prealbumin; Future  -     CBC Auto Differential; Future        -pt seen, evaluated, and managed  -dx discussed in detail. All questions/concerns addressed  -all tx options discussed. All alternatives, risks, benefits of all txs discussed  -the patient was educated about the diagnosis  -PT has failed conservative care options possible including but not limited to shoe wear and/or padding, bracing/strapping, at home ROM, formal PT, medical therapy, injection therapy  -XR/imaging reviewed by me: agree with read  -labs reviewed by me: ok for vgel  -cont icing/stretching regimen  -MRI reviewed  -given failure of conservative measures, we discussed surgical intervention  -pt would benefit from operative intervention: we discussed the following procedures: L EPF+OGR+BMAC  -we  discussed approx postop course  -would be out pt, elective surgery  -would be GA + block, supine position  -would need PCP clearance before proceeding  -labs and xr on way out  -potential DOS: 3/8/24        -rxs dispensed: none  -referrals: none  -WB: wbat      Follow up in about 2 weeks (around 3/6/2024).    Subjective:      Patient ID: Wilfredo Thomas is a 48 y.o. male.    Chief Complaint:   Chief Complaint   Patient presents with    Foot Pain     Left        CC - foot pain: patient presents to the podiatry clinic  with complaint of  left foot pain. Onset of the symptoms was several months ago. Precipitating event: unk. Current symptoms include: ability to bear weight, but with some pain, ronak the heel, swelling and worsening symptoms after a period of inactivity. Aggravating factors: walking and certain shoegear. Symptoms have gradually worsened. Patient has had no prior foot problems. Evaluation to date: plain films: normal. Treatment to date: avoidance of offending activity. Patients rates pain 9/10 on pain scale.      2/22/24:  Hx as above. Pt failed conservative measures for PF so MRI was ordered and obtained.    Foot Pain        Last Podiatry Enc: Visit date not found  Last Enc w/ Me: Visit date not found    Outside reports reviewed: historical medical records.  Family hx: as below  Past Medical History:   Diagnosis Date    Diabetes     Gout     Hyperlipidemia     Hypertension      Past Surgical History:   Procedure Laterality Date    INCISION AND DRAINAGE Left 8/6/2022    Procedure: Incision and Drainage - LEFT THIGH.;  Surgeon: Rene Neff MD;  Location: [address];  Service: Orthopedics;  Laterality: Left;    IRRIGATION AND DEBRIDEMENT OF LOWER EXTREMITY Left 7/29/2022    Procedure: IRRIGATION AND DEBRIDEMENT, LOWER EXTREMITY;  Surgeon: Rob Graff MD;  Location: [facility] OR [address] [place] [address];  Service: Orthopedics;  Laterality: Left;    WRIST SURGERY Left      Family History   Problem Relation Age of  "Onset    Diabetes Mother     No Known Problems Father      Current Outpatient Medications   Medication Sig Dispense Refill    amLODIPine (NORVASC) 10 MG tablet Take 1 tablet (10 mg total) by mouth once daily. 90 tablet 3    atorvastatin (LIPITOR) 20 MG tablet Take 1 tablet (20 mg total) by mouth once daily. 90 tablet 3    blood sugar diagnostic Strp 1 strip by Misc.(Non-Drug; Combo Route) route once daily. 100 each 11    blood-glucose meter kit Use as instructed 1 each 0    DULoxetine (CYMBALTA) 60 MG capsule Take 1 capsule (60 mg total) by mouth once daily. 90 capsule 3    gabapentin (NEURONTIN) 300 MG capsule Take 1 capsule (300 mg total) by mouth 3 (three) times daily. 90 capsule 3    HYDROcodone-acetaminophen (NORCO) 5-325 mg per tablet Take 1 tablet by mouth every 8 (eight) hours as needed.      insulin (LANTUS SOLOSTAR U-100 INSULIN) glargine 100 units/mL SubQ pen Inject 12 Units into the skin once daily. 15 mL 3    lancets Misc 1 lancet by Misc.(Non-Drug; Combo Route) route once daily. 100 each 11    LIDOcaine-prilocaine (EMLA) cream Apply topically 2 (two) times daily as needed. 60 g 0    lisinopriL (PRINIVIL,ZESTRIL) 40 MG tablet Take 1 tablet (40 mg total) by mouth once daily. 90 tablet 3    metFORMIN (GLUCOPHAGE-XR) 750 MG ER 24hr tablet Take 2 tablets (1,500 mg total) by mouth daily with breakfast. 180 tablet 3    multivitamin (THERAGRAN) per tablet Take 2 tablets by mouth.      pen needle, diabetic 31 gauge x 5/16" Ndle Use to inject insulin into the skin. 100 each 11    tirzepatide (MOUNJARO) 12.5 mg/0.5 mL PnIj Inject 12.5 mg into the skin every 7 days. 12 Pen 3     No current facility-administered medications for this visit.     Review of patient's allergies indicates:   Allergen Reactions    Adhesive tape-silicones      It was a White tape ( uncertain if related to silicone) 8/22/2022     Social History     Socioeconomic History    Marital status:    Tobacco Use    Smoking status: Former     " Current packs/day: 0.00     Types: Cigarettes     Quit date: 2020     Years since quittin.1    Smokeless tobacco: Never   Substance and Sexual Activity    Alcohol use: Yes     Alcohol/week: 12.0 standard drinks of alcohol     Types: 12 Cans of beer per week    Drug use: Never    Sexual activity: Yes     Partners: Female   Social History Narrative    Lives with wife and 2 kids in college and one older with 1 grand child. Supervisor at [employer]. Smoker but quit 2020     Social Determinants of Health     Financial Resource Strain: Patient Declined (2024)    Overall Financial Resource Strain (CARDIA)     Difficulty of Paying Living Expenses: Patient declined   Food Insecurity: Patient Declined (2024)    Hunger Vital Sign     Worried About Running Out of Food in the Last Year: Patient declined     Ran Out of Food in the Last Year: Patient declined   Transportation Needs: Patient Declined (2024)    PRAPARE - Transportation     Lack of Transportation (Medical): Patient declined     Lack of Transportation (Non-Medical): Patient declined   Physical Activity: Unknown (2024)    Exercise Vital Sign     Days of Exercise per Week: Patient declined   Stress: Stress Concern Present (2024)    [employer] - Occupational Stress Questionnaire     Feeling of Stress : To some extent   Social Connections: Unknown (2024)    Social Connection and Isolation Panel [NHANES]     Frequency of Communication with Friends and Family: Patient declined     Frequency of Social Gatherings with Friends and Family: Patient declined     Active Member of Clubs or Organizations: Patient declined     Attends Club or Organization Meetings: Patient declined     Marital Status: Patient declined   Housing Stability: Patient Declined (2024)    Housing Stability Vital Sign     Unable to Pay for Housing in the Last Year: Patient declined     Unstable Housing in the Last Year: Patient declined  "      ROS    REVIEW OF SYSTEMS: Negative as documented below as well as positive findings in bold.       Constitutional  Respiratory  Gastrointestinal  Skin   - Fever - Cough - Heartburn - Rash   - Chills - Spit blood - Nausea - Itching   - Weight Loss - Shortness of breath - Vomiting - Nail pain   - Malaise/Fatigue - Wheezing - Abdominal Pain  Wound/Ulcer   - Weight Gain   - Blood in Stool  Poor wound healing       - Diarrhea          Cardiovascular  Genitourinary  Neurological  HEENT   - Chest Pain - Dysuria - Burning Sensation of feet - Headache   - Palpitations - Hematuria - Tingling / Paresthesia - Congestion   - Pain at night in legs - Flank Pain - Dizziness - Sore Throat   - Cramping   - Tremor - Blurred Vision   - Leg Swelling   - Sensory Change - Double Vision   - Dizzy when standing   - Speech Change - Eye Redness       - Focal Weakness - Dry Eyes       - Loss of Consciousness          Endocrine  Musculoskeletal  Psychiatric   - Cold intolerance - Muscle Pain - Depression   - Heat intolerance - Neck Pain - Insomnia   - Anemia - Joint Pain - Memory Loss   -  Easy bruising, bleeding - Heel pain - Anxiety      Toe Pain        Leg/Ankle/Foot Pain         Objective:     Resp 18   Ht 5' 7" (1.702 m)   Wt 98.9 kg (218 lb)   BMI 34.14 kg/m²   Vitals:    02/21/24 1119   Resp: 18   Weight: 98.9 kg (218 lb)   Height: 5' 7" (1.702 m)   PainSc:   5   PainLoc: Foot           Physical Exam    General Appearance:   Patient appears well developed, well nourished  Patient appears stated age    Psychiatric:   Patient is oriented to time, place, and person.  Patient has appropriate mood and affect    Neck:  Trachea Midline  No visible masses    Respiratory/Ears:  No distress or labored breathing.  Able to differentiate between normal talking voice and whisper.  Able to follow commands    Eyes:  Visual Acuity intact  Lids and conjunctivae normal. No discoloration noted.    Foot Exam  Physical Exam  Ortho Exam  Ortho/SPM " Exam  Foot/Ankle Musculoskeletal Exam    L LE exam con't:  V:  DP 2/4, PT 2/4   CRT< 3s to all digits tested   Tibial and popliteal lymph nodes are w/o abnormality   Edema: absent, varicosities: absent    N:  Patient displays normal ankle reflexes   SILT in SP/DP/T/Ron/Saph distributions    Ortho: +Motor EHL/FHL/TA/GA   equinus deformity present  There is moderate pain with palpation of L medial calcaneal tubercle  Compartments soft/compressible. No pain on passive stretch of big toe. No calf  Pain.    Derm:  skin intact, skin warm and dry, skin without ulcers or lesions, skin without induration, nails normal, texture turgor well hydrated      Imaging / Labs:    MRI Foot (Hindfoot) Left Without Contrast    Result Date: 2/2/2024  EXAMINATION: MRI FOOT (HINDFOOT) LEFT WITHOUT CONTRAST CLINICAL HISTORY: Heel pain, chronic;  Plantar fascial fibromatosis TECHNIQUE: Routine multiplanar, multi sequence MRI of the left ankle performed without IV contrast. COMPARISON: None FINDINGS: Achilles: Normal size and signal Plantar Fascia: There is marked thickening/increased signal at the origin of the plantar fascia, consistent with plantar fasciitis. Small discrete partial-thickness tear noted measuring 0.3 cm (series 5, image 15). There is prominent subcortical marrow edema within the adjacent calcaneus.  The soft tissue edema noted in the musculature adjacent to the medial and lateral bands, suggesting muscle strain.  No fluid collections. Joints: No joint effusions. Tibiotalar and subtalar cartilage maintained. Ligaments: Anterior and posterior inferior tibiofibular ligaments are intact.  Anterior talofibular, calcaneofibular and posterior talofibular ligaments are intact.  Deep and superficial components of deltoid ligament are intact.  Spring ligament complex and Lisfranc ligament are intact. Tendons: Medial ankle flexor, dorsal ankle extensor, and peroneus tendons are intact. Bones:  No fracture.  Bone lesion in the distal  diaphysis of the tibia, partially imaged, suggestive of osteonecrosis. Miscellaneous: The sinus tarsi and tarsal tunnel are unremarkable.     Plantar fasciitis, as above. Partially imaged distal tibial bone lesion, suggestive of osteonecrosis. Electronically signed by: Severino Baltazar MD Date:    02/02/2024 Time:    11:52         Note: This was dictated using a computer transcription program. Although proofread, it may contain computer transcription errors and phonetic errors. Other human proofreading errors may also exist. Corrections may be performed at a later time. Please contact us for any clarification if needed.    Enrico Bazzi DPM  [facility]

## 2024-02-21 NOTE — PATIENT INSTRUCTIONS
Heel Pain (Plantar Fasciitis)        Heel pain is most often caused by plantar fasciitis, a condition that is sometimes also called heel spur syndrome when a spur is present. Heel pain may also be due to other causes, such as a stress fracture, tendonitis, arthritis, nerve irritation or, rarely, a cyst.    Because there are several potential causes, it is important to have heel pain properly diagnosed. A foot and ankle surgeon is able to distinguish between all the possibilities and to determine the underlying source of your heel pain.    What Is Plantar Fasciitis?  Heel pain is often caused by plantar fasciitis  Plantar fasciitis is an inflammation of the band of tissue (the plantar fascia) that extends from the heel to the toes. In this condition, the fascia first becomes irritated and then inflamed, resulting in heel pain.    Causes  The most common cause of plantar fasciitis relates to faulty structure of the foot. For example, people who have problems with their arches, either overly flat feet or high-arched feet, are more prone to developing plantar fasciitis.    Wearing nonsupportive footwear on hard, flat surfaces puts abnormal strain on the plantar fascia and can also lead to plantar fasciitis. This is particularly evident when ones job requires long hours on the feet. Obesity and overuse may also contribute to plantar fasciitis.    Symptoms  The symptoms of plantar fasciitis are:    Pain on the bottom of the heel  Pain in the arch of the foot  Pain that is usually worse upon arising  Pain that increases over a period of months  Swelling on the bottom of the heel     People with plantar fasciitis often describe the pain as worse when they get up in the morning or after they have been sitting for long periods of time. After a few minutes of walking, the pain decreases because walking stretches the fascia. For some people, the pain subsides but returns after spending long periods of time on their  feet.    Diagnosis  To arrive at a diagnosis, the foot and ankle surgeon will obtain your medical history and examine your foot. Throughout this process, the surgeon rules out all possible causes for your heel pain other than plantar fasciitis.    In addition, diagnostic imaging studies, such as x-rays or other imaging modalities, may be used to distinguish the different types of heel pain. Sometimes heel spurs are found in patients with plantar fasciitis, but these are rarely a source of pain. When they are present, the condition may be diagnosed as plantar fasciitis/heel spur syndrome.    Nonsurgical Treatment  Treatment of plantar fasciitis begins with first-line strategies, which you can begin at home:    -Stretching exercises. Exercises that stretch out the calf muscles help ease pain and assist with recovery.  -Avoid going barefoot. When you walk without shoes, you put undue strain and stress on your plantar fascia.  -Ice. Putting an ice pack on your heel for 20 minutes several times a day helps reduce inflammation. Place a thin towel between the ice and your heel; do not apply ice directly to the skin.  -Limit activities. Cut down on extended physical activities to give your heel a rest.  -Shoe modifications. Wearing supportive shoes that have good arch support and a slightly raised heel reduces stress on the plantar fascia.  -Medications. Oral nonsteroidal anti-inflammatory drugs (NSAIDs), such as ibuprofen, may be recommended to reduce pain and inflammation.     If you still have pain after several weeks, see your foot and ankle surgeon, who may add one or more of these treatment approaches:    -Padding, taping and strapping. Placing pads in the shoe softens the impact of walking. Taping and strapping help support the foot and reduce strain on the fascia.  -Orthotic devices. Custom orthotic devices that fit into your shoe help correct the underlying structural abnormalities causing the plantar  fasciitis.  -Injection therapy. In some cases, corticosteroid injections are used to help reduce the inflammation and relieve pain.  -Removable walking cast. A removable walking cast may be used to keep your foot immobile for a few weeks to allow it to rest and heal.  -Night splint. Wearing a night splint allows you to maintain an extended stretch of the plantar fascia while sleeping. This may help reduce the morning pain experienced by some patients.  -Physical therapy. Exercises and other physical therapy measures may be used to help provide relief.     When Is Surgery Needed?  Although most patients with plantar fasciitis respond to nonsurgical treatment, a small percentage of patients may require surgery. If, after several months of nonsurgical treatment, you continue to have heel pain, surgery will be considered. Your foot and ankle surgeon will discuss the surgical options with you and determine which approach would be most beneficial for you.    Long-Term Care  No matter what kind of treatment you undergo for plantar fasciitis, the underlying causes that led to this condition may remain. Therefore, you will need to continue with preventive measures. Wearing supportive shoes, stretching and using custom orthotic devices are the mainstay of long-term treatment for plantar fasciitis.            Understanding Heel Pain  Your heel is the back part of your foot. A band of tissue called the plantar fascia connects the heel bone to the bones in the ball of your foot. Nerves run from the heel up the inside of your ankle and into your leg. When you feel pain in the bottom of your heel, the plantar fascia may be inflamed. Overuse, Achilles tightness, or excess body weight can cause the tissue to tear or pull away from the bone. Sometimes the inflamed plantar fascia also irritates a nerve, causing more pain.    What causes heel pain?  Wearing shoes with poor cushioning can irritate the tissue in your heel (plantar  fascia). Being overweight or standing for long periods can also irritate the tissue. Running, walking, tennis, and other sports that put stress on the heels can cause tiny tears in the tissue. If your lower leg muscles are tight, this is more likely to occur. A tight Achilles tendon will also contribute to heel pain.  Symptoms  You may feel pain on the bottom or on the inside edge of your heel. The pain may be sharp when you get out of bed or when you stand up after sitting for a while. You may feel a dull ache in your heel after youve been standing for a long time on a hard surface. Running can also cause a dull ache.  Preventing future problems  To prevent future heel pain, wear shoes with well-cushioned heels. And do exercises prescribed by your healthcare provider to stretch the plantar fascia and the muscles in the lower leg.   Date Last Reviewed: 9/10/2015  © 0371-9664 [employer]. [address] [place] [address]. All rights reserved. This information is not intended as a substitute for professional medical care. Always follow your healthcare professional's instructions.      Treating Plantar Fasciitis  First, your healthcare provider tries to determine the cause of your problem in order to suggest ways to relieve pain. If your pain is due to poor foot mechanics, custom-made shoe inserts (orthoses) may help.    Reduce symptoms  To relieve mild symptoms, try aspirin, ibuprofen, or other medicines as directed. Rubbing ice on the affected area may also help.  To reduce severe pain and swelling, your healthcare provider may prescribe pills or injections or a walking cast in some instances. Physical therapy, such as ultrasound or a daily stretching program, may also be recommended. Surgery is rarely required.  To reduce symptoms caused by poor foot mechanics, your foot may be taped. This supports the arch and temporarily controls movement. Night splints may also help by stretching the  fascia.  Control movement  If taping helps, your healthcare provider may prescribe orthoses. Built from plaster casts of your feet, these inserts control the way your foot moves. As a result, your symptoms should go away.  Reduce overuse  Every time your foot strikes the ground, the plantar fascia is stretched. You can reduce the strain on the plantar fascia and the possibility of overuse by following these suggestions:  Lose any excess weight.  Avoid running on hard or uneven ground.  Use orthoses at all times in your shoes and house slippers.  If surgery is needed  Your healthcare provider may consider surgery if other types of treatment don't control your pain. During surgery, the plantar fascia is partially cut to release tension. As you heal, fibrous tissue fills the space between the heel bone and the plantar fascia.   Date Last Reviewed: 10/14/2015  © 1127-8342 [employer]. [address] [place] [address]. All rights reserved. This information is not intended as a substitute for professional medical care. Always follow your healthcare professional's instructions.      Equinus          What Is Equinus?    Equinus is a condition in which the upward bending motion of the ankle joint is limited. Someone with equinus lacks the flexibility to bring the top of the foot toward the front of the leg. Equinus can occur in one or both feet. When it involves both feet, the limitation of motion is sometimes worse in one foot than in the other.    People with equinus develop ways to compensate for their limited ankle motion, and this often leads to other foot, leg or back problems. The most common methods of compensation are flattening of the arch or picking up the heel early when walking, placing increased pressure on the ball of the foot. Other patients compensate by toe walking, while a smaller number take steps by bending abnormally at the hip or knee.    Causes  There are several possible causes  for the limited range of ankle motion. Often, it is due to tightness in the Achilles tendon or calf muscles (the soleus muscle and/or gastrocnemius muscle). In some patients, this tightness is congenital (present at birth), and sometimes it is an inherited trait. Other patients acquire the tightness from being in a cast, being on crutches or frequently wearing high-heeled shoes. In addition, diabetes can affect the fibers of the Achilles tendon and cause tightness. Sometimes equinus is related to a bone blocking the ankle motion. For example, a fragment of a broken bone following an ankle injury, or bone block, can get in the way and restrict motion. Equinus may also result from one leg being shorter than the other. Less often, equinus is caused by spasms in the calf muscle. These spasms may be signs of an underlying neurologic disorder.      Foot Problems Related to Equinus  Depending on how a patient compensates for the inability to bend properly at the ankle, a variety of foot conditions can develop, including:    Plantar fasciitis (arch/heel pain)  Calf cramping  Tendonitis (inflammation in the Achilles tendon)  Metatarsalgia (pain and/or callusing on the ball of the foot)  Flatfoot  Arthritis of the midfoot (middle area of the foot)  Pressure sores on the ball of the foot or the arch  Bunions and hammertoes  Ankle pain  Shin splints     Diagnosis  Most patients with equinus are unaware they have this condition when they first visit the doctor. Instead, they come to the doctor seeking relief for foot problems associated with equinus.    To diagnose equinus, the foot and ankle surgeon will evaluate the ankle's range of motion when the knee is flexed (bent) as well as extended (straightened). This enables the surgeon to identify whether the tendon or muscle is tight and to assess whether bone is interfering with ankle motion. X-rays may also be ordered. In some cases, the foot and ankle surgeon may refer the  patient for neurologic evaluation.    Nonsurgical Treatment  Treatment includes strategies aimed at relieving the symptoms and conditions associated with equinus. In addition, the patient is treated for the equinus itself through one or more of the following options:    Night splint. The foot may be placed in a splint at night to keep it in a position that helps reduce tightness of the calf muscle.  Heel lifts. Placing heel lifts inside the shoes or wearing shoes with a moderate heel takes stress off the Achilles tendon when walking and may reduce symptoms.  Arch supports or orthotic devices. Custom orthotic devices that fit into the shoe are often prescribed to keep weight distributed properly and to help control muscle/tendon imbalance.  Physical therapy. To help remedy muscle tightness, exercises that stretch the calf muscle(s) are recommended.     When Is Surgery Needed?  In some cases, surgery may be needed to correct the cause of equinus if it is related to a tight tendon or a bone blocking the ankle motion. The foot and ankle surgeon will determine the type of procedure that is best suited to the individual patient.                Ankle Dorsiflexion/Plantarflexion (Flexibility)    Sit on the floor or in bed with your legs straight in front of you.  Point both feet. Then flex both feet.  Do this 10 to 30 times in a row.  Repeat this exercise 2 times a day, or as instructed.  Date Last Reviewed: 5/1/2016 © 2000-2016 [employer]. [address] [place] [address]. All rights reserved. This information is not intended as a substitute for professional medical care. Always follow your healthcare professional's instructions.          Arch retraining    These exercises are for your right foot. Switch sides for your left foot.  Sit in a chair or stand with both feet flat on the floor. Press down with the ball of your right foot, but only on the left side of the foot, just under the big  toe.  Then pull the bottom of your big toe back toward your heel. This should pull up the arch of your foot. Dont flex your toes while doing this. It is a subtle movement of the arch.  Hold for 5 seconds. Relax.  Date Last Reviewed: 3/10/2016  © 5634-1461 [employer]. [address] [place] [address]. All rights reserved. This information is not intended as a substitute for professional medical care. Always follow your healthcare professional's instructions.        Soleus Stretch (Flexibility)    Stand facing a wall from 3 feet away. Take one step toward the wall with your right foot.  Place both palms on the wall. Bend both knees and lean forward. Keep both heels on the floor.  Hold for 30 to 60 seconds. Then relax both legs. Repeat the exercise 2 times.  Switch legs and repeat.  Repeat this exercise 3 times a day, or as instructed.     Tip: Dont bounce while youre stretching.   Date Last Reviewed: 3/10/2016  © 5157-7205 [employer]. [address] [place] [address]. All rights reserved. This information is not intended as a substitute for professional medical care. Always follow your healthcare professional's instructions.          Recommended OTC orthotics:  -powerstep  -superfeet    Recommended shoegear:  -new balance  -ascics  -elvis finch

## 2024-02-21 NOTE — TELEPHONE ENCOUNTER
Called ans spoke to pt he says that the pharmacy doesn't have the 12.5mg moungaro in stock, he still had refills for the 10mg he was able to request through the pharmacy [facility]. To see fi they can refill this.       Pt is also scheduled for a pre-op with you on Monday for Jluis moraes labs and EKG done today.

## 2024-02-22 ENCOUNTER — TELEPHONE (OUTPATIENT)
Dept: FAMILY MEDICINE | Facility: CLINIC | Age: 49
End: 2024-02-22
Payer: COMMERCIAL

## 2024-02-22 NOTE — TELEPHONE ENCOUNTER
----- Message from Oumou Zuniga MD sent at 2/22/2024 10:52 AM CST -----  Please ask patient if  he can call his pharmacy to ask if 15mg mounjaro is available so I can send that over since he is having difficulty getting the 12.5mg. In the meantime he should increase his lantus to 15U twice a day.

## 2024-02-22 NOTE — TELEPHONE ENCOUNTER
Pt inform that dr wants to him to call the RX and find out if RX has 15 mg of Monjoura so dr can send it over and to increase his Lantus to 15u bid.Pt agrees verbalize and understands.

## 2024-02-22 NOTE — TELEPHONE ENCOUNTER
----- Message from Simona Ferrell sent at 2/22/2024  2:16 PM CST -----  .Type:  Needs Medical Advice    Who Called:  PT  Symptoms (please be specific):    How long has patient had these symptoms:    Pharmacy name and phone #:    Would the patient rather a call back or a response via [facility]?   Best Call Back Number: [phone] (M)  Additional Information: WANTS TO LET THE OFFICE KNOW THAT THE MEDICINE WAS ON BACK ORDER ON 5 DIFFERENT PHARMACIES    tirzepatide (MOUNJARO) 12.5 mg/0.5 mL PnIj 12 Pen 3 2/9/2024 - No

## 2024-02-26 ENCOUNTER — OFFICE VISIT (OUTPATIENT)
Dept: FAMILY MEDICINE | Facility: CLINIC | Age: 49
End: 2024-02-26
Payer: COMMERCIAL

## 2024-02-26 VITALS
SYSTOLIC BLOOD PRESSURE: 138 MMHG | DIASTOLIC BLOOD PRESSURE: 88 MMHG | OXYGEN SATURATION: 96 % | TEMPERATURE: 99 F | BODY MASS INDEX: 34.84 KG/M2 | HEART RATE: 86 BPM | HEIGHT: 67 IN | WEIGHT: 222 LBS

## 2024-02-26 DIAGNOSIS — I10 ESSENTIAL HYPERTENSION: ICD-10-CM

## 2024-02-26 DIAGNOSIS — Z79.4 TYPE 2 DIABETES MELLITUS WITH MICROALBUMINURIA, WITH LONG-TERM CURRENT USE OF INSULIN: ICD-10-CM

## 2024-02-26 DIAGNOSIS — E66.09 CLASS 1 OBESITY DUE TO EXCESS CALORIES WITH SERIOUS COMORBIDITY AND BODY MASS INDEX (BMI) OF 34.0 TO 34.9 IN ADULT: ICD-10-CM

## 2024-02-26 DIAGNOSIS — Z01.818 PRE-OP EVALUATION: Primary | ICD-10-CM

## 2024-02-26 DIAGNOSIS — M72.2 PLANTAR FASCIITIS OF LEFT FOOT: ICD-10-CM

## 2024-02-26 DIAGNOSIS — R80.9 TYPE 2 DIABETES MELLITUS WITH MICROALBUMINURIA, WITH LONG-TERM CURRENT USE OF INSULIN: ICD-10-CM

## 2024-02-26 DIAGNOSIS — E11.29 TYPE 2 DIABETES MELLITUS WITH MICROALBUMINURIA, WITH LONG-TERM CURRENT USE OF INSULIN: ICD-10-CM

## 2024-02-26 PROCEDURE — 99214 OFFICE O/P EST MOD 30 MIN: CPT | Mod: S$GLB,,, | Performed by: STUDENT IN AN ORGANIZED HEALTH CARE EDUCATION/TRAINING PROGRAM

## 2024-02-26 PROCEDURE — 99999 PR PBB SHADOW E&M-EST. PATIENT-LVL IV: CPT | Mod: PBBFAC,,, | Performed by: STUDENT IN AN ORGANIZED HEALTH CARE EDUCATION/TRAINING PROGRAM

## 2024-02-26 NOTE — PROGRESS NOTES
Subjective:       Patient ID: Wilfredo Thomas is a 48 y.o. male.    Chief Complaint: Pre-op Exam      HPI    47M with uncontrolled T2DM, sHTN, HLD, gouty arthritis , obesity and plantar fasciitis/osteonecrosis presents for pre op clearance for scheduled endoscopic fasciotomy/resection with Dr Bazzi on 2024. He endorses no new complaints and since last clinic visit has made remarkable improvement in eating habits and incorporating resistance exercise to his daily activities now. Home BP readings range in the 120s/80, BS readings in the 160s, yet to get mounjaro due to shortage supply from pharmacy.  He is also in much better place with his mental health now.    Past Medical History:   Diagnosis Date    Diabetes     Gout     Hyperlipidemia     Hypertension        Past Surgical History:   Procedure Laterality Date    INCISION AND DRAINAGE Left 2022    Procedure: Incision and Drainage - LEFT THIGH.;  Surgeon: Rene Neff MD;  Location: [address];  Service: Orthopedics;  Laterality: Left;    IRRIGATION AND DEBRIDEMENT OF LOWER EXTREMITY Left 2022    Procedure: IRRIGATION AND DEBRIDEMENT, LOWER EXTREMITY;  Surgeon: Rob Graff MD;  Location: [facility] OR [address] [place] [address];  Service: Orthopedics;  Laterality: Left;    WRIST SURGERY Left        Family History   Problem Relation Age of Onset    Diabetes Mother     No Known Problems Father        Social History     Socioeconomic History    Marital status:    Tobacco Use    Smoking status: Former     Current packs/day: 0.00     Types: Cigarettes     Quit date: 2020     Years since quittin.1    Smokeless tobacco: Never   Substance and Sexual Activity    Alcohol use: Yes     Alcohol/week: 12.0 standard drinks of alcohol     Types: 12 Cans of beer per week    Drug use: Never    Sexual activity: Yes     Partners: Female   Social History Narrative    Lives with wife and 2 kids in college and one older with 1 grand child. Supervisor at [employer].  Smoker but quit 1/2020     Social Determinants of Health     Financial Resource Strain: Patient Declined (2/8/2024)    Overall Financial Resource Strain (CARDIA)     Difficulty of Paying Living Expenses: Patient declined   Food Insecurity: Patient Declined (2/8/2024)    Hunger Vital Sign     Worried About Running Out of Food in the Last Year: Patient declined     Ran Out of Food in the Last Year: Patient declined   Transportation Needs: Patient Declined (2/8/2024)    PRAPARE - Transportation     Lack of Transportation (Medical): Patient declined     Lack of Transportation (Non-Medical): Patient declined   Physical Activity: Unknown (2/8/2024)    Exercise Vital Sign     Days of Exercise per Week: Patient declined   Stress: Stress Concern Present (2/8/2024)    [place] [employer] - Occupational Stress Questionnaire     Feeling of Stress : To some extent   Social Connections: Unknown (2/8/2024)    Social Connection and Isolation Panel [NHANES]     Frequency of Communication with Friends and Family: Patient declined     Frequency of Social Gatherings with Friends and Family: Patient declined     Active Member of Clubs or Organizations: Patient declined     Attends Club or Organization Meetings: Patient declined     Marital Status: Patient declined   Housing Stability: Patient Declined (2/8/2024)    Housing Stability Vital Sign     Unable to Pay for Housing in the Last Year: Patient declined     Unstable Housing in the Last Year: Patient declined       Review of Systems   Constitutional:  Negative for chills, fatigue and fever.   Respiratory:  Negative for cough and shortness of breath.    Cardiovascular:  Negative for chest pain, palpitations and leg swelling.   Gastrointestinal:  Negative for abdominal pain, constipation and diarrhea.   Endocrine: Negative for polydipsia and polyphagia.   Genitourinary:  Negative for dysuria, flank pain and frequency.   Musculoskeletal:  Positive for arthralgias  "and gait problem. Negative for back pain and joint swelling.   Skin:  Negative for rash and wound.   Neurological:  Negative for dizziness, seizures, weakness, numbness and headaches.   Psychiatric/Behavioral:  Negative for dysphoric mood and suicidal ideas.          Objective:     Vitals:    02/26/24 1550   BP: (!) 142/90   BP Location: Right arm   Patient Position: Sitting   BP Method: Small (Manual)   Pulse: 86   Temp: 98.8 °F (37.1 °C)   TempSrc: Temporal   SpO2: 96%   Weight: 100.7 kg (222 lb 0.1 oz)   Height: 5' 7" (1.702 m)            Physical Exam  Vitals reviewed.   Constitutional:       General: He is not in acute distress.     Appearance: He is obese.   HENT:      Mouth/Throat:      Mouth: Mucous membranes are moist.   Cardiovascular:      Rate and Rhythm: Normal rate and regular rhythm.      Pulses: Normal pulses.      Heart sounds: Normal heart sounds.   Pulmonary:      Effort: Pulmonary effort is normal.      Breath sounds: Normal breath sounds.   Abdominal:      Hernia: There is no hernia in the left inguinal area.   Genitourinary:     Pubic Area: No rash.       Penis: No erythema, tenderness or swelling.       Testes: Cremasteric reflex is present.         Right: Mass, tenderness or swelling not present.         Left: Mass, tenderness or swelling not present.      Epididymis:      Right: Normal.      Left: Normal.   Musculoskeletal:         General: No swelling.      Right lower leg: No edema (1+).      Left lower leg: No edema.   Skin:     General: Skin is warm.   Neurological:      General: No focal deficit present.      Mental Status: He is alert and oriented to person, place, and time.      Cranial Nerves: No cranial nerve deficit.      Gait: Gait abnormal (antalgic gait).   Psychiatric:         Mood and Affect: Mood normal.         Behavior: Behavior normal.           Laboratory:  CBC:  Recent Labs   Lab Result Units 02/08/24  1615 02/21/24  1231   WBC K/uL 7.82 8.63   RBC M/uL 5.03 5.05 " "  Hemoglobin g/dL 15.4 15.6   Hematocrit % 43.7 43.8   Platelets K/uL 186 174   MCV fL 87 87   MCH pg 30.6 30.9   MCHC g/dL 35.2 35.6       CMP:  Recent Labs   Lab Result Units 02/08/24  1615 02/21/24  1231   Glucose mg/dL 302* 250*   Calcium mg/dL 10.2 9.9   Albumin g/dL 4.3 4.6   Total Protein g/dL 7.7 8.0   Sodium mmol/L 137 138   Potassium mmol/L 4.7 5.0   CO2 mmol/L 22* 22*   Chloride mmol/L 104 102   BUN mg/dL 14 20   Alkaline Phosphatase U/L 101 116   ALT U/L 47* 50*   AST U/L 31 47*   Total Bilirubin mg/dL 0.8 0.6       URINALYSIS:  No results for input(s): "COLORU", "CLARITYU", "SPECGRAV", "PHUR", "PROTEINUA", "GLUCOSEU", "BILIRUBINCON", "BLOODU", "WBCU", "RBCU", "BACTERIA", "MUCUS", "NITRITE", "LEUKOCYTESUR", "UROBILINOGEN", "HYALINECASTS" in the last 2160 hours.     LIPIDS:  Recent Labs   Lab Result Units 02/08/24  1615   TSH uIU/mL 2.410         TSH:  Recent Labs   Lab Result Units 02/08/24  1615   TSH uIU/mL 2.410       A1C:  Recent Labs   Lab Result Units 02/08/24  1615 02/21/24  1231   Hemoglobin A1C % 8.1* 8.3*  8.3*         : MRI left foot 02/02/2024  Achilles: Normal size and signal     Plantar Fascia: There is marked thickening/increased signal at the origin of the plantar fascia, consistent with plantar fasciitis. Small discrete partial-thickness tear noted measuring 0.3 cm (series 5, image 15). There is prominent subcortical marrow edema within the adjacent calcaneus.  The soft tissue edema noted in the musculature adjacent to the medial and lateral bands, suggesting muscle strain.  No fluid collections.     Joints: No joint effusions. Tibiotalar and subtalar cartilage maintained.     Ligaments: Anterior and posterior inferior tibiofibular ligaments are intact.  Anterior talofibular, calcaneofibular and posterior talofibular ligaments are intact.  Deep and superficial components of deltoid ligament are intact.  Spring ligament complex and Lisfranc ligament are intact.     Tendons: Medial ankle " flexor, dorsal ankle extensor, and peroneus tendons are intact.     Bones:  No fracture.  Bone lesion in the distal diaphysis of the tibia, partially imaged, suggestive of osteonecrosis.     Miscellaneous: The sinus tarsi and tarsal tunnel are unremarkable.     Impression:     Plantar fasciitis, as above.     Partially imaged distal tibial bone lesion, suggestive of osteonecrosis.    Assessment:         ICD-10-CM ICD-9-CM   1. Pre-op evaluation  Z01.818 V72.84   2. Plantar fasciitis of left foot  M72.2 728.71   3. Type 2 diabetes mellitus with microalbuminuria, with long-term current use of insulin  E11.29 250.40    R80.9 791.0    Z79.4 V58.67   4. Essential hypertension  I10 401.9   5. Class 1 obesity due to excess calories with serious comorbidity and body mass index (BMI) of 34.0 to 34.9 in adult  E66.09 278.00    Z68.34 V85.34           Plan:       Pre op clearance for Non-cardiac surgery  -for scheduled endoscopic fasciotomy/resection with Dr Bazzi on 03/09/2024  -for severe plantar fasciitis(left foot) that failed conservative therapy  -rCRI 0points  -labs and EKG reviewed  -patient is medically cleared for planned procedure    2. Type 2 diabetes mellitus with diabetic nephropathy  -worsening again, likely due to poor dietary habits and sedentary life style  - Hba1C slowing increasing, now @ 8.3  -states improvement in his home BS readings now since changes in dietary habits  -yet to restart mounjaro due to shortage from pharmacy  -c/w lantus 15U BID+metformin 1500mg ER daily  -c/w lisinopril to 40mg  -c/w therapeutic life style changes    3. BMI 34  -worsening again  -therapeutic life style modifications reinforced    4. sHTN  -now improved  -c/w amlodipine 10mg daily, c/w lisinopril 40mg daily  -therapeutic life style changes reinforced    5. Mixed HLD  -c/w statin and life style modifications    6. MDD  -start on cymbalta    HCM: declined all due vaccine for now, will continue to address next clinic  "visit        Patient's Medications   New Prescriptions    No medications on file   Previous Medications    AMLODIPINE (NORVASC) 10 MG TABLET    Take 1 tablet (10 mg total) by mouth once daily.    ATORVASTATIN (LIPITOR) 20 MG TABLET    Take 1 tablet (20 mg total) by mouth once daily.    BLOOD SUGAR DIAGNOSTIC STRP    1 strip by Misc.(Non-Drug; Combo Route) route once daily.    BLOOD-GLUCOSE METER KIT    Use as instructed    DULOXETINE (CYMBALTA) 60 MG CAPSULE    Take 1 capsule (60 mg total) by mouth once daily.    GABAPENTIN (NEURONTIN) 300 MG CAPSULE    Take 1 capsule (300 mg total) by mouth 3 (three) times daily.    HYDROCODONE-ACETAMINOPHEN (NORCO) 5-325 MG PER TABLET    Take 1 tablet by mouth every 8 (eight) hours as needed.    INSULIN (LANTUS SOLOSTAR U-100 INSULIN) GLARGINE 100 UNITS/ML SUBQ PEN    Inject 12 Units into the skin once daily.    LANCETS MISC    1 lancet by Misc.(Non-Drug; Combo Route) route once daily.    LIDOCAINE-PRILOCAINE (EMLA) CREAM    Apply topically 2 (two) times daily as needed.    LISINOPRIL (PRINIVIL,ZESTRIL) 40 MG TABLET    Take 1 tablet (40 mg total) by mouth once daily.    METFORMIN (GLUCOPHAGE-XR) 750 MG ER 24HR TABLET    Take 2 tablets (1,500 mg total) by mouth daily with breakfast.    MULTIVITAMIN (THERAGRAN) PER TABLET    Take 2 tablets by mouth.    PEN NEEDLE, DIABETIC 31 GAUGE X 5/16" NDLE    Use to inject insulin into the skin.    TIRZEPATIDE (MOUNJARO) 12.5 MG/0.5 ML PNIJ    Inject 12.5 mg into the skin every 7 days.   Modified Medications    No medications on file   Discontinued Medications    No medications on file         Patient was given opportunity to express concerns, ask questions and verbalizes understanding of current management plan     Dr Oumou Zuniga MD          "

## 2024-02-28 DIAGNOSIS — E11.65 TYPE 2 DIABETES MELLITUS WITH HYPERGLYCEMIA, WITHOUT LONG-TERM CURRENT USE OF INSULIN: ICD-10-CM

## 2024-02-28 DIAGNOSIS — E78.2 MIXED HYPERLIPIDEMIA: ICD-10-CM

## 2024-02-28 RX ORDER — ATORVASTATIN CALCIUM 20 MG/1
20 TABLET, FILM COATED ORAL
Qty: 90 TABLET | Refills: 3 | Status: SHIPPED | OUTPATIENT
Start: 2024-02-28 | End: 2024-04-08 | Stop reason: SDUPTHER

## 2024-03-04 ENCOUNTER — PATIENT MESSAGE (OUTPATIENT)
Dept: FAMILY MEDICINE | Facility: CLINIC | Age: 49
End: 2024-03-04
Payer: COMMERCIAL

## 2024-03-04 ENCOUNTER — OFFICE VISIT (OUTPATIENT)
Dept: PODIATRY | Facility: CLINIC | Age: 49
End: 2024-03-04
Payer: COMMERCIAL

## 2024-03-04 VITALS — HEIGHT: 67 IN | BODY MASS INDEX: 32.18 KG/M2 | WEIGHT: 205 LBS

## 2024-03-04 DIAGNOSIS — M21.869 ACQUIRED POSTERIOR EQUINUS, UNSPECIFIED LATERALITY: ICD-10-CM

## 2024-03-04 DIAGNOSIS — M72.2 PLANTAR FASCIITIS OF LEFT FOOT: Primary | ICD-10-CM

## 2024-03-04 PROCEDURE — 99214 OFFICE O/P EST MOD 30 MIN: CPT | Mod: S$GLB,,, | Performed by: PODIATRIST

## 2024-03-04 PROCEDURE — 99999 PR PBB SHADOW E&M-EST. PATIENT-LVL IV: CPT | Mod: PBBFAC,,, | Performed by: PODIATRIST

## 2024-03-04 RX ORDER — MELOXICAM 7.5 MG/1
7.5 TABLET ORAL DAILY
Qty: 30 TABLET | Refills: 1 | Status: SHIPPED | OUTPATIENT
Start: 2024-03-04 | End: 2024-04-17 | Stop reason: SDUPTHER

## 2024-03-04 RX ORDER — CYCLOBENZAPRINE HCL 10 MG
10 TABLET ORAL 3 TIMES DAILY PRN
Qty: 30 TABLET | Refills: 0 | Status: SHIPPED | OUTPATIENT
Start: 2024-03-04 | End: 2024-03-14

## 2024-03-04 RX ORDER — CEPHALEXIN 500 MG/1
500 CAPSULE ORAL EVERY 6 HOURS
Qty: 28 CAPSULE | Refills: 0 | Status: SHIPPED | OUTPATIENT
Start: 2024-03-04 | End: 2024-03-11

## 2024-03-04 RX ORDER — HYDROCODONE BITARTRATE AND ACETAMINOPHEN 5; 325 MG/1; MG/1
1 TABLET ORAL EVERY 6 HOURS PRN
Qty: 25 TABLET | Refills: 0 | Status: SHIPPED | OUTPATIENT
Start: 2024-03-04 | End: 2024-03-13 | Stop reason: SDUPTHER

## 2024-03-04 RX ORDER — ASCORBIC ACID 500 MG
500 TABLET ORAL DAILY
Qty: 30 TABLET | Refills: 1 | Status: SHIPPED | OUTPATIENT
Start: 2024-03-04 | End: 2024-05-03

## 2024-03-04 NOTE — PATIENT INSTRUCTIONS
Report to the Same Day Surgery unit on 3/8/24     1. DO NOT EAT OR DRINK ANYTHING AFTER THE MIDNIGHT BEFORE SURGERY     2. Do not drink any alcohol or take any mind-altering drugs 24 hours before surgery.     3. STOP TAKING: Coumadin, Plavix, Pletal, Aggrenox, Aspirin, Aleve, Naproxen, Advil, Motrin, Ibuprofen, [place], Celebrex, Allopurinol, and any medications containing aspirin or antiinflammatories N/A unless instructed otherwise by your Primary Care Provider     4. You may take Tylenol and your other medications up until midnight before your surgery.     5. Take the following medications the morning of your procedure with a sip of water (less than an ounce): Effexor,Depakote     6. Leave all valuables at home.     7. Bathe like you normally do the morning of or the night before surgery, preferably with an anti-bacterial soap     8. Only 2 visitors will be allowed on the unit with patients. Children under 12 years old are not allowed to visit on unit.     9. YOU WILL NOT BE ALLOWED TO DRIVE HOME AFTER YOUR PROCEDURE!!! A family member or friend must be in the unit with you to receive discharge instructions and to sign the papers for you to be discharged home. Also, you must make arrangements before your surgery day to have a ride home in a private vehicle (no buses or public transportation allowed). YOU WILL NOT BE ALLOWED TO WALK HOME, NOR WILL STAFF BE RESPONSIBLE FOR MAKING THESE ARRANGEMENTS FOR YOU!!! FAILURE TO MAKE THE PROPER ARRANGEMENTS FOR YOURSELF MAY RESULT IN THE CANCELLATION OF YOUR PROCEDURE!    10. Obtain a History & Physical for surgical clearance for your surgery from your Primary Care Provider within 30 days of your date of surgery. Have their office fax us a copy of the H&P. Bring a paper copy of the H&P with you to surgery.

## 2024-03-04 NOTE — PROGRESS NOTES
[facility] - PODIATRY  [address]  [address]  [place] [address]  Dept: [phone]  Dept Fax: [phone]    Enrico Bazzi Jr., DPM     Assessment:   MDM     Amount and/or Complexity of Data Reviewed  Clinical lab tests: ordered and reviewed  Tests in the radiology section of CPT®: ordered and reviewed  Independent visualization of images, tracings, or specimens: yes      MDM  Reviewed: previous chart, nursing note and vitals  Reviewed previous: labs, x-ray and MRI  Interpretation: labs, x-ray and MRI      1. Plantar fasciitis of left foot  HYDROcodone-acetaminophen (NORCO) 5-325 mg per tablet    meloxicam (MOBIC) 7.5 MG tablet    cyclobenzaprine (FLEXERIL) 10 MG tablet    cephALEXin (KEFLEX) 500 MG capsule    ascorbic acid, vitamin C, (VITAMIN C) 500 MG tablet      2. Acquired posterior equinus, unspecified laterality - Left Foot            Plan:     Mary Villalobos was seen today for foot pain.    Diagnoses and all orders for this visit:    Plantar fasciitis of left foot  -     HYDROcodone-acetaminophen (NORCO) 5-325 mg per tablet; Take 1 tablet by mouth every 6 (six) hours as needed for Pain.  -     meloxicam (MOBIC) 7.5 MG tablet; Take 1 tablet (7.5 mg total) by mouth once daily.  -     cyclobenzaprine (FLEXERIL) 10 MG tablet; Take 1 tablet (10 mg total) by mouth 3 (three) times daily as needed for Muscle spasms.  -     cephALEXin (KEFLEX) 500 MG capsule; Take 1 capsule (500 mg total) by mouth every 6 (six) hours. for 7 days  -     ascorbic acid, vitamin C, (VITAMIN C) 500 MG tablet; Take 1 tablet (500 mg total) by mouth once daily.    Acquired posterior equinus, unspecified laterality - Left Foot        -pt seen, evaluated, and managed  -dx discussed in detail. All questions/concerns addressed  -all tx options discussed. All alternatives, risks, benefits of all txs discussed  -the patient was educated about the diagnosis  -PT has failed conservative care  options possible including but not limited to shoe wear and/or padding, bracing/strapping, at home ROM, formal PT, medical therapy, injection therapy  -XR/imaging reviewed by me: agree with read  -labs reviewed by me: ok for vgel  -cont icing/stretching regimen  -MRI reviewed  -given failure of conservative measures, we discussed surgical intervention  -pt would benefit from operative intervention: we discussed the following procedures: L EPF+OGR+BMAC  -we discussed postop course  -would be out pt, elective surgery  -would be GA + block, supine position  -PCP has cleared  -Decision regarding elective surgery was made and the patient opts for surgical intervention: yes  -We discussed the patient risk factors and social determinants of health and their impacts including but not limited to: stress and/or mental health strain, social connections strain, family support strain, financial resource strain , severity of deformity / severity of injury, major medical co-morbidities, alcohol or tobacco abuse, housing resource strain, transportation strain, food insecurity, lack of physical activity  -Long discussion with patient regarding the procedure in detail. Patient understands all risks, possible benefits, potential complications, and alternatives, including, but not limited to those listed on the consent form. Pt understands consequences of failing to proceed with recommended procedure(s). All questions were answered. No guarantees given or implied as to outcome. Patient is aware of the procedure specific complications including but not limited to infection, wound or bone healing problems, damage to surrounding structures, need for additional surgery, loss of toes/foot/leg/life, scar formation, nerve pain, gait issues. They agree and exhibit appropriate understanding of all discussion points. Patient understands post-operative course and agrees to be compliant with care. Teach back method used as part of informed consent  process. Informed verbal and written consent was obtained. Consent forms read, signed, witnessed.  - DOS: 3/8/24        -rxs dispensed: none  -referrals: none  -WB: wbat      Follow up in about 1 week (around 3/11/2024).    Subjective:      Patient ID: Wilfredo Thomas is a 48 y.o. male.    Chief Complaint:   Chief Complaint   Patient presents with    Foot Pain     Left foot       CC - foot pain: patient presents to the podiatry clinic  with complaint of  left foot pain. Onset of the symptoms was several months ago. Precipitating event: unk. Current symptoms include: ability to bear weight, but with some pain, ronak the heel, swelling and worsening symptoms after a period of inactivity. Aggravating factors: walking and certain shoegear. Symptoms have gradually worsened. Patient has had no prior foot problems. Evaluation to date: plain films: normal. Treatment to date: avoidance of offending activity. Patients rates pain 9/10 on pain scale.      3/4/24:  Hx as above. Pt failed conservative measures for PF so MRI was ordered and obtained. Obtained PCP clearance and requesting surgery.    Foot Pain        Last Podiatry Enc: Visit date not found  Last Enc w/ Me: Visit date not found    Outside reports reviewed: historical medical records.  Family hx: as below  Past Medical History:   Diagnosis Date    Diabetes     Gout     Hyperlipidemia     Hypertension      Past Surgical History:   Procedure Laterality Date    INCISION AND DRAINAGE Left 08/06/2022    Procedure: Incision and Drainage - LEFT THIGH.;  Surgeon: Rene Neff MD;  Location: [address];  Service: Orthopedics;  Laterality: Left;    IRRIGATION AND DEBRIDEMENT OF LOWER EXTREMITY Left 07/29/2022    Procedure: IRRIGATION AND DEBRIDEMENT, LOWER EXTREMITY;  Surgeon: Rob Graff MD;  Location: [facility] OR [address] [place] [address];  Service: Orthopedics;  Laterality: Left;    KNEE ARTHROSCOPY W/ MENISCAL REPAIR Left     Jan 2023    WRIST SURGERY Left      Family History    Problem Relation Age of Onset    Diabetes Mother     No Known Problems Father      Current Outpatient Medications   Medication Sig Dispense Refill    amLODIPine (NORVASC) 10 MG tablet Take 1 tablet (10 mg total) by mouth once daily. 90 tablet 3    ascorbic acid, vitamin C, (VITAMIN C) 500 MG tablet Take 1 tablet (500 mg total) by mouth once daily. 30 tablet 1    atorvastatin (LIPITOR) 20 MG tablet TAKE 1 TABLET(20 MG) BY MOUTH EVERY DAY 90 tablet 3    blood sugar diagnostic Strp 1 strip by Misc.(Non-Drug; Combo Route) route once daily. 100 each 11    blood-glucose meter kit Use as instructed 1 each 0    cephALEXin (KEFLEX) 500 MG capsule Take 1 capsule (500 mg total) by mouth every 6 (six) hours. for 7 days 28 capsule 0    cyclobenzaprine (FLEXERIL) 10 MG tablet Take 1 tablet (10 mg total) by mouth 3 (three) times daily as needed for Muscle spasms. 30 tablet 0    DULoxetine (CYMBALTA) 60 MG capsule Take 1 capsule (60 mg total) by mouth once daily. 90 capsule 3    gabapentin (NEURONTIN) 300 MG capsule Take 1 capsule (300 mg total) by mouth 3 (three) times daily. 90 capsule 3    HYDROcodone-acetaminophen (NORCO) 5-325 mg per tablet Take 1 tablet by mouth every 6 (six) hours as needed for Pain. 25 tablet 0    insulin (LANTUS SOLOSTAR U-100 INSULIN) glargine 100 units/mL SubQ pen Inject 12 Units into the skin once daily. 15 mL 3    lancets Misc 1 lancet by Misc.(Non-Drug; Combo Route) route once daily. 100 each 11    LIDOcaine-prilocaine (EMLA) cream Apply topically 2 (two) times daily as needed. 60 g 0    lisinopriL (PRINIVIL,ZESTRIL) 40 MG tablet Take 1 tablet (40 mg total) by mouth once daily. 90 tablet 3    meloxicam (MOBIC) 7.5 MG tablet Take 1 tablet (7.5 mg total) by mouth once daily. 30 tablet 1    metFORMIN (GLUCOPHAGE-XR) 750 MG ER 24hr tablet Take 2 tablets (1,500 mg total) by mouth daily with breakfast. 180 tablet 3    multivitamin (THERAGRAN) per tablet Take 1 tablet by mouth.      pen needle, diabetic 31  "gauge x 5/16" Ndle Use to inject insulin into the skin. 100 each 11    tirzepatide (MOUNJARO) 12.5 mg/0.5 mL PnIj Inject 12.5 mg into the skin every 7 days. 12 Pen 3     No current facility-administered medications for this visit.     Review of patient's allergies indicates:   Allergen Reactions    Adhesive tape-silicones      It was a White tape ( uncertain if related to silicone) 2022     Social History     Socioeconomic History    Marital status:    Tobacco Use    Smoking status: Former     Current packs/day: 0.00     Types: Cigarettes     Quit date: 2020     Years since quittin.1    Smokeless tobacco: Never   Substance and Sexual Activity    Alcohol use: Yes     Alcohol/week: 12.0 standard drinks of alcohol     Types: 12 Cans of beer per week     Comment: socially    Drug use: Never    Sexual activity: Yes     Partners: Female   Social History Narrative    Lives with wife and 2 kids in college and one older with 1 grand child. Supervisor at [employer] Smoker but quit 2020     Social Determinants of Health     Financial Resource Strain: Patient Declined (2024)    Overall Financial Resource Strain (CARDIA)     Difficulty of Paying Living Expenses: Patient declined   Food Insecurity: Patient Declined (2024)    Hunger Vital Sign     Worried About Running Out of Food in the Last Year: Patient declined     Ran Out of Food in the Last Year: Patient declined   Transportation Needs: Patient Declined (2024)    PRAPARE - Transportation     Lack of Transportation (Medical): Patient declined     Lack of Transportation (Non-Medical): Patient declined   Physical Activity: Unknown (2024)    Exercise Vital Sign     Days of Exercise per Week: Patient declined   Stress: Stress Concern Present (2024)    [place] [employer] - Occupational Stress Questionnaire     Feeling of Stress : To some extent   Social Connections: Unknown (2024)    Social Connection and Isolation " "Panel [NHANES]     Frequency of Communication with Friends and Family: Patient declined     Frequency of Social Gatherings with Friends and Family: Patient declined     Active Member of Clubs or Organizations: Patient declined     Attends Club or Organization Meetings: Patient declined     Marital Status: Patient declined   Housing Stability: Patient Declined (2/8/2024)    Housing Stability Vital Sign     Unable to Pay for Housing in the Last Year: Patient declined     Unstable Housing in the Last Year: Patient declined       ROS    REVIEW OF SYSTEMS: Negative as documented below as well as positive findings in bold.       Constitutional  Respiratory  Gastrointestinal  Skin   - Fever - Cough - Heartburn - Rash   - Chills - Spit blood - Nausea - Itching   - Weight Loss - Shortness of breath - Vomiting - Nail pain   - Malaise/Fatigue - Wheezing - Abdominal Pain  Wound/Ulcer   - Weight Gain   - Blood in Stool  Poor wound healing       - Diarrhea          Cardiovascular  Genitourinary  Neurological  HEENT   - Chest Pain - Dysuria - Burning Sensation of feet - Headache   - Palpitations - Hematuria - Tingling / Paresthesia - Congestion   - Pain at night in legs - Flank Pain - Dizziness - Sore Throat   - Cramping   - Tremor - Blurred Vision   - Leg Swelling   - Sensory Change - Double Vision   - Dizzy when standing   - Speech Change - Eye Redness       - Focal Weakness - Dry Eyes       - Loss of Consciousness          Endocrine  Musculoskeletal  Psychiatric   - Cold intolerance - Muscle Pain - Depression   - Heat intolerance - Neck Pain - Insomnia   - Anemia - Joint Pain - Memory Loss   -  Easy bruising, bleeding - Heel pain - Anxiety      Toe Pain        Leg/Ankle/Foot Pain         Objective:     Ht 5' 7" (1.702 m)   Wt 93 kg (205 lb 0.4 oz)   BMI 32.11 kg/m²   Vitals:    03/04/24 0849   Weight: 93 kg (205 lb 0.4 oz)   Height: 5' 7" (1.702 m)   PainSc:   5   PainLoc: Foot           Physical Exam    General Appearance: "   Patient appears well developed, well nourished  Patient appears stated age    Psychiatric:   Patient is oriented to time, place, and person.  Patient has appropriate mood and affect    Neck:  Trachea Midline  No visible masses    Respiratory/Ears:  No distress or labored breathing.  Able to differentiate between normal talking voice and whisper.  Able to follow commands    Eyes:  Visual Acuity intact  Lids and conjunctivae normal. No discoloration noted.    Foot Exam  Physical Exam  Ortho Exam  Ortho/SPM Exam  Foot/Ankle Musculoskeletal Exam    L LE exam con't:  V:  DP 2/4, PT 2/4   CRT< 3s to all digits tested   Tibial and popliteal lymph nodes are w/o abnormality   Edema: absent, varicosities: absent    N:  Patient displays normal ankle reflexes   SILT in SP/DP/T/Ron/Saph distributions    Ortho: +Motor EHL/FHL/TA/GA   equinus deformity present  There is moderate pain with palpation of L medial calcaneal tubercle  Compartments soft/compressible. No pain on passive stretch of big toe. No calf  Pain.    Derm:  skin intact, skin warm and dry, skin without ulcers or lesions, skin without induration, nails normal, texture turgor well hydrated      Imaging / Labs:    MRI Foot (Hindfoot) Left Without Contrast    Result Date: 2/2/2024  EXAMINATION: MRI FOOT (HINDFOOT) LEFT WITHOUT CONTRAST CLINICAL HISTORY: Heel pain, chronic;  Plantar fascial fibromatosis TECHNIQUE: Routine multiplanar, multi sequence MRI of the left ankle performed without IV contrast. COMPARISON: None FINDINGS: Achilles: Normal size and signal Plantar Fascia: There is marked thickening/increased signal at the origin of the plantar fascia, consistent with plantar fasciitis. Small discrete partial-thickness tear noted measuring 0.3 cm (series 5, image 15). There is prominent subcortical marrow edema within the adjacent calcaneus.  The soft tissue edema noted in the musculature adjacent to the medial and lateral bands, suggesting muscle strain.  No fluid  collections. Joints: No joint effusions. Tibiotalar and subtalar cartilage maintained. Ligaments: Anterior and posterior inferior tibiofibular ligaments are intact.  Anterior talofibular, calcaneofibular and posterior talofibular ligaments are intact.  Deep and superficial components of deltoid ligament are intact.  Spring ligament complex and Lisfranc ligament are intact. Tendons: Medial ankle flexor, dorsal ankle extensor, and peroneus tendons are intact. Bones:  No fracture.  Bone lesion in the distal diaphysis of the tibia, partially imaged, suggestive of osteonecrosis. Miscellaneous: The sinus tarsi and tarsal tunnel are unremarkable.     Plantar fasciitis, as above. Partially imaged distal tibial bone lesion, suggestive of osteonecrosis. Electronically signed by: Severino Baltazar MD Date:    02/02/2024 Time:    11:52         Note: This was dictated using a computer transcription program. Although proofread, it may contain computer transcription errors and phonetic errors. Other human proofreading errors may also exist. Corrections may be performed at a later time. Please contact us for any clarification if needed.    Enrico Bazzi DPM  [facility]

## 2024-03-05 DIAGNOSIS — R80.9 TYPE 2 DIABETES MELLITUS WITH MICROALBUMINURIA, WITH LONG-TERM CURRENT USE OF INSULIN: ICD-10-CM

## 2024-03-05 DIAGNOSIS — E11.29 TYPE 2 DIABETES MELLITUS WITH MICROALBUMINURIA, WITH LONG-TERM CURRENT USE OF INSULIN: ICD-10-CM

## 2024-03-05 DIAGNOSIS — Z79.4 TYPE 2 DIABETES MELLITUS WITH MICROALBUMINURIA, WITH LONG-TERM CURRENT USE OF INSULIN: ICD-10-CM

## 2024-03-05 RX ORDER — INSULIN GLARGINE 100 [IU]/ML
16 INJECTION, SOLUTION SUBCUTANEOUS EVERY MORNING
Qty: 25 ML | Refills: 3 | Status: SHIPPED | OUTPATIENT
Start: 2024-03-05 | End: 2025-03-05

## 2024-03-08 PROBLEM — M21.862 ACQUIRED POSTERIOR EQUINUS OF LEFT LOWER EXTREMITY: Status: ACTIVE | Noted: 2024-03-08

## 2024-03-08 PROBLEM — M72.2 PLANTAR FASCIITIS OF LEFT FOOT: Status: ACTIVE | Noted: 2024-03-08

## 2024-03-13 ENCOUNTER — OFFICE VISIT (OUTPATIENT)
Dept: PODIATRY | Facility: CLINIC | Age: 49
End: 2024-03-13
Payer: COMMERCIAL

## 2024-03-13 DIAGNOSIS — M72.2 PLANTAR FASCIITIS OF LEFT FOOT: ICD-10-CM

## 2024-03-13 DIAGNOSIS — Z47.89 AFTERCARE FOLLOWING SURGERY OF THE MUSCULOSKELETAL SYSTEM: Primary | ICD-10-CM

## 2024-03-13 PROCEDURE — 99999 PR PBB SHADOW E&M-EST. PATIENT-LVL III: CPT | Mod: PBBFAC,,, | Performed by: PODIATRIST

## 2024-03-13 PROCEDURE — 99024 POSTOP FOLLOW-UP VISIT: CPT | Mod: S$GLB,,, | Performed by: PODIATRIST

## 2024-03-13 RX ORDER — HYDROCODONE BITARTRATE AND ACETAMINOPHEN 5; 325 MG/1; MG/1
1 TABLET ORAL EVERY 6 HOURS PRN
Qty: 25 TABLET | Refills: 0 | Status: SHIPPED | OUTPATIENT
Start: 2024-03-13 | End: 2024-03-27 | Stop reason: ALTCHOICE

## 2024-03-13 NOTE — PROGRESS NOTES
[facility] - PODIATRY  [address]  [address]  [place] [address]  Dept: [phone]  Dept Fax: [phone]    JU Padilla Jr., Jr.     Post-Operative Visit  Assessment:     1. Aftercare following surgery of the musculoskeletal system        2. Plantar fasciitis of left foot            Plan:   MDM    Coding    - pt seen evaluated and managed  - skin not ready for suture removal  - dressings re-applied  - wb: wbat in [facility]  - rx dispensed: norco renewed  - referrals: none    Follow up in about 2 weeks (around 3/27/2024).      Subjective:      Patient ID: Wilfredo Thomas is a 48 y.o. male.    Chief Complaint: No chief complaint on file.    There were no vitals filed for this visit.    DOS: 3/8/24  Procedure: L EPF + OGR    Wilfredo Thomas returns to the clinic today for the 1st postop visit. Pt is s/p above procedure. Wilfredo Thomas rates pain at a 7/10 on visual analog scale. Denies n/v/f/c.    HPI      Outside reports reviewed: historical medical records.    Past Medical History:   Diagnosis Date    Diabetes     Gout     Hyperlipidemia     Hypertension      Past Surgical History:   Procedure Laterality Date    BONE MARROW ASPIRATION Left 3/8/2024    Procedure: ASPIRATION, BONE MARROW;  Surgeon: Enrico Bazzi Jr., DPM;  Location: [facility] OR;  Service: Podiatry;  Laterality: Left;    ENDOSCOPIC PLANTAR FASCIOTOMY Left 3/8/2024    Procedure: FASCIOTOMY, PLANTAR, ENDOSCOPIC;  Surgeon: Enrico Bazzi Jr., DPM;  Location: [facility] OR;  Service: Podiatry;  Laterality: Left;  pop saph    INCISION AND DRAINAGE Left 08/06/2022    Procedure: Incision and Drainage - LEFT THIGH.;  Surgeon: Rene Neff MD;  Location: [facility] OR [address] [place] [address];  Service: Orthopedics;  Laterality: Left;    INJECTION, TENDON SHEATH OR LIGAMENT, 1 TENDON SHEATH OR LIGAMENT Left 3/8/2024    Procedure: INJECTION,TENDON SHEATH OR LIGAMENT,1 TENDON SHEATH OR LIGAMENT;   Surgeon: Enrico Bazzi Jr., DPM;  Location: [facility] OR;  Service: Podiatry;  Laterality: Left;    IRRIGATION AND DEBRIDEMENT OF LOWER EXTREMITY Left 07/29/2022    Procedure: IRRIGATION AND DEBRIDEMENT, LOWER EXTREMITY;  Surgeon: Rob Graff MD;  [facility];  Service: Orthopedics;  Laterality: Left;    KNEE ARTHROSCOPY W/ MENISCAL REPAIR Left     Jan 2023    LENGTHENING OR SHORTENING, TENDON, LOWER EXTREMITY, 1 TENDON Left 3/8/2024    Procedure: LENGTHENING OR SHORTENING, TENDON, LOWER EXTREMITY, 1 TENDON;  Surgeon: Enrico Bazzi Jr., DPM;  Location: [facility] OR;  Service: Podiatry;  Laterality: Left;    RESECTION OF GASTROCNEMIUS MUSCLE Left 3/8/2024    Procedure: RESECTION, MUSCLE, GASTROCNEMIUS;  Surgeon: Enrico Bazzi Jr., DPM;  Location: [facility] OR;  Service: Podiatry;  Laterality: Left;    WRIST SURGERY Left      Family History   Problem Relation Age of Onset    Diabetes Mother     No Known Problems Father      Current Outpatient Medications   Medication Sig Dispense Refill    amLODIPine (NORVASC) 10 MG tablet Take 1 tablet (10 mg total) by mouth once daily. 90 tablet 3    ascorbic acid, vitamin C, (VITAMIN C) 500 MG tablet Take 1 tablet (500 mg total) by mouth once daily. 30 tablet 1    atorvastatin (LIPITOR) 20 MG tablet TAKE 1 TABLET(20 MG) BY MOUTH EVERY DAY 90 tablet 3    blood sugar diagnostic Strp 1 strip by Misc.(Non-Drug; Combo Route) route once daily. 100 each 11    blood-glucose meter kit Use as instructed 1 each 0    cyclobenzaprine (FLEXERIL) 10 MG tablet Take 1 tablet (10 mg total) by mouth 3 (three) times daily as needed for Muscle spasms. 30 tablet 0    cyclobenzaprine (FLEXERIL) 10 MG tablet Take 1 tablet (10 mg total) by mouth 3 (three) times daily as needed. 30 tablet 0    DULoxetine (CYMBALTA) 60 MG capsule Take 1 capsule (60 mg total) by mouth once daily. 90 capsule 3    gabapentin (NEURONTIN) 300 MG capsule Take 1 capsule (300 mg total) by mouth 3 (three) times  "daily. 90 capsule 3    HYDROcodone-acetaminophen (NORCO) 5-325 mg per tablet Take 1 tablet by mouth every 6 (six) hours as needed for Pain. 25 tablet 0    insulin (LANTUS SOLOSTAR U-100 INSULIN) glargine 100 units/mL SubQ pen Inject 16 Units into the skin every morning then take 20 Units at bedtime 25 mL 3    lancets Misc 1 lancet by Misc.(Non-Drug; Combo Route) route once daily. 100 each 11    LIDOcaine-prilocaine (EMLA) cream Apply topically 2 (two) times daily as needed. 60 g 0    lisinopriL (PRINIVIL,ZESTRIL) 40 MG tablet Take 1 tablet (40 mg total) by mouth once daily. 90 tablet 3    meloxicam (MOBIC) 7.5 MG tablet Take 1 tablet (7.5 mg total) by mouth once daily. 30 tablet 1    metFORMIN (GLUCOPHAGE-XR) 750 MG ER 24hr tablet Take 2 tablets (1,500 mg total) by mouth daily with breakfast. 180 tablet 3    multivitamin (THERAGRAN) per tablet Take 1 tablet by mouth.      pen needle, diabetic 31 gauge x 5/16" Ndle Use to inject insulin into the skin. 100 each 11    tirzepatide 7.5 mg/0.5 mL PnIj Inject 7.5 mg into the skin every 7 days. 12 Pen 3     No current facility-administered medications for this visit.     Review of patient's allergies indicates:   Allergen Reactions    Adhesive tape-silicones      It was a White tape ( uncertain if related to silicone) 2022     Social History     Socioeconomic History    Marital status:    Tobacco Use    Smoking status: Former     Current packs/day: 0.00     Types: Cigarettes     Quit date: 2020     Years since quittin.1    Smokeless tobacco: Never   Substance and Sexual Activity    Alcohol use: Yes     Alcohol/week: 12.0 standard drinks of alcohol     Types: 12 Cans of beer per week     Comment: socially    Drug use: Never    Sexual activity: Yes     Partners: Female   Social History Narrative    Lives with wife and 2 kids in college and one older with 1 grand child. 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     Social Determinants of Health "     Financial Resource Strain: Patient Declined (2/8/2024)    Overall Financial Resource Strain (CARDIA)     Difficulty of Paying Living Expenses: Patient declined   Food Insecurity: Patient Declined (2/8/2024)    Hunger Vital Sign     Worried About Running Out of Food in the Last Year: Patient declined     Ran Out of Food in the Last Year: Patient declined   Transportation Needs: Patient Declined (2/8/2024)    PRAPARE - Transportation     Lack of Transportation (Medical): Patient declined     Lack of Transportation (Non-Medical): Patient declined   Physical Activity: Unknown (2/8/2024)    Exercise Vital Sign     Days of Exercise per Week: Patient declined   Stress: Stress Concern Present (2/8/2024)    [place] [employer] - Occupational Stress Questionnaire     Feeling of Stress : To some extent   Social Connections: Unknown (2/8/2024)    Social Connection and Isolation Panel [NHANES]     Frequency of Communication with Friends and Family: Patient declined     Frequency of Social Gatherings with Friends and Family: Patient declined     Active Member of Clubs or Organizations: Patient declined     Attends Club or Organization Meetings: Patient declined     Marital Status: Patient declined   Housing Stability: Patient Declined (2/8/2024)    Housing Stability Vital Sign     Unable to Pay for Housing in the Last Year: Patient declined     Unstable Housing in the Last Year: Patient declined       ROS  REVIEW OF SYSTEMS: Negative as documented below as well as positive findings in bold.       Constitutional  Respiratory  Gastrointestinal  Skin   - Fever - Cough - Heartburn - Rash   - Chills - Spit blood - Nausea - Itching   - Weight Loss - Shortness of breath - Vomiting - Nail pain   - Malaise/Fatigue - Wheezing - Abdominal Pain  Wound/Ulcer   - Weight Gain   - Blood in Stool         - Diarrhea          Cardiovascular  Genitourinary  Neurological  HEENT   - Chest Pain - Dysuria - Dizziness - Headache    - Palpitations - Hematuria - Tingling - Congestion   - Pain at night in legs - Flank Pain - Tremor - Sore Throat   - Cramping   - Sensory Change - Blurred Vision   - Leg Swelling   - Speech Change - Double Vision   - Dizzy when standing   - Focal Weakness - Eye Redness       - Seizures - Dry Eyes       - Loss of Consciousness          Endocrine  Musculoskeletal  Psychiatric   - Cold intolerance - Muscle Pain - Depression   - Heat intolerance - Neck Pain - Insomnia   - Anemia - Joint Pain - Memory Loss   -  Easy bruising, bleeding - Heel pain - Anxiety      Toe Pain        Leg/Ankle/Foot Pain         Objective:     There were no vitals taken for this visit.    Physical Exam    Neck:  Trachea Midline  No visible masses    Respiratory/Ears:  No distress or labored breathing.  Able to differentiate between normal talking voice and whisper.  Able to follow commands    Eyes:  Visual Acuity intact  No discoloration noted.    Physical Exam  Ortho Exam  Foot Exam    L LE exam con't:  V: DP 2/4, PT 2/4, CRT< 3s to all digits tested.    N: SILT in SP/DP/T/Ron/Saph distributions    Ortho: +Motor EHL/FHL/TA/GA   Surgical site pain present as expected   Surgical site swelling present and moderate   Compartments soft/compressible. No pain on passive stretch of big toe. No calf  Pain.     Derm: Skin intact. Sutures/staples: intact. Signs of infection: none.     Imaging / Labs:    Lab Results   Component Value Date    WBC 8.63 02/21/2024    WBC 7.82 02/08/2024    WBC 5.50 11/09/2022    WBC 8.52 08/29/2022    WBC 5.76 08/24/2022    PROCAL 0.47 (H) 08/17/2022    PROCAL 0.57 (H) 07/29/2022    PROCAL 0.29 (H) 07/26/2022    SEDRATE 68 (H) 08/23/2022    SEDRATE 75 (H) 08/21/2022    SEDRATE 58 (H) 08/19/2022    SEDRATE 50 (H) 08/17/2022    SEDRATE 65 (H) 08/15/2022    CRP 7.0 08/29/2022    CRP 30.7 (H) 08/23/2022    .4 (H) 08/21/2022    .2 (H) 08/19/2022    CRP 24.9 (H) 08/17/2022    PREALBUMIN 32 02/21/2024    PREALBUMIN 7  (L) 07/29/2022       Lab Results   Component Value Date    PREALBUMIN 32 02/21/2024

## 2024-03-27 ENCOUNTER — OFFICE VISIT (OUTPATIENT)
Dept: PODIATRY | Facility: CLINIC | Age: 49
End: 2024-03-27
Payer: COMMERCIAL

## 2024-03-27 VITALS — HEIGHT: 67 IN | BODY MASS INDEX: 34.43 KG/M2 | WEIGHT: 219.38 LBS

## 2024-03-27 DIAGNOSIS — Z47.89 AFTERCARE FOLLOWING SURGERY OF THE MUSCULOSKELETAL SYSTEM: Primary | ICD-10-CM

## 2024-03-27 PROCEDURE — 99999 PR PBB SHADOW E&M-EST. PATIENT-LVL IV: CPT | Mod: PBBFAC,,, | Performed by: PODIATRIST

## 2024-03-27 PROCEDURE — 99024 POSTOP FOLLOW-UP VISIT: CPT | Mod: S$GLB,,, | Performed by: PODIATRIST

## 2024-03-27 RX ORDER — TRAMADOL HYDROCHLORIDE AND ACETAMINOPHEN 37.5; 325 MG/1; MG/1
1 TABLET, FILM COATED ORAL EVERY 6 HOURS PRN
Qty: 30 TABLET | Refills: 0 | Status: SHIPPED | OUTPATIENT
Start: 2024-03-27 | End: 2024-04-17 | Stop reason: ALTCHOICE

## 2024-03-27 NOTE — PROGRESS NOTES
[facility] - PODIATRY  [address]  [address]  [place] [address]  Dept: [phone]  Dept Fax: [phone]    JU Padilla Jr., Jr.     Post-Operative Visit  Assessment:     1. Aftercare following surgery of the musculoskeletal system  tramadol-acetaminophen 37.5-325 mg (ULTRACET) 37.5-325 mg Tab    Ambulatory referral/consult to Physical/Occupational Therapy          Plan:   MDM    Coding    - pt seen evaluated and managed  - skin healed. Sutures removed. Steris applied  - dressings re-applied - keep c/d/I x 1 wk then ok to remove at home and shower like normal  - wb: wbat  - rx dispensed: narcotic stepdown to ultracet  - referrals: PT    Follow up in about 3 weeks (around 4/17/2024).      Subjective:      Patient ID: Wilfredo Thomas is a 48 y.o. male.    Chief Complaint:   Chief Complaint   Patient presents with    Post-op Evaluation     Left foot     There were no vitals filed for this visit.    DOS: 3/8/24  Procedure: L EPF + OGR    Wilfredo Thomas returns to the clinic today for the 2nd postop visit. Pt is s/p above procedure. Wilfredo Thomas rates pain at a 5/10 on visual analog scale. Denies n/v/f/c.    HPI      Outside reports reviewed: historical medical records.    Past Medical History:   Diagnosis Date    Diabetes     Gout     Hyperlipidemia     Hypertension      Past Surgical History:   Procedure Laterality Date    BONE MARROW ASPIRATION Left 3/8/2024    Procedure: ASPIRATION, BONE MARROW;  Surgeon: Enrico Bazzi Jr., DPM;  Location: [facility] OR;  Service: Podiatry;  Laterality: Left;    ENDOSCOPIC PLANTAR FASCIOTOMY Left 3/8/2024    Procedure: FASCIOTOMY, PLANTAR, ENDOSCOPIC;  Surgeon: Enrico Bazzi Jr., DPM;  Location: [facility] OR;  Service: Podiatry;  Laterality: Left;  pop saph    INCISION AND DRAINAGE Left 08/06/2022    Procedure: Incision and Drainage - LEFT THIGH.;  Surgeon: Rene Neff MD;  Location: [facility]  2ND FLR;  Service: Orthopedics;  Laterality: Left;    INJECTION, TENDON SHEATH OR LIGAMENT, 1 TENDON SHEATH OR LIGAMENT Left 3/8/2024    Procedure: INJECTION,TENDON SHEATH OR LIGAMENT,1 TENDON SHEATH OR LIGAMENT;  Surgeon: Enrico Bazzi Jr., DPM;  Location: [facility] OR;  Service: Podiatry;  Laterality: Left;    IRRIGATION AND DEBRIDEMENT OF LOWER EXTREMITY Left 07/29/2022    Procedure: IRRIGATION AND DEBRIDEMENT, LOWER EXTREMITY;  Surgeon: Rob Graff MD;  Location: [facility] OR 2ND FLR;  Service: Orthopedics;  Laterality: Left;    KNEE ARTHROSCOPY W/ MENISCAL REPAIR Left     Jan 2023    LENGTHENING OR SHORTENING, TENDON, LOWER EXTREMITY, 1 TENDON Left 3/8/2024    Procedure: LENGTHENING OR SHORTENING, TENDON, LOWER EXTREMITY, 1 TENDON;  Surgeon: Enrico Bazzi Jr., DPM;  Location: [facility] OR;  Service: Podiatry;  Laterality: Left;    RESECTION OF GASTROCNEMIUS MUSCLE Left 3/8/2024    Procedure: RESECTION, MUSCLE, GASTROCNEMIUS;  Surgeon: Enrico Bazzi Jr., DPM;  Location: [facility] OR;  Service: Podiatry;  Laterality: Left;    WRIST SURGERY Left      Family History   Problem Relation Age of Onset    Diabetes Mother     No Known Problems Father      Current Outpatient Medications   Medication Sig Dispense Refill    amLODIPine (NORVASC) 10 MG tablet Take 1 tablet (10 mg total) by mouth once daily. 90 tablet 3    ascorbic acid, vitamin C, (VITAMIN C) 500 MG tablet Take 1 tablet (500 mg total) by mouth once daily. 30 tablet 1    atorvastatin (LIPITOR) 20 MG tablet TAKE 1 TABLET(20 MG) BY MOUTH EVERY DAY 90 tablet 3    blood sugar diagnostic Strp 1 strip by Misc.(Non-Drug; Combo Route) route once daily. 100 each 11    blood-glucose meter kit Use as instructed 1 each 0    cyclobenzaprine (FLEXERIL) 10 MG tablet Take 1 tablet (10 mg total) by mouth 3 (three) times daily as needed. 30 tablet 0    DULoxetine (CYMBALTA) 60 MG capsule Take 1 capsule (60 mg total) by mouth once daily. 90 capsule 3    gabapentin (NEURONTIN) 300  "MG capsule Take 1 capsule (300 mg total) by mouth 3 (three) times daily. 90 capsule 3    insulin (LANTUS SOLOSTAR U-100 INSULIN) glargine 100 units/mL SubQ pen Inject 16 Units into the skin every morning then take 20 Units at bedtime 25 mL 3    lancets Misc 1 lancet by Misc.(Non-Drug; Combo Route) route once daily. 100 each 11    LIDOcaine-prilocaine (EMLA) cream Apply topically 2 (two) times daily as needed. 60 g 0    lisinopriL (PRINIVIL,ZESTRIL) 40 MG tablet Take 1 tablet (40 mg total) by mouth once daily. 90 tablet 3    meloxicam (MOBIC) 7.5 MG tablet Take 1 tablet (7.5 mg total) by mouth once daily. 30 tablet 1    metFORMIN (GLUCOPHAGE-XR) 750 MG ER 24hr tablet Take 2 tablets (1,500 mg total) by mouth daily with breakfast. 180 tablet 3    multivitamin (THERAGRAN) per tablet Take 1 tablet by mouth.      pen needle, diabetic 31 gauge x 5/16" Ndle Use to inject insulin into the skin. 100 each 11    tirzepatide 7.5 mg/0.5 mL PnIj Inject 7.5 mg into the skin every 7 days. 12 Pen 3    tramadol-acetaminophen 37.5-325 mg (ULTRACET) 37.5-325 mg Tab Take 1 tablet by mouth every 6 (six) hours as needed for Pain. 30 tablet 0     No current facility-administered medications for this visit.     Review of patient's allergies indicates:   Allergen Reactions    Adhesive tape-silicones      It was a White tape ( uncertain if related to silicone) 2022     Social History     Socioeconomic History    Marital status:    Tobacco Use    Smoking status: Former     Current packs/day: 0.00     Types: Cigarettes     Quit date: 2020     Years since quittin.2    Smokeless tobacco: Never   Substance and Sexual Activity    Alcohol use: Yes     Alcohol/week: 12.0 standard drinks of alcohol     Types: 12 Cans of beer per week     Comment: socially    Drug use: Never    Sexual activity: Yes     Partners: Female   Social History Narrative    Lives with wife and 2 kids in college and one older with 1 grand child. 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     Social Determinants of Health     Financial Resource Strain: Patient Declined (2/8/2024)    Overall Financial Resource Strain (CARDIA)     Difficulty of Paying Living Expenses: Patient declined   Food Insecurity: Patient Declined (2/8/2024)    Hunger Vital Sign     Worried About Running Out of Food in the Last Year: Patient declined     Ran Out of Food in the Last Year: Patient declined   Transportation Needs: Patient Declined (2/8/2024)    PRAPARE - Transportation     Lack of Transportation (Medical): Patient declined     Lack of Transportation (Non-Medical): Patient declined   Physical Activity: Unknown (2/8/2024)    Exercise Vital Sign     Days of Exercise per Week: Patient declined   Stress: Stress Concern Present (2/8/2024)    [employer] [place] [employer] - Occupational Stress Questionnaire     Feeling of Stress : To some extent   Social Connections: Unknown (2/8/2024)    Social Connection and Isolation Panel [NHANES]     Frequency of Communication with Friends and Family: Patient declined     Frequency of Social Gatherings with Friends and Family: Patient declined     Active Member of Clubs or Organizations: Patient declined     Attends Club or Organization Meetings: Patient declined     Marital Status: Patient declined   Housing Stability: Patient Declined (2/8/2024)    Housing Stability Vital Sign     Unable to Pay for Housing in the Last Year: Patient declined     Unstable Housing in the Last Year: Patient declined       ROS  REVIEW OF SYSTEMS: Negative as documented below as well as positive findings in bold.       Constitutional  Respiratory  Gastrointestinal  Skin   - Fever - Cough - Heartburn - Rash   - Chills - Spit blood - Nausea - Itching   - Weight Loss - Shortness of breath - Vomiting - Nail pain   - Malaise/Fatigue - Wheezing - Abdominal Pain  Wound/Ulcer   - Weight Gain   - Blood in Stool         - Diarrhea          Cardiovascular  Genitourinary   "Neurological  HEENT   - Chest Pain - Dysuria - Dizziness - Headache   - Palpitations - Hematuria - Tingling - Congestion   - Pain at night in legs - Flank Pain - Tremor - Sore Throat   - Cramping   - Sensory Change - Blurred Vision   - Leg Swelling   - Speech Change - Double Vision   - Dizzy when standing   - Focal Weakness - Eye Redness       - Seizures - Dry Eyes       - Loss of Consciousness          Endocrine  Musculoskeletal  Psychiatric   - Cold intolerance - Muscle Pain - Depression   - Heat intolerance - Neck Pain - Insomnia   - Anemia - Joint Pain - Memory Loss   -  Easy bruising, bleeding - Heel pain - Anxiety      Toe Pain        Leg/Ankle/Foot Pain         Objective:     Ht 5' 7" (1.702 m)   Wt 99.5 kg (219 lb 5.7 oz)   BMI 34.36 kg/m²     Physical Exam    Neck:  Trachea Midline  No visible masses    Respiratory/Ears:  No distress or labored breathing.  Able to differentiate between normal talking voice and whisper.  Able to follow commands    Eyes:  Visual Acuity intact  No discoloration noted.    Physical Exam  Ortho Exam  Foot Exam    L LE exam con't:  V: DP 2/4, PT 2/4, CRT< 3s to all digits tested.    N: SILT in SP/DP/T/Ron/Saph distributions    Ortho: +Motor EHL/FHL/TA/GA   Surgical site pain present and mild   Surgical site swelling absent   Compartments soft/compressible. No pain on passive stretch of big toe. No calf  Pain.     Derm: Skin intact. Sutures/staples: intact. Signs of infection: none.     Imaging / Labs:    Lab Results   Component Value Date    WBC 8.63 02/21/2024    WBC 7.82 02/08/2024    WBC 5.50 11/09/2022    WBC 8.52 08/29/2022    WBC 5.76 08/24/2022    PROCAL 0.47 (H) 08/17/2022    PROCAL 0.57 (H) 07/29/2022    PROCAL 0.29 (H) 07/26/2022    SEDRATE 68 (H) 08/23/2022    SEDRATE 75 (H) 08/21/2022    SEDRATE 58 (H) 08/19/2022    SEDRATE 50 (H) 08/17/2022    SEDRATE 65 (H) 08/15/2022    CRP 7.0 08/29/2022    CRP 30.7 (H) 08/23/2022    .4 (H) 08/21/2022    .2 (H) " 08/19/2022    CRP 24.9 (H) 08/17/2022    PREALBUMIN 32 02/21/2024    PREALBUMIN 7 (L) 07/29/2022       Lab Results   Component Value Date    PREALBUMIN 32 02/21/2024

## 2024-04-03 PROBLEM — R29.898 DECREASED STRENGTH OF LOWER EXTREMITY: Status: ACTIVE | Noted: 2024-04-03

## 2024-04-03 PROBLEM — R29.898 DECREASED STRENGTH OF LOWER EXTREMITY: Status: RESOLVED | Noted: 2022-09-13 | Resolved: 2024-04-03

## 2024-04-03 PROBLEM — M25.672 DECREASED RANGE OF MOTION OF LEFT ANKLE: Status: ACTIVE | Noted: 2024-04-03

## 2024-04-03 PROBLEM — R53.1 WEAKNESS: Status: RESOLVED | Noted: 2023-12-06 | Resolved: 2024-04-03

## 2024-04-03 PROBLEM — M25.672 STIFFNESS OF ANKLE JOINT, LEFT: Status: RESOLVED | Noted: 2023-12-06 | Resolved: 2024-04-03

## 2024-04-03 PROBLEM — Z74.09 DECREASED FUNCTIONAL MOBILITY AND ENDURANCE: Status: RESOLVED | Noted: 2022-09-13 | Resolved: 2024-04-03

## 2024-04-03 PROBLEM — Z74.09 IMPAIRED FUNCTIONAL MOBILITY, BALANCE, AND ENDURANCE: Status: ACTIVE | Noted: 2024-04-03

## 2024-04-08 DIAGNOSIS — Z79.4 TYPE 2 DIABETES MELLITUS WITHOUT COMPLICATION, WITH LONG-TERM CURRENT USE OF INSULIN: ICD-10-CM

## 2024-04-08 DIAGNOSIS — E11.9 TYPE 2 DIABETES MELLITUS WITHOUT COMPLICATION, WITH LONG-TERM CURRENT USE OF INSULIN: ICD-10-CM

## 2024-04-08 DIAGNOSIS — E11.65 TYPE 2 DIABETES MELLITUS WITH HYPERGLYCEMIA, WITHOUT LONG-TERM CURRENT USE OF INSULIN: ICD-10-CM

## 2024-04-08 DIAGNOSIS — E78.2 MIXED HYPERLIPIDEMIA: ICD-10-CM

## 2024-04-09 RX ORDER — PEN NEEDLE, DIABETIC 30 GX3/16"
NEEDLE, DISPOSABLE MISCELLANEOUS
Qty: 100 EACH | Refills: 11 | Status: SHIPPED | OUTPATIENT
Start: 2024-04-09 | End: 2024-06-04 | Stop reason: SDUPTHER

## 2024-04-09 RX ORDER — ATORVASTATIN CALCIUM 20 MG/1
20 TABLET, FILM COATED ORAL DAILY
Qty: 90 TABLET | Refills: 3 | Status: SHIPPED | OUTPATIENT
Start: 2024-04-09

## 2024-04-09 NOTE — TELEPHONE ENCOUNTER
Refill Routing Note   Medication(s) are not appropriate for processing by [facility] for the following reason(s):        Non-participating provider    [facility] action(s):  Route             Appointments  past 12m or future 3m with PCP    Date Provider   Last Visit   2/26/2024 Oumou Zuniga MD   Next Visit   4/8/2024 Oumou Zuniga MD   ED visits in past 90 days: 0        Note composed:3:11 AM 04/09/2024

## 2024-04-09 NOTE — TELEPHONE ENCOUNTER
Refill Routing Note   Medication(s) are not appropriate for processing by [facility] for the following reason(s):        Non-participating provider    [facility] action(s):  Route             Appointments  past 12m or future 3m with PCP    Date Provider   Last Visit   2/26/2024 Oumou Zuniga MD   Next Visit   4/15/2024 Oumou Zuniga MD   ED visits in past 90 days: 0        Note composed:3:45 AM 04/09/2024

## 2024-04-15 ENCOUNTER — OFFICE VISIT (OUTPATIENT)
Dept: FAMILY MEDICINE | Facility: CLINIC | Age: 49
End: 2024-04-15
Payer: COMMERCIAL

## 2024-04-15 VITALS
OXYGEN SATURATION: 96 % | DIASTOLIC BLOOD PRESSURE: 100 MMHG | HEIGHT: 67 IN | BODY MASS INDEX: 35 KG/M2 | HEART RATE: 100 BPM | WEIGHT: 223 LBS | TEMPERATURE: 99 F | SYSTOLIC BLOOD PRESSURE: 166 MMHG

## 2024-04-15 DIAGNOSIS — R80.9 TYPE 2 DIABETES MELLITUS WITH MICROALBUMINURIA, WITH LONG-TERM CURRENT USE OF INSULIN: ICD-10-CM

## 2024-04-15 DIAGNOSIS — M72.2 PLANTAR FASCIITIS OF LEFT FOOT: ICD-10-CM

## 2024-04-15 DIAGNOSIS — Z00.00 ANNUAL PHYSICAL EXAM: ICD-10-CM

## 2024-04-15 DIAGNOSIS — F32.1 CURRENT MODERATE EPISODE OF MAJOR DEPRESSIVE DISORDER WITHOUT PRIOR EPISODE: ICD-10-CM

## 2024-04-15 DIAGNOSIS — I10 ESSENTIAL HYPERTENSION: ICD-10-CM

## 2024-04-15 DIAGNOSIS — Z79.4 TYPE 2 DIABETES MELLITUS WITH MICROALBUMINURIA, WITH LONG-TERM CURRENT USE OF INSULIN: ICD-10-CM

## 2024-04-15 DIAGNOSIS — E66.09 CLASS 1 OBESITY DUE TO EXCESS CALORIES WITH SERIOUS COMORBIDITY AND BODY MASS INDEX (BMI) OF 34.0 TO 34.9 IN ADULT: ICD-10-CM

## 2024-04-15 DIAGNOSIS — E11.29 TYPE 2 DIABETES MELLITUS WITH MICROALBUMINURIA, WITH LONG-TERM CURRENT USE OF INSULIN: ICD-10-CM

## 2024-04-15 PROCEDURE — 99999 PR PBB SHADOW E&M-EST. PATIENT-LVL V: CPT | Mod: PBBFAC,,, | Performed by: STUDENT IN AN ORGANIZED HEALTH CARE EDUCATION/TRAINING PROGRAM

## 2024-04-15 PROCEDURE — 99214 OFFICE O/P EST MOD 30 MIN: CPT | Mod: S$GLB,,, | Performed by: STUDENT IN AN ORGANIZED HEALTH CARE EDUCATION/TRAINING PROGRAM

## 2024-04-15 NOTE — PROGRESS NOTES
Subjective:       Patient ID: Wilfredo Thomas is a 48 y.o. male.    Chief Complaint: Follow-up      HPI    47M with uncontrolled T2DM, sHTN, HLD, gouty arthritis , obesity and plantar fasciitis/osteonecrosis s/p fasciotomy (02/22/2024) by Dr Bazzi presents for f/u on chronic conditions,  now on twice weekly PT, recovering as expected and motivated to start his exercise regimen back.  Home BP readings range in the 120s/80, BS readings in the 140s.  He is also in much better place with his mental health now.    Past Medical History:   Diagnosis Date    Diabetes     Gout     Hyperlipidemia     Hypertension        Past Surgical History:   Procedure Laterality Date    BONE MARROW ASPIRATION Left 3/8/2024    Procedure: ASPIRATION, BONE MARROW;  Surgeon: Enrico Bazzi Jr., DPM;  Location: [facility] OR;  Service: Podiatry;  Laterality: Left;    ENDOSCOPIC PLANTAR FASCIOTOMY Left 3/8/2024    Procedure: FASCIOTOMY, PLANTAR, ENDOSCOPIC;  Surgeon: Enrico Bazzi Jr., DPM;  Location: [facility] OR;  Service: Podiatry;  Laterality: Left;  pop saph    INCISION AND DRAINAGE Left 08/06/2022    Procedure: Incision and Drainage - LEFT THIGH.;  Surgeon: Rene Neff MD;  Location: [facility] OR 2ND FLR;  Service: Orthopedics;  Laterality: Left;    INJECTION, TENDON SHEATH OR LIGAMENT, 1 TENDON SHEATH OR LIGAMENT Left 3/8/2024    Procedure: INJECTION,TENDON SHEATH OR LIGAMENT,1 TENDON SHEATH OR LIGAMENT;  Surgeon: Enrico Bazzi Jr., DPM;  Location: [facility] OR;  Service: Podiatry;  Laterality: Left;    IRRIGATION AND DEBRIDEMENT OF LOWER EXTREMITY Left 07/29/2022    Procedure: IRRIGATION AND DEBRIDEMENT, LOWER EXTREMITY;  Surgeon: Rob Graff MD;  Location: [facility] OR 2ND FLR;  Service: Orthopedics;  Laterality: Left;    KNEE ARTHROSCOPY W/ MENISCAL REPAIR Left     Jan 2023    LENGTHENING OR SHORTENING, TENDON, LOWER EXTREMITY, 1 TENDON Left 3/8/2024    Procedure: LENGTHENING OR SHORTENING, TENDON, LOWER EXTREMITY, 1 TENDON;   Surgeon: Enrico Bazzi Jr., DPM;  Location: [facility];  Service: Podiatry;  Laterality: Left;    RESECTION OF GASTROCNEMIUS MUSCLE Left 3/8/2024    Procedure: RESECTION, MUSCLE, GASTROCNEMIUS;  Surgeon: Enrico Bazzi Jr., DPM;  Location: [facility] OR;  Service: Podiatry;  Laterality: Left;    WRIST SURGERY Left        Family History   Problem Relation Name Age of Onset    Diabetes Mother      No Known Problems Father         Social History     Socioeconomic History    Marital status:    Tobacco Use    Smoking status: Former     Current packs/day: 0.00     Types: Cigarettes     Quit date: 2020     Years since quittin.2    Smokeless tobacco: Never   Substance and Sexual Activity    Alcohol use: Yes     Alcohol/week: 12.0 standard drinks of alcohol     Types: 12 Cans of beer per week     Comment: socially    Drug use: Never    Sexual activity: Yes     Partners: Female   Social History Narrative    Lives with wife and 2 kids in college and one older with 1 grand child. 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     Social Determinants of Health     Financial Resource Strain: Patient Declined (2024)    Overall Financial Resource Strain (CARDIA)     Difficulty of Paying Living Expenses: Patient declined   Food Insecurity: Patient Declined (2024)    Hunger Vital Sign     Worried About Running Out of Food in the Last Year: Patient declined     Ran Out of Food in the Last Year: Patient declined   Transportation Needs: Patient Declined (2024)    PRAPARE - Transportation     Lack of Transportation (Medical): Patient declined     Lack of Transportation (Non-Medical): Patient declined   Physical Activity: Unknown (2024)    Exercise Vital Sign     Days of Exercise per Week: Patient declined   Stress: Stress Concern Present (2024)    [employer] - Occupational Stress Questionnaire     Feeling of Stress : To some extent   Social Connections: Unknown (2024)     "Social Connection and Isolation Panel [NHANES]     Frequency of Communication with Friends and Family: Patient declined     Frequency of Social Gatherings with Friends and Family: Patient declined     Active Member of Clubs or Organizations: Patient declined     Attends Club or Organization Meetings: Patient declined     Marital Status: Patient declined   Housing Stability: Patient Declined (2/8/2024)    Housing Stability Vital Sign     Unable to Pay for Housing in the Last Year: Patient declined     Unstable Housing in the Last Year: Patient declined       Review of Systems   Constitutional:  Negative for chills, fatigue and fever.   Respiratory:  Negative for cough and shortness of breath.    Cardiovascular:  Negative for chest pain, palpitations and leg swelling.   Gastrointestinal:  Negative for abdominal pain, constipation and diarrhea.   Endocrine: Negative for polydipsia and polyphagia.   Genitourinary:  Negative for dysuria, flank pain and frequency.   Musculoskeletal:  Positive for gait problem. Negative for arthralgias, back pain and joint swelling.   Skin:  Negative for rash and wound.   Neurological:  Negative for dizziness, seizures, weakness, numbness and headaches.   Psychiatric/Behavioral:  Negative for dysphoric mood and suicidal ideas.          Objective:     Vitals:    04/15/24 1309 04/15/24 1337   BP: (!) 176/108 (!) 166/100   BP Location: Left arm    Patient Position: Sitting    BP Method: Small (Manual)    Pulse: 105 100   Temp: 98.6 °F (37 °C)    TempSrc: Temporal    SpO2: 96%    Weight: 101.1 kg (222 lb 15.9 oz)    Height: 5' 7" (1.702 m)             Physical Exam  Vitals reviewed.   Constitutional:       General: He is not in acute distress.     Appearance: He is obese.   HENT:      Mouth/Throat:      Mouth: Mucous membranes are moist.   Cardiovascular:      Rate and Rhythm: Normal rate and regular rhythm.      Pulses: Normal pulses.      Heart sounds: Normal heart sounds.   Pulmonary:      " "Effort: Pulmonary effort is normal.      Breath sounds: Normal breath sounds.   Abdominal:      Hernia: There is no hernia in the left inguinal area.   Genitourinary:     Pubic Area: No rash.       Penis: No erythema, tenderness or swelling.       Testes: Cremasteric reflex is present.         Right: Mass, tenderness or swelling not present.         Left: Mass, tenderness or swelling not present.      Epididymis:      Right: Normal.      Left: Normal.   Musculoskeletal:         General: No swelling (well healed left medial leg surgical incsion).      Right lower leg: No edema (1+).      Left lower leg: No edema.   Skin:     General: Skin is warm.   Neurological:      General: No focal deficit present.      Mental Status: He is alert and oriented to person, place, and time.      Cranial Nerves: No cranial nerve deficit.   Psychiatric:         Mood and Affect: Mood normal.         Behavior: Behavior normal.           Laboratory:  CBC:  Recent Labs   Lab Result Units 02/08/24  1615 02/21/24  1231   WBC K/uL 7.82 8.63   RBC M/uL 5.03 5.05   Hemoglobin g/dL 15.4 15.6   Hematocrit % 43.7 43.8   Platelets K/uL 186 174   MCV fL 87 87   MCH pg 30.6 30.9   MCHC g/dL 35.2 35.6       CMP:  Recent Labs   Lab Result Units 02/08/24  1615 02/21/24  1231   Glucose mg/dL 302* 250*   Calcium mg/dL 10.2 9.9   Albumin g/dL 4.3 4.6   Total Protein g/dL 7.7 8.0   Sodium mmol/L 137 138   Potassium mmol/L 4.7 5.0   CO2 mmol/L 22* 22*   Chloride mmol/L 104 102   BUN mg/dL 14 20   Alkaline Phosphatase U/L 101 116   ALT U/L 47* 50*   AST U/L 31 47*   Total Bilirubin mg/dL 0.8 0.6       URINALYSIS:  No results for input(s): "COLORU", "CLARITYU", "SPECGRAV", "PHUR", "PROTEINUA", "GLUCOSEU", "BILIRUBINCON", "BLOODU", "WBCU", "RBCU", "BACTERIA", "MUCUS", "NITRITE", "LEUKOCYTESUR", "UROBILINOGEN", "HYALINECASTS" in the last 2160 hours.     LIPIDS:  Recent Labs   Lab Result Units 02/08/24  1615   TSH uIU/mL 2.410         TSH:  Recent Labs   Lab " Result Units 02/08/24  1615   TSH uIU/mL 2.410       A1C:  Recent Labs   Lab Result Units 02/08/24  1615 02/21/24  1231   Hemoglobin A1C % 8.1* 8.3*  8.3*         : MRI left foot 02/02/2024  Achilles: Normal size and signal     Plantar Fascia: There is marked thickening/increased signal at the origin of the plantar fascia, consistent with plantar fasciitis. Small discrete partial-thickness tear noted measuring 0.3 cm (series 5, image 15). There is prominent subcortical marrow edema within the adjacent calcaneus.  The soft tissue edema noted in the musculature adjacent to the medial and lateral bands, suggesting muscle strain.  No fluid collections.     Joints: No joint effusions. Tibiotalar and subtalar cartilage maintained.     Ligaments: Anterior and posterior inferior tibiofibular ligaments are intact.  Anterior talofibular, calcaneofibular and posterior talofibular ligaments are intact.  Deep and superficial components of deltoid ligament are intact.  Spring ligament complex and Lisfranc ligament are intact.     Tendons: Medial ankle flexor, dorsal ankle extensor, and peroneus tendons are intact.     Bones:  No fracture.  Bone lesion in the distal diaphysis of the tibia, partially imaged, suggestive of osteonecrosis.     Miscellaneous: The sinus tarsi and tarsal tunnel are unremarkable.     Impression:     Plantar fasciitis, as above.     Partially imaged distal tibial bone lesion, suggestive of osteonecrosis.    Assessment:         ICD-10-CM ICD-9-CM   1. Essential hypertension  I10 401.9   2. Type 2 diabetes mellitus with microalbuminuria, with long-term current use of insulin  E11.29 250.40    R80.9 791.0    Z79.4 V58.67   3. Current moderate episode of major depressive disorder without prior episode  F32.1 296.22   4. Class 1 obesity due to excess calories with serious comorbidity and body mass index (BMI) of 34.0 to 34.9 in adult  E66.09 278.00    Z68.34 V85.34   5. Plantar fasciitis of left foot  M72.2  728.71                 Plan:       1. Type 2 diabetes mellitus with diabetic nephropathy  -worsening again, likely due to poor dietary habits and sedentary life style  - Hba1C slowing increasing, now @ 8.3  -states improvement in his home BS readings now since changes in dietary habits  -recently restarted mounjaro @ low dose due to pharm shortage  -c/w lantus 16U BID+metformin 1500mg ER daily  -c/w lisinopril to 40mg  -c/w therapeutic life style changes    2. BMI 34  -worsening again  -therapeutic life style modifications reinforced    3. sHTN  -not at goal, he stopped taking his amlodipine because of 'feeling unwell'  -patient advised to restart amlodipine @ 5mg and increase slowly as needed  -c/w amlodipine 10mg daily, c/w lisinopril 40mg daily  -therapeutic life style changes reinforced    4. Mixed HLD  -c/w statin and life style modifications    5. MDD/YANCI  -he stopped taking cymbalta because he did not 'like how it made him feel'    HCM: declined all due vaccine for now, will continue to address next clinic visit        Patient's Medications   New Prescriptions    No medications on file   Previous Medications    AMLODIPINE (NORVASC) 10 MG TABLET    Take 1 tablet (10 mg total) by mouth once daily.    ASCORBIC ACID, VITAMIN C, (VITAMIN C) 500 MG TABLET    Take 1 tablet (500 mg total) by mouth once daily.    ATORVASTATIN (LIPITOR) 20 MG TABLET    Take 1 tablet (20 mg total) by mouth once daily.    BLOOD SUGAR DIAGNOSTIC STRP    1 strip by Misc.(Non-Drug; Combo Route) route once daily.    BLOOD-GLUCOSE METER KIT    Use as instructed    CYCLOBENZAPRINE (FLEXERIL) 10 MG TABLET    Take 1 tablet (10 mg total) by mouth 3 (three) times daily as needed.    GABAPENTIN (NEURONTIN) 300 MG CAPSULE    Take 1 capsule (300 mg total) by mouth 3 (three) times daily.    INSULIN (LANTUS SOLOSTAR U-100 INSULIN) GLARGINE 100 UNITS/ML SUBQ PEN    Inject 16 Units into the skin every morning then take 20 Units at bedtime    LANCETS MISC  "   1 lancet by Misc.(Non-Drug; Combo Route) route once daily.    LIDOCAINE-PRILOCAINE (EMLA) CREAM    Apply topically 2 (two) times daily as needed.    LISINOPRIL (PRINIVIL,ZESTRIL) 40 MG TABLET    Take 1 tablet (40 mg total) by mouth once daily.    MELOXICAM (MOBIC) 7.5 MG TABLET    Take 1 tablet (7.5 mg total) by mouth once daily.    METFORMIN (GLUCOPHAGE-XR) 750 MG ER 24HR TABLET    Take 2 tablets (1,500 mg total) by mouth daily with breakfast.    MULTIVITAMIN (THERAGRAN) PER TABLET    Take 1 tablet by mouth.    PEN NEEDLE, DIABETIC 31 GAUGE X 5/16" NDLE    Use to inject insulin into the skin.    TIRZEPATIDE 7.5 MG/0.5 ML PNIJ    Inject 7.5 mg into the skin every 7 days.    TRAMADOL-ACETAMINOPHEN 37.5-325 MG (ULTRACET) 37.5-325 MG TAB    Take 1 tablet by mouth every 6 (six) hours as needed for Pain.   Modified Medications    No medications on file   Discontinued Medications    DULOXETINE (CYMBALTA) 60 MG CAPSULE    Take 1 capsule (60 mg total) by mouth once daily.         Patient was given opportunity to express concerns, ask questions and verbalizes understanding of current management plan     Dr Oumou Zuniga MD            "

## 2024-04-17 ENCOUNTER — OFFICE VISIT (OUTPATIENT)
Dept: PODIATRY | Facility: CLINIC | Age: 49
End: 2024-04-17
Payer: COMMERCIAL

## 2024-04-17 DIAGNOSIS — G62.9 NEUROPATHY: ICD-10-CM

## 2024-04-17 DIAGNOSIS — M72.2 PLANTAR FASCIITIS OF LEFT FOOT: ICD-10-CM

## 2024-04-17 DIAGNOSIS — Z47.89 AFTERCARE FOLLOWING SURGERY OF THE MUSCULOSKELETAL SYSTEM: Primary | ICD-10-CM

## 2024-04-17 PROCEDURE — 99024 POSTOP FOLLOW-UP VISIT: CPT | Mod: S$GLB,,, | Performed by: PODIATRIST

## 2024-04-17 PROCEDURE — 99999 PR PBB SHADOW E&M-EST. PATIENT-LVL III: CPT | Mod: PBBFAC,,, | Performed by: PODIATRIST

## 2024-04-17 RX ORDER — MELOXICAM 7.5 MG/1
7.5 TABLET ORAL DAILY
Qty: 30 TABLET | Refills: 1 | Status: SHIPPED | OUTPATIENT
Start: 2024-04-17 | End: 2024-05-08 | Stop reason: SDUPTHER

## 2024-04-17 NOTE — PROGRESS NOTES
[facility] - PODIATRY  [address]  [address]  [place] [address]  Dept: [phone]  Dept Fax: [phone]    JU Padilla Jr., Jr.     Post-Operative Visit  Assessment:     1. Aftercare following surgery of the musculoskeletal system        2. Plantar fasciitis of left foot  meloxicam (MOBIC) 7.5 MG tablet      3. Neuropathy            Plan:   MDM    Coding  6 wk s/p    - pt seen evaluated and managed   - pt healing normal for postop  - wb: wbat  - rx dispensed: mobic   - referrals: none  Cont PT    Follow up in about 3 weeks (around 5/8/2024).      Subjective:      Patient ID: Wilfredo Thomas is a 48 y.o. male.    Chief Complaint:   Chief Complaint   Patient presents with    Post-op Evaluation     There were no vitals filed for this visit.    DOS: 3/8/24  Procedure: L EPF + OGR    Wilfredo Thomas returns to the clinic today for a postop visit. Pt is s/p above procedure. Wilfredo Thomas rates pain at a 4/10 on visual analog scale. Denies n/v/f/c. In formal PT and doing well.     HPI      Outside reports reviewed: historical medical records.    Past Medical History:   Diagnosis Date    Diabetes     Gout     Hyperlipidemia     Hypertension      Past Surgical History:   Procedure Laterality Date    BONE MARROW ASPIRATION Left 3/8/2024    Procedure: ASPIRATION, BONE MARROW;  Surgeon: Enrico Bazzi Jr., DPM;  Location: [facility] OR;  Service: Podiatry;  Laterality: Left;    ENDOSCOPIC PLANTAR FASCIOTOMY Left 3/8/2024    Procedure: FASCIOTOMY, PLANTAR, ENDOSCOPIC;  Surgeon: Enrico Bazzi Jr., DPM;  Location: [facility] OR;  Service: Podiatry;  Laterality: Left;  pop saph    INCISION AND DRAINAGE Left 08/06/2022    Procedure: Incision and Drainage - LEFT THIGH.;  Surgeon: Rene Neff MD;  Location: [facility] OR [address] [place] [address];  Service: Orthopedics;  Laterality: Left;    INJECTION, TENDON SHEATH OR LIGAMENT, 1 TENDON SHEATH OR LIGAMENT Left 3/8/2024     Procedure: INJECTION,TENDON SHEATH OR LIGAMENT,1 TENDON SHEATH OR LIGAMENT;  Surgeon: Enrico Bazzi Jr., DPM;  Location: [facility] OR;  Service: Podiatry;  Laterality: Left;    IRRIGATION AND DEBRIDEMENT OF LOWER EXTREMITY Left 07/29/2022    Procedure: IRRIGATION AND DEBRIDEMENT, LOWER EXTREMITY;  Surgeon: Rob Graff MD;  Location: [facility] OR 2ND FLR;  Service: Orthopedics;  Laterality: Left;    KNEE ARTHROSCOPY W/ MENISCAL REPAIR Left     Jan 2023    LENGTHENING OR SHORTENING, TENDON, LOWER EXTREMITY, 1 TENDON Left 3/8/2024    Procedure: LENGTHENING OR SHORTENING, TENDON, LOWER EXTREMITY, 1 TENDON;  Surgeon: Enrico Bazzi Jr., DPM;  Location: [facility] OR;  Service: Podiatry;  Laterality: Left;    RESECTION OF GASTROCNEMIUS MUSCLE Left 3/8/2024    Procedure: RESECTION, MUSCLE, GASTROCNEMIUS;  Surgeon: Enrico Bazzi Jr., DPM;  Location: [facility] OR;  Service: Podiatry;  Laterality: Left;    WRIST SURGERY Left      Family History   Problem Relation Name Age of Onset    Diabetes Mother      No Known Problems Father       Current Outpatient Medications   Medication Sig Dispense Refill    amLODIPine (NORVASC) 10 MG tablet Take 1 tablet (10 mg total) by mouth once daily. 90 tablet 3    ascorbic acid, vitamin C, (VITAMIN C) 500 MG tablet Take 1 tablet (500 mg total) by mouth once daily. 30 tablet 1    atorvastatin (LIPITOR) 20 MG tablet Take 1 tablet (20 mg total) by mouth once daily. 90 tablet 3    blood sugar diagnostic Strp 1 strip by Misc.(Non-Drug; Combo Route) route once daily. 100 each 11    blood-glucose meter kit Use as instructed 1 each 0    cyclobenzaprine (FLEXERIL) 10 MG tablet Take 1 tablet (10 mg total) by mouth 3 (three) times daily as needed. 30 tablet 0    gabapentin (NEURONTIN) 300 MG capsule Take 1 capsule (300 mg total) by mouth 3 (three) times daily. 90 capsule 3    insulin (LANTUS SOLOSTAR U-100 INSULIN) glargine 100 units/mL SubQ pen Inject 16 Units into the skin every morning then take 20  "Units at bedtime 25 mL 3    lancets Misc 1 lancet by Misc.(Non-Drug; Combo Route) route once daily. 100 each 11    LIDOcaine-prilocaine (EMLA) cream Apply topically 2 (two) times daily as needed. 60 g 0    lisinopriL (PRINIVIL,ZESTRIL) 40 MG tablet Take 1 tablet (40 mg total) by mouth once daily. 90 tablet 3    metFORMIN (GLUCOPHAGE-XR) 750 MG ER 24hr tablet Take 2 tablets (1,500 mg total) by mouth daily with breakfast. 180 tablet 3    multivitamin (THERAGRAN) per tablet Take 1 tablet by mouth.      pen needle, diabetic 31 gauge x 5/16" Ndle Use to inject insulin into the skin. 100 each 11    tirzepatide 7.5 mg/0.5 mL PnIj Inject 7.5 mg into the skin every 7 days. 12 Pen 3    meloxicam (MOBIC) 7.5 MG tablet Take 1 tablet (7.5 mg total) by mouth once daily. 30 tablet 1     No current facility-administered medications for this visit.     Review of patient's allergies indicates:   Allergen Reactions    Adhesive tape-silicones      It was a White tape ( uncertain if related to silicone) 2022     Social History     Socioeconomic History    Marital status:    Tobacco Use    Smoking status: Former     Current packs/day: 0.00     Types: Cigarettes     Quit date: 2020     Years since quittin.2    Smokeless tobacco: Never   Substance and Sexual Activity    Alcohol use: Yes     Alcohol/week: 12.0 standard drinks of alcohol     Types: 12 Cans of beer per week     Comment: socially    Drug use: Never    Sexual activity: Yes     Partners: Female   Social History Narrative    Lives with wife and 2 kids in college and one older with 1 grand child. 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     Social Determinants of Health     Financial Resource Strain: Patient Declined (2024)    Overall Financial Resource Strain (CARDIA)     Difficulty of Paying Living Expenses: Patient declined   Food Insecurity: Patient Declined (2024)    Hunger Vital Sign     Worried About Running Out of Food in the Last Year: Patient " declined     Ran Out of Food in the Last Year: Patient declined   Transportation Needs: Patient Declined (2/8/2024)    PRAPARE - Transportation     Lack of Transportation (Medical): Patient declined     Lack of Transportation (Non-Medical): Patient declined   Physical Activity: Unknown (2/8/2024)    Exercise Vital Sign     Days of Exercise per Week: Patient declined   Stress: Stress Concern Present (2/8/2024)    [employer] [place] [employer] - Occupational Stress Questionnaire     Feeling of Stress : To some extent   Social Connections: Unknown (2/8/2024)    Social Connection and Isolation Panel [NHANES]     Frequency of Communication with Friends and Family: Patient declined     Frequency of Social Gatherings with Friends and Family: Patient declined     Active Member of Clubs or Organizations: Patient declined     Attends Club or Organization Meetings: Patient declined     Marital Status: Patient declined   Housing Stability: Patient Declined (2/8/2024)    Housing Stability Vital Sign     Unable to Pay for Housing in the Last Year: Patient declined     Unstable Housing in the Last Year: Patient declined       ROS  REVIEW OF SYSTEMS: Negative as documented below as well as positive findings in bold.       Constitutional  Respiratory  Gastrointestinal  Skin   - Fever - Cough - Heartburn - Rash   - Chills - Spit blood - Nausea - Itching   - Weight Loss - Shortness of breath - Vomiting - Nail pain   - Malaise/Fatigue - Wheezing - Abdominal Pain  Wound/Ulcer   - Weight Gain   - Blood in Stool         - Diarrhea          Cardiovascular  Genitourinary  Neurological  HEENT   - Chest Pain - Dysuria - Dizziness - Headache   - Palpitations - Hematuria - Tingling - Congestion   - Pain at night in legs - Flank Pain - Tremor - Sore Throat   - Cramping   - Sensory Change - Blurred Vision   - Leg Swelling   - Speech Change - Double Vision   - Dizzy when standing   - Focal Weakness - Eye Redness       - Seizures - Dry  Eyes       - Loss of Consciousness          Endocrine  Musculoskeletal  Psychiatric   - Cold intolerance - Muscle Pain - Depression   - Heat intolerance - Neck Pain - Insomnia   - Anemia - Joint Pain - Memory Loss   -  Easy bruising, bleeding - Heel pain - Anxiety      Toe Pain        Leg/Ankle/Foot Pain         Objective:     There were no vitals taken for this visit.    Physical Exam    Neck:  Trachea Midline  No visible masses    Respiratory/Ears:  No distress or labored breathing.  Able to differentiate between normal talking voice and whisper.  Able to follow commands    Eyes:  Visual Acuity intact  No discoloration noted.    Physical Exam  Ortho Exam  Foot Exam    L LE exam con't:  V: DP 2/4, PT 2/4, CRT< 3s to all digits tested.    N: SILT in SP/DP/T/Ron/Saph distributions    Ortho: +Motor EHL/FHL/TA/GA   Surgical site pain present and mild   Surgical site swelling absent   Compartments soft/compressible. No pain on passive stretch of big toe. No calf  Pain.     Derm: Skin intact. Sutures/staples: intact. Signs of infection: none.     Imaging / Labs:    Lab Results   Component Value Date    WBC 8.63 02/21/2024    WBC 7.82 02/08/2024    WBC 5.50 11/09/2022    WBC 8.52 08/29/2022    WBC 5.76 08/24/2022    PROCAL 0.47 (H) 08/17/2022    PROCAL 0.57 (H) 07/29/2022    PROCAL 0.29 (H) 07/26/2022    SEDRATE 68 (H) 08/23/2022    SEDRATE 75 (H) 08/21/2022    SEDRATE 58 (H) 08/19/2022    SEDRATE 50 (H) 08/17/2022    SEDRATE 65 (H) 08/15/2022    CRP 7.0 08/29/2022    CRP 30.7 (H) 08/23/2022    .4 (H) 08/21/2022    .2 (H) 08/19/2022    CRP 24.9 (H) 08/17/2022    PREALBUMIN 32 02/21/2024    PREALBUMIN 7 (L) 07/29/2022       Lab Results   Component Value Date    PREALBUMIN 32 02/21/2024

## 2024-04-28 ENCOUNTER — PATIENT MESSAGE (OUTPATIENT)
Dept: PODIATRY | Facility: CLINIC | Age: 49
End: 2024-04-28
Payer: COMMERCIAL

## 2024-04-29 NOTE — TELEPHONE ENCOUNTER
Good Morning,   Okay I will relay this message to . Are you able to send us a picture through the portal so we can take a look at how things are doing now?

## 2024-04-30 ENCOUNTER — TELEPHONE (OUTPATIENT)
Dept: PODIATRY | Facility: CLINIC | Age: 49
End: 2024-04-30
Payer: COMMERCIAL

## 2024-04-30 NOTE — TELEPHONE ENCOUNTER
Reached out to patient in regards to his call back request. Patient notified me of some redness and oozing coming from his incision site, patient did go to urgent care where he received 7 day script for antibiotics  that patient is currently taking. There was an image uploaded by PT of redness at incision,  was notified of the image. Patient is okay with following up next Wednesday as oozing has stopped with some slight redness still.

## 2024-05-08 ENCOUNTER — OFFICE VISIT (OUTPATIENT)
Dept: PODIATRY | Facility: CLINIC | Age: 49
End: 2024-05-08
Payer: COMMERCIAL

## 2024-05-08 VITALS — HEIGHT: 67 IN | WEIGHT: 222.88 LBS | RESPIRATION RATE: 18 BRPM | BODY MASS INDEX: 34.98 KG/M2

## 2024-05-08 DIAGNOSIS — M72.2 PLANTAR FASCIITIS OF LEFT FOOT: ICD-10-CM

## 2024-05-08 DIAGNOSIS — Z47.89 AFTERCARE FOLLOWING SURGERY OF THE MUSCULOSKELETAL SYSTEM: Primary | ICD-10-CM

## 2024-05-08 DIAGNOSIS — Z47.89 AFTERCARE FOLLOWING SURGERY OF THE MUSCULOSKELETAL SYSTEM: ICD-10-CM

## 2024-05-08 PROCEDURE — 99999 PR PBB SHADOW E&M-EST. PATIENT-LVL IV: CPT | Mod: PBBFAC,,, | Performed by: PODIATRIST

## 2024-05-08 RX ORDER — DOXYCYCLINE 100 MG/1
100 CAPSULE ORAL 2 TIMES DAILY
Qty: 14 CAPSULE | Refills: 0 | Status: SHIPPED | OUTPATIENT
Start: 2024-05-08 | End: 2024-05-08 | Stop reason: SDUPTHER

## 2024-05-08 RX ORDER — MELOXICAM 7.5 MG/1
7.5 TABLET ORAL DAILY
Qty: 30 TABLET | Refills: 1 | Status: SHIPPED | OUTPATIENT
Start: 2024-05-08 | End: 2024-05-08 | Stop reason: SDUPTHER

## 2024-05-08 NOTE — PROGRESS NOTES
[facility] - PODIATRY  [address]  [address]  [place] [address]  Dept: [phone]  Dept Fax: [phone]    JU Padilla Jr., Jr.     Post-Operative Visit  Assessment:     1. Aftercare following surgery of the musculoskeletal system        2. Plantar fasciitis of left foot  meloxicam (MOBIC) 7.5 MG tablet          Plan:   MDM    Coding  9 wk s/p    - pt seen evaluated and managed   - pt healing normal for postop  - medihoney to stitch opening QD  - wb: wbat  - rx dispensed: mobic   - referrals: none  Cont PT    Follow up in about 3 weeks (around 5/29/2024).      Subjective:      Patient ID: Wilfredo Thomas is a 48 y.o. male.    Chief Complaint:   Chief Complaint   Patient presents with    Post-op Evaluation     Vitals:    05/08/24 1055   Resp: 18       DOS: 3/8/24  Procedure: L EPF + OGR    Wilfredo Thomas returns to the clinic today for a postop visit. Pt is s/p above procedure. Wilfredo Thomas rates pain at a 1/10 on visual analog scale. Denies n/v/f/c. Met all goals in PT. Had area of skin by calf incision where he reports developed irritation that opened. Went to urgent care where reports a stitch was removed. No other issues.     HPI      Outside reports reviewed: historical medical records.    Past Medical History:   Diagnosis Date    Diabetes     Gout     Hyperlipidemia     Hypertension      Past Surgical History:   Procedure Laterality Date    BONE MARROW ASPIRATION Left 3/8/2024    Procedure: ASPIRATION, BONE MARROW;  Surgeon: Enrico Bazzi Jr., DPM;  Location: [facility] OR;  Service: Podiatry;  Laterality: Left;    ENDOSCOPIC PLANTAR FASCIOTOMY Left 3/8/2024    Procedure: FASCIOTOMY, PLANTAR, ENDOSCOPIC;  Surgeon: Enrico Bazzi Jr., DPM;  Location: [facility] OR;  Service: Podiatry;  Laterality: Left;  pop saph    INCISION AND DRAINAGE Left 08/06/2022    Procedure: Incision and Drainage - LEFT THIGH.;  Surgeon: Rene NUR  MD Tawanda;  Location: [facility] OR 2ND FLR;  Service: Orthopedics;  Laterality: Left;    INJECTION, TENDON SHEATH OR LIGAMENT, 1 TENDON SHEATH OR LIGAMENT Left 3/8/2024    Procedure: INJECTION,TENDON SHEATH OR LIGAMENT,1 TENDON SHEATH OR LIGAMENT;  Surgeon: Enrico Bazzi Jr., DPM;  Location: [facility] OR;  Service: Podiatry;  Laterality: Left;    IRRIGATION AND DEBRIDEMENT OF LOWER EXTREMITY Left 07/29/2022    Procedure: IRRIGATION AND DEBRIDEMENT, LOWER EXTREMITY;  Surgeon: Rob Graff MD;  Location: [facility] OR 2ND FLR;  Service: Orthopedics;  Laterality: Left;    KNEE ARTHROSCOPY W/ MENISCAL REPAIR Left     Jan 2023    LENGTHENING OR SHORTENING, TENDON, LOWER EXTREMITY, 1 TENDON Left 3/8/2024    Procedure: LENGTHENING OR SHORTENING, TENDON, LOWER EXTREMITY, 1 TENDON;  Surgeon: Enrico Bazzi Jr., DPM;  Location: [facility] OR;  Service: Podiatry;  Laterality: Left;    RESECTION OF GASTROCNEMIUS MUSCLE Left 3/8/2024    Procedure: RESECTION, MUSCLE, GASTROCNEMIUS;  Surgeon: Enrico Bazzi Jr., DPM;  Location: [facility] OR;  Service: Podiatry;  Laterality: Left;    WRIST SURGERY Left      Family History   Problem Relation Name Age of Onset    Diabetes Mother      No Known Problems Father       Current Outpatient Medications   Medication Sig Dispense Refill    amLODIPine (NORVASC) 10 MG tablet Take 1 tablet (10 mg total) by mouth once daily. 90 tablet 3    atorvastatin (LIPITOR) 20 MG tablet Take 1 tablet (20 mg total) by mouth once daily. 90 tablet 3    blood sugar diagnostic Strp 1 strip by Misc.(Non-Drug; Combo Route) route once daily. 100 each 11    blood-glucose meter kit Use as instructed 1 each 0    cyclobenzaprine (FLEXERIL) 10 MG tablet Take 1 tablet (10 mg total) by mouth 3 (three) times daily as needed. 30 tablet 0    gabapentin (NEURONTIN) 300 MG capsule Take 1 capsule (300 mg total) by mouth 3 (three) times daily. 90 capsule 3    insulin (LANTUS SOLOSTAR U-100 INSULIN) glargine 100 units/mL SubQ pen Inject 16  "Units into the skin every morning then take 20 Units at bedtime 25 mL 3    lancets Misc 1 lancet by Misc.(Non-Drug; Combo Route) route once daily. 100 each 11    LIDOcaine-prilocaine (EMLA) cream Apply topically 2 (two) times daily as needed. 60 g 0    lisinopriL (PRINIVIL,ZESTRIL) 40 MG tablet Take 1 tablet (40 mg total) by mouth once daily. 90 tablet 3    metFORMIN (GLUCOPHAGE-XR) 750 MG ER 24hr tablet Take 2 tablets (1,500 mg total) by mouth daily with breakfast. 180 tablet 3    multivitamin (THERAGRAN) per tablet Take 1 tablet by mouth.      pen needle, diabetic 31 gauge x 5/16" Ndle Use to inject insulin into the skin. 100 each 11    tirzepatide 7.5 mg/0.5 mL PnIj Inject 7.5 mg into the skin every 7 days. 12 Pen 3    meloxicam (MOBIC) 7.5 MG tablet Take 1 tablet (7.5 mg total) by mouth once daily. 30 tablet 1     No current facility-administered medications for this visit.     Review of patient's allergies indicates:   Allergen Reactions    Adhesive tape-silicones      It was a White tape ( uncertain if related to silicone) 2022     Social History     Socioeconomic History    Marital status:    Tobacco Use    Smoking status: Former     Current packs/day: 0.00     Types: Cigarettes     Quit date: 2020     Years since quittin.3    Smokeless tobacco: Never   Substance and Sexual Activity    Alcohol use: Yes     Alcohol/week: 12.0 standard drinks of alcohol     Types: 12 Cans of beer per week     Comment: socially    Drug use: Never    Sexual activity: Yes     Partners: Female   Social History Narrative    Lives with wife and 2 kids in college and one older with 1 grand child. 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     Social Determinants of Health     Financial Resource Strain: Patient Declined (2024)    Overall Financial Resource Strain (CARDIA)     Difficulty of Paying Living Expenses: Patient declined   Food Insecurity: Patient Declined (2024)    Hunger Vital Sign     Worried " "About Running Out of Food in the Last Year: Patient declined     Ran Out of Food in the Last Year: Patient declined   Transportation Needs: Patient Declined (2/8/2024)    PRAPARE - Transportation     Lack of Transportation (Medical): Patient declined     Lack of Transportation (Non-Medical): Patient declined   Physical Activity: Unknown (2/8/2024)    Exercise Vital Sign     Days of Exercise per Week: Patient declined   Stress: Stress Concern Present (2/8/2024)    [employer] - Occupational Stress Questionnaire     Feeling of Stress : To some extent   Housing Stability: Patient Declined (2/8/2024)    Housing Stability Vital Sign     Unable to Pay for Housing in the Last Year: Patient declined     Unstable Housing in the Last Year: Patient declined       ROS  REVIEW OF SYSTEMS: Negative as documented below as well as positive findings in bold.       Constitutional  Respiratory  Gastrointestinal  Skin   - Fever - Cough - Heartburn - Rash   - Chills - Spit blood - Nausea - Itching   - Weight Loss - Shortness of breath - Vomiting - Nail pain   - Malaise/Fatigue - Wheezing - Abdominal Pain  Wound/Ulcer   - Weight Gain   - Blood in Stool         - Diarrhea          Cardiovascular  Genitourinary  Neurological  HEENT   - Chest Pain - Dysuria - Dizziness - Headache   - Palpitations - Hematuria - Tingling - Congestion   - Pain at night in legs - Flank Pain - Tremor - Sore Throat   - Cramping   - Sensory Change - Blurred Vision   - Leg Swelling   - Speech Change - Double Vision   - Dizzy when standing   - Focal Weakness - Eye Redness       - Seizures - Dry Eyes       - Loss of Consciousness          Endocrine  Musculoskeletal  Psychiatric   - Cold intolerance - Muscle Pain - Depression   - Heat intolerance - Neck Pain - Insomnia   - Anemia - Joint Pain - Memory Loss   -  Easy bruising, bleeding - Heel pain - Anxiety      Toe Pain        Leg/Ankle/Foot Pain         Objective:     Resp 18   Ht 5' 7" " (1.702 m)   Wt 101.1 kg (222 lb 14.2 oz)   BMI 34.91 kg/m²     Physical Exam    Neck:  Trachea Midline  No visible masses    Respiratory/Ears:  No distress or labored breathing.  Able to differentiate between normal talking voice and whisper.  Able to follow commands    Eyes:  Visual Acuity intact  No discoloration noted.    Physical Exam  Ortho Exam  Foot Exam    L LE exam con't:  V: DP 2/4, PT 2/4, CRT< 3s to all digits tested.    N: SILT in SP/DP/T/Ron/Saph distributions    Ortho: +Motor EHL/FHL/TA/GA   Surgical site pain absent   Surgical site swelling absent   Equinus deformity absent   Compartments soft/compressible. No pain on passive stretch of big toe. No calf  Pain.     Derm: Skin intact. Sutures/staples: removed. Small opening in skin where stitch was is scabbed over without signs of infection. Signs of infection: none.     Imaging / Labs:    Lab Results   Component Value Date    WBC 8.63 02/21/2024    WBC 7.82 02/08/2024    WBC 5.50 11/09/2022    WBC 8.52 08/29/2022    WBC 5.76 08/24/2022    PROCAL 0.47 (H) 08/17/2022    PROCAL 0.57 (H) 07/29/2022    PROCAL 0.29 (H) 07/26/2022    SEDRATE 68 (H) 08/23/2022    SEDRATE 75 (H) 08/21/2022    SEDRATE 58 (H) 08/19/2022    SEDRATE 50 (H) 08/17/2022    SEDRATE 65 (H) 08/15/2022    CRP 7.0 08/29/2022    CRP 30.7 (H) 08/23/2022    .4 (H) 08/21/2022    .2 (H) 08/19/2022    CRP 24.9 (H) 08/17/2022    PREALBUMIN 32 02/21/2024    PREALBUMIN 7 (L) 07/29/2022       Lab Results   Component Value Date    PREALBUMIN 32 02/21/2024

## 2024-05-09 RX ORDER — DOXYCYCLINE 100 MG/1
100 CAPSULE ORAL 2 TIMES DAILY
Qty: 14 CAPSULE | Refills: 0 | Status: SHIPPED | OUTPATIENT
Start: 2024-05-09 | End: 2024-05-16

## 2024-05-09 RX ORDER — MELOXICAM 7.5 MG/1
7.5 TABLET ORAL DAILY
Qty: 30 TABLET | Refills: 1 | Status: SHIPPED | OUTPATIENT
Start: 2024-05-09 | End: 2024-07-08

## 2024-05-19 ENCOUNTER — PATIENT MESSAGE (OUTPATIENT)
Dept: PODIATRY | Facility: CLINIC | Age: 49
End: 2024-05-19
Payer: COMMERCIAL

## 2024-05-21 ENCOUNTER — TELEPHONE (OUTPATIENT)
Dept: PODIATRY | Facility: CLINIC | Age: 49
End: 2024-05-21

## 2024-05-29 ENCOUNTER — OFFICE VISIT (OUTPATIENT)
Dept: PODIATRY | Facility: CLINIC | Age: 49
End: 2024-05-29
Payer: COMMERCIAL

## 2024-05-29 ENCOUNTER — TELEPHONE (OUTPATIENT)
Dept: PODIATRY | Facility: CLINIC | Age: 49
End: 2024-05-29
Payer: COMMERCIAL

## 2024-05-29 VITALS — HEIGHT: 67 IN | WEIGHT: 222.88 LBS | BODY MASS INDEX: 34.98 KG/M2

## 2024-05-29 DIAGNOSIS — Z47.89 AFTERCARE FOLLOWING SURGERY OF THE MUSCULOSKELETAL SYSTEM: Primary | ICD-10-CM

## 2024-05-29 PROCEDURE — 99024 POSTOP FOLLOW-UP VISIT: CPT | Mod: S$GLB,,, | Performed by: PODIATRIST

## 2024-05-29 PROCEDURE — 99999 PR PBB SHADOW E&M-EST. PATIENT-LVL III: CPT | Mod: PBBFAC,,, | Performed by: PODIATRIST

## 2024-06-04 DIAGNOSIS — Z79.4 TYPE 2 DIABETES MELLITUS WITHOUT COMPLICATION, WITH LONG-TERM CURRENT USE OF INSULIN: ICD-10-CM

## 2024-06-04 DIAGNOSIS — E11.9 TYPE 2 DIABETES MELLITUS WITHOUT COMPLICATION, WITH LONG-TERM CURRENT USE OF INSULIN: ICD-10-CM

## 2024-06-04 RX ORDER — PEN NEEDLE, DIABETIC 30 GX3/16"
NEEDLE, DISPOSABLE MISCELLANEOUS
Qty: 100 EACH | Refills: 11 | Status: SHIPPED | OUTPATIENT
Start: 2024-06-04

## 2024-06-04 NOTE — TELEPHONE ENCOUNTER
Care Due:                  Date            Visit Type   Department     Provider  --------------------------------------------------------------------------------                                EP -                              PRIMARY      DESC FAMILY  Last Visit: 04-      [facility] ([facility])   [facility]                              EP -                              PRIMARY      DESC FAMILY  Next Visit: 06-      [facility]                                                            Last  Test          Frequency    Reason                     Performed    Due Date  --------------------------------------------------------------------------------    HBA1C.......  6 months...  insulin, metFORMIN,        02- 08-                             tirzepatide..............    Lipid Panel.  12 months..  atorvastatin.............  05-   05-    [facility] Embedded Care Due Messages. Reference number: [id number].   6/04/2024 5:30:21 AM CDT

## 2024-06-11 ENCOUNTER — PATIENT OUTREACH (OUTPATIENT)
Dept: ADMINISTRATIVE | Facility: HOSPITAL | Age: 49
End: 2024-06-11
Payer: COMMERCIAL

## 2024-06-11 NOTE — PROGRESS NOTES
Population Health Chart Review & Patient Outreach Details      Additional [facility] Health Notes:               Updates Requested / Reviewed:      Updated Care Coordination Note, Care Everywhere, Care Team Updated, and Immunizations Reconciliation Completed or Queried: [facility] Topics Overdue:      [facility] Score: 5     Colon Cancer Screening  Urine Screening  Eye Exam  Foot Exam  Uncontrolled BP                       Health Maintenance Topic(s) Outreach Outcomes & Actions Taken:    Blood Pressure - Outreach Outcomes & Actions Taken  : Primary Care Follow Up Visit Scheduled

## 2024-06-18 ENCOUNTER — PATIENT OUTREACH (OUTPATIENT)
Dept: ADMINISTRATIVE | Facility: HOSPITAL | Age: 49
End: 2024-06-18
Payer: COMMERCIAL

## 2024-06-24 ENCOUNTER — OFFICE VISIT (OUTPATIENT)
Dept: FAMILY MEDICINE | Facility: CLINIC | Age: 49
End: 2024-06-24
Payer: COMMERCIAL

## 2024-06-24 VITALS
HEIGHT: 67 IN | BODY MASS INDEX: 36.59 KG/M2 | SYSTOLIC BLOOD PRESSURE: 176 MMHG | TEMPERATURE: 98 F | HEART RATE: 96 BPM | DIASTOLIC BLOOD PRESSURE: 88 MMHG | WEIGHT: 233.13 LBS | OXYGEN SATURATION: 97 %

## 2024-06-24 DIAGNOSIS — F32.1 CURRENT MODERATE EPISODE OF MAJOR DEPRESSIVE DISORDER WITHOUT PRIOR EPISODE: ICD-10-CM

## 2024-06-24 DIAGNOSIS — R80.9 TYPE 2 DIABETES MELLITUS WITH MICROALBUMINURIA, WITH LONG-TERM CURRENT USE OF INSULIN: ICD-10-CM

## 2024-06-24 DIAGNOSIS — Z12.11 ENCOUNTER FOR COLORECTAL CANCER SCREENING: ICD-10-CM

## 2024-06-24 DIAGNOSIS — I10 ESSENTIAL HYPERTENSION: ICD-10-CM

## 2024-06-24 DIAGNOSIS — F41.1 GAD (GENERALIZED ANXIETY DISORDER): ICD-10-CM

## 2024-06-24 DIAGNOSIS — Z79.4 TYPE 2 DIABETES MELLITUS WITH MICROALBUMINURIA, WITH LONG-TERM CURRENT USE OF INSULIN: ICD-10-CM

## 2024-06-24 DIAGNOSIS — Z12.12 ENCOUNTER FOR COLORECTAL CANCER SCREENING: ICD-10-CM

## 2024-06-24 DIAGNOSIS — E11.29 TYPE 2 DIABETES MELLITUS WITH MICROALBUMINURIA, WITH LONG-TERM CURRENT USE OF INSULIN: ICD-10-CM

## 2024-06-24 DIAGNOSIS — E66.01 CLASS 2 SEVERE OBESITY DUE TO EXCESS CALORIES WITH SERIOUS COMORBIDITY AND BODY MASS INDEX (BMI) OF 36.0 TO 36.9 IN ADULT: ICD-10-CM

## 2024-06-24 PROCEDURE — 99999 PR PBB SHADOW E&M-EST. PATIENT-LVL V: CPT | Mod: PBBFAC,,, | Performed by: STUDENT IN AN ORGANIZED HEALTH CARE EDUCATION/TRAINING PROGRAM

## 2024-06-24 PROCEDURE — 99215 OFFICE O/P EST HI 40 MIN: CPT | Mod: S$GLB,,, | Performed by: STUDENT IN AN ORGANIZED HEALTH CARE EDUCATION/TRAINING PROGRAM

## 2024-06-24 RX ORDER — ESCITALOPRAM OXALATE 10 MG/1
10 TABLET ORAL DAILY
Qty: 90 TABLET | Refills: 3 | Status: SHIPPED | OUTPATIENT
Start: 2024-06-24 | End: 2025-06-24

## 2024-06-24 RX ORDER — SEMAGLUTIDE 2.68 MG/ML
2 INJECTION, SOLUTION SUBCUTANEOUS
Qty: 9 ML | Refills: 3 | Status: SHIPPED | OUTPATIENT
Start: 2024-06-24 | End: 2025-06-24

## 2024-06-24 NOTE — PROGRESS NOTES
Subjective:       Patient ID: Wilfredo Thomas is a 48 y.o. male.    Chief Complaint: Follow-up      HPI    47M with uncontrolled T2DM, sHTN, HLD, gouty arthritis , obesity and plantar fasciitis/osteonecrosis s/p fasciotomy (02/22/2024) by Dr Bazzi presents for f/u on chronic conditions. He c/o uncontrolled anxiety symptoms and now drinks 5-6 bottles of beer daily to keep himself calm , now at the point where he feels he needs help. He also only takes his BP when higher than 120s and skip the next dose . Otherwise he has been compliant with all regimen.     Past Medical History:   Diagnosis Date    Diabetes     Gout     Hyperlipidemia     Hypertension        Past Surgical History:   Procedure Laterality Date    BONE MARROW ASPIRATION Left 3/8/2024    Procedure: ASPIRATION, BONE MARROW;  Surgeon: Enrico Bazzi Jr., DPM;  Location: [facility] OR;  Service: Podiatry;  Laterality: Left;    ENDOSCOPIC PLANTAR FASCIOTOMY Left 3/8/2024    Procedure: FASCIOTOMY, PLANTAR, ENDOSCOPIC;  Surgeon: Enrico Bazzi Jr., DPM;  Location: [facility] OR;  Service: Podiatry;  Laterality: Left;  pop saph    INCISION AND DRAINAGE Left 08/06/2022    Procedure: Incision and Drainage - LEFT THIGH.;  Surgeon: Rene Neff MD;  Location: [facility] OR 2ND FLR;  Service: Orthopedics;  Laterality: Left;    INJECTION, TENDON SHEATH OR LIGAMENT, 1 TENDON SHEATH OR LIGAMENT Left 3/8/2024    Procedure: INJECTION,TENDON SHEATH OR LIGAMENT,1 TENDON SHEATH OR LIGAMENT;  Surgeon: Enrico Bazzi Jr., DPM;  Location: [facility] OR;  Service: Podiatry;  Laterality: Left;    IRRIGATION AND DEBRIDEMENT OF LOWER EXTREMITY Left 07/29/2022    Procedure: IRRIGATION AND DEBRIDEMENT, LOWER EXTREMITY;  Surgeon: Rob Graff MD;  Location: [facility] OR 2ND FLR;  Service: Orthopedics;  Laterality: Left;    KNEE ARTHROSCOPY W/ MENISCAL REPAIR Left     Jan 2023    LENGTHENING OR SHORTENING, TENDON, LOWER EXTREMITY, 1 TENDON Left 3/8/2024    Procedure: LENGTHENING OR  SHORTENING, TENDON, LOWER EXTREMITY, 1 TENDON;  Surgeon: Enrico Bazzi Jr., DPM;  Location: [facility] OR;  Service: Podiatry;  Laterality: Left;    RESECTION OF GASTROCNEMIUS MUSCLE Left 3/8/2024    Procedure: RESECTION, MUSCLE, GASTROCNEMIUS;  Surgeon: Enrico Bazzi Jr., DPM;  Location: [facility] OR;  Service: Podiatry;  Laterality: Left;    WRIST SURGERY Left        Family History   Problem Relation Name Age of Onset    Diabetes Mother      No Known Problems Father         Social History     Socioeconomic History    Marital status:    Tobacco Use    Smoking status: Former     Current packs/day: 0.00     Types: Cigarettes     Quit date: 2020     Years since quittin.4    Smokeless tobacco: Never   Substance and Sexual Activity    Alcohol use: Yes     Alcohol/week: 12.0 standard drinks of alcohol     Types: 12 Cans of beer per week     Comment: socially    Drug use: Never    Sexual activity: Yes     Partners: Female   Social History Narrative    Lives with wife and 2 kids in college and one older with 1 grand child. 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     Social Determinants of Health     Financial Resource Strain: Patient Declined (2024)    Overall Financial Resource Strain (CARDIA)     Difficulty of Paying Living Expenses: Patient declined   Food Insecurity: Patient Declined (2024)    Hunger Vital Sign     Worried About Running Out of Food in the Last Year: Patient declined     Ran Out of Food in the Last Year: Patient declined   Transportation Needs: Patient Declined (2024)    PRAPARE - Transportation     Lack of Transportation (Medical): Patient declined     Lack of Transportation (Non-Medical): Patient declined   Physical Activity: Unknown (2024)    Exercise Vital Sign     Days of Exercise per Week: Patient declined   Stress: Stress Concern Present (2024)    [employer] - Occupational Stress Questionnaire     Feeling of Stress : To some extent  "  Housing Stability: Patient Declined (2/8/2024)    Housing Stability Vital Sign     Unable to Pay for Housing in the Last Year: Patient declined     Unstable Housing in the Last Year: Patient declined       Review of Systems   Constitutional:  Negative for chills, fatigue and fever.   Respiratory:  Negative for cough and shortness of breath.    Cardiovascular:  Negative for chest pain, palpitations and leg swelling.   Gastrointestinal:  Negative for abdominal pain, constipation and diarrhea.   Endocrine: Negative for polydipsia and polyphagia.   Genitourinary:  Negative for dysuria, flank pain and frequency.   Musculoskeletal:  Negative for arthralgias, back pain and joint swelling.   Skin:  Negative for rash and wound.   Neurological:  Negative for dizziness, seizures, weakness, numbness and headaches.   Psychiatric/Behavioral:  Positive for agitation. Negative for dysphoric mood and suicidal ideas. The patient is nervous/anxious.          Objective:     Vitals:    06/24/24 1458 06/24/24 1547   BP: (!) 172/94 (!) 176/88   BP Location: Right arm    Patient Position: Sitting    BP Method: Large (Manual)    Pulse: 100 96   Temp: 97.7 °F (36.5 °C)    SpO2: 97%    Weight: 105.7 kg (233 lb 2.2 oz)    Height: 5' 7" (1.702 m)             Physical Exam  Vitals reviewed.   Constitutional:       General: He is not in acute distress.     Appearance: He is obese.   HENT:      Mouth/Throat:      Mouth: Mucous membranes are moist.   Cardiovascular:      Rate and Rhythm: Normal rate and regular rhythm.      Pulses: Normal pulses.      Heart sounds: Normal heart sounds.   Pulmonary:      Effort: Pulmonary effort is normal.      Breath sounds: Normal breath sounds.   Abdominal:      Hernia: There is no hernia in the left inguinal area.   Genitourinary:     Pubic Area: No rash.       Penis: No erythema, tenderness or swelling.       Testes: Cremasteric reflex is present.         Right: Mass, tenderness or swelling not present.       " "  Left: Mass, tenderness or swelling not present.      Epididymis:      Right: Normal.      Left: Normal.   Musculoskeletal:         General: No swelling (well healed left medial leg surgical incsion).      Right lower leg: No edema (1+).      Left lower leg: No edema.   Skin:     General: Skin is warm.   Neurological:      General: No focal deficit present.      Mental Status: He is alert and oriented to person, place, and time.      Cranial Nerves: No cranial nerve deficit.   Psychiatric:         Mood and Affect: Mood normal.         Behavior: Behavior normal.           Laboratory:  CBC:  Recent Labs   Lab Result Units 06/21/24  0748   WBC K/uL 6.24   RBC M/uL 5.00   Hemoglobin g/dL 15.6   Hematocrit % 44.7   Platelets K/uL 195   MCV fL 89   MCH pg 31.2*   MCHC g/dL 34.9       CMP:  Recent Labs   Lab Result Units 06/21/24  0908   Glucose mg/dL 201*   Calcium mg/dL 10.9*   Albumin g/dL 4.0   Total Protein g/dL 7.9   Sodium mmol/L 141   Potassium mmol/L 4.7   CO2 mmol/L 25   Chloride mmol/L 104   BUN mg/dL 13   Alkaline Phosphatase U/L 86   ALT U/L 66*   AST U/L 41*   Total Bilirubin mg/dL 0.6       URINALYSIS:  No results for input(s): "COLORU", "CLARITYU", "SPECGRAV", "PHUR", "PROTEINUA", "GLUCOSEU", "BILIRUBINCON", "BLOODU", "WBCU", "RBCU", "BACTERIA", "MUCUS", "NITRITE", "LEUKOCYTESUR", "UROBILINOGEN", "HYALINECASTS" in the last 2160 hours.     LIPIDS:  Recent Labs   Lab Result Units 06/21/24  0908   HDL mg/dL 46   Cholesterol mg/dL 158   Triglycerides mg/dL 244*   LDL Cholesterol mg/dL 63.2   HDL/Cholesterol Ratio % 29.1   Non-HDL Cholesterol mg/dL 112   Total Cholesterol/HDL Ratio  3.4         TSH:  No results for input(s): "TSH" in the last 2160 hours.      A1C:  Recent Labs   Lab Result Units 06/21/24  0748   Hemoglobin A1C % 7.4*         : MRI left foot 02/02/2024  Achilles: Normal size and signal     Plantar Fascia: There is marked thickening/increased signal at the origin of the plantar fascia, consistent " with plantar fasciitis. Small discrete partial-thickness tear noted measuring 0.3 cm (series 5, image 15). There is prominent subcortical marrow edema within the adjacent calcaneus.  The soft tissue edema noted in the musculature adjacent to the medial and lateral bands, suggesting muscle strain.  No fluid collections.     Joints: No joint effusions. Tibiotalar and subtalar cartilage maintained.     Ligaments: Anterior and posterior inferior tibiofibular ligaments are intact.  Anterior talofibular, calcaneofibular and posterior talofibular ligaments are intact.  Deep and superficial components of deltoid ligament are intact.  Spring ligament complex and Lisfranc ligament are intact.     Tendons: Medial ankle flexor, dorsal ankle extensor, and peroneus tendons are intact.     Bones:  No fracture.  Bone lesion in the distal diaphysis of the tibia, partially imaged, suggestive of osteonecrosis.     Miscellaneous: The sinus tarsi and tarsal tunnel are unremarkable.     Impression:     Plantar fasciitis, as above.     Partially imaged distal tibial bone lesion, suggestive of osteonecrosis.    Assessment:         ICD-10-CM ICD-9-CM   1. Essential hypertension  I10 401.9   2. Type 2 diabetes mellitus with microalbuminuria, with long-term current use of insulin  E11.29 250.40    R80.9 791.0    Z79.4 V58.67   3. Current moderate episode of major depressive disorder without prior episode  F32.1 296.22   4. Class 1 obesity due to excess calories with serious comorbidity and body mass index (BMI) of 36 E66.09 278.00    Z68.34 V85.34   5. YANCI   M72.2 728.71                 Plan:       1. Type 2 diabetes mellitus with diabetic nephropathy  -slight improvement in A1C   -will switch mounjaro to ozempic due to lack of consistent availability  -start ozempic @ 2mg qweekly  -start n jardiance in the setting of worsening microalbuminuria while on lisinopril 40mg   -c/w lantus 16U BID+metformin 1500mg ER daily  -c/w lisinopril to  40mg  -c/w therapeutic life style changes    2. YANCI/MDD  -uncontrolled , now drinking 5-6 bottles of beer  -now amenable to pharmacologic intervention, still contemplating psych  -will start on lexapro, counseled on delayed therapeutic effects    3. BMI 36  -worsening again  -therapeutic life style modifications reinforced    4. sHTN  -not at goal, due to med noncompliance , uncontrolled anxiety and dietary indiscretion  -counseled again extensively on med compliance  -c/w amlodipine 10mg daily, c/w lisinopril 40mg daily  -monitor and keep BP log  -therapeutic life style changes reinforced    5. Mixed HLD  -lipid panel @ goal  -c/w statin and life style modifications      HCM: declined all due vaccine for now, will continue to address next clinic visit        Patient's Medications   New Prescriptions    EMPAGLIFLOZIN (JARDIANCE) 10 MG TABLET    Take 1 tablet (10 mg total) by mouth once daily.    ESCITALOPRAM OXALATE (LEXAPRO) 10 MG TABLET    Take 1 tablet (10 mg total) by mouth once daily.    SEMAGLUTIDE (OZEMPIC) 2 MG/DOSE (8 MG/3 ML) PNIJ    Inject 2 mg into the skin every 7 days.   Previous Medications    AMLODIPINE (NORVASC) 10 MG TABLET    Take 1 tablet (10 mg total) by mouth once daily.    ATORVASTATIN (LIPITOR) 20 MG TABLET    Take 1 tablet (20 mg total) by mouth once daily.    BLOOD SUGAR DIAGNOSTIC STRP    1 strip by Misc.(Non-Drug; Combo Route) route once daily.    BLOOD-GLUCOSE METER KIT    Use as instructed    INSULIN GLARGINE U-100, LANTUS, (LANTUS SOLOSTAR U-100 INSULIN) 100 UNIT/ML (3 ML) INPN PEN    Inject 16 Units into the skin every morning then take 20 Units at bedtime    LANCETS MISC    1 lancet by Misc.(Non-Drug; Combo Route) route once daily.    LISINOPRIL (PRINIVIL,ZESTRIL) 40 MG TABLET    Take 1 tablet (40 mg total) by mouth once daily.    METFORMIN (GLUCOPHAGE-XR) 750 MG ER 24HR TABLET    Take 2 tablets (1,500 mg total) by mouth daily with breakfast.    MULTIVITAMIN (THERAGRAN) PER TABLET     "Take 1 tablet by mouth.    PEN NEEDLE, DIABETIC 31 GAUGE X 5/16" NDLE    Use to inject insulin into the skin.   Modified Medications    No medications on file   Discontinued Medications    CYCLOBENZAPRINE (FLEXERIL) 10 MG TABLET    Take 1 tablet (10 mg total) by mouth 3 (three) times daily as needed.    GABAPENTIN (NEURONTIN) 300 MG CAPSULE    Take 1 capsule (300 mg total) by mouth 3 (three) times daily.    LIDOCAINE-PRILOCAINE (EMLA) CREAM    Apply topically 2 (two) times daily as needed.    MELOXICAM (MOBIC) 7.5 MG TABLET    Take 1 tablet (7.5 mg total) by mouth once daily.    TIRZEPATIDE 7.5 MG/0.5 ML PNIJ    Inject 7.5 mg into the skin every 7 days.       Time spent: 43 minutes in face to face discussion concerning diagnosis, prognosis, review of lab and test results, benefits of treatment as well as management of disease, counseling of patient and coordination of care between various health care providers.  Greater than half the time spent was used for coordination of care and counseling of patient.    Patient was given opportunity to express concerns, ask questions and verbalizes understanding of current management plan     Dr Oumou Zuniga MD  Internal Medicine              "

## 2024-07-08 ENCOUNTER — CLINICAL SUPPORT (OUTPATIENT)
Dept: FAMILY MEDICINE | Facility: CLINIC | Age: 49
End: 2024-07-08
Payer: COMMERCIAL

## 2024-07-08 VITALS — DIASTOLIC BLOOD PRESSURE: 76 MMHG | SYSTOLIC BLOOD PRESSURE: 160 MMHG

## 2024-07-08 DIAGNOSIS — I10 ESSENTIAL HYPERTENSION: Primary | ICD-10-CM

## 2024-07-08 PROCEDURE — 99999 PR PBB SHADOW E&M-EST. PATIENT-LVL III: CPT | Mod: PBBFAC,,,

## 2024-07-08 NOTE — PROGRESS NOTES
Wilfredo Thomas 48 y.o. male is here today for Blood Pressure check.   History of HTN yes.    Review of patient's allergies indicates:   Allergen Reactions    Adhesive tape-silicones      It was a White tape ( uncertain if related to silicone) 8/22/2022     Creatinine   Date Value Ref Range Status   06/21/2024 1.1 0.5 - 1.4 mg/dL Final     Sodium   Date Value Ref Range Status   06/21/2024 141 136 - 145 mmol/L Final     Potassium   Date Value Ref Range Status   06/21/2024 4.7 3.5 - 5.1 mmol/L Final   ]  Patient denies taking blood pressure medications on a regular basis at the same time of the day.     Current Outpatient Medications:     amLODIPine (NORVASC) 10 MG tablet, Take 1 tablet (10 mg total) by mouth once daily., Disp: 90 tablet, Rfl: 3    atorvastatin (LIPITOR) 20 MG tablet, Take 1 tablet (20 mg total) by mouth once daily., Disp: 90 tablet, Rfl: 3    blood sugar diagnostic Strp, 1 strip by Misc.(Non-Drug; Combo Route) route once daily., Disp: 100 each, Rfl: 11    blood-glucose meter kit, Use as instructed, Disp: 1 each, Rfl: 0    empagliflozin (JARDIANCE) 10 mg tablet, Take 1 tablet (10 mg total) by mouth once daily., Disp: 30 tablet, Rfl: 0    EScitalopram oxalate (LEXAPRO) 10 MG tablet, Take 1 tablet (10 mg total) by mouth once daily., Disp: 90 tablet, Rfl: 3    insulin glargine U-100, Lantus, (LANTUS SOLOSTAR U-100 INSULIN) 100 unit/mL (3 mL) InPn pen, Inject 16 Units into the skin every morning then take 20 Units at bedtime, Disp: 25 mL, Rfl: 3    lancets Misc, 1 lancet by Misc.(Non-Drug; Combo Route) route once daily., Disp: 100 each, Rfl: 11    lisinopriL (PRINIVIL,ZESTRIL) 40 MG tablet, Take 1 tablet (40 mg total) by mouth once daily., Disp: 90 tablet, Rfl: 3    metFORMIN (GLUCOPHAGE-XR) 750 MG ER 24hr tablet, Take 2 tablets (1,500 mg total) by mouth daily with breakfast., Disp: 180 tablet, Rfl: 3    multivitamin (THERAGRAN) per tablet, Take 1 tablet by mouth., Disp: , Rfl:     pen needle, diabetic  "31 gauge x 5/16" Ndle, Use to inject insulin into the skin., Disp: 100 each, Rfl: 11    semaglutide (OZEMPIC) 2 mg/dose (8 mg/3 mL) PnIj, Inject 2 mg into the skin every 7 days., Disp: 9 mL, Rfl: 3  Does patient have record of home blood pressure readings no. Readings have been averaging .   Last dose of blood pressure medication was taken at.  Patient is asymptomatic.   Complains of .    BP: (!) 160/76 ,   .    Blood pressure reading after 15 minutes was 160/76, Pulse 80.  Dr. Zuniga notified.      "

## 2024-09-15 DIAGNOSIS — I10 ESSENTIAL HYPERTENSION: ICD-10-CM

## 2024-09-15 NOTE — TELEPHONE ENCOUNTER
No care due was identified.  Health [facility] Embedded Care Due Messages. Reference number: [id number].   9/15/2024 11:10:45 AM CDT

## 2024-09-16 RX ORDER — LISINOPRIL 40 MG/1
40 TABLET ORAL DAILY
Qty: 90 TABLET | Refills: 3 | Status: SHIPPED | OUTPATIENT
Start: 2024-09-16 | End: 2025-09-16

## 2024-12-02 ENCOUNTER — OFFICE VISIT (OUTPATIENT)
Dept: FAMILY MEDICINE | Facility: CLINIC | Age: 49
End: 2024-12-02
Payer: COMMERCIAL

## 2024-12-02 VITALS
HEART RATE: 84 BPM | WEIGHT: 223.44 LBS | HEIGHT: 67 IN | OXYGEN SATURATION: 98 % | BODY MASS INDEX: 35.07 KG/M2 | DIASTOLIC BLOOD PRESSURE: 86 MMHG | RESPIRATION RATE: 16 BRPM | TEMPERATURE: 98 F | SYSTOLIC BLOOD PRESSURE: 158 MMHG

## 2024-12-02 DIAGNOSIS — E11.29 TYPE 2 DIABETES MELLITUS WITH MICROALBUMINURIA, WITH LONG-TERM CURRENT USE OF INSULIN: Primary | ICD-10-CM

## 2024-12-02 DIAGNOSIS — E11.29 TYPE 2 DIABETES MELLITUS WITH MICROALBUMINURIA, WITH LONG-TERM CURRENT USE OF INSULIN: ICD-10-CM

## 2024-12-02 DIAGNOSIS — F41.1 GAD (GENERALIZED ANXIETY DISORDER): ICD-10-CM

## 2024-12-02 DIAGNOSIS — Z79.4 TYPE 2 DIABETES MELLITUS WITH MICROALBUMINURIA, WITH LONG-TERM CURRENT USE OF INSULIN: Primary | ICD-10-CM

## 2024-12-02 DIAGNOSIS — E78.2 MIXED HYPERLIPIDEMIA: ICD-10-CM

## 2024-12-02 DIAGNOSIS — I10 ESSENTIAL HYPERTENSION: ICD-10-CM

## 2024-12-02 DIAGNOSIS — F32.1 CURRENT MODERATE EPISODE OF MAJOR DEPRESSIVE DISORDER WITHOUT PRIOR EPISODE: ICD-10-CM

## 2024-12-02 DIAGNOSIS — E66.01 SEVERE OBESITY (BMI 35.0-39.9) WITH COMORBIDITY: ICD-10-CM

## 2024-12-02 DIAGNOSIS — R80.9 TYPE 2 DIABETES MELLITUS WITH MICROALBUMINURIA, WITH LONG-TERM CURRENT USE OF INSULIN: ICD-10-CM

## 2024-12-02 DIAGNOSIS — Z79.4 TYPE 2 DIABETES MELLITUS WITH MICROALBUMINURIA, WITH LONG-TERM CURRENT USE OF INSULIN: ICD-10-CM

## 2024-12-02 DIAGNOSIS — I10 ESSENTIAL HYPERTENSION: Primary | ICD-10-CM

## 2024-12-02 DIAGNOSIS — R80.9 TYPE 2 DIABETES MELLITUS WITH MICROALBUMINURIA, WITH LONG-TERM CURRENT USE OF INSULIN: Primary | ICD-10-CM

## 2024-12-02 PROCEDURE — 99214 OFFICE O/P EST MOD 30 MIN: CPT | Mod: S$GLB,,, | Performed by: STUDENT IN AN ORGANIZED HEALTH CARE EDUCATION/TRAINING PROGRAM

## 2024-12-02 PROCEDURE — 99999 PR PBB SHADOW E&M-EST. PATIENT-LVL IV: CPT | Mod: PBBFAC,,, | Performed by: STUDENT IN AN ORGANIZED HEALTH CARE EDUCATION/TRAINING PROGRAM

## 2024-12-02 RX ORDER — METOPROLOL SUCCINATE 50 MG/1
50 TABLET, EXTENDED RELEASE ORAL DAILY
Qty: 90 TABLET | Refills: 3 | Status: SHIPPED | OUTPATIENT
Start: 2024-12-02 | End: 2025-12-02

## 2024-12-02 RX ORDER — SEMAGLUTIDE 2.68 MG/ML
2 INJECTION, SOLUTION SUBCUTANEOUS
Qty: 9 ML | Refills: 3 | Status: SHIPPED | OUTPATIENT
Start: 2024-12-02 | End: 2024-12-02

## 2024-12-02 RX ORDER — HYDROXYZINE HYDROCHLORIDE 25 MG/1
25 TABLET, FILM COATED ORAL
COMMUNITY
Start: 2024-11-12 | End: 2024-12-02

## 2024-12-02 RX ORDER — SEMAGLUTIDE 2.68 MG/ML
2 INJECTION, SOLUTION SUBCUTANEOUS
Qty: 9 ML | Refills: 3 | Status: SHIPPED | OUTPATIENT
Start: 2024-12-02 | End: 2025-12-02

## 2024-12-02 RX ORDER — METOPROLOL SUCCINATE 25 MG/1
25 TABLET, EXTENDED RELEASE ORAL DAILY
COMMUNITY
Start: 2024-11-12 | End: 2024-12-02

## 2024-12-02 NOTE — PROGRESS NOTES
Subjective:       Patient ID: Wilfredo Thomas is a 49 y.o. male.    Chief Complaint: Follow-up and Hospital Follow Up (S/p chest pains x2 weeks ago)      HPI    47M with uncontrolled T2DM, sHTN, HLD, gouty arthritis , obesity and plantar fasciitis/osteonecrosis s/p fasciotomy (02/22/2024) by Dr Bazzi presents for f/u on chronic conditions. He c/o uncontrolled anxiety symptoms , he stopped taking the lexapro because he does not like how it made him feel-felt tired and not interested in doing anything, he has also stopped taking his cholesterol pill because of findings made on internet by his wife . Home BP range in the 130s-150s, compliant with lisinopril 40 and metoprolol 25 since last ER visit .     Past Medical History:   Diagnosis Date    Diabetes     Gout     Hyperlipidemia     Hypertension        Past Surgical History:   Procedure Laterality Date    BONE MARROW ASPIRATION Left 3/8/2024    Procedure: ASPIRATION, BONE MARROW;  Surgeon: Enrico Bazzi Jr., DPM;  Location: [facility] OR;  Service: Podiatry;  Laterality: Left;    ENDOSCOPIC PLANTAR FASCIOTOMY Left 3/8/2024    Procedure: FASCIOTOMY, PLANTAR, ENDOSCOPIC;  Surgeon: Enrico Bazzi Jr., DPM;  Location: [facility] OR;  Service: Podiatry;  Laterality: Left;  pop saph    INCISION AND DRAINAGE Left 08/06/2022    Procedure: Incision and Drainage - LEFT THIGH.;  Surgeon: Rene Neff MD;  Location: [facility] OR 2ND FLR;  Service: Orthopedics;  Laterality: Left;    INJECTION, TENDON SHEATH OR LIGAMENT, 1 TENDON SHEATH OR LIGAMENT Left 3/8/2024    Procedure: INJECTION,TENDON SHEATH OR LIGAMENT,1 TENDON SHEATH OR LIGAMENT;  Surgeon: Enrico Bazzi Jr., DPM;  Location: [facility] OR;  Service: Podiatry;  Laterality: Left;    IRRIGATION AND DEBRIDEMENT OF LOWER EXTREMITY Left 07/29/2022    Procedure: IRRIGATION AND DEBRIDEMENT, LOWER EXTREMITY;  Surgeon: Rob Graff MD;  Location: [facility] OR 2ND FLR;  Service: Orthopedics;  Laterality: Left;    KNEE ARTHROSCOPY W/  MENISCAL REPAIR Left     2023    LENGTHENING OR SHORTENING, TENDON, LOWER EXTREMITY, 1 TENDON Left 3/8/2024    Procedure: LENGTHENING OR SHORTENING, TENDON, LOWER EXTREMITY, 1 TENDON;  Surgeon: Enrico Bazzi Jr., DPM;  Location: [facility] OR;  Service: Podiatry;  Laterality: Left;    RESECTION OF GASTROCNEMIUS MUSCLE Left 3/8/2024    Procedure: RESECTION, MUSCLE, GASTROCNEMIUS;  Surgeon: Enrico Bazzi Jr., DPM;  Location: [facility] OR;  Service: Podiatry;  Laterality: Left;    WRIST SURGERY Left        Family History   Problem Relation Name Age of Onset    Diabetes Mother      No Known Problems Father         Social History     Socioeconomic History    Marital status:    Tobacco Use    Smoking status: Former     Current packs/day: 0.00     Types: Cigarettes     Quit date: 2020     Years since quittin.9    Smokeless tobacco: Never   Substance and Sexual Activity    Alcohol use: Yes     Alcohol/week: 12.0 standard drinks of alcohol     Types: 12 Cans of beer per week     Comment: socially    Drug use: Never    Sexual activity: Yes     Partners: Female   Social History Narrative    Lives with wife and 2 kids in college and one older with 1 grand child. Supervisor at [employer]. Smoker but quit 2020     Social Drivers of Health     Financial Resource Strain: Patient Declined (2024)    Overall Financial Resource Strain (CARDIA)     Difficulty of Paying Living Expenses: Patient declined   Food Insecurity: Patient Declined (2024)    Hunger Vital Sign     Worried About Running Out of Food in the Last Year: Patient declined     Ran Out of Food in the Last Year: Patient declined   Transportation Needs: Patient Declined (2024)    PRAPARE - Transportation     Lack of Transportation (Medical): Patient declined     Lack of Transportation (Non-Medical): Patient declined   Physical Activity: Unknown (2024)    Exercise Vital Sign     Days of Exercise per Week: Patient declined   Stress: Stress Concern  "Present (2/8/2024)    [facility] - Occupational Stress Questionnaire     Feeling of Stress : To some extent   Housing Stability: Patient Declined (2/8/2024)    Housing Stability Vital Sign     Unable to Pay for Housing in the Last Year: Patient declined     Unstable Housing in the Last Year: Patient declined       Review of Systems   Constitutional:  Negative for chills, fatigue and fever.   Respiratory:  Negative for cough and shortness of breath.    Cardiovascular:  Negative for chest pain, palpitations and leg swelling.   Gastrointestinal:  Negative for abdominal pain, constipation and diarrhea.   Endocrine: Negative for polydipsia and polyphagia.   Genitourinary:  Negative for dysuria, flank pain and frequency.   Musculoskeletal:  Negative for arthralgias, back pain and joint swelling.   Skin:  Negative for rash and wound.   Neurological:  Negative for dizziness, seizures, weakness, numbness and headaches.   Psychiatric/Behavioral:  Negative for agitation, dysphoric mood and suicidal ideas. The patient is not nervous/anxious.          Objective:     Vitals:    12/02/24 1328   BP: (!) 158/86   BP Location: Right arm   Patient Position: Sitting   Pulse: 84   Resp: 16   Temp: 98.1 °F (36.7 °C)   TempSrc: Temporal   SpO2: 98%   Weight: 101.4 kg (223 lb 7 oz)   Height: 5' 7" (1.702 m)            Physical Exam  Vitals reviewed.   Constitutional:       General: He is not in acute distress.     Appearance: He is obese.   HENT:      Mouth/Throat:      Mouth: Mucous membranes are moist.   Cardiovascular:      Rate and Rhythm: Normal rate and regular rhythm.      Pulses: Normal pulses.      Heart sounds: Normal heart sounds.   Pulmonary:      Effort: Pulmonary effort is normal.      Breath sounds: Normal breath sounds.   Abdominal:      Hernia: There is no hernia in the left inguinal area.   Genitourinary:     Pubic Area: No rash.       Penis: No erythema, tenderness or swelling.       Testes: " "Cremasteric reflex is present.         Right: Mass, tenderness or swelling not present.         Left: Mass, tenderness or swelling not present.      Epididymis:      Right: Normal.      Left: Normal.   Musculoskeletal:         General: No swelling (well healed left medial leg surgical incsion).      Right lower leg: No edema (1+).      Left lower leg: No edema.   Skin:     General: Skin is warm.   Neurological:      General: No focal deficit present.      Mental Status: He is alert and oriented to person, place, and time.      Cranial Nerves: No cranial nerve deficit.   Psychiatric:         Mood and Affect: Mood normal.         Behavior: Behavior normal.           Laboratory:  CBC:  No results for input(s): "WBC", "RBC", "HGB", "HCT", "PLT", "MCV", "MCH", "MCHC" in the last 2160 hours.      CMP:  No results for input(s): "GLU", "CALCIUM", "ALBUMIN", "PROT", "NA", "K", "CO2", "CL", "BUN", "ALKPHOS", "ALT", "AST", "BILITOT" in the last 2160 hours.    Invalid input(s): "CREATININ"      URINALYSIS:  No results for input(s): "COLORU", "CLARITYU", "SPECGRAV", "PHUR", "PROTEINUA", "GLUCOSEU", "BILIRUBINCON", "BLOODU", "WBCU", "RBCU", "BACTERIA", "MUCUS", "NITRITE", "LEUKOCYTESUR", "UROBILINOGEN", "HYALINECASTS" in the last 2160 hours.     LIPIDS:  No results for input(s): "TSH", "HDL", "CHOL", "TRIG", "LDLCALC", "CHOLHDL", "NONHDLCHOL", "TOTALCHOLEST" in the last 2160 hours.        TSH:  No results for input(s): "TSH" in the last 2160 hours.      A1C:  No results for input(s): "HGBA1C" in the last 2160 hours.        : MRI left foot 02/02/2024  Achilles: Normal size and signal     Plantar Fascia: There is marked thickening/increased signal at the origin of the plantar fascia, consistent with plantar fasciitis. Small discrete partial-thickness tear noted measuring 0.3 cm (series 5, image 15). There is prominent subcortical marrow edema within the adjacent calcaneus.  The soft tissue edema noted in the musculature adjacent to " the medial and lateral bands, suggesting muscle strain.  No fluid collections.     Joints: No joint effusions. Tibiotalar and subtalar cartilage maintained.     Ligaments: Anterior and posterior inferior tibiofibular ligaments are intact.  Anterior talofibular, calcaneofibular and posterior talofibular ligaments are intact.  Deep and superficial components of deltoid ligament are intact.  Spring ligament complex and Lisfranc ligament are intact.     Tendons: Medial ankle flexor, dorsal ankle extensor, and peroneus tendons are intact.     Bones:  No fracture.  Bone lesion in the distal diaphysis of the tibia, partially imaged, suggestive of osteonecrosis.     Miscellaneous: The sinus tarsi and tarsal tunnel are unremarkable.     Impression:     Plantar fasciitis, as above.     Partially imaged distal tibial bone lesion, suggestive of osteonecrosis.    Assessment:         ICD-10-CM ICD-9-CM   1. Essential hypertension  I10 401.9   2. Type 2 diabetes mellitus with microalbuminuria, with long-term current use of insulin  E11.29 250.40    R80.9 791.0    Z79.4 V58.67   3. Current moderate episode of major depressive disorder without prior episode  F32.1 296.22   4. Class 1 obesity due to excess calories with serious comorbidity and body mass index (BMI) of 35 E66.09 278.00    Z68.34 V85.34   5. YANCI   M72.2 728.71                 Plan:       1. Type 2 diabetes mellitus with diabetic nephropathy  -slight improvement in A1C   -c/w ozempic @ 2mg qweekly  -start on jardiance in the setting of worsening microalbuminuria while on lisinopril 40mg   -c/w lantus 16U BID+metformin 1500mg ER daily  -c/w lisinopril to 40mg  -c/w therapeutic life style changes    2. YANCI/MDD  -uncontrolled , not compliant with lexapro  -decline switch to another regimen for now  -still contemplating psych  -will start on lexapro, counseled on delayed therapeutic effects    3. BMI 35  -worsening again  -therapeutic life style modifications  "reinforced    4. sHTN  -not at goal, uncontrolled anxiety and dietary indiscretion  -counseled again extensively on med compliance  -increase metoprolol to 50mg ER, c/w lisinopril 40mg daily  -monitor and keep BP log  -therapeutic life style changes reinforced    5. Mixed HLD  -poorly compliant with statin  -need for compliance reinforced   -c/w statin and life style modifications      HCM: declined all due vaccine for now, will continue to address next clinic visit        Patient's Medications   New Prescriptions    METOPROLOL SUCCINATE (TOPROL-XL) 50 MG 24 HR TABLET    Take 1 tablet (50 mg total) by mouth once daily.   Previous Medications    ATORVASTATIN (LIPITOR) 20 MG TABLET    Take 1 tablet (20 mg total) by mouth once daily.    BLOOD SUGAR DIAGNOSTIC STRP    1 strip by Misc.(Non-Drug; Combo Route) route once daily.    BLOOD-GLUCOSE METER KIT    Use as instructed    INSULIN GLARGINE U-100, LANTUS, (LANTUS SOLOSTAR U-100 INSULIN) 100 UNIT/ML (3 ML) INPN PEN    Inject 16 Units into the skin every morning then take 20 Units at bedtime    LISINOPRIL (PRINIVIL,ZESTRIL) 40 MG TABLET    Take 1 tablet (40 mg total) by mouth once daily.    METFORMIN (GLUCOPHAGE-XR) 750 MG ER 24HR TABLET    Take 2 tablets (1,500 mg total) by mouth daily with breakfast.    MULTIVITAMIN (THERAGRAN) PER TABLET    Take 1 tablet by mouth.    PEN NEEDLE, DIABETIC 31 GAUGE X 5/16" NDLE    Use to inject insulin into the skin.   Modified Medications    Modified Medication Previous Medication    EMPAGLIFLOZIN (JARDIANCE) 10 MG TABLET empagliflozin (JARDIANCE) 10 mg tablet       Take 1 tablet (10 mg total) by mouth once daily.    Take 1 tablet (10 mg total) by mouth once daily.    SEMAGLUTIDE (OZEMPIC) 2 MG/DOSE (8 MG/3 ML) PNIJ semaglutide (OZEMPIC) 2 mg/dose (8 mg/3 mL) PnIj       Inject 2 mg into the skin every 7 days.    Inject 2 mg into the skin every 7 days.   Discontinued Medications    AMLODIPINE (NORVASC) 10 MG TABLET    Take 1 tablet (10 " mg total) by mouth once daily.    ESCITALOPRAM OXALATE (LEXAPRO) 10 MG TABLET    Take 1 tablet (10 mg total) by mouth once daily.    HYDROXYZINE HCL (ATARAX) 25 MG TABLET    Take 25 mg by mouth as needed for Anxiety (up to TID).    LANCETS MISC    1 lancet by Misc.(Non-Drug; Combo Route) route once daily.    METOPROLOL SUCCINATE (TOPROL-XL) 25 MG 24 HR TABLET    Take 25 mg by mouth once daily.       Time spent: 34 minutes in face to face discussion concerning diagnosis, prognosis, review of lab and test results, benefits of treatment as well as management of disease, counseling of patient and coordination of care between various health care providers.  Greater than half the time spent was used for coordination of care and counseling of patient.    Patient was given opportunity to express concerns, ask questions and verbalizes understanding of current management plan     Dr Oumou Zuniga MD  Internal Medicine

## 2025-03-12 DIAGNOSIS — E11.9 TYPE 2 DIABETES MELLITUS WITHOUT COMPLICATION: ICD-10-CM

## 2025-03-13 ENCOUNTER — TELEPHONE (OUTPATIENT)
Dept: FAMILY MEDICINE | Facility: CLINIC | Age: 50
End: 2025-03-13
Payer: COMMERCIAL

## 2025-03-13 NOTE — TELEPHONE ENCOUNTER
----- Message from Linsey sent at 3/13/2025 11:32 AM CDT -----  Type:  Needs Medical AdviceWho Called: Henok Mcgee Call Back Number: [phone]  m-f 8-4:30Additional Information: requesting a call back

## 2025-03-27 ENCOUNTER — OFFICE VISIT (OUTPATIENT)
Dept: FAMILY MEDICINE | Facility: CLINIC | Age: 50
End: 2025-03-27
Payer: COMMERCIAL

## 2025-03-27 VITALS
BODY MASS INDEX: 35.22 KG/M2 | SYSTOLIC BLOOD PRESSURE: 138 MMHG | TEMPERATURE: 98 F | HEART RATE: 73 BPM | RESPIRATION RATE: 19 BRPM | OXYGEN SATURATION: 97 % | HEIGHT: 67 IN | WEIGHT: 224.44 LBS | DIASTOLIC BLOOD PRESSURE: 88 MMHG

## 2025-03-27 DIAGNOSIS — E66.01 CLASS 2 SEVERE OBESITY DUE TO EXCESS CALORIES WITH SERIOUS COMORBIDITY AND BODY MASS INDEX (BMI) OF 36.0 TO 36.9 IN ADULT: ICD-10-CM

## 2025-03-27 DIAGNOSIS — I10 ESSENTIAL HYPERTENSION: ICD-10-CM

## 2025-03-27 DIAGNOSIS — E66.812 CLASS 2 SEVERE OBESITY DUE TO EXCESS CALORIES WITH SERIOUS COMORBIDITY AND BODY MASS INDEX (BMI) OF 36.0 TO 36.9 IN ADULT: ICD-10-CM

## 2025-03-27 DIAGNOSIS — R74.01 TRANSAMINITIS: ICD-10-CM

## 2025-03-27 DIAGNOSIS — E11.29 TYPE 2 DIABETES MELLITUS WITH MICROALBUMINURIA, WITH LONG-TERM CURRENT USE OF INSULIN: Primary | ICD-10-CM

## 2025-03-27 DIAGNOSIS — Z79.4 TYPE 2 DIABETES MELLITUS WITH MICROALBUMINURIA, WITH LONG-TERM CURRENT USE OF INSULIN: Primary | ICD-10-CM

## 2025-03-27 DIAGNOSIS — E11.65 UNCONTROLLED TYPE 2 DIABETES MELLITUS WITH HYPERGLYCEMIA: ICD-10-CM

## 2025-03-27 DIAGNOSIS — E78.2 MIXED HYPERLIPIDEMIA: ICD-10-CM

## 2025-03-27 DIAGNOSIS — R80.9 TYPE 2 DIABETES MELLITUS WITH MICROALBUMINURIA, WITH LONG-TERM CURRENT USE OF INSULIN: Primary | ICD-10-CM

## 2025-03-27 PROCEDURE — 99214 OFFICE O/P EST MOD 30 MIN: CPT | Mod: S$GLB,,, | Performed by: STUDENT IN AN ORGANIZED HEALTH CARE EDUCATION/TRAINING PROGRAM

## 2025-03-27 PROCEDURE — 99999 PR PBB SHADOW E&M-EST. PATIENT-LVL IV: CPT | Mod: PBBFAC,,, | Performed by: STUDENT IN AN ORGANIZED HEALTH CARE EDUCATION/TRAINING PROGRAM

## 2025-03-27 RX ORDER — METOPROLOL SUCCINATE 100 MG/1
100 TABLET, EXTENDED RELEASE ORAL DAILY
Qty: 90 TABLET | Refills: 3 | Status: SHIPPED | OUTPATIENT
Start: 2025-03-27 | End: 2025-03-27

## 2025-03-27 RX ORDER — SEMAGLUTIDE 2.68 MG/ML
2 INJECTION, SOLUTION SUBCUTANEOUS
Qty: 9 ML | Refills: 3 | Status: SHIPPED | OUTPATIENT
Start: 2025-03-27 | End: 2026-03-27

## 2025-03-27 RX ORDER — METOPROLOL SUCCINATE 100 MG/1
100 TABLET, EXTENDED RELEASE ORAL DAILY
Qty: 90 TABLET | Refills: 3 | Status: SHIPPED | OUTPATIENT
Start: 2025-03-27 | End: 2026-03-27

## 2025-03-27 NOTE — PROGRESS NOTES
Subjective:       Patient ID: Wilfredo Thomas is a 49 y.o. male.    Chief Complaint: Follow-up, Nasal Congestion (X3 days), and Sore Throat (Off and on x 3days)      HPI    49M with uncontrolled T2DM, sHTN, HLD, gouty arthritis , obesity and plantar fasciitis/osteonecrosis s/p fasciotomy (02/22/2024) presents for f/u on chronic conditions. He endorses no new complaints today . He has not been taking the ozempic and jardiance prescribed due to copay by his insurance company but has been compliant with other medications .. Home BP range in the 130s-140s. He reports no HA, vision changes , focal weakness.     Past Medical History:   Diagnosis Date    Diabetes     Gout     Hyperlipidemia     Hypertension        Past Surgical History:   Procedure Laterality Date    BONE MARROW ASPIRATION Left 3/8/2024    Procedure: ASPIRATION, BONE MARROW;  Surgeon: Enrico Bazzi Jr., DPM;  Location: [facility] OR;  Service: Podiatry;  Laterality: Left;    ENDOSCOPIC PLANTAR FASCIOTOMY Left 3/8/2024    Procedure: FASCIOTOMY, PLANTAR, ENDOSCOPIC;  Surgeon: Enrico Bazzi Jr., DPM;  Location: [facility] OR;  Service: Podiatry;  Laterality: Left;  pop saph    INCISION AND DRAINAGE Left 08/06/2022    Procedure: Incision and Drainage - LEFT THIGH.;  Surgeon: Rene Neff MD;  [facility];  Service: Orthopedics;  Laterality: Left;    INJECTION, TENDON SHEATH OR LIGAMENT, 1 TENDON SHEATH OR LIGAMENT Left 3/8/2024    Procedure: INJECTION,TENDON SHEATH OR LIGAMENT,1 TENDON SHEATH OR LIGAMENT;  Surgeon: Enrico Bazzi Jr., DPM;  Location: [facility] OR;  Service: Podiatry;  Laterality: Left;    IRRIGATION AND DEBRIDEMENT OF LOWER EXTREMITY Left 07/29/2022    Procedure: IRRIGATION AND DEBRIDEMENT, LOWER EXTREMITY;  Surgeon: Rob Graff MD;  Location: [facility] OR 2ND FLR;  Service: Orthopedics;  Laterality: Left;    KNEE ARTHROSCOPY W/ MENISCAL REPAIR Left     Jan 2023    LENGTHENING OR SHORTENING, TENDON, LOWER EXTREMITY, 1 TENDON  Left 3/8/2024    Procedure: LENGTHENING OR SHORTENING, TENDON, LOWER EXTREMITY, 1 TENDON;  Surgeon: Enrico Bazzi Jr., DPM;  Location: [facility] OR;  Service: Podiatry;  Laterality: Left;    RESECTION OF GASTROCNEMIUS MUSCLE Left 3/8/2024    Procedure: RESECTION, MUSCLE, GASTROCNEMIUS;  Surgeon: Enrico Bazzi Jr., DPM;  Location: [facility] OR;  Service: Podiatry;  Laterality: Left;    WRIST SURGERY Left        Family History   Problem Relation Name Age of Onset    Diabetes Mother      No Known Problems Father         Social History     Socioeconomic History    Marital status:    Tobacco Use    Smoking status: Former     Current packs/day: 0.00     Types: Cigarettes     Quit date: 2020     Years since quittin.2    Smokeless tobacco: Never   Substance and Sexual Activity    Alcohol use: Yes     Alcohol/week: 12.0 standard drinks of alcohol     Types: 12 Cans of beer per week     Comment: socially    Drug use: Never    Sexual activity: Yes     Partners: Female   Social History Narrative    Lives with wife and 2 kids in college and one older with 1 grand child. 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     Social Drivers of Health     Financial Resource Strain: Patient Declined (3/27/2025)    Overall Financial Resource Strain (CARDIA)     Difficulty of Paying Living Expenses: Patient declined   Food Insecurity: Patient Declined (3/27/2025)    Hunger Vital Sign     Worried About Running Out of Food in the Last Year: Patient declined     Ran Out of Food in the Last Year: Patient declined   Transportation Needs: Patient Declined (3/27/2025)    PRAPARE - Transportation     Lack of Transportation (Medical): Patient declined     Lack of Transportation (Non-Medical): Patient declined   Physical Activity: Unknown (3/27/2025)    Exercise Vital Sign     Days of Exercise per Week: Patient declined   Stress: Patient Declined (3/27/2025)    [employer] - Occupational Stress Questionnaire      "Feeling of Stress : Patient declined   Housing Stability: Patient Declined (3/27/2025)    Housing Stability Vital Sign     Unable to Pay for Housing in the Last Year: Patient declined     Homeless in the Last Year: Patient declined       Review of Systems   Constitutional:  Negative for chills, fatigue and fever.   HENT:  Positive for congestion.    Respiratory:  Negative for cough and shortness of breath.    Cardiovascular:  Negative for chest pain, palpitations and leg swelling.   Gastrointestinal:  Negative for abdominal pain, constipation and diarrhea.   Endocrine: Negative for polydipsia and polyphagia.   Genitourinary:  Negative for dysuria, flank pain and frequency.   Musculoskeletal:  Negative for arthralgias, back pain and joint swelling.   Skin:  Negative for rash and wound.   Neurological:  Negative for dizziness, seizures, weakness, numbness and headaches.   Psychiatric/Behavioral:  Negative for agitation, dysphoric mood and suicidal ideas. The patient is not nervous/anxious.          Objective:     Vitals:    03/27/25 0850   BP: 138/88   BP Location: Right arm   Patient Position: Sitting   Pulse: 73   Resp: 19   Temp: 97.9 °F (36.6 °C)   TempSrc: Temporal   SpO2: 97%   Weight: 101.8 kg (224 lb 6.9 oz)   Height: 5' 7" (1.702 m)            Physical Exam  Vitals reviewed.   Constitutional:       General: He is not in acute distress.     Appearance: He is obese.   HENT:      Mouth/Throat:      Mouth: Mucous membranes are moist.   Cardiovascular:      Rate and Rhythm: Normal rate and regular rhythm.      Pulses: Normal pulses.      Heart sounds: Normal heart sounds.   Pulmonary:      Effort: Pulmonary effort is normal.      Breath sounds: Normal breath sounds.   Abdominal:      Hernia: There is no hernia in the left inguinal area.   Genitourinary:     Pubic Area: No rash.       Penis: No erythema, tenderness or swelling.       Testes: Cremasteric reflex is present.         Right: Mass, tenderness or swelling " "not present.         Left: Mass, tenderness or swelling not present.      Epididymis:      Right: Normal.      Left: Normal.   Musculoskeletal:         General: No swelling (well healed left medial leg surgical incsion).      Right lower leg: No edema (1+).      Left lower leg: No edema.   Skin:     General: Skin is warm.   Neurological:      General: No focal deficit present.      Mental Status: He is alert and oriented to person, place, and time.      Cranial Nerves: No cranial nerve deficit.   Psychiatric:         Mood and Affect: Mood normal.         Behavior: Behavior normal.           Laboratory:  CBC:  No results for input(s): "WBC", "RBC", "HGB", "HCT", "PLT", "MCV", "MCH", "MCHC" in the last 2160 hours.      CMP:  No results for input(s): "GLU", "CALCIUM", "ALBUMIN", "PROT", "NA", "K", "CO2", "CL", "BUN", "ALKPHOS", "ALT", "AST", "BILITOT" in the last 2160 hours.    Invalid input(s): "CREATININ"      URINALYSIS:  No results for input(s): "COLORU", "CLARITYU", "SPECGRAV", "PHUR", "PROTEINUA", "GLUCOSEU", "BILIRUBINCON", "BLOODU", "WBCU", "RBCU", "BACTERIA", "MUCUS", "NITRITE", "LEUKOCYTESUR", "UROBILINOGEN", "HYALINECASTS" in the last 2160 hours.     LIPIDS:  No results for input(s): "TSH", "HDL", "CHOL", "TRIG", "LDLCALC", "CHOLHDL", "NONHDLCHOL", "TOTALCHOLEST" in the last 2160 hours.        TSH:  No results for input(s): "TSH" in the last 2160 hours.      A1C:  No results for input(s): "HGBA1C" in the last 2160 hours.        : MRI left foot 02/02/2024  Achilles: Normal size and signal     Plantar Fascia: There is marked thickening/increased signal at the origin of the plantar fascia, consistent with plantar fasciitis. Small discrete partial-thickness tear noted measuring 0.3 cm (series 5, image 15). There is prominent subcortical marrow edema within the adjacent calcaneus.  The soft tissue edema noted in the musculature adjacent to the medial and lateral bands, suggesting muscle strain.  No fluid " collections.     Joints: No joint effusions. Tibiotalar and subtalar cartilage maintained.     Ligaments: Anterior and posterior inferior tibiofibular ligaments are intact.  Anterior talofibular, calcaneofibular and posterior talofibular ligaments are intact.  Deep and superficial components of deltoid ligament are intact.  Spring ligament complex and Lisfranc ligament are intact.     Tendons: Medial ankle flexor, dorsal ankle extensor, and peroneus tendons are intact.     Bones:  No fracture.  Bone lesion in the distal diaphysis of the tibia, partially imaged, suggestive of osteonecrosis.     Miscellaneous: The sinus tarsi and tarsal tunnel are unremarkable.     Impression:     Plantar fasciitis, as above.     Partially imaged distal tibial bone lesion, suggestive of osteonecrosis.    Assessment:         ICD-10-CM ICD-9-CM   1. Essential hypertension  I10 401.9   2. Type 2 diabetes mellitus with microalbuminuria, with long-term current use of insulin  E11.29 250.40    R80.9 791.0    Z79.4 V58.67   3. Current moderate episode of major depressive disorder without prior episode  F32.1 296.22   4. Class 1 obesity due to excess calories with serious comorbidity and body mass index (BMI) of 35 E66.09 278.00    Z68.34 V85.34   5. YANCI   M72.2 728.71   6  Transaminitis              Plan:       1. Type 2 diabetes mellitus with diabetic nephropathy  -A1c not at goal (10)  due to non-compliance with medications ( ozempic and jardiance due to copay)  -c/w metformin and lantus 16U at bedtime  -will start on glipize 5mg ER with breakfast  -c/w lantus 16U BID+metformin 1500mg ER daily  -c/w lisinopril to 40mg  -c/w therapeutic life style changes    2. YANCI/MDD  -improved off meds  -monitor for now    3. BMI 35  -worsening again  -therapeutic life style modifications reinforced    4. sHTN  -not at goal, goal is <130/80  -increase toprol to 100mg XR daily, c/w lisinopril @ 40mg  -counseled again extensively on med  "compliance  -monitor and keep BP log  -therapeutic life style changes reinforced    5. Mixed HLD  -poorly compliant with statin  -need for compliance reinforced   -c/w statin and life style modifications    6. Transaminitis  -due to NAFLD  -repeat LFT ordered  -weight loss encouraged       HCM: declined all due vaccine for now, will continue to address next clinic visit        Patient's Medications   New Prescriptions    METOPROLOL SUCCINATE (TOPROL-XL) 100 MG 24 HR TABLET    Take 1 tablet (100 mg total) by mouth once daily.   Previous Medications    ATORVASTATIN (LIPITOR) 20 MG TABLET    Take 1 tablet (20 mg total) by mouth once daily.    BLOOD SUGAR DIAGNOSTIC STRP    1 strip by Misc.(Non-Drug; Combo Route) route once daily.    BLOOD-GLUCOSE METER KIT    Use as instructed    INSULIN GLARGINE U-100, LANTUS, (LANTUS SOLOSTAR U-100 INSULIN) 100 UNIT/ML (3 ML) INPN PEN    Inject 16 Units into the skin every morning then take 20 Units at bedtime    LISINOPRIL (PRINIVIL,ZESTRIL) 40 MG TABLET    Take 1 tablet (40 mg total) by mouth once daily.    METFORMIN (GLUCOPHAGE-XR) 750 MG ER 24HR TABLET    Take 2 tablets (1,500 mg total) by mouth daily with breakfast.    MULTIVITAMIN (THERAGRAN) PER TABLET    Take 1 tablet by mouth.    PEN NEEDLE, DIABETIC 31 GAUGE X 5/16" NDLE    Use to inject insulin into the skin.   Modified Medications    Modified Medication Previous Medication    SEMAGLUTIDE (OZEMPIC) 2 MG/DOSE (8 MG/3 ML) PNIJ semaglutide (OZEMPIC) 2 mg/dose (8 mg/3 mL) PnIj       Inject 2 mg into the skin every 7 days.    Inject 2 mg into the skin every 7 days.   Discontinued Medications    EMPAGLIFLOZIN (JARDIANCE) 10 MG TABLET    Take 1 tablet (10 mg total) by mouth once daily.    METOPROLOL SUCCINATE (TOPROL-XL) 50 MG 24 HR TABLET    Take 1 tablet (50 mg total) by mouth once daily.       Time spent: 37 minutes in face to face discussion concerning diagnosis, prognosis, review of lab and test results, benefits of " treatment as well as management of disease, counseling of patient and coordination of care between various health care providers.  Greater than half the time spent was used for coordination of care and counseling of patient.    Patient was given opportunity to express concerns, ask questions and verbalizes understanding of current management plan     Dr Oumou Zuniga MD  Internal Medicine

## 2025-03-28 ENCOUNTER — RESULTS FOLLOW-UP (OUTPATIENT)
Dept: FAMILY MEDICINE | Facility: CLINIC | Age: 50
End: 2025-03-28

## 2025-03-28 ENCOUNTER — TELEPHONE (OUTPATIENT)
Dept: FAMILY MEDICINE | Facility: CLINIC | Age: 50
End: 2025-03-28
Payer: COMMERCIAL

## 2025-03-28 DIAGNOSIS — R80.9 TYPE 2 DIABETES MELLITUS WITH MICROALBUMINURIA, WITH LONG-TERM CURRENT USE OF INSULIN: Primary | ICD-10-CM

## 2025-03-28 DIAGNOSIS — Z79.4 TYPE 2 DIABETES MELLITUS WITH MICROALBUMINURIA, WITH LONG-TERM CURRENT USE OF INSULIN: Primary | ICD-10-CM

## 2025-03-28 DIAGNOSIS — E11.29 TYPE 2 DIABETES MELLITUS WITH MICROALBUMINURIA, WITH LONG-TERM CURRENT USE OF INSULIN: Primary | ICD-10-CM

## 2025-03-28 DIAGNOSIS — E11.65 UNCONTROLLED TYPE 2 DIABETES MELLITUS WITH HYPERGLYCEMIA: ICD-10-CM

## 2025-03-28 RX ORDER — INSULIN LISPRO 100 [IU]/ML
5 INJECTION, SOLUTION INTRAVENOUS; SUBCUTANEOUS
Qty: 15 ML | Refills: 3 | Status: SHIPPED | OUTPATIENT
Start: 2025-03-28 | End: 2026-03-28

## 2025-03-28 RX ORDER — GLIPIZIDE 5 MG/1
5 TABLET, FILM COATED, EXTENDED RELEASE ORAL
Qty: 90 TABLET | Refills: 3 | Status: SHIPPED | OUTPATIENT
Start: 2025-03-28 | End: 2026-03-28

## 2025-03-28 NOTE — TELEPHONE ENCOUNTER
----- Message from Oumou Zuniga MD sent at 3/28/2025 12:22 PM CDT -----  Please let him know his A1C continues to get worse since off ozempic due to cost . I started him on glipizide 5mg daily , he also needs to start short acting insulin 5U with each meal and increase   his lantus to 20U at bedtime. Thanks   ----- Message -----  From: Lab, Background User  Sent: 3/27/2025  12:58 PM CDT  To: Oumou Zuniga MD

## 2025-03-31 ENCOUNTER — TELEPHONE (OUTPATIENT)
Dept: FAMILY MEDICINE | Facility: CLINIC | Age: 50
End: 2025-03-31
Payer: COMMERCIAL

## 2025-04-11 DIAGNOSIS — E11.29 TYPE 2 DIABETES MELLITUS WITH MICROALBUMINURIA, WITH LONG-TERM CURRENT USE OF INSULIN: ICD-10-CM

## 2025-04-11 DIAGNOSIS — Z79.4 TYPE 2 DIABETES MELLITUS WITH MICROALBUMINURIA, WITH LONG-TERM CURRENT USE OF INSULIN: ICD-10-CM

## 2025-04-11 DIAGNOSIS — R80.9 TYPE 2 DIABETES MELLITUS WITH MICROALBUMINURIA, WITH LONG-TERM CURRENT USE OF INSULIN: ICD-10-CM

## 2025-04-11 RX ORDER — INSULIN GLARGINE 100 [IU]/ML
INJECTION, SOLUTION SUBCUTANEOUS
Qty: 30 ML | Refills: 1 | Status: SHIPPED | OUTPATIENT
Start: 2025-04-11

## 2025-04-11 NOTE — TELEPHONE ENCOUNTER
Refill Decision Note   Wilfredo Martha  is requesting a refill authorization.  Brief Assessment and Rationale for Refill:  Approve     Medication Therapy Plan:        Comments:     Note composed:12:01 PM 04/11/2025

## 2025-04-11 NOTE — TELEPHONE ENCOUNTER
No care due was identified.  [facility] Embedded Care Due Messages. Reference number: [id number].   4/11/2025 10:13:15 AM CDT

## 2025-04-22 DIAGNOSIS — E78.2 MIXED HYPERLIPIDEMIA: ICD-10-CM

## 2025-04-22 DIAGNOSIS — E11.65 TYPE 2 DIABETES MELLITUS WITH HYPERGLYCEMIA, WITHOUT LONG-TERM CURRENT USE OF INSULIN: ICD-10-CM

## 2025-04-22 RX ORDER — ATORVASTATIN CALCIUM 20 MG/1
20 TABLET, FILM COATED ORAL DAILY
Qty: 90 TABLET | Refills: 2 | Status: SHIPPED | OUTPATIENT
Start: 2025-04-22

## 2025-04-22 NOTE — TELEPHONE ENCOUNTER
No care due was identified.  [facility] Embedded Care Due Messages. Reference number: [id number].   4/22/2025 5:59:52 AM CDT

## 2025-04-22 NOTE — TELEPHONE ENCOUNTER
Wilfredo Thomas  is requesting a refill authorization.  Brief Assessment and Rationale for Refill:  Approve     Medication Therapy Plan:         Comments:     Note composed:12:07 PM 04/22/2025

## 2025-05-07 ENCOUNTER — PATIENT MESSAGE (OUTPATIENT)
Dept: FAMILY MEDICINE | Facility: CLINIC | Age: 50
End: 2025-05-07
Payer: COMMERCIAL

## 2025-05-08 DIAGNOSIS — E11.65 TYPE 2 DIABETES MELLITUS WITH HYPERGLYCEMIA, WITHOUT LONG-TERM CURRENT USE OF INSULIN: Primary | ICD-10-CM

## 2025-05-08 RX ORDER — TIRZEPATIDE 7.5 MG/.5ML
7.5 INJECTION, SOLUTION SUBCUTANEOUS
Qty: 12 PEN | Refills: 3 | Status: SHIPPED | OUTPATIENT
Start: 2025-05-08

## 2025-05-20 DIAGNOSIS — E11.29 TYPE 2 DIABETES MELLITUS WITH MICROALBUMINURIA, WITH LONG-TERM CURRENT USE OF INSULIN: ICD-10-CM

## 2025-05-20 DIAGNOSIS — Z79.4 TYPE 2 DIABETES MELLITUS WITH MICROALBUMINURIA, WITH LONG-TERM CURRENT USE OF INSULIN: ICD-10-CM

## 2025-05-20 DIAGNOSIS — R80.9 TYPE 2 DIABETES MELLITUS WITH MICROALBUMINURIA, WITH LONG-TERM CURRENT USE OF INSULIN: ICD-10-CM

## 2025-05-20 RX ORDER — METFORMIN HYDROCHLORIDE 750 MG/1
TABLET, EXTENDED RELEASE ORAL
Qty: 180 TABLET | Refills: 1 | Status: SHIPPED | OUTPATIENT
Start: 2025-05-20

## 2025-05-20 NOTE — TELEPHONE ENCOUNTER
No care due was identified.  [facility] Embedded Care Due Messages. Reference number: [id number].   5/20/2025 5:57:33 AM CDT

## 2025-05-20 NOTE — TELEPHONE ENCOUNTER
Refill Decision Note   Wilfredo Pereiraaj  is requesting a refill authorization.  Brief Assessment and Rationale for Refill:  Approve     Medication Therapy Plan:         Alert overridden per protocol: Yes   Comments:     Note composed:10:52 AM 05/20/2025

## 2025-05-26 DIAGNOSIS — E11.29 TYPE 2 DIABETES MELLITUS WITH MICROALBUMINURIA, WITH LONG-TERM CURRENT USE OF INSULIN: ICD-10-CM

## 2025-05-26 DIAGNOSIS — R80.9 TYPE 2 DIABETES MELLITUS WITH MICROALBUMINURIA, WITH LONG-TERM CURRENT USE OF INSULIN: ICD-10-CM

## 2025-05-26 DIAGNOSIS — Z79.4 TYPE 2 DIABETES MELLITUS WITH MICROALBUMINURIA, WITH LONG-TERM CURRENT USE OF INSULIN: ICD-10-CM

## 2025-05-26 DIAGNOSIS — I10 ESSENTIAL HYPERTENSION: ICD-10-CM

## 2025-05-26 NOTE — TELEPHONE ENCOUNTER
No care due was identified.  [facility] Embedded Care Due Messages. Reference number: [id number].   5/26/2025 5:38:38 PM CDT

## 2025-05-27 RX ORDER — LISINOPRIL 40 MG/1
40 TABLET ORAL DAILY
Qty: 90 TABLET | Refills: 1 | Status: SHIPPED | OUTPATIENT
Start: 2025-05-27 | End: 2026-05-27

## 2025-05-27 RX ORDER — METFORMIN HYDROCHLORIDE 750 MG/1
TABLET, EXTENDED RELEASE ORAL
Qty: 180 TABLET | Refills: 1 | OUTPATIENT
Start: 2025-05-27

## 2025-05-27 NOTE — TELEPHONE ENCOUNTER
Please see refill request. Pt is switching pharmacies as well due to old pharmacy now using 3rd party shipping and having longer accesses times getting medication.

## 2025-06-18 ENCOUNTER — PATIENT MESSAGE (OUTPATIENT)
Dept: FAMILY MEDICINE | Facility: CLINIC | Age: 50
End: 2025-06-18

## 2025-06-19 DIAGNOSIS — Z00.00 ANNUAL PHYSICAL EXAM: Primary | ICD-10-CM

## 2025-06-19 DIAGNOSIS — E11.65 TYPE 2 DIABETES MELLITUS WITH HYPERGLYCEMIA, WITHOUT LONG-TERM CURRENT USE OF INSULIN: ICD-10-CM

## 2025-06-24 DIAGNOSIS — E11.9 TYPE 2 DIABETES MELLITUS WITHOUT COMPLICATION, WITH LONG-TERM CURRENT USE OF INSULIN: ICD-10-CM

## 2025-06-24 DIAGNOSIS — Z79.4 TYPE 2 DIABETES MELLITUS WITHOUT COMPLICATION, WITH LONG-TERM CURRENT USE OF INSULIN: ICD-10-CM

## 2025-06-24 RX ORDER — PEN NEEDLE, DIABETIC 31 GX5/16"
NEEDLE, DISPOSABLE MISCELLANEOUS
Qty: 100 EACH | Refills: 3 | Status: SHIPPED | OUTPATIENT
Start: 2025-06-24

## 2025-06-24 NOTE — TELEPHONE ENCOUNTER
Refill Decision Note   Wilfredo Martha  is requesting a refill authorization.  Brief Assessment and Rationale for Refill:  Approve     Medication Therapy Plan:         Comments:     Note composed:7:11 AM 06/24/2025

## 2025-06-24 NOTE — TELEPHONE ENCOUNTER
No care due was identified.  [facility] Embedded Care Due Messages. Reference number: [id number].   6/24/2025 5:28:51 AM CDT

## 2025-06-27 ENCOUNTER — RESULTS FOLLOW-UP (OUTPATIENT)
Dept: FAMILY MEDICINE | Facility: CLINIC | Age: 50
End: 2025-06-27

## 2025-07-01 ENCOUNTER — OFFICE VISIT (OUTPATIENT)
Dept: FAMILY MEDICINE | Facility: CLINIC | Age: 50
End: 2025-07-01
Payer: COMMERCIAL

## 2025-07-01 VITALS
HEART RATE: 82 BPM | DIASTOLIC BLOOD PRESSURE: 82 MMHG | RESPIRATION RATE: 19 BRPM | BODY MASS INDEX: 34.46 KG/M2 | TEMPERATURE: 98 F | OXYGEN SATURATION: 98 % | SYSTOLIC BLOOD PRESSURE: 134 MMHG | HEIGHT: 67 IN | WEIGHT: 219.56 LBS

## 2025-07-01 DIAGNOSIS — I10 ESSENTIAL HYPERTENSION: ICD-10-CM

## 2025-07-01 DIAGNOSIS — E66.09 CLASS 1 OBESITY DUE TO EXCESS CALORIES WITH SERIOUS COMORBIDITY AND BODY MASS INDEX (BMI) OF 32.0 TO 32.9 IN ADULT: ICD-10-CM

## 2025-07-01 DIAGNOSIS — E78.2 MIXED HYPERLIPIDEMIA: ICD-10-CM

## 2025-07-01 DIAGNOSIS — E66.811 CLASS 1 OBESITY DUE TO EXCESS CALORIES WITH SERIOUS COMORBIDITY AND BODY MASS INDEX (BMI) OF 32.0 TO 32.9 IN ADULT: ICD-10-CM

## 2025-07-01 DIAGNOSIS — Z79.4 TYPE 2 DIABETES MELLITUS WITHOUT COMPLICATION, WITH LONG-TERM CURRENT USE OF INSULIN: Primary | ICD-10-CM

## 2025-07-01 DIAGNOSIS — E11.9 TYPE 2 DIABETES MELLITUS WITHOUT COMPLICATION, WITH LONG-TERM CURRENT USE OF INSULIN: Primary | ICD-10-CM

## 2025-07-01 PROCEDURE — 99999 PR PBB SHADOW E&M-EST. PATIENT-LVL IV: CPT | Mod: PBBFAC,,, | Performed by: STUDENT IN AN ORGANIZED HEALTH CARE EDUCATION/TRAINING PROGRAM

## 2025-07-01 PROCEDURE — 3079F DIAST BP 80-89 MM HG: CPT | Mod: CPTII,S$GLB,, | Performed by: STUDENT IN AN ORGANIZED HEALTH CARE EDUCATION/TRAINING PROGRAM

## 2025-07-01 PROCEDURE — 1159F MED LIST DOCD IN RCRD: CPT | Mod: CPTII,S$GLB,, | Performed by: STUDENT IN AN ORGANIZED HEALTH CARE EDUCATION/TRAINING PROGRAM

## 2025-07-01 PROCEDURE — 3062F POS MACROALBUMINURIA REV: CPT | Mod: CPTII,S$GLB,, | Performed by: STUDENT IN AN ORGANIZED HEALTH CARE EDUCATION/TRAINING PROGRAM

## 2025-07-01 PROCEDURE — 99214 OFFICE O/P EST MOD 30 MIN: CPT | Mod: S$GLB,,, | Performed by: STUDENT IN AN ORGANIZED HEALTH CARE EDUCATION/TRAINING PROGRAM

## 2025-07-01 PROCEDURE — 3066F NEPHROPATHY DOC TX: CPT | Mod: CPTII,S$GLB,, | Performed by: STUDENT IN AN ORGANIZED HEALTH CARE EDUCATION/TRAINING PROGRAM

## 2025-07-01 PROCEDURE — 3008F BODY MASS INDEX DOCD: CPT | Mod: CPTII,S$GLB,, | Performed by: STUDENT IN AN ORGANIZED HEALTH CARE EDUCATION/TRAINING PROGRAM

## 2025-07-01 PROCEDURE — 4010F ACE/ARB THERAPY RXD/TAKEN: CPT | Mod: CPTII,S$GLB,, | Performed by: STUDENT IN AN ORGANIZED HEALTH CARE EDUCATION/TRAINING PROGRAM

## 2025-07-01 PROCEDURE — 3075F SYST BP GE 130 - 139MM HG: CPT | Mod: CPTII,S$GLB,, | Performed by: STUDENT IN AN ORGANIZED HEALTH CARE EDUCATION/TRAINING PROGRAM

## 2025-07-01 PROCEDURE — 3051F HG A1C>EQUAL 7.0%<8.0%: CPT | Mod: CPTII,S$GLB,, | Performed by: STUDENT IN AN ORGANIZED HEALTH CARE EDUCATION/TRAINING PROGRAM

## 2025-07-01 PROCEDURE — 1160F RVW MEDS BY RX/DR IN RCRD: CPT | Mod: CPTII,S$GLB,, | Performed by: STUDENT IN AN ORGANIZED HEALTH CARE EDUCATION/TRAINING PROGRAM

## 2025-07-01 NOTE — PROGRESS NOTES
[facility] Note    Chief Complaint      Chief Complaint   Patient presents with    Follow-up     3m f/u     History of Present Illness      Tasteaj Villalobos is a 49M with uncontrolled T2DM, sHTN, HLD, gouty arthritis , obesity and plantar fasciitis/osteonecrosis s/p fasciotomy (02/22/2024 who presents for a follow-up visit to discuss his health improvements since changing jobs and lifestyle. 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 He describes a dramatic reduction in stress levels, improved sleep patterns, and better lifestyle choices. He has reduced alcohol consumption to 2 or 3 beers on Friday and Saturday nights. He also reports making healthier dietary choices and estimates he has lost about 20 lbs in the last 2 months.    His wife has noticed a marked improvement in his demeanor, indicating a return to his normal self about a month ago. He has been monitoring his blood pressure at home, with readings ranging from 120/80 to 130/80. He reports feeling nervous about his blood pressure reading at the doctor's office due to anxiety.    Regarding his diabetes management, he reports taking his medications as prescribed, including Mounjaro 7.5 mg, Glipizide 5 mg with fasting, Metformin (2 tablets), and 12 units of insulin in the morning. He always eats breakfast and is trying to follow a fasting diet as suggested by his wife. He expresses a desire to eventually reduce his medication requirements as he continues to make healthy lifestyle changes and lose weight      ROS:  General: -fever, -chills, -fatigue, -weight gain, +weight loss  Eyes: -vision changes, -redness, -discharge  ENT: -ear pain, -nasal congestion, -sore throat  Cardiovascular: -chest pain, -palpitations, -lower extremity edema  Respiratory: -cough, -shortness of  "breath  Gastrointestinal: -abdominal pain, -nausea, -vomiting, -diarrhea, -constipation, -blood in stool  Genitourinary: -dysuria, -hematuria, -frequency  Musculoskeletal: -joint pain, -muscle pain  Skin: -rash, -lesion  Neurological: -headache, -dizziness, -numbness, -tingling  Psychiatric: +anxiety, -depression, -sleep difficulty, +alcohol abuse, +increased stressors          Wilfredo Thomas is a 49 y.o. male who presents with:    Problem List Addressed This Visit:  1. Type 2 diabetes mellitus without complication, with long-term current use of insulin    2. Essential hypertension  Overview:  - well controlled  - continue current medication      3. Class 1 obesity due to excess calories with serious comorbidity and body mass index (BMI) of 32.0 to 32.9 in adult    4. Mixed hyperlipidemia  Overview:  Lab Results   Component Value Date    LDLCALC 46.6 (L) 03/22/2022     - well controlled  - continue current medication             Encounter Medications[1]     Review of patient's allergies indicates:   Allergen Reactions    Adhesive tape-silicones      It was a White tape ( uncertain if related to silicone) 8/22/2022       Physical Exam      Vital Signs  Temp: 98.1 °F (36.7 °C)  Temp Source: Temporal  Pulse: 82  Resp: 19  SpO2: 98 %  BP: 134/82 (bp meds around 0515/0530)  BP Location: Right arm  Patient Position: Sitting  Pain Score: 0-No pain  Height and Weight  Height: 5' 7" (170.2 cm)  Weight: 99.6 kg (219 lb 9.3 oz)  BSA (Calculated - sq m): 2.17 sq meters  BMI (Calculated): 34.4  Weight in (lb) to have BMI = 25: 159.3]    Physical Exam    General: No acute distress. Well-developed. Well-nourished.  Eyes: EOMI. Sclerae anicteric.  HENT: Normocephalic. Atraumatic. Nares patent. Moist oral mucosa.  Ears: Bilateral TMs clear. Bilateral EACs clear.  Cardiovascular: Regular rate. Regular rhythm. No murmurs. No rubs. No gallops. Normal S1, S2.  Respiratory: Normal respiratory effort. Clear to auscultation bilaterally. " "No rales. No rhonchi. No wheezing.  Abdomen: Soft. Non-tender. Non-distended. Normoactive bowel sounds.  Musculoskeletal: No  obvious deformity.  Extremities: No lower extremity edema.  Neurological: Alert & oriented x3. No slurred speech. Normal gait.  Psychiatric: Normal mood. Normal affect. Good insight. Good judgment.  Skin: Warm. Dry. No rash.          Laboratory:  CBC:  Recent Labs   Lab Result Units 06/27/25  0810   WBC K/uL 4.74   RBC M/uL 5.22   HGB gm/dL 15.7   HCT % 45.7   Platelet Count K/uL 172   MCV fL 88   MCH pg 30.1   MCHC g/dL 34.4     CMP:  Recent Labs   Lab Result Units 06/27/25  0810   Glucose mg/dL 128*   Calcium mg/dL 9.4   Albumin g/dL 3.7   Protein Total gm/dL 7.9   Sodium mmol/L 140   Potassium mmol/L 4.4   CO2 mmol/L 20*   Chloride mmol/L 109   BUN mg/dL 11   ALP unit/L 80   ALT unit/L 55*   AST unit/L 34   Bilirubin Total mg/dL 0.3     URINALYSIS:  No results for input(s): "COLORU", "CLARITYU", "SPECGRAV", "PHUR", "PROTEINUA", "GLUCOSEU", "BILIRUBINCON", "BLOODU", "WBCU", "RBCU", "BACTERIA", "MUCUS", "NITRITE", "LEUKOCYTESUR", "UROBILINOGEN", "HYALINECASTS" in the last 2160 hours.   LIPIDS:  Recent Labs   Lab Result Units 06/27/25  0810   TSH uIU/mL 1.568   HDL Cholesterol mg/dL 36*   Cholesterol Total mg/dL 216*   Triglyceride mg/dL 435*   HDL/Cholesterol Ratio % 16.7*   Non HDL Cholesterol mg/dL 180   Cholesterol/HDL Ratio  6.0*     TSH:  Recent Labs   Lab Result Units 06/27/25  0810   TSH uIU/mL 1.568     A1C:  Recent Labs   Lab Result Units 06/27/25  0810   Hemoglobin A1c % 7.8*       Radiology:      Assessment/Plan     Wilfredo Thomas is a 49 y.o.male with:    1. Type 2 diabetes mellitus without complication, with long-term current use of insulin    2. Essential hypertension    3. Class 1 obesity due to excess calories with serious comorbidity and body mass index (BMI) of 32.0 to 32.9 in adult    4. Mixed hyperlipidemia    Assessment & Plan      - Noted significant improvement in " "HbA1c levels, decreasing from 11.1 to 7.8, previously greater than 14, and was once down to 5 something.  - Current diabetes management plan is effective, but may require adjustment as weight loss continues.  -BP also improving   -c/w current regimen   -c/w life style modifications      Patient verbalizes understanding and agrees with current treatment plan.    This note was generated with the assistance of ambient listening technology. I attest to having reviewed and edited the generated note for accuracy, though some syntax or spelling errors may persist. Please contact the author of this note for any clarification.     35 minutes of total time spent on the encounter, which includes face to face time and non-face to face time preparing to see the patient (eg, review of tests), Obtaining and/or reviewing separately obtained history, Documenting clinical information in the electronic or other health record, Independently interpreting results (not separately reported) and communicating results to the patient or Care coordination (not separately reported).      Oumou Zuniga MD  Internal Medicine   [facility]                       [1]   Outpatient Encounter Medications as of 7/1/2025   Medication Sig Dispense Refill    atorvastatin (LIPITOR) 20 MG tablet Take 1 tablet (20 mg total) by mouth once daily. 90 tablet 2    BD ULTRA-FINE SHORT PEN NEEDLE 31 gauge x 5/16" Ndle USE TO INJECT INSULIN INTO THE SKIN ONCE DAILY 100 each 3    blood sugar diagnostic Strp 1 strip by Misc.(Non-Drug; Combo Route) route once daily. 100 each 11    blood-glucose meter kit Use as instructed 1 each 0    glipiZIDE 5 MG TR24 Take 1 tablet (5 mg total) by mouth daily with breakfast. 90 tablet 3    insulin glargine U-100, Lantus, (LANTUS SOLOSTAR U-100 INSULIN) 100 unit/mL (3 mL) InPn pen Inject 16 Units into the skin every morning then take 20 Units at bedtime 30 mL 1    insulin lispro 100 unit/mL injection " "Inject 5 Units into the skin 3 (three) times daily before meals. 15 mL 3    lisinopriL (PRINIVIL,ZESTRIL) 40 MG tablet Take 1 tablet (40 mg total) by mouth once daily. 90 tablet 1    metFORMIN (GLUCOPHAGE-XR) 750 MG ER 24hr tablet TAKE 2 TABLETS(1500 MG) BY MOUTH DAILY WITH BREAKFAST 180 tablet 1    metoprolol succinate (TOPROL-XL) 100 MG 24 hr tablet Take 1 tablet (100 mg total) by mouth once daily. 90 tablet 3    multivitamin (THERAGRAN) per tablet Take 1 tablet by mouth.      tirzepatide (MOUNJARO) 7.5 mg/0.5 mL PnIj Inject 7.5 mg into the skin every 7 days. 12 Pen 3    [DISCONTINUED] pen needle, diabetic 31 gauge x 5/16" Ndle Use to inject insulin into the skin. 100 each 11     No facility-administered encounter medications on file as of 7/1/2025.     "

## (undated) DEVICE — ELECTRODE REM PLYHSV RETURN 9

## (undated) DEVICE — DRESSING N ADH OIL EMUL 3X8

## (undated) DEVICE — DRAPE U SPLIT SHEET 54X76IN

## (undated) DEVICE — TOWEL OR DISP STRL BLUE 4/PK

## (undated) DEVICE — TRAY MINOR ORTHO

## (undated) DEVICE — DRAPE THREE-QTR REINF 53X77IN

## (undated) DEVICE — DRAPE HIP TIBURON 87X115X134

## (undated) DEVICE — DRAPE TOP 53X102IN

## (undated) DEVICE — DRESSING TRANS 4X4 TEGADERM

## (undated) DEVICE — DRAPE IOBAN 2 STERI

## (undated) DEVICE — SUT CTD VICRYL 0 UND BR CT

## (undated) DEVICE — SUT 2-0 VICRYL / CT-1

## (undated) DEVICE — DRESSING ADAPTIC N ADH 3X8IN

## (undated) DEVICE — DRAPE STERI U-SHAPED 47X51IN

## (undated) DEVICE — SUT VICRYL PLUS 0 CT1 18IN

## (undated) DEVICE — DRAIN CHANNEL ROUND 15FR

## (undated) DEVICE — EVACUATOR WOUND BULB 100CC

## (undated) DEVICE — SOL IRR NACL .9% 3000ML

## (undated) DEVICE — SPONGE LAP 18X18 PREWASHED

## (undated) DEVICE — APPLICATOR CHLORAPREP ORN 26ML

## (undated) DEVICE — SUT VICRYL CT-1 2-0 27IN

## (undated) DEVICE — GAUZE SPONGE 4X4 12PLY

## (undated) DEVICE — DRESSING XEROFORM FOIL PK 1X8

## (undated) DEVICE — DRAPE THREE-QUARTER 53X77IN

## (undated) DEVICE — DRAPE STERI INSTRUMENT 1018

## (undated) DEVICE — TAPE SURG DURAPORE 2 X10YD

## (undated) DEVICE — SET IRR URLGY 2LINE UNIV SPIKE

## (undated) DEVICE — BNDG COFLEX FOAM LF2 ST 6X5YD

## (undated) DEVICE — TIP YANKAUERS BULB NO VENT

## (undated) DEVICE — KIT COLLECTION E SWAB REGULAR